# Patient Record
Sex: FEMALE | Race: WHITE | NOT HISPANIC OR LATINO | Employment: OTHER | URBAN - METROPOLITAN AREA
[De-identification: names, ages, dates, MRNs, and addresses within clinical notes are randomized per-mention and may not be internally consistent; named-entity substitution may affect disease eponyms.]

---

## 2017-03-20 ENCOUNTER — TRANSCRIBE ORDERS (OUTPATIENT)
Dept: ADMINISTRATIVE | Facility: HOSPITAL | Age: 62
End: 2017-03-20

## 2017-03-20 DIAGNOSIS — R79.1 ABNORMAL BLEEDING TIME: Primary | ICD-10-CM

## 2017-03-21 ENCOUNTER — HOSPITAL ENCOUNTER (OUTPATIENT)
Dept: RADIOLOGY | Facility: HOSPITAL | Age: 62
Discharge: HOME | End: 2017-03-21
Attending: OBSTETRICS & GYNECOLOGY
Payer: COMMERCIAL

## 2017-03-21 DIAGNOSIS — R79.1 ABNORMAL BLEEDING TIME: ICD-10-CM

## 2017-03-21 PROCEDURE — 76830 TRANSVAGINAL US NON-OB: CPT

## 2017-03-21 PROCEDURE — 76856 US EXAM PELVIC COMPLETE: CPT

## 2017-03-27 ENCOUNTER — GENERIC CONVERSION - ENCOUNTER (OUTPATIENT)
Dept: OTHER | Facility: OTHER | Age: 62
End: 2017-03-27

## 2017-03-30 ENCOUNTER — APPOINTMENT (OUTPATIENT)
Dept: LAB | Facility: HOSPITAL | Age: 62
End: 2017-03-30
Attending: INTERNAL MEDICINE
Payer: COMMERCIAL

## 2017-03-30 ENCOUNTER — TRANSCRIBE ORDERS (OUTPATIENT)
Dept: ADMINISTRATIVE | Facility: HOSPITAL | Age: 62
End: 2017-03-30

## 2017-03-30 ENCOUNTER — HOSPITAL ENCOUNTER (OUTPATIENT)
Dept: RADIOLOGY | Facility: HOSPITAL | Age: 62
Discharge: HOME | End: 2017-03-30
Payer: COMMERCIAL

## 2017-03-30 DIAGNOSIS — I10 ESSENTIAL HYPERTENSION, MALIGNANT: Primary | ICD-10-CM

## 2017-03-30 DIAGNOSIS — I10 ESSENTIAL HYPERTENSION, MALIGNANT: ICD-10-CM

## 2017-03-30 LAB
ATRIAL RATE: 72 BPM
P AXIS: 58 DEGREES
PR INTERVAL: 132 MS
QRS AXIS: 45 DEGREES
QRSD INTERVAL: 120 MS
QT INTERVAL: 432 MS
QTC INTERVAL: 473 MS
T WAVE AXIS: 42 DEGREES
VENIPUNCTURE: NORMAL
VENTRICULAR RATE: 72 BPM

## 2017-03-30 PROCEDURE — 71020 HB CHEST X-RAY 2VW FRONTAL&LATL: CPT

## 2017-03-30 PROCEDURE — 36415 COLL VENOUS BLD VENIPUNCTURE: CPT

## 2017-03-30 PROCEDURE — 93005 ELECTROCARDIOGRAM TRACING: CPT

## 2017-04-04 ENCOUNTER — ANESTHESIA EVENT (OUTPATIENT)
Dept: PERIOP | Facility: AMBULARY SURGERY CENTER | Age: 62
End: 2017-04-04
Payer: COMMERCIAL

## 2017-04-05 ENCOUNTER — ANESTHESIA (OUTPATIENT)
Dept: PERIOP | Facility: AMBULARY SURGERY CENTER | Age: 62
End: 2017-04-05
Payer: COMMERCIAL

## 2017-04-05 ENCOUNTER — HOSPITAL ENCOUNTER (OUTPATIENT)
Facility: AMBULARY SURGERY CENTER | Age: 62
Setting detail: OUTPATIENT SURGERY
Discharge: HOME | End: 2017-04-05
Attending: OBSTETRICS & GYNECOLOGY | Admitting: OBSTETRICS & GYNECOLOGY
Payer: COMMERCIAL

## 2017-04-05 ENCOUNTER — GENERIC CONVERSION - ENCOUNTER (OUTPATIENT)
Dept: OTHER | Facility: OTHER | Age: 62
End: 2017-04-05

## 2017-04-05 VITALS
DIASTOLIC BLOOD PRESSURE: 64 MMHG | TEMPERATURE: 98 F | OXYGEN SATURATION: 95 % | SYSTOLIC BLOOD PRESSURE: 123 MMHG | HEART RATE: 88 BPM | RESPIRATION RATE: 20 BRPM

## 2017-04-05 DIAGNOSIS — N95.0 POSTMENOPAUSAL BLEEDING: ICD-10-CM

## 2017-04-05 PROCEDURE — 88305 TISSUE EXAM BY PATHOLOGIST: CPT | Performed by: OBSTETRICS & GYNECOLOGY

## 2017-04-05 RX ORDER — METOCLOPRAMIDE HYDROCHLORIDE 5 MG/ML
INJECTION INTRAMUSCULAR; INTRAVENOUS AS NEEDED
Status: DISCONTINUED | OUTPATIENT
Start: 2017-04-05 | End: 2017-04-05 | Stop reason: SURG

## 2017-04-05 RX ORDER — SODIUM CHLORIDE, SODIUM LACTATE, POTASSIUM CHLORIDE, CALCIUM CHLORIDE 600; 310; 30; 20 MG/100ML; MG/100ML; MG/100ML; MG/100ML
125 INJECTION, SOLUTION INTRAVENOUS CONTINUOUS
Status: DISCONTINUED | OUTPATIENT
Start: 2017-04-05 | End: 2017-04-05 | Stop reason: HOSPADM

## 2017-04-05 RX ORDER — OXYCODONE HYDROCHLORIDE AND ACETAMINOPHEN 5; 325 MG/1; MG/1
1 TABLET ORAL EVERY 4 HOURS PRN
Status: DISCONTINUED | OUTPATIENT
Start: 2017-04-05 | End: 2017-04-05 | Stop reason: HOSPADM

## 2017-04-05 RX ORDER — ONDANSETRON 2 MG/ML
INJECTION INTRAMUSCULAR; INTRAVENOUS AS NEEDED
Status: DISCONTINUED | OUTPATIENT
Start: 2017-04-05 | End: 2017-04-05 | Stop reason: SURG

## 2017-04-05 RX ORDER — MIDAZOLAM HYDROCHLORIDE 1 MG/ML
INJECTION INTRAMUSCULAR; INTRAVENOUS AS NEEDED
Status: DISCONTINUED | OUTPATIENT
Start: 2017-04-05 | End: 2017-04-05 | Stop reason: SURG

## 2017-04-05 RX ORDER — FENTANYL CITRATE/PF 50 MCG/ML
50 SYRINGE (ML) INJECTION
Status: DISCONTINUED | OUTPATIENT
Start: 2017-04-05 | End: 2017-04-05 | Stop reason: HOSPADM

## 2017-04-05 RX ORDER — PROPOFOL 10 MG/ML
INJECTION, EMULSION INTRAVENOUS AS NEEDED
Status: DISCONTINUED | OUTPATIENT
Start: 2017-04-05 | End: 2017-04-05 | Stop reason: SURG

## 2017-04-05 RX ORDER — KETOROLAC TROMETHAMINE 30 MG/ML
INJECTION, SOLUTION INTRAMUSCULAR; INTRAVENOUS AS NEEDED
Status: DISCONTINUED | OUTPATIENT
Start: 2017-04-05 | End: 2017-04-05 | Stop reason: SURG

## 2017-04-05 RX ORDER — SODIUM CHLORIDE, SODIUM LACTATE, POTASSIUM CHLORIDE, CALCIUM CHLORIDE 600; 310; 30; 20 MG/100ML; MG/100ML; MG/100ML; MG/100ML
125 INJECTION, SOLUTION INTRAVENOUS CONTINUOUS
Status: DISCONTINUED | OUTPATIENT
Start: 2017-04-05 | End: 2017-04-05 | Stop reason: SDUPTHER

## 2017-04-05 RX ORDER — SODIUM CHLORIDE, SODIUM LACTATE, POTASSIUM CHLORIDE, CALCIUM CHLORIDE 600; 310; 30; 20 MG/100ML; MG/100ML; MG/100ML; MG/100ML
50 INJECTION, SOLUTION INTRAVENOUS CONTINUOUS
Status: DISCONTINUED | OUTPATIENT
Start: 2017-04-05 | End: 2017-04-05 | Stop reason: SDUPTHER

## 2017-04-05 RX ORDER — FENTANYL CITRATE 50 UG/ML
INJECTION, SOLUTION INTRAMUSCULAR; INTRAVENOUS AS NEEDED
Status: DISCONTINUED | OUTPATIENT
Start: 2017-04-05 | End: 2017-04-05 | Stop reason: SURG

## 2017-04-05 RX ADMIN — SODIUM CHLORIDE, SODIUM LACTATE, POTASSIUM CHLORIDE, AND CALCIUM CHLORIDE 125 ML/HR: .6; .31; .03; .02 INJECTION, SOLUTION INTRAVENOUS at 08:22

## 2017-04-05 RX ADMIN — FAMOTIDINE 20 MG: 10 INJECTION, SOLUTION INTRAVENOUS at 09:28

## 2017-04-05 RX ADMIN — DEXAMETHASONE SODIUM PHOSPHATE 10 MG: 10 INJECTION INTRAMUSCULAR; INTRAVENOUS at 09:29

## 2017-04-05 RX ADMIN — ONDANSETRON 4 MG: 2 INJECTION INTRAMUSCULAR; INTRAVENOUS at 09:39

## 2017-04-05 RX ADMIN — KETOROLAC TROMETHAMINE 30 MG: 30 INJECTION, SOLUTION INTRAMUSCULAR at 09:39

## 2017-04-05 RX ADMIN — MIDAZOLAM HYDROCHLORIDE 2 MG: 1 INJECTION, SOLUTION INTRAMUSCULAR; INTRAVENOUS at 09:17

## 2017-04-05 RX ADMIN — FENTANYL CITRATE 50 MCG: 50 INJECTION, SOLUTION INTRAMUSCULAR; INTRAVENOUS at 09:49

## 2017-04-05 RX ADMIN — FENTANYL CITRATE 50 MCG: 50 INJECTION, SOLUTION INTRAMUSCULAR; INTRAVENOUS at 09:29

## 2017-04-05 RX ADMIN — PROPOFOL 200 MG: 10 INJECTION, EMULSION INTRAVENOUS at 09:22

## 2017-04-05 RX ADMIN — METOCLOPRAMIDE HYDROCHLORIDE 10 MG: 5 INJECTION INTRAMUSCULAR; INTRAVENOUS at 09:28

## 2017-04-13 ENCOUNTER — ALLSCRIPTS OFFICE VISIT (OUTPATIENT)
Dept: OTHER | Facility: OTHER | Age: 62
End: 2017-04-13

## 2017-04-13 DIAGNOSIS — C54.1 MALIGNANT NEOPLASM OF ENDOMETRIUM (HCC): ICD-10-CM

## 2017-04-14 ENCOUNTER — GENERIC CONVERSION - ENCOUNTER (OUTPATIENT)
Dept: OTHER | Facility: OTHER | Age: 62
End: 2017-04-14

## 2017-04-21 ENCOUNTER — APPOINTMENT (OUTPATIENT)
Dept: LAB | Facility: HOSPITAL | Age: 62
End: 2017-04-21
Attending: OBSTETRICS & GYNECOLOGY
Payer: COMMERCIAL

## 2017-04-21 ENCOUNTER — TRANSCRIBE ORDERS (OUTPATIENT)
Dept: ADMINISTRATIVE | Facility: HOSPITAL | Age: 62
End: 2017-04-21

## 2017-04-21 DIAGNOSIS — C54.1 MALIGNANT NEOPLASM OF ENDOMETRIUM (HCC): ICD-10-CM

## 2017-04-21 DIAGNOSIS — C54.1 ENDOMETRIAL SARCOMA (HCC): ICD-10-CM

## 2017-04-21 DIAGNOSIS — Z01.812 PRE-OPERATIVE LABORATORY EXAMINATION: ICD-10-CM

## 2017-04-21 DIAGNOSIS — Z01.812 PRE-OPERATIVE LABORATORY EXAMINATION: Primary | ICD-10-CM

## 2017-04-21 LAB — VENIPUNCTURE: NORMAL

## 2017-04-21 PROCEDURE — 36415 COLL VENOUS BLD VENIPUNCTURE: CPT

## 2017-04-21 PROCEDURE — 86901 BLOOD TYPING SEROLOGIC RH(D): CPT

## 2017-04-21 PROCEDURE — 86900 BLOOD TYPING SEROLOGIC ABO: CPT

## 2017-04-21 PROCEDURE — 36415 COLL VENOUS BLD VENIPUNCTURE: CPT | Performed by: OBSTETRICS & GYNECOLOGY

## 2017-04-21 PROCEDURE — 86850 RBC ANTIBODY SCREEN: CPT

## 2017-04-24 ENCOUNTER — LAB CONVERSION - ENCOUNTER (OUTPATIENT)
Dept: OTHER | Facility: OTHER | Age: 62
End: 2017-04-24

## 2017-04-24 LAB
A/G RATIO (HISTORICAL): 1.5 (CALC) (ref 1–2.5)
ABO GROUP BLD: NORMAL
ALBUMIN SERPL BCP-MCNC: 4.1 G/DL (ref 3.6–5.1)
ALP SERPL-CCNC: 67 U/L (ref 33–130)
ALT SERPL W P-5'-P-CCNC: 25 U/L (ref 6–29)
AST SERPL W P-5'-P-CCNC: 26 U/L (ref 10–35)
BASOPHILS # BLD AUTO: 0.3 %
BASOPHILS # BLD AUTO: 20 CELLS/UL (ref 0–200)
BILIRUB SERPL-MCNC: 0.5 MG/DL (ref 0.2–1.2)
BUN SERPL-MCNC: 33 MG/DL (ref 7–25)
BUN/CREA RATIO (HISTORICAL): 20 (CALC) (ref 6–22)
CALCIUM SERPL-MCNC: 9 MG/DL (ref 8.6–10.4)
CHLORIDE SERPL-SCNC: 103 MMOL/L (ref 98–110)
CO2 SERPL-SCNC: 19 MMOL/L (ref 20–31)
CREAT SERPL-MCNC: 1.67 MG/DL (ref 0.5–0.99)
DEPRECATED RDW RBC AUTO: 14.1 % (ref 11–15)
EGFR AFRICAN AMERICAN (HISTORICAL): 37 ML/MIN/1.73M2
EGFR-AMERICAN CALC (HISTORICAL): 32 ML/MIN/1.73M2
EOSINOPHIL # BLD AUTO: 1.9 %
EOSINOPHIL # BLD AUTO: 127 CELLS/UL (ref 15–500)
GAMMA GLOBULIN (HISTORICAL): 2.8 G/DL (CALC) (ref 1.9–3.7)
GLUCOSE (HISTORICAL): 130 MG/DL (ref 65–99)
HBA1C MFR BLD HPLC: 6.7 % OF TOTAL HGB
HCT VFR BLD AUTO: 35.8 % (ref 35–45)
HGB BLD-MCNC: 12.3 G/DL (ref 11.7–15.5)
LYMPHOCYTES # BLD AUTO: 1695 CELLS/UL (ref 850–3900)
LYMPHOCYTES # BLD AUTO: 25.3 %
MCH RBC QN AUTO: 30.9 PG (ref 27–33)
MCHC RBC AUTO-ENTMCNC: 34.5 G/DL (ref 32–36)
MCV RBC AUTO: 89.8 FL (ref 80–100)
MONOCYTES # BLD AUTO: 362 CELLS/UL (ref 200–950)
MONOCYTES (HISTORICAL): 5.4 %
NEUTROPHILS # BLD AUTO: 4496 CELLS/UL (ref 1500–7800)
NEUTROPHILS # BLD AUTO: 67.1 %
PLATELET # BLD AUTO: 179 THOUSAND/UL (ref 140–400)
PMV BLD AUTO: 7.5 FL (ref 7.5–12.5)
POTASSIUM SERPL-SCNC: 4.9 MMOL/L (ref 3.5–5.3)
RBC # BLD AUTO: 3.98 MILLION/UL (ref 3.8–5.1)
RH BLD: NORMAL
SODIUM SERPL-SCNC: 134 MMOL/L (ref 135–146)
TOTAL PROTEIN (HISTORICAL): 6.9 G/DL (ref 6.1–8.1)
WBC # BLD AUTO: 6.7 THOUSAND/UL (ref 3.8–10.8)

## 2017-04-26 LAB
ABO GROUP BLD: NORMAL
BLD GP AB SCN SERPL QL: NEGATIVE
RH BLD: NEGATIVE
SPECIMEN EXPIRATION DATE: NORMAL

## 2017-05-01 RX ORDER — TRIAMTERENE AND HYDROCHLOROTHIAZIDE 37.5; 25 MG/1; MG/1
1 TABLET ORAL DAILY
COMMUNITY
End: 2020-01-09

## 2017-05-01 RX ORDER — ROSUVASTATIN CALCIUM 20 MG/1
20 TABLET, COATED ORAL
COMMUNITY
End: 2020-03-27 | Stop reason: SDUPTHER

## 2017-05-03 ENCOUNTER — ANESTHESIA EVENT (OUTPATIENT)
Dept: PERIOP | Facility: HOSPITAL | Age: 62
End: 2017-05-03
Payer: COMMERCIAL

## 2017-05-04 ENCOUNTER — ANESTHESIA (OUTPATIENT)
Dept: PERIOP | Facility: HOSPITAL | Age: 62
End: 2017-05-04
Payer: COMMERCIAL

## 2017-05-04 ENCOUNTER — HOSPITAL ENCOUNTER (OUTPATIENT)
Facility: HOSPITAL | Age: 62
Setting detail: OUTPATIENT SURGERY
Discharge: HOME | End: 2017-05-06
Attending: OBSTETRICS & GYNECOLOGY | Admitting: OBSTETRICS & GYNECOLOGY
Payer: COMMERCIAL

## 2017-05-04 DIAGNOSIS — C54.1 MALIGNANT NEOPLASM OF ENDOMETRIUM (HCC): ICD-10-CM

## 2017-05-04 PROBLEM — IMO0002 UNCONTROLLED TYPE 2 DIABETES MELLITUS WITH NEPHROPATHY: Status: ACTIVE | Noted: 2017-05-04

## 2017-05-04 PROBLEM — I10 HYPERTENSION: Chronic | Status: ACTIVE | Noted: 2017-05-04

## 2017-05-04 PROBLEM — E66.9 OBESITY: Chronic | Status: ACTIVE | Noted: 2017-05-04

## 2017-05-04 PROBLEM — E78.5 HYPERLIPIDEMIA: Chronic | Status: ACTIVE | Noted: 2017-05-04

## 2017-05-04 PROBLEM — I82.409 DEEP VEIN THROMBOSIS (DVT) DURING CURRENT HOSPITALIZATION (HCC): Status: ACTIVE | Noted: 2017-05-04

## 2017-05-04 LAB
GLUCOSE SERPL-MCNC: 102 MG/DL (ref 65–140)
GLUCOSE SERPL-MCNC: 162 MG/DL (ref 65–140)
GLUCOSE SERPL-MCNC: 297 MG/DL (ref 65–140)

## 2017-05-04 PROCEDURE — 88112 CYTOPATH CELL ENHANCE TECH: CPT | Performed by: OBSTETRICS & GYNECOLOGY

## 2017-05-04 PROCEDURE — 82948 REAGENT STRIP/BLOOD GLUCOSE: CPT

## 2017-05-04 PROCEDURE — 88309 TISSUE EXAM BY PATHOLOGIST: CPT | Performed by: OBSTETRICS & GYNECOLOGY

## 2017-05-04 PROCEDURE — 88307 TISSUE EXAM BY PATHOLOGIST: CPT | Performed by: OBSTETRICS & GYNECOLOGY

## 2017-05-04 PROCEDURE — 88363 XM ARCHIVE TISSUE MOLEC ANAL: CPT | Performed by: OBSTETRICS & GYNECOLOGY

## 2017-05-04 RX ORDER — LIDOCAINE HYDROCHLORIDE 10 MG/ML
INJECTION, SOLUTION INFILTRATION; PERINEURAL AS NEEDED
Status: DISCONTINUED | OUTPATIENT
Start: 2017-05-04 | End: 2017-05-04 | Stop reason: SURG

## 2017-05-04 RX ORDER — GLYCOPYRROLATE 0.2 MG/ML
INJECTION INTRAMUSCULAR; INTRAVENOUS AS NEEDED
Status: DISCONTINUED | OUTPATIENT
Start: 2017-05-04 | End: 2017-05-04 | Stop reason: SURG

## 2017-05-04 RX ORDER — OXYCODONE HYDROCHLORIDE 5 MG/1
5 TABLET ORAL EVERY 4 HOURS PRN
Status: DISCONTINUED | OUTPATIENT
Start: 2017-05-04 | End: 2017-05-06 | Stop reason: HOSPADM

## 2017-05-04 RX ORDER — CLONAZEPAM 0.5 MG/1
0.5 TABLET ORAL
Status: DISCONTINUED | OUTPATIENT
Start: 2017-05-05 | End: 2017-05-06 | Stop reason: HOSPADM

## 2017-05-04 RX ORDER — ONDANSETRON 2 MG/ML
4 INJECTION INTRAMUSCULAR; INTRAVENOUS EVERY 6 HOURS PRN
Status: DISCONTINUED | OUTPATIENT
Start: 2017-05-04 | End: 2017-05-06 | Stop reason: HOSPADM

## 2017-05-04 RX ORDER — SODIUM CHLORIDE, SODIUM LACTATE, POTASSIUM CHLORIDE, CALCIUM CHLORIDE 600; 310; 30; 20 MG/100ML; MG/100ML; MG/100ML; MG/100ML
20 INJECTION, SOLUTION INTRAVENOUS CONTINUOUS
Status: DISCONTINUED | OUTPATIENT
Start: 2017-05-04 | End: 2017-05-04

## 2017-05-04 RX ORDER — BUPROPION HYDROCHLORIDE 100 MG/1
200 TABLET, EXTENDED RELEASE ORAL EVERY MORNING
Status: DISCONTINUED | OUTPATIENT
Start: 2017-05-05 | End: 2017-05-06 | Stop reason: HOSPADM

## 2017-05-04 RX ORDER — OXYCODONE HYDROCHLORIDE 10 MG/1
10 TABLET ORAL EVERY 6 HOURS PRN
Status: DISCONTINUED | OUTPATIENT
Start: 2017-05-04 | End: 2017-05-06 | Stop reason: HOSPADM

## 2017-05-04 RX ORDER — SODIUM CHLORIDE 9 MG/ML
INJECTION, SOLUTION INTRAVENOUS CONTINUOUS PRN
Status: DISCONTINUED | OUTPATIENT
Start: 2017-05-04 | End: 2017-05-04 | Stop reason: SURG

## 2017-05-04 RX ORDER — FENTANYL CITRATE/PF 50 MCG/ML
25 SYRINGE (ML) INJECTION
Status: COMPLETED | OUTPATIENT
Start: 2017-05-04 | End: 2017-05-04

## 2017-05-04 RX ORDER — HEPARIN SODIUM 5000 [USP'U]/ML
INJECTION, SOLUTION INTRAVENOUS; SUBCUTANEOUS AS NEEDED
Status: DISCONTINUED | OUTPATIENT
Start: 2017-05-04 | End: 2017-05-04 | Stop reason: SURG

## 2017-05-04 RX ORDER — MAGNESIUM HYDROXIDE 1200 MG/15ML
LIQUID ORAL AS NEEDED
Status: DISCONTINUED | OUTPATIENT
Start: 2017-05-04 | End: 2017-05-04 | Stop reason: HOSPADM

## 2017-05-04 RX ORDER — ONDANSETRON 2 MG/ML
4 INJECTION INTRAMUSCULAR; INTRAVENOUS ONCE AS NEEDED
Status: COMPLETED | OUTPATIENT
Start: 2017-05-04 | End: 2017-05-04

## 2017-05-04 RX ORDER — ZIPRASIDONE HYDROCHLORIDE 40 MG/1
80 CAPSULE ORAL 2 TIMES DAILY WITH MEALS
Status: DISCONTINUED | OUTPATIENT
Start: 2017-05-05 | End: 2017-05-06 | Stop reason: HOSPADM

## 2017-05-04 RX ORDER — BUPIVACAINE HYDROCHLORIDE 2.5 MG/ML
INJECTION, SOLUTION INFILTRATION; PERINEURAL AS NEEDED
Status: DISCONTINUED | OUTPATIENT
Start: 2017-05-04 | End: 2017-05-04 | Stop reason: HOSPADM

## 2017-05-04 RX ORDER — FENTANYL CITRATE 50 UG/ML
INJECTION, SOLUTION INTRAMUSCULAR; INTRAVENOUS AS NEEDED
Status: DISCONTINUED | OUTPATIENT
Start: 2017-05-04 | End: 2017-05-04 | Stop reason: SURG

## 2017-05-04 RX ORDER — ROCURONIUM BROMIDE 10 MG/ML
INJECTION, SOLUTION INTRAVENOUS AS NEEDED
Status: DISCONTINUED | OUTPATIENT
Start: 2017-05-04 | End: 2017-05-04 | Stop reason: SURG

## 2017-05-04 RX ORDER — PROMETHAZINE HYDROCHLORIDE 25 MG/ML
12.5 INJECTION, SOLUTION INTRAMUSCULAR; INTRAVENOUS ONCE
Status: COMPLETED | OUTPATIENT
Start: 2017-05-04 | End: 2017-05-04

## 2017-05-04 RX ORDER — SIMETHICONE 80 MG
80 TABLET,CHEWABLE ORAL EVERY 6 HOURS PRN
Status: DISCONTINUED | OUTPATIENT
Start: 2017-05-04 | End: 2017-05-06 | Stop reason: HOSPADM

## 2017-05-04 RX ORDER — ONDANSETRON 2 MG/ML
INJECTION INTRAMUSCULAR; INTRAVENOUS AS NEEDED
Status: DISCONTINUED | OUTPATIENT
Start: 2017-05-04 | End: 2017-05-04 | Stop reason: SURG

## 2017-05-04 RX ORDER — SODIUM CHLORIDE, SODIUM LACTATE, POTASSIUM CHLORIDE, CALCIUM CHLORIDE 600; 310; 30; 20 MG/100ML; MG/100ML; MG/100ML; MG/100ML
50 INJECTION, SOLUTION INTRAVENOUS CONTINUOUS
Status: DISCONTINUED | OUTPATIENT
Start: 2017-05-04 | End: 2017-05-05

## 2017-05-04 RX ORDER — MIDAZOLAM HYDROCHLORIDE 1 MG/ML
INJECTION INTRAMUSCULAR; INTRAVENOUS AS NEEDED
Status: DISCONTINUED | OUTPATIENT
Start: 2017-05-04 | End: 2017-05-04 | Stop reason: SURG

## 2017-05-04 RX ORDER — DEXTROSE, SODIUM CHLORIDE, AND POTASSIUM CHLORIDE 5; .9; .15 G/100ML; G/100ML; G/100ML
125 INJECTION INTRAVENOUS CONTINUOUS
Status: DISCONTINUED | OUTPATIENT
Start: 2017-05-04 | End: 2017-05-05

## 2017-05-04 RX ORDER — CLONAZEPAM 0.5 MG/1
0.25 TABLET ORAL DAILY
Status: DISCONTINUED | OUTPATIENT
Start: 2017-05-05 | End: 2017-05-06 | Stop reason: HOSPADM

## 2017-05-04 RX ORDER — PROPOFOL 10 MG/ML
INJECTION, EMULSION INTRAVENOUS AS NEEDED
Status: DISCONTINUED | OUTPATIENT
Start: 2017-05-04 | End: 2017-05-04 | Stop reason: SURG

## 2017-05-04 RX ORDER — ACETAMINOPHEN 325 MG/1
650 TABLET ORAL EVERY 4 HOURS PRN
Status: DISCONTINUED | OUTPATIENT
Start: 2017-05-04 | End: 2017-05-06 | Stop reason: HOSPADM

## 2017-05-04 RX ORDER — OXYBUTYNIN CHLORIDE 5 MG/1
15 TABLET, EXTENDED RELEASE ORAL EVERY MORNING
Status: DISCONTINUED | OUTPATIENT
Start: 2017-05-05 | End: 2017-05-06

## 2017-05-04 RX ORDER — DOCUSATE SODIUM 100 MG/1
100 CAPSULE, LIQUID FILLED ORAL 2 TIMES DAILY
Status: DISCONTINUED | OUTPATIENT
Start: 2017-05-04 | End: 2017-05-06 | Stop reason: HOSPADM

## 2017-05-04 RX ADMIN — DOCUSATE SODIUM 100 MG: 100 CAPSULE, LIQUID FILLED ORAL at 20:00

## 2017-05-04 RX ADMIN — FENTANYL CITRATE 50 MCG: 50 INJECTION, SOLUTION INTRAMUSCULAR; INTRAVENOUS at 14:35

## 2017-05-04 RX ADMIN — Medication 2000 MG: at 12:37

## 2017-05-04 RX ADMIN — LIDOCAINE HYDROCHLORIDE 40 MG: 10 INJECTION, SOLUTION INFILTRATION; PERINEURAL at 12:18

## 2017-05-04 RX ADMIN — PROMETHAZINE HYDROCHLORIDE 12.5 MG: 25 INJECTION INTRAMUSCULAR; INTRAVENOUS at 16:59

## 2017-05-04 RX ADMIN — SODIUM CHLORIDE, SODIUM LACTATE, POTASSIUM CHLORIDE, AND CALCIUM CHLORIDE 20 ML/HR: .6; .31; .03; .02 INJECTION, SOLUTION INTRAVENOUS at 10:56

## 2017-05-04 RX ADMIN — FENTANYL CITRATE 50 MCG: 50 INJECTION, SOLUTION INTRAMUSCULAR; INTRAVENOUS at 15:00

## 2017-05-04 RX ADMIN — FENTANYL CITRATE 25 MCG: 50 INJECTION INTRAMUSCULAR; INTRAVENOUS at 16:16

## 2017-05-04 RX ADMIN — HYDROMORPHONE HYDROCHLORIDE 0.4 MG: 1 INJECTION, SOLUTION INTRAMUSCULAR; INTRAVENOUS; SUBCUTANEOUS at 16:59

## 2017-05-04 RX ADMIN — MIDAZOLAM HYDROCHLORIDE 2 MG: 1 INJECTION, SOLUTION INTRAMUSCULAR; INTRAVENOUS at 12:10

## 2017-05-04 RX ADMIN — ROCURONIUM BROMIDE 50 MG: 10 INJECTION, SOLUTION INTRAVENOUS at 12:18

## 2017-05-04 RX ADMIN — FENTANYL CITRATE 100 MCG: 50 INJECTION, SOLUTION INTRAMUSCULAR; INTRAVENOUS at 12:18

## 2017-05-04 RX ADMIN — NEOSTIGMINE METHYLSULFATE 4 MG: 1 INJECTION INTRAMUSCULAR; INTRAVENOUS; SUBCUTANEOUS at 14:49

## 2017-05-04 RX ADMIN — FENTANYL CITRATE 25 MCG: 50 INJECTION INTRAMUSCULAR; INTRAVENOUS at 16:28

## 2017-05-04 RX ADMIN — INSULIN LISPRO 15 UNITS: 100 INJECTION, SOLUTION INTRAVENOUS; SUBCUTANEOUS at 22:01

## 2017-05-04 RX ADMIN — OXYCODONE HYDROCHLORIDE 10 MG: 10 TABLET ORAL at 23:40

## 2017-05-04 RX ADMIN — HEPARIN SODIUM 5000 UNITS: 5000 INJECTION, SOLUTION INTRAVENOUS; SUBCUTANEOUS at 12:29

## 2017-05-04 RX ADMIN — FENTANYL CITRATE 25 MCG: 50 INJECTION INTRAMUSCULAR; INTRAVENOUS at 16:06

## 2017-05-04 RX ADMIN — DEXTROSE, SODIUM CHLORIDE, AND POTASSIUM CHLORIDE 125 ML/HR: 5; .9; .15 INJECTION INTRAVENOUS at 19:15

## 2017-05-04 RX ADMIN — ONDANSETRON 4 MG: 2 INJECTION INTRAMUSCULAR; INTRAVENOUS at 16:27

## 2017-05-04 RX ADMIN — PROPOFOL 150 MG: 10 INJECTION, EMULSION INTRAVENOUS at 12:18

## 2017-05-04 RX ADMIN — DEXAMETHASONE SODIUM PHOSPHATE 4 MG: 10 INJECTION INTRAMUSCULAR; INTRAVENOUS at 12:54

## 2017-05-04 RX ADMIN — FENTANYL CITRATE 50 MCG: 50 INJECTION, SOLUTION INTRAMUSCULAR; INTRAVENOUS at 14:06

## 2017-05-04 RX ADMIN — HYDROMORPHONE HYDROCHLORIDE 0.4 MG: 1 INJECTION, SOLUTION INTRAMUSCULAR; INTRAVENOUS; SUBCUTANEOUS at 18:00

## 2017-05-04 RX ADMIN — SODIUM CHLORIDE, SODIUM LACTATE, POTASSIUM CHLORIDE, AND CALCIUM CHLORIDE: .6; .31; .03; .02 INJECTION, SOLUTION INTRAVENOUS at 12:10

## 2017-05-04 RX ADMIN — ONDANSETRON 4 MG: 2 INJECTION INTRAMUSCULAR; INTRAVENOUS at 12:54

## 2017-05-04 RX ADMIN — SODIUM CHLORIDE: 0.9 INJECTION, SOLUTION INTRAVENOUS at 12:21

## 2017-05-04 RX ADMIN — GLYCOPYRROLATE 0.6 MG: 0.2 INJECTION INTRAMUSCULAR; INTRAVENOUS at 14:49

## 2017-05-04 RX ADMIN — SERTRALINE HYDROCHLORIDE 100 MG: 50 TABLET ORAL at 20:01

## 2017-05-04 RX ADMIN — FENTANYL CITRATE 25 MCG: 50 INJECTION INTRAMUSCULAR; INTRAVENOUS at 15:43

## 2017-05-05 LAB
ANION GAP SERPL CALCULATED.3IONS-SCNC: 8 MMOL/L (ref 4–13)
BUN SERPL-MCNC: 25 MG/DL (ref 5–25)
CALCIUM SERPL-MCNC: 8.1 MG/DL (ref 8.3–10.1)
CHLORIDE SERPL-SCNC: 108 MMOL/L (ref 100–108)
CO2 SERPL-SCNC: 26 MMOL/L (ref 21–32)
CREAT SERPL-MCNC: 1.48 MG/DL (ref 0.6–1.3)
ERYTHROCYTE [DISTWIDTH] IN BLOOD BY AUTOMATED COUNT: 13.9 % (ref 11.6–15.1)
GFR SERPL CREATININE-BSD FRML MDRD: 35.6 ML/MIN/1.73SQ M
GLUCOSE SERPL-MCNC: 117 MG/DL (ref 65–140)
GLUCOSE SERPL-MCNC: 158 MG/DL (ref 65–140)
GLUCOSE SERPL-MCNC: 164 MG/DL (ref 65–140)
GLUCOSE SERPL-MCNC: 215 MG/DL (ref 65–140)
GLUCOSE SERPL-MCNC: 245 MG/DL (ref 65–140)
HCT VFR BLD AUTO: 32.1 % (ref 34.8–46.1)
HGB BLD-MCNC: 10.2 G/DL (ref 11.5–15.4)
MCH RBC QN AUTO: 30 PG (ref 26.8–34.3)
MCHC RBC AUTO-ENTMCNC: 31.8 G/DL (ref 31.4–37.4)
MCV RBC AUTO: 94 FL (ref 82–98)
PLATELET # BLD AUTO: 134 THOUSANDS/UL (ref 149–390)
PMV BLD AUTO: 8.7 FL (ref 8.9–12.7)
POTASSIUM SERPL-SCNC: 4.4 MMOL/L (ref 3.5–5.3)
RBC # BLD AUTO: 3.4 MILLION/UL (ref 3.81–5.12)
SODIUM SERPL-SCNC: 142 MMOL/L (ref 136–145)
WBC # BLD AUTO: 7.98 THOUSAND/UL (ref 4.31–10.16)

## 2017-05-05 PROCEDURE — 80048 BASIC METABOLIC PNL TOTAL CA: CPT | Performed by: OBSTETRICS & GYNECOLOGY

## 2017-05-05 PROCEDURE — 82948 REAGENT STRIP/BLOOD GLUCOSE: CPT

## 2017-05-05 PROCEDURE — 85027 COMPLETE CBC AUTOMATED: CPT | Performed by: OBSTETRICS & GYNECOLOGY

## 2017-05-05 RX ORDER — TRIAMTERENE AND HYDROCHLOROTHIAZIDE 37.5; 25 MG/1; MG/1
1 TABLET ORAL DAILY
Status: DISCONTINUED | OUTPATIENT
Start: 2017-05-06 | End: 2017-05-06 | Stop reason: HOSPADM

## 2017-05-05 RX ORDER — DOCUSATE SODIUM 100 MG/1
100 CAPSULE, LIQUID FILLED ORAL 2 TIMES DAILY PRN
Qty: 60 CAPSULE | Refills: 0
Start: 2017-05-05 | End: 2019-11-12

## 2017-05-05 RX ORDER — SIMETHICONE 80 MG
80 TABLET,CHEWABLE ORAL EVERY 6 HOURS PRN
Status: DISCONTINUED | OUTPATIENT
Start: 2017-05-05 | End: 2017-05-05 | Stop reason: SDUPTHER

## 2017-05-05 RX ORDER — GLIMEPIRIDE 2 MG/1
4 TABLET ORAL
Status: DISCONTINUED | OUTPATIENT
Start: 2017-05-06 | End: 2017-05-06 | Stop reason: HOSPADM

## 2017-05-05 RX ORDER — INSULIN GLARGINE 100 [IU]/ML
65 INJECTION, SOLUTION SUBCUTANEOUS EVERY MORNING
Status: DISCONTINUED | OUTPATIENT
Start: 2017-05-05 | End: 2017-05-06 | Stop reason: HOSPADM

## 2017-05-05 RX ORDER — ACETAMINOPHEN 325 MG/1
TABLET ORAL
Qty: 30 TABLET | Refills: 0
Start: 2017-05-05 | End: 2018-06-28 | Stop reason: HOSPADM

## 2017-05-05 RX ORDER — HEPARIN SODIUM 5000 [USP'U]/ML
5000 INJECTION, SOLUTION INTRAVENOUS; SUBCUTANEOUS EVERY 8 HOURS SCHEDULED
Status: DISCONTINUED | OUTPATIENT
Start: 2017-05-05 | End: 2017-05-06 | Stop reason: HOSPADM

## 2017-05-05 RX ORDER — LISINOPRIL 5 MG/1
5 TABLET ORAL EVERY MORNING
Status: DISCONTINUED | OUTPATIENT
Start: 2017-05-06 | End: 2017-05-06 | Stop reason: HOSPADM

## 2017-05-05 RX ORDER — ATORVASTATIN CALCIUM 40 MG/1
40 TABLET, FILM COATED ORAL
Status: DISCONTINUED | OUTPATIENT
Start: 2017-05-05 | End: 2017-05-06 | Stop reason: HOSPADM

## 2017-05-05 RX ADMIN — ZIPRASIDONE HYDROCHLORIDE 80 MG: 40 CAPSULE ORAL at 10:05

## 2017-05-05 RX ADMIN — INSULIN LISPRO 3 UNITS: 100 INJECTION, SOLUTION INTRAVENOUS; SUBCUTANEOUS at 20:22

## 2017-05-05 RX ADMIN — OXYCODONE HYDROCHLORIDE 5 MG: 5 TABLET ORAL at 11:01

## 2017-05-05 RX ADMIN — OXYBUTYNIN CHLORIDE 15 MG: 5 TABLET, EXTENDED RELEASE ORAL at 10:04

## 2017-05-05 RX ADMIN — INSULIN GLARGINE 65 UNITS: 100 INJECTION, SOLUTION SUBCUTANEOUS at 09:54

## 2017-05-05 RX ADMIN — DOCUSATE SODIUM 100 MG: 100 CAPSULE, LIQUID FILLED ORAL at 17:19

## 2017-05-05 RX ADMIN — DOCUSATE SODIUM 100 MG: 100 CAPSULE, LIQUID FILLED ORAL at 10:04

## 2017-05-05 RX ADMIN — BUPROPION HYDROCHLORIDE 200 MG: 100 TABLET, EXTENDED RELEASE ORAL at 10:03

## 2017-05-05 RX ADMIN — OXYCODONE HYDROCHLORIDE 10 MG: 10 TABLET ORAL at 06:26

## 2017-05-05 RX ADMIN — DEXTROSE, SODIUM CHLORIDE, AND POTASSIUM CHLORIDE 125 ML/HR: 5; .9; .15 INJECTION INTRAVENOUS at 02:38

## 2017-05-05 RX ADMIN — ATORVASTATIN CALCIUM 40 MG: 40 TABLET, FILM COATED ORAL at 17:19

## 2017-05-05 RX ADMIN — SERTRALINE HYDROCHLORIDE 100 MG: 50 TABLET ORAL at 17:19

## 2017-05-05 RX ADMIN — CLONAZEPAM 0.25 MG: 0.5 TABLET ORAL at 10:04

## 2017-05-05 RX ADMIN — ZIPRASIDONE HYDROCHLORIDE 80 MG: 40 CAPSULE ORAL at 17:19

## 2017-05-05 RX ADMIN — INSULIN LISPRO 4 UNITS: 100 INJECTION, SOLUTION INTRAVENOUS; SUBCUTANEOUS at 09:52

## 2017-05-05 RX ADMIN — SERTRALINE HYDROCHLORIDE 100 MG: 50 TABLET ORAL at 10:04

## 2017-05-05 RX ADMIN — CLONAZEPAM 0.5 MG: 0.5 TABLET ORAL at 14:33

## 2017-05-05 RX ADMIN — HEPARIN SODIUM 5000 UNITS: 5000 INJECTION, SOLUTION INTRAVENOUS; SUBCUTANEOUS at 09:59

## 2017-05-05 RX ADMIN — HEPARIN SODIUM 5000 UNITS: 5000 INJECTION, SOLUTION INTRAVENOUS; SUBCUTANEOUS at 14:34

## 2017-05-06 VITALS
WEIGHT: 208.11 LBS | TEMPERATURE: 98.6 F | OXYGEN SATURATION: 91 % | DIASTOLIC BLOOD PRESSURE: 59 MMHG | RESPIRATION RATE: 20 BRPM | SYSTOLIC BLOOD PRESSURE: 105 MMHG | HEART RATE: 84 BPM | BODY MASS INDEX: 38.3 KG/M2 | HEIGHT: 62 IN

## 2017-05-06 PROBLEM — I82.409 DEEP VEIN THROMBOSIS (DVT) DURING CURRENT HOSPITALIZATION (HCC): Status: RESOLVED | Noted: 2017-05-06 | Resolved: 2017-05-06

## 2017-05-06 PROBLEM — I82.409 DEEP VEIN THROMBOSIS (DVT) DURING CURRENT HOSPITALIZATION (HCC): Status: ACTIVE | Noted: 2017-05-06

## 2017-05-06 PROBLEM — I82.409 DEEP VEIN THROMBOSIS (DVT) DURING CURRENT HOSPITALIZATION (HCC): Status: RESOLVED | Noted: 2017-05-04 | Resolved: 2017-05-06

## 2017-05-06 LAB
ANION GAP SERPL CALCULATED.3IONS-SCNC: 5 MMOL/L (ref 4–13)
BUN SERPL-MCNC: 22 MG/DL (ref 5–25)
CALCIUM SERPL-MCNC: 8.7 MG/DL (ref 8.3–10.1)
CHLORIDE SERPL-SCNC: 104 MMOL/L (ref 100–108)
CO2 SERPL-SCNC: 27 MMOL/L (ref 21–32)
CREAT SERPL-MCNC: 1.44 MG/DL (ref 0.6–1.3)
ERYTHROCYTE [DISTWIDTH] IN BLOOD BY AUTOMATED COUNT: 13.9 % (ref 11.6–15.1)
GFR SERPL CREATININE-BSD FRML MDRD: 36.8 ML/MIN/1.73SQ M
GLUCOSE SERPL-MCNC: 102 MG/DL (ref 65–140)
GLUCOSE SERPL-MCNC: 138 MG/DL (ref 65–140)
GLUCOSE SERPL-MCNC: 152 MG/DL (ref 65–140)
HCT VFR BLD AUTO: 35.9 % (ref 34.8–46.1)
HGB BLD-MCNC: 11.5 G/DL (ref 11.5–15.4)
MCH RBC QN AUTO: 30.3 PG (ref 26.8–34.3)
MCHC RBC AUTO-ENTMCNC: 32 G/DL (ref 31.4–37.4)
MCV RBC AUTO: 95 FL (ref 82–98)
PLATELET # BLD AUTO: 150 THOUSANDS/UL (ref 149–390)
PMV BLD AUTO: 8.6 FL (ref 8.9–12.7)
POTASSIUM SERPL-SCNC: 4.3 MMOL/L (ref 3.5–5.3)
RBC # BLD AUTO: 3.79 MILLION/UL (ref 3.81–5.12)
SODIUM SERPL-SCNC: 136 MMOL/L (ref 136–145)
WBC # BLD AUTO: 9.39 THOUSAND/UL (ref 4.31–10.16)

## 2017-05-06 PROCEDURE — 85027 COMPLETE CBC AUTOMATED: CPT | Performed by: OBSTETRICS & GYNECOLOGY

## 2017-05-06 PROCEDURE — 82948 REAGENT STRIP/BLOOD GLUCOSE: CPT

## 2017-05-06 PROCEDURE — 80048 BASIC METABOLIC PNL TOTAL CA: CPT | Performed by: OBSTETRICS & GYNECOLOGY

## 2017-05-06 RX ADMIN — DOCUSATE SODIUM 100 MG: 100 CAPSULE, LIQUID FILLED ORAL at 09:49

## 2017-05-06 RX ADMIN — ZIPRASIDONE HYDROCHLORIDE 80 MG: 40 CAPSULE ORAL at 09:49

## 2017-05-06 RX ADMIN — LISINOPRIL 5 MG: 5 TABLET ORAL at 09:49

## 2017-05-06 RX ADMIN — HEPARIN SODIUM 5000 UNITS: 5000 INJECTION, SOLUTION INTRAVENOUS; SUBCUTANEOUS at 00:15

## 2017-05-06 RX ADMIN — TRIAMTERENE AND HYDROCHLOROTHIAZIDE 1 TABLET: 37.5; 25 TABLET ORAL at 09:49

## 2017-05-06 RX ADMIN — INSULIN GLARGINE 65 UNITS: 100 INJECTION, SOLUTION SUBCUTANEOUS at 09:48

## 2017-05-06 RX ADMIN — SERTRALINE HYDROCHLORIDE 100 MG: 50 TABLET ORAL at 09:49

## 2017-05-06 RX ADMIN — HEPARIN SODIUM 5000 UNITS: 5000 INJECTION, SOLUTION INTRAVENOUS; SUBCUTANEOUS at 05:05

## 2017-05-06 RX ADMIN — BUPROPION HYDROCHLORIDE 200 MG: 100 TABLET, EXTENDED RELEASE ORAL at 09:48

## 2017-05-06 RX ADMIN — CLONAZEPAM 0.25 MG: 0.5 TABLET ORAL at 09:48

## 2017-05-06 RX ADMIN — GLIMEPIRIDE 4 MG: 2 TABLET ORAL at 09:48

## 2017-05-16 ENCOUNTER — TRANSCRIBE ORDERS (OUTPATIENT)
Dept: ADMINISTRATIVE | Facility: HOSPITAL | Age: 62
End: 2017-05-16

## 2017-05-16 ENCOUNTER — ALLSCRIPTS OFFICE VISIT (OUTPATIENT)
Dept: OTHER | Facility: OTHER | Age: 62
End: 2017-05-16

## 2017-05-16 DIAGNOSIS — C54.1 ENDOMETRIAL SARCOMA (HCC): Primary | ICD-10-CM

## 2017-06-09 ENCOUNTER — ALLSCRIPTS OFFICE VISIT (OUTPATIENT)
Dept: OTHER | Facility: OTHER | Age: 62
End: 2017-06-09

## 2017-06-13 ENCOUNTER — HOSPITAL ENCOUNTER (OUTPATIENT)
Dept: CT IMAGING | Facility: HOSPITAL | Age: 62
Discharge: HOME | End: 2017-06-13
Payer: COMMERCIAL

## 2017-06-13 DIAGNOSIS — C54.1 ENDOMETRIAL SARCOMA (HCC): ICD-10-CM

## 2017-06-13 PROCEDURE — 71250 CT THORAX DX C-: CPT

## 2017-06-13 PROCEDURE — 74176 CT ABD & PELVIS W/O CONTRAST: CPT

## 2017-06-16 ENCOUNTER — ALLSCRIPTS OFFICE VISIT (OUTPATIENT)
Dept: OTHER | Facility: OTHER | Age: 62
End: 2017-06-16

## 2017-06-26 ENCOUNTER — GENERIC CONVERSION - ENCOUNTER (OUTPATIENT)
Dept: OTHER | Facility: OTHER | Age: 62
End: 2017-06-26

## 2017-06-27 ENCOUNTER — APPOINTMENT (OUTPATIENT)
Dept: RADIATION ONCOLOGY | Facility: HOSPITAL | Age: 62
End: 2017-06-27
Attending: RADIOLOGY
Payer: COMMERCIAL

## 2017-06-27 PROCEDURE — 99215 OFFICE O/P EST HI 40 MIN: CPT | Performed by: RADIOLOGY

## 2017-07-03 ENCOUNTER — APPOINTMENT (OUTPATIENT)
Dept: RADIATION ONCOLOGY | Facility: CLINIC | Age: 62
End: 2017-07-03
Attending: RADIOLOGY
Payer: COMMERCIAL

## 2017-07-03 PROCEDURE — 77334 RADIATION TREATMENT AID(S): CPT | Performed by: RADIOLOGY

## 2017-07-03 PROCEDURE — 77290 THER RAD SIMULAJ FIELD CPLX: CPT | Performed by: RADIOLOGY

## 2017-07-11 ENCOUNTER — APPOINTMENT (OUTPATIENT)
Dept: RADIATION ONCOLOGY | Facility: HOSPITAL | Age: 62
End: 2017-07-11
Payer: COMMERCIAL

## 2017-07-11 PROCEDURE — 77300 RADIATION THERAPY DOSE PLAN: CPT | Performed by: RADIOLOGY

## 2017-07-11 PROCEDURE — 77338 DESIGN MLC DEVICE FOR IMRT: CPT | Performed by: RADIOLOGY

## 2017-07-11 PROCEDURE — 77301 RADIOTHERAPY DOSE PLAN IMRT: CPT | Performed by: RADIOLOGY

## 2017-07-18 PROCEDURE — 77280 THER RAD SIMULAJ FIELD SMPL: CPT | Performed by: RADIOLOGY

## 2017-07-19 PROCEDURE — 77386 HB NTSTY MODUL RAD TX DLVR CPLX: CPT | Performed by: RADIOLOGY

## 2017-07-20 PROCEDURE — 77386 HB NTSTY MODUL RAD TX DLVR CPLX: CPT | Performed by: RADIOLOGY

## 2017-07-21 PROCEDURE — 77386 HB NTSTY MODUL RAD TX DLVR CPLX: CPT | Performed by: RADIOLOGY

## 2017-07-24 PROCEDURE — 77386 HB NTSTY MODUL RAD TX DLVR CPLX: CPT | Performed by: RADIOLOGY

## 2017-07-25 PROCEDURE — 77386 HB NTSTY MODUL RAD TX DLVR CPLX: CPT | Performed by: RADIOLOGY

## 2017-07-25 PROCEDURE — 77417 THER RADIOLOGY PORT IMAGE(S): CPT | Performed by: RADIOLOGY

## 2017-07-25 PROCEDURE — 77336 RADIATION PHYSICS CONSULT: CPT | Performed by: RADIOLOGY

## 2017-07-26 ENCOUNTER — TRANSCRIBE ORDERS (OUTPATIENT)
Dept: ADMINISTRATIVE | Facility: HOSPITAL | Age: 62
End: 2017-07-26

## 2017-07-26 ENCOUNTER — APPOINTMENT (OUTPATIENT)
Dept: LAB | Facility: HOSPITAL | Age: 62
End: 2017-07-26
Payer: COMMERCIAL

## 2017-07-26 DIAGNOSIS — C54.1 ENDOMETRIAL SARCOMA (HCC): Primary | ICD-10-CM

## 2017-07-26 DIAGNOSIS — C54.1 ENDOMETRIAL SARCOMA (HCC): ICD-10-CM

## 2017-07-26 LAB
ERYTHROCYTE [DISTWIDTH] IN BLOOD BY AUTOMATED COUNT: 14.8 % (ref 11.6–15.1)
HCT VFR BLD AUTO: 33.8 % (ref 37–47)
HGB BLD-MCNC: 11.1 G/DL (ref 12–16)
MCH RBC QN AUTO: 29.7 PG (ref 27–31)
MCHC RBC AUTO-ENTMCNC: 32.7 G/DL (ref 31.4–37.4)
MCV RBC AUTO: 91 FL (ref 82–98)
PLATELET # BLD AUTO: 189 THOUSANDS/UL (ref 130–400)
PMV BLD AUTO: 6.8 FL (ref 8.9–12.7)
RBC # BLD AUTO: 3.72 MILLION/UL (ref 4.2–5.4)
WBC # BLD AUTO: 4.8 THOUSAND/UL (ref 4.8–10.8)

## 2017-07-26 PROCEDURE — 36415 COLL VENOUS BLD VENIPUNCTURE: CPT

## 2017-07-26 PROCEDURE — 85027 COMPLETE CBC AUTOMATED: CPT

## 2017-07-26 PROCEDURE — 77386 HB NTSTY MODUL RAD TX DLVR CPLX: CPT | Performed by: RADIOLOGY

## 2017-07-27 PROCEDURE — 77386 HB NTSTY MODUL RAD TX DLVR CPLX: CPT | Performed by: RADIOLOGY

## 2017-07-28 PROCEDURE — 77386 HB NTSTY MODUL RAD TX DLVR CPLX: CPT | Performed by: RADIOLOGY

## 2017-07-31 PROCEDURE — 77386 HB NTSTY MODUL RAD TX DLVR CPLX: CPT | Performed by: RADIOLOGY

## 2017-08-01 ENCOUNTER — APPOINTMENT (OUTPATIENT)
Dept: RADIATION ONCOLOGY | Facility: HOSPITAL | Age: 62
End: 2017-08-01
Payer: COMMERCIAL

## 2017-08-01 ENCOUNTER — GENERIC CONVERSION - ENCOUNTER (OUTPATIENT)
Dept: OTHER | Facility: OTHER | Age: 62
End: 2017-08-01

## 2017-08-01 PROCEDURE — 77417 THER RADIOLOGY PORT IMAGE(S): CPT | Performed by: RADIOLOGY

## 2017-08-01 PROCEDURE — 77386 HB NTSTY MODUL RAD TX DLVR CPLX: CPT | Performed by: RADIOLOGY

## 2017-08-01 PROCEDURE — 77336 RADIATION PHYSICS CONSULT: CPT | Performed by: RADIOLOGY

## 2017-08-02 PROCEDURE — 77386 HB NTSTY MODUL RAD TX DLVR CPLX: CPT | Performed by: RADIOLOGY

## 2017-08-03 PROCEDURE — 77412 RADIATION TX DELIVERY LVL 3: CPT | Performed by: RADIOLOGY

## 2017-08-03 PROCEDURE — 77387 GUIDANCE FOR RADJ TX DLVR: CPT | Performed by: RADIOLOGY

## 2017-08-07 ENCOUNTER — APPOINTMENT (OUTPATIENT)
Dept: LAB | Facility: HOSPITAL | Age: 62
End: 2017-08-07
Payer: COMMERCIAL

## 2017-08-07 DIAGNOSIS — C54.1 ENDOMETRIAL SARCOMA (HCC): ICD-10-CM

## 2017-08-07 LAB
ERYTHROCYTE [DISTWIDTH] IN BLOOD BY AUTOMATED COUNT: 14.9 % (ref 11.6–15.1)
HCT VFR BLD AUTO: 32 % (ref 37–47)
HGB BLD-MCNC: 10.7 G/DL (ref 12–16)
MCH RBC QN AUTO: 30 PG (ref 27–31)
MCHC RBC AUTO-ENTMCNC: 33.4 G/DL (ref 31.4–37.4)
MCV RBC AUTO: 90 FL (ref 82–98)
PLATELET # BLD AUTO: 154 THOUSANDS/UL (ref 130–400)
PMV BLD AUTO: 5.9 FL (ref 8.9–12.7)
RBC # BLD AUTO: 3.57 MILLION/UL (ref 4.2–5.4)
WBC # BLD AUTO: 3.6 THOUSAND/UL (ref 4.8–10.8)

## 2017-08-07 PROCEDURE — 85027 COMPLETE CBC AUTOMATED: CPT

## 2017-08-07 PROCEDURE — 77386 HB NTSTY MODUL RAD TX DLVR CPLX: CPT | Performed by: RADIOLOGY

## 2017-08-07 PROCEDURE — 36415 COLL VENOUS BLD VENIPUNCTURE: CPT

## 2017-08-08 PROCEDURE — 77386 HB NTSTY MODUL RAD TX DLVR CPLX: CPT | Performed by: RADIOLOGY

## 2017-08-09 PROCEDURE — 77417 THER RADIOLOGY PORT IMAGE(S): CPT | Performed by: RADIOLOGY

## 2017-08-09 PROCEDURE — 77336 RADIATION PHYSICS CONSULT: CPT | Performed by: RADIOLOGY

## 2017-08-09 PROCEDURE — 77386 HB NTSTY MODUL RAD TX DLVR CPLX: CPT | Performed by: RADIOLOGY

## 2017-08-10 PROCEDURE — 77386 HB NTSTY MODUL RAD TX DLVR CPLX: CPT | Performed by: RADIOLOGY

## 2017-08-11 PROCEDURE — 77386 HB NTSTY MODUL RAD TX DLVR CPLX: CPT | Performed by: RADIOLOGY

## 2017-08-14 PROCEDURE — 77386 HB NTSTY MODUL RAD TX DLVR CPLX: CPT | Performed by: RADIOLOGY

## 2017-08-15 PROCEDURE — 77386 HB NTSTY MODUL RAD TX DLVR CPLX: CPT | Performed by: RADIOLOGY

## 2017-08-17 PROCEDURE — 77336 RADIATION PHYSICS CONSULT: CPT | Performed by: RADIOLOGY

## 2017-08-17 PROCEDURE — 77386 HB NTSTY MODUL RAD TX DLVR CPLX: CPT | Performed by: RADIOLOGY

## 2017-08-18 PROCEDURE — 77386 HB NTSTY MODUL RAD TX DLVR CPLX: CPT | Performed by: RADIOLOGY

## 2017-08-21 ENCOUNTER — APPOINTMENT (OUTPATIENT)
Dept: LAB | Facility: HOSPITAL | Age: 62
End: 2017-08-21
Payer: COMMERCIAL

## 2017-08-21 LAB
BASOPHILS # BLD AUTO: 0 THOUSANDS/ΜL (ref 0–0.1)
BASOPHILS NFR BLD AUTO: 0 % (ref 0–1)
EOSINOPHIL # BLD AUTO: 0.1 THOUSAND/ΜL (ref 0–0.61)
EOSINOPHIL NFR BLD AUTO: 3 % (ref 0–6)
ERYTHROCYTE [DISTWIDTH] IN BLOOD BY AUTOMATED COUNT: 15.7 % (ref 11.6–15.1)
HCT VFR BLD AUTO: 32.5 % (ref 37–47)
HGB BLD-MCNC: 10.9 G/DL (ref 12–16)
LYMPHOCYTES # BLD AUTO: 0.6 THOUSANDS/ΜL (ref 0.6–4.47)
LYMPHOCYTES NFR BLD AUTO: 17 % (ref 14–44)
MCH RBC QN AUTO: 30.2 PG (ref 27–31)
MCHC RBC AUTO-ENTMCNC: 33.6 G/DL (ref 31.4–37.4)
MCV RBC AUTO: 90 FL (ref 82–98)
MONOCYTES # BLD AUTO: 0.3 THOUSAND/ΜL (ref 0.17–1.22)
MONOCYTES NFR BLD AUTO: 8 % (ref 4–12)
NEUTROPHILS # BLD AUTO: 2.7 THOUSANDS/ΜL (ref 1.85–7.62)
NEUTS SEG NFR BLD AUTO: 72 % (ref 43–75)
NRBC BLD AUTO-RTO: 0 /100 WBCS
PLATELET # BLD AUTO: 156 THOUSANDS/UL (ref 130–400)
PMV BLD AUTO: 6 FL (ref 8.9–12.7)
RBC # BLD AUTO: 3.61 MILLION/UL (ref 4.2–5.4)
WBC # BLD AUTO: 3.8 THOUSAND/UL (ref 4.8–10.8)

## 2017-08-21 PROCEDURE — 85025 COMPLETE CBC W/AUTO DIFF WBC: CPT | Performed by: RADIOLOGY

## 2017-08-21 PROCEDURE — 36415 COLL VENOUS BLD VENIPUNCTURE: CPT | Performed by: RADIOLOGY

## 2017-08-21 PROCEDURE — 77386 HB NTSTY MODUL RAD TX DLVR CPLX: CPT | Performed by: RADIOLOGY

## 2017-08-23 PROCEDURE — 77386 HB NTSTY MODUL RAD TX DLVR CPLX: CPT | Performed by: RADIOLOGY

## 2017-08-24 PROCEDURE — 77387 GUIDANCE FOR RADJ TX DLVR: CPT | Performed by: RADIOLOGY

## 2017-08-24 PROCEDURE — 77412 RADIATION TX DELIVERY LVL 3: CPT | Performed by: RADIOLOGY

## 2017-08-28 PROCEDURE — 77336 RADIATION PHYSICS CONSULT: CPT | Performed by: RADIOLOGY

## 2017-09-19 ENCOUNTER — APPOINTMENT (OUTPATIENT)
Dept: RADIATION ONCOLOGY | Facility: HOSPITAL | Age: 62
End: 2017-09-19
Attending: RADIOLOGY
Payer: COMMERCIAL

## 2017-09-19 ENCOUNTER — GENERIC CONVERSION - ENCOUNTER (OUTPATIENT)
Dept: OTHER | Facility: OTHER | Age: 62
End: 2017-09-19

## 2017-09-19 PROCEDURE — 99214 OFFICE O/P EST MOD 30 MIN: CPT | Performed by: RADIOLOGY

## 2017-09-28 ENCOUNTER — GENERIC CONVERSION - ENCOUNTER (OUTPATIENT)
Dept: OTHER | Facility: OTHER | Age: 62
End: 2017-09-28

## 2017-09-28 ENCOUNTER — TRANSCRIBE ORDERS (OUTPATIENT)
Dept: ADMINISTRATIVE | Facility: HOSPITAL | Age: 62
End: 2017-09-28

## 2017-09-28 DIAGNOSIS — C54.1 ENDOMETRIAL SARCOMA (HCC): Primary | ICD-10-CM

## 2017-09-28 DIAGNOSIS — C54.1 MALIGNANT NEOPLASM OF ENDOMETRIUM (HCC): ICD-10-CM

## 2017-10-05 ENCOUNTER — TRANSCRIBE ORDERS (OUTPATIENT)
Dept: ADMINISTRATIVE | Facility: HOSPITAL | Age: 62
End: 2017-10-05

## 2017-10-05 ENCOUNTER — APPOINTMENT (OUTPATIENT)
Dept: LAB | Facility: HOSPITAL | Age: 62
End: 2017-10-05
Attending: OBSTETRICS & GYNECOLOGY
Payer: COMMERCIAL

## 2017-10-05 DIAGNOSIS — C54.1 MALIGNANT NEOPLASM OF ENDOMETRIUM (HCC): ICD-10-CM

## 2017-10-05 LAB
BUN SERPL-MCNC: 27 MG/DL (ref 5–25)
CREAT SERPL-MCNC: 1.5 MG/DL (ref 0.6–1.3)
GFR SERPL CREATININE-BSD FRML MDRD: 37 ML/MIN/1.73SQ M

## 2017-10-05 PROCEDURE — 82565 ASSAY OF CREATININE: CPT

## 2017-10-05 PROCEDURE — 36415 COLL VENOUS BLD VENIPUNCTURE: CPT

## 2017-10-05 PROCEDURE — 84520 ASSAY OF UREA NITROGEN: CPT

## 2017-10-10 ENCOUNTER — HOSPITAL ENCOUNTER (OUTPATIENT)
Dept: CT IMAGING | Facility: HOSPITAL | Age: 62
Discharge: HOME | End: 2017-10-10
Attending: OBSTETRICS & GYNECOLOGY
Payer: COMMERCIAL

## 2017-10-10 DIAGNOSIS — C54.1 MALIGNANT NEOPLASM OF ENDOMETRIUM (HCC): ICD-10-CM

## 2017-10-10 PROCEDURE — 74176 CT ABD & PELVIS W/O CONTRAST: CPT

## 2017-10-23 ENCOUNTER — TRANSCRIBE ORDERS (OUTPATIENT)
Dept: ADMINISTRATIVE | Facility: HOSPITAL | Age: 62
End: 2017-10-23

## 2017-10-23 DIAGNOSIS — Z12.31 VISIT FOR SCREENING MAMMOGRAM: Primary | ICD-10-CM

## 2017-10-30 ENCOUNTER — HOSPITAL ENCOUNTER (OUTPATIENT)
Dept: RADIOLOGY | Facility: HOSPITAL | Age: 62
Discharge: HOME | End: 2017-10-30
Attending: INTERNAL MEDICINE
Payer: COMMERCIAL

## 2017-10-30 DIAGNOSIS — Z12.31 VISIT FOR SCREENING MAMMOGRAM: ICD-10-CM

## 2017-10-30 PROCEDURE — G0202 SCR MAMMO BI INCL CAD: HCPCS

## 2017-12-04 ENCOUNTER — TRANSCRIBE ORDERS (OUTPATIENT)
Dept: ADMINISTRATIVE | Facility: HOSPITAL | Age: 62
End: 2017-12-04

## 2017-12-04 DIAGNOSIS — C18.9 MALIGNANT NEOPLASM OF COLON, UNSPECIFIED PART OF COLON (HCC): Primary | ICD-10-CM

## 2017-12-28 ENCOUNTER — ALLSCRIPTS OFFICE VISIT (OUTPATIENT)
Dept: OTHER | Facility: OTHER | Age: 62
End: 2017-12-28

## 2017-12-29 NOTE — PROGRESS NOTES
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 new diagnosis of colon cancer     2  Return in 3 months for endometrial cancer surveillance  Chief Complaint   Chief Complaint Free Text Note Form: Endometrial cancer surveillance      History of Present Illness   Problem [facility]:    Unstaged stage IB, grade 1 endometrial cancer with positive LVSI  Previous Therapy:    1  Dilation and curettage on April 5, 2017 Grade 1 endometrial cancer Robotic-assisted total laparoscopic hysterectomy, bilateral salpingo-oophorectomy and cystoscopy on May 4, 2017  FIGO grade 1, 4 4 cm tumor 1 0 of 1 5 cm myometrial invasion  Positive LVSI  Negative pelvic washings  Whole pelvic radiotherapy and vaginal brachytherapy completed August 24, 2017        Free Text HPI: 78-year-old returns for endometrial cancer surveillance  In the interim, she underwent colonoscopy  She states that she has a new diagnosis of colon cancer and went back for 2nd colonoscopy to remove a 2nd polyp  She has a follow-up with her gastroenterologist on 1/4/2018  She has no pelvic pain, abdominal pain, or vaginal bleeding  No other interval change in her medications or medical history since her last visit  CT scan of the abdomen and pelvis without contrast on 10/10/2017 did not reveal any evidence of recurrent disease        Review of Systems   Complete-Female Gyn Onc:      Constitutional: No fever, no recent weight gain, no chills, not feeling tired and no recent weight loss  ENT: no nose bleeds  Cardiovascular: No chest pain, no lower extremity edema  Respiratory: No shortness of breath  Gastrointestinal: No abdominal pain, no constipation, no nausea or vomiting, no bloody stools  Genitourinary: No complaints of dysuria, no incontinence, no pelvic pain, no dysmenorrhea, no vaginal discharge or bleeding  Musculoskeletal: No limb swelling  Integumentary: No skin lesions  Neurological: No headache, no neuropathy  Endocrine: No complaints of proptosis, no hot flashes, no muscle weakness, no deepening of the voice, no feelings of weakness  Hematologic/Lymphatic: No complaints of swollen glands, no swollen glands in the neck, does not bleed easily, does not bruise easily  ROS Reviewed:    ROS reviewed  Active Problems   1  Bladder leak (788 30) (R32)   2  Deep vein thrombosis (DVT) (453 40) (I82 409)   3  Depression (311) (F32 9)   4  Diabetes mellitus type 2, uncomplicated (382 59) (P12 0)   5  Endometrial cancer, grade I (182 0) (C54 1)   6  HTN (hypertension), benign (401 1) (I10)   7  Hyperlipidemia (272 4) (E78 5)   8  Pain, pelvic, female (625 9) (R10 2)   9  Postoperative examination (V67 00) (Z09)   10  Superficial postoperative wound infection (998 59) (T81 4XXA)   11  Thickened endometrium (793 5) (R93 8)    Past Medical History   1  History of mammogram (V15 89) (Z92 89)   2  History of radiation therapy (V15 3) (Z92 3)  Active Problems And Past Medical History Reviewed: The active problems and past medical history were reviewed and updated today  Surgical History   1  History of Dilation And Curettage   2  History of Gallbladder Surgery   3  History of Hysterectomy   4  History of Salpingo-oophorectomy Bilateral   5  History of Tonsillectomy   6   History of Tubal Ligation    Family History   Mother 1  Family history of Carcinoma of kidney  Paternal Grandmother    2  Family history of malignant neoplasm of bone (V16 8) (Z80 8)  Maternal Aunt    3  Family history of malignant neoplasm (V16 9) (Z80 9)    Social History    ·    · Never smoked   · No alcohol use   · Retired   · Two children    Current Meds    1  BuPROPion HCl ER (SR) 200 MG Oral Tablet Extended Release 12 Hour; TAKE 1     TABLET DAILY; Therapy: (Recorded:13Apr2017) to Recorded   2  ClonazePAM 0 5 MG Oral Tablet; take 1/2 tablet in morning and 1 tablet in evening; Therapy: (Recorded:27Jun2017) to Recorded   3  Crestor 20 MG Oral Tablet; TAKE 1 TABLET DAILY; Therapy: (Recorded:27Jun2017) to Recorded   4  Geodon 80 MG Oral Capsule; TAKE 1 CAPSULE TWICE DAILY; Therapy: (Recorded:13Apr2017) to Recorded   5  Glimepiride 4 MG Oral Tablet; TAKE 1 TABLET DAILY WITH BREAKFAST; Therapy: (Recorded:27Jun2017) to Recorded   6  Invokana 300 MG Oral Tablet; TAKE 1 TABLET BY MOUTH ONCE DAILY 30 MINS     BEFORE BREAKFAST; Therapy: (Recorded:13Apr2017) to Recorded   7  Lantus SOLN; Inject 65 units every morning; Therapy: (Recorded:27Jun2017) to Recorded   8  Lisinopril 5 MG Oral Tablet; TAKE 1 TABLET DAILY; Therapy: (Recorded:13Apr2017) to Recorded   9  Oxybutynin Chloride ER 10 MG Oral Tablet Extended Release 24 Hour; Take 1 tablet     daily; Therapy: (Recorded:27Jun2017) to Recorded   10  Sertraline HCl - 100 MG Oral Tablet; take 2 tablet daily; Therapy: (Recorded:13Apr2017) to Recorded   11  Stool Softener 100 MG Oral Capsule; take 1 capsule daily; Therapy: (Recorded:27Jun2017) to Recorded   12  Triamterene-HCTZ TABS; TAKE 1 TABLET EVERY OTHER DAY; Therapy: (Recorded:13Apr2017) to Recorded   13  Vitamin D3 1000 UNIT Oral Tablet; TAKE 1 TABLET DAILY; Therapy: (Recorded:27Jun2017) to Recorded  Medication List Reviewed: The medication list was reviewed and updated today  Allergies   1   No Known Drug Allergies  2  No Known Environmental Allergies   3  No Known Food Allergies    Vitals   Vital Signs    Recorded: [id number] 10:06AM   Temperature 98 4 F, Oral   Heart Rate 68, R Radial   Pulse Quality Normal, R Radial   Respiration Quality Normal   Respiration 14   Systolic 480, LUE, Sitting   Diastolic 60, LUE, Sitting   Height 5 ft 2 in   Weight 193 lb    BMI Calculated 35 3   BSA Calculated 1 88     Physical Exam        Constitutional      General appearance: No acute distress, well appearing and well nourished  Neck      Neck: Normal, supple, trachea midline, no masses  Thyroid: Normal, no thyromegaly  Pulmonary      Respiratory effort: No increased work of breathing or signs of respiratory distress  Cardiovascular      Peripheral vascular exam: Normal pulses throughout  No edema noted in lower extremities  Genitourinary      External genitalia: Normal and no lesions appreciated  Vagina: Normal, no lesions or dryness appreciated  Urethra: Normal        Urethral meatus: Normal        Bladder: Normal, soft, non-tender and no prolapse or masses appreciated  Cervix: Surgically absent  Uterus: Surgically absent  Adnexa/parametria: Surgically absent  Abdomen      Abdomen: Normal, soft, non-tender, and no organomegaly noted  Liver and spleen: No hepatomegaly or splenomegaly  Examination for hernias: No hernias appreciated  Lymphatic      Palpation of lymph nodes in neck, axillae, groin and/or other locations: No lymphadenopathy or masses noted  Skin      Skin and subcutaneous tissue: Normal skin turgor and no rashes         Palpation of skin and subcutaneous tissue: Normal        Psychiatric      Orientation to person, place, and time: Normal        Mood and affect: Normal        GOG performance status is: 0      Signatures    Electronically signed by : Cinda Delarosa MD; Dec 28 2017 10:32AM EST (Author)

## 2018-01-12 VITALS
SYSTOLIC BLOOD PRESSURE: 100 MMHG | HEART RATE: 84 BPM | BODY MASS INDEX: 36.44 KG/M2 | DIASTOLIC BLOOD PRESSURE: 52 MMHG | RESPIRATION RATE: 16 BRPM | HEIGHT: 62 IN | WEIGHT: 198 LBS | OXYGEN SATURATION: 96 % | TEMPERATURE: 98.9 F

## 2018-01-12 LAB — SCAN RESULT: NORMAL

## 2018-01-13 VITALS
WEIGHT: 192 LBS | HEIGHT: 62 IN | HEART RATE: 89 BPM | SYSTOLIC BLOOD PRESSURE: 110 MMHG | TEMPERATURE: 98.4 F | BODY MASS INDEX: 35.33 KG/M2 | OXYGEN SATURATION: 96 % | RESPIRATION RATE: 16 BRPM | DIASTOLIC BLOOD PRESSURE: 68 MMHG

## 2018-01-13 VITALS
WEIGHT: 199.56 LBS | SYSTOLIC BLOOD PRESSURE: 118 MMHG | RESPIRATION RATE: 16 BRPM | HEIGHT: 62 IN | TEMPERATURE: 98.4 F | BODY MASS INDEX: 36.72 KG/M2 | DIASTOLIC BLOOD PRESSURE: 72 MMHG | HEART RATE: 80 BPM

## 2018-01-14 VITALS
RESPIRATION RATE: 14 BRPM | DIASTOLIC BLOOD PRESSURE: 64 MMHG | WEIGHT: 197.25 LBS | HEART RATE: 76 BPM | SYSTOLIC BLOOD PRESSURE: 110 MMHG | TEMPERATURE: 98.3 F | BODY MASS INDEX: 36.08 KG/M2

## 2018-01-14 VITALS
WEIGHT: 199 LBS | RESPIRATION RATE: 16 BRPM | DIASTOLIC BLOOD PRESSURE: 66 MMHG | BODY MASS INDEX: 36.62 KG/M2 | HEART RATE: 84 BPM | SYSTOLIC BLOOD PRESSURE: 120 MMHG | OXYGEN SATURATION: 97 % | HEIGHT: 62 IN | TEMPERATURE: 99 F

## 2018-01-14 VITALS
OXYGEN SATURATION: 97 % | BODY MASS INDEX: 37.36 KG/M2 | TEMPERATURE: 97 F | HEART RATE: 92 BPM | SYSTOLIC BLOOD PRESSURE: 128 MMHG | HEIGHT: 62 IN | DIASTOLIC BLOOD PRESSURE: 78 MMHG | WEIGHT: 203 LBS | RESPIRATION RATE: 16 BRPM

## 2018-01-18 NOTE — PROGRESS NOTES
Discussion/Summary  Social Work-Discussion Summary [facility]: Patient is being seen for a distress screenning assessment  LSW reviewed pt's distress thermometer completed by pt on 8/1/2017 in rad onc  Pt rated their distress as a 5/10 and named transportation, depression, nervousness, worry and loss of interest in usual activities as psychosocial problems  LSW attempted to meet with pt on 8/1/2017  According to pt's rad onc RN pt is refusing social work outreach  LSW will remain available if pt becomes interested in psychosocial assistance in the future        Signatures   Electronically signed by : JOHN Linda; Aug  1 2017  3:47PM EST                       (Author)

## 2018-01-22 VITALS
TEMPERATURE: 98.1 F | SYSTOLIC BLOOD PRESSURE: 118 MMHG | DIASTOLIC BLOOD PRESSURE: 62 MMHG | BODY MASS INDEX: 35.39 KG/M2 | HEART RATE: 72 BPM | HEIGHT: 62 IN | RESPIRATION RATE: 16 BRPM | WEIGHT: 192.31 LBS

## 2018-01-22 VITALS
DIASTOLIC BLOOD PRESSURE: 60 MMHG | HEIGHT: 62 IN | RESPIRATION RATE: 14 BRPM | HEART RATE: 68 BPM | TEMPERATURE: 98.4 F | WEIGHT: 193 LBS | BODY MASS INDEX: 35.51 KG/M2 | SYSTOLIC BLOOD PRESSURE: 110 MMHG

## 2018-03-29 ENCOUNTER — OFFICE VISIT (OUTPATIENT)
Dept: GYNECOLOGIC ONCOLOGY | Facility: CLINIC | Age: 63
End: 2018-03-29
Payer: COMMERCIAL

## 2018-03-29 VITALS
SYSTOLIC BLOOD PRESSURE: 132 MMHG | WEIGHT: 197 LBS | RESPIRATION RATE: 24 BRPM | BODY MASS INDEX: 36.25 KG/M2 | HEIGHT: 62 IN | TEMPERATURE: 98.2 F | DIASTOLIC BLOOD PRESSURE: 74 MMHG | HEART RATE: 84 BPM

## 2018-03-29 DIAGNOSIS — C54.1 MALIGNANT NEOPLASM OF ENDOMETRIUM (HCC): Primary | ICD-10-CM

## 2018-03-29 PROCEDURE — 99213 OFFICE O/P EST LOW 20 MIN: CPT | Performed by: OBSTETRICS & GYNECOLOGY

## 2018-03-29 NOTE — PROGRESS NOTES
Assessment/Plan:    Problem List Items Addressed This Visit        Genitourinary    Malignant neoplasm of endometrium ([facility] [address] ) - Primary      Clinically without evidence of disease recurrence  Her performance status is 0  Mismatch repair genes were all expressed  1  Return in 3 months for endometrial cancer surveillance  CHIEF COMPLAINT:   Endometrial cancer surveillance      Problem:  Malignant neoplasm of endometrium ([facility] [address] )    Staging form: Corpus Uteri - Carcinoma, AJCC 7th Edition    - Clinical: FIGO Stage IB (T1b, N0, M0) - Signed by Lexii Eisenberg PA-C on 3/28/2018      Previous therapy:     Malignant neoplasm of endometrium ([facility] [address] )    4/5/2017 Initial Diagnosis     [facility]         4/5/2017 Surgery     Dilation and curettage  A  Grade 1 endometrial cancer            5/4/2017 Surgery     Robotic-assisted total laparoscopic hysterectomy, bilateral salpingo-oophorectomy and cystoscopy  A  FIGO grade 1, 4 4 cm tumor   B  1 0 of 1 5 cm myometrial invasion  C  Positive LVSI  D  Negative pelvic washings         7/19/2017 - 8/24/2017 Radiation     :  Course: C1    Plan ID Energy Fractions Dose per Fraction (cGy) Total Dose Delivered (cGy) Elapsed Days   Whole Pelvis 10X 25 / 25 180 4,500 36      Treatment dates:  C1: 7/19/2017 - 8/24/2017                Patient ID: Lucita Herrera is a 58 y o  female  who has no new complaints today  No vaginal bleeding, abdominal/pelvic pain  Normal bowel and bladder function  No interval change in medical history since last visit  Quality of life is good  The following portions of the patient's history were reviewed and updated as appropriate: allergies, current medications, past family history, past medical history, past social history, past surgical history and problem list     Review of Systems   All other systems reviewed and are negative        Current Outpatient Prescriptions   Medication Sig Dispense Refill    buPROPion (WELLBUTRIN SR) 200 MG 12 hr tablet Take 200 mg by mouth every morning      Canagliflozin (INVOKANA) 300 MG TABS Take 300 mg by mouth every morning        clonazePAM (KlonoPIN) 0 25 MG disintegrating tablet Take 0 25 mg by mouth every morning      clonazePAM (KlonoPIN) 0 5 mg tablet Take 0 5 mg by mouth daily after lunch 3 pm      glimepiride (AMARYL) 4 mg tablet Take 4 mg by mouth daily with breakfast        Insulin Glargine (TOUJEO SOLOSTAR SC) Inject 65 Units under the skin every morning LANTUS       lisinopril (ZESTRIL) 5 mg tablet Take 5 mg by mouth every morning      metFORMIN (GLUCOPHAGE) 500 mg tablet Take 500 mg by mouth 2 (two) times a day with meals      oxybutynin (DITROPAN XL) 15 MG 24 hr tablet Take 15 mg by mouth every morning      rosuvastatin (CRESTOR) 20 MG tablet Take 20 mg by mouth daily after dinner      sertraline (ZOLOFT) 100 mg tablet Take 100 mg by mouth 2 (two) times a day      triamterene-hydrochlorothiazide (MAXZIDE-25) 37 5-25 mg per tablet Take 1 tablet by mouth daily      Vitamin D, Cholecalciferol, 1000 units CAPS Take 1,000 Units by mouth every morning        ziprasidone (GEODON) 80 mg capsule Take 80 mg by mouth 2 (two) times a day with meals      acetaminophen (TYLENOL) 325 mg tablet Every 4-6 hours as needed for mild pain 30 tablet 0    docusate sodium (COLACE) 100 mg capsule Take 1 capsule by mouth 2 (two) times a day as needed for constipation for up to 30 days 60 capsule 0     No current facility-administered medications for this visit  Objective:    Blood pressure 132/74, pulse 84, temperature 98 2 °F (36 8 °C), temperature source Oral, resp  rate (!) 24, height 5' 2" (1 575 m), weight 89 4 kg (197 lb)  Body mass index is 36 03 kg/m²  Body surface area is 1 9 meters squared  Physical Exam   Constitutional: She is oriented to person, place, and time  She appears well-developed and well-nourished  No distress     HENT:   Head: Normocephalic and atraumatic  Neck: Normal range of motion  Neck supple  No thyromegaly present  Abdominal: Soft  She exhibits no distension and no mass  There is no tenderness  There is no rebound and no guarding  Genitourinary:   Genitourinary Comments: The uterus, cervix, and adnexa are surgically absent  Vagina without masses, lesions, bleeding, apex is mobile   Musculoskeletal: She exhibits no edema  Lymphadenopathy:     She has no cervical adenopathy  Neurological: She is alert and oriented to person, place, and time  Skin: Skin is warm and dry  She is not diaphoretic  Psychiatric:    Flat affect

## 2018-03-29 NOTE — ASSESSMENT & PLAN NOTE
Clinically without evidence of disease recurrence  Her performance status is 0  Mismatch repair genes were all expressed  1  Return in 3 months for endometrial cancer surveillance

## 2018-06-28 ENCOUNTER — OFFICE VISIT (OUTPATIENT)
Dept: GYNECOLOGIC ONCOLOGY | Facility: CLINIC | Age: 63
End: 2018-06-28
Payer: COMMERCIAL

## 2018-06-28 VITALS
RESPIRATION RATE: 14 BRPM | SYSTOLIC BLOOD PRESSURE: 100 MMHG | HEIGHT: 62 IN | DIASTOLIC BLOOD PRESSURE: 60 MMHG | WEIGHT: 191 LBS | HEART RATE: 76 BPM | TEMPERATURE: 98.1 F | BODY MASS INDEX: 35.15 KG/M2

## 2018-06-28 DIAGNOSIS — C54.1 MALIGNANT NEOPLASM OF ENDOMETRIUM (HCC): Primary | ICD-10-CM

## 2018-06-28 DIAGNOSIS — D64.9 ANEMIA, UNSPECIFIED TYPE: ICD-10-CM

## 2018-06-28 PROCEDURE — 99214 OFFICE O/P EST MOD 30 MIN: CPT | Performed by: PHYSICIAN ASSISTANT

## 2018-06-28 NOTE — ASSESSMENT & PLAN NOTE
Clinically without evidence of disease recurrence  Return to the office in 3 months for continued cancer surveillance

## 2018-06-28 NOTE — PROGRESS NOTES
Assessment/Plan:    Problem List Items Addressed This Visit        Genitourinary    Malignant neoplasm of endometrium ([facility] [address] ) - Primary     Clinically without evidence of disease recurrence  Return to the office in 3 months for continued cancer surveillance  Other    Anemia     Recent colonoscopy  I discussed referral to hematology for further work-up of anemia  At this time, the patient does not desire further work-up, but was agreeable to taking Heme-Onc's contact information  Relevant Orders    Ambulatory referral to Hematology / Oncology            CHIEF COMPLAINT:   Endometrial cancer surveillance    Problem:  Cancer Staging  Malignant neoplasm of endometrium ([facility] [address] )  Staging form: Corpus Uteri - Carcinoma, AJCC 7th Edition  - Clinical: FIGO Stage IB (T1b, N0, M0) - Signed by Emelyn Gomez PA-C on 3/28/2018        Previous therapy:     Malignant neoplasm of endometrium ([facility] [address] )    4/5/2017 Initial Diagnosis     Malignant neoplasm of endometrium ([facility] [address] )         4/5/2017 Surgery     Dilation and curettage  A  Grade 1 endometrial cancer            5/4/2017 Surgery     Robotic-assisted total laparoscopic hysterectomy, bilateral salpingo-oophorectomy and cystoscopy  A  FIGO grade 1, 4 4 cm tumor   B  1 0 of 1 5 cm myometrial invasion  C  Positive LVSI  D  Negative pelvic washings  E  IHC for peterson, all proteins expressed  7/19/2017 - 8/24/2017 Radiation     :  Course: C1    Plan ID Energy Fractions Dose per Fraction (cGy) Total Dose Delivered (cGy) Elapsed Days   Whole Pelvis 10X 25 / 25 180 4,500 36      Treatment dates:  C1: 7/19/2017 - 8/24/2017                Patient ID: Sergio Will is a 58 y o  female  who has no new complaints today  No vaginal bleeding, abdominal/pelvic pain  Normal bladder function  Patient notes chronic, intermittent diarrhea  She recently underwent work-up for anemia, including a colonoscopy  Per the patient, pre-cancerous polyps were removed   Quality of life is good  The following portions of the patient's history were reviewed and updated as appropriate: allergies, current medications, past medical history, past surgical history and problem list     Review of Systems   Constitutional: Negative  HENT: Negative  Eyes: Negative  Respiratory: Negative  Cardiovascular: Negative  Gastrointestinal: Positive for diarrhea (occasional, chronic)  Genitourinary: Negative  Musculoskeletal: Negative  Skin: Negative  Neurological: Negative  Psychiatric/Behavioral: Negative          Current Outpatient Prescriptions   Medication Sig Dispense Refill    buPROPion (WELLBUTRIN SR) 200 MG 12 hr tablet Take 200 mg by mouth every morning      Canagliflozin (INVOKANA) 300 MG TABS Take 300 mg by mouth every morning        clonazePAM (KlonoPIN) 0 25 MG disintegrating tablet Take 0 25 mg by mouth every morning      clonazePAM (KlonoPIN) 0 5 mg tablet Take 0 5 mg by mouth daily after lunch 3 pm      docusate sodium (COLACE) 100 mg capsule Take 1 capsule by mouth 2 (two) times a day as needed for constipation for up to 30 days 60 capsule 0    Insulin Glargine (TOUJEO SOLOSTAR SC) Inject 65 Units under the skin every morning LANTUS       lisinopril (ZESTRIL) 5 mg tablet Take 5 mg by mouth every morning      metFORMIN (GLUCOPHAGE) 500 mg tablet Take 500 mg by mouth 2 (two) times a day with meals      oxybutynin (DITROPAN XL) 15 MG 24 hr tablet Take 15 mg by mouth every morning      rosuvastatin (CRESTOR) 20 MG tablet Take 20 mg by mouth daily after dinner      sertraline (ZOLOFT) 100 mg tablet Take 100 mg by mouth 2 (two) times a day      triamterene-hydrochlorothiazide (MAXZIDE-25) 37 5-25 mg per tablet Take 1 tablet by mouth daily      Vitamin D, Cholecalciferol, 1000 units CAPS Take 1,000 Units by mouth every morning        ziprasidone (GEODON) 80 mg capsule Take 80 mg by mouth 2 (two) times a day with meals       No current facility-administered medications for this visit  Objective:    Blood pressure 100/60, pulse 76, temperature 98 1 °F (36 7 °C), temperature source Oral, resp  rate 14, height 5' 2" (1 575 m), weight 86 6 kg (191 lb)  Body mass index is 34 93 kg/m²  Body surface area is 1 87 meters squared  Physical Exam   Constitutional: She is oriented to person, place, and time  She appears well-developed and well-nourished  HENT:   Head: Normocephalic and atraumatic  Neck: Normal range of motion  Neck supple  No thyromegaly present  Pulmonary/Chest: Effort normal    Abdominal: Soft  She exhibits no distension and no mass  There is no rebound  Genitourinary:   Genitourinary Comments: The external female genitalia is normal  The bartholin's, uretheral and skenes glands are normal  The urethral meatus is normal  Speculum exam reveals a grossly normal vagina, with radiation changes present  No masses, lesions or bleeding  Manual exam notes a surgical absent cervix, uterus and adnexal structures  No masses or fullness  Musculoskeletal: Normal range of motion  She exhibits no edema  Lymphadenopathy:     She has no cervical adenopathy  Neurological: She is alert and oriented to person, place, and time  Skin: Skin is warm and dry  No rash noted  Psychiatric: She has a normal mood and affect  Her behavior is normal    Vitals reviewed

## 2018-06-28 NOTE — ASSESSMENT & PLAN NOTE
Recent colonoscopy  I discussed referral to hematology for further work-up of anemia  At this time, the patient does not desire further work-up, but was agreeable to taking Heme-Onc's contact information

## 2018-09-27 ENCOUNTER — OFFICE VISIT (OUTPATIENT)
Dept: GYNECOLOGIC ONCOLOGY | Facility: CLINIC | Age: 63
End: 2018-09-27
Payer: COMMERCIAL

## 2018-09-27 VITALS
WEIGHT: 194 LBS | BODY MASS INDEX: 35.7 KG/M2 | TEMPERATURE: 98.8 F | RESPIRATION RATE: 14 BRPM | DIASTOLIC BLOOD PRESSURE: 64 MMHG | SYSTOLIC BLOOD PRESSURE: 112 MMHG | HEIGHT: 62 IN | HEART RATE: 82 BPM

## 2018-09-27 DIAGNOSIS — C54.1 MALIGNANT NEOPLASM OF ENDOMETRIUM (HCC): Primary | ICD-10-CM

## 2018-09-27 DIAGNOSIS — C18.9 MALIGNANT NEOPLASM OF COLON, UNSPECIFIED PART OF COLON (HCC): ICD-10-CM

## 2018-09-27 PROCEDURE — 99214 OFFICE O/P EST MOD 30 MIN: CPT | Performed by: PHYSICIAN ASSISTANT

## 2018-09-27 NOTE — ASSESSMENT & PLAN NOTE
Unstaged stage IB, grade 1 with positive LVSI  Completed adjuvant radiation in August 2017  Patient is clinically without evidence of disease recurrence  Return to the office in 3 months for continued cancer surveillance

## 2018-09-27 NOTE — PROGRESS NOTES
Assessment/Plan:    Problem List Items Addressed This Visit        Digestive    Colon cancer [facility])     Per patient, colonoscopy due 2020  Genitourinary    Malignant neoplasm of endometrium ([facility] [address] ) - Primary     Unstaged stage IB, grade 1 with positive LVSI  Completed adjuvant radiation in August 2017  Patient is clinically without evidence of disease recurrence  Return to the office in 3 months for continued cancer surveillance  CHIEF COMPLAINT:   Endometrial cancer surveillance    Problem:  Unstaged, stage IB, grade 1 with positive LVSI  Personal history of colon cancer        Previous therapy:     Malignant neoplasm of endometrium ([address] )    4/5/2017 Initial Diagnosis     Malignant neoplasm of endometrium ([facility] [address] )         4/5/2017 Surgery     Dilation and curettage  A  Grade 1 endometrial cancer            5/4/2017 Surgery     Robotic-assisted total laparoscopic hysterectomy, bilateral salpingo-oophorectomy and cystoscopy  A  FIGO grade 1, 4 4 cm tumor   B  1 0 of 1 5 cm myometrial invasion  C  Positive LVSI  D  Negative pelvic washings  E  IHC for peterson, all proteins expressed  7/19/2017 - 8/24/2017 Radiation     :  Course: C1    Plan ID Energy Fractions Dose per Fraction (cGy) Total Dose Delivered (cGy) Elapsed Days   Whole Pelvis 10X 25 / 25 180 4,500 36      Treatment dates:  C1: 7/19/2017 - 8/24/2017             Colon cancer ([facility] [address] )    12/2017 Surgery     Colonoscopy x 2              Patient ID: Karla Lorenzo is a 58 y o  female  who has no new complaints today  No vaginal bleeding, abdominal/pelvic pain  Normal bowel and bladder function  No interval change in medical history since last visit  Quality of life is good  The following portions of the patient's history were reviewed and updated as appropriate: allergies, current medications, past medical history and problem list     Review of Systems   Constitutional: Negative  HENT: Negative  Eyes: Negative  Respiratory: Negative  Cardiovascular: Negative  Gastrointestinal: Negative  Genitourinary: Negative  Musculoskeletal: Negative  Skin: Negative  Neurological: Negative  Psychiatric/Behavioral: Negative  Current Outpatient Prescriptions   Medication Sig Dispense Refill    buPROPion (WELLBUTRIN SR) 200 MG 12 hr tablet Take 200 mg by mouth every morning      Canagliflozin (INVOKANA) 300 MG TABS Take 300 mg by mouth every morning        clonazePAM (KlonoPIN) 0 25 MG disintegrating tablet Take 0 25 mg by mouth every morning      clonazePAM (KlonoPIN) 0 5 mg tablet Take 0 5 mg by mouth daily after lunch 3 pm      Insulin Glargine (TOUJEO SOLOSTAR SC) Inject 65 Units under the skin every morning LANTUS       lisinopril (ZESTRIL) 5 mg tablet Take 5 mg by mouth every morning      metFORMIN (GLUCOPHAGE) 500 mg tablet Take 500 mg by mouth 2 (two) times a day with meals      oxybutynin (DITROPAN XL) 15 MG 24 hr tablet Take 15 mg by mouth every morning      rosuvastatin (CRESTOR) 20 MG tablet Take 20 mg by mouth daily after dinner      sertraline (ZOLOFT) 100 mg tablet Take 100 mg by mouth 2 (two) times a day      triamterene-hydrochlorothiazide (MAXZIDE-25) 37 5-25 mg per tablet Take 1 tablet by mouth daily      Vitamin D, Cholecalciferol, 1000 units CAPS Take 1,000 Units by mouth every morning        ziprasidone (GEODON) 80 mg capsule Take 80 mg by mouth 2 (two) times a day with meals      docusate sodium (COLACE) 100 mg capsule Take 1 capsule by mouth 2 (two) times a day as needed for constipation for up to 30 days 60 capsule 0     No current facility-administered medications for this visit  Objective:    Blood pressure 112/64, pulse 82, temperature 98 8 °F (37 1 °C), temperature source Oral, resp  rate 14, height 5' 2" (1 575 m), weight 88 kg (194 lb)  Body mass index is 35 48 kg/m²  Body surface area is 1 89 meters squared      Physical Exam   Constitutional: She is oriented to person, place, and time  She appears well-developed and well-nourished  HENT:   Head: Normocephalic and atraumatic  Neck: Normal range of motion  Neck supple  No thyromegaly present  Pulmonary/Chest: Effort normal    Abdominal: Soft  She exhibits no distension and no mass  There is no rebound  Genitourinary:   Genitourinary Comments: The external female genitalia is normal  The bartholin's, uretheral and skenes glands are normal  The urethral meatus is normal (midline with no lesions)  Anus without fissure or lesion  Speculum exam reveals a grossly normal vagina, radiation changes present  No masses, lesions, discharge or bleeding  No significant cystocele or rectocele noted  Manual exam notes a surgical absent cervix, uterus and adnexal structures  No masses or fullness  Bladder is without fullness, mass or tenderness  Musculoskeletal: Normal range of motion  She exhibits no edema  Lymphadenopathy:     She has no cervical adenopathy  Neurological: She is alert and oriented to person, place, and time  Skin: Skin is warm and dry  No rash noted  Psychiatric: She has a normal mood and affect  Her behavior is normal    Vitals reviewed

## 2018-10-09 ENCOUNTER — TELEPHONE (OUTPATIENT)
Dept: NEPHROLOGY | Facility: CLINIC | Age: 63
End: 2018-10-09

## 2018-10-09 NOTE — TELEPHONE ENCOUNTER
I received an ins referral from patient's pcp for an appt for us with the diagnosis of overactive bladder, I notified the patient and let her know we do not treat her for that and to please follow up with Urology  I gave her phone number to [facility]  I also notified her referring Pcp

## 2019-01-03 ENCOUNTER — OFFICE VISIT (OUTPATIENT)
Dept: GYNECOLOGIC ONCOLOGY | Facility: CLINIC | Age: 64
End: 2019-01-03
Payer: COMMERCIAL

## 2019-01-03 VITALS
WEIGHT: 194 LBS | BODY MASS INDEX: 35.7 KG/M2 | RESPIRATION RATE: 14 BRPM | TEMPERATURE: 98.3 F | HEART RATE: 98 BPM | HEIGHT: 62 IN | SYSTOLIC BLOOD PRESSURE: 118 MMHG | DIASTOLIC BLOOD PRESSURE: 70 MMHG

## 2019-01-03 DIAGNOSIS — Z08 ENCOUNTER FOR FOLLOW-UP SURVEILLANCE OF ENDOMETRIAL CANCER: Primary | ICD-10-CM

## 2019-01-03 DIAGNOSIS — Z85.42 ENCOUNTER FOR FOLLOW-UP SURVEILLANCE OF ENDOMETRIAL CANCER: Primary | ICD-10-CM

## 2019-01-03 DIAGNOSIS — C18.9 MALIGNANT NEOPLASM OF COLON, UNSPECIFIED PART OF COLON (HCC): ICD-10-CM

## 2019-01-03 DIAGNOSIS — Z85.42 HISTORY OF ENDOMETRIAL CANCER: ICD-10-CM

## 2019-01-03 PROCEDURE — 99212 OFFICE O/P EST SF 10 MIN: CPT | Performed by: PHYSICIAN ASSISTANT

## 2019-01-03 NOTE — PROGRESS NOTES
Assessment/Plan:    Problem List Items Addressed This Visit        Digestive    Colon cancer [facility])     Per patient, colonoscopy due 2020  Genitourinary    Encounter for follow-up surveillance of endometrial cancer - Primary       Other    History of endometrial cancer     Unstaged stage IB, grade 1 with positive LVSI  Completed adjuvant radiation in August 2017  Patient is clinically without evidence of disease recurrence  Return to the office in 3 months for continued cancer surveillance  CHIEF COMPLAINT:   Endometrial cancer surveillance    Problem:  Cancer Staging  Malignant neoplasm of endometrium ([facility] [address] )  Staging form: Corpus Uteri - Carcinoma, AJCC 7th Edition  - Clinical: FIGO Stage IB (T1b, N0, M0) - Signed by Shahrzad Hester PA-C on 3/28/2018    Personal history of colon cancer      Previous therapy:     History of endometrial cancer    4/5/2017 Initial Diagnosis     Malignant neoplasm of endometrium ([facility] [address] )         4/5/2017 Surgery     Dilation and curettage  A  Grade 1 endometrial cancer            5/4/2017 Surgery     Robotic-assisted total laparoscopic hysterectomy, bilateral salpingo-oophorectomy and cystoscopy  A  FIGO grade 1, 4 4 cm tumor   B  1 0 of 1 5 cm myometrial invasion  C  Positive LVSI  D  Negative pelvic washings  E  IHC for peterson, all proteins expressed  7/19/2017 - 8/24/2017 Radiation     :  Course: C1    Plan ID Energy Fractions Dose per Fraction (cGy) Total Dose Delivered (cGy) Elapsed Days   Whole Pelvis 10X 25 / 25 180 4,500 36      Treatment dates:  C1: 7/19/2017 - 8/24/2017             Colon cancer ([facility] [address] )    12/2017 Surgery     Colonoscopy x 2              Patient ID: Chilo Fraser is a 58 y o  female  who has no new complaints today  No vaginal bleeding, abdominal/pelvic pain  Normal bowel and bladder function  No interval change in medical history since last visit  Quality of life is good          The following portions of the patient's history were reviewed and updated as appropriate: allergies, current medications, past medical history and problem list     Review of Systems   Constitutional: Negative  HENT: Negative  Eyes: Negative  Respiratory: Negative  Cardiovascular: Negative  Gastrointestinal: Negative  Genitourinary: Negative  Musculoskeletal: Negative  Skin: Negative  Neurological: Negative  Psychiatric/Behavioral: Negative  Current Outpatient Prescriptions   Medication Sig Dispense Refill    buPROPion (WELLBUTRIN SR) 200 MG 12 hr tablet Take 200 mg by mouth every morning      Canagliflozin (INVOKANA) 300 MG TABS Take 300 mg by mouth every morning        clonazePAM (KlonoPIN) 0 25 MG disintegrating tablet Take 0 25 mg by mouth every morning      clonazePAM (KlonoPIN) 0 5 mg tablet Take 0 5 mg by mouth daily after lunch 3 pm      Insulin Glargine (TOUJEO SOLOSTAR SC) Inject 65 Units under the skin every morning LANTUS       lisinopril (ZESTRIL) 5 mg tablet Take 5 mg by mouth every morning      metFORMIN (GLUCOPHAGE) 500 mg tablet Take 500 mg by mouth 2 (two) times a day with meals      oxybutynin (DITROPAN XL) 15 MG 24 hr tablet Take 15 mg by mouth every morning      rosuvastatin (CRESTOR) 20 MG tablet Take 20 mg by mouth daily after dinner      sertraline (ZOLOFT) 100 mg tablet Take 100 mg by mouth 2 (two) times a day      triamterene-hydrochlorothiazide (MAXZIDE-25) 37 5-25 mg per tablet Take 1 tablet by mouth daily      Vitamin D, Cholecalciferol, 1000 units CAPS Take 1,000 Units by mouth every morning        ziprasidone (GEODON) 80 mg capsule Take 80 mg by mouth 2 (two) times a day with meals      docusate sodium (COLACE) 100 mg capsule Take 1 capsule by mouth 2 (two) times a day as needed for constipation for up to 30 days 60 capsule 0     No current facility-administered medications for this visit              Objective:    Blood pressure 118/70, pulse 98, temperature 98 3 °F (36 8 °C), temperature source Oral, resp  rate 14, height 5' 2" (1 575 m), weight 88 kg (194 lb)  Body mass index is 35 48 kg/m²  Body surface area is 1 89 meters squared  Physical Exam   Constitutional: She is oriented to person, place, and time  She appears well-developed and well-nourished  HENT:   Head: Normocephalic and atraumatic  Neck: Normal range of motion  Neck supple  No thyromegaly present  Pulmonary/Chest: Effort normal    Abdominal: Soft  She exhibits no distension and no mass  There is no rebound  Genitourinary:   Genitourinary Comments: The external female genitalia is normal  The bartholin's, uretheral and skenes glands are normal  The urethral meatus is normal (midline with no lesions)  Anus without fissure or lesion  Speculum exam reveals a grossly normal vagina  No masses, lesions, discharge or bleeding  No significant cystocele or rectocele noted  Bimanual exam notes a surgical absent cervix, uterus and adnexal structures  No masses or fullness  Bladder is without fullness, mass or tenderness  Musculoskeletal: Normal range of motion  She exhibits no edema  Lymphadenopathy:     She has no cervical adenopathy  Neurological: She is alert and oriented to person, place, and time  Skin: Skin is warm and dry  No rash noted  Psychiatric: She has a normal mood and affect  Her behavior is normal    Vitals reviewed

## 2019-04-04 ENCOUNTER — OFFICE VISIT (OUTPATIENT)
Dept: GYNECOLOGIC ONCOLOGY | Facility: CLINIC | Age: 64
End: 2019-04-04
Payer: COMMERCIAL

## 2019-04-04 VITALS
RESPIRATION RATE: 16 BRPM | TEMPERATURE: 97.9 F | HEIGHT: 62 IN | SYSTOLIC BLOOD PRESSURE: 134 MMHG | WEIGHT: 195 LBS | HEART RATE: 88 BPM | DIASTOLIC BLOOD PRESSURE: 76 MMHG | BODY MASS INDEX: 35.88 KG/M2

## 2019-04-04 DIAGNOSIS — Z85.038 HISTORY OF COLON CANCER: ICD-10-CM

## 2019-04-04 DIAGNOSIS — Z85.42 HISTORY OF ENDOMETRIAL CANCER: ICD-10-CM

## 2019-04-04 DIAGNOSIS — Z85.42 ENCOUNTER FOR FOLLOW-UP SURVEILLANCE OF ENDOMETRIAL CANCER: Primary | ICD-10-CM

## 2019-04-04 DIAGNOSIS — Z08 ENCOUNTER FOR FOLLOW-UP SURVEILLANCE OF ENDOMETRIAL CANCER: Primary | ICD-10-CM

## 2019-04-04 PROCEDURE — 99213 OFFICE O/P EST LOW 20 MIN: CPT | Performed by: PHYSICIAN ASSISTANT

## 2019-08-15 ENCOUNTER — OFFICE VISIT (OUTPATIENT)
Dept: GYNECOLOGIC ONCOLOGY | Facility: CLINIC | Age: 64
End: 2019-08-15
Payer: COMMERCIAL

## 2019-08-15 VITALS
SYSTOLIC BLOOD PRESSURE: 114 MMHG | BODY MASS INDEX: 35.51 KG/M2 | DIASTOLIC BLOOD PRESSURE: 68 MMHG | WEIGHT: 193 LBS | HEART RATE: 70 BPM | HEIGHT: 62 IN

## 2019-08-15 DIAGNOSIS — Z85.42 ENCOUNTER FOR FOLLOW-UP SURVEILLANCE OF ENDOMETRIAL CANCER: ICD-10-CM

## 2019-08-15 DIAGNOSIS — Z85.038 HISTORY OF COLON CANCER: ICD-10-CM

## 2019-08-15 DIAGNOSIS — Z85.42 HISTORY OF ENDOMETRIAL CANCER: Primary | ICD-10-CM

## 2019-08-15 DIAGNOSIS — Z08 ENCOUNTER FOR FOLLOW-UP SURVEILLANCE OF ENDOMETRIAL CANCER: ICD-10-CM

## 2019-08-15 PROCEDURE — 99213 OFFICE O/P EST LOW 20 MIN: CPT | Performed by: PHYSICIAN ASSISTANT

## 2019-08-15 RX ORDER — METFORMIN HYDROCHLORIDE 500 MG/1
500 TABLET, EXTENDED RELEASE ORAL 2 TIMES DAILY
Refills: 5 | COMMUNITY
Start: 2019-07-13

## 2019-08-15 RX ORDER — PEN NEEDLE, DIABETIC 32GX 5/32"
NEEDLE, DISPOSABLE MISCELLANEOUS
Refills: 10 | COMMUNITY
Start: 2019-07-03

## 2019-08-15 NOTE — ASSESSMENT & PLAN NOTE
History of unstaged, stage IB, grade 1 with positive LVSI  She is s/p completion of adjuvant radiation in August 2017  She is clinically without evidence of disease recurrence       The patient will return to the office in 6 months for continued surveillance

## 2019-08-15 NOTE — PROGRESS NOTES
Assessment/Plan:    Problem List Items Addressed This Visit        Other    History of endometrial cancer - Primary     History of unstaged, stage IB, grade 1 with positive LVSI  She is s/p completion of adjuvant radiation in August 2017  She is clinically without evidence of disease recurrence       The patient will return to the office in 6 months for continued surveillance  History of colon cancer     Per patient, colonoscopy due 2020  Encounter for follow-up surveillance of endometrial cancer            CHIEF COMPLAINT:   Endometrial cancer surveillance    Problem:  History of endometrial and colon cancer        Previous therapy:     History of endometrial cancer    4/5/2017 Initial Diagnosis     Malignant neoplasm of endometrium ([facility] [address] )      4/5/2017 Surgery     Dilation and curettage  A  Grade 1 endometrial cancer         5/4/2017 Surgery     Robotic-assisted total laparoscopic hysterectomy, bilateral salpingo-oophorectomy and cystoscopy  A  FIGO grade 1, 4 4 cm tumor   B  1 0 of 1 5 cm myometrial invasion  C  Positive LVSI  D  Negative pelvic washings  E  IHC for peterson, all proteins expressed  7/19/2017 - 8/24/2017 Radiation     :  Course: C1    Plan ID Energy Fractions Dose per Fraction (cGy) Total Dose Delivered (cGy) Elapsed Days   Whole Pelvis 10X 25 / 25 180 4,500 36      Treatment dates:  C1: 7/19/2017 - 8/24/2017          History of colon cancer    12/2017 Surgery     Colonoscopy x 2           Patient ID: Elmira Dickens is a 71 y o  female  who has no new complaints today  No vaginal bleeding, abdominal/pelvic pain  Normal bowel and bladder function  No interval change in medical history since last visit  Quality of life is good  The following portions of the patient's history were reviewed and updated as appropriate: allergies, current medications, past medical history and problem list     Review of Systems   Constitutional: Negative  HENT: Negative  Eyes: Negative  Respiratory: Negative  Cardiovascular: Negative  Gastrointestinal: Negative  Genitourinary: Negative  Musculoskeletal: Negative  Skin: Negative  Neurological: Negative  Psychiatric/Behavioral: Negative  Current Outpatient Medications   Medication Sig Dispense Refill    BD PEN NEEDLE JOSEPHINE U/F 32G X 4 MM MISC USE 4 TIMES DAILY WITH INSULIN  10    buPROPion (WELLBUTRIN SR) 200 MG 12 hr tablet Take 200 mg by mouth every morning      Canagliflozin (INVOKANA) 300 MG TABS Take 300 mg by mouth every morning        clonazePAM (KlonoPIN) 0 25 MG disintegrating tablet Take 0 25 mg by mouth every morning      clonazePAM (KlonoPIN) 0 5 mg tablet Take 0 5 mg by mouth daily after lunch 3 pm      docusate sodium (COLACE) 100 mg capsule Take 1 capsule by mouth 2 (two) times a day as needed for constipation for up to 30 days 60 capsule 0    Insulin Glargine (TOUJEO SOLOSTAR SC) Inject 65 Units under the skin every morning LANTUS       lisinopril (ZESTRIL) 5 mg tablet Take 5 mg by mouth every morning      metFORMIN (GLUCOPHAGE) 500 mg tablet Take 500 mg by mouth 2 (two) times a day with meals      metFORMIN (GLUCOPHAGE-XR) 500 mg 24 hr tablet Take 500 mg by mouth 2 (two) times a day  5    oxybutynin (DITROPAN XL) 15 MG 24 hr tablet Take 15 mg by mouth every morning      rosuvastatin (CRESTOR) 20 MG tablet Take 20 mg by mouth daily after dinner      sertraline (ZOLOFT) 100 mg tablet Take 100 mg by mouth 2 (two) times a day      triamterene-hydrochlorothiazide (MAXZIDE-25) 37 5-25 mg per tablet Take 1 tablet by mouth daily      Vitamin D, Cholecalciferol, 1000 units CAPS Take 1,000 Units by mouth every morning        ziprasidone (GEODON) 80 mg capsule Take 80 mg by mouth 2 (two) times a day with meals       No current facility-administered medications for this visit  Objective:    Blood pressure 114/68, pulse 70, height 5' 2" (1 575 m), weight 87 5 kg (193 lb)    Body mass index is 35 3 kg/m²  Body surface area is 1 88 meters squared  Physical Exam   Constitutional: She is oriented to person, place, and time  She appears well-developed and well-nourished  HENT:   Head: Normocephalic and atraumatic  Neck: Normal range of motion  Neck supple  No thyromegaly present  Pulmonary/Chest: Effort normal    Abdominal: Soft  She exhibits no distension and no mass  There is no rebound  Genitourinary:   Genitourinary Comments: The external female genitalia is normal  The bartholin's, uretheral and skenes glands are normal  The urethral meatus is normal (midline with no lesions)  Anus without fissure or lesion  Speculum exam reveals a grossly normal vagina  Radiation changes present  No masses, lesions, discharge or bleeding  No significant cystocele or rectocele noted  Bimanual exam notes a surgical absent cervix, uterus and adnexal structures  No masses or fullness  Bladder is without fullness, mass or tenderness  Musculoskeletal: Normal range of motion  She exhibits no edema  Lymphadenopathy:     She has no cervical adenopathy  Neurological: She is alert and oriented to person, place, and time  Skin: Skin is warm and dry  No rash noted  Psychiatric: She has a normal mood and affect  Her behavior is normal    Vitals reviewed

## 2019-09-13 ENCOUNTER — TRANSCRIBE ORDERS (OUTPATIENT)
Dept: ADMINISTRATIVE | Facility: HOSPITAL | Age: 64
End: 2019-09-13

## 2019-09-13 DIAGNOSIS — Z12.39 BREAST SCREENING, UNSPECIFIED: Primary | ICD-10-CM

## 2019-10-03 ENCOUNTER — HOSPITAL ENCOUNTER (OUTPATIENT)
Dept: RADIOLOGY | Facility: HOSPITAL | Age: 64
Discharge: HOME | End: 2019-10-03
Attending: INTERNAL MEDICINE
Payer: COMMERCIAL

## 2019-10-03 VITALS — BODY MASS INDEX: 31.82 KG/M2 | WEIGHT: 191 LBS | HEIGHT: 65 IN

## 2019-10-03 DIAGNOSIS — Z12.39 BREAST SCREENING: ICD-10-CM

## 2019-10-03 PROCEDURE — 77067 SCR MAMMO BI INCL CAD: CPT

## 2019-10-12 ENCOUNTER — TRANSCRIBE ORDERS (OUTPATIENT)
Dept: ADMINISTRATIVE | Facility: HOSPITAL | Age: 64
End: 2019-10-12

## 2019-10-12 DIAGNOSIS — I10 HYPERTENSION, UNSPECIFIED TYPE: Primary | ICD-10-CM

## 2019-10-12 DIAGNOSIS — R06.02 SOB (SHORTNESS OF BREATH): Primary | ICD-10-CM

## 2019-10-17 ENCOUNTER — HOSPITAL ENCOUNTER (OUTPATIENT)
Dept: RADIOLOGY | Facility: HOSPITAL | Age: 64
Discharge: HOME | End: 2019-10-17
Attending: INTERNAL MEDICINE
Payer: COMMERCIAL

## 2019-10-17 ENCOUNTER — TRANSCRIBE ORDERS (OUTPATIENT)
Dept: ADMINISTRATIVE | Facility: HOSPITAL | Age: 64
End: 2019-10-17
Payer: COMMERCIAL

## 2019-10-17 ENCOUNTER — APPOINTMENT (OUTPATIENT)
Dept: LAB | Facility: HOSPITAL | Age: 64
End: 2019-10-17
Attending: INTERNAL MEDICINE
Payer: COMMERCIAL

## 2019-10-17 ENCOUNTER — HOSPITAL ENCOUNTER (OUTPATIENT)
Dept: NON INVASIVE DIAGNOSTICS | Facility: HOSPITAL | Age: 64
Discharge: HOME | End: 2019-10-17
Attending: INTERNAL MEDICINE
Payer: COMMERCIAL

## 2019-10-17 DIAGNOSIS — Z00.00 ROUTINE GENERAL MEDICAL EXAMINATION AT A HEALTH CARE FACILITY: Primary | ICD-10-CM

## 2019-10-17 DIAGNOSIS — I10 HYPERTENSION, UNSPECIFIED TYPE: ICD-10-CM

## 2019-10-17 LAB
CHEST PAIN STATEMENT: NORMAL
MAX DIASTOLIC BP: 72 MMHG
MAX HEART RATE: 101 BPM
MAX PREDICTED HEART RATE: 155 BPM
MAX. SYSTOLIC BP: 140 MMHG
PROTOCOL NAME: NORMAL
REASON FOR TERMINATION: NORMAL
TARGET HR FORMULA: NORMAL
TEST INDICATION: NORMAL
TIME IN EXERCISE PHASE: NORMAL

## 2019-10-17 PROCEDURE — 93017 CV STRESS TEST TRACING ONLY: CPT

## 2019-10-17 PROCEDURE — 93016 CV STRESS TEST SUPVJ ONLY: CPT | Performed by: INTERNAL MEDICINE

## 2019-10-17 PROCEDURE — 93005 ELECTROCARDIOGRAM TRACING: CPT | Performed by: INTERNAL MEDICINE

## 2019-10-17 PROCEDURE — 78452 HT MUSCLE IMAGE SPECT MULT: CPT

## 2019-10-17 PROCEDURE — 78452 HT MUSCLE IMAGE SPECT MULT: CPT | Performed by: INTERNAL MEDICINE

## 2019-10-17 PROCEDURE — 93018 CV STRESS TEST I&R ONLY: CPT | Performed by: INTERNAL MEDICINE

## 2019-10-17 PROCEDURE — A9502 TC99M TETROFOSMIN: HCPCS

## 2019-10-17 RX ADMIN — REGADENOSON 0.4 MG: 0.08 INJECTION, SOLUTION INTRAVENOUS at 09:18

## 2019-10-18 LAB
ATRIAL RATE: 79 BPM
P AXIS: 59 DEGREES
PR INTERVAL: 142 MS
QRS AXIS: 41 DEGREES
QRSD INTERVAL: 120 MS
QT INTERVAL: 416 MS
QTC INTERVAL: 477 MS
T WAVE AXIS: 46 DEGREES
VENTRICULAR RATE: 79 BPM

## 2019-10-18 PROCEDURE — 93010 ELECTROCARDIOGRAM REPORT: CPT | Performed by: INTERNAL MEDICINE

## 2019-10-25 ENCOUNTER — HOSPITAL ENCOUNTER (OUTPATIENT)
Dept: NON INVASIVE DIAGNOSTICS | Facility: HOSPITAL | Age: 64
Discharge: HOME | End: 2019-10-25
Attending: INTERNAL MEDICINE
Payer: COMMERCIAL

## 2019-10-25 DIAGNOSIS — R06.02 SOB (SHORTNESS OF BREATH): ICD-10-CM

## 2019-10-25 PROCEDURE — 93306 TTE W/DOPPLER COMPLETE: CPT

## 2019-10-25 PROCEDURE — 93306 TTE W/DOPPLER COMPLETE: CPT | Performed by: INTERNAL MEDICINE

## 2019-11-12 NOTE — PATIENT INSTRUCTIONS
Overactive Bladder   WHAT YOU NEED TO KNOW:   What is overactive bladder? Overactive bladder is a sudden urge to urinate that is difficult for you to control  It occurs when the muscles of the bladder contract (tighten) more than normal  This causes a frequent or sudden need to urinate  You usually have to urinate more than 8 times in 24 hours  You may need to get up more than once in the middle of the night to urinate  You may also leak urine before you are able to make it to the bathroom  What increases my risk for overactive bladder? Your risk for overactive bladder increases as you get older  Previous vaginal birth, chronic constipation, and diabetes increase your risk  Obesity, nerve injury, stroke, and spinal cord problems also increase your risk  How is overactive bladder diagnosed? Your healthcare provider will ask questions about your symptoms  He will also ask you about any medical conditions you have and the medicines you take  He may examine your pelvic area and abdomen to look for problems that may be causing your symptoms  You may need any of the following:  · Blood and urine tests  may be done to look for signs of infection or blood or glucose (sugar) in your urine  · You may be asked to keep a record  about your urination patterns for a few days  Write down the number of times you urinate over 24 hours, the amount, and if you have urine leakage  Your healthcare provider may also want you to record the type and amount of liquids you drink  · An ultrasound  of your bladder may be done  The amount of urine left in your bladder after you urinate will be measured  · A cystoscopy  may show problems inside your bladder  A cystoscope is put into your bladder through your urethra  The urethra is the tube that urine flows through when you urinate  The cystoscope is a long tube with a lens and a light on the end  · Urodynamic testing  may show how well your bladder works   A narrow tube is placed in your urethra and another is placed in your rectum  Ask for more information about this test   How is overactive bladder managed? · Limit liquids as directed  Limit liquids to decrease the amount you urinate  Ask how much liquid to drink each day and which liquids are best for you  You may need to avoid drinking liquids several hours before you go to sleep  Your healthcare provider may also recommend that you limit caffeine and alcohol  · Exercise regularly and maintain a healthy weight  Ask your healthcare provider how much you should weigh and about the best exercise plan for you  Extra weight puts pressure on your bladder and may make your symptoms worse  Ask him to help you create a weight loss plan if you are overweight  · Do pelvic muscle exercises often  Your pelvic muscles help you stop urinating  Squeeze these muscles tightly for 5 seconds, then relax for 5 seconds  Gradually work up to squeezing for 10 seconds  Do 3 sets of 15 repetitions a day, or as directed  This will help strengthen your pelvic muscles and improve bladder control  · Train your bladder  Go to the bathroom at set times, such as every 2 hours, even if you do not feel the urge to go  You can also try to hold your urine when you feel the urge to go  For example, hold your urine for 5 minutes when you feel the urge to go  As that becomes easier, hold your urine for 10 minutes  Work up to every 3 or 4 hours to help control your bladder  How is overactive bladder treated? The following treatments may be done if other methods are not working:  · Medicines  may be given to relax your bladder and decrease urination  · Sacral nerve stimulation sends electrical signals to your sacral nerve through a small device implanted under your skin  Your sacral nerve controls your bladder, sphincter, and pelvic floor muscles  · Surgery  may be done if all other treatments cannot help you control your bladder    When should I contact my healthcare provider? · Your urine is pink, or you notice blood in your urine  · You have pain with urination  · You continue to have symptoms even after you take your medicine  · You have questions or concerns about your condition or care  CARE AGREEMENT:   You have the right to help plan your care  Learn about your health condition and how it may be treated  Discuss treatment options with your caregivers to decide what care you want to receive  You always have the right to refuse treatment  The above information is an  only  It is not intended as medical advice for individual conditions or treatments  Talk to your doctor, nurse or pharmacist before following any medical regimen to see if it is safe and effective for you  © 2017 [address]  Information is for End User's use only and may not be sold, redistributed or otherwise used for commercial purposes  All illustrations and images included in CareNotes® are the copyrighted property of A D A M , Inc  or [employer]  OnabotulinumtoxinA (By injection)   OnabotulinumtoxinA (zr-l-ebu-cv-NHD-uhk-tox-in-ay)  Treats muscle stiffness, muscle spasms, excessive sweating, overactive bladder, or loss of bladder control  Prevents chronic migraine headaches  Improves the appearance of wrinkles on the face  Brand Name(s): Botox, Botox Cosmetic   There may be other brand names for this medicine  When This Medicine Should Not Be Used: This medicine is not right for everyone  You should not receive this medicine if you had an allergic reaction to onabotulinumtoxinA or any other botulinum toxin product  How to Use This Medicine:   Injectable  · Your doctor will prescribe your exact dose and tell you how often it should be given  This medicine is given by a healthcare provider as a shot under your skin or into a muscle  · You may be given medicine to numb the area where the shot will be injected   If you receive the medicine around your eyes, you may be given eye drops or ointment to numb the area  After your injection, you may need to wear a protective contact lens or eye patch  · If you are being treated for excessive sweating, shave your underarms but do not use deodorant for 24 hours before your injection  Avoid exercise, hot foods or liquids, or anything else that could make you sweat for 30 minutes before your injection  · The recommended treatment schedule for chronic migraine is every 12 weeks  · This medicine works slowly  Once your condition has improved, the medicine will last about 3 months, then the effects will slowly go away  You might need more injections to treat your condition  ¨ Muscle spasms in the eyelids should improve within 3 to 10 days  ¨ Eye muscle problems should improve 1 or 2 days after the injection, and the improvement should last for 2 to 6 weeks  ¨ Neck pain should improve within 2 to 6 weeks  ¨ Arm stiffness should improve within 4 to 6 weeks  ¨ Facial lines or wrinkles should improve 1 or 2 days  · This medicine should come with a Medication Guide  Ask your pharmacist for a copy if you do not have one  · Missed dose:Call your doctor or pharmacist for instructions  Drugs and Foods to Avoid:   Ask your doctor or pharmacist before using any other medicine, including over-the-counter medicines, vitamins, and herbal products  · Some foods and medicine can affect how onabotulinumtoxinA works  Tell your doctor if you are using any of the following:  ¨ Aspirin or a blood thinner (such as ticlopidine, warfarin)  ¨ Muscle relaxer  ¨ Medicine for an infection (such as amikacin, gentamicin, streptomycin, tobramycin)  · Tell your doctor if you have received an injection of any botulinum toxin product within the past 4 months    Warnings While Using This Medicine:   · Tell your doctor if you are pregnant or breastfeeding, or if you have breathing or lung problems, bleeding problems, heart or blood vessel disease, or nerve or muscle problems (such as myasthenia gravis)  Tell your doctor if you have ever had face surgery or if you have a urinary tract infection or trouble urinating, diabetes, or multiple sclerosis  · This medicine may cause the following problems:  ¨ Muscle weakness, loss of bladder control, trouble swallowing, speaking, or breathing (caused by the toxin spreading to other parts of your body)  · This medicine may make your muscles weak or cause vision problems  Do not drive or do anything else that could be dangerous until you know how this medicine affects you  · There are some warnings that only apply if you are receiving this medicine to treat the following:   ¨ Injections near the eye: This medicine may reduce blinking, which can raise the risk of eye problems such as corneal exposure and ulcers  Tell your doctor right away if you notice that you are blinking less than usual or your eyes feel dry  ¨ Urinary incontinence: This medicine may cause autonomic dysreflexia, which can be a life-threatening condition  ¨ Overactive bladder: Check with your doctor right away if you have trouble urinating or a burning sensation while urinating  · This medicine contains products from donated human blood, so it may contain viruses, although the risk is low  Human donors and blood are always tested for viruses to keep the risk low  Talk with your doctor about this risk if you are concerned  · Your doctor will check your progress and the effects of this medicine at regular visits  Keep all appointments    Possible Side Effects While Using This Medicine:   Call your doctor right away if you notice any of these side effects:  · Allergic reaction: Itching or hives, swelling in your face or hands, swelling or tingling in your mouth or throat, chest tightness, trouble breathing  · Blurred or double vision, droopy eyelids  · Change in how much or how often you urinate, trouble urinating, or painful urination  · Chest pain, slow or uneven heartbeat  · Headache, increased sweating, warmth or redness in your face, neck, or arm  · Muscle weakness  · Trouble swallowing, talking, or breathing  If you notice these less serious side effects, talk with your doctor:   · Fever, chills, cough, stuffy or runny nose, sore throat, and body aches  · Pain in your neck, back, arms, or legs  · Redness, pain, tenderness, bruising, swelling, or weakness where the shot was given  If you notice other side effects that you think are caused by this medicine, tell your doctor  Call your doctor for medical advice about side effects  You may report side effects to FDA at 0-073-FDA-8596  © 2017 [address] Information is for End User's use only and may not be sold, redistributed or otherwise used for commercial purposes  The above information is an  only  It is not intended as medical advice for individual conditions or treatments  Talk to your doctor, nurse or pharmacist before following any medical regimen to see if it is safe and effective for you  Sacral Nerve Stimulation, Ambulatory Care   GENERAL INFORMATION:   What do I need to know about sacral nerve stimulation? Sacral nerve stimulation (SNS) is treatment for urinary retention without blockage, overactive bladder symptoms, and fecal incontinence  Overactive bladder symptoms include urinary urge incontinence and urinary frequency  Electrical impulses are sent directly to the sacral nerves to improve or restore bladder or bowel function  Sacral nerves are in your lower back  They control your anus, rectum, and bladder functioning  SNS is done when medicines and behavior therapy are not effective  What will happen during SNS? SNS is a two-part procedure:  · The first part is a trial phase to see if it will improve your symptoms   A temporary or permanent lead is placed into your lower back, through to your sacral nerves, for 3 days to 2 weeks  The lead is connected to an external stimulator  Electrical impulses will stimulate your sacral nerves  You will be asked to write down your symptoms and bladder or bowel function  · The second part involves implanting the stimulator  Your healthcare provider will complete this part if you had a decrease in symptoms during the trial phase  He will make an incision on the side of your upper buttock  A space is made just under your skin and the stimulator is connected to a permanent lead  Then, the stimulator is placed in the space and the space is closed with stitches  You will be given a programming device to adjust and turn the stimulator off and on as needed  What are the risks of SNS? You can have pain, discomfort and infection in the area where the stimulator was implanted  You may have an allergic reaction to the materials that the lead or stimulator is made from  You may have pain or feel a shock during stimulation  The lead may move and you may need to have the procedure done again  You cannot have an MRI of your abdomen or pelvis if you have a stimulator implanted  An MRI can cause the leads to heat up and cause problems with your stimulator  You may set off metal detectors at airports or theft detectors in stores  CARE AGREEMENT:   You have the right to help plan your care  Learn about your health condition and how it may be treated  Discuss treatment options with your caregivers to decide what care you want to receive  You always have the right to refuse treatment  The above information is an  only  It is not intended as medical advice for individual conditions or treatments  Talk to your doctor, nurse or pharmacist before following any medical regimen to see if it is safe and effective for you  © 2014 [address] is for End User's use only and may not be sold, redistributed or otherwise used for commercial purposes   All illustrations and images included in [place] are the copyrighted property of A D A M , Inc  or [employer]

## 2019-11-12 NOTE — PROGRESS NOTES
Problem List Items Addressed This Visit        Genitourinary    OAB (overactive bladder) - Primary     Patient has previously been treated for urinary frequency and urgency, and some urge incontinence, currently using between 2-3 pads per day, these are not particularly wet when she changes them  She has previously been on oxybutynin, she has not been taking this for some time, I have removed this from her medication list     She states that she does not want to get back on this medication, she does not want a new medication and she does not want surgery at this time  As such, I spoke with her about the addition of a fiber supplement, as well as timed voiding to help to manage her symptoms  We did discuss percutaneous tibial nerve stimulation, sacral neuromodulation by way of InterStim placement, as well as cystoscopy with botulinum toxin  She states that she does not want these therapies at this time  She will see us back on a p r n  Basis going forward, all of her questions and concerns were answered and addressed today         Relevant Orders    POCT urine dip    Urinalysis with microscopic    POCT Measure PVR (Completed)          Discussion:  I had a nice visit with Julio C Willoughby today, it sounds like she probably has overactive bladder, wet variant, as well as some degree of diabetic bladder dysfunction given her history of severe diabetes  I did tell her that in our diabetic patients that take medicines such as Invokana, they do state that there incontinence symptoms do improve when they get off this medication, I did tell her to not stop or make any changes in her diabetes medications without talking to her primary care provider/the prescribing provider, however      She is interested in behavioral changes at this time, does not want new surgeries or medicines, all of her questions and concerns were answered and addressed today      Assessment and plan:     Please see problem oriented charting for the assessment plan of today's urological complaints    Megan Abreu MD      Chief Complaint     Chief Complaint   Patient presents with    overactive bladder    Urinary incontinence      History of Present Illness     Nayeli Rodriguez is a 71 y o  woman referred in consultation by Dr Tina García for overactive bladder  A copy of today's consultation has been sent to the referring provider in the name of continuity of care  With regard to this complaint it is localized to the urinary bladder  The quality is described as slight to moderate leakage and the severity of this complaint is described as mild  These symptoms have been present for many years and the timing is ongoing  Previous treatments include oxybutynin and previous work-up includes evaluation by her previous doctor, it sounds like she is referring to a urologist but I do not see records from an outside urologist   The patient mentions nothing particular as aggravating and alleviating factors, respectively  The following associated signs and symptoms are mentioned: does endorse some constipation, does not take a fiber supplement  PVR 62 milliliters    I did request urinalysis today, the patient was unable to provide me with a specimen, however    Uses 2-3 pads per day  The following portions of the patient's history were reviewed and updated as appropriate: allergies, current medications, past family history, past medical history, past social history, past surgical history and problem list     Detailed Urologic History     - please refer to HPI    Review of Systems     Review of Systems   Constitutional: Negative  HENT: Negative  Eyes: Negative  Respiratory: Negative  Cardiovascular: Negative  Gastrointestinal: Negative  Endocrine: Negative  Genitourinary:        As per HPI   Musculoskeletal: Positive for gait problem (Some slow gait, likely due to neuropathy)  Skin: Negative  Allergic/Immunologic: Negative  Neurological:        Patient appears to have slight dysarthria, has a kirit Southern accent, peripheral neuropathy is present along with a shuffling gait   Hematological: Negative  Psychiatric/Behavioral: Negative  Patient mentions history of a nervous breakdown, and occasional depression, taking antidepressants and antipsychotic medication at this time, no active thoughts of self harm or harm to others             Allergies     No Known Allergies    Physical Exam     Physical Exam   Constitutional: She is oriented to person, place, and time  She appears well-developed and well-nourished  No distress  Appears nontoxic, pleasant   HENT:   Head: Normocephalic and atraumatic  Right Ear: External ear normal    Left Ear: External ear normal    Nose: Nose normal    Eyes: Right eye exhibits no discharge  Left eye exhibits no discharge  Neck: No tracheal deviation present  Cardiovascular: Normal rate, regular rhythm and intact distal pulses  Pulmonary/Chest: Effort normal  No stridor  No respiratory distress  Abdominal: Soft  She exhibits no distension and no mass  There is no tenderness  There is no rebound and no guarding  No hernia  Genitourinary:   Genitourinary Comments: No CVA or suprapubic tenderness   Musculoskeletal: She exhibits no edema, tenderness or deformity  Neurological: She is alert and oriented to person, place, and time  No cranial nerve deficit  Coordination normal    Skin: Skin is warm and dry  No rash noted  She is not diaphoretic  No erythema  No pallor  Psychiatric: She has a normal mood and affect  Her behavior is normal  Judgment and thought content normal    Nursing note and vitals reviewed            Vital Signs  Vitals:    11/15/19 0917   BP: 130/70   BP Location: Left arm   Patient Position: Sitting   Cuff Size: Adult   Pulse: 93   Weight: 87 5 kg (193 lb)   Height: 5' 4" (1 626 m)         Current Medications       Current Outpatient Medications:     BD PEN NEEDLE JOSEPHINE U/F 32G X 4 MM MISC, USE 4 TIMES DAILY WITH INSULIN, Disp: , Rfl: 10    buPROPion (WELLBUTRIN SR) 200 MG 12 hr tablet, Take 200 mg by mouth every morning, Disp: , Rfl:     clonazePAM (KlonoPIN) 0 25 MG disintegrating tablet, Take 0 25 mg by mouth every morning, Disp: , Rfl:     clonazePAM (KlonoPIN) 0 5 mg tablet, Take 0 5 mg by mouth daily after lunch 3 pm, Disp: , Rfl:     lisinopril (ZESTRIL) 2 5 mg tablet, Take 2 5 mg by mouth daily, Disp: , Rfl: 3    metFORMIN (GLUCOPHAGE) 500 mg tablet, Take 500 mg by mouth 2 (two) times a day with meals, Disp: , Rfl:     rosuvastatin (CRESTOR) 20 MG tablet, Take 20 mg by mouth daily after dinner, Disp: , Rfl:     sertraline (ZOLOFT) 100 mg tablet, Take 100 mg by mouth 2 (two) times a day, Disp: , Rfl:     Vitamin D, Cholecalciferol, 1000 units CAPS, Take 1,000 Units by mouth every morning  , Disp: , Rfl:     ziprasidone (GEODON) 80 mg capsule, Take 80 mg by mouth 2 (two) times a day with meals, Disp: , Rfl:     Canagliflozin (INVOKANA) 300 MG TABS, Take 300 mg by mouth every morning  , Disp: , Rfl:     Insulin Glargine (TOUJEO SOLOSTAR SC), Inject 65 Units under the skin every morning LANTUS , Disp: , Rfl:     lisinopril (ZESTRIL) 5 mg tablet, Take 5 mg by mouth every morning, Disp: , Rfl:     metFORMIN (GLUCOPHAGE-XR) 500 mg 24 hr tablet, Take 500 mg by mouth 2 (two) times a day, Disp: , Rfl: 5    naproxen (NAPROSYN) 500 mg tablet, Take 500 mg by mouth every 12 (twelve) hours as needed Take with food, Disp: , Rfl: 0    triamterene-hydrochlorothiazide (MAXZIDE-25) 37 5-25 mg per tablet, Take 1 tablet by mouth daily, Disp: , Rfl:       Active Problems     Patient Active Problem List   Diagnosis    History of endometrial cancer    Uncontrolled type 2 diabetes mellitus with nephropathy ([facility] [address] )    Hypertension    Hyperlipidemia    Obesity    Anemia    History of colon cancer    Encounter for follow-up surveillance of endometrial cancer    OAB (overactive bladder)         Past Medical History     Past Medical History:   Diagnosis Date    Anesthesia complication     difficulty waking up    Arthritis     left knee    Bone spur     left knee behing the knee cap    Depression     [facility] [address] [facility]     Endometrial cancer ([facility] [address] [facility]     grade I    Hx of bleeding disorder     vaginal bleeding started on 3/13/17     Hyperlipidemia     Hypertension     Shortness of breath     with exertion    Urinary incontinence     Vitamin D deficiency          Surgical History     Past Surgical History:   Procedure Laterality Date    BREAST BIOPSY Left     benign    CHOLECYSTECTOMY      open    DILATION AND CURETTAGE OF UTERUS N/A 4/5/2017    Procedure: DILATATION AND CURETTAGE (D&C);   Surgeon: Oleg Mahoney MD;  Location: [facility] MAIN OR;  Service:     HYSTERECTOMY      OOPHORECTOMY      PELVIC LAPAROSCOPY      OH LAPAROSCOPY W TOT HYSTERECTUTERUS <=250 GRAM  W TUBE/OVARY N/A 5/4/2017    Procedure: ROBOTIC ASSISTED TOTAL LAPAROSCOPIC HYSTERECTOMY, BILATERAL SALPINGOOPHERECTOMY; CYSTOSCOPY;  Surgeon: Ernst Man MD;  Location:  MAIN OR;  Service: Gynecology Oncology    TONSILLECTOMY      TUBAL LIGATION           Family History     Family History   Problem Relation Age of Onset    Cancer Mother         Renal    Heart disease Father         CHF    Heart disease Sister         atrial septal defect         Social History     Social History     Social History     Tobacco Use   Smoking Status Never Smoker   Smokeless Tobacco Never Used         Pertinent Lab Values     Lab Results   Component Value Date    CREATININE 1 50 (H) 10/05/2017                 Pertinent Imaging      No urologic imaging for my review

## 2019-11-13 ENCOUNTER — TELEPHONE (OUTPATIENT)
Dept: UROLOGY | Facility: MEDICAL CENTER | Age: 64
End: 2019-11-13

## 2019-11-13 NOTE — TELEPHONE ENCOUNTER
Patient of Dr Kristina Dominguez seen in [place] office  Patient stated she is unable to get medical records from Dr Jayden Ruiz office  Dr office requesting we fax over request   Phone number [phone] for doctors office

## 2019-11-15 ENCOUNTER — OFFICE VISIT (OUTPATIENT)
Dept: UROLOGY | Facility: CLINIC | Age: 64
End: 2019-11-15
Payer: COMMERCIAL

## 2019-11-15 VITALS
WEIGHT: 193 LBS | SYSTOLIC BLOOD PRESSURE: 130 MMHG | HEART RATE: 93 BPM | HEIGHT: 64 IN | BODY MASS INDEX: 32.95 KG/M2 | DIASTOLIC BLOOD PRESSURE: 70 MMHG

## 2019-11-15 DIAGNOSIS — N32.81 OAB (OVERACTIVE BLADDER): Primary | ICD-10-CM

## 2019-11-15 LAB — POST-VOID RESIDUAL VOLUME, ML POC: 62 ML

## 2019-11-15 PROCEDURE — 99204 OFFICE O/P NEW MOD 45 MIN: CPT | Performed by: UROLOGY

## 2019-11-15 PROCEDURE — 51798 US URINE CAPACITY MEASURE: CPT | Performed by: UROLOGY

## 2019-11-15 RX ORDER — LISINOPRIL 2.5 MG/1
2.5 TABLET ORAL DAILY
Refills: 3 | COMMUNITY
Start: 2019-10-11

## 2019-11-15 RX ORDER — NAPROXEN 500 MG/1
500 TABLET ORAL EVERY 12 HOURS PRN
Refills: 0 | COMMUNITY
Start: 2019-10-08 | End: 2021-01-13

## 2019-11-15 NOTE — ASSESSMENT & PLAN NOTE
Patient has previously been treated for urinary frequency and urgency, and some urge incontinence, currently using between 2-3 pads per day, these are not particularly wet when she changes them  She has previously been on oxybutynin, she has not been taking this for some time, I have removed this from her medication list     She states that she does not want to get back on this medication, she does not want a new medication and she does not want surgery at this time  As such, I spoke with her about the addition of a fiber supplement, as well as timed voiding to help to manage her symptoms  We did discuss percutaneous tibial nerve stimulation, sacral neuromodulation by way of InterStim placement, as well as cystoscopy with botulinum toxin  She states that she does not want these therapies at this time  She will see us back on a p r n   Basis going forward, all of her questions and concerns were answered and addressed today

## 2020-01-09 ENCOUNTER — CONSULT (OUTPATIENT)
Dept: CARDIOLOGY CLINIC | Facility: CLINIC | Age: 65
End: 2020-01-09
Payer: COMMERCIAL

## 2020-01-09 ENCOUNTER — OFFICE VISIT (OUTPATIENT)
Dept: GYNECOLOGIC ONCOLOGY | Facility: CLINIC | Age: 65
End: 2020-01-09
Payer: COMMERCIAL

## 2020-01-09 VITALS
HEIGHT: 64 IN | BODY MASS INDEX: 32.78 KG/M2 | OXYGEN SATURATION: 98 % | WEIGHT: 192 LBS | SYSTOLIC BLOOD PRESSURE: 138 MMHG | DIASTOLIC BLOOD PRESSURE: 70 MMHG

## 2020-01-09 VITALS
DIASTOLIC BLOOD PRESSURE: 76 MMHG | WEIGHT: 196 LBS | BODY MASS INDEX: 33.46 KG/M2 | HEART RATE: 81 BPM | TEMPERATURE: 98.1 F | SYSTOLIC BLOOD PRESSURE: 138 MMHG | HEIGHT: 64 IN

## 2020-01-09 DIAGNOSIS — Z85.42 ENCOUNTER FOR FOLLOW-UP SURVEILLANCE OF ENDOMETRIAL CANCER: Primary | ICD-10-CM

## 2020-01-09 DIAGNOSIS — Z08 ENCOUNTER FOR FOLLOW-UP SURVEILLANCE OF ENDOMETRIAL CANCER: Primary | ICD-10-CM

## 2020-01-09 DIAGNOSIS — Z85.42 HISTORY OF ENDOMETRIAL CANCER: ICD-10-CM

## 2020-01-09 DIAGNOSIS — I10 HYPERTENSION, UNSPECIFIED TYPE: Chronic | ICD-10-CM

## 2020-01-09 DIAGNOSIS — R06.00 DYSPNEA ON EXERTION: Primary | ICD-10-CM

## 2020-01-09 DIAGNOSIS — R00.2 PALPITATIONS: ICD-10-CM

## 2020-01-09 DIAGNOSIS — I51.89 DIASTOLIC DYSFUNCTION: ICD-10-CM

## 2020-01-09 DIAGNOSIS — R60.9 EDEMA, UNSPECIFIED TYPE: ICD-10-CM

## 2020-01-09 DIAGNOSIS — R07.9 CHEST PAIN IN ADULT: ICD-10-CM

## 2020-01-09 DIAGNOSIS — IMO0002 UNCONTROLLED TYPE 2 DIABETES MELLITUS WITH NEPHROPATHY: ICD-10-CM

## 2020-01-09 PROCEDURE — 93000 ELECTROCARDIOGRAM COMPLETE: CPT | Performed by: INTERNAL MEDICINE

## 2020-01-09 PROCEDURE — 99212 OFFICE O/P EST SF 10 MIN: CPT | Performed by: NURSE PRACTITIONER

## 2020-01-09 PROCEDURE — 99205 OFFICE O/P NEW HI 60 MIN: CPT | Performed by: INTERNAL MEDICINE

## 2020-01-09 RX ORDER — INSULIN GLARGINE 100 [IU]/ML
INJECTION, SOLUTION SUBCUTANEOUS
COMMUNITY
Start: 2020-01-06 | End: 2020-02-25 | Stop reason: SDUPTHER

## 2020-01-09 RX ORDER — ASPIRIN 81 MG/1
81 TABLET ORAL
Qty: 90 TABLET | Refills: 3 | Status: SHIPPED | OUTPATIENT
Start: 2020-01-09 | End: 2021-01-12

## 2020-01-09 RX ORDER — METOPROLOL SUCCINATE 25 MG/1
25 TABLET, EXTENDED RELEASE ORAL DAILY
Qty: 30 TABLET | Refills: 3 | Status: SHIPPED | OUTPATIENT
Start: 2020-01-09 | End: 2020-05-04 | Stop reason: SDUPTHER

## 2020-01-09 RX ORDER — NITROGLYCERIN 0.4 MG/1
0.4 TABLET SUBLINGUAL
Qty: 30 TABLET | Refills: 1 | Status: SHIPPED | OUTPATIENT
Start: 2020-01-09

## 2020-01-09 NOTE — PROGRESS NOTES
[facility]  [address] [facility] [address]  [address]   [place], [address]  Cardiology Consultation  Everton Alcazar  1955  [phone]  [facility] [address]  [address]  [address] [facility], [address]  [place] [address] [facility] [address]  [phone]    1  Dyspnea on exertion  POCT ECG   2  Uncontrolled type 2 diabetes mellitus with nephropathy (HCC)  POCT ECG   3  Hypertension, unspecified type  POCT ECG   4  Edema, unspecified type  POCT ECG   5  Palpitations  POCT ECG      Discussion/Summary:   Chest pain- Abnormal baseline ekg  Stress test equivocal- no focal ischemia  Cannot rule out balance ischemia  Baseline EKG with nonspecific ST T-wave changes in anterior leads  Aspirin 81mg daily  She is currently on optimal medical therapy including rosuvastatin + aspirin + lisinopril 2 5mg + metoprolol 25mg in morning  Trial of nitroglycerin prn  If with refractory chest pain she will come to the ED    Exertional dyspnea- secondary to deconditioning? No hx of smoking  Trial of metoprolol for heart rate control  Diastolic dysfunction? Consideration for RHC if with continue dyspnea    Htn- lisinopril 2 5 mg daily  Diabetes mellitus- currently on metformin + lantus    Hld- rosuvastatin + aspirin    -- obtain last blood work from PCP      History:   59-year-old woman with long history of diabetes mellitus, family history for CAD presents with worsening exertional shortness of breath  She reports with light activities feeling significant dyspnea and chest tightness  She had a recent nuclear stress test which did not show focal ischemia but did have some transient ischemic dilatation  She she has not had no prior history of myocardial infarction or CVA  No prior history of atrial fibrillation  She has periodic lower extremity edema   Her blood pressures have been controlled  She is compliant with her medications      PMH  Diabetes Mellitus- 15 years      Past Medical History:   Diagnosis Date    Anesthesia complication     difficulty waking up    Arthritis     left knee    Bone spur     left knee behing the knee cap    Depression     Diabetes mellitus (HCC)     Endometrial cancer (HCC)     grade I    Hx of bleeding disorder     vaginal bleeding started on 3/13/17     Hyperlipidemia     Hypertension     Shortness of breath     with exertion    Urinary incontinence     Vitamin D deficiency      Social History     Socioeconomic History    Marital status: /Civil Union     Spouse name: Not on file    Number of children: Not on file    Years of education: Not on file    Highest education level: Not on file   Occupational History    Not on file   Social Needs    Financial resource strain: Not on file    Food insecurity:     Worry: Not on file     Inability: Not on file    Transportation needs:     Medical: Not on file     Non-medical: Not on file   Tobacco Use    Smoking status: Never Smoker    Smokeless tobacco: Never Used   Substance and Sexual Activity    Alcohol use: No    Drug use: No    Sexual activity: Not on file   Lifestyle    Physical activity:     Days per week: Not on file     Minutes per session: Not on file    Stress: Not on file   Relationships    Social connections:     Talks on phone: Not on file     Gets together: Not on file     Attends Latter day service: Not on file     Active member of club or organization: Not on file     Attends meetings of clubs or organizations: Not on file     Relationship status: Not on file    Intimate partner violence:     Fear of current or ex partner: Not on file     Emotionally abused: Not on file     Physically abused: Not on file     Forced sexual activity: Not on file   Other Topics Concern    Not on file   Social History Narrative    Not on file      Family History   Problem Relation Age of Onset    Cancer Mother         Renal    Heart disease Father         CHF    Heart disease Sister atrial septal defect     Past Surgical History:   Procedure Laterality Date    BREAST BIOPSY Left     benign    CHOLECYSTECTOMY      open    DILATION AND CURETTAGE OF UTERUS N/A 4/5/2017    Procedure: DILATATION AND CURETTAGE (D&C);   Surgeon: Betty Norris MD;  Location: [facility] MAIN OR;  Service:     HYSTERECTOMY      OOPHORECTOMY      PELVIC LAPAROSCOPY      MS LAPAROSCOPY W TOT HYSTERECTUTERUS <=250 GRAM  W TUBE/OVARY N/A 5/4/2017    Procedure: ROBOTIC ASSISTED TOTAL LAPAROSCOPIC HYSTERECTOMY, BILATERAL SALPINGOOPHERECTOMY; CYSTOSCOPY;  Surgeon: Ari Mancia MD;  Location:  MAIN OR;  Service: Gynecology Oncology    TONSILLECTOMY      TUBAL LIGATION         Current Outpatient Medications:     BD PEN NEEDLE JOSEPHINE U/F 32G X 4 MM MISC, USE 4 TIMES DAILY WITH INSULIN, Disp: , Rfl: 10    buPROPion (WELLBUTRIN SR) 200 MG 12 hr tablet, Take 200 mg by mouth every morning, Disp: , Rfl:     clonazePAM (KlonoPIN) 0 25 MG disintegrating tablet, Take 0 25 mg by mouth every morning, Disp: , Rfl:     clonazePAM (KlonoPIN) 0 5 mg tablet, Take 0 5 mg by mouth daily after lunch 3 pm, Disp: , Rfl:     LANTUS SOLOSTAR 100 units/mL injection pen, INJECT 70 UNITS SUBCUTANEOUSLY ONCE DAILY, Disp: , Rfl:     lisinopril (ZESTRIL) 2 5 mg tablet, Take 2 5 mg by mouth daily, Disp: , Rfl: 3    metFORMIN (GLUCOPHAGE-XR) 500 mg 24 hr tablet, Take 500 mg by mouth 2 (two) times a day, Disp: , Rfl: 5    rosuvastatin (CRESTOR) 20 MG tablet, Take 20 mg by mouth daily after dinner, Disp: , Rfl:     sertraline (ZOLOFT) 100 mg tablet, Take 100 mg by mouth 2 (two) times a day, Disp: , Rfl:     Vitamin D, Cholecalciferol, 1000 units CAPS, Take 1,000 Units by mouth every morning  , Disp: , Rfl:     ziprasidone (GEODON) 80 mg capsule, Take 80 mg by mouth 2 (two) times a day with meals, Disp: , Rfl:     Canagliflozin (INVOKANA) 300 MG TABS, Take 300 mg by mouth every morning  , Disp: , Rfl:     Insulin Glargine (TOUJEO SOLOSTAR SC), Inject 65 Units under the skin every morning LANTUS , Disp: , Rfl:     LANTUS SOLOSTAR 100 units/mL injection pen, , Disp: , Rfl:     lisinopril (ZESTRIL) 5 mg tablet, Take 5 mg by mouth every morning, Disp: , Rfl:     naproxen (NAPROSYN) 500 mg tablet, Take 500 mg by mouth every 12 (twelve) hours as needed Take with food, Disp: , Rfl: 0    triamterene-hydrochlorothiazide (MAXZIDE-25) 37 5-25 mg per tablet, Take 1 tablet by mouth daily, Disp: , Rfl:   No Known Allergies  Vitals:    01/09/20 1524   BP: 138/70   BP Location: Right arm   Patient Position: Sitting   Cuff Size: Large   SpO2: 98%   Weight: 87 1 kg (192 lb)   Height: 5' 4" (1 626 m)       Review of Systems:   Review of Systems   Constitutional: Negative  Negative for activity change, appetite change, chills, diaphoresis, fatigue, fever and unexpected weight change  HENT: Negative  Negative for congestion, dental problem, drooling, ear discharge, ear pain, facial swelling, hearing loss, mouth sores, nosebleeds, postnasal drip, rhinorrhea, sinus pressure, sinus pain, sneezing, sore throat, tinnitus, trouble swallowing and voice change  Eyes: Negative  Negative for photophobia, pain, redness, itching and visual disturbance  Respiratory: Positive for shortness of breath  Negative for apnea, cough, choking, chest tightness, wheezing and stridor  Cardiovascular: Positive for leg swelling  Negative for chest pain and palpitations  Gastrointestinal: Negative  Negative for abdominal distention, abdominal pain, anal bleeding, blood in stool, constipation, diarrhea, nausea, rectal pain and vomiting  Endocrine: Negative  Negative for cold intolerance, heat intolerance, polydipsia, polyphagia and polyuria  Genitourinary: Negative    Negative for decreased urine volume, difficulty urinating, dyspareunia, dysuria, enuresis, flank pain, frequency, genital sores, hematuria, menstrual problem, pelvic pain, urgency, vaginal bleeding, vaginal discharge and vaginal pain  Musculoskeletal: Negative  Negative for arthralgias, back pain, gait problem, joint swelling, myalgias, neck pain and neck stiffness  Skin: Negative  Negative for color change, pallor, rash and wound  Allergic/Immunologic: Negative  Negative for environmental allergies, food allergies and immunocompromised state  Neurological: Negative  Negative for dizziness, tremors, seizures, syncope, facial asymmetry, speech difficulty, weakness, light-headedness, numbness and headaches  Hematological: Negative  Negative for adenopathy  Does not bruise/bleed easily  Psychiatric/Behavioral: Negative  Negative for agitation, behavioral problems, confusion, decreased concentration, dysphoric mood, hallucinations, self-injury, sleep disturbance and suicidal ideas  The patient is not nervous/anxious and is not hyperactive  All other systems reviewed and are negative  Vitals:    01/09/20 1524   BP: 138/70   BP Location: Right arm   Patient Position: Sitting   Cuff Size: Large   SpO2: 98%   Weight: 87 1 kg (192 lb)   Height: 5' 4" (1 626 m)     Physical Examination:   Physical Exam   Constitutional: She is oriented to person, place, and time  She appears well-developed and well-nourished  No distress  HENT:   Head: Normocephalic and atraumatic  Right Ear: External ear normal    Left Ear: External ear normal    Eyes: Pupils are equal, round, and reactive to light  Conjunctivae are normal  Right eye exhibits no discharge  Left eye exhibits no discharge  No scleral icterus  Neck: Normal range of motion  Neck supple  No JVD present  No tracheal deviation present  No thyromegaly present  Cardiovascular: Normal rate and regular rhythm  Exam reveals gallop  Exam reveals no friction rub  Murmur heard  Pulmonary/Chest: Effort normal and breath sounds normal  No stridor  No respiratory distress  She has no wheezes  She has no rales  She exhibits no tenderness  Abdominal: Soft  Bowel sounds are normal  She exhibits no distension and no mass  There is no tenderness  There is no rebound and no guarding  Musculoskeletal: Normal range of motion  She exhibits no edema, tenderness or deformity  Neurological: She is alert and oriented to person, place, and time  She has normal reflexes  She displays normal reflexes  No cranial nerve deficit  She exhibits normal muscle tone  Coordination normal    Skin: Skin is warm and dry  No rash noted  She is not diaphoretic  No erythema  No pallor  Psychiatric: She has a normal mood and affect  Her behavior is normal  Judgment and thought content normal        Labs:     Lab Results   Component Value Date    WBC 3 80 (L) 2017    HGB 10 9 (L) 2017    HCT 32 5 (L) 2017    MCV 90 2017    RDW 15 7 (H) 2017     2017     BMP:  Lab Results   Component Value Date    SODIUM 136 2017    K 4 3 2017     2017    CO2 27 2017    BUN 27 (H) 10/05/2017    CREATININE 1 50 (H) 10/05/2017    GLUC 138 2017    CALCIUM 8 7 2017    EGFR 37 10/05/2017     LFT:  Lab Results   Component Value Date    AST 26 2017    ALT 25 2017    ALKPHOS 67 2017    ALB 4 1 2017      No results found for: [id number]  Lab Results   Component Value Date    HGBA1C 6 7 (H) 2017       Imaging & Testing   I have personally reviewed pertinent reports        Cardiac Testing     Results for orders placed during the hospital encounter of 10/25/19   Echo complete with contrast if indicated    Narrative [address] [place] [address]  [phone]    Transthoracic Echocardiogram  2D, M-mode, Doppler, and Color Doppler    Study date:  25-Oct-2019    Patient: Richelle Harry  MR number: [id number]  Account number: [de-identified]  : 1955  Age: 71 years  Gender: Female  Status: Outpatient  Location: Echo lab  Height: 65 in  Weight: 190 5 lb  BP: 134/ 75 mmHg    Indications: SOB    Diagnoses: R06 02 - Shortness of breath    Sonographer:  VALERIE Schmidt  Primary Physician:  Mara Lagos MD  Referring Physician:  Mara Lagos MD  Group:  [facility]  Interpreting Physician:  Edgard Powell DO    SUMMARY    LEFT VENTRICLE:  Systolic function was normal by visual assessment  Ejection fraction was estimated to be 55 %  There were no regional wall motion abnormalities  There was mild concentric hypertrophy  Doppler parameters were consistent with abnormal left ventricular relaxation (grade 1 diastolic dysfunction)  MITRAL VALVE:  There was mild regurgitation  AORTIC VALVE:  There was no evidence for stenosis  There was no regurgitation  TRICUSPID VALVE:  There was mild regurgitation  Pulmonary artery systolic pressure was within the normal range  HISTORY: PRIOR HISTORY: Diabetes, Endometrial Cancer and Colon Cancer,HTN,hyperlipidemia  PROCEDURE: The procedure was performed in the echo lab  This was a routine study  The transthoracic approach was used  The study included complete 2D imaging, M-mode, complete spectral Doppler, and color Doppler  The heart rate was 81 bpm,  at the start of the study  Image quality was adequate  LEFT VENTRICLE: Size was normal  Systolic function was normal by visual assessment  Ejection fraction was estimated to be 55 %  There were no regional wall motion abnormalities  There was mild concentric hypertrophy  DOPPLER: Doppler  parameters were consistent with abnormal left ventricular relaxation (grade 1 diastolic dysfunction)  RIGHT VENTRICLE: The size was normal  Systolic function was normal  DOPPLER: Systolic pressure was within the normal range  LEFT ATRIUM: The atrium was mildly dilated  RIGHT ATRIUM: Size was normal     MITRAL VALVE: Valve structure was normal  There was normal leaflet separation  No echocardiographic evidence for prolapse   DOPPLER: The transmitral velocity was within the normal range  There was no evidence for stenosis  There was mild  regurgitation  AORTIC VALVE: The valve was trileaflet  Leaflets exhibited normal thickness and normal cuspal separation  DOPPLER: Transaortic velocity was within the normal range  There was no evidence for stenosis  There was no regurgitation  TRICUSPID VALVE: The valve structure was normal  There was normal leaflet separation  DOPPLER: The transtricuspid velocity was within the normal range  There was mild regurgitation  Pulmonary artery systolic pressure was within the normal  range  Estimated peak PA pressure was 30 mmHg  PULMONIC VALVE: Leaflets exhibited normal thickness, no calcification, and normal cuspal separation  DOPPLER: The transpulmonic velocity was within the normal range  There was no regurgitation  PERICARDIUM: There was no thickening  There was no pericardial effusion  AORTA: The root exhibited normal size  PULMONARY ARTERY: The size was normal  The morphology appeared normal     SYSTEM MEASUREMENT TABLES    2D mode  AoR Diam 2D: 3 2 cm  LA Diam (2D): 4 cm  LA/Ao (2D): 1 25  FS (2D Teich): 27 7 %  IVSd (2D): 1 09 cm  LVDEV: 129 cmï¾³  LVESV: 60 cmï¾³  LVIDd(2D): 5 19 cm  LVISd (2D): 3 75 cm  LVOT Area 2D: 3 14 cmï¾²  LVPWd (2D): 1 13 cm  SV (Teich): 69 cmï¾³    Apical four chamber  LVEF A4C: 59 %    Unspecified Scan Mode  ARLENE Cont Eq (Peak Andres): 2 4 cmï¾²  LVOT Diam : 2 cm  LVOT Vmax: 1160 mm/s  LVOT Vmax; Mean: 1160 mm/s  Peak Grad ; Mean: 5 mm[Hg]  MV Peak A Andres: 755 mm/s  MV Peak E Andres   Mean: 607 mm/s  MVA (PHT): 4 07 cmï¾²  PHT: 54 ms  Max P mm[Hg]  V Max: 2100 mm/s  Vmax: 2210 mm/s  RA Area: 16 5 cmï¾²  RA Volume: 40 4 cmï¾³  TAPSE: 1 6 cm    [employer] Accredited Echocardiography Laboratory    Prepared and electronically signed by    Pedro Luis Cortez DO  Signed 25-Oct-2019 17:50:25         Results for orders placed during the hospital encounter of 10/17/19   NM myocardial perfusion spect (rx stress and/or rest)    Narrative [address]  [address] [facility]  [phone]    Rest/Stress Gated SPECT Myocardial Perfusion Imaging After Regadenoson    Patient: Larry Mina  MR number: [id number]  Account number: [de-identified]  : 1955  Age: 71 years  Gender: Female  Status: Outpatient  Location: Stress lab  Height: 64 in  Weight: 190 lb  BP: 129/ 64 mmHg    Allergies: NO KNOWN ALLERGIES    Diagnosis: I10  - Essential (primary) hypertension    Primary Physician:  Yissel Louie MD  Technician:  Julia Birch  RN:  Jamar Leon RN  Referring Physician:  Yissel Louie MD  Group:  [facility] Reis  Report Prepared By[de-identified]  KENTRELL Peguero  RN:  KENTRELL Peguero  Interpreting Physician:  Lisa Clemons MD    INDICATIONS: Evaluation for coronary artery disease  HISTORY: The patient is a 71year old  female  Chest pain status: no chest pain  Other symptoms: dyspnea  Coronary artery disease risk factors: dyslipidemia, hypertension, family history of premature coronary artery disease, and  diabetes mellitus  Cardiovascular history: none significant  Co-morbidity: obesity  Medications: a lipid lowering agent, an antihypertensive agent, and diabetic medications  PHYSICAL EXAM: Baseline physical exam screening: no wheezes audible  REST ECG: RBBB Normal sinus rhythm  PROCEDURE: The study was performed in the the Stress lab  A regadenoson infusion pharmacologic stress test was performed  Gated SPECT myocardial perfusion imaging was performed after stress and at rest  Systolic blood pressure was 129  mmHg, at the start of the study  Diastolic blood pressure was 64 mmHg, at the start of the study  The heart rate was 72 bpm, at the start of the study  IV double checked    Regadenoson protocol:  Time HR bpm SBP mmHg DBP mmHg Symptoms Rhythm/conduct  Baseline 09:14 72 129 64 none NSR  Immediate 09:18 90 140 72 mild dyspnea same as above  1 min 09:19 94 138 64 same as above --  2 min 09:20 90 126 58 subsiding --  3 min 09:21 89 123 58 none same as above  No medications or fluids given  STRESS SUMMARY: Duration of pharmacologic stress was 3 min  Maximal heart rate during stress was 101 bpm  The heart rate response to stress was normal  There was normal resting blood pressure with an appropriate response to stress  The  rate-pressure product for the peak heart rate and blood pressure was 58055  There was no chest pain during stress  The stress test was terminated due to protocol completion  Pre oxygen saturation: 96 %  Peak oxygen saturation: 98 %  The  stress ECG was equivocal for ischemia  There were no stress arrhythmias or conduction abnormalities  ISOTOPE ADMINISTRATION:  Resting isotope administration Stress isotope administration  Agent Tetrofosmin Tetrofosmin  Dose 11 mCi 33 mCi  Date 10/17/2019 10/17/2019    The radiopharmaceutical was injected at the peak effect of pharmacologic stress  MYOCARDIAL PERFUSION IMAGING:  The image quality was good  Rotating projection images reveal mild breast attenuation and prone imaging completed for attenuation correction  Left ventricular size was normal  The TID ratio was 1 2  PERFUSION DEFECTS:  -  There was a small, paradoxical (reverse redistribution) myocardial perfusion defect of the apical wall likely due to low counts  -  There was a reversible myocardial perfusion defect of the anteroseptal wall which improved with prone imaging likely due to attenuation from breast tissue  GATED SPECT:  The calculated left ventricular ejection fraction was 60 %  Left ventricular ejection fraction was within normal limits by visual estimate  SUMMARY:  -  Stress results: There was no chest pain during stress  -  ECG conclusions: The stress ECG was equivocal for ischemia  -  Perfusion imaging:  There was a small, paradoxical (reverse redistribution) myocardial perfusion defect of the apical wall likely due to low counts  There was a reversible myocardial perfusion defect of the anteroseptal wall which  improved with prone imaging likely due to attenuation from breast tissue   -  Gated SPECT: The calculated left ventricular ejection fraction was 60 %  Left ventricular ejection fraction was within normal limits by visual estimate  -  Impressions and recommendations: Likely normal study after pharmacologic vasodilation  No evidence of prior infarction and no focal reversible perfusion defect with preserved ejection fraction  Cannot rule out balanced ischemia from  pharmacological vasodilation study but TID ratio of 1 2 and preserved EF  IMPRESSIONS: Likely normal study after pharmacologic vasodilation  No evidence of prior infarction and no focal reversible perfusion defect with preserved ejection fraction  Cannot rule out balanced ischemia from pharmacological  vasodilation study but TID ratio of 1 2 and preserved EF  Prepared and signed by    Carolina Romano MD  Signed 10/18/2019 10:59:38         EKG: Personally reviewed  Normal sinus rhythm incomplete rbbb t-wave inversions      Carolina Romano MD [facility]  [phone]  Please call with any questions or suggestions    Counseling :  A description of the counseling:   Goals and Barriers:  Patient's ability to self care:  Medication side effect reviewed with patient in detail and all their questions answered  "Portions of the record may have been created with voice recognition software  Occasional wrong word or "sound a like" substitutions may have occurred due to the inherent limitations of voice recognition software  Read the chart carefully and recognize, using context, where substitutions have occurred   Please call if you have any questions  "

## 2020-01-09 NOTE — ASSESSMENT & PLAN NOTE
70 y/o with a history of stage IB1 endometrial cancer with positive LVSI  She completed adjuvant radiation in August 2017  She is clinically without evidence of disease recurrence  Her performance status is zero      The patient will return to the office in 6 months for continued surveillance  She is aware of warning signs of cancer recurrence and when to call the office

## 2020-01-09 NOTE — PROGRESS NOTES
Assessment/Plan:    Problem List Items Addressed This Visit        Other    History of endometrial cancer     70 y/o with a history of stage IB1 endometrial cancer with positive LVSI  She completed adjuvant radiation in August 2017  She is clinically without evidence of disease recurrence  Her performance status is zero      The patient will return to the office in 6 months for continued surveillance  She is aware of warning signs of cancer recurrence and when to call the office  Encounter for follow-up surveillance of endometrial cancer - Primary              CHIEF COMPLAINT: Endometrial cancer surveillance      Subjective:     Problem:  Cancer Staging  History of endometrial cancer  Staging form: Corpus Uteri - Carcinoma, AJCC 7th Edition  - Clinical: FIGO Stage IB (T1b, N0, M0) - Signed by Max Sow PA-C on 3/28/2018      Previous therapy:     History of endometrial cancer    4/5/2017 Initial Diagnosis     Malignant neoplasm of endometrium ([facility] [address] )      4/5/2017 Surgery     Dilation and curettage  A  Grade 1 endometrial cancer         5/4/2017 Surgery     Robotic-assisted total laparoscopic hysterectomy, bilateral salpingo-oophorectomy and cystoscopy  A  FIGO grade 1, 4 4 cm tumor   B  1 0 of 1 5 cm myometrial invasion  C  Positive LVSI  D  Negative pelvic washings  E  IHC for peterson, all proteins expressed  7/19/2017 - 8/24/2017 Radiation     :  Course: C1    Plan ID Energy Fractions Dose per Fraction (cGy) Total Dose Delivered (cGy) Elapsed Days   Whole Pelvis 10X 25 / 25 180 4,500 36      Treatment dates:  C1: 7/19/2017 - 8/24/2017          History of colon cancer    12/2017 Surgery     Colonoscopy x 2           Patient ID: Nupur John is a 71 y o  female     70 y/o female presents for endometrial cancer surveillance  She has been well in the interim and is without acute complaints  She denies nausea or vomiting  Her appetite is appropriate   She is voiding and moving her bowels without difficulty  She denies abdominal or pelvic pain  The patient is without vaginal bleeding or discharge  She is ambulatory  UTD with mammogram screening  Colonoscopy due later this year  Review of Systems   Constitutional: Negative for chills, fatigue, fever and unexpected weight change  HENT: Negative for nosebleeds  Eyes: Negative  Respiratory: Negative for cough, chest tightness, shortness of breath and wheezing  Cardiovascular: Negative for chest pain, palpitations and leg swelling  Gastrointestinal: Negative for abdominal distention, abdominal pain, anal bleeding, blood in stool, constipation, diarrhea, nausea, rectal pain and vomiting  Endocrine: Negative  Genitourinary: Negative for difficulty urinating, dysuria, frequency, hematuria, pelvic pain, urgency, vaginal bleeding, vaginal discharge and vaginal pain  Musculoskeletal: Negative for arthralgias and joint swelling  Skin: Negative for color change, pallor and rash  Neurological: Negative for dizziness, weakness, light-headedness, numbness and headaches  Hematological: Negative  Psychiatric/Behavioral: Negative          Current Outpatient Medications   Medication Sig Dispense Refill    BD PEN NEEDLE JOSEPHINE U/F 32G X 4 MM MISC USE 4 TIMES DAILY WITH INSULIN  10    buPROPion (WELLBUTRIN SR) 200 MG 12 hr tablet Take 200 mg by mouth every morning      Canagliflozin (INVOKANA) 300 MG TABS Take 300 mg by mouth every morning        clonazePAM (KlonoPIN) 0 25 MG disintegrating tablet Take 0 25 mg by mouth every morning      clonazePAM (KlonoPIN) 0 5 mg tablet Take 0 5 mg by mouth daily after lunch 3 pm      Insulin Glargine (TOUJEO SOLOSTAR SC) Inject 65 Units under the skin every morning LANTUS       lisinopril (ZESTRIL) 2 5 mg tablet Take 2 5 mg by mouth daily  3    metFORMIN (GLUCOPHAGE-XR) 500 mg 24 hr tablet Take 500 mg by mouth 2 (two) times a day  5    rosuvastatin (CRESTOR) 20 MG tablet Take 20 mg by mouth daily after dinner      sertraline (ZOLOFT) 100 mg tablet Take 100 mg by mouth 2 (two) times a day      Vitamin D, Cholecalciferol, 1000 units CAPS Take 1,000 Units by mouth every morning        ziprasidone (GEODON) 80 mg capsule Take 80 mg by mouth 2 (two) times a day with meals      lisinopril (ZESTRIL) 5 mg tablet Take 5 mg by mouth every morning      naproxen (NAPROSYN) 500 mg tablet Take 500 mg by mouth every 12 (twelve) hours as needed Take with food  0    triamterene-hydrochlorothiazide (MAXZIDE-25) 37 5-25 mg per tablet Take 1 tablet by mouth daily       No current facility-administered medications for this visit  No Known Allergies    Past Medical History:   Diagnosis Date    Anesthesia complication     difficulty waking up    Arthritis     left knee    Bone spur     left knee behing the knee cap    Depression     Diabetes mellitus ([facility] [address] )     Endometrial cancer ([facility] [address] )     grade I    Hx of bleeding disorder     vaginal bleeding started on 3/13/17     Hyperlipidemia     Hypertension     Shortness of breath     with exertion    Urinary incontinence     Vitamin D deficiency        Past Surgical History:   Procedure Laterality Date    BREAST BIOPSY Left     benign    CHOLECYSTECTOMY      open    DILATION AND CURETTAGE OF UTERUS N/A 2017    Procedure: DILATATION AND CURETTAGE (D&C);   Surgeon: Vee Song MD;  Location: [facility] MAIN OR;  Service:     HYSTERECTOMY      OOPHORECTOMY      PELVIC LAPAROSCOPY      IA LAPAROSCOPY W TOT HYSTERECTUTERUS <=250 GRAM  W TUBE/OVARY N/A 2017    Procedure: ROBOTIC ASSISTED TOTAL LAPAROSCOPIC HYSTERECTOMY, BILATERAL SALPINGOOPHERECTOMY; CYSTOSCOPY;  Surgeon: Teagan Verduzco MD;  Location:  MAIN OR;  Service: Gynecology Oncology    TONSILLECTOMY      TUBAL LIGATION         OB History        2    Para   2    Term   2            AB        Living           SAB        TAB        Ectopic        Multiple        Live Births Family History   Problem Relation Age of Onset   Donald Epps Cancer Mother         Renal    Heart disease Father         CHF    Heart disease Sister         atrial septal defect       The following portions of the patient's history were reviewed and updated as appropriate: allergies, current medications, past family history, past medical history, past social history, past surgical history and problem list       Objective:    Blood pressure 138/76, pulse 81, temperature 98 1 °F (36 7 °C), temperature source Oral, height 5' 4" (1 626 m), weight 88 9 kg (196 lb)  Body mass index is 33 64 kg/m²  Physical Exam   Constitutional: She is oriented to person, place, and time  She appears well-developed and well-nourished  No distress  HENT:   Head: Normocephalic and atraumatic  Eyes: Right eye exhibits no discharge  Left eye exhibits no discharge  Neck: Normal range of motion  Neck supple  Cardiovascular: Normal rate, regular rhythm and normal heart sounds  Pulmonary/Chest: Effort normal and breath sounds normal  No respiratory distress  Abdominal: Soft  She exhibits no distension and no mass  There is no tenderness  There is no guarding  Obese   Genitourinary: Vagina normal  No vaginal discharge found  Genitourinary Comments: External genitalia without abnormality  The Bartholin's and Bathgate's glands are normal  Normal midline urethral meatus  There is no blood, discharge, lesion, or mass visualized in the vagina  Uterus, adnexa, and cervix are surgically absent  No masses or fullness on bimanual exam  Bladder without tenderness  Anus without fissure or lesion  Musculoskeletal: Normal range of motion  She exhibits no edema or tenderness  Lymphadenopathy:     She has no cervical adenopathy  Neurological: She is alert and oriented to person, place, and time  No cranial nerve deficit  Skin: Skin is warm and dry  No rash noted  She is not diaphoretic  No erythema  No pallor     Psychiatric: She has a normal mood and affect   Her behavior is normal  Judgment and thought content normal          No results found for:   Lab Results   Component Value Date    WBC 3 80 (L) 08/21/2017    HGB 10 9 (L) 08/21/2017    HCT 32 5 (L) 08/21/2017    MCV 90 08/21/2017     08/21/2017     Lab Results   Component Value Date     (L) 04/21/2017    K 4 3 05/06/2017     05/06/2017    CO2 27 05/06/2017    BUN 27 (H) 10/05/2017    CREATININE 1 50 (H) 10/05/2017    CALCIUM 8 7 05/06/2017    AST 26 04/21/2017    ALT 25 04/21/2017    ALKPHOS 67 04/21/2017    PROT 6 9 04/21/2017    BILITOT 0 5 04/21/2017    EGFR 37 10/05/2017

## 2020-02-11 ENCOUNTER — OFFICE VISIT (OUTPATIENT)
Dept: CARDIOLOGY CLINIC | Facility: CLINIC | Age: 65
End: 2020-02-11
Payer: COMMERCIAL

## 2020-02-11 VITALS
WEIGHT: 193 LBS | HEART RATE: 66 BPM | SYSTOLIC BLOOD PRESSURE: 120 MMHG | BODY MASS INDEX: 32.95 KG/M2 | DIASTOLIC BLOOD PRESSURE: 52 MMHG | HEIGHT: 64 IN | OXYGEN SATURATION: 97 %

## 2020-02-11 DIAGNOSIS — I10 HYPERTENSION, UNSPECIFIED TYPE: ICD-10-CM

## 2020-02-11 DIAGNOSIS — R00.2 PALPITATIONS: ICD-10-CM

## 2020-02-11 DIAGNOSIS — R06.00 DYSPNEA ON EXERTION: ICD-10-CM

## 2020-02-11 DIAGNOSIS — IMO0002 UNCONTROLLED TYPE 2 DIABETES MELLITUS WITH NEPHROPATHY: Primary | ICD-10-CM

## 2020-02-11 PROCEDURE — 99214 OFFICE O/P EST MOD 30 MIN: CPT | Performed by: INTERNAL MEDICINE

## 2020-02-11 NOTE — PROGRESS NOTES
[facility]  [address] [facility] [address]  [address]   [place] [place]  Cardiology Follow-up  Ronald Singhosito  1955  [id number]  [facility] [place]  [address]  [address] [facility], [address]  [place] [id number] [phone]    1  Uncontrolled type 2 diabetes mellitus with nephropathy ([facility] [address] )     2  Hypertension, unspecified type     3  Dyspnea on exertion     4  Palpitations        Discussion/Summary:   Chest pain- No chest pain  Still with dyspnea  Abnormal baseline ekg  Stress test equivocal- no focal ischemia  Cannot rule out balance ischemia  Baseline EKG with nonspecific ST T-wave changes in anterior leads  Aspirin 81mg daily  She is currently on optimal medical therapy including rosuvastatin + aspirin + lisinopril 2 5mg + metoprolol 25mg in morning  Trial of nitroglycerin prn  If with refractory chest pain she will come to the ED  Plan for follow-up consideration for R + LHC  Exertional dyspnea- secondary to deconditioning? No hx of smoking  Trial of metoprolol for heart rate control  Diastolic dysfunction? Consideration for RHC if with continue dyspnea    Htn- lisinopril 2 5 mg daily  Diabetes mellitus- currently on metformin + lantus    Hld- rosuvastatin + aspirin    -- obtain last blood work from PCP- 2nd request given  Possible addition of Omega 3 supplementation      History:   16-year-old woman with long history of diabetes mellitus, family history for CAD presents with worsening exertional shortness of breath  She reports with light activities feeling significant dyspnea and chest tightness  She had a recent nuclear stress test which did not show focal ischemia but did have some transient ischemic dilatation  She she has not had no prior history of myocardial infarction or CVA  No prior history of atrial fibrillation  She has periodic lower extremity edema   Her blood pressures have been controlled    She is compliant with her medications  02/11/2019:  She continues to have exertional shortness of breath  She is not challenge herself with exercise  Her daughter is present and states she has been non compliant  She is compliant with her medications  We have not received her repeat blood work      PMH  Diabetes Mellitus- 15 years      Past Medical History:   Diagnosis Date    Anesthesia complication     difficulty waking up    Arthritis     left knee    Bone spur     left knee behing the knee cap    Depression     Diabetes mellitus (HCC)     Endometrial cancer (HCC)     grade I    Hx of bleeding disorder     vaginal bleeding started on 3/13/17     Hyperlipidemia     Hypertension     Shortness of breath     with exertion    Urinary incontinence     Vitamin D deficiency      Social History     Socioeconomic History    Marital status: /Civil Union     Spouse name: Not on file    Number of children: Not on file    Years of education: Not on file    Highest education level: Not on file   Occupational History    Not on file   Social Needs    Financial resource strain: Not on file    Food insecurity:     Worry: Not on file     Inability: Not on file    Transportation needs:     Medical: Not on file     Non-medical: Not on file   Tobacco Use    Smoking status: Never Smoker    Smokeless tobacco: Never Used   Substance and Sexual Activity    Alcohol use: No    Drug use: No    Sexual activity: Not on file   Lifestyle    Physical activity:     Days per week: Not on file     Minutes per session: Not on file    Stress: Not on file   Relationships    Social connections:     Talks on phone: Not on file     Gets together: Not on file     Attends Synagogue service: Not on file     Active member of club or organization: Not on file     Attends meetings of clubs or organizations: Not on file     Relationship status: Not on file    Intimate partner violence:     Fear of current or ex partner: Not on file Emotionally abused: Not on file     Physically abused: Not on file     Forced sexual activity: Not on file   Other Topics Concern    Not on file   Social History Narrative    Not on file      Family History   Problem Relation Age of Onset    Cancer Mother         Renal    Heart disease Father         CHF    Heart disease Sister         atrial septal defect     Past Surgical History:   Procedure Laterality Date    BREAST BIOPSY Left     benign    CHOLECYSTECTOMY      open    DILATION AND CURETTAGE OF UTERUS N/A 4/5/2017    Procedure: DILATATION AND CURETTAGE (D&C);   Surgeon: Zbigniew Ramires MD;  Location: [facility] MAIN OR;  Service:     HYSTERECTOMY      OOPHORECTOMY      PELVIC LAPAROSCOPY      IN LAPAROSCOPY W TOT HYSTERECTUTERUS <=250 GRAM  W TUBE/OVARY N/A 5/4/2017    Procedure: ROBOTIC ASSISTED TOTAL LAPAROSCOPIC HYSTERECTOMY, BILATERAL SALPINGOOPHERECTOMY; CYSTOSCOPY;  Surgeon: Haresh Barakat MD;  Location:  MAIN OR;  Service: Gynecology Oncology    TONSILLECTOMY      TUBAL LIGATION         Current Outpatient Medications:     aspirin (ECOTRIN LOW STRENGTH) 81 mg EC tablet, Take 1 tablet (81 mg total) by mouth daily with breakfast, Disp: 90 tablet, Rfl: 3    BD PEN NEEDLE JOSEPHINE U/F 32G X 4 MM MISC, USE 4 TIMES DAILY WITH INSULIN, Disp: , Rfl: 10    buPROPion (WELLBUTRIN SR) 200 MG 12 hr tablet, Take 200 mg by mouth every morning, Disp: , Rfl:     clonazePAM (KlonoPIN) 0 25 MG disintegrating tablet, Take 0 25 mg by mouth every morning, Disp: , Rfl:     clonazePAM (KlonoPIN) 0 5 mg tablet, Take 0 5 mg by mouth daily after lunch 3 pm, Disp: , Rfl:     Insulin Glargine (TOUJEO SOLOSTAR SC), Inject 65 Units under the skin every morning LANTUS , Disp: , Rfl:     LANTUS SOLOSTAR 100 units/mL injection pen, , Disp: , Rfl:     LANTUS SOLOSTAR 100 units/mL injection pen, INJECT 70 UNITS SUBCUTANEOUSLY ONCE DAILY, Disp: , Rfl:     lisinopril (ZESTRIL) 2 5 mg tablet, Take 2 5 mg by mouth daily, Disp: , Rfl: 3    metFORMIN (GLUCOPHAGE-XR) 500 mg 24 hr tablet, Take 500 mg by mouth 2 (two) times a day, Disp: , Rfl: 5    metoprolol succinate (TOPROL-XL) 25 mg 24 hr tablet, Take 1 tablet (25 mg total) by mouth daily, Disp: 30 tablet, Rfl: 3    rosuvastatin (CRESTOR) 20 MG tablet, Take 20 mg by mouth daily after dinner, Disp: , Rfl:     sertraline (ZOLOFT) 100 mg tablet, Take 100 mg by mouth 2 (two) times a day, Disp: , Rfl:     Vitamin D, Cholecalciferol, 1000 units CAPS, Take 1,000 Units by mouth every morning  , Disp: , Rfl:     ziprasidone (GEODON) 80 mg capsule, Take 80 mg by mouth 2 (two) times a day with meals, Disp: , Rfl:     Canagliflozin (INVOKANA) 300 MG TABS, Take 300 mg by mouth every morning  , Disp: , Rfl:     naproxen (NAPROSYN) 500 mg tablet, Take 500 mg by mouth every 12 (twelve) hours as needed Take with food, Disp: , Rfl: 0    nitroglycerin (NITROSTAT) 0 4 mg SL tablet, Place 1 tablet (0 4 mg total) under the tongue every 5 (five) minutes as needed for chest pain (Patient not taking: Reported on 2/11/2020), Disp: 30 tablet, Rfl: 1  No Known Allergies  Vitals:    02/11/20 1536   BP: 120/52   BP Location: Right arm   Patient Position: Sitting   Cuff Size: Standard   Pulse: 66   SpO2: 97%   Weight: 87 5 kg (193 lb)   Height: 5' 4" (1 626 m)       Review of Systems:   Review of Systems   Constitutional: Negative  Negative for activity change, appetite change, chills, diaphoresis, fatigue, fever and unexpected weight change  HENT: Negative  Negative for congestion, dental problem, drooling, ear discharge, ear pain, facial swelling, hearing loss, mouth sores, nosebleeds, postnasal drip, rhinorrhea, sinus pressure, sinus pain, sneezing, sore throat, tinnitus, trouble swallowing and voice change  Eyes: Negative  Negative for photophobia, pain, redness, itching and visual disturbance  Respiratory: Positive for shortness of breath   Negative for apnea, cough, choking, chest tightness, wheezing and stridor  Cardiovascular: Positive for leg swelling  Negative for chest pain and palpitations  Gastrointestinal: Negative  Negative for abdominal distention, abdominal pain, anal bleeding, blood in stool, constipation, diarrhea, nausea, rectal pain and vomiting  Endocrine: Negative  Negative for cold intolerance, heat intolerance, polydipsia, polyphagia and polyuria  Genitourinary: Negative  Negative for decreased urine volume, difficulty urinating, dyspareunia, dysuria, enuresis, flank pain, frequency, genital sores, hematuria, menstrual problem, pelvic pain, urgency, vaginal bleeding, vaginal discharge and vaginal pain  Musculoskeletal: Negative  Negative for arthralgias, back pain, gait problem, joint swelling, myalgias, neck pain and neck stiffness  Skin: Negative  Negative for color change, pallor, rash and wound  Allergic/Immunologic: Negative  Negative for environmental allergies, food allergies and immunocompromised state  Neurological: Negative  Negative for dizziness, tremors, seizures, syncope, facial asymmetry, speech difficulty, weakness, light-headedness, numbness and headaches  Hematological: Negative  Negative for adenopathy  Does not bruise/bleed easily  Psychiatric/Behavioral: Negative  Negative for agitation, behavioral problems, confusion, decreased concentration, dysphoric mood, hallucinations, self-injury, sleep disturbance and suicidal ideas  The patient is not nervous/anxious and is not hyperactive  All other systems reviewed and are negative  Vitals:    02/11/20 1536   BP: 120/52   BP Location: Right arm   Patient Position: Sitting   Cuff Size: Standard   Pulse: 66   SpO2: 97%   Weight: 87 5 kg (193 lb)   Height: 5' 4" (1 626 m)     Physical Examination:   Physical Exam   Constitutional: She is oriented to person, place, and time  She appears well-developed and well-nourished  No distress     HENT:   Head: Normocephalic and atraumatic  Right Ear: External ear normal    Left Ear: External ear normal    Eyes: Pupils are equal, round, and reactive to light  Conjunctivae are normal  Right eye exhibits no discharge  Left eye exhibits no discharge  No scleral icterus  Neck: Normal range of motion  Neck supple  No JVD present  No tracheal deviation present  No thyromegaly present  Cardiovascular: Normal rate and regular rhythm  Exam reveals gallop  Exam reveals no friction rub  Murmur heard  Pulmonary/Chest: Effort normal and breath sounds normal  No stridor  No respiratory distress  She has no wheezes  She has no rales  She exhibits no tenderness  Abdominal: Soft  Bowel sounds are normal  She exhibits no distension and no mass  There is no tenderness  There is no rebound and no guarding  Musculoskeletal: Normal range of motion  She exhibits no edema, tenderness or deformity  Neurological: She is alert and oriented to person, place, and time  She has normal reflexes  She displays normal reflexes  No cranial nerve deficit  She exhibits normal muscle tone  Coordination normal    Skin: Skin is warm and dry  No rash noted  She is not diaphoretic  No erythema  No pallor  Psychiatric: She has a normal mood and affect   Her behavior is normal  Judgment and thought content normal        Labs:     Lab Results   Component Value Date    WBC 3 80 (L) 08/21/2017    HGB 10 9 (L) 08/21/2017    HCT 32 5 (L) 08/21/2017    MCV 90 08/21/2017    RDW 15 7 (H) 08/21/2017     08/21/2017     BMP:  Lab Results   Component Value Date    SODIUM 136 05/06/2017    K 4 3 05/06/2017     05/06/2017    CO2 27 05/06/2017    BUN 27 (H) 10/05/2017    CREATININE 1 50 (H) 10/05/2017    GLUC 138 05/06/2017    CALCIUM 8 7 05/06/2017    EGFR 37 10/05/2017     LFT:  Lab Results   Component Value Date    AST 26 04/21/2017    ALT 25 04/21/2017    ALKPHOS 67 04/21/2017    ALB 4 1 04/21/2017      [facility] found [facility]: [facility]  Lab Results   Component Value Date    HGBA1C 6 7 (H) 2017       Imaging & Testing   I have personally reviewed pertinent reports  Cardiac Testing     Results for orders placed during the hospital encounter of 10/25/19   Echo complete with contrast if indicated    [address]  [address] [facility]  [place]  [phone]    Transthoracic Echocardiogram  2D, M-mode, Doppler, and Color Doppler    Study date:  25-Oct-2019    Patient: Felecia Olivia  MR number: [id number]  Account number: [de-identified]  : 1955  Age: 71 years  Gender: Female  Status: Outpatient  Location: Echo lab  Height: 65 in  Weight: 190 5 lb  BP: 134/ 75 mmHg    Indications: SOB    Diagnoses: R06 02 - Shortness of breath    Sonographer:  VALERIE Jeffries  Primary Physician:  Kim Gomez MD  Referring Physician:  Kim Gomez MD  Group:  [facility]  Interpreting Physician:  Madi Welch DO    SUMMARY    LEFT VENTRICLE:  Systolic function was normal by visual assessment  Ejection fraction was estimated to be 55 %  There were no regional wall motion abnormalities  There was mild concentric hypertrophy  Doppler parameters were consistent with abnormal left ventricular relaxation (grade 1 diastolic dysfunction)  MITRAL VALVE:  There was mild regurgitation  AORTIC VALVE:  There was no evidence for stenosis  There was no regurgitation  TRICUSPID VALVE:  There was mild regurgitation  Pulmonary artery systolic pressure was within the normal range  HISTORY: PRIOR HISTORY: Diabetes, Endometrial Cancer and Colon Cancer,HTN,hyperlipidemia  PROCEDURE: The procedure was performed in the echo lab  This was a routine study  The transthoracic approach was used  The study included complete 2D imaging, M-mode, complete spectral Doppler, and color Doppler  The heart rate was 81 bpm,  at the start of the study  Image quality was adequate      LEFT VENTRICLE: Size was normal  Systolic function was normal by visual assessment  Ejection fraction was estimated to be 55 %  There were no regional wall motion abnormalities  There was mild concentric hypertrophy  DOPPLER: Doppler  parameters were consistent with abnormal left ventricular relaxation (grade 1 diastolic dysfunction)  RIGHT VENTRICLE: The size was normal  Systolic function was normal  DOPPLER: Systolic pressure was within the normal range  LEFT ATRIUM: The atrium was mildly dilated  RIGHT ATRIUM: Size was normal     MITRAL VALVE: Valve structure was normal  There was normal leaflet separation  No echocardiographic evidence for prolapse  DOPPLER: The transmitral velocity was within the normal range  There was no evidence for stenosis  There was mild  regurgitation  AORTIC VALVE: The valve was trileaflet  Leaflets exhibited normal thickness and normal cuspal separation  DOPPLER: Transaortic velocity was within the normal range  There was no evidence for stenosis  There was no regurgitation  TRICUSPID VALVE: The valve structure was normal  There was normal leaflet separation  DOPPLER: The transtricuspid velocity was within the normal range  There was mild regurgitation  Pulmonary artery systolic pressure was within the normal  range  Estimated peak PA pressure was 30 mmHg  PULMONIC VALVE: Leaflets exhibited normal thickness, no calcification, and normal cuspal separation  DOPPLER: The transpulmonic velocity was within the normal range  There was no regurgitation  PERICARDIUM: There was no thickening  There was no pericardial effusion  AORTA: The root exhibited normal size      PULMONARY ARTERY: The size was normal  The morphology appeared normal     SYSTEM MEASUREMENT TABLES    2D mode  AoR Diam 2D: 3 2 cm  LA Diam (2D): 4 cm  LA/Ao (2D): 1 25  FS (2D Teich): 27 7 %  IVSd (2D): 1 09 cm  LVDEV: 129 cmï¾³  LVESV: 60 cmï¾³  LVIDd(2D): 5 19 cm  LVISd (2D): 3 75 cm  LVOT Area 2D: 3 14 cmï¾²  LVPWd (2D): 1 13 cm  SV (Teich): 69 cmï¾³    Apical four chamber  LVEF A4C: 59 %    Unspecified Scan Mode  ARLENE Cont Eq (Peak Andres): 2 4 cmï¾²  LVOT Diam : 2 cm  LVOT Vmax: 1160 mm/s  LVOT Vmax; Mean: 1160 mm/s  Peak Grad ; Mean: 5 mm[Hg]  MV Peak A Andres: 755 mm/s  MV Peak E Andres  Mean: 607 mm/s  MVA (PHT): 4 07 cmï¾²  PHT: 54 ms  Max P mm[Hg]  V Max: 2100 mm/s  Vmax: 2210 mm/s  RA Area: 16 5 cmï¾²  RA Volume: 40 4 cmï¾³  TAPSE: 1 6 cm    [employer] Accredited Echocardiography Laboratory    Prepared and electronically signed by    Pedro Luis Cortez DO  Signed 25-Oct-2019 17:50:25         Results for orders placed during the hospital encounter of 10/17/19   NM myocardial perfusion spect (rx stress and/or rest)    [facility] [address]  [address] [facility] [address]  [phone]    Rest/Stress Gated SPECT Myocardial Perfusion Imaging After Regadenoson    Patient: Benjamín Lizama  MR number: [id number]  Account number: [de-identified]  : 1955  Age: 71 years  Gender: Female  Status: Outpatient  Location: Stress lab  Height: 64 in  Weight: 190 lb  BP: 129/ 64 mmHg    Allergies: NO KNOWN ALLERGIES    Diagnosis: I10  - Essential (primary) hypertension    Primary Physician:  Ang Durant MD  Technician:  Renata Betancourt  RN:  Nicole Hendrix RN  Referring Physician:  Ang Durant MD  Group:  [facility]her[facility]  Report Prepared By[de-identified]  KENTRELL Pierre  RN:  KENTRELL Pierre  Interpreting Physician:  Jennifer Ledesma MD    INDICATIONS: Evaluation for coronary artery disease  HISTORY: The patient is a 71year old  female  Chest pain status: no chest pain  Other symptoms: dyspnea  Coronary artery disease risk factors: dyslipidemia, hypertension, family history of premature coronary artery disease, and  diabetes mellitus  Cardiovascular history: none significant  Co-morbidity: obesity  Medications: a lipid lowering agent, an antihypertensive agent, and diabetic medications      PHYSICAL EXAM: Baseline physical exam screening: no wheezes audible  REST ECG: RBBB Normal sinus rhythm  PROCEDURE: The study was performed in the the Stress lab  A regadenoson infusion pharmacologic stress test was performed  Gated SPECT myocardial perfusion imaging was performed after stress and at rest  Systolic blood pressure was 129  mmHg, at the start of the study  Diastolic blood pressure was 64 mmHg, at the start of the study  The heart rate was 72 bpm, at the start of the study  IV double checked  Regadenoson protocol:  Time HR bpm SBP mmHg DBP mmHg Symptoms Rhythm/conduct  Baseline 09:14 72 129 64 none NSR  Immediate 09:18 90 140 72 mild dyspnea same as above  1 min 09:19 94 138 64 same as above --  2 min 09:20 90 126 58 subsiding --  3 min 09:21 89 123 58 none same as above  No medications or fluids given  STRESS SUMMARY: Duration of pharmacologic stress was 3 min  Maximal heart rate during stress was 101 bpm  The heart rate response to stress was normal  There was normal resting blood pressure with an appropriate response to stress  The  rate-pressure product for the peak heart rate and blood pressure was 55252  There was no chest pain during stress  The stress test was terminated due to protocol completion  Pre oxygen saturation: 96 %  Peak oxygen saturation: 98 %  The  stress ECG was equivocal for ischemia  There were no stress arrhythmias or conduction abnormalities  ISOTOPE ADMINISTRATION:  Resting isotope administration Stress isotope administration  Agent Tetrofosmin Tetrofosmin  Dose 11 mCi 33 mCi  Date 10/17/2019 10/17/2019    The radiopharmaceutical was injected at the peak effect of pharmacologic stress  MYOCARDIAL PERFUSION IMAGING:  The image quality was good  Rotating projection images reveal mild breast attenuation and prone imaging completed for attenuation correction  Left ventricular size was normal  The TID ratio was 1 2      PERFUSION DEFECTS:  -  There was a small, paradoxical (reverse redistribution) myocardial perfusion defect of the apical wall likely due to low counts  -  There was a reversible myocardial perfusion defect of the anteroseptal wall which improved with prone imaging likely due to attenuation from breast tissue  GATED SPECT:  The calculated left ventricular ejection fraction was 60 %  Left ventricular ejection fraction was within normal limits by visual estimate  SUMMARY:  -  Stress results: There was no chest pain during stress  -  ECG conclusions: The stress ECG was equivocal for ischemia  -  Perfusion imaging: There was a small, paradoxical (reverse redistribution) myocardial perfusion defect of the apical wall likely due to low counts  There was a reversible myocardial perfusion defect of the anteroseptal wall which  improved with prone imaging likely due to attenuation from breast tissue   -  Gated SPECT: The calculated left ventricular ejection fraction was 60 %  Left ventricular ejection fraction was within normal limits by visual estimate  -  Impressions and recommendations: Likely normal study after pharmacologic vasodilation  No evidence of prior infarction and no focal reversible perfusion defect with preserved ejection fraction  Cannot rule out balanced ischemia from  pharmacological vasodilation study but TID ratio of 1 2 and preserved EF  IMPRESSIONS: Likely normal study after pharmacologic vasodilation  No evidence of prior infarction and no focal reversible perfusion defect with preserved ejection fraction  Cannot rule out balanced ischemia from pharmacological  vasodilation study but TID ratio of 1 2 and preserved EF  Prepared and signed by    Piero Fernandez MD  Signed 10/18/2019 10:59:38         EKG: Personally reviewed      Normal sinus rhythm incomplete rbbb t-wave inversions      Piero Fernandez MD [facility]  [phone]  Please call with any questions or suggestions    Counseling :  A description of the counseling:   Goals and Barriers:  Patient's ability to self care:  Medication side effect reviewed with patient in detail and all their questions answered  "Portions of the record may have been created with voice recognition software  Occasional wrong word or "sound a like" substitutions may have occurred due to the inherent limitations of voice recognition software  Read the chart carefully and recognize, using context, where substitutions have occurred   Please call if you have any questions  "

## 2020-02-11 NOTE — H&P (VIEW-ONLY)
[facility]  [address] [facility] [address]  [address]   [place], [address]  Cardiology Follow-up  Lyndsey May  1955  [id number]  [address] [facility] [place]  [address]  [address] [facility], [address]  [place] [id number] [phone]    1  Uncontrolled type 2 diabetes mellitus with nephropathy ([facility] [address] )     2  Hypertension, unspecified type     3  Dyspnea on exertion     4  Palpitations        Discussion/Summary:   Chest pain- No chest pain  Still with dyspnea  Abnormal baseline ekg  Stress test equivocal- no focal ischemia  Cannot rule out balance ischemia  Baseline EKG with nonspecific ST T-wave changes in anterior leads  Aspirin 81mg daily  She is currently on optimal medical therapy including rosuvastatin + aspirin + lisinopril 2 5mg + metoprolol 25mg in morning  Trial of nitroglycerin prn  If with refractory chest pain she will come to the ED  Plan for follow-up consideration for R + LHC  Exertional dyspnea- secondary to deconditioning? No hx of smoking  Trial of metoprolol for heart rate control  Diastolic dysfunction? Consideration for RHC if with continue dyspnea    Htn- lisinopril 2 5 mg daily  Diabetes mellitus- currently on metformin + lantus    Hld- rosuvastatin + aspirin    -- obtain last blood work from PCP- 2nd request given  Possible addition of Omega 3 supplementation      History:   26-year-old woman with long history of diabetes mellitus, family history for CAD presents with worsening exertional shortness of breath  She reports with light activities feeling significant dyspnea and chest tightness  She had a recent nuclear stress test which did not show focal ischemia but did have some transient ischemic dilatation  She she has not had no prior history of myocardial infarction or CVA  No prior history of atrial fibrillation  She has periodic lower extremity edema   Her blood pressures have been controlled    She is compliant with her medications  02/11/2019:  She continues to have exertional shortness of breath  She is not challenge herself with exercise  Her daughter is present and states she has been non compliant  She is compliant with her medications  We have not received her repeat blood work      PMH  Diabetes Mellitus- 15 years      Past Medical History:   Diagnosis Date    Anesthesia complication     difficulty waking up    Arthritis     left knee    Bone spur     left knee behing the knee cap    Depression     Diabetes mellitus (HCC)     Endometrial cancer (HCC)     grade I    Hx of bleeding disorder     vaginal bleeding started on 3/13/17     Hyperlipidemia     Hypertension     Shortness of breath     with exertion    Urinary incontinence     Vitamin D deficiency      Social History     Socioeconomic History    Marital status: /Civil Union     Spouse name: Not on file    Number of children: Not on file    Years of education: Not on file    Highest education level: Not on file   Occupational History    Not on file   Social Needs    Financial resource strain: Not on file    Food insecurity:     Worry: Not on file     Inability: Not on file    Transportation needs:     Medical: Not on file     Non-medical: Not on file   Tobacco Use    Smoking status: Never Smoker    Smokeless tobacco: Never Used   Substance and Sexual Activity    Alcohol use: No    Drug use: No    Sexual activity: Not on file   Lifestyle    Physical activity:     Days per week: Not on file     Minutes per session: Not on file    Stress: Not on file   Relationships    Social connections:     Talks on phone: Not on file     Gets together: Not on file     Attends Buddhist service: Not on file     Active member of club or organization: Not on file     Attends meetings of clubs or organizations: Not on file     Relationship status: Not on file    Intimate partner violence:     Fear of current or ex partner: Not on file Emotionally abused: Not on file     Physically abused: Not on file     Forced sexual activity: Not on file   Other Topics Concern    Not on file   Social History Narrative    Not on file      Family History   Problem Relation Age of Onset    Cancer Mother         Renal    Heart disease Father         CHF    Heart disease Sister         atrial septal defect     Past Surgical History:   Procedure Laterality Date    BREAST BIOPSY Left     benign    CHOLECYSTECTOMY      open    DILATION AND CURETTAGE OF UTERUS N/A 4/5/2017    Procedure: DILATATION AND CURETTAGE (D&C);   Surgeon: Jacki Adbi MD;  Location: [facility] MAIN OR;  Service:     HYSTERECTOMY      OOPHORECTOMY      PELVIC LAPAROSCOPY      LA LAPAROSCOPY W TOT HYSTERECTUTERUS <=250 GRAM  W TUBE/OVARY N/A 5/4/2017    Procedure: ROBOTIC ASSISTED TOTAL LAPAROSCOPIC HYSTERECTOMY, BILATERAL SALPINGOOPHERECTOMY; CYSTOSCOPY;  Surgeon: Tommy Steward MD;  Location:  MAIN OR;  Service: Gynecology Oncology    TONSILLECTOMY      TUBAL LIGATION         Current Outpatient Medications:     aspirin (ECOTRIN LOW STRENGTH) 81 mg EC tablet, Take 1 tablet (81 mg total) by mouth daily with breakfast, Disp: 90 tablet, Rfl: 3    BD PEN NEEDLE JOSEPHINE U/F 32G X 4 MM MISC, USE 4 TIMES DAILY WITH INSULIN, Disp: , Rfl: 10    buPROPion (WELLBUTRIN SR) 200 MG 12 hr tablet, Take 200 mg by mouth every morning, Disp: , Rfl:     clonazePAM (KlonoPIN) 0 25 MG disintegrating tablet, Take 0 25 mg by mouth every morning, Disp: , Rfl:     clonazePAM (KlonoPIN) 0 5 mg tablet, Take 0 5 mg by mouth daily after lunch 3 pm, Disp: , Rfl:     Insulin Glargine (TOUJEO SOLOSTAR SC), Inject 65 Units under the skin every morning LANTUS , Disp: , Rfl:     LANTUS SOLOSTAR 100 units/mL injection pen, , Disp: , Rfl:     LANTUS SOLOSTAR 100 units/mL injection pen, INJECT 70 UNITS SUBCUTANEOUSLY ONCE DAILY, Disp: , Rfl:     lisinopril (ZESTRIL) 2 5 mg tablet, Take 2 5 mg by mouth daily, Disp: , Rfl: 3    metFORMIN (GLUCOPHAGE-XR) 500 mg 24 hr tablet, Take 500 mg by mouth 2 (two) times a day, Disp: , Rfl: 5    metoprolol succinate (TOPROL-XL) 25 mg 24 hr tablet, Take 1 tablet (25 mg total) by mouth daily, Disp: 30 tablet, Rfl: 3    rosuvastatin (CRESTOR) 20 MG tablet, Take 20 mg by mouth daily after dinner, Disp: , Rfl:     sertraline (ZOLOFT) 100 mg tablet, Take 100 mg by mouth 2 (two) times a day, Disp: , Rfl:     Vitamin D, Cholecalciferol, 1000 units CAPS, Take 1,000 Units by mouth every morning  , Disp: , Rfl:     ziprasidone (GEODON) 80 mg capsule, Take 80 mg by mouth 2 (two) times a day with meals, Disp: , Rfl:     Canagliflozin (INVOKANA) 300 MG TABS, Take 300 mg by mouth every morning  , Disp: , Rfl:     naproxen (NAPROSYN) 500 mg tablet, Take 500 mg by mouth every 12 (twelve) hours as needed Take with food, Disp: , Rfl: 0    nitroglycerin (NITROSTAT) 0 4 mg SL tablet, Place 1 tablet (0 4 mg total) under the tongue every 5 (five) minutes as needed for chest pain (Patient not taking: Reported on 2/11/2020), Disp: 30 tablet, Rfl: 1  No Known Allergies  Vitals:    02/11/20 1536   BP: 120/52   BP Location: Right arm   Patient Position: Sitting   Cuff Size: Standard   Pulse: 66   SpO2: 97%   Weight: 87 5 kg (193 lb)   Height: 5' 4" (1 626 m)       Review of Systems:   Review of Systems   Constitutional: Negative  Negative for activity change, appetite change, chills, diaphoresis, fatigue, fever and unexpected weight change  HENT: Negative  Negative for congestion, dental problem, drooling, ear discharge, ear pain, facial swelling, hearing loss, mouth sores, nosebleeds, postnasal drip, rhinorrhea, sinus pressure, sinus pain, sneezing, sore throat, tinnitus, trouble swallowing and voice change  Eyes: Negative  Negative for photophobia, pain, redness, itching and visual disturbance  Respiratory: Positive for shortness of breath   Negative for apnea, cough, choking, chest tightness, wheezing and stridor  Cardiovascular: Positive for leg swelling  Negative for chest pain and palpitations  Gastrointestinal: Negative  Negative for abdominal distention, abdominal pain, anal bleeding, blood in stool, constipation, diarrhea, nausea, rectal pain and vomiting  Endocrine: Negative  Negative for cold intolerance, heat intolerance, polydipsia, polyphagia and polyuria  Genitourinary: Negative  Negative for decreased urine volume, difficulty urinating, dyspareunia, dysuria, enuresis, flank pain, frequency, genital sores, hematuria, menstrual problem, pelvic pain, urgency, vaginal bleeding, vaginal discharge and vaginal pain  Musculoskeletal: Negative  Negative for arthralgias, back pain, gait problem, joint swelling, myalgias, neck pain and neck stiffness  Skin: Negative  Negative for color change, pallor, rash and wound  Allergic/Immunologic: Negative  Negative for environmental allergies, food allergies and immunocompromised state  Neurological: Negative  Negative for dizziness, tremors, seizures, syncope, facial asymmetry, speech difficulty, weakness, light-headedness, numbness and headaches  Hematological: Negative  Negative for adenopathy  Does not bruise/bleed easily  Psychiatric/Behavioral: Negative  Negative for agitation, behavioral problems, confusion, decreased concentration, dysphoric mood, hallucinations, self-injury, sleep disturbance and suicidal ideas  The patient is not nervous/anxious and is not hyperactive  All other systems reviewed and are negative  Vitals:    02/11/20 1536   BP: 120/52   BP Location: Right arm   Patient Position: Sitting   Cuff Size: Standard   Pulse: 66   SpO2: 97%   Weight: 87 5 kg (193 lb)   Height: 5' 4" (1 626 m)     Physical Examination:   Physical Exam   Constitutional: She is oriented to person, place, and time  She appears well-developed and well-nourished  No distress     HENT:   Head: Normocephalic and atraumatic  Right Ear: External ear normal    Left Ear: External ear normal    Eyes: Pupils are equal, round, and reactive to light  Conjunctivae are normal  Right eye exhibits no discharge  Left eye exhibits no discharge  No scleral icterus  Neck: Normal range of motion  Neck supple  No JVD present  No tracheal deviation present  No thyromegaly present  Cardiovascular: Normal rate and regular rhythm  Exam reveals gallop  Exam reveals no friction rub  Murmur heard  Pulmonary/Chest: Effort normal and breath sounds normal  No stridor  No respiratory distress  She has no wheezes  She has no rales  She exhibits no tenderness  Abdominal: Soft  Bowel sounds are normal  She exhibits no distension and no mass  There is no tenderness  There is no rebound and no guarding  Musculoskeletal: Normal range of motion  She exhibits no edema, tenderness or deformity  Neurological: She is alert and oriented to person, place, and time  She has normal reflexes  She displays normal reflexes  No cranial nerve deficit  She exhibits normal muscle tone  Coordination normal    Skin: Skin is warm and dry  No rash noted  She is not diaphoretic  No erythema  No pallor  Psychiatric: She has a normal mood and affect   Her behavior is normal  Judgment and thought content normal        Labs:     Lab Results   Component Value Date    WBC 3 80 (L) 08/21/2017    HGB 10 9 (L) 08/21/2017    HCT 32 5 (L) 08/21/2017    MCV 90 08/21/2017    RDW 15 7 (H) 08/21/2017     08/21/2017     BMP:  Lab Results   Component Value Date    SODIUM 136 05/06/2017    K 4 3 05/06/2017     05/06/2017    CO2 27 05/06/2017    BUN 27 (H) 10/05/2017    CREATININE 1 50 (H) 10/05/2017    GLUC 138 05/06/2017    CALCIUM 8 7 05/06/2017    EGFR 37 10/05/2017     LFT:  Lab Results   Component Value Date    AST 26 04/21/2017    ALT 25 04/21/2017    ALKPHOS 67 04/21/2017    ALB 4 1 04/21/2017      [facility]: [facility]  Lab Results   Component Value Date    HGBA1C 6 7 (H) 2017       Imaging & Testing   I have personally reviewed pertinent reports  Cardiac Testing     Results for orders placed during the hospital encounter of 10/25/19   Echo complete with contrast if indicated    [address]  [address] [facility]  [place]  [phone]    Transthoracic Echocardiogram  2D, M-mode, Doppler, and Color Doppler    Study date:  25-Oct-2019    Patient: Hao Donis  MR number: [id number]  Account number: [de-identified]  : 1955  Age: 71 years  Gender: Female  Status: Outpatient  Location: Echo lab  Height: 65 in  Weight: 190 5 lb  BP: 134/ 75 mmHg    Indications: SOB    Diagnoses: R06 02 - Shortness of breath    Sonographer:  Jolynn Duane, RCS  Primary Physician:  Montserrat Gatica MD  Referring Physician:  Montserrat Gatica MD  Group:  [facility]  Interpreting Physician:  Mandy Hartley DO    SUMMARY    LEFT VENTRICLE:  Systolic function was normal by visual assessment  Ejection fraction was estimated to be 55 %  There were no regional wall motion abnormalities  There was mild concentric hypertrophy  Doppler parameters were consistent with abnormal left ventricular relaxation (grade 1 diastolic dysfunction)  MITRAL VALVE:  There was mild regurgitation  AORTIC VALVE:  There was no evidence for stenosis  There was no regurgitation  TRICUSPID VALVE:  There was mild regurgitation  Pulmonary artery systolic pressure was within the normal range  HISTORY: PRIOR HISTORY: Diabetes, Endometrial Cancer and Colon Cancer,HTN,hyperlipidemia  PROCEDURE: The procedure was performed in the echo lab  This was a routine study  The transthoracic approach was used  The study included complete 2D imaging, M-mode, complete spectral Doppler, and color Doppler  The heart rate was 81 bpm,  at the start of the study  Image quality was adequate      LEFT VENTRICLE: Size was normal  Systolic function was normal by visual assessment  Ejection fraction was estimated to be 55 %  There were no regional wall motion abnormalities  There was mild concentric hypertrophy  DOPPLER: Doppler  parameters were consistent with abnormal left ventricular relaxation (grade 1 diastolic dysfunction)  RIGHT VENTRICLE: The size was normal  Systolic function was normal  DOPPLER: Systolic pressure was within the normal range  LEFT ATRIUM: The atrium was mildly dilated  RIGHT ATRIUM: Size was normal     MITRAL VALVE: Valve structure was normal  There was normal leaflet separation  No echocardiographic evidence for prolapse  DOPPLER: The transmitral velocity was within the normal range  There was no evidence for stenosis  There was mild  regurgitation  AORTIC VALVE: The valve was trileaflet  Leaflets exhibited normal thickness and normal cuspal separation  DOPPLER: Transaortic velocity was within the normal range  There was no evidence for stenosis  There was no regurgitation  TRICUSPID VALVE: The valve structure was normal  There was normal leaflet separation  DOPPLER: The transtricuspid velocity was within the normal range  There was mild regurgitation  Pulmonary artery systolic pressure was within the normal  range  Estimated peak PA pressure was 30 mmHg  PULMONIC VALVE: Leaflets exhibited normal thickness, no calcification, and normal cuspal separation  DOPPLER: The transpulmonic velocity was within the normal range  There was no regurgitation  PERICARDIUM: There was no thickening  There was no pericardial effusion  AORTA: The root exhibited normal size      PULMONARY ARTERY: The size was normal  The morphology appeared normal     SYSTEM MEASUREMENT TABLES    2D mode  AoR Diam 2D: 3 2 cm  LA Diam (2D): 4 cm  LA/Ao (2D): 1 25  FS (2D Teich): 27 7 %  IVSd (2D): 1 09 cm  LVDEV: 129 cmï¾³  LVESV: 60 cmï¾³  LVIDd(2D): 5 19 cm  LVISd (2D): 3 75 cm  LVOT Area 2D: 3 14 cmï¾²  LVPWd (2D): 1 13 cm  SV (Teich): 69 cmï¾³    Apical four chamber  LVEF A4C: 59 %    Unspecified Scan Mode  ARLENE Cont Eq (Peak Andres): 2 4 cmï¾²  LVOT Diam : 2 cm  LVOT Vmax: 1160 mm/s  LVOT Vmax; Mean: 1160 mm/s  Peak Grad ; Mean: 5 mm[Hg]  MV Peak A Andres: 755 mm/s  MV Peak E Andres  Mean: 607 mm/s  MVA (PHT): 4 07 cmï¾²  PHT: 54 ms  Max P mm[Hg]  V Max: 2100 mm/s  Vmax: 2210 mm/s  RA Area: 16 5 cmï¾²  RA Volume: 40 4 cmï¾³  TAPSE: 1 6 cm    [employer] Accredited Echocardiography Laboratory    Prepared and electronically signed by    Katlyn Simpson DO  Signed 25-Oct-2019 17:50:25         Results for orders placed during the hospital encounter of 10/17/19   NM myocardial perfusion spect (rx stress and/or rest)    [facility] [address]  [address] [facility]   [phone]    Rest/Stress Gated SPECT Myocardial Perfusion Imaging After Regadenoson    Patient: Lm Pardo  MR number: [id number]  Account number: [de-identified]  : 1955  Age: 71 years  Gender: Female  Status: Outpatient  Location: Stress lab  Height: 64 in  Weight: 190 lb  BP: 129/ 64 mmHg    Allergies: NO KNOWN ALLERGIES    Diagnosis: I10  - Essential (primary) hypertension    Primary Physician:  Haritha Mcelroy MD  Technician:  Oleg Rojas  RN:  Jaclyn Coto RN  Referring Physician:  Haritha Mcelroy MD  Group:  [facility]  Report Prepared By[de-identified]  KENTRELL Dykes  RN:  KENTRELL Dykes  Interpreting Physician:  Prince Diop MD    INDICATIONS: Evaluation for coronary artery disease  HISTORY: The patient is a 71year old  female  Chest pain status: no chest pain  Other symptoms: dyspnea  Coronary artery disease risk factors: dyslipidemia, hypertension, family history of premature coronary artery disease, and  diabetes mellitus  Cardiovascular history: none significant  Co-morbidity: obesity  Medications: a lipid lowering agent, an antihypertensive agent, and diabetic medications      PHYSICAL EXAM: Baseline physical exam screening: no wheezes audible  REST ECG: RBBB Normal sinus rhythm  PROCEDURE: The study was performed in the the Stress lab  A regadenoson infusion pharmacologic stress test was performed  Gated SPECT myocardial perfusion imaging was performed after stress and at rest  Systolic blood pressure was 129  mmHg, at the start of the study  Diastolic blood pressure was 64 mmHg, at the start of the study  The heart rate was 72 bpm, at the start of the study  IV double checked  Regadenoson protocol:  Time HR bpm SBP mmHg DBP mmHg Symptoms Rhythm/conduct  Baseline 09:14 72 129 64 none NSR  Immediate 09:18 90 140 72 mild dyspnea same as above  1 min 09:19 94 138 64 same as above --  2 min 09:20 90 126 58 subsiding --  3 min 09:21 89 123 58 none same as above  No medications or fluids given  STRESS SUMMARY: Duration of pharmacologic stress was 3 min  Maximal heart rate during stress was 101 bpm  The heart rate response to stress was normal  There was normal resting blood pressure with an appropriate response to stress  The  rate-pressure product for the peak heart rate and blood pressure was 29015  There was no chest pain during stress  The stress test was terminated due to protocol completion  Pre oxygen saturation: 96 %  Peak oxygen saturation: 98 %  The  stress ECG was equivocal for ischemia  There were no stress arrhythmias or conduction abnormalities  ISOTOPE ADMINISTRATION:  Resting isotope administration Stress isotope administration  Agent Tetrofosmin Tetrofosmin  Dose 11 mCi 33 mCi  Date 10/17/2019 10/17/2019    The radiopharmaceutical was injected at the peak effect of pharmacologic stress  MYOCARDIAL PERFUSION IMAGING:  The image quality was good  Rotating projection images reveal mild breast attenuation and prone imaging completed for attenuation correction  Left ventricular size was normal  The TID ratio was 1 2      PERFUSION DEFECTS:  -  There was a small, paradoxical (reverse redistribution) myocardial perfusion defect of the apical wall likely due to low counts  -  There was a reversible myocardial perfusion defect of the anteroseptal wall which improved with prone imaging likely due to attenuation from breast tissue  GATED SPECT:  The calculated left ventricular ejection fraction was 60 %  Left ventricular ejection fraction was within normal limits by visual estimate  SUMMARY:  -  Stress results: There was no chest pain during stress  -  ECG conclusions: The stress ECG was equivocal for ischemia  -  Perfusion imaging: There was a small, paradoxical (reverse redistribution) myocardial perfusion defect of the apical wall likely due to low counts  There was a reversible myocardial perfusion defect of the anteroseptal wall which  improved with prone imaging likely due to attenuation from breast tissue   -  Gated SPECT: The calculated left ventricular ejection fraction was 60 %  Left ventricular ejection fraction was within normal limits by visual estimate  -  Impressions and recommendations: Likely normal study after pharmacologic vasodilation  No evidence of prior infarction and no focal reversible perfusion defect with preserved ejection fraction  Cannot rule out balanced ischemia from  pharmacological vasodilation study but TID ratio of 1 2 and preserved EF  IMPRESSIONS: Likely normal study after pharmacologic vasodilation  No evidence of prior infarction and no focal reversible perfusion defect with preserved ejection fraction  Cannot rule out balanced ischemia from pharmacological  vasodilation study but TID ratio of 1 2 and preserved EF  Prepared and signed by    Carolina Romano MD  Signed 10/18/2019 10:59:38         EKG: Personally reviewed      Normal sinus rhythm incomplete rbbb t-wave inversions      Carolina Romano MD [facility]  [phone]  Please call with any questions or suggestions    Counseling :  A description of the counseling:   Goals and Barriers:  Patient's ability to self care:  Medication side effect reviewed with patient in detail and all their questions answered  "Portions of the record may have been created with voice recognition software  Occasional wrong word or "sound a like" substitutions may have occurred due to the inherent limitations of voice recognition software  Read the chart carefully and recognize, using context, where substitutions have occurred   Please call if you have any questions  "

## 2020-02-14 ENCOUNTER — TELEPHONE (OUTPATIENT)
Dept: CARDIOLOGY CLINIC | Facility: CLINIC | Age: 65
End: 2020-02-14

## 2020-02-14 DIAGNOSIS — R06.00 DYSPNEA ON EXERTION: ICD-10-CM

## 2020-02-14 DIAGNOSIS — IMO0002 UNCONTROLLED TYPE 2 DIABETES MELLITUS WITH NEPHROPATHY: Primary | ICD-10-CM

## 2020-02-14 DIAGNOSIS — R94.39 CARDIOVASCULAR STRESS TEST ABNORMAL: ICD-10-CM

## 2020-02-14 NOTE — TELEPHONE ENCOUNTER
Dr Alvaro Farrell,  Patient called to make you aware that she took nitro both yesterday and today when feeling short of breath  She states that after she took two dosages she felt less SOB  Can you advise further? She would like a call back

## 2020-02-17 DIAGNOSIS — R06.02 SOB (SHORTNESS OF BREATH): Primary | ICD-10-CM

## 2020-02-18 ENCOUNTER — TELEPHONE (OUTPATIENT)
Dept: CARDIOLOGY CLINIC | Facility: CLINIC | Age: 65
End: 2020-02-18

## 2020-02-18 NOTE — TELEPHONE ENCOUNTER
Patient called asking if she needed to stop taking Aspirin 81 prior to cardiac cath on 2/25/2020  Please advise

## 2020-02-21 ENCOUNTER — TELEPHONE (OUTPATIENT)
Dept: CARDIOLOGY CLINIC | Facility: CLINIC | Age: 65
End: 2020-02-21

## 2020-02-24 ENCOUNTER — TELEPHONE (OUTPATIENT)
Dept: NON INVASIVE DIAGNOSTICS | Facility: HOSPITAL | Age: 65
End: 2020-02-24

## 2020-02-24 RX ORDER — SODIUM CHLORIDE 9 MG/ML
150 INJECTION, SOLUTION INTRAVENOUS CONTINUOUS
Status: CANCELLED | OUTPATIENT
Start: 2020-02-24

## 2020-02-24 RX ORDER — ASPIRIN 81 MG/1
324 TABLET, CHEWABLE ORAL ONCE
Status: CANCELLED | OUTPATIENT
Start: 2020-02-24 | End: 2020-02-24

## 2020-02-25 ENCOUNTER — HOSPITAL ENCOUNTER (OUTPATIENT)
Dept: NON INVASIVE DIAGNOSTICS | Facility: HOSPITAL | Age: 65
Discharge: HOME | End: 2020-02-25
Attending: INTERNAL MEDICINE | Admitting: INTERNAL MEDICINE
Payer: COMMERCIAL

## 2020-02-25 VITALS
DIASTOLIC BLOOD PRESSURE: 73 MMHG | HEART RATE: 68 BPM | HEIGHT: 64 IN | BODY MASS INDEX: 33.03 KG/M2 | TEMPERATURE: 97 F | RESPIRATION RATE: 18 BRPM | OXYGEN SATURATION: 95 % | WEIGHT: 193.5 LBS | SYSTOLIC BLOOD PRESSURE: 128 MMHG

## 2020-02-25 DIAGNOSIS — R94.39 ABNORMAL STRESS TEST: Primary | ICD-10-CM

## 2020-02-25 DIAGNOSIS — IMO0002 UNCONTROLLED TYPE 2 DIABETES MELLITUS WITH NEPHROPATHY: ICD-10-CM

## 2020-02-25 DIAGNOSIS — R06.00 DYSPNEA ON EXERTION: ICD-10-CM

## 2020-02-25 DIAGNOSIS — R94.39 CARDIOVASCULAR STRESS TEST ABNORMAL: ICD-10-CM

## 2020-02-25 LAB
ANION GAP SERPL CALCULATED.3IONS-SCNC: 12 MMOL/L (ref 4–13)
BUN SERPL-MCNC: 21 MG/DL (ref 5–25)
CALCIUM SERPL-MCNC: 8.9 MG/DL (ref 8.3–10.1)
CHLORIDE SERPL-SCNC: 97 MMOL/L (ref 100–108)
CHOLEST SERPL-MCNC: 161 MG/DL (ref 50–200)
CO2 SERPL-SCNC: 25 MMOL/L (ref 21–32)
CREAT SERPL-MCNC: 1.23 MG/DL (ref 0.6–1.3)
GFR SERPL CREATININE-BSD FRML MDRD: 46 ML/MIN/1.73SQ M
GLUCOSE P FAST SERPL-MCNC: 127 MG/DL (ref 65–99)
GLUCOSE SERPL-MCNC: 127 MG/DL (ref 65–140)
HDLC SERPL-MCNC: 61 MG/DL
LDLC SERPL CALC-MCNC: 65 MG/DL (ref 0–100)
NONHDLC SERPL-MCNC: 100 MG/DL
POTASSIUM SERPL-SCNC: 4.5 MMOL/L (ref 3.5–5.3)
SODIUM SERPL-SCNC: 134 MMOL/L (ref 136–145)
TRIGL SERPL-MCNC: 176 MG/DL

## 2020-02-25 PROCEDURE — C1894 INTRO/SHEATH, NON-LASER: HCPCS | Performed by: INTERNAL MEDICINE

## 2020-02-25 PROCEDURE — 93458 L HRT ARTERY/VENTRICLE ANGIO: CPT | Performed by: INTERNAL MEDICINE

## 2020-02-25 PROCEDURE — 93460 R&L HRT ART/VENTRICLE ANGIO: CPT | Performed by: INTERNAL MEDICINE

## 2020-02-25 PROCEDURE — 82810 BLOOD GASES O2 SAT ONLY: CPT | Performed by: INTERNAL MEDICINE

## 2020-02-25 PROCEDURE — C1769 GUIDE WIRE: HCPCS | Performed by: INTERNAL MEDICINE

## 2020-02-25 PROCEDURE — 93005 ELECTROCARDIOGRAM TRACING: CPT

## 2020-02-25 PROCEDURE — 99152 MOD SED SAME PHYS/QHP 5/>YRS: CPT | Performed by: INTERNAL MEDICINE

## 2020-02-25 PROCEDURE — 99153 MOD SED SAME PHYS/QHP EA: CPT | Performed by: INTERNAL MEDICINE

## 2020-02-25 PROCEDURE — 80048 BASIC METABOLIC PNL TOTAL CA: CPT | Performed by: INTERNAL MEDICINE

## 2020-02-25 PROCEDURE — 80061 LIPID PANEL: CPT | Performed by: INTERNAL MEDICINE

## 2020-02-25 RX ORDER — SODIUM CHLORIDE 9 MG/ML
150 INJECTION, SOLUTION INTRAVENOUS CONTINUOUS
Status: DISCONTINUED | OUTPATIENT
Start: 2020-02-25 | End: 2020-02-26 | Stop reason: HOSPADM

## 2020-02-25 RX ORDER — NITROGLYCERIN 20 MG/100ML
INJECTION INTRAVENOUS CODE/TRAUMA/SEDATION MEDICATION
Status: COMPLETED | OUTPATIENT
Start: 2020-02-25 | End: 2020-02-25

## 2020-02-25 RX ORDER — LIDOCAINE HYDROCHLORIDE 10 MG/ML
INJECTION, SOLUTION EPIDURAL; INFILTRATION; INTRACAUDAL; PERINEURAL CODE/TRAUMA/SEDATION MEDICATION
Status: COMPLETED | OUTPATIENT
Start: 2020-02-25 | End: 2020-02-25

## 2020-02-25 RX ORDER — HEPARIN SODIUM 1000 [USP'U]/ML
INJECTION, SOLUTION INTRAVENOUS; SUBCUTANEOUS CODE/TRAUMA/SEDATION MEDICATION
Status: COMPLETED | OUTPATIENT
Start: 2020-02-25 | End: 2020-02-25

## 2020-02-25 RX ORDER — VERAPAMIL HYDROCHLORIDE 2.5 MG/ML
INJECTION, SOLUTION INTRAVENOUS CODE/TRAUMA/SEDATION MEDICATION
Status: COMPLETED | OUTPATIENT
Start: 2020-02-25 | End: 2020-02-25

## 2020-02-25 RX ORDER — MIDAZOLAM HYDROCHLORIDE 2 MG/2ML
INJECTION, SOLUTION INTRAMUSCULAR; INTRAVENOUS CODE/TRAUMA/SEDATION MEDICATION
Status: COMPLETED | OUTPATIENT
Start: 2020-02-25 | End: 2020-02-25

## 2020-02-25 RX ORDER — ASPIRIN 81 MG/1
324 TABLET, CHEWABLE ORAL ONCE
Status: COMPLETED | OUTPATIENT
Start: 2020-02-25 | End: 2020-02-25

## 2020-02-25 RX ORDER — FENTANYL CITRATE 50 UG/ML
INJECTION, SOLUTION INTRAMUSCULAR; INTRAVENOUS CODE/TRAUMA/SEDATION MEDICATION
Status: COMPLETED | OUTPATIENT
Start: 2020-02-25 | End: 2020-02-25

## 2020-02-25 RX ORDER — SODIUM CHLORIDE 9 MG/ML
75 INJECTION, SOLUTION INTRAVENOUS CONTINUOUS
Status: DISPENSED | OUTPATIENT
Start: 2020-02-25 | End: 2020-02-25

## 2020-02-25 RX ADMIN — IOHEXOL 60 ML: 350 INJECTION, SOLUTION INTRAVENOUS at 09:34

## 2020-02-25 RX ADMIN — SODIUM CHLORIDE 150 ML/HR: 0.9 INJECTION, SOLUTION INTRAVENOUS at 07:17

## 2020-02-25 RX ADMIN — NITROGLYCERIN 200 MCG: 20 INJECTION INTRAVENOUS at 09:14

## 2020-02-25 RX ADMIN — LIDOCAINE HYDROCHLORIDE 1 ML: 10 INJECTION, SOLUTION EPIDURAL; INFILTRATION; INTRACAUDAL; PERINEURAL at 09:12

## 2020-02-25 RX ADMIN — MIDAZOLAM HYDROCHLORIDE 1 MG: 1 INJECTION, SOLUTION INTRAMUSCULAR; INTRAVENOUS at 09:06

## 2020-02-25 RX ADMIN — FENTANYL CITRATE 25 MCG: 50 INJECTION INTRAMUSCULAR; INTRAVENOUS at 09:11

## 2020-02-25 RX ADMIN — FENTANYL CITRATE 25 MCG: 50 INJECTION INTRAMUSCULAR; INTRAVENOUS at 09:06

## 2020-02-25 RX ADMIN — VERAPAMIL HYDROCHLORIDE 2.5 MG: 2.5 INJECTION, SOLUTION INTRAVENOUS at 09:14

## 2020-02-25 RX ADMIN — HEPARIN SODIUM 4000 UNITS: 1000 INJECTION INTRAVENOUS; SUBCUTANEOUS at 09:13

## 2020-02-25 RX ADMIN — MIDAZOLAM HYDROCHLORIDE 1 MG: 1 INJECTION, SOLUTION INTRAMUSCULAR; INTRAVENOUS at 09:11

## 2020-02-25 RX ADMIN — ASPIRIN 81 MG 324 MG: 81 TABLET ORAL at 07:17

## 2020-02-25 NOTE — INTERVAL H&P NOTE
H&P reviewed  After examining the patient I find no changes in the patients condition since the H&P had been written      Vitals:    02/25/20 0705   BP: 136/68   Pulse: 72   Resp: 18   Temp: 98 1 °F (36 7 °C)   SpO2: 96%

## 2020-02-26 LAB
ATRIAL RATE: 73 BPM
P AXIS: 54 DEGREES
PR INTERVAL: 144 MS
QRS AXIS: 37 DEGREES
QRSD INTERVAL: 122 MS
QT INTERVAL: 446 MS
QTC INTERVAL: 482 MS
T WAVE AXIS: 42 DEGREES
VENTRICULAR RATE: 70 BPM

## 2020-02-26 PROCEDURE — 93010 ELECTROCARDIOGRAM REPORT: CPT | Performed by: INTERNAL MEDICINE

## 2020-03-20 LAB
BUN SERPL-MCNC: 25 MG/DL (ref 7–25)
BUN/CREAT SERPL: 20 (CALC) (ref 6–22)
CALCIUM SERPL-MCNC: 9.6 MG/DL (ref 8.6–10.4)
CHLORIDE SERPL-SCNC: 102 MMOL/L (ref 98–110)
CO2 SERPL-SCNC: 28 MMOL/L (ref 20–32)
CREAT SERPL-MCNC: 1.22 MG/DL (ref 0.5–0.99)
GLUCOSE SERPL-MCNC: 96 MG/DL (ref 65–99)
POTASSIUM SERPL-SCNC: 4.6 MMOL/L (ref 3.5–5.3)
SL AMB EGFR AFRICAN AMERICAN: 53 ML/MIN/1.73M2
SL AMB EGFR NON AFRICAN AMERICAN: 46 ML/MIN/1.73M2
SODIUM SERPL-SCNC: 139 MMOL/L (ref 135–146)

## 2020-03-27 ENCOUNTER — TELEMEDICINE (OUTPATIENT)
Dept: CARDIOLOGY CLINIC | Facility: CLINIC | Age: 65
End: 2020-03-27
Payer: COMMERCIAL

## 2020-03-27 DIAGNOSIS — E78.2 MIXED HYPERLIPIDEMIA: ICD-10-CM

## 2020-03-27 DIAGNOSIS — R07.9 CHEST PAIN IN ADULT: ICD-10-CM

## 2020-03-27 DIAGNOSIS — IMO0002 UNCONTROLLED TYPE 2 DIABETES MELLITUS WITH NEPHROPATHY: Primary | ICD-10-CM

## 2020-03-27 DIAGNOSIS — R06.00 DYSPNEA ON EXERTION: ICD-10-CM

## 2020-03-27 PROCEDURE — 99443 PR PHYS/QHP TELEPHONE EVALUATION 21-30 MIN: CPT | Performed by: INTERNAL MEDICINE

## 2020-03-27 RX ORDER — ROSUVASTATIN CALCIUM 20 MG/1
20 TABLET, COATED ORAL
Qty: 90 TABLET | Refills: 3 | Status: SHIPPED | OUTPATIENT
Start: 2020-03-27 | End: 2020-09-28 | Stop reason: SDUPTHER

## 2020-03-27 NOTE — PROGRESS NOTES
Virtual Regular Visit    Problem List Items Addressed This Visit        Endocrine    Uncontrolled type 2 diabetes mellitus with nephropathy ([facility] [address] ) - Primary       Other    Hyperlipidemia (Chronic)      Other Visit Diagnoses     Dyspnea on exertion        Chest pain in adult            Exertional dyspnea- no structural abnormality  Hld with elevated triglycerides- rosuvastatin 20mg + omega 3 2000mg     CAD- aspiirn 81mg    Htn- metoprolol + lisinopril    Reason for visit is cardiac cath follow-up    Encounter provider Mikey Hendrix MD    Provider located at [address] [address]      Recent Visits  No visits were found meeting these conditions  Showing recent visits within past 7 days and meeting all other requirements     Today's Visits  Date Type Provider Dept   03/27/20 Telemedicine Mikey Hendrix MD [facility]   Showing today's visits and meeting all other requirements     Future Appointments  No visits were found meeting these conditions  Showing future appointments within next 150 days and meeting all other requirements        After connecting through [employer], the patient was identified by name and date of birth  Karen Rodriguez was informed that this is a telemedicine visit and that the visit is being conducted through telephone which may not be secure and therefore, might not be HIPAA-compliant  My office door was closed  No one else was in the room  She acknowledged consent and understanding of privacy and security of the video platform  The patient has agreed to participate and understands they can discontinue the visit at any time  Lizette Hopkins is a 76 y o  female with recent right left heart cardiac catheterization for abnormal stress test   She was found to have nonobstructive all coronary artery disease  She has a has been placed on optimal medical therapy    Her blood pressures been controlled  She has not been exerting herself  She she will try and exercise more  She denies having any syncope deny is having any major bleeding  Past Medical History:   Diagnosis Date    Anesthesia complication     difficulty waking up    Arthritis     left knee    Bone spur     left knee behing the knee cap    Chronic kidney disease     Colon cancer (HCC)     Colon polyp     Depression     Diabetes mellitus ([facility] [address] )     Endometrial cancer ([facility] [address] )     grade I    Hx of bleeding disorder     vaginal bleeding started on 3/13/17     Hyperlipidemia     Hypertension     PONV (postoperative nausea and vomiting)     Shortness of breath     with exertion    Urinary incontinence     Vitamin D deficiency        Past Surgical History:   Procedure Laterality Date    BREAST BIOPSY Left     benign    CHOLECYSTECTOMY      open    COLONOSCOPY      DILATION AND CURETTAGE OF UTERUS N/A 4/5/2017    Procedure: DILATATION AND CURETTAGE (D&C);   Surgeon: Aniya Huddleston MD;  [facility] MAIN OR;  Service:     HYSTERECTOMY      OOPHORECTOMY      PELVIC LAPAROSCOPY      TN LAPAROSCOPY W TOT HYSTERECTUTERUS <=250 GRAM  W TUBE/OVARY N/A 5/4/2017    Procedure: ROBOTIC ASSISTED TOTAL LAPAROSCOPIC HYSTERECTOMY, BILATERAL SALPINGOOPHERECTOMY; CYSTOSCOPY;  Surgeon: Petr Trevino MD;  Location:  MAIN OR;  Service: Gynecology Oncology    TONSILLECTOMY      TUBAL LIGATION         Current Outpatient Medications   Medication Sig Dispense Refill    aspirin (ECOTRIN LOW STRENGTH) 81 mg EC tablet Take 1 tablet (81 mg total) by mouth daily with breakfast 90 tablet 3    BD PEN NEEDLE JOSEPHINE U/F 32G X 4 MM MISC USE 4 TIMES DAILY WITH INSULIN  10    buPROPion (WELLBUTRIN SR) 200 MG 12 hr tablet Take 200 mg by mouth every morning      clonazePAM (KlonoPIN) 0 25 MG disintegrating tablet Take 0 25 mg by mouth every morning      clonazePAM (KlonoPIN) 0 5 mg tablet Take 0 5 mg by mouth daily after lunch 3 pm      Insulin Glargine (TOUJEO SOLOSTAR SC) Inject 70 Units under the skin every morning LANTUS      lisinopril (ZESTRIL) 2 5 mg tablet Take 2 5 mg by mouth daily  3    metFORMIN (GLUCOPHAGE-XR) 500 mg 24 hr tablet Take 500 mg by mouth 2 (two) times a day  5    metoprolol succinate (TOPROL-XL) 25 mg 24 hr tablet Take 1 tablet (25 mg total) by mouth daily 30 tablet 3    naproxen (NAPROSYN) 500 mg tablet Take 500 mg by mouth every 12 (twelve) hours as needed Take with food  0    nitroglycerin (NITROSTAT) 0 4 mg SL tablet Place 1 tablet (0 4 mg total) under the tongue every 5 (five) minutes as needed for chest pain 30 tablet 1    rosuvastatin (CRESTOR) 20 MG tablet Take 20 mg by mouth daily after dinner      sertraline (ZOLOFT) 100 mg tablet Take 100 mg by mouth 2 (two) times a day      Vitamin D, Cholecalciferol, 1000 units CAPS Take 1,000 Units by mouth every morning        ziprasidone (GEODON) 80 mg capsule Take 80 mg by mouth 2 (two) times a day with meals       No current facility-administered medications for this visit  No Known Allergies    Review of Systems   Constitutional: Negative  Negative for activity change, appetite change, chills, diaphoresis, fatigue, fever and unexpected weight change  HENT: Negative  Negative for congestion, dental problem, drooling, ear discharge, ear pain, facial swelling, hearing loss, mouth sores, nosebleeds, postnasal drip, rhinorrhea, sinus pressure, sinus pain, sneezing, sore throat, tinnitus, trouble swallowing and voice change  Eyes: Negative  Negative for photophobia, pain, redness, itching and visual disturbance  Respiratory: Positive for shortness of breath  Negative for apnea, cough, choking, chest tightness, wheezing and stridor  Cardiovascular: Negative  Negative for chest pain, palpitations and leg swelling  Gastrointestinal: Negative    Negative for abdominal distention, abdominal pain, anal bleeding, blood in stool, constipation, diarrhea, nausea, rectal pain and vomiting  Endocrine: Negative  Negative for cold intolerance, heat intolerance, polydipsia, polyphagia and polyuria  Genitourinary: Negative  Negative for decreased urine volume, difficulty urinating, dyspareunia, dysuria, enuresis, flank pain, frequency, genital sores, hematuria, menstrual problem, pelvic pain, urgency, vaginal bleeding, vaginal discharge and vaginal pain  Musculoskeletal: Negative  Negative for arthralgias, back pain, gait problem, joint swelling, myalgias, neck pain and neck stiffness  Skin: Negative  Negative for color change, pallor, rash and wound  Allergic/Immunologic: Negative  Negative for environmental allergies, food allergies and immunocompromised state  Neurological: Negative  Negative for dizziness, tremors, seizures, syncope, facial asymmetry, speech difficulty, weakness, light-headedness, numbness and headaches  Hematological: Negative  Negative for adenopathy  Does not bruise/bleed easily  Psychiatric/Behavioral: Negative  Negative for agitation, behavioral problems, confusion, decreased concentration, dysphoric mood, hallucinations, self-injury, sleep disturbance and suicidal ideas  The patient is not nervous/anxious and is not hyperactive  All other systems reviewed and are negative  Left main: The vessel was normal sized  Angiography showed minor luminal irregularities  LAD: The vessel was normal sized  Angiography showed mild atherosclerosis  No focal stenosis  Circumflex: The vessel was normal sized  Angiography showed mild atherosclerosis  No focal stenosis    Hemodynamic assessment demonstrated mildly elevated LVEDP, normal cardiac output, and normal pulmonary capillary wedge pressure  5 L/min and index of 2 5    I spent 40 minutes with the patient during this visit

## 2020-05-04 DIAGNOSIS — IMO0002 UNCONTROLLED TYPE 2 DIABETES MELLITUS WITH NEPHROPATHY: ICD-10-CM

## 2020-05-04 DIAGNOSIS — R07.9 CHEST PAIN IN ADULT: ICD-10-CM

## 2020-05-04 DIAGNOSIS — I10 HYPERTENSION, UNSPECIFIED TYPE: Chronic | ICD-10-CM

## 2020-05-04 DIAGNOSIS — R00.2 PALPITATIONS: ICD-10-CM

## 2020-05-04 RX ORDER — METOPROLOL SUCCINATE 25 MG/1
25 TABLET, EXTENDED RELEASE ORAL DAILY
Qty: 90 TABLET | Refills: 3 | Status: SHIPPED | OUTPATIENT
Start: 2020-05-04 | End: 2020-09-28 | Stop reason: SDUPTHER

## 2020-07-09 ENCOUNTER — DOCUMENTATION (OUTPATIENT)
Dept: RADIATION ONCOLOGY | Facility: HOSPITAL | Age: 65
End: 2020-07-09

## 2020-07-09 ENCOUNTER — OFFICE VISIT (OUTPATIENT)
Dept: GYNECOLOGIC ONCOLOGY | Facility: CLINIC | Age: 65
End: 2020-07-09
Payer: COMMERCIAL

## 2020-07-09 VITALS
RESPIRATION RATE: 18 BRPM | HEIGHT: 64 IN | WEIGHT: 196 LBS | TEMPERATURE: 98.4 F | DIASTOLIC BLOOD PRESSURE: 64 MMHG | HEART RATE: 72 BPM | BODY MASS INDEX: 33.46 KG/M2 | SYSTOLIC BLOOD PRESSURE: 138 MMHG

## 2020-07-09 DIAGNOSIS — Z85.42 HISTORY OF ENDOMETRIAL CANCER: ICD-10-CM

## 2020-07-09 DIAGNOSIS — Z85.42 ENCOUNTER FOR FOLLOW-UP SURVEILLANCE OF ENDOMETRIAL CANCER: Primary | ICD-10-CM

## 2020-07-09 DIAGNOSIS — Z08 ENCOUNTER FOR FOLLOW-UP SURVEILLANCE OF ENDOMETRIAL CANCER: Primary | ICD-10-CM

## 2020-07-09 PROCEDURE — 99212 OFFICE O/P EST SF 10 MIN: CPT | Performed by: PHYSICIAN ASSISTANT

## 2020-07-09 NOTE — PROGRESS NOTES
Assessment/Plan:    Problem List Items Addressed This Visit        Other    History of endometrial cancer     History of unstaged, stage IB, grade 1 with positive LVSI  She is s/p completion of adjuvant radiation in August 2017  She is clinically without evidence of disease recurrence       The patient will return to the office in 6 months for continued surveillance  Encounter for follow-up surveillance of endometrial cancer - Primary            CHIEF COMPLAINT:   Endometrial cancer surveillance    Problem:  Cancer Staging  History of endometrial cancer  Staging form: Corpus Uteri - Carcinoma, AJCC 7th Edition  - Clinical: FIGO Stage IB (T1b, N0, M0) - Signed by Laurence Medina PA-C on 3/28/2018        Previous therapy:     History of endometrial cancer    4/5/2017 Initial Diagnosis     Malignant neoplasm of endometrium ([facility] [address] )      4/5/2017 Surgery     Dilation and curettage  A  Grade 1 endometrial cancer         5/4/2017 Surgery     Robotic-assisted total laparoscopic hysterectomy, bilateral salpingo-oophorectomy and cystoscopy  A  FIGO grade 1, 4 4 cm tumor   B  1 0 of 1 5 cm myometrial invasion  C  Positive LVSI  D  Negative pelvic washings  E  IHC for peterson, all proteins expressed  7/19/2017 - 8/24/2017 Radiation     :  Course: C1    Plan ID Energy Fractions Dose per Fraction (cGy) Total Dose Delivered (cGy) Elapsed Days   Whole Pelvis 10X 25 / 25 180 4,500 36      Treatment dates:  C1: 7/19/2017 - 8/24/2017          History of colon cancer    12/2017 Surgery     Colonoscopy x 2           Patient ID: Evette Angeles is a 76 y o  female  who has no new complaints today  No vaginal bleeding, abdominal/pelvic pain  Normal bowel and bladder function  No interval change in medical history since last visit  Quality of life is good        The following portions of the patient's history were reviewed and updated as appropriate: allergies, current medications, past medical history, past surgical history and problem list     Review of Systems   Constitutional: Negative  HENT: Negative  Eyes: Negative  Respiratory: Negative  Cardiovascular: Negative  Gastrointestinal: Negative  Genitourinary: Negative  Musculoskeletal: Negative  Skin: Negative  Neurological: Negative  Psychiatric/Behavioral: Negative  Current Outpatient Medications   Medication Sig Dispense Refill    aspirin (ECOTRIN LOW STRENGTH) 81 mg EC tablet Take 1 tablet (81 mg total) by mouth daily with breakfast 90 tablet 3    BD PEN NEEDLE JOSEPHINE U/F 32G X 4 MM MISC USE 4 TIMES DAILY WITH INSULIN  10    buPROPion (WELLBUTRIN SR) 200 MG 12 hr tablet Take 200 mg by mouth every morning      clonazePAM (KlonoPIN) 0 25 MG disintegrating tablet Take 0 25 mg by mouth every morning      clonazePAM (KlonoPIN) 0 5 mg tablet Take 0 5 mg by mouth daily after lunch 3 pm      Insulin Glargine (TOUJEO SOLOSTAR SC) Inject 70 Units under the skin every morning LANTUS      lisinopril (ZESTRIL) 2 5 mg tablet Take 2 5 mg by mouth daily  3    metFORMIN (GLUCOPHAGE-XR) 500 mg 24 hr tablet Take 500 mg by mouth 2 (two) times a day  5    metoprolol succinate (TOPROL-XL) 25 mg 24 hr tablet Take 1 tablet (25 mg total) by mouth daily 90 tablet 3    nitroglycerin (NITROSTAT) 0 4 mg SL tablet Place 1 tablet (0 4 mg total) under the tongue every 5 (five) minutes as needed for chest pain 30 tablet 1    rosuvastatin (CRESTOR) 20 MG tablet Take 1 tablet (20 mg total) by mouth daily after dinner 90 tablet 3    sertraline (ZOLOFT) 100 mg tablet Take 100 mg by mouth 2 (two) times a day      Vitamin D, Cholecalciferol, 1000 units CAPS Take 1,000 Units by mouth every morning        ziprasidone (GEODON) 80 mg capsule Take 80 mg by mouth 2 (two) times a day with meals      naproxen (NAPROSYN) 500 mg tablet Take 500 mg by mouth every 12 (twelve) hours as needed Take with food  0     No current facility-administered medications for this visit  Objective:    Blood pressure 138/64, pulse 72, temperature 98 4 °F (36 9 °C), temperature source Temporal, resp  rate 18, height 5' 4" (1 626 m), weight 88 9 kg (196 lb), not currently breastfeeding  Body mass index is 33 64 kg/m²  Body surface area is 1 94 meters squared  Physical Exam   Constitutional: She is oriented to person, place, and time  She appears well-developed and well-nourished  HENT:   Head: Normocephalic and atraumatic  Neck: Normal range of motion  Neck supple  No thyromegaly present  Pulmonary/Chest: Effort normal    Abdominal: Soft  She exhibits no distension and no mass  There is no rebound  Genitourinary:   Genitourinary Comments: The external female genitalia is normal  The bartholin's, uretheral and skenes glands are normal  The urethral meatus is normal (midline with no lesions)  Anus without fissure or lesion  Speculum exam reveals a shortened vagina with prior radiation changes  No masses, lesions, discharge or bleeding  No significant cystocele or rectocele noted  Bimanual exam notes a surgical absent cervix, uterus and adnexal structures  No masses or fullness  Bladder is without fullness, mass or tenderness  Musculoskeletal: Normal range of motion  She exhibits no edema  Lymphadenopathy:     She has no cervical adenopathy  Neurological: She is alert and oriented to person, place, and time  Skin: Skin is warm and dry  No rash noted  Psychiatric: She has a normal mood and affect  Her behavior is normal    Vitals reviewed

## 2020-07-09 NOTE — PROGRESS NOTES
Received and reviewed pt's DT-received from gyn onc at [facility]  Pt rated her distress level as a 5 with depression, fears, nervousness, sadness, worry, constipation, legs feeling swollen, hands dry/itchy and tingling in feet  Called and spoke with the pt re above  She reported that she felt much less stress after appt today-her coping methods include cleaning and rearranging her home  She feels her spouse and family/friends are very supportive  Reviewed my role with her and offered ongoing support  She was receptive to taking my contact information  Invited pt to call if any concerns arise

## 2020-09-28 ENCOUNTER — OFFICE VISIT (OUTPATIENT)
Dept: CARDIOLOGY CLINIC | Facility: CLINIC | Age: 65
End: 2020-09-28
Payer: COMMERCIAL

## 2020-09-28 VITALS
SYSTOLIC BLOOD PRESSURE: 124 MMHG | OXYGEN SATURATION: 98 % | TEMPERATURE: 98.4 F | BODY MASS INDEX: 30.73 KG/M2 | WEIGHT: 180 LBS | DIASTOLIC BLOOD PRESSURE: 62 MMHG | HEIGHT: 64 IN | HEART RATE: 73 BPM

## 2020-09-28 DIAGNOSIS — R06.00 DYSPNEA ON EXERTION: Primary | ICD-10-CM

## 2020-09-28 DIAGNOSIS — I51.89 DIASTOLIC DYSFUNCTION: ICD-10-CM

## 2020-09-28 DIAGNOSIS — E78.2 MIXED HYPERLIPIDEMIA: ICD-10-CM

## 2020-09-28 DIAGNOSIS — IMO0002 UNCONTROLLED TYPE 2 DIABETES MELLITUS WITH NEPHROPATHY: ICD-10-CM

## 2020-09-28 DIAGNOSIS — I10 HYPERTENSION, UNSPECIFIED TYPE: ICD-10-CM

## 2020-09-28 DIAGNOSIS — R00.2 PALPITATIONS: ICD-10-CM

## 2020-09-28 DIAGNOSIS — R07.9 CHEST PAIN IN ADULT: ICD-10-CM

## 2020-09-28 PROCEDURE — 99214 OFFICE O/P EST MOD 30 MIN: CPT | Performed by: INTERNAL MEDICINE

## 2020-09-28 PROCEDURE — 93000 ELECTROCARDIOGRAM COMPLETE: CPT | Performed by: INTERNAL MEDICINE

## 2020-09-28 RX ORDER — CHLORAL HYDRATE 500 MG
1000 CAPSULE ORAL 2 TIMES DAILY
Qty: 60 CAPSULE | Refills: 0
Start: 2020-09-28

## 2020-09-28 RX ORDER — ROSUVASTATIN CALCIUM 20 MG/1
20 TABLET, COATED ORAL
Qty: 90 TABLET | Refills: 3 | Status: SHIPPED | OUTPATIENT
Start: 2020-09-28 | End: 2021-03-22

## 2020-09-28 RX ORDER — ZIPRASIDONE HYDROCHLORIDE 80 MG/1
1 CAPSULE ORAL 2 TIMES DAILY
COMMUNITY
Start: 2020-08-21

## 2020-09-28 RX ORDER — METOPROLOL SUCCINATE 25 MG/1
25 TABLET, EXTENDED RELEASE ORAL DAILY
Qty: 90 TABLET | Refills: 3 | Status: SHIPPED | OUTPATIENT
Start: 2020-09-28 | End: 2021-07-20 | Stop reason: SDUPTHER

## 2020-12-09 ENCOUNTER — TRANSCRIBE ORDERS (OUTPATIENT)
Dept: ADMINISTRATIVE | Facility: HOSPITAL | Age: 65
End: 2020-12-09

## 2020-12-09 DIAGNOSIS — Z12.31 ENCOUNTER FOR SCREENING MAMMOGRAM FOR MALIGNANT NEOPLASM OF BREAST: Primary | ICD-10-CM

## 2020-12-10 ENCOUNTER — HOSPITAL ENCOUNTER (OUTPATIENT)
Dept: RADIOLOGY | Facility: HOSPITAL | Age: 65
Discharge: HOME | End: 2020-12-10
Attending: INTERNAL MEDICINE
Payer: COMMERCIAL

## 2020-12-10 VITALS — HEIGHT: 65 IN | BODY MASS INDEX: 31.82 KG/M2 | WEIGHT: 191 LBS

## 2020-12-10 DIAGNOSIS — Z12.31 ENCOUNTER FOR SCREENING MAMMOGRAM FOR MALIGNANT NEOPLASM OF BREAST: ICD-10-CM

## 2020-12-10 PROCEDURE — 77067 SCR MAMMO BI INCL CAD: CPT

## 2020-12-10 PROCEDURE — 77063 BREAST TOMOSYNTHESIS BI: CPT

## 2021-01-04 ENCOUNTER — PREP FOR PROCEDURE (OUTPATIENT)
Dept: GASTROENTEROLOGY | Facility: CLINIC | Age: 66
End: 2021-01-04

## 2021-01-04 DIAGNOSIS — Z86.010 HISTORY OF COLON POLYPS: Primary | ICD-10-CM

## 2021-01-05 DIAGNOSIS — R07.9 CHEST PAIN IN ADULT: ICD-10-CM

## 2021-01-05 DIAGNOSIS — R06.00 DYSPNEA ON EXERTION: ICD-10-CM

## 2021-01-09 ENCOUNTER — TELEPHONE (OUTPATIENT)
Dept: OTHER | Facility: OTHER | Age: 66
End: 2021-01-09

## 2021-01-12 RX ORDER — HYDROGEN PEROXIDE 2.65 ML/100ML
LIQUID ORAL; TOPICAL
Qty: 90 TABLET | Refills: 0 | Status: SHIPPED | OUTPATIENT
Start: 2021-01-12

## 2021-01-13 ENCOUNTER — HOSPITAL ENCOUNTER (OUTPATIENT)
Dept: GASTROENTEROLOGY | Facility: AMBULATORY SURGERY CENTER | Age: 66
Discharge: HOME | End: 2021-01-13
Payer: COMMERCIAL

## 2021-01-13 ENCOUNTER — ANESTHESIA (OUTPATIENT)
Dept: GASTROENTEROLOGY | Facility: AMBULATORY SURGERY CENTER | Age: 66
End: 2021-01-13

## 2021-01-13 ENCOUNTER — ANESTHESIA EVENT (OUTPATIENT)
Dept: GASTROENTEROLOGY | Facility: AMBULATORY SURGERY CENTER | Age: 66
End: 2021-01-13

## 2021-01-13 VITALS
SYSTOLIC BLOOD PRESSURE: 120 MMHG | DIASTOLIC BLOOD PRESSURE: 66 MMHG | RESPIRATION RATE: 18 BRPM | TEMPERATURE: 97 F | WEIGHT: 195 LBS | HEART RATE: 78 BPM | BODY MASS INDEX: 32.49 KG/M2 | HEIGHT: 65 IN | OXYGEN SATURATION: 97 %

## 2021-01-13 DIAGNOSIS — Z86.010 HISTORY OF COLON POLYPS: ICD-10-CM

## 2021-01-13 PROCEDURE — 88305 TISSUE EXAM BY PATHOLOGIST: CPT | Performed by: PATHOLOGY

## 2021-01-13 PROCEDURE — 45385 COLONOSCOPY W/LESION REMOVAL: CPT | Performed by: INTERNAL MEDICINE

## 2021-01-13 PROCEDURE — 00811 ANES LWR INTST NDSC NOS: CPT | Performed by: NURSE ANESTHETIST, CERTIFIED REGISTERED

## 2021-01-13 RX ORDER — SODIUM CHLORIDE 9 MG/ML
30 INJECTION, SOLUTION INTRAVENOUS CONTINUOUS
Status: CANCELLED | OUTPATIENT
Start: 2021-01-13

## 2021-01-13 RX ORDER — SODIUM CHLORIDE 9 MG/ML
INJECTION, SOLUTION INTRAVENOUS CONTINUOUS PRN
Status: DISCONTINUED | OUTPATIENT
Start: 2021-01-13 | End: 2021-01-13

## 2021-01-13 RX ORDER — PROPOFOL 10 MG/ML
INJECTION, EMULSION INTRAVENOUS AS NEEDED
Status: DISCONTINUED | OUTPATIENT
Start: 2021-01-13 | End: 2021-01-13

## 2021-01-13 RX ORDER — SODIUM CHLORIDE 9 MG/ML
20 INJECTION, SOLUTION INTRAVENOUS CONTINUOUS
Status: DISCONTINUED | OUTPATIENT
Start: 2021-01-13 | End: 2021-01-17 | Stop reason: HOSPADM

## 2021-01-13 RX ADMIN — SODIUM CHLORIDE: 9 INJECTION, SOLUTION INTRAVENOUS at 12:52

## 2021-01-13 RX ADMIN — PROPOFOL 50 MG: 10 INJECTION, EMULSION INTRAVENOUS at 13:04

## 2021-01-13 RX ADMIN — PROPOFOL 150 MG: 10 INJECTION, EMULSION INTRAVENOUS at 12:54

## 2021-01-13 RX ADMIN — PROPOFOL 50 MG: 10 INJECTION, EMULSION INTRAVENOUS at 12:58

## 2021-01-13 NOTE — INTERVAL H&P NOTE
H&P reviewed  After examining the patient I find no changes in the patients condition since the H&P had been written      Vitals:    01/13/21 1237   BP: 142/71   Pulse: 87   Resp: 18   Temp: (!) 97 °F (36 1 °C)   SpO2: 99%

## 2021-01-13 NOTE — DISCHARGE SUMMARY
Discharge Summary - Feliberto Guadalupe 76 y o  female MRN: [id number]    Unit/Bed#:  Encounter: [id number]    Admission Date:  01/13/2021    Admitting Diagnosis: History of colon polyps [Z86 010]    HPI:  Patient had presented for surveillance colonoscopy  History of colon cancer in mother and personal history of large sessile polyp with high-grade dysplasia  Procedures Performed: No orders of the defined types were placed in this encounter  Summary of Hospital Course:  See procedure note  Single polyp was removed  Significant Findings, Care, Treatment and Services Provided:  See procedure note    Complications:  None    Discharge Diagnosis:  Colon polyp removed  Internal hemorrhoid  Tattoo juan j identified  Resolved Problems  Date Reviewed: 1/13/2021    None          Condition at Discharge: good         Discharge instructions/Information to patient and family:   See after visit summary for information provided to patient and family  Provisions for Follow-Up Care:  See after visit summary for information related to follow-up care and any pertinent home health orders        PCP: Terence Carbajal MD    Disposition: Home

## 2021-01-13 NOTE — ANESTHESIA POSTPROCEDURE EVALUATION
Post-Op Assessment Note    CV Status:  Stable  Pain Score: 0    Pain management: adequate     Mental Status:  Sleepy   Hydration Status:  Stable   PONV Controlled:  None   Airway Patency:  Patent      Post Op Vitals Reviewed: Yes      Staff: CRNA         No complications documented      BP   107/64   Temp     Pulse  70   Resp   18   SpO2   98

## 2021-01-13 NOTE — DISCHARGE INSTRUCTIONS
Hemorrhoids   WHAT YOU NEED TO KNOW:   What are hemorrhoids? Hemorrhoids are swollen blood vessels inside your rectum (internal hemorrhoids) or on your anus (external hemorrhoids)  Sometimes a hemorrhoid may prolapse  This means it extends out of your anus  What increases my risk for hemorrhoids? · Pregnancy or obesity    · Straining or sitting for a long time during bowel movements    · Liver disease    · Weak muscles around the anus caused by older age, rectal surgery, or anal intercourse    · A lack of physical activity    · Chronic diarrhea or constipation    · A low-fiber diet    What are the signs and symptoms of hemorrhoids? · Pain or itching around your anus or inside your rectum    · Swelling or bumps around your anus    · Bright red blood in your bowel movement, on the toilet paper, or in the toilet bowl    · Tissue bulging out of your anus (prolapsed hemorrhoids)    · Incontinence (poor control over urine or bowel movements)    How are hemorrhoids diagnosed? Your healthcare provider will ask about your symptoms, the foods you eat, and your bowel movements  He or she will examine your anus for external hemorrhoids  You may need the following:  · A digital rectal exam  is a test to check for hemorrhoids  Your healthcare provider will put a gloved finger inside your anus to feel for the hemorrhoids  · An anoscopy  is a test that uses a scope (small tube with a light and camera on the end) to look at your hemorrhoids  How are hemorrhoids treated? Treatment will depend on your symptoms  You may need any of the following:  · Medicines  can help decrease pain and swelling, and soften your bowel movement  The medicine may be a pill, pad, cream, or ointment  · Procedures  may be used to shrink or remove your hemorrhoid  Examples include rubber-band ligation, sclerotherapy, and photocoagulation  These procedures may be done in your healthcare provider's office   Ask your healthcare provider for more information about these procedures  · Surgery  may be needed to shrink or remove your hemorrhoids  How can I manage my symptoms? · Apply ice on your anus for 15 to 20 minutes every hour or as directed  Use an ice pack, or put crushed ice in a plastic bag  Cover it with a towel before you apply it to your anus  Ice helps prevent tissue damage and decreases swelling and pain  · Take a sitz bath  Fill a bathtub with 4 to 6 inches of warm water  You may also use a sitz bath pan that fits inside a toilet bowl  Sit in the sitz bath for 15 minutes  Do this 3 times a day, and after each bowel movement  The warm water can help decrease pain and swelling  · Keep your anal area clean  Gently wash the area with warm water daily  Soap may irritate the area  After a bowel movement, wipe with moist towelettes or wet toilet paper  Dry toilet paper can irritate the area  How can I help prevent hemorrhoids? · Do not strain to have a bowel movement  Do not sit on the toilet too long  These actions can increase pressure on the tissues in your rectum and anus  · Drink plenty of liquids  Liquids can help prevent constipation  Ask how much liquid to drink each day and which liquids are best for you  · Eat a variety of high-fiber foods  Examples include fruits, vegetables, and whole grains  Ask your healthcare provider how much fiber you need each day  You may need to take a fiber supplement  · Exercise as directed  Exercise, such as walking, may make it easier to have a bowel movement  Ask your healthcare provider to help you create an exercise plan  · Do not have anal sex  Anal sex can weaken the skin around your rectum and anus  · Avoid heavy lifting  This can cause straining and increase your risk for another hemorrhoid  When should I seek immediate care? · You have severe pain in your rectum or around your anus      · You have severe pain in your abdomen and you are vomiting  · You have bleeding from your anus that soaks through your underwear  When should I contact my healthcare provider? · You have frequent and painful bowel movements  · Your hemorrhoid looks or feels more swollen than usual      · You do not have a bowel movement for 2 days or more  · You see or feel tissue coming through your anus  · You have questions or concerns about your condition or care  CARE AGREEMENT:   You have the right to help plan your care  Learn about your health condition and how it may be treated  Discuss treatment options with your healthcare providers to decide what care you want to receive  You always have the right to refuse treatment  The above information is an  only  It is not intended as medical advice for individual conditions or treatments  Talk to your doctor, nurse or pharmacist before following any medical regimen to see if it is safe and effective for you  © Copyright [address] Information is for End User's use only and may not be sold, redistributed or otherwise used for commercial purposes  All illustrations and images included in CareNotes® are the copyrighted property of [employer]  or [facility] Fiber Diet   WHAT YOU NEED TO KNOW:   What is a high-fiber diet? A high-fiber diet includes foods that have a high amount of fiber  Fiber is the part of fruits, vegetables, and grains that is not broken down by your body  Fiber keeps your bowel movements regular  Fiber can also help lower your cholesterol level, control blood sugar in people with diabetes, and relieve constipation  Fiber can also help you control your weight because it helps you feel full faster  Most adults should eat 25 to 35 grams of fiber each day  Talk to your dietitian or healthcare provider about the amount of fiber you need  What foods are good sources of fiber?        · Foods with at least 4 grams of fiber per serving:      ? ? to ½ cup of high-fiber cereal (check the nutrition label on the box)    ? ½ cup of blackberries or raspberries    ? 4 dried prunes    ? 1 cooked artichoke    ? ½ cup of cooked legumes, such as lentils, or red, kidney, and randall beans    · Foods with 1 to 3 grams of fiber per serving:      ? 1 slice of whole-wheat, pumpernickel, or rye bread    ? ½ cup of cooked brown rice    ? 4 whole-wheat crackers    ? 1 cup of oatmeal    ? ½ cup of cereal with 1 to 3 grams of fiber per serving (check the nutrition label on the box)    ? 1 small piece of fruit, such as an apple, banana, pear, kiwi, or orange    ? 3 dates    ? ½ cup of canned apricots, fruit cocktail, peaches, or pears    ? ½ cup of raw or cooked vegetables, such as carrots, cauliflower, cabbage, spinach, squash, or corn  What are some ways that I can increase fiber in my diet? · Choose brown or wild rice instead of white rice  · Use whole wheat flour in recipes instead of white or all-purpose flour  · Add beans and peas to casseroles or soups  · Choose fresh fruit and vegetables with peels or skins on instead of juices  What other guidelines should I follow? · Add fiber to your diet slowly  You may have abdominal discomfort, bloating, and gas if you add fiber to your diet too quickly  · Drink plenty of liquids as you add fiber to your diet  You may have nausea or develop constipation if you do not drink enough water  Ask how much liquid to drink each day and which liquids are best for you  CARE AGREEMENT:   You have the right to help plan your care  Discuss treatment options with your healthcare provider to decide what care you want to receive  You always have the right to refuse treatment  The above information is an  only  It is not intended as medical advice for individual conditions or treatments  Talk to your doctor, nurse or pharmacist before following any medical regimen to see if it is safe and effective for you    © Copyright [address] Information is for [employer] use only and may not be sold, redistributed or otherwise used for commercial purposes  All illustrations and images included in CareNotes® are the copyrighted property of [employer]  or [employer]   Colorectal Polyps   WHAT YOU NEED TO KNOW:   What are colorectal polyps? Colorectal polyps are small growths of tissue in the lining of the colon and rectum  Most polyps are hyperplastic polyps and are usually benign (noncancerous)  Certain types of polyps, called adenomatous polyps, may turn into cancer  What increases my risk of colorectal polyps? The exact cause of colorectal polyps is unknown  The following may increase your risk:  · Older age    · A diet of foods high in fat and low in fiber     · Family history of polyps    · Intestinal diseases, such as Crohn's disease or ulcerative colitis    · An unhealthy lifestyle, such as physical inactivity, smoking, or drinking alcohol    · Obesity    What are the signs and symptoms of colorectal polyps? · Blood in your bowel movement or bleeding from the rectum    · Change in bowel movement habits, such as diarrhea and constipation    · Abdominal pain    How are colorectal polyps diagnosed? You should have fecal blood screening once a year for colorectal disease if you are over 48years old  You should be screened earlier if you have an intestinal disease or a family history of polyps or colorectal cancer  During this screening, a sample of your bowel movement is checked for blood, which may be an early sign of colorectal polyps or cancer  You may also need any of the following tests:  · Digital rectal exam:  Your healthcare provider will examine your anus and use a finger to check your rectum for polyps  · Barium enema: A barium enema is an x-ray of the colon  A tube is put into your anus, and a liquid called barium is put through the tube   Barium is used so that healthcare providers can see your colon better on the x-ray film  · Virtual colonoscopy: This is a CT scan that takes pictures of the inside of your colon and rectum  A small, flexible tube is put into your rectum and air or carbon dioxide (gas) is used to expand your colon  This lets healthcare providers clearly see your colon and any polyps on a monitor  · Colonoscopy or sigmoidoscopy: These procedures help your healthcare provider see the inside of your colon using a flexible tube with a small light and camera on the end  During a sigmoidoscopy, your healthcare provider will only look at rectum and lower colon  During a colonoscopy, healthcare providers will look at the full length of your colon  Healthcare providers may remove a small amount of tissue from the colon for a biopsy  How are colorectal polyps treated? A polypectomy is a minimally invasive procedure to remove your polyps  They may be removed during a colonoscopy or sigmoidoscopy  Your healthcare provider may need to remove the polyps with a laparoscope  Laparoscopy is done by inserting a small, flexible scope into incisions made on your abdomen  What are the risks of colorectal polyps? You may bleed during a colonoscopy procedure  Your bowel may be perforated (torn) when polyps are removed  This may lead to an open abdominal surgery  During surgery, you may bleed too much or get an infection  Adenomatous polyps that are not removed may turn into cancer and become more difficult to treat  Where can I find support and more information? · [address] [facility]) [address], [place] [address]  Phone: [phone]  Web Address: Naz Herring  [facility] nih gov    When should I contact my healthcare provider? · You have a fever  · You have chills, a cough, or feel weak and achy  · You have abdominal pain that does not go away or gets worse after you take medicine  · Your abdomen is swollen       · You are losing weight without trying  · You have questions or concerns about your condition or care  When should I seek immediate care or call 911? · You have sudden shortness of breath  · You have a fast heart rate, fast breathing, or are too dizzy to stand up  · You have severe abdominal pain  · You see blood in your bowel movement  CARE AGREEMENT:   You have the right to help plan your care  Learn about your health condition and how it may be treated  Discuss treatment options with your healthcare providers to decide what care you want to receive  You always have the right to refuse treatment  The above information is an  only  It is not intended as medical advice for individual conditions or treatments  Talk to your doctor, nurse or pharmacist before following any medical regimen to see if it is safe and effective for you  © Copyright [address] Information is for End User's use only and may not be sold, redistributed or otherwise used for commercial purposes   All illustrations and images included in CareNotes® are the copyrighted property of [employer]  or [address] [place] [address]

## 2021-01-13 NOTE — H&P
History and Physical - [facility]  Jia Hernandez 76 y o  female MRN: [id number]                  HPI: Jia Hernandez is a 76y o  year old female who presents for surveillance colonoscopy  Patient has history of adenomatous colon polyp with high-grade dysplasia  She has family history of colon cancer in her mother  There is no recent change in bowel habits or rectal bleeding  Her last colonoscopy was three years ago  REVIEW OF SYSTEMS: Per the HPI, and otherwise unremarkable  Historical Information   Past Medical History:   Diagnosis Date    Anesthesia complication     difficulty waking up    Arthritis     left knee    Bone spur     left knee behing the knee cap    Chronic kidney disease     Colon polyp     Tubulovillous Adenoma High Grade Dysplasia - 2017    Depression     Diabetes mellitus ([facility] [address] )     Endometrial cancer ([facility] [address] )     grade I    Hx of bleeding disorder     vaginal bleeding started on 3/13/17     Hyperlipidemia     Hypertension     PONV (postoperative nausea and vomiting)     Shortness of breath     with exertion    Urinary incontinence     Vitamin D deficiency      Past Surgical History:   Procedure Laterality Date    BREAST BIOPSY Left     benign    CHOLECYSTECTOMY      open    COLONOSCOPY      DILATION AND CURETTAGE OF UTERUS N/A 4/5/2017    Procedure: DILATATION AND CURETTAGE (D&C);   Surgeon: Nitin Trevino MD;  Location: [facility] MAIN OR;  Service:     HYSTERECTOMY      OOPHORECTOMY      PELVIC LAPAROSCOPY      NH LAPAROSCOPY W TOT HYSTERECTUTERUS <=250 GRAM  W TUBE/OVARY N/A 5/4/2017    Procedure: ROBOTIC ASSISTED TOTAL LAPAROSCOPIC HYSTERECTOMY, BILATERAL SALPINGOOPHERECTOMY; CYSTOSCOPY;  Surgeon: Dianna Jain MD;  Location:  MAIN OR;  Service: Gynecology Oncology    TONSILLECTOMY      TUBAL LIGATION       Social History   Social History     Substance and Sexual Activity   Alcohol Use No     Social History     Substance and Sexual Activity   Drug Use No     Social History     Tobacco Use   Smoking Status Never Smoker   Smokeless Tobacco Never Used     Family History   Problem Relation Age of Onset    Cancer Mother         Renal    Heart disease Father         CHF    Heart disease Sister         atrial septal defect       Meds/Allergies       Current Outpatient Medications:     buPROPion (WELLBUTRIN SR) 200 MG 12 hr tablet    clonazePAM (KlonoPIN) 0 25 MG disintegrating tablet    clonazePAM (KlonoPIN) 0 5 mg tablet    DOCUSATE SODIUM PO    EQ Aspirin Adult Low Dose 81 MG EC tablet    Insulin Glargine (TOUJEO SOLOSTAR SC)    lisinopril (ZESTRIL) 2 5 mg tablet    metFORMIN (GLUCOPHAGE-XR) 500 mg 24 hr tablet    metoprolol succinate (TOPROL-XL) 25 mg 24 hr tablet    Multiple Vitamins-Minerals (MULTIVITAMIN ADULT PO)    Omega-3 Fatty Acids (fish oil) 1,000 mg    rosuvastatin (CRESTOR) 20 MG tablet    sertraline (ZOLOFT) 100 mg tablet    Vitamin D, Cholecalciferol, 1000 units CAPS    ziprasidone (GEODON) 80 mg capsule    ziprasidone (GEODON) 80 mg capsule    BD PEN NEEDLE JOSEPHINE U/F 32G X 4 MM MISC    nitroglycerin (NITROSTAT) 0 4 mg SL tablet    No Known Allergies    Objective     /71   Pulse 87   Temp (!) 97 °F (36 1 °C) (Temporal)   Resp 18   Ht 5' 5" (1 651 m)   Wt 88 5 kg (195 lb)   SpO2 99%   BMI 32 45 kg/m²       PHYSICAL EXAM    Gen: NAD  CV: RRR  CHEST: Clear  ABD: soft, NT/ND  EXT: no edema      ASSESSMENT/PLAN:  This is a 76y o  year old female here for surveillance colonoscopy with history of adenoma with high-grade dysplasia, and she is stable and optimized for her procedure

## 2021-01-13 NOTE — ANESTHESIA PREPROCEDURE EVALUATION
Procedure:  COLONOSCOPY    Relevant Problems   CARDIO   (+) Hyperlipidemia   (+) Hypertension      HEMATOLOGY   (+) Anemia      NEURO/PSYCH   (+) Encounter for follow-up surveillance of endometrial cancer   (+) History of colon cancer   (+) History of endometrial cancer        Physical Exam    Airway    Mallampati score: II  TM Distance: >3 FB  Neck ROM: full     Dental       Cardiovascular  Cardiovascular exam normal    Pulmonary  Pulmonary exam normal     Other Findings        Anesthesia Plan  ASA Score- 2     Anesthesia Type- IV sedation with anesthesia with ASA Monitors  Additional Monitors:   Airway Plan:           Plan Factors-Exercise tolerance (METS): <4 METS  Chart reviewed  Patient is not a current smoker  Induction- intravenous  Postoperative Plan-     Informed Consent- Anesthetic plan and risks discussed with patient

## 2021-01-14 ENCOUNTER — OFFICE VISIT (OUTPATIENT)
Dept: GYNECOLOGIC ONCOLOGY | Facility: CLINIC | Age: 66
End: 2021-01-14
Payer: COMMERCIAL

## 2021-01-14 VITALS
TEMPERATURE: 97.6 F | BODY MASS INDEX: 32.15 KG/M2 | HEART RATE: 76 BPM | WEIGHT: 193 LBS | DIASTOLIC BLOOD PRESSURE: 76 MMHG | HEIGHT: 65 IN | SYSTOLIC BLOOD PRESSURE: 130 MMHG

## 2021-01-14 DIAGNOSIS — Z85.42 ENCOUNTER FOR FOLLOW-UP SURVEILLANCE OF ENDOMETRIAL CANCER: ICD-10-CM

## 2021-01-14 DIAGNOSIS — Z08 ENCOUNTER FOR FOLLOW-UP SURVEILLANCE OF ENDOMETRIAL CANCER: ICD-10-CM

## 2021-01-14 DIAGNOSIS — Z85.42 HISTORY OF ENDOMETRIAL CANCER: Primary | ICD-10-CM

## 2021-01-14 DIAGNOSIS — Z85.038 HISTORY OF COLON CANCER: ICD-10-CM

## 2021-01-14 PROCEDURE — 99212 OFFICE O/P EST SF 10 MIN: CPT | Performed by: PHYSICIAN ASSISTANT

## 2021-01-14 NOTE — PROGRESS NOTES
Assessment/Plan:    Problem List Items Addressed This Visit        Other    History of endometrial cancer - Primary     History of unstaged, stage IB, grade 1 with positive LVSI  She is s/p completion of adjuvant radiation in August 2017  She is clinically without evidence of disease recurrence       The patient will return to the office in 6 months for continued surveillance           History of colon cancer     Colonoscopy performed yesterday  Encounter for follow-up surveillance of endometrial cancer            CHIEF COMPLAINT:   Endometrial cancer surveillance    Problem:  Cancer Staging  History of endometrial cancer  Staging form: Corpus Uteri - Carcinoma, AJCC 7th Edition  - Clinical: FIGO Stage IB (T1b, N0, M0) - Signed by Larry Rivero PA-C on 3/28/2018        Previous therapy:  Oncology History   History of endometrial cancer   4/5/2017 Initial Diagnosis    Malignant neoplasm of endometrium ([facility] [address] )     4/5/2017 Surgery    Dilation and curettage  A  Grade 1 endometrial cancer        5/4/2017 Surgery    Robotic-assisted total laparoscopic hysterectomy, bilateral salpingo-oophorectomy and cystoscopy  A  FIGO grade 1, 4 4 cm tumor   B  1 0 of 1 5 cm myometrial invasion  C  Positive LVSI  D  Negative pelvic washings  E  IHC for peterson, all proteins expressed  7/19/2017 - 8/24/2017 Radiation    :  Course: C1    Plan ID Energy Fractions Dose per Fraction (cGy) Total Dose Delivered (cGy) Elapsed Days   Whole Pelvis 10X 25 / 25 180 4,500 36      Treatment dates:  C1: 7/19/2017 - 8/24/2017       History of colon cancer   12/2017 Surgery    Colonoscopy x 2           Patient ID: Osvaldo Barone is a 76 y o  female  who has no new complaints today  No vaginal bleeding, abdominal/pelvic pain  Normal bowel and bladder function  The patient underwent colonoscopy yesterday  Polyp removed, pathology pending  Overall, quality of life is good         The following portions of the patient's history were reviewed and updated as appropriate: allergies, current medications, past medical history, past surgical history and problem list     Review of Systems   Constitutional: Negative  HENT: Negative  Eyes: Negative  Respiratory: Negative  Cardiovascular: Negative  Gastrointestinal: Negative  Genitourinary: Negative  Musculoskeletal: Negative  Skin: Negative  Neurological: Negative  Psychiatric/Behavioral: Negative          Current Outpatient Medications   Medication Sig Dispense Refill    BD PEN NEEDLE JOSEPHINE U/F 32G X 4 MM MISC USE 4 TIMES DAILY WITH INSULIN  10    buPROPion (WELLBUTRIN SR) 200 MG 12 hr tablet Take 200 mg by mouth every morning      clonazePAM (KlonoPIN) 0 25 MG disintegrating tablet Take 0 25 mg by mouth every morning      clonazePAM (KlonoPIN) 0 5 mg tablet Take 0 5 mg by mouth daily after lunch 3 pm      DOCUSATE SODIUM PO       EQ Aspirin Adult Low Dose 81 MG EC tablet Take 1 tablet by mouth once daily with breakfast 90 tablet 0    Insulin Glargine (TOUJEO SOLOSTAR SC) Inject 70 Units under the skin every morning LANTUS      lisinopril (ZESTRIL) 2 5 mg tablet Take 2 5 mg by mouth daily  3    metFORMIN (GLUCOPHAGE-XR) 500 mg 24 hr tablet Take 500 mg by mouth 2 (two) times a day  5    metoprolol succinate (TOPROL-XL) 25 mg 24 hr tablet Take 1 tablet (25 mg total) by mouth daily 90 tablet 3    Multiple Vitamins-Minerals (MULTIVITAMIN ADULT PO)       nitroglycerin (NITROSTAT) 0 4 mg SL tablet Place 1 tablet (0 4 mg total) under the tongue every 5 (five) minutes as needed for chest pain 30 tablet 1    Omega-3 Fatty Acids (fish oil) 1,000 mg Take 1 capsule (1,000 mg total) by mouth 2 (two) times a day 60 capsule 0    rosuvastatin (CRESTOR) 20 MG tablet Take 1 tablet (20 mg total) by mouth daily after dinner 90 tablet 3    sertraline (ZOLOFT) 100 mg tablet Take 100 mg by mouth 2 (two) times a day      Vitamin D, Cholecalciferol, 1000 units CAPS Take 1,000 Units by mouth every morning        ziprasidone (GEODON) 80 mg capsule Take 80 mg by mouth 2 (two) times a day with meals      ziprasidone (GEODON) 80 mg capsule Take 1 capsule by mouth 2 (two) times a day       No current facility-administered medications for this visit  Facility-Administered Medications Ordered in Other Visits   Medication Dose Route Frequency Provider Last Rate Last Admin    sodium chloride 0 9 % infusion  20 mL/hr Intravenous Continuous Spence Boast, CRNA               Objective:    Blood pressure 130/76, pulse 76, temperature 97 6 °F (36 4 °C), temperature source Tympanic, height 5' 5" (1 651 m), weight 87 5 kg (193 lb), not currently breastfeeding  Body mass index is 32 12 kg/m²  Body surface area is 1 95 meters squared  Physical Exam  Vitals signs reviewed  Exam conducted with a chaperone present  Constitutional:       General: She is not in acute distress  Appearance: Normal appearance  She is not ill-appearing  HENT:      Head: Normocephalic and atraumatic  Mouth/Throat:      Mouth: Mucous membranes are moist    Eyes:      General:         Right eye: No discharge  Left eye: No discharge  Conjunctiva/sclera: Conjunctivae normal    Pulmonary:      Effort: Pulmonary effort is normal    Abdominal:      Palpations: Abdomen is soft  There is no mass  Tenderness: There is no abdominal tenderness  Hernia: No hernia is present  Genitourinary:     Comments: The external female genitalia is normal  The bartholin's, uretheral and skenes glands are normal  The urethral meatus is normal (midline with no lesions)  Anus without fissure or lesion  Speculum exam reveals a grossly normal vagina  Radiation changes are present  No masses, lesions,discharge or bleeding  No significant cystocele or rectocele noted  Bimanual exam notes a surgical absent cervix, uterus and adnexal structures  No masses or fullness   Bladder is without fullness, mass or tenderness  Musculoskeletal:      Right lower leg: No edema  Left lower leg: No edema  Skin:     General: Skin is warm and dry  Coloration: Skin is not jaundiced  Findings: No rash  Neurological:      General: No focal deficit present  Mental Status: She is alert and oriented to person, place, and time  Cranial Nerves: No cranial nerve deficit  Sensory: No sensory deficit  Motor: No weakness  Gait: Gait normal    Psychiatric:         Mood and Affect: Mood normal          Behavior: Behavior normal          Thought Content:  Thought content normal          Judgment: Judgment normal

## 2021-03-17 ENCOUNTER — TELEPHONE (OUTPATIENT)
Dept: GASTROENTEROLOGY | Facility: CLINIC | Age: 66
End: 2021-03-17

## 2021-03-20 DIAGNOSIS — IMO0002 UNCONTROLLED TYPE 2 DIABETES MELLITUS WITH NEPHROPATHY: ICD-10-CM

## 2021-03-20 DIAGNOSIS — R07.9 CHEST PAIN IN ADULT: ICD-10-CM

## 2021-03-20 DIAGNOSIS — R06.00 DYSPNEA ON EXERTION: ICD-10-CM

## 2021-03-22 RX ORDER — ROSUVASTATIN CALCIUM 20 MG/1
TABLET, COATED ORAL
Qty: 90 TABLET | Refills: 0 | Status: SHIPPED | OUTPATIENT
Start: 2021-03-22 | End: 2021-07-20 | Stop reason: SDUPTHER

## 2021-06-07 LAB
CREAT ?TM UR-SCNC: 177 UMOL/L
EXT MICROALBUMIN URINE RANDOM: 3.3
HBA1C MFR BLD HPLC: 6 %
HCV AB SER-ACNC: NON REACTIVE
MICROALBUMIN/CREAT UR: 19 MG/G{CREAT}

## 2021-06-23 ENCOUNTER — TELEPHONE (OUTPATIENT)
Dept: GASTROENTEROLOGY | Facility: CLINIC | Age: 66
End: 2021-06-23

## 2021-06-23 NOTE — TELEPHONE ENCOUNTER
Can you call this pt  She called to say she had some BW done and has Hep A  She said she may need a shot  She is a pt of Dr Osbaldo Caicedo  Please and thank you

## 2021-06-23 NOTE — TELEPHONE ENCOUNTER
Attempted to reach patient x 2 this afternoon to inform her I received a copy of her labs  She had prior exposure to Hepatitis A  I will try to call her later today or in the morning if no answer  When I call or I keep get a message that she did not set up her voicemail on her phone  Thank you

## 2021-06-23 NOTE — TELEPHONE ENCOUNTER
Spoke to patient on phone and reviewed labs patient is hepatitis a IgG a m  reactive which shows previous exposure  Patient's liver enzymes all within normal limits except AST 37 and slightly elevated  Patient denies any GI symptoms or fatigue  Explained to patient she does not need to have a hepatitis a vaccine that with prior exposure her body will produce antibodies  Patient also reported that she has previously received hepatitis-B vaccine  Thank you

## 2021-06-24 NOTE — TELEPHONE ENCOUNTER
I do not think she necessarily needs a follow-up appointment hepatitis A  you treat the symptoms which she is experiencing none  Her exposure may have been months ago  Her liver enzymes almost within normal limits except AST is 37  normal range 10-35, AST is only slightly elevated  You can call the patient and if she prefers you can always set up a follow-up with Dr Emy Montes  Thank you

## 2021-06-24 NOTE — TELEPHONE ENCOUNTER
Spoke with pt, she feels she does not need to see Dr Olivia Bey at this time  She will call us if she needs to see him in the future

## 2021-07-01 ENCOUNTER — OFFICE VISIT (OUTPATIENT)
Dept: GASTROENTEROLOGY | Facility: CLINIC | Age: 66
End: 2021-07-01
Payer: COMMERCIAL

## 2021-07-01 VITALS
WEIGHT: 186 LBS | SYSTOLIC BLOOD PRESSURE: 100 MMHG | HEIGHT: 65 IN | DIASTOLIC BLOOD PRESSURE: 56 MMHG | BODY MASS INDEX: 30.99 KG/M2 | HEART RATE: 72 BPM

## 2021-07-01 DIAGNOSIS — B15.9 ACUTE HEPATITIS A: ICD-10-CM

## 2021-07-01 DIAGNOSIS — Z85.038 HISTORY OF COLON CANCER: Primary | ICD-10-CM

## 2021-07-01 DIAGNOSIS — D12.6 ADENOMATOUS POLYP OF COLON, UNSPECIFIED PART OF COLON: ICD-10-CM

## 2021-07-01 PROCEDURE — 99214 OFFICE O/P EST MOD 30 MIN: CPT | Performed by: INTERNAL MEDICINE

## 2021-07-01 PROCEDURE — 1124F ACP DISCUSS-NO DSCNMKR DOCD: CPT | Performed by: INTERNAL MEDICINE

## 2021-07-01 NOTE — PROGRESS NOTES
[facility] - Outpatient Follow-up Note  Conrado Hein 78 y o  female MRN: [id number]  Encounter: [id number]          ASSESSMENT AND PLAN:      1  History of colon cancer   patient has history of colon cancer which was treated with polypectomy and submucosal resection  Patient had a tattoo juan j placed at the site of polypectomy  Recent colonoscopy did not reveal any residual polyp in that area  A smaller rectal polyp was removed  This appears to be more like hyperplastic or prolapsing tissue  I have explained the patient that there is nothing to worry about this  She should return in three years again for repeat colonoscopy for surveillance  Patient denies any abdominal pain or discomfort  She denies any nausea or vomiting  She denies any heartburn indigestion  There is no dysphagia odynophagia  She denies any chest pain, cough or  Palpitation  There is no orthopnea  2  Adenomatous polyp of colon, unspecified part of colon   patient has history of adenomatous colon polyp and adenocarcinoma  Repeat colonoscopy in three years have been recommended  3  Acute hepatitis A    Patient has history of icterus earlier this month  Patient was seen by her primary care at [facility]   Patient had blood work performed which revealed hepatitis a antibody IgM positive  I have reviewed the liver function test done earlier this month which was completely normal   Patient probably had the infection somewhere last month and had continued hepatitis E IgM positivity  Currently patient is anicteric  Patient has discussed with me regarding possibility of hepatitis a vaccination  I have explained the patient that she is she already has hepatitis a antibody IgM positive there is no need for vaccination    She will convert into hepatitis a IgG which will help give her lifelong immune TP against hepatitis A     ______________________________________________________________________    SUBJECTIVE:   There is no abdominal pain or discomfort  There is no nausea or vomiting  She denies any change in bowel habits or rectal bleeding  Denies any chest pain, cough or wheezing  There is no palpitation, orthopnea her urine is not dark  She does not have any history of jaundice recently  She did have   Yellow eyes last month  REVIEW OF SYSTEMS IS OTHERWISE NEGATIVE  Historical Information   Past Medical History:   Diagnosis Date    Anesthesia complication     difficulty waking up    Arthritis     left knee    Bone spur     left knee behing the knee cap    Chronic kidney disease     Colon polyp     Tubulovillous Adenoma High Grade Dysplasia - 2017    Depression     Diabetes mellitus [facility] [address] [facility]     Endometrial cancer ([facility] [address] )     grade I    Hx of bleeding disorder     vaginal bleeding started on 3/13/17     Hyperlipidemia     Hypertension     PONV (postoperative nausea and vomiting)     Shortness of breath     with exertion    Urinary incontinence     Vitamin D deficiency      Past Surgical History:   Procedure Laterality Date    BREAST BIOPSY Left     benign    CHOLECYSTECTOMY      open    COLONOSCOPY      DILATION AND CURETTAGE OF UTERUS N/A 4/5/2017    Procedure: DILATATION AND CURETTAGE (D&C);   Surgeon: Hazel Sanchez MD;  Location[facility] MAIN OR;  Service:     HYSTERECTOMY      OOPHORECTOMY      PELVIC LAPAROSCOPY      VA LAPAROSCOPY W TOT HYSTERECTUTERUS <=250 GRAM  W TUBE/OVARY N/A 5/4/2017    Procedure: ROBOTIC ASSISTED TOTAL LAPAROSCOPIC HYSTERECTOMY, BILATERAL SALPINGOOPHERECTOMY; CYSTOSCOPY;  Surgeon: Sagar Parker MD;  Location:  MAIN OR;  Service: Gynecology Oncology    TONSILLECTOMY      TUBAL LIGATION       Social History   Social History     Substance and Sexual Activity   Alcohol Use No     Social History     Substance and Sexual Activity   Drug Use No     Social History Tobacco Use   Smoking Status Never Smoker   Smokeless Tobacco Never Used     Family History   Problem Relation Age of Onset    Cancer Mother         Renal    Heart disease Father         CHF    Heart disease Sister         atrial septal defect       Meds/Allergies       Current Outpatient Medications:     BD PEN NEEDLE JOSEPHINE U/F 32G X 4 MM MISC    buPROPion (WELLBUTRIN SR) 200 MG 12 hr tablet    clonazePAM (KlonoPIN) 0 25 MG disintegrating tablet    clonazePAM (KlonoPIN) 0 5 mg tablet    DOCUSATE SODIUM PO    EQ Aspirin Adult Low Dose 81 MG EC tablet    Insulin Glargine (TOUJEO SOLOSTAR SC)    lisinopril (ZESTRIL) 2 5 mg tablet    metFORMIN (GLUCOPHAGE-XR) 500 mg 24 hr tablet    metoprolol succinate (TOPROL-XL) 25 mg 24 hr tablet    Multiple Vitamins-Minerals (MULTIVITAMIN ADULT PO)    nitroglycerin (NITROSTAT) 0 4 mg SL tablet    Omega-3 Fatty Acids (fish oil) 1,000 mg    rosuvastatin (CRESTOR) 20 MG tablet    sertraline (ZOLOFT) 100 mg tablet    Vitamin D, Cholecalciferol, 1000 units CAPS    ziprasidone (GEODON) 80 mg capsule    ziprasidone (GEODON) 80 mg capsule    No Known Allergies        Objective     Blood pressure 100/56, pulse 72, height 5' 5" (1 651 m), weight 84 4 kg (186 lb), not currently breastfeeding  Body mass index is 30 95 kg/m²  PHYSICAL EXAM:      General Appearance:   Alert, cooperative, no distress   HEENT:   Normocephalic, atraumatic, anicteric      Neck:  Supple, symmetrical, trachea midline   Lungs:   Clear to auscultation bilaterally; no rales, rhonchi or wheezing; respirations unlabored    Heart[de-identified]   Regular rate and rhythm; no murmur, rub, or gallop     Abdomen:   Soft, non-tender, non-distended; normal bowel sounds; no masses, no organomegaly    Genitalia:   Deferred    Rectal:   Deferred    Extremities:  No cyanosis, clubbing or edema    Pulses:  2+ and symmetric    Skin:  No jaundice, rashes, or lesions    Lymph nodes:  No palpable cervical lymphadenopathy  Lab Results:   No visits with results within 1 Day(s) from this visit  Latest known visit with results is:   Orders Only on 06/07/2021   Component Date Value    EXT Creatinine Urine 06/07/2021 177     Microalbum  ,U,Random 06/07/2021 3 3     EXTERNAL Microalb/Creat * 06/07/2021 19     HEP C AB 06/07/2021 Non Reactive     Hemoglobin A1C 06/07/2021 6 0          Radiology Results:   No results found

## 2021-07-20 ENCOUNTER — OFFICE VISIT (OUTPATIENT)
Dept: CARDIOLOGY CLINIC | Facility: CLINIC | Age: 66
End: 2021-07-20
Payer: COMMERCIAL

## 2021-07-20 VITALS
HEIGHT: 65 IN | BODY MASS INDEX: 30.66 KG/M2 | DIASTOLIC BLOOD PRESSURE: 58 MMHG | WEIGHT: 184 LBS | HEART RATE: 70 BPM | SYSTOLIC BLOOD PRESSURE: 104 MMHG | TEMPERATURE: 98.6 F | OXYGEN SATURATION: 98 %

## 2021-07-20 DIAGNOSIS — R07.9 CHEST PAIN IN ADULT: ICD-10-CM

## 2021-07-20 DIAGNOSIS — R06.00 DYSPNEA ON EXERTION: ICD-10-CM

## 2021-07-20 DIAGNOSIS — R00.2 PALPITATIONS: ICD-10-CM

## 2021-07-20 DIAGNOSIS — I10 HYPERTENSION, UNSPECIFIED TYPE: ICD-10-CM

## 2021-07-20 DIAGNOSIS — IMO0002 UNCONTROLLED TYPE 2 DIABETES MELLITUS WITH NEPHROPATHY: ICD-10-CM

## 2021-07-20 PROCEDURE — 99214 OFFICE O/P EST MOD 30 MIN: CPT | Performed by: INTERNAL MEDICINE

## 2021-07-20 RX ORDER — ZIPRASIDONE HYDROCHLORIDE 40 MG/1
40 CAPSULE ORAL EVERY EVENING
COMMUNITY
Start: 2021-07-05

## 2021-07-20 RX ORDER — METOPROLOL SUCCINATE 25 MG/1
25 TABLET, EXTENDED RELEASE ORAL DAILY
Qty: 90 TABLET | Refills: 3 | Status: SHIPPED | OUTPATIENT
Start: 2021-07-20

## 2021-07-20 RX ORDER — ROSUVASTATIN CALCIUM 20 MG/1
20 TABLET, COATED ORAL DAILY
Qty: 90 TABLET | Refills: 3 | Status: SHIPPED | OUTPATIENT
Start: 2021-07-20

## 2021-07-20 NOTE — PROGRESS NOTES
[facility]  [address] [facility] [address]  [address]   [place], [address]  Cardiology Follow-up  Marline Campos  1955  [id number]  [facility] [place]  [address] [facility]  [address] [facility] [facility] [address]  [place] [address] [phone]    1  Uncontrolled type 2 diabetes mellitus with nephropathy (HCC)  rosuvastatin (CRESTOR) 20 MG tablet    metoprolol succinate (TOPROL-XL) 25 mg 24 hr tablet   2  Dyspnea on exertion  rosuvastatin (CRESTOR) 20 MG tablet   3  Chest pain in adult  rosuvastatin (CRESTOR) 20 MG tablet    metoprolol succinate (TOPROL-XL) 25 mg 24 hr tablet   4  Hypertension, unspecified type  metoprolol succinate (TOPROL-XL) 25 mg 24 hr tablet   5  Palpitations  metoprolol succinate (TOPROL-XL) 25 mg 24 hr tablet      Discussion/Summary:   Chest pain- resolved  cath negative  Exertional dyspnea- secondary to deconditioning? Heart rate improved  No hx of smoking  Continue metoprolol therapy  Improve exercise conditioning  Incomplete RBBB/abnormal ekg- false negative test    Htn- lisinopril 2 5 mg daily  Continue metoprolol 25mg daily    Diabetes mellitus- currently on metformin + lantus    Hld- rosuvastatin + aspirin  Contolled  Continue omega 3 supplementation  History:   77-year-old woman with long history of diabetes mellitus, family history for CAD presents with worsening exertional shortness of breath  She reports with light activities feeling significant dyspnea and chest tightness  She had a recent nuclear stress test which did not show focal ischemia but did have some transient ischemic dilatation  She she has not had no prior history of myocardial infarction or CVA  No prior history of atrial fibrillation  She has periodic lower extremity edema   Her blood pressures have been controlled  She is compliant with her medications  02/11/2019:  She continues to have exertional shortness of breath    She is not challenge herself with exercise  Her daughter is present and states she has been non compliant  She is compliant with her medications  We have not received her repeat blood work     09/28/2020:  She denies having recurrence of chest discomfort  She is improving her shortness of breath  She is using an exercise bike  She will try improve on her conditioning  She is compliant with her medications  She has had recent blood work  We need to obtain her last PCP blood work     07/20/2021:  She has been compliant with her medications  She denies having any chest discomfort  She denies having exertional shortness of breath  Her last blood work showed a cholesterol was controlled      PMH  Diabetes Mellitus- 15 years      Past Medical History:   Diagnosis Date    Anesthesia complication     difficulty waking up    Arthritis     left knee    Bone spur     left knee behing the knee cap    Chronic kidney disease     Colon polyp     Tubulovillous Adenoma High Grade Dysplasia - 2017    Depression     Diabetes mellitus ([facility] [address] )     Endometrial cancer ([facility] )     grade I    Hx of bleeding disorder     vaginal bleeding started on 3/13/17     Hyperlipidemia     Hypertension     PONV (postoperative nausea and vomiting)     Shortness of breath     with exertion    Urinary incontinence     Vitamin D deficiency      Social History     Socioeconomic History    Marital status: /Civil Union     Spouse name: Not on file    Number of children: Not on file    Years of education: Not on file    Highest education level: Not on file   Occupational History    Not on file   Tobacco Use    Smoking status: Never Smoker    Smokeless tobacco: Never Used   Vaping Use    Vaping Use: Never used   Substance and Sexual Activity    Alcohol use: No    Drug use: No    Sexual activity: Not on file   Other Topics Concern    Not on file   Social History Narrative    Not on file     Social Determinants of Health     Financial Resource Strain:     Difficulty of Paying Living Expenses:    Food Insecurity:     Worried About Running Out of Food in the Last Year:     [address] in the Last Year:    Transportation Needs:     Lack of Transportation (Medical):  Lack of Transportation (Non-Medical):    Physical Activity:     Days of Exercise per Week:     Minutes of Exercise per Session:    Stress:     Feeling of Stress :    Social Connections:     Frequency of Communication with Friends and Family:     Frequency of Social Gatherings with Friends and Family:     Attends [employer] Services:     Active Member of Clubs or Organizations:     Attends Club or Organization Meetings:     Marital Status:    Intimate Partner Violence:     Fear of Current or Ex-Partner:     Emotionally Abused:     Physically Abused:     Sexually Abused:       Family History   Problem Relation Age of Onset    Cancer Mother         Renal    Heart disease Father         CHF    Heart disease Sister         atrial septal defect     Past Surgical History:   Procedure Laterality Date    BREAST BIOPSY Left     benign    CHOLECYSTECTOMY      open    COLONOSCOPY      DILATION AND CURETTAGE OF UTERUS N/A 4/5/2017    Procedure: DILATATION AND CURETTAGE (D&C);   Surgeon: Ludwig Saxena MD;  [facility] MAIN OR;  Service:     HYSTERECTOMY      OOPHORECTOMY      PELVIC LAPAROSCOPY      MS LAPAROSCOPY W TOT HYSTERECTUTERUS <=250 GRAM  W TUBE/OVARY N/A 5/4/2017    Procedure: ROBOTIC ASSISTED TOTAL LAPAROSCOPIC HYSTERECTOMY, BILATERAL SALPINGOOPHERECTOMY; CYSTOSCOPY;  Surgeon: Delon Mack MD;  Location:  MAIN OR;  Service: Gynecology Oncology    TONSILLECTOMY      TUBAL LIGATION         Current Outpatient Medications:     BD PEN NEEDLE JOSEPHINE U/F 32G X 4 MM MISC, USE 4 TIMES DAILY WITH INSULIN, Disp: , Rfl: 10    buPROPion (WELLBUTRIN SR) 200 MG 12 hr tablet, Take 200 mg by mouth every morning, Disp: , Rfl:     clonazePAM (KlonoPIN) 0 25 MG disintegrating tablet, Take 0 25 mg by mouth every morning, Disp: , Rfl:     clonazePAM (KlonoPIN) 0 5 mg tablet, Take 0 5 mg by mouth daily after lunch 3 pm, Disp: , Rfl:     DOCUSATE SODIUM PO, , Disp: , Rfl:     EQ Aspirin Adult Low Dose 81 MG EC tablet, Take 1 tablet by mouth once daily with breakfast, Disp: 90 tablet, Rfl: 0    Insulin Glargine (TOUJEO SOLOSTAR SC), Inject 70 Units under the skin every morning LANTUS, Disp: , Rfl:     lisinopril (ZESTRIL) 2 5 mg tablet, Take 2 5 mg by mouth daily, Disp: , Rfl: 3    metFORMIN (GLUCOPHAGE-XR) 500 mg 24 hr tablet, Take 500 mg by mouth 2 (two) times a day, Disp: , Rfl: 5    metoprolol succinate (TOPROL-XL) 25 mg 24 hr tablet, Take 1 tablet (25 mg total) by mouth daily, Disp: 90 tablet, Rfl: 3    Multiple Vitamins-Minerals (MULTIVITAMIN ADULT PO), , Disp: , Rfl:     Omega-3 Fatty Acids (fish oil) 1,000 mg, Take 1 capsule (1,000 mg total) by mouth 2 (two) times a day, Disp: 60 capsule, Rfl: 0    rosuvastatin (CRESTOR) 20 MG tablet, Take 1 tablet (20 mg total) by mouth daily, Disp: 90 tablet, Rfl: 3    sertraline (ZOLOFT) 100 mg tablet, Take 100 mg by mouth 2 (two) times a day, Disp: , Rfl:     Vitamin D, Cholecalciferol, 1000 units CAPS, Take 1,000 Units by mouth every morning  , Disp: , Rfl:     ziprasidone (GEODON) 80 mg capsule, Take 80 mg by mouth 2 (two) times a day with meals, Disp: , Rfl:     ziprasidone (GEODON) 80 mg capsule, Take 1 capsule by mouth 2 (two) times a day, Disp: , Rfl:     nitroglycerin (NITROSTAT) 0 4 mg SL tablet, Place 1 tablet (0 4 mg total) under the tongue every 5 (five) minutes as needed for chest pain (Patient not taking: Reported on 7/20/2021), Disp: 30 tablet, Rfl: 1    ziprasidone (GEODON) 40 mg capsule, Take 40 mg by mouth every evening, Disp: , Rfl:   No Known Allergies  Vitals:    07/20/21 1116   BP: 104/58   BP Location: Right arm   Patient Position: Sitting   Cuff Size: Large   Pulse: 70   Temp: 98 6 °F (37 °C) SpO2: 98%   Weight: 83 5 kg (184 lb)   Height: 5' 5" (1 651 m)       Review of Systems:   Review of Systems   Constitutional: Negative  Negative for activity change, appetite change, chills, diaphoresis, fatigue, fever and unexpected weight change  HENT: Negative  Negative for congestion, dental problem, drooling, ear discharge, ear pain, facial swelling, hearing loss, mouth sores, nosebleeds, postnasal drip, rhinorrhea, sinus pressure, sinus pain, sneezing, sore throat, tinnitus, trouble swallowing and voice change  Eyes: Negative  Negative for photophobia, pain, redness, itching and visual disturbance  Respiratory: Positive for shortness of breath  Negative for apnea, cough, choking, chest tightness, wheezing and stridor  Cardiovascular: Positive for leg swelling  Negative for chest pain and palpitations  Gastrointestinal: Negative  Negative for abdominal distention, abdominal pain, anal bleeding, blood in stool, constipation, diarrhea, nausea, rectal pain and vomiting  Endocrine: Negative  Negative for cold intolerance, heat intolerance, polydipsia, polyphagia and polyuria  Genitourinary: Negative  Negative for decreased urine volume, difficulty urinating, dyspareunia, dysuria, enuresis, flank pain, frequency, genital sores, hematuria, menstrual problem, pelvic pain, urgency, vaginal bleeding, vaginal discharge and vaginal pain  Musculoskeletal: Negative  Negative for arthralgias, back pain, gait problem, joint swelling, myalgias, neck pain and neck stiffness  Skin: Negative  Negative for color change, pallor, rash and wound  Allergic/Immunologic: Negative  Negative for environmental allergies, food allergies and immunocompromised state  Neurological: Negative  Negative for dizziness, tremors, seizures, syncope, facial asymmetry, speech difficulty, weakness, light-headedness, numbness and headaches  Hematological: Negative  Negative for adenopathy   Does not bruise/bleed easily  Psychiatric/Behavioral: Negative  Negative for agitation, behavioral problems, confusion, decreased concentration, dysphoric mood, hallucinations, self-injury, sleep disturbance and suicidal ideas  The patient is not nervous/anxious and is not hyperactive  All other systems reviewed and are negative  Vitals:    07/20/21 1116   BP: 104/58   BP Location: Right arm   Patient Position: Sitting   Cuff Size: Large   Pulse: 70   Temp: 98 6 °F (37 °C)   SpO2: 98%   Weight: 83 5 kg (184 lb)   Height: 5' 5" (1 651 m)     Physical Examination:   Physical Exam  Constitutional:       General: She is not in acute distress  Appearance: She is well-developed  She is not diaphoretic  HENT:      Head: Normocephalic and atraumatic  Right Ear: External ear normal       Left Ear: External ear normal    Eyes:      General: No scleral icterus  Right eye: No discharge  Left eye: No discharge  Conjunctiva/sclera: Conjunctivae normal       Pupils: Pupils are equal, round, and reactive to light  Neck:      Thyroid: No thyromegaly  Vascular: No JVD  Trachea: No tracheal deviation  Cardiovascular:      Rate and Rhythm: Normal rate and regular rhythm  Heart sounds: Murmur heard  No friction rub  Gallop present  Pulmonary:      Effort: Pulmonary effort is normal  No respiratory distress  Breath sounds: Normal breath sounds  No stridor  No wheezing or rales  Chest:      Chest wall: No tenderness  Abdominal:      General: Bowel sounds are normal  There is no distension  Palpations: Abdomen is soft  There is no mass  Tenderness: There is no abdominal tenderness  There is no guarding or rebound  Musculoskeletal:         General: No tenderness or deformity  Normal range of motion  Cervical back: Normal range of motion and neck supple  Skin:     General: Skin is warm and dry  Coloration: Skin is not pale  Findings: No erythema or rash  Neurological:      Mental Status: She is alert and oriented to person, place, and time  Cranial Nerves: No cranial nerve deficit  Motor: No abnormal muscle tone  Coordination: Coordination normal       Deep Tendon Reflexes: Reflexes are normal and symmetric  Reflexes normal    Psychiatric:         Behavior: Behavior normal          Thought Content: Thought content normal          Judgment: Judgment normal          Labs:     Lab Results   Component Value Date    WBC 3 80 (L) 2017    HGB 10 9 (L) 2017    HCT 32 5 (L) 2017    MCV 90 2017    RDW 15 7 (H) 2017     2017     BMP:  Lab Results   Component Value Date    SODIUM 139 2020    K 4 6 2020     2020    CO2 28 2020    BUN 25 2020    CREATININE 1 22 (H) 2020    GLUC 96 2020    GLUF 127 (H) 2020    CALCIUM 9 6 2020    EGFR 46 2020     LFT:  Lab Results   Component Value Date    AST 26 2017    ALT 25 2017    ALKPHOS 67 2017    ALB 4 1 2017      No results found for: [id number]  Lab Results   Component Value Date    HGBA1C 6 0 2021       Imaging & Testing   I have personally reviewed pertinent reports        Cardiac Testing     Results for orders placed during the hospital encounter of 10/25/19   Echo complete with contrast if indicated    [facility] [address]  [address] [address]  [phone]    Transthoracic Echocardiogram  2D, M-mode, Doppler, and Color Doppler    Study date:  25-Oct-2019    Patient: Ashley Villalta  MR number: [id number]  Account number: [de-identified]  : 1955  Age: 71 years  Gender: Female  Status: Outpatient  Location: Echo lab  Height: 65 in  Weight: 190 5 lb  BP: 134/ 75 mmHg    Indications: SOB    Diagnoses: R06 02 - Shortness of breath    Sonographer:  VALERIE Ann  Primary Physician:  Jolene Hedrick MD  Referring Physician: Hernesto Swan MD  Group:  [facility]  Interpreting Physician:  DO AMBREEN Phillip    LEFT VENTRICLE:  Systolic function was normal by visual assessment  Ejection fraction was estimated to be 55 %  There were no regional wall motion abnormalities  There was mild concentric hypertrophy  Doppler parameters were consistent with abnormal left ventricular relaxation (grade 1 diastolic dysfunction)  MITRAL VALVE:  There was mild regurgitation  AORTIC VALVE:  There was no evidence for stenosis  There was no regurgitation  TRICUSPID VALVE:  There was mild regurgitation  Pulmonary artery systolic pressure was within the normal range  HISTORY: PRIOR HISTORY: Diabetes, Endometrial Cancer and Colon Cancer,HTN,hyperlipidemia  PROCEDURE: The procedure was performed in the echo lab  This was a routine study  The transthoracic approach was used  The study included complete 2D imaging, M-mode, complete spectral Doppler, and color Doppler  The heart rate was 81 bpm,  at the start of the study  Image quality was adequate  LEFT VENTRICLE: Size was normal  Systolic function was normal by visual assessment  Ejection fraction was estimated to be 55 %  There were no regional wall motion abnormalities  There was mild concentric hypertrophy  DOPPLER: Doppler  parameters were consistent with abnormal left ventricular relaxation (grade 1 diastolic dysfunction)  RIGHT VENTRICLE: The size was normal  Systolic function was normal  DOPPLER: Systolic pressure was within the normal range  LEFT ATRIUM: The atrium was mildly dilated  RIGHT ATRIUM: Size was normal     MITRAL VALVE: Valve structure was normal  There was normal leaflet separation  No echocardiographic evidence for prolapse  DOPPLER: The transmitral velocity was within the normal range  There was no evidence for stenosis  There was mild  regurgitation  AORTIC VALVE: The valve was trileaflet   Leaflets exhibited normal thickness and normal cuspal separation  DOPPLER: Transaortic velocity was within the normal range  There was no evidence for stenosis  There was no regurgitation  TRICUSPID VALVE: The valve structure was normal  There was normal leaflet separation  DOPPLER: The transtricuspid velocity was within the normal range  There was mild regurgitation  Pulmonary artery systolic pressure was within the normal  range  Estimated peak PA pressure was 30 mmHg  PULMONIC VALVE: Leaflets exhibited normal thickness, no calcification, and normal cuspal separation  DOPPLER: The transpulmonic velocity was within the normal range  There was no regurgitation  PERICARDIUM: There was no thickening  There was no pericardial effusion  AORTA: The root exhibited normal size  PULMONARY ARTERY: The size was normal  The morphology appeared normal     SYSTEM MEASUREMENT TABLES    2D mode  AoR Diam 2D: 3 2 cm  LA Diam (2D): 4 cm  LA/Ao (2D): 1 25  FS (2D Teich): 27 7 %  IVSd (2D): 1 09 cm  LVDEV: 129 cmï¾³  LVESV: 60 cmï¾³  LVIDd(2D): 5 19 cm  LVISd (2D): 3 75 cm  LVOT Area 2D: 3 14 cmï¾²  LVPWd (2D): 1 13 cm  SV (Teich): 69 cmï¾³    Apical four chamber  LVEF A4C: 59 %    Unspecified Scan Mode  ARLENE Cont Eq (Peak Andres): 2 4 cmï¾²  LVOT Diam : 2 cm  LVOT Vmax: 1160 mm/s  LVOT Vmax; Mean: 1160 mm/s  Peak Grad ; Mean: 5 mm[Hg]  MV Peak A Andres: 755 mm/s  MV Peak E Andres   Mean: 607 mm/s  MVA (PHT): 4 07 cmï¾²  PHT: 54 ms  Max P mm[Hg]  V Max: 2100 mm/s  Vmax: 2210 mm/s  RA Area: 16 5 cmï¾²  RA Volume: 40 4 cmï¾³  TAPSE: 1 6 cm    [employer] Accredited Echocardiography Laboratory    Prepared and electronically signed by    Miranda Swanson DO  Signed 25-Oct-2019 17:50:25         Results for orders placed during the hospital encounter of 10/17/19   NM myocardial perfusion spect (rx stress and/or rest)    [facility] [address]  [address] [facility] [address] [phone]    Rest/Stress Gated SPECT Myocardial Perfusion Imaging After Regadenoson    Patient: Mohan Hawkins  MR number: [id number]  Account number: [de-identified]  : 1955  Age: 71 years  Gender: Female  Status: Outpatient  Location: Stress lab  Height: 64 in  Weight: 190 lb  BP: 129/ 64 mmHg    Allergies: NO KNOWN ALLERGIES    Diagnosis: I10  - Essential (primary) hypertension    Primary Physician:  Dagoberto Mills MD  Technician:  Angélica Florentino  RN:  Adele Kayser, RN  Referring Physician:  Dagoberto Mills MD  Group:  [facility]  Report Prepared By[de-identified]  KENTRELL Mccann  RN:  KENTRELL Mccann  Interpreting Physician:  Sasha Langley MD    INDICATIONS: Evaluation for coronary artery disease  HISTORY: The patient is a 71year old  female  Chest pain status: no chest pain  Other symptoms: dyspnea  Coronary artery disease risk factors: dyslipidemia, hypertension, family history of premature coronary artery disease, and  diabetes mellitus  Cardiovascular history: none significant  Co-morbidity: obesity  Medications: a lipid lowering agent, an antihypertensive agent, and diabetic medications  PHYSICAL EXAM: Baseline physical exam screening: no wheezes audible  REST ECG: RBBB Normal sinus rhythm  PROCEDURE: The study was performed in the the Stress lab  A regadenoson infusion pharmacologic stress test was performed  Gated SPECT myocardial perfusion imaging was performed after stress and at rest  Systolic blood pressure was 129  mmHg, at the start of the study  Diastolic blood pressure was 64 mmHg, at the start of the study  The heart rate was 72 bpm, at the start of the study  IV double checked    Regadenoson protocol:  Time HR bpm SBP mmHg DBP mmHg Symptoms Rhythm/conduct  Baseline 09:14 72 129 64 none NSR  Immediate 09:18 90 140 72 mild dyspnea same as above  1 min 09:19 94 138 64 same as above --  2 min 09:20 90 126 58 subsiding --  3 min 09:21 89 123 58 none same as above  No medications or fluids given     STRESS SUMMARY: Duration of pharmacologic stress was 3 min  Maximal heart rate during stress was 101 bpm  The heart rate response to stress was normal  There was normal resting blood pressure with an appropriate response to stress  The  rate-pressure product for the peak heart rate and blood pressure was [address]  There was no chest pain during stress  The stress test was terminated due to protocol completion  Pre oxygen saturation: 96 %  Peak oxygen saturation: 98 %  The  stress ECG was equivocal for ischemia  There were no stress arrhythmias or conduction abnormalities  ISOTOPE ADMINISTRATION:  Resting isotope administration Stress isotope administration  Agent Tetrofosmin Tetrofosmin  Dose 11 mCi 33 mCi  Date 10/17/2019 10/17/2019    The radiopharmaceutical was injected at the peak effect of pharmacologic stress  MYOCARDIAL PERFUSION IMAGING:  The image quality was good  Rotating projection images reveal mild breast attenuation and prone imaging completed for attenuation correction  Left ventricular size was normal  The TID ratio was 1 2  PERFUSION DEFECTS:  -  There was a small, paradoxical (reverse redistribution) myocardial perfusion defect of the apical wall likely due to low counts  -  There was a reversible myocardial perfusion defect of the anteroseptal wall which improved with prone imaging likely due to attenuation from breast tissue  GATED SPECT:  The calculated left ventricular ejection fraction was 60 %  Left ventricular ejection fraction was within normal limits by visual estimate  SUMMARY:  -  Stress results: There was no chest pain during stress  -  ECG conclusions: The stress ECG was equivocal for ischemia  -  Perfusion imaging: There was a small, paradoxical (reverse redistribution) myocardial perfusion defect of the apical wall likely due to low counts   There was a reversible myocardial perfusion defect of the anteroseptal wall which  improved with prone imaging likely due to attenuation from breast tissue   -  Gated SPECT: The calculated left ventricular ejection fraction was 60 %  Left ventricular ejection fraction was within normal limits by visual estimate  -  Impressions and recommendations: Likely normal study after pharmacologic vasodilation  No evidence of prior infarction and no focal reversible perfusion defect with preserved ejection fraction  Cannot rule out balanced ischemia from  pharmacological vasodilation study but TID ratio of 1 2 and preserved EF  IMPRESSIONS: Likely normal study after pharmacologic vasodilation  No evidence of prior infarction and no focal reversible perfusion defect with preserved ejection fraction  Cannot rule out balanced ischemia from pharmacological  vasodilation study but TID ratio of 1 2 and preserved EF  Prepared and signed by    Yuki Sagastume MD  Signed 10/18/2019 10:59:38         EKG: Personally reviewed  Normal sinus rhythm incomplete rbbb t-wave inversions      Yuki Sagastume MD [facility]  [phone]  Please call with any questions or suggestions    Counseling :  A description of the counseling:   Goals and Barriers:  Patient's ability to self care:  Medication side effect reviewed with patient in detail and all their questions answered  "Portions of the record may have been created with voice recognition software  Occasional wrong word or "sound a like" substitutions may have occurred due to the inherent limitations of voice recognition software  Read the chart carefully and recognize, using context, where substitutions have occurred   Please call if you have any questions  "

## 2021-08-20 ENCOUNTER — OFFICE VISIT (OUTPATIENT)
Dept: OBGYN CLINIC | Facility: CLINIC | Age: 66
End: 2021-08-20
Payer: COMMERCIAL

## 2021-08-20 ENCOUNTER — APPOINTMENT (OUTPATIENT)
Dept: RADIOLOGY | Facility: CLINIC | Age: 66
End: 2021-08-20
Payer: COMMERCIAL

## 2021-08-20 VITALS
SYSTOLIC BLOOD PRESSURE: 126 MMHG | HEART RATE: 70 BPM | HEIGHT: 65 IN | WEIGHT: 184 LBS | BODY MASS INDEX: 30.66 KG/M2 | DIASTOLIC BLOOD PRESSURE: 74 MMHG

## 2021-08-20 DIAGNOSIS — M25.561 CHRONIC PAIN OF RIGHT KNEE: ICD-10-CM

## 2021-08-20 DIAGNOSIS — M25.561 RIGHT KNEE PAIN, UNSPECIFIED CHRONICITY: ICD-10-CM

## 2021-08-20 DIAGNOSIS — G89.29 CHRONIC PAIN OF RIGHT KNEE: ICD-10-CM

## 2021-08-20 DIAGNOSIS — Z01.89 ENCOUNTER FOR LOWER EXTREMITY COMPARISON IMAGING STUDY: ICD-10-CM

## 2021-08-20 DIAGNOSIS — M17.11 PRIMARY OSTEOARTHRITIS OF RIGHT KNEE: Primary | ICD-10-CM

## 2021-08-20 DIAGNOSIS — Z86.39 HISTORY OF DIABETES MELLITUS: ICD-10-CM

## 2021-08-20 PROCEDURE — 73562 X-RAY EXAM OF KNEE 3: CPT

## 2021-08-20 PROCEDURE — 73560 X-RAY EXAM OF KNEE 1 OR 2: CPT

## 2021-08-20 PROCEDURE — 99203 OFFICE O/P NEW LOW 30 MIN: CPT | Performed by: ORTHOPAEDIC SURGERY

## 2021-08-20 NOTE — PROGRESS NOTES
Assessment/Plan:  1  Primary osteoarthritis of right knee     2  Chronic pain of right knee  XR knee 3 vw right non injury   3  History of diabetes mellitus       Scribe Attestation    I,:  Luciana San am acting as a scribe while in the presence of the attending physician :       I,:  Danielle Ortiz, DO personally performed the services described in this documentation    as scribed in my presence :           Pattie Feliciano is a pleasant 18-year-old female who presents today for initial evaluation of her right knee pain  After reviewing her imaging and performing a thorough history and physical exam I explained that she is suffering with severe degenerative change in her knee  I offered to perform a corticosteroid injection for symptomatic relief  She declined this intervention today and prefers to continue using Tylenol  We discussed appropriate dosing  She does understand that the corticosteroid injection remains an option for her she wishes and she will return to the office at her discretion for this  We will see her back as needed  Subjective: Initial evaluation for right knee pain    Patient ID: Carol Varela is a 78 y o  female who presents today for initial evaluation of her right knee pain  At today's visit, she complains increasing pain about her right knee over the last month  She states that this pain has been present for quite some time  She describes aching pain about the anterior and posterior aspect of her knee that can be severe  She finds her pain is worse with activity and somewhat better at rest   She does report that her level of activity has been decreasing recently due to her knee pain  She also complains of activity related swelling in the knee  She does use Tylenol which provides minimal benefit  She denies any recent injury or trauma  Review of Systems   Constitutional: Positive for activity change  Negative for chills, fever and unexpected weight change     HENT: Negative for hearing loss, nosebleeds and sore throat  Eyes: Negative for pain, redness and visual disturbance  Respiratory: Negative for cough, shortness of breath and wheezing  Cardiovascular: Negative for chest pain, palpitations and leg swelling  Gastrointestinal: Negative for abdominal pain, nausea and vomiting  Endocrine: Negative for polydipsia and polyuria  Genitourinary: Negative for dysuria and hematuria  Musculoskeletal: Positive for arthralgias and myalgias  Negative for joint swelling  See HPI   Skin: Negative for rash and wound  Neurological: Negative for dizziness, numbness and headaches  Psychiatric/Behavioral: Negative for decreased concentration and suicidal ideas  The patient is not nervous/anxious  Past Medical History:   Diagnosis Date    Anesthesia complication     difficulty waking up    Arthritis     left knee    Bone spur     left knee behing the knee cap    Chronic kidney disease     Colon polyp     Tubulovillous Adenoma High Grade Dysplasia - 2017    Depression     Diabetes mellitus [facility] [address] [facility]     Endometrial cancer ([facility] [address] )     grade I    Hx of bleeding disorder     vaginal bleeding started on 3/13/17     Hyperlipidemia     Hypertension     PONV (postoperative nausea and vomiting)     Shortness of breath     with exertion    Urinary incontinence     Vitamin D deficiency        Past Surgical History:   Procedure Laterality Date    BREAST BIOPSY Left     benign    CHOLECYSTECTOMY      open    COLONOSCOPY      DILATION AND CURETTAGE OF UTERUS N/A 4/5/2017    Procedure: DILATATION AND CURETTAGE (D&C);   Surgeon: Hazel Sanchez MD;  Location: [facility] MAIN OR;  Service:     HYSTERECTOMY      OOPHORECTOMY      PELVIC LAPAROSCOPY      NC LAPAROSCOPY W TOT HYSTERECTUTERUS <=250 GRAM  W TUBE/OVARY N/A 5/4/2017    Procedure: ROBOTIC ASSISTED TOTAL LAPAROSCOPIC HYSTERECTOMY, BILATERAL SALPINGOOPHERECTOMY; CYSTOSCOPY;  Surgeon: Sagar Parker MD;  Location:  MAIN OR;  Service: Gynecology Oncology    TONSILLECTOMY      TUBAL LIGATION         Family History   Problem Relation Age of Onset    Cancer Mother         Renal    Heart disease Father         CHF    Heart disease Sister         atrial septal defect       Social History     Occupational History    Not on file   Tobacco Use    Smoking status: Never Smoker    Smokeless tobacco: Never Used   Vaping Use    Vaping Use: Never used   Substance and Sexual Activity    Alcohol use: No    Drug use: No    Sexual activity: Not on file         Current Outpatient Medications:     BD PEN NEEDLE JOSEPHINE U/F 32G X 4 MM MISC, USE 4 TIMES DAILY WITH INSULIN, Disp: , Rfl: 10    buPROPion (WELLBUTRIN SR) 200 MG 12 hr tablet, Take 200 mg by mouth every morning, Disp: , Rfl:     clonazePAM (KlonoPIN) 0 25 MG disintegrating tablet, Take 0 25 mg by mouth every morning, Disp: , Rfl:     clonazePAM (KlonoPIN) 0 5 mg tablet, Take 0 5 mg by mouth daily after lunch 3 pm, Disp: , Rfl:     DOCUSATE SODIUM PO, , Disp: , Rfl:     EQ Aspirin Adult Low Dose 81 MG EC tablet, Take 1 tablet by mouth once daily with breakfast, Disp: 90 tablet, Rfl: 0    Insulin Glargine (TOUJEO SOLOSTAR SC), Inject 70 Units under the skin every morning LANTUS, Disp: , Rfl:     lisinopril (ZESTRIL) 2 5 mg tablet, Take 2 5 mg by mouth daily, Disp: , Rfl: 3    metFORMIN (GLUCOPHAGE-XR) 500 mg 24 hr tablet, Take 500 mg by mouth 2 (two) times a day, Disp: , Rfl: 5    metoprolol succinate (TOPROL-XL) 25 mg 24 hr tablet, Take 1 tablet (25 mg total) by mouth daily, Disp: 90 tablet, Rfl: 3    Multiple Vitamins-Minerals (MULTIVITAMIN ADULT PO), , Disp: , Rfl:     nitroglycerin (NITROSTAT) 0 4 mg SL tablet, Place 1 tablet (0 4 mg total) under the tongue every 5 (five) minutes as needed for chest pain (Patient not taking: Reported on 7/20/2021), Disp: 30 tablet, Rfl: 1    Omega-3 Fatty Acids (fish oil) 1,000 mg, Take 1 capsule (1,000 mg total) by mouth 2 (two) times a day, Disp: 60 capsule, Rfl: 0    rosuvastatin (CRESTOR) 20 MG tablet, Take 1 tablet (20 mg total) by mouth daily, Disp: 90 tablet, Rfl: 3    sertraline (ZOLOFT) 100 mg tablet, Take 100 mg by mouth 2 (two) times a day, Disp: , Rfl:     Vitamin D, Cholecalciferol, 1000 units CAPS, Take 1,000 Units by mouth every morning  , Disp: , Rfl:     ziprasidone (GEODON) 40 mg capsule, Take 40 mg by mouth every evening, Disp: , Rfl:     ziprasidone (GEODON) 80 mg capsule, Take 80 mg by mouth 2 (two) times a day with meals, Disp: , Rfl:     ziprasidone (GEODON) 80 mg capsule, Take 1 capsule by mouth 2 (two) times a day, Disp: , Rfl:     No Known Allergies    Objective:  Vitals:    08/20/21 1313   BP: 126/74   Pulse: 70       Body mass index is 30 62 kg/m²  Right Knee Exam     Tenderness   The patient is experiencing tenderness in the medial joint line  Range of Motion   Right knee extension: 0  Right knee flexion: 120  Tests   Varus: negative Valgus: negative  Drawer:  Anterior - negative    Posterior - negative    Other   Erythema: absent  Scars: absent  Sensation: normal  Pulse: present  Swelling: none  Effusion: no effusion present    Comments:  Stable at 0, 30 and 90 degrees  Neurovascularly in tact distally  No warmth, erythema or signs of infection  Parapatellar crepitance noted  Patellofemoral grind: positive          Observations     Right Knee   Negative for effusion  Physical Exam  Vitals and nursing note reviewed  Constitutional:       Appearance: She is well-developed  HENT:      Head: Normocephalic and atraumatic  Eyes:      General: No scleral icterus  Conjunctiva/sclera: Conjunctivae normal    Cardiovascular:      Rate and Rhythm: Normal rate  Pulmonary:      Effort: Pulmonary effort is normal  No respiratory distress  Musculoskeletal:      Cervical back: Normal range of motion and neck supple  Right knee: No effusion        Comments: As noted in HPI   Skin: General: Skin is warm and dry  Neurological:      Mental Status: She is alert and oriented to person, place, and time  Psychiatric:         Behavior: Behavior normal          I have personally reviewed pertinent films in PACS  X-ray of the right knee obtained on 08/20/2021 reviewed demonstrating severe degenerative change with near bone-on-bone contact in the medial compartment  There is sclerosis, marginal osteophytosis, and tibial subluxation  There is no acute fracture, dislocation, lytic or blastic lesion

## 2021-08-25 ENCOUNTER — TELEPHONE (OUTPATIENT)
Dept: OBGYN CLINIC | Facility: OTHER | Age: 66
End: 2021-08-25

## 2021-08-25 NOTE — TELEPHONE ENCOUNTER
We would perform a steroid injection first to treat her osteoarthritis   This can be done at her convenience

## 2021-08-25 NOTE — TELEPHONE ENCOUNTER
Dr Darrion Florentino    Patient called in to ask about an injection for her Right knee  The last note states Corticosteroid, and I asked her if she would like me to schedule her an appointment to receive that injection  She then said "He said he has to do something with my insurance"    That being said, would you prefer her get a  VISCO or just Steroid? C/b # [phone]    Thank you !

## 2021-08-27 ENCOUNTER — OFFICE VISIT (OUTPATIENT)
Dept: OBGYN CLINIC | Facility: CLINIC | Age: 66
End: 2021-08-27
Payer: COMMERCIAL

## 2021-08-27 VITALS
HEART RATE: 70 BPM | SYSTOLIC BLOOD PRESSURE: 116 MMHG | DIASTOLIC BLOOD PRESSURE: 70 MMHG | HEIGHT: 65 IN | BODY MASS INDEX: 30.22 KG/M2 | WEIGHT: 181.4 LBS

## 2021-08-27 DIAGNOSIS — Z86.39 HISTORY OF DIABETES MELLITUS: ICD-10-CM

## 2021-08-27 DIAGNOSIS — G89.29 CHRONIC PAIN OF RIGHT KNEE: ICD-10-CM

## 2021-08-27 DIAGNOSIS — M17.11 PRIMARY OSTEOARTHRITIS OF RIGHT KNEE: Primary | ICD-10-CM

## 2021-08-27 DIAGNOSIS — M25.561 CHRONIC PAIN OF RIGHT KNEE: ICD-10-CM

## 2021-08-27 PROCEDURE — 20610 DRAIN/INJ JOINT/BURSA W/O US: CPT | Performed by: ORTHOPAEDIC SURGERY

## 2021-08-27 PROCEDURE — 99213 OFFICE O/P EST LOW 20 MIN: CPT | Performed by: ORTHOPAEDIC SURGERY

## 2021-08-27 RX ORDER — BUPIVACAINE HYDROCHLORIDE 5 MG/ML
6 INJECTION, SOLUTION EPIDURAL; INTRACAUDAL
Status: COMPLETED | OUTPATIENT
Start: 2021-08-27 | End: 2021-08-27

## 2021-08-27 RX ORDER — TRIAMCINOLONE ACETONIDE 40 MG/ML
80 INJECTION, SUSPENSION INTRA-ARTICULAR; INTRAMUSCULAR
Status: COMPLETED | OUTPATIENT
Start: 2021-08-27 | End: 2021-08-27

## 2021-08-27 RX ADMIN — TRIAMCINOLONE ACETONIDE 80 MG: 40 INJECTION, SUSPENSION INTRA-ARTICULAR; INTRAMUSCULAR at 14:22

## 2021-08-27 RX ADMIN — BUPIVACAINE HYDROCHLORIDE 6 ML: 5 INJECTION, SOLUTION EPIDURAL; INTRACAUDAL at 14:22

## 2021-08-27 NOTE — PROGRESS NOTES
Assessment/Plan:  1  Primary osteoarthritis of right knee  Large joint arthrocentesis   2  Chronic pain of right knee  Large joint arthrocentesis   3  History of diabetes mellitus  Large joint arthrocentesis      Jesusita Rinne is a pleasant 68-year-old female presenting today for follow-up of her activity related right knee pain due to her severe end-stage underlying osteoarthritis  Since she has previously failed conservative treatment, she would like to escalate treatment in the form of a cortisone injection here today  She consented to and underwent a right knee cortisone injection as detailed below, which she tolerated well without difficulty or complication  Post injection instructions were provided  She is aware that it may cause a transient increase in her blood sugars  We will plan to see her back in 3-4 months pending the efficacy of today's procedure  She expressed understanding all of her questions were addressed today    Large joint arthrocentesis: R knee  Universal Protocol:  Consent: Verbal consent obtained  Risks and benefits: risks, benefits and alternatives were discussed  Consent given by: patient  Time out: Immediately prior to procedure a "time out" was called to verify the correct patient, procedure, equipment, support staff and site/side marked as required    Timeout called at: 8/27/2021 2:00 PM   Site marked: the operative site was marked  Patient identity confirmed: verbally with patient    Supporting Documentation  Indications: pain   Procedure Details  Location: knee - R knee  Preparation: Patient was prepped and draped in the usual sterile fashion  Needle size: 20 G  Ultrasound guidance: no  Approach: anterolateral  Medications administered: 6 mL bupivacaine (PF) 0 5 %; 80 mg triamcinolone acetonide 40 mg/mL    Patient tolerance: patient tolerated the procedure well with no immediate complications  Dressing:  Sterile dressing applied            Subjective: Right knee follow up    Patient ID: Karishma Staples is a 78 y o  female  Sarah Leslie is a pleasant 18-year-old female presenting today for follow-up 1 week from her previous appointment for her activity related right knee pain due to underlying osteoarthritis  Her activity related pain has persisted despite her conservative efforts  After considering risks benefits, she would like to proceed with a cortisone injection today  She denies new injuries from last week's visit    Review of Systems   Constitutional: Positive for activity change  HENT: Negative  Eyes: Negative  Respiratory: Negative  Cardiovascular: Negative  Gastrointestinal: Negative  Endocrine: Negative  Genitourinary: Negative  Musculoskeletal: Positive for arthralgias, gait problem, joint swelling and myalgias  Skin: Negative  Allergic/Immunologic: Negative  Hematological: Negative  Psychiatric/Behavioral: Negative  Past Medical History:   Diagnosis Date    Anesthesia complication     difficulty waking up    Arthritis     left knee    Bone spur     left knee behing the knee cap    Chronic kidney disease     Colon polyp     Tubulovillous Adenoma High Grade Dysplasia - 2017    Depression     Diabetes mellitus ([address]     Endometrial cancer ([facility] [address] [facility]     grade I    Hx of bleeding disorder     vaginal bleeding started on 3/13/17     Hyperlipidemia     Hypertension     PONV (postoperative nausea and vomiting)     Shortness of breath     with exertion    Urinary incontinence     Vitamin D deficiency        Past Surgical History:   Procedure Laterality Date    BREAST BIOPSY Left     benign    CHOLECYSTECTOMY      open    COLONOSCOPY      DILATION AND CURETTAGE OF UTERUS N/A 4/5/2017    Procedure: DILATATION AND CURETTAGE (D&C);   Surgeon: Yara Marcano MD;  [facility] MAIN OR;  Service:     HYSTERECTOMY      OOPHORECTOMY      PELVIC LAPAROSCOPY      ID LAPAROSCOPY W TOT HYSTERECTUTERUS <=250 GRAM  W TUBE/OVARY N/A 5/4/2017 Procedure: ROBOTIC ASSISTED TOTAL LAPAROSCOPIC HYSTERECTOMY, BILATERAL SALPINGOOPHERECTOMY; CYSTOSCOPY;  Surgeon: Kaye Diaz MD;  Location: [facility] MAIN [facility];  Service: Gynecology Oncology    TONSILLECTOMY      TUBAL LIGATION         Family History   Problem Relation Age of Onset    Cancer Mother         Renal    Heart disease Father         CHF    Heart disease Sister         atrial septal defect       Social History     Occupational History    Not on file   Tobacco Use    Smoking status: Never Smoker    Smokeless tobacco: Never Used   Vaping Use    Vaping Use: Never used   Substance and Sexual Activity    Alcohol use: No    Drug use: No    Sexual activity: Not on file         Current Outpatient Medications:     BD PEN NEEDLE JOSEPHINE U/F 32G X 4 MM MISC, USE 4 TIMES DAILY WITH INSULIN, Disp: , Rfl: 10    buPROPion (WELLBUTRIN SR) 200 MG 12 hr tablet, Take 200 mg by mouth every morning, Disp: , Rfl:     clonazePAM (KlonoPIN) 0 25 MG disintegrating tablet, Take 0 25 mg by mouth every morning, Disp: , Rfl:     clonazePAM (KlonoPIN) 0 5 mg tablet, Take 0 5 mg by mouth daily after lunch 3 pm, Disp: , Rfl:     DOCUSATE SODIUM PO, , Disp: , Rfl:     EQ Aspirin Adult Low Dose 81 MG EC tablet, Take 1 tablet by mouth once daily with breakfast, Disp: 90 tablet, Rfl: 0    Insulin Glargine (TOUJEO SOLOSTAR SC), Inject 70 Units under the skin every morning LANTUS, Disp: , Rfl:     lisinopril (ZESTRIL) 2 5 mg tablet, Take 2 5 mg by mouth daily, Disp: , Rfl: 3    metFORMIN (GLUCOPHAGE-XR) 500 mg 24 hr tablet, Take 500 mg by mouth 2 (two) times a day, Disp: , Rfl: 5    metoprolol succinate (TOPROL-XL) 25 mg 24 hr tablet, Take 1 tablet (25 mg total) by mouth daily, Disp: 90 tablet, Rfl: 3    Multiple Vitamins-Minerals (MULTIVITAMIN ADULT PO), , Disp: , Rfl:     nitroglycerin (NITROSTAT) 0 4 mg SL tablet, Place 1 tablet (0 4 mg total) under the tongue every 5 (five) minutes as needed for chest pain (Patient not taking: Reported on 7/20/2021), Disp: 30 tablet, Rfl: 1    Omega-3 Fatty Acids (fish oil) 1,000 mg, Take 1 capsule (1,000 mg total) by mouth 2 (two) times a day, Disp: 60 capsule, Rfl: 0    rosuvastatin (CRESTOR) 20 MG tablet, Take 1 tablet (20 mg total) by mouth daily, Disp: 90 tablet, Rfl: 3    sertraline (ZOLOFT) 100 mg tablet, Take 100 mg by mouth 2 (two) times a day, Disp: , Rfl:     Vitamin D, Cholecalciferol, 1000 units CAPS, Take 1,000 Units by mouth every morning  , Disp: , Rfl:     ziprasidone (GEODON) 40 mg capsule, Take 40 mg by mouth every evening, Disp: , Rfl:     ziprasidone (GEODON) 80 mg capsule, Take 80 mg by mouth 2 (two) times a day with meals, Disp: , Rfl:     ziprasidone (GEODON) 80 mg capsule, Take 1 capsule by mouth 2 (two) times a day, Disp: , Rfl:     No Known Allergies    Objective:  Vitals:    08/27/21 1346   BP: 116/70   Pulse: 70       Body mass index is 30 19 kg/m²  Right Knee Exam     Tenderness   The patient is experiencing tenderness in the medial joint line and MCL  Range of Motion   Extension: normal Right knee extension: 0  Flexion: normal Right knee flexion: 120  Tests   Varus: negative Valgus: negative  Drawer:  Anterior - negative    Posterior - negative    Other   Erythema: absent  Scars: absent  Sensation: normal  Pulse: present  Swelling: none  Effusion: no effusion present    Comments:  Stable at 0, 30 and 90 degrees  Neurovascularly in tact distally  No warmth, erythema or signs of infection  Parapatellar crepitance noted  Patellofemoral grind: positive          Observations     Right Knee   Negative for effusion  Physical Exam  Vitals and nursing note reviewed  Constitutional:       Appearance: She is well-developed  Comments: Body mass index is 30 19 kg/m²  HENT:      Head: Normocephalic and atraumatic  Right Ear: External ear normal       Left Ear: External ear normal    Cardiovascular:      Rate and Rhythm: Normal rate  Pulmonary:      Effort: Pulmonary effort is normal    Abdominal:      Palpations: Abdomen is soft  Musculoskeletal:      Cervical back: Normal range of motion  Right knee: No effusion  Comments: See ortho exam   Skin:     General: Skin is warm and dry  Neurological:      General: No focal deficit present  Mental Status: She is alert and oriented to person, place, and time  Psychiatric:         Behavior: Behavior normal          Thought Content:  Thought content normal          Judgment: Judgment normal

## 2021-09-02 ENCOUNTER — OFFICE VISIT (OUTPATIENT)
Dept: GYNECOLOGIC ONCOLOGY | Facility: CLINIC | Age: 66
End: 2021-09-02
Payer: COMMERCIAL

## 2021-09-02 VITALS
OXYGEN SATURATION: 97 % | WEIGHT: 182 LBS | BODY MASS INDEX: 30.32 KG/M2 | HEART RATE: 68 BPM | SYSTOLIC BLOOD PRESSURE: 128 MMHG | RESPIRATION RATE: 16 BRPM | HEIGHT: 65 IN | DIASTOLIC BLOOD PRESSURE: 64 MMHG | TEMPERATURE: 97.1 F

## 2021-09-02 DIAGNOSIS — Z08 ENCOUNTER FOR FOLLOW-UP SURVEILLANCE OF ENDOMETRIAL CANCER: ICD-10-CM

## 2021-09-02 DIAGNOSIS — Z85.42 ENCOUNTER FOR FOLLOW-UP SURVEILLANCE OF ENDOMETRIAL CANCER: ICD-10-CM

## 2021-09-02 DIAGNOSIS — Z85.42 HISTORY OF ENDOMETRIAL CANCER: Primary | ICD-10-CM

## 2021-09-02 DIAGNOSIS — Z12.31 ENCOUNTER FOR SCREENING MAMMOGRAM FOR BREAST CANCER: ICD-10-CM

## 2021-09-02 DIAGNOSIS — Z85.038 HISTORY OF COLON CANCER: ICD-10-CM

## 2021-09-02 PROCEDURE — 99213 OFFICE O/P EST LOW 20 MIN: CPT | Performed by: PHYSICIAN ASSISTANT

## 2021-09-02 NOTE — PROGRESS NOTES
Assessment/Plan:    Problem List Items Addressed This Visit        Other    History of endometrial cancer - Primary     History of unstaged, stage IB, grade 1 with positive LVSI  She is s/p completion of adjuvant radiation in August 2017  She is clinically without evidence of disease recurrence       The patient will return to the office in 6 months for continued surveillance           History of colon cancer     Last colonoscopy, January 2021  Encounter for follow-up surveillance of endometrial cancer      Other Visit Diagnoses     Encounter for screening mammogram for breast cancer        Relevant Orders    Mammo screening bilateral w 3d & cad            CHIEF COMPLAINT:   Endometrial cancer surveillance    Problem:  Cancer Staging  History of endometrial cancer  Staging form: Corpus Uteri - Carcinoma, AJCC 7th Edition  - Clinical: FIGO Stage IB (T1b, N0, M0) - Signed by Juan Rich PA-C on 3/28/2018        Previous therapy:  Oncology History   History of endometrial cancer   4/5/2017 Initial Diagnosis    Malignant neoplasm of endometrium ([facility] [address] )     4/5/2017 Surgery    Dilation and curettage  A  Grade 1 endometrial cancer        5/4/2017 Surgery    Robotic-assisted total laparoscopic hysterectomy, bilateral salpingo-oophorectomy and cystoscopy  A  FIGO grade 1, 4 4 cm tumor   B  1 0 of 1 5 cm myometrial invasion  C  Positive LVSI  D  Negative pelvic washings  E  IHC for peterson, all proteins expressed  7/19/2017 - 8/24/2017 Radiation    :  Course: C1    Plan ID Energy Fractions Dose per Fraction (cGy) Total Dose Delivered (cGy) Elapsed Days   Whole Pelvis 10X 25 / 25 180 4,500 36      Treatment dates:  C1: 7/19/2017 - 8/24/2017       History of colon cancer   12/2017 Surgery    Colonoscopy x 2           Patient ID: Andrea Nolan is a 78 y o  female  who has no new complaints today  No vaginal bleeding, abdominal/pelvic pain  Normal bowel and bladder function   No interval change in medical history since last visit  Quality of life is good  The following portions of the patient's history were reviewed and updated as appropriate: allergies, current medications, past medical history, past surgical history and problem list     Review of Systems   Constitutional: Negative  HENT: Negative  Eyes: Negative  Respiratory: Negative  Cardiovascular: Negative  Gastrointestinal: Negative  Genitourinary: Negative  Musculoskeletal: Positive for arthralgias  Skin: Negative  Neurological: Negative  Psychiatric/Behavioral: Negative          Current Outpatient Medications   Medication Sig Dispense Refill    BD PEN NEEDLE JOSEPHINE U/F 32G X 4 MM MISC USE 4 TIMES DAILY WITH INSULIN  10    buPROPion (WELLBUTRIN SR) 200 MG 12 hr tablet Take 200 mg by mouth every morning      clonazePAM (KlonoPIN) 0 25 MG disintegrating tablet Take 0 25 mg by mouth every morning      clonazePAM (KlonoPIN) 0 5 mg tablet Take 0 5 mg by mouth daily after lunch 3 pm      DOCUSATE SODIUM PO       EQ Aspirin Adult Low Dose 81 MG EC tablet Take 1 tablet by mouth once daily with breakfast 90 tablet 0    Insulin Glargine (TOUJEO SOLOSTAR SC) Inject 70 Units under the skin every morning LANTUS      lisinopril (ZESTRIL) 2 5 mg tablet Take 2 5 mg by mouth daily  3    metFORMIN (GLUCOPHAGE-XR) 500 mg 24 hr tablet Take 500 mg by mouth 2 (two) times a day  5    metoprolol succinate (TOPROL-XL) 25 mg 24 hr tablet Take 1 tablet (25 mg total) by mouth daily 90 tablet 3    Multiple Vitamins-Minerals (MULTIVITAMIN ADULT PO)       Omega-3 Fatty Acids (fish oil) 1,000 mg Take 1 capsule (1,000 mg total) by mouth 2 (two) times a day 60 capsule 0    rosuvastatin (CRESTOR) 20 MG tablet Take 1 tablet (20 mg total) by mouth daily 90 tablet 3    sertraline (ZOLOFT) 100 mg tablet Take 100 mg by mouth 2 (two) times a day      ziprasidone (GEODON) 40 mg capsule Take 40 mg by mouth every evening      ziprasidone (GEODON) 80 mg capsule Take 80 mg by mouth 2 (two) times a day with meals      nitroglycerin (NITROSTAT) 0 4 mg SL tablet Place 1 tablet (0 4 mg total) under the tongue every 5 (five) minutes as needed for chest pain (Patient not taking: Reported on 9/2/2021) 30 tablet 1    Vitamin D, Cholecalciferol, 1000 units CAPS Take 1,000 Units by mouth every morning   (Patient not taking: Reported on 9/2/2021)      ziprasidone (GEODON) 80 mg capsule Take 1 capsule by mouth 2 (two) times a day (Patient not taking: Reported on 9/2/2021)       No current facility-administered medications for this visit  Objective:    Blood pressure 128/64, pulse 68, temperature (!) 97 1 °F (36 2 °C), temperature source Temporal, resp  rate 16, height 5' 5" (1 651 m), weight 82 6 kg (182 lb), SpO2 97 %, not currently breastfeeding  Body mass index is 30 29 kg/m²  Body surface area is 1 9 meters squared  Physical Exam  Vitals reviewed  Exam conducted with a chaperone present  Constitutional:       General: She is not in acute distress  Appearance: Normal appearance  She is not ill-appearing  HENT:      Head: Normocephalic and atraumatic  Mouth/Throat:      Mouth: Mucous membranes are moist    Eyes:      General:         Right eye: No discharge  Left eye: No discharge  Conjunctiva/sclera: Conjunctivae normal    Pulmonary:      Effort: Pulmonary effort is normal    Abdominal:      Palpations: Abdomen is soft  There is no mass  Tenderness: There is no abdominal tenderness  Hernia: No hernia is present  Genitourinary:     Comments: The external female genitalia is normal  The bartholin's, uretheral and skenes glands are normal  The urethral meatus is normal (midline with no lesions)  Anus without fissure or lesion  Speculum exam reveals a grossly normal vagina  Radiation changes present  No masses, lesions,discharge or bleeding  No significant cystocele or rectocele noted   Bimanual exam notes a surgical absent cervix, uterus and adnexal structures  No masses or fullness  Bladder is without fullness, mass or tenderness  Musculoskeletal:      Right lower leg: No edema  Left lower leg: No edema  Skin:     General: Skin is warm and dry  Coloration: Skin is not jaundiced  Findings: No rash  Neurological:      General: No focal deficit present  Mental Status: She is alert and oriented to person, place, and time  Cranial Nerves: No cranial nerve deficit  Sensory: No sensory deficit  Motor: No weakness  Gait: Gait normal    Psychiatric:         Mood and Affect: Mood normal          Behavior: Behavior normal          Thought Content:  Thought content normal          Judgment: Judgment normal

## 2021-09-29 ENCOUNTER — APPOINTMENT (OUTPATIENT)
Dept: RADIOLOGY | Facility: CLINIC | Age: 66
End: 2021-09-29
Payer: COMMERCIAL

## 2021-09-29 ENCOUNTER — OFFICE VISIT (OUTPATIENT)
Dept: OBGYN CLINIC | Facility: CLINIC | Age: 66
End: 2021-09-29
Payer: COMMERCIAL

## 2021-09-29 VITALS
HEIGHT: 65 IN | DIASTOLIC BLOOD PRESSURE: 70 MMHG | SYSTOLIC BLOOD PRESSURE: 122 MMHG | HEART RATE: 73 BPM | WEIGHT: 184 LBS | BODY MASS INDEX: 30.66 KG/M2

## 2021-09-29 DIAGNOSIS — M25.562 LEFT KNEE PAIN, UNSPECIFIED CHRONICITY: ICD-10-CM

## 2021-09-29 DIAGNOSIS — G89.29 CHRONIC PAIN OF LEFT KNEE: ICD-10-CM

## 2021-09-29 DIAGNOSIS — Z01.89 ENCOUNTER FOR LOWER EXTREMITY COMPARISON IMAGING STUDY: ICD-10-CM

## 2021-09-29 DIAGNOSIS — M25.562 CHRONIC PAIN OF LEFT KNEE: ICD-10-CM

## 2021-09-29 DIAGNOSIS — M17.0 PRIMARY OSTEOARTHRITIS OF BOTH KNEES: Primary | ICD-10-CM

## 2021-09-29 PROCEDURE — 73562 X-RAY EXAM OF KNEE 3: CPT

## 2021-09-29 PROCEDURE — 73560 X-RAY EXAM OF KNEE 1 OR 2: CPT

## 2021-09-29 PROCEDURE — 20610 DRAIN/INJ JOINT/BURSA W/O US: CPT | Performed by: ORTHOPAEDIC SURGERY

## 2021-09-29 PROCEDURE — 99214 OFFICE O/P EST MOD 30 MIN: CPT | Performed by: ORTHOPAEDIC SURGERY

## 2021-09-29 RX ORDER — BUPIVACAINE HYDROCHLORIDE 5 MG/ML
6 INJECTION, SOLUTION EPIDURAL; INTRACAUDAL
Status: COMPLETED | OUTPATIENT
Start: 2021-09-29 | End: 2021-09-29

## 2021-09-29 RX ORDER — TRIAMCINOLONE ACETONIDE 40 MG/ML
80 INJECTION, SUSPENSION INTRA-ARTICULAR; INTRAMUSCULAR
Status: COMPLETED | OUTPATIENT
Start: 2021-09-29 | End: 2021-09-29

## 2021-09-29 RX ADMIN — TRIAMCINOLONE ACETONIDE 80 MG: 40 INJECTION, SUSPENSION INTRA-ARTICULAR; INTRAMUSCULAR at 09:11

## 2021-09-29 RX ADMIN — BUPIVACAINE HYDROCHLORIDE 6 ML: 5 INJECTION, SOLUTION EPIDURAL; INTRACAUDAL at 09:11

## 2021-09-29 NOTE — PROGRESS NOTES
Assessment/Plan:  1  Primary osteoarthritis of both knees     2  Chronic pain of left knee  XR knee 3 vw left non injury     Scribe Attestation    I,:  Luis Villanueva am acting as a scribe while in the presence of the attending physician :       I,:  Madhu Dunn, DO personally performed the services described in this documentation    as scribed in my presence :           Daljit Galarza is a pleasant 43-year-old female who returns today for initial evaluation of her left knee pain  After reviewing her imaging and performing a thorough history and physical exam I explained that she is suffering with severe degenerative change in her left knee, similar to her right knee  As she did well with corticosteroid injection in her right knee, I offered to perform this for her left knee  After discussing the risks and benefits, she elected to proceed  The injection was administered as documented below and was well tolerated by the patient  Post injection instructions were provided  We will see her back in 3 months for repeat clinical evaluation  Large joint arthrocentesis: L knee  Universal Protocol:  Consent: Verbal consent obtained    Risks and benefits: risks, benefits and alternatives were discussed  Consent given by: patient  Patient understanding: patient states understanding of the procedure being performed  Site marked: the operative site was marked  Patient identity confirmed: verbally with patient    Supporting Documentation  Indications: pain   Procedure Details  Location: knee - L knee  Preparation: Patient was prepped and draped in the usual sterile fashion  Needle size: 20 G  Ultrasound guidance: no  Approach: anterolateral  Medications administered: 6 mL bupivacaine (PF) 0 5 %; 80 mg triamcinolone acetonide 40 mg/mL    Patient tolerance: patient tolerated the procedure well with no immediate complications  Dressing:  Sterile dressing applied          Subjective:   Initial evaluation for left knee pain    Patient ID: Dora Hammond is a 78 y o  female who returns today for initial evaluation of her left knee pain  She has been receiving treatment for her right knee and has been responding favorably to corticosteroid injection therapy  At today's visit, she states that she suffered a fall a few weeks ago and after the fall began having diffuse left knee pain  She describes aching pain that is most symptomatic in the anterior and anteromedial aspect of the knee  Her pain has been worse with activity and somewhat better at rest   She denies any distal paresthesias of the lower extremity  Review of Systems   Constitutional: Positive for activity change  Negative for chills, fever and unexpected weight change  HENT: Negative for hearing loss, nosebleeds and sore throat  Eyes: Negative for pain, redness and visual disturbance  Respiratory: Negative for cough, shortness of breath and wheezing  Cardiovascular: Negative for chest pain, palpitations and leg swelling  Gastrointestinal: Negative for abdominal pain, nausea and vomiting  Endocrine: Negative for polydipsia and polyuria  Genitourinary: Negative for dysuria and hematuria  Musculoskeletal: Positive for arthralgias  Negative for joint swelling and myalgias  See HPI   Skin: Negative for rash and wound  Neurological: Negative for dizziness, numbness and headaches  Psychiatric/Behavioral: Negative for decreased concentration and suicidal ideas  The patient is not nervous/anxious            Past Medical History:   Diagnosis Date    Anesthesia complication     difficulty waking up    Arthritis     left knee    Bone spur     left knee behing the knee cap    Chronic kidney disease     Colon polyp     Tubulovillous Adenoma High Grade Dysplasia - 2017    Depression     Diabetes mellitus [facility] [address]     Endometrial cancer ([facility] [address]     grade I    Hx of bleeding disorder     vaginal bleeding started on 3/13/17     Hyperlipidemia     Hypertension     PONV (postoperative nausea and vomiting)     Shortness of breath     with exertion    Urinary incontinence     Vitamin D deficiency        Past Surgical History:   Procedure Laterality Date    BREAST BIOPSY Left     benign    CHOLECYSTECTOMY      open    COLONOSCOPY      DILATION AND CURETTAGE OF UTERUS N/A 4/5/2017    Procedure: DILATATION AND CURETTAGE (D&C);   Surgeon: Norris Ahumada, MD;  Location: [facility] MAIN OR;  Service:     HYSTERECTOMY      OOPHORECTOMY      PELVIC LAPAROSCOPY      HI LAPAROSCOPY W TOT HYSTERECTUTERUS <=250 GRAM  W TUBE/OVARY N/A 5/4/2017    Procedure: ROBOTIC ASSISTED TOTAL LAPAROSCOPIC HYSTERECTOMY, BILATERAL SALPINGOOPHERECTOMY; CYSTOSCOPY;  Surgeon: Sylvester Austin MD;  Location:  MAIN OR;  Service: Gynecology Oncology    TONSILLECTOMY      TUBAL LIGATION         Family History   Problem Relation Age of Onset    Cancer Mother         Renal    Heart disease Father         CHF    Heart disease Sister         atrial septal defect       Social History     Occupational History    Not on file   Tobacco Use    Smoking status: Never Smoker    Smokeless tobacco: Never Used   Vaping Use    Vaping Use: Never used   Substance and Sexual Activity    Alcohol use: No    Drug use: No    Sexual activity: Not on file         Current Outpatient Medications:     BD PEN NEEDLE JOSEPHINE U/F 32G X 4 MM MISC, USE 4 TIMES DAILY WITH INSULIN, Disp: , Rfl: 10    buPROPion (WELLBUTRIN SR) 200 MG 12 hr tablet, Take 200 mg by mouth every morning, Disp: , Rfl:     clonazePAM (KlonoPIN) 0 25 MG disintegrating tablet, Take 0 25 mg by mouth every morning, Disp: , Rfl:     clonazePAM (KlonoPIN) 0 5 mg tablet, Take 0 5 mg by mouth daily after lunch 3 pm, Disp: , Rfl:     DOCUSATE SODIUM PO, , Disp: , Rfl:     EQ Aspirin Adult Low Dose 81 MG EC tablet, Take 1 tablet by mouth once daily with breakfast, Disp: 90 tablet, Rfl: 0    Insulin Glargine (TOUJEO SOLOSTAR SC), Inject 70 Units under the skin every morning LANTUS, Disp: , Rfl:     lisinopril (ZESTRIL) 2 5 mg tablet, Take 2 5 mg by mouth daily, Disp: , Rfl: 3    metFORMIN (GLUCOPHAGE-XR) 500 mg 24 hr tablet, Take 500 mg by mouth 2 (two) times a day, Disp: , Rfl: 5    metoprolol succinate (TOPROL-XL) 25 mg 24 hr tablet, Take 1 tablet (25 mg total) by mouth daily, Disp: 90 tablet, Rfl: 3    Multiple Vitamins-Minerals (MULTIVITAMIN ADULT PO), , Disp: , Rfl:     nitroglycerin (NITROSTAT) 0 4 mg SL tablet, Place 1 tablet (0 4 mg total) under the tongue every 5 (five) minutes as needed for chest pain (Patient not taking: Reported on 9/2/2021), Disp: 30 tablet, Rfl: 1    Omega-3 Fatty Acids (fish oil) 1,000 mg, Take 1 capsule (1,000 mg total) by mouth 2 (two) times a day, Disp: 60 capsule, Rfl: 0    rosuvastatin (CRESTOR) 20 MG tablet, Take 1 tablet (20 mg total) by mouth daily, Disp: 90 tablet, Rfl: 3    sertraline (ZOLOFT) 100 mg tablet, Take 100 mg by mouth 2 (two) times a day, Disp: , Rfl:     Vitamin D, Cholecalciferol, 1000 units CAPS, Take 1,000 Units by mouth every morning   (Patient not taking: Reported on 9/2/2021), Disp: , Rfl:     ziprasidone (GEODON) 40 mg capsule, Take 40 mg by mouth every evening, Disp: , Rfl:     ziprasidone (GEODON) 80 mg capsule, Take 80 mg by mouth 2 (two) times a day with meals, Disp: , Rfl:     ziprasidone (GEODON) 80 mg capsule, Take 1 capsule by mouth 2 (two) times a day (Patient not taking: Reported on 9/2/2021), Disp: , Rfl:     No Known Allergies    Objective:  Vitals:    09/29/21 0827   BP: 122/70   Pulse: 73       Body mass index is 30 62 kg/m²  Left Knee Exam     Tenderness   The patient is experiencing tenderness in the medial joint line  Range of Motion   Left knee extension: 0  Left knee flexion: 120 with pain       Tests   Varus: negative Valgus: negative  Drawer:  Anterior - negative     Posterior - negative    Other   Erythema: absent  Scars: absent  Sensation: normal  Pulse: present  Swelling: none  Effusion: no effusion present    Comments:  Stable at 0, 30 an 90 degrees  Neurovascularly in tact distally  Parapatellar crepitance noted  Patellofemoral grind: positive          Observations   Left Knee   Negative for effusion  Physical Exam  Vitals and nursing note reviewed  Constitutional:       Appearance: She is well-developed  HENT:      Head: Normocephalic and atraumatic  Eyes:      General: No scleral icterus  Conjunctiva/sclera: Conjunctivae normal    Cardiovascular:      Rate and Rhythm: Normal rate  Pulmonary:      Effort: Pulmonary effort is normal  No respiratory distress  Musculoskeletal:      Cervical back: Normal range of motion and neck supple  Left knee: No effusion  Comments: As noted in HPI   Skin:     General: Skin is warm and dry  Neurological:      Mental Status: She is alert and oriented to person, place, and time  Psychiatric:         Behavior: Behavior normal          I have personally reviewed pertinent films in PACS  X-ray of the left knee obtained on 09/29/2021 reviewed demonstrating severe degenerative change with near bone-on-bone contact in the medial and patellofemoral compartments  There is sclerosis, marginal osteophytosis, and tibial subluxation  There is no acute fracture, dislocation, lytic or blastic lesion

## 2022-02-08 ENCOUNTER — TELEPHONE (OUTPATIENT)
Dept: HEMATOLOGY ONCOLOGY | Facility: CLINIC | Age: 67
End: 2022-02-08

## 2022-02-08 NOTE — TELEPHONE ENCOUNTER
Patient calling to report a large lump in her right axilla towards the right breast  Pain score of 8  She states that she noticed it about 1 month ago  Patient states the lump started out small then continued to "swell" and increase in size  She describes it as " it covers my entire armpit"    Recent patient reported vaccines:     1st Covid vaccine dose : 4/16/2021    2nd Covid vaccine dose : 5/4/2021    Flu vaccine : 10/2021    Patient states that she has not had any imaging or seen a specialist for this problem  Patient states that she has a history of endometrial cancer and is Dr Rafael Garcia patient       She realizes that he does not treat the breast, but she would like to inform him and request assistance/referral     [phone] (h)

## 2022-02-08 NOTE — TELEPHONE ENCOUNTER
Returned call to patient her reports her right axilla is swollen and painful  She reports it has been this way she thinks for about one month-denies redness to that area "it just hurts a lot"  Instructed to go to her PCP ASAP (or Urgent care) to have it examined  She stated "okay"  I asked she call us back with an update

## 2022-02-24 ENCOUNTER — OFFICE VISIT (OUTPATIENT)
Dept: SURGERY | Facility: CLINIC | Age: 67
End: 2022-02-24
Payer: COMMERCIAL

## 2022-02-24 VITALS
WEIGHT: 195.6 LBS | TEMPERATURE: 96.2 F | SYSTOLIC BLOOD PRESSURE: 112 MMHG | DIASTOLIC BLOOD PRESSURE: 60 MMHG | OXYGEN SATURATION: 97 % | HEIGHT: 65 IN | HEART RATE: 89 BPM | BODY MASS INDEX: 32.59 KG/M2

## 2022-02-24 DIAGNOSIS — R94.39 ABNORMAL STRESS TEST: ICD-10-CM

## 2022-02-24 DIAGNOSIS — R22.31 AXILLARY MASS, RIGHT: Primary | ICD-10-CM

## 2022-02-24 DIAGNOSIS — Z85.038 HISTORY OF COLON CANCER: ICD-10-CM

## 2022-02-24 DIAGNOSIS — Z85.42 HISTORY OF ENDOMETRIAL CANCER: ICD-10-CM

## 2022-02-24 DIAGNOSIS — IMO0002 UNCONTROLLED TYPE 2 DIABETES MELLITUS WITH NEPHROPATHY: ICD-10-CM

## 2022-02-24 DIAGNOSIS — I10 HYPERTENSION: Chronic | ICD-10-CM

## 2022-02-24 PROCEDURE — 99204 OFFICE O/P NEW MOD 45 MIN: CPT | Performed by: SPECIALIST

## 2022-02-24 NOTE — PROGRESS NOTES
History and Physical Examination - General Surgery   [facility]  Dayna Tariq 75 y o  female MRN: [id number]  Unit/Bed#:  Encounter: [id number] PCP: Karsten Waters MD    History of Present Illness   Chief Complaint:  I have the right axillary mass    HPI:  Dayna Tariq is a 75 y o  female who presents to office with history of right axillary mass which was there for many years and now it has become more painful and having some pain over the right anterior chest    Patient has similar mass which was excised many years ago from the left breast  Last mammo was the 1 year ago was on unremarkable  Patient has history of endometrial cancer and the colon cancer  Patient has a history of a gallbladder surgery in past    Patient had active COVID infection in December 2021  Still has some shortness of breath unable to walk 2 blocks patient has an appointment to see cardiologist    Not on anticoagulation except aspirin    Historical Information   The following portions of the patient's history were reviewed and updated as appropriate  Past Medical History:   Diagnosis Date    Anesthesia complication     difficulty waking up    Arthritis     left knee    Bone spur     left knee behing the knee cap    Chronic kidney disease     Colon polyp     Tubulovillous Adenoma High Grade Dysplasia - 2017    Depression     Diabetes mellitus ([address]     Endometrial cancer ([facility] [address]     grade I    Hx of bleeding disorder     vaginal bleeding started on 3/13/17     Hyperlipidemia     Hypertension     PONV (postoperative nausea and vomiting)     Shortness of breath     with exertion    Urinary incontinence     Vitamin D deficiency      Past Surgical History:   Procedure Laterality Date    BREAST BIOPSY Left     benign    CHOLECYSTECTOMY      open    COLONOSCOPY      DILATION AND CURETTAGE OF UTERUS N/A 4/5/2017    Procedure: DILATATION AND CURETTAGE (D&C);   Surgeon: Vikki Sanchez MD; Location: [facility] MAIN OR;  Service:     HYSTERECTOMY      OOPHORECTOMY      PELVIC LAPAROSCOPY      ID LAPAROSCOPY W TOT HYSTERECTUTERUS <=250 GRAM  W TUBE/OVARY N/A 5/4/2017    Procedure: ROBOTIC ASSISTED TOTAL LAPAROSCOPIC HYSTERECTOMY, BILATERAL SALPINGOOPHERECTOMY; CYSTOSCOPY;  Surgeon: Mary West MD;  Location:  MAIN OR;  Service: Gynecology Oncology    TONSILLECTOMY      TUBAL LIGATION       Social History   Social History     Substance and Sexual Activity   Alcohol Use No     Social History     Substance and Sexual Activity   Drug Use No     Social History     Tobacco Use   Smoking Status Never Smoker   Smokeless Tobacco Never Used     Family History:   Family History   Problem Relation Age of Onset    Cancer Mother         Renal    Heart disease Father         CHF    Heart disease Sister         atrial septal defect       Meds/Allergies   No Known Allergies    Current Outpatient Medications:     BD PEN NEEDLE JOSEPHINE U/F 32G X 4 MM MISC, USE 4 TIMES DAILY WITH INSULIN, Disp: , Rfl: 10    buPROPion (WELLBUTRIN SR) 200 MG 12 hr tablet, Take 200 mg by mouth every morning, Disp: , Rfl:     clonazePAM (KlonoPIN) 0 25 MG disintegrating tablet, Take 0 25 mg by mouth every morning, Disp: , Rfl:     clonazePAM (KlonoPIN) 0 5 mg tablet, Take 0 5 mg by mouth daily after lunch 3 pm, Disp: , Rfl:     DOCUSATE SODIUM PO, , Disp: , Rfl:     EQ Aspirin Adult Low Dose 81 MG EC tablet, Take 1 tablet by mouth once daily with breakfast, Disp: 90 tablet, Rfl: 0    Insulin Glargine (TOUJEO SOLOSTAR SC), Inject 70 Units under the skin every morning LANTUS, Disp: , Rfl:     lisinopril (ZESTRIL) 2 5 mg tablet, Take 2 5 mg by mouth daily, Disp: , Rfl: 3    metFORMIN (GLUCOPHAGE-XR) 500 mg 24 hr tablet, Take 500 mg by mouth 2 (two) times a day, Disp: , Rfl: 5    metoprolol succinate (TOPROL-XL) 25 mg 24 hr tablet, Take 1 tablet (25 mg total) by mouth daily, Disp: 90 tablet, Rfl: 3    Multiple Vitamins-Minerals (MULTIVITAMIN ADULT PO), , Disp: , Rfl:     nitroglycerin (NITROSTAT) 0 4 mg SL tablet, Place 1 tablet (0 4 mg total) under the tongue every 5 (five) minutes as needed for chest pain, Disp: 30 tablet, Rfl: 1    Omega-3 Fatty Acids (fish oil) 1,000 mg, Take 1 capsule (1,000 mg total) by mouth 2 (two) times a day, Disp: 60 capsule, Rfl: 0    rosuvastatin (CRESTOR) 20 MG tablet, Take 1 tablet (20 mg total) by mouth daily, Disp: 90 tablet, Rfl: 3    sertraline (ZOLOFT) 100 mg tablet, Take 100 mg by mouth 2 (two) times a day, Disp: , Rfl:     ziprasidone (GEODON) 40 mg capsule, Take 40 mg by mouth every evening, Disp: , Rfl:     ziprasidone (GEODON) 80 mg capsule, Take 80 mg by mouth 2 (two) times a day with meals, Disp: , Rfl:     Vitamin D, Cholecalciferol, 1000 units CAPS, Take 1,000 Units by mouth every morning   (Patient not taking: Reported on 9/2/2021), Disp: , Rfl:     ziprasidone (GEODON) 80 mg capsule, Take 1 capsule by mouth 2 (two) times a day (Patient not taking: Reported on 9/2/2021), Disp: , Rfl:      REVIEW OF SYSTEMS  Constitutional:  Denies fever or chills   Eyes:  Denies change in visual acuity   HENT:  Denies nasal congestion or sore throat   Respiratory:  Complaining of cough and shortness of breath   Since COVID infection in December 2021  Cardiovascular:  Denies chest pain or edema   GI:  Denies abdominal pain, nausea, vomiting, bloody stools or diarrhea   :  Denies dysuria, frequency, difficulty in micturition and nocturia  Musculoskeletal:  History of back pain or joint pain   Neurologic:  Denies headache, focal weakness or sensory changes   Endocrine:  Denies polyuria or polydipsia   Lymphatic:  Denies swollen glands   Psychiatric:  Denies depression or anxiety     Objective   Current Vitals:   /60 (BP Location: Left arm, Patient Position: Sitting, Cuff Size: Large)   Pulse 89   Temp (!) 96 2 °F (35 7 °C) (Core)   Ht 5' 5" (1 651 m)   Wt 88 7 kg (195 lb 9 6 oz)   SpO2 97% BMI 32 55 kg/m²   Body mass index is 32 55 kg/m²  PHYSICAL EXAMS  General:  Patient is not in acute distress, laying in the bed comfortably, awake, alert responding to commands,   HEENT:  Both pupils normal-size atraumatic, normocephalic, nonicteric  Neck:  JVP not raised  Trachea central  Respiratory:  normal Breath sounds clear to auscultation,  Cardiovascular:  S1-S2 normal without any murmur   GI:  Abdomen soft nontender  Obese scar from previous  Musculoskeletal:  History of back pain joint  Integument:  No skin rashes or ulceration  Lymphatic:  No cervical or axillary lymphadenopathy  Neurologic:  Patient is awake alert, responding to command, well-oriented to time and place and person moving all extremities ambulating well    Visit Diagnosis:   Diagnoses and all orders for this visit:    Axillary mass, right    Abnormal stress test    Hypertension    Uncontrolled type 2 diabetes mellitus with nephropathy ([facility] [address] )    History of colon cancer    History of endometrial cancer       Plan of care was discussed with patient in detail    Pertinent labs reviewed  Pertinent images and available reads personally reviewed  No results found  Pertinent notes reviewed    Assessment/Plan   Assessment:  Prominent axillary tail of breast  Possible lipoma    Plan:  The risks, benefits, alternatives,and probabilities of success were discussed in detail with no guarantee made as to outcome  All questions were answered to the patient's satisfaction  Ultrasound right axilla  Follow-up in 2 weeks after ultrasound  Was explained that no surgery could be done in view of a recent COVID infection    Counseling / Coordination of Care  Expained patient in detailed about coordination of care  A description of the counseling / coordination of care:  I performed an interim history, pertinent images and labs, performed a physical examination to arrive at the plan delineated above with associated thought processes         Dr Xu Ram Chrissy Rucker MD [facility]    Office   Tel  [phone]  Fax   [phone]

## 2022-02-24 NOTE — ASSESSMENT & PLAN NOTE
Patient had the of active COVID infection in December   Ultrasound right axilla  We have to wait for surgery for 3 months after COVID infection  Follow-up in 2 weeks

## 2022-03-31 ENCOUNTER — HOSPITAL ENCOUNTER (OUTPATIENT)
Dept: RADIOLOGY | Facility: HOSPITAL | Age: 67
Discharge: HOME | End: 2022-03-31
Attending: SPECIALIST
Payer: COMMERCIAL

## 2022-03-31 VITALS — WEIGHT: 195 LBS | HEIGHT: 65 IN | BODY MASS INDEX: 32.49 KG/M2

## 2022-03-31 DIAGNOSIS — R22.33 AXILLARY MASS, BILATERAL: ICD-10-CM

## 2022-03-31 DIAGNOSIS — Z85.42 HISTORY OF ENDOMETRIAL CANCER: ICD-10-CM

## 2022-03-31 DIAGNOSIS — R22.31 AXILLARY MASS, RIGHT: ICD-10-CM

## 2022-03-31 DIAGNOSIS — R94.39 ABNORMAL STRESS TEST: ICD-10-CM

## 2022-03-31 DIAGNOSIS — I10 HYPERTENSION: Chronic | ICD-10-CM

## 2022-03-31 DIAGNOSIS — IMO0002 UNCONTROLLED TYPE 2 DIABETES MELLITUS WITH NEPHROPATHY: ICD-10-CM

## 2022-03-31 DIAGNOSIS — Z85.038 HISTORY OF COLON CANCER: ICD-10-CM

## 2022-03-31 DIAGNOSIS — Z12.31 ENCOUNTER FOR SCREENING MAMMOGRAM FOR MALIGNANT NEOPLASM OF BREAST: ICD-10-CM

## 2022-03-31 PROCEDURE — G0279 TOMOSYNTHESIS, MAMMO: HCPCS

## 2022-03-31 PROCEDURE — 77066 DX MAMMO INCL CAD BI: CPT

## 2022-03-31 PROCEDURE — 76642 ULTRASOUND BREAST LIMITED: CPT

## 2022-04-07 PROBLEM — N63.31 MASS OF AXILLARY TAIL OF RIGHT BREAST: Status: ACTIVE | Noted: 2022-04-07

## 2022-04-07 PROBLEM — N63.32 MASS OF AXILLARY TAIL OF LEFT BREAST: Status: ACTIVE | Noted: 2022-04-07

## 2022-04-08 ENCOUNTER — OFFICE VISIT (OUTPATIENT)
Dept: SURGERY | Facility: CLINIC | Age: 67
End: 2022-04-08
Payer: COMMERCIAL

## 2022-04-08 ENCOUNTER — OFFICE VISIT (OUTPATIENT)
Dept: OBGYN CLINIC | Facility: CLINIC | Age: 67
End: 2022-04-08
Payer: COMMERCIAL

## 2022-04-08 VITALS
TEMPERATURE: 96.3 F | DIASTOLIC BLOOD PRESSURE: 72 MMHG | BODY MASS INDEX: 31.32 KG/M2 | OXYGEN SATURATION: 97 % | WEIGHT: 188 LBS | HEIGHT: 65 IN | SYSTOLIC BLOOD PRESSURE: 122 MMHG | HEART RATE: 94 BPM

## 2022-04-08 VITALS
BODY MASS INDEX: 32.44 KG/M2 | WEIGHT: 190 LBS | HEART RATE: 82 BPM | DIASTOLIC BLOOD PRESSURE: 74 MMHG | SYSTOLIC BLOOD PRESSURE: 113 MMHG | HEIGHT: 64 IN | RESPIRATION RATE: 17 BRPM

## 2022-04-08 DIAGNOSIS — N63.31 MASS OF AXILLARY TAIL OF RIGHT BREAST: ICD-10-CM

## 2022-04-08 DIAGNOSIS — Z09 SURGICAL FOLLOW-UP CARE: Primary | ICD-10-CM

## 2022-04-08 DIAGNOSIS — M17.0 PRIMARY OSTEOARTHRITIS OF BOTH KNEES: Primary | ICD-10-CM

## 2022-04-08 DIAGNOSIS — M25.562 CHRONIC PAIN OF BOTH KNEES: ICD-10-CM

## 2022-04-08 DIAGNOSIS — Z86.39 HISTORY OF DIABETES MELLITUS: ICD-10-CM

## 2022-04-08 DIAGNOSIS — G89.29 CHRONIC PAIN OF BOTH KNEES: ICD-10-CM

## 2022-04-08 DIAGNOSIS — Z85.42 HISTORY OF ENDOMETRIAL CANCER: ICD-10-CM

## 2022-04-08 DIAGNOSIS — M25.561 CHRONIC PAIN OF BOTH KNEES: ICD-10-CM

## 2022-04-08 DIAGNOSIS — I10 HYPERTENSION: Chronic | ICD-10-CM

## 2022-04-08 DIAGNOSIS — N63.32 MASS OF AXILLARY TAIL OF LEFT BREAST: ICD-10-CM

## 2022-04-08 PROCEDURE — 20610 DRAIN/INJ JOINT/BURSA W/O US: CPT | Performed by: ORTHOPAEDIC SURGERY

## 2022-04-08 PROCEDURE — 99213 OFFICE O/P EST LOW 20 MIN: CPT | Performed by: SPECIALIST

## 2022-04-08 PROCEDURE — 99213 OFFICE O/P EST LOW 20 MIN: CPT | Performed by: ORTHOPAEDIC SURGERY

## 2022-04-08 RX ORDER — TRIAMCINOLONE ACETONIDE 40 MG/ML
80 INJECTION, SUSPENSION INTRA-ARTICULAR; INTRAMUSCULAR
Status: COMPLETED | OUTPATIENT
Start: 2022-04-08 | End: 2022-04-08

## 2022-04-08 RX ORDER — BUPIVACAINE HYDROCHLORIDE 5 MG/ML
6 INJECTION, SOLUTION EPIDURAL; INTRACAUDAL
Status: COMPLETED | OUTPATIENT
Start: 2022-04-08 | End: 2022-04-08

## 2022-04-08 RX ADMIN — BUPIVACAINE HYDROCHLORIDE 6 ML: 5 INJECTION, SOLUTION EPIDURAL; INTRACAUDAL at 15:27

## 2022-04-08 RX ADMIN — TRIAMCINOLONE ACETONIDE 80 MG: 40 INJECTION, SUSPENSION INTRA-ARTICULAR; INTRAMUSCULAR at 15:27

## 2022-04-08 NOTE — PROGRESS NOTES
General Surgery Office Visit Follow up   [facility]  Patient: Jackie Waters   : 1955 Sex: female MRN: [id number]   CSN: [id number] PCP: Thomas Mancilla MD    Assessment/ Plan:  Jackie Waters is a 75 y o  female  day(s) follow S/p ultrasound bilateral axilla and mammogram breast  Surgical follow-up care [Z09]    Plan  Prominent axillary tail of breast  Normal ultrasound  Normal mammogram    Discharge from for follow  Follow-up mammogram every year by primary care physician    If patient needs excision of those prominent  Axillary tail of breast may need a cosmetic  Surgery along with the reduction mammaplasty    Care discussed with patient in detail ultrasound and mammogram discussed  Cosmetic surgery discussed    Patient agreed with the present plan of care no surgery    SUBJECTIVE:   I am doing well  My knees are hurting I am going to see Dr Clifford Bridges I need bilateral knee replaced  I think my lumps or decreasing in size    OBJECTIVE:  No fever no chills no rigors  Tolerating p o   Diet well  Normal bowel movement no constipation or diarrhea  Ambulating well with some knee pain    Vitals:   /72 (BP Location: Left arm, Patient Position: Sitting, Cuff Size: Standard)   Pulse 94   Temp (!) 96 3 °F (35 7 °C) (Core)   Ht 5' 5" (1 651 m)   Wt 85 3 kg (188 lb)   SpO2 97%   BMI 31 28 kg/m²     Active medications:    Current Outpatient Medications:     BD PEN NEEDLE JOSEPHINE U/F 32G X 4 MM MISC, USE 4 TIMES DAILY WITH INSULIN, Disp: , Rfl: 10    buPROPion (WELLBUTRIN SR) 200 MG 12 hr tablet, Take 200 mg by mouth every morning, Disp: , Rfl:     clonazePAM (KlonoPIN) 0 25 MG disintegrating tablet, Take 0 25 mg by mouth every morning, Disp: , Rfl:     clonazePAM (KlonoPIN) 0 5 mg tablet, Take 0 5 mg by mouth daily after lunch 3 pm, Disp: , Rfl:     EQ Aspirin Adult Low Dose 81 MG EC tablet, Take 1 tablet by mouth once daily with breakfast, Disp: 90 tablet, Rfl: 0    Insulin Glargine (TOUJEO SOLOSTAR SC), Inject 70 Units under the skin every morning LANTUS, Disp: , Rfl:     lisinopril (ZESTRIL) 2 5 mg tablet, Take 2 5 mg by mouth daily, Disp: , Rfl: 3    metFORMIN (GLUCOPHAGE-XR) 500 mg 24 hr tablet, Take 500 mg by mouth 2 (two) times a day, Disp: , Rfl: 5    metoprolol succinate (TOPROL-XL) 25 mg 24 hr tablet, Take 1 tablet (25 mg total) by mouth daily, Disp: 90 tablet, Rfl: 3    Multiple Vitamins-Minerals (MULTIVITAMIN ADULT PO), , Disp: , Rfl:     nitroglycerin (NITROSTAT) 0 4 mg SL tablet, Place 1 tablet (0 4 mg total) under the tongue every 5 (five) minutes as needed for chest pain, Disp: 30 tablet, Rfl: 1    Omega-3 Fatty Acids (fish oil) 1,000 mg, Take 1 capsule (1,000 mg total) by mouth 2 (two) times a day, Disp: 60 capsule, Rfl: 0    rosuvastatin (CRESTOR) 20 MG tablet, Take 1 tablet (20 mg total) by mouth daily, Disp: 90 tablet, Rfl: 3    sertraline (ZOLOFT) 100 mg tablet, Take 100 mg by mouth 2 (two) times a day, Disp: , Rfl:     ziprasidone (GEODON) 40 mg capsule, Take 40 mg by mouth every evening, Disp: , Rfl:     ziprasidone (GEODON) 80 mg capsule, Take 80 mg by mouth 2 (two) times a day with meals, Disp: , Rfl:     DOCUSATE SODIUM PO, , Disp: , Rfl:     Vitamin D, Cholecalciferol, 1000 units CAPS, Take 1,000 Units by mouth every morning   (Patient not taking: Reported on 9/2/2021), Disp: , Rfl:     ziprasidone (GEODON) 80 mg capsule, Take 1 capsule by mouth 2 (two) times a day (Patient not taking: Reported on 9/2/2021), Disp: , Rfl:     Physical Exam:   General Alert awake   Normocephalic atraumatic PERRLA   Lungs clear bilaterally  Cardiac normal S1 normal S2  Abdomen soft,non tender Bowel sounds present protuberant obese  Skin:  Moist  Ext: No clubbing, cyanosis, edema  Bilateral axillary prominent tail soft no palpable mass    Visit Diagnosis:   Diagnoses and all orders for this visit:    Surgical follow-up care    Mass of axillary tail of left breast    Mass of axillary tail of right breast    History of endometrial cancer    Hypertension       Plan of care was discussed with patient in detail    Pertinent labs reviewed  Most Recent Labs:   No visits with results within 2 Week(s) from this visit  Latest known visit with results is:   Orders Only on 06/07/2021   Component Date Value Ref Range Status    EXT Creatinine Urine 06/07/2021 177   Final    Microalbum  ,U,Random 06/07/2021 3 3   Final    EXTERNAL Microalb/Creat Ratio 06/07/2021 19   Final    HEP C AB 06/07/2021 Non Reactive   Final    Hemoglobin A1C 06/07/2021 6 0   Final       Pertinent images and available reads personally reviewed  Procedure: Mammo diagnostic bilateral w 3d & cad    Result Date: 3/31/2022  Narrative: DIAGNOSIS: Axillary mass, right; Encounter for screening mammogram for malignant neoplasm of breast; Axillary mass, bilateral TECHNIQUE: Digital diagnostic mammography was performed  Computer Aided Detection (CAD) analyzed all applicable images  Ultrasound of the bilateral breast(s) was performed  COMPARISONS: Prior breast imaging dated: 12/10/2020, 10/03/2019, 10/30/2017, 05/10/2016, 10/21/2015, 05/13/2015, and 05/07/2015 RELEVANT HISTORY: Family Breast Cancer History: History of breast cancer in Paternal Aunt  Family Medical History: Family medical history includes breast cancer in paternal aunt  Personal History: No known relevant hormone history  Surgical history includes breast biopsy, hysterectomy, and oophorectomy  Medical history includes colon cancer and endometrial cancer  RISK ASSESSMENT: 5 Year Tyrer-Cuzick: 1 11 % 10 Year Tyrer-Cuzick: 2 27 % Lifetime Tyrer-Cuzick: 4 12 % TISSUE DENSITY: The breasts are almost entirely fatty  INDICATION: Jodi Hinkle is a 75 y o  female presenting for bilateral axillary mass/pt  FINDINGS: Bilateral There are no suspicious masses, grouped microcalcifications or areas of unexplained architectural distortion   The skin and nipple areolar complex are unremarkable  Benign appearing calcifications are stable in both breast  At the areas of palpable concern the bilateral axillae, ultrasound confirms no suspicious solid or cystic mass lesions, areas of architectural distortion, or abnormal acoustic shadowing  Normal isoechoic fat lobules noted as are bilateral benign morphology axillary lymph nodes  Impression: No evidence for malignancy  ASSESSMENT/BI-RADS CATEGORY: Left: 2 - Benign Right: 2 - Benign Overall: 2 - Benign RECOMMENDATION:      - Routine screening mammogram in 1 year for both breasts  - Clinical management for both breasts  Workstation ID: [id number]     Procedure: US Breast Axilla Bilateral    Result Date: 3/31/2022  Narrative: DIAGNOSIS: Axillary mass, right; Encounter for screening mammogram for malignant neoplasm of breast; Axillary mass, bilateral TECHNIQUE: Digital diagnostic mammography was performed  Computer Aided Detection (CAD) analyzed all applicable images  Ultrasound of the bilateral breast(s) was performed  COMPARISONS: Prior breast imaging dated: 12/10/2020, 10/03/2019, 10/30/2017, 05/10/2016, 10/21/2015, 05/13/2015, and 05/07/2015 RELEVANT HISTORY: Family Breast Cancer History: History of breast cancer in Paternal Aunt  Family Medical History: Family medical history includes breast cancer in paternal aunt  Personal History: No known relevant hormone history  Surgical history includes breast biopsy, hysterectomy, and oophorectomy  Medical history includes colon cancer and endometrial cancer  RISK ASSESSMENT: 5 Year Tyrer-Cuzick: 1 11 % 10 Year Tyrer-Cuzick: 2 27 % Lifetime Tyrer-Cuzick: 4 12 % TISSUE DENSITY: The breasts are almost entirely fatty  INDICATION: Feliberto Guadalupe is a 75 y o  female presenting for bilateral axillary mass/pt  FINDINGS: Bilateral There are no suspicious masses, grouped microcalcifications or areas of unexplained architectural distortion   The skin and nipple areolar complex are unremarkable  Benign appearing calcifications are stable in both breast  At the areas of palpable concern the bilateral axillae, ultrasound confirms no suspicious solid or cystic mass lesions, areas of architectural distortion, or abnormal acoustic shadowing  Normal isoechoic fat lobules noted as are bilateral benign morphology axillary lymph nodes  Impression: No evidence for malignancy  ASSESSMENT/BI-RADS CATEGORY: Left: 2 - Benign Right: 2 - Benign Overall: 2 - Benign RECOMMENDATION:      - Routine screening mammogram in 1 year for both breasts  - Clinical management for both breasts  Workstation ID: [id number]     Pertinent notes reviewed    Counseling / Coordination of Care  Total Office time /unit time spent today 15minutes  Greater than 50% of total time was spent with the patient and / or family counseling and / or coordination of care  A description of the counseling / coordination of care:  I performed an interim history, pertinent images and labs, performed a physical examination to arrive at the plan delineated above with associated thought processes  Robin Frankel MD MS FRCS FACS  [facility]  04/08/22 9:37 AM        Portions of the record may have been created with voice recognition software  Occasional wrong word or "sound a like" substitutions may have occurred due to the inherent limitations of voice recognition software  Read the chart carefully and recognize, using context, where substitutions have occurred

## 2022-04-08 NOTE — PROGRESS NOTES
Assessment/Plan:  1  Primary osteoarthritis of both knees  Large joint arthrocentesis   2  Chronic pain of both knees  Large joint arthrocentesis   3  History of diabetes mellitus  Large joint arthrocentesis     Tom Ross is a pleasant 68-year-old female presenting today for follow-up of her activity related bilateral knee pain due to her severe underlying osteoarthritis  Her left knee is more symptomatic than her right  She did do well with staggered cortisone injections last year and is interested in repeating those today  Is she consented to and underwent a left knee cortisone injection as detailed below, which she tolerated well without difficulty or complication  Post injection instructions were provided  She does understand that it may cause a transient increase in her blood sugars for the next 4-5 days  She can return in 2-3 weeks for a right knee cortisone injection if her knee remains symptomatic  She expressed understanding all of her questions were addressed    Large joint arthrocentesis: L knee  Universal Protocol:  Consent: Verbal consent obtained  Risks and benefits: risks, benefits and alternatives were discussed  Consent given by: patient  Time out: Immediately prior to procedure a "time out" was called to verify the correct patient, procedure, equipment, support staff and site/side marked as required    Timeout called at: 4/8/2022 3:00 PM   Site marked: the operative site was marked  Patient identity confirmed: verbally with patient    Supporting Documentation  Indications: pain   Procedure Details  Location: knee - L knee  Preparation: Patient was prepped and draped in the usual sterile fashion  Needle size: 20 G  Ultrasound guidance: no  Approach: anterolateral  Medications administered: 6 mL bupivacaine (PF) 0 5 %; 80 mg triamcinolone acetonide 40 mg/mL    Patient tolerance: patient tolerated the procedure well with no immediate complications  Dressing:  Sterile dressing applied          Subjective:  Bilateral knee follow-up    Patient ID: Kashif Sanchez is a 75 y o  female  Josue Torres is a pleasant 54-year-old female presenting today for follow-up of her activity related bilateral knee pain due to her underlying osteoarthritis  She did see good relief from the staggered cortisone injections administered in August and September of last year  Her left knee is bothering her more than her right and she is interested in repeat injections at this time  She did have COVID in December, but has not had any residual issues  She denies any new injuries        Review of Systems   Constitutional: Negative  HENT: Negative  Eyes: Negative  Respiratory: Negative  Cardiovascular: Negative  Gastrointestinal: Negative  Endocrine: Negative  Genitourinary: Negative  Musculoskeletal: Positive for arthralgias, joint swelling and myalgias  Skin: Negative  Allergic/Immunologic: Negative  Neurological: Negative  Hematological: Negative  Psychiatric/Behavioral: Negative        Past Medical History:   Diagnosis Date    Anesthesia complication     difficulty waking up    Arthritis     left knee    Bone spur     left knee behing the knee cap    Chronic kidney disease     Colon cancer [facility])     patient states about 2017    Colon polyp     Tubulovillous Adenoma High Grade Dysplasia - 2017    Depression     Diabetes mellitus ([facility] [address] [facility]     Endometrial cancer ([facility] [address] )     grade I    Hx of bleeding disorder     vaginal bleeding started on 3/13/17     Hyperlipidemia     Hypertension     PONV (postoperative nausea and vomiting)     Shortness of breath     with exertion    Urinary incontinence     Vitamin D deficiency        Past Surgical History:   Procedure Laterality Date    BREAST BIOPSY Left     benign-maybe at ar age 58    CHOLECYSTECTOMY      open    COLONOSCOPY      DILATION AND CURETTAGE OF UTERUS N/A 4/5/2017    Procedure: DILATATION AND CURETTAGE (D&C); Surgeon: Jacki Abdi MD;  Location: [facility] MAIN OR;  Service:     HYSTERECTOMY      OOPHORECTOMY      PELVIC LAPAROSCOPY      SD LAPAROSCOPY W TOT HYSTERECTUTERUS <=250 GRAM  W TUBE/OVARY N/A 5/4/2017    Procedure: ROBOTIC ASSISTED TOTAL LAPAROSCOPIC HYSTERECTOMY, BILATERAL SALPINGOOPHERECTOMY; CYSTOSCOPY;  Surgeon: Tommy Steward MD;  Location:  MAIN OR;  Service: Gynecology Oncology    TONSILLECTOMY      TUBAL LIGATION         Family History   Problem Relation Age of Onset    Cancer Mother         Renal    Heart disease Father         CHF    Heart disease Sister         atrial septal defect    Breast cancer Paternal Aunt 39       Social History     Occupational History    Not on file   Tobacco Use    Smoking status: Never Smoker    Smokeless tobacco: Never Used   Vaping Use    Vaping Use: Never used   Substance and Sexual Activity    Alcohol use: No    Drug use: No    Sexual activity: Not on file         Current Outpatient Medications:     BD PEN NEEDLE JOSEPHINE U/F 32G X 4 MM MISC, USE 4 TIMES DAILY WITH INSULIN, Disp: , Rfl: 10    buPROPion (WELLBUTRIN SR) 200 MG 12 hr tablet, Take 200 mg by mouth every morning, Disp: , Rfl:     clonazePAM (KlonoPIN) 0 5 mg tablet, Take 0 5 mg by mouth daily after lunch 0 5 tablet in morning, one whole tablet around 3 pm  , Disp: , Rfl:     EQ Aspirin Adult Low Dose 81 MG EC tablet, Take 1 tablet by mouth once daily with breakfast, Disp: 90 tablet, Rfl: 0    Insulin Glargine (TOUJEO SOLOSTAR SC), Inject 70 Units under the skin every morning LANTUS, Disp: , Rfl:     lisinopril (ZESTRIL) 2 5 mg tablet, Take 2 5 mg by mouth daily, Disp: , Rfl: 3    metFORMIN (GLUCOPHAGE-XR) 500 mg 24 hr tablet, Take 500 mg by mouth 2 (two) times a day, Disp: , Rfl: 5    metoprolol succinate (TOPROL-XL) 25 mg 24 hr tablet, Take 1 tablet (25 mg total) by mouth daily, Disp: 90 tablet, Rfl: 3    Multiple Vitamins-Minerals (MULTIVITAMIN ADULT PO), , Disp: , Rfl:    nitroglycerin (NITROSTAT) 0 4 mg SL tablet, Place 1 tablet (0 4 mg total) under the tongue every 5 (five) minutes as needed for chest pain, Disp: 30 tablet, Rfl: 1    Omega-3 Fatty Acids (fish oil) 1,000 mg, Take 1 capsule (1,000 mg total) by mouth 2 (two) times a day, Disp: 60 capsule, Rfl: 0    rosuvastatin (CRESTOR) 20 MG tablet, Take 1 tablet (20 mg total) by mouth daily, Disp: 90 tablet, Rfl: 3    sertraline (ZOLOFT) 100 mg tablet, Take 100 mg by mouth 2 (two) times a day, Disp: , Rfl:     ziprasidone (GEODON) 40 mg capsule, Take 40 mg by mouth every evening, Disp: , Rfl:     ziprasidone (GEODON) 80 mg capsule, Take 80 mg by mouth 2 (two) times a day with meals, Disp: , Rfl:     clonazePAM (KlonoPIN) 0 25 MG disintegrating tablet, Take 0 25 mg by mouth every morning (Patient not taking: Reported on 4/8/2022 ), Disp: , Rfl:     DOCUSATE SODIUM PO, , Disp: , Rfl:     Vitamin D, Cholecalciferol, 1000 units CAPS, Take 1,000 Units by mouth every morning   (Patient not taking: Reported on 9/2/2021), Disp: , Rfl:     ziprasidone (GEODON) 80 mg capsule, Take 1 capsule by mouth 2 (two) times a day (Patient not taking: Reported on 9/2/2021), Disp: , Rfl:     No Known Allergies    Objective:  Vitals:    04/08/22 1447   BP: 113/74   Pulse: 82   Resp: 17       Body mass index is 32 61 kg/m²  Left Knee Exam     Tenderness   The patient is experiencing tenderness in the medial joint line  Range of Motion   Left knee extension: 0  Left knee flexion: 120 with pain       Tests   Varus: negative Valgus: negative  Drawer:  Anterior - negative     Posterior - negative    Other   Erythema: absent  Scars: absent  Sensation: normal  Pulse: present  Swelling: none  Effusion: no effusion present    Comments:  Stable at 0, 30 an 90 degrees  Neurovascularly in tact distally  Parapatellar crepitance noted  Patellofemoral grind: positive  Ambulates slowly with shuffling gait          Observations   Left Knee   Negative for effusion  Physical Exam  Vitals and nursing note reviewed  Constitutional:       Appearance: She is well-developed  Comments: Body mass index is 32 61 kg/m²  HENT:      Head: Normocephalic and atraumatic  Right Ear: External ear normal       Left Ear: External ear normal    Cardiovascular:      Rate and Rhythm: Normal rate  Pulses: Normal pulses  Pulmonary:      Effort: Pulmonary effort is normal    Abdominal:      Palpations: Abdomen is soft  Musculoskeletal:      Cervical back: Normal range of motion  Left knee: No effusion  Comments: See ortho exam   Skin:     General: Skin is warm and dry  Neurological:      General: No focal deficit present  Mental Status: She is alert and oriented to person, place, and time  Psychiatric:         Mood and Affect: Mood normal          Behavior: Behavior normal          Thought Content:  Thought content normal          Judgment: Judgment normal

## 2022-08-23 ENCOUNTER — TELEPHONE (OUTPATIENT)
Dept: OBGYN CLINIC | Facility: CLINIC | Age: 67
End: 2022-08-23

## 2022-08-23 NOTE — TELEPHONE ENCOUNTER
Patient contacted office and lvm for apt with Dr Corrina Childress for her knee  Attempted to call patient to schedule   lvm

## 2022-09-28 ENCOUNTER — OFFICE VISIT (OUTPATIENT)
Dept: OBGYN CLINIC | Facility: CLINIC | Age: 67
End: 2022-09-28
Payer: COMMERCIAL

## 2022-09-28 VITALS
DIASTOLIC BLOOD PRESSURE: 76 MMHG | SYSTOLIC BLOOD PRESSURE: 130 MMHG | HEIGHT: 64 IN | HEART RATE: 80 BPM | WEIGHT: 193 LBS | BODY MASS INDEX: 32.95 KG/M2

## 2022-09-28 DIAGNOSIS — G89.29 CHRONIC PAIN OF LEFT KNEE: ICD-10-CM

## 2022-09-28 DIAGNOSIS — M17.12 PRIMARY OSTEOARTHRITIS OF LEFT KNEE: Primary | ICD-10-CM

## 2022-09-28 DIAGNOSIS — M25.562 CHRONIC PAIN OF LEFT KNEE: ICD-10-CM

## 2022-09-28 PROCEDURE — 99214 OFFICE O/P EST MOD 30 MIN: CPT | Performed by: ORTHOPAEDIC SURGERY

## 2022-10-15 ENCOUNTER — APPOINTMENT (EMERGENCY)
Dept: RADIOLOGY | Facility: HOSPITAL | Age: 67
DRG: 643 | End: 2022-10-15
Payer: COMMERCIAL

## 2022-10-15 ENCOUNTER — HOSPITAL ENCOUNTER (INPATIENT)
Facility: HOSPITAL | Age: 67
LOS: 4 days | Discharge: NURSING, REHAB OR LONG-TERM CARE | DRG: 643 | End: 2022-10-19
Attending: EMERGENCY MEDICINE
Payer: COMMERCIAL

## 2022-10-15 DIAGNOSIS — G92.8 TOXIC METABOLIC ENCEPHALOPATHY: ICD-10-CM

## 2022-10-15 DIAGNOSIS — G89.29 CHRONIC PAIN: ICD-10-CM

## 2022-10-15 DIAGNOSIS — R41.82 ALTERED MENTAL STATUS: Primary | ICD-10-CM

## 2022-10-15 DIAGNOSIS — M19.90 OSTEOARTHRITIS: ICD-10-CM

## 2022-10-15 DIAGNOSIS — F99 PSYCHIATRIC DISORDER: ICD-10-CM

## 2022-10-15 DIAGNOSIS — E87.1 HYPONATREMIA: ICD-10-CM

## 2022-10-15 PROBLEM — R79.89 ELEVATED TROPONIN: Status: ACTIVE | Noted: 2022-10-15

## 2022-10-15 PROBLEM — E11.9 DIABETES MELLITUS, TYPE 2 (HCC): Status: ACTIVE | Noted: 2017-05-04

## 2022-10-15 PROBLEM — R77.8 ELEVATED TROPONIN: Status: ACTIVE | Noted: 2022-10-15

## 2022-10-15 LAB
4HR DELTA HS TROPONIN: 6 NG/L
ALBUMIN SERPL BCP-MCNC: 3.7 G/DL (ref 3.5–5)
ALP SERPL-CCNC: 77 U/L (ref 46–116)
ALT SERPL W P-5'-P-CCNC: 55 U/L (ref 12–78)
AMMONIA PLAS-SCNC: 14 UMOL/L (ref 11–35)
AMORPH URATE CRY URNS QL MICRO: NORMAL /HPF
AMPHETAMINES SERPL QL SCN: NEGATIVE
ANION GAP SERPL CALCULATED.3IONS-SCNC: 10 MMOL/L (ref 4–13)
ANION GAP SERPL CALCULATED.3IONS-SCNC: 7 MMOL/L (ref 4–13)
ANION GAP SERPL CALCULATED.3IONS-SCNC: 9 MMOL/L (ref 4–13)
APAP SERPL-MCNC: 8.9 UG/ML (ref 10–20)
APTT PPP: 26 SECONDS (ref 23–37)
AST SERPL W P-5'-P-CCNC: 92 U/L (ref 5–45)
ATRIAL RATE: 104 BPM
BACTERIA UR QL AUTO: NORMAL /HPF
BARBITURATES UR QL: NEGATIVE
BASOPHILS # BLD AUTO: 0.03 THOUSANDS/ÂΜL (ref 0–0.1)
BASOPHILS NFR BLD AUTO: 0 % (ref 0–1)
BENZODIAZ UR QL: NEGATIVE
BILIRUB SERPL-MCNC: 1.01 MG/DL (ref 0.2–1)
BILIRUB UR QL STRIP: NEGATIVE
BUN SERPL-MCNC: 10 MG/DL (ref 5–25)
BUN SERPL-MCNC: 10 MG/DL (ref 5–25)
BUN SERPL-MCNC: 11 MG/DL (ref 5–25)
CALCIUM SERPL-MCNC: 8.5 MG/DL (ref 8.3–10.1)
CALCIUM SERPL-MCNC: 8.6 MG/DL (ref 8.3–10.1)
CALCIUM SERPL-MCNC: 8.7 MG/DL (ref 8.3–10.1)
CARDIAC TROPONIN I PNL SERPL HS: 39 NG/L
CARDIAC TROPONIN I PNL SERPL HS: 45 NG/L
CHLORIDE SERPL-SCNC: 86 MMOL/L (ref 96–108)
CHLORIDE SERPL-SCNC: 87 MMOL/L (ref 96–108)
CHLORIDE SERPL-SCNC: 87 MMOL/L (ref 96–108)
CLARITY UR: CLEAR
CO2 SERPL-SCNC: 24 MMOL/L (ref 21–32)
CO2 SERPL-SCNC: 25 MMOL/L (ref 21–32)
CO2 SERPL-SCNC: 27 MMOL/L (ref 21–32)
COCAINE UR QL: NEGATIVE
COLOR UR: YELLOW
CREAT SERPL-MCNC: 0.85 MG/DL (ref 0.6–1.3)
CREAT SERPL-MCNC: 0.88 MG/DL (ref 0.6–1.3)
CREAT SERPL-MCNC: 0.98 MG/DL (ref 0.6–1.3)
EOSINOPHIL # BLD AUTO: 0.03 THOUSAND/ÂΜL (ref 0–0.61)
EOSINOPHIL NFR BLD AUTO: 0 % (ref 0–6)
ERYTHROCYTE [DISTWIDTH] IN BLOOD BY AUTOMATED COUNT: 13 % (ref 11.6–15.1)
ETHANOL SERPL-MCNC: <3 MG/DL (ref 0–3)
FLUAV RNA RESP QL NAA+PROBE: NEGATIVE
FLUBV RNA RESP QL NAA+PROBE: NEGATIVE
GFR SERPL CREATININE-BSD FRML MDRD: 59 ML/MIN/1.73SQ M
GFR SERPL CREATININE-BSD FRML MDRD: 67 ML/MIN/1.73SQ M
GFR SERPL CREATININE-BSD FRML MDRD: 70 ML/MIN/1.73SQ M
GLUCOSE SERPL-MCNC: 202 MG/DL (ref 65–140)
GLUCOSE SERPL-MCNC: 204 MG/DL (ref 65–140)
GLUCOSE SERPL-MCNC: 210 MG/DL (ref 65–140)
GLUCOSE SERPL-MCNC: 210 MG/DL (ref 65–140)
GLUCOSE SERPL-MCNC: 224 MG/DL (ref 65–140)
GLUCOSE SERPL-MCNC: 227 MG/DL (ref 65–140)
GLUCOSE UR STRIP-MCNC: ABNORMAL MG/DL
HCT VFR BLD AUTO: 36.5 % (ref 34.8–46.1)
HGB BLD-MCNC: 12.8 G/DL (ref 11.5–15.4)
HGB UR QL STRIP.AUTO: ABNORMAL
IMM GRANULOCYTES # BLD AUTO: 0.05 THOUSAND/UL (ref 0–0.2)
IMM GRANULOCYTES NFR BLD AUTO: 1 % (ref 0–2)
INR PPP: 1.03 (ref 0.84–1.19)
KETONES UR STRIP-MCNC: NEGATIVE MG/DL
LACTATE SERPL-SCNC: 1 MMOL/L (ref 0.5–2)
LEUKOCYTE ESTERASE UR QL STRIP: NEGATIVE
LYMPHOCYTES # BLD AUTO: 1.67 THOUSANDS/ÂΜL (ref 0.6–4.47)
LYMPHOCYTES NFR BLD AUTO: 17 % (ref 14–44)
MCH RBC QN AUTO: 30.9 PG (ref 26.8–34.3)
MCHC RBC AUTO-ENTMCNC: 35.1 G/DL (ref 31.4–37.4)
MCV RBC AUTO: 88 FL (ref 82–98)
METHADONE UR QL: NEGATIVE
MONOCYTES # BLD AUTO: 0.98 THOUSAND/ÂΜL (ref 0.17–1.22)
MONOCYTES NFR BLD AUTO: 10 % (ref 4–12)
NEUTROPHILS # BLD AUTO: 7.28 THOUSANDS/ÂΜL (ref 1.85–7.62)
NEUTS SEG NFR BLD AUTO: 72 % (ref 43–75)
NITRITE UR QL STRIP: NEGATIVE
NON-SQ EPI CELLS URNS QL MICRO: NORMAL /HPF
NRBC BLD AUTO-RTO: 0 /100 WBCS
OPIATES UR QL SCN: NEGATIVE
OXYCODONE+OXYMORPHONE UR QL SCN: NEGATIVE
P AXIS: 61 DEGREES
PCP UR QL: NEGATIVE
PH UR STRIP.AUTO: 6 [PH]
PLATELET # BLD AUTO: 188 THOUSANDS/UL (ref 149–390)
PLATELET # BLD AUTO: 252 THOUSANDS/UL (ref 149–390)
PMV BLD AUTO: 7.9 FL (ref 8.9–12.7)
PMV BLD AUTO: 8.2 FL (ref 8.9–12.7)
POTASSIUM SERPL-SCNC: 3.6 MMOL/L (ref 3.5–5.3)
POTASSIUM SERPL-SCNC: 3.6 MMOL/L (ref 3.5–5.3)
POTASSIUM SERPL-SCNC: 3.8 MMOL/L (ref 3.5–5.3)
PR INTERVAL: 130 MS
PROT SERPL-MCNC: 7.2 G/DL (ref 6.4–8.4)
PROT UR STRIP-MCNC: ABNORMAL MG/DL
PROTHROMBIN TIME: 13.7 SECONDS (ref 11.6–14.5)
QRS AXIS: 23 DEGREES
QRSD INTERVAL: 120 MS
QT INTERVAL: 374 MS
QTC INTERVAL: 491 MS
RBC # BLD AUTO: 4.14 MILLION/UL (ref 3.81–5.12)
RBC #/AREA URNS AUTO: NORMAL /HPF
RSV RNA RESP QL NAA+PROBE: NEGATIVE
SALICYLATES SERPL-MCNC: <3 MG/DL (ref 3–20)
SARS-COV-2 RNA RESP QL NAA+PROBE: NEGATIVE
SODIUM SERPL-SCNC: 120 MMOL/L (ref 135–147)
SODIUM SERPL-SCNC: 121 MMOL/L (ref 135–147)
SODIUM SERPL-SCNC: 121 MMOL/L (ref 135–147)
SP GR UR STRIP.AUTO: >=1.03 (ref 1–1.03)
T WAVE AXIS: 47 DEGREES
THC UR QL: NEGATIVE
TSH SERPL DL<=0.05 MIU/L-ACNC: 1.07 UIU/ML (ref 0.45–4.5)
TSH SERPL DL<=0.05 MIU/L-ACNC: 1.33 UIU/ML (ref 0.45–4.5)
URATE SERPL-MCNC: 3.7 MG/DL (ref 2–7.5)
UROBILINOGEN UR QL STRIP.AUTO: 0.2 E.U./DL
VENTRICULAR RATE: 104 BPM
WBC # BLD AUTO: 10.04 THOUSAND/UL (ref 4.31–10.16)
WBC #/AREA URNS AUTO: NORMAL /HPF

## 2022-10-15 PROCEDURE — 84484 ASSAY OF TROPONIN QUANT: CPT | Performed by: EMERGENCY MEDICINE

## 2022-10-15 PROCEDURE — 99285 EMERGENCY DEPT VISIT HI MDM: CPT

## 2022-10-15 PROCEDURE — 82948 REAGENT STRIP/BLOOD GLUCOSE: CPT

## 2022-10-15 PROCEDURE — 93005 ELECTROCARDIOGRAM TRACING: CPT

## 2022-10-15 PROCEDURE — 87081 CULTURE SCREEN ONLY: CPT | Performed by: INTERNAL MEDICINE

## 2022-10-15 PROCEDURE — 84484 ASSAY OF TROPONIN QUANT: CPT | Performed by: NURSE PRACTITIONER

## 2022-10-15 PROCEDURE — 84443 ASSAY THYROID STIM HORMONE: CPT | Performed by: NURSE PRACTITIONER

## 2022-10-15 PROCEDURE — 99223 1ST HOSP IP/OBS HIGH 75: CPT | Performed by: INTERNAL MEDICINE

## 2022-10-15 PROCEDURE — 81001 URINALYSIS AUTO W/SCOPE: CPT | Performed by: NURSE PRACTITIONER

## 2022-10-15 PROCEDURE — G1004 CDSM NDSC: HCPCS

## 2022-10-15 PROCEDURE — 80179 DRUG ASSAY SALICYLATE: CPT | Performed by: EMERGENCY MEDICINE

## 2022-10-15 PROCEDURE — 83930 ASSAY OF BLOOD OSMOLALITY: CPT | Performed by: NURSE PRACTITIONER

## 2022-10-15 PROCEDURE — 82140 ASSAY OF AMMONIA: CPT | Performed by: EMERGENCY MEDICINE

## 2022-10-15 PROCEDURE — 71045 X-RAY EXAM CHEST 1 VIEW: CPT

## 2022-10-15 PROCEDURE — 84550 ASSAY OF BLOOD/URIC ACID: CPT | Performed by: NURSE PRACTITIONER

## 2022-10-15 PROCEDURE — 84300 ASSAY OF URINE SODIUM: CPT | Performed by: NURSE PRACTITIONER

## 2022-10-15 PROCEDURE — 85049 AUTOMATED PLATELET COUNT: CPT | Performed by: NURSE PRACTITIONER

## 2022-10-15 PROCEDURE — 84443 ASSAY THYROID STIM HORMONE: CPT | Performed by: EMERGENCY MEDICINE

## 2022-10-15 PROCEDURE — 80307 DRUG TEST PRSMV CHEM ANLYZR: CPT | Performed by: NURSE PRACTITIONER

## 2022-10-15 PROCEDURE — 82533 TOTAL CORTISOL: CPT | Performed by: NURSE PRACTITIONER

## 2022-10-15 PROCEDURE — 70450 CT HEAD/BRAIN W/O DYE: CPT

## 2022-10-15 PROCEDURE — 0241U HB NFCT DS VIR RESP RNA 4 TRGT: CPT | Performed by: EMERGENCY MEDICINE

## 2022-10-15 PROCEDURE — 85610 PROTHROMBIN TIME: CPT | Performed by: EMERGENCY MEDICINE

## 2022-10-15 PROCEDURE — 87086 URINE CULTURE/COLONY COUNT: CPT | Performed by: NURSE PRACTITIONER

## 2022-10-15 PROCEDURE — 87040 BLOOD CULTURE FOR BACTERIA: CPT | Performed by: EMERGENCY MEDICINE

## 2022-10-15 PROCEDURE — 80048 BASIC METABOLIC PNL TOTAL CA: CPT | Performed by: NURSE PRACTITIONER

## 2022-10-15 PROCEDURE — 85025 COMPLETE CBC W/AUTO DIFF WBC: CPT | Performed by: EMERGENCY MEDICINE

## 2022-10-15 PROCEDURE — 80143 DRUG ASSAY ACETAMINOPHEN: CPT | Performed by: EMERGENCY MEDICINE

## 2022-10-15 PROCEDURE — 82077 ASSAY SPEC XCP UR&BREATH IA: CPT | Performed by: EMERGENCY MEDICINE

## 2022-10-15 PROCEDURE — 36415 COLL VENOUS BLD VENIPUNCTURE: CPT | Performed by: EMERGENCY MEDICINE

## 2022-10-15 PROCEDURE — 83935 ASSAY OF URINE OSMOLALITY: CPT | Performed by: NURSE PRACTITIONER

## 2022-10-15 PROCEDURE — 83605 ASSAY OF LACTIC ACID: CPT | Performed by: EMERGENCY MEDICINE

## 2022-10-15 PROCEDURE — 99285 EMERGENCY DEPT VISIT HI MDM: CPT | Performed by: EMERGENCY MEDICINE

## 2022-10-15 PROCEDURE — 85730 THROMBOPLASTIN TIME PARTIAL: CPT | Performed by: EMERGENCY MEDICINE

## 2022-10-15 PROCEDURE — 80053 COMPREHEN METABOLIC PANEL: CPT | Performed by: EMERGENCY MEDICINE

## 2022-10-15 RX ORDER — ATORVASTATIN CALCIUM 40 MG/1
40 TABLET, FILM COATED ORAL
Status: DISCONTINUED | OUTPATIENT
Start: 2022-10-15 | End: 2022-10-19 | Stop reason: HOSPADM

## 2022-10-15 RX ORDER — INSULIN LISPRO 100 [IU]/ML
1-6 INJECTION, SOLUTION INTRAVENOUS; SUBCUTANEOUS
Status: DISCONTINUED | OUTPATIENT
Start: 2022-10-15 | End: 2022-10-19 | Stop reason: HOSPADM

## 2022-10-15 RX ORDER — CLONAZEPAM 0.5 MG/1
0.5 TABLET ORAL ONCE
Status: DISCONTINUED | OUTPATIENT
Start: 2022-10-15 | End: 2022-10-15

## 2022-10-15 RX ORDER — ASPIRIN 81 MG/1
81 TABLET ORAL
Status: DISCONTINUED | OUTPATIENT
Start: 2022-10-16 | End: 2022-10-19 | Stop reason: HOSPADM

## 2022-10-15 RX ORDER — INSULIN GLARGINE 100 [IU]/ML
40 INJECTION, SOLUTION SUBCUTANEOUS EVERY MORNING
Status: DISCONTINUED | OUTPATIENT
Start: 2022-10-16 | End: 2022-10-16

## 2022-10-15 RX ORDER — METOPROLOL SUCCINATE 25 MG/1
25 TABLET, EXTENDED RELEASE ORAL DAILY
Status: DISCONTINUED | OUTPATIENT
Start: 2022-10-16 | End: 2022-10-19 | Stop reason: HOSPADM

## 2022-10-15 RX ORDER — BUPROPION HYDROCHLORIDE 150 MG/1
150 TABLET ORAL DAILY
Status: DISCONTINUED | OUTPATIENT
Start: 2022-10-16 | End: 2022-10-16

## 2022-10-15 RX ORDER — SODIUM CHLORIDE, SODIUM GLUCONATE, SODIUM ACETATE, POTASSIUM CHLORIDE, MAGNESIUM CHLORIDE, SODIUM PHOSPHATE, DIBASIC, AND POTASSIUM PHOSPHATE .53; .5; .37; .037; .03; .012; .00082 G/100ML; G/100ML; G/100ML; G/100ML; G/100ML; G/100ML; G/100ML
75 INJECTION, SOLUTION INTRAVENOUS CONTINUOUS
Status: DISCONTINUED | OUTPATIENT
Start: 2022-10-15 | End: 2022-10-16

## 2022-10-15 RX ORDER — CHLORAL HYDRATE 500 MG
1000 CAPSULE ORAL 2 TIMES DAILY
Status: DISCONTINUED | OUTPATIENT
Start: 2022-10-15 | End: 2022-10-19 | Stop reason: HOSPADM

## 2022-10-15 RX ORDER — BUPROPION HYDROCHLORIDE 100 MG/1
200 TABLET, EXTENDED RELEASE ORAL EVERY MORNING
Status: DISCONTINUED | OUTPATIENT
Start: 2022-10-16 | End: 2022-10-15 | Stop reason: RX

## 2022-10-15 RX ORDER — ENOXAPARIN SODIUM 100 MG/ML
40 INJECTION SUBCUTANEOUS DAILY
Status: DISCONTINUED | OUTPATIENT
Start: 2022-10-16 | End: 2022-10-19 | Stop reason: HOSPADM

## 2022-10-15 RX ORDER — NYSTATIN 100000 [USP'U]/G
POWDER TOPICAL 2 TIMES DAILY
Status: DISCONTINUED | OUTPATIENT
Start: 2022-10-15 | End: 2022-10-19 | Stop reason: HOSPADM

## 2022-10-15 RX ORDER — POTASSIUM CHLORIDE 20 MEQ/1
40 TABLET, EXTENDED RELEASE ORAL ONCE
Status: COMPLETED | OUTPATIENT
Start: 2022-10-15 | End: 2022-10-15

## 2022-10-15 RX ORDER — METFORMIN HYDROCHLORIDE 500 MG/1
500 TABLET, EXTENDED RELEASE ORAL 2 TIMES DAILY WITH MEALS
Status: DISCONTINUED | OUTPATIENT
Start: 2022-10-16 | End: 2022-10-16

## 2022-10-15 RX ADMIN — INSULIN LISPRO 2 UNITS: 100 INJECTION, SOLUTION INTRAVENOUS; SUBCUTANEOUS at 21:47

## 2022-10-15 RX ADMIN — POTASSIUM CHLORIDE 40 MEQ: 1500 TABLET, EXTENDED RELEASE ORAL at 23:20

## 2022-10-15 RX ADMIN — INSULIN LISPRO 2 UNITS: 100 INJECTION, SOLUTION INTRAVENOUS; SUBCUTANEOUS at 18:37

## 2022-10-15 RX ADMIN — SERTRALINE 100 MG: 50 TABLET, FILM COATED ORAL at 17:27

## 2022-10-15 RX ADMIN — OMEGA-3 FATTY ACIDS CAP 1000 MG 1000 MG: 1000 CAP at 17:27

## 2022-10-15 RX ADMIN — ATORVASTATIN CALCIUM 40 MG: 40 TABLET, FILM COATED ORAL at 17:27

## 2022-10-15 RX ADMIN — SODIUM CHLORIDE, SODIUM GLUCONATE, SODIUM ACETATE, POTASSIUM CHLORIDE, MAGNESIUM CHLORIDE, SODIUM PHOSPHATE, DIBASIC, AND POTASSIUM PHOSPHATE 50 ML/HR: .53; .5; .37; .037; .03; .012; .00082 INJECTION, SOLUTION INTRAVENOUS at 23:14

## 2022-10-15 RX ADMIN — SODIUM CHLORIDE 500 ML: 0.9 INJECTION, SOLUTION INTRAVENOUS at 17:36

## 2022-10-15 NOTE — ASSESSMENT & PLAN NOTE
· Patient presents confused, she is alert to self, place and time but is slow to respond, appears restless   Not at baseline per family  · Likely in the setting of hyponatremia  · CT head unremarkable  · UDS pending  · UA pending  · Hold sedating medications   · Continue neuro checks per routine  · CAM ICU  · Sleep hygiene

## 2022-10-15 NOTE — ASSESSMENT & PLAN NOTE
· History of psychiatric disorder  · Continue wellbutrin and zoloft  · Hold home geodon, klonopin given confusion, altered mental status on admisson  · Unclear compliance with home meds at baseline

## 2022-10-15 NOTE — ASSESSMENT & PLAN NOTE
Hyponatremia with sodium 120 on admission  Presents confused, likely progressive over several days but patient is a poor historian  Family at the bedside is unsure of history of presentation  No recent labs to review  Not on diuretics at home   Appears euvolemic on exam    · Plan to check UA, urine sodium, urine osmo, serum osmo  · Will give 500mL of NSS and recheck BMP  · Continue Q6hr BMP  · Goal to raise sodium 6-8mEq within 24 hours  · Patient also has a history of cancer and may benefit from CT scan pending sodium trend  · Patient is on SSRI which may possibly contributing to hyponatremia  · Will consult nephrology, appreciate recommendations   · Close I&Os  · Close neurologic monitoring

## 2022-10-15 NOTE — ED PROVIDER NOTES
History  Chief Complaint   Patient presents with   • Altered Mental Status     Pt altered for unknown time     67yoF hx DM,HTN,bipolar, CKD BIBA after daughter spoke to her on the phone and she sounded confused  Daughter thinks she may be off her meds  Pt herself is slow to respond but answers questions, states she doesn't feel well but cannot elaborate  Last known well a few days ago  Prior to Admission Medications   Prescriptions Last Dose Informant Patient Reported? Taking?    BD PEN NEEDLE JOSEPHINE U/F 32G X 4 MM MISC  Self Yes No   Sig: USE 4 TIMES DAILY WITH INSULIN   DOCUSATE SODIUM PO  Self Yes No   Patient not taking: Reported on 4/8/2022    EQ Aspirin Adult Low Dose 81 MG EC tablet  Self No No   Sig: Take 1 tablet by mouth once daily with breakfast   Insulin Glargine (TOUJEO SOLOSTAR SC)  Self Yes No   Sig: Inject 70 Units under the skin every morning LANTUS   Multiple Vitamins-Minerals (MULTIVITAMIN ADULT PO)  Self Yes No   Omega-3 Fatty Acids (fish oil) 1,000 mg  Self No No   Sig: Take 1 capsule (1,000 mg total) by mouth 2 (two) times a day   Vitamin D, Cholecalciferol, 1000 units CAPS  Self Yes No   Sig: Take 1,000 Units by mouth every morning     Patient not taking: Reported on 9/2/2021   buPROPion (WELLBUTRIN SR) 200 MG 12 hr tablet  Self Yes No   Sig: Take 200 mg by mouth every morning   clonazePAM (KlonoPIN) 0 25 MG disintegrating tablet  Self Yes No   Sig: Take 0 25 mg by mouth every morning   Patient not taking: Reported on 4/8/2022    clonazePAM (KlonoPIN) 0 5 mg tablet  Self Yes No   Sig: Take 0 5 mg by mouth daily after lunch 0 5 tablet in morning, one whole tablet around 3 pm     lisinopril (ZESTRIL) 2 5 mg tablet  Self Yes No   Sig: Take 2 5 mg by mouth daily   metFORMIN (GLUCOPHAGE-XR) 500 mg 24 hr tablet  Self Yes No   Sig: Take 500 mg by mouth 2 (two) times a day   metoprolol succinate (TOPROL-XL) 25 mg 24 hr tablet  Self No No   Sig: Take 1 tablet (25 mg total) by mouth daily nitroglycerin (NITROSTAT) 0 4 mg SL tablet  Self No No   Sig: Place 1 tablet (0 4 mg total) under the tongue every 5 (five) minutes as needed for chest pain   rosuvastatin (CRESTOR) 20 MG tablet  Self No No   Sig: Take 1 tablet (20 mg total) by mouth daily   sertraline (ZOLOFT) 100 mg tablet  Self Yes No   Sig: Take 100 mg by mouth 2 (two) times a day   ziprasidone (GEODON) 40 mg capsule  Self Yes No   Sig: Take 40 mg by mouth every evening   ziprasidone (GEODON) 80 mg capsule  Self Yes No   Sig: Take 80 mg by mouth 2 (two) times a day with meals   ziprasidone (GEODON) 80 mg capsule  Self Yes No   Sig: Take 1 capsule by mouth 2 (two) times a day   Patient not taking: Reported on 9/2/2021      Facility-Administered Medications: None       Past Medical History:   Diagnosis Date   • Anesthesia complication     difficulty waking up   • Arthritis     left knee   • Bone spur     left knee behing the knee cap   • Chronic kidney disease    • Colon cancer [facility])     patient states about 2017   • Colon polyp     Tubulovillous Adenoma High Grade Dysplasia - 2017   • Depression    • Diabetes mellitus (HCC)    • Endometrial cancer (HCC)     grade I   • Hx of bleeding disorder     vaginal bleeding started on 3/13/17    • Hyperlipidemia    • Hypertension    • PONV (postoperative nausea and vomiting)    • Shortness of breath     with exertion   • Urinary incontinence    • Vitamin D deficiency        Past Surgical History:   Procedure Laterality Date   • BREAST BIOPSY Left     benign-maybe at ar age 58   • CHOLECYSTECTOMY      open   • COLONOSCOPY     • DILATION AND CURETTAGE OF UTERUS N/A 4/5/2017    Procedure: DILATATION AND CURETTAGE (D&C);   Surgeon: Alejandro Kraft MD;  Location: [facility] MAIN OR;  Service:    • HYSTERECTOMY     • OOPHORECTOMY     • PELVIC LAPAROSCOPY     • AZ LAPAROSCOPY W TOT HYSTERECTUTERUS <=250 GRAM  W TUBE/OVARY N/A 5/4/2017    Procedure: ROBOTIC ASSISTED TOTAL LAPAROSCOPIC HYSTERECTOMY, BILATERAL SALPINGOOPHERECTOMY; CYSTOSCOPY;  Surgeon: Dior Mejia MD;  Location: [facility] MAIN [facility];  Service: Gynecology Oncology   • TONSILLECTOMY     • TUBAL LIGATION         Family History   Problem Relation Age of Onset   • Cancer Mother         Renal   • Heart disease Father         CHF   • Heart disease Sister         atrial septal defect   • Breast cancer Paternal Aunt 40     I have reviewed and agree with the history as documented  E-Cigarette/Vaping   • E-Cigarette Use Never User      E-Cigarette/Vaping Substances   • Nicotine No    • THC No    • CBD No    • Flavoring No    • Other No    • Unknown No      Social History     Tobacco Use   • Smoking status: Never Smoker   • Smokeless tobacco: Never Used   Vaping Use   • Vaping Use: Never used   Substance Use Topics   • Alcohol use: No   • Drug use: No       Review of Systems   Unable to perform ROS: Mental status change       Physical Exam  Physical Exam  Vitals reviewed  Constitutional:       General: She is not in acute distress  Appearance: She is well-developed  She is ill-appearing  HENT:      Head: Normocephalic and atraumatic  Right Ear: External ear normal       Left Ear: External ear normal       Nose: Nose normal  No rhinorrhea  Mouth/Throat:      Mouth: Mucous membranes are moist    Eyes:      Conjunctiva/sclera: Conjunctivae normal    Cardiovascular:      Rate and Rhythm: Regular rhythm  Tachycardia present  Pulmonary:      Effort: Pulmonary effort is normal       Breath sounds: Normal breath sounds  No wheezing or rales  Abdominal:      Palpations: Abdomen is soft  Tenderness: There is no abdominal tenderness  Musculoskeletal:      Cervical back: Neck supple  Right lower leg: No edema  Left lower leg: No edema  Skin:     General: Skin is warm and dry  Neurological:      Mental Status: She is alert        Comments: Oriented x 2   Psychiatric:      Comments: Flat affect         Vital Signs  ED Triage Vitals [10/15/22 1313]   Temperature Pulse Respirations Blood Pressure SpO2   99 °F (37 2 °C) (!) 109 20 (!) 189/98 98 %      Temp Source Heart Rate Source Patient Position - Orthostatic VS BP Location FiO2 (%)   Oral Monitor Lying Left arm --      Pain Score       --           Vitals:    10/15/22 1313 10/15/22 1345 10/15/22 1415 10/15/22 1430   BP: (!) 189/98 (!) 178/86 (!) 211/102 (!) 173/82   Pulse: (!) 109 (!) 108 (!) 110 (!) 106   Patient Position - Orthostatic VS: Lying            Visual Acuity  Visual Acuity    Flowsheet Row Most Recent Value   L Pupil Size (mm) 3   R Pupil Size (mm) 3          ED Medications  Medications - No data to display    Diagnostic Studies  Results Reviewed     Procedure Component Value Units Date/Time    Basic metabolic panel [[id number]]     Lab Status: No result Specimen: Blood     Uric acid [[id number]]     Lab Status: No result Specimen: Blood     TSH, 3rd generation [[id number]]     Lab Status: No result Specimen: Blood     Sodium, urine, random [[id number]]     Lab Status: No result Specimen: Urine     Osmolality, urine [[id number]]     Lab Status: No result Specimen: Urine     Osmolality-"If this is regarding a toxic alcohol, STOP  Test is not routinely indicated  Please consult medical  on call for further guidance " [[id number]]     Lab Status: No result Specimen: Blood     HS Troponin I 4hr [[id number]]     Lab Status: No result Specimen: Blood     FLU/RSV/COVID - if FLU/RSV clinically relevant [[id number]]  (Normal) Collected: 10/15/22 1330    Lab Status: Final result Specimen: Nares from Nose Updated: 10/15/22 1427     SARS-CoV-2 Negative     INFLUENZA A PCR Negative     INFLUENZA B PCR Negative     RSV PCR Negative    Narrative:      FOR PEDIATRIC PATIENTS - copy/paste COVID Guidelines URL to browser: [email or web address]    SARS-CoV-2 assay is a Nucleic Acid Amplification assay intended for the  qualitative detection of nucleic acid from SARS-CoV-2 in nasopharyngeal  swabs  Results are for the presumptive identification of SARS-CoV-2 RNA  Positive results are indicative of infection with SARS-CoV-2, the virus  causing COVID-19, but do not rule out bacterial infection or co-infection  with other viruses  Laboratories within the [facility] and its  territories are required to report all positive results to the appropriate  public health authorities  Negative results do not preclude SARS-CoV-2  infection and should not be used as the sole basis for treatment or other  patient management decisions  Negative results must be combined with  clinical observations, patient history, and epidemiological information  This test has not been FDA cleared or approved  This test has been authorized by FDA under an Emergency Use Authorization  (EUA)  This test is only authorized for the duration of time the  declaration that circumstances exist justifying the authorization of the  emergency use of an in vitro diagnostic tests for detection of SARS-CoV-2  virus and/or diagnosis of COVID-19 infection under section 564(b)(1) of  the Act, 21 U  S C  053XBS-7(R)(3), unless the authorization is terminated  or revoked sooner  The test has been validated but independent review by FDA  and CLIA is pending  Test performed using [employer] Gene[employer]pert: This RT-PCR assay targets N2,  a region unique to SARS-CoV-2  A conserved region in the E-gene was chosen  for pan-Sarbecovirus detection which includes SARS-CoV-2  According to CMS-2020-01-R, this platform meets the definition of high-throughput technology      Comprehensive metabolic panel [[id number]]  (Abnormal) Collected: 10/15/22 1330    Lab Status: Final result Specimen: Blood from Arm, Left Updated: 10/15/22 1422     Sodium 120 mmol/L      Potassium 3 8 mmol/L      Chloride 86 mmol/L      CO2 24 mmol/L      ANION GAP 10 mmol/L      BUN 11 mg/dL      Creatinine 0 88 mg/dL      Glucose 224 mg/dL Calcium 8 6 mg/dL      AST 92 U/L      ALT 55 U/L      Alkaline Phosphatase 77 U/L      Total Protein 7 2 g/dL      Albumin 3 7 g/dL      Total Bilirubin 1 01 mg/dL      eGFR 67 ml/min/1 73sq m     Narrative:      [place] guidelines for Chronic Kidney Disease (CKD):   •  Stage 1 with normal or high GFR (GFR > 90 mL/min/1 73 square meters)  •  Stage 2 Mild CKD (GFR = 60-89 mL/min/1 73 square meters)  •  Stage 3A Moderate CKD (GFR = 45-59 mL/min/1 73 square meters)  •  Stage 3B Moderate CKD (GFR = 30-44 mL/min/1 73 square meters)  •  Stage 4 Severe CKD (GFR = 15-29 mL/min/1 73 square meters)  •  Stage 5 End Stage CKD (GFR <15 mL/min/1 73 square meters)  Note: GFR calculation is accurate only with a steady state creatinine    TSH [[id number]]  (Normal) Collected: 10/15/22 1330    Lab Status: Final result Specimen: Blood from Arm, Left Updated: 10/15/22 1422     TSH 3RD GENERATON 1 072 uIU/mL     Narrative:      Patients undergoing fluorescein dye angiography may retain small amounts of fluorescein in the body for 48-72 hours post procedure  Samples containing fluorescein can produce falsely depressed TSH values  If the patient had this procedure,a specimen should be resubmitted post fluorescein clearance        Salicylate level [[id number]]  (Abnormal) Collected: 10/15/22 1330    Lab Status: Final result Specimen: Blood from Arm, Left Updated: 84/81/26 8704     Salicylate Lvl <3 mg/dL     Acetaminophen level-"If concentration is detectable, please discuss with medical  on call " [[id number]]  (Abnormal) Collected: 10/15/22 1330    Lab Status: Final result Specimen: Blood from Arm, Left Updated: 10/15/22 1422     Acetaminophen Level 8 9 ug/mL     Lactic acid [[id number]]  (Normal) Collected: 10/15/22 1330    Lab Status: Final result Specimen: Blood from Arm, Left Updated: 10/15/22 1416     LACTIC ACID 1 0 mmol/L     Narrative:      Result may be elevated if tourniquet was used during collection  Ethanol [[id number]]  (Normal) Collected: 10/15/22 1330    Lab Status: Final result Specimen: Blood from Arm, Left Updated: 10/15/22 1416     Ethanol Lvl <3 mg/dL     HS Troponin 0hr (reflex protocol) [[id number]]  (Normal) Collected: 10/15/22 1330    Lab Status: Final result Specimen: Blood from Arm, Left Updated: 10/15/22 1415     hs TnI 0hr 39 ng/L     HS Troponin I 2hr [[id number]]     Lab Status: No result Specimen: Blood     Ammonia [[id number]]  (Normal) Collected: 10/15/22 1330    Lab Status: Final result Specimen: Blood from Arm, Left Updated: 10/15/22 1404     Ammonia 14 umol/L     Blood culture #2 [[id number]] Collected: 10/15/22 1354    Lab Status: In process Specimen: Blood from Arm, Left Updated: 10/15/22 1403    Blood culture #1 [[id number]] Collected: 10/15/22 1401    Lab Status:  In process Specimen: Blood from Arm, Right Updated: 10/15/22 1403    Protime-INR [[id number]]  (Normal) Collected: 10/15/22 1330    Lab Status: Final result Specimen: Blood from Arm, Left Updated: 10/15/22 1402     Protime 13 7 seconds      INR 1 03    APTT [[id number]]  (Normal) Collected: 10/15/22 1330    Lab Status: Final result Specimen: Blood from Arm, Left Updated: 10/15/22 1402     PTT 26 seconds     CBC and differential [[id number]]  (Abnormal) Collected: 10/15/22 1330    Lab Status: Final result Specimen: Blood from Arm, Left Updated: 10/15/22 1347     WBC 10 04 Thousand/uL      RBC 4 14 Million/uL      Hemoglobin 12 8 g/dL      Hematocrit 36 5 %      MCV 88 fL      MCH 30 9 pg      MCHC 35 1 g/dL      RDW 13 0 %      MPV 8 2 fL      Platelets 673 Thousands/uL      nRBC 0 /100 WBCs      Neutrophils Relative 72 %      Immat GRANS % 1 %      Lymphocytes Relative 17 %      Monocytes Relative 10 %      Eosinophils Relative 0 %      Basophils Relative 0 %      Neutrophils Absolute 7 28 Thousands/µL      Immature Grans Absolute 0 05 Thousand/uL      Lymphocytes Absolute 1 67 Thousands/µL      Monocytes Absolute 0 98 Thousand/µL      Eosinophils Absolute 0 03 Thousand/µL      Basophils Absolute 0 03 Thousands/µL     UA w Reflex to Microscopic w Reflex to Culture [[id number]]     Lab Status: No result Specimen: Urine, Straight Cath     Urine culture [[id number]]     Lab Status: No result Specimen: Urine, Straight Cath     Rapid drug screen, urine [[id number]]     Lab Status: No result Specimen: Urine     Fingerstick Glucose (POCT) [[id number]]  (Abnormal) Collected: 10/15/22 1320    Lab Status: Final result Updated: 10/15/22 1321     POC Glucose 227 mg/dl                  CT head without contrast   Final Result by Willy Teixeira DO (10/15 7053)      No acute intracranial abnormality  Chronic microangiopathic changes  Workstation performed: [id number]         XR chest 1 view portable    (Results Pending)              Procedures  Procedures         ED Course                                             MDM  Number of Diagnoses or Management Options  Altered mental status  Hyponatremia  Diagnosis management comments: Pt arrives altered, Na 120  Stable and awake but confused, no seizures  Borderline febrile, UA pending  CT neg  Pt accepted to ICU  Disposition  Final diagnoses: Altered mental status   Hyponatremia     Time reflects when diagnosis was documented in both MDM as applicable and the Disposition within this note     Time User Action Codes Description Comment    10/15/2022  3:31 PM Luz Ferro Add [R41 82] Altered mental status     10/15/2022  3:32 PM Luz Ferro Add [E87 1] Hyponatremia       ED Disposition     ED Disposition   Admit    Condition   Stable    Date/Time   Sat Oct 15, 2022  3:31 PM    Comment   Case was discussed with dr Brad Cheng and the patient's admission status was agreed to be Admission Status: inpatient status to the service of Dr Brad Cheng             Follow-up Information    None         Patient's Medications   Discharge Prescriptions    No medications on file No discharge procedures on file      PDMP Review     None          ED Provider  Electronically Signed by           Piter Vinson DO  10/15/22 3413

## 2022-10-15 NOTE — H&P
[address] [facility] 1955, 75 y o  female MRN: [id number]  Unit/Bed#: ICU 10 Encounter: [id number]  Primary Care Provider: Davian Denney MD   Date and time admitted to hospital: 10/15/2022  1:12 PM    * Hyponatremia  Assessment & Plan  Hyponatremia with sodium 120 on admission  Presents confused, likely progressive over several days but patient is a poor historian  Family at the bedside is unsure of history of presentation  No recent labs to review  Not on diuretics at home  Appears euvolemic on exam    · Plan to check UA, urine sodium, urine osmo, serum osmo  · Will give 500mL of NSS and recheck BMP  · Continue Q6hr BMP  · Goal to raise sodium 6-8mEq within 24 hours  · Patient also has a history of cancer and may benefit from CT scan pending sodium trend  · Patient is on SSRI which may possibly contributing to hyponatremia  · Will consult nephrology, appreciate recommendations   · Close I&Os  · Close neurologic monitoring        Toxic metabolic encephalopathy  Assessment & Plan  · Patient presents confused, she is alert to self, place and time but is slow to respond, appears restless   Not at baseline per family  · Likely in the setting of hyponatremia  · CT head unremarkable  · UDS pending  · UA pending  · Hold sedating medications   · Continue neuro checks per routine  · CAM ICU  · Sleep hygiene     Elevated troponin  Assessment & Plan  · Initial HS Troponin very mildly elevated on admission of 39  · Patient states she intermittently has chest pain, denies chest pain currently  · EKG with Twave inversions in V1-4, no acute ST changes  · Continue to trend troponins   · Pending troponin trend, ASA and echo       Psychiatric disorder  Assessment & Plan  · History of psychiatric disorder  · Continue wellbutrin and zoloft  · Hold home geodon, klonopin given confusion, altered mental status on admisson  · Unclear compliance with home meds at baseline    History of colon cancer  Assessment & Plan  · Hx of colon and endometrial cancer  Endometrial cancer s/p surgery and radiation completed in 2017  · Not currently receiving treatment  · In remission per family     Hyperlipidemia  Assessment & Plan  · Continue lipitor as a substitution for crestor    Hypertension  Assessment & Plan  · Continue metoprolol  · Hold home lisinopril for now  · Consider resuming tomorrow    Diabetes mellitus, type 2 (HCC)  Assessment & Plan  Lab Results   Component Value Date    HGBA1C 6 0 (H) 06/07/2021       Recent Labs     10/15/22  1320   POCGLU 227*       Blood Sugar Average: Last 72 hrs:  (P) 227     · Hold home metformin   · Start SSI  · Continue lantus, will dose reduce to 40u in AM pending oral intake  Consider increasing if able to tolerate PO  · Goal BG < 180    -------------------------------------------------------------------------------------------------------------  Chief Complaint: altered mental status    History of Present Illness   HX and PE limited by: patient is a poor historian  Opal Hernandez is a 75 y o  female with past medical history of colon and endometrial cancer in remission, HTN, HLD, DM2, psychiatric disorder who presents with progressive confusion  Patient is a poor historian and daughter at the bedside is unsure of chronicity  She thinks that confusion has likely been ongoing over several days  Patient denies shortness of breath, dysuria, fever, chills, nausea, vomiting or diarrhea  In the ER she was found to have a sodium of 120  No recent labs to review  Will admit to SD 1 for further workup and hemodynamic monitoring  History obtained from chart review and the patient   -------------------------------------------------------------------------------------------------------------  Dispo:  Admit to Stepdown Level 1    Code Status: Level 1 - Full Code  --------------------------------------------------------------------------------------------------------------  Review of Systems   Reason unable to perform ROS: poor historian, confused  Constitutional: Negative for activity change and appetite change  Respiratory: Negative for shortness of breath  Cardiovascular: Positive for chest pain  Int chest pain, not currently    Gastrointestinal: Negative for diarrhea, nausea and vomiting  Review of systems was unable to be performed secondary to poor historian     Physical Exam  Constitutional:       General: She is not in acute distress  Appearance: Normal appearance  She is not ill-appearing  HENT:      Head: Normocephalic and atraumatic  Mouth/Throat:      Mouth: Mucous membranes are dry  Pharynx: Oropharynx is clear  Eyes:      Extraocular Movements: Extraocular movements intact  Pupils: Pupils are equal, round, and reactive to light  Cardiovascular:      Rate and Rhythm: Normal rate and regular rhythm  Pulses: Normal pulses  Heart sounds: Normal heart sounds  Pulmonary:      Effort: Pulmonary effort is normal  No respiratory distress  Breath sounds: Normal breath sounds  Abdominal:      General: Abdomen is flat  Bowel sounds are normal  There is distension  Palpations: Abdomen is soft  Skin:     General: Skin is warm and dry  Capillary Refill: Capillary refill takes less than 2 seconds  Neurological:      Mental Status: She is alert  GCS: GCS eye subscore is 4  GCS verbal subscore is 5  GCS motor subscore is 6  Motor: Tremor present  Comments: Mild upper extremity tremors   Psychiatric:         Speech: Speech is delayed        Comments: restless       --------------------------------------------------------------------------------------------------------------  Vitals:   Vitals:    10/15/22 1430 10/15/22 1632 10/15/22 1700 10/15/22 1730   BP: (!) 173/82 158/79 169/79 155/76   BP Location:  Right arm     Pulse: (!) 106 101 100 97   Resp:  (!) 28 (!) 29 (!) 31   Temp:  98 5 °F (36 9 °C) TempSrc:  Temporal     SpO2: 97% 98% 98% 98%   Weight:       Height:         Temp  Min: 98 5 °F (36 9 °C)  Max: 99 °F (37 2 °C)     Height: 5' 4" (162 6 cm)  Body mass index is 33 57 kg/m²  Laboratory and Diagnostics:  Results from last 7 days   Lab Units 10/15/22  1330   WBC Thousand/uL 10 04   HEMOGLOBIN g/dL 12 8   HEMATOCRIT % 36 5   PLATELETS Thousands/uL 252   NEUTROS PCT % 72   MONOS PCT % 10     Results from last 7 days   Lab Units 10/15/22  1330   SODIUM mmol/L 120*   POTASSIUM mmol/L 3 8   CHLORIDE mmol/L 86*   CO2 mmol/L 24   ANION GAP mmol/L 10   BUN mg/dL 11   CREATININE mg/dL 0 88   CALCIUM mg/dL 8 6   GLUCOSE RANDOM mg/dL 224*   ALT U/L 55   AST U/L 92*   ALK PHOS U/L 77   ALBUMIN g/dL 3 7   TOTAL BILIRUBIN mg/dL 1 01*          Results from last 7 days   Lab Units 10/15/22  1330   INR  1 03   PTT seconds 26          Results from last 7 days   Lab Units 10/15/22  1330   LACTIC ACID mmol/L 1 0     ABG:    VBG:          Micro:        EKG: NSR  Imaging: I have personally reviewed pertinent reports     and I have personally reviewed pertinent films in PACS      Historical Information   Past Medical History:   Diagnosis Date   • Anesthesia complication     difficulty waking up   • Arthritis     left knee   • Bone spur     left knee behing the knee cap   • Chronic kidney disease    • Colon cancer [facility])     patient states about 2017   • Colon polyp     Tubulovillous Adenoma High Grade Dysplasia - 2017   • Depression    • Diabetes mellitus ([facility] [address] )    • Endometrial cancer ([facility] [address] )     grade I   • Hx of bleeding disorder     vaginal bleeding started on 3/13/17    • Hyperlipidemia    • Hypertension    • PONV (postoperative nausea and vomiting)    • Psychiatric disorder    • Shortness of breath     with exertion   • Urinary incontinence    • Vitamin D deficiency      Past Surgical History:   Procedure Laterality Date   • BREAST BIOPSY Left     benign-maybe at ar age 58   • CHOLECYSTECTOMY      open   • COLONOSCOPY • DILATION AND CURETTAGE OF UTERUS N/A 4/5/2017    Procedure: DILATATION AND CURETTAGE (D&C);   Surgeon: Corrina Cortez MD;  Location: [facility] MAIN OR;  Service:    • HYSTERECTOMY     • OOPHORECTOMY     • PELVIC LAPAROSCOPY     • DC LAPAROSCOPY W TOT HYSTERECTUTERUS <=250 GRAM  W TUBE/OVARY N/A 5/4/2017    Procedure: ROBOTIC ASSISTED TOTAL LAPAROSCOPIC HYSTERECTOMY, BILATERAL SALPINGOOPHERECTOMY; CYSTOSCOPY;  Surgeon: Jun Portillo MD;  Location:  MAIN OR;  Service: Gynecology Oncology   • TONSILLECTOMY     • TUBAL LIGATION       Social History   Social History     Substance and Sexual Activity   Alcohol Use No     Social History     Substance and Sexual Activity   Drug Use No     Social History     Tobacco Use   Smoking Status Never Smoker   Smokeless Tobacco Never Used     Exercise History: NA  Family History:   Family History   Problem Relation Age of Onset   • Cancer Mother         Renal   • Heart disease Father         CHF   • Heart disease Sister         atrial septal defect   • Breast cancer Paternal Aunt 39     I have reviewed this patient's family history and commented on sigificant items within the HPI      Medications:  Current Facility-Administered Medications   Medication Dose Route Frequency   • [START ON 10/16/2022] aspirin (ECOTRIN LOW STRENGTH) EC tablet 81 mg  81 mg Oral Daily With Breakfast   • atorvastatin (LIPITOR) tablet 40 mg  40 mg Oral Daily With Dinner   • [START ON 10/16/2022] buPROPion (WELLBUTRIN SR) 12 hr tablet 200 mg  200 mg Oral QAM   • [START ON 10/16/2022] enoxaparin (LOVENOX) subcutaneous injection 40 mg  40 mg Subcutaneous Daily   • fish oil capsule 1,000 mg  1,000 mg Oral BID   • influenza vaccine, high-dose (FLUZONE HIGH-DOSE) IM injection SIM 0 7 mL  0 7 mL Intramuscular Once   • [START ON 10/16/2022] insulin glargine (LANTUS) subcutaneous injection 40 Units 0 4 mL  40 Units Subcutaneous QAM   • insulin lispro (HumaLOG) 100 units/mL subcutaneous injection 1-6 Units 1-6 Units Subcutaneous TID AC   • insulin lispro (HumaLOG) 100 units/mL subcutaneous injection 1-6 Units  1-6 Units Subcutaneous HS   • [START ON 10/16/2022] metoprolol succinate (TOPROL-XL) 24 hr tablet 25 mg  25 mg Oral Daily   • [START ON 10/16/2022] multivitamin-minerals (CENTRUM) tablet 1 tablet  1 tablet Oral Daily   • nystatin (MYCOSTATIN) powder   Topical BID   • sertraline (ZOLOFT) tablet 100 mg  100 mg Oral BID   • sodium chloride 0 9 % bolus 500 mL  500 mL Intravenous Once     Home medications:  Prior to Admission Medications   Prescriptions Last Dose Informant Patient Reported? Taking?    BD PEN NEEDLE JOSEPHINE U/F 32G X 4 MM MISC  Self Yes No   Sig: USE 4 TIMES DAILY WITH INSULIN   DOCUSATE SODIUM PO  Self Yes No   Patient not taking: Reported on 4/8/2022    EQ Aspirin Adult Low Dose 81 MG EC tablet  Self No No   Sig: Take 1 tablet by mouth once daily with breakfast   Insulin Glargine (TOUJEO SOLOSTAR SC)  Self Yes No   Sig: Inject 70 Units under the skin every morning LANTUS   Multiple Vitamins-Minerals (MULTIVITAMIN ADULT PO)  Self Yes No   Omega-3 Fatty Acids (fish oil) 1,000 mg  Self No No   Sig: Take 1 capsule (1,000 mg total) by mouth 2 (two) times a day   Vitamin D, Cholecalciferol, 1000 units CAPS  Self Yes No   Sig: Take 1,000 Units by mouth every morning     Patient not taking: Reported on 9/2/2021   buPROPion (WELLBUTRIN SR) 200 MG 12 hr tablet  Self Yes No   Sig: Take 200 mg by mouth every morning   clonazePAM (KlonoPIN) 0 25 MG disintegrating tablet  Self Yes No   Sig: Take 0 25 mg by mouth every morning   Patient not taking: Reported on 4/8/2022    clonazePAM (KlonoPIN) 0 5 mg tablet  Self Yes No   Sig: Take 0 5 mg by mouth daily after lunch 0 5 tablet in morning, one whole tablet around 3 pm     lisinopril (ZESTRIL) 2 5 mg tablet  Self Yes No   Sig: Take 2 5 mg by mouth daily   metFORMIN (GLUCOPHAGE-XR) 500 mg 24 hr tablet  Self Yes No   Sig: Take 500 mg by mouth 2 (two) times a day metoprolol succinate (TOPROL-XL) 25 mg 24 hr tablet  Self No No   Sig: Take 1 tablet (25 mg total) by mouth daily   nitroglycerin (NITROSTAT) 0 4 mg SL tablet  Self No No   Sig: Place 1 tablet (0 4 mg total) under the tongue every 5 (five) minutes as needed for chest pain   rosuvastatin (CRESTOR) 20 MG tablet  Self No No   Sig: Take 1 tablet (20 mg total) by mouth daily   sertraline (ZOLOFT) 100 mg tablet  Self Yes No   Sig: Take 100 mg by mouth 2 (two) times a day   ziprasidone (GEODON) 80 mg capsule  Self Yes No   Sig: Take 80 mg by mouth 2 (two) times a day with meals   ziprasidone (GEODON) 80 mg capsule  Self Yes No   Sig: Take 1 capsule by mouth 2 (two) times a day   Patient not taking: Reported on 9/2/2021      Facility-Administered Medications: None     Allergies:  No Known Allergies    ------------------------------------------------------------------------------------------------------------  Advance Directive and Living Will:      Power of :    POLST:    ------------------------------------------------------------------------------------------------------------  Anticipated Length of Stay is > 2 midnights    Care Time Delivered:   No Critical Care time spent       [facility]        Portions of the record may have been created with voice recognition software  Occasional wrong word or "sound a like" substitutions may have occurred due to the inherent limitations of voice recognition software    Read the chart carefully and recognize, using context, where substitutions have occurred

## 2022-10-15 NOTE — ASSESSMENT & PLAN NOTE
· Initial HS Troponin very mildly elevated on admission of 39  · Patient states she intermittently has chest pain, denies chest pain currently  · EKG with Twave inversions in V1-4, no acute ST changes  · Continue to trend troponins   · Pending troponin trend, ASA and echo

## 2022-10-15 NOTE — ASSESSMENT & PLAN NOTE
· Hx of colon and endometrial cancer   Endometrial cancer s/p surgery and radiation completed in 2017  · Not currently receiving treatment  · In remission per family

## 2022-10-15 NOTE — ASSESSMENT & PLAN NOTE
Lab Results   Component Value Date    HGBA1C 6 0 (H) 06/07/2021       Recent Labs     10/15/22  1320   POCGLU 227*       Blood Sugar Average: Last 72 hrs:  (P) 227     · Hold home metformin   · Start SSI  · Continue lantus, will dose reduce to 40u in AM pending oral intake   Consider increasing if able to tolerate PO  · Goal BG < 180

## 2022-10-16 PROBLEM — R94.39 ABNORMAL STRESS TEST: Status: RESOLVED | Noted: 2020-02-25 | Resolved: 2022-10-16

## 2022-10-16 PROBLEM — F32.A DEPRESSION: Status: ACTIVE | Noted: 2017-04-13

## 2022-10-16 PROBLEM — N63.31 MASS OF AXILLARY TAIL OF RIGHT BREAST: Chronic | Status: ACTIVE | Noted: 2022-04-07

## 2022-10-16 PROBLEM — N32.81 OAB (OVERACTIVE BLADDER): Chronic | Status: ACTIVE | Noted: 2019-11-15

## 2022-10-16 PROBLEM — F99 PSYCHIATRIC DISORDER: Chronic | Status: ACTIVE | Noted: 2022-10-15

## 2022-10-16 PROBLEM — E11.9 DIABETES MELLITUS, TYPE 2 (HCC): Chronic | Status: ACTIVE | Noted: 2017-05-04

## 2022-10-16 PROBLEM — Z09 SURGICAL FOLLOW-UP CARE: Status: RESOLVED | Noted: 2022-04-08 | Resolved: 2022-10-16

## 2022-10-16 PROBLEM — Z85.42 HISTORY OF ENDOMETRIAL CANCER: Chronic | Status: ACTIVE | Noted: 2017-05-04

## 2022-10-16 PROBLEM — Z85.038 HISTORY OF COLON CANCER: Chronic | Status: ACTIVE | Noted: 2018-09-27

## 2022-10-16 PROBLEM — Z08 ENCOUNTER FOR FOLLOW-UP SURVEILLANCE OF ENDOMETRIAL CANCER: Status: RESOLVED | Noted: 2019-01-03 | Resolved: 2022-10-16

## 2022-10-16 PROBLEM — Z85.42 ENCOUNTER FOR FOLLOW-UP SURVEILLANCE OF ENDOMETRIAL CANCER: Status: RESOLVED | Noted: 2019-01-03 | Resolved: 2022-10-16

## 2022-10-16 PROBLEM — F32.A DEPRESSION: Chronic | Status: ACTIVE | Noted: 2017-04-13

## 2022-10-16 PROBLEM — N63.32 MASS OF AXILLARY TAIL OF LEFT BREAST: Chronic | Status: ACTIVE | Noted: 2022-04-07

## 2022-10-16 LAB
ALBUMIN SERPL BCP-MCNC: 3.3 G/DL (ref 3.5–5)
ALP SERPL-CCNC: 64 U/L (ref 46–116)
ALT SERPL W P-5'-P-CCNC: 50 U/L (ref 12–78)
ANION GAP SERPL CALCULATED.3IONS-SCNC: 3 MMOL/L (ref 4–13)
ANION GAP SERPL CALCULATED.3IONS-SCNC: 5 MMOL/L (ref 4–13)
ANION GAP SERPL CALCULATED.3IONS-SCNC: 6 MMOL/L (ref 4–13)
ANION GAP SERPL CALCULATED.3IONS-SCNC: 6 MMOL/L (ref 4–13)
AST SERPL W P-5'-P-CCNC: 73 U/L (ref 5–45)
BASOPHILS # BLD AUTO: 0.02 THOUSANDS/ÂΜL (ref 0–0.1)
BASOPHILS NFR BLD AUTO: 0 % (ref 0–1)
BILIRUB DIRECT SERPL-MCNC: 0.25 MG/DL (ref 0–0.2)
BILIRUB SERPL-MCNC: 0.86 MG/DL (ref 0.2–1)
BUN SERPL-MCNC: 10 MG/DL (ref 5–25)
BUN SERPL-MCNC: 11 MG/DL (ref 5–25)
BUN SERPL-MCNC: 13 MG/DL (ref 5–25)
BUN SERPL-MCNC: 8 MG/DL (ref 5–25)
BUN SERPL-MCNC: 8 MG/DL (ref 5–25)
BUN SERPL-MCNC: 9 MG/DL (ref 5–25)
CA-I BLD-SCNC: 0.98 MMOL/L (ref 1.12–1.32)
CALCIUM SERPL-MCNC: 8.1 MG/DL (ref 8.3–10.1)
CALCIUM SERPL-MCNC: 8.3 MG/DL (ref 8.3–10.1)
CALCIUM SERPL-MCNC: 8.4 MG/DL (ref 8.3–10.1)
CALCIUM SERPL-MCNC: 8.5 MG/DL (ref 8.3–10.1)
CHLORIDE SERPL-SCNC: 88 MMOL/L (ref 96–108)
CHLORIDE SERPL-SCNC: 89 MMOL/L (ref 96–108)
CHLORIDE SERPL-SCNC: 90 MMOL/L (ref 96–108)
CHLORIDE SERPL-SCNC: 91 MMOL/L (ref 96–108)
CHLORIDE SERPL-SCNC: 93 MMOL/L (ref 96–108)
CHLORIDE SERPL-SCNC: 96 MMOL/L (ref 96–108)
CO2 SERPL-SCNC: 26 MMOL/L (ref 21–32)
CO2 SERPL-SCNC: 27 MMOL/L (ref 21–32)
CO2 SERPL-SCNC: 28 MMOL/L (ref 21–32)
CO2 SERPL-SCNC: 28 MMOL/L (ref 21–32)
CREAT SERPL-MCNC: 0.82 MG/DL (ref 0.6–1.3)
CREAT SERPL-MCNC: 0.82 MG/DL (ref 0.6–1.3)
CREAT SERPL-MCNC: 0.86 MG/DL (ref 0.6–1.3)
CREAT SERPL-MCNC: 0.87 MG/DL (ref 0.6–1.3)
CREAT SERPL-MCNC: 0.89 MG/DL (ref 0.6–1.3)
CREAT SERPL-MCNC: 0.95 MG/DL (ref 0.6–1.3)
EOSINOPHIL # BLD AUTO: 0.03 THOUSAND/ÂΜL (ref 0–0.61)
EOSINOPHIL NFR BLD AUTO: 1 % (ref 0–6)
ERYTHROCYTE [DISTWIDTH] IN BLOOD BY AUTOMATED COUNT: 13 % (ref 11.6–15.1)
GFR SERPL CREATININE-BSD FRML MDRD: 61 ML/MIN/1.73SQ M
GFR SERPL CREATININE-BSD FRML MDRD: 66 ML/MIN/1.73SQ M
GFR SERPL CREATININE-BSD FRML MDRD: 68 ML/MIN/1.73SQ M
GFR SERPL CREATININE-BSD FRML MDRD: 69 ML/MIN/1.73SQ M
GFR SERPL CREATININE-BSD FRML MDRD: 73 ML/MIN/1.73SQ M
GFR SERPL CREATININE-BSD FRML MDRD: 73 ML/MIN/1.73SQ M
GLUCOSE SERPL-MCNC: 144 MG/DL (ref 65–140)
GLUCOSE SERPL-MCNC: 147 MG/DL (ref 65–140)
GLUCOSE SERPL-MCNC: 149 MG/DL (ref 65–140)
GLUCOSE SERPL-MCNC: 161 MG/DL (ref 65–140)
GLUCOSE SERPL-MCNC: 179 MG/DL (ref 65–140)
GLUCOSE SERPL-MCNC: 186 MG/DL (ref 65–140)
GLUCOSE SERPL-MCNC: 197 MG/DL (ref 65–140)
GLUCOSE SERPL-MCNC: 199 MG/DL (ref 65–140)
GLUCOSE SERPL-MCNC: 211 MG/DL (ref 65–140)
HCT VFR BLD AUTO: 32.8 % (ref 34.8–46.1)
HGB BLD-MCNC: 11.3 G/DL (ref 11.5–15.4)
IMM GRANULOCYTES # BLD AUTO: 0.03 THOUSAND/UL (ref 0–0.2)
IMM GRANULOCYTES NFR BLD AUTO: 1 % (ref 0–2)
LYMPHOCYTES # BLD AUTO: 1.17 THOUSANDS/ÂΜL (ref 0.6–4.47)
LYMPHOCYTES NFR BLD AUTO: 18 % (ref 14–44)
MAGNESIUM SERPL-MCNC: 1.7 MG/DL (ref 1.6–2.6)
MCH RBC QN AUTO: 30.9 PG (ref 26.8–34.3)
MCHC RBC AUTO-ENTMCNC: 34.5 G/DL (ref 31.4–37.4)
MCV RBC AUTO: 90 FL (ref 82–98)
MONOCYTES # BLD AUTO: 0.8 THOUSAND/ÂΜL (ref 0.17–1.22)
MONOCYTES NFR BLD AUTO: 12 % (ref 4–12)
NEUTROPHILS # BLD AUTO: 4.57 THOUSANDS/ÂΜL (ref 1.85–7.62)
NEUTS SEG NFR BLD AUTO: 68 % (ref 43–75)
NRBC BLD AUTO-RTO: 0 /100 WBCS
OSMOLALITY UR/SERPL-RTO: 255 MMOL/KG (ref 282–298)
OSMOLALITY UR: 518 MMOL/KG
PHOSPHATE SERPL-MCNC: 2.9 MG/DL (ref 2.3–4.1)
PLATELET # BLD AUTO: 167 THOUSANDS/UL (ref 149–390)
PMV BLD AUTO: 8.2 FL (ref 8.9–12.7)
POTASSIUM SERPL-SCNC: 3.6 MMOL/L (ref 3.5–5.3)
POTASSIUM SERPL-SCNC: 3.6 MMOL/L (ref 3.5–5.3)
POTASSIUM SERPL-SCNC: 3.9 MMOL/L (ref 3.5–5.3)
POTASSIUM SERPL-SCNC: 4 MMOL/L (ref 3.5–5.3)
POTASSIUM SERPL-SCNC: 4.3 MMOL/L (ref 3.5–5.3)
POTASSIUM SERPL-SCNC: 4.4 MMOL/L (ref 3.5–5.3)
PROT SERPL-MCNC: 6.8 G/DL (ref 6.4–8.4)
RBC # BLD AUTO: 3.66 MILLION/UL (ref 3.81–5.12)
SODIUM 24H UR-SCNC: 56 MOL/L
SODIUM SERPL-SCNC: 120 MMOL/L (ref 135–147)
SODIUM SERPL-SCNC: 122 MMOL/L (ref 135–147)
SODIUM SERPL-SCNC: 123 MMOL/L (ref 135–147)
SODIUM SERPL-SCNC: 125 MMOL/L (ref 135–147)
WBC # BLD AUTO: 6.62 THOUSAND/UL (ref 4.31–10.16)

## 2022-10-16 PROCEDURE — 80076 HEPATIC FUNCTION PANEL: CPT | Performed by: NURSE PRACTITIONER

## 2022-10-16 PROCEDURE — G0008 ADMIN INFLUENZA VIRUS VAC: HCPCS | Performed by: INTERNAL MEDICINE

## 2022-10-16 PROCEDURE — 99255 IP/OBS CONSLTJ NEW/EST HI 80: CPT | Performed by: INTERNAL MEDICINE

## 2022-10-16 PROCEDURE — 82948 REAGENT STRIP/BLOOD GLUCOSE: CPT

## 2022-10-16 PROCEDURE — 84100 ASSAY OF PHOSPHORUS: CPT | Performed by: NURSE PRACTITIONER

## 2022-10-16 PROCEDURE — 99232 SBSQ HOSP IP/OBS MODERATE 35: CPT | Performed by: INTERNAL MEDICINE

## 2022-10-16 PROCEDURE — 90662 IIV NO PRSV INCREASED AG IM: CPT | Performed by: INTERNAL MEDICINE

## 2022-10-16 PROCEDURE — 80048 BASIC METABOLIC PNL TOTAL CA: CPT | Performed by: NURSE PRACTITIONER

## 2022-10-16 PROCEDURE — 83735 ASSAY OF MAGNESIUM: CPT | Performed by: NURSE PRACTITIONER

## 2022-10-16 PROCEDURE — 82330 ASSAY OF CALCIUM: CPT | Performed by: NURSE PRACTITIONER

## 2022-10-16 PROCEDURE — 85025 COMPLETE CBC W/AUTO DIFF WBC: CPT | Performed by: NURSE PRACTITIONER

## 2022-10-16 RX ORDER — DOCUSATE SODIUM 100 MG/1
100 CAPSULE, LIQUID FILLED ORAL 2 TIMES DAILY
Status: DISCONTINUED | OUTPATIENT
Start: 2022-10-16 | End: 2022-10-19 | Stop reason: HOSPADM

## 2022-10-16 RX ORDER — CLONAZEPAM 0.5 MG/1
0.5 TABLET ORAL ONCE
Status: COMPLETED | OUTPATIENT
Start: 2022-10-16 | End: 2022-10-16

## 2022-10-16 RX ORDER — LISINOPRIL 2.5 MG/1
2.5 TABLET ORAL DAILY
Status: DISCONTINUED | OUTPATIENT
Start: 2022-10-16 | End: 2022-10-17

## 2022-10-16 RX ORDER — POTASSIUM CHLORIDE 20 MEQ/1
40 TABLET, EXTENDED RELEASE ORAL ONCE
Status: COMPLETED | OUTPATIENT
Start: 2022-10-16 | End: 2022-10-16

## 2022-10-16 RX ORDER — MAGNESIUM SULFATE HEPTAHYDRATE 40 MG/ML
2 INJECTION, SOLUTION INTRAVENOUS ONCE
Status: COMPLETED | OUTPATIENT
Start: 2022-10-16 | End: 2022-10-17

## 2022-10-16 RX ORDER — CLONAZEPAM 1 MG/1
1 TABLET ORAL
Status: DISCONTINUED | OUTPATIENT
Start: 2022-10-16 | End: 2022-10-17

## 2022-10-16 RX ORDER — ZIPRASIDONE HYDROCHLORIDE 40 MG/1
80 CAPSULE ORAL 2 TIMES DAILY WITH MEALS
Status: DISCONTINUED | OUTPATIENT
Start: 2022-10-16 | End: 2022-10-17

## 2022-10-16 RX ORDER — CLONAZEPAM 0.5 MG/1
0.5 TABLET ORAL DAILY
Status: DISCONTINUED | OUTPATIENT
Start: 2022-10-16 | End: 2022-10-17

## 2022-10-16 RX ORDER — INSULIN GLARGINE 100 [IU]/ML
70 INJECTION, SOLUTION SUBCUTANEOUS EVERY MORNING
Status: DISCONTINUED | OUTPATIENT
Start: 2022-10-16 | End: 2022-10-19 | Stop reason: HOSPADM

## 2022-10-16 RX ADMIN — NYSTATIN: 100000 POWDER TOPICAL at 08:57

## 2022-10-16 RX ADMIN — Medication 1 TABLET: at 08:05

## 2022-10-16 RX ADMIN — OMEGA-3 FATTY ACIDS CAP 1000 MG 1000 MG: 1000 CAP at 08:05

## 2022-10-16 RX ADMIN — SODIUM CHLORIDE: 4 INJECTION, SOLUTION, CONCENTRATE INTRAVENOUS at 08:57

## 2022-10-16 RX ADMIN — INSULIN GLARGINE 70 UNITS: 100 INJECTION, SOLUTION SUBCUTANEOUS at 08:06

## 2022-10-16 RX ADMIN — BUPROPION HYDROCHLORIDE 150 MG: 150 TABLET, EXTENDED RELEASE ORAL at 08:06

## 2022-10-16 RX ADMIN — DOCUSATE SODIUM 100 MG: 100 CAPSULE, LIQUID FILLED ORAL at 18:25

## 2022-10-16 RX ADMIN — INSULIN LISPRO 1 UNITS: 100 INJECTION, SOLUTION INTRAVENOUS; SUBCUTANEOUS at 06:20

## 2022-10-16 RX ADMIN — OMEGA-3 FATTY ACIDS CAP 1000 MG 1000 MG: 1000 CAP at 18:13

## 2022-10-16 RX ADMIN — ASPIRIN 81 MG: 81 TABLET, COATED ORAL at 08:05

## 2022-10-16 RX ADMIN — LISINOPRIL 2.5 MG: 2.5 TABLET ORAL at 11:20

## 2022-10-16 RX ADMIN — POTASSIUM CHLORIDE 40 MEQ: 1500 TABLET, EXTENDED RELEASE ORAL at 16:32

## 2022-10-16 RX ADMIN — SERTRALINE 100 MG: 50 TABLET, FILM COATED ORAL at 08:05

## 2022-10-16 RX ADMIN — ZIPRASIDONE HYDROCHLORIDE 80 MG: 40 CAPSULE ORAL at 16:33

## 2022-10-16 RX ADMIN — ENOXAPARIN SODIUM 40 MG: 40 INJECTION SUBCUTANEOUS at 08:05

## 2022-10-16 RX ADMIN — NYSTATIN: 100000 POWDER TOPICAL at 18:10

## 2022-10-16 RX ADMIN — INSULIN LISPRO 1 UNITS: 100 INJECTION, SOLUTION INTRAVENOUS; SUBCUTANEOUS at 16:44

## 2022-10-16 RX ADMIN — INFLUENZA A VIRUS A/VICTORIA/2570/2019 IVR-215 (H1N1) ANTIGEN (FORMALDEHYDE INACTIVATED), INFLUENZA A VIRUS A/DARWIN/9/2021 SAN-010 (H3N2) ANTIGEN (FORMALDEHYDE INACTIVATED), INFLUENZA B VIRUS B/PHUKET/3073/2013 ANTIGEN (FORMALDEHYDE INACTIVATED), AND INFLUENZA B VIRUS B/MICHIGAN/01/2021 ANTIGEN (FORMALDEHYDE INACTIVATED) 0.7 ML: 60; 60; 60; 60 INJECTION, SUSPENSION INTRAMUSCULAR at 16:41

## 2022-10-16 RX ADMIN — CLONAZEPAM 0.5 MG: 0.5 TABLET ORAL at 12:15

## 2022-10-16 RX ADMIN — MAGNESIUM SULFATE HEPTAHYDRATE 2 G: 40 INJECTION, SOLUTION INTRAVENOUS at 16:34

## 2022-10-16 RX ADMIN — CLONAZEPAM 0.5 MG: 0.5 TABLET ORAL at 00:19

## 2022-10-16 RX ADMIN — METOPROLOL SUCCINATE 25 MG: 25 TABLET, EXTENDED RELEASE ORAL at 08:05

## 2022-10-16 RX ADMIN — ATORVASTATIN CALCIUM 40 MG: 40 TABLET, FILM COATED ORAL at 16:32

## 2022-10-16 NOTE — CONSULTS
[address] [facility] 75 y o  female MRN: [id number]  Unit/Bed#: ICU 10 Encounter: [id number]    ASSESSMENT and PLAN:  1  Hyponatremia:  · Admission sodium of 120 on October 15, 2022  · Baseline sodium levels from labs from June 2021 and June 2022 are 135-137 (Care everywhere)  · Workup:  Serum osmolality 255, uric acid 3 7, TSH 1 326  · Urine osmolality, urine sodium are pending  · At this time, working diagnosis is SIADH - possibly due to psychiatric medications (bupropion and sertraline)  · Na is up to 122 this AM with isotonic IVF  · Will change to 1 8% saline at 60 cc/hr  · Goal Na is not > 128 by 2 pm today  2  Encephalopathy:  · Per critical care  3  Hypertension:  · BP elevated  · On metoprolol and lisinopril at home  · May resume home medications  4  Diabetes mellitus:    SUMMARY OF RECOMMENDATIONS:  · Change to 1 8% saline at 60 cc/hr  · Check BMP q 4 hours  · Goal Na is not > 128 by 2 pm today  · Follow up [facility] and [facility]  The above plan was discussed with crit care  Previous records were personally reviewed by me to obtain a baseline creatinine  The images (CXR) were personally reviewed by me in PACS    HISTORY OF PRESENT ILLNESS:  Requesting Physician: Tyree Montiel DO  Reason for Consult: Hyponatremia    Estee Jackson is a 75 y o  female who was admitted to [facility] on 10/15/22 after presenting with a change in mental status  A renal consultation is requested today for assistance in the management of hyponatremia  Justin Christianson has a history of HTN, DM, HLP, depression  She now presents to [facility] due to a change in mental status  The patient is a poor historian and the information contained in this consult was obtained upon discussion with the critical care team   From what I gather, the patient's daughter reported that the patient was confused but they are unsure of how long this has been    On presentation, the patient's sodium level was noted to be 120 and she was admitted to the ICU  She was started on isotonic fluids - she received a bolus of  cc in the ER and was placed on Isolyte at 50 cc/hour thereafter  Her sodium this morning is 122  On further questioning, the patient reports headache, some diarrhea and leg swelling  She denied any fever, chills, chest pain, shortness of breath, nausea, vomiting  She is on multiple psychiatric medications but does not recall when the doses were recently adjusted  PAST MEDICAL HISTORY:  Past Medical History:   Diagnosis Date   • Anesthesia complication     difficulty waking up   • Arthritis     left knee   • Bone spur     left knee behing the knee cap   • Chronic kidney disease    • Colon cancer [facility])     patient states about 2017   • Colon polyp     Tubulovillous Adenoma High Grade Dysplasia - 2017   • Depression    • Diabetes mellitus ([facility] [address] )    • Endometrial cancer (HCC)     grade I   • Hx of bleeding disorder     vaginal bleeding started on 3/13/17    • Hyperlipidemia    • Hypertension    • PONV (postoperative nausea and vomiting)    • Psychiatric disorder    • Shortness of breath     with exertion   • Urinary incontinence    • Vitamin D deficiency      PAST SURGICAL HISTORY:  Past Surgical History:   Procedure Laterality Date   • BREAST BIOPSY Left     benign-maybe at ar age 58   • CHOLECYSTECTOMY      open   • COLONOSCOPY     • DILATION AND CURETTAGE OF UTERUS N/A 4/5/2017    Procedure: DILATATION AND CURETTAGE (D&C);   Surgeon: Rhianna Pruitt MD;  Location: [facility] MAIN OR;  Service:    • HYSTERECTOMY     • OOPHORECTOMY     • PELVIC LAPAROSCOPY     • CO LAPAROSCOPY W TOT HYSTERECTUTERUS <=250 GRAM  W TUBE/OVARY N/A 5/4/2017    Procedure: ROBOTIC ASSISTED TOTAL LAPAROSCOPIC HYSTERECTOMY, BILATERAL SALPINGOOPHERECTOMY; CYSTOSCOPY;  Surgeon: Mary Molina MD;  Location:  MAIN OR;  Service: Gynecology Oncology   • TONSILLECTOMY     • TUBAL LIGATION       ALLERGIES:  No Known Allergies    SOCIAL HISTORY:  Social History     Substance and Sexual Activity   Alcohol Use Never     Social History     Substance and Sexual Activity   Drug Use No     Social History     Tobacco Use   Smoking Status Never Smoker   Smokeless Tobacco Never Used     FAMILY HISTORY:  Family History   Problem Relation Age of Onset   • Cancer Mother         Renal   • Heart disease Father         CHF   • Heart disease Sister         atrial septal defect   • Breast cancer Paternal Aunt 39     MEDICATIONS:    Current Facility-Administered Medications:   •  aspirin (ECOTRIN LOW STRENGTH) EC tablet 81 mg, 81 mg, Oral, Daily With Breakfast, JOHN Camarena, 81 mg at 10/16/22 5351  •  atorvastatin (LIPITOR) tablet 40 mg, 40 mg, Oral, Daily With Dinner, JOHN Camarena, 40 mg at 10/15/22 1727  •  buPROPion (WELLBUTRIN XL) 24 hr tablet 150 mg, 150 mg, Oral, Daily, Olayinka Harkins DO, 150 mg at 10/16/22 5183  •  enoxaparin (LOVENOX) subcutaneous injection 40 mg, 40 mg, Subcutaneous, Daily, JOHN Suarez, 40 mg at 10/16/22 7901  •  fish oil capsule 1,000 mg, 1,000 mg, Oral, BID, JOHN Suarez, 1,000 mg at 10/16/22 6226  •  influenza vaccine, high-dose (FLUZONE HIGH-DOSE) IM injection SIM 0 7 mL, 0 7 mL, Intramuscular, Once, Olayinka Harkins DO  •  insulin glargine (LANTUS) subcutaneous injection 70 Units 0 7 mL, 70 Units, Subcutaneous, QAM, JOHN Camarena, 70 Units at 10/16/22 7110  •  insulin lispro (HumaLOG) 100 units/mL subcutaneous injection 1-6 Units, 1-6 Units, Subcutaneous, TID AC, 1 Units at 10/16/22 0620 **AND** Fingerstick Glucose (POCT), , , TID AC, JOHN Mora  •  insulin lispro (HumaLOG) 100 units/mL subcutaneous injection 1-6 Units, 1-6 Units, Subcutaneous, HS, JOHN Suarez, 2 Units at 10/15/22 2147  •  metoprolol succinate (TOPROL-XL) 24 hr tablet 25 mg, 25 mg, Oral, Daily, Sheree Preethi JOHN Moreira, 25 mg at 10/16/22 3840  •  multivitamin-minerals (CENTRUM) tablet 1 tablet, 1 tablet, Oral, Daily, JOHN Vu, 1 tablet at 10/16/22 0629  •  nystatin (MYCOSTATIN) powder, , Topical, BID, JOHN Vu  •  sertraline (ZOLOFT) tablet 100 mg, 100 mg, Oral, BID, JOHN Vu, 100 mg at 10/16/22 8357  •  sodium chloride 23 4% (HYPERTONIC) 308 mEq in sterile water 1,000 mL infusion, , Intravenous, Continuous, Rayo Pascual MD    REVIEW OF SYSTEMS:  All the systems were reviewed and were negative except as documented on the HPI  PHYSICAL EXAM:  Current Weight: Weight - Scale: 86 7 kg (191 lb 2 2 oz)  First Weight: Weight - Scale: 88 7 kg (195 lb 8 8 oz)  Vitals:    10/16/22 0544 10/16/22 0600 10/16/22 0700 10/16/22 0800   BP:  162/76 (!) 171/78 148/81   BP Location:    Right arm   Pulse:  90 97 92   Resp:  (!) 29 (!) 26 (!) 31   Temp:    97 7 °F (36 5 °C)   TempSrc:    Temporal   SpO2:  100% 99% 99%   Weight: 86 7 kg (191 lb 2 2 oz)      Height:           Intake/Output Summary (Last 24 hours) at 10/16/2022 0823  Last data filed at 10/16/2022 0601  Gross per 24 hour   Intake 1143 33 ml   Output 1410 ml   Net -266 67 ml     Physical Exam  General: conscious, cooperative, not in distress  Skin: warm, dry, good turgor  Eyes: pink conjunctivae, no scleral icterus  ENT: moist lips and mucous membranes  Respiratory: equal chest expansion, clear breath sounds  Cardiovascular: distinct heart sounds, normal rate, regular rhythm, no rub  Abdomen: soft, non-tender, non-distended, normoactive bowel sounds  Extremities: no edema  Genitourinary: no gonzales catheter  Neuro: awake, alert  Psych: appropriate affect        Lab Results:   Results from last 7 days   Lab Units 10/16/22  0542 10/16/22  0206 10/15/22  2205 10/15/22  1816 10/15/22  1330   WBC Thousand/uL 6 62  --   --   --  10 04   HEMOGLOBIN g/dL 11 3*  --   --   --  12 8 HEMATOCRIT % 32 8*  --   --   --  36 5   PLATELETS Thousands/uL 167  --   --  188 252   POTASSIUM mmol/L 4 0 3 9 3 6 3 6 3 8   CHLORIDE mmol/L 89* 88* 87* 87* 86*   CO2 mmol/L 27 27 25 27 24   BUN mg/dL 8 9 10 10 11   CREATININE mg/dL 0 82 0 82 0 85 0 98 0 88   CALCIUM mg/dL 8 5 8 5 8 7 8 5 8 6   MAGNESIUM mg/dL 1 7  --   --   --   --    PHOSPHORUS mg/dL 2 9  --   --   --   --    ALK PHOS U/L 64  --   --   --  77   ALT U/L 50  --   --   --  55   AST U/L 73*  --   --   --  92*     Other Studies:   Chest x-ray personally reviewed by me in PACS showed no vascular congestion

## 2022-10-16 NOTE — ASSESSMENT & PLAN NOTE
· History of psychiatric disorder  · Continue wellbutrin and zoloft  · Hold home geodon, scheduled klonopin given confusion, altered mental status on admisson  · Unclear compliance with home meds at baseline

## 2022-10-16 NOTE — PROGRESS NOTES
[facility] [address]  Progress Note - Yvette Bolden 1955, 75 y o  female MRN: [id number]  Unit/Bed#: ICU 10 Encounter: [id number]  Primary Care Provider: Janeane Mcburney, MD   Date and time admitted to hospital: 10/15/2022  1:12 PM    * Hyponatremia  Assessment & Plan  · Na 120 on admission  Presents confused, likely progressive over several days but patient is a poor historian  · Urine sodium, urine osmo, serum osmo pending  · 500mL of NSS with no real improvement in level, Isolyte started @ 50 cc/hr increased to 75 cc/hr if am BMP not improved will transition to NSS  · Continue Q4hr BMP  · Goal to raise sodium 6-8mEq within 24 hours about 1800 today  · Patient also has a history of cancer and may benefit from CT scan pending sodium trend  · Patient is on SSRI which may possibly contributing to hyponatremia  · Consult nephrology will see today  · Close I&Os  · Close neurologic monitoring- pt remains confused at times    Toxic metabolic encephalopathy  Assessment & Plan  · Patient presents confused, she is alert to self, place and time but is slow to respond, appears restless   Not at baseline per family  · Likely in the setting of hyponatremia  · CT head unremarkable  · UDS and UA negative  · Hold sedating medications   · Continue neuro checks  · CAM ICU  · Sleep hygiene- gave 1 dose of Klonopin for sleep per pt request    Elevated troponin  Assessment & Plan  · Initial HS Troponin very mildly elevated on admission of 39  · Patient states she intermittently has chest pain, denies chest pain currently  · EKG with Twave inversions in V1-4, no acute ST changes      Diabetes mellitus, type 2 [facility])  Assessment & Plan  Lab Results   Component Value Date    HGBA1C 6 0 (H) 06/07/2021       Recent Labs     10/15/22  1320 10/15/22  1825 10/15/22  2147   POCGLU 227* 204* 210*       Blood Sugar Average: Last 72 hrs:  (P) 172 [id number]     · Resume home metformin when taking PO  · Cont SSI  · Continue lantus, will dose reduce to 40u in AM pending oral intake (takes 70 units baseline)  Consider increasing if able to tolerate PO  · Goal BG < 180    Psychiatric disorder  Assessment & Plan  · History of psychiatric disorder  · Continue wellbutrin and zoloft  · Hold home geodon, scheduled klonopin given confusion, altered mental status on admisson  · Unclear compliance with home meds at baseline    History of colon cancer  Assessment & Plan  · Hx of colon and endometrial cancer   Endometrial cancer s/p surgery and radiation completed in 2017  · Not currently receiving treatment  · In remission per family     Hyperlipidemia  Assessment & Plan  · Continue home statin    Hypertension  Assessment & Plan  · Continue metoprolol  · Hold home lisinopril for now, consider resuming today      ----------------------------------------------------------------------------------------  HPI/24hr events: no acute issues overnight; remained slightly altered throughout the night; requested Klonopin for sleep, has been sleeping since    Patient appropriate for transfer out of the ICU today?: No  Disposition: Continue Stepdown Level 1 level of care   Code Status: Level 1 - Full Code  ---------------------------------------------------------------------------------------  SUBJECTIVE    Review of Systems   Unable to perform ROS: Mental status change       ---------------------------------------------------------------------------------------  OBJECTIVE    Vitals   Vitals:    10/16/22 0100 10/16/22 0200 10/16/22 0300 10/16/22 0400   BP: 168/72 126/61 137/64 160/69   BP Location:    Right arm   Pulse: 92 86 85 88   Resp: 20 (!) 26 (!) 24 15   Temp:    98 °F (36 7 °C)   TempSrc:    Temporal   SpO2: 97% 100% 100% 100%   Weight:    86 7 kg (191 lb 2 2 oz)   Height:         Temp (24hrs), Av 9 °F (36 6 °C), Min:96 7 °F (35 9 °C), Max:99 °F (37 2 °C)  Current: Temperature: 98 °F (36 7 °C)          Respiratory:  SpO2: SpO2: 100 %, SpO2 Device: O2 Device: Nasal cannula  Nasal Cannula O2 Flow Rate (L/min): 2 L/min    Invasive/non-invasive ventilation settings   Respiratory  Report   Lab Data (Last 4 hours)    None         O2/Vent Data (Last 4 hours)    None                Physical Exam  Vitals and nursing note reviewed  Constitutional:       General: She is awake  She is not in acute distress  Appearance: Normal appearance  She is well-developed  She is obese  HENT:      Head: Normocephalic and atraumatic  Eyes:      Extraocular Movements: Extraocular movements intact  Pupils: Pupils are equal, round, and reactive to light  Cardiovascular:      Rate and Rhythm: Normal rate and regular rhythm  Pulmonary:      Effort: Pulmonary effort is normal       Breath sounds: Normal breath sounds  Abdominal:      General: Bowel sounds are normal  There is no distension  Palpations: Abdomen is soft  Tenderness: There is no abdominal tenderness  Musculoskeletal:         General: Normal range of motion  Right lower leg: Edema present  Left lower leg: Edema present  Skin:     General: Skin is warm and dry  Capillary Refill: Capillary refill takes less than 2 seconds  Neurological:      Mental Status: She is alert  She is confused  Psychiatric:         Mood and Affect: Mood normal          Speech: Speech is delayed  Behavior: Behavior is cooperative  Cognition and Memory: Cognition is impaired  Judgment: Judgment is impulsive               Laboratory and Diagnostics:  Results from last 7 days   Lab Units 10/15/22  1816 10/15/22  1330   WBC Thousand/uL  --  10 04   HEMOGLOBIN g/dL  --  12 8   HEMATOCRIT %  --  36 5   PLATELETS Thousands/uL 188 252   NEUTROS PCT %  --  72   MONOS PCT %  --  10     Results from last 7 days   Lab Units 10/16/22  0206 10/15/22  2205 10/15/22  1816 10/15/22  1330   SODIUM mmol/L 120* 121* 121* 120*   POTASSIUM mmol/L 3 9 3 6 3 6 3 8   CHLORIDE mmol/L 88* 87* 87* 86*   CO2 mmol/L 27 25 27 24   ANION GAP mmol/L 5 9 7 10   BUN mg/dL 9 10 10 11   CREATININE mg/dL 0 82 0 85 0 98 0 88   CALCIUM mg/dL 8 5 8 7 8 5 8 6   GLUCOSE RANDOM mg/dL 199* 202* 210* 224*   ALT U/L  --   --   --  55   AST U/L  --   --   --  92*   ALK PHOS U/L  --   --   --  77   ALBUMIN g/dL  --   --   --  3 7   TOTAL BILIRUBIN mg/dL  --   --   --  1 01*          Results from last 7 days   Lab Units 10/15/22  1330   INR  1 03   PTT seconds 26          Results from last 7 days   Lab Units 10/15/22  1330   LACTIC ACID mmol/L 1 0     ABG:    VBG:          Micro  Results from last 7 days   Lab Units 10/15/22  1401 10/15/22  1354   BLOOD CULTURE  Received in Microbiology Lab  Culture in Progress  Received in Microbiology Lab  Culture in Progress  EKG: NSR  Imaging: I have personally reviewed pertinent reports  Intake and Output  I/O       10/14 0701  10/15 0700 10/15 0701  10/16 0700    P  O   120    IV Piggyback  500    Total Intake(mL/kg)  620 (7 2)    Urine (mL/kg/hr)  1410    Total Output  1410    Net  -790                Height and Weights   Height: 5' 4" (162 6 cm)     Body mass index is 32 81 kg/m²  Weight (last 2 days)     Date/Time Weight    10/16/22 0400 86 7 (191 14)    10/15/22 1702 87 3 (192 46)    10/15/22 1313 88 7 (195 55)            Nutrition       Diet Orders   (From admission, onward)             Start     Ordered    10/15/22 2138  Diet Flo/CHO Controlled; Consistent Carbohydrate Diet Level 2 (5 carb servings/75 grams CHO/meal); Fluid Restriction 1800 ML  Diet effective now        References:    Nutrtion Support Algorithm Enteral vs  Parenteral   Question Answer Comment   Diet Type Flo/CHO Controlled    Flo/CHO Controlled Consistent Carbohydrate Diet Level 2 (5 carb servings/75 grams CHO/meal)    Other Restriction(s): Fluid Restriction 1800 ML    RD to adjust diet per protocol?  Yes        10/15/22 2138                  Active Medications  Scheduled Meds:  Current Facility-Administered Medications   Medication Dose Route Frequency Provider Last Rate   • aspirin  81 mg Oral Daily With Breakfast JOHN Gregory     • atorvastatin  40 mg Oral Daily With [place], [place]     • buPROPion  150 mg Oral Daily Isaiah Barber DO     • enoxaparin  40 mg Subcutaneous Daily [place], [place]     • fish oil  1,000 mg Oral BID JOHN Gregory     • influenza vaccine  0 7 mL Intramuscular Once Isaiah Barber DO     • insulin glargine  40 Units Subcutaneous QAM JOHN Zamora     • insulin lispro  1-6 Units Subcutaneous TID AC JOHN Zamora     • insulin lispro  1-6 Units Subcutaneous HS JOHN Gregory     • metoprolol succinate  25 mg Oral Daily [facility] [place]     • multi-electrolyte  75 mL/hr Intravenous Continuous Ulysess JOHN Bourne 75 mL/hr (10/16/22 0327)   • multivitamin-minerals  1 tablet Oral Daily JOHN Gregory     • nystatin   Topical BID [facility] [place]     • sertraline  100 mg Oral BID JOHN Gregory       Continuous Infusions:  multi-electrolyte, 75 mL/hr, Last Rate: 75 mL/hr (10/16/22 0327)      PRN Meds:        Invasive Devices Review  Invasive Devices  Report    Peripheral Intravenous Line  Duration           Peripheral IV 10/17/19 Lower; Left Arm 1094 days    Peripheral IV 10/15/22 Left;Ventral (anterior) Forearm <1 day                Rationale for remaining devices: N/A  ---------------------------------------------------------------------------------------  Advance Directive and Living Will:      Power of :    POLST:    ---------------------------------------------------------------------------------------  Care Time Delivered:   No Critical Care time spent       [facility]

## 2022-10-16 NOTE — ASSESSMENT & PLAN NOTE
· Na 120 on admission  Presents confused, likely progressive over several days but patient is a poor historian      · Urine sodium, urine osmo, serum osmo pending  · 500mL of NSS with no real improvement in level, Isolyte started @ 50 cc/hr increased to 75 cc/hr if am BMP not improved will transition to NSS  · Continue Q4hr BMP  · Goal to raise sodium 6-8mEq within 24 hours about 1800 today  · Patient also has a history of cancer and may benefit from CT scan pending sodium trend  · Patient is on SSRI which may possibly contributing to hyponatremia  · Consult nephrology will see today  · Close I&Os  · Close neurologic monitoring- pt remains confused at times

## 2022-10-16 NOTE — ASSESSMENT & PLAN NOTE
· Initial HS Troponin very mildly elevated on admission of 39  · Patient states she intermittently has chest pain, denies chest pain currently  · EKG with Twave inversions in V1-4, no acute ST changes

## 2022-10-16 NOTE — ASSESSMENT & PLAN NOTE
Lab Results   Component Value Date    HGBA1C 6 0 (H) 06/07/2021       Recent Labs     10/15/22  1320 10/15/22  1825 10/15/22  2147   POCGLU 227* 204* 210*       Blood Sugar Average: Last 72 hrs:  (P) 066 [id number]     · Resume home metformin when taking PO  · Cont SSI  · Continue lantus, will dose reduce to 40u in AM pending oral intake (takes 70 units baseline)   Consider increasing if able to tolerate PO  · Goal BG < 180

## 2022-10-16 NOTE — ASSESSMENT & PLAN NOTE
· Patient presents confused, she is alert to self, place and time but is slow to respond, appears restless   Not at baseline per family  · Likely in the setting of hyponatremia  · CT head unremarkable  · UDS and UA negative  · Hold sedating medications   · Continue neuro checks  · CAM ICU  · Sleep hygiene- gave 1 dose of Klonopin for sleep per pt request

## 2022-10-17 ENCOUNTER — APPOINTMENT (INPATIENT)
Dept: RADIOLOGY | Facility: HOSPITAL | Age: 67
DRG: 643 | End: 2022-10-17
Payer: COMMERCIAL

## 2022-10-17 PROBLEM — R79.89 ELEVATED TROPONIN: Status: RESOLVED | Noted: 2022-10-15 | Resolved: 2022-10-17

## 2022-10-17 PROBLEM — R77.8 ELEVATED TROPONIN: Status: RESOLVED | Noted: 2022-10-15 | Resolved: 2022-10-17

## 2022-10-17 LAB
ANION GAP SERPL CALCULATED.3IONS-SCNC: 3 MMOL/L (ref 4–13)
ANION GAP SERPL CALCULATED.3IONS-SCNC: 3 MMOL/L (ref 4–13)
ANION GAP SERPL CALCULATED.3IONS-SCNC: 6 MMOL/L (ref 4–13)
ANION GAP SERPL CALCULATED.3IONS-SCNC: 8 MMOL/L (ref 4–13)
ATRIAL RATE: 104 BPM
BACTERIA UR CULT: NORMAL
BASOPHILS # BLD AUTO: 0.04 THOUSANDS/ÂΜL (ref 0–0.1)
BASOPHILS NFR BLD AUTO: 1 % (ref 0–1)
BUN SERPL-MCNC: 13 MG/DL (ref 5–25)
BUN SERPL-MCNC: 14 MG/DL (ref 5–25)
BUN SERPL-MCNC: 15 MG/DL (ref 5–25)
BUN SERPL-MCNC: 16 MG/DL (ref 5–25)
CALCIUM SERPL-MCNC: 8.5 MG/DL (ref 8.3–10.1)
CALCIUM SERPL-MCNC: 8.5 MG/DL (ref 8.3–10.1)
CALCIUM SERPL-MCNC: 8.7 MG/DL (ref 8.3–10.1)
CALCIUM SERPL-MCNC: 8.7 MG/DL (ref 8.3–10.1)
CHLORIDE SERPL-SCNC: 100 MMOL/L (ref 96–108)
CHLORIDE SERPL-SCNC: 102 MMOL/L (ref 96–108)
CHLORIDE SERPL-SCNC: 97 MMOL/L (ref 96–108)
CHLORIDE SERPL-SCNC: 98 MMOL/L (ref 96–108)
CO2 SERPL-SCNC: 26 MMOL/L (ref 21–32)
CO2 SERPL-SCNC: 26 MMOL/L (ref 21–32)
CO2 SERPL-SCNC: 29 MMOL/L (ref 21–32)
CO2 SERPL-SCNC: 30 MMOL/L (ref 21–32)
CORTIS SERPL-MCNC: 25 UG/DL
CREAT SERPL-MCNC: 0.89 MG/DL (ref 0.6–1.3)
CREAT SERPL-MCNC: 0.93 MG/DL (ref 0.6–1.3)
CREAT SERPL-MCNC: 1.05 MG/DL (ref 0.6–1.3)
CREAT SERPL-MCNC: 1.17 MG/DL (ref 0.6–1.3)
EOSINOPHIL # BLD AUTO: 0.08 THOUSAND/ÂΜL (ref 0–0.61)
EOSINOPHIL NFR BLD AUTO: 1 % (ref 0–6)
ERYTHROCYTE [DISTWIDTH] IN BLOOD BY AUTOMATED COUNT: 13.6 % (ref 11.6–15.1)
GFR SERPL CREATININE-BSD FRML MDRD: 47 ML/MIN/1.73SQ M
GFR SERPL CREATININE-BSD FRML MDRD: 54 ML/MIN/1.73SQ M
GFR SERPL CREATININE-BSD FRML MDRD: 63 ML/MIN/1.73SQ M
GFR SERPL CREATININE-BSD FRML MDRD: 66 ML/MIN/1.73SQ M
GLUCOSE SERPL-MCNC: 102 MG/DL (ref 65–140)
GLUCOSE SERPL-MCNC: 121 MG/DL (ref 65–140)
GLUCOSE SERPL-MCNC: 123 MG/DL (ref 65–140)
GLUCOSE SERPL-MCNC: 126 MG/DL (ref 65–140)
GLUCOSE SERPL-MCNC: 135 MG/DL (ref 65–140)
GLUCOSE SERPL-MCNC: 165 MG/DL (ref 65–140)
HCT VFR BLD AUTO: 35.3 % (ref 34.8–46.1)
HGB BLD-MCNC: 11.3 G/DL (ref 11.5–15.4)
IMM GRANULOCYTES # BLD AUTO: 0.03 THOUSAND/UL (ref 0–0.2)
IMM GRANULOCYTES NFR BLD AUTO: 1 % (ref 0–2)
LYMPHOCYTES # BLD AUTO: 0.93 THOUSANDS/ÂΜL (ref 0.6–4.47)
LYMPHOCYTES NFR BLD AUTO: 16 % (ref 14–44)
MCH RBC QN AUTO: 31.2 PG (ref 26.8–34.3)
MCHC RBC AUTO-ENTMCNC: 32 G/DL (ref 31.4–37.4)
MCV RBC AUTO: 97 FL (ref 82–98)
MONOCYTES # BLD AUTO: 0.65 THOUSAND/ÂΜL (ref 0.17–1.22)
MONOCYTES NFR BLD AUTO: 11 % (ref 4–12)
MRSA NOSE QL CULT: NORMAL
NEUTROPHILS # BLD AUTO: 4.22 THOUSANDS/ÂΜL (ref 1.85–7.62)
NEUTS SEG NFR BLD AUTO: 70 % (ref 43–75)
NRBC BLD AUTO-RTO: 0 /100 WBCS
P AXIS: 61 DEGREES
PLATELET # BLD AUTO: 144 THOUSANDS/UL (ref 149–390)
PMV BLD AUTO: 8.2 FL (ref 8.9–12.7)
POTASSIUM SERPL-SCNC: 4.4 MMOL/L (ref 3.5–5.3)
POTASSIUM SERPL-SCNC: 4.5 MMOL/L (ref 3.5–5.3)
POTASSIUM SERPL-SCNC: 4.7 MMOL/L (ref 3.5–5.3)
POTASSIUM SERPL-SCNC: 4.8 MMOL/L (ref 3.5–5.3)
PR INTERVAL: 130 MS
QRS AXIS: 23 DEGREES
QRSD INTERVAL: 120 MS
QT INTERVAL: 374 MS
QTC INTERVAL: 491 MS
RBC # BLD AUTO: 3.62 MILLION/UL (ref 3.81–5.12)
SODIUM SERPL-SCNC: 129 MMOL/L (ref 135–147)
SODIUM SERPL-SCNC: 132 MMOL/L (ref 135–147)
SODIUM SERPL-SCNC: 132 MMOL/L (ref 135–147)
SODIUM SERPL-SCNC: 135 MMOL/L (ref 135–147)
T WAVE AXIS: 47 DEGREES
VENTRICULAR RATE: 104 BPM
WBC # BLD AUTO: 5.95 THOUSAND/UL (ref 4.31–10.16)

## 2022-10-17 PROCEDURE — 93010 ELECTROCARDIOGRAM REPORT: CPT | Performed by: INTERNAL MEDICINE

## 2022-10-17 PROCEDURE — 80048 BASIC METABOLIC PNL TOTAL CA: CPT | Performed by: NURSE PRACTITIONER

## 2022-10-17 PROCEDURE — G1004 CDSM NDSC: HCPCS

## 2022-10-17 PROCEDURE — 99232 SBSQ HOSP IP/OBS MODERATE 35: CPT | Performed by: INTERNAL MEDICINE

## 2022-10-17 PROCEDURE — 71275 CT ANGIOGRAPHY CHEST: CPT

## 2022-10-17 PROCEDURE — 82948 REAGENT STRIP/BLOOD GLUCOSE: CPT

## 2022-10-17 PROCEDURE — 74174 CTA ABD&PLVS W/CONTRAST: CPT

## 2022-10-17 PROCEDURE — 80048 BASIC METABOLIC PNL TOTAL CA: CPT | Performed by: INTERNAL MEDICINE

## 2022-10-17 PROCEDURE — 85025 COMPLETE CBC W/AUTO DIFF WBC: CPT | Performed by: PHYSICIAN ASSISTANT

## 2022-10-17 PROCEDURE — 99232 SBSQ HOSP IP/OBS MODERATE 35: CPT | Performed by: ANESTHESIOLOGY

## 2022-10-17 RX ORDER — ZIPRASIDONE HYDROCHLORIDE 20 MG/1
80 CAPSULE ORAL 2 TIMES DAILY
Status: DISCONTINUED | OUTPATIENT
Start: 2022-10-17 | End: 2022-10-19 | Stop reason: HOSPADM

## 2022-10-17 RX ORDER — POLYETHYLENE GLYCOL 3350 17 G/17G
17 POWDER, FOR SOLUTION ORAL DAILY
Status: DISCONTINUED | OUTPATIENT
Start: 2022-10-17 | End: 2022-10-19 | Stop reason: HOSPADM

## 2022-10-17 RX ADMIN — INSULIN GLARGINE 70 UNITS: 100 INJECTION, SOLUTION SUBCUTANEOUS at 08:07

## 2022-10-17 RX ADMIN — ASPIRIN 81 MG: 81 TABLET, COATED ORAL at 08:11

## 2022-10-17 RX ADMIN — ZIPRASIDONE HYDROCHLORIDE 80 MG: 40 CAPSULE ORAL at 17:17

## 2022-10-17 RX ADMIN — SODIUM CHLORIDE: 4 INJECTION, SOLUTION, CONCENTRATE INTRAVENOUS at 01:37

## 2022-10-17 RX ADMIN — Medication 1 TABLET: at 08:10

## 2022-10-17 RX ADMIN — NYSTATIN 1 APPLICATION: 100000 POWDER TOPICAL at 08:11

## 2022-10-17 RX ADMIN — ENOXAPARIN SODIUM 40 MG: 40 INJECTION SUBCUTANEOUS at 08:07

## 2022-10-17 RX ADMIN — POLYETHYLENE GLYCOL 3350 17 G: 17 POWDER, FOR SOLUTION ORAL at 17:15

## 2022-10-17 RX ADMIN — DICLOFENAC SODIUM TOPICAL GEL, 1%, 2 G: 10 GEL TOPICAL at 21:25

## 2022-10-17 RX ADMIN — NYSTATIN 1 APPLICATION: 100000 POWDER TOPICAL at 17:18

## 2022-10-17 RX ADMIN — OMEGA-3 FATTY ACIDS CAP 1000 MG 1000 MG: 1000 CAP at 17:16

## 2022-10-17 RX ADMIN — DOCUSATE SODIUM 100 MG: 100 CAPSULE, LIQUID FILLED ORAL at 08:11

## 2022-10-17 RX ADMIN — DOCUSATE SODIUM 100 MG: 100 CAPSULE, LIQUID FILLED ORAL at 17:16

## 2022-10-17 RX ADMIN — DICLOFENAC SODIUM TOPICAL GEL, 1%, 2 G: 10 GEL TOPICAL at 17:18

## 2022-10-17 RX ADMIN — LISINOPRIL 2.5 MG: 2.5 TABLET ORAL at 08:11

## 2022-10-17 RX ADMIN — ATORVASTATIN CALCIUM 40 MG: 40 TABLET, FILM COATED ORAL at 17:16

## 2022-10-17 RX ADMIN — OMEGA-3 FATTY ACIDS CAP 1000 MG 1000 MG: 1000 CAP at 08:11

## 2022-10-17 RX ADMIN — IOHEXOL 100 ML: 350 INJECTION, SOLUTION INTRAVENOUS at 15:05

## 2022-10-17 RX ADMIN — ZIPRASIDONE HYDROCHLORIDE 80 MG: 40 CAPSULE ORAL at 11:40

## 2022-10-17 RX ADMIN — METOPROLOL SUCCINATE 25 MG: 25 TABLET, EXTENDED RELEASE ORAL at 08:11

## 2022-10-17 NOTE — PROGRESS NOTES
[place] [facility]  Progress Note - Ale Mcallister 1955, 75 y o  female MRN: [id number]  Unit/Bed#: ICU 10 Encounter: [id number]  Primary Care Provider: Davian Denney MD   Date and time admitted to hospital: 10/15/2022  1:12 PM    * Hyponatremia  Assessment & Plan  · Na 120 on admission  Presents confused, likely progressive over several days but patient is a poor historian  · ?SIADH  · Urine sodium, urine osmo, serum osmo pending  · On 1 8% saline - sodium at 125 overnight so increased to 70ml/hr  · Continue Q4hr BMP  · Patient also has a history of cancer and may benefit from CT scan pending sodium trend  · Patient is on SSRI which may possibly contributing to hyponatremia  · Close I&Os  · Close neurologic monitoring- pt remains confused at times    Toxic metabolic encephalopathy  Assessment & Plan  · Patient presents confused, she is alert to self, place and time but is slow to respond, appears restless   Not at baseline per family  · Likely in the setting of hyponatremia  · CT head unremarkable  · UDS and UA negative  · Hold sedating medications   · Continue neuro checks  · CAM ICU  · Sleep hygiene- held Klonopin as patient more lethargic overnight    Elevated troponin-resolved as of 10/17/2022  Assessment & Plan  · Initial HS Troponin very mildly elevated on admission of 39  · Patient states she intermittently has chest pain, denies chest pain currently  · EKG with Twave inversions in V1-4, no acute ST changes      Diabetes mellitus, type 2 [facility])  Assessment & Plan  Lab Results   Component Value Date    HGBA1C 6 0 (H) 06/07/2021       Recent Labs     10/15/22  2147 10/16/22  1121 10/16/22  1638 10/16/22  2113   POCGLU 210* 149* 161* 144*       Blood Sugar Average: Last 72 hrs:  (P) 182 5     · Resume home metformin when taking PO  · Cont SSI  · Continue lantus, monitor  · Goal BG < 180    Psychiatric disorder  Assessment & Plan  · History of psychiatric disorder  · Continue wellbutrin and zoloft  · Hold scheduled klonopin given confusion, altered mental status on admisson  Continue Geodone  · Unclear compliance with home meds at baseline    History of colon cancer  Assessment & Plan  · Hx of colon and endometrial cancer  Endometrial cancer s/p surgery and radiation completed in 2017  · Not currently receiving treatment  · In remission per family     Hyperlipidemia  Assessment & Plan  · Continue home statin    Hypertension  Assessment & Plan  · Continue metoprolol      ----------------------------------------------------------------------------------------  HPI/24hr events: Patient more lethargic so Klonopin held  Sodium 129 at 0200 so 1 8% stopped    Patient appropriate for transfer out of the ICU today?: Patient does not meet criteria for ICU Follow-up Clinic; referral has not been made     Disposition: Transfer to Med Surg with Telemetry   Code Status: Level 1 - Full Code  ---------------------------------------------------------------------------------------  SUBJECTIVE  confused    Review of Systems  Review of systems was unable to be performed secondary to confusion  ---------------------------------------------------------------------------------------  OBJECTIVE    Vitals   Vitals:    10/16/22 4758 10/17/22 0000 10/17/22 0400 10/17/22 0547   BP: 94/55 114/59 124/61    BP Location:       Pulse: 62 62 63    Resp: 15 16 15    Temp: (!) 96 6 °F (35 9 °C)      TempSrc: Temporal      SpO2: 99% 99% 100%    Weight:    87 6 kg (193 lb 2 oz)   Height:         Temp (24hrs), Av 2 °F (36 2 °C), Min:96 6 °F (35 9 °C), Max:97 7 °F (36 5 °C)  Current: Temperature: (!) 96 6 °F (35 9 °C)          Respiratory:  SpO2: SpO2: 100 %, SpO2 Activity: SpO2 Activity: At Rest, SpO2 Device: O2 Device: None (Room air), Capnography: Capnography: No  Nasal Cannula O2 Flow Rate (L/min): 2 L/min    Invasive/non-invasive ventilation settings   Respiratory  Report   Lab Data (Last 4 hours)    None O2/Vent Data (Last 4 hours)    None                Physical Exam  Vitals and nursing note reviewed  Constitutional:       General: She is not in acute distress  HENT:      Head: Normocephalic and atraumatic  Mouth/Throat:      Mouth: Mucous membranes are moist    Eyes:      Pupils: Pupils are equal, round, and reactive to light  Cardiovascular:      Rate and Rhythm: Normal rate  Pulmonary:      Effort: Pulmonary effort is normal  No respiratory distress  Abdominal:      General: Abdomen is flat  Musculoskeletal:         General: No swelling  Skin:     General: Skin is warm and dry  Neurological:      General: No focal deficit present  Mental Status: She is alert  She is disoriented               Laboratory and Diagnostics:  Results from last 7 days   Lab Units 10/16/22  0542 10/15/22  1816 10/15/22  1330   WBC Thousand/uL 6 62  --  10 04   HEMOGLOBIN g/dL 11 3*  --  12 8   HEMATOCRIT % 32 8*  --  36 5   PLATELETS Thousands/uL 167 188 252   NEUTROS PCT % 68  --  72   MONOS PCT % 12  --  10     Results from last 7 days   Lab Units 10/17/22  0150 10/16/22  2138 10/16/22  1809 10/16/22  1354 10/16/22  0910 10/16/22  0542 10/16/22  0206 10/15/22  1816 10/15/22  1330   SODIUM mmol/L 129* 125* 125* 125* 123* 122* 120*   < > 120*   POTASSIUM mmol/L 4 5 4 4 4 3 3 6 3 6 4 0 3 9   < > 3 8   CHLORIDE mmol/L 97 96 93* 91* 90* 89* 88*   < > 86*   CO2 mmol/L 29 26 27 28 28 27 27   < > 24   ANION GAP mmol/L 3* 3* 5 6 5 6 5   < > 10   BUN mg/dL 13 13 11 10 8 8 9   < > 11   CREATININE mg/dL 0 93 0 86 0 89 0 95 0 87 0 82 0 82   < > 0 88   CALCIUM mg/dL 8 5 8 1* 8 3 8 4 8 5 8 5 8 5   < > 8 6   GLUCOSE RANDOM mg/dL 121 147* 197* 186* 211* 179* 199*   < > 224*   ALT U/L  --   --   --   --   --  50  --   --  55   AST U/L  --   --   --   --   --  73*  --   --  92*   ALK PHOS U/L  --   --   --   --   --  64  --   --  77   ALBUMIN g/dL  --   --   --   --   --  3 3*  --   --  3 7   TOTAL BILIRUBIN mg/dL  --   --   -- --   --  0 86  --   --  1 01*    < > = values in this interval not displayed  Results from last 7 days   Lab Units 10/16/22  0542   MAGNESIUM mg/dL 1 7   PHOSPHORUS mg/dL 2 9      Results from last 7 days   Lab Units 10/15/22  1330   INR  1 03   PTT seconds 26          Results from last 7 days   Lab Units 10/15/22  1330   LACTIC ACID mmol/L 1 0     ABG:    VBG:          Micro  Results from last 7 days   Lab Units 10/15/22  1401 10/15/22  1354   BLOOD CULTURE  No Growth at 24 hrs  No Growth at 24 hrs  EKG:   Imaging: I have personally reviewed pertinent reports  Intake and Output  I/O       10/15 0701  10/16 0700 10/16 0701  10/17 0700    P  O  240     I V  (mL/kg) 403 3 (4 7) 1359 3 (15 5)    IV Piggyback 500     Total Intake(mL/kg) 1143 3 (13 2) 1359 3 (15 5)    Urine (mL/kg/hr) 1410 1410 (0 7)    Total Output 1410 1410    Net -266 7 -50 7                Height and Weights   Height: 5' 4" (162 6 cm)     Body mass index is 33 15 kg/m²  Weight (last 2 days)     Date/Time Weight    10/17/22 0547 87 6 (193 12)    10/16/22 0544 86 7 (191 14)    10/16/22 0400 86 7 (191 14)    10/15/22 1702 87 3 (192 46)    10/15/22 1313 88 7 (195 55)            Nutrition       Diet Orders   (From admission, onward)             Start     Ordered    10/15/22 2138  Diet Flo/CHO Controlled; Consistent Carbohydrate Diet Level 2 (5 carb servings/75 grams CHO/meal); Fluid Restriction 1800 ML  Diet effective now        References:    Nutrtion Support Algorithm Enteral vs  Parenteral   Question Answer Comment   Diet Type Flo/CHO Controlled    Flo/CHO Controlled Consistent Carbohydrate Diet Level 2 (5 carb servings/75 grams CHO/meal)    Other Restriction(s): Fluid Restriction 1800 ML    RD to adjust diet per protocol?  Yes        10/15/22 2138                  Active Medications  Scheduled Meds:  Current Facility-Administered Medications   Medication Dose Route Frequency Provider Last Rate   • aspirin  81 mg Oral Daily With Breakfast JOHN Soares     • atorvastatin  40 mg Oral Daily With ONEJOHN SKELTON     • docusate sodium  100 mg Oral BID Tanesha Ciacedoe, [address]     • enoxaparin  40 mg Subcutaneous Daily Tanesha Paul, [address]     • fish oil  1,000 mg Oral BID Starjanelle Caicedoe, [address]     • insulin glargine  70 Units Subcutaneous QAM Starjanelle Paul, [address]     • insulin lispro  1-6 Units Subcutaneous TID AC Eliane Isabella Schustergee, [address]     • insulin lispro  1-6 Units Subcutaneous HS Hinarigo Flaherty JOHN Paul     • lisinopril  2 5 mg Oral Daily Tanesha Caicedoe, [address]     • metoprolol succinate  25 mg Oral Daily Tanesha Caicedoe, [address]     • multivitamin-minerals  1 tablet Oral Daily Eliane Schustergee, [address]     • nystatin   Topical BID Tanesha Paul, [address]     • ziprasidone  80 mg Oral BID With Meals JOHN Soares       Continuous Infusions:     PRN Meds:        Invasive Devices Review  Invasive Devices  Report    Peripheral Intravenous Line  Duration           Peripheral IV 10/17/19 Lower; Left Arm 1095 days    Peripheral IV 10/15/22 Left;Ventral (anterior) Forearm 1 day          Drain  Duration           Urethral Catheter Latex 16 Fr  <1 day                Rationale for remaining devices:   ---------------------------------------------------------------------------------------  Advance Directive and Living Will:      Power of :    POLST:    ---------------------------------------------------------------------------------------  Care Time Delivered:   No Critical Care time spent       Sidney Briceño PA-C      Portions of the record may have been created with voice recognition software  Occasional wrong word or "sound a like" substitutions may have occurred due to the inherent limitations of voice recognition software    Read the chart carefully and recognize, using context, where substitutions have occurred

## 2022-10-17 NOTE — ASSESSMENT & PLAN NOTE
Lab Results   Component Value Date    HGBA1C 6 0 (H) 06/07/2021       Recent Labs     10/15/22  2147 10/16/22  1121 10/16/22  1638 10/16/22  2113   POCGLU 210* 149* 161* 144*       Blood Sugar Average: Last 72 hrs:  (P) 182 5     · Resume home metformin when taking PO  · Cont SSI  · Continue lantus, monitor  · Goal BG < 180

## 2022-10-17 NOTE — ASSESSMENT & PLAN NOTE
· Patient presents confused, she is alert to self, place and time but is slow to respond, appears restless   Not at baseline per family  · Likely in the setting of hyponatremia  · CT head unremarkable  · UDS and UA negative  · Hold sedating medications   · Continue neuro checks  · CAM ICU  · Sleep hygiene- held Klonopin as patient more lethargic overnight

## 2022-10-17 NOTE — ASSESSMENT & PLAN NOTE
· History of psychiatric disorder  · Continue wellbutrin and zoloft  · Hold scheduled klonopin given confusion, altered mental status on admisson   Continue Geodone  · Unclear compliance with home meds at baseline

## 2022-10-17 NOTE — ASSESSMENT & PLAN NOTE
· Na 120 on admission  Presents confused, likely progressive over several days but patient is a poor historian      · ?SIADH  · Urine sodium, urine osmo, serum osmo pending  · On 1 8% saline - sodium at 125 overnight so increased to 70ml/hr  · Continue Q4hr BMP  · Patient also has a history of cancer and may benefit from CT scan pending sodium trend  · Patient is on SSRI which may possibly contributing to hyponatremia  · Close I&Os  · Close neurologic monitoring- pt remains confused at times

## 2022-10-17 NOTE — CASE MANAGEMENT
Case Management Assessment & Discharge Planning Note    Patient name Sarah Jung  Location ICU 10/ICU 10 MRN [id number]  : 1955 Date 10/17/2022       Current Admission Date: 10/15/2022  Current Admission Diagnosis:Hyponatremia   Patient Active Problem List    Diagnosis Date Noted   • Hyponatremia 10/15/2022   • Psychiatric disorder 10/15/2022   • Toxic metabolic encephalopathy    • Mass of axillary tail of right breast 2022   • Mass of axillary tail of left breast 2022   • Axillary mass, right 2022   • OAB (overactive bladder) 11/15/2019   • History of colon cancer 2018   • Anemia 2018   • History of endometrial cancer 2017   • Diabetes mellitus, type 2 ([facility] [address] ) 2017   • Hypertension 2017   • Hyperlipidemia 2017   • Obesity 2017   • Depression 2017      LOS (days): 2  Geometric Mean LOS (GMLOS) (days): 5 10  Days to GMLOS:3     OBJECTIVE:    Risk of Unplanned Readmission Score: 16 61     Current admission status: Inpatient  Referral Reason: Other (Disposition planning)    Preferred Pharmacy:   [facility] [id number]  Phone: [phone] Fax: [phone]    Primary Care Provider: Ruddy Fair MD    Primary Insurance: [employer]  Secondary Insurance: MEDICARE    ASSESSMENT:  [phone] [facility] Representative - Daughter   Primary Phone: [phone] (Mobile)               Advance Directives  Does patient have a [address] [place] [address] Avenue?: No  Was patient offered paperwork?: Yes (not interested at this time)  Does patient currently have a Health Care decision maker?: Yes, please see Health Care Proxy section  Does patient have Advance Directives?: No  Was patient offered paperwork?: Yes (not interested at this time)  Primary Contact: Alannah Hastings    Readmission Root Cause  30 Day Readmission: No    Patient Information  Admitted from[de-identified] Home  Mental Status: Alert  During Assessment patient was accompanied by: Spouse, Daughter  Assessment information provided by[de-identified] Spouse, Daughter, Patient  Primary Caregiver: Self  Support Systems: Spouse/significant other, Daughter  [place] of Residence: [address] [place] [address] [place] do you live in?: [address] [place] [address] Road entry access options  Select all that apply : Stairs  Number of steps to enter home : 6  Do the steps have railings?: Yes  Type of Current Residence: Other (Comment) (one story)  In the last 12 months, was there a time when you were not able to pay the mortgage or rent on time?: No  In the last 12 months, how many places have you lived?: 1  In the last 12 months, was there a time when you did not have a steady place to sleep or slept in a shelter (including now)?: No  Homeless/housing insecurity resource given?: N/A  Living Arrangements: Lives w/ Spouse/significant other    Activities of Daily Living Prior to Admission  Functional Status: Independent  Completes ADLs independently?: Yes  Ambulates independently?: Yes  Does patient use assisted devices?: No  Does patient currently own DME?: No  Does patient have a history of Outpatient Therapy (PT/OT)?: No  Does the patient have a history of Short-Term Rehab?: No  Does patient have a history of HHC?: No  Does patient currently have [facility]?: No    Patient Information Continued  Income Source: Pension/snf  Does patient have prescription coverage?: Yes ([employer])  Within the past 12 months, you worried that your food would run out before you got the money to buy more : Never true  Within the past 12 months, the food you bought just didn't last and you didn't have money to get more : Never true  Food insecurity resource given?: N/A  Does patient receive dialysis treatments?: No  Does patient have a history of substance abuse?: No  Does patient have a history of Mental Health Diagnosis? :  (Depression per chart)    Means of Transportation  Means of Transport to [facility][de-identified] Family transport  In the past 12 months, has lack of transportation kept you from medical appointments or from getting medications?: No  In the past 12 months, has lack of transportation kept you from meetings, work, or from getting things needed for daily living?: No  Was application for public transport provided?: N/A      DISCHARGE DETAILS:    Discharge planning discussed with[de-identified] Patient,  and daughter  Freedom of Choice: Yes  Comments - Freedom of Choice: SW met with pt and family to assess needs and discuss plans  Per daughter they are awaiting PT/OT recommendations to decide on DCP  At this time they are anticipating pt returning home  Per daughter pt has been seeing Dr Demetrius Rodrigues for her knee and knee surgery has been discussed  SW offered home care services if pt is able to return home  If after PT/OT evaluations the recommendations are different plan can be changed  Pt and family are open to home care  Discussed need to make blanket home care referrals to determine agency availability and pt/family agreeable with referrals being made  Daughter also anticipating need for rolling walker  No DME company preference specified  SW offered ongoing support and assistance to pt and family  Will follow  CM contacted family/caregiver?: Yes  Were Treatment Team discharge recommendations reviewed with patient/caregiver?: Yes  Did patient/caregiver verbalize understanding of patient care needs?: Yes  Were patient/caregiver advised of the risks associated with not following Treatment Team discharge recommendations?: Yes    Contacts  Patient Contacts: Princess Brito  Relationship to Patient[de-identified] Family (daughter)  Contact Method:  In Person  Reason/Outcome: Continuity of Care, Discharge [address]         Is the patient interested in [facility] at discharge?: Yes  [address] requested[de-identified] Nursing, Occupational Therapy, Physical Therapy  Home Health Agency Name[de-identified] Other  [address] Provider[de-identified] PCP  Home Health Services Needed[de-identified] Evaluate Functional Status and Safety, Gait/ADL Training, Strengthening/Theraputic Exercises to Improve Function  Homebound Criteria Met[de-identified] Requires the Assistance of Another Person for Safe Ambulation or to Leave the Home, Uses an Assist Device (i e  cane, walker, etc)  Supporting Clincal Findings[de-identified] Limited Endurance, Fatigues Easliy in [employer]    DME Referral Provided  Referral made for DME?: Yes  DME referral completed for the following items[de-identified] Citlaly Parker  DME Supplier Name[de-identified] [employer]    Other Referral/Resources/Interventions Provided:  Interventions: HHC, DME  Referral Comments: [place] home care referrals made  Rolling walker will be ordered from [facility]      Treatment Team Recommendation:  (pending)  Discharge Destination Plan[de-identified]  (pending PT/OT evaluations)  Transport at Discharge :  (pending progress)    IMM Given (Date):: 10/17/22 (Initial)

## 2022-10-17 NOTE — QUICK NOTE
RENAL NOTE   Sepideh Schofield 75 y o  female MRN: [id number]  Unit/Bed#: ICU 10 Encounter: [id number]    · Na at 6 pm is 125  · Currently on 1 8% saline at 60 cc/hr  · Goal Na is not more than 130 by 6 am on 10/17/22  · Continue BMP q4H  · If Na is approaching 130 well before 6 am tomorrow, would stop 1 8% saline  · If Na worsens, then would give Furosemide 20 mg IV x 1    · Discussed with crit care

## 2022-10-17 NOTE — PROGRESS NOTES
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-bupropion and sertraline and Ziprasidone  -Hospital Course:  Not much response to isotonic IV fluid initially and was started on 1 8% saline on 11/16  -CT abdomen pelvis without contrast from 2017 was negative for any malignancy  -Plan:   • Sodium level improved to 132 mEq/liter on blood work from 10/17  Overall 10 mEq increase since yesterday  Will stop further 1 8% saline and monitor  • Continue to monitor BMP q 6 hours, if sodium level drops below 130 would consider starting on salt tablets, continue fluid restriction 1 5 L per day  Encephalopathy, possibly due to hyponatremia, mental status improving  -CT head was negative    Primary hypertension  -BLOOD PRESSURE ACCEPTABLE, CONTINUE CURRENT TREATMENT WITH METOPROLOL AND LISINOPRIL  -avoid hypotension    Others diabetes mellitus continue management per ICU team  History of colon and endometrial cancer, status post endometrial surgery as well as radiation in 2017    Discussed with ICU team    SUBJECTIVE:  No new complaints    OBJECTIVE:  Current Weight: Weight - Scale: 87 6 kg (193 lb 2 oz)  Vitals:    10/17/22 0600   BP: 121/75   Pulse: 65   Resp: 22   Temp:    SpO2: 100%       Intake/Output Summary (Last 24 hours) at 10/17/2022 0808  Last data filed at 10/17/2022 0630  Gross per 24 hour   Intake 1534 33 ml   Output 1935 ml   Net -400 67 ml       Physical Exam  General:  Ill looking, awake    Eyes: Conjunctivae pink,  Sclera anicteric  ENT: lips and mucous membranes moist  Neck: supple   Chest: Clear to Auscultation both lungs,  no crackles, ronchus or wheezing  CVS: S1 & S2 present, normal rate, regular rhythm, no murmur    Abdomen: soft, non-tender, non-distended, Bowel sounds normoactive  Extremities: no edema of  legs  Skin: no rash  Neuro: awake, alert, oriented X3  Psych: Mood and affect appropriate       Medications:    Current Facility-Administered Medications:   •  aspirin (ECOTRIN LOW STRENGTH) EC tablet 81 mg, 81 mg, Oral, Daily With Breakfast, JOHN Gomez, 81 mg at 10/16/22 7991  •  atorvastatin (LIPITOR) tablet 40 mg, 40 mg, Oral, Daily With ElianaJOHN Parada, 40 mg at 10/16/22 6815  •  docusate sodium (COLACE) capsule 100 mg, 100 mg, Oral, BID, JOHN Gomez, 100 mg at 10/16/22 1825  •  enoxaparin (LOVENOX) subcutaneous injection 40 mg, 40 mg, Subcutaneous, Daily, JOHN Gomez, 40 mg at 10/17/22 8741  •  fish oil capsule 1,000 mg, 1,000 mg, Oral, BID, JOHN Gomez, 1,000 mg at 10/16/22 1813  •  insulin glargine (LANTUS) subcutaneous injection 70 Units 0 7 mL, 70 Units, Subcutaneous, CAYETANO, [employer], CRNP, 70 Units at 10/17/22 8765  •  insulin lispro (HumaLOG) 100 units/mL subcutaneous injection 1-6 Units, 1-6 Units, Subcutaneous, TID AC, 1 Units at 10/16/22 1644 **AND** Fingerstick Glucose (POCT), , , TID AC, JOHN Sharpe  •  insulin lispro (HumaLOG) 100 units/mL subcutaneous injection 1-6 Units, 1-6 Units, Subcutaneous, HS, JOHN Gomez, 2 Units at 10/15/22 2147  •  lisinopril (ZESTRIL) tablet 2 5 mg, 2 5 mg, Oral, Daily, JOHN Gomez, 2 5 mg at 10/16/22 1120  •  metoprolol succinate (TOPROL-XL) 24 hr tablet 25 mg, 25 mg, Oral, Daily, JOHN Gomez, 25 mg at 10/16/22 0805  •  multivitamin-minerals (CENTRUM) tablet 1 tablet, 1 tablet, Oral, Daily, Shraddha Suarez, 1 tablet at 10/16/22 3495  •  nystatin (MYCOSTATIN) powder, , Topical, BID, Shraddha Suarez, Given at 10/16/22 1810  •  ziprasidone (GEODON) capsule 80 mg, 80 mg, Oral, BID With Meals, Sampson Paul, CRNP, 80 mg at 10/16/22 1633    Invasive Devices:   Urethral Catheter Latex 16 Fr  (Active)   Reasons to continue Urinary Catheter  Acute urinary retention/obstruction failing urinary retention protocol 10/17/22 0400   Goal for Removal Voiding trial when ambulation improves 10/17/22 0400   Site Assessment Clean;Skin intact 10/17/22 0400   Shanks Care Done 10/16/22 2000   Collection Container Standard drainage bag 10/17/22 0400   Securement Method Securing device (Describe) 10/17/22 0400   Output (mL) 525 mL 10/17/22 0400       Lab Results:   Results from last 7 days   Lab Units 10/17/22  0547 10/17/22  0150 10/16/22  2138 10/16/22  0910 10/16/22  0542 10/15/22  2205 10/15/22  1816 10/15/22  1330   WBC Thousand/uL 5 95  --   --   --  6 62  --   --  10 04   HEMOGLOBIN g/dL 11 3*  --   --   --  11 3*  --   --  12 8   HEMATOCRIT % 35 3  --   --   --  32 8*  --   --  36 5   PLATELETS Thousands/uL 144*  --   --   --  167  --  188 252   POTASSIUM mmol/L 4 7 4 5 4 4   < > 4 0   < > 3 6 3 8   CHLORIDE mmol/L 100 97 96   < > 89*   < > 87* 86*   CO2 mmol/L 26 29 26   < > 27   < > 27 24   BUN mg/dL 14 13 13   < > 8   < > 10 11   CREATININE mg/dL 0 89 0 93 0 86   < > 0 82   < > 0 98 0 88   CALCIUM mg/dL 8 5 8 5 8 1*   < > 8 5   < > 8 5 8 6   MAGNESIUM mg/dL  --   --   --   --  1 7  --   --   --    PHOSPHORUS mg/dL  --   --   --   --  2 9  --   --   --    ALK PHOS U/L  --   --   --   --  64  --   --  77   ALT U/L  --   --   --   --  50  --   --  55   AST U/L  --   --   --   --  73*  --   --  92*    < > = values in this interval not displayed  Previous work up:         Portions of the record may have been created with voice recognition software   Occasional wrong word or "sound a like" substitutions may have occurred due to the inherent limitations of voice recognition software  Read the chart carefully and recognize, using context, where substitutions have occurred  If you have any questions, please contact the dictating provider

## 2022-10-18 ENCOUNTER — APPOINTMENT (INPATIENT)
Dept: NON INVASIVE DIAGNOSTICS | Facility: HOSPITAL | Age: 67
DRG: 643 | End: 2022-10-18
Payer: COMMERCIAL

## 2022-10-18 PROBLEM — M17.0 OSTEOARTHRITIS OF BOTH KNEES: Status: ACTIVE | Noted: 2022-10-18

## 2022-10-18 PROBLEM — R79.89 ELEVATED BRAIN NATRIURETIC PEPTIDE (BNP) LEVEL: Status: ACTIVE | Noted: 2022-10-18

## 2022-10-18 LAB
ANION GAP SERPL CALCULATED.3IONS-SCNC: 4 MMOL/L (ref 4–13)
ANION GAP SERPL CALCULATED.3IONS-SCNC: 5 MMOL/L (ref 4–13)
BUN SERPL-MCNC: 19 MG/DL (ref 5–25)
BUN SERPL-MCNC: 20 MG/DL (ref 5–25)
CALCIUM SERPL-MCNC: 8.7 MG/DL (ref 8.3–10.1)
CALCIUM SERPL-MCNC: 9.1 MG/DL (ref 8.3–10.1)
CHLORIDE SERPL-SCNC: 100 MMOL/L (ref 96–108)
CHLORIDE SERPL-SCNC: 102 MMOL/L (ref 96–108)
CO2 SERPL-SCNC: 27 MMOL/L (ref 21–32)
CO2 SERPL-SCNC: 28 MMOL/L (ref 21–32)
CREAT SERPL-MCNC: 1.09 MG/DL (ref 0.6–1.3)
CREAT SERPL-MCNC: 1.14 MG/DL (ref 0.6–1.3)
ERYTHROCYTE [DISTWIDTH] IN BLOOD BY AUTOMATED COUNT: 14.2 % (ref 11.6–15.1)
GFR SERPL CREATININE-BSD FRML MDRD: 49 ML/MIN/1.73SQ M
GFR SERPL CREATININE-BSD FRML MDRD: 52 ML/MIN/1.73SQ M
GLUCOSE SERPL-MCNC: 105 MG/DL (ref 65–140)
GLUCOSE SERPL-MCNC: 109 MG/DL (ref 65–140)
GLUCOSE SERPL-MCNC: 111 MG/DL (ref 65–140)
GLUCOSE SERPL-MCNC: 173 MG/DL (ref 65–140)
GLUCOSE SERPL-MCNC: 79 MG/DL (ref 65–140)
GLUCOSE SERPL-MCNC: 86 MG/DL (ref 65–140)
GLUCOSE SERPL-MCNC: 94 MG/DL (ref 65–140)
HCT VFR BLD AUTO: 32.3 % (ref 34.8–46.1)
HGB BLD-MCNC: 10.2 G/DL (ref 11.5–15.4)
MAGNESIUM SERPL-MCNC: 2.2 MG/DL (ref 1.6–2.6)
MCH RBC QN AUTO: 30.4 PG (ref 26.8–34.3)
MCHC RBC AUTO-ENTMCNC: 31.6 G/DL (ref 31.4–37.4)
MCV RBC AUTO: 96 FL (ref 82–98)
NT-PROBNP SERPL-MCNC: 169 PG/ML
PLATELET # BLD AUTO: 162 THOUSANDS/UL (ref 149–390)
PMV BLD AUTO: 8.5 FL (ref 8.9–12.7)
POTASSIUM SERPL-SCNC: 4.9 MMOL/L (ref 3.5–5.3)
POTASSIUM SERPL-SCNC: 5 MMOL/L (ref 3.5–5.3)
RBC # BLD AUTO: 3.35 MILLION/UL (ref 3.81–5.12)
SODIUM SERPL-SCNC: 133 MMOL/L (ref 135–147)
SODIUM SERPL-SCNC: 133 MMOL/L (ref 135–147)
WBC # BLD AUTO: 6.57 THOUSAND/UL (ref 4.31–10.16)

## 2022-10-18 PROCEDURE — 83735 ASSAY OF MAGNESIUM: CPT | Performed by: NURSE PRACTITIONER

## 2022-10-18 PROCEDURE — 83880 ASSAY OF NATRIURETIC PEPTIDE: CPT | Performed by: PHYSICIAN ASSISTANT

## 2022-10-18 PROCEDURE — 82948 REAGENT STRIP/BLOOD GLUCOSE: CPT

## 2022-10-18 PROCEDURE — 97110 THERAPEUTIC EXERCISES: CPT

## 2022-10-18 PROCEDURE — 97167 OT EVAL HIGH COMPLEX 60 MIN: CPT

## 2022-10-18 PROCEDURE — 99232 SBSQ HOSP IP/OBS MODERATE 35: CPT | Performed by: PHYSICIAN ASSISTANT

## 2022-10-18 PROCEDURE — 99232 SBSQ HOSP IP/OBS MODERATE 35: CPT | Performed by: INTERNAL MEDICINE

## 2022-10-18 PROCEDURE — 93306 TTE W/DOPPLER COMPLETE: CPT

## 2022-10-18 PROCEDURE — 80048 BASIC METABOLIC PNL TOTAL CA: CPT | Performed by: NURSE PRACTITIONER

## 2022-10-18 PROCEDURE — 97163 PT EVAL HIGH COMPLEX 45 MIN: CPT

## 2022-10-18 PROCEDURE — 85027 COMPLETE CBC AUTOMATED: CPT | Performed by: NURSE PRACTITIONER

## 2022-10-18 RX ORDER — FUROSEMIDE 10 MG/ML
20 INJECTION INTRAMUSCULAR; INTRAVENOUS ONCE
Status: COMPLETED | OUTPATIENT
Start: 2022-10-18 | End: 2022-10-18

## 2022-10-18 RX ORDER — SODIUM CHLORIDE 1000 MG
1 TABLET, SOLUBLE MISCELLANEOUS 2 TIMES DAILY WITH MEALS
Status: DISCONTINUED | OUTPATIENT
Start: 2022-10-18 | End: 2022-10-18

## 2022-10-18 RX ORDER — SODIUM CHLORIDE 1000 MG
1 TABLET, SOLUBLE MISCELLANEOUS 2 TIMES DAILY WITH MEALS
Status: DISCONTINUED | OUTPATIENT
Start: 2022-10-18 | End: 2022-10-19

## 2022-10-18 RX ADMIN — METOPROLOL SUCCINATE 25 MG: 25 TABLET, EXTENDED RELEASE ORAL at 09:34

## 2022-10-18 RX ADMIN — DOCUSATE SODIUM 100 MG: 100 CAPSULE, LIQUID FILLED ORAL at 09:32

## 2022-10-18 RX ADMIN — ENOXAPARIN SODIUM 40 MG: 40 INJECTION SUBCUTANEOUS at 09:35

## 2022-10-18 RX ADMIN — Medication 1 TABLET: at 09:33

## 2022-10-18 RX ADMIN — SODIUM CHLORIDE 1 G: 1 TABLET ORAL at 12:34

## 2022-10-18 RX ADMIN — DOCUSATE SODIUM 100 MG: 100 CAPSULE, LIQUID FILLED ORAL at 17:50

## 2022-10-18 RX ADMIN — ATORVASTATIN CALCIUM 40 MG: 40 TABLET, FILM COATED ORAL at 16:21

## 2022-10-18 RX ADMIN — NYSTATIN: 100000 POWDER TOPICAL at 09:37

## 2022-10-18 RX ADMIN — NYSTATIN: 100000 POWDER TOPICAL at 17:54

## 2022-10-18 RX ADMIN — OMEGA-3 FATTY ACIDS CAP 1000 MG 1000 MG: 1000 CAP at 17:49

## 2022-10-18 RX ADMIN — FUROSEMIDE 20 MG: 10 INJECTION, SOLUTION INTRAMUSCULAR; INTRAVENOUS at 12:36

## 2022-10-18 RX ADMIN — ZIPRASIDONE HYDROCHLORIDE 80 MG: 40 CAPSULE ORAL at 17:49

## 2022-10-18 RX ADMIN — ZIPRASIDONE HYDROCHLORIDE 80 MG: 40 CAPSULE ORAL at 09:32

## 2022-10-18 RX ADMIN — INSULIN LISPRO 1 UNITS: 100 INJECTION, SOLUTION INTRAVENOUS; SUBCUTANEOUS at 21:30

## 2022-10-18 RX ADMIN — OMEGA-3 FATTY ACIDS CAP 1000 MG 1000 MG: 1000 CAP at 09:32

## 2022-10-18 RX ADMIN — INSULIN GLARGINE 70 UNITS: 100 INJECTION, SOLUTION SUBCUTANEOUS at 09:31

## 2022-10-18 RX ADMIN — SODIUM CHLORIDE 1 G: 1 TABLET ORAL at 16:21

## 2022-10-18 RX ADMIN — ASPIRIN 81 MG: 81 TABLET, COATED ORAL at 09:32

## 2022-10-18 NOTE — ASSESSMENT & PLAN NOTE
· Patient with chronic history of severe bilateral OA, chronic pain  Was following with Orthopedic surgery outpatient in considering elective bilateral knee replacement  · Patient has been deconditioned and not ambulating as frequently due to pain per daughter  · PT/OT evaluated, patient was weak and unsteady, recommending STR which patient at this time is refusing  · Discussed with Orthopedic surgery, no inpatient interventions or new x-rays indicated at this time, already recommended to have total knee replacement  Needs close outpatient follow-up to schedule elective total knee replacement

## 2022-10-18 NOTE — PROGRESS NOTES
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-bupropion and sertraline and Ziprasidone  -Hospital Course:  Not much response to isotonic IV fluid initially and was started on 1 8% saline on 11/16 and had good response after which it was stopped  -CT abdomen pelvis without contrast from 2017 was negative for any malignancy  -Plan:   • Sodium level improved to 133 mEq/liter  and stable on last 2 blood work  Discussed with patient about possibility of drop in sodium level as she likely has underlying SIADH due to psychiatric medications he is currently taking  Will start on salt tablets 1 g twice daily and monitor sodium level  • Continue fluid restriction 1 8 L per day  • Renal function is stable at creatinine 1 0-1 1  • If plan for discharge, would recommend repeat BMP coming Friday  Would discharge on salt tablets 1 g twice daily  Overall stable from Nephrology side for outpatient care    Encephalopathy, possibly due to hyponatremia, mental status improving  -CT head was negative    Primary hypertension  -BP stable, continue treatment with metoprolol and lisinopril  -avoid hypotension    Anemia:  Hemoglobin 10 2, continue to monitor per primary team    Others   diabetes mellitus continue management per ICU team  History of colon and endometrial cancer, status post endometrial surgery as well as radiation in 2017    Discussed with primary team    SUBJECTIVE:  No new complaints    No chest pain or shortness of breath, no nausea vomiting    OBJECTIVE:  Current Weight: Weight - Scale: 87 5 kg (193 lb)  Vitals:    10/18/22 0622   BP:    Pulse: 80   Resp:    Temp:    SpO2: 99%   /64 (BP Location: Right arm)   Pulse 80   Temp 98 1 °F (36 7 °C) (Oral)   Resp 18   Ht 5' 4" (1 626 m)   Wt 87 5 kg (193 lb)   SpO2 99%   BMI 33 13 kg/m²       Intake/Output Summary (Last 24 hours) at 10/18/2022 1056  Last data filed at 10/18/2022 0601  Gross per 24 hour   Intake 725 ml   Output 1625 ml   Net -900 ml       Physical Exam  General:  Ill looking, awake  Eyes: Conjunctivae pink,  Sclera anicteric  ENT: lips and mucous membranes moist  Neck: supple   Chest: Clear to Auscultation both lungs,  no crackles, ronchus or wheezing  CVS: S1 & S2 present, normal rate, regular rhythm, no murmur    Abdomen: soft, non-tender, non-distended, Bowel sounds normoactive  Extremities: no edema of  legs  Skin: no rash  Neuro: awake, alert, oriented x 3   Psych: Mood and affect appropriate       Medications:    Current Facility-Administered Medications:   •  aspirin (ECOTRIN LOW STRENGTH) EC tablet 81 mg, 81 mg, Oral, Daily With Breakfast, [employer], CRNP, 81 mg at 10/18/22 0932  •  atorvastatin (LIPITOR) tablet 40 mg, 40 mg, Oral, Daily With Maria Elena Young, CRNP, 40 mg at 10/17/22 1716  •  [MAR Hold] Diclofenac Sodium (VOLTAREN) 1 % topical gel 2 g, 2 g, Topical, 4x Daily, Gearl Plank Stilwell, CRNP, 2 g at 10/17/22 2125  •  docusate sodium (COLACE) capsule 100 mg, 100 mg, Oral, BID, Gearl Plank Stilwell, CRNP, 100 mg at 10/18/22 0932  •  enoxaparin (LOVENOX) subcutaneous injection 40 mg, 40 mg, Subcutaneous, Daily, Gearl Plank Stilwell, CRNP, 40 mg at 10/18/22 1787  •  fish oil capsule 1,000 mg, 1,000 mg, Oral, BID, Gearl Plank Stilwell, CRNP, 1,000 mg at 10/18/22 0932  •  insulin glargine (LANTUS) subcutaneous injection 70 Units 0 7 mL, 70 Units, Subcutaneous, Sarika MONTEIRO, [facility], 70 Units at 10/18/22 0931  •  insulin lispro (HumaLOG) 100 units/mL subcutaneous injection 1-6 Units, 1-6 Units, Subcutaneous, TID AC, 1 Units at 10/16/22 1644 **AND** Fingerstick Glucose (POCT), , , TID AC, JOHN Erickson  •  insulin lispro (HumaLOG) 100 units/mL subcutaneous injection 1-6 Units, 1-6 Units, Subcutaneous, HS, JOHN Anand, 2 Units at 10/15/22 2147  •  metoprolol succinate (TOPROL-XL) 24 hr tablet 25 mg, 25 mg, Oral, Daily, JOHN Anand, 25 mg at 10/18/22 0298  •  multivitamin-minerals (CENTRUM) tablet 1 tablet, 1 tablet, Oral, Daily, KARLEE AnandNP, 1 tablet at 10/18/22 5157  •  nystatin (MYCOSTATIN) powder, , Topical, BID, Marcella Caicedoe, [address], Given at 10/18/22 7904  •  [MAR Hold] polyethylene glycol (MIRALAX) packet 17 g, 17 g, Oral, Daily, Marcella Schustergee, KARLEENP, 17 g at 10/17/22 1715  •  ziprasidone (GEODON) capsule 80 mg, 80 mg, Oral, BID, Marcella [place], CRNP, 80 mg at 10/18/22 0932    Invasive Devices:   Urethral Catheter Latex 16 Fr   (Active)   Reasons to continue Urinary Catheter  Acute urinary retention/obstruction failing urinary retention protocol 10/17/22 0400   Goal for Removal Voiding trial when ambulation improves 10/17/22 0400   Site Assessment Clean;Skin intact 10/17/22 0400   Shanks Care Done 10/16/22 2000   Collection Container Standard drainage bag 10/17/22 0400   Securement Method Securing device (Describe) 10/17/22 0400   Output (mL) 525 mL 10/17/22 0400       Lab Results:   Results from last 7 days   Lab Units 10/18/22  0616 10/18/22  0049 10/17/22  1807 10/17/22  1152 10/17/22  0547 10/16/22  0910 10/16/22  0542 10/15/22  1816 10/15/22  1330   WBC Thousand/uL 6 57  --   --   --  5 95  --  6 62  --  10 04   HEMOGLOBIN g/dL 10 2*  --   --   --  11 3*  --  11 3*  --  12 8   HEMATOCRIT % 32 3*  --   --   --  35 3  --  32 8*  --  36 5   PLATELETS Thousands/uL 162  --   --   -- 144*  --  167   < > 252   POTASSIUM mmol/L 4 9 5 0 4 8   < > 4 7   < > 4 0   < > 3 8   CHLORIDE mmol/L 102 100 98   < > 100   < > 89*   < > 86*   CO2 mmol/L 27 28 26   < > 26   < > 27   < > 24   BUN mg/dL 20 19 16   < > 14   < > 8   < > 11   CREATININE mg/dL 1 09 1 14 1 17   < > 0 89   < > 0 82   < > 0 88   CALCIUM mg/dL 9 1 8 7 8 7   < > 8 5   < > 8 5   < > 8 6   MAGNESIUM mg/dL 2 2  --   --   --   --   --  1 7  --   --    PHOSPHORUS mg/dL  --   --   --   --   --   --  2 9  --   --    ALK PHOS U/L  --   --   --   --   --   --  64  --  77   ALT U/L  --   --   --   --   --   --  50  --  55   AST U/L  --   --   --   --   --   --  73*  --  92*    < > = values in this interval not displayed  Previous work up:         Portions of the record may have been created with voice recognition software  Occasional wrong word or "sound a like" substitutions may have occurred due to the inherent limitations of voice recognition software  Read the chart carefully and recognize, using context, where substitutions have occurred  If you have any questions, please contact the dictating provider

## 2022-10-18 NOTE — ASSESSMENT & PLAN NOTE
· Initial HS Troponin very mildly elevated on admission of 39  · Patient states she intermittently has chest pain, denies chest pain currently  · EKG with T-wave inversions in V1-4, no acute ST changes  · Suspect non MI troponin elevation in setting of hyponatremia/acute illness

## 2022-10-18 NOTE — ASSESSMENT & PLAN NOTE
· History of psychiatric disorder  · Home regimen: Wellbutrin, Zoloft, Klonopin, Geodon  Unclear compliance  · Wellbutrin, Zoloft and Klonopin held in setting of encephalopathy  · Per patient's daughter, further management of psychiatric disorder per patient's primary psychiatrist, declines further changes to medications or inpatient psychiatric consult at this time

## 2022-10-18 NOTE — CASE MANAGEMENT
Support Center received request for authorization from Care Manager    Authorization request for: [facility]  Facility Name: [facility]  NPI: [id number]  Facility MD:  Dr Gely Liu: [id number]  Authorization initiated by contacting: Larissa Flores: [employer]  Pending Reference #: [id number]   Clinicals submitted via: Bill Dubin

## 2022-10-18 NOTE — PHYSICAL THERAPY NOTE
PHYSICAL THERAPY EVALUATION/TREATMENT     10/18/22 1037   PT Last Visit   PT Visit Date 10/18/22   Note Type   Note type Evaluation   Pain Assessment   Pain Assessment Tool 0-10   Pain Score No Pain   Restrictions/Precautions   Weight Bearing Precautions Per Order No   Other Precautions Chair Alarm; Bed Alarm; Fall Risk   Home Living   Type of [address] [facility] One level  (6 HERNAN)   Bathroom Shower/Tub Tub/shower unit   [address] [facility]  (standard)   Prior Function   Level of [place] Independent with ADLs; Independent with functional mobility   Lives With Spouse; Son   [facility] From [facility]   General   Additional Pertinent History Admitted with AMS, hyponatremia   Family/Caregiver Present No   Cognition   Overall Cognitive Status WFL   Arousal/Participation Cooperative   Attention Within functional limits   Following Commands Follows multistep commands with increased time or repetition   RLE Assessment   RLE Assessment WFL  (grossly at least 3+/5 strength  Limited by arthritis pain)   LLE Assessment   LLE Assessment WFL  (grossly at least 3+/5 strength    Limited by arthritis pain)   Bed Mobility   Supine to Sit 4  Minimal assistance   Additional Comments to chair   Transfers   Sit to Stand 3  Moderate assistance   Additional items Assist x 1;Verbal cues   Stand to Sit 3  Moderate assistance   Additional items Assist x 1   Ambulation/Elevation   Gait pattern   (unsteady)   Gait Assistance 3  Moderate assist   Additional items Assist x 1;Verbal cues   Assistive Device Rolling walker   Distance 3 feet   Ambulation/Elevation Additional Comments limited by O2 tubing length   Balance   Static Sitting Fair +   Dynamic Sitting Fair   Static Standing Poor +   Dynamic Standing Poor   Ambulatory Poor  (with RW)   Activity Tolerance   Activity Tolerance Patient limited by fatigue;Patient limited by pain  (knee pain)   Nurse Made Aware yes: June   Assessment   Prognosis Good   Problem List Decreased strength;Decreased endurance; Impaired balance;Decreased mobility;Pain   Assessment Patient seen for Physical Therapy evaluation  Patient admitted with Hyponatremia  Comorbidities affecting patient's physical performance include:Arthritis in knees : left > right, CKD, Colon Ca~2017, endometrial Ca, DM, HTN  Personal factors affecting patient at time of initial evaluation include: lives in one story house, stairs to enter home, inability to ambulate household distances, inability to navigate community distances, inability to navigate level surfaces without external assistance, inability to perform ADLS and inability to perform IADLS   Prior to admission, patient was independent with functional mobility without assistive device, independent with ADLS, requiring assist for IADLS, living with  and son in a single level home with 6 steps to enter, ambulating household distance and home with family assist   Please find objective findings from Physical Therapy assessment regarding body systems outlined above with impairments and limitations including weakness, impaired balance, decreased endurance, gait deviations, pain, decreased activity tolerance, decreased functional mobility tolerance and fall risk  The Barthel Index was used as a functional outcome tool presenting with a score of Barthel Index Score: 35 today indicating marked limitations of functional mobility and ADLS  Patient's clinical presentation is currently unstable/unpredictable as seen in patient's presentation of vital sign response, changing level of pain, increased fall risk, new onset of impairment of functional mobility, decreased endurance and new onset of weakness  Pt would benefit from continued Physical Therapy treatment to address deficits as defined above and maximize level of functional mobility  As demonstrated by objective findings, the assigned level of complexity for this evaluation is high  The patient's AM-PAC Basic Mobility Inpatient Short Form Raw Score is 13  A Raw score of less than or equal to 16 suggests the patient may benefit from discharge to post-acute rehabilitation services  Please also refer to the recommendation of the Physical Therapist for safe discharge planning  Goals   Patient Goals to go home   STG Expiration Date 10/25/22   Short Term Goal #1 Indep with transfers supine <> short sit and sit <> stand with use of RW and with fair + balance   Short Term Goal #2 supervision for amb  with RW for functional household distances with fair + balance   LTG Expiration Date 11/01/22   Long Term Goal #1 INdep amb  with RW for functional household distances with good balance  Long Term Goal #2 Indep navigating 6 stairs to allow pt to enter/exit her home  Plan   Treatment/Interventions Functional transfer training;LE strengthening/ROM; Elevations; Therapeutic exercise; Endurance training;Patient/family training;Equipment eval/education; Bed mobility;Gait training;Spoke to nursing;Spoke to case management;OT;Family   PT Frequency Other (Comment)  (5/wk)   Recommendation   PT Discharge Recommendation Post acute rehabilitation services   Equipment Recommended [facility] Recommended Wheeled walker   AM-PAC Basic Mobility Inpatient   Turning in Bed Without Bedrails 3   Lying on Back to Sitting on Edge of Flat Bed 3   Moving Bed to Chair 2   Standing Up From Chair 2   Walk in Room 2   Climb 3-5 Stairs 1   Basic Mobility Inpatient Raw Score 13   Basic Mobility Standardized Score 33 99   Highest Level Of Mobility   JH-HLM Goal 4: Move to chair/commode   JH-HLM Achieved 4: Move to chair/commode   Barthel Index   Feeding 10   Bathing 0   Grooming Score 0   Dressing Score 5   Bladder Score 0   Bowels Score 10   Toilet Use Score 5   Transfers (Bed/Chair) Score 5   Mobility (Level Surface) Score 0   Stairs Score 0   Barthel Index Score 35   Additional Treatment Session   Start Time 1027   End Time 1037   Treatment Assessment Pt able to take steps fwd/bwd and sidestep with mod A to get to bedside chair using RW  Once in chair, pt completed: seated hip flexion, LAQs and APs all 10 reps bilaterally  All needs in reach  A:Tolerated fairly well  P: cont  as per plan   Equipment Use rolling walker   End of Consult   Patient Position at End of Consult Bedside chair;Bed/Chair alarm activated; All needs within reach   End of Consult Comments Pt is very unsteady with gait   Some c/o pain in bilateral knees due to arthritis  Pt is supposed to get knee replacements in future  Would benefit from rehab before going home, then progress to OP PT to increase strength for tentative knee replacements  Licensure   [place] License Number  frank Flores PT  [id number]

## 2022-10-18 NOTE — OCCUPATIONAL THERAPY NOTE
OT EVALUATION       10/18/22 1047   Note Type   Note type Evaluation   Pain Assessment   Pain Assessment Tool 0-10   Pain Score No Pain   Restrictions/Precautions   Other Precautions Chair Alarm; Bed Alarm; Fall Risk   Home Living   Type of [address] One level  (6 HERNAN)   Bathroom Shower/Tub Tub/shower unit   [address] [facility]  (standard walker)   Prior Function   Level of [place] Independent with ADLs; Independent with functional mobility   Lives With Spouse; Jose A Montalvo Help From Family   ADL   Eating Assistance 7  Independent   Grooming Assistance 5  Supervision/Setup   UB Bathing Assistance 4  Minimal Assistance   LB Bathing Assistance 3  Moderate Assistance   UB Dressing Assistance 4  Minimal Assistance   LB Dressing Assistance 3  Moderate Assistance   Toileting Assistance  3  Moderate Assistance   Bed Mobility   Supine to Sit 4  Minimal assistance   Transfers   Sit to Stand 3  Moderate assistance   Stand to Sit 3  Moderate assistance   Functional Mobility   Functional Mobility 3  Moderate assistance   Additional Comments few steps bed to chair with RW   Balance   Static Sitting Fair +   Dynamic Sitting Fair   Static Standing Poor +   Dynamic Standing Poor   Activity Tolerance   Activity Tolerance Patient limited by fatigue   RUE Assessment   RUE Assessment WFL   LUE Assessment   LUE Assessment WFL   Cognition   Overall Cognitive Status WFL   Arousal/Participation Cooperative   Attention Within functional limits   Orientation Level Oriented X4   Following Commands Follows multistep commands with increased time or repetition   Assessment   Limitation Decreased ADL status; Decreased UE strength;Decreased Safe judgement during ADL;Decreased endurance;Decreased self-care trans;Decreased high-level ADLs  (decreased balance and mobility)   Prognosis Good   Assessment Patient evaluated by Occupational Therapy  Patient admitted with Hyponatremia    The patients occupational profile, medical and therapy history includes a extensive additional review of physical, cognitive, or psychosocial history related to current functional performance  Comorbidities affecting functional mobility and ADLS include: arthritis, cancer, CKD, diabetes and hypertension  Prior to admission, patient was independent with functional mobility without assistive device, independent with ADLS and requiring assist for IADLS  The evaluation identifies the following performance deficits: weakness, impaired balance, decreased endurance, increased fall risk, new onset of impairment of functional mobility, decreased ADLS, decreased IADLS, decreased activity tolerance, decreased safety awareness, impaired judgement and decreased strength, that result in activity limitations and/or participation restrictions  This evaluation requires clinical decision making of high complexity, because the patient presents with comorbidites that affect occupational performance and required significant modification of tasks or assistance with consideration of multiple treatment options  The Barthel Index was used as a functional outcome tool presenting with a score of Barthel Index Score: 35, indicating marked limitations of functional mobility and ADLS  The patient's raw score on the -PAC Daily Activity inpatient short form is 16, standardized score is 35 96, less than 39 4  Patients at this level are likely to benefit from DC to post-acute rehabilitation services  Please refer to the recommendation of the Occupational Therapist for safe DC planning  Patient will benefit from skilled Occupational Therapy services to address above deficits and facilitate a safe return to prior level of function     Goals   Patient Goals to go home   STG Time Frame   (1-7 days)   Short Term Goal  Goals established to promote Patient Goals: to go home:  Grooming: independent seated, Bathing: min assist, Upper Body Dressing supervision, Lower Body Dressing: min assist, Toileting: min assist, Patient will increase standing tolerance to 3 minutes during ADL task to decrease assistance level and decrease fall risk; Patient will increase bed mobility to supervision in preparation for ADLS and transfers; Patient will increase functional mobility to and from bathroom with rolling walker with min assist to increase performance with ADLS and to use a toilet; Patient will tolerate 10 minutes of UE ROM/strengthening to increase general activity tolerance and performance in ADLS/IADLS; Patient will improve functional activity tolerance to 10 minutes of sustained functional tasks to increase participation in basic self-care and decrease assistance level;  Patient will increase dynamic sitting balance to fair+ to improve the ability to sit at edge of bed or on a chair for ADLS;  Patient will increase dynamic standing balance to poor+ to improve postural stability and decrease fall risk during standing ADLS and transfers  LTG Time Frame   (8-14 days)   Long Term Goal Bathing: supervision, Upper Body Dressing independent, Lower Body Dressing: supervision, Toileting: supervision, Patient will increase standing tolerance to 6 minutes during ADL task to decrease assistance level and decrease fall risk; Patient will increase bed mobility to independent in preparation for ADLS and transfers;  Patient will increase functional mobility to and from bathroom with rolling walker with supervision to increase performance with ADLS and to use a toilet; Patient will tolerate 20 minutes of UE ROM/strengthening to increase general activity tolerance and performance in ADLS/IADLS; Patient will improve functional activity tolerance to 20 minutes of sustained functional tasks to increase participation in basic self-care and decrease assistance level;  Patient will increase dynamic sitting balance to good to improve the ability to sit at edge of bed or on a chair for ADLS;  Patient will increase dynamic standing balance to fair- to improve postural stability and decrease fall risk during standing ADLS and transfers  Pt will score >/= 20/24 on AM-PAC Daily Activity Inpatient scale to promote safe independence with ADLs and functional mobility; Pt will score >/= 65/100 on Barthel Index in order to decrease caregiver assistance needed and increase ability to perform ADLs and functional mobility  Functional Transfer Goals   Pt Will Perform All Functional Transfers   (STG min assist LTG supervision)   Plan   Treatment Interventions ADL retraining;Functional transfer training;UE strengthening/ROM; Endurance training;Patient/family training;Equipment evaluation/education; Activityengagement; Compensatory technique education   Goal Expiration Date 11/08/22   OT Frequency 3-5x/wk   Recommendation   OT Discharge Recommendation Post acute rehabilitation services   AM-PAC Daily Activity Inpatient   Lower Body Dressing 2   Bathing 2   Toileting 2   Upper Body Dressing 3   Grooming 3   Eating 4   Daily Activity Raw Score 16   Daily Activity Standardized Score (Calc for Raw Score >=11) 35 96   AM-PAC Applied Cognition Inpatient   Following a Speech/Presentation 4   Understanding Ordinary Conversation 4   Taking Medications 4   Remembering Where Things Are Placed or Put Away 4   Remembering List of 4-5 Errands 4   Taking Care of Complicated Tasks 4   Applied Cognition Raw Score 24   Applied Cognition Standardized Score 62 21   Barthel Index   Feeding 10   Bathing 0   Grooming Score 0   Dressing Score 5   Bladder Score 0   Bowels Score 10   Toilet Use Score 5   Transfers (Bed/Chair) Score 5   Mobility (Level Surface) Score 0   Stairs Score 0   Barthel Index Score 35   Licensure   [place] License Number  [facility] [address] OTR/L [id number]

## 2022-10-18 NOTE — CASE MANAGEMENT
Case Management Discharge Planning Note    Patient name Leandro Mancera  Location [address] [place] [address] MRN [id number]  : 1955 Date 10/18/2022       Current Admission Date: 10/15/2022  Current Admission Diagnosis:Hyponatremia   Patient Active Problem List    Diagnosis Date Noted   • Hyponatremia 10/15/2022   • Psychiatric disorder 10/15/2022   • Toxic metabolic encephalopathy    • Mass of axillary tail of right breast 2022   • Mass of axillary tail of left breast 2022   • Axillary mass, right 2022   • OAB (overactive bladder) 11/15/2019   • History of colon cancer 2018   • Anemia 2018   • History of endometrial cancer 2017   • Diabetes mellitus, type 2 ([address] ) 2017   • Hypertension 2017   • Hyperlipidemia 2017   • Obesity 2017   • Depression 2017      LOS (days): 3  Geometric Mean LOS (GMLOS) (days): 5 10  Days to GMLOS:2 3     OBJECTIVE:  Risk of Unplanned Readmission Score: 16 77         Current admission status: Inpatient   Preferred Pharmacy:   [facility] [address] [facility]  [place] [id number]  Phone: [phone] Fax: [phone]    Primary Care Provider: Chelsy Morris MD    Primary Insurance: [employer]  Secondary Insurance: MEDICARE    DISCHARGE DETAILS:    Discharge planning discussed with[de-identified] Dtfilemon Sultana  Freedom of Choice: Yes  Comments - Freedom of Choice: SW received phone call from dtfilemon Sultana requesting to continue discussion on discharge plans  Jenifer notes choice [place] [address] agency is #1 [facility] and #2 [facility] if accepted  Choice for STR facilty if recommended is [facility]  SW advised PT/OT evaluations remain pending; however, referrals will be made proactively      Contacts  Patient Contacts: Maeola Damascus (dtr)  Relationship to Patient[de-identified] Family  Contact Method: Phone  Phone Number: [phone]  Reason/Outcome: Emergency Contact, Discharge Planning, Continuity of Care, Referral    Other Referral/Resources/Interventions Provided:  Interventions: HHC, Short Term Rehab    SW checked on status of [facility] referrals  [facility] with [facility]  [address] [employer] and [facility] are still reviewing  STR referral sent in [place] as well  Response pending  SLIM AP updated on above  SW will f/u with PT/OT on recommendations

## 2022-10-18 NOTE — ASSESSMENT & PLAN NOTE
Patient mildly volume overloaded with lower extremity edema, SOB, intermittently requiring 2 L NC O2  Not previously on home O2  · Last ECHO in 2019: EF 88%, grade 1 diastolic dysfunction  · CXR, CTA did not reveal findings consistent with heart failure or volume overload  · BNP: 169     · Trial low-dose IV Lasix 20 mg x1 today, discussed with Nephrology  · Obtain updated ECHO  · Consider cardiology consult

## 2022-10-18 NOTE — ASSESSMENT & PLAN NOTE
Na 120 on admission  Presented confused, likely progressive over several days but patient is a poor historian  Initially under ICU service for need for hypertonic fluids and frequent BMP monitoring  · ?SIADH, also consider SSRI contributing  · CTA c/a/p did not reveal any acute findings or signs concerning for underlying malignancy  · S/p isotonic saline, then 1 8% saline, since D/C  · Na improving, 133 today    · Nephrology following:  · Start salt tablets 1 g BID, monitor response  · Continue 1 8 L fluid restriction  · Recommend repeating BMP on 10/21 if discharged  · Close I&Os  · Close neurologic monitoring - pt remains confused at times

## 2022-10-18 NOTE — CASE MANAGEMENT
Case Management Discharge Planning Note    Patient name Charlene Gonzales  Location [address] [place] [address] MRN [id number]  : 1955 Date 10/18/2022       Current Admission Date: 10/15/2022  Current Admission Diagnosis:Hyponatremia   Patient Active Problem List    Diagnosis Date Noted   • Elevated brain natriuretic peptide (BNP) level 10/18/2022   • Hyponatremia 10/15/2022   • Psychiatric disorder 10/15/2022   • Toxic metabolic encephalopathy    • Mass of axillary tail of right breast 2022   • Mass of axillary tail of left breast 2022   • Axillary mass, right 2022   • OAB (overactive bladder) 11/15/2019   • History of colon cancer 2018   • Anemia 2018   • History of endometrial cancer 2017   • Diabetes mellitus, type 2 ([address] ) 2017   • Hypertension 2017   • Hyperlipidemia 2017   • Obesity 2017   • Depression 2017      LOS (days): 3  Geometric Mean LOS (GMLOS) (days): 5 10  Days to GMLOS:2 2     OBJECTIVE:  Risk of Unplanned Readmission Score: 16 99      Current admission status: Inpatient   Preferred Pharmacy:   [facility] [address] [facility]  [place] [address]  Phone: [phone] Fax: [phone]    Primary Care Provider: Castro Francois MD    Primary Insurance: [facility]  Secondary Insurance: MEDICARE    DISCHARGE DETAILS:    Discharge planning discussed with[de-identified] Patient, Dtr Nima Mcdonnell  [place] of Choice: Yes     CM contacted family/caregiver?: Yes  Were Treatment Team discharge recommendations reviewed with patient/caregiver?: Yes  Did patient/caregiver verbalize understanding of patient care needs?: Yes  Were patient/caregiver advised of the risks associated with not following Treatment Team discharge recommendations?: Yes    Contacts  Patient Contacts: Capri Morfin (dtr)  Relationship to Patient[de-identified] Family  Contact Method: Phone  Phone Number: [phone]  Reason/Outcome: Emergency Contact, Discharge Planning, Continuity of Care, Referral    Treatment Team Recommendation: Short Term Rehab  Discharge Destination Plan[de-identified] Home with [address]    SW updated patient's dtr Nate Elizalde with recommendation by PT/OT for STR  Patient initially stated that she would not go to rehab but is now stating that she will consider  Nate Elizalde noted she wasn't surprised but will speak to her about it today  Sw advised that pt was accepted at [address] [place] [address] and also by [facility]  Nate Elizalde inquired if patient could be admitted under the [facility] service at [facility]  NINO will reach out to [facility]/[place] regarding  Nate Elizalde also inquired if orthopedics could be consulted to evaluate if patient's knees are main contributing factor to patient's ambulatory dysfunction  If so, Nate Elizalde is not sure that rehab will be effective for patient and noted it may be better for her to go home w/ [facility] and see if her knee replacement surgery could be moved up  SLIM AP notified  SW reached out to [facility] Attending Dr Bony Mayfield re ability to accept patient at [facility]  SW will f/u with CM DC support to start STR auth once PT/OT notes are available

## 2022-10-18 NOTE — CASE MANAGEMENT
Case Management Progress Note    Patient name Elif Hester  [address] MRN [id number]  : 1955 Date 10/18/2022       LOS (days): 3  Geometric Mean LOS (GMLOS) (days): 5 10  Days to GMLOS:2 1        OBJECTIVE:     Current admission status: Inpatient  Preferred Pharmacy:   [address] [place] [address] [phone] [address]  Phone: [phone] Fax: [phone]    Primary Care Provider: Diaz Banerjee MD    Primary Insurance: [facility]  Secondary Insurance: MEDICARE    PROGRESS NOTE:    SW notified by dtr Kellie Romano that she spoke with patient and patient is now agreeable to going to STR at [address]  SW advised that Dr Yoselyn Ulrich agreed to admit pt under [facility] service at [facility]  Dtr pleased

## 2022-10-18 NOTE — ASSESSMENT & PLAN NOTE
Lab Results   Component Value Date    HGBA1C 6 0 (H) 06/07/2021       Recent Labs     10/17/22  1708 10/17/22  2123 10/18/22  0721 10/18/22  1133   POCGLU 102 105 86 109       Blood Sugar Average: Last 72 hrs:  (P) 687 [id number]   · Hold home metformin  · Continue home Lantus 70 units qHS, SSI coverage while inpatient  · Diabetic diet

## 2022-10-18 NOTE — PROGRESS NOTES
[facility] [address]  Progress Note - Jesus Thompson 1955, 75 y o  female MRN: [id number]  Unit/Bed#: [address] [place] [address] Encounter: [id number]  Primary Care Provider: Paul Epps MD   Date and time admitted to hospital: 10/15/2022  1:12 PM    * Hyponatremia  Assessment & Plan  Na 120 on admission  Presented confused, likely progressive over several days but patient is a poor historian  Initially under ICU service for need for hypertonic fluids and frequent BMP monitoring  · ?SIADH, also consider SSRI contributing  · CTA c/a/p did not reveal any acute findings or signs concerning for underlying malignancy  · S/p isotonic saline, then 1 8% saline, since D/C  · Na improving, 133 today  · Nephrology following:  · Start salt tablets 1 g BID, monitor response  · Continue 1 8 L fluid restriction  · Recommend repeating BMP on 10/21 if discharged  · Close I&Os  · Close neurologic monitoring - pt remains confused at times    Elevated brain natriuretic peptide (BNP) level  Assessment & Plan  Patient mildly volume overloaded with lower extremity edema, SOB, intermittently requiring 2 L NC O2  Not previously on home O2  · Last ECHO in 2019: EF 51%, grade 1 diastolic dysfunction  · CXR, CTA did not reveal findings consistent with heart failure or volume overload  · BNP: 169  · Trial low-dose IV Lasix 20 mg x1 today, discussed with Nephrology  · Obtain updated ECHO  · Consider cardiology consult    Toxic metabolic encephalopathy  Assessment & Plan  Patient presented confused, she is alert to self, place and time but is slow to respond, appears restless  Not at baseline per family  · Likely in the setting of hyponatremia, also consider underlying psychiatric disorders/questionable compliance with medication contributing  · CT head unremarkable  · UDS and UA negative  · Patient today A&O x3, still slow to respond to questioning    Per patient's daughter, closer to baseline mental status  · Continue holding sedating medications   · Continue neuro checks  · Sleep hygiene - held Klonopin as patient more lethargic overnight    Osteoarthritis of both knees  Assessment & Plan  · Patient with chronic history of severe bilateral OA, chronic pain  Was following with Orthopedic surgery outpatient in considering elective bilateral knee replacement  · Patient has been deconditioned and not ambulating as frequently due to pain per daughter  · PT/OT evaluated, patient was weak and unsteady, recommending STR which patient at this time is refusing  · Discussed with Orthopedic surgery, no inpatient interventions or new x-rays indicated at this time, already recommended to have total knee replacement  Needs close outpatient follow-up to schedule elective total knee replacement  Psychiatric disorder  Assessment & Plan  · History of psychiatric disorder  · Home regimen: Wellbutrin, Zoloft, Klonopin, Geodon  Unclear compliance  · Wellbutrin, Zoloft and Klonopin held in setting of encephalopathy  · Per patient's daughter, further management of psychiatric disorder per patient's primary psychiatrist, declines further changes to medications or inpatient psychiatric consult at this time  History of colon cancer  Assessment & Plan  · Hx of colon and endometrial cancer   Endometrial cancer s/p surgery and radiation completed in 2017  · Not currently receiving treatment  · In remission per family     Hyperlipidemia  Assessment & Plan  · Continue statin    Hypertension  Assessment & Plan  · BP reviewed, stable  · Continue Toprol    Diabetes mellitus, type 2 [facility])  Assessment & Plan  Lab Results   Component Value Date    HGBA1C 6 0 (H) 06/07/2021       Recent Labs     10/17/22  1708 10/17/22  2123 10/18/22  0721 10/18/22  1133   POCGLU 102 105 86 109       Blood Sugar Average: Last 72 hrs:  (P) 867 [id number]   · Hold home metformin  · Continue home Lantus 70 units qHS, SSI coverage while inpatient  · Diabetic diet    Elevated troponin-resolved as of 10/17/2022  Assessment & Plan  · Initial HS Troponin very mildly elevated on admission of 39  · Patient states she intermittently has chest pain, denies chest pain currently  · EKG with T-wave inversions in V1-4, no acute ST changes  · Suspect non MI troponin elevation in setting of hyponatremia/acute illness    VTE Pharmacologic Prophylaxis: VTE Score: 3 Moderate Risk (Score 3-4) - Pharmacological DVT Prophylaxis Ordered: enoxaparin (Lovenox)  Patient Centered Rounds: I performed bedside rounds with nursing staff today  Discussions with Specialists or Other Care Team Provider:  Orthopedic surgery, Nephrology, case management    Education and Discussions with Family / Patient: Updated  (daughter) via phone  Time Spent for Care: 45 minutes  More than 50% of total time spent on counseling and coordination of care as described above  Current Length of Stay: 3 day(s)  Current Patient Status: Inpatient   Certification Statement: The patient will continue to require additional inpatient hospital stay due to Monitor volume status, hyponatremia, discharge planning  Discharge Plan: Anticipate discharge in 24-48 hrs to [facility] versus rehab    Code Status: Level 1 - Full Code    Subjective:   Patient is seen at bedside this a m , reports that she intermittently feels short of breath with swelling in her legs  Patient reports that she did not do well with physical therapy, reports feeling weak with chronic knee pain, although stated that she is not interested in rehab at this time  Objective:     Vitals:   Temp (24hrs), Av °F (36 7 °C), Min:97 1 °F (36 2 °C), Max:98 5 °F (36 9 °C)    Temp:  [97 1 °F (36 2 °C)-98 5 °F (36 9 °C)] 98 5 °F (36 9 °C)  HR:  [67-81] 67  Resp:  [18-33] 20  BP: (113-128)/(59-80) 122/61  SpO2:  [95 %-99 %] 96 %  Body mass index is 33 13 kg/m²  Input and Output Summary (last 24 hours):      Intake/Output Summary (Last 24 hours) at 10/18/2022 1550  Last data filed at 10/18/2022 0601  Gross per 24 hour   Intake 450 ml   Output 1325 ml   Net -875 ml       Physical Exam:   Physical Exam  Constitutional:       General: She is not in acute distress  Appearance: She is not ill-appearing, toxic-appearing or diaphoretic  Cardiovascular:      Rate and Rhythm: Normal rate and regular rhythm  Pulses: Normal pulses  Heart sounds: Normal heart sounds  Pulmonary:      Effort: No respiratory distress  Breath sounds: Normal breath sounds  Comments: Slightly increased WOB  Abdominal:      General: Bowel sounds are normal  There is no distension  Palpations: Abdomen is soft  Tenderness: There is no abdominal tenderness  Musculoskeletal:         General: Swelling present  No tenderness  Comments: Very mild, nonpitting bilateral lower extremity edema   Skin:     General: Skin is warm  Neurological:      General: No focal deficit present  Mental Status: She is alert and oriented to person, place, and time        Comments: Slow to respond to questioning   Psychiatric:         Mood and Affect: Mood normal          Behavior: Behavior normal          Additional Data:     Labs:  Results from last 7 days   Lab Units 10/18/22  0616 10/17/22  0547   WBC Thousand/uL 6 57 5 95   HEMOGLOBIN g/dL 10 2* 11 3*   HEMATOCRIT % 32 3* 35 3   PLATELETS Thousands/uL 162 144*   NEUTROS PCT %  --  70   LYMPHS PCT %  --  16   MONOS PCT %  --  11   EOS PCT %  --  1     Results from last 7 days   Lab Units 10/18/22  0616 10/16/22  0910 10/16/22  0542   SODIUM mmol/L 133*   < > 122*   POTASSIUM mmol/L 4 9   < > 4 0   CHLORIDE mmol/L 102   < > 89*   CO2 mmol/L 27   < > 27   BUN mg/dL 20   < > 8   CREATININE mg/dL 1 09   < > 0 82   ANION GAP mmol/L 4   < > 6   CALCIUM mg/dL 9 1   < > 8 5   ALBUMIN g/dL  --   --  3 3*   TOTAL BILIRUBIN mg/dL  --   --  0 86   ALK PHOS U/L  --   --  64   ALT U/L  --   --  50   AST U/L  -- --  73*   GLUCOSE RANDOM mg/dL 79   < > 179*    < > = values in this interval not displayed  Results from last 7 days   Lab Units 10/15/22  1330   INR  1 03     Results from last 7 days   Lab Units 10/18/22  1133 10/18/22  0721 10/17/22  2123 10/17/22  1708 10/17/22  0629 10/16/22  2113 10/16/22  1638 10/16/22  1121 10/15/22  2147 10/15/22  1825 10/15/22  1320   POC GLUCOSE mg/dl 109 86 105 102 126 144* 161* 149* 210* 204* 227*         Results from last 7 days   Lab Units 10/15/22  1330   LACTIC ACID mmol/L 1 0       Lines/Drains:  Invasive Devices  Report    Peripheral Intravenous Line  Duration           Peripheral IV 10/17/19 Lower; Left Arm 1097 days    Peripheral IV 10/15/22 Left;Ventral (anterior) Forearm 2 days          Drain  Duration           Urethral Catheter Latex 16 Fr  2 days              Urinary Catheter:  Goal for removal: Voiding trial when ambulation improves               Imaging: Reviewed radiology reports from this admission including: chest CT scan, abdominal/pelvic CT and xray(s)    Recent Cultures (last 7 days):   Results from last 7 days   Lab Units 10/15/22  1804 10/15/22  1401 10/15/22  1354   BLOOD CULTURE   --  No Growth at 48 hrs  No Growth at 48 hrs     URINE CULTURE  No Growth <1000 cfu/mL  --   --        Last 24 Hours Medication List:   Current Facility-Administered Medications   Medication Dose Route Frequency Provider Last Rate   • aspirin  81 mg Oral Daily With Breakfast JOHN Hebert     • atorvastatin  40 mg Oral Daily With [address] Narayan Cecilia     • [facility] Hold] Diclofenac Sodium  2 g Topical 4x Daily JOHN Hebert     • docusate sodium  100 mg Oral BID JOHN Hebert     • enoxaparin  40 mg Subcutaneous Daily Eliane Mason [place], [address]     • fish oil  1,000 mg Oral BID Gwenetta Motto [place], [address]     • insulin glargine  70 Units Subcutaneous QAM JOHN Zamora     • insulin lispro 1-6 Units Subcutaneous TID AC Eliane Mason [place], [address]     • insulin lispro  1-6 Units Subcutaneous HS Ellis Klamath [place], [address]     • metoprolol succinate  25 mg Oral Daily Ellis Klamath [place], [address]     • multivitamin-minerals  1 tablet Oral Daily Ellis Klamath [place], [address]     • nystatin   Topical BID Ellis Klamath [place], [address]     • [facility] Hold] polyethylene glycol  17 g Oral Daily Ellis Klamath [place], [address]     • sodium chloride  1 g Oral BID With Meals Randy Higgins MD     • ziprasidone  80 mg Oral BID [address]          Today, Patient Was Seen By: Miguel Angel Olsen    **Please Note: This note may have been constructed using a voice recognition system  **

## 2022-10-18 NOTE — ASSESSMENT & PLAN NOTE
Patient presented confused, she is alert to self, place and time but is slow to respond, appears restless  Not at baseline per family  · Likely in the setting of hyponatremia, also consider underlying psychiatric disorders/questionable compliance with medication contributing  · CT head unremarkable  · UDS and UA negative  · Patient today A&O x3, still slow to respond to questioning  Per patient's daughter, closer to baseline mental status    · Continue holding sedating medications   · Continue neuro checks  · Sleep hygiene - held Klonopin as patient more lethargic overnight

## 2022-10-19 ENCOUNTER — TELEPHONE (OUTPATIENT)
Dept: NEPHROLOGY | Facility: CLINIC | Age: 67
End: 2022-10-19

## 2022-10-19 VITALS
HEIGHT: 64 IN | OXYGEN SATURATION: 93 % | WEIGHT: 190.6 LBS | HEART RATE: 77 BPM | TEMPERATURE: 98.7 F | DIASTOLIC BLOOD PRESSURE: 64 MMHG | BODY MASS INDEX: 32.54 KG/M2 | RESPIRATION RATE: 18 BRPM | SYSTOLIC BLOOD PRESSURE: 141 MMHG

## 2022-10-19 DIAGNOSIS — E87.1 HYPONATREMIA: Primary | ICD-10-CM

## 2022-10-19 DIAGNOSIS — I10 HYPERTENSION, UNSPECIFIED TYPE: ICD-10-CM

## 2022-10-19 LAB
ANION GAP SERPL CALCULATED.3IONS-SCNC: 2 MMOL/L (ref 4–13)
AORTIC ROOT: 3 CM
APICAL FOUR CHAMBER EJECTION FRACTION: 69 %
AV LVOT PEAK GRADIENT: 5 MMHG
AV PEAK GRADIENT: 9 MMHG
BUN SERPL-MCNC: 21 MG/DL (ref 5–25)
CALCIUM SERPL-MCNC: 9 MG/DL (ref 8.3–10.1)
CHLORIDE SERPL-SCNC: 100 MMOL/L (ref 96–108)
CO2 SERPL-SCNC: 30 MMOL/L (ref 21–32)
CREAT SERPL-MCNC: 1.11 MG/DL (ref 0.6–1.3)
DME PARACHUTE DELIVERY DATE REQUESTED: NORMAL
DME PARACHUTE ITEM DESCRIPTION: NORMAL
DME PARACHUTE ORDER STATUS: NORMAL
DME PARACHUTE SUPPLIER NAME: NORMAL
DME PARACHUTE SUPPLIER PHONE: NORMAL
DOP CALC LVOT AREA: 3.14 CM2
DOP CALC LVOT DIAMETER: 2 CM
E WAVE DECELERATION TIME: 198 MS
E/A RATIO: 0.73
ERYTHROCYTE [DISTWIDTH] IN BLOOD BY AUTOMATED COUNT: 13.9 % (ref 11.6–15.1)
FRACTIONAL SHORTENING: 27 (ref 28–44)
GFR SERPL CREATININE-BSD FRML MDRD: 51 ML/MIN/1.73SQ M
GLUCOSE SERPL-MCNC: 101 MG/DL (ref 65–140)
GLUCOSE SERPL-MCNC: 106 MG/DL (ref 65–140)
GLUCOSE SERPL-MCNC: 127 MG/DL (ref 65–140)
GLUCOSE SERPL-MCNC: 165 MG/DL (ref 65–140)
HCT VFR BLD AUTO: 34.2 % (ref 34.8–46.1)
HGB BLD-MCNC: 10.8 G/DL (ref 11.5–15.4)
INTERVENTRICULAR SEPTUM IN DIASTOLE (PARASTERNAL SHORT AXIS VIEW): 1 CM
INTERVENTRICULAR SEPTUM: 1 CM (ref 0.6–1.1)
LAAS-AP2: 15.3 CM2
LAAS-AP4: 20 CM2
LEFT ATRIUM AREA SYSTOLE SINGLE PLANE A4C: 15.4 CM2
LEFT ATRIUM SIZE: 4 CM
LEFT ATRIUM VOLUME INDEX (MOD BIPLANE): 20.7
LEFT INTERNAL DIMENSION IN SYSTOLE: 4.1 CM (ref 2.1–4)
LEFT VENTRICULAR INTERNAL DIMENSION IN DIASTOLE: 5.6 CM (ref 3.5–6)
LEFT VENTRICULAR POSTERIOR WALL IN END DIASTOLE: 1 CM
LEFT VENTRICULAR STROKE VOLUME: 82 ML
LVSV (TEICH): 82 ML
MCH RBC QN AUTO: 30.5 PG (ref 26.8–34.3)
MCHC RBC AUTO-ENTMCNC: 31.6 G/DL (ref 31.4–37.4)
MCV RBC AUTO: 97 FL (ref 82–98)
MV E'TISSUE VEL-SEP: 5 CM/S
MV PEAK A VEL: 0.89 M/S
MV PEAK E VEL: 65 CM/S
MV STENOSIS PRESSURE HALF TIME: 57 MS
MV VALVE AREA BY PLANIMETRY: 18.6 CM2
MV VALVE AREA P 1/2 METHOD: 3.9
PLATELET # BLD AUTO: 163 THOUSANDS/UL (ref 149–390)
PMV BLD AUTO: 8.6 FL (ref 8.9–12.7)
POTASSIUM SERPL-SCNC: 4.2 MMOL/L (ref 3.5–5.3)
RBC # BLD AUTO: 3.54 MILLION/UL (ref 3.81–5.12)
RIGHT VENTRICLE ID DIMENSION: 3 CM
SL CV LEFT ATRIUM LENGTH A2C: 4.9 CM
SL CV LV EF: 55
SL CV PED ECHO LEFT VENTRICLE DIASTOLIC VOLUME (MOD BIPLANE) 2D: 157 ML
SL CV PED ECHO LEFT VENTRICLE SYSTOLIC VOLUME (MOD BIPLANE) 2D: 74 ML
SODIUM SERPL-SCNC: 132 MMOL/L (ref 135–147)
TR MAX PG: 24 MMHG
TR PEAK VELOCITY: 2.4 M/S
TRICUSPID VALVE PEAK REGURGITATION VELOCITY: 2.44 M/S
WBC # BLD AUTO: 5.38 THOUSAND/UL (ref 4.31–10.16)

## 2022-10-19 PROCEDURE — 85027 COMPLETE CBC AUTOMATED: CPT | Performed by: PHYSICIAN ASSISTANT

## 2022-10-19 PROCEDURE — 99232 SBSQ HOSP IP/OBS MODERATE 35: CPT | Performed by: INTERNAL MEDICINE

## 2022-10-19 PROCEDURE — 99239 HOSP IP/OBS DSCHRG MGMT >30: CPT | Performed by: PHYSICIAN ASSISTANT

## 2022-10-19 PROCEDURE — 91913: CPT | Performed by: PHYSICIAN ASSISTANT

## 2022-10-19 PROCEDURE — 94760 N-INVAS EAR/PLS OXIMETRY 1: CPT

## 2022-10-19 PROCEDURE — 93306 TTE W/DOPPLER COMPLETE: CPT | Performed by: INTERNAL MEDICINE

## 2022-10-19 PROCEDURE — 82948 REAGENT STRIP/BLOOD GLUCOSE: CPT

## 2022-10-19 PROCEDURE — 80048 BASIC METABOLIC PNL TOTAL CA: CPT | Performed by: INTERNAL MEDICINE

## 2022-10-19 RX ORDER — NYSTATIN 100000 [USP'U]/G
POWDER TOPICAL 2 TIMES DAILY
Qty: 15 G | Refills: 0
Start: 2022-10-19 | End: 2022-10-28 | Stop reason: SDUPTHER

## 2022-10-19 RX ORDER — DOCUSATE SODIUM 100 MG/1
100 CAPSULE, LIQUID FILLED ORAL 2 TIMES DAILY
Refills: 0
Start: 2022-10-19 | End: 2022-10-28 | Stop reason: SDUPTHER

## 2022-10-19 RX ORDER — TORSEMIDE 10 MG/1
10 TABLET ORAL DAILY
Refills: 0
Start: 2022-10-20 | End: 2022-10-28

## 2022-10-19 RX ORDER — SODIUM CHLORIDE 1000 MG
1 TABLET, SOLUBLE MISCELLANEOUS 3 TIMES DAILY
Refills: 0
Start: 2022-10-19 | End: 2022-10-28

## 2022-10-19 RX ORDER — POLYETHYLENE GLYCOL 3350 17 G/17G
17 POWDER, FOR SOLUTION ORAL DAILY
Refills: 0
Start: 2022-10-20 | End: 2022-10-28 | Stop reason: SDUPTHER

## 2022-10-19 RX ORDER — TORSEMIDE 10 MG/1
10 TABLET ORAL DAILY
Status: DISCONTINUED | OUTPATIENT
Start: 2022-10-19 | End: 2022-10-19 | Stop reason: HOSPADM

## 2022-10-19 RX ORDER — SODIUM PHOSPHATE, DIBASIC AND SODIUM PHOSPHATE, MONOBASIC 7; 19 G/133ML; G/133ML
1 ENEMA RECTAL ONCE AS NEEDED
Status: COMPLETED | OUTPATIENT
Start: 2022-10-19 | End: 2022-10-19

## 2022-10-19 RX ORDER — SODIUM CHLORIDE 1000 MG
1 TABLET, SOLUBLE MISCELLANEOUS
Status: DISCONTINUED | OUTPATIENT
Start: 2022-10-19 | End: 2022-10-19 | Stop reason: HOSPADM

## 2022-10-19 RX ADMIN — INSULIN LISPRO 1 UNITS: 100 INJECTION, SOLUTION INTRAVENOUS; SUBCUTANEOUS at 12:06

## 2022-10-19 RX ADMIN — INSULIN GLARGINE 70 UNITS: 100 INJECTION, SOLUTION SUBCUTANEOUS at 09:30

## 2022-10-19 RX ADMIN — POLYETHYLENE GLYCOL 3350 17 G: 17 POWDER, FOR SOLUTION ORAL at 08:36

## 2022-10-19 RX ADMIN — SODIUM CHLORIDE 1 G: 1 TABLET ORAL at 08:33

## 2022-10-19 RX ADMIN — SODIUM CHLORIDE 1 G: 1 TABLET ORAL at 16:04

## 2022-10-19 RX ADMIN — MODERNA COVID-19 VACCINE, BIVALENT 0.5 ML: 25; 25 INJECTION, SUSPENSION INTRAMUSCULAR at 16:16

## 2022-10-19 RX ADMIN — ATORVASTATIN CALCIUM 40 MG: 40 TABLET, FILM COATED ORAL at 16:04

## 2022-10-19 RX ADMIN — DICLOFENAC SODIUM TOPICAL GEL, 1%, 2 G: 10 GEL TOPICAL at 12:16

## 2022-10-19 RX ADMIN — OMEGA-3 FATTY ACIDS CAP 1000 MG 1000 MG: 1000 CAP at 08:33

## 2022-10-19 RX ADMIN — DOCUSATE SODIUM 100 MG: 100 CAPSULE, LIQUID FILLED ORAL at 18:11

## 2022-10-19 RX ADMIN — DOCUSATE SODIUM 100 MG: 100 CAPSULE, LIQUID FILLED ORAL at 08:33

## 2022-10-19 RX ADMIN — SODIUM CHLORIDE 1 G: 1 TABLET ORAL at 12:09

## 2022-10-19 RX ADMIN — TORSEMIDE 10 MG: 10 TABLET ORAL at 12:09

## 2022-10-19 RX ADMIN — ZIPRASIDONE HYDROCHLORIDE 80 MG: 40 CAPSULE ORAL at 08:33

## 2022-10-19 RX ADMIN — SODIUM PHOSPHATE 1 ENEMA: 7; 19 ENEMA RECTAL at 16:02

## 2022-10-19 RX ADMIN — METOPROLOL SUCCINATE 25 MG: 25 TABLET, EXTENDED RELEASE ORAL at 08:33

## 2022-10-19 RX ADMIN — NYSTATIN: 100000 POWDER TOPICAL at 18:12

## 2022-10-19 RX ADMIN — ASPIRIN 81 MG: 81 TABLET, COATED ORAL at 08:33

## 2022-10-19 RX ADMIN — Medication 1 TABLET: at 08:33

## 2022-10-19 RX ADMIN — ZIPRASIDONE HYDROCHLORIDE 80 MG: 40 CAPSULE ORAL at 18:10

## 2022-10-19 RX ADMIN — NYSTATIN: 100000 POWDER TOPICAL at 08:37

## 2022-10-19 RX ADMIN — ENOXAPARIN SODIUM 40 MG: 40 INJECTION SUBCUTANEOUS at 08:34

## 2022-10-19 NOTE — ASSESSMENT & PLAN NOTE
Patient mildly volume overloaded with lower extremity edema, SOB, intermittently requiring 2 L NC O2  Not previously on home O2  · Last ECHO in 2019: EF 27%, grade 1 diastolic dysfunction  · CXR, CTA did not reveal findings consistent with heart failure or volume overload  · BNP: 169  · S/p IV Lasix 20 mg x1 on 10/18  · ECHO: EF 25%, normal systolic and diastolic function    · Starting maintenance torsemide 10 mg daily per Nephrology to avoid fluid overload on salt tablets

## 2022-10-19 NOTE — ASSESSMENT & PLAN NOTE
· Hx of colon and endometrial cancer   Endometrial cancer s/p surgery and radiation completed in 2017  · Not currently receiving treatment  · In remission per family   · Patient reporting constipation, requesting Fleet enema, will order as needed

## 2022-10-19 NOTE — ASSESSMENT & PLAN NOTE
Lab Results   Component Value Date    HGBA1C 6 0 (H) 06/07/2021       Recent Labs     10/18/22  2013 10/19/22  0721 10/19/22  1123 10/19/22  1618   POCGLU 173* 106 165* 127       Blood Sugar Average: Last 72 hrs:  (P) 128   · Hold home metformin  · Continue home Lantus 70 units qHS, SSI coverage while inpatient  · Diabetic diet

## 2022-10-19 NOTE — CASE MANAGEMENT
Rachel Guajadro 49 has received approved authorization from:   [employer] in by Rep: Robert Clark received for: [facility]  Facility: [address] [place] [address] #: [id number]  Start of Care: 10/19  Next Review Date: 10/25  Care Coordinator: Roberto POTTS#: [phone]   Care Manager notified:  Yes

## 2022-10-19 NOTE — ASSESSMENT & PLAN NOTE
Patient presented confused, she is alert to self, place and time but is slow to respond, appears restless  Not at baseline per family  · Likely in the setting of hyponatremia, also consider underlying psychiatric disorders/questionable compliance with medication contributing  · CT head unremarkable  · UDS and UA negative  · Patient today A&O x3, appears to be at baseline  Per patient's daughter, closer to baseline mental status    · Continue holding sedating medications   · Continue neuro checks  · Sleep hygiene

## 2022-10-19 NOTE — DISCHARGE SUMMARY
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  · CTA c/a/p did not reveal any acute findings or signs concerning for underlying malignancy  · S/p isotonic saline, then 1 8% saline, since D/C  · Na improving, although slightly decreased at 132 today  Slight elevation in Cr but overall within baseline  · Shanks catheter placed initially on admission for accurate I&Os, D/C Shanks catheter with voiding trial today  · Nephrology following:  · Increase salt tablets 1 g BID->TID  · Increase fluid restriction 1 8->1 5 L  · Start torsemide 10 mg daily to prevent fluid overload  · Recommend repeating BMP on 10/24 on discharge  · Follow-up with Nephrology outpatient  · Close neurologic monitoring - pt remains confused at times    Elevated brain natriuretic peptide (BNP) level  Assessment & Plan  Patient mildly volume overloaded with lower extremity edema, SOB, intermittently requiring 2 L NC O2  Not previously on home O2  · Last ECHO in 2019: EF 96%, grade 1 diastolic dysfunction  · CXR, CTA did not reveal findings consistent with heart failure or volume overload  · BNP: 169  · S/p IV Lasix 20 mg x1 on 10/18  · ECHO: EF 03%, normal systolic and diastolic function    · Starting maintenance torsemide 10 mg daily per Nephrology to avoid fluid overload on salt tablets    Toxic metabolic encephalopathy  Assessment & Plan  Patient presented confused, she is alert to self, place and time but is slow to respond, appears restless  Not at baseline per family  · Likely in the setting of hyponatremia, also consider underlying psychiatric disorders/questionable compliance with medication contributing  · CT head unremarkable  · UDS and UA negative  · Patient today A&O x3, appears to be at baseline  Per patient's daughter, closer to baseline mental status  · Continue holding sedating medications   · Continue neuro checks  · Sleep hygiene    Osteoarthritis of both knees  Assessment & Plan  · Patient with chronic history of severe bilateral OA, chronic pain  Was following with Orthopedic surgery outpatient in considering elective bilateral knee replacement  · Patient has been deconditioned and not ambulating as frequently due to pain per daughter  · Discussed with Orthopedic surgery:  · No inpatient interventions or new x-rays indicated at this time, already recommended to have total knee replacement  · Needs close outpatient follow-up with Orthopedics to schedule elective total knee replacement  · PT/OT evaluated, patient was weak and unsteady, recommending STR which patient initially had refused, now agreeable per discussion with patient's daughter  Case management on board for placement  Psychiatric disorder  Assessment & Plan  · History of psychiatric disorder  · Home regimen: Wellbutrin, Zoloft, Klonopin, Geodon  Unclear compliance  · Wellbutrin, Zoloft and Klonopin held in setting of encephalopathy  · ICU provider spoke with patient's primary psychiatrist, continue holding Wellbutrin, Zoloft until seen outpatient, could consider restarting Klonopin as needed  Continue Geodon  History of colon cancer  Assessment & Plan  · Hx of colon and endometrial cancer   Endometrial cancer s/p surgery and radiation completed in 2017  · Not currently receiving treatment  · In remission per family   · Patient reporting constipation, requesting Fleet enema, will order as needed    Hyperlipidemia  Assessment & Plan  · Continue statin    Hypertension  Assessment & Plan  · BP reviewed, stable  · Continue Toprol    Diabetes mellitus, type 2 [facility])  Assessment & Plan  Lab Results   Component Value Date    HGBA1C 6 0 (H) 06/07/2021       Recent Labs     10/18/22  2013 10/19/22  0721 10/19/22  1123 10/19/22  1618   POCGLU 173* 106 165* 127       Blood Sugar Average: Last 72 hrs:  (P) 128   · Hold home metformin  · Continue home Lantus 70 units qHS, SSI coverage while inpatient  · Diabetic diet    Medical Problems             Resolved Problems  Date Reviewed: 10/19/2022          Resolved    Elevated troponin 10/17/2022     Resolved by  Pato Vasquez PA-C              Discharging Physician / Practitioner: Nereida Morataya  PCP: Joselin Wayne MD  Admission Date:   Admission Orders (From admission, onward)     Ordered        10/15/22 1531  Inpatient Admission  Once                      Discharge Date: 10/19/22    Consultations During Hospital Stay:  · Nephrology  · Critical care  · PT/OT    Procedures Performed:   · None    Significant Findings / Test Results:   CTA chest abdomen pelvis w wo contrast   Final Result by Aly Keenan MD (10/17 2101)      No evidence of acute process in the chest, abdomen or pelvis  Workstation performed: [id number]         CT head without contrast   Final Result by Lesli Mcclure DO (10/15 1553)      No acute intracranial abnormality  Chronic microangiopathic changes  Workstation performed: [id number]         XR chest 1 view portable   Final Result by Jamie Redmond MD (10/15 1708)      No acute cardiopulmonary disease  Workstation performed: [id number]             Incidental Findings:   · None     Test Results Pending at Discharge (will require follow up):    · None     Outpatient Tests Requested:  · Follow-up with Nephrology outpatient  · Follow-up with psychiatry outpatient  · Follow-up with Orthopedic surgery outpatient  · Obtain BMP while at rehab and/or in 1 week to monitor electrolytes and renal function    Complications:  None    Reason for Admission:  Hyponatremia    Hospital Course:   Shaunna Braxton is a 75 y o  female patient, PMH multiple psychiatric disorders, OA bilateral knees, HTN, HLD, who originally presented to the hospital on 10/15/2022 due to hyponatremia  Patient initially presented with AMS and severe hyponatremia, Na 120 on arrival   CT head, UA and UDS were negative for acute findings, CTA c/a/p did not reveal any signs concerning for underlying malignancy  Patient initially was trialed on isotonic saline, however did not respond and was placed on hypertonic saline  Patient was initially admitted under Critical Care Service due to frequent BMP monitoring, Shanks catheter placed for accurate I&O measurement  During admission, patient's hyponatremia and mental status had significantly improved, thought to be due to SIADH from psychiatric disorders/medications  Patient's home Wellbutrin, Zoloft and Klonopin were initially held due to encephalopathy, ICU provider had discussed with patient's known psychiatrist and recommended continuing to hold until can be seen outpatient, although could consider restarting Klonopin as needed  Nephrology was following, tolerated transition off hypertonic fluids well, to continue sodium chloride supplementation and fluid restriction on discharge  There was concern for mild volume overload, to start maintenance torsemide on discharge to event fluid overload with salt tablets  Shanks catheter removed prior to discharge  Recommend close outpatient follow-up with Nephrology, obtain repeat BMP to monitor response  PT/OT had evaluated patient, recommending short-term rehab, initially had refused but was ultimately agreeable upon discussion with patient's daughter  Please see above list of diagnoses and related plan for additional information       Condition at Discharge: stable    Discharge Day Visit / Exam:   Subjective:  Patient is a bedside this a m , denies SOB or other complaints, appears to be at baseline mental status  Discussed with patient and patient's daughter via phone, agreeable with plan for discharge to rehab  Vitals: Blood Pressure: 141/64 (10/19/22 1603)  Pulse: 77 (10/19/22 1603)  Temperature: 98 7 °F (37 1 °C) (10/19/22 1603)  Temp Source: Oral (10/18/22 2255)  Respirations: 18 (10/19/22 1603)  Height: 5' 4" (162 6 cm) (10/18/22 1330)  Weight - Scale: 86 5 kg (190 lb 9 6 oz) (10/19/22 0600)  SpO2: 93 % (10/19/22 1603)  Exam:   Physical Exam  Constitutional:       General: She is not in acute distress  Appearance: She is not ill-appearing, toxic-appearing or diaphoretic  Cardiovascular:      Rate and Rhythm: Normal rate and regular rhythm  Pulses: Normal pulses  Heart sounds: Normal heart sounds  Pulmonary:      Effort: Pulmonary effort is normal  No respiratory distress  Abdominal:      General: Bowel sounds are normal  There is no distension  Palpations: Abdomen is soft  Tenderness: There is no abdominal tenderness  Musculoskeletal:         General: No swelling or tenderness  Skin:     General: Skin is warm  Neurological:      General: No focal deficit present  Mental Status: She is alert and oriented to person, place, and time  Psychiatric:         Mood and Affect: Mood normal          Behavior: Behavior normal          Discussion with Family: Updated  (daughter) via phone  Discharge instructions/Information to patient and family:   See after visit summary for information provided to patient and family  Provisions for Follow-Up Care:  See after visit summary for information related to follow-up care and any pertinent home health orders  Disposition:   Other [facility] at [address] [place] [address]    Planned Readmission:  None     Discharge Statement:  I spent 45 minutes discharging the patient   This time was spent on the day of discharge  I had direct contact with the patient on the day of discharge  Greater than 50% of the total time was spent examining patient, answering all patient questions, arranging and discussing plan of care with patient as well as directly providing post-discharge instructions  Additional time then spent on discharge activities  Discharge Medications:  See after visit summary for reconciled discharge medications provided to patient and/or family        **Please Note: This note may have been constructed using a voice recognition system**

## 2022-10-19 NOTE — ASSESSMENT & PLAN NOTE
· History of psychiatric disorder  · Home regimen: Wellbutrin, Zoloft, Klonopin, Geodon  Unclear compliance  · Wellbutrin, Zoloft and Klonopin held in setting of encephalopathy  · ICU provider spoke with patient's primary psychiatrist, continue holding Wellbutrin, Zoloft until seen outpatient, could consider restarting Klonopin as needed  Continue Geodon

## 2022-10-19 NOTE — PROGRESS NOTES
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-bupropion and sertraline and Ziprasidone  -Hospital Course:  Not much response to isotonic IV fluid initially and was started on 1 8% saline on 11/16 and had good response after which it was stopped  -CT abdomen pelvis without contrast from 2017 was negative for any malignancy  -she was started on salt tablets 1 g b i d  On 10/18 when the sodium was 133  -Plan:   • Sodium level dropped to 132 mEq/liter today, increasing salt tablets to 1 g t i d   Continue fluid restriction at 1 5 L per day  Also started on torsemide 10 mg daily to prevent fluid overload from use of salt tablets  • Will discharge on salt tablets 1 g t i d   • Okay for discharge from Nephrology side, office informed to arrange for outpatient follow-up  Repeat BMP next week on Monday  • Renal function stable at creatinine 1 0-1 1    Encephalopathy, possibly due to hyponatremia, mental status improving  -CT head was negative    Primary hypertension  -BP stable, continue treatment with metoprolol and lisinopril  -avoid hypotension    Anemia:  Hemoglobin 10 2, continue to monitor per primary team    Others   diabetes mellitus continue management per ICU team  History of colon and endometrial cancer, status post endometrial surgery as well as radiation in 2017    Discussed with primary team advanced practitioner    SUBJECTIVE:  No new complaints  No chest pain or SOB       OBJECTIVE:  Current Weight: Weight - Scale: 86 5 kg (190 lb 9 6 oz)  Vitals:    10/19/22 1005   BP:    Pulse:    Resp:    Temp:    SpO2: 95%   /61   Pulse 74   Temp 98 7 °F (37 1 °C)   Resp 20   Ht 5' 4" (1 626 m)   Wt 86 5 kg (190 lb 9 6 oz)   SpO2 95%   BMI 32 72 kg/m²       Intake/Output Summary (Last 24 hours) at 10/19/2022 1153  Last data filed at 10/19/2022 0457  Gross per 24 hour   Intake 720 ml   Output 1150 ml   Net -430 ml       Physical Exam  General:  Ill looking, awake  Eyes: Conjunctivae pink,  Sclera anicteric  ENT: lips and mucous membranes moist  Neck: supple   Chest: Clear to Auscultation both lungs,  no crackles, ronchus or wheezing  CVS: S1 & S2 present, normal rate, regular rhythm, no murmur    Abdomen: soft, non-tender, non-distended, Bowel sounds normoactive  Extremities: no edema of  legs  Skin: no rash  Neuro: awake, alert, oriented x 3   Psych: Mood and affect appropriate     Medications:    Current Facility-Administered Medications:   •  aspirin (ECOTRIN LOW STRENGTH) EC tablet 81 mg, 81 mg, Oral, Daily With Breakfast, William Albrecht MD, 81 mg at 10/19/22 3954  •  atorvastatin (LIPITOR) tablet 40 mg, 40 mg, Oral, Daily With Eder Zarate MD, 40 mg at 10/18/22 1621  •  Diclofenac Sodium (VOLTAREN) 1 % topical gel 2 g, 2 g, Topical, 4x Daily, William Albrecht MD, 2 g at 10/17/22 2125  •  docusate sodium (COLACE) capsule 100 mg, 100 mg, Oral, BID, William Albrecht MD, 100 mg at 10/19/22 4253  •  enoxaparin (LOVENOX) subcutaneous injection 40 mg, 40 mg, Subcutaneous, Daily, William Albrecht MD, 40 mg at 10/19/22 0834  •  fish oil capsule 1,000 mg, 1,000 mg, Oral, BID, William Albrecht MD, 1,000 mg at 10/19/22 7257  •  insulin glargine (LANTUS) subcutaneous injection 70 Units 0 7 mL, 70 Units, Subcutaneous, QAM, William Albrecht MD, 70 Units at 10/18/22 0931  •  insulin lispro (HumaLOG) 100 units/mL subcutaneous injection 1-6 Units, 1-6 Units, Subcutaneous, TID AC, 1 Units at 10/16/22 1644 **AND** Fingerstick Glucose (POCT), , , TID AC, Marta Mccloud MD  •  insulin lispro (HumaLOG) 100 units/mL subcutaneous injection 1-6 Units, 1-6 Units, Subcutaneous, HS, Marta Mccloud MD, 1 Units at 10/18/22 2130  •  metoprolol succinate (TOPROL-XL) 24 hr tablet 25 mg, 25 mg, Oral, Daily, Marta Mccloud MD, 25 mg at 10/19/22 8451  •  multivitamin-minerals (CENTRUM) tablet 1 tablet, 1 tablet, Oral, Daily, Marta Mccloud MD, 1 tablet at 10/19/22 1208  •  nystatin (MYCOSTATIN) powder, , Topical, BID, Marta Mccloud MD, Given at 10/19/22 0837  •  polyethylene glycol (MIRALAX) packet 17 g, 17 g, Oral, Daily, Marta Mccloud MD, 17 g at 10/19/22 0836  •  sodium chloride tablet 1 g, 1 g, Oral, BID With Meals, aMrta Mccloud MD, 1 g at 10/19/22 0833  •  ziprasidone (GEODON) capsule 80 mg, 80 mg, Oral, BID, Marta Mccloud MD, 80 mg at 10/19/22 0833    Invasive Devices:   Urethral Catheter Latex 16 Fr   (Active)   Reasons to continue Urinary Catheter  Acute urinary retention/obstruction failing urinary retention protocol 10/17/22 0400   Goal for Removal Voiding trial when ambulation improves 10/17/22 0400   Site Assessment Clean;Skin intact 10/17/22 0400   Shanks Care Done 10/16/22 2000   Collection Container Standard drainage bag 10/17/22 0400   Securement Method Securing device (Describe) 10/17/22 0400   Output (mL) 525 mL 10/17/22 0400       Lab Results:   Results from last 7 days   Lab Units 10/19/22  0514 10/18/22  0616 10/18/22  0049 10/17/22  1152 10/17/22  0547 10/16/22  0910 10/16/22  0542 10/15/22  1816 10/15/22  1330   WBC Thousand/uL 5 38 6 57  --   --  5 95  --  6 62  --  10 04   HEMOGLOBIN g/dL 10 8* 10 2*  --   --  11 3*  --  11 3*  --  12 8   HEMATOCRIT % 34 2* 32 3*  --   --  35 3  --  32 8*  --  36 5   PLATELETS Thousands/uL 163 162  --   --  144*  --  167   < > 252   POTASSIUM mmol/L 4 2 4 9 5 0   < > 4 7   < > 4 0   < > 3 8   CHLORIDE mmol/L 100 102 100   < > 100   < > 89*   < > 86*   CO2 mmol/L 30 27 28   < > 26   < > 27   < > 24   BUN mg/dL 21 20 19   < > 14   < > 8   < > 11   CREATININE mg/dL 1 11 1 09 1 14   < > 0 89   < > 0 82   < > 0 88   CALCIUM mg/dL 9 0 9 1 8 7   < > 8 5   < > 8 5   < > 8 6   MAGNESIUM mg/dL  --  2 2  --   --   --   --  1 7  --   --    PHOSPHORUS mg/dL  --   --   --   --   --   --  2 9  --   --    ALK PHOS U/L  --   --   --   --   --   --  64  --  77   ALT U/L  --   --   --   --   --   --  50  --  55   AST U/L  --   --   --   --   --   --  73*  --  92*    < > = values in this interval not displayed  Previous work up:         Portions of the record may have been created with voice recognition software  Occasional wrong word or "sound a like" substitutions may have occurred due to the inherent limitations of voice recognition software  Read the chart carefully and recognize, using context, where substitutions have occurred  If you have any questions, please contact the dictating provider

## 2022-10-19 NOTE — UTILIZATION REVIEW
Initial Clinical Review    Admission: Date/Time/Statement:   Admission Orders (From admission, onward)     Ordered        10/15/22 1531  Inpatient Admission  Once                      Orders Placed This Encounter   Procedures   • Inpatient Admission     Standing Status:   Standing     Number of Occurrences:   1     Order Specific Question:   Level of Care     Answer:   Critical Care [15]     Order Specific Question:   Estimated length of stay     Answer:   More than 2 Midnights     Order Specific Question:   Certification     Answer:   I certify that inpatient services are medically necessary for this patient for a duration of greater than two midnights  See H&P and MD Progress Notes for additional information about the patient's course of treatment  ED Arrival Information     Expected   -    Arrival   10/15/2022 13:11    Acuity   Emergent            Means of arrival   Ambulance    [facility]    Admission type   Emergency            Arrival complaint   AMS           Chief Complaint   Patient presents with   • Altered Mental Status     Pt altered for unknown time       Initial Presentation:   75 yof to ER from home via EMS for progressive confusion per daughter who thinks she may be off her meds  Hx DM, HTN, bipolar, CKD BIBA  Presents tachycardic, hypertensive, restless, slow to respond, upper extremity tremors  Admission work-up showing hyponatremia @ 120, elevated troponin  Admitted to inpatient status to level 1 stepdown for hyponatremia  Plan to check UA, urine sodium, urine osmo, serum osmo  Will give 500mL of NSS and recheck BMP  Continue Q6hr BMP  Goal to raise sodium 6-8mEq within 24 hours, consult renal    Date: 10/16/22   Day 2:   Persistently confused  Na still only 122; no real improvement in level, Isolyte started @ 50 cc/hr increased to 75 cc/hr if am BMP not improved will transition to NSS   Fluid restriction maintained    Per renal: hyponatremia  Plan: Change to 1 8% saline at 60 cc/hr  Check BMP q 4 hours  Goal Na is not > 128 by 2 pm today  Follow up [facility]Osm and Cinthia       10/18/22: med surg LOC 10/17  10/18/22 22:55:47 98 5 °F (36 9 °C) 72 18 129/63 85 95 % 28 2 L/min Nasal cannula --   10/18/22 18:59:06 98 4 °F (36 9 °C) 74 18 125/62 83 99 % 28 2 L/min Nasal cannula --   10/18/22 15:21:30 98 5 °F (36 9 °C) 67 20 122/61 81 96 % -- -- -- --   10/18/22 1330 -- 67 -- 114/64 -- -- -- -- -- --   10/18/22 0940 98 °F (36 7 °C) 77 21 118/63 -- 99 % 28 2 L/min Nasal cannula Lying   10/18/22 06:22:04 -- 80 -- -- -- 99 % -- -- -- --   10/17/22 23:08:35 98 1 °F (36 7 °C) 81 18 114/64 81 95 % 28 2 L/min Nasal cannula Lying   10/17/22 1941 97 1 °F (36 2 °C) Abnormal  80 33 Abnormal  113/59 83 97 % -- -- None (Room air) --   10/17/22 1800 -- 79 33 Abnormal  -- -- 98 % -- -- -- --   10/17/22 1700 -- 81 25 Abnormal  -- -- 97 % -- -- -- --     Pt A&O x3, however, still slow to respond to questions  Na improved to 133, start salt tabs per renal  C/o intermittent SOB & swelling in legs  Lungs clear with slight increased WOB, mild non-pitting BLE edema noted  Elevated BNP, IV Lasix x1 dose, IVF d/c'd yesterday, echo ordered      ED Triage Vitals   Temperature Pulse Respirations Blood Pressure SpO2   10/15/22 1313 10/15/22 1313 10/15/22 1313 10/15/22 1313 10/15/22 1313   99 °F (37 2 °C) (!) 109 20 (!) 189/98 98 %      Temp Source Heart Rate Source Patient Position - Orthostatic VS BP Location FiO2 (%)   10/15/22 1313 10/15/22 1313 10/15/22 1313 10/15/22 1313 --   Oral Monitor Lying Left arm       Pain Score       10/15/22 1632       6          Wt Readings from Last 1 Encounters:   10/19/22 86 5 kg (190 lb 9 6 oz)     Additional Vital Signs:   Vitals:     10/16/22 0100 10/16/22 0200 10/16/22 0300 10/16/22 0400   BP: 168/72 126/61 137/64 160/69   BP Location:       Right arm   Pulse: 92 86 85 88   Resp: 20 (!) 26 (!) 24 15   Temp:       98 °F (36 7 °C)   TempSrc:       Temporal   SpO2: 97% 100% 100% 100%   Weight:       86 7 kg (191 lb 2 2 oz)   Height:                 Temp (24hrs), Av 9 °F (36 6 °C), Min:96 7 °F (35 9 °C), Max:99 °F (37 2 °C)  Current: Temperature: 98 °F (36 7 °C)     Respiratory:  SpO2: SpO2: 100 %, SpO2 Device: O2 Device: Nasal cannula  Nasal Cannula O2 Flow Rate (L/min): 2 L/min    Pertinent Labs/Diagnostic Test Results:   10/18  Echo=  •  Left Ventricle: Left ventricular cavity size is normal  Wall thickness is mildly increased  The left ventricular ejection fraction is 55%  Systolic function is normal  Wall motion is normal  Diastolic function is normal for age  •  Left Atrium: The atrium is mildly dilated  •  Prior TTE study available for comparison  Prior study date: 10/25/2019  No significant changes noted compared to the prior study    CTA chest abdomen pelvis w wo contrast   Final Result  (10/17 2101)      No evidence of acute process in the chest, abdomen or pelvis  CT head without contrast   Final Result  (10/15 5943)      No acute intracranial abnormality  Chronic microangiopathic changes  XR chest 1 view portable   Final Result  (10/15 170)      No acute cardiopulmonary disease  10/15  Ekg=  Sinus tachycardia  Possible Left atrial enlargement  Right bundle branch block    Results from last 7 days   Lab Units 10/15/22  1330   SARS-COV-2  Negative     Results from last 7 days   Lab Units 10/19/22  0514 10/18/22  0616 10/17/22  0547 10/16/22  0542 10/15/22  1816 10/15/22  1330   WBC Thousand/uL 5 38 6 57 5 95 6 62  --  10 04   HEMOGLOBIN g/dL 10 8* 10 2* 11 3* 11 3*  --  12 8   HEMATOCRIT % 34 2* 32 3* 35 3 32 8*  --  36 5   PLATELETS Thousands/uL 163 162 144* 167   < > 252   NEUTROS ABS Thousands/µL  --   --  4 22 4 57  --  7 28    < > = values in this interval not displayed       Results from last 7 days   Lab Units 10/19/22  0514 10/18/22  7601 10/18/22  0049 10/17/22  1807 10/17/22  1152 10/16/22  0910 10/16/22  0542 SODIUM mmol/L 132* 133* 133* 132* 135   < > 122*   POTASSIUM mmol/L 4 2 4 9 5 0 4 8 4 4   < > 4 0   CHLORIDE mmol/L 100 102 100 98 102   < > 89*   CO2 mmol/L 30 27 28 26 30   < > 27   ANION GAP mmol/L 2* 4 5 8 3*   < > 6   BUN mg/dL 21 20 19 16 15   < > 8   CREATININE mg/dL 1 11 1 09 1 14 1 17 1 05   < > 0 82   EGFR ml/min/1 73sq m 51 52 49 47 54   < > 73   CALCIUM mg/dL 9 0 9 1 8 7 8 7 8 7   < > 8 5   CALCIUM, IONIZED mmol/L  --   --   --   --   --   --  0 98*   MAGNESIUM mg/dL  --  2 2  --   --   --   --  1 7   PHOSPHORUS mg/dL  --   --   --   --   --   --  2 9    < > = values in this interval not displayed       Results from last 7 days   Lab Units 10/16/22  0542 10/15/22  1330   AST U/L 73* 92*   ALT U/L 50 55   ALK PHOS U/L 64 77   TOTAL PROTEIN g/dL 6 8 7 2   ALBUMIN g/dL 3 3* 3 7   TOTAL BILIRUBIN mg/dL 0 86 1 01*   BILIRUBIN DIRECT mg/dL 0 25*  --    AMMONIA umol/L  --  14     Results from last 7 days   Lab Units 10/19/22  1123 10/19/22  0721 10/18/22  2013 10/18/22  1642 10/18/22  1133 10/18/22  0721 10/17/22  2123 10/17/22  1708 10/17/22  0629 10/16/22  2113 10/16/22  1638 10/16/22  1121   POC GLUCOSE mg/dl 165* 106 173* 111 109 86 105 102 126 144* 161* 149*     Results from last 7 days   Lab Units 10/19/22  0514 10/18/22  0616 10/18/22  0049 10/17/22  1807 10/17/22  1152 10/17/22  0547 10/17/22  0150 10/16/22  2138 10/16/22  1809 10/16/22  1354 10/16/22  0910 10/16/22  0542   GLUCOSE RANDOM mg/dL 101 79 94 165* 135 123 121 147* 197* 186* 211* 179*     Results from last 7 days   Lab Units 10/15/22  1330   OSMOLALITY, SERUM mmol/*     Results from last 7 days   Lab Units 10/15/22  1818 10/15/22  1330   HS TNI 0HR ng/L  --  39   HS TNI 4HR ng/L 45  --    HSTNI D4 ng/L 6  --      Results from last 7 days   Lab Units 10/15/22  1330   PROTIME seconds 13 7   INR  1 03   PTT seconds 26     Results from last 7 days   Lab Units 10/15/22  1330   TSH 3RD GENERATON uIU/mL 1 326  1 072     Results from last 7 days   Lab Units 10/15/22  1330   LACTIC ACID mmol/L 1 0     Results from last 7 days   Lab Units 10/18/22  0616   NT-PRO BNP pg/mL 169*     Results from last 7 days   Lab Units 10/15/22  2214 10/15/22  1330   OSMOLALITY, SERUM mmol/KG  --  255*   OSMO UR mmol/  --      Results from last 7 days   Lab Units 10/15/22  2214 10/15/22  1809   CLARITY UA   --  Clear   COLOR UA   --  Yellow   SPEC GRAV UA   --  >=1 030   PH UA   --  6 0   GLUCOSE UA mg/dl  --  500 (1/2%)*   KETONES UA mg/dl  --  Negative   BLOOD UA   --  Trace-lysed*   PROTEIN UA mg/dl  --  30 (1+)*   NITRITE UA   --  Negative   BILIRUBIN UA   --  Negative   UROBILINOGEN UA E U /dl  --  0 2   LEUKOCYTES UA   --  Negative   WBC UA /hpf  --  None Seen   RBC UA /hpf  --  0-1   BACTERIA UA /hpf  --  Occasional   EPITHELIAL CELLS WET PREP /hpf  --  None Seen   SODIUM UR  56  --      Results from last 7 days   Lab Units 10/15/22  1330   INFLUENZA A PCR  Negative   INFLUENZA B PCR  Negative   RSV PCR  Negative     Results from last 7 days   Lab Units 10/15/22  1757   AMPH/METH  Negative   BARBITURATE UR  Negative   BENZODIAZEPINE UR  Negative   COCAINE UR  Negative   METHADONE URINE  Negative   OPIATE UR  Negative   PCP UR  Negative   THC UR  Negative     Results from last 7 days   Lab Units 10/15/22  1330   ETHANOL LVL mg/dL <3   ACETAMINOPHEN LVL ug/mL 8 9*   SALICYLATE LVL mg/dL <3*     Results from last 7 days   Lab Units 10/15/22  1804 10/15/22  1401 10/15/22  1354   BLOOD CULTURE   --  No Growth at 72 hrs  No Growth at 72 hrs     URINE CULTURE  No Growth <1000 cfu/mL  --   --      Past Medical History:   Diagnosis Date   • Anesthesia complication     difficulty waking up   • Arthritis     left knee   • Bone spur     left knee behing the knee cap   • Chronic kidney disease    • Colon cancer [facility])     patient states about 2017   • Colon polyp     Tubulovillous Adenoma High Grade Dysplasia - 2017   • Depression    • Diabetes mellitus ([facility] [address] )    • Endometrial cancer ([facility] [address] )     grade I   • Hx of bleeding disorder     vaginal bleeding started on 3/13/17    • Hyperlipidemia    • Hypertension    • PONV (postoperative nausea and vomiting)    • Psychiatric disorder    • Shortness of breath     with exertion   • Urinary incontinence    • Vitamin D deficiency      Present on Admission:  • Hyponatremia  • Hypertension  • Hyperlipidemia  • Diabetes mellitus, type 2 (HCC)  • History of colon cancer  • Psychiatric disorder  • Toxic metabolic encephalopathy  • (Resolved) Elevated troponin    Admitting Diagnosis: Hyponatremia [E87 1]  Altered mental status [R41 82]  Age/Sex: 75 y o  female  Admission Orders:  Consult renal  Nursing dysphagia assessment  Scd/foot pumps  Neuro checks q4h  accuchecks  Pt/ot eval & tx  Consult psyche  Consult ortho  1800cc lfuid restriction    Scheduled Medications:  Medications 10/10 10/11 10/12 10/13 10/14 10/15 10/16 10/17 10/18   aspirin (ECOTRIN LOW STRENGTH) EC tablet 81 mg  Dose: 81 mg  Freq: Daily with breakfast Route: PO  Start: 10/16/22 0730   Admin Instructions:   Do Not Crush - Enteric coated  0805      7042      0932        atorvastatin (LIPITOR) tablet 40 mg  Dose: 40 mg  Freq: Daily with dinner Route: PO  Start: 10/15/22 1715         1727      1632      1716      1621        buPROPion (WELLBUTRIN SR) 12 hr tablet 200 mg  Dose: 200 mg  Freq: Every morning Route: PO  Start: 10/16/22 0900 End: 10/15/22 2056   Admin Instructions:   Do not crush or chew  Look alike sound alike  2056-D/C'd         buPROPion (WELLBUTRIN XL) 24 hr tablet 150 mg  Dose: 150 mg  Freq: Daily Route: PO  Start: 10/16/22 0900 End: 10/16/22 1150   Admin Instructions:   Do not crush or chew  Look alike sound alike           1744     1150-D/C'd        clonazePAM (KlonoPIN) tablet 0 5 mg  Dose: 0 5 mg  Freq: Daily Route: PO  Start: 10/16/22 1200 End: 10/17/22 0552   Admin Instructions:   High alert medication   LOOK ALIKE SOUND ALIKE MED 1215      0552-D/C'd       clonazePAM (KlonoPIN) tablet 0 5 mg  Dose: 0 5 mg  Freq: Once Route: PO  Start: 10/16/22 0015 End: 10/16/22 0019   Admin Instructions:   High alert medication  LOOK ALIKE SOUND ALIKE MED          0019          clonazePAM (KlonoPIN) tablet 0 5 mg  Dose: 0 5 mg  Freq: Once Route: PO  Start: 10/15/22 1615 End: 10/15/22 1610   Admin Instructions:   High alert medication  LOOK ALIKE SOUND ALIKE MED         1610-D/C'd         clonazePAM (KlonoPIN) tablet 1 mg  Dose: 1 mg  Freq: Daily at bedtime Route: PO  Start: 10/16/22 2200 End: 10/17/22 0552   Admin Instructions:   High alert medication  LOOK ALIKE SOUND ALISeniorlink MED          (2114) [C]      0552-D/C'd       Diclofenac Sodium (VOLTAREN) 1 % topical gel 2 g  Dose: 2 g  Freq: 4 times daily Route: TP  Start: 10/17/22 1800   Order specific questions:   What is the location for this topical application? [facility]           1718     2125     2302      0900     1200     1800     2200        docusate sodium (COLACE) capsule 100 mg  Dose: 100 mg  Freq: 2 times daily Route: PO  Start: 10/16/22 1815          1825      0811     1716      0932     1750        enoxaparin (LOVENOX) subcutaneous injection 40 mg  Dose: 40 mg  Freq: Daily Route: SC  Start: 10/16/22 0900   Admin Instructions:   High Alert Medication, Check for allergies to pork or pork derivatives/dietary restrictions before administration  LOOK ALIKE SOUND ALIKE MED          3505      [id number]      0935        fish oil capsule 1,000 mg  Dose: 1,000 mg  Freq: 2 times daily Route: PO  Start: 10/15/22 1800   Admin Instructions:   Swallow whole; do not break, crush, dissolve, or chew  [facility]        furosemide (LASIX) injection 20 mg  Dose: 20 mg  Freq: Once Route: IV  Start: 10/18/22 1230 End: 10/18/22 1236   Admin Instructions:   Administer slowly 20 mg per minute   LOOK ALIKE SOUND ALIKE MED   Order specific questions:   Hold for systolic blood pressure less than (mmHg) 110            1236        influenza vaccine, high-dose (FLUZONE HIGH-DOSE) IM injection SIM 0 7 mL  Dose: 0 7 mL  Freq: Once Route: IM  Indications of Use: VACCINATION  Start: 10/15/22 1645 End: 10/16/22 1641   Admin Instructions:   Safe to administer as soon as possible after screening  Shake well before use  Refrigerate  Order specific questions:   [facility] recommends spacing COVID vaccines from influenza vaccines by 14 days  Does the patient consent to receiving the flu vaccine at this time? Yes          1641          insulin glargine (LANTUS) subcutaneous injection 40 Units 0 4 mL  Dose: 40 Units  Freq: Every morning Route: SC  Start: 10/16/22 0900 End: 10/16/22 0604   Admin Instructions:   HIGH ALERT MEDICATION  Do not dilute/mix insulin glargine with any other insulin formulation/solution  **DISPOSE IN 8 GALLON BLACK CONTAINER** Do not hold medication without a physician order  0604-D/C'd        insulin glargine (LANTUS) subcutaneous injection 70 Units 0 7 mL  Dose: 70 Units  Freq: Every morning Route: SC  Start: 10/16/22 0900   Admin Instructions:   HIGH ALERT MEDICATION  Do not dilute/mix insulin glargine with any other insulin formulation/solution  **DISPOSE IN 8 GALLON BLACK CONTAINER** Do not hold medication without a physician order  1958      0807      0931        insulin lispro (HumaLOG) 100 units/mL subcutaneous injection 1-6 Units  Dose: 1-6 Units  Freq: Daily at bedtime Route: SC  Start: 10/15/22 2200   Admin Instructions:   Algorithm 3    Blood Glucose 150 - 189: 1 Unit of Insulin  Blood Glucose 190 - 229: 2 Units of Insulin  Blood Glucose 230 - 269: 3 Units of Insulin  Blood Glucose 270 - 309: 4 Units of Insulin  Blood Glucose 310 - 349: 5 Units of Insulin  Blood Glucose greater than or equal to 350: 6 Units of Insulin  HIGH ALERT MEDICATION  **DISPOSE IN 8 GALLON BLACK CONTAINER**   LOOK ALIKE SOUND ALIKE MED         2147 [C]      (2114) [C]      (2125) [C]      2130 [C]         insulin lispro (HumaLOG) 100 units/mL subcutaneous injection 1-6 Units  Dose: 1-6 Units  Freq: 3 times daily before meals Route: SC  Start: 10/15/22 1730   Admin Instructions:   Algorithm 3    Blood Glucose 150 - 189: 1 Unit of Insulin  Blood Glucose 190 - 229: 2 Units of Insulin  Blood Glucose 230 - 269: 3 Units of Insulin  Blood Glucose 270 - 309: 4 Units of Insulin  Blood Glucose 310 - 349: 5 Units of Insulin  Blood Glucose greater than or equal to 350: 6 Units of Insulin  HIGH ALERT MEDICATION  **DISPOSE IN 8 GALLON BLACK CONTAINER**  LOOK ALIKE SOUND ALIKE MED         3978 [C]      [id number] [C]     (9523) 6726      (6657) [C]     (1130)     (1709)      (0800)     (1237)     (1550)        lisinopril (ZESTRIL) tablet 2 5 mg  Dose: 2 5 mg  Freq: Daily Route: PO  Start: 10/16/22 1100 End: 10/17/22 1034   Admin Instructions:   LOOK ALIKE SOUND ALIKE MED   Order specific questions:   Hold for systolic blood pressure less than (mmHg) 110          1120      0811     1034-D/C'd       magnesium sulfate 2 g/50 mL IVPB (premix) 2 g  Dose: 2 g  Freq: Once Route: IV  Last Dose: Stopped (10/17/22 0811)  Start: 10/16/22 1615 End: 10/17/22 0811   Admin Instructions:   High alert medication  1634      1115         metFORMIN (GLUCOPHAGE-XR) 24 hr tablet 500 mg  Dose: 500 mg  Freq: 2 times daily with meals Route: PO  Start: 10/16/22 0730 End: 10/16/22 0458   Admin Instructions:   Swallow whole; do not crush, chew or split  LOOK ALIKE SOUND ALIKE MED          0458-D/C'd        metoprolol succinate (TOPROL-XL) 24 hr tablet 25 mg  Dose: 25 mg  Freq: Daily Route: PO  Start: 10/16/22 0900   Admin Instructions:   Hold for heart rate less than 50 beats per minute  Do not crush or chew   LOOK ALIKE SOUND ALIKE MED   Order specific questions:   Hold for systolic blood pressure less than (mmHg) 110          0805      0811      0934        multivitamin-minerals (CENTRUM) tablet 1 tablet  Dose: 1 tablet  Freq: Daily Route: PO  Start: 10/16/22 0900   Admin Instructions:   **DISPOSE IN 8 GALLON BLACK CONTAINER**          0894      0810      0984        nystatin (MYCOSTATIN) powder  Freq: 2 times daily Route: TP  Start: 10/15/22 1800   Admin Instructions:   LOOK ALIKE SOUND ALIKE MED         (4626) 3748 2475 2489     1715 5179     1751        polyethylene glycol (MIRALAX) packet 17 g  Dose: 17 g  Freq: Daily Route: PO  Start: 10/17/22 1600   Admin Instructions:   Stir powder in 4-8 ounces of water, juice, soda, coffee or tea until dissolved and drink  LOOK ALIKE SOUND ALIKE MED           5370     1229      0900        potassium chloride (K-DUR,KLOR-CON) CR tablet 40 mEq  Dose: 40 mEq  Freq: Once Route: PO  Start: 10/16/22 1600 End: 10/16/22 1632   Admin Instructions:   Swallow whole; do not crush or chew  Tablet may be split in half to facilitate swallowing  1632          potassium chloride (K-DUR,KLOR-CON) CR tablet 40 mEq  Dose: 40 mEq  Freq: Once Route: PO  Start: 10/15/22 2315 End: 10/15/22 2320   Admin Instructions:   Swallow whole; do not crush or chew  Tablet may be split in half to facilitate swallowing          2320           sertraline (ZOLOFT) tablet 100 mg  Dose: 100 mg  Freq: 2 times daily Route: PO  Start: 10/15/22 1800 End: 10/16/22 1150   Admin Instructions:   LOOK ALIKE SOUND ALIKE MED         5414      0887     1150-D/C'd        sodium chloride 0 9 % bolus 500 mL  Dose: 500 mL  Freq:  Once Route: IV  Last Dose: Stopped (10/15/22 1806)  Start: 10/15/22 1715 End: 10/15/22 1806         1736     1806           sodium chloride tablet 1 g  Dose: 1 g  Freq: 3 times daily with meals Route: PO  Start: 10/19/22 1200               sodium chloride tablet 1 g  Dose: 1 g  Freq: 2 times daily with meals Route: PO  Start: 10/18/22 1115 End: 10/19/22 1156            1234     1621        sodium chloride tablet 1 g  Dose: 1 g  Freq: 2 times daily with meals Route: PO  Start: 10/18/22 1630 End: 10/18/22 1101            1101-D/C'd      torsemide (DEMADEX) tablet 10 mg  Dose: 10 mg  Freq: Daily Route: PO  Start: 10/19/22 1200   Order specific questions:   Hold for systolic blood pressure less than (mmHg) 110               ziprasidone (GEODON) capsule 80 mg  Dose: 80 mg  Freq: 2 times daily Route: PO  Start: 10/17/22 1045           1140     1717      0932     1749        ziprasidone (GEODON) capsule 80 mg  Dose: 80 mg  Freq: 2 times daily with meals Route: PO  Start: 10/16/22 1630 End: 10/17/22 0814          1633      0814-D/C'd  (0818)         Medications 10/10 10/11 10/12 10/13 10/14 10/15 10/16 10/17 10/18       Continuous Meds Sorted by Name  for Conchetta Poles as of 10/19/22 1310                   Inactive     Active     Other Encounter     Linked            Medications 10/10 10/11 10/12 10/13 10/14 10/15 10/16 10/17 10/18   multi-electrolyte (ISOLYTE-S PH 7 4 equivalent) IV solution  Rate: 75 mL/hr Dose: 75 mL/hr  Freq: Continuous Route: IV  Indications of Use: IV Hydration  Last Dose: Stopped (10/16/22 0811)  Start: 10/15/22 2315 End: 10/16/22 0753         2314      0327     0753-D/C'd  0811          sodium chloride 23 4% (HYPERTONIC) 308 mEq in sterile water 1,000 mL infusion  Rate: 70 mL/hr Freq: Continuous Route: IV  Last Dose: Stopped (10/17/22 0630)  Start: 10/16/22 0800 End: 10/17/22 6971          7362     4275      0137     0552-D/C'd  0630 [C]             PRN Meds Sorted by Name  for Conchetta Poles as of 10/19/22 1310                   Inactive     Active     Other Encounter     Linked            Medications 10/10 10/11 10/12 10/13 10/14 10/15 10/16 10/17 10/18   iohexol (OMNIPAQUE) 350 MG/ML injection (SINGLE-DOSE) 100 mL  Dose: 100 mL  Freq: Once in imaging Route: IV  PRN Reason: contrast  Start: 10/17/22 1505 End: 10/17/22 1505           1505         sodium phosphate-biphosphate (FLEET) enema 1 enema  Dose: 1 enema  Freq:  Once as needed Route: RE  PRN Reason: constipation  Start: 10/19/22 1210                   Network Utilization Review Department  ATTENTION: Please call with any questions or concerns to [phone] and carefully listen to the prompts so that you are directed to the right person  All voicemails are confidential   Renee Lobo all requests for admission clinical reviews, approved or denied determinations and any other requests to dedicated fax number below belonging to the campus where the patient is receiving treatment   List of dedicated fax numbers for the Facilities:  [address] DENIALS (Administrative/Medical Necessity) [phone]   [address] (Maternity/NICU/Pediatrics) [phone]   919 Leanna Fournier [phone]   [place] [address] [phone]   [address] [place] [address] [facility] [phone]   1558 [facility] [place] [facility] 134 815 [facility] [phone]

## 2022-10-19 NOTE — CASE MANAGEMENT
Case Management Progress Note    Patient name Tj Amezcua  Location [address] [address]-* MRN [id number]  : 1955 Date 10/19/2022       LOS (days): 4  Geometric Mean LOS (GMLOS) (days): 5 10  Days to GMLOS:1 1        OBJECTIVE:     Current admission status: Inpatient  Preferred Pharmacy:   [address] [facility]  [place] [id number]  Phone: [phone] Fax: [phone]    Primary Care Provider: Binh Martínez MD    Primary Insurance: Guido Patricia  Secondary Insurance: MEDICARE    PROGRESS NOTE:    SW notified by dtr that they no longer wish to transport her and would like a wheelchair van instead  Transport requested in [employer]  Pickup time pending  Unit nurse notified

## 2022-10-19 NOTE — ASSESSMENT & PLAN NOTE
· Patient with chronic history of severe bilateral OA, chronic pain  Was following with Orthopedic surgery outpatient in considering elective bilateral knee replacement  · Patient has been deconditioned and not ambulating as frequently due to pain per daughter  · Discussed with Orthopedic surgery:  · No inpatient interventions or new x-rays indicated at this time, already recommended to have total knee replacement  · Needs close outpatient follow-up with Orthopedics to schedule elective total knee replacement  · PT/OT evaluated, patient was weak and unsteady, recommending STR which patient initially had refused, now agreeable per discussion with patient's daughter  Case management on board for placement

## 2022-10-19 NOTE — ASSESSMENT & PLAN NOTE
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  · CTA c/a/p did not reveal any acute findings or signs concerning for underlying malignancy  · S/p isotonic saline, then 1 8% saline, since D/C  · Na improving, although slightly decreased at 132 today  Slight elevation in Cr but overall within baseline    · Shanks catheter placed initially on admission for accurate I&Os, D/C Shanks catheter with voiding trial today  · Nephrology following:  · Increase salt tablets 1 g BID->TID  · Increase fluid restriction 1 8->1 5 L  · Start torsemide 10 mg daily to prevent fluid overload  · Recommend repeating BMP on 10/24 on discharge  · Follow-up with Nephrology outpatient  · Close neurologic monitoring - pt remains confused at times

## 2022-10-19 NOTE — [place] UNIVERSAL TRANSFER FORM
[place] UNIVERSAL TRANSFER FORM  (ALL ITEMS MUST BE COMPLETED)    1  TRANSFER FROM: [id number]    2  DATE OF TRANSFER: 10/19/2022                        TIME OF TRANSFER: 1745    3  PATIENT NAME: Lisa Vincent,        YOB: 1955                             GENDER: female    4  LANGUAGE:   English    5  PHYSICIAN NAME:  Andreas Lloyd MD                   PHONE: [phone]    6  CODE STATUS: Level 1 - Full Code        Out of Hospital DNR Attached: No    7  :                                      :  Extended Emergency Contact Information  Primary Emergency Contact: Arun Bowles  Address: [facility], [address] [place] [address]  [place] Phone: [phone]  Relation: Spouse  Secondary Emergency Contact: [facility]  Mobile Phone: [phone]  Relation: Daughter           Health Care Representative/Proxy:  Yes           Legal Guardian:  Yes             NAME OF:           HEALTH CARE REPRESENTATIVE/PROXY:                                         OR           LEGAL GUARDIAN, IF NOT :                                               PHONE:  (Day)           (Night)                        (Cell)    8  REASON FOR TRANSFER: STR r/t deconditioning            V/S: /64   Pulse 77   Temp 98 7 °F (37 1 °C)   Resp 18   Ht 5' 4" (1 626 m)   Wt 86 5 kg (190 lb 9 6 oz)   SpO2 93%   BMI 32 72 kg/m²           PAIN: No    9  PRIMARY DIAGNOSIS: Hyponatremia      Secondary Diagnosis:         Pacemaker: No     Internal Defib: No          Mental Health Diagnosis (if Applicable): Chronic Psychiatric disorder, depression  10  RESTRAINTS: No     11  RESPIRATORY NEEDS: Room air     12  ISOLATION/PRECAUTION: No    13  ALLERGY: Patient has no known allergies  14  SENSORY: Decreased vision           15  SKIN CONDITION: intact    16   DIET:  Diabetic diet    17  IV ACCESS: No    18  PERSONAL ITEMS SENT WITH PATIENT:  Glasses, Bible, 2 shirts , 2 pair of pants, 12 pairs of underwear, and  2 bras  19  ATTACHED DOCUMENTS: AVS form  20  AT RISK ALERTS: fall        HARM TO: Very pleasant  21  WEIGHT BEARING STATUS:         Left Leg: Limited        Right Leg: Limited    22  MENTAL STATUS: Alert and oriented x 3    23  FUNCTION:        Walk: With help        Transfer: With help        Toilet: With help        Feed: With help    24  IMMUNIZATIONS/SCREENING:     Immunization History   Administered Date(s) Administered   • COVID-19 Moderna Vac BIVALENT 18 Yr+ Im (BOOSTER ONLY) 10/19/2022   • INFLUENZA 10/19/2020   • Influenza, high dose seasonal 0 7 mL 10/16/2022   • influenza, trivalent, adjuvanted 10/19/2020       25  BOWEL:  Continent    26  BLADDER:  Continent    27   SENDING FACILITY CONTACT: Latanya Ramirez                  Title: RN        Unit: [id number] [facility]        Phone: [phone] [address] (if known):        Title:        Unit:         Phone:         FORM PREFILLED BY (if applicable)       Title:       Unit:        Phone:         FORM COMPLETED BY Latanya Ruby      Title: [facility]      Phone: [phone]

## 2022-10-19 NOTE — ASSESSMENT & PLAN NOTE
Lab Results   Component Value Date    HGBA1C 6 0 (H) 06/07/2021       Recent Labs     10/18/22  1642 10/18/22  2013 10/19/22  0721 10/19/22  1123   POCGLU 111 173* 106 165*       Blood Sugar Average: Last 72 hrs:  (P) 160 [id number]   · Hold home metformin  · Continue home Lantus 70 units qHS, SSI coverage while inpatient  · Diabetic diet

## 2022-10-19 NOTE — PLAN OF CARE
Problem: MOBILITY - ADULT  Goal: Maintains/Returns to pre admission functional level  Description: INTERVENTIONS:  - Perform BMAT or MOVE assessment daily    - Set and communicate daily mobility goal to care team and patient/family/caregiver  - Collaborate with rehabilitation services on mobility goals if consulted  - Perform Range of Motion 3 times a day  - Reposition patient every 2 hours  - Dangle patient 2 times a day  - Stand patient 2 times a day  - Ambulate patient 2 times a day  - Out of bed to chair 2 times a day   - Out of bed for meals 2  Problem: Potential for Falls  Goal: Patient will remain free of falls  Description: INTERVENTIONS:  - Educate patient/family on patient safety including physical limitations  - Instruct patient to call for assistance with activity   - Consult OT/PT to assist with strengthening/mobility   - Keep Call bell within reach  - Keep bed low and locked with side rails adjusted as appropriate  - Keep care items and personal belongings within reach  - Initiate and maintain comfort rounds  - Make Fall Risk Sign visible to staff  - Offer Toileting every 2 Hours, in advance of need  - Initiate/Maintain bed alarm  - Obtain necessary fall risk management equipment:   Problem: Nutrition/Hydration-ADULT  Goal: Nutrient/Hydration intake appropriate for improving, restoring or maintaining nutritional needs  Description: Monitor and assess patient's nutrition/hydration status for malnutrition  Collaborate with interdisciplinary team and initiate plan and interventions as ordered  Monitor patient's weight and dietary intake as ordered or per policy  Utilize nutrition screening tool and intervene as necessary  Determine patient's food preferences and provide high-protein, high-caloric foods as appropriate       INTERVENTIONS:  - Monitor oral intake, urinary output, labs, and treatment plans  - Assess nutrition and hydration status and recommend course of action  - Evaluate amount of meals eaten  - Assist patient with eating if necessary   - Allow adequate time for meals  - Recommend/ encourage appropriate diets, oral nutritional supplements, and vitamin/mineral supplements  - Order, calculate, and assess calorie counts as needed  - Recommend, monitor, and adjust tube feedings and TPN/PPN based on assessed needs  - Assess need for intravenous fluids  - Provide specific nutrition/hydration education as appropriate  - Include patient/family/caregiver in decisions related to nutrition  Outcome: Progressing     Problem: PAIN - ADULT  Goal: Verbalizes/displays adequate comfort level or baseline comfort level  Description: Interventions:  - Encourage patient to monitor pain and request assistance  - Assess pain using appropriate pain scale  - Administer analgesics based on type and severity of pain and evaluate response  - Implement non-pharmacological measures as appropriate and evaluate response  - Consider cultural and social influences on pain and pain management  - Notify physician/advanced practitioner if interventions unsuccessful or patient reports new pain  Outcome: Progressing     Problem: DISCHARGE PLANNING  Goal: Discharge to home or other facility with appropriate resources  Description: INTERVENTIONS:  - Identify barriers to discharge w/patient and caregiver  - Arrange for needed discharge resources and transportation as appropriate  - Identify discharge learning needs (meds, wound care, etc )  - Arrange for interpretive services to assist at discharge as needed  - Refer to Case Management Department for coordinating discharge planning if the patient needs post-hospital services based on physician/advanced practitioner order or complex needs related to functional status, cognitive ability, or social support system  Outcome: Progressing     Problem: Knowledge Deficit  Goal: Patient/family/caregiver demonstrates understanding of disease process, treatment plan, medications, and discharge instructions  Description: Complete learning assessment and assess knowledge base    Interventions:  - Provide teaching at level of understanding  - Provide teaching via preferred learning methods  Outcome: Progressing     Problem: Prexisting or High Potential for Compromised Skin Integrity  Goal: Skin integrity is maintained or improved  Description: INTERVENTIONS:  - Identify patients at risk for skin breakdown  - Assess and monitor skin integrity  - Assess and monitor nutrition and hydration status  - Monitor labs   - Assess for incontinence   - Turn and reposition patient  - Assist with mobility/ambulation  - Relieve pressure over bony prominences  - Avoid friction and shearing  - Provide appropriate hygiene as needed including keeping skin clean and dry  - Evaluate need for skin moisturizer/barrier cream  - Collaborate with interdisciplinary team   - Patient/family teaching  - Consider wound care consult   Outcome: Progressing     Problem: METABOLIC, FLUID AND ELECTROLYTES - ADULT  Goal: Electrolytes maintained within normal limits  Description: INTERVENTIONS:  - Monitor labs and assess patient for signs and symptoms of electrolyte imbalances  - Administer electrolyte replacement as ordered  - Monitor response to electrolyte replacements, including repeat lab results as appropriate  - Instruct patient on fluid and nutrition as appropriate  Outcome: Progressing     Problem: METABOLIC, FLUID AND ELECTROLYTES - ADULT  Goal: Fluid balance maintained  Description: INTERVENTIONS:  - Monitor labs   - Monitor I/O and WT  - Instruct patient on fluid and nutrition as appropriate  - Assess for signs & symptoms of volume excess or deficit  Outcome: Progressing     Problem: METABOLIC, FLUID AND ELECTROLYTES - ADULT  Goal: Glucose maintained within target range  Description: INTERVENTIONS:  - Monitor Blood Glucose as ordered  - Assess for signs and symptoms of hyperglycemia and hypoglycemia  - Administer ordered medications to maintain glucose within target range  - Assess nutritional intake and initiate nutrition service referral as needed  Outcome: Progressing     - Apply yellow socks and bracelet for high fall risk patients  - Consider moving patient to room near nurses station  Outcome: Progressing    times a day  - Out of bed for toileting  - Record patient progress and toleration of activity level   Outcome: Progressing     Problem: MOBILITY - ADULT  Goal: Maintain or return to baseline ADL function  Description: INTERVENTIONS:  -  Assess patient's ability to carry out ADLs; assess patient's baseline for ADL function and identify physical deficits which impact ability to perform ADLs (bathing, care of mouth/teeth, toileting, grooming, dressing, etc )  - Assess/evaluate cause of self-care deficits   - Assess range of motion  - Assess patient's mobility; develop plan if impaired  - Assess patient's need for assistive devices and provide as appropriate  - Encourage maximum independence but intervene and supervise when necessary  - Involve family in performance of ADLs  - Assess for home care needs following discharge   - Consider OT consult to assist with ADL evaluation and planning for discharge  - Provide patient education as appropriate  Outcome: Progressing

## 2022-10-19 NOTE — CASE MANAGEMENT
Case Management Progress Note    Patient name Luis Alfredo Oleary  [address] MRN [id number]  : 1955 Date 10/19/2022       LOS (days): 4  Geometric Mean LOS (GMLOS) (days): 5 10  Days to GMLOS:1 1        OBJECTIVE:     Current admission status: Inpatient  Preferred Pharmacy:   [facility] [address] [facility], [address] [id number]  Phone: [phone] Fax: [phone]    Primary Care Provider: Angelina Lainez MD    Primary Insurance: [employer]  Secondary Insurance: MEDICARE    PROGRESS NOTE:    SNF authorization was received for admission to [facility] today  SW later advised by liaison that pt would need to be boosted for COVID in order to go to the bed available  SW met bedside with unit nurse CC and explained to patient and   Both were understanding  Patient agreed to get booster today  SLIM AP notified  She will order booster and place discharge orders  SW called dtr Chano Quevedo to advise of approval and plan for booster  SW also inquired on outstanding order for RW  Chano Quevedo would still like to move forward with obtaining RW  Liaison Jony notified  He will be delivering bedside today  SNF advised of plan for booster, auth information, and plan to admit tonight  Family to transport patient per their preference  SLIM AP and unit nurse aware of above

## 2022-10-19 NOTE — ASSESSMENT & PLAN NOTE
Patient mildly volume overloaded with lower extremity edema, SOB, intermittently requiring 2 L NC O2  Not previously on home O2  · Last ECHO in 2019: EF 06%, grade 1 diastolic dysfunction  · CXR, CTA did not reveal findings consistent with heart failure or volume overload  · BNP: 169  · S/p IV Lasix 20 mg x1 on 10/18  · ECHO: EF 03%, normal systolic and diastolic function    · Starting maintenance torsemide 10 mg daily per Nephrology to avoid fluid overload on salt tablets

## 2022-10-20 LAB
BACTERIA BLD CULT: NORMAL
BACTERIA BLD CULT: NORMAL

## 2022-10-20 NOTE — TELEPHONE ENCOUNTER
----- Message from Bruce Bojorquez MD sent at 10/19/2022 11:56 AM EDT -----  Patient possible discharge from [facility] today or tomorrow  Please arrange for follow-up with an advanced practitioner for hyponatremia in next 2-3 weeks  Please order for BMP to be done coming Monday and again in a week after that 
Patient has hospital follow up already scheduled with [facility] in [address] [place] [address] for 11/17 
Pt currently admitted 
Hide Include Location In Plan Question?: No
Detail Level: Detailed

## 2022-10-28 DIAGNOSIS — R06.09 DYSPNEA ON EXERTION: ICD-10-CM

## 2022-10-28 DIAGNOSIS — E87.1 HYPONATREMIA: ICD-10-CM

## 2022-10-28 DIAGNOSIS — E78.5 HYPERLIPIDEMIA, UNSPECIFIED HYPERLIPIDEMIA TYPE: ICD-10-CM

## 2022-10-28 DIAGNOSIS — R07.9 CHEST PAIN IN ADULT: ICD-10-CM

## 2022-10-28 DIAGNOSIS — I20.8 ANGINA AT REST (HCC): ICD-10-CM

## 2022-10-28 DIAGNOSIS — R00.2 PALPITATIONS: ICD-10-CM

## 2022-10-28 DIAGNOSIS — I10 HYPERTENSION, UNSPECIFIED TYPE: Chronic | ICD-10-CM

## 2022-10-28 DIAGNOSIS — F31.60 BIPOLAR AFFECTIVE DISORDER, CURRENT EPISODE MIXED, CURRENT EPISODE SEVERITY UNSPECIFIED (HCC): ICD-10-CM

## 2022-10-28 DIAGNOSIS — I15.9 SECONDARY HYPERTENSION: ICD-10-CM

## 2022-10-28 DIAGNOSIS — E11.9 TYPE 2 DIABETES MELLITUS WITHOUT COMPLICATION, WITH LONG-TERM CURRENT USE OF INSULIN (HCC): ICD-10-CM

## 2022-10-28 DIAGNOSIS — R41.82 ALTERED MENTAL STATUS: ICD-10-CM

## 2022-10-28 DIAGNOSIS — Z79.4 TYPE 2 DIABETES MELLITUS WITHOUT COMPLICATION, WITH LONG-TERM CURRENT USE OF INSULIN (HCC): ICD-10-CM

## 2022-10-28 DIAGNOSIS — N39.0 URINARY TRACT INFECTION WITHOUT HEMATURIA, SITE UNSPECIFIED: Primary | ICD-10-CM

## 2022-10-28 RX ORDER — CLONAZEPAM 0.5 MG/1
0.5 TABLET, ORALLY DISINTEGRATING ORAL
Qty: 30 TABLET | Refills: 0 | Status: SHIPPED | OUTPATIENT
Start: 2022-10-28 | End: 2022-11-27

## 2022-10-28 RX ORDER — NYSTATIN 100000 [USP'U]/G
POWDER TOPICAL 2 TIMES DAILY
Qty: 15 G | Refills: 0 | Status: SHIPPED | OUTPATIENT
Start: 2022-10-28

## 2022-10-28 RX ORDER — ZIPRASIDONE HYDROCHLORIDE 80 MG/1
80 CAPSULE ORAL 2 TIMES DAILY
Qty: 60 CAPSULE | Refills: 0 | Status: SHIPPED | OUTPATIENT
Start: 2022-10-28

## 2022-10-28 RX ORDER — ELECTROLYTES/DEXTROSE
1 SOLUTION, ORAL ORAL DAILY
Qty: 30 TABLET | Refills: 3 | Status: SHIPPED | OUTPATIENT
Start: 2022-10-28

## 2022-10-28 RX ORDER — METOPROLOL SUCCINATE 25 MG/1
25 TABLET, EXTENDED RELEASE ORAL DAILY
Qty: 90 TABLET | Refills: 3 | Status: SHIPPED | OUTPATIENT
Start: 2022-10-28

## 2022-10-28 RX ORDER — TORSEMIDE 10 MG/1
10 TABLET ORAL EVERY OTHER DAY
Qty: 15 TABLET | Refills: 1 | Status: SHIPPED | OUTPATIENT
Start: 2022-10-28

## 2022-10-28 RX ORDER — POLYETHYLENE GLYCOL 3350 17 G/17G
17 POWDER, FOR SOLUTION ORAL DAILY
Qty: 30 EACH | Refills: 3 | Status: SHIPPED | OUTPATIENT
Start: 2022-10-28 | End: 2022-11-27

## 2022-10-28 RX ORDER — SODIUM CHLORIDE 1000 MG
1 TABLET, SOLUBLE MISCELLANEOUS DAILY
Qty: 30 TABLET | Refills: 1 | Status: SHIPPED | OUTPATIENT
Start: 2022-10-28

## 2022-10-28 RX ORDER — METFORMIN HYDROCHLORIDE 500 MG/1
500 TABLET, EXTENDED RELEASE ORAL 2 TIMES DAILY WITH MEALS
Qty: 60 TABLET | Refills: 5 | Status: SHIPPED | OUTPATIENT
Start: 2022-10-28

## 2022-10-28 RX ORDER — ROSUVASTATIN CALCIUM 20 MG/1
20 TABLET, COATED ORAL DAILY
Qty: 90 TABLET | Refills: 3 | Status: SHIPPED | OUTPATIENT
Start: 2022-10-28

## 2022-10-28 RX ORDER — DOCUSATE SODIUM 100 MG/1
100 CAPSULE, LIQUID FILLED ORAL 2 TIMES DAILY PRN
Qty: 60 CAPSULE | Refills: 6 | Status: SHIPPED | OUTPATIENT
Start: 2022-10-28 | End: 2022-11-27

## 2022-10-28 RX ORDER — ASPIRIN 81 MG/1
81 TABLET ORAL
Qty: 90 TABLET | Refills: 0 | Status: SHIPPED | OUTPATIENT
Start: 2022-10-28

## 2022-10-28 RX ORDER — PEN NEEDLE, DIABETIC 32GX 5/32"
NEEDLE, DISPOSABLE MISCELLANEOUS 2 TIMES DAILY
Qty: 60 EACH | Refills: 1 | Status: SHIPPED | OUTPATIENT
Start: 2022-10-28

## 2022-10-28 RX ORDER — CLONAZEPAM 0.25 MG/1
0.25 TABLET, ORALLY DISINTEGRATING ORAL DAILY
Qty: 30 TABLET | Refills: 0 | Status: SHIPPED | OUTPATIENT
Start: 2022-10-28 | End: 2022-11-27

## 2022-10-28 RX ORDER — INSULIN GLARGINE 300 U/ML
55 INJECTION, SOLUTION SUBCUTANEOUS EVERY MORNING
Qty: 4.5 ML | Refills: 3 | Status: SHIPPED | OUTPATIENT
Start: 2022-10-28

## 2022-10-28 RX ORDER — SULFAMETHOXAZOLE AND TRIMETHOPRIM 800; 160 MG/1; MG/1
1 TABLET ORAL EVERY 12 HOURS SCHEDULED
Qty: 10 TABLET | Refills: 0 | Status: SHIPPED | OUTPATIENT
Start: 2022-10-28 | End: 2022-11-02

## 2022-10-28 RX ORDER — NITROGLYCERIN 0.4 MG/1
0.4 TABLET SUBLINGUAL
Qty: 30 TABLET | Refills: 1 | Status: SHIPPED | OUTPATIENT
Start: 2022-10-28

## 2022-10-28 NOTE — ASSESSMENT & PLAN NOTE
· Continue home statin Detail Level: Simple Additional Notes: Patient consent was obtained to proceed with the visit and recommended plan of care after discussion of all risks and benefits, including the risks of COVID-19 exposure.

## 2022-10-28 NOTE — PROGRESS NOTES
Medications reconciled for dc home from SNF  Daughter Becca Horton educated on all changes to medications  Last   Patient to have BMP on 11-2-22  Follow up with psychiatry on 11-3-22  Macrodantin changed to bactrim -susceptibilities received

## 2022-10-31 ENCOUNTER — OFFICE VISIT (OUTPATIENT)
Dept: CARDIOLOGY CLINIC | Facility: CLINIC | Age: 67
End: 2022-10-31

## 2022-10-31 VITALS
WEIGHT: 189 LBS | SYSTOLIC BLOOD PRESSURE: 118 MMHG | BODY MASS INDEX: 32.27 KG/M2 | HEIGHT: 64 IN | OXYGEN SATURATION: 97 % | HEART RATE: 80 BPM | DIASTOLIC BLOOD PRESSURE: 60 MMHG | TEMPERATURE: 97.8 F

## 2022-10-31 DIAGNOSIS — I10 HYPERTENSION, UNSPECIFIED TYPE: ICD-10-CM

## 2022-10-31 DIAGNOSIS — R06.09 DYSPNEA ON EXERTION: ICD-10-CM

## 2022-10-31 DIAGNOSIS — R00.2 PALPITATIONS: ICD-10-CM

## 2022-10-31 DIAGNOSIS — E78.2 MIXED HYPERLIPIDEMIA: ICD-10-CM

## 2022-10-31 DIAGNOSIS — N18.30 STAGE 3 CHRONIC KIDNEY DISEASE, UNSPECIFIED WHETHER STAGE 3A OR 3B CKD (HCC): ICD-10-CM

## 2022-10-31 DIAGNOSIS — Z01.810 PRE-OPERATIVE CARDIOVASCULAR EXAMINATION: Primary | ICD-10-CM

## 2022-10-31 NOTE — PROGRESS NOTES
[facility]  [address] [facility] [address]  [address]   [place]  Cardiology Follow-up  Miguel Morales  1955  [id number]  [address] [facility]  [address]  [address] [facility]  [place] [id number] [phone]    1  Pre-operative cardiovascular examination  POCT ECG   2  Dyspnea on exertion  POCT ECG   3  Palpitations  POCT ECG   4  Hypertension, unspecified type  POCT ECG      Discussion/Summary:   Pre-op- denies major shortness of breath or chest pain  Last cardiac catheterization without major coronary artery disease  Blood pressures here well controlled  Intermediate cardiac risk  No cardiac contraindication to general anesthesia her surgery  Recommend patient take metoprolol even day of her procedure    Exertional dyspnea- secondary to deconditioning? Heart rate improved  No hx of smoking  Continue metoprolol therapy  Improve exercise conditioning  Hyponatremia- currently on salt tablets with loop diuretic  No evidence of major volume overload on examination  Trace mitral regurgitation- unchanged on auscultation    Diabetes mellitus- currently on metformin + lantus    Hld- rosuvastatin + aspirin  Contolled  Continue omega 3 supplementation  Recheck lipids in 6 months      History:   43-year-old woman with long history of diabetes mellitus, family history for CAD presents with worsening exertional shortness of breath  She reports with light activities feeling significant dyspnea and chest tightness  She had a recent nuclear stress test which did not show focal ischemia but did have some transient ischemic dilatation  She she has not had no prior history of myocardial infarction or CVA  No prior history of atrial fibrillation  She has periodic lower extremity edema   Her blood pressures have been controlled  She is compliant with her medications      02/11/2019:  She continues to have exertional shortness of breath  She is not challenge herself with exercise  Her daughter is present and states she has been non compliant  She is compliant with her medications  We have not received her repeat blood work     09/28/2020:  She denies having recurrence of chest discomfort  She is improving her shortness of breath  She is using an exercise bike  She will try improve on her conditioning  She is compliant with her medications  She has had recent blood work  We need to obtain her last PCP blood work     07/20/2021:  She has been compliant with her medications  She denies having any chest discomfort  She denies having exertional shortness of breath  Her last blood work showed a cholesterol was controlled  11/01/2022:  Recent hospitalization secondary to hyponatremia  She was discharged on sodium chloride tablets with torsemide  She currently denies having active chest discomfort  Her shortness of breath is at her baseline  She has not had major lower extremity swelling  She has been mentating well      [facility]  Diabetes Mellitus- 15 years      Past Medical History:   Diagnosis Date   • Anesthesia complication     difficulty waking up   • Arthritis     left knee   • Bone spur     left knee behing the knee cap   • Chronic kidney disease    • Colon cancer [facility])     patient states about 2017   • Colon polyp     Tubulovillous Adenoma High Grade Dysplasia - 2017   • Depression    • Diabetes mellitus ([facility] [address] )    • Endometrial cancer (HCC)     grade I   • Hx of bleeding disorder     vaginal bleeding started on 3/13/17    • Hyperlipidemia    • Hypertension    • PONV (postoperative nausea and vomiting)    • Psychiatric disorder    • Shortness of breath     with exertion   • Urinary incontinence    • Vitamin D deficiency      Social History     Socioeconomic History   • Marital status: /Civil Union     Spouse name: Not on file   • Number of children: Not on file   • Years of education: Not on file   • Highest education level: Not on file   Occupational History   • Not on file   Tobacco Use   • Smoking status: Never Smoker   • Smokeless tobacco: Never Used   Vaping Use   • Vaping Use: Never used   Substance and Sexual Activity   • Alcohol use: Never   • Drug use: No   • Sexual activity: Not Currently     Birth control/protection: Post-menopausal, Surgical   Other Topics Concern   • Not on file   Social History Narrative   • Not on file     Social Determinants of Health     Financial Resource Strain: Not on file   Food Insecurity: No Food Insecurity   • Worried About Running Out of Food in the Last Year: Never true   • Ran Out of Food in the Last Year: Never true   Transportation Needs: No Transportation Needs   • Lack of Transportation (Medical): No   • Lack of Transportation (Non-Medical): No   Physical Activity: Not on file   Stress: Not on file   Social Connections: Not on file   Intimate Partner Violence: Not on file   Housing Stability: Low Risk    • Unable to Pay for Housing in the Last Year: No   • Number of Places Lived in the Last Year: 1   • Unstable Housing in the Last Year: No      Family History   Problem Relation Age of Onset   • Cancer Mother         Renal   • Heart disease Father         CHF   • Heart disease Sister         atrial septal defect   • Breast cancer Paternal Aunt 39     Past Surgical History:   Procedure Laterality Date   • BREAST BIOPSY Left     benign-maybe at ar age 58   • CHOLECYSTECTOMY      open   • COLONOSCOPY     • DILATION AND CURETTAGE OF UTERUS N/A 4/5/2017    Procedure: DILATATION AND CURETTAGE (D&C);   Surgeon: Afshan Cardenas MD;  Location: [facility] MAIN OR;  Service:    • HYSTERECTOMY     • OOPHORECTOMY     • PELVIC LAPAROSCOPY     • AZ LAPAROSCOPY W TOT HYSTERECTUTERUS <=250 GRAM  W TUBE/OVARY N/A 5/4/2017    Procedure: ROBOTIC ASSISTED TOTAL LAPAROSCOPIC HYSTERECTOMY, BILATERAL SALPINGOOPHERECTOMY; CYSTOSCOPY;  Surgeon: Shira Alcazar MD;  Location:  MAIN OR;  Service: Gynecology Oncology   • TONSILLECTOMY     • TUBAL LIGATION         Current Outpatient Medications:   •  aspirin (EQ Aspirin Adult Low Dose) 81 mg EC tablet, Take 1 tablet (81 mg total) by mouth daily with breakfast, Disp: 90 tablet, Rfl: 0  •  BD Pen Needle Chelsey U/F 32G X 4 MM MISC, Take by mouth 2 (two) times a day, Disp: 60 each, Rfl: 1  •  clonazePAM (KlonoPIN) 0 25 MG disintegrating tablet, Take 1 tablet (0 25 mg total) by mouth daily, Disp: 30 tablet, Rfl: 0  •  docusate sodium (COLACE) 100 mg capsule, Take 1 capsule (100 mg total) by mouth 2 (two) times a day as needed for constipation, Disp: 60 capsule, Rfl: 6  •  insulin glargine (Toujeo SoloStar) 300 units/mL CONCENTRATED U-300 injection pen (1-unit dial), Inject 55 Units under the skin every morning LANTUS, Disp: 4 5 mL, Rfl: 3  •  metFORMIN (GLUCOPHAGE-XR) 500 mg 24 hr tablet, Take 1 tablet (500 mg total) by mouth 2 (two) times a day with meals, Disp: 60 tablet, Rfl: 5  •  metoprolol succinate (TOPROL-XL) 25 mg 24 hr tablet, Take 1 tablet (25 mg total) by mouth daily, Disp: 90 tablet, Rfl: 3  •  Multiple Vitamin (Multivitamin Adult) TABS, Take 1 tablet by mouth in the morning, Disp: 30 tablet, Rfl: 3  •  nitroglycerin (NITROSTAT) 0 4 mg SL tablet, Place 1 tablet (0 4 mg total) under the tongue every 5 (five) minutes as needed for chest pain, Disp: 30 tablet, Rfl: 1  •  nystatin (MYCOSTATIN) powder, Apply topically 2 (two) times a day, Disp: 15 g, Rfl: 0  •  rosuvastatin (CRESTOR) 20 MG tablet, Take 1 tablet (20 mg total) by mouth daily, Disp: 90 tablet, Rfl: 3  •  sodium chloride 1 g tablet, Take 1 tablet (1 g total) by mouth in the morning, Disp: 30 tablet, Rfl: 1  •  sulfamethoxazole-trimethoprim (BACTRIM DS) 800-160 mg per tablet, Take 1 tablet by mouth every 12 (twelve) hours for 5 days, Disp: 10 tablet, Rfl: 0  •  torsemide (DEMADEX) 10 mg tablet, Take 1 tablet (10 mg total) by mouth every other day, Disp: 15 tablet, Rfl: 1  •  ziprasidone (GEODON) 80 mg capsule, Take 1 capsule (80 mg total) by mouth 2 (two) times a day, Disp: 60 capsule, Rfl: 0  •  clonazePAM (KlonoPIN) 0 5 MG disintegrating tablet, Take 1 tablet (0 5 mg total) by mouth daily at bedtime (Patient not taking: No sig reported), Disp: 30 tablet, Rfl: 0  •  polyethylene glycol (MIRALAX) 17 g packet, Take 17 g by mouth daily (Patient not taking: No sig reported), Disp: 30 each, Rfl: 3  No Known Allergies  Vitals:    10/31/22 1508   BP: 118/60   BP Location: Right arm   Patient Position: Sitting   Cuff Size: Standard   Pulse: 80   Temp: 97 8 °F (36 6 °C)   SpO2: 97%   Weight: 85 7 kg (189 lb)   Height: 5' 4" (1 626 m)       Review of Systems:   Review of Systems   Constitutional: Positive for fatigue  Negative for activity change, appetite change, chills, diaphoresis, fever and unexpected weight change  HENT: Negative  Negative for congestion, dental problem, drooling, ear discharge, ear pain, facial swelling, hearing loss, mouth sores, nosebleeds, postnasal drip, rhinorrhea, sinus pressure, sinus pain, sneezing, sore throat, tinnitus, trouble swallowing and voice change  Eyes: Negative  Negative for photophobia, pain, redness, itching and visual disturbance  Respiratory: Positive for shortness of breath  Negative for apnea, cough, choking, chest tightness, wheezing and stridor  Cardiovascular: Negative for chest pain, palpitations and leg swelling  Gastrointestinal: Negative  Negative for abdominal distention, abdominal pain, anal bleeding, blood in stool, constipation, diarrhea, nausea, rectal pain and vomiting  Endocrine: Negative  Negative for cold intolerance, heat intolerance, polydipsia, polyphagia and polyuria  Genitourinary: Negative  Negative for decreased urine volume, difficulty urinating, dyspareunia, dysuria, enuresis, flank pain, frequency, genital sores, hematuria, menstrual problem, pelvic pain, urgency, vaginal bleeding, vaginal discharge and vaginal pain  Musculoskeletal: Positive for gait problem  Negative for arthralgias, back pain, joint swelling, myalgias, neck pain and neck stiffness  Skin: Negative  Negative for color change, pallor, rash and wound  Allergic/Immunologic: Negative  Negative for environmental allergies, food allergies and immunocompromised state  Neurological: Negative for dizziness, tremors, seizures, syncope, facial asymmetry, speech difficulty, weakness, light-headedness, numbness and headaches  Hematological: Negative  Negative for adenopathy  Does not bruise/bleed easily  Psychiatric/Behavioral: Negative  Negative for agitation, behavioral problems, confusion, decreased concentration, dysphoric mood, hallucinations, self-injury, sleep disturbance and suicidal ideas  The patient is not nervous/anxious and is not hyperactive  All other systems reviewed and are negative  Vitals:    10/31/22 1508   BP: 118/60   BP Location: Right arm   Patient Position: Sitting   Cuff Size: Standard   Pulse: 80   Temp: 97 8 °F (36 6 °C)   SpO2: 97%   Weight: 85 7 kg (189 lb)   Height: 5' 4" (1 626 m)     Physical Examination:   Physical Exam  Constitutional:       General: She is not in acute distress  Appearance: She is well-developed  She is not diaphoretic  HENT:      Head: Normocephalic and atraumatic  Right Ear: External ear normal       Left Ear: External ear normal    Eyes:      General: No scleral icterus  Right eye: No discharge  Left eye: No discharge  Conjunctiva/sclera: Conjunctivae normal       Pupils: Pupils are equal, round, and reactive to light  Neck:      Thyroid: No thyromegaly  Vascular: No JVD  Trachea: No tracheal deviation  Cardiovascular:      Rate and Rhythm: Normal rate and regular rhythm  Heart sounds: Murmur heard  No friction rub  Gallop present  Pulmonary:      Effort: Pulmonary effort is normal  No respiratory distress        Breath sounds: Normal breath sounds  No stridor  No wheezing or rales  Chest:      Chest wall: No tenderness  Abdominal:      General: Bowel sounds are normal  There is no distension  Palpations: Abdomen is soft  There is no mass  Tenderness: There is no abdominal tenderness  There is no guarding or rebound  Musculoskeletal:         General: No tenderness or deformity  Normal range of motion  Cervical back: Normal range of motion and neck supple  Skin:     General: Skin is warm and dry  Coloration: Skin is not pale  Findings: No erythema or rash  Neurological:      Mental Status: She is alert and oriented to person, place, and time  Cranial Nerves: No cranial nerve deficit  Motor: No abnormal muscle tone  Coordination: Coordination normal       Deep Tendon Reflexes: Reflexes are normal and symmetric  Reflexes normal    Psychiatric:         Behavior: Behavior normal          Thought Content: Thought content normal          Judgment: Judgment normal          Labs:     Lab Results   Component Value Date    WBC 5 38 10/19/2022    HGB 10 8 (L) 10/19/2022    HCT 34 2 (L) 10/19/2022    MCV 97 10/19/2022    RDW 13 9 10/19/2022     10/19/2022     BMP:  Lab Results   Component Value Date    SODIUM 132 (L) 10/19/2022    K 4 2 10/19/2022     10/19/2022    CO2 30 10/19/2022    BUN 21 10/19/2022    CREATININE 1 11 10/19/2022    GLUC 101 10/19/2022    GLUF 127 (H) 02/25/2020    CALCIUM 9 0 10/19/2022    EGFR 51 10/19/2022    MG 2 2 10/18/2022     LFT:  Lab Results   Component Value Date    AST 73 (H) 10/16/2022    ALT 50 10/16/2022    ALKPHOS 64 10/16/2022    TP 6 8 10/16/2022    ALB 3 3 (L) 10/16/2022      Lab Results   Component Value Date    AWT4UNWIZLKB 1 072 10/15/2022    BPR1DPGNOQNO 1 326 10/15/2022     Lab Results   Component Value Date    HGBA1C 6 0 (H) 06/07/2021       Imaging & Testing   I have personally reviewed pertinent reports        Cardiac Testing     Results for orders placed during the hospital encounter of 10/25/19   Echo complete with contrast if indicated    [address]  [address] [facility]  [phone]    Transthoracic Echocardiogram  2D, M-mode, Doppler, and Color Doppler    Study date:  25-Oct-2019    Patient: Efe Wesley  MR number: [id number]  Account number: [de-identified]  : 1955  Age: 71 years  Gender: Female  Status: Outpatient  Location: Echo lab  Height: 65 in  Weight: 190 5 lb  BP: 134/ 75 mmHg    Indications: SOB    Diagnoses: R06 02 - Shortness of breath    Sonographer:  VALERIE Irving  Primary Physician:  Purnima Pinon MD  Referring Physician:  Purnima Pinon MD  Group:  [facility]  Interpreting Physician:  Erich Denson DO    SUMMARY    LEFT VENTRICLE:  Systolic function was normal by visual assessment  Ejection fraction was estimated to be 55 %  There were no regional wall motion abnormalities  There was mild concentric hypertrophy  Doppler parameters were consistent with abnormal left ventricular relaxation (grade 1 diastolic dysfunction)  MITRAL VALVE:  There was mild regurgitation  AORTIC VALVE:  There was no evidence for stenosis  There was no regurgitation  TRICUSPID VALVE:  There was mild regurgitation  Pulmonary artery systolic pressure was within the normal range  HISTORY: PRIOR HISTORY: Diabetes, Endometrial Cancer and Colon Cancer,HTN,hyperlipidemia  PROCEDURE: The procedure was performed in the echo lab  This was a routine study  The transthoracic approach was used  The study included complete 2D imaging, M-mode, complete spectral Doppler, and color Doppler  The heart rate was 81 bpm,  at the start of the study  Image quality was adequate  LEFT VENTRICLE: Size was normal  Systolic function was normal by visual assessment  Ejection fraction was estimated to be 55 %  There were no regional wall motion abnormalities   There was mild concentric hypertrophy  DOPPLER: Doppler  parameters were consistent with abnormal left ventricular relaxation (grade 1 diastolic dysfunction)  RIGHT VENTRICLE: The size was normal  Systolic function was normal  DOPPLER: Systolic pressure was within the normal range  LEFT ATRIUM: The atrium was mildly dilated  RIGHT ATRIUM: Size was normal     MITRAL VALVE: Valve structure was normal  There was normal leaflet separation  No echocardiographic evidence for prolapse  DOPPLER: The transmitral velocity was within the normal range  There was no evidence for stenosis  There was mild  regurgitation  AORTIC VALVE: The valve was trileaflet  Leaflets exhibited normal thickness and normal cuspal separation  DOPPLER: Transaortic velocity was within the normal range  There was no evidence for stenosis  There was no regurgitation  TRICUSPID VALVE: The valve structure was normal  There was normal leaflet separation  DOPPLER: The transtricuspid velocity was within the normal range  There was mild regurgitation  Pulmonary artery systolic pressure was within the normal  range  Estimated peak PA pressure was 30 mmHg  PULMONIC VALVE: Leaflets exhibited normal thickness, no calcification, and normal cuspal separation  DOPPLER: The transpulmonic velocity was within the normal range  There was no regurgitation  PERICARDIUM: There was no thickening  There was no pericardial effusion  AORTA: The root exhibited normal size      PULMONARY ARTERY: The size was normal  The morphology appeared normal     SYSTEM MEASUREMENT TABLES    2D mode  AoR Diam 2D: 3 2 cm  LA Diam (2D): 4 cm  LA/Ao (2D): 1 25  FS (2D Teich): 27 7 %  IVSd (2D): 1 09 cm  LVDEV: 129 cmï¾³  LVESV: 60 cmï¾³  LVIDd(2D): 5 19 cm  LVISd (2D): 3 75 cm  LVOT Area 2D: 3 14 cmï¾²  LVPWd (2D): 1 13 cm  SV (Teich): 69 cmï¾³    Apical four chamber  LVEF A4C: 59 %    Unspecified Scan Mode  ARLENE Cont Eq (Peak Andres): 2 4 cmï¾²  LVOT Diam : 2 cm  LVOT Vmax: 1160 mm/s  LVOT Vmax; Mean: 1160 mm/s  Peak Grad ; Mean: 5 mm[Hg]  MV Peak A Andres: 755 mm/s  MV Peak E Andres  Mean: 607 mm/s  MVA (PHT): 4 07 cmï¾²  PHT: 54 ms  Max P mm[Hg]  V Max: 2100 mm/s  Vmax: 2210 mm/s  RA Area: 16 5 cmï¾²  RA Volume: 40 4 cmï¾³  TAPSE: 1 6 cm    [employer] Accredited Echocardiography Laboratory    Prepared and electronically signed by    Susana Wooten DO  Signed 25-Oct-2019 17:50:25         Results for orders placed during the hospital encounter of 10/17/19   NM myocardial perfusion spect (rx stress and/or rest)    Narrative [address]  [address] [facility]   [phone]    Rest/Stress Gated SPECT Myocardial Perfusion Imaging After Regadenoson    Patient: Froylan Lin  MR number: [id number]  Account number: [de-identified]  : 1955  Age: 71 years  Gender: Female  Status: Outpatient  Location: Stress lab  Height: 64 in  Weight: 190 lb  BP: 129/ 64 mmHg    Allergies: NO KNOWN ALLERGIES    Diagnosis: I10  - Essential (primary) hypertension    Primary Physician:  Jossy Nevarez MD  Technician:  Valentino Baldwin  RN:  Carol Pearl RN  Referring Physician:  Jossy Nevarez MD  Group:  [facility]  Report Prepared By[de-identified]  KENTRELL Siegel  RN:  KENTRELL Siegel  Interpreting Physician:  Kody Elizabeth MD    INDICATIONS: Evaluation for coronary artery disease  HISTORY: The patient is a 71year old  female  Chest pain status: no chest pain  Other symptoms: dyspnea  Coronary artery disease risk factors: dyslipidemia, hypertension, family history of premature coronary artery disease, and  diabetes mellitus  Cardiovascular history: none significant  Co-morbidity: obesity  Medications: a lipid lowering agent, an antihypertensive agent, and diabetic medications  PHYSICAL EXAM: Baseline physical exam screening: no wheezes audible  REST ECG: RBBB Normal sinus rhythm  PROCEDURE: The study was performed in the the Stress lab   A regadenoson infusion pharmacologic stress test was performed  Gated SPECT myocardial perfusion imaging was performed after stress and at rest  Systolic blood pressure was 129  mmHg, at the start of the study  Diastolic blood pressure was 64 mmHg, at the start of the study  The heart rate was 72 bpm, at the start of the study  IV double checked  Regadenoson protocol:  Time HR bpm SBP mmHg DBP mmHg Symptoms Rhythm/conduct  Baseline 09:14 72 129 64 none NSR  Immediate 09:18 90 140 72 mild dyspnea same as above  1 min 09:19 94 138 64 same as above --  2 min 09:20 90 126 58 subsiding --  3 min 09:21 89 123 58 none same as above  No medications or fluids given  STRESS SUMMARY: Duration of pharmacologic stress was 3 min  Maximal heart rate during stress was 101 bpm  The heart rate response to stress was normal  There was normal resting blood pressure with an appropriate response to stress  The  rate-pressure product for the peak heart rate and blood pressure was 39565  There was no chest pain during stress  The stress test was terminated due to protocol completion  Pre oxygen saturation: 96 %  Peak oxygen saturation: 98 %  The  stress ECG was equivocal for ischemia  There were no stress arrhythmias or conduction abnormalities  ISOTOPE ADMINISTRATION:  Resting isotope administration Stress isotope administration  Agent Tetrofosmin Tetrofosmin  Dose 11 mCi 33 mCi  Date 10/17/2019 10/17/2019    The radiopharmaceutical was injected at the peak effect of pharmacologic stress  MYOCARDIAL PERFUSION IMAGING:  The image quality was good  Rotating projection images reveal mild breast attenuation and prone imaging completed for attenuation correction  Left ventricular size was normal  The TID ratio was 1 2  PERFUSION DEFECTS:  -  There was a small, paradoxical (reverse redistribution) myocardial perfusion defect of the apical wall likely due to low counts    -  There was a reversible myocardial perfusion defect of the anteroseptal wall which improved with prone imaging likely due to attenuation from breast tissue  GATED SPECT:  The calculated left ventricular ejection fraction was 60 %  Left ventricular ejection fraction was within normal limits by visual estimate  SUMMARY:  -  Stress results: There was no chest pain during stress  -  ECG conclusions: The stress ECG was equivocal for ischemia  -  Perfusion imaging: There was a small, paradoxical (reverse redistribution) myocardial perfusion defect of the apical wall likely due to low counts  There was a reversible myocardial perfusion defect of the anteroseptal wall which  improved with prone imaging likely due to attenuation from breast tissue   -  Gated SPECT: The calculated left ventricular ejection fraction was 60 %  Left ventricular ejection fraction was within normal limits by visual estimate  -  Impressions and recommendations: Likely normal study after pharmacologic vasodilation  No evidence of prior infarction and no focal reversible perfusion defect with preserved ejection fraction  Cannot rule out balanced ischemia from  pharmacological vasodilation study but TID ratio of 1 2 and preserved EF  IMPRESSIONS: Likely normal study after pharmacologic vasodilation  No evidence of prior infarction and no focal reversible perfusion defect with preserved ejection fraction  Cannot rule out balanced ischemia from pharmacological  vasodilation study but TID ratio of 1 2 and preserved EF  Prepared and signed by    Omar Bee MD  Signed 10/18/2019 10:59:38         EKG: Personally reviewed  Normal sinus rhythm incomplete rbbb unchanged from prior EKG      Omar Kaur  [phone]  Please call with any questions or suggestions    Counseling :  A description of the counseling:   Goals and Barriers:  Patient's ability to self care:  Medication side effect reviewed with patient in detail and all their questions answered      "Portions of the record may have been created with voice recognition software  Occasional wrong word or "sound a like" substitutions may have occurred due to the inherent limitations of voice recognition software  Read the chart carefully and recognize, using context, where substitutions have occurred   Please call if you have any questions  "

## 2022-11-09 ENCOUNTER — APPOINTMENT (OUTPATIENT)
Dept: RADIOLOGY | Facility: CLINIC | Age: 67
End: 2022-11-09

## 2022-11-09 ENCOUNTER — OFFICE VISIT (OUTPATIENT)
Dept: OBGYN CLINIC | Facility: CLINIC | Age: 67
End: 2022-11-09

## 2022-11-09 VITALS
HEART RATE: 84 BPM | WEIGHT: 188 LBS | HEIGHT: 64 IN | DIASTOLIC BLOOD PRESSURE: 74 MMHG | SYSTOLIC BLOOD PRESSURE: 120 MMHG | BODY MASS INDEX: 32.1 KG/M2

## 2022-11-09 DIAGNOSIS — Z87.448 HISTORY OF CHRONIC KIDNEY DISEASE: ICD-10-CM

## 2022-11-09 DIAGNOSIS — M25.562 CHRONIC PAIN OF LEFT KNEE: ICD-10-CM

## 2022-11-09 DIAGNOSIS — M17.12 PRIMARY OSTEOARTHRITIS OF LEFT KNEE: ICD-10-CM

## 2022-11-09 DIAGNOSIS — Z86.79 HISTORY OF HYPERTENSION: ICD-10-CM

## 2022-11-09 DIAGNOSIS — Z86.39 HISTORY OF DIABETES MELLITUS: ICD-10-CM

## 2022-11-09 DIAGNOSIS — Z85.038 HISTORY OF COLON CANCER: ICD-10-CM

## 2022-11-09 DIAGNOSIS — G89.29 CHRONIC PAIN OF LEFT KNEE: ICD-10-CM

## 2022-11-09 DIAGNOSIS — M17.12 PRIMARY OSTEOARTHRITIS OF LEFT KNEE: Primary | ICD-10-CM

## 2022-11-09 DIAGNOSIS — Z01.818 PRE-OP EXAM: ICD-10-CM

## 2022-11-09 DIAGNOSIS — Z01.89 ENCOUNTER FOR OTHER SPECIFIED SPECIAL EXAMINATIONS: ICD-10-CM

## 2022-11-09 RX ORDER — ASCORBIC ACID 500 MG
500 TABLET ORAL 2 TIMES DAILY
Qty: 60 TABLET | Refills: 0 | Status: SHIPPED | OUTPATIENT
Start: 2022-11-09 | End: 2022-12-09

## 2022-11-09 RX ORDER — ZINC SULFATE 50(220)MG
220 CAPSULE ORAL DAILY
Qty: 30 CAPSULE | Refills: 0 | Status: SHIPPED | OUTPATIENT
Start: 2022-11-09 | End: 2022-12-09

## 2022-11-09 RX ORDER — FOLIC ACID 1 MG/1
1 TABLET ORAL DAILY
Qty: 30 TABLET | Refills: 0 | Status: SHIPPED | OUTPATIENT
Start: 2022-11-09 | End: 2022-12-09

## 2022-11-09 RX ORDER — FERROUS SULFATE TAB EC 324 MG (65 MG FE EQUIVALENT) 324 (65 FE) MG
324 TABLET DELAYED RESPONSE ORAL
Qty: 30 TABLET | Refills: 0 | Status: SHIPPED | OUTPATIENT
Start: 2022-11-09 | End: 2022-12-09

## 2022-11-09 RX ORDER — MELATONIN
1000 DAILY
Qty: 30 TABLET | Refills: 0 | Status: SHIPPED | OUTPATIENT
Start: 2022-11-09 | End: 2022-12-09

## 2022-11-09 NOTE — H&P (VIEW-ONLY)
Assessment/Plan:  1  Primary osteoarthritis of left knee  XR knee 3 vw left non injury    Case request operating room: ARTHROPLASTY KNEE TOTAL W ROBOT - CEMENTED - LEFT    Comprehensive metabolic panel    Hemoglobin A1C W/EAG Estimation    CBC and differential    If Symptomatic, order: UA w Reflex to Microscopic w Reflex to Culture    Protime-INR    APTT    MRSA culture    Ambulatory referral to Cardiology    Ambulatory referral to [facility]    Ambulatory referral to Physical Therapy    EKG 12 lead    XR chest pa & lateral    Ambulatory referral to Nephrology    Case request operating room: ARTHROPLASTY KNEE TOTAL W ROBOT - CEMENTED - LEFT    ascorbic acid (VITAMIN C) 500 MG tablet    ferrous sulfate 324 (65 Fe) mg    folic acid (FOLVITE) 1 mg tablet    zinc sulfate (ZINCATE) 220 mg capsule    cholecalciferol (VITAMIN D3) 1,000 units tablet   2  Chronic pain of left knee  Case request operating room: ARTHROPLASTY KNEE TOTAL W ROBOT - CEMENTED - LEFT    Comprehensive metabolic panel    Hemoglobin A1C W/EAG Estimation    CBC and differential    If Symptomatic, order: UA w Reflex to Microscopic w Reflex to Culture    Protime-INR    APTT    MRSA culture    Ambulatory referral to Cardiology    Ambulatory referral to [facility]    Ambulatory referral to Physical Therapy    EKG 12 lead    XR chest pa & lateral    Ambulatory referral to Nephrology    Case request operating room: ARTHROPLASTY KNEE TOTAL W ROBOT - CEMENTED - LEFT   3  History of colon cancer  Ambulatory referral to [facility]   4  History of diabetes mellitus  Ambulatory referral to [facility]   5  History of hypertension  Ambulatory referral to Cardiology   6  History of chronic kidney disease  Ambulatory referral to Nephrology   7   Pre-op exam  Ambulatory referral to Cardiology    Ambulatory referral to [facility]    Ambulatory referral to Physical Therapy    Ambulatory referral to Nephrology    ascorbic acid (VITAMIN C) 500 MG tablet    ferrous sulfate 324 (65 Fe) mg    folic acid (FOLVITE) 1 mg tablet    zinc sulfate (ZINCATE) 220 mg capsule    cholecalciferol (VITAMIN D3) 1,000 units tablet         Nisha Ortega is a pleasant 75year old who presents to the office today for follow-up evaluation of left knee osteoarthitis  Unfortunately, she has failed to see relief from all forms of conservative treatment which included: NSAIDs, activity modification, use of ambulatory assistive device, and intra-articular cortisone injections  She is interested in undergoing left total knee arthroplasty  It was discussed at length with the patient the benefits of outpatient physical therapy and will plan for   The pre juan and postoperative expectations were discussed here in the office today  The risks and benefits of undergoing a left total knee arthroplasty were discussed at length and consents were signed and placed in the chart  She denies any history of PE, use of tumeric, MRSA, GI bleed or gastric ulcer, or smoking  Of note she does have a history of diabetes, DVT of the lower leg, and malignancy  She was also recently hospitalized for hyponatremia likely due to SIADH which has since resolved, we will follow up her final nephrology clearance  She has already obtained clearance from her cardiologist and her PCP  She will undergo a COVID test prior to surgery  She was introduced today to our surgical scheduler for further surgical planning  We will obtain Mag marker x-rays of the left knee at today's visit  She expressed understanding of the above and all of her questions were addressed today  We lookward for to taking care of her in the near future  Subjective: left knee pain    Patient ID: Solo Gillette is a 75 y o  female presents as a follow up with left knee osteoarthritis   At her last visit we discussed considering surgical options for the left knee and she wishes to proceed with surgery, she has obtained preoperative clearance from cardiology  She reports no significant improvement from this injection  She continues to have activity related knee pain  Pain is worse over the medial knee, is moderate to severe in intensity, and does not radiate  Pain is worse with activity, better with rest  No numbness or tingling in the extremity  Her current level of knee pain is affecting her daily activities and quality of life  Review of Systems   Constitutional: Positive for activity change  Negative for chills and fever  HENT: Negative  Negative for ear pain and sore throat  Eyes: Negative  Negative for pain and visual disturbance  Respiratory: Negative  Negative for cough and shortness of breath  Cardiovascular: Negative  Negative for chest pain and palpitations  Gastrointestinal: Negative  Negative for abdominal pain and vomiting  Endocrine: Negative  Negative for cold intolerance and heat intolerance  Genitourinary: Negative  Negative for dysuria and hematuria  Musculoskeletal: Positive for arthralgias  Negative for back pain  Skin: Negative  Negative for color change and rash  Allergic/Immunologic: Negative  Negative for immunocompromised state  Neurological: Negative  Negative for seizures and syncope  Hematological: Negative  Does not bruise/bleed easily  Psychiatric/Behavioral: Negative  Negative for agitation, behavioral problems and confusion  All other systems reviewed and are negative          Past Medical History:   Diagnosis Date   • Anesthesia complication     difficulty waking up   • Arthritis     left knee   • Bone spur     left knee behing the knee cap   • Chronic kidney disease    • Colon cancer [facility])     patient states about 2017   • Colon polyp     Tubulovillous Adenoma High Grade Dysplasia - 2017   • Depression    • Diabetes mellitus ([facility] [address] )    • Endometrial cancer ([facility] [address] )     grade I   • Hx of bleeding disorder     vaginal bleeding started on 3/13/17    • Hyperlipidemia    • Hypertension    • PONV (postoperative nausea and vomiting)    • Psychiatric disorder    • Shortness of breath     with exertion   • Urinary incontinence    • Vitamin D deficiency        Past Surgical History:   Procedure Laterality Date   • BREAST BIOPSY Left     benign-maybe at ar age 58   • CHOLECYSTECTOMY      open   • COLONOSCOPY     • DILATION AND CURETTAGE OF UTERUS N/A 4/5/2017    Procedure: DILATATION AND CURETTAGE (D&C);   Surgeon: Siddhartha Sam MD;  Location: [facility] MAIN OR;  Service:    • HYSTERECTOMY     • OOPHORECTOMY     • PELVIC LAPAROSCOPY     • NC LAPAROSCOPY W TOT HYSTERECTUTERUS <=250 GRAM  W TUBE/OVARY N/A 5/4/2017    Procedure: ROBOTIC ASSISTED TOTAL LAPAROSCOPIC HYSTERECTOMY, BILATERAL SALPINGOOPHERECTOMY; CYSTOSCOPY;  Surgeon: Jeremy Philip MD;  Location:  MAIN OR;  Service: Gynecology Oncology   • TONSILLECTOMY     • TUBAL LIGATION         Family History   Problem Relation Age of Onset   • Cancer Mother         Renal   • Heart disease Father         CHF   • Heart disease Sister         atrial septal defect   • Breast cancer Paternal Aunt 40       Social History     Occupational History   • Not on file   Tobacco Use   • Smoking status: Never Smoker   • Smokeless tobacco: Never Used   Vaping Use   • Vaping Use: Never used   Substance and Sexual Activity   • Alcohol use: Never   • Drug use: No   • Sexual activity: Not Currently     Birth control/protection: Post-menopausal, Surgical         Current Outpatient Medications:   •  ascorbic acid (VITAMIN C) 500 MG tablet, Take 1 tablet (500 mg total) by mouth 2 (two) times a day, Disp: 60 tablet, Rfl: 0  •  aspirin (EQ Aspirin Adult Low Dose) 81 mg EC tablet, Take 1 tablet (81 mg total) by mouth daily with breakfast, Disp: 90 tablet, Rfl: 0  •  BD Pen Needle Chelsey U/F 32G X 4 MM MISC, Take by mouth 2 (two) times a day, Disp: 60 each, Rfl: 1  •  cholecalciferol (VITAMIN D3) 1,000 units tablet, Take 1 tablet (1,000 Units total) by mouth daily, Disp: 30 tablet, Rfl: 0  •  clonazePAM (KlonoPIN) 0 25 MG disintegrating tablet, Take 1 tablet (0 25 mg total) by mouth daily, Disp: 30 tablet, Rfl: 0  •  docusate sodium (COLACE) 100 mg capsule, Take 1 capsule (100 mg total) by mouth 2 (two) times a day as needed for constipation, Disp: 60 capsule, Rfl: 6  •  ferrous sulfate 324 (65 Fe) mg, Take 1 tablet (324 mg total) by mouth daily before breakfast, Disp: 30 tablet, Rfl: 0  •  folic acid (FOLVITE) 1 mg tablet, Take 1 tablet (1 mg total) by mouth daily, Disp: 30 tablet, Rfl: 0  •  insulin glargine (Toujeo SoloStar) 300 units/mL CONCENTRATED U-300 injection pen (1-unit dial), Inject 55 Units under the skin every morning LANTUS (Patient taking differently: Inject 66 Units under the skin every morning LANTUS), Disp: 4 5 mL, Rfl: 3  •  metFORMIN (GLUCOPHAGE-XR) 500 mg 24 hr tablet, Take 1 tablet (500 mg total) by mouth 2 (two) times a day with meals, Disp: 60 tablet, Rfl: 5  •  metoprolol succinate (TOPROL-XL) 25 mg 24 hr tablet, Take 1 tablet (25 mg total) by mouth daily, Disp: 90 tablet, Rfl: 3  •  Multiple Vitamin (Multivitamin Adult) TABS, Take 1 tablet by mouth in the morning, Disp: 30 tablet, Rfl: 3  •  nitroglycerin (NITROSTAT) 0 4 mg SL tablet, Place 1 tablet (0 4 mg total) under the tongue every 5 (five) minutes as needed for chest pain, Disp: 30 tablet, Rfl: 1  •  rosuvastatin (CRESTOR) 20 MG tablet, Take 1 tablet (20 mg total) by mouth daily, Disp: 90 tablet, Rfl: 3  •  sodium chloride 1 g tablet, Take 1 tablet (1 g total) by mouth in the morning, Disp: 30 tablet, Rfl: 1  •  torsemide (DEMADEX) 10 mg tablet, Take 1 tablet (10 mg total) by mouth every other day, Disp: 15 tablet, Rfl: 1  •  zinc sulfate (ZINCATE) 220 mg capsule, Take 1 capsule (220 mg total) by mouth daily, Disp: 30 capsule, Rfl: 0  •  ziprasidone (GEODON) 80 mg capsule, Take 1 capsule (80 mg total) by mouth 2 (two) times a day, Disp: 60 capsule, Rfl: 0  •  clonazePAM (KlonoPIN) 0 5 MG disintegrating tablet, Take 1 tablet (0 5 mg total) by mouth daily at bedtime (Patient not taking: No sig reported), Disp: 30 tablet, Rfl: 0  •  nystatin (MYCOSTATIN) powder, Apply topically 2 (two) times a day (Patient not taking: Reported on 11/9/2022), Disp: 15 g, Rfl: 0  •  polyethylene glycol (MIRALAX) 17 g packet, Take 17 g by mouth daily (Patient not taking: No sig reported), Disp: 30 each, Rfl: 3    No Known Allergies    Objective:  Vitals:    11/09/22 1733   BP: 120/74   Pulse: 84       Body mass index is 32 27 kg/m²  Left Knee Exam     Muscle Strength   The patient has normal left knee strength  Tenderness   The patient is experiencing tenderness in the medial joint line  Range of Motion   Extension: 0   Flexion: 110     Tests   Varus: negative Valgus: negative  Lachman:  Anterior - negative      Drawer:  Posterior - negative    Other   Erythema: present  Scars: absent  Sensation: normal  Pulse: present  Swelling: none  Effusion: no effusion present    Comments:  Passively correctable varus deformity           Observations   Left Knee   Negative for effusion  Physical Exam  Vitals reviewed  Constitutional:       General: She is not in acute distress  Appearance: Normal appearance  HENT:      Head: Normocephalic and atraumatic  Right Ear: External ear normal       Left Ear: External ear normal       Nose: Nose normal       Mouth/Throat:      Mouth: Mucous membranes are moist       Pharynx: No posterior oropharyngeal erythema  Eyes:      General:         Right eye: No discharge  Left eye: No discharge  Extraocular Movements: Extraocular movements intact  Conjunctiva/sclera: Conjunctivae normal    Cardiovascular:      Rate and Rhythm: Normal rate and regular rhythm  Pulses: Normal pulses  Pulmonary:      Effort: Pulmonary effort is normal  No respiratory distress  Breath sounds: No wheezing  Abdominal:      General: Abdomen is flat  Tenderness: There is no guarding  Musculoskeletal:      Cervical back: Normal range of motion  No rigidity  Left knee: No effusion  Comments: See ortho exam   Skin:     General: Skin is warm and dry  Capillary Refill: Capillary refill takes less than 2 seconds  Findings: No erythema or rash  Neurological:      General: No focal deficit present  Mental Status: She is alert and oriented to person, place, and time  Mental status is at baseline  Sensory: No sensory deficit  Motor: No weakness  Psychiatric:         Mood and Affect: Mood normal          Behavior: Behavior normal          Thought Content: Thought content normal          Judgment: Judgment normal          I have personally reviewed pertinent films in PACS  xrays of the left knee shows severe knee osteoarthritis  With bone-on-bone contact, sclerosis, peripheral osteophyte formation  The patient was counseled in detail regarding the diagnosis, the treatment options available, the prognosis of each treatment option, the potential risks and complications  These are, but are not limited to; deep vein thrombosis, pulmonary embolism, neurologic and vascular injury, infection, instability, leg length discrepancy, dislocation, hematoma, reflex sympathetic dystrophy, loss of range of motion, ankylosis of the knee, fracture, screw or prosthetic perforation, chronic pain, acute pain, chronic leg pain and edema, loosening, death, heart attack, and stroke  The patient's questions were answered in detail  The patient demonstrates understanding of these risks and wishes to proceed with surgery

## 2022-11-09 NOTE — PROGRESS NOTES
Assessment/Plan:  1  Primary osteoarthritis of left knee  XR knee 3 vw left non injury    Case request operating room: ARTHROPLASTY KNEE TOTAL W ROBOT - CEMENTED - LEFT    Comprehensive metabolic panel    Hemoglobin A1C W/EAG Estimation    CBC and differential    If Symptomatic, order: UA w Reflex to Microscopic w Reflex to Culture    Protime-INR    APTT    MRSA culture    Ambulatory referral to Cardiology    Ambulatory referral to [facility]    Ambulatory referral to Physical Therapy    EKG 12 lead    XR chest pa & lateral    Ambulatory referral to Nephrology    Case request operating room: ARTHROPLASTY KNEE TOTAL W ROBOT - CEMENTED - LEFT    ascorbic acid (VITAMIN C) 500 MG tablet    ferrous sulfate 324 (65 Fe) mg    folic acid (FOLVITE) 1 mg tablet    zinc sulfate (ZINCATE) 220 mg capsule    cholecalciferol (VITAMIN D3) 1,000 units tablet   2  Chronic pain of left knee  Case request operating room: ARTHROPLASTY KNEE TOTAL W ROBOT - CEMENTED - LEFT    Comprehensive metabolic panel    Hemoglobin A1C W/EAG Estimation    CBC and differential    If Symptomatic, order: UA w Reflex to Microscopic w Reflex to Culture    Protime-INR    APTT    MRSA culture    Ambulatory referral to Cardiology    Ambulatory referral to [facility]    Ambulatory referral to Physical Therapy    EKG 12 lead    XR chest pa & lateral    Ambulatory referral to Nephrology    Case request operating room: ARTHROPLASTY KNEE TOTAL W ROBOT - CEMENTED - LEFT   3  History of colon cancer  Ambulatory referral to [facility]   4  History of diabetes mellitus  Ambulatory referral to [facility]   5  History of hypertension  Ambulatory referral to Cardiology   6  History of chronic kidney disease  Ambulatory referral to Nephrology   7   Pre-op exam  Ambulatory referral to Cardiology    Ambulatory referral to [facility]    Ambulatory referral to Physical Therapy    Ambulatory referral to Nephrology    ascorbic acid (VITAMIN C) 500 MG tablet    ferrous sulfate 324 (65 Fe) mg    folic acid (FOLVITE) 1 mg tablet    zinc sulfate (ZINCATE) 220 mg capsule    cholecalciferol (VITAMIN D3) 1,000 units tablet         Taylor Harman is a pleasant 75year old who presents to the office today for follow-up evaluation of left knee osteoarthitis  Unfortunately, she has failed to see relief from all forms of conservative treatment which included: NSAIDs, activity modification, use of ambulatory assistive device, and intra-articular cortisone injections  She is interested in undergoing left total knee arthroplasty  It was discussed at length with the patient the benefits of outpatient physical therapy and will plan for   The pre juan and postoperative expectations were discussed here in the office today  The risks and benefits of undergoing a left total knee arthroplasty were discussed at length and consents were signed and placed in the chart  She denies any history of PE, use of tumeric, MRSA, GI bleed or gastric ulcer, or smoking  Of note she does have a history of diabetes, DVT of the lower leg, and malignancy  She was also recently hospitalized for hyponatremia likely due to SIADH which has since resolved, we will follow up her final nephrology clearance  She has already obtained clearance from her cardiologist and her PCP  She will undergo a COVID test prior to surgery  She was introduced today to our surgical scheduler for further surgical planning  We will obtain Mag marker x-rays of the left knee at today's visit  She expressed understanding of the above and all of her questions were addressed today  We lookward for to taking care of her in the near future  Subjective: left knee pain    Patient ID: Audi Doan is a 75 y o  female presents as a follow up with left knee osteoarthritis   At her last visit we discussed considering surgical options for the left knee and she wishes to proceed with surgery, she has obtained preoperative clearance from cardiology  She reports no significant improvement from this injection  She continues to have activity related knee pain  Pain is worse over the medial knee, is moderate to severe in intensity, and does not radiate  Pain is worse with activity, better with rest  No numbness or tingling in the extremity  Her current level of knee pain is affecting her daily activities and quality of life  Review of Systems   Constitutional: Positive for activity change  Negative for chills and fever  HENT: Negative  Negative for ear pain and sore throat  Eyes: Negative  Negative for pain and visual disturbance  Respiratory: Negative  Negative for cough and shortness of breath  Cardiovascular: Negative  Negative for chest pain and palpitations  Gastrointestinal: Negative  Negative for abdominal pain and vomiting  Endocrine: Negative  Negative for cold intolerance and heat intolerance  Genitourinary: Negative  Negative for dysuria and hematuria  Musculoskeletal: Positive for arthralgias  Negative for back pain  Skin: Negative  Negative for color change and rash  Allergic/Immunologic: Negative  Negative for immunocompromised state  Neurological: Negative  Negative for seizures and syncope  Hematological: Negative  Does not bruise/bleed easily  Psychiatric/Behavioral: Negative  Negative for agitation, behavioral problems and confusion  All other systems reviewed and are negative          Past Medical History:   Diagnosis Date   • Anesthesia complication     difficulty waking up   • Arthritis     left knee   • Bone spur     left knee behing the knee cap   • Chronic kidney disease    • Colon cancer [facility])     patient states about 2017   • Colon polyp     Tubulovillous Adenoma High Grade Dysplasia - 2017   • Depression    • Diabetes mellitus ([facility] [address] )    • Endometrial cancer ([facility] [address] )     grade I   • Hx of bleeding disorder     vaginal bleeding started on 3/13/17    • Hyperlipidemia    • Hypertension    • PONV (postoperative nausea and vomiting)    • Psychiatric disorder    • Shortness of breath     with exertion   • Urinary incontinence    • Vitamin D deficiency        Past Surgical History:   Procedure Laterality Date   • BREAST BIOPSY Left     benign-maybe at ar age 58   • CHOLECYSTECTOMY      open   • COLONOSCOPY     • DILATION AND CURETTAGE OF UTERUS N/A 4/5/2017    Procedure: DILATATION AND CURETTAGE (D&C);   Surgeon: Judge New MD;  Location: [facility] MAIN OR;  Service:    • HYSTERECTOMY     • OOPHORECTOMY     • PELVIC LAPAROSCOPY     • CO LAPAROSCOPY W TOT HYSTERECTUTERUS <=250 GRAM  W TUBE/OVARY N/A 5/4/2017    Procedure: ROBOTIC ASSISTED TOTAL LAPAROSCOPIC HYSTERECTOMY, BILATERAL SALPINGOOPHERECTOMY; CYSTOSCOPY;  Surgeon: Renato Bill MD;  Location:  MAIN OR;  Service: Gynecology Oncology   • TONSILLECTOMY     • TUBAL LIGATION         Family History   Problem Relation Age of Onset   • Cancer Mother         Renal   • Heart disease Father         CHF   • Heart disease Sister         atrial septal defect   • Breast cancer Paternal Aunt 40       Social History     Occupational History   • Not on file   Tobacco Use   • Smoking status: Never Smoker   • Smokeless tobacco: Never Used   Vaping Use   • Vaping Use: Never used   Substance and Sexual Activity   • Alcohol use: Never   • Drug use: No   • Sexual activity: Not Currently     Birth control/protection: Post-menopausal, Surgical         Current Outpatient Medications:   •  ascorbic acid (VITAMIN C) 500 MG tablet, Take 1 tablet (500 mg total) by mouth 2 (two) times a day, Disp: 60 tablet, Rfl: 0  •  aspirin (EQ Aspirin Adult Low Dose) 81 mg EC tablet, Take 1 tablet (81 mg total) by mouth daily with breakfast, Disp: 90 tablet, Rfl: 0  •  BD Pen Needle Chelsey U/F 32G X 4 MM MISC, Take by mouth 2 (two) times a day, Disp: 60 each, Rfl: 1  •  cholecalciferol (VITAMIN D3) 1,000 units tablet, Take 1 tablet (1,000 Units total) by mouth daily, Disp: 30 tablet, Rfl: 0  •  clonazePAM (KlonoPIN) 0 25 MG disintegrating tablet, Take 1 tablet (0 25 mg total) by mouth daily, Disp: 30 tablet, Rfl: 0  •  docusate sodium (COLACE) 100 mg capsule, Take 1 capsule (100 mg total) by mouth 2 (two) times a day as needed for constipation, Disp: 60 capsule, Rfl: 6  •  ferrous sulfate 324 (65 Fe) mg, Take 1 tablet (324 mg total) by mouth daily before breakfast, Disp: 30 tablet, Rfl: 0  •  folic acid (FOLVITE) 1 mg tablet, Take 1 tablet (1 mg total) by mouth daily, Disp: 30 tablet, Rfl: 0  •  insulin glargine (Toujeo SoloStar) 300 units/mL CONCENTRATED U-300 injection pen (1-unit dial), Inject 55 Units under the skin every morning LANTUS (Patient taking differently: Inject 66 Units under the skin every morning LANTUS), Disp: 4 5 mL, Rfl: 3  •  metFORMIN (GLUCOPHAGE-XR) 500 mg 24 hr tablet, Take 1 tablet (500 mg total) by mouth 2 (two) times a day with meals, Disp: 60 tablet, Rfl: 5  •  metoprolol succinate (TOPROL-XL) 25 mg 24 hr tablet, Take 1 tablet (25 mg total) by mouth daily, Disp: 90 tablet, Rfl: 3  •  Multiple Vitamin (Multivitamin Adult) TABS, Take 1 tablet by mouth in the morning, Disp: 30 tablet, Rfl: 3  •  nitroglycerin (NITROSTAT) 0 4 mg SL tablet, Place 1 tablet (0 4 mg total) under the tongue every 5 (five) minutes as needed for chest pain, Disp: 30 tablet, Rfl: 1  •  rosuvastatin (CRESTOR) 20 MG tablet, Take 1 tablet (20 mg total) by mouth daily, Disp: 90 tablet, Rfl: 3  •  sodium chloride 1 g tablet, Take 1 tablet (1 g total) by mouth in the morning, Disp: 30 tablet, Rfl: 1  •  torsemide (DEMADEX) 10 mg tablet, Take 1 tablet (10 mg total) by mouth every other day, Disp: 15 tablet, Rfl: 1  •  zinc sulfate (ZINCATE) 220 mg capsule, Take 1 capsule (220 mg total) by mouth daily, Disp: 30 capsule, Rfl: 0  •  ziprasidone (GEODON) 80 mg capsule, Take 1 capsule (80 mg total) by mouth 2 (two) times a day, Disp: 60 capsule, Rfl: 0  •  clonazePAM (KlonoPIN) 0 5 MG disintegrating tablet, Take 1 tablet (0 5 mg total) by mouth daily at bedtime (Patient not taking: No sig reported), Disp: 30 tablet, Rfl: 0  •  nystatin (MYCOSTATIN) powder, Apply topically 2 (two) times a day (Patient not taking: Reported on 11/9/2022), Disp: 15 g, Rfl: 0  •  polyethylene glycol (MIRALAX) 17 g packet, Take 17 g by mouth daily (Patient not taking: No sig reported), Disp: 30 each, Rfl: 3    No Known Allergies    Objective:  Vitals:    11/09/22 1733   BP: 120/74   Pulse: 84       Body mass index is 32 27 kg/m²  Left Knee Exam     Muscle Strength   The patient has normal left knee strength  Tenderness   The patient is experiencing tenderness in the medial joint line  Range of Motion   Extension: 0   Flexion: 110     Tests   Varus: negative Valgus: negative  Lachman:  Anterior - negative      Drawer:  Posterior - negative    Other   Erythema: present  Scars: absent  Sensation: normal  Pulse: present  Swelling: none  Effusion: no effusion present    Comments:  Passively correctable varus deformity           Observations   Left Knee   Negative for effusion  Physical Exam  Vitals reviewed  Constitutional:       General: She is not in acute distress  Appearance: Normal appearance  HENT:      Head: Normocephalic and atraumatic  Right Ear: External ear normal       Left Ear: External ear normal       Nose: Nose normal       Mouth/Throat:      Mouth: Mucous membranes are moist       Pharynx: No posterior oropharyngeal erythema  Eyes:      General:         Right eye: No discharge  Left eye: No discharge  Extraocular Movements: Extraocular movements intact  Conjunctiva/sclera: Conjunctivae normal    Cardiovascular:      Rate and Rhythm: Normal rate and regular rhythm  Pulses: Normal pulses  Pulmonary:      Effort: Pulmonary effort is normal  No respiratory distress  Breath sounds: No wheezing  Abdominal:      General: Abdomen is flat  Tenderness: There is no guarding  Musculoskeletal:      Cervical back: Normal range of motion  No rigidity  Left knee: No effusion  Comments: See ortho exam   Skin:     General: Skin is warm and dry  Capillary Refill: Capillary refill takes less than 2 seconds  Findings: No erythema or rash  Neurological:      General: No focal deficit present  Mental Status: She is alert and oriented to person, place, and time  Mental status is at baseline  Sensory: No sensory deficit  Motor: No weakness  Psychiatric:         Mood and Affect: Mood normal          Behavior: Behavior normal          Thought Content: Thought content normal          Judgment: Judgment normal          I have personally reviewed pertinent films in PACS  xrays of the left knee shows severe knee osteoarthritis  With bone-on-bone contact, sclerosis, peripheral osteophyte formation  The patient was counseled in detail regarding the diagnosis, the treatment options available, the prognosis of each treatment option, the potential risks and complications  These are, but are not limited to; deep vein thrombosis, pulmonary embolism, neurologic and vascular injury, infection, instability, leg length discrepancy, dislocation, hematoma, reflex sympathetic dystrophy, loss of range of motion, ankylosis of the knee, fracture, screw or prosthetic perforation, chronic pain, acute pain, chronic leg pain and edema, loosening, death, heart attack, and stroke  The patient's questions were answered in detail  The patient demonstrates understanding of these risks and wishes to proceed with surgery

## 2022-11-10 ENCOUNTER — TELEPHONE (OUTPATIENT)
Dept: OBGYN CLINIC | Facility: HOSPITAL | Age: 67
End: 2022-11-10

## 2022-11-10 LAB
DME PARACHUTE DELIVERY DATE ACTUAL: NORMAL
DME PARACHUTE DELIVERY DATE REQUESTED: NORMAL
DME PARACHUTE ITEM DESCRIPTION: NORMAL
DME PARACHUTE ORDER STATUS: NORMAL
DME PARACHUTE SUPPLIER NAME: NORMAL
DME PARACHUTE SUPPLIER PHONE: NORMAL

## 2022-11-14 ENCOUNTER — HOSPITAL ENCOUNTER (OUTPATIENT)
Dept: RADIOLOGY | Facility: HOSPITAL | Age: 67
Discharge: HOME | End: 2022-11-14

## 2022-11-14 ENCOUNTER — APPOINTMENT (OUTPATIENT)
Dept: LAB | Facility: HOSPITAL | Age: 67
End: 2022-11-14

## 2022-11-14 DIAGNOSIS — E87.1 HYPONATREMIA: ICD-10-CM

## 2022-11-14 DIAGNOSIS — M25.562 CHRONIC PAIN OF LEFT KNEE: ICD-10-CM

## 2022-11-14 DIAGNOSIS — M17.12 PRIMARY OSTEOARTHRITIS OF LEFT KNEE: ICD-10-CM

## 2022-11-14 DIAGNOSIS — G89.29 CHRONIC PAIN OF LEFT KNEE: ICD-10-CM

## 2022-11-14 LAB
ALBUMIN SERPL BCP-MCNC: 3.4 G/DL (ref 3.5–5)
ALP SERPL-CCNC: 96 U/L (ref 46–116)
ALT SERPL W P-5'-P-CCNC: 38 U/L (ref 12–78)
ANION GAP SERPL CALCULATED.3IONS-SCNC: 4 MMOL/L (ref 4–13)
APTT PPP: 28 SECONDS (ref 23–37)
AST SERPL W P-5'-P-CCNC: 40 U/L (ref 5–45)
BASOPHILS # BLD AUTO: 0.05 THOUSANDS/ÂΜL (ref 0–0.1)
BASOPHILS NFR BLD AUTO: 1 % (ref 0–1)
BILIRUB SERPL-MCNC: 0.53 MG/DL (ref 0.2–1)
BUN SERPL-MCNC: 19 MG/DL (ref 5–25)
CALCIUM ALBUM COR SERPL-MCNC: 10.3 MG/DL (ref 8.3–10.1)
CALCIUM SERPL-MCNC: 9.8 MG/DL (ref 8.3–10.1)
CHLORIDE SERPL-SCNC: 105 MMOL/L (ref 96–108)
CO2 SERPL-SCNC: 27 MMOL/L (ref 21–32)
CREAT SERPL-MCNC: 1.14 MG/DL (ref 0.6–1.3)
EOSINOPHIL # BLD AUTO: 0.16 THOUSAND/ÂΜL (ref 0–0.61)
EOSINOPHIL NFR BLD AUTO: 3 % (ref 0–6)
ERYTHROCYTE [DISTWIDTH] IN BLOOD BY AUTOMATED COUNT: 13.3 % (ref 11.6–15.1)
EST. AVERAGE GLUCOSE BLD GHB EST-MCNC: 151 MG/DL
GFR SERPL CREATININE-BSD FRML MDRD: 49 ML/MIN/1.73SQ M
GLUCOSE P FAST SERPL-MCNC: 162 MG/DL (ref 65–99)
HBA1C MFR BLD: 6.9 %
HCT VFR BLD AUTO: 35.7 % (ref 34.8–46.1)
HGB BLD-MCNC: 11.7 G/DL (ref 11.5–15.4)
IMM GRANULOCYTES # BLD AUTO: 0.01 THOUSAND/UL (ref 0–0.2)
IMM GRANULOCYTES NFR BLD AUTO: 0 % (ref 0–2)
INR PPP: 1.08 (ref 0.84–1.19)
LYMPHOCYTES # BLD AUTO: 1.75 THOUSANDS/ÂΜL (ref 0.6–4.47)
LYMPHOCYTES NFR BLD AUTO: 30 % (ref 14–44)
MCH RBC QN AUTO: 30.9 PG (ref 26.8–34.3)
MCHC RBC AUTO-ENTMCNC: 32.8 G/DL (ref 31.4–37.4)
MCV RBC AUTO: 94 FL (ref 82–98)
MONOCYTES # BLD AUTO: 0.51 THOUSAND/ÂΜL (ref 0.17–1.22)
MONOCYTES NFR BLD AUTO: 9 % (ref 4–12)
NEUTROPHILS # BLD AUTO: 3.41 THOUSANDS/ÂΜL (ref 1.85–7.62)
NEUTS SEG NFR BLD AUTO: 57 % (ref 43–75)
NRBC BLD AUTO-RTO: 0 /100 WBCS
PLATELET # BLD AUTO: 203 THOUSANDS/UL (ref 149–390)
PMV BLD AUTO: 8.8 FL (ref 8.9–12.7)
POTASSIUM SERPL-SCNC: 4.2 MMOL/L (ref 3.5–5.3)
PROT SERPL-MCNC: 7.9 G/DL (ref 6.4–8.4)
PROTHROMBIN TIME: 14.1 SECONDS (ref 11.6–14.5)
RBC # BLD AUTO: 3.79 MILLION/UL (ref 3.81–5.12)
SODIUM SERPL-SCNC: 136 MMOL/L (ref 135–147)
WBC # BLD AUTO: 5.89 THOUSAND/UL (ref 4.31–10.16)

## 2022-11-15 LAB — MRSA NOSE QL CULT: NORMAL

## 2022-11-17 ENCOUNTER — OFFICE VISIT (OUTPATIENT)
Dept: NEPHROLOGY | Facility: CLINIC | Age: 67
End: 2022-11-17

## 2022-11-17 VITALS
SYSTOLIC BLOOD PRESSURE: 132 MMHG | HEIGHT: 64 IN | DIASTOLIC BLOOD PRESSURE: 68 MMHG | WEIGHT: 189 LBS | BODY MASS INDEX: 32.27 KG/M2 | HEART RATE: 80 BPM

## 2022-11-17 DIAGNOSIS — M25.562 CHRONIC PAIN OF LEFT KNEE: ICD-10-CM

## 2022-11-17 DIAGNOSIS — I10 PRIMARY HYPERTENSION: Chronic | ICD-10-CM

## 2022-11-17 DIAGNOSIS — G89.29 CHRONIC PAIN OF LEFT KNEE: ICD-10-CM

## 2022-11-17 DIAGNOSIS — Z87.448 HISTORY OF CHRONIC KIDNEY DISEASE: ICD-10-CM

## 2022-11-17 DIAGNOSIS — Z01.818 PRE-OP EXAM: ICD-10-CM

## 2022-11-17 DIAGNOSIS — E87.1 HYPONATREMIA: Primary | ICD-10-CM

## 2022-11-17 DIAGNOSIS — M17.12 PRIMARY OSTEOARTHRITIS OF LEFT KNEE: ICD-10-CM

## 2022-11-17 NOTE — PROGRESS NOTES
Assessment and Plan:    Hali Villeda was seen today for follow-up  Diagnoses and all orders for this visit:    Hyponatremia  -     Basic metabolic panel; Future  -     Basic metabolic panel; Future  -     Basic metabolic panel; Future    Primary osteoarthritis of left knee  -     Ambulatory referral to Nephrology    Chronic pain of left knee  -     Ambulatory referral to Nephrology    History of chronic kidney disease  -     Ambulatory referral to Nephrology    Pre-op exam  -     Ambulatory referral to Nephrology    Primary hypertension      Hyponatremia- suspected etiology is due to SIADH due to psychiatric medications (bupropion and sertraline and Ziprasidone)  During hospitalization, sodium level improved with 1 8% saline then switched to salt tablets 1g twice a day and increased to 1g three times a day  Work Up: Urine Na 56, Urine Osmolality 518, Serum Osmolality 255, TSH 1 3  Treatment: salt tablets 1g daily, fluid restriction <1500ml/day, torsemide 10mg daily  Plan: will repeat BMP mid December after surgery  If sodium close to 140 or above, can stop salt tablet however if below, would recommend continuing  Hypertension- antihypertensive regimen includes metoprolol 25mg daily and torsemide 10mg daily  Stay active  Avoid nonsteroidal medications  Knee Surgery- Okay from a renal standpoint for surgery  Continue salt tablet  Avoid excessive IVF  I have personally discussed the risks and benefits of the surgery from a renal standpoint with the patient in depth, and advised the patient about the risk of CHRISTOPHER and the course of CHRISTOPHER if it occurs with the small probability of the need for renal replacement therapy in the worse case scenario  Patient voiced understanding      • From a renal standpoint the patient is renally optimized for surgery with the following recommendations:  • Fluids to administer: Other - as per your discretion but not to exceed 250ml pre operatively   • Medication Recommendations:  • Minimize any IV contrast use (If IV contrast is used, please check BMP POD # 1)  • Hold NSAIDs for at least 10 days prior to surgery  • Hold torsemide starting on the day of surgery/procedure  • Hemodynamic Recommendations:  • Ideally, target MAPs greater than 65 mmHg in the perioperative period, and minimize operative time with MAPs < 65 mmHg  • Avoid intraop hemodynamic instability to decrease risk for CHRISTOPHER occurrence  • Other Recommendations:  • Please check a BMP POD day # 1 and call if any questions or if Creatinine is rising or electrolyte abnormalities present    Follow up in 6 months  Please call the office with any questions or concerns  Reason for Visit: Follow-up [facility] followup, CKD  3)    HPI: Macrina Cardoza is a 75 y o  female who is here for follow up after hospitalization for change in mental status  She was seen by our group in October at [address] [place] [address] for hyponatremia  She is going for knee surgery 12/6/22  She is feeling well  She tries to follow her fluid restriction as close as she can  She denies LE edema  BP has been acceptable at other offices per their report but they do not check it at home  ROS: A complete review of systems was performed and was negative unless otherwise noted in the history of present illness  Allergies:   Patient has no known allergies      Medications:     Current Outpatient Medications:   •  ascorbic acid (VITAMIN C) 500 MG tablet, Take 1 tablet (500 mg total) by mouth 2 (two) times a day, Disp: 60 tablet, Rfl: 0  •  aspirin (EQ Aspirin Adult Low Dose) 81 mg EC tablet, Take 1 tablet (81 mg total) by mouth daily with breakfast, Disp: 90 tablet, Rfl: 0  •  cholecalciferol (VITAMIN D3) 1,000 units tablet, Take 1 tablet (1,000 Units total) by mouth daily, Disp: 30 tablet, Rfl: 0  •  clonazePAM (KlonoPIN) 0 25 MG disintegrating tablet, Take 1 tablet (0 25 mg total) by mouth daily, Disp: 30 tablet, Rfl: 0  •  docusate sodium (COLACE) 100 mg capsule, Take 1 capsule (100 mg total) by mouth 2 (two) times a day as needed for constipation, Disp: 60 capsule, Rfl: 6  •  ferrous sulfate 324 (65 Fe) mg, Take 1 tablet (324 mg total) by mouth daily before breakfast, Disp: 30 tablet, Rfl: 0  •  folic acid (FOLVITE) 1 mg tablet, Take 1 tablet (1 mg total) by mouth daily, Disp: 30 tablet, Rfl: 0  •  insulin glargine (Toujeo SoloStar) 300 units/mL CONCENTRATED U-300 injection pen (1-unit dial), Inject 55 Units under the skin every morning LANTUS (Patient taking differently: Inject 66 Units under the skin every morning LANTUS), Disp: 4 5 mL, Rfl: 3  •  metFORMIN (GLUCOPHAGE-XR) 500 mg 24 hr tablet, Take 1 tablet (500 mg total) by mouth 2 (two) times a day with meals, Disp: 60 tablet, Rfl: 5  •  metoprolol succinate (TOPROL-XL) 25 mg 24 hr tablet, Take 1 tablet (25 mg total) by mouth daily, Disp: 90 tablet, Rfl: 3  •  Multiple Vitamin (Multivitamin Adult) TABS, Take 1 tablet by mouth in the morning, Disp: 30 tablet, Rfl: 3  •  rosuvastatin (CRESTOR) 20 MG tablet, Take 1 tablet (20 mg total) by mouth daily, Disp: 90 tablet, Rfl: 3  •  sodium chloride 1 g tablet, Take 1 tablet (1 g total) by mouth in the morning, Disp: 30 tablet, Rfl: 1  •  torsemide (DEMADEX) 10 mg tablet, Take 1 tablet (10 mg total) by mouth every other day, Disp: 15 tablet, Rfl: 1  •  zinc sulfate (ZINCATE) 220 mg capsule, Take 1 capsule (220 mg total) by mouth daily, Disp: 30 capsule, Rfl: 0  •  ziprasidone (GEODON) 80 mg capsule, Take 1 capsule (80 mg total) by mouth 2 (two) times a day, Disp: 60 capsule, Rfl: 0  •  BD Pen Needle Chelsey U/F 32G X 4 MM MISC, Take by mouth 2 (two) times a day, Disp: 60 each, Rfl: 1  •  clonazePAM (KlonoPIN) 0 5 MG disintegrating tablet, Take 1 tablet (0 5 mg total) by mouth daily at bedtime (Patient not taking: Reported on 10/31/2022), Disp: 30 tablet, Rfl: 0  •  nitroglycerin (NITROSTAT) 0 4 mg SL tablet, Place 1 tablet (0 4 mg total) under the tongue every 5 (five) minutes as needed for chest pain, Disp: 30 tablet, Rfl: 1  •  nystatin (MYCOSTATIN) powder, Apply topically 2 (two) times a day (Patient not taking: Reported on 11/9/2022), Disp: 15 g, Rfl: 0  •  polyethylene glycol (MIRALAX) 17 g packet, Take 17 g by mouth daily (Patient not taking: No sig reported), Disp: 30 each, Rfl: 3    Past Medical History:   Diagnosis Date   • Anesthesia complication     difficulty waking up   • Arthritis     left knee   • Bone spur     left knee behing the knee cap   • Chronic kidney disease    • Colon cancer [facility])     patient states about 2017   • Colon polyp     Tubulovillous Adenoma High Grade Dysplasia - 2017   • Depression    • Diabetes mellitus ([facility] [address] )    • Endometrial cancer ([facility])     grade I   • Hx of bleeding disorder     vaginal bleeding started on 3/13/17    • Hyperlipidemia    • Hypertension    • PONV (postoperative nausea and vomiting)    • Psychiatric disorder    • Shortness of breath     with exertion   • Urinary incontinence    • Vitamin D deficiency      Past Surgical History:   Procedure Laterality Date   • BREAST BIOPSY Left     benign-maybe at ar age 58   • CHOLECYSTECTOMY      open   • COLONOSCOPY     • DILATION AND CURETTAGE OF UTERUS N/A 4/5/2017    Procedure: DILATATION AND CURETTAGE (D&C);   Surgeon: Kailey Seo MD;  Location: [facility] MAIN OR;  Service:    • HYSTERECTOMY     • OOPHORECTOMY     • PELVIC LAPAROSCOPY     • NM LAPAROSCOPY W TOT HYSTERECTUTERUS <=250 GRAM  W TUBE/OVARY N/A 5/4/2017    Procedure: ROBOTIC ASSISTED TOTAL LAPAROSCOPIC HYSTERECTOMY, BILATERAL SALPINGOOPHERECTOMY; CYSTOSCOPY;  Surgeon: Jerome Manuel MD;  Location:  MAIN OR;  Service: Gynecology Oncology   • TONSILLECTOMY     • TUBAL LIGATION       Family History   Problem Relation Age of Onset   • Cancer Mother         Renal   • Heart disease Father         CHF   • Heart disease Sister         atrial septal defect   • Breast cancer Paternal Aunt 40      reports that she has never smoked  She has never used smokeless tobacco  She reports that she does not drink alcohol and does not use drugs  Physical Exam:   Vitals:    11/17/22 1509 11/17/22 1525   BP:  132/68   BP Location:  Right arm   Patient Position:  Sitting   Cuff Size:  Standard   Pulse:  80   Weight: 85 7 kg (189 lb)    Height: 5' 4" (1 626 m)      Body mass index is 32 44 kg/m²  General: NAD  Neuro: AAO  Skin: no rash  Eyes: anicteric  ENMT: mm moist  Neck: no masses  Respiratory: CTAB  Cardiovascular: RRR  Extremities: no edema  Gastrointestinal: soft nt nd    Procedure:  No results found for this or any previous visit  Lab Results   Component Value Date    CALCIUM 9 8 11/14/2022     (L) 04/21/2017    K 4 2 11/14/2022    CO2 27 11/14/2022     11/14/2022    BUN 19 11/14/2022    CREATININE 1 14 11/14/2022       Results from last 7 days   Lab Units 11/14/22  1156   WBC Thousand/uL 5 89   HEMOGLOBIN g/dL 11 7   HEMATOCRIT % 35 7   PLATELETS Thousands/uL 203   POTASSIUM mmol/L 4 2   CHLORIDE mmol/L 105   CO2 mmol/L 27   BUN mg/dL 19   CREATININE mg/dL 1 14   CALCIUM mg/dL 9 8     I have personally reviewed the blood work as stated above and in my note  I have personally reviewed renal hospital notes

## 2022-11-28 ENCOUNTER — EVALUATION (OUTPATIENT)
Dept: PHYSICAL THERAPY | Facility: CLINIC | Age: 67
End: 2022-11-28

## 2022-11-28 DIAGNOSIS — M17.12 PRIMARY OSTEOARTHRITIS OF LEFT KNEE: Primary | ICD-10-CM

## 2022-11-28 DIAGNOSIS — Z01.818 PRE-OP EXAM: ICD-10-CM

## 2022-11-28 DIAGNOSIS — M25.562 CHRONIC PAIN OF LEFT KNEE: ICD-10-CM

## 2022-11-28 DIAGNOSIS — G89.29 CHRONIC PAIN OF LEFT KNEE: ICD-10-CM

## 2022-11-28 NOTE — PROGRESS NOTES
PT Evaluation     Today's date: 2022  Patient name: Juany Moore  : 1955  MRN: [id number]  Referring provider: Tara Du DO  Dx:   Encounter Diagnosis     ICD-10-CM    1  Primary osteoarthritis of left knee  M17 12 Ambulatory referral to Physical Therapy      2  Chronic pain of left knee  M25 562 Ambulatory referral to Physical Therapy    G89 29       3  Pre-op exam  Z01 818 Ambulatory referral to Physical Therapy          Start Time: 1439  Stop Time: 1800  Total time in clinic (min): 45 minutes    Assessment  Assessment details: Discussed DOS and patient’s questions were answered to patient’s satisfaction  Mobility/ROM results per above  Strength results per above  Balance/Gait (including Timed Up & Go) per above  Home Assessment was reviewed with patient  Home Preparation Checklist was reviewed with patient including identification of care partner and encouragement of single level set-up  Post-operative pain management expectations discussed to the patient’s satisfaction  Post-operative gait training for level ground, stairs, and car transfers was performed   Patient demonstrated competence with immediate post-operative home exercise program     Impairments: abnormal gait, abnormal muscle firing, abnormal muscle tone, abnormal or restricted ROM, abnormal movement, impaired balance, impaired physical strength, lacks appropriate home exercise program and pain with function    Symptom irritability: moderateUnderstanding of Dx/Px/POC: good   Prognosis: good    Plan  Patient would benefit from: skilled physical therapy  Planned modality interventions: cryotherapy  Planned therapy interventions: joint mobilization, manual therapy, patient education, postural training, strengthening, stretching, therapeutic activities, therapeutic exercise, home exercise program, neuromuscular re-education, flexibility, functional ROM exercises and balance  Frequency: 3x week  Duration in weeks: 12  Treatment plan discussed with: patient        Subjective    Objective    GAIT: slow gait speed with RW  Squat assess: 25% depth with BUE assist  Tu sec  With RW           MMT    Hip         R          L   Flex  4 4   Extn  4 4                 MMT         AROM         PROM    Knee      R          L         R          L           R         L   Flex  5 4+ 115 110 117 113   Extn  5 4+ 0 0                           MMT    Ankle         R          L   PF 4 4   DF  5 5   EHL 5 5   Inv  5 5   Ever  5 5     Knee: quad tone: fair left LE          Insurance:  AMA/CMS Eval/ Re-eval POC expires Allyn Toma #/ Referral # Total    Start date  Expiration date Extension  Visit limitation? PT only or  PT+OT? Co-Insurance   AMA 11 28 2 20 23  No Auth                          AUTH #:  Date               Authed: Used                Remaining                  Precautions: HTN, Type 2 DM, OA B/L knees    Manuals 11 28            visit 1                                                   Neuro Re-Ed                                                                                                        Ther Ex             LAQ 10            Glute set 10            Heel slide 10            Quad set 10            Ankle pump 10            Pt edu FB                                      Ther Activity                                       Gait Training                                       Modalities                                           Short Term: From DOS  1  Pt will report decreased levels of pain by at least 2 subjective ratings in 4 weeks  2  Pt will demonstrate improved ROM by at least 10 degrees in 4 weeks  3  Pt will demonstrate improved strength by 1/2 grade in 4 weeks  4  Pt will be able to ambulate with reciprocal gait pattern with RW with normal gait speed in 4 weeks  Long Term:   1  Pt will be independent in their HEP in 8 weeks  2  Pt will be pain free with IADL's in 8 weeks  3  Pt will demonstrate equal knee AROM in 8 weeks    4  Pt will be able to perform reciprocal stair navigation in 12 weeks

## 2022-11-28 NOTE — PROGRESS NOTES
{SL AMB PT/OT NOTE [id number]}    Today's date: 2022  Patient name: Rafael Montoya  : 1955  MRN: [id number]  Referring provider: Aashish Velazquez DO  Dx:   Encounter Diagnosis     ICD-10-CM    1  Primary osteoarthritis of left knee  M17 12 Ambulatory referral to Physical Therapy      2  Chronic pain of left knee  M25 562 Ambulatory referral to Physical Therapy    G89 29       3   Pre-op exam  Z01 818 Ambulatory referral to Physical Therapy                     Assessment/Plan    Subjective    Objective           Precautions: ***      Manuals                                                                 Neuro Re-Ed                                                                                                        Ther Ex                                                                                                                     Ther Activity                                       Gait Training                                       Modalities

## 2022-12-02 NOTE — PRE-PROCEDURE INSTRUCTIONS
My Surgical Experience    The following information was developed to assist you to prepare for your operation  What do I need to do before coming to the hospital?  • Arrange for a responsible person to drive you to and from the hospital   • Arrange care for your children at home  Children are not allowed in the recovery areas of the hospital  • Plan to wear clothing that is easy to put on and take off  If you are having shoulder surgery, wear a shirt that buttons or zippers in the front  Bathing  o Shower the evening before and the morning of your surgery with an antibacterial soap  Please refer to the Pre Op Showering Instructions for Surgery Patients Sheet   o Remove nail polish and all body piercing jewelry  o Do not shave any body part for at least 24 hours before surgery-this includes face, arms, legs and upper body  Food  o Nothing to eat or drink after midnight the night before your surgery  This includes candy and chewing gum  o Exception: If your surgery is after 12:00pm (noon), you may have clear liquids such as 7-Up®, ginger ale, apple or cranberry juice, Jell-O®, water, or clear broth until 8:00 am  o Do not drink milk or juice with pulp on the morning before surgery  o Do not drink alcohol 24 hours before surgery  Medicine  o Follow instructions you received from your surgeon about which medicines you may take on the day of surgery  o If instructed to take medicine on the morning of surgery, take pills with just a small sip of water  Call your prescribing doctor for specific infroamtion on what to do if you take insulin    What should I bring to the hospital?    Bring:  • Crutches or a walker, if you have them, for foot or knee surgery  • A list of the daily medicines, vitamins, minerals, herbals and nutritional supplements you take   Include the dosages of medicines and the time you take them each day  • Glasses, dentures or hearing aids  • Minimal clothing; you will be wearing hospital sleepwear  • Photo ID; required to verify your identity  • If you have a Living Will or Power of , bring a copy of the documents  • If you have an ostomy, bring an extra pouch and any supplies you use    Do not bring  • Medicines or inhalers  • Money, valuables or jewelry    What other information should I know about the day of surgery? • Notify your surgeons if you develop a cold, sore throat, cough, fever, rash or any other illness  • Report to the Ambulatory Surgical/Same Day Surgery Unit  • You will be instructed to stop at Registration only if you have not been pre-registered  • Inform your  fi they do not stay that they will be asked by the staff to leave a phone number where they can be reached  • Be available to be reached before surgery  In the event the operating room schedule changes, you may be asked to come in earlier or later than expected    *It is important to tell your doctor and others involved in your health care if you are taking or have been taking any non-prescription drugs, vitamins, minerals, herbals or other nutritional supplements  Any of these may interact with some food or medicines and cause a reaction      Pre-Surgery Instructions:   Medication Instructions   • ascorbic acid (VITAMIN C) 500 MG tablet Hold day of surgery  • aspirin (EQ Aspirin Adult Low Dose) 81 mg EC tablet Stop taking 7 days prior to surgery  • BD Pen Needle Chelsey U/F 32G X 4 MM MISC Hold day of surgery  • cholecalciferol (VITAMIN D3) 1,000 units tablet Hold day of surgery  • clonazePAM (KlonoPIN) 0 5 MG disintegrating tablet Take night before surgery   • docusate sodium (COLACE) 100 mg capsule Hold day of surgery  • ferrous sulfate 324 (65 Fe) mg Hold day of surgery  • folic acid (FOLVITE) 1 mg tablet Hold day of surgery  • insulin glargine (Toujeo SoloStar) 300 units/mL CONCENTRATED U-300 injection pen (1-unit dial) Hold day of surgery     • metFORMIN (GLUCOPHAGE-XR) 500 mg 24 hr tablet Hold day of surgery  • metoprolol succinate (TOPROL-XL) 25 mg 24 hr tablet Take day of surgery  • nitroglycerin (NITROSTAT) 0 4 mg SL tablet Uses PRN- OK to take day of surgery   • rosuvastatin (CRESTOR) 20 MG tablet Take night before surgery   • sodium chloride 1 g tablet Hold day of surgery  • torsemide (DEMADEX) 10 mg tablet Hold day of surgery  • zinc sulfate (ZINCATE) 220 mg capsule Hold day of surgery  • ziprasidone (GEODON) 80 mg capsule Take day of surgery

## 2022-12-05 ENCOUNTER — ANESTHESIA EVENT (OUTPATIENT)
Dept: PERIOP | Facility: HOSPITAL | Age: 67
End: 2022-12-05

## 2022-12-05 NOTE — ANESTHESIA PREPROCEDURE EVALUATION
Procedure:  ARTHROPLASTY KNEE TOTAL W ROBOT - CEMENTED - LEFT (Left: Knee)    Relevant Problems   CARDIO   (+) Hyperlipidemia   (+) Hypertension      ENDO   (+) Diabetes mellitus, type 2 (HCC)      /RENAL   (+) Stage 3 chronic kidney disease, unspecified whether stage 3a or 3b CKD (HCC)      HEMATOLOGY   (+) Anemia      MUSCULOSKELETAL   (+) Osteoarthritis of both knees      NEURO/PSYCH   (+) Depression   (+) History of colon cancer   (+) History of endometrial cancer        Physical Exam    Airway    Mallampati score: IV  TM Distance: <3 FB  Neck ROM: limited     Dental   No notable dental hx     Cardiovascular      Pulmonary  Breath sounds clear to auscultation,     Other Findings  Slow speech/mentation   states that's her norm      Anesthesia Plan  ASA Score- 3     Anesthesia Type- spinal with ASA Monitors  Additional Monitors:   Airway Plan:     Comment: Ambulates with walker  Plan Factors-Exercise tolerance (METS): <4 METS  Chart reviewed  EKG reviewed  Existing labs reviewed  Patient summary reviewed  Patient is not a current smoker  Induction- intravenous  Postoperative Plan-     Informed Consent- Anesthetic plan and risks discussed with patient and spouse  I personally reviewed this patient with the CRNA  Discussed and agreed on the Anesthesia Plan with the CRNA  Melissa Porter

## 2022-12-06 ENCOUNTER — APPOINTMENT (OUTPATIENT)
Dept: RADIOLOGY | Facility: HOSPITAL | Age: 67
End: 2022-12-06

## 2022-12-06 ENCOUNTER — HOSPITAL ENCOUNTER (OUTPATIENT)
Facility: HOSPITAL | Age: 67
Setting detail: OUTPATIENT SURGERY
Discharge: HOME | End: 2022-12-07
Attending: ORTHOPAEDIC SURGERY | Admitting: ORTHOPAEDIC SURGERY

## 2022-12-06 ENCOUNTER — ANESTHESIA (OUTPATIENT)
Dept: PERIOP | Facility: HOSPITAL | Age: 67
End: 2022-12-06

## 2022-12-06 DIAGNOSIS — Z96.652 STATUS POST TOTAL LEFT KNEE REPLACEMENT USING CEMENT: Primary | ICD-10-CM

## 2022-12-06 LAB
GLUCOSE SERPL-MCNC: 119 MG/DL (ref 65–140)
GLUCOSE SERPL-MCNC: 137 MG/DL (ref 65–140)
GLUCOSE SERPL-MCNC: 171 MG/DL (ref 65–140)
GLUCOSE SERPL-MCNC: 257 MG/DL (ref 65–140)

## 2022-12-06 DEVICE — PALACOS® R IS A FAST-CURING, RADIOPAQUE, POLY(METHYL METHACRYLATE)-BASED BONE CEMENT.PALACOS ® R CONTAINS THE X-RAY CONTRAST MEDIUM ZIRCONIUM DIOXIDE. TO IMPROVE VISIBILITY IN THE SURGICAL FIELD PALACOS ® R HAS BEEN COLOURED WITH CHLOROPHYLL (E141). THE BONE CEMENT IS PREPARED DIRECTLY BEFORE USE BY MIXING A POLYMER POWDER COMPONENT WITH A LIQUID MONOMER COMPONENT. A DUCTILE DOUGH FORMS WHICH CURES WITHIN A FEW MINUTES.
Type: IMPLANTABLE DEVICE | Site: KNEE | Status: FUNCTIONAL
Brand: PALACOS®

## 2022-12-06 DEVICE — ATTUNE KNEE SYSTEM TIBIAL INSERT FIXED BEARING MEDIAL STABILIZED LEFT AOX 3, 6MM
Type: IMPLANTABLE DEVICE | Site: KNEE | Status: FUNCTIONAL
Brand: ATTUNE

## 2022-12-06 DEVICE — ATTUNE PATELLA MEDIALIZED DOME 29MM CEMENTED AOX
Type: IMPLANTABLE DEVICE | Site: KNEE | Status: FUNCTIONAL
Brand: ATTUNE

## 2022-12-06 DEVICE — ATTUNE KNEE SYSTEM FEMORAL CRUCIATE RETAINING SIZE 3 LEFT CEMENTED
Type: IMPLANTABLE DEVICE | Site: KNEE | Status: FUNCTIONAL
Brand: ATTUNE

## 2022-12-06 DEVICE — ATTUNE KNEE SYSTEM TIBIAL BASE FIXED BEARING SIZE 3 CEMENTED
Type: IMPLANTABLE DEVICE | Site: KNEE | Status: FUNCTIONAL
Brand: ATTUNE

## 2022-12-06 RX ORDER — ACETAMINOPHEN 325 MG/1
975 TABLET ORAL EVERY 8 HOURS
Status: DISCONTINUED | OUTPATIENT
Start: 2022-12-06 | End: 2022-12-07 | Stop reason: HOSPADM

## 2022-12-06 RX ORDER — ENOXAPARIN SODIUM 100 MG/ML
40 INJECTION SUBCUTANEOUS DAILY
Status: DISCONTINUED | OUTPATIENT
Start: 2022-12-07 | End: 2022-12-07 | Stop reason: HOSPADM

## 2022-12-06 RX ORDER — GABAPENTIN 300 MG/1
300 CAPSULE ORAL ONCE
Status: COMPLETED | OUTPATIENT
Start: 2022-12-06 | End: 2022-12-06

## 2022-12-06 RX ORDER — ACETAMINOPHEN 325 MG/1
975 TABLET ORAL ONCE
Status: COMPLETED | OUTPATIENT
Start: 2022-12-06 | End: 2022-12-06

## 2022-12-06 RX ORDER — MAGNESIUM HYDROXIDE/ALUMINUM HYDROXICE/SIMETHICONE 120; 1200; 1200 MG/30ML; MG/30ML; MG/30ML
30 SUSPENSION ORAL EVERY 6 HOURS PRN
Status: DISCONTINUED | OUTPATIENT
Start: 2022-12-06 | End: 2022-12-07 | Stop reason: HOSPADM

## 2022-12-06 RX ORDER — SODIUM CHLORIDE 1000 MG
1 TABLET, SOLUBLE MISCELLANEOUS DAILY
Status: DISCONTINUED | OUTPATIENT
Start: 2022-12-06 | End: 2022-12-07 | Stop reason: HOSPADM

## 2022-12-06 RX ORDER — PROPOFOL 10 MG/ML
INJECTION, EMULSION INTRAVENOUS CONTINUOUS PRN
Status: DISCONTINUED | OUTPATIENT
Start: 2022-12-06 | End: 2022-12-06

## 2022-12-06 RX ORDER — ONDANSETRON 2 MG/ML
4 INJECTION INTRAMUSCULAR; INTRAVENOUS ONCE AS NEEDED
Status: DISCONTINUED | OUTPATIENT
Start: 2022-12-06 | End: 2022-12-06 | Stop reason: HOSPADM

## 2022-12-06 RX ORDER — CEFAZOLIN SODIUM 2 G/50ML
2000 SOLUTION INTRAVENOUS ONCE
Status: DISCONTINUED | OUTPATIENT
Start: 2022-12-06 | End: 2022-12-06 | Stop reason: HOSPADM

## 2022-12-06 RX ORDER — METOPROLOL SUCCINATE 25 MG/1
25 TABLET, EXTENDED RELEASE ORAL DAILY
Status: DISCONTINUED | OUTPATIENT
Start: 2022-12-07 | End: 2022-12-07 | Stop reason: HOSPADM

## 2022-12-06 RX ORDER — MIDAZOLAM HYDROCHLORIDE 2 MG/2ML
INJECTION, SOLUTION INTRAMUSCULAR; INTRAVENOUS AS NEEDED
Status: DISCONTINUED | OUTPATIENT
Start: 2022-12-06 | End: 2022-12-06

## 2022-12-06 RX ORDER — ZIPRASIDONE HYDROCHLORIDE 20 MG/1
80 CAPSULE ORAL 2 TIMES DAILY
Status: DISCONTINUED | OUTPATIENT
Start: 2022-12-06 | End: 2022-12-07 | Stop reason: HOSPADM

## 2022-12-06 RX ORDER — OXYCODONE HYDROCHLORIDE 5 MG/1
2.5 TABLET ORAL EVERY 4 HOURS PRN
Status: DISCONTINUED | OUTPATIENT
Start: 2022-12-06 | End: 2022-12-07

## 2022-12-06 RX ORDER — CEFAZOLIN SODIUM 2 G/50ML
2000 SOLUTION INTRAVENOUS EVERY 8 HOURS
Status: COMPLETED | OUTPATIENT
Start: 2022-12-06 | End: 2022-12-07

## 2022-12-06 RX ORDER — ALBUMIN, HUMAN INJ 5% 5 %
SOLUTION INTRAVENOUS CONTINUOUS PRN
Status: DISCONTINUED | OUTPATIENT
Start: 2022-12-06 | End: 2022-12-06

## 2022-12-06 RX ORDER — CHLORHEXIDINE GLUCONATE 0.12 MG/ML
15 RINSE ORAL ONCE
Status: COMPLETED | OUTPATIENT
Start: 2022-12-06 | End: 2022-12-06

## 2022-12-06 RX ORDER — INSULIN LISPRO 100 [IU]/ML
2-12 INJECTION, SOLUTION INTRAVENOUS; SUBCUTANEOUS
Status: DISCONTINUED | OUTPATIENT
Start: 2022-12-06 | End: 2022-12-07 | Stop reason: HOSPADM

## 2022-12-06 RX ORDER — BUPIVACAINE HYDROCHLORIDE 2.5 MG/ML
INJECTION, SOLUTION EPIDURAL; INFILTRATION; INTRACAUDAL AS NEEDED
Status: DISCONTINUED | OUTPATIENT
Start: 2022-12-06 | End: 2022-12-06 | Stop reason: HOSPADM

## 2022-12-06 RX ORDER — SODIUM CHLORIDE, SODIUM LACTATE, POTASSIUM CHLORIDE, CALCIUM CHLORIDE 600; 310; 30; 20 MG/100ML; MG/100ML; MG/100ML; MG/100ML
125 INJECTION, SOLUTION INTRAVENOUS CONTINUOUS
Status: DISCONTINUED | OUTPATIENT
Start: 2022-12-06 | End: 2022-12-06

## 2022-12-06 RX ORDER — GABAPENTIN 100 MG/1
200 CAPSULE ORAL 2 TIMES DAILY
Status: DISCONTINUED | OUTPATIENT
Start: 2022-12-06 | End: 2022-12-07 | Stop reason: HOSPADM

## 2022-12-06 RX ORDER — ONDANSETRON 2 MG/ML
INJECTION INTRAMUSCULAR; INTRAVENOUS AS NEEDED
Status: DISCONTINUED | OUTPATIENT
Start: 2022-12-06 | End: 2022-12-06

## 2022-12-06 RX ORDER — SODIUM CHLORIDE 9 MG/ML
75 INJECTION, SOLUTION INTRAVENOUS CONTINUOUS
Status: DISCONTINUED | OUTPATIENT
Start: 2022-12-06 | End: 2022-12-07 | Stop reason: ALTCHOICE

## 2022-12-06 RX ORDER — MAGNESIUM HYDROXIDE 1200 MG/15ML
LIQUID ORAL AS NEEDED
Status: DISCONTINUED | OUTPATIENT
Start: 2022-12-06 | End: 2022-12-06 | Stop reason: HOSPADM

## 2022-12-06 RX ORDER — FENTANYL CITRATE/PF 50 MCG/ML
25 SYRINGE (ML) INJECTION
Status: DISCONTINUED | OUTPATIENT
Start: 2022-12-06 | End: 2022-12-06 | Stop reason: HOSPADM

## 2022-12-06 RX ORDER — EPHEDRINE SULFATE 50 MG/ML
INJECTION INTRAVENOUS AS NEEDED
Status: DISCONTINUED | OUTPATIENT
Start: 2022-12-06 | End: 2022-12-06

## 2022-12-06 RX ORDER — HYDROMORPHONE HCL/PF 1 MG/ML
0.2 SYRINGE (ML) INJECTION
Status: DISCONTINUED | OUTPATIENT
Start: 2022-12-06 | End: 2022-12-06 | Stop reason: HOSPADM

## 2022-12-06 RX ORDER — CLONAZEPAM 0.5 MG/1
0.5 TABLET ORAL
Status: DISCONTINUED | OUTPATIENT
Start: 2022-12-06 | End: 2022-12-07 | Stop reason: HOSPADM

## 2022-12-06 RX ORDER — DOCUSATE SODIUM 100 MG/1
100 CAPSULE, LIQUID FILLED ORAL 2 TIMES DAILY
Status: DISCONTINUED | OUTPATIENT
Start: 2022-12-06 | End: 2022-12-07 | Stop reason: HOSPADM

## 2022-12-06 RX ORDER — PANTOPRAZOLE SODIUM 40 MG/1
40 TABLET, DELAYED RELEASE ORAL DAILY
Status: DISCONTINUED | OUTPATIENT
Start: 2022-12-07 | End: 2022-12-07 | Stop reason: HOSPADM

## 2022-12-06 RX ORDER — PRAVASTATIN SODIUM 40 MG
40 TABLET ORAL
Status: DISCONTINUED | OUTPATIENT
Start: 2022-12-06 | End: 2022-12-07 | Stop reason: HOSPADM

## 2022-12-06 RX ORDER — CEFAZOLIN SODIUM 2 G/50ML
SOLUTION INTRAVENOUS AS NEEDED
Status: DISCONTINUED | OUTPATIENT
Start: 2022-12-06 | End: 2022-12-06

## 2022-12-06 RX ORDER — ONDANSETRON 2 MG/ML
4 INJECTION INTRAMUSCULAR; INTRAVENOUS EVERY 6 HOURS PRN
Status: DISCONTINUED | OUTPATIENT
Start: 2022-12-06 | End: 2022-12-07 | Stop reason: HOSPADM

## 2022-12-06 RX ORDER — DEXAMETHASONE SODIUM PHOSPHATE 4 MG/ML
10 INJECTION, SOLUTION INTRA-ARTICULAR; INTRALESIONAL; INTRAMUSCULAR; INTRAVENOUS; SOFT TISSUE ONCE
Status: COMPLETED | OUTPATIENT
Start: 2022-12-07 | End: 2022-12-07

## 2022-12-06 RX ORDER — SODIUM CHLORIDE, SODIUM LACTATE, POTASSIUM CHLORIDE, CALCIUM CHLORIDE 600; 310; 30; 20 MG/100ML; MG/100ML; MG/100ML; MG/100ML
INJECTION, SOLUTION INTRAVENOUS CONTINUOUS PRN
Status: DISCONTINUED | OUTPATIENT
Start: 2022-12-06 | End: 2022-12-06

## 2022-12-06 RX ORDER — OXYCODONE HYDROCHLORIDE 5 MG/1
5 TABLET ORAL EVERY 4 HOURS PRN
Status: DISCONTINUED | OUTPATIENT
Start: 2022-12-06 | End: 2022-12-07

## 2022-12-06 RX ORDER — PROPOFOL 10 MG/ML
INJECTION, EMULSION INTRAVENOUS AS NEEDED
Status: DISCONTINUED | OUTPATIENT
Start: 2022-12-06 | End: 2022-12-06

## 2022-12-06 RX ORDER — OXYCODONE HCL 10 MG/1
10 TABLET, FILM COATED, EXTENDED RELEASE ORAL ONCE
Status: COMPLETED | OUTPATIENT
Start: 2022-12-06 | End: 2022-12-06

## 2022-12-06 RX ADMIN — ACETAMINOPHEN 975 MG: 325 TABLET, FILM COATED ORAL at 11:29

## 2022-12-06 RX ADMIN — PROPOFOL 40 MCG/KG/MIN: 10 INJECTION, EMULSION INTRAVENOUS at 13:04

## 2022-12-06 RX ADMIN — OXYCODONE HYDROCHLORIDE 5 MG: 5 TABLET ORAL at 20:28

## 2022-12-06 RX ADMIN — ONDANSETRON 4 MG: 2 INJECTION INTRAMUSCULAR; INTRAVENOUS at 12:50

## 2022-12-06 RX ADMIN — SODIUM CHLORIDE 75 ML/HR: 0.9 INJECTION, SOLUTION INTRAVENOUS at 17:51

## 2022-12-06 RX ADMIN — CLONAZEPAM 0.5 MG: 0.5 TABLET ORAL at 23:19

## 2022-12-06 RX ADMIN — MIDAZOLAM 2 MG: 1 INJECTION INTRAMUSCULAR; INTRAVENOUS at 12:50

## 2022-12-06 RX ADMIN — CEFAZOLIN SODIUM 2000 MG: 2 SOLUTION INTRAVENOUS at 23:19

## 2022-12-06 RX ADMIN — OXYCODONE HYDROCHLORIDE 10 MG: 10 TABLET, FILM COATED, EXTENDED RELEASE ORAL at 11:29

## 2022-12-06 RX ADMIN — PRAVASTATIN SODIUM 40 MG: 40 TABLET ORAL at 23:19

## 2022-12-06 RX ADMIN — CHLORHEXIDINE GLUCONATE 15 ML: 1.2 SOLUTION ORAL at 11:30

## 2022-12-06 RX ADMIN — ZIPRASIDONE HYDROCHLORIDE 80 MG: 20 CAPSULE ORAL at 17:51

## 2022-12-06 RX ADMIN — SODIUM CHLORIDE 1 G: 1 TABLET ORAL at 17:51

## 2022-12-06 RX ADMIN — ALBUMIN HUMAN: 50 SOLUTION INTRAVENOUS at 14:50

## 2022-12-06 RX ADMIN — SODIUM CHLORIDE, SODIUM LACTATE, POTASSIUM CHLORIDE, AND CALCIUM CHLORIDE 125 ML/HR: .6; .31; .03; .02 INJECTION, SOLUTION INTRAVENOUS at 10:26

## 2022-12-06 RX ADMIN — ACETAMINOPHEN 975 MG: 325 TABLET, FILM COATED ORAL at 17:51

## 2022-12-06 RX ADMIN — PHENYLEPHRINE HYDROCHLORIDE 100 MCG/MIN: 10 INJECTION INTRAVENOUS at 13:22

## 2022-12-06 RX ADMIN — EPHEDRINE SULFATE 10 MG: 50 INJECTION, SOLUTION INTRAVENOUS at 13:09

## 2022-12-06 RX ADMIN — PROPOFOL 20 MG: 10 INJECTION, EMULSION INTRAVENOUS at 13:04

## 2022-12-06 RX ADMIN — EPHEDRINE SULFATE 10 MG: 50 INJECTION, SOLUTION INTRAVENOUS at 14:23

## 2022-12-06 RX ADMIN — GABAPENTIN 200 MG: 100 CAPSULE ORAL at 17:51

## 2022-12-06 RX ADMIN — Medication 20 MG: at 13:04

## 2022-12-06 RX ADMIN — GABAPENTIN 300 MG: 300 CAPSULE ORAL at 11:29

## 2022-12-06 RX ADMIN — CEFAZOLIN SODIUM 2000 MG: 2 SOLUTION INTRAVENOUS at 12:55

## 2022-12-06 RX ADMIN — DOCUSATE SODIUM 100 MG: 100 CAPSULE, LIQUID FILLED ORAL at 17:51

## 2022-12-06 RX ADMIN — SODIUM CHLORIDE, SODIUM LACTATE, POTASSIUM CHLORIDE, AND CALCIUM CHLORIDE: .6; .31; .03; .02 INJECTION, SOLUTION INTRAVENOUS at 12:50

## 2022-12-06 RX ADMIN — SODIUM CHLORIDE, SODIUM LACTATE, POTASSIUM CHLORIDE, AND CALCIUM CHLORIDE: .6; .31; .03; .02 INJECTION, SOLUTION INTRAVENOUS at 14:41

## 2022-12-06 RX ADMIN — EPHEDRINE SULFATE 10 MG: 50 INJECTION, SOLUTION INTRAVENOUS at 13:07

## 2022-12-06 NOTE — PROGRESS NOTES
Progress Note - Orthopedics   Ryley Malady 75 y o  female MRN: [id number]  Unit/Bed#: OR POOL Encounter: [id number]    Assessment:  1) POD#0 s/p RA L TKA    Plan:  Ancef 2g IV x 2 for 24 hours postop  DVT prophylaxis: Lovenox 40mg SQ Qdaily/SCD's/Ambulation  WBAT LLE  PT/OT- WBAT LLE  Analgesia PRN  Follow up AM labs  D/c gonzales in AM POD #1  Dressing- monitor for drainage  Discharge planning -disposition pending progress with PT postoperatively  Plan for discharge home tomorrow to start outpatient Visicol therapy later this week  Weight bearing: WBAT LLE    VTE Pharmacologic Prophylaxis: Enoxaparin (Lovenox)  VTE Mechanical Prophylaxis: sequential compression device    Subjective: Patient seen and examined postoperatively  Pain control presents with surgery discussed with the patient and patient's family in the waiting room  Vitals: Blood pressure 121/65, pulse 88, temperature (!) 95 9 °F (35 5 °C), temperature source Temporal, resp  rate 18, weight 82 8 kg (182 lb 9 6 oz), SpO2 99 %, not currently breastfeeding  ,Body mass index is 31 34 kg/m²  Intake/Output Summary (Last 24 hours) at 12/6/2022 1518  Last data filed at 12/6/2022 1507  Gross per 24 hour   Intake 1250 ml   Output 240 ml   Net 1010 ml       Invasive Devices     Peripheral Intravenous Line  Duration           Peripheral IV 12/06/22 Right Forearm <1 day          Drain  Duration           Urethral Catheter Latex 16 Fr  <1 day                Physical Exam: NAD  Ortho Exam: LLE: Dsg c/d/i, compartments soft, calf non-tender, +PF/DF/EHL, +DP/SP/Saph/Sural SILT, DP 2+, foot warm    Lab, Imaging and other studies: PO XR L knee: Reviewed and acceptable    Con WHITE    Division of Adult Reconstruction  Department of Orthopaedic Surgery  [facility]

## 2022-12-06 NOTE — OP NOTE
OPERATIVE REPORT  PATIENT NAME: Steven Rock    :  1955  MRN: [id number]  Pt Location: [facility] OR ROOM 01    SURGERY DATE: 2022    Surgeon(s) and Role:     * Hannah Daugherty, DO - Primary     * Eleazar Barkley PA-C - Assisting, no qualified resident was available an assistant was needed for patient positioning, prepping and draping, soft tissue retraction, protection of vital structures throughout the case, exposure of the femur and tibia from robotic assisted resection and implantation of final prosthesis, superficial closure, and to complete the case safely  Tiarra Hwang,  ATC/OTC - 2nd Assist    Preop Diagnosis:  Primary osteoarthritis of left knee [M17 12]  Chronic pain of left knee [M25 562, G89 29]    Post-Op Diagnosis Codes:     * Primary osteoarthritis of left knee [M17 12]     * Chronic pain of left knee [M25 562, G89 29]    Procedure(s) (LRB):  ARTHROPLASTY KNEE TOTAL W ROBOT - CEMENTED - LEFT (Left)    Specimen(s):  * No specimens in log *    Estimated Blood Loss:   40 mL    Drains: None  Urethral Catheter Latex 16 Fr  (Active)   Number of days: 0     Anesthesia Type:   Spinal with sedation, postoperative abductor canal block with Exparel    Intravenous fluids: 900 cc    Antibiotics: Ancef 2 g    Urinary output: Shanks: 200 cc    Tourniquet time: 56 Duy@[employer] mmHg    Implant Name Type Inv   Item Serial No   Lot No  LRB No  Used Action   CEMENT BN 40 GM PALACOS RADIOPAQUE W/O ANTIBIOTIC - [id number]  CEMENT BN 40 GM PALACOS RADIOPAQUE W/O ANTIBIOTIC  [employer] [id number] Left 1 Implanted   CEMENT BN 40 GM PALACOS RADIOPAQUE W/O ANTIBIOTIC - [id number]  CEMENT BN 40 GM PALACOS RADIOPAQUE W/O ANTIBIOTIC  [employer] [id number] Left 1 Implanted   COMPONENT PATELLA 29MM MEDIAL DOMED CMNT ATTUNE - [id number]  COMPONENT PATELLA 29MM MEDIAL DOMED CMNT ATTUNE  DEPUY [id number] Left 1 Implanted   BASEPLATE TIBIAL SZ 3 CMNT FX BRNG ATTUNE S PLUS - [id number]  BASEPLATE TIBIAL SZ 3 CMNT FX BRNG ATTUNE S PLUS  DEPUY [id number] Left 1 Implanted   INSERT TIB SZ 3 6MM LT FX BRNG MED STAB ATTUNE - [id number]  INSERT TIB SZ 3 6MM LT FX BRNG MED STAB ATTUNE  DEPUY [id number] Left 1 Implanted   COMPONENT FEM SZ 3 LT CMNT CR ATTUNE - [id number]  COMPONENT FEM SZ 3 LT CMNT CR ATTUNE  DEPUY [id number] Left 1 Implanted     Operative Indications:  Primary osteoarthritis of left knee [M17 12]  Chronic pain of left knee [T76 589, G89 29]  Patient is a 50-year-old female known to me for treatment of her activity related left knee pain  The patient has exhausted conservative measures of treatment and her persistent activity related left knee pain persisted  X-rays demonstrate severe osteoarthritis of her left knee  With failed conservative treatment, persistent activity related pain, and end-stage osteoarthritis the patient was recommended undergo robotic assisted left total knee arthroplasty  She was agreeable to this  She was optimized for the intended procedure by her medical subspecialist   She underwent robotic assisted left knee arthroplasty on 12/6/2022  Operative Findings:  Intraoperatively the patient was found to have significant osteopenia  Her preoperative range of motion was 10 degrees of flexion to 120 degrees of flexion with a 9 degree varus deformity of her left limb  Robotic assisted left total knee arthroplasty was successfully performed  The final construct demonstrated excellent range of motion from 0 to 135 degrees with excellent stability at 0 degrees and 30 degrees at 90 degrees  The patella tracked midline and flat  The overall alignment of the limb was corrected to 3 degrees of varus  After closure of the arthrotomy range of motion, stability, patellar tracking were unchanged  The patient tolerated procedure well  Complications:   None    Procedure and Technique:  The patient was identified and marked in the preoperative holding area  Final questions were addressed   Consents were visualized for correct laterality and the intended procedure  Patient was taken back to the operating room where they were transferred to the operating room table and administered spinal anesthesia by the anesthesia staff  Tourniquet was then applied to the left thigh  The right lower extremity was draped over the foot break in the bed after the split leg attachment was removed, and the popliteal fossa was well padded and the leg was secured in the flexed position off of the operating table  The left lower extremity was prepped and draped in the standard sterile fashion with the addition of the knee positioner  The patient was administered 2 g of IV Ancef  Tranexamic acid  was not administered by the anesthesia staff due to a history of DVT  A final timeout was performed and all members of the operating room staff were in agreement of the intended procedure and the correct laterality was confirmed  An anterior knee incision was marked over the anterior left knee and carried over the medial portion of the patella down medial to the tibial tubercle  The leg was exsanguinated and the tourniquet was inflated to 250 mmHg  An incision was made over the anterior knee using a scalpel, and sharp dissection was carried deep through Elpidio's fascia creating full thickness flaps  The extensor mechanism was identified  A standard medial parapatellar arthrotomy was performed using a scalpel, and upon doing so benign-appearing yellow intra-articular fluid was expressed  The anterior horn of the medial and lateral meniscus was removed  50% of the patellar fat pad was removed for proper exposure  The distal arthrotomy was used to initiate a medial release around the proximal tibia at the joint line to the mid coronal plane  On inspection there was diffuse grade 4 degenerative change in the medial compartment, lateral compartment, and patellofemoral compartment   Preparations were made for robotic assisted total knee arthroplasty  The periarticular osteophytes were removed from around the distal femur, proximal tibia, and intercondylar notch  As the periarticular space was being prepared and osteophytes were being removed it was noted that the patient extremity soft osteopenic bone  The remnants of the ACL and the PCL were removed electrocautery  The visualized portions of the medial and lateral meniscus were removed  The distal femur and proximal tibial arrays were placed without complication  The checkpoints were created the distal femur proximal tibia  The hip center was captured  Anatomic registration was carried out in sequence  Range of motion was evaluated and the knee was in 9 degrees of varus, and the range of motion was from 10 degrees-120 degrees  The Proadjust graph was created  Through manipulating the position of the femoral and tibial implants a well-balanced Proadjust graph was created and this was excepted as the intraoperative plan  The robot was brought into the surgical field  The retractors were placed around the distal femur proximal tibia  Robotic assisted resection was performed of the distal femur in sequence without damage to the periarticular tissues  The knee tibia was subluxed anteriorly and the proximal tibia was resected without complication  All bony fragments were removed and resection appeared to be complete  The knee was brought out into the full extension in the posterior compartment was evaluated  The remnants of the medial and lateral meniscus were removed with electrocautery  Based on the intraoperative plan a size 3 femoral notch guide was placed and the trochlea was prepared  The size 3 left CR trial was placed upon the distal femur and a size 5 polyethylene insert was placed into the articulation  The knee was cycled through a range of motion and is range of motion was evaluated    The polyethylene was then upsized to a size 6 medial stabilized CR trial and the knee was cycled through range of motion and his motion was again evaluated  The overall alignment of the limb was restored to 3 degrees of varus, and range of motion was from 0 degrees-135 degrees  The knee had excellent stability at 0 degrees and 30 degrees and 90 degrees  This was deemed to be the final construct  The arrays were removed from the distal femur proximal tibia without complication  The lug holes in the femur were drilled  The trials were removed and the tibia was subluxed anteriorly  The tibia was sized and was found that a size 3 base plate would be appropriate  This was aligned with the medial 1/3 of the proximal tibial tubercle and pinned into place  The tibia was then reamed, broached, and finished in sequence  The base plate was removed  Attention was then turned to the patella  The osteo synovial reflection was revealed using a Bovie  The patella height measured 21 millimeters prior to resection  The patella was sized and found to be a 29 mm patellar button  The cutting guide was set for the appropriate depth and the patella was evenly resected using oscillating saw  The 29 mm patellar button was then medialized over the patella and the lug holes were drilled  A trial patella button was placed upon the patella and the pre osteotomy patellar height was restored  A lateral facetectomy of the patella was performed  The soft tissues of the posterior capsule were cauterized using Bovie to ensure hemostasis  The posterior capsule and medial and lateral periarticular soft tissues were injected with a periarticular analgesic cocktail  The knee was again irrigated in preparation for cementation  The tibia was subluxed and all retractors were in place for cementation when final implants were confirmed and placed upon the back table    2 bags of Palacos cement without gentamicin were mixed and the final size 3 S+ tibial implant, 6 mm AOX CR medial stabilized polyethylene insert, size 3 left CR femur, and 29 mm patella button were cemented in sequence  Excess cement was removed after each implant was in place  As the cement cured the remainder of the periarticular pain cocktail was injected into the soft tissues around the knee  Irrigation then followed with IrriSept followed by normal saline solution  After the cement had cured the joint was inspected for any residual loose bodies of cement and these were removed  The tourniquet was released after 56 minutes at 250 mmHg  The tracking and stability of the final components were assessed  The patella tracked midline without tilt, and the knee was stable in both flexion and extension, coronal stability was unchanged, and the knee demonstrated range of motion from 0-135°  The knee was again irrigated and a very conservative partial synovectomy was performed  Hemostasis was achieved using electrocautery  Closure began using a #2 barbed bidirectional suture for the arthrotomy  After closure of the arthrotomy range of motion to gravity was tested and was found to be 0-135° of flexion  Quarter percent Marcaine plain was injected in the subcutaneous soft tissues  The deep subcutaneous tissues were reapproximated with undyed 0 Vicryl, the superficial subcutaneous tissues were reapproximated with undyed 2-0 Vicryl, the skin edges reapproximated with a running 3-0 bidirectional barbed Monocryl suture, and the skin edges were sealed with Exofin  After the  had dried a silver impregnated Mepilex dressing was placed over the incision  The drapes were removed and OLAMIDE stockings were placed on the bilateral lower extremities  Patient was then awakened from anesthesia without difficulty, and transferred to PACU in stable condition        I was present for all critical portions of the procedure    Patient Disposition:  PACU         SIGNATURE: Bessie Bynum DO  DATE: December 6, 2022  TIME: 3:03 PM

## 2022-12-06 NOTE — ANESTHESIA POSTPROCEDURE EVALUATION
Post-Op Assessment Note    CV Status:  Stable  Pain Score: 0    Pain management: adequate     Mental Status:  Sleepy   Hydration Status:  Euvolemic   PONV Controlled:  Controlled   Airway Patency:  Patent      Post Op Vitals Reviewed: Yes      Staff: Anesthesiologist, CRNA         No notable events documented      BP   105/53   Temp     Pulse  63   Resp   14   SpO2   100

## 2022-12-06 NOTE — INTERVAL H&P NOTE
H&P reviewed  After examining the patient I find no changes in the patients condition since the H&P had been written  Patient seen and examined this morning and she states that there have been no changes in her overall medical health or orthopedic exam since last evaluation  Patient denies any recent fever, chills, nausea, vomiting, headache, chest pain, trouble breathing  The patient has been seen and cleared by medical subspecialist with preparation for the procedure today  She will be a good candidate for Lovenox postoperatively for DVT prophylaxis  She would be a good candidate for outpatient physical therapy following discharge from hospital   All questions addressed this morning  Proceed OR for robotic assisted left total knee arthroplasty      Vitals:    12/06/22 0950   BP: 121/65   Pulse: 88   Resp: 18   Temp: (!) 95 9 °F (35 5 °C)   SpO2: 99%

## 2022-12-06 NOTE — PHYSICAL THERAPY NOTE
PHYSICAL THERAPY EVALUATION/TREATMENT       12/06/22 1645   PT Last Visit   PT Visit Date 12/06/22   Note Type   Note type Evaluation   Pain Assessment   Pain Assessment Tool 0-10   Pain Score No Pain  (at rest)   Pain Location/Orientation Orientation: Left; Location: Knee   Pain Onset/Description Onset: Ongoing; Descriptor: Sore   Effect of Pain on Daily Activities limits mobility/rest   Patient's Stated Pain Goal No pain   Hospital Pain Intervention(s) Rest;Repositioned   Multiple Pain Sites No   Restrictions/Precautions   Weight Bearing Precautions Per Order Yes   LLE Weight Bearing Per Order WBAT   Home Living   Type of [address] [place], [place] [address] One level;Stairs to enter with rails  (6 HERNA[address])   [address] [place] chair   Home Equipment Walker;Cane   Prior Function   Level of [place] Independent with ADLs; Independent with functional mobility; Needs assistance with IADLS  (Does not drive, family does cooking/cleaning/laundry)   Lives With Spouse; Son   S[place]yer An Help From Family   IADLs Family/Friend/Other provides transportation; Family/Friend/Other provides meals   Falls in the last 6 months 0   General   Additional Pertinent History Pt is a 75year-old female who was admitted to the hospital today (12/6/22) now seen POD #0 L TKA  Family/Caregiver Present Yes  ( at bedside)   Cognition   Arousal/Participation Cooperative   Orientation Level Oriented X4   Following Commands Follows multistep commands with increased time or repetition   Subjective   Subjective "i'm sleepy"   RLE Assessment   RLE Assessment WFL   LLE Assessment   LLE Assessment WFL  (Knee at least 3/5 ; Knee ROM 0-80 degrees)   Light Touch   RLE Light Touch   (in)   LLE Light Touch   (Intact but reports decreased senation compared to RLE from knee + below)   Bed Mobility   Supine to Sit 5  Supervision   Additional items Bedrails;Verbal cues; Increased time required   Sit to Supine 5  Supervision   Additional items Bedrails;Verbal cues   Transfers   Sit to Stand 4  Minimal assistance   Additional items Assist x 1;Verbal cues   Stand to Sit 4  Minimal assistance   Additional items Assist x 1;Verbal cues   Stand pivot 4  Minimal assistance   Additional items Assist x 1;Verbal cues   Ambulation/Elevation   Gait pattern Antalgic;Decreased L stance; Step to;Short stride   Gait Assistance 4  Minimal assist   Additional items Assist x 1;Verbal cues   Assistive Device Rolling walker   Distance 4 feet forward from edge of bed   Stair Management Assistance Not tested   Balance   Static Sitting Fair +   Dynamic Sitting Fair   Static Standing Fair -   Dynamic Standing Fair -   Ambulatory Poor +   Activity Tolerance   Activity Tolerance Patient tolerated treatment well;Patient limited by fatigue;Patient limited by pain   Nurse Made Aware yes   Assessment   Prognosis Good   Problem List Decreased strength;Decreased range of motion;Decreased endurance; Impaired balance;Decreased mobility;Pain;Orthopedic restrictions;Decreased skin integrity   Assessment Patient seen for Physical Therapy evaluation  Patient admitted with Status post total left knee replacement using cement  Comorbidities affecting patient's physical performance include: cancer, CKD, diabetes, hypertension and osteoarthritis  Personal factors affecting patient at time of initial evaluation include: lives in 1 story house, ambulating with assistive device, stairs to enter home, inability to navigate community distances, inability to perform IADLS  and inability to live alone  Prior to admission, patient was independent with functional mobility with walker, independent with ADLS, requiring assist for IADLS, living with spouse + son in a 1 level home with 6 steps to enter and ambulating household distance    Please find objective findings from Physical Therapy assessment regarding body systems outlined above with impairments and limitations including weakness, decreased ROM, impaired balance, decreased endurance, gait deviations, pain, decreased activity tolerance, decreased functional mobility tolerance, fall risk and decreased skin integrity  The Barthel Index was used as a functional outcome tool presenting with a score of Barthel Index Score: 50 today indicating marked limitations of functional mobility and ADLS  Patient's clinical presentation is currently unstable/unpredictable as seen in patient's presentation of vital sign response, changing level of pain, increased fall risk and decreased endurance  Pt would benefit from continued Physical Therapy treatment to address deficits as defined above and maximize level of functional mobility  As demonstrated by objective findings, the assigned level of complexity for this evaluation is high  The patient's AM-PAC Basic Mobility Inpatient Short Form Raw Score is 17  A Raw score of greater than 16 suggests the patient may benefit from discharge to home  Please also refer to the recommendation of the Physical Therapist for safe discharge planning  Goals   Patient Goals to get better   STG Expiration Date 12/13/22   Short Term Goal #1 Pt will be I with HEP ; Pt will perform bed mobility/transfer to bedside chair with Supervision using RW   Short Term Goal #2 Pt will ambulate x 150 feet with RW + Supervision ; Pt joann negotiate x 6 steps with rail/cane + Superviison   LTG Expiration Date 12/20/22   Long Term Goal #1 Pt will perform bed mobility/transfers IND with walker   Long Term Goal #2 Pt will ambulate 250 feet with RW IND ; Pt will negotiate x 6 steps with rail/cane IND   Plan   Treatment/Interventions ADL retraining;Functional transfer training;LE strengthening/ROM; Endurance training; Therapeutic exercise; Bed mobility;Gait training;Spoke to nursing   PT Frequency Twice a day   Recommendation   PT Discharge Recommendation Home with outpatient rehabilitation   Additional Comments Pt has walker/cane at home   Fawn Avila in Bed Without Bedrails 4   Lying on Back to Sitting on Edge of Flat Bed 3   Moving Bed to Chair 3   Standing Up From Chair 3   Walk in Room 3   Climb 3-5 Stairs 1   Basic Mobility Inpatient Raw Score 17   Basic Mobility Standardized Score 39 67   Highest Level Of Mobility   -[facility] Goal 5: Stand one or more mins   -HL Achieved 5: Stand (1 or more minutes)   Barthel Index   Feeding 10   Bathing 0   Grooming Score 0   Dressing Score 5   Bladder Score 10   Bowels Score 10   Toilet Use Score 5   Transfers (Bed/Chair) Score 10   Mobility (Level Surface) Score 0   Stairs Score 0   Barthel Index Score 50   Additional Treatment Session   Start Time 1725   End Time 1735   Treatment Assessment S: "I am feeling nauseous" O/A: Pt agreeable to PT session this afternoon  Pt able to ambulate approx 4 feet forward from edge of bed - reports feeling nauseous and requesting to return to bed  Able to return to sitting with Min A, intermittent verbal cues for proper hand placement + supervision to return to supine position  Repositioned for comfort and given kelli cracker/water + setup with food at end of session P: Pt will benefit from skilled PT to address deficits to maximize IND for safe d/c   Equipment Use walker   Exercises   Neuro re-ed Pt able to perform ankle pumps, LAQ x 5 reps L   End of Consult   Patient Position at End of Consult Supine;Bed/Chair alarm activated; All needs within reach   [facility] Insurance Number  Adriana Ahmadi [id number]

## 2022-12-06 NOTE — PLAN OF CARE
Problem: PAIN - ADULT  Goal: Verbalizes/displays adequate comfort level or baseline comfort level  Description: Interventions:  - Encourage patient to monitor pain and request assistance  - Assess pain using appropriate pain scale  - Administer analgesics based on type and severity of pain and evaluate response  - Implement non-pharmacological measures as appropriate and evaluate response  - Consider cultural and social influences on pain and pain management  - Notify physician/advanced practitioner if interventions unsuccessful or patient reports new pain  Outcome: Progressing     Problem: INFECTION - ADULT  Goal: Absence or prevention of progression during hospitalization  Description: INTERVENTIONS:  - Assess and monitor for signs and symptoms of infection  - Monitor lab/diagnostic results  - Monitor all insertion sites, i e  indwelling lines, tubes, and drains  -- [place] appropriate cooling/warming therapies per order  - Administer medications as ordered  - Instruct and encourage patient and family to use good hand hygiene technique  - Identify and instruct in appropriate isolation precautions for identified infection/condition  Outcome: Progressing  Goal: Absence of fever/infection during neutropenic period  Description: INTERVENTIONS:  - Monitor WBC    Outcome: Progressing     Problem: SAFETY ADULT  Goal: Patient will remain free of falls  Description: INTERVENTIONS:  - Educate patient/family on patient safety including physical limitations  - Instruct patient to call for assistance with activity   - Consult OT/PT to assist with strengthening/mobility   - Keep Call bell within reach  - Keep bed low and locked with side rails adjusted as appropriate  - Keep care items and personal belongings within reach  - Initiate and maintain comfort rounds  - Make Fall Risk Sign visible to staff  - Offer Toileting every 2 Hours, in advance of need  - Initiate/Maintain 1alarm  - Obtain necessary fall risk management equipment: call bell use  - Apply yellow socks and bracelet for high fall risk patients  - Consider moving patient to room near nurses station  Outcome: Progressing  Goal: Maintain or return to baseline ADL function  Description: INTERVENTIONS:  -  Assess patient's ability to carry out ADLs; assess patient's baseline for ADL function and identify physical deficits which impact ability to perform ADLs (bathing, care of mouth/teeth, toileting, grooming, dressing, etc )  - Assess/evaluate cause of self-care deficits   - Assess range of motion  - Assess patient's mobility; develop plan if impaired  - Assess patient's need for assistive devices and provide as appropriate  - Encourage maximum independence but intervene and supervise when necessary  - Involve family in performance of ADLs  - Assess for home care needs following discharge   - Consider OT consult to assist with ADL evaluation and planning for discharge  - Provide patient education as appropriate  Outcome: Progressing  Goal: Maintains/Returns to pre admission functional level  Description: INTERVENTIONS:  - Perform BMAT or MOVE assessment daily    - Set and communicate daily mobility goal to care team and patient/family/caregiver  - Collaborate with rehabilitation services on mobility goals if consulted  - Perform Range of Motion 3 times a day  - Reposition patient every 3 hours    - Dangle patient 3 times a day  - Stand patient 3 times a day  - Ambulate patient 3 times a day  - Out of bed to chair 3 times a day   - Out of bed for meals 3 times a day  - Out of bed for toileting  - Record patient progress and toleration of activity level   Outcome: Progressing     Problem: DISCHARGE PLANNING  Goal: Discharge to home or other facility with appropriate resources  Description: INTERVENTIONS:  - Identify barriers to discharge w/patient and caregiver  - Arrange for needed discharge resources and transportation as appropriate  - Identify discharge learning needs (meds, wound care, etc )  - Arrange for interpretive services to assist at discharge as needed  - Refer to Case Management Department for coordinating discharge planning if the patient needs post-hospital services based on physician/advanced practitioner order or complex needs related to functional status, cognitive ability, or social support system  Outcome: Progressing     Problem: Knowledge Deficit  Goal: Patient/family/caregiver demonstrates understanding of disease process, treatment plan, medications, and discharge instructions  Description: Complete learning assessment and assess knowledge base    Interventions:  - Provide teaching at level of understanding  - Provide teaching via preferred learning methods  Outcome: Progressing

## 2022-12-07 VITALS
HEIGHT: 64 IN | BODY MASS INDEX: 31.07 KG/M2 | WEIGHT: 182 LBS | DIASTOLIC BLOOD PRESSURE: 71 MMHG | SYSTOLIC BLOOD PRESSURE: 134 MMHG | RESPIRATION RATE: 17 BRPM | OXYGEN SATURATION: 100 % | HEART RATE: 82 BPM | TEMPERATURE: 99.1 F

## 2022-12-07 LAB
ANION GAP SERPL CALCULATED.3IONS-SCNC: 6 MMOL/L (ref 4–13)
BASOPHILS # BLD AUTO: 0 THOUSANDS/ÂΜL (ref 0–0.1)
BASOPHILS NFR BLD AUTO: 0 % (ref 0–1)
BUN SERPL-MCNC: 13 MG/DL (ref 5–25)
CALCIUM SERPL-MCNC: 8.5 MG/DL (ref 8.3–10.1)
CHLORIDE SERPL-SCNC: 104 MMOL/L (ref 96–108)
CO2 SERPL-SCNC: 28 MMOL/L (ref 21–32)
CREAT SERPL-MCNC: 1 MG/DL (ref 0.6–1.3)
EOSINOPHIL # BLD AUTO: 0.04 THOUSAND/ÂΜL (ref 0–0.61)
EOSINOPHIL NFR BLD AUTO: 1 % (ref 0–6)
ERYTHROCYTE [DISTWIDTH] IN BLOOD BY AUTOMATED COUNT: 13.2 % (ref 11.6–15.1)
GFR SERPL CREATININE-BSD FRML MDRD: 58 ML/MIN/1.73SQ M
GLUCOSE P FAST SERPL-MCNC: 220 MG/DL (ref 65–99)
GLUCOSE SERPL-MCNC: 203 MG/DL (ref 65–140)
GLUCOSE SERPL-MCNC: 220 MG/DL (ref 65–140)
GLUCOSE SERPL-MCNC: 260 MG/DL (ref 65–140)
GLUCOSE SERPL-MCNC: 264 MG/DL (ref 65–140)
GLUCOSE SERPL-MCNC: 271 MG/DL (ref 65–140)
GLUCOSE SERPL-MCNC: 275 MG/DL (ref 65–140)
GLUCOSE SERPL-MCNC: 279 MG/DL (ref 65–140)
HCT VFR BLD AUTO: 29.1 % (ref 34.8–46.1)
HGB BLD-MCNC: 9.6 G/DL (ref 11.5–15.4)
IMM GRANULOCYTES # BLD AUTO: 0.02 THOUSAND/UL (ref 0–0.2)
IMM GRANULOCYTES NFR BLD AUTO: 0 % (ref 0–2)
LYMPHOCYTES # BLD AUTO: 0.58 THOUSANDS/ÂΜL (ref 0.6–4.47)
LYMPHOCYTES NFR BLD AUTO: 7 % (ref 14–44)
MCH RBC QN AUTO: 31 PG (ref 26.8–34.3)
MCHC RBC AUTO-ENTMCNC: 33 G/DL (ref 31.4–37.4)
MCV RBC AUTO: 94 FL (ref 82–98)
MONOCYTES # BLD AUTO: 0.53 THOUSAND/ÂΜL (ref 0.17–1.22)
MONOCYTES NFR BLD AUTO: 6 % (ref 4–12)
NEUTROPHILS # BLD AUTO: 7.06 THOUSANDS/ÂΜL (ref 1.85–7.62)
NEUTS SEG NFR BLD AUTO: 86 % (ref 43–75)
NRBC BLD AUTO-RTO: 0 /100 WBCS
PLATELET # BLD AUTO: 140 THOUSANDS/UL (ref 149–390)
PMV BLD AUTO: 9.2 FL (ref 8.9–12.7)
POTASSIUM SERPL-SCNC: 4.5 MMOL/L (ref 3.5–5.3)
RBC # BLD AUTO: 3.1 MILLION/UL (ref 3.81–5.12)
SODIUM SERPL-SCNC: 138 MMOL/L (ref 135–147)
WBC # BLD AUTO: 8.23 THOUSAND/UL (ref 4.31–10.16)

## 2022-12-07 RX ORDER — OXYCODONE HYDROCHLORIDE 10 MG/1
10 TABLET ORAL EVERY 4 HOURS PRN
Status: DISCONTINUED | OUTPATIENT
Start: 2022-12-07 | End: 2022-12-07 | Stop reason: HOSPADM

## 2022-12-07 RX ORDER — OXYCODONE HYDROCHLORIDE 5 MG/1
5 TABLET ORAL EVERY 4 HOURS PRN
Status: DISCONTINUED | OUTPATIENT
Start: 2022-12-07 | End: 2022-12-07 | Stop reason: HOSPADM

## 2022-12-07 RX ORDER — NALOXONE HYDROCHLORIDE 4 MG/.1ML
SPRAY NASAL
Qty: 1 EACH | Refills: 0 | Status: SHIPPED | OUTPATIENT
Start: 2022-12-07

## 2022-12-07 RX ORDER — OXYCODONE HYDROCHLORIDE 5 MG/1
5 TABLET ORAL EVERY 4 HOURS PRN
Qty: 30 TABLET | Refills: 0 | Status: SHIPPED | OUTPATIENT
Start: 2022-12-07 | End: 2022-12-12

## 2022-12-07 RX ORDER — ACETAMINOPHEN 325 MG/1
975 TABLET ORAL EVERY 8 HOURS
Refills: 0
Start: 2022-12-07

## 2022-12-07 RX ORDER — ENOXAPARIN SODIUM 100 MG/ML
40 INJECTION SUBCUTANEOUS DAILY
Qty: 16.4 ML | Refills: 0 | Status: SHIPPED | OUTPATIENT
Start: 2022-12-08 | End: 2023-01-18

## 2022-12-07 RX ADMIN — ACETAMINOPHEN 975 MG: 325 TABLET, FILM COATED ORAL at 09:27

## 2022-12-07 RX ADMIN — METOPROLOL SUCCINATE 25 MG: 25 TABLET, EXTENDED RELEASE ORAL at 08:25

## 2022-12-07 RX ADMIN — INSULIN LISPRO 6 UNITS: 100 INJECTION, SOLUTION INTRAVENOUS; SUBCUTANEOUS at 09:27

## 2022-12-07 RX ADMIN — SODIUM CHLORIDE 1 G: 1 TABLET ORAL at 08:25

## 2022-12-07 RX ADMIN — CEFAZOLIN SODIUM 2000 MG: 2 SOLUTION INTRAVENOUS at 06:26

## 2022-12-07 RX ADMIN — OXYCODONE HYDROCHLORIDE 5 MG: 5 TABLET ORAL at 08:25

## 2022-12-07 RX ADMIN — ZIPRASIDONE HYDROCHLORIDE 80 MG: 20 CAPSULE ORAL at 08:25

## 2022-12-07 RX ADMIN — INSULIN LISPRO 4 UNITS: 100 INJECTION, SOLUTION INTRAVENOUS; SUBCUTANEOUS at 08:24

## 2022-12-07 RX ADMIN — ENOXAPARIN SODIUM 40 MG: 40 INJECTION SUBCUTANEOUS at 08:25

## 2022-12-07 RX ADMIN — PANTOPRAZOLE SODIUM 40 MG: 40 TABLET, DELAYED RELEASE ORAL at 08:25

## 2022-12-07 RX ADMIN — INSULIN LISPRO 6 UNITS: 100 INJECTION, SOLUTION INTRAVENOUS; SUBCUTANEOUS at 14:22

## 2022-12-07 RX ADMIN — GABAPENTIN 200 MG: 100 CAPSULE ORAL at 08:25

## 2022-12-07 RX ADMIN — DOCUSATE SODIUM 100 MG: 100 CAPSULE, LIQUID FILLED ORAL at 08:25

## 2022-12-07 RX ADMIN — ACETAMINOPHEN 975 MG: 325 TABLET, FILM COATED ORAL at 01:53

## 2022-12-07 RX ADMIN — OXYCODONE HYDROCHLORIDE 5 MG: 5 TABLET ORAL at 01:53

## 2022-12-07 RX ADMIN — DEXAMETHASONE SODIUM PHOSPHATE 10 MG: 4 INJECTION, SOLUTION INTRAMUSCULAR; INTRAVENOUS at 14:22

## 2022-12-07 NOTE — OCCUPATIONAL THERAPY NOTE
Occupational Therapy Evaluation/Treatment        12/07/22 1110   Note Type   Note type Evaluation   Pain Assessment   Pain Assessment Tool 0-10   Pain Score 5   Pain Location/Orientation Orientation: Left; Location: Knee   Restrictions/Precautions   Weight Bearing Precautions Per Order Yes   LLE Weight Bearing Per Order WBAT   Other Precautions Chair Alarm; Bed Alarm; Fall Risk;Impulsive;Pain   Home Living   Type of [facility] One level;Stairs to enter with rails  (6 HERNAN)   Bathroom Shower/Tub Tub/shower unit   Bathroom Toilet Standard   Bathroom Equipment Shower chair   Home Equipment Walker;Cane   Additional Comments Patient reports PTA independent in ADLs and functional mobility   Prior Function   Level of [place] Independent with ADLs; Independent with functional mobility   Lives With Spouse; Son   [place] Help From Family   IADLs Family/Friend/Other provides transportation; Family/Friend/Other provides meals   Comments Patient lives with spouse and son; daughter will also provide assist for ADLs/iADLs as needed   ADL   Eating Assistance 5  Supervision/Setup   Grooming Assistance 5  Supervision/Setup   UB Bathing Assistance 5  Supervision/Setup   LB Bathing Assistance 4  Minimal Assistance    [address] 5  Supervision/Setup   LB Dressing Assistance 4  Minimal [address]  4  Minimal Assistance   Bed Mobility   Additional Comments Not assessed; patient received seated in recliner chair at start of session   Transfers   Sit to Stand 5  Supervision   Additional items Verbal cues   Stand to Sit 5  Supervision   Additional items Verbal cues   Additional Comments Increased time to complete functional transfers and ADLs due to fatigue; pt reports feeling 'woozy' from pain medicine   Functional Mobility   Functional Mobility 5  Supervision   Additional Comments Patient ambulated short household distance in room with RW and supervision; increased time   Balance   Static Sitting Fair +   Dynamic Sitting Fair   Static Standing Fair   Dynamic Standing Fair -   Activity Tolerance   Activity Tolerance Patient limited by pain; Patient limited by fatigue   RUE Assessment   RUE Assessment WFL   LUE Assessment   LUE Assessment WFL   Cognition   Overall Cognitive Status WFL   Arousal/Participation Alert; Cooperative   Attention Within functional limits   Orientation Level Oriented X4   Following Commands Follows multistep commands with increased time or repetition   Comments Patient reports feeling 'woozy' from pain meds; increased fatigue and increased time to process commands   Assessment   Limitation Decreased ADL status; Decreased UE strength;Decreased Safe judgement during ADL;Decreased endurance;Decreased self-care trans;Decreased high-level ADLs   Prognosis Good   Assessment Patient evaluated by Occupational Therapy  Patient admitted with Status post total left knee replacement using cement  The patients occupational profile, medical and therapy history includes a extensive additional review of physical, cognitive, or psychosocial history related to current functional performance  Comorbidities affecting functional mobility and ADLS include: DM, arthritis, HTN, depression, vitamin D deficiency, HLD, CKD, colon cancer  Prior to admission, patient was independent with functional mobility without assistive device, independent with ADLS and independent with IADLS  The evaluation identifies the following performance deficits: weakness, impaired balance, decreased endurance, increased fall risk, new onset of impairment of functional mobility, decreased ADLS, decreased IADLS, pain, decreased activity tolerance, decreased safety awareness, impaired judgement and decreased strength, that result in activity limitations and/or participation restrictions   This evaluation requires clinical decision making of high complexity, because the patient presents with comorbidites that affect occupational performance and required significant modification of tasks or assistance with consideration of multiple treatment options  The Barthel Index was used as a functional outcome tool presenting with a score of Barthel Index Score: 55, indicating marked limitations of functional mobility and ADLS  The patient's raw score on the -PAC Daily Activity inpatient short form is 20, standardized score is 42 03, greater than 39 4  Patients at this level are likely to benefit from DC to home  Please refer to the recommendation of the Occupational Therapist for safe DC planning  Patient will benefit from skilled Occupational Therapy services to address above deficits and facilitate a safe return to prior level of function  Goals   Patient Goals 'I want to go home'   STG Time Frame 1-3   Short Term Goal  Goals established to promote Patient Goals: 'I want to go home':  Eating: independent; Grooming: independent standing at sink; Bathing: supervision; Upper Body Dressing independent; Lower Body Dressing: supervision; Toileting: supervision; Patient will increase ambulatory standard toilet transfer to independent to increase performance and safety with ADLS and functional mobility; Patient will increase standing tolerance to 10 minutes during ADL task to decrease assistance level and decrease fall risk; Patient will increase bed mobility to independent in preparation for ADLS and transfers;  Patient will increase functional mobility to and from bathroom with rolling walker independently to increase performance with ADLS and to use a toilet; Patient will tolerate 10 minutes of UE ROM/strengthening to increase general activity tolerance and performance in ADLS/IADLS; Patient will improve functional activity tolerance to 10 minutes of sustained functional tasks to increase participation in basic self-care and decrease assistance level;  Patient will be able to to verbalize understanding and perform energy conservation/proper body mechanics during ADLS and functional mobility at least 75% of the time with minimal cueing to decrease signs of fatigue and increase stamina to return to prior level of function; Patient will increase dynamic sitting balance to fair+ to improve the ability to sit at edge of bed or on a chair for ADLS;  Patient will increase dynamic standing balance to fair to improve postural stability and decrease fall risk during standing ADLS and transfers  LTG Time Frame 3-7   Long Term Goal Bathing: independent; Lower Body Dressing: independent; Toileting: independent; Patient will increase ambulatory standard toilet transfer to independent with rolling walker to increase performance and safety with ADLS and functional mobility; Patient will increase standing tolerance to 15 minutes during ADL task to decrease assistance level and decrease fall risk;Patient will increase functional mobility to and from bathroom with single point cane independently to increase performance with ADLS and to use a toilet; Patient will tolerate 15 minutes of UE ROM/strengthening to increase general activity tolerance and performance in ADLS/IADLS; Patient will improve functional activity tolerance to 15 minutes of sustained functional tasks to increase participation in basic self-care and decrease assistance level;  Patient will be able to to verbalize understanding and perform energy conservation/proper body mechanics during ADLS and functional mobility at least 90% of the time with no cueing to decrease signs of fatigue and increase stamina to return to prior level of function; Patient will increase static/dynamic sitting balance to good to improve the ability to sit at edge of bed or on a chair for ADLS;  Patient will increase static/dynamic standing balance to good to improve postural stability and decrease fall risk during standing ADLS and transfers    Pt will score >/= 24/24 on AM-PAC Daily Activity Inpatient scale to promote safe independence with ADLs and functional mobility; Pt will score >/= 100/100 on Barthel Index in order to decrease caregiver assistance needed and increase ability to perform ADLs and functional mobility  Plan   Treatment Interventions ADL retraining;Functional transfer training;UE strengthening/ROM; Endurance training;Patient/family training;Equipment evaluation/education; Compensatory technique education;Continued evaluation; Energy conservation; Activityengagement   Goal Expiration Date 12/14/22   OT Frequency 3-5x/wk   Recommendation   OT Discharge Recommendation No rehabilitation needs   AM-PAC Daily Activity Inpatient   Lower Body Dressing 3   Bathing 3   Toileting 3   Upper Body Dressing 3   Grooming 4   Eating 4   Daily Activity Raw Score 20   Daily Activity Standardized Score (Calc for Raw Score >=11) 42 03   AM-PAC Applied Cognition Inpatient   Following a Speech/Presentation 3   Understanding Ordinary Conversation 4   Taking Medications 3   Remembering Where Things Are Placed or Put Away 3   Remembering List of 4-5 Errands 3   Taking Care of Complicated Tasks 3   Applied Cognition Raw Score 19   Applied Cognition Standardized Score 39 77   Barthel Index   Feeding 10   Bathing 0   Grooming Score 0   Dressing Score 5   Bladder Score 10   Bowels Score 10   Toilet Use Score 5   Transfers (Bed/Chair) Score 10   Mobility (Level Surface) Score 0   Stairs Score 5   Barthel Index Score 55   Additional Treatment Session   Start Time 1040   End Time 1110   Treatment Assessment Patient seen for OT treatment session following evaluation; Patient ambulated short household distance to/from bathroom with RW and supervision; Attempted ambulatory transfer to/from standard height toilet however patient with difficulty due to increased pain L knee; BSC over toilet attempted instead, and patient able to complete ambulatory transfer to/from [facility] over toilet with RW and supervision;  Patient reports  recently purchased a raised toilet seat for use at home; Hand hygiene standing to sink unsupported with supervision; Once back at chair, stand to sit with supervision; Lower body Dressing: Don underwear, don pants with min assist and significantly increased time to complete; Upper body dressing: don bra and overhead shirt with set up assist, increased time to complete  Patient's daughter present for OT treatment session and reports able to assist patient with ADLs/iADLs as needed upon discharge home; Patient and daughter educated on safe car transfer technique and safe tub transfer technique, verbalized good understanding  Patient limited at times during evaluation by pain and delayed processing due to lethargy likely from pain meds/surgery; however functionally patient able to complete ADLs and functional mobility at a supervision-min assist level and will have assist from family at home as needed  At end of session, patient seated in recliner chair with all needs met, daughter at bedside  Continue OT per POC     Licensure   [place] License Number  [place], [place] [id number]

## 2022-12-07 NOTE — PHYSICAL THERAPY NOTE
PT TREATMENT       12/07/22 1535   PT Last Visit   PT Visit Date 12/07/22   Note Type   Note Type BID visit/treatment   Pain Assessment   Pain Assessment Tool 0-10   Pain Score 7   Pain Location/Orientation Orientation: Left; Location: Knee   Pain Onset/Description Descriptor: Aching;Descriptor: Sore; Onset: Ongoing   Effect of Pain on Daily Activities limits mobility   Patient's Stated Pain Goal No pain   Hospital Pain Intervention(s) Rest;Repositioned   Multiple Pain Sites No   Restrictions/Precautions   Weight Bearing Precautions Per Order Yes   LLE Weight Bearing Per Order WBAT   Other Precautions Bed Alarm; Chair Alarm; Fall Risk;Pain   General   Chart Reviewed Yes   Family/Caregiver Present No   Cognition   Arousal/Participation Cooperative   Attention Within functional limits   Orientation Level Oriented X4   Following Commands Follows multistep commands with increased time or repetition   Subjective   Subjective "I didn't sleep last night"   Bed Mobility   Additional Comments Received sitting in bedside chair   Transfers   Sit to Stand 5  Supervision   Additional items Verbal cues; Increased time required   Stand to Sit 5  Supervision   Additional items Verbal cues; Increased time required   Stand pivot 5  Supervision   Additional items Increased time required   Ambulation/Elevation   Gait pattern Step through pattern; Short stride; Inconsistent mayank;Decreased L stance   Gait Assistance 5  Supervision   Additional items Verbal cues   Assistive Device Rolling walker   Distance 60 feet   Stair Management Assistance 5  Supervision   Additional items Verbal cues   Stair Management Technique One rail R;With cane   Number of Stairs 2   Balance   Static Sitting Fair +   Dynamic Sitting Fair   Static Standing Fair   Dynamic Standing Fair -   Ambulatory Fair -   Activity Tolerance   Activity Tolerance Patient limited by fatigue;Patient tolerated treatment well   Nurse Made Aware yes   Exercises   Neuro re-ed Pt able to perform LAQ, ankle pumps in sitting x 5-10 reps with intermittent assist L   Assessment   Prognosis Good   Problem List Decreased strength;Decreased range of motion;Decreased endurance; Impaired balance;Decreased mobility;Pain   Assessment Pt agreeable to PT session this afternoon - attempted to see patient x 3 attempts - during first two attempts patient very fatrigued/tired and difficult to arouse/maintain eye contact  On third attempt able to verbalize her name/birthday and oriented to place/time - Once lowering legs to floor patient more awake and able to participate  Pt able to ambulate x 60 feet with decreased gait speed, step through pattern with short step length with close supervision using RW  Able to ascend x 2 steps with rail/cane with verbal cues for proper sequencing  Able to descend backwards with close supervision/contact guard  Once sitting in bedside chair patient able to keep eyes open but within 4-5 minutes falling asleep  RN present to bladder scan patient and patient able to wake up to transfer to commode  The patient's AM-PAC Basic Mobility Inpatient Short Form Raw Score is 20  A Raw score of greater than 16 suggests the patient may benefit from discharge to home  Please also refer to the recommendation of the Physical Therapist for safe discharge planning  Goals   Patient Goals to go home   Plan   Treatment/Interventions ADL retraining;Functional transfer training;LE strengthening/ROM; Therapeutic exercise; Endurance training;Bed mobility;Gait training;Spoke to nursing   Progress Progressing toward goals   PT Frequency Twice a day   Recommendation   PT Discharge Recommendation Home with outpatient rehabilitation   AM-PAC Basic Mobility Inpatient   Turning in Bed Without Bedrails 4   Lying on Back to Sitting on Edge of Flat Bed 4   Moving Bed to Chair 3   Standing Up From Chair 3   Walk in Room 3   Climb 3-5 Stairs 3   Basic Mobility Inpatient Raw Score 20   Basic Mobility Standardized Score 43 99   Highest Level Of Mobility   -HLM Goal 6: Walk 10 steps or more   JH-HLM Achieved 7: Walk 25 feet or more   Education   Education Provided Mobility training;Home exercise program;Assistive device   Patient Explanation/teachback used; Reinforcement needed   End of Consult   Patient Position at End of Consult Bedside chair;Bed/Chair alarm activated; All needs within reach   [facility] Number  Adriana Ahmadi [id number]

## 2022-12-07 NOTE — PLAN OF CARE
Problem: PHYSICAL THERAPY ADULT  Goal: Performs mobility at highest level of function for planned discharge setting  See evaluation for individualized goals  Description: Treatment/Interventions: ADL retraining, Functional transfer training, LE strengthening/ROM, Therapeutic exercise, Endurance training, Gait training, Spoke to nursing          See flowsheet documentation for full assessment, interventions and recommendations  Outcome: Progressing  Note: Prognosis: Good  Problem List: Decreased strength, Decreased range of motion, Impaired balance, Decreased endurance, Decreased mobility, Pain, Orthopedic restrictions  Assessment: Pt agreeable to PT session this AM  Able to perform supine/seated exercise as mentioned above with intermittent tactile cues to improve form/mobility  Pt able to progress ambulation x 60 feet for 2 trials with mild gait deviations as mentioned above, decreased gait speed 2* to pain at L knee  Able to negotiate 4 steps with rail + cane with good response, intermittent verbal cues for proper sequencing  Pt repositioned for comfort in bedside chair with all needs within reach  PT Discharge Recommendation: Home with outpatient rehabilitation    See flowsheet documentation for full assessment

## 2022-12-07 NOTE — PROGRESS NOTES
Progress Note - Orthopedics   Dayna Tariq 75 y o  female MRN: [id number]  Unit/Bed#: 2 South 202 Encounter: [id number]    Assessment:  1) POD#1 s/p Robotic Assisted Left TKA    Plan:  Ancef 2g IV x 2 for 24 hours postop - completed  DVT prophylaxis: Lovenox 40mg SQ Qdaily/SCD's/Ambulation  PT/OT- WBAT LLE  Analgesia PRN - will increase from fabi protocol and monitor  Follow up AM labs - stable, DC IVF, no hematoma  Shanks Dc - monitor for void  Dressing- monitor for drainage, Mepilex may remain in place 7 days  Discharge planning -disposition pending progress with PT postoperatively  Plan for discharge home today to start outpatient physical therapy later this week  All discharge instructions and patient questions were discussed in detail  She will follow up with Dr Lyndsey Alvarado in 2 weeks    Weight bearing: WBAT LLE    VTE Pharmacologic Prophylaxis: Enoxaparin (Lovenox)  VTE Mechanical Prophylaxis: sequential compression device    Subjective: Patient seen and examined this morning  Pain poorly controlled controlled  No overnight events  Minimally Able to work with physical therapy yesterday  Denies chest pain, SOB, dizziness, or paresthesias    Vitals: Blood pressure 136/75, pulse 100, temperature 98 5 °F (36 9 °C), resp  rate 17, height 5' 4" (1 626 m), weight 82 6 kg (182 lb), SpO2 98 %, not currently breastfeeding  ,Body mass index is 31 24 kg/m²        Intake/Output Summary (Last 24 hours) at 12/7/2022 0829  Last data filed at 12/7/2022 3633  Gross per 24 hour   Intake 2293 75 ml   Output 1240 ml   Net 1053 75 ml       Invasive Devices     Peripheral Intravenous Line  Duration           Peripheral IV 12/06/22 Right Forearm <1 day                Physical Exam: NAD, A&O3, resting comfortably in bed  Ortho Exam: LLE: Left anterior knee Dsg c/d/i, compartments soft, calf non-tender, +PF/DF/EHL, +DP/SP/Saph/Sural SILT, DP 2+, foot warm, TEDs/foot pumps in place    Lab, Imaging and other studies: PO XR L knee: Reviewed and acceptable    Lab Results   Component Value Date    WBC 8 23 12/07/2022    HGB 9 6 (L) 12/07/2022    HCT 29 1 (L) 12/07/2022    MCV 94 12/07/2022     (L) 12/07/2022     Lab Results   Component Value Date     (L) 04/21/2017    SODIUM 138 12/07/2022    K 4 5 12/07/2022     12/07/2022    CO2 28 12/07/2022    AGAP 6 12/07/2022    BUN 13 12/07/2022    CREATININE 1 00 12/07/2022    GLUC 220 (H) 12/07/2022    GLUF 220 (H) 12/07/2022    CALCIUM 8 5 12/07/2022    AST 40 11/14/2022    ALT 38 11/14/2022    ALKPHOS 96 11/14/2022    PROT 6 9 04/21/2017    TP 7 9 11/14/2022    BILITOT 0 5 04/21/2017    TBILI 0 53 11/14/2022    EGFR 58 12/07/2022     Cami Garcia PA-C

## 2022-12-07 NOTE — DISCHARGE INSTRUCTIONS
Total Knee Replacement     WHAT YOU SHOULD KNOW:   Total knee replacement is surgery to replace a knee joint damaged by wear, injury, or disease  It is also called a total knee arthroplasty  The knee joint is where your femur (thigh bone) and tibia (large lower leg bone or shin bone) meet  A small bone called the patella (kneecap) protects your knee joint  AFTER YOU LEAVE:     Medicines:   Pain medicine: You may be given a prescription medicine to decrease pain  Do not wait until the pain is severe before you take this medicine  We recommend that you take Tylenol 975mg every 8 hours for baseline pain control  Oxycodone can be used as needed, but no more than 1-2 tablets every 4 to 6 hours  Stool softeners  make it easier for you to have a bowel movement  You may need this medicine to treat or prevent constipation  You will be given Colace 100mg to take twice a day    Anticoagulants  are a type of blood thinner medicine that helps prevent clots  Clots can cause strokes, heart attacks, and death  These medicines may cause you to bleed or bruise more easily  You will be given lovenox 40mg to inject under the skin daily for 6 weeks after surgery  Watch for bleeding from your gums or nose  Watch for blood in your urine and bowel movements  Use a soft washcloth and a soft toothbrush  If you shave, use an electric razor  Avoid activities that can cause bruising or bleeding  Take your medicine as directed  Call your healthcare provider if you think your medicine is not helping or if you have side effects  Tell him if you are allergic to any medicine  Keep a list of the medicines, vitamins, and herbs you take  Include the amounts, and when and why you take them  Bring the list or the pill bottles to follow-up visits  Carry your medicine list with you in case of an emergency  Follow up with your orthopedist as directed:   You may need to return to have your wound checked and stitches, staples, or drain removed  Write down your questions so you remember to ask them during your visits  Please make an appointment to see Dr Abebe German 2 weeks postoperatively  Physical therapy:  A physical therapist teaches you exercises to help improve movement and strength, and to decrease pain  You will begin outpatient physical therapy later this week as planned  Self-care:   Wound Care:  Please leave the Mepilex dressing in place for 7 days after surgery  After 7 days, you may remove the dressing and leave the incision open to air  If the incision is uncomfortable under clothing after the Mepilex is removed, you may cover it with a a dry sterile dressing  Once the Mepilex dressing has been removed, please keep the incision dry for another week until your follow up appointment  Use ice:  Ice helps decrease swelling and pain  Ice may also help prevent tissue damage  Use an ice pack or put crushed ice in a plastic bag  Cover it with a towel, and place it on your knee for 15 to 20 minutes every hour as directed  Use a cane, walker, or crutches as directed: These devices will help decrease your risk of falling  Your therapist will guide you about transitioning from a walker to cane to no assistive device  Wear pressure stockings: These are long, tight stockings that put pressure on your legs to promote blood flow and prevent clots  You may remove the stocking on your non-operative leg when you get home  The stocking on your operative leg should remain on for 2-3 days  Afterwards, you may put the stocking on or off as needed for swelling  Contact your orthopedist if:  You have a fever over 101 0°F     You have trouble moving or bending your joint  You have increased pain and swelling in your joint, even after you take pain medicine  You have back pain or lower leg pain when you bend your foot upwards  You have questions or concerns about your condition or care      Blood soaks through/saturates your bandage  Your incision comes apart  Your incision is red, swollen, or draining pus  You cannot walk or move your joint, or the limb is numb  Your leg feels warm, tender, and painful  It may look swollen and red  Seek immediate care or call 911 if: You feel like you are going to faint  You have a seizure or feel confused  You feel lightheaded, short of breath, and have chest pain  You have chest pain when you take a deep breath or cough  You may cough up blood  © 2014 [address] is for End User's use only and may not be sold, redistributed or otherwise used for commercial purposes  All illustrations and images included in CareNotes® are the copyrighted property of [employer]  or Lopez Wong  The above information is an  only  It is not intended as medical advice for individual conditions or treatments  Talk to your doctor, nurse or pharmacist before following any medical regimen to see if it is safe and effective for you

## 2022-12-07 NOTE — PLAN OF CARE
Problem: PAIN - ADULT  Goal: Verbalizes/displays adequate comfort level or baseline comfort level  Description: Interventions:  - Encourage patient to monitor pain and request assistance  - Assess pain using appropriate pain scale  - Administer analgesics based on type and severity of pain and evaluate response  - Implement non-pharmacological measures as appropriate and evaluate response  - Consider cultural and social influences on pain and pain management  - Notify physician/advanced practitioner if interventions unsuccessful or patient reports new pain  Outcome: Progressing     Problem: INFECTION - ADULT  Goal: Absence or prevention of progression during hospitalization  Description: INTERVENTIONS:  - Assess and monitor for signs and symptoms of infection  - Monitor lab/diagnostic results  - Monitor all insertion sites, i e  indwelling lines, tubes, and drains  -- Scotts appropriate cooling/warming therapies per order  - Administer medications as ordered  - Instruct and encourage patient and family to use good hand hygiene technique  - Identify and instruct in appropriate isolation precautions for identified infection/condition  Outcome: Progressing  Goal: Absence of fever/infection during neutropenic period  Description: INTERVENTIONS:  - Monitor WBC    Outcome: Progressing     Problem: SAFETY ADULT  Goal: Patient will remain free of falls  Description: INTERVENTIONS:  - Educate patient/family on patient safety including physical limitations  - Instruct patient to call for assistance with activity   - Consult OT/PT to assist with strengthening/mobility   - Keep Call bell within reach  - Keep bed low and locked with side rails adjusted as appropriate  - Keep care items and personal belongings within reach  - Initiate and maintain comfort rounds  - Make Fall Risk Sign visible to staff  - Offer Toileting every 2 Hours, in advance of need  - Initiate/Maintain bed alarm  - Obtain necessary fall risk management equipment: fall risk   - Apply yellow socks and bracelet for high fall risk patients  - Consider moving patient to room near nurses station  Outcome: Progressing  Goal: Maintain or return to baseline ADL function  Description: INTERVENTIONS:  -  Assess patient's ability to carry out ADLs; assess patient's baseline for ADL function and identify physical deficits which impact ability to perform ADLs (bathing, care of mouth/teeth, toileting, grooming, dressing, etc )  - Assess/evaluate cause of self-care deficits   - Assess range of motion  - Assess patient's mobility; develop plan if impaired  - Assess patient's need for assistive devices and provide as appropriate  - Encourage maximum independence but intervene and supervise when necessary  - Involve family in performance of ADLs  - Assess for home care needs following discharge   - Consider OT consult to assist with ADL evaluation and planning for discharge  - Provide patient education as appropriate  Outcome: Progressing  Goal: Maintains/Returns to pre admission functional level  Description: INTERVENTIONS:  - Perform BMAT or MOVE assessment daily    - Set and communicate daily mobility goal to care team and patient/family/caregiver  - Collaborate with rehabilitation services on mobility goals if consulted  - Perform Range of Motion 2 times a day  - Reposition patient every 2 hours    - Dangle patient 2 times a day  - Stand patient 2 times a day  - Ambulate patient 2 times a day  - Out of bed to chair 2 times a day   - Out of bed for meals 2 times a day  - Out of bed for toileting  - Record patient progress and toleration of activity level   Outcome: Progressing

## 2022-12-07 NOTE — PHYSICAL THERAPY NOTE
PT TREATMENT       12/07/22 1012   PT Last Visit   PT Visit Date 12/07/22   Note Type   Note Type BID visit/treatment   Pain Assessment   Pain Assessment Tool 0-10   Pain Score 10 - Worst Possible Pain   Pain Location/Orientation Orientation: Left; Location: Knee   Pain Onset/Description Descriptor: Aching;Descriptor: Sore; Onset: Ongoing   Effect of Pain on Daily Activities limits rest/mobility   Patient's Stated Pain Goal No pain   Hospital Pain Intervention(s) Rest;Repositioned;Cold applied   Multiple Pain Sites No   Restrictions/Precautions   Weight Bearing Precautions Per Order Yes   LLE Weight Bearing Per Order WBAT   Other Precautions Fall Risk;Pain   General   Chart Reviewed Yes   Family/Caregiver Present No   Cognition   Overall Cognitive Status WFL   Orientation Level Oriented X4   Following Commands Follows multistep commands with increased time or repetition   Subjective   Subjective "it's really hurting this morning"   Bed Mobility   Supine to Sit 5  Supervision   Additional items Bedrails; Increased time required;Verbal cues   Sit to Supine 5  Supervision   Transfers   Sit to Stand 5  Supervision   Additional items Verbal cues   Stand to Sit 5  Supervision   Additional items Verbal cues   Stand pivot 5  Supervision   Additional items Verbal cues   Ambulation/Elevation   Gait pattern Antalgic; Short stride; Step through pattern  (decreased gait speed)   Gait Assistance 5  Supervision   Additional items Verbal cues   Assistive Device Rolling walker   Distance 60 feet x 2 trials   Stair Management Assistance 4  Minimal assist   Additional items Verbal cues   Stair Management Technique Two rails; One rail L;With cane   Number of Stairs 8   Ambulation/Elevation Additional Comments Able to perform 4 steps x 2 rails + 4 steps 1 rail + cane   Balance   Static Sitting Fair +   Dynamic Sitting Fair   Static Standing Fair   Dynamic Standing Fair -   Ambulatory Fair -   Activity Tolerance   Activity Tolerance Patient tolerated treatment well;Patient limited by pain   Nurse Made Aware yes   Exercises   Neuro re-ed Pt able to perform exercise in supine/sitting including ankle pumps, quad/glute sets heel slides, LAQ   Assessment   Prognosis Good   Problem List Decreased strength;Decreased range of motion; Impaired balance;Decreased endurance;Decreased mobility;Pain;Orthopedic restrictions   Assessment Pt agreeable to PT session this AM  Able to perform supine/seated exercise as mentioned above with intermittent tactile cues to improve form/mobility  Pt able to progress ambulation x 60 feet for 2 trials with mild gait deviations as mentioned above, decreased gait speed 2* to pain at L knee  Able to negotiate 4 steps with rail + cane with good response, intermittent verbal cues for proper sequencing  Pt repositioned for comfort in bedside chair with all needs within reach  The patient's AM-PAC Basic Mobility Inpatient Short Form Raw Score is 20  A Raw score of greater than 16 suggests the patient may benefit from discharge to home  Please also refer to the recommendation of the Physical Therapist for safe discharge planning  Goals   Patient Goals to get better   Plan   Treatment/Interventions ADL retraining;Functional transfer training;LE strengthening/ROM; Therapeutic exercise; Endurance training;Gait training;Spoke to nursing   Progress Progressing toward goals   PT Frequency Twice a day   Recommendation   PT Discharge Recommendation Home with outpatient rehabilitation   AM-PAC Basic Mobility Inpatient   Turning in Bed Without Bedrails 4   Lying on Back to Sitting on Edge of Flat Bed 4   Moving Bed to Chair 3   Standing Up From Chair 3   Walk in Room 3   Climb 3-5 Stairs 3   Basic Mobility Inpatient Raw Score 20   Basic Mobility Standardized Score 43 99   Highest Level Of Mobility   JH-HLM Goal 6: Walk 10 steps or more   JH-HLM Achieved 7: Walk 25 feet or more   End of Consult   Patient Position at End of Consult Bedside chair; All needs within reach   [facility] Insurance Number  Adriana Ahmadi [id number]

## 2022-12-08 ENCOUNTER — TELEPHONE (OUTPATIENT)
Dept: OBGYN CLINIC | Facility: HOSPITAL | Age: 67
End: 2022-12-08

## 2022-12-09 ENCOUNTER — OFFICE VISIT (OUTPATIENT)
Dept: PHYSICAL THERAPY | Facility: CLINIC | Age: 67
End: 2022-12-09

## 2022-12-09 DIAGNOSIS — Z96.652 STATUS POST TOTAL KNEE REPLACEMENT, LEFT: Primary | ICD-10-CM

## 2022-12-09 DIAGNOSIS — M25.562 CHRONIC PAIN OF LEFT KNEE: ICD-10-CM

## 2022-12-09 DIAGNOSIS — M17.12 PRIMARY OSTEOARTHRITIS OF LEFT KNEE: ICD-10-CM

## 2022-12-09 DIAGNOSIS — G89.29 CHRONIC PAIN OF LEFT KNEE: ICD-10-CM

## 2022-12-09 NOTE — PROGRESS NOTES
PT Re-evaluation     Today's date: 2022  Patient name: Ryley Corea  : 1955  MRN: [id number]  Referring provider: Jerrell Evans DO  Dx:   Encounter Diagnosis     ICD-10-CM    1  Primary osteoarthritis of left knee  M17 12 Ambulatory referral to Physical Therapy      2  Chronic pain of left knee  M25 562 Ambulatory referral to Physical Therapy    G89 29       3  Pre-op exam  Z01 818 Ambulatory referral to Physical Therapy          Start Time: 7442  Stop Time: 1800  Total time in clinic (min): 45 minutes    Assessment  Patient is presenting with decreased knee ROM, strength, and slow gait speed leading to limitations with ADLs, walking, squatting, transitions, and steps  Prognosis is good given HEP compliance and PT 2-3x/wk  Positive prognostic indicators include positive attitude toward recovery  Please contact me if you have any questions or recommendations  Thank you for the opportunity to share in Yris's care  Impairments: abnormal gait, abnormal muscle firing, abnormal muscle tone, abnormal or restricted ROM, abnormal movement, impaired balance, impaired physical strength, lacks appropriate home exercise program and pain with function    Symptom irritability: moderate Understanding of Dx/Px/POC: good   Prognosis: good    Plan  Patient would benefit from: skilled physical therapy  Planned modality interventions: cryotherapy  Planned therapy interventions: joint mobilization, manual therapy, patient education, postural training, strengthening, stretching, therapeutic activities, therapeutic exercise, home exercise program, neuromuscular re-education, flexibility, functional ROM exercises and balance  Frequency: 3x week initially  Duration in weeks: 12  Treatment plan discussed with: patient and daughter    Subjective  Patient is presenting 3 days post op left total knee replacement  She reports high pain level, limitations with walking, completing ADLs, and squatting   Patient denies numbness/tingling at this time  Objective    GAIT: slow gait speed with RW, decreased knee flexion  Squat assess: 25% depth with BUE assist  Tu min  With RW           MMT    Hip         R          L   Flex  4 4   Extn  4 4                 MMT         AROM         PROM    Knee      R          L         R          L           R         L   Flex  5 4+ 115 90 117 97   Extn  5 3+ 0 0                           MMT    Ankle         R          L   PF 4 4   DF  5 5   EHL 5 5   Inv  5 5   Ever  5 5     Knee: quad tone: fair left LE  Swelling present: WNL      Insurance:  AMA/CMS Eval/ Re-eval POC expires Alee Callahan #/ Referral # Total    Start date  Expiration date Extension  Visit limitation? PT only or  PT+OT? Co-Insurance   AMA  2  23  No Auth                          AUTH #:  Date 12 9             Authed: 99 units Used 2 3              Remaining  97 94               Precautions: HTN, Type 2 DM, OA B/L knees    Manuals 11 28 12 9       visit 1 2                                  Neuro Re-Ed                                                                        Ther Ex         LAQ 10 10       Glute set 10 10       Heel slide 10 10       Quad set 10 10       Ankle pump 10        Pt edu FB        PROM  FB       Nu step  6' lvl 1       Re-assessment  FB       Stand hip flexion  10x       Calf raise  10                Ther Activity                           Gait Training                           Modalities                               Short Term: From DOS  1  Pt will report decreased levels of pain by at least 2 subjective ratings in 4 weeks  2  Pt will demonstrate improved ROM by at least 10 degrees in 4 weeks  3  Pt will demonstrate improved strength by 1/2 grade in 4 weeks  4  Pt will be able to ambulate with reciprocal gait pattern with RW with normal gait speed in 4 weeks  Long Term:   1  Pt will be independent in their HEP in 8 weeks  2  Pt will be pain free with IADL's in 8 weeks    3  Pt will demonstrate equal knee AROM in 8 weeks    4  Pt will be able to perform reciprocal stair navigation in 12 weeks

## 2022-12-10 ENCOUNTER — NURSE TRIAGE (OUTPATIENT)
Dept: OTHER | Facility: OTHER | Age: 67
End: 2022-12-10

## 2022-12-10 NOTE — TELEPHONE ENCOUNTER
Reason for Disposition  • [1] Caller has URGENT question AND [2] triager unable to answer question    Answer Assessment - Initial Assessment Questions  1  SYMPTOM: "What's the main symptom you're concerned about?" (e g , pain, fever, vomiting)      bruising  2  ONSET: "When did bruising  start?"      yesterday  3  SURGERY: "What surgery was performed?"      Total knee replacement  4  DATE of SURGERY: "When was surgery performed?"       10/6  5  ANESTHESIA: " What type of anesthesia did you have?" (e g , general, spinal, epidural, local)      general  6  PAIN: "Is there any pain?" If Yes, ask: "How bad is it?"  (Scale 1-10; or mild, moderate, severe)      moderate  7  FEVER: "Do you have a fever?" If Yes, ask: "What is your temperature, how was it measured, and when did it start?"      no  8  VOMITING: "Is there any vomiting?" If yes, ask: "How many times?"      no  9  BLEEDING: "Is there any bleeding?" If Yes, ask: "How much?" and "Where?"      Possible   10   OTHER SYMPTOMS: "Do you have any other symptoms?" (e g , drainage from wound, painful urination, constipation)        no    Protocols used: POST-OP SYMPTOMS AND QUESTIONS-[facility]

## 2022-12-10 NOTE — TELEPHONE ENCOUNTER
Regarding: pain/bruising after surgery  ----- Message from [facility] sent at 12/10/2022 11:13 AM EST -----  "My mom has bruising on her left calf along with pain   She had a knee replacement surgery on 12/06/2022 "

## 2022-12-12 ENCOUNTER — OFFICE VISIT (OUTPATIENT)
Dept: PHYSICAL THERAPY | Facility: CLINIC | Age: 67
End: 2022-12-12

## 2022-12-12 DIAGNOSIS — G89.29 CHRONIC PAIN OF LEFT KNEE: ICD-10-CM

## 2022-12-12 DIAGNOSIS — M25.562 CHRONIC PAIN OF LEFT KNEE: ICD-10-CM

## 2022-12-12 DIAGNOSIS — Z96.652 STATUS POST TOTAL KNEE REPLACEMENT, LEFT: ICD-10-CM

## 2022-12-12 DIAGNOSIS — M17.12 PRIMARY OSTEOARTHRITIS OF LEFT KNEE: Primary | ICD-10-CM

## 2022-12-12 NOTE — PROGRESS NOTES
Daily Note     Today's date: 2022  Patient name: Ryley Corea  : 1955  MRN: [id number]  Referring provider: Jerrell Evans DO  Dx:   Encounter Diagnosis     ICD-10-CM    1  Primary osteoarthritis of left knee  M17 12       2  Chronic pain of left knee  M25 562     G89 29       3  Status post total knee replacement, left  Z96 652         Start Time: 1015  Stop Time: 1100  Total time in clinic (min): 45 minutes  Subjective: Patient reports knee pain entering treatment today  Objective: See treatment diary below  Swelling present medial knee and gastroc region    Assessment: Tolerated treatment well  Patient did well with treatment rendered today  Cueing needed for proper exercise performance  Full knee extension demonstrated actively  Fatigue reported with step ups added today  Plan: Continue per plan of care  Insurance:  AMA/CMS Eval/ Re-eval POC expires Iman Antonio #/ Referral # Total    Start date  Expiration date Extension  Visit limitation? PT only or  PT+OT?  Co-Insurance   AMA  2 20 23  No Auth                          AUTH #:  Date 12 9  12 12               Authed: 99 units Used 2 3  3                 Remaining  97 94  91                  Precautions: HTN, Type 2 DM, OA B/L knees     Manuals 11 28 12 9  12 12         visit 1 2 3                                                         Neuro Re-Ed                                                               Ther Ex               LAQ 10 10  3*10         Glute set 10 10           Heel slide 10 10  2*10         Quad set 10 10  2*10         Ankle pump 10    2*10         Pt edu FB    FB         PROM   FB  FB         Nu step   6' lvl 1  7' lvl 1         Re-assessment   FB           Stand hip flexion   10x  10x         Calf raise   10  2*10         Step up   2*10, 4"                                                 Ther Activity                                               Gait Training                                             Modalities

## 2022-12-12 NOTE — TELEPHONE ENCOUNTER
Spoke to daughter, who confirmed patients new number is [phone], changed and updated in the chart  Pt contacted and full postoperative follow up call assessment completed  She reports doing "ok" and "some pain " SHe is ambulating with the RW, and has outpatient PT again today, she also went Friday  She reports swelling is "pretty bad at the knee" and we discussed icing, and swelling being persistent for the first 2 weeks  She states her dressing is dry with no drainage  We did review her AVS for OLAMIDE stocking instructions  We also reviewed her AVS for medications  She reports taking Tylenol 975mg TID and oxycodone 5mg every 4 hours for pain  She is taking colace 100mg BID (had a BM) and Lovenox daily  She denies questions or concerns, and denies any chest pain, SOB, dizziness, fever or calf pain  pt encouraged to call me with questions, concerns or issues

## 2022-12-14 ENCOUNTER — OFFICE VISIT (OUTPATIENT)
Dept: PHYSICAL THERAPY | Facility: CLINIC | Age: 67
End: 2022-12-14

## 2022-12-14 DIAGNOSIS — M25.562 CHRONIC PAIN OF LEFT KNEE: ICD-10-CM

## 2022-12-14 DIAGNOSIS — Z96.652 STATUS POST TOTAL KNEE REPLACEMENT, LEFT: ICD-10-CM

## 2022-12-14 DIAGNOSIS — G89.29 CHRONIC PAIN OF LEFT KNEE: ICD-10-CM

## 2022-12-14 DIAGNOSIS — M17.12 PRIMARY OSTEOARTHRITIS OF LEFT KNEE: Primary | ICD-10-CM

## 2022-12-14 NOTE — PROGRESS NOTES
Daily Note     Today's date: 2022  Patient name: Sean Armijo  : 1955  MRN: [id number]  Referring provider: Jason Levine DO  Dx:   Encounter Diagnosis     ICD-10-CM    1  Primary osteoarthritis of left knee  M17 12       2  Chronic pain of left knee  M25 562     G89 29       3  Status post total knee replacement, left  L19 301                  Subjective: Patient reports knee is doing better with less soreness entering treatment  Reports that she has a current UTI and may be limited with therapy today  Reports that she felt good after last session  Objective: See treatment diary below  Swelling present medial knee and gastroc region  Slow gait speed    Assessment: Tolerated treatment well  Patient demonstrates improved knee flexion with treatment today  Good tolerance to addition of standing exercises  Plan: Continue per plan of care  Insurance:  AMA/CMS Eval/ Re-eval POC expires Leeanne Pacheco #/ Referral # Total    Start date  Expiration date Extension  Visit limitation? PT only or  PT+OT?  Co-Insurance   AMA  2 20 23  No Auth                          AUTH #:  Date  12 9  12 12 12 14      Authed: 99 units Used 2 3  3         Remaining  97 94  91          Precautions: HTN, Type 2 DM, OA B/L knees     HEP: glute set, heel slide, quad set, LAQ, ankle pumps    Manuals  28 12 9  12 12  12 14       visit 1 2 3  4                                                       Neuro Re-Ed                                                               Ther Ex               LAQ 10 10  3*10  2*15 with  DF       Heel slide 10 10  2*10  2*10, 5" PT assist       Quad set 10 10  2*10  3*10       Ankle pump 10    2*10  2*10       Pt edu FB    FB         PROM   FB  FB  FB       Nu step seat 6   6' lvl 1  7' lvl 1 7', lvl 1       Re-assessment   FB           Stand hip flexion   10x  10x  2*10       Calf raise   10  2*10  3*10       squats     3*10     Step up   2*10, 4" 2*10, 4"                                       Ther Activity                                               Gait Training                                               Modalities

## 2022-12-15 ENCOUNTER — TELEPHONE (OUTPATIENT)
Dept: NEPHROLOGY | Facility: CLINIC | Age: 67
End: 2022-12-15

## 2022-12-15 DIAGNOSIS — E87.1 HYPONATREMIA: ICD-10-CM

## 2022-12-15 RX ORDER — TORSEMIDE 10 MG/1
10 TABLET ORAL EVERY OTHER DAY
Qty: 15 TABLET | Refills: 3 | Status: SHIPPED | OUTPATIENT
Start: 2022-12-15

## 2022-12-15 NOTE — TELEPHONE ENCOUNTER
rMedication Refills   Person Calling In  Name if not patient Patient    Medication name  torsemide   Medication Dosage  Medication Frequency 2 mg 1 tablet by mouth every other day   Luigi Paniagua   Additional Information

## 2022-12-16 ENCOUNTER — OFFICE VISIT (OUTPATIENT)
Dept: PHYSICAL THERAPY | Facility: CLINIC | Age: 67
End: 2022-12-16

## 2022-12-16 DIAGNOSIS — Z96.652 STATUS POST TOTAL KNEE REPLACEMENT, LEFT: ICD-10-CM

## 2022-12-16 DIAGNOSIS — M25.562 CHRONIC PAIN OF LEFT KNEE: ICD-10-CM

## 2022-12-16 DIAGNOSIS — M17.12 PRIMARY OSTEOARTHRITIS OF LEFT KNEE: Primary | ICD-10-CM

## 2022-12-16 DIAGNOSIS — G89.29 CHRONIC PAIN OF LEFT KNEE: ICD-10-CM

## 2022-12-16 NOTE — PROGRESS NOTES
Daily Note     Today's date: 2022  Patient name: Saige Farfan  : 1955  MRN: [id number]  Referring provider: Moris Moreira DO  Dx:   Encounter Diagnosis     ICD-10-CM    1  Primary osteoarthritis of left knee  M17 12       2  Chronic pain of left knee  M25 562     G89 29       3  Status post total knee replacement, left  [id number]                  Subjective: Patient reports some soreness in her knee entering treatment today  Objective: See treatment diary below  Swelling present medial knee and gastroc region  Slow gait speed with RW    Assessment: Tolerated treatment well  PT assist required with SLR flexion added today to avoid quad lag  Bandage removed and good incisional healing is present  Plan: Continue per plan of care  Insurance:  AMA/CMS Eval/ Re-eval POC expires Joselyn Gonzalez #/ Referral # Total    Start date  Expiration date Extension  Visit limitation? PT only or  PT+OT? Co-Insurance   AMA  28 2 20 23  No Auth                          AUTH #:  Date  12 9  12 12 12 14 12 16     Authed: 99 units Used 2 3  3 3 3       Remaining  97 94  91 88 85        Precautions: HTN, Type 2 DM, OA B/L knees     HEP: glute set, heel slide, quad set, LAQ, ankle pumps    Manuals 11 28 12 9  12 12  12 14  12 16     visit 1 2 3  4  5                                                     Neuro Re-Ed                feet together EC         4*20'' CCG                                     Ther Ex               LAQ 10 10  3*10  2*15 with  DF  2*15, 3" with DF     Heel slide 10 10  2*10  2*10, 5" PT assist  2*10, 5" PT assist     Quad set 10 10  2*10  3*10  2*10     Ankle pump 10    2*10  2*10 2*10     Pt edu FB    FB         PROM   FB  FB  FB       Nu step seat 6   6' lvl 1  7' lvl 1 7', lvl 1  7', lvl 1     Re-assessment   FB           Stand hip flexion   10x  10x  2*10  2*12     Calf raise   10  2*10  3*10  3*10     squats     3*10 3*10    Step up   2*10, 4" 2*10, 4"     Stand hip abd      3*10 ea  SLR flexion     2*10 PT assist                    Ther Activity                                               Gait Training                                               Modalities

## 2022-12-19 ENCOUNTER — OFFICE VISIT (OUTPATIENT)
Dept: PHYSICAL THERAPY | Facility: CLINIC | Age: 67
End: 2022-12-19

## 2022-12-19 DIAGNOSIS — M25.562 CHRONIC PAIN OF LEFT KNEE: ICD-10-CM

## 2022-12-19 DIAGNOSIS — M17.12 PRIMARY OSTEOARTHRITIS OF LEFT KNEE: Primary | ICD-10-CM

## 2022-12-19 DIAGNOSIS — Z96.652 STATUS POST TOTAL KNEE REPLACEMENT, LEFT: ICD-10-CM

## 2022-12-19 DIAGNOSIS — G89.29 CHRONIC PAIN OF LEFT KNEE: ICD-10-CM

## 2022-12-19 NOTE — PROGRESS NOTES
Daily Note     Today's date: 2022  Patient name: Harry Valera  : 1955  MRN: [id number]  Referring provider: Janyce Bence, DO  Dx:   Encounter Diagnosis     ICD-10-CM    1  Primary osteoarthritis of left knee  M17 12       2  Chronic pain of left knee  M25 562     G89 29       3  Status post total knee replacement, left  [id number]                  Subjective: Patient reports continued trouble sleeping at this time and that her anterior knee region  Objective: See treatment diary below  Swelling present medial knee and gastroc region  Slow gait speed with RW, improved since last visit    Knee extn: 0 deg  Knee flexion: 110 deg  Assessment: Tolerated treatment well  Tactile and verbal cueing required to complete SLR flexion today  Achiness in knee reported with standing  feet together EC to semi tandem with CCG  Plan: Continue per plan of care  Insurance:  AMA/CMS Eval/ Re-eval POC expires Manisha Hilding #/ Referral # Total    Start date  Expiration date Extension  Visit limitation? PT only or  PT+OT?  Co-Insurance   AMA  2 20 23  No Auth                          AUTH #:  Date 11 28 12 9  12 12 12 14 12 16 12 19    Authed: 99 units Used 2 3  3 3 3 3      Remaining  97 94  91 88 85 82       Precautions: HTN, Type 2 DM, OA B/L knees     HEP: glute set, heel slide, quad set, LAQ, ankle pumps    Manuals 12 9  12 12  12 14  12 16  12 19   visit 2 3  4  5  6                                             Neuro Re-Ed              feet together EC       4*20'' CCG  4*20'' semi-tandem CCG                               Ther Ex             LAQ 10  3*10  2*15 with  DF  2*15, 3" with DF  2*15, 3" with DF   Heel slide 10  2*10  2*10, 5" PT assist  2*10, 5" PT assist     Quad set 10  2*10  3*10  2*10  2*10   Ankle pump    2*10  2*10 2*10  2*10   PROM FB  FB  FB    FB   Nu step seat 6 6' lvl 1  7' lvl 1 7', lvl 1  7', lvl 1  7' lvl 1   Re-assessment FB           Stand hip flexion 10x  10x 2*10  2*12  3*10   Calf raise 10  2*10  3*10  3*10 3*10   squats    3*10 3*10 3*10   Step up  2*10, 4" 2*10, 4" 2*10, 4" 2*10, 6"   Stand hip abd     3*10 ea  3*10 ea     SLR flexion    2*10 PT assist 3*8                  Ther Activity                                         Gait Training                                         Modalities

## 2022-12-20 ENCOUNTER — TELEPHONE (OUTPATIENT)
Dept: NEPHROLOGY | Facility: CLINIC | Age: 67
End: 2022-12-20

## 2022-12-20 DIAGNOSIS — E87.1 HYPONATREMIA: ICD-10-CM

## 2022-12-20 LAB
BUN SERPL-MCNC: 14 MG/DL (ref 7–25)
BUN/CREAT SERPL: NORMAL (CALC) (ref 6–22)
CALCIUM SERPL-MCNC: 9.6 MG/DL (ref 8.6–10.4)
CHLORIDE SERPL-SCNC: 105 MMOL/L (ref 98–110)
CO2 SERPL-SCNC: 27 MMOL/L (ref 20–32)
CREAT SERPL-MCNC: 0.88 MG/DL (ref 0.5–1.05)
GFR/BSA.PRED SERPLBLD CYS-BASED-ARV: 72 ML/MIN/1.73M2
GLUCOSE SERPL-MCNC: 93 MG/DL (ref 65–139)
POTASSIUM SERPL-SCNC: 3.8 MMOL/L (ref 3.5–5.3)
SODIUM SERPL-SCNC: 142 MMOL/L (ref 135–146)

## 2022-12-20 NOTE — TELEPHONE ENCOUNTER
----- Message from [place], [place] sent at 12/20/2022  8:34 AM EST -----  Sodium level 142 which is at goal   Salmeron Night to stop salt tablets and repeat BMP (already ordered) in mid January  Thanks

## 2022-12-21 ENCOUNTER — APPOINTMENT (OUTPATIENT)
Dept: RADIOLOGY | Facility: CLINIC | Age: 67
End: 2022-12-21

## 2022-12-21 ENCOUNTER — OFFICE VISIT (OUTPATIENT)
Dept: OBGYN CLINIC | Facility: CLINIC | Age: 67
End: 2022-12-21

## 2022-12-21 ENCOUNTER — OFFICE VISIT (OUTPATIENT)
Dept: PHYSICAL THERAPY | Facility: CLINIC | Age: 67
End: 2022-12-21

## 2022-12-21 VITALS — DIASTOLIC BLOOD PRESSURE: 68 MMHG | HEART RATE: 80 BPM | SYSTOLIC BLOOD PRESSURE: 108 MMHG

## 2022-12-21 DIAGNOSIS — Z96.652 STATUS POST TOTAL KNEE REPLACEMENT, LEFT: ICD-10-CM

## 2022-12-21 DIAGNOSIS — Z96.652 STATUS POST TOTAL LEFT KNEE REPLACEMENT USING CEMENT: ICD-10-CM

## 2022-12-21 DIAGNOSIS — Z96.652 STATUS POST TOTAL LEFT KNEE REPLACEMENT USING CEMENT: Primary | ICD-10-CM

## 2022-12-21 DIAGNOSIS — E87.1 HYPONATREMIA: ICD-10-CM

## 2022-12-21 DIAGNOSIS — M25.562 CHRONIC PAIN OF LEFT KNEE: ICD-10-CM

## 2022-12-21 DIAGNOSIS — G89.29 CHRONIC PAIN OF LEFT KNEE: ICD-10-CM

## 2022-12-21 DIAGNOSIS — M17.12 PRIMARY OSTEOARTHRITIS OF LEFT KNEE: Primary | ICD-10-CM

## 2022-12-21 RX ORDER — SODIUM CHLORIDE 1000 MG
1 TABLET, SOLUBLE MISCELLANEOUS EVERY MORNING
Qty: 30 TABLET | Refills: 3 | Status: SHIPPED | OUTPATIENT
Start: 2022-12-21 | End: 2022-12-27 | Stop reason: ALTCHOICE

## 2022-12-21 RX ORDER — OXYCODONE HYDROCHLORIDE 5 MG/1
5 TABLET ORAL EVERY 4 HOURS PRN
Qty: 45 TABLET | Refills: 0 | Status: SHIPPED | OUTPATIENT
Start: 2022-12-21

## 2022-12-21 RX ORDER — SODIUM CHLORIDE 1000 MG
TABLET, SOLUBLE MISCELLANEOUS
Qty: 30 TABLET | Refills: 0 | Status: SHIPPED | OUTPATIENT
Start: 2022-12-21 | End: 2022-12-21 | Stop reason: SDUPTHER

## 2022-12-21 NOTE — PROGRESS NOTES
Daily Note     Today's date: 2022  Patient name: Vianey Roman  : 1955  MRN: [id number]  Referring provider: Pastor Jose DO  Dx:   Encounter Diagnosis     ICD-10-CM    1  Primary osteoarthritis of left knee  M17 12       2  Chronic pain of left knee  M25 562     G89 29       3  Status post total knee replacement, left  [id number]                  Subjective: Patient reports continued trouble sleeping at this time and that her anterior knee region  Objective: See treatment diary below  Swelling present medial knee and gastroc region  Slow gait speed with RW, improved since last visit    Knee extn: 0 deg  Knee flexion: 113 deg  Assessment: Tolerated treatment well  Patient demonstrates improved squat depth and knee flexion compared to prior visit  Able to perform reciprocal stair ascension for assessment today with BUE assist  Good incisional healing  Plan: Continue per plan of care  Insurance:  AMA/CMS Eval/ Re-eval POC expires James Candelaria #/ Referral # Total    Start date  Expiration date Extension  Visit limitation? PT only or  PT+OT?  Co-Insurance   AMA  2 20 23  No Auth                          AUTH #:  Date  12 9  12 12 12 14 12 16 12 19 12 21   Authed: 99 units Used 2 3  3 3 3 3      Remaining  97 94  91 88 85 82       Precautions: HTN, Type 2 DM, OA B/L knees     HEP: glute set, heel slide, quad set, LAQ, ankle pumps    Manuals  12 12  12 14  12 16  12 19 12 21   visit 3  4  5  6 7    patellar mobs         FB                             Neuro Re-Ed             feet together EC     4*20'' CCG  4*20'' semi-tandem CCG 4*20'' semi-Tdm CCG                             Ther Ex            LAQ  3*10  2*15 with  DF  2*15, 3" with DF  2*15, 3" with DF 2*15, 3" with DF   Heel slide  2*10  2*10, 5" PT assist  2*10, 5" PT assist      Quad set  2*10  3*10  2*10  2*10 HEP   Ankle pump  2*10  2*10 2*10  2*10 HEP   PROM  FB  FB    FB FB   Nu step seat 6  7' lvl 1 7', lvl 1  7', lvl 1  7' lvl 1 8', lvl 2   Re-assessment            Stand hip flexion  10x  2*10  2*12  3*10 3*10   Calf raise  2*10  3*10  3*10 3*10 3*10   squats   3*10 3*10 3*10 3*10   Step up 2*10, 4" 2*10, 4" 2*10, 4" 2*10, 6" 3*10, 6"   Stand hip abd    3*10 ea  3*10 ea  3*10 ea     SLR flexion   2*10 PT assist 3*8  3*10                                        Ther Activity                                      Gait Training                                      Modalities

## 2022-12-21 NOTE — PROGRESS NOTES
Assessment/Plan:  1  Status post total left knee replacement using cement  XR knee 3 vw left non injury    oxyCODONE (Roxicodone) 5 immediate release tablet          Patient presents today for her 2 week post op following her left total knee arthroplasty on  12/6/22  Overall she is doing well, she will continue with her efforts with PT, she should continue her lovenox for DVT ppx, she may begin to wash her incision but should continue to avoid submerging in the incision site in any body of water including the bath at this time  Will see patient back in 4 weeks for her next follow up appointment  Patient ID: Shahid Kruger is a 75 y o  female    Subjective: patient presents for her 2 week post operative visit following her right TKA on 12/6/22  Overall she feels she is doing well, her pain has been well controlled  She has been attending PT as directed  She denies any wound issues since her surgery  She is ambulating with a walker currently  Review of Systems   Constitutional: Positive for activity change  HENT: Negative for hearing loss  Eyes: Negative for discharge and redness  Respiratory: Negative for cough  Cardiovascular: Negative for chest pain  Gastrointestinal: Negative for diarrhea and vomiting  Musculoskeletal: Positive for arthralgias and joint swelling  Skin: Negative for rash and wound  Neurological: Negative for speech difficulty and numbness  Psychiatric/Behavioral: Negative for agitation           Past Medical History:   Diagnosis Date   • Anesthesia complication     difficulty waking up   • Arthritis     left knee   • Bone spur     left knee behing the knee cap   • Chronic kidney disease    • Colon cancer [facility])     patient states about 2017   • Colon polyp     Tubulovillous Adenoma High Grade Dysplasia - 2017   • Depression    • Diabetes mellitus ([address] )    • Endometrial cancer ([address] )     grade I   • Hx of bleeding disorder     vaginal bleeding started on 3/13/17 • Hyperlipidemia    • Hypertension    • PONV (postoperative nausea and vomiting)    • Psychiatric disorder    • Shortness of breath     with exertion   • Urinary incontinence    • Vitamin D deficiency        Past Surgical History:   Procedure Laterality Date   • BREAST BIOPSY Left     benign-maybe at ar age 58   • CHOLECYSTECTOMY      open   • COLONOSCOPY     • DILATION AND CURETTAGE OF UTERUS N/A 4/5/2017    Procedure: DILATATION AND CURETTAGE (D&C);   Surgeon: Lawson Oconnell MD;  Location: [facility] MAIN OR;  Service:    • HYSTERECTOMY     • OOPHORECTOMY     • PELVIC LAPAROSCOPY     • DC LAPAROSCOPY W TOT HYSTERECTUTERUS <=250 GRAM  W TUBE/OVARY N/A 5/4/2017    Procedure: ROBOTIC ASSISTED TOTAL LAPAROSCOPIC HYSTERECTOMY, BILATERAL SALPINGOOPHERECTOMY; CYSTOSCOPY;  Surgeon: Naz Richey MD;  Location:  MAIN OR;  Service: Gynecology Oncology   • DC TOTAL KNEE ARTHROPLASTY Left 12/6/2022    Procedure: ARTHROPLASTY KNEE TOTAL W ROBOT - CEMENTED - LEFT;  Surgeon: Sonja Justice DO;  Location: [address] [place] [address];  Service: Orthopedics   • TONSILLECTOMY     • TUBAL LIGATION         Family History   Problem Relation Age of Onset   • Cancer Mother         Renal   • Heart disease Father         CHF   • Heart disease Sister         atrial septal defect   • Breast cancer Paternal Aunt 40       Social History     Occupational History   • Not on file   Tobacco Use   • Smoking status: Never   • Smokeless tobacco: Never   Vaping Use   • Vaping Use: Never used   Substance and Sexual Activity   • Alcohol use: Never   • Drug use: No   • Sexual activity: Not Currently     Birth control/protection: Post-menopausal, Surgical         Current Outpatient Medications:   •  oxyCODONE (Roxicodone) 5 immediate release tablet, Take 1 tablet (5 mg total) by mouth every 4 (four) hours as needed for moderate pain Max Daily Amount: 30 mg, Disp: 45 tablet, Rfl: 0  •  acetaminophen (TYLENOL) 325 mg tablet, Take 3 tablets (975 mg total) by mouth every 8 (eight) hours, Disp: , Rfl: 0  •  BD Pen Needle Chelsey U/F 32G X 4 MM MISC, Take by mouth 2 (two) times a day, Disp: 60 each, Rfl: 1  •  clonazePAM (KlonoPIN) 0 25 MG disintegrating tablet, Take 1 tablet (0 25 mg total) by mouth daily, Disp: 30 tablet, Rfl: 0  •  clonazePAM (KlonoPIN) 0 5 MG disintegrating tablet, Take 1 tablet (0 5 mg total) by mouth daily at bedtime, Disp: 30 tablet, Rfl: 0  •  docusate sodium (COLACE) 100 mg capsule, Take 1 capsule (100 mg total) by mouth 2 (two) times a day as needed for constipation, Disp: 60 capsule, Rfl: 6  •  enoxaparin (LOVENOX) 40 mg/0 4 mL, Inject 0 4 mL (40 mg total) under the skin daily Do not start before December 8, 2022 , Disp: 16 4 mL, Rfl: 0  •  insulin glargine (Toujeo SoloStar) 300 units/mL CONCENTRATED U-300 injection pen (1-unit dial), Inject 55 Units under the skin every morning LANTUS (Patient taking differently: Inject 66 Units under the skin every morning LANTUS), Disp: 4 5 mL, Rfl: 3  •  metFORMIN (GLUCOPHAGE-XR) 500 mg 24 hr tablet, Take 1 tablet (500 mg total) by mouth 2 (two) times a day with meals, Disp: 60 tablet, Rfl: 5  •  metoprolol succinate (TOPROL-XL) 25 mg 24 hr tablet, Take 1 tablet (25 mg total) by mouth daily, Disp: 90 tablet, Rfl: 3  •  Multiple Vitamin (Multivitamin Adult) TABS, Take 1 tablet by mouth in the morning, Disp: 30 tablet, Rfl: 3  •  naloxone (NARCAN) 4 mg/0 1 mL nasal spray, Administer 1 spray into a nostril   If no response after 2-3 minutes, give another dose in the other nostril using a new spray , Disp: 1 each, Rfl: 0  •  nitroglycerin (NITROSTAT) 0 4 mg SL tablet, Place 1 tablet (0 4 mg total) under the tongue every 5 (five) minutes as needed for chest pain, Disp: 30 tablet, Rfl: 1  •  rosuvastatin (CRESTOR) 20 MG tablet, Take 1 tablet (20 mg total) by mouth daily (Patient taking differently: Take 20 mg by mouth daily at bedtime), Disp: 90 tablet, Rfl: 3  •  sodium chloride 1 g tablet, Take 1 tablet (1 g total) by mouth every morning, Disp: 30 tablet, Rfl: 3  •  torsemide (DEMADEX) 10 mg tablet, Take 1 tablet (10 mg total) by mouth every other day, Disp: 15 tablet, Rfl: 3  •  ziprasidone (GEODON) 80 mg capsule, Take 1 capsule (80 mg total) by mouth 2 (two) times a day, Disp: 60 capsule, Rfl: 0    No Known Allergies    Objective:  Vitals:    12/21/22 1432   BP: 108/68   Pulse: 80       There is no height or weight on file to calculate BMI  Left Knee Exam     Tenderness   The patient is experiencing tenderness in the medial joint line  Range of Motion   Extension: -5   Flexion: 110     Tests   Varus: negative Valgus: negative  Drawer:  Anterior - negative       Patellar apprehension: negative    Other   Erythema: absent  Scars: present  Sensation: normal  Pulse: present  Swelling: mild  Effusion: no effusion present    Comments:  Incision site is well appearing without drainage or dehisence  Mild ecchymosis to the posterior aspect of the knee and calf area              Observations   Left Knee   Negative for effusion  Physical Exam  Vitals and nursing note reviewed  Constitutional:       General: She is not in acute distress  Appearance: Normal appearance  HENT:      Head: Normocephalic and atraumatic  Nose: Nose normal    Eyes:      General: No scleral icterus  Right eye: No discharge  Left eye: No discharge  Extraocular Movements: Extraocular movements intact  Cardiovascular:      Rate and Rhythm: Normal rate  Pulmonary:      Effort: Pulmonary effort is normal  No respiratory distress  Musculoskeletal:      Cervical back: Normal range of motion and neck supple  Left knee: No effusion  Comments: See ortho eam   Skin:     General: Skin is warm and dry  Neurological:      Mental Status: She is alert and oriented to person, place, and time     Psychiatric:         Mood and Affect: Mood normal          Behavior: Behavior normal          I have personally reviewed pertinent films in PACS  AP, lateral, and sunrise views of the left knee shows stable position of her implants without any evidence of loosening or malpositioning  This document was created using speech voice recognition software  Grammatical errors, random word insertions, pronoun errors, and incomplete sentences are an occasional consequence of this system due to software limitations, ambient noise, and hardware issues  Any formal questions or concerns about content, text, or information contained within the body of this dictation should be directly addressed to the provider for clarification

## 2022-12-21 NOTE — TELEPHONE ENCOUNTER
Patient called asking if she should stop taking torsemide as she was taking the torsemide because she was taking sodium chloride

## 2022-12-21 NOTE — TELEPHONE ENCOUNTER
Patient called back and expressed understanding that she is to stop the salt tablets and repeat a BMP in mid-january  Patient cited no questions or concerns at this time

## 2022-12-22 NOTE — TELEPHONE ENCOUNTER
Spoke with patient about the following, expressed understanding and thanked us for the call:    Would recommend continuing torsemide as this will help maintain normal sodium levels and allow for free water excretion while she is not taking salt tablets

## 2022-12-23 ENCOUNTER — OFFICE VISIT (OUTPATIENT)
Dept: PHYSICAL THERAPY | Facility: CLINIC | Age: 67
End: 2022-12-23

## 2022-12-23 DIAGNOSIS — M17.12 PRIMARY OSTEOARTHRITIS OF LEFT KNEE: Primary | ICD-10-CM

## 2022-12-23 DIAGNOSIS — Z96.652 STATUS POST TOTAL KNEE REPLACEMENT, LEFT: ICD-10-CM

## 2022-12-23 DIAGNOSIS — G89.29 CHRONIC PAIN OF LEFT KNEE: ICD-10-CM

## 2022-12-23 DIAGNOSIS — M25.562 CHRONIC PAIN OF LEFT KNEE: ICD-10-CM

## 2022-12-23 NOTE — PROGRESS NOTES
Daily Note     Today's date: 2022  Patient name: Quin Schultz  : 1955  MRN: [id number]  Referring provider: Lynnette Ball DO  Dx:   Encounter Diagnosis     ICD-10-CM    1  Primary osteoarthritis of left knee  M17 12       2  Chronic pain of left knee  M25 562     G89 29       3  Status post total knee replacement, left  [id number]                  Subjective: Patient reports seeing physician giving her a good report  Patient reports continuing to performing step to stair navigation entering her home  Objective: See treatment diary below  Swelling present medial knee and gastroc region  Slow gait speed with RW, improved since last visit    Knee extn: 0 deg  Knee flexion: 113 deg  Assessment: Tolerated treatment well  Patient continues to require cueing for recovery periods during treatment today particularly with straight leg raise  Able to perform reciprocal stair navigation on 4'' step with BUE assist     Plan: Continue per plan of care  Insurance:  AMA/CMS Eval/ Re-eval POC expires Ketty Reyes #/ Referral # Total    Start date  Expiration date Extension  Visit limitation? PT only or  PT+OT?  Co-Insurance   AMA  2 20 23  No Auth                          AUTH #:  Date  12 9  12 12 12 14 12 16 12 19 12 21   Authed: 99 units Used 2 3  3 3 3 3      Remaining  97 94  91 88 85 82       Precautions: HTN, Type 2 DM, OA B/L knees     HEP: glute set, heel slide, quad set, LAQ, ankle pumps    Manuals  12 14  12 16  12 19 12 21 12 23   visit  4  5  6 7 8    patellar mobs       FB                            Neuro Re-Ed            feet together EC   4*20'' CCG  4*20'' semi-tandem CCG 4*20'' semi-Tdm CCG 4*20'' semi-Tdm CCG                           Ther Ex           LAQ  2*15 with  DF  2*15, 3" with DF  2*15, 3" with DF 2*15, 3" with DF 2*15, 3" with DF   Heel slide  2*10, 5" PT assist  2*10, 5" PT assist       Quad set  3*10  2*10  2*10 HEP    Ankle pump  2*10 2*10  2*10 HEP    PROM  FB    FB FB    Nu step seat 6 7', lvl 1  7', lvl 1  7' lvl 1 8', lvl 2 4' 1/2 rev  bike   Re-assessment           Stand hip flexion  2*10  2*12  3*10 3*10 3*10   Calf raise  3*10  3*10 3*10 3*10 3*10   squats  3*10 3*10 3*10 3*10 3*10   Step up 2*10, 4" 2*10, 4" 2*10, 6" 3*10, 6" 3*10, 6"   Stand hip abd   3*10 ea  3*10 ea  3*10 ea  3*10 ea     SLR flexion  2*10 PT assist 3*8  3*10 3*10                                       Ther Activity                                   Gait Training                                   Modalities

## 2022-12-27 ENCOUNTER — OFFICE VISIT (OUTPATIENT)
Dept: FAMILY MEDICINE CLINIC | Facility: CLINIC | Age: 67
End: 2022-12-27

## 2022-12-27 VITALS
HEIGHT: 64 IN | TEMPERATURE: 97.3 F | BODY MASS INDEX: 30.59 KG/M2 | OXYGEN SATURATION: 99 % | SYSTOLIC BLOOD PRESSURE: 108 MMHG | RESPIRATION RATE: 16 BRPM | WEIGHT: 179.2 LBS | HEART RATE: 73 BPM | DIASTOLIC BLOOD PRESSURE: 70 MMHG

## 2022-12-27 DIAGNOSIS — Z79.4 TYPE 2 DIABETES MELLITUS WITHOUT COMPLICATION, WITH LONG-TERM CURRENT USE OF INSULIN (HCC): Primary | ICD-10-CM

## 2022-12-27 DIAGNOSIS — E11.9 TYPE 2 DIABETES MELLITUS WITHOUT COMPLICATION, WITH LONG-TERM CURRENT USE OF INSULIN (HCC): Primary | ICD-10-CM

## 2022-12-27 DIAGNOSIS — D69.6 PLATELETS DECREASED (HCC): ICD-10-CM

## 2022-12-27 DIAGNOSIS — E87.1 HYPONATREMIA: ICD-10-CM

## 2022-12-27 DIAGNOSIS — E78.2 MIXED HYPERLIPIDEMIA: Chronic | ICD-10-CM

## 2022-12-27 DIAGNOSIS — Z96.652 STATUS POST TOTAL LEFT KNEE REPLACEMENT USING CEMENT: ICD-10-CM

## 2022-12-27 DIAGNOSIS — D64.9 ANEMIA, UNSPECIFIED TYPE: ICD-10-CM

## 2022-12-27 DIAGNOSIS — F31.9 BIPOLAR AFFECTIVE DISORDER, REMISSION STATUS UNSPECIFIED (HCC): ICD-10-CM

## 2022-12-27 DIAGNOSIS — I10 ESSENTIAL HYPERTENSION: ICD-10-CM

## 2022-12-27 PROBLEM — G92.8 TOXIC METABOLIC ENCEPHALOPATHY: Status: RESOLVED | Noted: 2022-10-15 | Resolved: 2022-12-27

## 2022-12-27 PROBLEM — F99 PSYCHIATRIC DISORDER: Chronic | Status: RESOLVED | Noted: 2022-10-15 | Resolved: 2022-12-27

## 2022-12-27 RX ORDER — AMOXICILLIN 500 MG/1
CAPSULE ORAL
COMMUNITY
Start: 2022-12-23 | End: 2022-12-27

## 2022-12-27 RX ORDER — CLONAZEPAM 0.5 MG/1
0.5 TABLET ORAL 2 TIMES DAILY PRN
COMMUNITY
Start: 2022-12-14

## 2022-12-27 RX ORDER — CHLORAL HYDRATE 500 MG
1000 CAPSULE ORAL 2 TIMES DAILY
COMMUNITY

## 2022-12-27 NOTE — ASSESSMENT & PLAN NOTE
Platelet count was slightly low  Will check labs again  Lab Results   Component Value Date     (L) 12/07/2022

## 2022-12-27 NOTE — PROGRESS NOTES
Name: Daljit Kaufman      : 1955      MRN: [id number]  Encounter Provider: Yonny Henriquez DO  Encounter Date: 2022   Encounter department: [address] [place] [address]     1  Type 2 diabetes mellitus without complication, with long-term current use of insulin ([facility])  Assessment & Plan:    Lab Results   Component Value Date    HGBA1C 6 9 (H) 2022       Orders:  -     Hemoglobin A1c (w/out EAG) ([facility] ONLY); Future; Expected date: 2023  -     Microalbumin, Random Urine (W/Creatinine) ([facility] ONLY); Future; Expected date: 2023    2  Platelets decreased ([facility] )  Assessment & Plan:  Platelet count was slightly low  Will check labs again  Lab Results   Component Value Date     (L) 2022         Orders:  -     CBC and differential; Future  -     CBC and differential  -     CBC and Platelet; Future; Expected date: 2023  -     CBC and Platelet    3  Essential hypertension  -     Comprehensive metabolic panel; Future; Expected date: 2023  -     CBC and Platelet; Future; Expected date: 2023  -     Lipid Panel with Direct LDL reflex; Future; Expected date: 2023  -     Comprehensive metabolic panel  -     CBC and Platelet  -     Lipid Panel with Direct LDL reflex    4  Mixed hyperlipidemia  Assessment & Plan:  Stable  Continue rosuvastatin 20 mg daily    Orders:  -     Lipid Panel with Direct LDL reflex; Future; Expected date: 2023  -     Lipid Panel with Direct LDL reflex    5  Bipolar affective disorder, remission status unspecified ([facility] )  Assessment & Plan:  Follows with Dr Luz Marina Goins   Currently only on Geodon      6  Status post total left knee replacement using cement    7   Anemia, unspecified type  Assessment & Plan:  Recently anemic post her knee replacement  Patient still feeling cold and tired  Will check labs  Lab Results   Component Value Date    HGB 9 6 (L) 2022         Orders:  -     CBC and differential; Future  -     Iron; Future  -     TIBC; Future  -     CBC and differential  -     Iron  -     TIBC    8  Hyponatremia  Assessment & Plan:  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  Following with nephrology  Sodium chloride tablets recently discontinued    Orders:  -     Basic metabolic panel; Future  -     Basic metabolic panel  -     Comprehensive metabolic panel; Future; Expected date: 03/27/2023  -     Comprehensive metabolic panel        Return in about 3 months (around 3/27/2023) for Annual Wellness Visit  Subjective      Patient is here to establish her long-term care  She recently had her left knee replaced  She has been feeling cold and tired since then  Her daughter is concerned about anemia  Diabetes-patient has been careful with her diet  She is taking her medications regularly  She stopped eating as many desserts  She continues to follow regularly with her psychiatrist   She had to have her medications changed due to hyponatremia from SIADH  Review of Systems    Current Outpatient Medications on File Prior to Visit   Medication Sig   • acetaminophen (TYLENOL) 325 mg tablet Take 3 tablets (975 mg total) by mouth every 8 (eight) hours   • BD Pen Needle Chelsey U/F 32G X 4 MM MISC Take by mouth 2 (two) times a day   • clonazePAM (KlonoPIN) 0 5 mg tablet Take 0 5 mg by mouth 2 (two) times a day as needed   • docusate sodium (COLACE) 100 mg capsule Take 1 capsule (100 mg total) by mouth 2 (two) times a day as needed for constipation   • enoxaparin (LOVENOX) 40 mg/0 4 mL Inject 0 4 mL (40 mg total) under the skin daily Do not start before December 8, 2022     • insulin glargine (Toujeo SoloStar) 300 units/mL CONCENTRATED U-300 injection pen (1-unit dial) Inject 55 Units under the skin every morning LANTUS (Patient taking differently: Inject 66 Units under the skin every morning LANTUS)   • metFORMIN (GLUCOPHAGE-XR) 500 mg 24 hr tablet Take 1 tablet (500 mg total) by mouth 2 (two) times a day with meals   • metoprolol succinate (TOPROL-XL) 25 mg 24 hr tablet Take 1 tablet (25 mg total) by mouth daily   • nitroglycerin (NITROSTAT) 0 4 mg SL tablet Place 1 tablet (0 4 mg total) under the tongue every 5 (five) minutes as needed for chest pain   • Omega-3 Fatty Acids (fish oil) 1,000 mg Take 1,000 mg by mouth 2 (two) times a day   • oxyCODONE (Roxicodone) 5 immediate release tablet Take 1 tablet (5 mg total) by mouth every 4 (four) hours as needed for moderate pain Max Daily Amount: 30 mg   • rosuvastatin (CRESTOR) 20 MG tablet Take 1 tablet (20 mg total) by mouth daily (Patient taking differently: Take 20 mg by mouth daily at bedtime)   • torsemide (DEMADEX) 10 mg tablet Take 1 tablet (10 mg total) by mouth every other day   • ziprasidone (GEODON) 80 mg capsule Take 1 capsule (80 mg total) by mouth 2 (two) times a day   • [DISCONTINUED] amoxicillin (AMOXIL) 500 mg capsule TAKE 1 CAPSULE BY MOUTH THREE TIMES DAILY FOR 7 DAYS   • Multiple Vitamin (Multivitamin Adult) TABS Take 1 tablet by mouth in the morning (Patient not taking: Reported on 12/27/2022)   • naloxone (NARCAN) 4 mg/0 1 mL nasal spray Administer 1 spray into a nostril  If no response after 2-3 minutes, give another dose in the other nostril using a new spray  (Patient not taking: Reported on 12/27/2022)   • [DISCONTINUED] clonazePAM (KlonoPIN) 0 25 MG disintegrating tablet Take 1 tablet (0 25 mg total) by mouth daily   • [DISCONTINUED] clonazePAM (KlonoPIN) 0 5 MG disintegrating tablet Take 1 tablet (0 5 mg total) by mouth daily at bedtime   • [DISCONTINUED] sodium chloride 1 g tablet Take 1 tablet (1 g total) by mouth every morning (Patient not taking: Reported on 12/27/2022)       Objective     /70   Pulse 73   Temp (!) 97 3 °F (36 3 °C) (Temporal)   Resp 16   Ht 5' 4" (1 626 m)   Wt 81 3 kg (179 lb 3 2 oz)   SpO2 99%   BMI 30 76 kg/m²     Physical Exam  Vitals and nursing note reviewed  Constitutional:       Appearance: She is well-developed  HENT:      Head: Normocephalic and atraumatic  Right Ear: Tympanic membrane and external ear normal       Left Ear: Tympanic membrane and external ear normal    Cardiovascular:      Rate and Rhythm: Normal rate and regular rhythm  Pulses: no weak pulses          Dorsalis pedis pulses are 2+ on the right side and 2+ on the left side  Posterior tibial pulses are 2+ on the right side and 2+ on the left side  Heart sounds: Normal heart sounds  No murmur heard  No friction rub  Pulmonary:      Effort: Pulmonary effort is normal  No respiratory distress  Breath sounds: Normal breath sounds  No wheezing or rales  Musculoskeletal:      Right lower leg: Edema present  Left lower leg: Edema present  Feet:      Right foot:      Skin integrity: No ulcer, skin breakdown, erythema, warmth, callus or dry skin  Left foot:      Skin integrity: No ulcer, skin breakdown, erythema, warmth, callus or dry skin  Neurological:      Gait: Gait abnormal         Patient's shoes and socks removed  Right Foot/Ankle   Right Foot Inspection  Skin Exam: skin normal  Skin not intact, no dry skin, no warmth, no callus, no erythema, no maceration, no abnormal color, no pre-ulcer, no ulcer and no callus  Toe Exam: ROM and strength within normal limits  Sensory   Monofilament testing: intact    Vascular  Capillary refills: < 3 seconds  The right DP pulse is 2+  The right PT pulse is 2+  Left Foot/Ankle  Left Foot Inspection  Skin Exam: skin normal  Skin not intact, no dry skin, no warmth, no erythema, no maceration, normal color, no pre-ulcer, no ulcer and no callus  Toe Exam: ROM and strength within normal limits  Sensory   Monofilament testing: intact    Vascular  Capillary refills: < 3 seconds  The left DP pulse is 2+  The left PT pulse is 2+       Assign Risk Category  No deformity present  No loss of protective sensation  No weak pulses  Risk: 0    Doss Trenton, DO

## 2022-12-27 NOTE — ASSESSMENT & PLAN NOTE
Recently anemic post her knee replacement  Patient still feeling cold and tired  Will check labs  Lab Results   Component Value Date    HGB 9 6 (L) 12/07/2022

## 2022-12-27 NOTE — ASSESSMENT & PLAN NOTE
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

## 2022-12-28 ENCOUNTER — OFFICE VISIT (OUTPATIENT)
Dept: PHYSICAL THERAPY | Facility: CLINIC | Age: 67
End: 2022-12-28

## 2022-12-28 DIAGNOSIS — Z96.652 STATUS POST TOTAL LEFT KNEE REPLACEMENT USING CEMENT: ICD-10-CM

## 2022-12-28 DIAGNOSIS — G89.29 CHRONIC PAIN OF LEFT KNEE: ICD-10-CM

## 2022-12-28 DIAGNOSIS — Z96.652 STATUS POST TOTAL KNEE REPLACEMENT, LEFT: ICD-10-CM

## 2022-12-28 DIAGNOSIS — M17.12 PRIMARY OSTEOARTHRITIS OF LEFT KNEE: Primary | ICD-10-CM

## 2022-12-28 DIAGNOSIS — M25.562 CHRONIC PAIN OF LEFT KNEE: ICD-10-CM

## 2022-12-28 RX ORDER — ENOXAPARIN SODIUM 100 MG/ML
40 INJECTION SUBCUTANEOUS DAILY
Qty: 16.4 ML | Refills: 0 | OUTPATIENT
Start: 2022-12-28 | End: 2023-02-07

## 2022-12-28 NOTE — PROGRESS NOTES
Daily Note     Today's date: 2022  Patient name: Magda Saleem  : 1955  MRN: [id number]  Referring provider: Dorita Morse DO  Dx:   Encounter Diagnosis     ICD-10-CM    1  Primary osteoarthritis of left knee  M17 12       2  Chronic pain of left knee  M25 562     G89 29       3  Status post total knee replacement, left  [id number]                  Subjective: Patient reports some knee soreness entering treatment today  Continues to use RW and reports feeling more secure with this  1 on 1 with PT for 23 minutes  Remaining time independent fitness program   Objective: See treatment diary below  Knee extn: 0 deg  Knee flexion: 113 deg  Assessment: Tolerated treatment well  Patient demonstrated good tolerance to lateral movements added today  Cueing needed to avoid straight leg position with side stepping exercise  BUE assist required with step ups  Plan: Continue per plan of care  Insurance:  AMA/CMS Eval/ Re[facility]eval P[facility] #/ Referral # Total    Start date  Expiration date Extension  Visit limitation? PT only or  PT+OT?  Co-Insurance   AMA  2 20 23  No Auth                          AUTH #:  Date 11 28 12 9  12 12 12 14 12 16 12 19 12 21 12 23      Authed: 99 units Used 2 3  3 3 3 3 3 3 2       Remaining  97 94  91 88 85 82 79 76 74        Precautions: HTN, Type 2 DM, OA B/L knees     HEP: glute set, heel slide, quad set, LAQ, ankle pumps,     Manuals  12 16  12 19 12 21 12 23    visit  5  6 7 8 9    patellar mobs     FB                           Neuro Re-Ed           feet together EC 4*20'' CCG  4*20'' semi-tandem CCG 4*20'' semi-Tdm CCG 4*20'' semi-Tdm CCG                          Ther Ex          LAQ  2*15, 3" with DF  2*15, 3" with DF 2*15, 3" with DF 2*15, 3" with DF 2*15, 3" with DF   Heel slide  2*10, 5" PT assist        PROM    FB FB     Nu step seat 6  7', lvl 1  7' lvl 1 8', lvl 2 4' 1/2 rev  bike 7' lvl 1 nu step   Stand hip flexion  2*12  3*10 3*10 3*10 3*10   Calf raise  3*10 3*10 3*10 3*10 3*10   squats 3*10 3*10 3*10 3*10 3*10   Step up 2*10, 4" 2*10, 6" 3*10, 6" 3*10, 6" 2*15, 6"   Stand hip abd  3*10 ea  3*10 ea  3*10 ea  3*10 ea  3*10 ea     SLR flexion 2*10 PT assist 3*8  3*10 3*10                                       Ther Activity                                Gait Training                                Modalities

## 2022-12-28 NOTE — TELEPHONE ENCOUNTER
Left message that Enoxaparin is only needed 6 weeks post op  She no longer needs to take this, so the refill was denied  I advised for patient to call in with any questions

## 2022-12-29 ENCOUNTER — TELEPHONE (OUTPATIENT)
Dept: FAMILY MEDICINE CLINIC | Facility: CLINIC | Age: 67
End: 2022-12-29

## 2022-12-29 LAB
BASOPHILS # BLD AUTO: 30 CELLS/UL (ref 0–200)
BASOPHILS NFR BLD AUTO: 0.6 %
BUN SERPL-MCNC: 10 MG/DL (ref 7–25)
BUN/CREAT SERPL: NORMAL (CALC) (ref 6–22)
CALCIUM SERPL-MCNC: 9.4 MG/DL (ref 8.6–10.4)
CHLORIDE SERPL-SCNC: 103 MMOL/L (ref 98–110)
CO2 SERPL-SCNC: 28 MMOL/L (ref 20–32)
CREAT SERPL-MCNC: 0.91 MG/DL (ref 0.5–1.05)
EOSINOPHIL # BLD AUTO: 130 CELLS/UL (ref 15–500)
EOSINOPHIL NFR BLD AUTO: 2.6 %
ERYTHROCYTE [DISTWIDTH] IN BLOOD BY AUTOMATED COUNT: 13.3 % (ref 11–15)
GFR/BSA.PRED SERPLBLD CYS-BASED-ARV: 69 ML/MIN/1.73M2
GLUCOSE SERPL-MCNC: 117 MG/DL (ref 65–139)
HCT VFR BLD AUTO: 30.4 % (ref 35–45)
HGB BLD-MCNC: 9.6 G/DL (ref 11.7–15.5)
IRON SATN MFR SERPL: 18 % (CALC) (ref 16–45)
IRON SERPL-MCNC: 61 MCG/DL (ref 45–160)
LYMPHOCYTES # BLD AUTO: 1045 CELLS/UL (ref 850–3900)
LYMPHOCYTES NFR BLD AUTO: 20.9 %
MCH RBC QN AUTO: 29.5 PG (ref 27–33)
MCHC RBC AUTO-ENTMCNC: 31.6 G/DL (ref 32–36)
MCV RBC AUTO: 93.5 FL (ref 80–100)
MONOCYTES # BLD AUTO: 320 CELLS/UL (ref 200–950)
MONOCYTES NFR BLD AUTO: 6.4 %
NEUTROPHILS # BLD AUTO: 3475 CELLS/UL (ref 1500–7800)
NEUTROPHILS NFR BLD AUTO: 69.5 %
PLATELET # BLD AUTO: 224 THOUSAND/UL (ref 140–400)
PMV BLD REES-ECKER: 9 FL (ref 7.5–12.5)
POTASSIUM SERPL-SCNC: 4.1 MMOL/L (ref 3.5–5.3)
RBC # BLD AUTO: 3.25 MILLION/UL (ref 3.8–5.1)
SODIUM SERPL-SCNC: 141 MMOL/L (ref 135–146)
TIBC SERPL-MCNC: 346 MCG/DL (CALC) (ref 250–450)
WBC # BLD AUTO: 5 THOUSAND/UL (ref 3.8–10.8)

## 2022-12-29 NOTE — TELEPHONE ENCOUNTER
----- Message from Jazz Cabezas sent at 12/29/2022 10:08 AM EST -----    ----- Message -----  From: Crow Toure Lab Results In  Sent: 12/28/2022  11:15 PM EST  To: Andrew Londono DO

## 2022-12-29 NOTE — TELEPHONE ENCOUNTER
Please let patient know her blood count has not changed since surgery, Hb is still 9 6  She should take otc iron once a day and continue to follow up with Dr Jose Manzo

## 2022-12-30 ENCOUNTER — OFFICE VISIT (OUTPATIENT)
Dept: PHYSICAL THERAPY | Facility: CLINIC | Age: 67
End: 2022-12-30

## 2022-12-30 DIAGNOSIS — M17.12 PRIMARY OSTEOARTHRITIS OF LEFT KNEE: Primary | ICD-10-CM

## 2022-12-30 DIAGNOSIS — Z96.652 STATUS POST TOTAL KNEE REPLACEMENT, LEFT: ICD-10-CM

## 2022-12-30 DIAGNOSIS — M25.562 CHRONIC PAIN OF LEFT KNEE: ICD-10-CM

## 2022-12-30 DIAGNOSIS — G89.29 CHRONIC PAIN OF LEFT KNEE: ICD-10-CM

## 2022-12-30 NOTE — PROGRESS NOTES
Daily Note     Today's date: 2022  Patient name: Claudine Padron  : 1955  MRN: [id number]  Referring provider: Lai Ferro DO  Dx:   Encounter Diagnosis     ICD-10-CM    1  Primary osteoarthritis of left knee  M17 12       2  Chronic pain of left knee  M25 562     G89 29       3  Status post total knee replacement, left  [id number]         Start Time: 1010  Stop Time: 1100  Total time in clinic (min): 50 minutes  Subjective: Patient reports that she is interested in trying to use a cane today  Objective: See treatment diary below  Knee extn: 0 deg  Knee flexion: 113 deg  Assessment: Tolerated treatment well  Patient require constant cueing for proper use of SPC in RUE with ambulation and with stair navigation with use of railing  Continues to present with slight quad lag when performing straight leg raise  Plan: Continue per plan of care  Insurance:  AMA/CMS Eval/ Re-eval POC expires Maureen Maravilla #/ Referral # Total    Start date  Expiration date Extension  Visit limitation? PT only or  PT+OT?  Co-Insurance   AMA  2 20 23  No Auth                          AUTH #:  Date  12 9  12 12 12 14 12 16 12 19 12 21 12 23 12  30    Authed: 99 units Used 2 3  3 3 3 3 3 3 2 4      Remaining  97 94  91 88 85 82 79 76 74 70       Precautions: HTN, Type 2 DM, OA B/L knees     HEP: glute set, heel slide, quad set, LAQ, ankle pumps,     Manuals  12 19 12 21 12 23    visit  6 7 8 9 10    patellar mobs   FB   FB                       Neuro Re-Ed          feet together EC  4*20'' semi-tandem CCG 4*20'' semi-Tdm CCG 4*20'' semi-Tdm CCG                         Ther Ex         LAQ  2*15, 3" with DF 2*15, 3" with DF 2*15, 3" with DF 2*15, 3" with DF 3*12, 3" with DF   Heel slide         PROM  FB FB      Nu step seat 6  7' lvl 1 8', lvl 2 4' 1/2 rev  bike 7' lvl 1 nu step 7' lvl 3 nu step   Stand hip flexion  3*10 3*10 3*10 3*10    Calf raise 3*10 3*10 3*10 3*10 3*10   squats 3*10 3*10 3*10 3*10 3*10   Step up 2*10, 6" 3*10, 6" 3*10, 6" 2*15, 6" 2*15, 6"   Stand hip abd  3*10 ea  3*10 ea  3*10 ea  3*10 ea      SLR flexion 3*8  3*10 3*10  3*10                                     Ther Activity          stair navigation      8' with [facility]             Gait Training          SPC       8'             Modalities

## 2023-01-03 ENCOUNTER — OFFICE VISIT (OUTPATIENT)
Dept: PHYSICAL THERAPY | Facility: CLINIC | Age: 68
End: 2023-01-03

## 2023-01-03 DIAGNOSIS — M25.562 CHRONIC PAIN OF LEFT KNEE: ICD-10-CM

## 2023-01-03 DIAGNOSIS — Z96.652 STATUS POST TOTAL KNEE REPLACEMENT, LEFT: ICD-10-CM

## 2023-01-03 DIAGNOSIS — M17.12 PRIMARY OSTEOARTHRITIS OF LEFT KNEE: Primary | ICD-10-CM

## 2023-01-03 DIAGNOSIS — G89.29 CHRONIC PAIN OF LEFT KNEE: ICD-10-CM

## 2023-01-03 NOTE — PROGRESS NOTES
Daily Note     Today's date: 1/3/2023  Patient name: Larry Mccann  : 1955  MRN: [id number]  Referring provider: Yazmin Hernandez DO  Dx:   Encounter Diagnosis     ICD-10-CM    1  Primary osteoarthritis of left knee  M17 12       2  Chronic pain of left knee  M25 562     G89 29       3  Status post total knee replacement, left  [id number]                  Subjective: Patient reports using the cane some at home  Reports that she is compliant with HEP and is still having knee discomfort that will interfere with sleeping  Objective: See treatment diary below  Knee extn: 0 deg  Knee flexion: not measured today    Assessment: Tolerated treatment well  Patient demonstrated improved gait pattern with SPC during treatment today  Full passive knee extension at this time and scar is healing nicely  Patient educated it is okay to start using lotion around incision as scabbing is off  Plan: Continue per plan of care  Insurance:  AMA/CMS Eval/ Re-eval POC expires Richie Mar #/ Referral # Total    Start date  Expiration date Extension  Visit limitation? PT only or  PT+OT?  Co-Insurance   AMA  2 20 23  No Auth                          AUTH #:  Date  12 9  12 12 12 14 12 16 12 19 12 21 12 23 12 28 12 30 1 3 23   Authed: 99 units Used 2 3  3 3 3 3 3 3 2 4 3     Remaining  97 94  91 88 85 82 79 76 74 70 67      Precautions: HTN, Type 2 DM, OA B/L knees     HEP: glute set, heel slide, quad set, LAQ, ankle pumps,     Manuals  12 19 12 21 12 23 12/28 12/30 1/3/23   visit  6 7 8 9 10 11    patellar mobs   FB   FB FB                         Neuro Re-Ed           feet together EC  4*20'' semi-tandem CCG 4*20'' semi-Tdm CCG 4*20'' semi-Tdm CCG                            Ther Ex          LAQ  2*15, 3" with DF 2*15, 3" with DF 2*15, 3" with DF 2*15, 3" with DF 3*12, 3" with DF    Heel slide          PROM  FB FB    FB   Nu step seat 6  7' lvl 1 8', lvl 2 4' 1/2 rev  bike 7' lvl 1 nu step 7' lvl 3 nu step 8' lvl 3 nu step   Stand hip flexion  3*10 3*10 3*10 3*10     Calf raise 3*10 3*10 3*10 3*10 3*10 3*10   squats 3*10 3*10 3*10 3*10 3*10 3*10   Step up 2*10, 6" 3*10, 6" 3*10, 6" 2*15, 6" 2*15, 6" 3*10, 8"   Stand hip abd  3*10 ea  3*10 ea  3*10 ea  3*10 ea   3*10 RLE moving   SLR flexion 3*8  3*10 3*10  3*10 3*10, 2"                                         Ther Activity           stair navigation      8' with SPC 5'               Gait Training           SPC       8' 8'               Modalities

## 2023-01-05 ENCOUNTER — OFFICE VISIT (OUTPATIENT)
Dept: PHYSICAL THERAPY | Facility: CLINIC | Age: 68
End: 2023-01-05

## 2023-01-05 DIAGNOSIS — Z96.652 STATUS POST TOTAL KNEE REPLACEMENT, LEFT: ICD-10-CM

## 2023-01-05 DIAGNOSIS — G89.29 CHRONIC PAIN OF LEFT KNEE: ICD-10-CM

## 2023-01-05 DIAGNOSIS — M17.12 PRIMARY OSTEOARTHRITIS OF LEFT KNEE: Primary | ICD-10-CM

## 2023-01-05 DIAGNOSIS — M25.562 CHRONIC PAIN OF LEFT KNEE: ICD-10-CM

## 2023-01-05 NOTE — PROGRESS NOTES
Daily Note     Today's date: 2023  Patient name: Steven Rock  : 1955  MRN: [id number]  Referring provider: John Deal DO  Dx:   Encounter Diagnosis     ICD-10-CM    1  Primary osteoarthritis of left knee  M17 12       2  Chronic pain of left knee  M25 562     G89 29       3  Status post total knee replacement, left  [id number]                  Subjective: Patient reports walking at times short distances at home without cane but is afraid of falling  Objective: See treatment diary below  Knee extn: 0 deg  Knee flexion: not measured today    Assessment: Tolerated treatment well  Patient able to ambulate small distances with slow gait pattern without use of SPC  Advised to continue to use this at home for safety reasons and patient verbalized understanding  Patient wished to defer further exercise today and end session due to other personal matters  Plan: Continue per plan of care  Insurance:  AMA/CMS Eval/ Re-eval POC expires Emile Burgos #/ Referral # Total    Start date  Expiration date Extension  Visit limitation? PT only or  PT+OT? Co-Insurance   AMA  2 20 23  No Auth                          AUTH #:  Date  12 16 12  12  12 30 1 3 23 1 5 23   Authed: 99 units Used 3 3 3 3 3 2 4 3      Remaining  88 85 82 79 76 74 70 67       Precautions: HTN, Type 2 DM, OA B/L knees     HEP: glute set, heel slide, quad set, LAQ, ankle pumps,     Manuals 12 23 12/28 12/30 1/3/23 1/5/23   visit 8 9 10 11 12    patellar mobs   FB FB                      Neuro Re-Ed         feet together EC 4*20'' semi-Tdm CCG                         Ther Ex        LAQ 2*15, 3" with DF 2*15, 3" with DF 3*12, 3" with DF     Heel slide        PROM    FB    Nu step seat 6 4' 12 rev  bike 7' lvl 1 nu step 7' lvl 3 nu step 8' lvl 3 nu step 7' lvl 3   Stand hip flexion 3*10 3*10   3*10 ea     Calf raise 3*10 3*10 3*10 3*10 3*10   squats 3*10 3*10 3*10 3*10 3*10   Step up 3*10, 6" 2*15, 6" 2*15, 6" 3*10, 8" Stand hip abd  3*10 ea  3*10 ea   3*10 RLE moving 3*10 RLE moving   SLR flexion 3*10  3*10 3*10, 2"                                     Ther Activity         stair navigation   8' with SPC 5'  3'            Gait Training         SPC    8' 8'  8'            Modalities

## 2023-01-09 ENCOUNTER — TELEPHONE (OUTPATIENT)
Dept: NEPHROLOGY | Facility: CLINIC | Age: 68
End: 2023-01-09

## 2023-01-09 ENCOUNTER — OFFICE VISIT (OUTPATIENT)
Dept: PHYSICAL THERAPY | Facility: CLINIC | Age: 68
End: 2023-01-09

## 2023-01-09 DIAGNOSIS — G89.29 CHRONIC PAIN OF LEFT KNEE: ICD-10-CM

## 2023-01-09 DIAGNOSIS — M25.562 CHRONIC PAIN OF LEFT KNEE: ICD-10-CM

## 2023-01-09 DIAGNOSIS — M17.12 PRIMARY OSTEOARTHRITIS OF LEFT KNEE: Primary | ICD-10-CM

## 2023-01-09 DIAGNOSIS — Z96.652 STATUS POST TOTAL KNEE REPLACEMENT, LEFT: ICD-10-CM

## 2023-01-09 NOTE — TELEPHONE ENCOUNTER
Patient called to have her BMP lab order for 1/16 sent to [facility] on [address]  Lab order faxed

## 2023-01-09 NOTE — PROGRESS NOTES
Daily Note     Today's date: 2023  Patient name: Ankush Nunn  : 1955  MRN: [id number]  Referring provider: Felicia Garduno DO  Dx:   Encounter Diagnosis     ICD-10-CM    1  Primary osteoarthritis of left knee  M17 12       2  Chronic pain of left knee  M25 562     G89 29       3  Status post total knee replacement, left  [id number]                  Subjective: Patient reports her pain is 5/10 today left knee      Objective: See treatment diary below      Assessment: Tolerated treatment well  Taught patient quad set  She needed verbal and tactile cues to perform a quad set  Contraction of left quad was minimal     Plan: Continue per plan of care  As per primary PT  Insurance:  AMA/CMS Eval/ Re-eval POC expires Linda Napier #/ Referral # Total    Start date  Expiration date Extension  Visit limitation? PT only or  PT+OT? Co-Insurance   AMA 11 28 2 20 23  No Auth                          AUTH #:  Date 12 14 12 16 12 19 12 21 12 23 12 28 12 30 1 3 23 1 5 23   Authed: 99 units Used 3 3 3 3 3 2 4 3 2     Remaining  88 85 82 79 76 74 70 67 65      AUTH #:  Date            Authed: 99 units Used 2             Remaining  63             Precautions: HTN, Type 2 DM, OA B/L knees     HEP: glute set, heel slide, quad set, LAQ, ankle pumps,     Manuals 12/28 12/30 1/3/23 1/5/23 1/9/23   visit 9 10 11 12 13    patellar mobs  FB FB  AF                     Neuro Re-Ed         feet together EC                          Ther Ex        LAQ 2*15, 3" with DF 3*12, 3" with DF   3x10   Heel slide     1x10 on slide board w strap   PROM   FB  AF   Nu step seat 6 7' lvl 1 nu step 7' lvl 3 nu step 8' lvl 3 nu step 7' lvl 3 7' Lev 3   Stand hip flexion 3*10   3*10 ea   3x10   Calf raise 3*10 3*10 3*10 3*10 3x10   squats 3*10 3*10 3*10 3*10 2x10   Step up 2*15, 6" 2*15, 6" 3*10, 8"     Stand hip abd  3*10 ea   3*10 RLE moving 3*10 RLE moving 3x10   SLR flexion  3*10 3*10, 2"                                      Ther Activity  stair navigation  8' with SPC 5'  3'             Gait Training         SPC   8' 8'  8'             Modalities

## 2023-01-11 ENCOUNTER — TELEPHONE (OUTPATIENT)
Dept: CARDIOLOGY CLINIC | Facility: CLINIC | Age: 68
End: 2023-01-11

## 2023-01-11 LAB
BUN SERPL-MCNC: 10 MG/DL (ref 7–25)
BUN/CREAT SERPL: NORMAL (CALC) (ref 6–22)
CALCIUM SERPL-MCNC: 9.2 MG/DL (ref 8.6–10.4)
CHLORIDE SERPL-SCNC: 101 MMOL/L (ref 98–110)
CO2 SERPL-SCNC: 31 MMOL/L (ref 20–32)
CREAT SERPL-MCNC: 0.97 MG/DL (ref 0.5–1.05)
GFR/BSA.PRED SERPLBLD CYS-BASED-ARV: 64 ML/MIN/1.73M2
GLUCOSE SERPL-MCNC: 110 MG/DL (ref 65–139)
POTASSIUM SERPL-SCNC: 3.9 MMOL/L (ref 3.5–5.3)
SODIUM SERPL-SCNC: 139 MMOL/L (ref 135–146)

## 2023-01-11 NOTE — TELEPHONE ENCOUNTER
Spoke with daughter  Patient has been taking torsemide 10mg EOD  Legs +2 pitting edema  No sob  No vitals to report

## 2023-01-11 NOTE — TELEPHONE ENCOUNTER
Daughter called mom legs swollen x 2 months would like to speak to someone regarding medication she was taking   Please call see dr Dee Dee Romano

## 2023-01-11 NOTE — TELEPHONE ENCOUNTER
There are going to try torsemide 10 mg daily plus compression plus leg elevation    They will touch bases with us in 1 week and let us know if the lower extremity swelling is better

## 2023-01-12 ENCOUNTER — OFFICE VISIT (OUTPATIENT)
Dept: PHYSICAL THERAPY | Facility: CLINIC | Age: 68
End: 2023-01-12

## 2023-01-12 DIAGNOSIS — M17.12 PRIMARY OSTEOARTHRITIS OF LEFT KNEE: Primary | ICD-10-CM

## 2023-01-12 DIAGNOSIS — M25.562 CHRONIC PAIN OF LEFT KNEE: ICD-10-CM

## 2023-01-12 DIAGNOSIS — Z96.652 STATUS POST TOTAL KNEE REPLACEMENT, LEFT: ICD-10-CM

## 2023-01-12 DIAGNOSIS — E87.1 HYPONATREMIA: ICD-10-CM

## 2023-01-12 DIAGNOSIS — G89.29 CHRONIC PAIN OF LEFT KNEE: ICD-10-CM

## 2023-01-12 RX ORDER — TORSEMIDE 10 MG/1
10 TABLET ORAL DAILY
Qty: 30 TABLET | Refills: 3 | Status: SHIPPED | OUTPATIENT
Start: 2023-01-12

## 2023-01-12 NOTE — PROGRESS NOTES
Daily Note     Today's date: 2023  Patient name: Angeline Goodman  : 1955  MRN: [id number]  Referring provider: Huber Moreau DO  Dx:   Encounter Diagnosis     ICD-10-CM    1  Primary osteoarthritis of left knee  M17 12       2  Chronic pain of left knee  M25 562     G89 29       3  Status post total knee replacement, left  [id number]         Start Time: 1715  Stop Time: 1745  Total time in clinic (min): 30 minutes  Subjective: Patient reports lateral knee pain 7/10  Reports increased swelling in leg  Cardiologist and nephrologist aware  Objective: See treatment diary below    Assessment: Tolerated treatment fair today  Difficulty with quad activation during session  Referring provider made aware of patient lateral calf pain  Patient to call referring provider's office tomorrow for appointment per his recommendations  Plan: Continue per plan of care  Insurance:  AMA/CMS Eval/ Re-eval POC expires Kassandra Jallohs #/ Referral # Total    Start date  Expiration date Extension  Visit limitation? PT only or  PT+OT? Co-Insurance   AMA 11 28 2 20 23  No Auth                          AUTH #:  Date  14 12 16 12 19 12 21 12 23 12 28 12 30 1 3 23 1 5 23   Authed: 99 units Used 3 3 3 3 3 2 4 3 2     Remaining  88 85 82 79 76 74 70 67 65      AUTH #:  Date 1 9           Authed: 99 units Used 2             Remaining  63             Precautions: HTN, Type 2 DM, OA B/L knees     HEP: glute set, heel slide, quad set, LAQ, ankle pumps,     Manuals 12/28 12/30 1/3/23 1/5/23 1/9/23 1/12/23   visit 9 10 11 12 13 14    patellar mobs  FB FB  AF Not performed                       Neuro Re-Ed          feet together EC                             Ther Ex         LAQ 2*15, 3" with DF 3*12, 3" with DF   3x10 2*10   Heel slide     1x10 on slide board w strap    PROM   FB  AF    Nu step seat 6 7' lvl 1 nu step 7' lvl 3 nu step 8' lvl 3 nu step 7' lvl 3 7' Lev 3 7' lvl 3   Stand hip flexion 3*10   3*10 ea   3x10 3*10   Calf raise 3*10 3*10 3*10 3*10 3x10 held   squats 3*10 3*10 3*10 3*10 2x10 3*10   Step up 2*15, 6" 2*15, 6" 3*10, 8"      Stand hip abd  3*10 ea   3*10 RLE moving 3*10 RLE moving 3x10    SLR flexion  3*10 3*10, 2"      Quad set      2*10, 3"                               Ther Activity          stair navigation  8' with SPC 5'  3'               Gait Training          SPC   8' 8'  8'               Modalities

## 2023-01-12 NOTE — TELEPHONE ENCOUNTER
Pt's daughter called - pt is having worsening edema, cardiology had recommended to take torsemide 10 mg daily instead of qod that she is currently taking for few weeks or until edema improves  Daughter wanted to know if nephrology agreeable with plan  Per Thania burleson to increase Torsemide to 10 mg daily until edema improves, prescription sent to [facility]  Lab orders in the system, pt can have in few weeks    Above detailed message was left for Madeline Padron (pt's daughter)

## 2023-01-13 ENCOUNTER — OFFICE VISIT (OUTPATIENT)
Dept: OBGYN CLINIC | Facility: CLINIC | Age: 68
End: 2023-01-13

## 2023-01-13 ENCOUNTER — HOSPITAL ENCOUNTER (OUTPATIENT)
Dept: VASCULAR ULTRASOUND | Facility: HOSPITAL | Age: 68
Discharge: HOME | End: 2023-01-13

## 2023-01-13 ENCOUNTER — TELEPHONE (OUTPATIENT)
Dept: OBGYN CLINIC | Facility: CLINIC | Age: 68
End: 2023-01-13

## 2023-01-13 VITALS
TEMPERATURE: 98.7 F | BODY MASS INDEX: 30.35 KG/M2 | HEIGHT: 64 IN | SYSTOLIC BLOOD PRESSURE: 110 MMHG | HEART RATE: 80 BPM | WEIGHT: 177.8 LBS | DIASTOLIC BLOOD PRESSURE: 68 MMHG

## 2023-01-13 DIAGNOSIS — Z96.652 AFTERCARE FOLLOWING LEFT KNEE JOINT REPLACEMENT SURGERY: ICD-10-CM

## 2023-01-13 DIAGNOSIS — Z47.1 AFTERCARE FOLLOWING LEFT KNEE JOINT REPLACEMENT SURGERY: ICD-10-CM

## 2023-01-13 DIAGNOSIS — Z96.652 STATUS POST TOTAL LEFT KNEE REPLACEMENT USING CEMENT: ICD-10-CM

## 2023-01-13 DIAGNOSIS — Z86.718 HISTORY OF DVT (DEEP VEIN THROMBOSIS): ICD-10-CM

## 2023-01-13 DIAGNOSIS — M79.662 PAIN OF LEFT CALF: ICD-10-CM

## 2023-01-13 DIAGNOSIS — M79.662 PAIN OF LEFT CALF: Primary | ICD-10-CM

## 2023-01-13 DIAGNOSIS — R60.0 LEG EDEMA, LEFT: ICD-10-CM

## 2023-01-13 NOTE — TELEPHONE ENCOUNTER
PT reached put to provided yesterday regarding some concerning leg swelling  lvm for patient to make appointment today to be evaluated for leg swelling  Will keep attempting to contact today

## 2023-01-14 NOTE — PROGRESS NOTES
Assessment/Plan:  1  Pain of left calf  VAS lower limb venous duplex study, unilateral/limited      2  Leg edema, left  VAS lower limb venous duplex study, unilateral/limited      3  History of DVT (deep vein thrombosis)  VAS lower limb venous duplex study, unilateral/limited      4  Status post total left knee replacement using cement  VAS lower limb venous duplex study, unilateral/limited      5  Aftercare following left knee joint replacement surgery  VAS lower limb venous duplex study, unilateral/limited        Alfred Russell is a pleasant 34-year-old female presenting today for an interval evaluation 5 weeks after a robotic assisted left total knee arthroplasty  She has developed left leg edema and pain in the calf over the past week  She has also been without DVT prophylaxis since completing her partial fill prescription of Lovenox last week  She does seem to be doing better with the diuretic  However, with a tender calf, swollen leg, and subtherapeutic anticoagulation due to early stoppage, I have referred her for a stat duplex ultrasound to rule out DVT  If positive, she will present to the emergency department  If negative, it is recommended that she restart her regular aspirin regimen that she was doing prior to surgery  I would like to see her back in 2 weeks for reevaluation  She expressed understanding all of her questions were addressed    Addendum: I did receive notification that her duplex ultrasound was negative for DVT  Therefore, she should reinitiate her normal aspirin regimen    Subjective: Left leg swelling    Patient ID: Dayna Tariq is a 75 y o  female  Alfred Russell is a pleasant 34-year-old female presenting today 5 weeks after undergoing a robotic assisted left total knee arthroplasty  She has been working with physical therapy and was noted to have increased swelling and pain over the last week in her left leg  She was evaluated and treated by cardiology who increased her diuretic    She locates the pain primarily in the calf and anterior knee  Of note, she only received a partial fill of her Lovenox prescription, so she has not been on Lovenox or any other anticoagulation for the past week or so  She denies any falls or paresthesias    Review of Systems   Constitutional: Positive for activity change  HENT: Negative  Eyes: Negative  Respiratory: Negative  Cardiovascular: Positive for leg swelling  Gastrointestinal: Negative  Endocrine: Negative  Genitourinary: Negative  Musculoskeletal: Positive for arthralgias, gait problem, joint swelling and myalgias  Skin: Positive for color change  Allergic/Immunologic: Negative  Hematological: Negative  Psychiatric/Behavioral: Negative  Past Medical History:   Diagnosis Date   • Anesthesia complication     difficulty waking up   • Arthritis     left knee   • Bone spur     left knee behing the knee cap   • Chronic kidney disease    • Colon cancer [facility])     patient states about 2017   • Colon polyp     Tubulovillous Adenoma High Grade Dysplasia - 2017   • Depression    • Diabetes mellitus ([facility] [address] )    • Endometrial cancer (HCC)     grade I   • Hx of bleeding disorder     vaginal bleeding started on 3/13/17    • Hyperlipidemia    • Hypertension    • PONV (postoperative nausea and vomiting)    • Psychiatric disorder    • Shortness of breath     with exertion   • Urinary incontinence    • Vitamin D deficiency        Past Surgical History:   Procedure Laterality Date   • BREAST BIOPSY Left     benign-maybe at ar age 58   • CHOLECYSTECTOMY      open   • COLONOSCOPY     • DILATION AND CURETTAGE OF UTERUS N/A 04/05/2017    Procedure: DILATATION AND CURETTAGE (D&C);   Surgeon: Kailey Seo MD;  Location: [facility] MAIN OR;  Service:    • HYSTERECTOMY     • OOPHORECTOMY     • PELVIC LAPAROSCOPY     • MN ARTHRP KNE CONDYLE&PLATU MEDIAL&LAT COMPARTMENTS Left 12/06/2022    Procedure: ARTHROPLASTY KNEE TOTAL W ROBOT - CEMENTED - LEFT; Surgeon: Haresh Landin DO;  Location: [address] [place] [address];  Service: Orthopedics   • GA LAPS TOTAL HYSTERECT 250 GM/< W/RMVL TUBE/OVARY N/A 05/04/2017    Procedure: ROBOTIC ASSISTED TOTAL LAPAROSCOPIC HYSTERECTOMY, BILATERAL SALPINGOOPHERECTOMY; CYSTOSCOPY;  Surgeon: Maikel Pyle MD;  Location: [facility] OR;  Service: Gynecology Oncology   • REPLACEMENT TOTAL KNEE     • TONSILLECTOMY     • TUBAL LIGATION         Family History   Problem Relation Age of Onset   • Cancer Mother         Renal   • Heart disease Father         CHF   • Heart disease Sister         atrial septal defect   • Breast cancer Paternal Aunt 40       Social History     Occupational History   • Not on file   Tobacco Use   • Smoking status: Never   • Smokeless tobacco: Never   Vaping Use   • Vaping Use: Never used   Substance and Sexual Activity   • Alcohol use: Never   • Drug use: No   • Sexual activity: Not Currently     Birth control/protection: Post-menopausal, Surgical         Current Outpatient Medications:   •  acetaminophen (TYLENOL) 325 mg tablet, Take 3 tablets (975 mg total) by mouth every 8 (eight) hours, Disp: , Rfl: 0  •  BD Pen Needle Chelsey U/F 32G X 4 MM MISC, Take by mouth 2 (two) times a day, Disp: 60 each, Rfl: 1  •  clonazePAM (KlonoPIN) 0 5 mg tablet, Take 0 5 mg by mouth 2 (two) times a day as needed, Disp: , Rfl:   •  docusate sodium (COLACE) 100 mg capsule, Take 1 capsule (100 mg total) by mouth 2 (two) times a day as needed for constipation, Disp: 60 capsule, Rfl: 6  •  enoxaparin (LOVENOX) 40 mg/0 4 mL, Inject 0 4 mL (40 mg total) under the skin daily Do not start before December 8, 2022 , Disp: 16 4 mL, Rfl: 0  •  insulin glargine (Toujeo SoloStar) 300 units/mL CONCENTRATED U-300 injection pen (1-unit dial), Inject 55 Units under the skin every morning LANTUS (Patient taking differently: Inject 66 Units under the skin every morning LANTUS), Disp: 4 5 mL, Rfl: 3  •  metFORMIN (GLUCOPHAGE-XR) 500 mg 24 hr tablet, Take 1 tablet (500 mg total) by mouth 2 (two) times a day with meals, Disp: 60 tablet, Rfl: 5  •  metoprolol succinate (TOPROL-XL) 25 mg 24 hr tablet, Take 1 tablet (25 mg total) by mouth daily, Disp: 90 tablet, Rfl: 3  •  Multiple Vitamin (Multivitamin Adult) TABS, Take 1 tablet by mouth in the morning (Patient not taking: Reported on 12/27/2022), Disp: 30 tablet, Rfl: 3  •  naloxone (NARCAN) 4 mg/0 1 mL nasal spray, Administer 1 spray into a nostril  If no response after 2-3 minutes, give another dose in the other nostril using a new spray  (Patient not taking: Reported on 12/27/2022), Disp: 1 each, Rfl: 0  •  nitroglycerin (NITROSTAT) 0 4 mg SL tablet, Place 1 tablet (0 4 mg total) under the tongue every 5 (five) minutes as needed for chest pain, Disp: 30 tablet, Rfl: 1  •  Omega-3 Fatty Acids (fish oil) 1,000 mg, Take 1,000 mg by mouth 2 (two) times a day, Disp: , Rfl:   •  oxyCODONE (Roxicodone) 5 immediate release tablet, Take 1 tablet (5 mg total) by mouth every 4 (four) hours as needed for moderate pain Max Daily Amount: 30 mg, Disp: 45 tablet, Rfl: 0  •  rosuvastatin (CRESTOR) 20 MG tablet, Take 1 tablet (20 mg total) by mouth daily (Patient taking differently: Take 20 mg by mouth daily at bedtime), Disp: 90 tablet, Rfl: 3  •  torsemide (DEMADEX) 10 mg tablet, Take 1 tablet (10 mg total) by mouth daily Pt can take Torsemide 10 mg qd due to worsening edema, can go back to every other day when improved  , Disp: 30 tablet, Rfl: 3  •  ziprasidone (GEODON) 80 mg capsule, Take 1 capsule (80 mg total) by mouth 2 (two) times a day, Disp: 60 capsule, Rfl: 0    No Known Allergies    Objective:  Vitals:    01/13/23 1306   BP: 110/68   Pulse: 80   Temp: 98 7 °F (37 1 °C)       Body mass index is 30 52 kg/m²  Left Knee Exam     Muscle Strength   The patient has normal left knee strength  Tenderness   The patient is experiencing tenderness in the medial joint line      Range of Motion   Extension:  0 normal   Flexion:  110 normal Tests   Varus: negative Valgus: negative  Drawer:  Anterior - negative       Patellar apprehension: negative    Other   Erythema: absent  Scars: present  Sensation: normal  Pulse: present  Swelling: mild  Effusion: no effusion present    Comments:  Incision site is well appearing without drainage or dehisence  Mild ecchymosis to the posterior aspect of the knee and calf area  Calf tender to palpation  Mild nonpitting edema left lower extremity compared to contralateral side  Positive Homans  Prosthesis stable to stressing varus and valgus  Ambulates slowly with a walker              Observations   Left Knee   Negative for effusion  Physical Exam  Vitals and nursing note reviewed  Constitutional:       Appearance: Normal appearance  She is well-developed  Comments: Body mass index is 30 52 kg/m²  HENT:      Head: Normocephalic and atraumatic  Right Ear: External ear normal       Left Ear: External ear normal    Eyes:      Extraocular Movements: Extraocular movements intact  Conjunctiva/sclera: Conjunctivae normal    Cardiovascular:      Rate and Rhythm: Normal rate  Pulses: Normal pulses  Pulmonary:      Effort: Pulmonary effort is normal    Musculoskeletal:      Cervical back: Normal range of motion  Left knee: No effusion  Comments: See ortho exam   Skin:     General: Skin is warm and dry  Neurological:      General: No focal deficit present  Mental Status: She is alert and oriented to person, place, and time  Mental status is at baseline  Psychiatric:         Mood and Affect: Mood normal          Behavior: Behavior normal          Thought Content:  Thought content normal          Judgment: Judgment normal

## 2023-01-16 ENCOUNTER — OFFICE VISIT (OUTPATIENT)
Dept: PHYSICAL THERAPY | Facility: CLINIC | Age: 68
End: 2023-01-16

## 2023-01-16 ENCOUNTER — TELEPHONE (OUTPATIENT)
Dept: NEPHROLOGY | Facility: CLINIC | Age: 68
End: 2023-01-16

## 2023-01-16 DIAGNOSIS — Z96.652 STATUS POST TOTAL KNEE REPLACEMENT, LEFT: ICD-10-CM

## 2023-01-16 DIAGNOSIS — M25.562 CHRONIC PAIN OF LEFT KNEE: ICD-10-CM

## 2023-01-16 DIAGNOSIS — M17.12 PRIMARY OSTEOARTHRITIS OF LEFT KNEE: Primary | ICD-10-CM

## 2023-01-16 DIAGNOSIS — G89.29 CHRONIC PAIN OF LEFT KNEE: ICD-10-CM

## 2023-01-16 NOTE — PROGRESS NOTES
Daily Note     Today's date: 2023  Patient name: Junior Quinn  : 1955  MRN: [id number]  Referring provider: May Gregory DO  Dx:   Encounter Diagnosis     ICD-10-CM    1  Primary osteoarthritis of left knee  M17 12       2  Status post total knee replacement, left  Z96 652       3  Chronic pain of left knee  M25 562     G89 29                  Subjective: Patient reports seeing referring provider's PA  VAS ultrasound performed and was negative  Patient reporting less pain entering session than prior  Objective: See treatment diary below    Assessment: Tolerated treatment well today  Patient reported feeling okay during session with education provided to patient to perform straight leg raise and heel slides at home to work on quad strengthening and improving knee flexion  Knee flexion improved after fibular head mobs  Plan: Continue per plan of care  Insurance:  AMA/CMS Eval/ Re-eval POC expires Waqas [place] #/ Referral # Total    Start date  Expiration date Extension  Visit limitation? PT only or  PT+OT? Co-Insurance   AMA 11 28 2 20 23  No Auth                          AUTH #:  Date 12 14 12 16 12 19 12 21 12 23 12 28 12 30 1 3 23 1 5 23   Authed: 99 units Used 3 3 3 3 3 2 4 3 2     Remaining  88 85 82 79 76 74 70 67 65      AUTH #:  Date 1 9           Authed: 99 units Used 2             Remaining  63             Precautions: HTN, Type 2 DM, OA B/L knees     HEP: glute set, heel slide, quad set, LAQ, ankle pumps,     Manuals 1/3/23 1/5/23 1/9/23 1/12/23 1/16/23   visit 11 12 13 14 15    patellar mobs FB  AF Not performed FB    fib head mobs     FB            Neuro Re-Ed         feet together EC                          Ther Ex        LAQ   3x10 2*10 2*10   Heel slide   1x10 on slide board w strap     PROM FB  AF  FB   Nu step seat 6 8' lvl 3 nu step 7' lvl 3 7' Lev 3 7' lvl 3 Bike, 1/2 rev  5 '   Stand hip flexion  3*10 ea   3x10 3*10    Calf raise 3*10 3*10 3x10 held    squats 3*10 3*10 2x10 3*10 3*10   Step up 3*10, 8"    3*10, 6"    Stand hip abd  3*10 RLE moving 3*10 RLE moving 3x10     SLR flexion 3*10, 2"    3*10   Quad set    2*10, 3" 2*10, 3"   Side step     2*2 laps (10')                    Ther Activity         stair navigation 5'  3'               Gait Training        [facility]  8'  8'               Modalities

## 2023-01-16 NOTE — TELEPHONE ENCOUNTER
Patient called for the results from her 1/10 lab work done at [facility]  She requests a call back to go over her results

## 2023-01-18 ENCOUNTER — TELEPHONE (OUTPATIENT)
Dept: OTHER | Facility: HOSPITAL | Age: 68
End: 2023-01-18

## 2023-01-18 NOTE — TELEPHONE ENCOUNTER
Called patient to discuss blood work  She states swelling is improving  She is taking torsemide 10mg daily and wearing compression stockings  She continues to be off the salt tablets  BMP looks great without concerns from my standpoint  All questions answered

## 2023-01-19 ENCOUNTER — APPOINTMENT (OUTPATIENT)
Dept: PHYSICAL THERAPY | Facility: CLINIC | Age: 68
End: 2023-01-19

## 2023-01-20 ENCOUNTER — TELEPHONE (OUTPATIENT)
Dept: CARDIOLOGY CLINIC | Facility: CLINIC | Age: 68
End: 2023-01-20

## 2023-01-20 DIAGNOSIS — N18.30 STAGE 3 CHRONIC KIDNEY DISEASE, UNSPECIFIED WHETHER STAGE 3A OR 3B CKD (HCC): Primary | ICD-10-CM

## 2023-01-20 NOTE — TELEPHONE ENCOUNTER
Patient's daughter called  Just touching base with you after increasing my Torsemide from every other day to daily for 1 week due to leg swelling  I am also wearing compression stocking in addition to keeping my legs elevated  The swelling is improved, I still have plus 1 edema       So my question is do I continue with Torsemide one more week or go back to every other day? Just to remind you I have low sodium and on a fluid restriction

## 2023-01-25 ENCOUNTER — TELEPHONE (OUTPATIENT)
Dept: CARDIOLOGY CLINIC | Facility: CLINIC | Age: 68
End: 2023-01-25

## 2023-01-25 LAB
BUN SERPL-MCNC: 14 MG/DL (ref 7–25)
BUN/CREAT SERPL: NORMAL (CALC) (ref 6–22)
CALCIUM SERPL-MCNC: 9.7 MG/DL (ref 8.6–10.4)
CHLORIDE SERPL-SCNC: 100 MMOL/L (ref 98–110)
CO2 SERPL-SCNC: 29 MMOL/L (ref 20–32)
CREAT SERPL-MCNC: 0.93 MG/DL (ref 0.5–1.05)
GFR/BSA.PRED SERPLBLD CYS-BASED-ARV: 67 ML/MIN/1.73M2
GLUCOSE SERPL-MCNC: 122 MG/DL (ref 65–139)
POTASSIUM SERPL-SCNC: 4 MMOL/L (ref 3.5–5.3)
SODIUM SERPL-SCNC: 138 MMOL/L (ref 135–146)

## 2023-01-25 NOTE — TELEPHONE ENCOUNTER
----- Message from Kari Maravilla MD sent at 1/25/2023 11:08 AM EST -----  Her sodium is stable at 138- can you let her daughter SOHAM Estrada Lopez no   We shold continue the torsemide if she is doing well

## 2023-01-26 ENCOUNTER — TELEPHONE (OUTPATIENT)
Dept: OBGYN CLINIC | Facility: CLINIC | Age: 68
End: 2023-01-26

## 2023-01-26 NOTE — TELEPHONE ENCOUNTER
Contacted patient and lvm to contact office to schedule missed appointment 01/25/23 as soon as possible with Dr Tati Martinez  Ideally 01/27/23

## 2023-01-27 ENCOUNTER — OFFICE VISIT (OUTPATIENT)
Dept: OBGYN CLINIC | Facility: CLINIC | Age: 68
End: 2023-01-27

## 2023-01-27 VITALS
WEIGHT: 177 LBS | SYSTOLIC BLOOD PRESSURE: 125 MMHG | HEART RATE: 73 BPM | DIASTOLIC BLOOD PRESSURE: 64 MMHG | BODY MASS INDEX: 30.22 KG/M2 | HEIGHT: 64 IN | TEMPERATURE: 98.3 F

## 2023-01-27 DIAGNOSIS — Z96.652 AFTERCARE FOLLOWING LEFT KNEE JOINT REPLACEMENT SURGERY: ICD-10-CM

## 2023-01-27 DIAGNOSIS — Z96.652 STATUS POST TOTAL LEFT KNEE REPLACEMENT USING CEMENT: Primary | ICD-10-CM

## 2023-01-27 DIAGNOSIS — Z47.1 AFTERCARE FOLLOWING LEFT KNEE JOINT REPLACEMENT SURGERY: ICD-10-CM

## 2023-01-27 NOTE — PROGRESS NOTES
Assessment/Plan:  1  Status post total left knee replacement using cement        2  Aftercare following left knee joint replacement surgery          Scribe Attestation    I,:  Simone Santillan am acting as a scribe while in the presence of the attending physician :       I,:  Marleni Garrett, DO personally performed the services described in this documentation    as scribed in my presence :         Linden Russell is a pleasant 79-year-old female who returns today for follow-up evaluation 7-week status post left total knee arthroplasty  I am very pleased with her clinical presentation today in the office  She may continue gradually increasing her activity to her tolerance  She no longer requires DVT prophylaxis  She should remain diligent with her home exercise program   I would like to see her back in 5 weeks for her 3-month postoperative appointment with x-ray on arrival     Subjective: Follow-up evaluation 7-week status post left total knee arthroplasty    Patient ID: Carlos Bagley is a 75 y o  female who returns today for follow-up evaluation 7-week status post left total knee arthroplasty  At her last visit, an ultrasound was ordered to evaluate for lower extremity DVT  This test was negative  At today's visit, she reports that she has been doing well  She denies any pain about the knee at rest or with activity  She is no longer participating in physical therapy  She is using a walker for ambulatory assistance  She denies any new injury or trauma  Review of Systems   Constitutional: Positive for activity change  Negative for chills, fever and unexpected weight change  HENT: Negative for hearing loss, nosebleeds and sore throat  Eyes: Negative for pain, redness and visual disturbance  Respiratory: Negative for cough, shortness of breath and wheezing  Cardiovascular: Negative for chest pain, palpitations and leg swelling  Gastrointestinal: Negative for abdominal pain, nausea and vomiting  Endocrine: Negative for polydipsia and polyuria  Genitourinary: Negative for dysuria and hematuria  Musculoskeletal: Negative for arthralgias, joint swelling and myalgias  See HPI   Skin: Negative for rash and wound  Neurological: Negative for dizziness, numbness and headaches  Psychiatric/Behavioral: Negative for decreased concentration and suicidal ideas  The patient is not nervous/anxious  Past Medical History:   Diagnosis Date   • Anesthesia complication     difficulty waking up   • Arthritis     left knee   • Bone spur     left knee behing the knee cap   • Chronic kidney disease    • Colon cancer [facility])     patient states about 2017   • Colon polyp     Tubulovillous Adenoma High Grade Dysplasia - 2017   • Depression    • Diabetes mellitus ([facility] [address] )    • Endometrial cancer (HCC)     grade I   • Hx of bleeding disorder     vaginal bleeding started on 3/13/17    • Hyperlipidemia    • Hypertension    • PONV (postoperative nausea and vomiting)    • Psychiatric disorder    • Shortness of breath     with exertion   • Urinary incontinence    • Vitamin D deficiency        Past Surgical History:   Procedure Laterality Date   • BREAST BIOPSY Left     benign-maybe at ar age 58   • CHOLECYSTECTOMY      open   • COLONOSCOPY     • DILATION AND CURETTAGE OF UTERUS N/A 04/05/2017    Procedure: DILATATION AND CURETTAGE (D&C);   Surgeon: Marquise Bob MD;  Location: [facility] MAIN OR;  Service:    • HYSTERECTOMY     • OOPHORECTOMY     • PELVIC LAPAROSCOPY     • AR ARTHRP KNE CONDYLE&PLATU MEDIAL&LAT COMPARTMENTS Left 12/06/2022    Procedure: ARTHROPLASTY KNEE TOTAL W ROBOT - CEMENTED - LEFT;  Surgeon: Daniela Garcia DO;  Location: [address] [place] [address];  Service: Orthopedics   • AR LAPS TOTAL HYSTERECT 250 GM/< W/RMVL TUBE/OVARY N/A 05/04/2017    Procedure: ROBOTIC ASSISTED TOTAL LAPAROSCOPIC HYSTERECTOMY, BILATERAL SALPINGOOPHERECTOMY; CYSTOSCOPY;  Surgeon: Bailee Krishna MD;  Location:  MAIN OR;  Service: Gynecology Oncology   • REPLACEMENT TOTAL KNEE     • TONSILLECTOMY     • TUBAL LIGATION         Family History   Problem Relation Age of Onset   • Cancer Mother         Renal   • Heart disease Father         CHF   • Heart disease Sister         atrial septal defect   • Breast cancer Paternal Aunt 40       Social History     Occupational History   • Not on file   Tobacco Use   • Smoking status: Never   • Smokeless tobacco: Never   Vaping Use   • Vaping Use: Never used   Substance and Sexual Activity   • Alcohol use: Never   • Drug use: No   • Sexual activity: Not Currently     Birth control/protection: Post-menopausal, Surgical         Current Outpatient Medications:   •  acetaminophen (TYLENOL) 325 mg tablet, Take 3 tablets (975 mg total) by mouth every 8 (eight) hours, Disp: , Rfl: 0  •  BD Pen Needle Chelsey U/F 32G X 4 MM MISC, Take by mouth 2 (two) times a day, Disp: 60 each, Rfl: 1  •  clonazePAM (KlonoPIN) 0 5 mg tablet, Take 0 5 mg by mouth 2 (two) times a day as needed, Disp: , Rfl:   •  docusate sodium (COLACE) 100 mg capsule, Take 1 capsule (100 mg total) by mouth 2 (two) times a day as needed for constipation, Disp: 60 capsule, Rfl: 6  •  insulin glargine (Toujeo SoloStar) 300 units/mL CONCENTRATED U-300 injection pen (1-unit dial), Inject 55 Units under the skin every morning LANTUS (Patient taking differently: Inject 66 Units under the skin every morning LANTUS), Disp: 4 5 mL, Rfl: 3  •  metFORMIN (GLUCOPHAGE-XR) 500 mg 24 hr tablet, Take 1 tablet (500 mg total) by mouth 2 (two) times a day with meals, Disp: 60 tablet, Rfl: 5  •  metoprolol succinate (TOPROL-XL) 25 mg 24 hr tablet, Take 1 tablet (25 mg total) by mouth daily, Disp: 90 tablet, Rfl: 3  •  Multiple Vitamin (Multivitamin Adult) TABS, Take 1 tablet by mouth in the morning, Disp: 30 tablet, Rfl: 3  •  nitroglycerin (NITROSTAT) 0 4 mg SL tablet, Place 1 tablet (0 4 mg total) under the tongue every 5 (five) minutes as needed for chest pain, Disp: 30 tablet, Rfl: 1  •  Omega-3 Fatty Acids (fish oil) 1,000 mg, Take 1,000 mg by mouth 2 (two) times a day, Disp: , Rfl:   •  oxyCODONE (Roxicodone) 5 immediate release tablet, Take 1 tablet (5 mg total) by mouth every 4 (four) hours as needed for moderate pain Max Daily Amount: 30 mg, Disp: 45 tablet, Rfl: 0  •  rosuvastatin (CRESTOR) 20 MG tablet, Take 1 tablet (20 mg total) by mouth daily (Patient taking differently: Take 20 mg by mouth daily at bedtime), Disp: 90 tablet, Rfl: 3  •  torsemide (DEMADEX) 10 mg tablet, Take 1 tablet (10 mg total) by mouth daily Pt can take Torsemide 10 mg qd due to worsening edema, can go back to every other day when improved  , Disp: 30 tablet, Rfl: 3  •  ziprasidone (GEODON) 80 mg capsule, Take 1 capsule (80 mg total) by mouth 2 (two) times a day, Disp: 60 capsule, Rfl: 0  •  enoxaparin (LOVENOX) 40 mg/0 4 mL, Inject 0 4 mL (40 mg total) under the skin daily Do not start before December 8, 2022  (Patient not taking: Reported on 1/27/2023), Disp: 16 4 mL, Rfl: 0  •  naloxone (NARCAN) 4 mg/0 1 mL nasal spray, Administer 1 spray into a nostril  If no response after 2-3 minutes, give another dose in the other nostril using a new spray  (Patient not taking: Reported on 12/27/2022), Disp: 1 each, Rfl: 0    No Known Allergies    Objective:  Vitals:    01/27/23 1052   BP: 125/64   Pulse: 73   Temp: 98 3 °F (36 8 °C)       Body mass index is 30 38 kg/m²  Left Knee Exam     Tenderness   The patient is experiencing no tenderness  Range of Motion   Left knee extension: 3     Flexion: 120     Tests   Varus: negative Valgus: negative  Drawer:  Anterior - negative     Posterior - negative    Other   Erythema: absent  Scars: present (well healed anterior operative scar)  Sensation: normal  Pulse: present  Swelling: none  Effusion: no effusion present    Comments:  Stable at 0, 30 and 90 degrees  No warmth or erythema  Neurovascularly in tact distally  Patella tracks flat and midline          Observations   Left Knee   Negative for effusion  Physical Exam  Vitals and nursing note reviewed  Constitutional:       Appearance: Normal appearance  She is well-developed  HENT:      Head: Normocephalic and atraumatic  Right Ear: External ear normal       Left Ear: External ear normal    Eyes:      General: No scleral icterus  Extraocular Movements: Extraocular movements intact  Conjunctiva/sclera: Conjunctivae normal    Cardiovascular:      Rate and Rhythm: Normal rate  Pulmonary:      Effort: Pulmonary effort is normal  No respiratory distress  Musculoskeletal:      Cervical back: Normal range of motion and neck supple  Left knee: No effusion  Comments: See Ortho exam   Skin:     General: Skin is warm and dry  Neurological:      Mental Status: She is alert and oriented to person, place, and time  Psychiatric:         Behavior: Behavior normal            This document was created using speech voice recognition software  Grammatical errors, random word insertions, pronoun errors, and incomplete sentences are an occasional consequence of this system due to software limitations, ambient noise, and hardware issues  Any formal questions or concerns about content, text, or information contained within the body of this dictation should be directly addressed to the provider for clarification

## 2023-01-30 NOTE — TELEPHONE ENCOUNTER
Pts daughter called, pt still had +1 pittign edema  She is taking torsemide daily  She had her BMP last Wednesday    Please advise

## 2023-01-30 NOTE — TELEPHONE ENCOUNTER
Spoke to daughter, pt will continue as instructed and repeat BMP in 1 month   Lab ordered and at  per pt request

## 2023-02-08 DIAGNOSIS — Z79.4 TYPE 2 DIABETES MELLITUS WITHOUT COMPLICATION, WITH LONG-TERM CURRENT USE OF INSULIN (HCC): ICD-10-CM

## 2023-02-08 DIAGNOSIS — E11.9 TYPE 2 DIABETES MELLITUS WITHOUT COMPLICATION, WITH LONG-TERM CURRENT USE OF INSULIN (HCC): ICD-10-CM

## 2023-02-09 RX ORDER — METFORMIN HYDROCHLORIDE 500 MG/1
500 TABLET, EXTENDED RELEASE ORAL 2 TIMES DAILY WITH MEALS
Qty: 60 TABLET | Refills: 3 | Status: SHIPPED | OUTPATIENT
Start: 2023-02-09

## 2023-02-12 DIAGNOSIS — Z79.4 TYPE 2 DIABETES MELLITUS WITHOUT COMPLICATION, WITH LONG-TERM CURRENT USE OF INSULIN (HCC): ICD-10-CM

## 2023-02-12 DIAGNOSIS — E11.9 TYPE 2 DIABETES MELLITUS WITHOUT COMPLICATION, WITH LONG-TERM CURRENT USE OF INSULIN (HCC): ICD-10-CM

## 2023-02-13 DIAGNOSIS — E11.9 TYPE 2 DIABETES MELLITUS WITHOUT COMPLICATION, WITH LONG-TERM CURRENT USE OF INSULIN (HCC): ICD-10-CM

## 2023-02-13 DIAGNOSIS — Z79.4 TYPE 2 DIABETES MELLITUS WITHOUT COMPLICATION, WITH LONG-TERM CURRENT USE OF INSULIN (HCC): ICD-10-CM

## 2023-02-13 RX ORDER — INSULIN GLARGINE 300 U/ML
55 INJECTION, SOLUTION SUBCUTANEOUS EVERY MORNING
Qty: 4.5 ML | Refills: 0 | Status: SHIPPED | OUTPATIENT
Start: 2023-02-13 | End: 2023-02-14

## 2023-02-14 RX ORDER — INSULIN GLARGINE 300 U/ML
INJECTION, SOLUTION SUBCUTANEOUS
Qty: 6 ML | Refills: 3 | Status: SHIPPED | OUTPATIENT
Start: 2023-02-14

## 2023-02-15 ENCOUNTER — TELEPHONE (OUTPATIENT)
Dept: CARDIOLOGY CLINIC | Facility: CLINIC | Age: 68
End: 2023-02-15

## 2023-02-15 LAB
BUN SERPL-MCNC: 13 MG/DL (ref 7–25)
BUN/CREAT SERPL: ABNORMAL (CALC) (ref 6–22)
CALCIUM SERPL-MCNC: 9.6 MG/DL (ref 8.6–10.4)
CHLORIDE SERPL-SCNC: 98 MMOL/L (ref 98–110)
CO2 SERPL-SCNC: 29 MMOL/L (ref 20–32)
CREAT SERPL-MCNC: 0.88 MG/DL (ref 0.5–1.05)
GFR/BSA.PRED SERPLBLD CYS-BASED-ARV: 72 ML/MIN/1.73M2
GLUCOSE SERPL-MCNC: 165 MG/DL (ref 65–99)
POTASSIUM SERPL-SCNC: 3.8 MMOL/L (ref 3.5–5.3)
SODIUM SERPL-SCNC: 135 MMOL/L (ref 135–146)

## 2023-02-15 NOTE — TELEPHONE ENCOUNTER
----- Message from Mulu Campa MD sent at 2/15/2023  1:04 PM EST -----  Sodium is stable at 135 continue diuretic

## 2023-02-20 DIAGNOSIS — E87.1 HYPONATREMIA: Primary | ICD-10-CM

## 2023-02-22 ENCOUNTER — TELEPHONE (OUTPATIENT)
Dept: FAMILY MEDICINE CLINIC | Facility: CLINIC | Age: 68
End: 2023-02-22

## 2023-02-22 DIAGNOSIS — E87.1 HYPONATREMIA: Primary | ICD-10-CM

## 2023-02-22 LAB
BUN SERPL-MCNC: 15 MG/DL (ref 7–25)
BUN/CREAT SERPL: ABNORMAL (CALC) (ref 6–22)
CALCIUM SERPL-MCNC: 9.4 MG/DL (ref 8.6–10.4)
CHLORIDE SERPL-SCNC: 97 MMOL/L (ref 98–110)
CO2 SERPL-SCNC: 29 MMOL/L (ref 20–32)
CREAT SERPL-MCNC: 0.82 MG/DL (ref 0.5–1.05)
GFR/BSA.PRED SERPLBLD CYS-BASED-ARV: 77 ML/MIN/1.73M2
GLUCOSE SERPL-MCNC: 156 MG/DL (ref 65–99)
POTASSIUM SERPL-SCNC: 4.3 MMOL/L (ref 3.5–5.3)
SODIUM SERPL-SCNC: 134 MMOL/L (ref 135–146)

## 2023-02-22 NOTE — TELEPHONE ENCOUNTER
2/22/2023 8:26 AM Called Emili regarding her BMP results  Her sodium did go down to 134  Left message on her voicemail to call the office     Kim Zarate,

## 2023-02-28 NOTE — TELEPHONE ENCOUNTER
2/28/2023 8:40 AM Called Emili regarding her lab results  Discussed her sodium is down to 134    She will increase her salt intake    Message complete  Shwetha Vogel, DO

## 2023-03-02 ENCOUNTER — TELEPHONE (OUTPATIENT)
Dept: OTHER | Facility: OTHER | Age: 68
End: 2023-03-02

## 2023-03-02 DIAGNOSIS — I10 ESSENTIAL HYPERTENSION: Primary | ICD-10-CM

## 2023-03-02 RX ORDER — ASPIRIN 81 MG/1
81 TABLET ORAL DAILY
Qty: 90 TABLET | Refills: 1 | Status: SHIPPED | OUTPATIENT
Start: 2023-03-02

## 2023-03-02 NOTE — TELEPHONE ENCOUNTER
----- Message from Vianey Roman sent at 3/1/2023  8:01 PM EST -----  Regarding: Refill  Contact: [phone]  Good evening,  I noticed that Aspirin 81 mg  daily is not on my medication list  Could you please call in a RX to [place]    Thank you,  Emili

## 2023-03-02 NOTE — TELEPHONE ENCOUNTER
Patient is currently at [address] [facility] in [facility]  dtr will call back with fax number

## 2023-03-03 ENCOUNTER — TELEPHONE (OUTPATIENT)
Dept: NEPHROLOGY | Facility: CLINIC | Age: 68
End: 2023-03-03

## 2023-03-03 LAB
BUN SERPL-MCNC: 16 MG/DL (ref 7–25)
BUN/CREAT SERPL: ABNORMAL (CALC) (ref 6–22)
CALCIUM SERPL-MCNC: 9.7 MG/DL (ref 8.6–10.4)
CHLORIDE SERPL-SCNC: 98 MMOL/L (ref 98–110)
CO2 SERPL-SCNC: 29 MMOL/L (ref 20–32)
CREAT SERPL-MCNC: 0.9 MG/DL (ref 0.5–1.05)
GFR/BSA.PRED SERPLBLD CYS-BASED-ARV: 69 ML/MIN/1.73M2
GLUCOSE SERPL-MCNC: 153 MG/DL (ref 65–139)
POTASSIUM SERPL-SCNC: 3.9 MMOL/L (ref 3.5–5.3)
SODIUM SERPL-SCNC: 137 MMOL/L (ref 135–146)

## 2023-03-03 NOTE — TELEPHONE ENCOUNTER
Called patient to clarify about her urinating, she said she is not going as much as she typically does  She has no other complaints at this time

## 2023-03-07 ENCOUNTER — OFFICE VISIT (OUTPATIENT)
Dept: FAMILY MEDICINE CLINIC | Facility: CLINIC | Age: 68
End: 2023-03-07

## 2023-03-07 VITALS
SYSTOLIC BLOOD PRESSURE: 122 MMHG | BODY MASS INDEX: 27.69 KG/M2 | HEIGHT: 64 IN | DIASTOLIC BLOOD PRESSURE: 68 MMHG | HEART RATE: 82 BPM | WEIGHT: 162.2 LBS | TEMPERATURE: 97 F | RESPIRATION RATE: 16 BRPM

## 2023-03-07 DIAGNOSIS — D69.6 PLATELETS DECREASED (HCC): ICD-10-CM

## 2023-03-07 DIAGNOSIS — E87.1 HYPONATREMIA: ICD-10-CM

## 2023-03-07 DIAGNOSIS — F31.9 BIPOLAR AFFECTIVE DISORDER, REMISSION STATUS UNSPECIFIED (HCC): ICD-10-CM

## 2023-03-07 DIAGNOSIS — E11.9 TYPE 2 DIABETES MELLITUS WITHOUT COMPLICATION, WITH LONG-TERM CURRENT USE OF INSULIN (HCC): Primary | ICD-10-CM

## 2023-03-07 DIAGNOSIS — Z79.4 TYPE 2 DIABETES MELLITUS WITHOUT COMPLICATION, WITH LONG-TERM CURRENT USE OF INSULIN (HCC): Primary | ICD-10-CM

## 2023-03-07 PROBLEM — N18.30 STAGE 3 CHRONIC KIDNEY DISEASE, UNSPECIFIED WHETHER STAGE 3A OR 3B CKD (HCC): Status: RESOLVED | Noted: 2022-10-31 | Resolved: 2023-03-07

## 2023-03-07 RX ORDER — ESCITALOPRAM OXALATE 5 MG/1
TABLET ORAL
COMMUNITY
Start: 2023-02-10

## 2023-03-07 RX ORDER — BUPROPION HYDROCHLORIDE 100 MG/1
TABLET, EXTENDED RELEASE ORAL
COMMUNITY
Start: 2023-03-06 | End: 2023-03-07 | Stop reason: ALTCHOICE

## 2023-03-07 RX ORDER — PNV NO.95/FERROUS FUM/FOLIC AC 28MG-0.8MG
TABLET ORAL DAILY
COMMUNITY

## 2023-03-07 NOTE — ASSESSMENT & PLAN NOTE
Following with Dr Alysha Daniels   We discussed medication options in light of her hyponatremia  She did try bupropion the past and it gave her hallucinations  I agreed with starting low-dose of Lexapro with careful monitoring of her sodium

## 2023-03-07 NOTE — PROGRESS NOTES
Name: Marguerite Rock      : 1955      MRN: [id number]  Encounter Provider: Teri Jerez DO  Encounter Date: 3/7/2023   Encounter department: [address]     1  Type 2 diabetes mellitus without complication, with long-term current use of insulin ([facility])  Assessment & Plan:    Lab Results   Component Value Date    HGBA1C 6 9 (H) 2022     Insulin is down to 44       2  Bipolar affective disorder, remission status unspecified ([facility] [address] )  Assessment & Plan:  Following with Dr Rere Perdaza   We discussed medication options in light of her hyponatremia  She did try bupropion the past and it gave her hallucinations  I agreed with starting low-dose of Lexapro with careful monitoring of her sodium  3  Platelets decreased ([facility] [address] )  Assessment & Plan:  Stable   Lab Results   Component Value Date     2022           4  Hyponatremia  Assessment & Plan:  Starting lexapro and concerned about sodium levels  Will check BMP    Orders:  -     Basic metabolic panel; Standing  -     Basic metabolic panel    Return for Scheduled follow up in       She is seeing a psychiatrist for her depression  Patient tried Wellbutrin in the past and had paranoia and delusions of people breaking into her house  She is feeling very depressed  Her daughter is worried about her memory  Unsure if it is her depression or an underlying memory issue      Review of Systems    Current Outpatient Medications on File Prior to Visit   Medication Sig   • acetaminophen (TYLENOL) 325 mg tablet Take 3 tablets (975 mg total) by mouth every 8 (eight) hours   • aspirin (Aspirin 81) 81 mg EC tablet Take 1 tablet (81 mg total) by mouth daily   • BD Pen Needle Chelsey U/F 32G X 4 MM MISC Take by mouth 2 (two) times a day   • clonazePAM (KlonoPIN) 0 5 mg tablet Take 0 5 mg by mouth 2 (two) times a day as needed 0 5mg at night   • docusate sodium (COLACE) 100 mg capsule Take 1 capsule (100 mg total) by mouth 2 (two) times a day as needed for constipation (Patient taking differently: Take 100 mg by mouth 2 (two) times a day)   • escitalopram (LEXAPRO) 5 mg tablet Not started yet   • Ferrous Sulfate (Iron) 325 (65 Fe) MG TABS Take by mouth daily   • metFORMIN (GLUCOPHAGE-XR) 500 mg 24 hr tablet Take 1 tablet (500 mg total) by mouth 2 (two) times a day with meals   • metoprolol succinate (TOPROL-XL) 25 mg 24 hr tablet Take 1 tablet (25 mg total) by mouth daily   • Multiple Vitamin (Multivitamin Adult) TABS Take 1 tablet by mouth in the morning   • nitroglycerin (NITROSTAT) 0 4 mg SL tablet Place 1 tablet (0 4 mg total) under the tongue every 5 (five) minutes as needed for chest pain   • Omega-3 Fatty Acids (fish oil) 1,000 mg Take 1,000 mg by mouth 2 (two) times a day   • rosuvastatin (CRESTOR) 20 MG tablet Take 1 tablet (20 mg total) by mouth daily (Patient taking differently: Take 20 mg by mouth daily at bedtime)   • torsemide (DEMADEX) 10 mg tablet Take 1 tablet (10 mg total) by mouth daily Pt can take Torsemide 10 mg qd due to worsening edema, can go back to every other day when improved  • Toujeo SoloStar 300 units/mL CONCENTRATED U-300 injection pen (1-unit dial) INJECT 55 UNITS SUBCUTANEOUSLY IN THE MORNING (Patient taking differently: 44 units)   • ziprasidone (GEODON) 80 mg capsule Take 1 capsule (80 mg total) by mouth 2 (two) times a day   • [DISCONTINUED] buPROPion (WELLBUTRIN SR) 100 mg 12 hr tablet Not started yet   • [DISCONTINUED] enoxaparin (LOVENOX) 40 mg/0 4 mL Inject 0 4 mL (40 mg total) under the skin daily Do not start before December 8, 2022  (Patient not taking: Reported on 1/27/2023)   • [DISCONTINUED] naloxone (NARCAN) 4 mg/0 1 mL nasal spray Administer 1 spray into a nostril  If no response after 2-3 minutes, give another dose in the other nostril using a new spray   (Patient not taking: Reported on 12/27/2022)   • [DISCONTINUED] oxyCODONE (Roxicodone) 5 immediate release tablet Take 1 tablet (5 mg total) by mouth every 4 (four) hours as needed for moderate pain Max Daily Amount: 30 mg (Patient not taking: Reported on 3/7/2023)       Objective     /68   Pulse 82   Temp (!) 97 °F (36 1 °C)   Resp 16   Ht 5' 4" (1 626 m)   Wt 73 6 kg (162 lb 3 2 oz)   BMI 27 84 kg/m²     Physical Exam  Vitals and nursing note reviewed  Constitutional:       Appearance: She is well-developed  HENT:      Head: Normocephalic and atraumatic  Right Ear: Tympanic membrane and external ear normal       Left Ear: Tympanic membrane and external ear normal    Cardiovascular:      Rate and Rhythm: Normal rate and regular rhythm  Heart sounds: Normal heart sounds  No murmur heard  No friction rub  Pulmonary:      Effort: Pulmonary effort is normal  No respiratory distress  Breath sounds: Normal breath sounds  No wheezing or rales  Musculoskeletal:      Right lower leg: No edema  Left lower leg: No edema  Psychiatric:         Mood and Affect: Affect is flat         Yonny Henriquez, DO

## 2023-03-07 NOTE — TELEPHONE ENCOUNTER
Spoke with patient about the following, she expressed understanding and thanked us for the call:    Bmp looked good from last week   No concerns from a lab standpoint

## 2023-03-08 DIAGNOSIS — E87.1 HYPONATREMIA: Primary | ICD-10-CM

## 2023-03-09 ENCOUNTER — APPOINTMENT (OUTPATIENT)
Dept: RADIOLOGY | Facility: CLINIC | Age: 68
End: 2023-03-09

## 2023-03-09 ENCOUNTER — OFFICE VISIT (OUTPATIENT)
Dept: OBGYN CLINIC | Facility: CLINIC | Age: 68
End: 2023-03-09

## 2023-03-09 VITALS
TEMPERATURE: 98.2 F | BODY MASS INDEX: 27.55 KG/M2 | SYSTOLIC BLOOD PRESSURE: 132 MMHG | HEIGHT: 64 IN | DIASTOLIC BLOOD PRESSURE: 72 MMHG | HEART RATE: 76 BPM | WEIGHT: 161.4 LBS

## 2023-03-09 DIAGNOSIS — Z96.652 STATUS POST TOTAL LEFT KNEE REPLACEMENT USING CEMENT: Primary | ICD-10-CM

## 2023-03-09 DIAGNOSIS — Z47.1 AFTERCARE FOLLOWING LEFT KNEE JOINT REPLACEMENT SURGERY: ICD-10-CM

## 2023-03-09 DIAGNOSIS — Z96.652 STATUS POST TOTAL LEFT KNEE REPLACEMENT USING CEMENT: ICD-10-CM

## 2023-03-09 DIAGNOSIS — Z96.652 AFTERCARE FOLLOWING LEFT KNEE JOINT REPLACEMENT SURGERY: ICD-10-CM

## 2023-03-09 NOTE — PROGRESS NOTES
Assessment/Plan:  1  Status post total left knee replacement using cement  XR knee 3 vw left non injury      2  Aftercare following left knee joint replacement surgery          Scribe Attestation    I,:  Neisha Tay am acting as a scribe while in the presence of the attending physician :       I,:  Josie Ruiz, DO personally performed the services described in this documentation    as scribed in my presence :         Apollo Cortez is a pleasant 59-year-old female who returns today for follow-up evaluation 3 months status post left total knee arthroplasty  I am very pleased with her imaging and her clinical presentation today in the office  She may continue with all activity to her tolerance  She understands that she requires prophylactic antibiotics prior to any dental procedure moving forward  I do believe that her low back discomfort will improve after transitioning back to a normal mattress  All of her questions and concerns were addressed today  We will see her back in 9 months for her anniversary visit with x-ray on arrival      Subjective: Follow-up evaluation 3-month status post left total knee arthroplasty    Patient ID: Dakota Quinn is a 70 y o  female who returns today for follow-up evaluation 3-month status post left total knee arthroplasty  She reports that her knee has been doing well  She denies any significant pain about her knee at rest or with activity  She also does not feel limited by her knee  She does report soreness in her left low back which has been increasing recently  She has been sleeping on an air mattress and believes this may be contributing to her symptoms  She is using a walker for ambulatory assistance because of this  She denies any new injury or trauma  Review of Systems   Constitutional: Negative for activity change, chills, fever and unexpected weight change  HENT: Negative for hearing loss, nosebleeds and sore throat      Eyes: Negative for pain, redness and visual disturbance  Respiratory: Negative for cough, shortness of breath and wheezing  Cardiovascular: Negative for chest pain, palpitations and leg swelling  Gastrointestinal: Negative for abdominal pain, nausea and vomiting  Endocrine: Negative for polydipsia and polyuria  Genitourinary: Negative for dysuria and hematuria  Musculoskeletal: Negative for arthralgias, joint swelling and myalgias  See HPI   Skin: Negative for rash and wound  Neurological: Negative for dizziness, numbness and headaches  Psychiatric/Behavioral: Negative for decreased concentration and suicidal ideas  The patient is not nervous/anxious  Past Medical History:   Diagnosis Date   • Anesthesia complication     difficulty waking up   • Arthritis     left knee   • Bone spur     left knee behing the knee cap   • Chronic kidney disease    • Colon cancer [facility])     patient states about 2017   • Colon polyp     Tubulovillous Adenoma High Grade Dysplasia - 2017   • Depression    • Diabetes mellitus ([facility] [address] )    • Endometrial cancer (HCC)     grade I   • Hx of bleeding disorder     vaginal bleeding started on 3/13/17    • Hyperlipidemia    • Hypertension    • PONV (postoperative nausea and vomiting)    • Psychiatric disorder    • Shortness of breath     with exertion   • Urinary incontinence    • Vitamin D deficiency        Past Surgical History:   Procedure Laterality Date   • BREAST BIOPSY Left     benign-maybe at ar age 58   • CHOLECYSTECTOMY      open   • COLONOSCOPY     • DILATION AND CURETTAGE OF UTERUS N/A 04/05/2017    Procedure: DILATATION AND CURETTAGE (D&C);   Surgeon: Shanae Henriquez MD;  Location: [facility] MAIN OR;  Service:    • HYSTERECTOMY     • OOPHORECTOMY     • PELVIC LAPAROSCOPY     • CT ARTHRP KNE CONDYLE&PLATU MEDIAL&LAT COMPARTMENTS Left 12/06/2022    Procedure: ARTHROPLASTY KNEE TOTAL W ROBOT - CEMENTED - LEFT;  Surgeon: Devon Benjamin DO;  Location: [address] [place] [address];  Service: Orthopedics   • CT LAPS TOTAL HYSTERECT 250 GM/< W/RMVL TUBE/OVARY N/A 05/04/2017    Procedure: ROBOTIC ASSISTED TOTAL LAPAROSCOPIC HYSTERECTOMY, BILATERAL SALPINGOOPHERECTOMY; CYSTOSCOPY;  Surgeon: Zuleika Antoine MD;  Location: [facility] MAIN OR;  Service: Gynecology Oncology   • REPLACEMENT TOTAL KNEE     • TONSILLECTOMY     • TUBAL LIGATION         Family History   Problem Relation Age of Onset   • Cancer Mother         Renal   • Heart disease Father         CHF   • Heart disease Sister         atrial septal defect   • Breast cancer Paternal Aunt 40       Social History     Occupational History   • Not on file   Tobacco Use   • Smoking status: Never   • Smokeless tobacco: Never   Vaping Use   • Vaping Use: Never used   Substance and Sexual Activity   • Alcohol use: Never   • Drug use: No   • Sexual activity: Not Currently     Birth control/protection: Post-menopausal, Surgical         Current Outpatient Medications:   •  acetaminophen (TYLENOL) 325 mg tablet, Take 3 tablets (975 mg total) by mouth every 8 (eight) hours, Disp: , Rfl: 0  •  aspirin (Aspirin 81) 81 mg EC tablet, Take 1 tablet (81 mg total) by mouth daily, Disp: 90 tablet, Rfl: 1  •  BD Pen Needle Chelsey U/F 32G X 4 MM MISC, Take by mouth 2 (two) times a day, Disp: 60 each, Rfl: 1  •  clonazePAM (KlonoPIN) 0 5 mg tablet, Take 0 5 mg by mouth 2 (two) times a day as needed 0 5mg at night, Disp: , Rfl:   •  escitalopram (LEXAPRO) 5 mg tablet, Not started yet, Disp: , Rfl:   •  Ferrous Sulfate (Iron) 325 (65 Fe) MG TABS, Take by mouth daily, Disp: , Rfl:   •  metFORMIN (GLUCOPHAGE-XR) 500 mg 24 hr tablet, Take 1 tablet (500 mg total) by mouth 2 (two) times a day with meals, Disp: 60 tablet, Rfl: 3  •  metoprolol succinate (TOPROL-XL) 25 mg 24 hr tablet, Take 1 tablet (25 mg total) by mouth daily, Disp: 90 tablet, Rfl: 3  •  Multiple Vitamin (Multivitamin Adult) TABS, Take 1 tablet by mouth in the morning, Disp: 30 tablet, Rfl: 3  •  nitroglycerin (NITROSTAT) 0 4 mg SL tablet, Place 1 tablet (0 4 mg total) under the tongue every 5 (five) minutes as needed for chest pain, Disp: 30 tablet, Rfl: 1  •  Omega-3 Fatty Acids (fish oil) 1,000 mg, Take 1,000 mg by mouth 2 (two) times a day, Disp: , Rfl:   •  rosuvastatin (CRESTOR) 20 MG tablet, Take 1 tablet (20 mg total) by mouth daily (Patient taking differently: Take 20 mg by mouth daily at bedtime), Disp: 90 tablet, Rfl: 3  •  torsemide (DEMADEX) 10 mg tablet, Take 1 tablet (10 mg total) by mouth daily Pt can take Torsemide 10 mg qd due to worsening edema, can go back to every other day when improved  , Disp: 30 tablet, Rfl: 3  •  Toujeo SoloStar 300 units/mL CONCENTRATED U-300 injection pen (1-unit dial), INJECT 55 UNITS SUBCUTANEOUSLY IN THE MORNING (Patient taking differently: 44 units), Disp: 6 mL, Rfl: 3  •  ziprasidone (GEODON) 80 mg capsule, Take 1 capsule (80 mg total) by mouth 2 (two) times a day, Disp: 60 capsule, Rfl: 0  •  docusate sodium (COLACE) 100 mg capsule, Take 1 capsule (100 mg total) by mouth 2 (two) times a day as needed for constipation (Patient taking differently: Take 100 mg by mouth 2 (two) times a day), Disp: 60 capsule, Rfl: 6    No Known Allergies    Objective:  Vitals:    03/09/23 1509   BP: 132/72   Pulse: 76   Temp: 98 2 °F (36 8 °C)       Body mass index is 27 7 kg/m²  Left Knee Exam     Tenderness   The patient is experiencing no tenderness  Range of Motion   Left knee extension: 3  Flexion: 130     Tests   Varus: negative Valgus: negative  Drawer:  Anterior - negative     Posterior - negative    Other   Erythema: absent  Scars: present (well healed anterior operative scar)  Sensation: normal  Pulse: present  Swelling: none  Effusion: no effusion present    Comments:  Stable at 3, 30 and 90 degrees  No warmth or erythema  Neurovascularly in tact distally  Patella tracks flat and midline          Observations   Left Knee   Negative for effusion  Physical Exam  Vitals and nursing note reviewed  Constitutional:       Appearance: Normal appearance  She is well-developed  HENT:      Head: Normocephalic and atraumatic  Right Ear: External ear normal       Left Ear: External ear normal    Eyes:      General: No scleral icterus  Extraocular Movements: Extraocular movements intact  Conjunctiva/sclera: Conjunctivae normal    Cardiovascular:      Rate and Rhythm: Normal rate  Pulmonary:      Effort: Pulmonary effort is normal  No respiratory distress  Musculoskeletal:      Cervical back: Normal range of motion and neck supple  Left knee: No effusion  Comments: See Ortho exam   Skin:     General: Skin is warm and dry  Neurological:      Mental Status: She is alert and oriented to person, place, and time  Psychiatric:         Behavior: Behavior normal          I have personally reviewed pertinent films in PACS  X-ray of the left knee obtained on 3/9/2023 reviewed demonstrating a well-positioned and aligned cemented total knee arthroplasty without evidence of failure  There is no new fracture, dislocation, lytic or blastic lesion  This document was created using speech voice recognition software  Grammatical errors, random word insertions, pronoun errors, and incomplete sentences are an occasional consequence of this system due to software limitations, ambient noise, and hardware issues  Any formal questions or concerns about content, text, or information contained within the body of this dictation should be directly addressed to the provider for clarification

## 2023-03-15 LAB
BUN SERPL-MCNC: 16 MG/DL (ref 7–25)
BUN/CREAT SERPL: ABNORMAL (CALC) (ref 6–22)
CALCIUM SERPL-MCNC: 9.6 MG/DL (ref 8.6–10.4)
CHLORIDE SERPL-SCNC: 96 MMOL/L (ref 98–110)
CO2 SERPL-SCNC: 29 MMOL/L (ref 20–32)
CREAT SERPL-MCNC: 0.85 MG/DL (ref 0.5–1.05)
GFR/BSA.PRED SERPLBLD CYS-BASED-ARV: 74 ML/MIN/1.73M2
GLUCOSE SERPL-MCNC: 158 MG/DL (ref 65–99)
POTASSIUM SERPL-SCNC: 4.1 MMOL/L (ref 3.5–5.3)
SODIUM SERPL-SCNC: 135 MMOL/L (ref 135–146)

## 2023-03-16 ENCOUNTER — TELEPHONE (OUTPATIENT)
Dept: NEUROLOGY | Facility: CLINIC | Age: 68
End: 2023-03-16

## 2023-03-17 NOTE — TELEPHONE ENCOUNTER
1st attempt to schedule patient from referral   Patient couldn't talk at this time and will call back

## 2023-03-18 DIAGNOSIS — Z79.4 TYPE 2 DIABETES MELLITUS WITHOUT COMPLICATION, WITH LONG-TERM CURRENT USE OF INSULIN (HCC): ICD-10-CM

## 2023-03-18 DIAGNOSIS — E11.9 TYPE 2 DIABETES MELLITUS WITHOUT COMPLICATION, WITH LONG-TERM CURRENT USE OF INSULIN (HCC): ICD-10-CM

## 2023-03-20 ENCOUNTER — APPOINTMENT (INPATIENT)
Dept: RADIOLOGY | Facility: HOSPITAL | Age: 68
End: 2023-03-20

## 2023-03-20 ENCOUNTER — ANESTHESIA EVENT (INPATIENT)
Dept: PERIOP | Facility: HOSPITAL | Age: 68
End: 2023-03-20

## 2023-03-20 ENCOUNTER — ANESTHESIA (INPATIENT)
Dept: PERIOP | Facility: HOSPITAL | Age: 68
End: 2023-03-20

## 2023-03-20 ENCOUNTER — APPOINTMENT (EMERGENCY)
Dept: RADIOLOGY | Facility: HOSPITAL | Age: 68
End: 2023-03-20

## 2023-03-20 ENCOUNTER — HOSPITAL ENCOUNTER (INPATIENT)
Facility: HOSPITAL | Age: 68
LOS: 8 days | End: 2023-03-28
Attending: EMERGENCY MEDICINE | Admitting: STUDENT IN AN ORGANIZED HEALTH CARE EDUCATION/TRAINING PROGRAM

## 2023-03-20 DIAGNOSIS — I10 ESSENTIAL HYPERTENSION: ICD-10-CM

## 2023-03-20 DIAGNOSIS — R41.3 MEMORY CHANGES: ICD-10-CM

## 2023-03-20 DIAGNOSIS — Z86.718 HISTORY OF DVT OF LOWER EXTREMITY: ICD-10-CM

## 2023-03-20 DIAGNOSIS — S72.90XA FEMUR FRACTURE (HCC): Primary | ICD-10-CM

## 2023-03-20 DIAGNOSIS — F41.9 ANXIETY: ICD-10-CM

## 2023-03-20 DIAGNOSIS — F31.9 BIPOLAR DEPRESSION (HCC): ICD-10-CM

## 2023-03-20 DIAGNOSIS — R41.82 ALTERED MENTAL STATUS: ICD-10-CM

## 2023-03-20 DIAGNOSIS — S72.452A CLOSED DISPLACED SUPRACONDYLAR FRACTURE OF DISTAL END OF LEFT FEMUR WITHOUT INTRACONDYLAR EXTENSION, INITIAL ENCOUNTER (HCC): Primary | ICD-10-CM

## 2023-03-20 DIAGNOSIS — N39.0 UTI (URINARY TRACT INFECTION): ICD-10-CM

## 2023-03-20 DIAGNOSIS — R07.9 CHEST PAIN: ICD-10-CM

## 2023-03-20 DIAGNOSIS — F31.9 BIPOLAR AFFECTIVE DISORDER, REMISSION STATUS UNSPECIFIED (HCC): ICD-10-CM

## 2023-03-20 DIAGNOSIS — D62 ACUTE BLOOD LOSS ANEMIA: ICD-10-CM

## 2023-03-20 PROBLEM — T88.59XA DELAYED EMERGENCE FROM ANESTHESIA: Status: ACTIVE | Noted: 2023-03-20

## 2023-03-20 PROBLEM — E83.42 HYPOMAGNESEMIA: Status: ACTIVE | Noted: 2023-03-20

## 2023-03-20 PROBLEM — M97.12XA PERIPROSTHETIC FRACTURE AROUND INTERNAL PROSTHETIC LEFT KNEE JOINT: Status: ACTIVE | Noted: 2023-03-20

## 2023-03-20 PROBLEM — F99 PSYCHIATRIC DISORDER: Status: ACTIVE | Noted: 2022-10-15

## 2023-03-20 PROBLEM — R93.89 ABNORMAL CAT SCAN: Status: ACTIVE | Noted: 2023-03-20

## 2023-03-20 PROBLEM — Z98.890 PONV (POSTOPERATIVE NAUSEA AND VOMITING): Status: ACTIVE | Noted: 2023-03-20

## 2023-03-20 PROBLEM — R11.2 PONV (POSTOPERATIVE NAUSEA AND VOMITING): Status: ACTIVE | Noted: 2023-03-20

## 2023-03-20 LAB
2HR DELTA HS TROPONIN: 0 NG/L
4HR DELTA HS TROPONIN: -1 NG/L
ABO GROUP BLD: NORMAL
ALBUMIN SERPL BCP-MCNC: 3.7 G/DL (ref 3.5–5)
ALP SERPL-CCNC: 85 U/L (ref 34–104)
ALT SERPL W P-5'-P-CCNC: 26 U/L (ref 7–52)
ANION GAP SERPL CALCULATED.3IONS-SCNC: 10 MMOL/L (ref 4–13)
APTT PPP: 26 SECONDS (ref 23–37)
AST SERPL W P-5'-P-CCNC: 26 U/L (ref 13–39)
BASOPHILS # BLD AUTO: 0.02 THOUSANDS/ÂΜL (ref 0–0.1)
BASOPHILS NFR BLD AUTO: 0 % (ref 0–1)
BILIRUB SERPL-MCNC: 0.46 MG/DL (ref 0.2–1)
BILIRUB UR QL STRIP: NEGATIVE
BLD GP AB SCN SERPL QL: NEGATIVE
BUN SERPL-MCNC: 13 MG/DL (ref 5–25)
CALCIUM SERPL-MCNC: 8.6 MG/DL (ref 8.4–10.2)
CARDIAC TROPONIN I PNL SERPL HS: 12 NG/L
CARDIAC TROPONIN I PNL SERPL HS: 13 NG/L
CARDIAC TROPONIN I PNL SERPL HS: 13 NG/L
CHLORIDE SERPL-SCNC: 92 MMOL/L (ref 96–108)
CLARITY UR: CLEAR
CO2 SERPL-SCNC: 29 MMOL/L (ref 21–32)
COLOR UR: NORMAL
CREAT SERPL-MCNC: 0.82 MG/DL (ref 0.6–1.3)
EOSINOPHIL # BLD AUTO: 0.02 THOUSAND/ÂΜL (ref 0–0.61)
EOSINOPHIL NFR BLD AUTO: 0 % (ref 0–6)
ERYTHROCYTE [DISTWIDTH] IN BLOOD BY AUTOMATED COUNT: 13.6 % (ref 11.6–15.1)
FLUAV RNA RESP QL NAA+PROBE: NEGATIVE
FLUBV RNA RESP QL NAA+PROBE: NEGATIVE
GFR SERPL CREATININE-BSD FRML MDRD: 73 ML/MIN/1.73SQ M
GLUCOSE SERPL-MCNC: 148 MG/DL (ref 65–140)
GLUCOSE SERPL-MCNC: 166 MG/DL (ref 65–140)
GLUCOSE SERPL-MCNC: 179 MG/DL (ref 65–140)
GLUCOSE SERPL-MCNC: 181 MG/DL (ref 65–140)
GLUCOSE SERPL-MCNC: 183 MG/DL (ref 65–140)
GLUCOSE UR STRIP-MCNC: NEGATIVE MG/DL
HCT VFR BLD AUTO: 32.2 % (ref 34.8–46.1)
HGB BLD-MCNC: 11 G/DL (ref 11.5–15.4)
HGB UR QL STRIP.AUTO: NEGATIVE
IMM GRANULOCYTES # BLD AUTO: 0.03 THOUSAND/UL (ref 0–0.2)
IMM GRANULOCYTES NFR BLD AUTO: 0 % (ref 0–2)
INR PPP: 1.1 (ref 0.84–1.19)
KETONES UR STRIP-MCNC: NEGATIVE MG/DL
LEUKOCYTE ESTERASE UR QL STRIP: NEGATIVE
LYMPHOCYTES # BLD AUTO: 0.71 THOUSANDS/ÂΜL (ref 0.6–4.47)
LYMPHOCYTES NFR BLD AUTO: 8 % (ref 14–44)
MAGNESIUM SERPL-MCNC: 1.4 MG/DL (ref 1.9–2.7)
MCH RBC QN AUTO: 29.4 PG (ref 26.8–34.3)
MCHC RBC AUTO-ENTMCNC: 34.2 G/DL (ref 31.4–37.4)
MCV RBC AUTO: 86 FL (ref 82–98)
MONOCYTES # BLD AUTO: 0.54 THOUSAND/ÂΜL (ref 0.17–1.22)
MONOCYTES NFR BLD AUTO: 6 % (ref 4–12)
NEUTROPHILS # BLD AUTO: 7.56 THOUSANDS/ÂΜL (ref 1.85–7.62)
NEUTS SEG NFR BLD AUTO: 86 % (ref 43–75)
NITRITE UR QL STRIP: NEGATIVE
NRBC BLD AUTO-RTO: 0 /100 WBCS
PH UR STRIP.AUTO: 6 [PH]
PLATELET # BLD AUTO: 238 THOUSANDS/UL (ref 149–390)
PMV BLD AUTO: 8.2 FL (ref 8.9–12.7)
POTASSIUM SERPL-SCNC: 3.8 MMOL/L (ref 3.5–5.3)
PROT SERPL-MCNC: 6.9 G/DL (ref 6.4–8.4)
PROT UR STRIP-MCNC: NEGATIVE MG/DL
PROTHROMBIN TIME: 14.3 SECONDS (ref 11.6–14.5)
RBC # BLD AUTO: 3.74 MILLION/UL (ref 3.81–5.12)
RH BLD: NEGATIVE
RSV RNA RESP QL NAA+PROBE: NEGATIVE
SARS-COV-2 RNA RESP QL NAA+PROBE: NEGATIVE
SODIUM SERPL-SCNC: 131 MMOL/L (ref 135–147)
SP GR UR STRIP.AUTO: 1.01 (ref 1–1.03)
SPECIMEN EXPIRATION DATE: NORMAL
TSH SERPL DL<=0.05 MIU/L-ACNC: 0.88 UIU/ML (ref 0.45–4.5)
UROBILINOGEN UR QL STRIP.AUTO: 0.2 E.U./DL
WBC # BLD AUTO: 8.88 THOUSAND/UL (ref 4.31–10.16)

## 2023-03-20 PROCEDURE — 0QSC06Z REPOSITION LEFT LOWER FEMUR WITH INTRAMEDULLARY INTERNAL FIXATION DEVICE, OPEN APPROACH: ICD-10-PCS | Performed by: ORTHOPAEDIC SURGERY

## 2023-03-20 DEVICE — LOCKING SCREW FOR IM NAIL Ø 5MM/ 62MM/ XL25/ STERILE: Type: IMPLANTABLE DEVICE | Site: FEMUR | Status: FUNCTIONAL

## 2023-03-20 DEVICE — LOCKING SCREW FOR IM NAIL Ø 5MM/ 56MM/ XL25/ STERILE: Type: IMPLANTABLE DEVICE | Site: FEMUR | Status: FUNCTIONAL

## 2023-03-20 DEVICE — LOCKING SCREW FOR IM NAIL Ø 5MM/ 70MM/ XL25/ STERILE: Type: IMPLANTABLE DEVICE | Site: FEMUR | Status: FUNCTIONAL

## 2023-03-20 DEVICE — LOCKING SCREW FOR IM NAIL Ø 5MM/ 44MM/ XL25/ STERILE: Type: IMPLANTABLE DEVICE | Site: FEMUR | Status: FUNCTIONAL

## 2023-03-20 DEVICE — LOCKING SCREW FOR IM NAIL Ø 5MM/ 32MM/ XL25/ STERILE: Type: IMPLANTABLE DEVICE | Site: FEMUR | Status: FUNCTIONAL

## 2023-03-20 DEVICE — RFNA / 11MM / 360MM STANDARD BEND / STERILE: Type: IMPLANTABLE DEVICE | Site: FEMUR | Status: FUNCTIONAL

## 2023-03-20 DEVICE — END CAP FOR RFNA / 0MM STERILE: Type: IMPLANTABLE DEVICE | Site: FEMUR | Status: FUNCTIONAL

## 2023-03-20 RX ORDER — ZIPRASIDONE HYDROCHLORIDE 20 MG/1
80 CAPSULE ORAL 2 TIMES DAILY
Status: DISCONTINUED | OUTPATIENT
Start: 2023-03-20 | End: 2023-03-21

## 2023-03-20 RX ORDER — CLONAZEPAM 0.5 MG/1
0.5 TABLET ORAL 2 TIMES DAILY PRN
Status: DISCONTINUED | OUTPATIENT
Start: 2023-03-21 | End: 2023-03-20

## 2023-03-20 RX ORDER — ONDANSETRON 2 MG/ML
INJECTION INTRAMUSCULAR; INTRAVENOUS AS NEEDED
Status: DISCONTINUED | OUTPATIENT
Start: 2023-03-20 | End: 2023-03-20

## 2023-03-20 RX ORDER — CHLORAL HYDRATE 500 MG
1000 CAPSULE ORAL 2 TIMES DAILY
Status: DISCONTINUED | OUTPATIENT
Start: 2023-03-21 | End: 2023-03-21

## 2023-03-20 RX ORDER — FENTANYL CITRATE/PF 50 MCG/ML
50 SYRINGE (ML) INJECTION
Status: DISCONTINUED | OUTPATIENT
Start: 2023-03-20 | End: 2023-03-20 | Stop reason: HOSPADM

## 2023-03-20 RX ORDER — FENTANYL CITRATE 50 UG/ML
INJECTION, SOLUTION INTRAMUSCULAR; INTRAVENOUS AS NEEDED
Status: DISCONTINUED | OUTPATIENT
Start: 2023-03-20 | End: 2023-03-20

## 2023-03-20 RX ORDER — SUCCINYLCHOLINE/SOD CL,ISO/PF 100 MG/5ML
SYRINGE (ML) INTRAVENOUS AS NEEDED
Status: DISCONTINUED | OUTPATIENT
Start: 2023-03-20 | End: 2023-03-20

## 2023-03-20 RX ORDER — LIDOCAINE HYDROCHLORIDE 10 MG/ML
INJECTION, SOLUTION EPIDURAL; INFILTRATION; INTRACAUDAL; PERINEURAL AS NEEDED
Status: DISCONTINUED | OUTPATIENT
Start: 2023-03-20 | End: 2023-03-20

## 2023-03-20 RX ORDER — OXYCODONE HYDROCHLORIDE 5 MG/1
5 TABLET ORAL EVERY 6 HOURS PRN
Status: DISCONTINUED | OUTPATIENT
Start: 2023-03-20 | End: 2023-03-21

## 2023-03-20 RX ORDER — HYDROMORPHONE HCL/PF 1 MG/ML
0.5 SYRINGE (ML) INJECTION ONCE
Status: COMPLETED | OUTPATIENT
Start: 2023-03-20 | End: 2023-03-20

## 2023-03-20 RX ORDER — CEFAZOLIN SODIUM 1 G/50ML
1000 SOLUTION INTRAVENOUS EVERY 8 HOURS
Status: CANCELLED | OUTPATIENT
Start: 2023-03-20 | End: 2023-03-21

## 2023-03-20 RX ORDER — ACETAMINOPHEN 325 MG/1
975 TABLET ORAL ONCE
Status: COMPLETED | OUTPATIENT
Start: 2023-03-20 | End: 2023-03-20

## 2023-03-20 RX ORDER — FERROUS SULFATE 325(65) MG
325 TABLET ORAL
Status: DISCONTINUED | OUTPATIENT
Start: 2023-03-21 | End: 2023-03-23

## 2023-03-20 RX ORDER — ALBUMIN, HUMAN INJ 5% 5 %
SOLUTION INTRAVENOUS CONTINUOUS PRN
Status: DISCONTINUED | OUTPATIENT
Start: 2023-03-20 | End: 2023-03-20

## 2023-03-20 RX ORDER — SODIUM CHLORIDE 9 MG/ML
INJECTION, SOLUTION INTRAVENOUS CONTINUOUS PRN
Status: DISCONTINUED | OUTPATIENT
Start: 2023-03-20 | End: 2023-03-20

## 2023-03-20 RX ORDER — MAGNESIUM SULFATE HEPTAHYDRATE 40 MG/ML
2 INJECTION, SOLUTION INTRAVENOUS ONCE
Status: COMPLETED | OUTPATIENT
Start: 2023-03-20 | End: 2023-03-20

## 2023-03-20 RX ORDER — CEFAZOLIN SODIUM 1 G/3ML
INJECTION, POWDER, FOR SOLUTION INTRAMUSCULAR; INTRAVENOUS AS NEEDED
Status: DISCONTINUED | OUTPATIENT
Start: 2023-03-20 | End: 2023-03-20

## 2023-03-20 RX ORDER — HYDROMORPHONE HCL/PF 1 MG/ML
0.5 SYRINGE (ML) INJECTION EVERY 2 HOUR PRN
Status: CANCELLED | OUTPATIENT
Start: 2023-03-20 | End: 2023-03-22

## 2023-03-20 RX ORDER — ESCITALOPRAM OXALATE 5 MG/1
5 TABLET ORAL DAILY
Status: DISCONTINUED | OUTPATIENT
Start: 2023-03-21 | End: 2023-03-23

## 2023-03-20 RX ORDER — DOCUSATE SODIUM 100 MG/1
100 CAPSULE, LIQUID FILLED ORAL 2 TIMES DAILY
Status: DISCONTINUED | OUTPATIENT
Start: 2023-03-20 | End: 2023-03-22

## 2023-03-20 RX ORDER — ACETAMINOPHEN 325 MG/1
650 TABLET ORAL EVERY 6 HOURS PRN
Status: DISCONTINUED | OUTPATIENT
Start: 2023-03-20 | End: 2023-03-22

## 2023-03-20 RX ORDER — EPHEDRINE SULFATE 50 MG/ML
INJECTION INTRAVENOUS AS NEEDED
Status: DISCONTINUED | OUTPATIENT
Start: 2023-03-20 | End: 2023-03-20

## 2023-03-20 RX ORDER — CLONAZEPAM 0.5 MG/1
0.25 TABLET ORAL
COMMUNITY
End: 2023-03-28

## 2023-03-20 RX ORDER — POLYETHYLENE GLYCOL 3350 17 G/17G
17 POWDER, FOR SOLUTION ORAL DAILY
Status: DISCONTINUED | OUTPATIENT
Start: 2023-03-21 | End: 2023-03-22

## 2023-03-20 RX ORDER — PROPOFOL 10 MG/ML
INJECTION, EMULSION INTRAVENOUS CONTINUOUS PRN
Status: DISCONTINUED | OUTPATIENT
Start: 2023-03-20 | End: 2023-03-20

## 2023-03-20 RX ORDER — KETAMINE HCL IN NACL, ISO-OSM 100MG/10ML
SYRINGE (ML) INJECTION AS NEEDED
Status: DISCONTINUED | OUTPATIENT
Start: 2023-03-20 | End: 2023-03-20

## 2023-03-20 RX ORDER — ROCURONIUM BROMIDE 10 MG/ML
INJECTION, SOLUTION INTRAVENOUS AS NEEDED
Status: DISCONTINUED | OUTPATIENT
Start: 2023-03-20 | End: 2023-03-20

## 2023-03-20 RX ORDER — CEFAZOLIN SODIUM 2 G/50ML
2000 SOLUTION INTRAVENOUS ONCE
Status: DISCONTINUED | OUTPATIENT
Start: 2023-03-20 | End: 2023-03-20 | Stop reason: HOSPADM

## 2023-03-20 RX ORDER — ONDANSETRON 2 MG/ML
4 INJECTION INTRAMUSCULAR; INTRAVENOUS ONCE AS NEEDED
Status: DISCONTINUED | OUTPATIENT
Start: 2023-03-20 | End: 2023-03-20 | Stop reason: HOSPADM

## 2023-03-20 RX ORDER — ZIPRASIDONE HYDROCHLORIDE 20 MG/1
80 CAPSULE ORAL 2 TIMES DAILY
Status: DISCONTINUED | OUTPATIENT
Start: 2023-03-21 | End: 2023-03-20

## 2023-03-20 RX ORDER — CEFAZOLIN SODIUM 1 G/50ML
1000 SOLUTION INTRAVENOUS EVERY 8 HOURS
Status: COMPLETED | OUTPATIENT
Start: 2023-03-20 | End: 2023-03-21

## 2023-03-20 RX ORDER — ZIPRASIDONE HYDROCHLORIDE 80 MG/1
160 CAPSULE ORAL
COMMUNITY
End: 2023-03-28

## 2023-03-20 RX ORDER — PROPOFOL 10 MG/ML
INJECTION, EMULSION INTRAVENOUS AS NEEDED
Status: DISCONTINUED | OUTPATIENT
Start: 2023-03-20 | End: 2023-03-20

## 2023-03-20 RX ORDER — METOPROLOL SUCCINATE 25 MG/1
25 TABLET, EXTENDED RELEASE ORAL DAILY
Status: DISCONTINUED | OUTPATIENT
Start: 2023-03-21 | End: 2023-03-28 | Stop reason: HOSPADM

## 2023-03-20 RX ORDER — PEN NEEDLE, DIABETIC 32GX 5/32"
NEEDLE, DISPOSABLE MISCELLANEOUS
Qty: 60 EACH | Refills: 0 | Status: ON HOLD | OUTPATIENT
Start: 2023-03-20

## 2023-03-20 RX ORDER — ACETAMINOPHEN 325 MG/1
650 TABLET ORAL EVERY 6 HOURS PRN
Status: CANCELLED | OUTPATIENT
Start: 2023-03-20

## 2023-03-20 RX ORDER — OXYCODONE HYDROCHLORIDE 10 MG/1
10 TABLET ORAL EVERY 4 HOURS PRN
Status: CANCELLED | OUTPATIENT
Start: 2023-03-20

## 2023-03-20 RX ORDER — OXYCODONE HYDROCHLORIDE 5 MG/1
5 TABLET ORAL EVERY 4 HOURS PRN
Status: CANCELLED | OUTPATIENT
Start: 2023-03-20

## 2023-03-20 RX ORDER — DEXAMETHASONE SODIUM PHOSPHATE 10 MG/ML
INJECTION, SOLUTION INTRAMUSCULAR; INTRAVENOUS AS NEEDED
Status: DISCONTINUED | OUTPATIENT
Start: 2023-03-20 | End: 2023-03-20

## 2023-03-20 RX ORDER — CLONAZEPAM 0.5 MG/1
0.5 TABLET ORAL 2 TIMES DAILY PRN
Status: DISCONTINUED | OUTPATIENT
Start: 2023-03-20 | End: 2023-03-21

## 2023-03-20 RX ORDER — ASPIRIN 81 MG/1
81 TABLET, CHEWABLE ORAL 2 TIMES DAILY
Status: DISCONTINUED | OUTPATIENT
Start: 2023-03-20 | End: 2023-03-21

## 2023-03-20 RX ORDER — HYDROMORPHONE HCL IN WATER/PF 6 MG/30 ML
0.2 PATIENT CONTROLLED ANALGESIA SYRINGE INTRAVENOUS EVERY 4 HOURS PRN
Status: DISCONTINUED | OUTPATIENT
Start: 2023-03-20 | End: 2023-03-28 | Stop reason: HOSPADM

## 2023-03-20 RX ORDER — ATORVASTATIN CALCIUM 40 MG/1
40 TABLET, FILM COATED ORAL
Status: DISCONTINUED | OUTPATIENT
Start: 2023-03-21 | End: 2023-03-28 | Stop reason: HOSPADM

## 2023-03-20 RX ORDER — MAGNESIUM HYDROXIDE 1200 MG/15ML
LIQUID ORAL AS NEEDED
Status: DISCONTINUED | OUTPATIENT
Start: 2023-03-20 | End: 2023-03-20 | Stop reason: HOSPADM

## 2023-03-20 RX ADMIN — PROPOFOL 110 MCG/KG/MIN: 10 INJECTION, EMULSION INTRAVENOUS at 18:06

## 2023-03-20 RX ADMIN — FENTANYL CITRATE 25 MCG: 50 INJECTION, SOLUTION INTRAMUSCULAR; INTRAVENOUS at 18:10

## 2023-03-20 RX ADMIN — CEFAZOLIN 2000 MG: 1 INJECTION, POWDER, FOR SOLUTION INTRAVENOUS at 18:03

## 2023-03-20 RX ADMIN — FENTANYL CITRATE 25 MCG: 50 INJECTION, SOLUTION INTRAMUSCULAR; INTRAVENOUS at 18:34

## 2023-03-20 RX ADMIN — ZIPRASIDONE HYDROCHLORIDE 80 MG: 20 CAPSULE ORAL at 22:33

## 2023-03-20 RX ADMIN — ASPIRIN 81 MG CHEWABLE TABLET 81 MG: 81 TABLET CHEWABLE at 22:34

## 2023-03-20 RX ADMIN — HYDROMORPHONE HYDROCHLORIDE 0.5 MG: 1 INJECTION, SOLUTION INTRAMUSCULAR; INTRAVENOUS; SUBCUTANEOUS at 16:50

## 2023-03-20 RX ADMIN — Medication 10 MG: at 18:06

## 2023-03-20 RX ADMIN — CEFAZOLIN SODIUM 1000 MG: 1 SOLUTION INTRAVENOUS at 22:34

## 2023-03-20 RX ADMIN — LIDOCAINE HYDROCHLORIDE 50 MG: 10 INJECTION, SOLUTION EPIDURAL; INFILTRATION; INTRACAUDAL; PERINEURAL at 18:10

## 2023-03-20 RX ADMIN — SODIUM CHLORIDE: 0.9 INJECTION, SOLUTION INTRAVENOUS at 17:50

## 2023-03-20 RX ADMIN — ACETAMINOPHEN 975 MG: 325 TABLET, FILM COATED ORAL at 13:01

## 2023-03-20 RX ADMIN — SUGAMMADEX 50 MG: 100 INJECTION, SOLUTION INTRAVENOUS at 19:12

## 2023-03-20 RX ADMIN — EPHEDRINE SULFATE 5 MG: 50 INJECTION, SOLUTION INTRAVENOUS at 18:07

## 2023-03-20 RX ADMIN — FENTANYL CITRATE 25 MCG: 50 INJECTION, SOLUTION INTRAMUSCULAR; INTRAVENOUS at 18:57

## 2023-03-20 RX ADMIN — ONDANSETRON 4 MG: 2 INJECTION INTRAMUSCULAR; INTRAVENOUS at 18:57

## 2023-03-20 RX ADMIN — SODIUM CHLORIDE 500 ML: 0.9 INJECTION, SOLUTION INTRAVENOUS at 13:41

## 2023-03-20 RX ADMIN — Medication 80 MG: at 17:58

## 2023-03-20 RX ADMIN — DEXAMETHASONE SODIUM PHOSPHATE 4 MG: 10 INJECTION, SOLUTION INTRAMUSCULAR; INTRAVENOUS at 18:05

## 2023-03-20 RX ADMIN — PROPOFOL 100 MG: 10 INJECTION, EMULSION INTRAVENOUS at 17:58

## 2023-03-20 RX ADMIN — EPHEDRINE SULFATE 10 MG: 50 INJECTION, SOLUTION INTRAVENOUS at 18:40

## 2023-03-20 RX ADMIN — EPHEDRINE SULFATE 10 MG: 50 INJECTION, SOLUTION INTRAVENOUS at 18:05

## 2023-03-20 RX ADMIN — HYDROMORPHONE HYDROCHLORIDE 0.5 MG: 1 INJECTION, SOLUTION INTRAMUSCULAR; INTRAVENOUS; SUBCUTANEOUS at 13:40

## 2023-03-20 RX ADMIN — MAGNESIUM SULFATE HEPTAHYDRATE 2 G: 40 INJECTION, SOLUTION INTRAVENOUS at 13:41

## 2023-03-20 RX ADMIN — ALBUMIN (HUMAN): 12.5 INJECTION, SOLUTION INTRAVENOUS at 18:05

## 2023-03-20 RX ADMIN — SUGAMMADEX 150 MG: 100 INJECTION, SOLUTION INTRAVENOUS at 19:06

## 2023-03-20 RX ADMIN — ROCURONIUM BROMIDE 30 MG: 10 INJECTION, SOLUTION INTRAVENOUS at 18:07

## 2023-03-20 RX ADMIN — EPHEDRINE SULFATE 10 MG: 50 INJECTION, SOLUTION INTRAVENOUS at 18:02

## 2023-03-20 RX ADMIN — CLONAZEPAM 0.5 MG: 0.5 TABLET ORAL at 22:33

## 2023-03-20 RX ADMIN — DOCUSATE SODIUM 100 MG: 100 CAPSULE, LIQUID FILLED ORAL at 22:34

## 2023-03-20 RX ADMIN — FENTANYL CITRATE 25 MCG: 50 INJECTION, SOLUTION INTRAMUSCULAR; INTRAVENOUS at 17:58

## 2023-03-20 NOTE — ED NOTES
RN called daughter per pt req  Made aware that she is here  Per daughter, she has been having ambulatory disfunction  And recent L knee surgery   MD made aware     Di Rivero RN  03/20/23 6923

## 2023-03-20 NOTE — ED PROVIDER NOTES
History  Chief Complaint   Patient presents with   • Fall     Arrives BLS reported that she slipped and fall in the bathtub  No LOC, hit head  Co pain to L knee, BL hips  Pt on ASA,  Collar applied on arrival       65-year-old female presents via BLS after she fell slipping in her bathtub today  Complaining of left knee pain  Unable to ambulate  Daughter also was concerned that she has been declining mentally over the last couple weeks has been slow to respond confused they think it could be depression however neurologic evaluation has not been done as she could not follow-up outpatient  No reports of nausea vomiting abdominal pain diarrhea fevers chills cough congestion dysuria frequency or any other symptoms  Has a left knee replacement  Also reports hitting her head only on aspirin has neck pain and bilateral hip pain  No other injuries or complaints      History provided by:  Patient   used: No        Prior to Admission Medications   Prescriptions Last Dose Informant Patient Reported? Taking?    BD Pen Needle Chelsey 2nd Gen 32G X 4 MM MISC   No No   Sig: USE 1  TWICE DAILY   Ferrous Sulfate (Iron) 325 (65 Fe) MG TABS   Yes No   Sig: Take by mouth daily   Multiple Vitamin (Multivitamin Adult) TABS   No No   Sig: Take 1 tablet by mouth in the morning   Omega-3 Fatty Acids (fish oil) 1,000 mg   Yes No   Sig: Take 1,000 mg by mouth 2 (two) times a day   Touafshin SoloStar 300 units/mL CONCENTRATED U-300 injection pen (1-unit dial)   No No   Sig: INJECT 55 UNITS SUBCUTANEOUSLY IN THE MORNING   Patient taking differently: 44 units   acetaminophen (TYLENOL) 325 mg tablet   No No   Sig: Take 3 tablets (975 mg total) by mouth every 8 (eight) hours   aspirin (Aspirin 81) 81 mg EC tablet   No No   Sig: Take 1 tablet (81 mg total) by mouth daily   clonazePAM (KlonoPIN) 0 5 mg tablet   Yes No   Sig: Take 0 5 mg by mouth 2 (two) times a day as needed 0 5mg at night   docusate sodium (COLACE) 100 mg capsule   No No   Sig: Take 1 capsule (100 mg total) by mouth 2 (two) times a day as needed for constipation   Patient taking differently: Take 100 mg by mouth 2 (two) times a day   escitalopram (LEXAPRO) 5 mg tablet   Yes No   Sig: Not started yet   metFORMIN (GLUCOPHAGE-XR) 500 mg 24 hr tablet   No No   Sig: Take 1 tablet (500 mg total) by mouth 2 (two) times a day with meals   metoprolol succinate (TOPROL-XL) 25 mg 24 hr tablet   No No   Sig: Take 1 tablet (25 mg total) by mouth daily   nitroglycerin (NITROSTAT) 0 4 mg SL tablet   No No   Sig: Place 1 tablet (0 4 mg total) under the tongue every 5 (five) minutes as needed for chest pain   rosuvastatin (CRESTOR) 20 MG tablet   No No   Sig: Take 1 tablet (20 mg total) by mouth daily   Patient taking differently: Take 20 mg by mouth daily at bedtime   torsemide (DEMADEX) 10 mg tablet   No No   Sig: Take 1 tablet (10 mg total) by mouth daily Pt can take Torsemide 10 mg qd due to worsening edema, can go back to every other day when improved     ziprasidone (GEODON) 80 mg capsule   No No   Sig: Take 1 capsule (80 mg total) by mouth 2 (two) times a day      Facility-Administered Medications: None       Past Medical History:   Diagnosis Date   • Anesthesia complication     difficulty waking up   • Arthritis     left knee   • Bone spur     left knee behing the knee cap   • Chronic kidney disease    • Colon cancer [facility])     patient states about 2017   • Colon polyp     Tubulovillous Adenoma High Grade Dysplasia - 2017   • Depression    • Diabetes mellitus ([facility] [address] )    • Endometrial cancer (HCC)     grade I   • Hx of bleeding disorder     vaginal bleeding started on 3/13/17    • Hyperlipidemia    • Hypertension    • PONV (postoperative nausea and vomiting)    • Psychiatric disorder    • Shortness of breath     with exertion   • Urinary incontinence    • Vitamin D deficiency        Past Surgical History:   Procedure Laterality Date   • BREAST BIOPSY Left     benign-maybe at ar age 58   • CHOLECYSTECTOMY      open   • COLONOSCOPY     • DILATION AND CURETTAGE OF UTERUS N/A 04/05/2017    Procedure: DILATATION AND CURETTAGE (D&C); Surgeon: Leanna Lopez MD;  Location: [facility] MAIN OR;  Service:    • HYSTERECTOMY     • OOPHORECTOMY     • PELVIC LAPAROSCOPY     • MS ARTHRP KNE CONDYLE&PLATU MEDIAL&LAT COMPARTMENTS Left 12/06/2022    Procedure: ARTHROPLASTY KNEE TOTAL W ROBOT - CEMENTED - LEFT;  Surgeon: Wilfred Castillo DO;  Location: [address] [place] [address];  Service: Orthopedics   • MS LAPS TOTAL HYSTERECT 250 GM/< W/RMVL TUBE/OVARY N/A 05/04/2017    Procedure: ROBOTIC ASSISTED TOTAL LAPAROSCOPIC HYSTERECTOMY, BILATERAL SALPINGOOPHERECTOMY; CYSTOSCOPY;  Surgeon: Alcira Gamez MD;  Location:  MAIN OR;  Service: Gynecology Oncology   • REPLACEMENT TOTAL KNEE     • TONSILLECTOMY     • TUBAL LIGATION         Family History   Problem Relation Age of Onset   • Cancer Mother         Renal   • Heart disease Father         CHF   • Heart disease Sister         atrial septal defect   • Breast cancer Paternal Aunt 40     I have reviewed and agree with the history as documented  E-Cigarette/Vaping   • E-Cigarette Use Never User      E-Cigarette/Vaping Substances   • Nicotine No    • THC No    • CBD No    • Flavoring No    • Other No    • Unknown No      Social History     Tobacco Use   • Smoking status: Never   • Smokeless tobacco: Never   Vaping Use   • Vaping Use: Never used   Substance Use Topics   • Alcohol use: Never   • Drug use: No       Review of Systems   Constitutional: Negative  HENT: Negative  Eyes: Negative  Respiratory: Negative  Cardiovascular: Negative  Gastrointestinal: Negative  Endocrine: Negative  Genitourinary: Negative  Musculoskeletal: Positive for arthralgias, joint swelling and myalgias  Skin: Negative  Allergic/Immunologic: Negative  Neurological: Negative  Hematological: Negative  Psychiatric/Behavioral: Negative      All other systems reviewed and are negative  Physical Exam  Physical Exam  Constitutional:       Appearance: Normal appearance  HENT:      Head: Normocephalic and atraumatic  Nose: Nose normal       Mouth/Throat:      Mouth: Mucous membranes are moist    Eyes:      Extraocular Movements: Extraocular movements intact  Pupils: Pupils are equal, round, and reactive to light  Cardiovascular:      Rate and Rhythm: Normal rate and regular rhythm  Pulmonary:      Effort: Pulmonary effort is normal       Breath sounds: Normal breath sounds  Abdominal:      General: Abdomen is flat  Bowel sounds are normal       Palpations: Abdomen is soft  Musculoskeletal:         General: Normal range of motion  Cervical back: Normal range of motion and neck supple  Comments: Left knee: Numbness and deformity noted above the left knee  Popliteal pulses intact  Peroneal nerve is intact  Range of motion deferred because of pain  Hip joint is stable  Pelvis is stable  Right knee tenderness noted along the knee joint range of motion intact no effusion noted no deformity noted  Skin:     General: Skin is warm  Capillary Refill: Capillary refill takes less than 2 seconds  Neurological:      General: No focal deficit present  Mental Status: She is alert and oriented to person, place, and time  Mental status is at baseline  Psychiatric:         Mood and Affect: Mood normal          Thought Content:  Thought content normal          Vital Signs  ED Triage Vitals [03/20/23 1112]   Temperature Pulse Respirations Blood Pressure SpO2   98 5 °F (36 9 °C) 86 20 145/69 99 %      Temp Source Heart Rate Source Patient Position - Orthostatic VS BP Location FiO2 (%)   Oral Monitor -- -- --      Pain Score       10 - Worst Possible Pain           Vitals:    03/20/23 1315 03/20/23 1400 03/20/23 1415 03/20/23 1515   BP: 136/74 102/56 104/55 122/59   Pulse: 72 68 68 64         Visual Acuity      ED Medications  Medications acetaminophen (TYLENOL) tablet 975 mg (975 mg Oral Given 3/20/23 1301)   magnesium sulfate 2 g/50 mL IVPB (premix) 2 g (2 g Intravenous New Bag 3/20/23 1341)   sodium chloride 0 9 % bolus 500 mL (500 mL Intravenous New Bag 3/20/23 1341)   HYDROmorphone (DILAUDID) injection 0 5 mg (0 5 mg Intravenous Given 3/20/23 1340)       Diagnostic Studies  Results Reviewed     Procedure Component Value Units Date/Time    HS Troponin I 2hr [[id number]]  (Normal) Collected: 03/20/23 1441    Lab Status: Final result Specimen: Blood from Arm, Left Updated: 03/20/23 1512     hs TnI 2hr 13 ng/L      Delta 2hr hsTnI 0 ng/L     FLU/RSV/COVID - if FLU/RSV clinically relevant [[id number]]     Lab Status: No result Specimen: Nares from Nose     HS Troponin I 4hr [[id number]]     Lab Status: No result Specimen: Blood     HS Troponin 0hr (reflex protocol) [[id number]]  (Normal) Collected: 03/20/23 1224    Lab Status: Final result Specimen: Blood from Arm, Left Updated: 03/20/23 1359     hs TnI 0hr 13 ng/L     TSH [[id number]]  (Normal) Collected: 03/20/23 1224    Lab Status: Final result Specimen: Blood from Arm, Left Updated: 03/20/23 1343     TSH 3RD GENERATON 0 877 uIU/mL     Narrative:      Patients undergoing fluorescein dye angiography may retain small amounts of fluorescein in the body for 48-72 hours post procedure  Samples containing fluorescein can produce falsely depressed TSH values  If the patient had this procedure,a specimen should be resubmitted post fluorescein clearance        Protime-INR [[id number]]  (Normal) Collected: 03/20/23 1224    Lab Status: Final result Specimen: Blood from Arm, Left Updated: 03/20/23 1256     Protime 14 3 seconds      INR 1 10    APTT [[id number]]  (Normal) Collected: 03/20/23 1224    Lab Status: Final result Specimen: Blood from Arm, Left Updated: 03/20/23 1256     PTT 26 seconds     Comprehensive metabolic panel [[id number]]  (Abnormal) Collected: 03/20/23 1224    Lab Status: Final result Specimen: Blood from Arm, Left Updated: 03/20/23 1254     Sodium 131 mmol/L      Potassium 3 8 mmol/L      Chloride 92 mmol/L      CO2 29 mmol/L      ANION GAP 10 mmol/L      BUN 13 mg/dL      Creatinine 0 82 mg/dL      Glucose 181 mg/dL      Calcium 8 6 mg/dL      AST 26 U/L      ALT 26 U/L      Alkaline Phosphatase 85 U/L      Total Protein 6 9 g/dL      Albumin 3 7 g/dL      Total Bilirubin 0 46 mg/dL      eGFR 73 ml/min/1 73sq m     Narrative:      [place] guidelines for Chronic Kidney Disease (CKD):   •  Stage 1 with normal or high GFR (GFR > 90 mL/min/1 73 square meters)  •  Stage 2 Mild CKD (GFR = 60-89 mL/min/1 73 square meters)  •  Stage 3A Moderate CKD (GFR = 45-59 mL/min/1 73 square meters)  •  Stage 3B Moderate CKD (GFR = 30-44 mL/min/1 73 square meters)  •  Stage 4 Severe CKD (GFR = 15-29 mL/min/1 73 square meters)  •  Stage 5 End Stage CKD (GFR <15 mL/min/1 73 square meters)  Note: GFR calculation is accurate only with a steady state creatinine    Magnesium [[id number]]  (Abnormal) Collected: 03/20/23 1224    Lab Status: Final result Specimen: Blood from Arm, Left Updated: 03/20/23 1254     Magnesium 1 4 mg/dL     CBC and differential [[id number]]  (Abnormal) Collected: 03/20/23 1224    Lab Status: Final result Specimen: Blood from Arm, Left Updated: 03/20/23 1230     WBC 8 88 Thousand/uL      RBC 3 74 Million/uL      Hemoglobin 11 0 g/dL      Hematocrit 32 2 %      MCV 86 fL      MCH 29 4 pg      MCHC 34 2 g/dL      RDW 13 6 %      MPV 8 2 fL      Platelets 053 Thousands/uL      nRBC 0 /100 WBCs      Neutrophils Relative 86 %      Immat GRANS % 0 %      Lymphocytes Relative 8 %      Monocytes Relative 6 %      Eosinophils Relative 0 %      Basophils Relative 0 %      Neutrophils Absolute 7 56 Thousands/µL      Immature Grans Absolute 0 03 Thousand/uL      Lymphocytes Absolute 0 71 Thousands/µL      Monocytes Absolute 0 54 Thousand/µL      Eosinophils Absolute 0 02 Thousand/µL Basophils Absolute 0 02 Thousands/µL     Fingerstick Glucose (POCT) [[id number]]  (Abnormal) Collected: 03/20/23 1139    Lab Status: Final result Updated: 03/20/23 1146     POC Glucose 183 mg/dl     UA (URINE) with reflex to Scope [[id number]]     Lab Status: No result Specimen: Urine                  XR knee 4+ vw left injury   Final Result by Luz Issa MD (03/20 1434)      Acute, displaced periprosthetic left distal femoral fracture  Workstation performed: [id number]         XR pelvis complete 3+ views   Final Result by Luz Issa MD (03/20 1439)      Diffuse osteopenia  No acute pelvic fracture  Workstation performed: [id number]         CT spine cervical without contrast   Final Result by Jacquie Diaz MD (03/20 1313)      No cervical spine fracture or traumatic malalignment  Workstation performed: [id number]         CT head without contrast   Final Result by Jacquie Diaz MD (03/20 1313)      No acute intracranial abnormality  Workstation performed: [id number]         XR knee 4+ vw right injury    (Results Pending)              Procedures  ECG 12 Lead Documentation Only  Performed by: Butch Abbott DO  Authorized by: Butch Abbott DO     ECG reviewed by me, the ED Provider: yes    Patient location:  ED  Previous ECG:     Previous ECG:  Unavailable    Comparison to cardiac monitor: Yes    Interpretation:     Interpretation: non-specific    Rate:     ECG rate assessment: normal    Rhythm:     Rhythm: sinus rhythm    Ectopy:     Ectopy: none    QRS:     QRS axis:  Normal  Conduction:     Conduction: normal    ST segments:     ST segments:  Non-specific  T waves:     T waves: non-specific               ED Course                               SBIRT 20yo+    Flowsheet Row Most Recent Value   SBIRT (23 yo +)    In order to provide better care to our patients, we are screening all of our patients for alcohol and drug use   Would it be okay to ask you these screening questions? No Filed at: 03/20/2023 1122                    Medical Decision Making  Patient evaluated with labs, imaging  Orthopaedics consulted will plan for surgery tonight  Reviewed results discussed with patient and daughter  Patient given IV fluids, pain  Patient improved with symptoms, admitted to hospitalist service for further evaluation and management  Patient verbalized understanding of results and agreed with the plan  Femur fracture [facility]): acute illness or injury  Amount and/or Complexity of Data Reviewed  Independent Historian: spouse and EMS  External Data Reviewed: labs, radiology, ECG and notes  Labs: ordered  Decision-making details documented in ED Course  Radiology: ordered  Decision-making details documented in ED Course  ECG/medicine tests: ordered  Decision-making details documented in ED Course  Risk  OTC drugs  Prescription drug management  Decision regarding hospitalization  Disposition  Final diagnoses:   Femur fracture ([address] )   Altered mental status     Time reflects when diagnosis was documented in both MDM as applicable and the Disposition within this note     Time User Action Codes Description Comment    3/20/2023  1:48 PM Alma Day Add [S72 90XA] Femur fracture ([facility] [address] )     3/20/2023  3:54 PM Candice Day Add [R41 82] Altered mental status       ED Disposition     ED Disposition   Admit    Condition   Stable    Date/Time   Mon Mar 20, 2023  1:48 PM    Comment               Follow-up Information    None         Patient's Medications   Discharge Prescriptions    No medications on file       No discharge procedures on file      PDMP Review       Value Time User    PDMP Reviewed  Yes 12/7/2022 12:36 PM Dario Youngblood PA-C          ED Provider  Electronically Signed by           Darby Hollingsworth,   03/20/23 1553       Alma Day,   03/20/23 1554

## 2023-03-20 NOTE — CONSULTS
Consultation - Orthopedics   Marianna Overton 70 y o  female MRN: [id number]  Unit/Bed#: ED 14 Encounter: [id number]      Assessment/Plan     Assessment:  Left knee pain -patient is known to our practice after undergoing a robotic assisted left total knee arthroplasty in December 2022  Unfortunately, she suffered a mechanical fall in the bathtub this morning resulting in a displaced left distal femur periprosthetic fracture  This will require surgical intervention in the form of a retrograde nail  Since that she has not had anything to eat since this morning and is not on any blood thinners other than aspirin 81 mg, we will plan for surgery tonight with Dr Jaylyn Solitario  He will obtain consent prior to procedure  In the interim, we have ordered preoperative labs including a type and screen  She will need to be nonweightbearing on the left lower extremity  Of note, we will abstain from our typical tranexamic acid protocol due to her history of DVT  The procedure was discussed with the patient at bedside in the emergency department and she was amenable  Please keep her n p o     Plan:  -Plan for OR with Dr Lily Flores for a retrograde intramedullary nail left femur  Consent will be obtained prior to surgery  -NPO  -NWB LLE  -Hold all chemical anticoagulation, SCDs okay  -No TXA due to history of lower extremity DVT  -Antibiotics on call to OR  -Appreciate SLIM input for risk stratification    History of Present Illness   Physician Requesting Consult: Madi De Jesus MD  Reason for Consult / Principal Problem: Fall  HPI: Marianna Overton is a 70y o  year old female well-known to me after undergoing a robotic assisted left total knee arthroplasty in December 2022  She was just seen for her 3-month postop appointment and was doing very well  She was in her normal state of health this morning when her  was helping her out of the bathtub    She slipped and suffered a mechanical fall onto her left side, bending her knee   She was unable to stand and ambulate afterwards  She was brought to the emergency department where x-ray revealed a displaced distal femur periprosthetic fracture  She complains of significant pain in this area  She also has pain in her right knee, which has known significant underlying osteoarthritis, and both hips  No fractures were appreciated of the hips or right knee  She has not had anything to eat since this morning  She takes aspirin 81 mg, but no other blood thinners  She denies hitting her head or any loss of consciousness  She denies recent illness, chest pain, shortness of breath, dizziness, or paresthesias in the left lower extremity  Her pain is localized around the left knee    Consults    Review of Systems   Constitutional: Positive for activity change  HENT: Negative  Eyes: Negative  Respiratory: Negative  Cardiovascular: Positive for leg swelling  Gastrointestinal: Negative  Endocrine: Negative  Genitourinary: Negative  Musculoskeletal: Positive for arthralgias, gait problem, joint swelling and myalgias  Skin: Negative  Allergic/Immunologic: Negative  Hematological: Negative  Psychiatric/Behavioral: Negative          Historical Information   Past Medical History:   Diagnosis Date   • Anesthesia complication     difficulty waking up   • Arthritis     left knee   • Bone spur     left knee behing the knee cap   • Chronic kidney disease    • Colon cancer [facility])     patient states about 2017   • Colon polyp     Tubulovillous Adenoma High Grade Dysplasia - 2017   • Depression    • Diabetes mellitus ([facility] [address] )    • Endometrial cancer ([facility] [address] )     grade I   • Hx of bleeding disorder     vaginal bleeding started on 3/13/17    • Hyperlipidemia    • Hypertension    • PONV (postoperative nausea and vomiting)    • Psychiatric disorder    • Shortness of breath     with exertion   • Urinary incontinence    • Vitamin D deficiency      Past Surgical History:   Procedure Laterality Date   • BREAST BIOPSY Left     benign-maybe at ar age 58   • CHOLECYSTECTOMY      open   • COLONOSCOPY     • DILATION AND CURETTAGE OF UTERUS N/A 04/05/2017    Procedure: DILATATION AND CURETTAGE (D&C); Surgeon: Lakshmi Diaz MD;  Location: [facility] MAIN OR;  Service:    • HYSTERECTOMY     • OOPHORECTOMY     • PELVIC LAPAROSCOPY     • WI ARTHRP KNE CONDYLE&PLATU MEDIAL&LAT COMPARTMENTS Left 12/06/2022    Procedure: ARTHROPLASTY KNEE TOTAL W ROBOT - CEMENTED - LEFT;  Surgeon: Juni Briceno DO;  Location: [address] [place] [address];  Service: Orthopedics   • WI LAPS TOTAL HYSTERECT 250 GM/< W/RMVL TUBE/OVARY N/A 05/04/2017    Procedure: ROBOTIC ASSISTED TOTAL LAPAROSCOPIC HYSTERECTOMY, BILATERAL SALPINGOOPHERECTOMY; CYSTOSCOPY;  Surgeon: Ana Cristina Lim MD;  Location:  MAIN OR;  Service: Gynecology Oncology   • REPLACEMENT TOTAL KNEE     • TONSILLECTOMY     • TUBAL LIGATION       Social History   Social History     Substance and Sexual Activity   Alcohol Use Never     Social History     Substance and Sexual Activity   Drug Use No     E-Cigarette/Vaping   • E-Cigarette Use Never User      E-Cigarette/Vaping Substances   • Nicotine No    • THC No    • CBD No    • Flavoring No    • Other No    • Unknown No      Social History     Tobacco Use   Smoking Status Never   Smokeless Tobacco Never     Family History:   Family History   Problem Relation Age of Onset   • Cancer Mother         Renal   • Heart disease Father         CHF   • Heart disease Sister         atrial septal defect   • Breast cancer Paternal Aunt 39       Meds/Allergies   all current active meds have been reviewed, current meds:   No current facility-administered medications for this encounter  and PTA meds:   Prior to Admission Medications   Prescriptions Last Dose Informant Patient Reported? Taking?    BD Pen Needle Chelsey 2nd Gen 32G X 4 MM MISC   No No   Sig: USE 1  TWICE DAILY   Ferrous Sulfate (Iron) 325 (65 Fe) MG TABS   Yes No   Sig: Take by mouth daily   Multiple Vitamin (Multivitamin Adult) TABS   No No   Sig: Take 1 tablet by mouth in the morning   Omega-3 Fatty Acids (fish oil) 1,000 mg   Yes No   Sig: Take 1,000 mg by mouth 2 (two) times a day   Touozieltwan SoloStar 300 units/mL CONCENTRATED U-300 injection pen (1-unit dial)   No No   Sig: INJECT 55 UNITS SUBCUTANEOUSLY IN THE MORNING   Patient taking differently: 44 units   acetaminophen (TYLENOL) 325 mg tablet   No No   Sig: Take 3 tablets (975 mg total) by mouth every 8 (eight) hours   aspirin (Aspirin 81) 81 mg EC tablet   No No   Sig: Take 1 tablet (81 mg total) by mouth daily   clonazePAM (KlonoPIN) 0 5 mg tablet   Yes No   Sig: Take 0 5 mg by mouth 2 (two) times a day as needed 0 5mg at night   docusate sodium (COLACE) 100 mg capsule   No No   Sig: Take 1 capsule (100 mg total) by mouth 2 (two) times a day as needed for constipation   Patient taking differently: Take 100 mg by mouth 2 (two) times a day   escitalopram (LEXAPRO) 5 mg tablet   Yes No   Sig: Not started yet   metFORMIN (GLUCOPHAGE-XR) 500 mg 24 hr tablet   No No   Sig: Take 1 tablet (500 mg total) by mouth 2 (two) times a day with meals   metoprolol succinate (TOPROL-XL) 25 mg 24 hr tablet   No No   Sig: Take 1 tablet (25 mg total) by mouth daily   nitroglycerin (NITROSTAT) 0 4 mg SL tablet   No No   Sig: Place 1 tablet (0 4 mg total) under the tongue every 5 (five) minutes as needed for chest pain   rosuvastatin (CRESTOR) 20 MG tablet   No No   Sig: Take 1 tablet (20 mg total) by mouth daily   Patient taking differently: Take 20 mg by mouth daily at bedtime   torsemide (DEMADEX) 10 mg tablet   No No   Sig: Take 1 tablet (10 mg total) by mouth daily Pt can take Torsemide 10 mg qd due to worsening edema, can go back to every other day when improved     ziprasidone (GEODON) 80 mg capsule   No No   Sig: Take 1 capsule (80 mg total) by mouth 2 (two) times a day      Facility-Administered Medications: None     No Known Allergies    Objective Vitals: Blood pressure 122/59, pulse 64, temperature 98 5 °F (36 9 °C), temperature source Oral, resp  rate 16, weight 71 3 kg (157 lb 3 oz), SpO2 96 %, not currently breastfeeding  ,Body mass index is 26 98 kg/m²  No intake or output data in the 24 hours ending 03/20/23 1541  No intake/output data recorded  Invasive Devices     Peripheral Intravenous Line  Duration           Peripheral IV 12/06/22 Right Forearm 104 days                Physical Exam  Vitals and nursing note reviewed  Constitutional:       Appearance: Normal appearance  She is normal weight  HENT:      Head: Normocephalic and atraumatic  Right Ear: External ear normal       Left Ear: External ear normal       Nose: Nose normal       Mouth/Throat:      Mouth: Mucous membranes are moist    Eyes:      Extraocular Movements: Extraocular movements intact  Cardiovascular:      Rate and Rhythm: Normal rate  Pulses: Normal pulses  Pulmonary:      Effort: Pulmonary effort is normal    Abdominal:      Palpations: Abdomen is soft  Musculoskeletal:      Cervical back: Neck supple  Left knee: No effusion  Comments: See ortho exam   Skin:     General: Skin is warm and dry  Capillary Refill: Capillary refill takes less than 2 seconds  Neurological:      General: No focal deficit present  Mental Status: She is alert and oriented to person, place, and time  Mental status is at baseline  Psychiatric:         Mood and Affect: Mood normal          Behavior: Behavior normal          Thought Content: Thought content normal          Judgment: Judgment normal        Left Knee Exam     Tenderness   The patient is experiencing tenderness in the lateral retinaculum and medial retinaculum      Other   Erythema: absent  Scars: present  Sensation: normal  Pulse: present  Swelling: none  Effusion: no effusion present    Comments:  Well-healed anterior knee incision  Range of motion and strength deferred due to acute fracture  No erythema, warmth, abrasion, laceration, ulceration, or other break in skin  Thigh and calf soft and nontender  Normal sensation to light touch L2-S1 nerve distributions  2+ pedal pulse  Positive EHL/DF/PF            Lab Results:   I have personally reviewed pertinent lab results  CBC:   Lab Results   Component Value Date    WBC 8 88 03/20/2023    HGB 11 0 (L) 03/20/2023    HCT 32 2 (L) 03/20/2023    MCV 86 03/20/2023     03/20/2023    MCH 29 4 03/20/2023    MCHC 34 2 03/20/2023    RDW 13 6 03/20/2023    MPV 8 2 (L) 03/20/2023    NRBC 0 03/20/2023     CMP:   Lab Results   Component Value Date    SODIUM 131 (L) 03/20/2023    CL 92 (L) 03/20/2023    CO2 29 03/20/2023    BUN 13 03/20/2023    CREATININE 0 82 03/20/2023    CALCIUM 8 6 03/20/2023    AST 26 03/20/2023    ALT 26 03/20/2023    ALKPHOS 85 03/20/2023    EGFR 73 03/20/2023     PT/INR:   Lab Results   Component Value Date    INR 1 10 03/20/2023     Imaging Studies: I have personally reviewed pertinent films in PACS   XR pelvis complete 3+ views    Result Date: 3/20/2023  Narrative: PELVIS INDICATION:   pain  COMPARISON:  None VIEWS:  XR PELVIS COMPLETE 3+ VW FINDINGS: Diffuse osteopenia  No acute pelvic fracture  Mild degenerative changes of both hips  Impression: Diffuse osteopenia  No acute pelvic fracture  Workstation performed: [id number]     XR knee 3 vw left non injury    Result Date: 3/15/2023  Narrative: LEFT KNEE INDICATION:   [id number]: Presence of left artificial knee joint  COMPARISON:  12/21/2022 VIEWS:  XR KNEE 3 VW LEFT NON INJURY FINDINGS: There is no acute fracture or dislocation  There is no joint effusion  Unremarkable appearance of total knee arthroplasty  No evidence of hardware complication  No lytic or blastic osseous lesion  Soft tissues are unremarkable  Impression: Unremarkable appearance of total knee arthroplasty   Workstation performed: [id number]     XR knee 4+ vw left injury    Result Date: 3/20/2023  Narrative: LEFT KNEE INDICATION:   pain  COMPARISON:  3/9/2023  VIEWS:  XR KNEE 4+ VW LEFT INJURY FINDINGS: There is an acute, displaced periprosthetic fracture of the distal femur with acute apex angulation and overriding of fracture components  Stably aligned knee arthroplasty  No dislocation  Soft tissues are unremarkable  Impression: Acute, displaced periprosthetic left distal femoral fracture  Workstation performed: [id number]     CT head without contrast    Result Date: 3/20/2023  Narrative: CT BRAIN - WITHOUT CONTRAST INDICATION:   Ocular pain pain  COMPARISON:  CT 10/15/2022  TECHNIQUE:  CT examination of the brain was performed  In addition to axial images, sagittal and coronal 2D reformatted images were created and submitted for interpretation  Radiation dose length product (DLP) for this visit:  448 mGy-cm   This examination, like all CT scans performed in the [facility], was performed utilizing techniques to minimize radiation dose exposure, including the use of iterative reconstruction and automated exposure control  IMAGE QUALITY:  Diagnostic  FINDINGS: PARENCHYMA: Decreased attenuation is noted in periventricular and subcortical white matter demonstrating an appearance that is statistically most likely to represent mild microangiopathic change  No CT signs of acute infarction  No intracranial mass, mass effect or midline shift  No acute parenchymal hemorrhage  VENTRICLES AND EXTRA-AXIAL SPACES:  Normal for the patient's age  VISUALIZED ORBITS: Normal visualized orbits  PARANASAL SINUSES: Stable tiny polyp or mucous retention cyst in the right maxillary sinus  CALVARIUM AND EXTRACRANIAL SOFT TISSUES:  Normal      Impression: No acute intracranial abnormality  Workstation performed: [id number]     CT spine cervical without contrast    Result Date: 3/20/2023  Narrative: CT CERVICAL SPINE - WITHOUT CONTRAST INDICATION:   pain  COMPARISON:  CT 8/17/2007   TECHNIQUE:  CT examination of the cervical spine was performed without intravenous contrast   Contiguous axial images were obtained  Sagittal and coronal reconstructions were performed  Radiation dose length product (DLP) for this visit:  974 mGy-cm   This examination, like all CT scans performed in the [facility], was performed utilizing techniques to minimize radiation dose exposure, including the use of iterative reconstruction and automated exposure control  IMAGE QUALITY:  Diagnostic  FINDINGS: ALIGNMENT:  Normal alignment of the cervical spine  No subluxation  VERTEBRAE:  No fracture  DEGENERATIVE CHANGES:  Mild multilevel cervical degenerative changes are noted without critical central canal stenosis  PREVERTEBRAL AND PARASPINAL SOFT TISSUES:  10 mm hypodense right thyroid nodule  Incidental discovery of one or more thyroid nodule(s) measuring less than 1 5 cm and without suspicious features is noted in this patient who is above 28years old; according to guidelines published in the February 2015 white paper on incidental thyroid nodules in the Journal of the [employer] of Radiology [facility]), no further evaluation is recommended  THORACIC INLET:  Normal      Impression: No cervical spine fracture or traumatic malalignment  Workstation performed: [id number]     Dr Jalen Gonzalez and I reviewed all available pertinent imaging  She has severe end stage right knee degenerative changes  She has mild bilateral hip osteoarthritis without fracture   Her left knee demonstrates an acute displaced distal femur periprosthetic fracture    EKG, Pathology, and Other Studies: I have personally reviewed pertinent films in PACS  VTE Prophylaxis: Sequential compression device Sarah Severance)     Code Status: Prior  Advance Directive and Living Will:      Power of :    POLST:      Ioana Garcia PA-C

## 2023-03-20 NOTE — H&P
[address] [facility] 1955, 70 y o  female MRN: [id number]  Unit/Bed#: OR POOL Encounter: [id number]  Primary Care Provider: Onelia Nash DO   Date and time admitted to hospital: 3/20/2023 11:11 AM    * Periprosthetic fracture around internal prosthetic left knee joint  Assessment & Plan  · Noted in history   · Left total knee arthroplasty in December 2022  Suffered a mechanical fall in the bathtub this morning  · X-ray knee revealed acute displaced periprosthetic left distal femoral fracture  · CT head showed no acute intracranial abnormality  · Ortho surgery was consulted in ED  Patient will require surgical intervention in the form of retrograde nail  · Plan is for surgery tonight with Dr Sara Noriega  · NPO  · NWB LLE  · Hold all blood thinners  · SCDs  · Pain management  · Supportive care    Hyponatremia  Assessment & Plan  Noted on admission with sodium of 131, likely dehydration from poor oral intake  ED gave 500 ml bolus IVF  Repeat BMP in a m  Type 2 diabetes mellitus without complication, with long-term current use of insulin [facility])  Assessment & Plan  Lab Results   Component Value Date    HGBA1C 6 9 (H) 11/14/2022       Recent Labs     03/20/23  1139 03/20/23  1700   POCGLU 183* 148*       Blood Sugar Average: Last 72 hrs:  (P) 165 5     Hold metformin  Accu-Cheks ACHS  SSI  Hypoglycemic protocol    Abnormal CAT scan  Assessment & Plan  Incidental finding on CT of spine: 10 mm hypodense right thyroid nodule  Recommendations on incidental thyroid nodules in the Journal of the [employer] of Radiology [facility]), no further evaluation is recommended  Hypomagnesemia  Assessment & Plan  Magnesium noted on admission to be 1 4  Repleted in ED  Check in a m      History of DVT of lower extremity  Assessment & Plan  · Noted in history  · Patient only takes aspirin 81 mg  · Holding for surgery    Psychiatric disorder  Assessment & Plan  Geodon and Lexapro    Anemia  Assessment & Plan  · Continue iron supplementation    Hyperlipidemia  Assessment & Plan  Statin    Essential hypertension  Assessment & Plan  Metoprolol    VTE Pharmacologic Prophylaxis:   High Risk (Score >/= 5) - Pharmacological DVT Prophylaxis Contraindicated  Sequential Compression Devices Ordered  Code Status: Full  Discussion with family: Updated  (daughter) at bedside  Anticipated Length of Stay: Patient will be admitted on an inpatient basis with an anticipated length of stay of greater than 2 midnights secondary to scheduled surgery  Total Time Spent on Date of Encounter in care of patient: 55 minutes This time was spent on one or more of the following: performing physical exam; counseling and coordination of care; obtaining or reviewing history; documenting in the medical record; reviewing/ordering tests, medications or procedures; communicating with other healthcare professionals and discussing with patient's family/caregivers  Chief Complaint: Fall    History of Present Illness:  Jethro Larkin is a 70 y o  female with a PMH of hypertension, diabetes type 2, hyperlipidemia, psychiatric disorder colon and endometrial cancer in remission who presents post fall in the bathtub this morning, and was unable to stand and ambulate afterwards  Patient reported excruciating pain to the left knee  X-ray of the left knee revealed acute displaced periprosthetic left distal femoral fracture  Ortho surgery was consulted in ED and planned surgical intervention for tonight  Sodium and magnesium were low and repleted in ED  Review of Systems:  Review of Systems   Constitutional: Positive for activity change  Respiratory: Negative for cough and chest tightness  Cardiovascular: Positive for leg swelling  Gastrointestinal: Negative for abdominal pain  Musculoskeletal: Positive for arthralgias, joint swelling and myalgias  Neurological: Positive for weakness  Psychiatric/Behavioral: Negative for agitation  Past Medical and Surgical History:   Past Medical History:   Diagnosis Date   • Anesthesia complication     difficulty waking up   • Arthritis     left knee   • Bone spur     left knee behing the knee cap   • Chronic kidney disease    • Colon cancer [facility])     patient states about 2017   • Colon polyp     Tubulovillous Adenoma High Grade Dysplasia - 2017   • Depression    • Diabetes mellitus ([facility] [address] )    • Endometrial cancer (HCC)     grade I   • Hx of bleeding disorder     vaginal bleeding started on 3/13/17    • Hyperlipidemia    • Hypertension    • PONV (postoperative nausea and vomiting)    • Psychiatric disorder    • Shortness of breath     with exertion   • Urinary incontinence    • Vitamin D deficiency        Past Surgical History:   Procedure Laterality Date   • BREAST BIOPSY Left     benign-maybe at ar age 58   • CHOLECYSTECTOMY      open   • COLONOSCOPY     • DILATION AND CURETTAGE OF UTERUS N/A 04/05/2017    Procedure: DILATATION AND CURETTAGE (D&C); Surgeon: Trinh Banks MD;  Location: [facility] MAIN OR;  Service:    • HYSTERECTOMY     • OOPHORECTOMY     • PELVIC LAPAROSCOPY     • ND ARTHRP KNE CONDYLE&PLATU MEDIAL&LAT COMPARTMENTS Left 12/06/2022    Procedure: ARTHROPLASTY KNEE TOTAL W ROBOT - CEMENTED - LEFT;  Surgeon: Roberto Rivero DO;  Location: [address] [place] [address];  Service: Orthopedics   • ND LAPS TOTAL HYSTERECT 250 GM/< W/RMVL TUBE/OVARY N/A 05/04/2017    Procedure: ROBOTIC ASSISTED TOTAL LAPAROSCOPIC HYSTERECTOMY, BILATERAL SALPINGOOPHERECTOMY; CYSTOSCOPY;  Surgeon: Heena Crain MD;  Location:  MAIN OR;  Service: Gynecology Oncology   • REPLACEMENT TOTAL KNEE     • TONSILLECTOMY     • TUBAL LIGATION         Meds/Allergies:  Prior to Admission medications    Medication Sig Start Date End Date Taking?  Authorizing Provider   acetaminophen (TYLENOL) 325 mg tablet Take 3 tablets (975 mg total) by mouth every 8 (eight) hours 12/7/22   Kenia Diaz EZEKIEL   aspirin (Aspirin 81) 81 mg EC tablet Take 1 tablet (81 mg total) by mouth daily 3/2/23   Marguerite Ohm, DO   BD Pen Needle Chelsey 2nd Gen 32G X 4 MM MISC USE 1  TWICE DAILY 3/20/23   Tuesday JOHN Gonzales   clonazePAM (KlonoPIN) 0 5 mg tablet Take 0 5 mg by mouth 2 (two) times a day as needed 0 5mg at night 12/14/22   Historical Provider, MD   docusate sodium (COLACE) 100 mg capsule Take 1 capsule (100 mg total) by mouth 2 (two) times a day as needed for constipation  Patient taking differently: Take 100 mg by mouth 2 (two) times a day 10/28/22 3/7/23  Tuesday JOHN Gonzales   escitalopram (LEXAPRO) 5 mg tablet Not started yet 2/10/23   Historical Provider, MD   Ferrous Sulfate (Iron) 325 (65 Fe) MG TABS Take by mouth daily    Historical Provider, MD   metFORMIN (GLUCOPHAGE-XR) 500 mg 24 hr tablet Take 1 tablet (500 mg total) by mouth 2 (two) times a day with meals 2/9/23   Marguerite Ohm, DO   metoprolol succinate (TOPROL-XL) 25 mg 24 hr tablet Take 1 tablet (25 mg total) by mouth daily 10/28/22   Tuesday JOHN Gonzales   Multiple Vitamin (Multivitamin Adult) TABS Take 1 tablet by mouth in the morning 10/28/22   Tuesday JOHN Gonzales   nitroglycerin (NITROSTAT) 0 4 mg SL tablet Place 1 tablet (0 4 mg total) under the tongue every 5 (five) minutes as needed for chest pain 10/28/22   Tuesday JOHN Gonzales   Omega-3 Fatty Acids (fish oil) 1,000 mg Take 1,000 mg by mouth 2 (two) times a day    Historical Provider, MD   rosuvastatin (CRESTOR) 20 MG tablet Take 1 tablet (20 mg total) by mouth daily  Patient taking differently: Take 20 mg by mouth daily at bedtime 10/28/22   Tuesday JOHN Gonzales   torsemide (DEMADEX) 10 mg tablet Take 1 tablet (10 mg total) by mouth daily Pt can take Torsemide 10 mg qd due to worsening edema, can go back to every other day when improved   1/12/23   Francisca Sean, PA-C   Toujeo SoloStar 300 units/mL CONCENTRATED U-300 injection pen (1-unit dial) INJECT 55 UNITS SUBCUTANEOUSLY IN THE MORNING  Patient taking differently: 44 units 2/14/23   Sarahy Wesley DO   ziprasidone (GEODON) 80 mg capsule Take 1 capsule (80 mg total) by mouth 2 (two) times a day 10/28/22   Tuesday JOHN Gonzales   BD Pen Needle Chelsey U/F 32G X 4 MM MISC Take by mouth 2 (two) times a day 10/28/22 3/20/23  Tuesday JOHN Gonzales     I have reviewed home medications with patient personally  Allergies: No Known Allergies    Social History:  Marital Status: /Civil Union   Occupation:   Patient Pre-hospital Living Situation:   Patient Pre-hospital Level of Mobility:   Patient Pre-hospital Diet Restrictions:  Substance Use History:   Social History     Substance and Sexual Activity   Alcohol Use Never     Social History     Tobacco Use   Smoking Status Never   Smokeless Tobacco Never     Social History     Substance and Sexual Activity   Drug Use No             Physical Exam:     Vitals:   Blood Pressure: 120/64 (03/20/23 1715)  Pulse: 68 (03/20/23 1715)  Temperature: 98 5 °F (36 9 °C) (03/20/23 1112)  Temp Source: Oral (03/20/23 1112)  Respirations: 16 (03/20/23 1715)  Weight - Scale: 71 3 kg (157 lb 3 oz) (03/20/23 1112)  SpO2: 94 % (03/20/23 1715)    Physical Exam  HENT:      Mouth/Throat:      Mouth: Mucous membranes are dry  Cardiovascular:      Rate and Rhythm: Normal rate  Heart sounds: Normal heart sounds  Pulmonary:      Breath sounds: Normal breath sounds  Abdominal:      General: Bowel sounds are normal    Musculoskeletal:         General: Swelling, tenderness, deformity and signs of injury present  Left lower leg: Edema present  Skin:     General: Skin is dry  Capillary Refill: Capillary refill takes less than 2 seconds  Neurological:      Mental Status: She is alert and oriented to person, place, and time     Psychiatric:         Behavior: Behavior normal          Additional Data:     Lab Results:  Results from last 7 days   Lab Units 03/20/23  1224   WBC Thousand/uL 8 88   HEMOGLOBIN g/dL 11 0*   HEMATOCRIT % 32 2*   PLATELETS Thousands/uL 238   NEUTROS PCT % 86*   LYMPHS PCT % 8*   MONOS PCT % 6   EOS PCT % 0     Results from last 7 days   Lab Units 03/20/23  1224   SODIUM mmol/L 131*   POTASSIUM mmol/L 3 8   CHLORIDE mmol/L 92*   CO2 mmol/L 29   BUN mg/dL 13   CREATININE mg/dL 0 82   ANION GAP mmol/L 10   CALCIUM mg/dL 8 6   ALBUMIN g/dL 3 7   TOTAL BILIRUBIN mg/dL 0 46   ALK PHOS U/L 85   ALT U/L 26   AST U/L 26   GLUCOSE RANDOM mg/dL 181*     Results from last 7 days   Lab Units 03/20/23  1224   INR  1 10     Results from last 7 days   Lab Units 03/20/23  1700 03/20/23  1139   POC GLUCOSE mg/dl 148* 183*               Lines/Drains:  Invasive Devices     Peripheral Intravenous Line  Duration           Peripheral IV 12/06/22 Right Forearm 104 days          Drain  Duration           Urethral Catheter Double-lumen 16 Fr  <1 day              Urinary Catheter:  Goal for removal: TBD             Imaging: Reviewed radiology reports from this admission including: xray(s)  XR knee 4+ vw left injury   Final Result by Cordelia Soriano MD (03/20 1434)      Acute, displaced periprosthetic left distal femoral fracture  Workstation performed: [id number]         XR knee 4+ vw right injury   Final Result by Micha Clemente MD (03/20 1637)      No acute osseous abnormality  Degenerative changes as described  Workstation performed: [id number]         XR pelvis complete 3+ views   Final Result by Cordelia Soriano MD (03/20 1439)      Diffuse osteopenia  No acute pelvic fracture  Workstation performed: [id number]         CT spine cervical without contrast   Final Result by Achille Cooks, MD (03/20 1313)      No cervical spine fracture or traumatic malalignment  Workstation performed: [id number]         CT head without contrast   Final Result by Achille Cooks, MD (03/20 1313)      No acute intracranial abnormality                    Workstation performed: [id number] XR femur 2 vw left    (Results Pending)       EKG and Other Studies Reviewed on Admission:   · EKG: NSR  HR 60's  ** Please Note: This note has been constructed using a voice recognition system   **

## 2023-03-20 NOTE — OP NOTE
OPERATIVE REPORT  PATIENT NAME: Akshat Melchor    :  1955  MRN: [id number]  Pt Location: [facility] ROOM 03    SURGERY DATE: 3/20/2023    Surgeon(s) and Role:     * Sheron Little MD - Primary    Preop Diagnosis:  Closed displaced supracondylar fracture of distal end of left femur without intracondylar extension, initial encounter ([place] [address] ) Mohan Brian    Post-Op Diagnosis Codes:     * Closed displaced supracondylar fracture of distal end of left femur without intracondylar extension, initial encounter ([place] [address] ) [S72 [id number]]    Procedure(s):  Left - INSERTION NAIL IM FEMUR RETROGRADE    Specimen(s):  * No specimens in log *    Estimated Blood Loss:   Minimal    Drains:  Urethral Catheter Double-lumen 16 Fr  (Active)   Amt returned on insertion(mL) 400 mL 23 1632   Reasons to continue Urinary Catheter  Acute urinary retention/obstruction failing urinary retention protocol 23 1632   Goal for Removal No longer needed- Will place order to discontinue 23 1632   Site Assessment Clean;Skin intact 23 1632   Securement Method Securing device (Describe) 23 1632   Number of days: 0       Anesthesia Type:   General    Operative Indications:  Closed displaced supracondylar fracture of distal end of left femur without intracondylar extension, initial encounter ([place] [address] ) [[id number]]    Operative Findings:  Closed displaced periprosthetic supracondylar left distal femoral fracture    Complications:   None    Procedure and Technique:  After informed surgical consent of been obtained patient was brought to the operating room and transferred to the operating table in the supine position  General anesthesia was obtained  Left lower extremity was prepped and draped in normal sterile fashion  Using the standard technique a small incision was made distal to the patella and dissection was taken down through the skin and subcutaneous tissues to the level of the notch    The starting hole was made in the distal femur was "taken up across the femur proximally  It was at this point I determined that she really had a fairly significant extension deformity that I had to correct through the fracture site  The other issue is that the entry site and the portion in the notch was rather tight and so that was difficult to work around  Weighing all these options I placed a coronal blocking screw from lateral to medial in the distal aspect of the proximal fragment  After this had been completed the guidewire was directed up and anterior to this blocking screw and into the proximal fragment  The femur was sequentially reamed and then an 11 x 360 mm retrograde antegrade femoral nail was placed up the canal and into the proximal fragment  This was locked distally using the \"perfect [place]\" technique with 3 separate locking screws  This was then also locked proximally with a single anterior to posterior screw  Final fluoroscopic views were obtained showing good position of her fracture and her fixation  Wounds were closed with 2-0 Vicryl and staples  Sterile dressings were applied  Patient was then awakened from general anesthesia and transferred to recovery stable condition having tolerated procedure well     I was present for the entire procedure, I was present for all critical portions of the procedure, A qualified resident physician was not available and A physician assistant was required during the procedure for retraction tissue handling,dissection and suturing    Patient Disposition:  PACU         SIGNATURE: Suzanne Elliott MD  DATE: March 20, 2023  TIME: 7:02 PM              "

## 2023-03-20 NOTE — ANESTHESIA PREPROCEDURE EVALUATION
Procedure:  NSERTION NAIL IM FEMUR RETROGRADE (Left: Leg Upper)    Relevant Problems   ANESTHESIA   (+) Delayed emergence from anesthesia   (+) PONV (postoperative nausea and vomiting)      CARDIO   (+) Essential hypertension   (+) Hyperlipidemia      ENDO   (+) Type 2 diabetes mellitus without complication, with long-term current use of insulin (HCC)      HEMATOLOGY   (+) Anemia      MUSCULOSKELETAL   (+) Osteoarthritis of both knees      NEURO/PSYCH   (+) History of DVT of lower extremity   (+) History of colon cancer   (+) History of endometrial cancer      Musculoskeletal and Integument   (+) Periprosthetic fracture around internal prosthetic left knee joint      Other   (+) Bipolar disorder (HCC)   (+) Hyponatremia   (+) Status post total left knee replacement using cement        Physical Exam    Airway    Mallampati score: III  TM Distance: >3 FB  Neck ROM: full     Dental       Cardiovascular  Rhythm: regular, Rate: normal,     Pulmonary  Breath sounds clear to auscultation,     Other Findings        Anesthesia Plan  ASA Score- 3     Anesthesia Type- general with ASA Monitors  Additional Monitors:   Airway Plan: ETT  Comment: TIVA with ETT  Plan Factors-    Chart reviewed  Patient is not a current smoker  Induction- intravenous  Postoperative Plan- Plan for postoperative opioid use  Informed Consent- Anesthetic plan and risks discussed with patient and daughter  I personally reviewed this patient with the CRNA  Discussed and agreed on the Anesthesia Plan with the CRNA  Leslee Pastor

## 2023-03-20 NOTE — ANESTHESIA PREPROCEDURE EVALUATION
Procedure:  INSERTION NAIL IM FEMUR ANTEGRADE (TROCHANTERIC) (Left: Leg Upper)    Relevant Problems   CARDIO   (+) Essential hypertension   (+) Hyperlipidemia      ENDO   (+) Type 2 diabetes mellitus without complication, with long-term current use of insulin (HCC)      HEMATOLOGY   (+) Anemia      MUSCULOSKELETAL   (+) Osteoarthritis of both knees      NEURO/PSYCH   (+) History of colon cancer   (+) History of endometrial cancer      Other   (+) Bipolar disorder (HCC)   (+) Status post total left knee replacement using cement

## 2023-03-20 NOTE — ASSESSMENT & PLAN NOTE
Noted on admission with sodium of 131, likely dehydration from poor oral intake  ED gave 500 ml bolus IVF  Repeat BMP in a m

## 2023-03-20 NOTE — PERIOPERATIVE NURSING NOTE
Left leg ace dry, intact  Left pedal pulse palpable  Ice pack applied  Slight bloody drainage on meplix dsg, marked  16French gonzales patent for clear agustin urine

## 2023-03-20 NOTE — ASSESSMENT & PLAN NOTE
· Noted in history   · Left total knee arthroplasty in December 2022  Suffered a mechanical fall in the bathtub this morning  · X-ray knee revealed acute displaced periprosthetic left distal femoral fracture  · CT head showed no acute intracranial abnormality  · Ortho surgery was consulted in ED    Patient will require surgical intervention in the form of retrograde nail  · Plan is for surgery tonight with Dr Knutson Pi  · NPO  · NWB LLE  · Hold all blood thinners  · SCDs  · Pain management  · Supportive care

## 2023-03-20 NOTE — ASSESSMENT & PLAN NOTE
Lab Results   Component Value Date    HGBA1C 6 9 (H) 11/14/2022       Recent Labs     03/20/23  1139 03/20/23  1700   POCGLU 183* 148*       Blood Sugar Average: Last 72 hrs:  (P) 165 5     Hold metformin  Accu-Cheks ACHS  SSI  Hypoglycemic protocol

## 2023-03-20 NOTE — ASSESSMENT & PLAN NOTE
Incidental finding on CT of spine: 10 mm hypodense right thyroid nodule  Recommendations on incidental thyroid nodules in the Journal of the [employer] of Radiology [facility]), no further evaluation is recommended

## 2023-03-20 NOTE — ANESTHESIA POSTPROCEDURE EVALUATION
Post-Op Assessment Note    CV Status:  Stable  Pain Score: 0    Pain management: adequate     Mental Status:  Sleepy   Hydration Status:  Stable   PONV Controlled:  None   Airway Patency:  Patent      Post Op Vitals Reviewed: Yes      Staff: Anesthesiologist         No notable events documented      BP   116/63   Temp   97 3   Pulse  69   Resp   12   SpO2   100%

## 2023-03-21 PROBLEM — E83.42 HYPOMAGNESEMIA: Status: RESOLVED | Noted: 2023-03-20 | Resolved: 2023-03-21

## 2023-03-21 PROBLEM — F41.9 ANXIETY: Status: ACTIVE | Noted: 2023-03-21

## 2023-03-21 LAB
ANION GAP SERPL CALCULATED.3IONS-SCNC: 6 MMOL/L (ref 4–13)
ATRIAL RATE: 80 BPM
BUN SERPL-MCNC: 12 MG/DL (ref 5–25)
CALCIUM SERPL-MCNC: 8 MG/DL (ref 8.4–10.2)
CHLORIDE SERPL-SCNC: 96 MMOL/L (ref 96–108)
CO2 SERPL-SCNC: 29 MMOL/L (ref 21–32)
CREAT SERPL-MCNC: 0.66 MG/DL (ref 0.6–1.3)
ERYTHROCYTE [DISTWIDTH] IN BLOOD BY AUTOMATED COUNT: 13.6 % (ref 11.6–15.1)
GFR SERPL CREATININE-BSD FRML MDRD: 90 ML/MIN/1.73SQ M
GLUCOSE SERPL-MCNC: 148 MG/DL (ref 65–140)
GLUCOSE SERPL-MCNC: 150 MG/DL (ref 65–140)
HCT VFR BLD AUTO: 25 % (ref 34.8–46.1)
HGB BLD-MCNC: 8.3 G/DL (ref 11.5–15.4)
MAGNESIUM SERPL-MCNC: 1.9 MG/DL (ref 1.9–2.7)
MCH RBC QN AUTO: 28.8 PG (ref 26.8–34.3)
MCHC RBC AUTO-ENTMCNC: 33.2 G/DL (ref 31.4–37.4)
MCV RBC AUTO: 87 FL (ref 82–98)
P AXIS: 65 DEGREES
PLATELET # BLD AUTO: 178 THOUSANDS/UL (ref 149–390)
PMV BLD AUTO: 8.5 FL (ref 8.9–12.7)
POTASSIUM SERPL-SCNC: 3.8 MMOL/L (ref 3.5–5.3)
PR INTERVAL: 134 MS
QRS AXIS: 25 DEGREES
QRSD INTERVAL: 110 MS
QT INTERVAL: 456 MS
QTC INTERVAL: 525 MS
RBC # BLD AUTO: 2.88 MILLION/UL (ref 3.81–5.12)
SODIUM SERPL-SCNC: 131 MMOL/L (ref 135–147)
T WAVE AXIS: 43 DEGREES
VENTRICULAR RATE: 80 BPM
WBC # BLD AUTO: 7.59 THOUSAND/UL (ref 4.31–10.16)

## 2023-03-21 RX ORDER — CLONAZEPAM 0.5 MG/1
0.25 TABLET ORAL 2 TIMES DAILY
Status: DISCONTINUED | OUTPATIENT
Start: 2023-03-21 | End: 2023-03-22

## 2023-03-21 RX ORDER — OXYCODONE HYDROCHLORIDE 5 MG/1
2.5 TABLET ORAL EVERY 6 HOURS PRN
Status: DISCONTINUED | OUTPATIENT
Start: 2023-03-21 | End: 2023-03-28 | Stop reason: HOSPADM

## 2023-03-21 RX ORDER — INSULIN LISPRO 100 [IU]/ML
1-5 INJECTION, SOLUTION INTRAVENOUS; SUBCUTANEOUS
Status: DISCONTINUED | OUTPATIENT
Start: 2023-03-21 | End: 2023-03-28 | Stop reason: HOSPADM

## 2023-03-21 RX ORDER — ZIPRASIDONE HYDROCHLORIDE 40 MG/1
80 CAPSULE ORAL DAILY
Status: DISCONTINUED | OUTPATIENT
Start: 2023-03-22 | End: 2023-03-28 | Stop reason: HOSPADM

## 2023-03-21 RX ORDER — ASPIRIN 81 MG/1
81 TABLET, CHEWABLE ORAL DAILY
Status: DISCONTINUED | OUTPATIENT
Start: 2023-03-22 | End: 2023-03-21

## 2023-03-21 RX ORDER — ENOXAPARIN SODIUM 100 MG/ML
40 INJECTION SUBCUTANEOUS
Status: DISCONTINUED | OUTPATIENT
Start: 2023-03-21 | End: 2023-03-28 | Stop reason: HOSPADM

## 2023-03-21 RX ORDER — ZIPRASIDONE HYDROCHLORIDE 40 MG/1
160 CAPSULE ORAL
Status: DISCONTINUED | OUTPATIENT
Start: 2023-03-21 | End: 2023-03-23

## 2023-03-21 RX ORDER — SODIUM CHLORIDE 9 MG/ML
75 INJECTION, SOLUTION INTRAVENOUS CONTINUOUS
Status: DISCONTINUED | OUTPATIENT
Start: 2023-03-21 | End: 2023-03-21

## 2023-03-21 RX ADMIN — OMEGA-3 FATTY ACIDS CAP 1000 MG 1000 MG: 1000 CAP at 08:35

## 2023-03-21 RX ADMIN — OMEGA-3 FATTY ACIDS CAP 1000 MG 1000 MG: 1000 CAP at 17:51

## 2023-03-21 RX ADMIN — CLONAZEPAM 0.5 MG: 0.5 TABLET ORAL at 10:37

## 2023-03-21 RX ADMIN — DOCUSATE SODIUM 100 MG: 100 CAPSULE, LIQUID FILLED ORAL at 17:51

## 2023-03-21 RX ADMIN — ZIPRASIDONE HYDROCHLORIDE 80 MG: 20 CAPSULE ORAL at 08:39

## 2023-03-21 RX ADMIN — INSULIN LISPRO 1 UNITS: 100 INJECTION, SOLUTION INTRAVENOUS; SUBCUTANEOUS at 21:51

## 2023-03-21 RX ADMIN — ASPIRIN 81 MG CHEWABLE TABLET 81 MG: 81 TABLET CHEWABLE at 08:36

## 2023-03-21 RX ADMIN — B-COMPLEX W/ C & FOLIC ACID TAB 1 TABLET: TAB at 08:35

## 2023-03-21 RX ADMIN — CEFAZOLIN SODIUM 1000 MG: 1 SOLUTION INTRAVENOUS at 04:48

## 2023-03-21 RX ADMIN — ZIPRASIDONE HYDROCHLORIDE 160 MG: 40 CAPSULE ORAL at 21:49

## 2023-03-21 RX ADMIN — CEFAZOLIN SODIUM 1000 MG: 1 SOLUTION INTRAVENOUS at 11:49

## 2023-03-21 RX ADMIN — HYDROMORPHONE HYDROCHLORIDE 0.2 MG: 0.2 INJECTION, SOLUTION INTRAMUSCULAR; INTRAVENOUS; SUBCUTANEOUS at 06:16

## 2023-03-21 RX ADMIN — METOPROLOL SUCCINATE 25 MG: 25 TABLET, EXTENDED RELEASE ORAL at 08:35

## 2023-03-21 RX ADMIN — CLONAZEPAM 0.25 MG: 0.5 TABLET ORAL at 21:50

## 2023-03-21 RX ADMIN — POLYETHYLENE GLYCOL 3350 17 G: 17 POWDER, FOR SOLUTION ORAL at 08:36

## 2023-03-21 RX ADMIN — OXYCODONE 5 MG: 5 TABLET ORAL at 11:25

## 2023-03-21 RX ADMIN — ENOXAPARIN SODIUM 40 MG: 40 INJECTION SUBCUTANEOUS at 16:44

## 2023-03-21 RX ADMIN — OXYCODONE HYDROCHLORIDE 2.5 MG: 5 TABLET ORAL at 21:49

## 2023-03-21 RX ADMIN — DOCUSATE SODIUM 100 MG: 100 CAPSULE, LIQUID FILLED ORAL at 08:36

## 2023-03-21 RX ADMIN — ESCITALOPRAM OXALATE 5 MG: 5 TABLET, FILM COATED ORAL at 08:35

## 2023-03-21 RX ADMIN — ACETAMINOPHEN 650 MG: 325 TABLET, FILM COATED ORAL at 16:57

## 2023-03-21 RX ADMIN — ATORVASTATIN CALCIUM 40 MG: 40 TABLET, FILM COATED ORAL at 16:46

## 2023-03-21 RX ADMIN — OXYCODONE 5 MG: 5 TABLET ORAL at 03:54

## 2023-03-21 RX ADMIN — FERROUS SULFATE TAB 325 MG (65 MG ELEMENTAL FE) 325 MG: 325 (65 FE) TAB at 08:35

## 2023-03-21 NOTE — PLAN OF CARE
Problem: Potential for Falls  Goal: Patient will remain free of falls  Description: INTERVENTIONS:  - Educate patient/family on patient safety including physical limitations  - Instruct patient to call for assistance with activity   - Consult OT/PT to assist with strengthening/mobility   - Keep Call bell within reach  - Keep bed low and locked with side rails adjusted as appropriate  - Keep care items and personal belongings within reach  - Initiate and maintain comfort rounds  - Make Fall Risk Sign visible to staff  - Offer Toileting every 2Hours, in advance of need  - Initiate/Maintain bed alarm  - Obtain necessary fall risk management equipment: yellow socks  - Apply yellow socks and bracelet for high fall risk patients  - Consider moving patient to room near nurses station  3/21/2023 1358 by Jaimee Grey  Outcome: Progressing  3/21/2023 1031 by Jaimee Grey  Outcome: Progressing     Problem: Nutrition/Hydration-ADULT  Goal: Nutrient/Hydration intake appropriate for improving, restoring or maintaining nutritional needs  Description: Monitor and assess patient's nutrition/hydration status for malnutrition  Collaborate with interdisciplinary team and initiate plan and interventions as ordered  Monitor patient's weight and dietary intake as ordered or per policy  Utilize nutrition screening tool and intervene as necessary  Determine patient's food preferences and provide high-protein, high-caloric foods as appropriate       INTERVENTIONS:  - Monitor oral intake, urinary output, labs, and treatment plans  - Assess nutrition and hydration status and recommend course of action  - Evaluate amount of meals eaten  - Assist patient with eating if necessary   - Allow adequate time for meals  - Recommend/ encourage appropriate diets, oral nutritional supplements, and vitamin/mineral supplements  - Order, calculate, and assess calorie counts as needed  - Recommend, monitor, and adjust tube feedings and TPN/PPN based on assessed needs  - Assess need for intravenous fluids  - Provide specific nutrition/hydration education as appropriate  - Include patient/family/caregiver in decisions related to nutrition  3/21/2023 1358 by Alan Zamudio  Outcome: Progressing  3/21/2023 1031 by Alan Zamudio  Outcome: Progressing     Problem: MOBILITY - ADULT  Goal: Maintain or return to baseline ADL function  Description: INTERVENTIONS:  -  Assess patient's ability to carry out ADLs; assess patient's baseline for ADL function and identify physical deficits which impact ability to perform ADLs (bathing, care of mouth/teeth, toileting, grooming, dressing, etc )  - Assess/evaluate cause of self-care deficits   - Assess range of motion  - Assess patient's mobility; develop plan if impaired  - Assess patient's need for assistive devices and provide as appropriate  - Encourage maximum independence but intervene and supervise when necessary  - Involve family in performance of ADLs  - Assess for home care needs following discharge   - Consider OT consult to assist with ADL evaluation and planning for discharge  - Provide patient education as appropriate  3/21/2023 1358 by Alan Zamudio  Outcome: Progressing  3/21/2023 1031 by Alan Zamudio  Outcome: Progressing  Goal: Maintains/Returns to pre admission functional level  Description: INTERVENTIONS:  - Perform BMAT or MOVE assessment daily    - Set and communicate daily mobility goal to care team and patient/family/caregiver  - Collaborate with rehabilitation services on mobility goals if consulted  - Perform Range of Motion 3 times a day  - Reposition patient every 2 hours    - Dangle patient 3 times a day  - Stand patient 3 times a day  - Ambulate patient 3 times a day  - Out of bed to chair 3 times a day   - Out of bed for meals 3 times a day  - Out of bed for toileting  - Record patient progress and toleration of activity level   3/21/2023 1358 by Alan Zamudio  Outcome: Progressing  3/21/2023 1031 by Katherine Robison  Outcome: Progressing

## 2023-03-21 NOTE — UTILIZATION REVIEW
Initial Clinical Review    Admission: Date/Time/Statement:   Admission Orders (From admission, onward)     Ordered        03/20/23 1348  INPATIENT ADMISSION  Once                      Orders Placed This Encounter   Procedures   • INPATIENT ADMISSION     Standing Status:   Standing     Number of Occurrences:   1     Order Specific Question:   Level of Care     Answer:   Med Surg [16]     Order Specific Question:   Estimated length of stay     Answer:   More than 2 Midnights     Order Specific Question:   Certification     Answer:   I certify that inpatient services are medically necessary for this patient for a duration of greater than two midnights  See H&P and MD Progress Notes for additional information about the patient's course of treatment  ED Arrival Information     Expected   -    Arrival   3/20/2023 11:07    Acuity   Urgent            Means of arrival   Ambulance    Escorted by   [facility]    Service   Hospitalist    Admission type   Emergency            Arrival complaint   l knee,hip pain due to fall           Chief Complaint   Patient presents with   • Fall     Arrives BLS reported that she slipped and fall in the bathtub  No LOC, hit head  Co pain to L knee, BL hips  Pt on ASA,  Collar applied on arrival         Initial Presentation:   70 yof to ER from home via EMS s/p slip & fall in bathtub, c/o L knee, bilateral hip & neck pain, unable to ambulate, +head strike on ASA  Hx L TKA, hypertension, diabetes type 2, hyperlipidemia, psychiatric disorder colon and endometrial cancer in remission  Presents with numbness and deformity noted above the left knee  Popliteal pulses intact  Peroneal nerve is intact  Range of motion deferred because of pain  Hip joint is stable  Pelvis is stable  Right knee tenderness noted along the knee joint range of motion intact no effusion noted no deformity noted   Admission work-up showing L femoral fx on imaging, hypomagnesemia  Admitted to inpatient status for displaced left distal femur periprosthetic fracture  Per ortho: displaced left distal femur periprosthetic fracture  OR planned for a retrograde intramedullary nail left femur  To OR for: Left - INSERTION NAIL IM FEMUR RETROGRADE  Anesthesia Type: General  Operative Findings: Closed displaced periprosthetic supracondylar left distal femoral fracture    Date: 3/21/23   Day 2:   POD#1: Left - INSERTION NAIL IM FEMUR RETROGRADE  LLE dressing c,d,&i  Using IV Dilaudid & Oxycodone for pain control  Post-op course IVABT completed  Hyponatremia, possibly 2nd antipsychotic meds  Hx low Na per daughter; home fluid restriction resumed, monitor      ED Triage Vitals   Temperature Pulse Respirations Blood Pressure SpO2   03/20/23 1112 03/20/23 1112 03/20/23 1112 03/20/23 1112 03/20/23 1112   98 5 °F (36 9 °C) 86 20 145/69 99 %      Temp Source Heart Rate Source Patient Position - Orthostatic VS BP Location FiO2 (%)   03/20/23 1112 03/20/23 1112 03/20/23 2039 03/20/23 2039 --   Oral Monitor Lying Right arm       Pain Score       03/20/23 1112       10 - Worst Possible Pain          Wt Readings from Last 1 Encounters:   03/20/23 71 3 kg (157 lb 3 oz)     Additional Vital Signs:   03/21/23 07:49:43 99 6 °F (37 6 °C) 82 -- 116/58 77 99 % -- -- -- --   03/21/23 03:06:32 97 7 °F (36 5 °C) 82 19 116/60 79 98 % -- -- -- --   03/20/23 20:39:54 97 2 °F (36 2 °C) Abnormal  79 17 138/74 -- 93 % 2 L/min Nasal cannula -- Lying   03/20/23 2000 -- 78 16 130/62 -- 99 % 2 L/min Nasal cannula -- --   03/20/23 1951 -- -- -- -- -- 97 % 2 L/min Blow-by  -- --   O2 Device: changed to nasal [email or web address] litres at 03/20/23 1951 03/20/23 1944 -- 77 17 141/66 -- 100 % 4 L/min Simple mask  -- --   O2 Device: changed to blow by at 03/20/23 1944 03/20/23 1926 97 3 °F (36 3 °C) Abnormal  71 13 116/63 -- 100 % 4 L/min Simple mask WDL --   03/20/23 1715 -- 68 16 120/64 87 94 % -- -- -- --   03/20/23 1645 -- 66 18 123/60 86 97 % -- -- -- --     Pertinent Labs/Diagnostic Test Results:   XR femur 2 vw left   Final Result  (03/20 2006)      Fluoroscopic guidance provided for procedure guidance  Please refer to the separate procedure notes for additional details  XR knee 4+ vw left injury   Final Result (03/20 1434)      Acute, displaced periprosthetic left distal femoral fracture  XR knee 4+ vw right injury   Final Result  (03/20 1637)      No acute osseous abnormality  Degenerative changes as described  XR pelvis complete 3+ views   Final Result (03/20 1439)      Diffuse osteopenia  No acute pelvic fracture  CT spine cervical without contrast   Final Result  (03/20 1313)      No cervical spine fracture or traumatic malalignment  CT head without contrast   Final Result  (03/20 1313)      No acute intracranial abnormality       Results from last 7 days   Lab Units 03/20/23  1615   SARS-COV-2  Negative     Results from last 7 days   Lab Units 03/21/23  0459 03/20/23  1224   WBC Thousand/uL 7 59 8 88   HEMOGLOBIN g/dL 8 3* 11 0*   HEMATOCRIT % 25 0* 32 2*   PLATELETS Thousands/uL 178 238   NEUTROS ABS Thousands/µL  --  7 56     Results from last 7 days   Lab Units 03/21/23  0459 03/20/23  1224 03/15/23  1046   SODIUM mmol/L 131* 131* 135   POTASSIUM mmol/L 3 8 3 8 4 1   CHLORIDE mmol/L 96 92* 96*   CO2 mmol/L 29 29 29   ANION GAP mmol/L 6 10  --    BUN mg/dL 12 13 16   CREATININE mg/dL 0 66 0 82 0 85   EGFR ml/min/1 73sq m 90 73 74   CALCIUM mg/dL 8 0* 8 6 9 6   MAGNESIUM mg/dL 1 9 1 4*  --      Results from last 7 days   Lab Units 03/20/23  1224   AST U/L 26   ALT U/L 26   ALK PHOS U/L 85   TOTAL PROTEIN g/dL 6 9   ALBUMIN g/dL 3 7   TOTAL BILIRUBIN mg/dL 0 46     Results from last 7 days   Lab Units 03/20/23  2125 03/20/23  1943 03/20/23  1700 03/20/23  1139   POC GLUCOSE mg/dl 179* 166* 148* 183*     Results from last 7 days   Lab Units 03/21/23  0459 03/20/23  1224 03/15/23  1046   GLUCOSE RANDOM mg/dL 148* 181* 158*     Results from last 7 days   Lab Units 03/20/23  1615 03/20/23  1441 03/20/23  1224   HS TNI 0HR ng/L  --   --  13   HS TNI 2HR ng/L  --  13  --    HSTNI D2 ng/L  --  0  --    HS TNI 4HR ng/L 12  --   --    HSTNI D4 ng/L -1  --   --      Results from last 7 days   Lab Units 03/20/23  1224   PROTIME seconds 14 3   INR  1 10   PTT seconds 26     Results from last 7 days   Lab Units 03/20/23  1224   TSH 3RD GENERATON uIU/mL 0 877     Results from last 7 days   Lab Units 03/20/23  1626   CLARITY UA  Clear   COLOR UA  Light Yellow   SPEC GRAV UA  1 010   PH UA  6 0   GLUCOSE UA mg/dl Negative   KETONES UA mg/dl Negative   BLOOD UA  Negative   PROTEIN UA mg/dl Negative   NITRITE UA  Negative   BILIRUBIN UA  Negative   UROBILINOGEN UA E U /dl 0 2   LEUKOCYTES UA  Negative     Results from last 7 days   Lab Units 03/20/23  1615   INFLUENZA A PCR  Negative   INFLUENZA B PCR  Negative   RSV PCR  Negative     ED Treatment:   Medication Administration from 03/20/2023 1107 to 03/20/2023 1733       Date/Time Order Dose Route Action     03/20/2023 1301 EDT acetaminophen (TYLENOL) tablet 975 mg 975 mg Oral Given     03/20/2023 1341 EDT magnesium sulfate 2 g/50 mL IVPB (premix) 2 g 2 g Intravenous New Bag     03/20/2023 1341 EDT sodium chloride 0 9 % bolus 500 mL 500 mL Intravenous New Bag     03/20/2023 1340 EDT HYDROmorphone (DILAUDID) injection 0 5 mg 0 5 mg Intravenous Given     03/20/2023 1650 EDT HYDROmorphone (DILAUDID) injection 0 5 mg 0 5 mg Intravenous Given        Past Medical History:   Diagnosis Date   • Anesthesia complication     difficulty waking up   • Arthritis     left knee   • Bone spur     left knee behing the knee cap   • Chronic kidney disease    • Colon cancer [facility])     patient states about 2017   • Colon polyp     Tubulovillous Adenoma High Grade Dysplasia - 2017   • Depression    • Diabetes mellitus ([facility] [address] )    • [facility] )     grade I   • Hx of bleeding disorder     vaginal bleeding started on 3/13/17 • Hyperlipidemia    • Hypertension    • Hypomagnesemia 3/20/2023   • PONV (postoperative nausea and vomiting)    • Psychiatric disorder    • Shortness of breath     with exertion   • Urinary incontinence    • Vitamin D deficiency      Present on Admission:  • Anemia  • Essential hypertension  • Hyperlipidemia  • Hyponatremia  • Psychiatric disorder  • (Resolved) Hypomagnesemia  • Abnormal CAT scan  • Anxiety      Admitting Diagnosis: Femur fracture (HCC) [S72 90XA]  Altered mental status [R41 82]  Hip pain [M25 559]  Knee pain [M25 569]  Age/Sex: 70 y o  female  Admission Orders: Shanks cath  NWB LLE  Scd/foot pumps  Consult ortho  1500cc fluid restriction  Pt/ot eval & tx    Scheduled Medications:  aspirin, 81 mg, Oral, BID  atorvastatin, 40 mg, Oral, Daily With Dinner  ceFAZolin (ANCEF) IVPB 1,000 mg 50 mL, Q8H, x 3 doses  clonazePAM, 0 25 mg, Oral, BID  docusate sodium, 100 mg, Oral, BID  enoxaparin, 40 mg, Subcutaneous, Q24H MARIA INES  escitalopram, 5 mg, Oral, Daily  ferrous sulfate, 325 mg, Oral, Daily With Breakfast  fish oil, 1,000 mg, Oral, BID  metoprolol succinate, 25 mg, Oral, Daily  multivitamin stress formula, 1 tablet, Oral, Daily  polyethylene glycol, 17 g, Oral, Daily  ziprasidone, 160 mg, Oral, HS  [START ON 3/22/2023] ziprasidone, 80 mg, Oral, Daily    PRN Meds:  acetaminophen, 650 mg, Oral, Q6H PRN  HYDROmorphone, 0 2 mg, Intravenous, Q4H PRN, 3/21 x1  oxyCODONE, 5 mg, Oral, Q6H PRN, 3/21 x2    Network Utilization Review Department  ATTENTION: Please call with any questions or concerns to [phone] and carefully listen to the prompts so that you are directed to the right person  All voicemails are confidential   Zenaida Triana all requests for admission clinical reviews, approved or denied determinations and any other requests to dedicated fax number below belonging to the campus where the patient is receiving treatment   List of dedicated fax numbers for the Facilities:  [facility] [place] ADMISSION DENIALS (Administrative/Medical Necessity) [phone]   [address] (Maternity/NICU/Pediatrics) Leila Li [address] Irlanda Gonzalez  [phone]   [address] [address] [facility] [place] [place] [address] [facility] [address] [facility] [place] [address] [facility] [phone]

## 2023-03-21 NOTE — ASSESSMENT & PLAN NOTE
· Noted in history   · Left total knee arthroplasty in December 2022  Suffered a mechanical fall in the bathtub this morning  · X-ray knee revealed acute displaced periprosthetic left distal femoral fracture  · CT head showed no acute intracranial abnormality  · Ortho surgery was consulted in ED    Patient will require surgical intervention in the form of retrograde nail  · Surgery 3/20  with Dr Alysha Jackson  · POD # 1 s p insertion nail IM femur retrograde   · Lovenox 40 mg daily for 6 weeks  · NWB LLE  · SCDs  · Pain management  · Supportive care

## 2023-03-21 NOTE — PROGRESS NOTES
Cristina U  65   Progress Note - Alana Godoy 1955, 70 y o  female MRN: [id number]  Unit/Bed#: [address] [place] [address] Encounter: [id number]  Primary Care Provider: Caitlin Chatman DO   Date and time admitted to hospital: 3/20/2023 11:11 AM    * Periprosthetic fracture around internal prosthetic left knee joint  Assessment & Plan  · Noted in history   · Left total knee arthroplasty in December 2022  Suffered a mechanical fall in the bathtub this morning  · X-ray knee revealed acute displaced periprosthetic left distal femoral fracture  · CT head showed no acute intracranial abnormality  · Ortho surgery was consulted in ED  Patient will require surgical intervention in the form of retrograde nail  · Surgery 3/20  with Dr Kandis Marie  · POD # 1 s p insertion nail IM femur retrograde   · Lovenox 40 mg daily for 6 weeks  · NWB LLE  · SCDs  · Pain management  · Supportive care    Hyponatremia  Assessment & Plan  · Noted on admission with sodium of 131, possibly secondary to antipsychotics medications  Patient's daughter stated sodium is chronically low and and she is on 1500 mL/day fluid restriction consistently  · Regular diet with fluid restriction   · Monitor and trend sodium        Type 2 diabetes mellitus without complication, with long-term current use of insulin [facility])  Assessment & Plan  Lab Results   Component Value Date    HGBA1C 6 9 (H) 11/14/2022       Recent Labs     03/20/23  1139 03/20/23  1700 03/20/23  1943 03/20/23  2125   POCGLU 183* 148* 166* 179*       Blood Sugar Average: Last 72 hrs:  (P) 169     Hold metformin  Accu-Cheks ACHS  SSI  Hypoglycemic protocol    Anxiety  Assessment & Plan  Continue Clonazepam    Abnormal CAT scan  Assessment & Plan  Incidental finding on CT of spine: 10 mm hypodense right thyroid nodule  Recommendations on incidental thyroid nodules in the Journal of the [employer] of Radiology [facility]), no further evaluation is recommended      History of DVT of lower extremity  Assessment & Plan  · Noted in history  · Patient only takes aspirin 81 mg  · Continue aspirin    Psychiatric disorder  Assessment & Plan  Geodon and Lexapro    Anemia  Assessment & Plan  · Continue iron supplementation    Hyperlipidemia  Assessment & Plan  Statin    Essential hypertension  Assessment & Plan  Continue Metoprolol and torsemide    Hypomagnesemia-resolved as of 3/21/2023  Assessment & Plan  Magnesium noted on admission to be 1 4  Repleted in ED  Mag today 1 9  Resolved         VTE Pharmacologic Prophylaxis:   High Risk (Score >/= 5) - Pharmacological DVT Prophylaxis Ordered: enoxaparin (Lovenox)  Sequential Compression Devices Ordered  Patient Centered Rounds: I performed bedside rounds with nursing staff today  Discussions with Specialists or Other Care Team Provider: Yes    Education and Discussions with Family / Patient: Updated  (daughter) via phone  Total Time Spent on Date of Encounter in care of patient: 35 minutes This time was spent on one or more of the following: performing physical exam; counseling and coordination of care; obtaining or reviewing history; documenting in the medical record; reviewing/ordering tests, medications or procedures; communicating with other healthcare professionals and discussing with patient's family/caregivers  Current Length of Stay: 1 day(s)  Current Patient Status: Inpatient   Certification Statement: The patient will continue to require additional inpatient hospital stay due to Clinical course  Discharge Plan: Anticipate discharge in 48-72 hrs to discharge location to be determined pending rehab evaluations  Code Status: Level 1 - Full Code    Subjective:   Patient seen and examined at bedside  POD #1  Reports mild discomfort to the left lower extremity  Denies chest pain, shortness of breath, headaches, lightheadedness or abdominal pain      Objective:     Vitals:   Temp (24hrs), Av 9 °F (36 6 °C), Min:97 2 °F (36 2 °C), Max:99 6 °F (37 6 °C)    Temp:  [97 2 °F (36 2 °C)-99 6 °F (37 6 °C)] 99 6 °F (37 6 °C)  HR:  [64-91] 82  Resp:  [13-19] 18  BP: (102-142)/(55-80) 116/58  SpO2:  [93 %-100 %] 99 %  Body mass index is 26 98 kg/m²  Input and Output Summary (last 24 hours): Intake/Output Summary (Last 24 hours) at 3/21/2023 1320  Last data filed at 3/21/2023 0518  Gross per 24 hour   Intake 900 ml   Output 1025 ml   Net -125 ml       Physical Exam:   Physical Exam  Constitutional:       Appearance: She is not ill-appearing  HENT:      Head: Normocephalic  Mouth/Throat:      Mouth: Mucous membranes are moist    Eyes:      General: No scleral icterus  Cardiovascular:      Rate and Rhythm: Normal rate  Heart sounds: Normal heart sounds  Pulmonary:      Effort: Pulmonary effort is normal       Breath sounds: Normal breath sounds  Abdominal:      General: Bowel sounds are normal  There is no distension  Palpations: Abdomen is soft  Musculoskeletal:         General: Signs of injury present  Comments: LLE with  ACE wrap  CDI   Skin:     General: Skin is dry  Capillary Refill: Capillary refill takes less than 2 seconds  Coloration: Skin is pale  Neurological:      Mental Status: She is alert  Mental status is at baseline  Motor: Weakness present     Psychiatric:         Behavior: Behavior normal          Additional Data:     Labs:  Results from last 7 days   Lab Units 03/21/23  0459 03/20/23  1224   WBC Thousand/uL 7 59 8 88   HEMOGLOBIN g/dL 8 3* 11 0*   HEMATOCRIT % 25 0* 32 2*   PLATELETS Thousands/uL 178 238   NEUTROS PCT %  --  86*   LYMPHS PCT %  --  8*   MONOS PCT %  --  6   EOS PCT %  --  0     Results from last 7 days   Lab Units 03/21/23  0459 03/20/23  1224   SODIUM mmol/L 131* 131*   POTASSIUM mmol/L 3 8 3 8   CHLORIDE mmol/L 96 92*   CO2 mmol/L 29 29   BUN mg/dL 12 13   CREATININE mg/dL 0 66 0 82   ANION GAP mmol/L 6 10   CALCIUM mg/dL 8 0* 8 6   ALBUMIN g/dL  --  3 7 TOTAL BILIRUBIN mg/dL  --  0 46   ALK PHOS U/L  --  85   ALT U/L  --  26   AST U/L  --  26   GLUCOSE RANDOM mg/dL 148* 181*     Results from last 7 days   Lab Units 03/20/23  1224   INR  1 10     Results from last 7 days   Lab Units 03/20/23  2125 03/20/23  1943 03/20/23  1700 03/20/23  1139   POC GLUCOSE mg/dl 179* 166* 148* 183*               Lines/Drains:  Invasive Devices     Peripheral Intravenous Line  Duration           Peripheral IV 03/20/23 Left Antecubital 1 day          Drain  Duration           Urethral Catheter Double-lumen 16 Fr  <1 day              Urinary Catheter:  Goal for removal: Voiding trial when ambulation improves               Imaging: No pertinent imaging reviewed      Recent Cultures (last 7 days):         Last 24 Hours Medication List:   Current Facility-Administered Medications   Medication Dose Route Frequency Provider Last Rate   • acetaminophen  650 mg Oral Q6H PRN JOHN Lopez     • aspirin  81 mg Oral BID Yash Bro MD     • atorvastatin  40 mg Oral Daily With Dinner JOHN Lopez     • clonazePAM  0 25 mg Oral BID JOHN Lopez     • docusate sodium  100 mg Oral BID JOHN Lopez     • enoxaparin  40 mg Subcutaneous Q24H [address] [place] [address] JOHN Dale     • escitalopram  5 mg Oral Daily [place] JOHN Dale     • ferrous sulfate  325 mg Oral Daily With Breakfast JOHN Lopez     • fish oil  1,000 mg Oral BID JOHN Lopez     • HYDROmorphone  0 2 mg Intravenous Q4H PRN Chris Bui PA-C     • metoprolol succinate  25 mg Oral Daily JOHN Lopez     • multivitamin stress formula  1 tablet Oral Daily JOHN Lopez     • oxyCODONE  5 mg Oral Q6H PRN Chris Bui PA-C     • polyethylene glycol  17 g Oral Daily JOHN Lopez     • ziprasidone  160 mg Oral HS JOHN Lopez     • [START ON 3/22/2023] ziprasidone  80 mg Oral Daily JOHN Lopez Today, Patient Was Seen By: JOHN Cortes    **Please Note: This note may have been constructed using a voice recognition system  **

## 2023-03-21 NOTE — PROGRESS NOTES
Progress Note - Orthopedics   Dimas Rojas 70 y o  female MRN: [id number]  Unit/Bed#: [address] [place] [address] Encounter: [id number]        Subjective: Postop day #1 status post surgical fixation left distal femur periprosthetic fracture with retrograde IM nail  Patient notes pain about the left thigh, which is worse with movement and better with pain medication  She notes good sensation of the left lower extremity  She does feel quite groggy today, but denies any chest pain, shortness of breath, or dizziness  Hemoglobin 8 3 today  Vitals have been stable  No acute overnight events  Vitals: Blood pressure 111/53, pulse 84, temperature 99 1 °F (37 3 °C), resp  rate 18, weight 71 3 kg (157 lb 3 oz), SpO2 98 %, not currently breastfeeding  ,Body mass index is 26 98 kg/m²  Intake/Output Summary (Last 24 hours) at 3/21/2023 1455  Last data filed at 3/21/2023 0518  Gross per 24 hour   Intake 400 ml   Output 1025 ml   Net -625 ml       Invasive Devices     Peripheral Intravenous Line  Duration           Peripheral IV 03/20/23 Left Antecubital 1 day          Drain  Duration           Urethral Catheter Double-lumen 16 Fr  <1 day                  Ortho Exam: Alert and oriented x3 in no acute distress lying comfortably in bed  Left lower extremity: Ace bandage CDI  Proximal dressing with small area of strike through  Compartments soft  No calf tenderness  Moves toes/ankle freely  Good capillary refill  Sensation intact lateral femoral cutaneous, saphenous, sural, deep/superficial peroneal nerve distributions  Lab, Imaging and other studies: I have personally reviewed pertinent lab results    CBC:   Lab Results   Component Value Date    WBC 7 59 03/21/2023    HGB 8 3 (L) 03/21/2023    HCT 25 0 (L) 03/21/2023    MCV 87 03/21/2023     03/21/2023    MCH 28 8 03/21/2023    MCHC 33 2 03/21/2023    RDW 13 6 03/21/2023    MPV 8 5 (L) 03/21/2023    NRBC 0 03/20/2023     CMP:   Lab Results   Component Value Date     (L) 04/21/2017    CL 96 03/21/2023    CL 96 (L) 03/15/2023    CO2 29 03/21/2023    CO2 29 03/15/2023    BUN 12 03/21/2023    BUN 16 03/15/2023    CREATININE 0 66 03/21/2023    CREATININE 1 67 (H) 04/21/2017    CALCIUM 8 0 (L) 03/21/2023    CALCIUM 9 6 03/15/2023    AST 26 03/20/2023    AST 26 04/21/2017    ALT 26 03/20/2023    ALT 25 04/21/2017    ALKPHOS 85 03/20/2023    ALKPHOS 67 04/21/2017    PROT 6 9 04/21/2017    BILITOT 0 5 04/21/2017    EGFR 90 03/21/2023     PT/INR:   Lab Results   Component Value Date    INR 1 10 03/20/2023       Assessment:  Status post surgical fixation left distal femur periprosthetic fracture with retrograde IM nail    Plan:  PT/OT  Weight-bear as tolerated left lower extremity  Dressings will remain in place  Lovenox for DVT prophylaxis  Postop antibiotics completed  We will continue to monitor hemoglobin  Analgesia as needed  Medical management per primary team   Patient will likely require short-term rehab upon discharge  Weight bearing: WBAT with assistance      VTE Pharmacologic Prophylaxis: Enoxaparin (Lovenox)  VTE Mechanical Prophylaxis: sequential compression device

## 2023-03-21 NOTE — PLAN OF CARE
Problem: Potential for Falls  Goal: Patient will remain free of falls  Description: INTERVENTIONS:  - Educate patient/family on patient safety including physical limitations  - Instruct patient to call for assistance with activity   - Consult OT/PT to assist with strengthening/mobility   - Keep Call bell within reach  - Keep bed low and locked with side rails adjusted as appropriate  - Keep care items and personal belongings within reach  - Initiate and maintain comfort rounds  - Make Fall Risk Sign visible to staff  - Offer Toileting every 2 Hours, in advance of need  - Initiate/Maintain bed alarm  - Obtain necessary fall risk management equipment: yellow socks  - Apply yellow socks and bracelet for high fall risk patients  - Consider moving patient to room near nurses station  Outcome: Progressing     Problem: Nutrition/Hydration-ADULT  Goal: Nutrient/Hydration intake appropriate for improving, restoring or maintaining nutritional needs  Description: Monitor and assess patient's nutrition/hydration status for malnutrition  Collaborate with interdisciplinary team and initiate plan and interventions as ordered  Monitor patient's weight and dietary intake as ordered or per policy  Utilize nutrition screening tool and intervene as necessary  Determine patient's food preferences and provide high-protein, high-caloric foods as appropriate       INTERVENTIONS:  - Monitor oral intake, urinary output, labs, and treatment plans  - Assess nutrition and hydration status and recommend course of action  - Evaluate amount of meals eaten  - Assist patient with eating if necessary   - Allow adequate time for meals  - Recommend/ encourage appropriate diets, oral nutritional supplements, and vitamin/mineral supplements  - Order, calculate, and assess calorie counts as needed  - Recommend, monitor, and adjust tube feedings and TPN/PPN based on assessed needs  - Assess need for intravenous fluids  - Provide specific nutrition/hydration education as appropriate  - Include patient/family/caregiver in decisions related to nutrition  Outcome: Progressing     Problem: MOBILITY - ADULT  Goal: Maintain or return to baseline ADL function  Description: INTERVENTIONS:  -  Assess patient's ability to carry out ADLs; assess patient's baseline for ADL function and identify physical deficits which impact ability to perform ADLs (bathing, care of mouth/teeth, toileting, grooming, dressing, etc )  - Assess/evaluate cause of self-care deficits   - Assess range of motion  - Assess patient's mobility; develop plan if impaired  - Assess patient's need for assistive devices and provide as appropriate  - Encourage maximum independence but intervene and supervise when necessary  - Involve family in performance of ADLs  - Assess for home care needs following discharge   - Consider OT consult to assist with ADL evaluation and planning for discharge  - Provide patient education as appropriate  Outcome: Progressing  Goal: Maintains/Returns to pre admission functional level  Description: INTERVENTIONS:  - Perform BMAT or MOVE assessment daily    - Set and communicate daily mobility goal to care team and patient/family/caregiver  - Collaborate with rehabilitation services on mobility goals if consulted  - Perform Range of Motion 3 times a day  - Reposition patient every 2 hours    - Dangle patient 3 times a day  - Stand patient 3 times a day  - Ambulate patient 3 times a day  - Out of bed to chair 3 times a day   - Out of bed for meals 3 times a day  - Out of bed for toileting  - Record patient progress and toleration of activity level   Outcome: Progressing

## 2023-03-21 NOTE — CASE MANAGEMENT
Case Management Assessment & Discharge Planning Note    Patient name Edmond Edmondson  Location [address] [address] MRN [id number]  : 1955 Date 3/21/2023       Current Admission Date: 3/20/2023  Current Admission Diagnosis:Periprosthetic fracture around internal prosthetic left knee joint   Patient Active Problem List    Diagnosis Date Noted   • Anxiety 2023   • PONV (postoperative nausea and vomiting) 2023   • Delayed emergence from anesthesia 2023   • Periprosthetic fracture around internal prosthetic left knee joint 2023   • History of DVT of lower extremity 2023   • Abnormal CAT scan 2023   • Platelets decreased ([address] ) 2022   • Status post total left knee replacement using cement 2022   • Elevated brain natriuretic peptide (BNP) level 10/18/2022   • Osteoarthritis of both knees 10/18/2022   • Hyponatremia 10/15/2022   • Psychiatric disorder 10/15/2022   • Mass of axillary tail of right breast 2022   • Mass of axillary tail of left breast 2022   • Axillary mass, right 2022   • OAB (overactive bladder) 11/15/2019   • History of colon cancer 2018   • Anemia 2018   • History of endometrial cancer 2017   • Type 2 diabetes mellitus without complication, with long-term current use of insulin ([address] ) 2017   • Essential hypertension 2017   • Hyperlipidemia 2017   • Obesity 2017   • Bipolar disorder ([address] ) 2017      LOS (days): 1  Geometric Mean LOS (GMLOS) (days):   Days to GMLOS:     OBJECTIVE:    Risk of Unplanned Readmission Score: 20 27     Current admission status: Inpatient    Preferred Pharmacy:   [facility] [address] [facility]  [id number] [id number]  Phone: [phone] Fax: [phone]    Primary Care Provider: Sary Montague DO    Primary Insurance: Britt Melissa  Secondary Insurance: MEDICARE    ASSESSMENT:  Chandan Lomax Health Care Representative - Daughter   Primary Phone: [phone] (Mobile)               Readmission Root Cause  30 Day Readmission: No    Patient Information  Admitted from[de-identified] Home  Mental Status: Alert  During Assessment patient was accompanied by: Spouse, Daughter  Assessment information provided by[de-identified] Daughter  Primary Caregiver: Family  Caregiver's Name[de-identified] Jolene Young Relationship to Patient[de-identified] Family Member (daughter)  Caregiver's Telephone Number[de-identified] [phone]  Support Systems: Spouse/significant other, Daughter  [place] of Residence: [address] [place] [address] do you live in?: [address] [place] [address] Road entry access options   Select all that apply : Stairs  Number of steps to enter home : 6  Do the steps have railings?: Yes  Type of Current Residence: Other (Comment) (one story)  In the last 12 months, was there a time when you were not able to pay the mortgage or rent on time?: No  In the last 12 months, how many places have you lived?: 1  In the last 12 months, was there a time when you did not have a steady place to sleep or slept in a shelter (including now)?: No  Homeless/housing insecurity resource given?: N/A  Living Arrangements: Lives w/ Spouse/significant other    Activities of Daily Living Prior to Admission  Functional Status: Assistance  Completes ADLs independently?: No  Level of ADL dependence: Assistance  Ambulates independently?: Yes  Does patient use assisted devices?: Yes  Assisted Devices (DME) used: Devon Varela (raised toilet seat)  Does patient currently own DME?: Yes  What DME does the patient currently own?: Andrzej Butler, Other (Comment) (raised toilet seat)  Does patient have a history of Outpatient Therapy (PT/OT)?: Yes  Does the patient have a history of Short-Term Rehab?: Yes ([facility])  Does patient have a history of [facility]?: No  Does patient currently have [facility]?: No    Patient Information Continued  Income Source: Pension/senior care  Does patient have prescription coverage?: Yes ([employer])  Within the past 12 months, you worried that your food would run out before you got the money to buy more : Never true  Within the past 12 months, the food you bought just didn't last and you didn't have money to get more : Never true  Food insecurity resource given?: N/A  Does patient receive dialysis treatments?: No  Does patient have a history of substance abuse?: No  Does patient have a history of Mental Health Diagnosis?: Yes (Bipolar Disorder)    Means of Transportation  Means of Transport to Appts[de-identified] Family transport  In the past 12 months, has lack of transportation kept you from medical appointments or from getting medications?: No  In the past 12 months, has lack of transportation kept you from meetings, work, or from getting things needed for daily living?: No  Was application for public transport provided?: N/A    DISCHARGE DETAILS:    Discharge planning discussed with[de-identified] Patient,  and daughter, Amrit Gómez of Choice: Yes  Comments - Freedom of Choice: SW met with pt and family to assess needs and discuss plans  Pt was admitted with femur fracture after fall  Pt had surgery yesterday  STR placement recommended  Pt's daughter requested referrals be made to acute rehab facilities including [address] and [facility] [address] offered to make the acute rehab referrals and offered to also make skilled referrals as alternate plan  Daughter was agreeable but reinforced that acute rehab is family's preference    CM contacted family/caregiver?: Yes  Were Treatment Team discharge recommendations reviewed with patient/caregiver?: Yes  Did patient/caregiver verbalize understanding of patient care needs?: Yes  Were patient/caregiver advised of the risks associated with not following Treatment Team discharge recommendations?: Yes    Contacts  Patient Contacts: Cy Hill (dtr)  Relationship to Patient[de-identified] Family  Contact Method: In Person  Reason/Outcome: Discharge [address]         Is the patient interested in [facility] at discharge?: No    DME Referral Provided  Referral made for DME?: No    Other Referral/Resources/Interventions Provided:  Interventions: Short Term Rehab, Acute Rehab  Referral Comments: Referrals for acute and skilled rehab will be made when PT/OT evaluations are available      Treatment Team Recommendation: Short Term Rehab, Acute Rehab  Discharge Destination Plan[de-identified] Short Term Rehab, Acute Rehab  Transport at Discharge : [facility]

## 2023-03-21 NOTE — ASSESSMENT & PLAN NOTE
Lab Results   Component Value Date    HGBA1C 6 9 (H) 11/14/2022       Recent Labs     03/20/23  1139 03/20/23  1700 03/20/23  1943 03/20/23 2125   POCGLU 183* 148* 166* 179*       Blood Sugar Average: Last 72 hrs:  (P) 169     Recent A1c 6 9  Hold metformin and Toujeo   Update A1c  Accu-Cheks ACHS  SSI  Hypoglycemic protocol

## 2023-03-21 NOTE — ASSESSMENT & PLAN NOTE
Lab Results   Component Value Date    HGBA1C 6 9 (H) 11/14/2022       Recent Labs     03/20/23  1139 03/20/23  1700 03/20/23  1943 03/20/23 2125   POCGLU 183* 148* 166* 179*       Blood Sugar Average: Last 72 hrs:  (P) 169     Hold metformin  Accu-Cheks ACHS  SSI  Hypoglycemic protocol

## 2023-03-21 NOTE — ASSESSMENT & PLAN NOTE
· Noted on admission with sodium of 131, possibly secondary to antipsychotics medications  Patient's daughter stated sodium is chronically low and and she is on 1500 mL/day fluid restriction consistently    · Regular diet with fluid restriction   · Monitor and trend sodium

## 2023-03-22 PROBLEM — S72.92XA FRACTURE OF LEFT FEMUR (HCC): Status: ACTIVE | Noted: 2023-03-20

## 2023-03-22 PROBLEM — R41.3 MEMORY CHANGES: Status: ACTIVE | Noted: 2023-03-22

## 2023-03-22 PROBLEM — F31.9 BIPOLAR DEPRESSION (HCC): Status: ACTIVE | Noted: 2022-10-15

## 2023-03-22 LAB
ANION GAP SERPL CALCULATED.3IONS-SCNC: 8 MMOL/L (ref 4–13)
BUN SERPL-MCNC: 10 MG/DL (ref 5–25)
CALCIUM SERPL-MCNC: 8 MG/DL (ref 8.4–10.2)
CHLORIDE SERPL-SCNC: 98 MMOL/L (ref 96–108)
CO2 SERPL-SCNC: 28 MMOL/L (ref 21–32)
CREAT SERPL-MCNC: 0.64 MG/DL (ref 0.6–1.3)
ERYTHROCYTE [DISTWIDTH] IN BLOOD BY AUTOMATED COUNT: 14.1 % (ref 11.6–15.1)
EST. AVERAGE GLUCOSE BLD GHB EST-MCNC: 120 MG/DL
FOLATE SERPL-MCNC: 13.6 NG/ML (ref 3.1–17.5)
GFR SERPL CREATININE-BSD FRML MDRD: 91 ML/MIN/1.73SQ M
GLUCOSE SERPL-MCNC: 134 MG/DL (ref 65–140)
GLUCOSE SERPL-MCNC: 137 MG/DL (ref 65–140)
GLUCOSE SERPL-MCNC: 142 MG/DL (ref 65–140)
GLUCOSE SERPL-MCNC: 163 MG/DL (ref 65–140)
GLUCOSE SERPL-MCNC: 265 MG/DL (ref 65–140)
HBA1C MFR BLD: 5.8 %
HCT VFR BLD AUTO: 24.8 % (ref 34.8–46.1)
HGB BLD-MCNC: 8.1 G/DL (ref 11.5–15.4)
MCH RBC QN AUTO: 29 PG (ref 26.8–34.3)
MCHC RBC AUTO-ENTMCNC: 32.7 G/DL (ref 31.4–37.4)
MCV RBC AUTO: 89 FL (ref 82–98)
PLATELET # BLD AUTO: 132 THOUSANDS/UL (ref 149–390)
PMV BLD AUTO: 8.8 FL (ref 8.9–12.7)
POTASSIUM SERPL-SCNC: 3.9 MMOL/L (ref 3.5–5.3)
RBC # BLD AUTO: 2.79 MILLION/UL (ref 3.81–5.12)
SODIUM SERPL-SCNC: 134 MMOL/L (ref 135–147)
VIT B12 SERPL-MCNC: 322 PG/ML (ref 100–900)
WBC # BLD AUTO: 5.85 THOUSAND/UL (ref 4.31–10.16)

## 2023-03-22 RX ORDER — LORAZEPAM 2 MG/ML
0.5 INJECTION INTRAMUSCULAR ONCE AS NEEDED
Status: COMPLETED | OUTPATIENT
Start: 2023-03-22 | End: 2023-03-23

## 2023-03-22 RX ORDER — POLYETHYLENE GLYCOL 3350 17 G/17G
17 POWDER, FOR SOLUTION ORAL 2 TIMES DAILY
Status: DISCONTINUED | OUTPATIENT
Start: 2023-03-22 | End: 2023-03-28 | Stop reason: HOSPADM

## 2023-03-22 RX ORDER — CLONAZEPAM 0.5 MG/1
0.25 TABLET ORAL
Status: DISCONTINUED | OUTPATIENT
Start: 2023-03-22 | End: 2023-03-24

## 2023-03-22 RX ORDER — ACETAMINOPHEN 325 MG/1
975 TABLET ORAL 2 TIMES DAILY
Status: DISCONTINUED | OUTPATIENT
Start: 2023-03-22 | End: 2023-03-28 | Stop reason: HOSPADM

## 2023-03-22 RX ADMIN — METOPROLOL SUCCINATE 25 MG: 25 TABLET, EXTENDED RELEASE ORAL at 08:04

## 2023-03-22 RX ADMIN — ZIPRASIDONE HYDROCHLORIDE 160 MG: 40 CAPSULE ORAL at 21:58

## 2023-03-22 RX ADMIN — FERROUS SULFATE TAB 325 MG (65 MG ELEMENTAL FE) 325 MG: 325 (65 FE) TAB at 08:03

## 2023-03-22 RX ADMIN — ACETAMINOPHEN 975 MG: 325 TABLET, FILM COATED ORAL at 14:14

## 2023-03-22 RX ADMIN — CLONAZEPAM 0.25 MG: 0.5 TABLET ORAL at 21:58

## 2023-03-22 RX ADMIN — CLONAZEPAM 0.25 MG: 0.5 TABLET ORAL at 08:05

## 2023-03-22 RX ADMIN — ZIPRASIDONE HYDROCHLORIDE 80 MG: 40 CAPSULE ORAL at 08:07

## 2023-03-22 RX ADMIN — CYANOCOBALAMIN TAB 500 MCG 1000 MCG: 500 TAB at 18:15

## 2023-03-22 RX ADMIN — ESCITALOPRAM OXALATE 5 MG: 5 TABLET, FILM COATED ORAL at 08:04

## 2023-03-22 RX ADMIN — POLYETHYLENE GLYCOL 3350 17 G: 17 POWDER, FOR SOLUTION ORAL at 08:01

## 2023-03-22 RX ADMIN — ENOXAPARIN SODIUM 40 MG: 40 INJECTION SUBCUTANEOUS at 08:06

## 2023-03-22 RX ADMIN — POLYETHYLENE GLYCOL 3350 17 G: 17 POWDER, FOR SOLUTION ORAL at 21:59

## 2023-03-22 RX ADMIN — ACETAMINOPHEN 975 MG: 325 TABLET, FILM COATED ORAL at 21:58

## 2023-03-22 RX ADMIN — ATORVASTATIN CALCIUM 40 MG: 40 TABLET, FILM COATED ORAL at 16:14

## 2023-03-22 RX ADMIN — DOCUSATE SODIUM 100 MG: 100 CAPSULE, LIQUID FILLED ORAL at 08:03

## 2023-03-22 RX ADMIN — HYDROMORPHONE HYDROCHLORIDE 0.2 MG: 0.2 INJECTION, SOLUTION INTRAMUSCULAR; INTRAVENOUS; SUBCUTANEOUS at 19:45

## 2023-03-22 RX ADMIN — INSULIN LISPRO 1 UNITS: 100 INJECTION, SOLUTION INTRAVENOUS; SUBCUTANEOUS at 21:58

## 2023-03-22 RX ADMIN — INSULIN LISPRO 2 UNITS: 100 INJECTION, SOLUTION INTRAVENOUS; SUBCUTANEOUS at 11:49

## 2023-03-22 NOTE — ASSESSMENT & PLAN NOTE
Incidental finding on CT of spine: 10 mm hypodense right thyroid nodule  Recommendations on incidental thyroid nodules in the Journal of the [employer] of Radiology Isi Lubin, no further evaluation is recommended

## 2023-03-22 NOTE — PHYSICAL THERAPY NOTE
"       PHYSICAL THERAPY EVALUATION/TREATMENT     03/22/23 1000   PT Last Visit   PT Visit Date 03/22/23   Note Type   Note type Screen   Pain Assessment   Pain Assessment Tool 0-10   Pain Score 10 - Worst Possible Pain   Pain Location/Orientation Orientation: Left; Location: Leg   Hospital Pain Intervention(s) Repositioned; Ambulation/increased activity   Restrictions/Precautions   Weight Bearing Precautions Per Order Yes   LLE Weight Bearing Per Order WBAT   Home Living   Type of [facility] One level; Able to live on main level with bedroom/bathroom;Stairs to enter with rails  ([address])   Home Equipment Walker;Cane;Grab bars   Additional Comments uses RW at home   Prior Function   Level of [place] Independent with functional mobility; Needs assistance with ADLs; Needs assistance with IADLS   Lives With Spouse; Son   [place] Help From Family  (HHA 2x/wk)   IADLs Family/Friend/Other provides transportation; Family/Friend/Other provides meals; Family/Friend/Other provides medication management   Falls in the last 6 months 1 to 4   Comments Pt may be a questionable historian   General   Additional Pertinent History Pt is a 70year old female admitted following a fall at home POD#2 s/p surgical fixation left distal femur periprosthetic fracture with retrograde IM nail   Family/Caregiver Present No   Cognition   Overall Cognitive Status Impaired  (slow responses at times)   Arousal/Participation Alert   Attention Attends with cues to redirect   Orientation Level Oriented to person;Oriented to place;Oriented to situation   Following Commands Follows one step commands with increased time or repetition   Comments Pt is also impulsive; needs cues to wait on directions from PT   Subjective   Subjective \"what am I doing today? \"   RLE Assessment   RLE Assessment WFL   LLE Assessment   LLE Assessment   (knee flexion to 90; hip : 90 ankle: WFLs  strength: hip: 4/5, knee: 3+/5, ankle : 4/5)   Bed Mobility   Supine to Sit " 3  Moderate assistance   Additional items Increased time required;Verbal cues;LE management   Additional Comments pt to bedside chair   Transfers   Sit to Stand 4  Minimal assistance   Additional items Assist x 1;Verbal cues   Stand to Sit 4  Minimal assistance   Additional items Assist x 1;Verbal cues   Ambulation/Elevation   Gait pattern Forward Flexion; Short stride; Step to;Decreased L stance   Gait Assistance 4  Minimal assist   Additional items Assist x 1;Verbal cues   Assistive Device Rolling walker   Distance 5 feet   Stair Management Assistance Not tested   Balance   Static Sitting Fair +   Dynamic Sitting Fair   Static Standing Fair   Dynamic Standing [address] -  (with RW)   Activity Tolerance   Activity Tolerance Patient limited by pain; Patient limited by fatigue   Nurse Made Aware yes: Gabriela  Pt in chair   Assessment   Prognosis Good   Problem List Decreased strength;Decreased endurance; Impaired balance;Decreased mobility; Impaired judgement;Decreased safety awareness;Decreased skin integrity;Pain   Assessment Patient seen for Physical Therapy evaluation  Patient admitted with Periprosthetic fracture around internal prosthetic left knee joint  Comorbidities affecting patient's physical performance include: left TKA, arthritis, colon cancer, DM, depression, HTN  Personal factors affecting patient at time of initial evaluation include: lives in single story house, ambulating with assistive device, stairs to enter home, inability to ambulate household distances, inability to navigate community distances, inability to navigate level surfaces without external assistance, positive fall history, impulsivity, inability to perform ADLS and inability to perform IADLS    Prior to admission, patient was independent with functional mobility with rolling walker, requiring assist for ADLS, requiring assist for IADLS, living with spouse in a single level home with 6 steps to enter, ambulating household distance and home with family assist   Please find objective findings from Physical Therapy assessment regarding body systems outlined above with impairments and limitations including weakness, impaired balance, decreased endurance, impaired coordination, gait deviations, pain, decreased activity tolerance, decreased functional mobility tolerance, decreased safety awareness, impaired judgement, fall risk, decreased skin integrity and decreased cognition  The Barthel Index was used as a functional outcome tool presenting with a score of Barthel Index Score: 50 today indicating marked limitations of functional mobility and ADLS  Patient's clinical presentation is currently unstable/unpredictable as seen in patient's presentation of changing level of pain, varying levels of cognitive performance, increased fall risk, new onset of impairment of functional mobility, decreased endurance and new onset of weakness  Pt would benefit from continued Physical Therapy treatment to address deficits as defined above and maximize level of functional mobility  As demonstrated by objective findings, the assigned level of complexity for this evaluation is high  The patient's AM-PAC Basic Mobility Inpatient Short Form Raw Score is 14  A Raw score of less than or equal to 16 suggests the patient may benefit from discharge to post-acute rehabilitation services  Please also refer to the recommendation of the Physical Therapist for safe discharge planning  Goals   Patient Goals to sit in the chair   STG Expiration Date 03/29/23   Short Term Goal #1 Indep with transfers supine <> short sit and sit <> stand with use of RW WBAT LLE     Short Term Goal #2 Supervision for amb with RW for functional household distances with fair + balance   LTG Expiration Date 04/05/23   Long Term Goal #1 Indep amb  with RW for functional household distances with good balance   Long Term Goal #2 Indep with HEP   Plan   Treatment/Interventions Functional transfer training;LE strengthening/ROM; Therapeutic exercise; Endurance training;Cognitive reorientation;Patient/family training;Equipment eval/education; Bed mobility;Gait training;Spoke to nursing;Spoke to case management;OT   PT Frequency Other (Comment)  (dialy)   Recommendation   PT Discharge Recommendation Post acute rehabilitation services   Additional Comments Pt has RW   AM-PAC Basic Mobility Inpatient   Turning in Flat Bed Without Bedrails 2   Lying on Back to Sitting on Edge of Flat Bed Without Bedrails 2   Moving Bed to Chair 3   Standing Up From Chair Using Arms 3   Walk in Room 3   Climb 3-5 Stairs With Railing 1   Basic Mobility Inpatient Raw Score 14   Basic Mobility Standardized Score 35 55   Highest Level Of Mobility   JH-HLM Goal 4: Move to chair/commode   JH-HLM Achieved 4: Move to chair/commode   Barthel Index   Feeding 10   Bathing 0   Grooming Score 0   Dressing Score 5   Bladder Score 10   Bowels Score 10   Toilet Use Score 5   Transfers (Bed/Chair) Score 10   Mobility (Level Surface) Score 0   Stairs Score 0   Barthel Index Score 50   Additional Treatment Session   Start Time 0940   End Time 1000   Treatment Assessment Pt presents semi supine in bed  Agreeable to PT  Would like to sit in the chair  Pt just finished with CNA : washed up/ AM care  Pt requires most help with bedmobility to get to EOB  Does fairly well with tranferring sit <> stand and with walking with RW and Min A  Pt in chair at end of session  A: Able to walk from bed <> chair with min A and verbal cues  Pt would benefit from Rehab upon d/c  Acute vs  STR  P: Cont  as per plan  Expect pt to progress to allow pt to attend acute rehab  Equipment Use rolling walker   Exercises   Heelslides Supine;5 reps; Left   Ankle Pumps Sitting;10 reps;Bilateral   Balance training  sitting, standing and walking with use of RW   End of Consult   Patient Position at End of Consult Bedside chair;Bed/Chair alarm activated; All needs within reach   [address] Number  Cheryl Ashleigh Henning PT  [phone]

## 2023-03-22 NOTE — ASSESSMENT & PLAN NOTE
Patient cooperative, pleasant but slow the respond  Daughter noted an acute change in her memory about 3 weeks ago  Seems like it is getting closer to baseline     · CT head unremarkable  · UA unremarkable   · Check B12, folate, A1c, and vit D   · Consult neurology for further input   · Supportive care

## 2023-03-22 NOTE — OCCUPATIONAL THERAPY NOTE
Occupational Therapy Evaluation/Treatment     Patient Name: Marianna Overton  DGVJE'X Date: 3/22/2023  Problem List  Principal Problem:    Periprosthetic fracture around internal prosthetic left knee joint  Active Problems:    Type 2 diabetes mellitus without complication, with long-term current use of insulin (HCC)    Essential hypertension    Hyperlipidemia    Anemia    Hyponatremia    Status post total left knee replacement using cement    PONV (postoperative nausea and vomiting)    Delayed emergence from anesthesia    Psychiatric disorder    History of DVT of lower extremity    Abnormal CAT scan    Anxiety    Past Medical History  Past Medical History:   Diagnosis Date    Anesthesia complication     difficulty waking up    Arthritis     left knee    Bone spur     left knee behing the knee cap    Chronic kidney disease     Colon cancer ([facility] )     patient states about 2017    Colon polyp     Tubulovillous Adenoma High Grade Dysplasia - 2017    Depression     Diabetes mellitus ([facility] )     [facility]ometrial cancer ([facility] )     grade I    Hx of bleeding disorder     vaginal bleeding started on 3/13/17     Hyperlipidemia     Hypertension     Hypomagnesemia 3/20/2023    PONV (postoperative nausea and vomiting)     Psychiatric disorder     Shortness of breath     with exertion    Urinary incontinence     Vitamin D deficiency      Past Surgical History  Past Surgical History:   Procedure Laterality Date    BREAST BIOPSY Left     benign-maybe at ar age 58    CHOLECYSTECTOMY      open    COLONOSCOPY      DILATION AND CURETTAGE OF UTERUS N/A 04/05/2017    Procedure: DILATATION AND CURETTAGE (D&C);   Surgeon: Zuleika Huffman MD;  Location: [facility] MAIN OR;  Service:     HYSTERECTOMY      OOPHORECTOMY      PELVIC LAPAROSCOPY      NE ARTHRP KNE CONDYLE&PLATU MEDIAL&LAT COMPARTMENTS Left 12/06/2022    Procedure: ARTHROPLASTY KNEE TOTAL W ROBOT - CEMENTED - LEFT;  Surgeon: Jon Cooper DO;  Location: [address] [place] [address];  Service: Orthopedics AR LAPS TOTAL HYSTERECT 250 GM/< W/RMVL TUBE/OVARY N/A 05/04/2017    Procedure: ROBOTIC ASSISTED TOTAL LAPAROSCOPIC HYSTERECTOMY, BILATERAL SALPINGOOPHERECTOMY; CYSTOSCOPY;  Surgeon: Sarah Gallardo MD;  Location:  MAIN OR;  Service: Gynecology Oncology    AR OPTX FEM SHFT FX W/INSJ IMED IMPLT W/WO SCREW Left 3/20/2023    Procedure: INSERTION NAIL IM FEMUR RETROGRADE;  Surgeon: Sheron Little MD;  Location: [address] [place] [address];  Service: Orthopedics    REPLACEMENT TOTAL KNEE      TONSILLECTOMY      TUBAL LIGATION           03/22/23 1145   OT Last Visit   OT Visit Date 03/22/23   Note Type   Note type Evaluation  (+treatment)   Pain Assessment   Pain Assessment Tool 0-10   Pain Score 10 - Worst Possible Pain   Pain Location/Orientation Orientation: Left; Location: Leg   Pain Radiating Towards [place]   Pain Onset/Description Onset: Ongoing;Frequency: Constant/Continuous; Descriptor: Discomfort   Effect of Pain on Daily Activities Limits comfort and activity tolerance   Patient's Stated Pain Goal No pain   Hospital Pain Intervention(s) Repositioned; Ambulation/increased activity; Emotional support   Multiple Pain Sites No   Restrictions/Precautions   Weight Bearing Precautions Per Order Yes   LLE Weight Bearing Per Order WBAT  (POD#2 s/p surgical fixation left distal femur periprosthetic fracture with retrograde IM nail)   Other Precautions Cognitive; Chair Alarm; Bed Alarm;WBS;Fall Risk;Pain   Home Living   Type of [address] [place] [address] One level;Performs ADLs on one level; Able to live on main level with bedroom/bathroom;Stairs to enter with rails  (6 HER[address] with handrails)   Bathroom Shower/Tub Tub/shower unit   Bathroom Toilet Raised   Bathroom Equipment Shower chair; Toilet raiser;Grab bars around toilet   [address] Walker;Cane;Grab bars   Additional Comments Pt ambulates at RW at baseline   Prior Function   Level of [place] Independent with functional mobility; Needs assistance with "ADLs; Independent with IADLS   Lives With Spouse; Son   [place] Help From Family;Personal care attendant  (Pt reports have a PCA who comes in 2-3x/week to assist with bathing and dressing)   IADLs Family/Friend/Other provides transportation; Family/Friend/Other provides meals; Family/Friend/Other provides medication management   Falls in the last 6 months 1 to 4  (pt reports 3)   Vocational On disability   Comments At baseline pt lives with her spouse and son in a 1STE, able to maintain a FFSU  Pt reports having assistance with ADLs from her family and personal care attendant who comes 2-3x/week  Her family and spouse assist with IADLs and she reports being independent for functional mobility with RW  Pt is a poor historian, unsure how realistic the information provided is  Pt has a hx of falls, (-) driving  Lifestyle   Autonomy At baseline pt is JUAN PABLO with RW and needs assistance with ADLs/IADLs  Pt has a very supportive family   Reciprocal Relationships Spouse//son//family   Service to Others On disability   Intrinsic Gratification Pt reports that she likes to read   General   Additional Pertinent History Pt is POD#2 s/p surgical fixation left distal femur periprosthetic fracture with retrograde IM nail after a fall in the tub/shower, (+) head strike  Pt has a hx of bipoloar disorder  Family/Caregiver Present No   Subjective   Subjective \"I want that other lady in here  I am scared to fall\"   ADL   Eating Assistance 5  Supervision/Setup   Grooming Assistance 5  Supervision/Setup   UB Bathing Assistance 4  Minimal Assistance   LB Bathing Assistance 3  Moderate Assistance   UB Dressing Assistance 4  Minimal [address] 3  Moderate [address]  3  Moderate Assistance   Functional Assistance 4  Minimal Assistance   Additional Comments Pt is limited by pain in LLE, limited insight into current deficits, decresed activity tolerance, fear of falling and decreased initiation   " Bed Mobility   Supine to Sit Unable to assess   Sit to Supine Unable to assess   Additional Comments Pt OOB in recliner chair at the start/end of the session with all needs in reach   Transfers   Sit to Stand 4  Minimal assistance   Additional items Assist x 1; Increased time required;Verbal cues   Stand to Sit 4  Minimal assistance   Additional items Assist x 1; Increased time required;Verbal cues   Stand pivot 4  Minimal assistance   Additional items Assist x 1; Increased time required;Verbal cues  (RW)   Additional Comments Pt demonstrates good use of armrests for controlled desent into bedside chair  Verbal cues provided for optimal body mechanics, surface proximity and RW usage  Functional Mobility   Functional Mobility 4  Minimal assistance   Additional Comments variable CHUCK to CGA for functional household mobility to the bathroom with RW and increased time, d/t fear of falling  Additional items Rolling walker   Balance   Static Sitting Fair +   Dynamic Sitting Fair   Static Standing Fair   Dynamic Standing Fair -   Activity Tolerance   Activity Tolerance Patient limited by fatigue;Patient limited by pain   Medical Staff Made Aware Spoke with care coordination, [facility]   Nurse Made Aware Spoke with RN Ayesha Stratton pre/post session   RUE Assessment   RUE Assessment WFL  (MMT 3+/5 grossly, pt has limited shoulder flexion 0-90 degrees)   LUE Assessment   LUE Assessment WFL  (MMT 3+/5 grossly, pt has limited shoulder flexion 0-90 degrees)   Hand Function   Gross Motor Coordination Functional   Fine Motor Coordination Functional   Sensation   Light Touch No apparent deficits  (Pt denies)   Cognition   Overall Cognitive Status Impaired   Arousal/Participation Alert; Responsive; Cooperative   Attention Attends with cues to redirect   Orientation Level Oriented to person;Oriented to time;Oriented to situation;Disoriented to place   Memory Decreased recall of precautions   Following Commands Follows one step commands with increased time or repetition   Comments Pt presents with flat affect t/o the session, however is cooperative and motivated to participate  Pt with increased fear of falling, requesting a second person for functional mobility for moral support  Pt benefits from reassurance t/o the session  Overall, pt demonstrates good carry over of safety precautions  Assessment   Limitation Decreased ADL status; Decreased UE ROM; Decreased UE strength;Decreased endurance;Decreased self-care trans;Decreased high-level ADLs   Assessment Patient is a 70 y o  female seen for OT evaluation and treatment  at [address] [place] [address] following admission on 3/20/2023  s/p Periprosthetic fracture around internal prosthetic left knee joint  Comorbidities and significant PMHx impacting functional performance include: hypertension, diabetes type 2, hyperlipidemia, psychiatric disorder, colon and endometrial cancer in remission   Patient presents with active orders for OT eval and treat; WBAT LLE   Performed at least 2 patient identifiers during session including name and wristband  Prior to admission, patient was independent with functional mobility with RW , requiring assist for ADLS and requiring assist for IADLS  Upon initial evaluation, patient requires min A for UB ADLs, mod A for LB ADLs, and min A for transfers and variable min A to CGA functional mobility household distance with RW  Based on functional eval, pt presents with intact  attention, intact  safety awareness, impaired  problem solving skills, and intact  long memory   Occupational performance is affected by the following deficits: endurance ,  decreased muscular strength , decreased standing tolerance for self care tasks , decreased dynamic balance impacting functional reach, decreased activity tolerance , impaired judgement and problem solving , decreased initiation , (+) pain  and impaired global mental status The AM-PAC & Barthel Index outcome tools were used to assist in determining pt safety w/self care /mobility and appropriate d/c recommendations, see above for score  Based on these findings, functional performance deficits, and medical complexity pt has been identified as a high complexity evaluation  Personal factors impacting performance include: decreased (+) Hx of falls , steps to enter home, decreased ADL independence , decreased IADL independence and High fall risk   Patient would benefit from OT services within the acute care setting to maximize level of functional independence in the following occupational areas toileting, personal hygiene/grooming , activity engagement , fall prevention , UB ADLs, LB ADLs, self-care transfers, bed mobility  and functional mobility  From OT standpoint, recommendation at time of D/C would be post-acute rehabilitation   Goals   Patient Goals None stated at this time; To have less pain and return home   LTG Time Frame 10-14   Long Term Goal See below   Plan   Treatment Interventions ADL retraining;Functional transfer training;UE strengthening/ROM; Patient/family training;Equipment evaluation/education; Compensatory technique education; Activityengagement; Energy conservation   Goal Expiration Date 04/01/23   OT Treatment Day 1   OT Frequency 3-5x/wk   Recommendation   OT Discharge Recommendation Post acute rehabilitation services   Additional Comments  The patient's raw score on the AM-PAC Daily Activity Inpatient Short Form is 16  A raw score of less than 19 suggests the patient may benefit from discharge to post-acute rehabilitation services  Please refer to the recommendation of the Occupational Therapist for safe discharge planning     AM-PAC Daily Activity Inpatient   Lower Body Dressing 2   Bathing 2   Toileting 2   Upper Body Dressing 3   Grooming 3   Eating 4   Daily Activity Raw Score 16   Daily Activity Standardized Score (Calc for Raw Score >=11) 35 96   AM-[facility] Applied Cognition Inpatient   Following a Speech/Presentation 1 Understanding Ordinary Conversation 3   Taking Medications 1   Remembering Where Things Are Placed or Put Away 2   Remembering List of 4-5 Errands 2   Taking Care of Complicated Tasks 1   Applied Cognition Raw Score 10   Applied Cognition Standardized Score 24 98   Barthel Index   Feeding 10   Bathing 0   Grooming Score 0   Dressing Score 5   Bladder Score 10   Bowels Score 10   Toilet Use Score 5   Transfers (Bed/Chair) Score 10   Mobility (Level Surface) Score 0   Stairs Score 0   Barthel Index Score 50   Additional Treatment Session   Start Time 1135   End Time 1145   Treatment Assessment Pt participated in tx session following IE for ADL training and to further challenge activity tolerance  Pt completed ambulating standard toilet transfer with min A and use of R grab bar  Pt with demonstration of controlled descend onto the toilet  Pt attempted to have a BM, however unsuccessful  Pt ambulated a few steps to the recliner chair with min A and use of RW, verbal cues for usage of RW  Pt declined ambulating short household distance back to the recliner chair d/t fatigue and pain in LLE  Pt tolerated treatment well and demonstrates good carry over of proper hand placement and body mechanics for safe transfers t/o the session  Pt will continue to benefit from skilled OT services to maximize functional mobility, self-care transfers and participation in ADL/self-care tasks  Continue POC, goals remain appropriate  At time of d/c recommendation is post-acute rehabilitation services  End of Consult   Education Provided Yes   Patient Position at End of Consult Bedside chair;Bed/Chair alarm activated; All needs within reach   Nurse Communication Nurse aware of consult   End of Consult Comments ELVER Reid present at the end of the session   Licensure   [place] License Number  [address]  [id number]       Pt will complete UB ADLs Independent  as needed for increased ADL independence within 10 days       Pt will complete LB ADLs Mod independent   with use of LHAE as needed for increased ADL independence within 10 days  Pt will verbalize and demonstrate understanding in use of compensatory techniques and use of long handled AE for increased safety and independence during LB ADL tasks  Pt will complete toileting Mod independent   with use of DME for increased ADL independence within 10 days  Pt will demonstrate proper body mechanics to complete self-care transfers and functional mobility with Mod independent  and use of AD PRN for increased safety and functional independence within 10 days  Pt will demonstrate standing tolerance of 5 min with Mod independent  and use of AD PRN for increased activity tolerance during ADL/IADL tasks within 10 days  Pt will demonstrate proper body mechanics and fall prevention strategies during 100% of tx sessions for increased safety awareness during ADL/IADLs    Pt will demonstrate OOB sitting tolerance of 2-4 hr/day for increased activity tolerance and engagement in self care tasks within 10 days  Pt will receive education on use of compensatory memory strategies for increased safety and independent with IADL tasks  Pt will verbalize and demonstrate understanding of energy conservation/deep breathing techniques for increased activity tolerance and endurance during meaningful activities  Pt will verbalize and demonstrate understanding of B UE HEP program increase strength and endurance during self care transfers within 10 days  Pt will verbalize and demonstrate understanding of post-op movement precautions and WB status in 100% of tx sessions for increased safety and functional mobility  Pt will demonstrate use of pain management techniques PRN for increased activity tolerance and engagement

## 2023-03-22 NOTE — ASSESSMENT & PLAN NOTE
HGB 11-> 8 3 -> 8 1   Continue iron supplementation  Check B12, folate, iron panel, and FOBT   Monitor  Transfuse if less than 7

## 2023-03-22 NOTE — PLAN OF CARE
Problem: OCCUPATIONAL THERAPY ADULT  Goal: Performs self-care activities at highest level of function for planned discharge setting  See evaluation for individualized goals  Description: Treatment Interventions: ADL retraining, Functional transfer training, UE strengthening/ROM, Patient/family training, Equipment evaluation/education, Compensatory technique education, Activityengagement, Energy conservation          See flowsheet documentation for full assessment, interventions and recommendations  Note: Limitation: Decreased ADL status, Decreased UE ROM, Decreased UE strength, Decreased endurance, Decreased self-care trans, Decreased high-level ADLs     Assessment: Patient is a 70 y o  female seen for OT evaluation and treatment  at [address] [place] [address] following admission on 3/20/2023  s/p Periprosthetic fracture around internal prosthetic left knee joint  Comorbidities and significant PMHx impacting functional performance include: hypertension, diabetes type 2, hyperlipidemia, psychiatric disorder, colon and endometrial cancer in remission   Patient presents with active orders for OT eval and treat; WBAT LLE   Performed at least 2 patient identifiers during session including name and wristband  Prior to admission, patient was independent with functional mobility with RW , requiring assist for ADLS and requiring assist for IADLS  Upon initial evaluation, patient requires min A for UB ADLs, mod A for LB ADLs, and min A for transfers and variable min A to CGA functional mobility household distance with RW  Based on functional eval, pt presents with intact  attention, intact  safety awareness, impaired  problem solving skills, and intact  long memory   Occupational performance is affected by the following deficits: endurance ,  decreased muscular strength , decreased standing tolerance for self care tasks , decreased dynamic balance impacting functional reach, decreased activity tolerance , impaired judgement and problem solving , decreased initiation , (+) pain  and impaired global mental status The AM-PAC & Barthel Index outcome tools were used to assist in determining pt safety w/self care /mobility and appropriate d/c recommendations, see above for score  Based on these findings, functional performance deficits, and medical complexity pt has been identified as a high complexity evaluation  Personal factors impacting performance include: decreased (+) Hx of falls , steps to enter home, decreased ADL independence , decreased IADL independence and High fall risk   Patient would benefit from OT services within the acute care setting to maximize level of functional independence in the following occupational areas toileting, personal hygiene/grooming , activity engagement , fall prevention , UB ADLs, LB ADLs, self-care transfers, bed mobility  and functional mobility   From OT standpoint, recommendation at time of D/C would be post-acute rehabilitation     OT Discharge Recommendation: Post acute rehabilitation services       Jamir Oquendo MS OTR/L

## 2023-03-22 NOTE — PROGRESS NOTES
[facility] 44  Progress Note - Akshat Melchor 1955, 70 y o  female MRN: [id number]  Unit/Bed#: 2050 [facility] Encounter: [id number]  Primary Care Provider: Linsey Garrison DO   Date and time admitted to hospital: 3/20/2023 11:11 AM    * Fracture of left femur [facility])  Assessment & Plan  · Left total knee arthroplasty in December 2022  · Suffered a mechanical fall in the bathtub  · Imaging revealed an acute displaced periprosthetic left distal femoral fracture  · Orthopedics following   · POD # 2 s/p left femoral intramedullary nail (retrograde for periprosthetic fracture)  · Lovenox 40 mg daily for 6 weeks  · Holding ASA while on Lovenox  · Weight bearing as tolerated   · SCDs  · Pain management  · Supportive care  · PT/OT recommending acute rehab     Memory changes  Assessment & Plan  Patient cooperative, pleasant but slow the respond  Daughter noted an acute change in her memory about 3 weeks ago  Seems like it is getting closer to baseline  · CT head unremarkable  · UA unremarkable   · Check B12, folate, A1c, and vit D   · Consult neurology for further input   · Supportive care     Hyponatremia  Assessment & Plan  · Acute on Chronic  Noted on admission with sodium of 131, possibly secondary to antipsychotics medications   Previously on Wellbutrin and Zoloft, now on Lexapro   · Regular diet with 1500 mL fluid restriction   · Na today 134  · Monitor am     Anemia  Assessment & Plan  HGB 11-> 8 3 -> 8 1   Continue iron supplementation  Check B12, folate, iron panel, and FOBT   Monitor  Transfuse if less than 7    Type 2 diabetes mellitus without complication, with long-term current use of insulin [facility])  Assessment & Plan  Lab Results   Component Value Date    HGBA1C 6 9 (H) 11/14/2022       Recent Labs     03/20/23  1139 03/20/23  1700 03/20/23  1943 03/20/23  2125   POCGLU 183* 148* 166* 179*       Blood Sugar Average: Last 72 hrs:  (P) 169     Recent A1c 6 9  Hold metformin and Toujeo Update A1c  Accu-Cheks ACHS  SSI  Hypoglycemic protocol    Anxiety  Assessment & Plan  Stable  Continue Clonazepam    Abnormal CAT scan  Assessment & Plan  Incidental finding on CT of spine: 10 mm hypodense right thyroid nodule  Recommendations on incidental thyroid nodules in the Journal of the [employer] of Radiology [facility]), no further evaluation is recommended  History of DVT of lower extremity  Assessment & Plan  Noted in history  Patient only takes aspirin 81 mg  Currently holding aspirin while on lovenox     Bipolar depression ([address] )  Assessment & Plan  Continue home medications, Geodon, Klonopin, and Lexapro  Mood is stable, cooperative     Hyperlipidemia  Assessment & Plan  Continue Lipitor     Essential hypertension  Assessment & Plan  Continue Toprol XL  Hold Torsemide      VTE Pharmacologic Prophylaxis:   Moderate Risk (Score 3-4) - Pharmacological DVT Prophylaxis Ordered: enoxaparin (Lovenox)  Patient Centered Rounds: I performed bedside rounds with nursing staff today  Discussions with Specialists or Other Care Team Provider: nursing, CM    Education and Discussions with Family / Patient: Updated  (daughter) via phone  Total Time Spent on Date of Encounter in care of patient: 35 minutes This time was spent on one or more of the following: performing physical exam; counseling and coordination of care; obtaining or reviewing history; documenting in the medical record; reviewing/ordering tests, medications or procedures; communicating with other healthcare professionals and discussing with patient's family/caregivers  Current Length of Stay: 2 day(s)  Current Patient Status: Inpatient   Certification Statement: The patient will continue to require additional inpatient hospital stay due to post op care, forgetfulness   Discharge Plan: Anticipate discharge in 24-48 hrs to acute care    Code Status: Level 1 - Full Code    Subjective:   Patient seen and examined at bedside  She is resting out of bed in recliner chair with feet elevated  SCDs in place  She reports her left leg pain is controlled  She is tolerating the gonzales catheter without pain or discomfort  Eating okay  No HA, dizziness, CP, or SOB  Objective:     Vitals:   Temp (24hrs), Av °F (37 2 °C), Min:98 4 °F (36 9 °C), Max:99 8 °F (37 7 °C)    Temp:  [98 4 °F (36 9 °C)-99 8 °F (37 7 °C)] 98 4 °F (36 9 °C)  HR:  [75-97] 97  Resp:  [16-17] 16  BP: (109-133)/(52-63) 132/59  SpO2:  [97 %-100 %] 100 %  Body mass index is 26 98 kg/m²  Input and Output Summary (last 24 hours): Intake/Output Summary (Last 24 hours) at 3/22/2023 1441  Last data filed at 3/22/2023 1416  Gross per 24 hour   Intake --   Output 1250 ml   Net -1250 ml       Physical Exam:   Physical Exam  Vitals and nursing note reviewed  Constitutional:       General: She is not in acute distress  Appearance: She is obese  She is ill-appearing  She is not toxic-appearing or diaphoretic  Comments: Pleasant female resting out of bed in chair with feet elevated    HENT:      Head: Normocephalic  Mouth/Throat:      Mouth: Mucous membranes are moist    Eyes:      Conjunctiva/sclera: Conjunctivae normal    Cardiovascular:      Rate and Rhythm: Normal rate  Pulmonary:      Effort: Pulmonary effort is normal       Breath sounds: Normal breath sounds  No wheezing, rhonchi or rales  Abdominal:      General: Bowel sounds are normal       Palpations: Abdomen is soft  Musculoskeletal:         General: Normal range of motion  Cervical back: Normal range of motion  Right lower leg: No edema  Left lower leg: No edema  Skin:     General: Skin is warm and dry  Capillary Refill: Capillary refill takes less than 2 seconds  Neurological:      Mental Status: She is alert and oriented to person, place, and time  Mental status is at baseline  Motor: Weakness present     Psychiatric:         Mood and Affect: Mood normal  Speech: Speech normal          Behavior: Behavior normal  Behavior is not agitated  Behavior is cooperative  Cognition and Memory: Memory is impaired  Additional Data:     Labs:  Results from last 7 days   Lab Units 03/22/23  0556 03/21/23  0459 03/20/23  1224   WBC Thousand/uL 5 85   < > 8 88   HEMOGLOBIN g/dL 8 1*   < > 11 0*   HEMATOCRIT % 24 8*   < > 32 2*   PLATELETS Thousands/uL 132*   < > 238   NEUTROS PCT %  --   --  86*   LYMPHS PCT %  --   --  8*   MONOS PCT %  --   --  6   EOS PCT %  --   --  0    < > = values in this interval not displayed  Results from last 7 days   Lab Units 03/22/23  0556 03/21/23  0459 03/20/23  1224   SODIUM mmol/L 134*   < > 131*   POTASSIUM mmol/L 3 9   < > 3 8   CHLORIDE mmol/L 98   < > 92*   CO2 mmol/L 28   < > 29   BUN mg/dL 10   < > 13   CREATININE mg/dL 0 64   < > 0 82   ANION GAP mmol/L 8   < > 10   CALCIUM mg/dL 8 0*   < > 8 6   ALBUMIN g/dL  --   --  3 7   TOTAL BILIRUBIN mg/dL  --   --  0 46   ALK PHOS U/L  --   --  85   ALT U/L  --   --  26   AST U/L  --   --  26   GLUCOSE RANDOM mg/dL 137   < > 181*    < > = values in this interval not displayed       Results from last 7 days   Lab Units 03/20/23  1224   INR  1 10     Results from last 7 days   Lab Units 03/22/23  1052 03/22/23  0705 03/21/23  2042 03/20/23  2125 03/20/23  1943 03/20/23  1700 03/20/23  1139   POC GLUCOSE mg/dl 265* 134 150* 179* 166* 148* 183*               Lines/Drains:  Invasive Devices     Peripheral Intravenous Line  Duration           Peripheral IV 03/20/23 Left Antecubital 2 days              Urinary Catheter:  Goal for removal: Remove after 48 hrs of I/O monitoring               Imaging: Reviewed radiology reports from this admission including: CT head, xray(s) and CT cervical spine     Recent Cultures (last 7 days):         Last 24 Hours Medication List:   Current Facility-Administered Medications   Medication Dose Route Frequency Provider Last Rate   • acetaminophen  975 mg Oral BID JOHN Gao     • atorvastatin  40 mg Oral Daily With Dinner JOHN Ruiz     • clonazePAM  0 25 mg Oral BID JOHN Ruiz     • enoxaparin  40 mg Subcutaneous Q24H [facility] JOHN Ruiz     • escitalopram  5 mg Oral Daily Olayide D JOHN Dale     • ferrous sulfate  325 mg Oral Daily With Breakfast JOHN Ruiz     • HYDROmorphone  0 2 mg Intravenous Q4H PRN Luis Carlos Jorge PA-C     • insulin lispro  1-5 Units Subcutaneous TID AC Olayide D JOHN Dale     • insulin lispro  1-5 Units Subcutaneous HS Olayide D JOHN Dale     • metoprolol succinate  25 mg Oral Daily Olayide D JOHN Dale     • oxyCODONE  2 5 mg Oral Q6H PRN JOHN Ruiz     • polyethylene glycol  17 g Oral BID JOHN Gao     • ziprasidone  160 mg Oral HS JOHN Ruiz     • ziprasidone  80 mg Oral Daily JOHN Ruiz          Today, Patient Was Seen By: JOHN Gao    **Please Note: This note may have been constructed using a voice recognition system  **

## 2023-03-22 NOTE — PROGRESS NOTES
Orthopedics   Brad Velasco 70 y o  female MRN: [id number]  Unit/Bed#: [address] [place] [address]      Subjective:  70 y  o female post operative day 2 left femoral intramedullary nail (retrograde for periprosthetic fracture)  Pt doing well  Pain controlled  Patient denies any complaints this afternoon  No overnight events       Labs:  0   Lab Value Date/Time    HCT 24 8 (L) 03/22/2023 0556    HCT 25 0 (L) 03/21/2023 0459    HCT 32 2 (L) 03/20/2023 1224    HCT 35 8 04/21/2017 0950    HGB 8 1 (L) 03/22/2023 0556    HGB 8 3 (L) 03/21/2023 0459    HGB 11 0 (L) 03/20/2023 1224    HGB 12 3 04/21/2017 0950    INR 1 10 03/20/2023 1224    WBC 5 85 03/22/2023 0556    WBC 7 59 03/21/2023 0459    WBC 8 88 03/20/2023 1224    WBC 6 7 04/21/2017 0950       Meds:    Current Facility-Administered Medications:   •  acetaminophen (TYLENOL) tablet 650 mg, 650 mg, Oral, Q6H PRN, Vernona Husk, CRNP, 650 mg at 03/21/23 1657  •  atorvastatin (LIPITOR) tablet 40 mg, 40 mg, Oral, Daily With Dinner, Olayide D Olaniyan, CRNP, 40 mg at 03/21/23 1646  •  clonazePAM (KlonoPIN) tablet 0 25 mg, 0 25 mg, Oral, BID, Olayide D Olaniyan, CRNP, 0 25 mg at 03/22/23 0805  •  docusate sodium (COLACE) capsule 100 mg, 100 mg, Oral, BID, Olayide D Olaniyan, CRNP, 100 mg at 03/22/23 5337  •  enoxaparin (LOVENOX) subcutaneous injection 40 mg, 40 mg, Subcutaneous, Q24H [address], Olayide D Olaniyan, CRNP, 40 mg at 03/22/23 5803  •  escitalopram (LEXAPRO) tablet 5 mg, 5 mg, Oral, Daily, Olayide D Olaniyan, CRNP, 5 mg at 03/22/23 3577  •  ferrous sulfate tablet 325 mg, 325 mg, Oral, Daily With Breakfast, JOHN Silverman, 325 mg at 03/22/23 0812  •  HYDROmorphone HCl (DILAUDID) injection 0 2 mg, 0 2 mg, Intravenous, Q4H PRN, Eileen Matta PA-C, 0 2 mg at 03/21/23 0616  •  insulin lispro (HumaLOG) 100 units/mL subcutaneous injection 1-5 Units, 1-5 Units, Subcutaneous, TID AC **AND** Fingerstick Glucose (POCT), , , TID AC, JOHN Blanco  •  insulin lispro (HumaLOG) 100 units/mL subcutaneous injection 1-5 Units, 1-5 Units, Subcutaneous, HS, Olayide D Olaniyan, CRNP, 1 Units at 03/21/23 2151  •  metoprolol succinate (TOPROL-XL) 24 hr tablet 25 mg, 25 mg, Oral, Daily, Olayide D Olaniyan, CRNP, 25 mg at 03/22/23 6623  •  oxyCODONE (ROXICODONE) IR tablet 2 5 mg, 2 5 mg, Oral, Q6H PRN, Rollene Grumbling, CRNP, 2 5 mg at 03/21/23 2149  •  polyethylene glycol (MIRALAX) packet 17 g, 17 g, Oral, Daily, Olayide D Olaniyan, CRNP, 17 g at 03/22/23 0801  •  ziprasidone (GEODON) capsule 160 mg, 160 mg, Oral, HS, Olayide D Olaniyan, CRNP, 160 mg at 03/21/23 2149  •  ziprasidone (GEODON) capsule 80 mg, 80 mg, Oral, Daily, Rollene Grumbling, CRNP, 80 mg at 03/22/23 6047    Blood Culture:   Lab Results   Component Value Date    BLOODCX No Growth After 5 Days  10/15/2022       Wound Culture:   No results found for: WOUNDCULT    Ins and Outs:  I/O last 24 hours: In: -   Out: 900 [Urine:900]          Physical exam:  Vitals:    03/22/23 0800   BP: 132/59   Pulse: 97   Resp: 16   Temp: 98 4 °F (36 9 °C)   SpO2: 100%     left lower extremity  · Dressing clean dry intact  · Sensation intact L2-S1  · Motor intact to knee flexion/extension, EHL/FHL  · 2+ DP pulse    Assessment: 70 y  o female post operative day 2 left femur IMN   Pt doing well    Plan:  · Up and out of bed  · Weightbearing as tolerated left lower extremity  · PT/OT  · DVT prophylaxis  · Analgesics  · Dispo: Alexis Mazariegos for discharge from ortho perspective   · Pending medical stability  · Will require rehab placement  · Patient noted to have acute blood loss anemia due to a drop in Hbg of > 2 0g from preop levels, will monitor vital signs and resuscitate with IV fluids as needed   · hgb 8 1 today    Marge Yarbrough PA-C

## 2023-03-22 NOTE — PLAN OF CARE
Problem: Potential for Falls  Goal: Patient will remain free of falls  Description: INTERVENTIONS:  - Educate patient/family on patient safety including physical limitations  - Instruct patient to call for assistance with activity   - Consult OT/PT to assist with strengthening/mobility   - Keep Call bell within reach  - Keep bed low and locked with side rails adjusted as appropriate  - Keep care items and personal belongings within reach  - Initiate and maintain comfort rounds  - Make Fall Risk Sign visible to staff  - Offer Toileting every 2 Hours, in advance of need  - Initiate/Maintain bed alarm  - Obtain necessary fall risk management equipment: nonskid footwear  - Apply yellow socks and bracelet for high fall risk patients  - Consider moving patient to room near nurses station  Outcome: Progressing     Problem: Nutrition/Hydration-ADULT  Goal: Nutrient/Hydration intake appropriate for improving, restoring or maintaining nutritional needs  Description: Monitor and assess patient's nutrition/hydration status for malnutrition  Collaborate with interdisciplinary team and initiate plan and interventions as ordered  Monitor patient's weight and dietary intake as ordered or per policy  Utilize nutrition screening tool and intervene as necessary  Determine patient's food preferences and provide high-protein, high-caloric foods as appropriate       INTERVENTIONS:  - Monitor oral intake, urinary output, labs, and treatment plans  - Assess nutrition and hydration status and recommend course of action  - Evaluate amount of meals eaten  - Assist patient with eating if necessary   - Allow adequate time for meals  - Recommend/ encourage appropriate diets, oral nutritional supplements, and vitamin/mineral supplements  - Order, calculate, and assess calorie counts as needed  - Recommend, monitor, and adjust tube feedings and TPN/PPN based on assessed needs  - Assess need for intravenous fluids  - Provide specific nutrition/hydration education as appropriate  - Include patient/family/caregiver in decisions related to nutrition  Outcome: Progressing     Problem: MOBILITY - ADULT  Goal: Maintain or return to baseline ADL function  Description: INTERVENTIONS:  -  Assess patient's ability to carry out ADLs; assess patient's baseline for ADL function and identify physical deficits which impact ability to perform ADLs (bathing, care of mouth/teeth, toileting, grooming, dressing, etc )  - Assess/evaluate cause of self-care deficits   - Assess range of motion  - Assess patient's mobility; develop plan if impaired  - Assess patient's need for assistive devices and provide as appropriate  - Encourage maximum independence but intervene and supervise when necessary  - Involve family in performance of ADLs  - Assess for home care needs following discharge   - Consider OT consult to assist with ADL evaluation and planning for discharge  - Provide patient education as appropriate  Outcome: Progressing  Goal: Maintains/Returns to pre admission functional level  Description: INTERVENTIONS:  - Perform BMAT or MOVE assessment daily    - Set and communicate daily mobility goal to care team and patient/family/caregiver  - Collaborate with rehabilitation services on mobility goals if consulted  - Perform Range of Motion 3 times a day  - Reposition patient every 2 hours    - Dangle patient 1 times a day  - Stand patient 1 times a day  - Ambulate patient 2 times a day  - Out of bed to chair 1 times a day   - Out of bed for meals 1 times a day  - Out of bed for toileting  - Record patient progress and toleration of activity level   Outcome: Progressing     Problem: Prexisting or High Potential for Compromised Skin Integrity  Goal: Skin integrity is maintained or improved  Description: INTERVENTIONS:  - Identify patients at risk for skin breakdown  - Assess and monitor skin integrity  - Assess and monitor nutrition and hydration status  - Monitor labs   - Assess for incontinence   - Turn and reposition patient  - Assist with mobility/ambulation  - Relieve pressure over bony prominences  - Avoid friction and shearing  - Provide appropriate hygiene as needed including keeping skin clean and dry  - Evaluate need for skin moisturizer/barrier cream  - Collaborate with interdisciplinary team   - Patient/family teaching  - Consider wound care consult   Outcome: Progressing

## 2023-03-22 NOTE — CASE MANAGEMENT
Case Management Progress Note    Patient name Evgeny Carmona  Location [address] [place] [facility] MRN [id number]  : 1955 Date 3/22/2023       LOS (days): 2  Geometric Mean LOS (GMLOS) (days):   Days to [address]:        OBJECTIVE:    Current admission status: Inpatient  Preferred Pharmacy:   [facility] [address] [facility]  [phone] [id number]  Phone: [phone] Fax: [phone]    Primary Care Provider: Marguerite Geiger DO    Primary Insurance: Jordan Vargas  Secondary Insurance: MEDICARE    PROGRESS NOTE:    SW following to assist with DCP  Pt was evaluated by PT and OT  Per PT pt may be a good candidate for acute rehab  Family's preference is acute rehab placement  SW made referrals to acute rehab facilities requested by daughter  Referrals were also made to skilled facilities if needed  [employer] authorization will be needed prior to transfer  Will follow

## 2023-03-22 NOTE — ASSESSMENT & PLAN NOTE
· Acute on Chronic  Noted on admission with sodium of 131, possibly secondary to antipsychotics medications   Previously on Wellbutrin and Zoloft, now on Lexapro   · Regular diet with 1500 mL fluid restriction   · Na today 134  · Monitor am

## 2023-03-23 ENCOUNTER — APPOINTMENT (INPATIENT)
Dept: RADIOLOGY | Facility: HOSPITAL | Age: 68
End: 2023-03-23

## 2023-03-23 LAB
ANION GAP SERPL CALCULATED.3IONS-SCNC: 6 MMOL/L (ref 4–13)
BASOPHILS # BLD AUTO: 0.01 THOUSANDS/ÂΜL (ref 0–0.1)
BASOPHILS NFR BLD AUTO: 0 % (ref 0–1)
BUN SERPL-MCNC: 14 MG/DL (ref 5–25)
CALCIUM SERPL-MCNC: 8 MG/DL (ref 8.4–10.2)
CHLORIDE SERPL-SCNC: 99 MMOL/L (ref 96–108)
CO2 SERPL-SCNC: 29 MMOL/L (ref 21–32)
CREAT SERPL-MCNC: 0.73 MG/DL (ref 0.6–1.3)
EOSINOPHIL # BLD AUTO: 0.12 THOUSAND/ÂΜL (ref 0–0.61)
EOSINOPHIL NFR BLD AUTO: 2 % (ref 0–6)
ERYTHROCYTE [DISTWIDTH] IN BLOOD BY AUTOMATED COUNT: 14.3 % (ref 11.6–15.1)
GFR SERPL CREATININE-BSD FRML MDRD: 84 ML/MIN/1.73SQ M
GLUCOSE SERPL-MCNC: 116 MG/DL (ref 65–140)
GLUCOSE SERPL-MCNC: 118 MG/DL (ref 65–140)
GLUCOSE SERPL-MCNC: 126 MG/DL (ref 65–140)
GLUCOSE SERPL-MCNC: 217 MG/DL (ref 65–140)
GLUCOSE SERPL-MCNC: 291 MG/DL (ref 65–140)
HCT VFR BLD AUTO: 23.7 % (ref 34.8–46.1)
HGB BLD-MCNC: 7.7 G/DL (ref 11.5–15.4)
IMM GRANULOCYTES # BLD AUTO: 0.03 THOUSAND/UL (ref 0–0.2)
IMM GRANULOCYTES NFR BLD AUTO: 1 % (ref 0–2)
LYMPHOCYTES # BLD AUTO: 1.14 THOUSANDS/ÂΜL (ref 0.6–4.47)
LYMPHOCYTES NFR BLD AUTO: 20 % (ref 14–44)
MAGNESIUM SERPL-MCNC: 1.7 MG/DL (ref 1.9–2.7)
MCH RBC QN AUTO: 28.9 PG (ref 26.8–34.3)
MCHC RBC AUTO-ENTMCNC: 32.5 G/DL (ref 31.4–37.4)
MCV RBC AUTO: 89 FL (ref 82–98)
MONOCYTES # BLD AUTO: 0.43 THOUSAND/ÂΜL (ref 0.17–1.22)
MONOCYTES NFR BLD AUTO: 8 % (ref 4–12)
NEUTROPHILS # BLD AUTO: 3.86 THOUSANDS/ÂΜL (ref 1.85–7.62)
NEUTS SEG NFR BLD AUTO: 69 % (ref 43–75)
NRBC BLD AUTO-RTO: 0 /100 WBCS
PHOSPHATE SERPL-MCNC: 2.7 MG/DL (ref 2.3–4.1)
PLATELET # BLD AUTO: 123 THOUSANDS/UL (ref 149–390)
PMV BLD AUTO: 8.7 FL (ref 8.9–12.7)
POTASSIUM SERPL-SCNC: 4.3 MMOL/L (ref 3.5–5.3)
RBC # BLD AUTO: 2.66 MILLION/UL (ref 3.81–5.12)
SODIUM SERPL-SCNC: 134 MMOL/L (ref 135–147)
WBC # BLD AUTO: 5.59 THOUSAND/UL (ref 4.31–10.16)

## 2023-03-23 RX ORDER — SODIUM CHLORIDE 9 MG/ML
50 INJECTION, SOLUTION INTRAVENOUS CONTINUOUS
Status: DISCONTINUED | OUTPATIENT
Start: 2023-03-23 | End: 2023-03-23

## 2023-03-23 RX ORDER — ZIPRASIDONE HYDROCHLORIDE 40 MG/1
80 CAPSULE ORAL
Status: DISCONTINUED | OUTPATIENT
Start: 2023-03-23 | End: 2023-03-28 | Stop reason: HOSPADM

## 2023-03-23 RX ORDER — SENNOSIDES 8.6 MG
2 TABLET ORAL
Status: DISCONTINUED | OUTPATIENT
Start: 2023-03-23 | End: 2023-03-28 | Stop reason: HOSPADM

## 2023-03-23 RX ORDER — LANOLIN ALCOHOL/MO/W.PET/CERES
400 CREAM (GRAM) TOPICAL DAILY
Status: DISCONTINUED | OUTPATIENT
Start: 2023-03-23 | End: 2023-03-28 | Stop reason: HOSPADM

## 2023-03-23 RX ADMIN — CYANOCOBALAMIN TAB 500 MCG 1000 MCG: 500 TAB at 08:35

## 2023-03-23 RX ADMIN — ZIPRASIDONE HYDROCHLORIDE 80 MG: 40 CAPSULE ORAL at 08:38

## 2023-03-23 RX ADMIN — FERROUS SULFATE TAB 325 MG (65 MG ELEMENTAL FE) 325 MG: 325 (65 FE) TAB at 07:49

## 2023-03-23 RX ADMIN — SENNOSIDES 17.2 MG: 8.6 TABLET, FILM COATED ORAL at 22:16

## 2023-03-23 RX ADMIN — MAGNESIUM OXIDE TAB 400 MG (241.3 MG ELEMENTAL MG) 400 MG: 400 (241.3 MG) TAB at 09:34

## 2023-03-23 RX ADMIN — ACETAMINOPHEN 975 MG: 325 TABLET, FILM COATED ORAL at 08:37

## 2023-03-23 RX ADMIN — ZIPRASIDONE HYDROCHLORIDE 80 MG: 40 CAPSULE ORAL at 22:19

## 2023-03-23 RX ADMIN — ENOXAPARIN SODIUM 40 MG: 40 INJECTION SUBCUTANEOUS at 08:39

## 2023-03-23 RX ADMIN — ACETAMINOPHEN 975 MG: 325 TABLET, FILM COATED ORAL at 22:16

## 2023-03-23 RX ADMIN — CLONAZEPAM 0.25 MG: 0.5 TABLET ORAL at 22:16

## 2023-03-23 RX ADMIN — ESCITALOPRAM OXALATE 5 MG: 5 TABLET, FILM COATED ORAL at 08:36

## 2023-03-23 RX ADMIN — LORAZEPAM 0.5 MG: 2 INJECTION INTRAMUSCULAR; INTRAVENOUS at 09:34

## 2023-03-23 RX ADMIN — INSULIN LISPRO 3 UNITS: 100 INJECTION, SOLUTION INTRAVENOUS; SUBCUTANEOUS at 11:37

## 2023-03-23 RX ADMIN — METOPROLOL SUCCINATE 25 MG: 25 TABLET, EXTENDED RELEASE ORAL at 08:35

## 2023-03-23 RX ADMIN — POLYETHYLENE GLYCOL 3350 17 G: 17 POWDER, FOR SOLUTION ORAL at 08:34

## 2023-03-23 RX ADMIN — INSULIN LISPRO 1 UNITS: 100 INJECTION, SOLUTION INTRAVENOUS; SUBCUTANEOUS at 22:19

## 2023-03-23 RX ADMIN — SODIUM CHLORIDE 100 ML/HR: 0.9 INJECTION, SOLUTION INTRAVENOUS at 06:43

## 2023-03-23 RX ADMIN — ATORVASTATIN CALCIUM 40 MG: 40 TABLET, FILM COATED ORAL at 16:49

## 2023-03-23 RX ADMIN — POLYETHYLENE GLYCOL 3350 17 G: 17 POWDER, FOR SOLUTION ORAL at 22:16

## 2023-03-23 NOTE — ASSESSMENT & PLAN NOTE
· Acute on Chronic  Noted on admission with sodium of 131, possibly secondary to antipsychotics medications   Previously on Wellbutrin and Zoloft  · Discontinued Lexapro as this could be contributing to acute memory/slow response   · Sodium stable at 135  · Decrease torsemide to 5 mg every other day to avoid dehydration  · Liberalize fluid restriction to 1800 mL/day  · Monitor sodium in am

## 2023-03-23 NOTE — ASSESSMENT & PLAN NOTE
· S/p mechanical fall in the bathtub  · Imaging revealed an acute displaced periprosthetic left distal femoral fracture  · Orthopedics following   · POD #6 status post surgical fixation left distal femur periprosthetic fracture with retrograde IM nail  · Pain controlled   · Noted acute blood loss anemia postoperatively  · Attempted to transfuse 1 unit of PRBCs, however, unable to transfuse as patient is type O- and current recommendations are to transfuse if less than 6 given critical shortage of PRBCs  · Venofer 200 mg IV daily for 3 doses  · Hemoglobin stable, 11 3 -> 7 3 -> 7 8 -> 7 3  · No signs of active bleeding   · Plan for Lovenox 40 mg daily for 6 weeks  · Hold ASA while on Lovenox  · Weight bearing as tolerated   · SCDs  · Bowel regimen   · Supportive care  · PT/OT recommending acute rehab   · Continue Shanks catheter and plan for voiding trial in acute rehab  · Stable for discharge from a surgical perspective

## 2023-03-23 NOTE — PROGRESS NOTES
Cristina U  65   Progress Note - Dimas Rojas 1955, 70 y o  female MRN: [id number]  Unit/Bed#: [address] Encounter: [id number]  Primary Care Provider: Ramona Álvarez DO   Date and time admitted to hospital: 3/20/2023 11:11 AM    * Fracture of left femur [facility])  Assessment & Plan  · Left total knee arthroplasty in December 2022  · Suffered a mechanical fall in the bathtub  · Imaging revealed an acute displaced periprosthetic left distal femoral fracture  · Orthopedics following   · POD #3 s/p left femoral intramedullary nail (retrograde for periprosthetic fracture)  · Lovenox 40 mg daily for 6 weeks  · Holding ASA while on Lovenox  · Weight bearing as tolerated   · SCDs  · Pain management with bowel regimen   · Supportive care  · Monitor for urinary retention   · PT/OT recommending acute rehab     Memory changes  Assessment & Plan  Patient cooperative, pleasant but slow the respond  Daughter noted an acute change in her memory about 3 weeks ago  Seems like it is getting closer to baseline  · CT head unremarkable  · MRI brain with acute findings  · Appreciate neurology input   · Will need outpatient testing   · Added B12 supplement  · UA unremarkable   · Supportive care     Hyponatremia  Assessment & Plan  · Acute on Chronic  Noted on admission with sodium of 131, possibly secondary to antipsychotics medications   Previously on Wellbutrin and Zoloft, now on Lexapro   · Will discontinue Lexapro as this could be contributing to acute memory/slow response   · Regular diet with 1500 mL fluid restriction   · Na today 134  · Monitor am     Anemia  Assessment & Plan  HGB 11-> 8 3 -> 8 1 -> 7 7  · B12 322  · Folate 13 6  · Iron panel without confirmation of DANIELLA  · Stop iron supplementation at this time   · Add B12 supplement   · Check FOBT   · Monitor HGB and transfuse if less than 7    Type 2 diabetes mellitus without complication, with long-term current use of insulin (HCC)  Assessment & Plan  Lab Results   Component Value Date    HGBA1C 5 8 (H) 03/22/2023       Recent Labs     03/22/23  2040 03/23/23  0717 03/23/23  1109 03/23/23  1635   POCGLU 163* 126 291* 118       Blood Sugar Average: Last 72 hrs:  (P) 138 [id number]     Recent A1c 6 9 -> 5 8  Hold metformin and Toujeo   Accu-Cheks ACHS  SSI  Hypoglycemic protocol    Abnormal CAT scan  Assessment & Plan  Incidental finding on CT of spine: 10 mm hypodense right thyroid nodule  · Recommendations on incidental thyroid nodules in the Journal of the [employer] of Radiology [facility]), no further evaluation is recommended  History of DVT of lower extremity  Assessment & Plan  · Noted in history  · Patient only takes aspirin 81 mg  · Currently holding aspirin while on lovenox     Bipolar depression ([facility] [address] )  Assessment & Plan  · Appreciate psychiatry input   · Decrease Geodon to 80 mg BID (maximum dose)  · Decrease Klonopin to 0 25 mg HS  · Stop Lexapro due to concern for acute memory changes  · Mood is stable, pleasant and cooperative     Hyperlipidemia  Assessment & Plan  Continue Lipitor     Essential hypertension  Assessment & Plan  BP stable  · Continue Toprol XL  · Hold Torsemide; consider resuming tomorrow       VTE Pharmacologic Prophylaxis:   Moderate Risk (Score 3-4) - Pharmacological DVT Prophylaxis Ordered: enoxaparin (Lovenox)  Patient Centered Rounds: I performed bedside rounds with nursing staff today  Discussions with Specialists or Other Care Team Provider: nursing, CM, neurology, psychiatry     Education and Discussions with Family / Patient: Updated  (daughter) via phone  As well as  at bedside      Total Time Spent on Date of Encounter in care of patient: 35 minutes This time was spent on one or more of the following: performing physical exam; counseling and coordination of care; obtaining or reviewing history; documenting in the medical record; reviewing/ordering tests, medications or procedures; communicating with other healthcare professionals and discussing with patient's family/caregivers  Current Length of Stay: 3 day(s)  Current Patient Status: Inpatient   Certification Statement: The patient will continue to require additional inpatient hospital stay due to post op care, forgetfulness   Discharge Plan: Anticipate discharge in 24-48 hrs to acute care    Code Status: Level 1 - Full Code    Subjective:   Patient seen and examined at bedside   at bedside  Patient eating dinner  Appetite fair - eating about 25% thus far  Left thigh pain controlled  No HA, dizziness, CP, or SOB  Per nursing, patient has not urinated since her straight cath this morning  Will straight cath again now  Objective:     Vitals:   Temp (24hrs), Av 5 °F (36 9 °C), Min:98 2 °F (36 8 °C), Max:99 °F (37 2 °C)    Temp:  [98 2 °F (36 8 °C)-99 °F (37 2 °C)] 98 6 °F (37 °C)  HR:  [67-83] 67  Resp:  [16-22] 22  BP: (112-144)/(50-69) 113/58  SpO2:  [96 %-99 %] 97 %  Body mass index is 26 98 kg/m²  Input and Output Summary (last 24 hours): Intake/Output Summary (Last 24 hours) at 3/23/2023 1658  Last data filed at 3/23/2023 0810  Gross per 24 hour   Intake --   Output 300 ml   Net -300 ml       Physical Exam:   Physical Exam  Vitals and nursing note reviewed  Constitutional:       General: She is not in acute distress  Appearance: She is obese  She is ill-appearing  She is not toxic-appearing or diaphoretic  HENT:      Head: Normocephalic  Mouth/Throat:      Mouth: Mucous membranes are moist    Eyes:      Conjunctiva/sclera: Conjunctivae normal    Cardiovascular:      Rate and Rhythm: Normal rate  Pulmonary:      Effort: Pulmonary effort is normal       Breath sounds: Normal breath sounds  No wheezing, rhonchi or rales  Abdominal:      General: Bowel sounds are normal  There is no distension  Palpations: Abdomen is soft  Tenderness: There is no abdominal tenderness     Musculoskeletal: General: Normal range of motion  Cervical back: Normal range of motion  Right lower leg: No edema  Left lower leg: No edema  Skin:     General: Skin is warm and dry  Capillary Refill: Capillary refill takes less than 2 seconds  Neurological:      Mental Status: She is alert and oriented to person, place, and time  Mental status is at baseline  Motor: Weakness present  Psychiatric:         Mood and Affect: Affect is flat  Speech: Speech normal          Behavior: Behavior normal  Behavior is cooperative  Cognition and Memory: Memory is impaired  Judgment: Judgment normal           Additional Data:     Labs:  Results from last 7 days   Lab Units 03/23/23  0522   WBC Thousand/uL 5 59   HEMOGLOBIN g/dL 7 7*   HEMATOCRIT % 23 7*   PLATELETS Thousands/uL 123*   NEUTROS PCT % 69   LYMPHS PCT % 20   MONOS PCT % 8   EOS PCT % 2     Results from last 7 days   Lab Units 03/23/23  0522 03/21/23  0459 03/20/23  1224   SODIUM mmol/L 134*   < > 131*   POTASSIUM mmol/L 4 3   < > 3 8   CHLORIDE mmol/L 99   < > 92*   CO2 mmol/L 29   < > 29   BUN mg/dL 14   < > 13   CREATININE mg/dL 0 73   < > 0 82   ANION GAP mmol/L 6   < > 10   CALCIUM mg/dL 8 0*   < > 8 6   ALBUMIN g/dL  --   --  3 7   TOTAL BILIRUBIN mg/dL  --   --  0 46   ALK PHOS U/L  --   --  85   ALT U/L  --   --  26   AST U/L  --   --  26   GLUCOSE RANDOM mg/dL 116   < > 181*    < > = values in this interval not displayed       Results from last 7 days   Lab Units 03/20/23  1224   INR  1 10     Results from last 7 days   Lab Units 03/23/23  1635 03/23/23  1109 03/23/23  0717 03/22/23  2040 03/22/23  1553 03/22/23  1052 03/22/23  0705 03/21/23  2042 03/20/23  2125 03/20/23  1943 03/20/23  1700 03/20/23  1139   POC GLUCOSE mg/dl 118 291* 126 163* 142* 265* 134 150* 179* 166* 148* 183*     Results from last 7 days   Lab Units 03/22/23  0556   HEMOGLOBIN A1C % 5 8*           Lines/Drains:  Invasive Devices     Peripheral Intravenous Line  Duration           Peripheral IV 03/20/23 Left Antecubital 3 days              Urinary Catheter:  Goal for removal: Remove after 48 hrs of I/O monitoring               Imaging: Reviewed radiology reports from this admission including: CT head, MRI brain, xray(s) and CT cervical spine     Recent Cultures (last 7 days):         Last 24 Hours Medication List:   Current Facility-Administered Medications   Medication Dose Route Frequency Provider Last Rate   • acetaminophen  975 mg Oral BID JOHN Devine     • atorvastatin  40 mg Oral Daily With Dinner JOHN Wright     • clonazePAM  0 25 mg Oral HS JOHN Devine     • cyanocobalamin  1,000 mcg Oral Daily JOHN Devine     • enoxaparin  40 mg Subcutaneous Q24H [address] JOHN Wright     • HYDROmorphone  0 2 mg Intravenous Q4H PRN Leslie Rome PA-C     • insulin lispro  1-5 Units Subcutaneous TID AC JOHN Blanco     • insulin lispro  1-5 Units Subcutaneous HS JOHN Blanco     • magnesium Oxide  400 mg Oral Daily JOHN Devine     • metoprolol succinate  25 mg Oral Daily Olayide JOHN Wong     • oxyCODONE  2 5 mg Oral Q6H PRN JOHN Wright     • polyethylene glycol  17 g Oral BID JOHN Devine     • ziprasidone  80 mg Oral Daily JOHN Wright     • ziprasidone  80 mg Oral HS Ricarda Brody MD          Today, Patient Was Seen By: JOHN Devine    **Please Note: This note may have been constructed using a voice recognition system  **

## 2023-03-23 NOTE — ASSESSMENT & PLAN NOTE
· Appreciate psychiatry input   · Decreased Geodon to 80 mg BID (maximum dose)  · Continue Klonopin 0 25 mg BID  · Discontinued Lexapro due to concern for acute memory changes  · Mood is stable, pleasant and cooperative

## 2023-03-23 NOTE — ASSESSMENT & PLAN NOTE
CTA chest: Subcentimeter right thyroid nodule with adjacent punctate calcification  · Updated patient's daughter   · Per radiology, no follow-up needed

## 2023-03-23 NOTE — ARC ADMISSION
Referral received for [facility]C admission  Review completed this morning with ARC physician and patient is approved pending medical stability/clearance plus insurance authorization once cleared  CM updated  Please have PT/OT continue to work with patient

## 2023-03-23 NOTE — PHYSICAL THERAPY NOTE
"   PT TREATMENT     03/23/23 5410   Note Type   Note Type Treatment   Pain Assessment   Pain Assessment Tool 0-10   Pain Score 9   Pain Location/Orientation Location: Leg;Orientation: Left  (Pt reports she does not want to take pain meds )   Restrictions/Precautions   LLE Weight Bearing Per Order WBAT   Other Precautions Pain; Fall Risk; Chair Alarm   General   Chart Reviewed Yes   Cognition   Arousal/Participation Arousable  (slow to respond but improved during the session  flat affect)   Attention Within functional limits   Following Commands Follows one step commands with increased time or repetition   Subjective   Subjective \"I feel so sleepy\"   Bed Mobility   Supine to Sit 3  Moderate assistance   Additional items LE management;Verbal cues; Increased time required   Additional items LE management; Increased time required   Transfers   Sit to Stand 3  Moderate assistance   Stand to Sit 4  Minimal assistance   Stand pivot 4  Minimal assistance   Additional items   (with walker)   Toilet transfer 4  Minimal assistance   Additional items Verbal cues;Standard toilet; Increased time required  (grab bar R)   Ambulation/Elevation   Gait pattern   (slow mayank)   Gait Assistance 4  Minimal assist   Assistive Device Rolling walker   Distance 2x20 feet   Balance   Static Sitting Fair +   Static Standing Fair -   Activity Tolerance   Activity Tolerance Patient limited by fatigue   Exercises    x 10 each ankle pumps, AAROM heel slides supine, AAROM LAQ sitting  Assessment   Prognosis Good   Problem List Decreased strength;Decreased range of motion; Impaired balance;Decreased mobility;Pain;Decreased cognition   Assessment Pt demonstrates good activity tolerance considering pt underwent IM nail L femur 2 days ago  Pt is limited by c/o feeling very tired  Pt is motivated to return home and to her prior level of function and should do well at STR  The patient's AM-PAC Basic Mobility Inpatient Short Form Raw Score is 13   A Raw " score of less than or equal to 16 suggests the patient may benefit from discharge to post-acute rehabilitation services  Plan   Treatment/Interventions Elevations;LE strengthening/ROM; Functional transfer training; Therapeutic exercise; Endurance training;Patient/family training;Gait training;Bed mobility; Equipment eval/education   Progress Progressing toward goals   PT Frequency   (daily)   Recommendation   PT Discharge Recommendation Post acute rehabilitation services   AM-PAC Basic Mobility Inpatient   Turning in Flat Bed Without Bedrails 2   Lying on Back to Sitting on Edge of Flat Bed Without Bedrails 2   Moving Bed to Chair 2   Standing Up From Chair Using Arms 3   Walk in Room 3   Climb 3-5 Stairs With Railing 1   Basic Mobility Inpatient Raw Score 13   Basic Mobility Standardized Score 33 99   Highest Level Of Mobility   -HLM Goal 4: Move to chair/commode   -HLM Achieved 7: Walk 25 feet or more   Licensure   [place] License Number  Guy ZUÑIGA  [id number]

## 2023-03-23 NOTE — CONSULTS
Greg[facility] 38   Neurology Initial Consult    Jethro Larkin is a 70 y o  female  [address] obtained from:   Chief Complaint   Patient presents with   • Fall     Arrives BLS reported that she slipped and fall in the bathtub  No LOC, hit head  Co pain to L knee, BL hips  Pt on ASA,  Collar applied on arrival           Assessment/Plan:    1  Femur Fracture S/P repair  2  Bipolar disorder, polypharmacy  3  Memory Changes  4  Hyponatremia  5  Iron Deficiency  6  DM      -Fall risks  -Continue on baby aspirin 81 mg daily  -Continues on atorvastatin 40 mg daily  -Psychiatric medications per medical team  -MRI of the brain completed negative for any acute findings  -Vitamin B12, folate, TSH within normal limits, vitamin D pending  -PT/OT/ST-OT evaluation for cognition/memory  -Recommendations for psychiatric follow-up/consultation in hospital to evaluate for alternate medication options for antidepressant   -Consider discontinuation of Lexapro for alternate medication option   -Continue for outpatient follow-up with neurology regarding memory evaluation and testing  Patient is a 26-year-old female who fell and fractured her left knee, she had surgical intervention and is postop day #3 today  During hospitalization daughter raise some concerns with regards to changes in her memory  She has noted increased slowing of her responses and difficulties with her memory, more so over the past few weeks  Patient had initially been on Wellbutrin however was taken off by her psychiatrist due to hallucinations and confusion  This was replaced by Lexapro 5 mg daily in February, started 2/10/2023  This was reviewed by her PCP since that time due to hyponatremia however they continued the use of Lexapro with observation of her sodium levels  Neurology was consulted for further evaluation, an MRI of the brain was ordered to rule out ischemic etiology    MRI of the brain noncontrast study was completed this a m , was negative for any acute ischemic event, no other acute abnormality seen  Neurology at bedside however patient had received IV Ativan sedation prior to her MRI and was obtunded during his exam   She had trace to decreased reflexes bilaterally, she was able to open her eyes however was not tracking or following commands  Patient resumed sedation  Patient has multiple medical changes at this point in time  Would not be an ideal time nor place to evaluate patient for dementia or memory loss  Would recommend patient continue to follow-up in the outpatient setting for MRI with neuro quant, neuropsych evaluation and referral to occupational therapy for memory and cognition therapies  MoCA testing would not be an accurate test at this point in time given patient being on chemical sedation and pain control  Patient does have polypharmacy on board, most recently added Lexapro  Patient's daughter had developed her memory issues have increased over the past few weeks, Lexapro added on February 10  Possible exacerbation of patient's underlying memory deficits, recommendations for psychiatry evaluation for alternate medication for treatment of patient's depression/bipolar disorder  Bronson Loya will need follow up in 8-10 weeks with general attending or advance practitioner  She will not require outpatient neurological testing  HPI:  Bronson Loya is a 68yo female with PMH of CKD, colon and endometrial cancers, DM, HLD, HTN, bipolar disorder and vitamin D deficiencies who was brought to the hospital on 3/20/2022 with a left knee fracture  She is postop day #2 repair by orthopedics  During her hospitalization patient's daughter had voiced some concerns with regards to her memory  This has been an ongoing concern for her for approximately 3 to 4 weeks    She was referred to neurology however they have not yet been able to secure an appointment in the outpatient setting  Patient was sent for an MRI of the brain to rule out any ischemic events that might cause patient to have changes in her mentation or memory, this was negative for any acute findings  She has mild chronic angiopathic disease  Patient is on polypharmacy at home, she is on Wellbutrin which was recently stopped due to hallucinations, she is on Geodon, Klonopin and recently started on Zoloft by her PCP on February 10, 2022  Met with patient at bedside, she had just returned from her MRI of the brain  She was sedated with Ativan prior to her study and was sedate during this exam   Patient was able to open her eyes with loud call and shaking of shoulders however promptly return to sedated state  Patient has some diminished reflexes with the upper extremity, trace to the lower extremity, left knee was not tested as this is the fracture knee  She has some downgoing plantar reflexes on the right, her left foot with some fanning of her toes during testing  Reviewed patient's medical history, prior complaints and treatment recommendations  Patient is followed in the outpatient area by psychiatry, Dr Bria Castrejon  He has her on Klonopin and Geodon, he had her on bupropion which was changed to low-dose Lexapro 5 mg daily  She was then evaluated by her PCP due to hyponatremia, based on her history PCP did agree with low-dose Lexapro with monitoring of her sodium levels over time  With regards to evaluation for dementia, this would be best served in the outpatient setting when patient has improved from her current medical issues  Would recommend in the outpatient setting evaluation for OT, MRI with neuro quant and neuropsychiatric evaluations  Patient's vitamin levels and TSH are normal     Currently patient is sedated and unable to answer any questions to assess her memories at this time it would be skewed by other medications such as pain control    Patient does have polypharmacy with her psychiatric medications, she is being managed by outpatient psychiatry  She was recently started on Lexapro 5 mg and has subsequently noted an increase in memory issues over the past few weeks  Would recommend reconsideration to an alternate antidepressant from Lexapro as this can cause some brain slowing with somebody with underlying cognitive changes  Patient can follow-up in the outpatient neurology office once things have improved, her antidepressants have changed and further evaluation of her psychological polypharmacy can be done by her psychiatry team           Past Medical History:   Diagnosis Date   • Anesthesia complication     difficulty waking up   • Arthritis     left knee   • Bone spur     left knee behing the knee cap   • Chronic kidney disease    • Colon cancer [facility])     patient states about 2017   • Colon polyp     Tubulovillous Adenoma High Grade Dysplasia - 2017   • Depression    • Diabetes mellitus ([facility])    • Endometrial cancer ([facility])     grade I   • Hx of bleeding disorder     vaginal bleeding started on 3/13/17    • Hyperlipidemia    • Hypertension    • Hypomagnesemia 3/20/2023   • PONV (postoperative nausea and vomiting)    • Psychiatric disorder    • Shortness of breath     with exertion   • Urinary incontinence    • Vitamin D deficiency        Past Surgical History:   Procedure Laterality Date   • BREAST BIOPSY Left     benign-maybe at ar age 58   • CHOLECYSTECTOMY      open   • COLONOSCOPY     • DILATION AND CURETTAGE OF UTERUS N/A 04/05/2017    Procedure: DILATATION AND CURETTAGE (D&C);   Surgeon: Juliana Rush MD;  Location: [facility] MAIN OR;  Service:    • HYSTERECTOMY     • OOPHORECTOMY     • PELVIC LAPAROSCOPY     • ND ARTHRP KNE CONDYLE&PLATU MEDIAL&LAT COMPARTMENTS Left 12/06/2022    Procedure: ARTHROPLASTY KNEE TOTAL W ROBOT - CEMENTED - LEFT;  Surgeon: Alba Price DO;  Location: [address] [place] [address];  Service: Orthopedics   • ND LAPS TOTAL HYSTERECT 250 GM/< W/RMVL TUBE/OVARY N/A 05/04/2017 Procedure: ROBOTIC ASSISTED TOTAL LAPAROSCOPIC HYSTERECTOMY, BILATERAL SALPINGOOPHERECTOMY; CYSTOSCOPY;  Surgeon: Chilango Farris MD;  [facility] MA[facility];  Service: Gynecology Oncology   • PA OPTX FEM SHFT FX W/INSJ IMED IMPLT W/WO SCREW Left 3/20/2023    Procedure: INSERTION NAIL IM FEMUR RETROGRADE;  Surgeon: Ju Castillo MD;  Location: [address] [place] [address];  Service: Orthopedics   • REPLACEMENT TOTAL KNEE     • TONSILLECTOMY     • TUBAL LIGATION         No Known Allergies      Current Facility-Administered Medications:   •  acetaminophen (TYLENOL) tablet 975 mg, 975 mg, Oral, BID, Di Arm, CRNP, 975 mg at 03/23/23 3318  •  atorvastatin (LIPITOR) tablet 40 mg, 40 mg, Oral, Daily With Dinner, Olayide D Olaniyan, CRNP, 40 mg at 03/22/23 1614  •  clonazePAM (KlonoPIN) tablet 0 25 mg, 0 25 mg, Oral, HS, Di Arm, CRNP, 0 25 mg at 03/22/23 2158  •  cyanocobalamin (VITAMIN B-12) tablet 1,000 mcg, 1,000 mcg, Oral, Daily, Di Arm, CRNP, 1,000 mcg at 03/23/23 6758  •  enoxaparin (LOVENOX) subcutaneous injection 40 mg, 40 mg, Subcutaneous, Q24H [address], Olayide D Olaniyan, CRNP, 40 mg at 03/23/23 8539  •  escitalopram (LEXAPRO) tablet 5 mg, 5 mg, Oral, Daily, Olayide D Olaniyan, CRNP, 5 mg at 03/23/23 9401  •  HYDROmorphone HCl (DILAUDID) injection 0 2 mg, 0 2 mg, Intravenous, Q4H PRN, Yeimi Sen PA-C, 0 2 mg at 03/22/23 1945  •  insulin lispro (HumaLOG) 100 units/mL subcutaneous injection 1-5 Units, 1-5 Units, Subcutaneous, TID AC, 3 Units at 03/23/23 1137 **AND** Fingerstick Glucose (POCT), , , TID AC, Olayide D Olaniyan, CRNP  •  insulin lispro (HumaLOG) 100 units/mL subcutaneous injection 1-5 Units, 1-5 Units, Subcutaneous, HS, JOHN Blanco, 1 Units at 03/22/23 2158  •  magnesium Oxide (MAG-OX) tablet 400 mg, 400 mg, Oral, Daily, JOHN Flores Arm, 400 mg at 03/23/23 0759  •  metoprolol succinate (TOPROL-XL) 24 hr tablet 25 mg, 25 mg, Oral, Daily, Sergio Kava D JOHN Dale, 25 mg at 03/23/23 8307  •  oxyCODONE (ROXICODONE) IR tablet 2 5 mg, 2 5 mg, Oral, Q6H PRN, JOHN Bailey, 2 5 mg at 03/21/23 2149  •  polyethylene glycol (MIRALAX) packet 17 g, 17 g, Oral, BID, Lesvia Fan, CRNP, 17 g at 03/23/23 6851  •  sodium chloride 0 9 % infusion, 50 mL/hr, Intravenous, Continuous, Lesvia FanKARLEENP, Last Rate: 50 mL/hr at 03/23/23 1106, 50 mL/hr at 03/23/23 1106  •  ziprasidone (GEODON) capsule 160 mg, 160 mg, Oral, HS, Olayide D Ollissette, KARLEENP, 160 mg at 03/22/23 2158  •  ziprasidone (GEODON) capsule 80 mg, 80 mg, Oral, Daily, JOHN Bailey, 80 mg at 03/23/23 0801    Social History     Socioeconomic History   • Marital status: /Civil Union     Spouse name: Not on file   • Number of children: Not on file   • Years of education: Not on file   • Highest education level: Not on file   Occupational History   • Not on file   Tobacco Use   • Smoking status: Never   • Smokeless tobacco: Never   Vaping Use   • Vaping Use: Never used   Substance and Sexual Activity   • Alcohol use: Never   • Drug use: No   • Sexual activity: Not Currently     Birth control/protection: Post-menopausal, Surgical   Other Topics Concern   • Not on file   Social History Narrative   • Not on file     Social Determinants of Health     Financial Resource Strain: Not on file   Food Insecurity: No Food Insecurity   • Worried About Running Out of Food in the Last Year: Never true   • Ran Out of Food in the Last Year: Never true   Transportation Needs: No Transportation Needs   • Lack of Transportation (Medical): No   • Lack of Transportation (Non-Medical):  No   Physical Activity: Not on file   Stress: Not on file   Social Connections: Not on file   Intimate Partner Violence: Not on file   Housing Stability: Low Risk    • Unable to Pay for Housing in the Last Year: No   • Number of Places Lived in the Last Year: 1   • Unstable Housing in the Last Year: No Family History   Problem Relation Age of Onset   • Cancer Mother         Renal   • Heart disease Father         CHF   • Heart disease Sister         atrial septal defect   • Breast cancer Paternal Aunt 39         Review of systems:  Patient is currently unable to participate in subjective feedback and exam     Physical examination:  /61   Pulse 71   Temp 98 4 °F (36 9 °C)   Resp 18   Wt 71 3 kg (157 lb 3 oz)   SpO2 99%   BMI 26 98 kg/m²     GENERAL APPEARANCE:  The patient is sedated, difficult to arouse  HEENT:  Head is normocephalic  Pupils are equal and reactive  NECK:  Supple without lymphadenopathy  HEART:  Regular rate and rhythm  LUNGS: No audible wheezing or stridor heard, slight snore with sleep   ABDOMEN:  Soft nondistended   EXTREMITIES:  Without cyanosis, clubbing or edema  Mental status: The patient is unable to be assessed for mental status at this time secondary to sedation  Cranial nerves:  CN II: Visual fields are unable to be assessed  Fundoscopic exam is unable to be assessed   pupils are 1 mm and briskly reactive to light  CN III, IV, VI: At primary gaze, there is no eye deviation  CN V: Corneal responses are intact  CN VII: Face is symmetric with normal eye closure  CN VIII: Hearing is unable to be assessed  CN IX, X: Palate and phonation are unable to be assessed  CN XI: Head turning and shoulder shrug are unable to be assessed  CN XII: Tongue is midline    Motor:  Unable to test patient's muscle and motor exams due to being obtunded from prior sedation    Reflexes    RJ BJ TJ KJ AJ Plantars Jewell's   Right 1+ 1+  tr 1+ Downgoing Not present   Left 1+ 1+  Not tested 1+ Fanned up Not present      Sensory:  Patient unable to provide subjective feedback due to sedation  Coordination:  Patient unable to participate in neurological exam due to receipt of sedation for MRI  [place] unable to assess secondary to sedation  Gait/Stance:  Unable to assess secondary to sedation    Lab Results   Component Value Date    WBC 5 59 03/23/2023    HGB 7 7 (L) 03/23/2023    HCT 23 7 (L) 03/23/2023    MCV 89 03/23/2023     (L) 03/23/2023     Lab Results   Component Value Date    HGBA1C 5 8 (H) 03/22/2023     Lab Results   Component Value Date    ALT 26 03/20/2023    AST 26 03/20/2023    ALKPHOS 85 03/20/2023    BILITOT 0 5 04/21/2017     Lab Results   Component Value Date    CALCIUM 8 0 (L) 03/23/2023     (L) 04/21/2017    K 4 3 03/23/2023    CO2 29 03/23/2023    CL 99 03/23/2023    BUN 14 03/23/2023    CREATININE 0 73 03/23/2023         Review of reports and notes reveal:  Independent Interpretation of images or specimens:  XR pelvis complete 3+ views    Result Date: 3/20/2023  Diffuse osteopenia  No acute pelvic fracture  Workstation performed: [id number]     XR femur 2 vw left    Result Date: 3/20/2023  Fluoroscopic guidance provided for procedure guidance  Please refer to the separate procedure notes for additional details  Workstation performed: [id number]     XR knee 4+ vw left injury    Result Date: 3/20/2023  Acute, displaced periprosthetic left distal femoral fracture  Workstation performed: [id number]     XR knee 4+ vw right injury    Result Date: 3/20/2023  No acute osseous abnormality  Degenerative changes as described  Workstation performed: [id number]     CT head without contrast    Result Date: 3/20/2023  No acute intracranial abnormality  Workstation performed: [id number]     CT spine cervical without contrast    Result Date: 3/20/2023  No cervical spine fracture or traumatic malalignment  Workstation performed: [id number]     MRI brain wo contrast    Result Date: 3/23/2023  1  No acute infarction, edema, or mass effect  2  Mild chronic microangiopathic ischemic changes  3  Subcentimeter right frontal scalp sebaceous cyst  Workstation performed: [id number]           Thank you for this consult      Total time of encounter: 70 Minutes  More than 50% of time was spent in counseling and coordination of care of patient  History, her diagnostic studies, current treatment plans and follow-up recommendations    Discussed with medical team and neurology MD

## 2023-03-23 NOTE — TELEPHONE ENCOUNTER
2nd attempt to schedule patient from referral   Patient is in hospital and was asked to call daughter  Called daughter  No answer  No voicemail

## 2023-03-23 NOTE — SPEECH THERAPY NOTE
Speech pathology/swallow eval - missed visit  Swallow eval orders and EMR reviewed  Pt greeted at b/s- sleepy but orientated to self, place and time  Pt reported poor appetite today  As per rn, Patricia, pt received Ativan and going for MRI brain now  Rn reported that pt eats slowly  Pt on regular diet and thin liquids  Will return to see pt as appropriate and schedule permits  D/w rn  Current Medical Status  Pt is a 70 y o  female who presented to [address] [place] [address] on 3/20/23 via BLS after she suffered a mechanical fall in her bathtub  Complaining of left knee pain  3/20/23 status post surgical fixation left distal femur periprosthetic fracture with retrograde IM nail  Michelle Antonio, [address] [facility]  Speech Pathology [place] [id number]

## 2023-03-23 NOTE — ASSESSMENT & PLAN NOTE
Patient cooperative, pleasant but slow the respond  Daughter noted an acute change in her memory about 3 weeks ago     · CT head unremarkable  · MRI brain with acute findings  · Appreciate neurology and psychiatry input   · Stop Lexapro as this can worsen memory issues  · Added B12 supplement  · UA unremarkable   · Supportive care   · Follow-up with neurology for further outpatient memory testing

## 2023-03-23 NOTE — SPEECH THERAPY NOTE
Speech Language/Pathology/2nd missed visit    Second attempt to see pt today for bedside swallow eval  Pt given ativan this am for MRI/brain, d/w rn, Patricia that would make attempt later in day  Rn reported that pt eats slowly and may need an easier to [facility]  Edin Cisse  Pt seen b/s- sleeping without alerting to verbal stimuli  Rn present and agreed with deferring eval until 3/24  Douglas Rodas, [id number] [facility]  Speech Pathology [place] [id number]

## 2023-03-23 NOTE — CONSULTS
Consultation - Akshat Melchor 70 y o  female MRN: [id number]    Unit/Bed#: [address] [place] [address] Encounter: [id number]      Identifying Data: 70years old white female is admitted at [address] [place] [address] on March 20, 2023 after she had a fall  Psychiatric consultation is for bipolar depression  Patient examined spoke to the nurse history physical medications labs reviewed and noted drugs and alcohol level is negative discussed with nurse practitioner Edmund Simons at length she reported that patient has a long psych history and she has a psychiatrist who is giving her psychotropic medications and family do not want to stop her Geodon and Klonopin neurologist recommended not to start her on Lexapro at this time  I reviewed the 's note patient is referred for the hospice care after medical stabilization  Edmund Simons also reported that patient had given Ativan for MRI and she is sluggish but she is able to communicate and respond to simple verbal command  Patient is a poor historian and is sluggish but she was responding to simple verbal command she knows that she is at the hospital she was able to tell me her age and she is aware about what is going on with her she was able to give out the basic background psychiatric history she is seeing her psychiatrist Dr Gordon Hoff for 20+ years and she has been taking Geodon and Klonopin she was able to give me the names of the medications currently patient is on such a high dose of Geodon 80 mg in the morning and 160 mg at bedtime after reviewing her medications she is also on Klonopin 0 5 mg p o  twice daily at home but currently it is 0 25 mg p o  at bedtime only to avoid the sedation since patient had a fall and medical evaluation is in progress    After the evaluation I discussed with nurse practitioner Edmund Simons that I will reduce her Geodon up to 80 mg twice a day currently patient is such a high dose of medication and this dose is usually not recommended for the geriatric patients patient is 70years old  If anything needed I will follow-up and adjust her medication but I do not want to give her more than 160 mg a day at this time and also I will continue her Klonopin and the smaller dose at this time but if needed I will adjust the dosage  I reviewed her home medications patient is getting Geodon 80 mg twice a day and Klonopin 0 5 mg p o  twice daily she was also ordered Lexapro 5 mg daily but it is not started yet  Social history patient lives with her  she has 1 daughter and 1 son she denies smoking denies abusing alcohol or drugs she had 12 grade education and 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 patient denies history of drugs and alcohol abuse in the past     Allergy  No known drug allergy    Diagnosis  Bipolar depression  Anxiety  Left knee arthritis left knee bone spur CKD colon cancer colon polyp diabetes endometrial cancer hypertension hyperlipidemia vitamin D deficiency urinary incontinence  History of breast biopsy was benign cholecystectomy colonoscopy D&C hysterectomy oophorectomy pelvic laparoscopy bilateral salpingo-oophorectomy total knee replacement tonsillectomy tubal ligation    Chief Complaints: Bipolar depression and anxiety    Family History: Patient denies  Family History   Problem Relation Age of Onset   • Cancer Mother         Renal   • Heart disease Father         CHF   • Heart disease Sister         atrial septal defect   • Breast cancer Paternal Aunt 39       Legal History:     Mental Status Exam: 70years old white female is alert awake but sleepy keeping her eyes closed during the session but she is oriented to place and person she is able to answer my questions memory intact affect flat withdrawn guarded brasher labile currently patient denies auditory or visual hallucinations  Patient denies suicidal or homicidal ideation  Patient is sluggish she is not agitated  She gets anxious insomnia appetite poor    Guarded but no paranoia or delusion elucidated poor concentration Insight and judgment are adequate  History of Present Illness     HPI: Jim Oconnell is a 70y o  year old female who presents with fall    Inpatient consult to Psychiatry  Consult performed by: Judy Obregon MD  Consult ordered by: JOHN Casey            Historical Information   Past Psychiatric History: Patient gives a long history of bipolar depression and she has been under psychiatric care Dr Brendan Guan since over 20+ years and she has been on psychotropic medications she was able to tell me name of her both psychiatric medications Klonopin and Geodon but she was not sure about the dosage  Patient reports that years ago she had 1 psychiatric admission in the past but no suicide attempt in the past she has no history of drugs and alcohol abuse in the past currently she is under psychiatric care with psychotropic medications as described above  Past Medical History:   Diagnosis Date   • Anesthesia complication     difficulty waking up   • Arthritis     left knee   • Bone spur     left knee behing the knee cap   • Chronic kidney disease    • Colon cancer [facility])     patient states about 2017   • Colon polyp     Tubulovillous Adenoma High Grade Dysplasia - 2017   • Depression    • Diabetes mellitus ([facility] )    • Endometrial cancer ([facility] )     grade I   • Hx of bleeding disorder     vaginal bleeding started on 3/13/17    • Hyperlipidemia    • Hypertension    • Hypomagnesemia 3/20/2023   • PONV (postoperative nausea and vomiting)    • Psychiatric disorder    • Shortness of breath     with exertion   • Urinary incontinence    • Vitamin D deficiency      Past Surgical History:   Procedure Laterality Date   • BREAST BIOPSY Left     benign-maybe at ar age 58   • CHOLECYSTECTOMY      open   • COLONOSCOPY     • DILATION AND CURETTAGE OF UTERUS N/A 04/05/2017    Procedure: DILATATION AND CURETTAGE (D&C);   Surgeon: Alisha Chavez MD; Location: [facility] SURGICAL [place] MAIN OR;  Service:    • HYSTERECTOMY     • OOPHORECTOMY     • PELVIC LAPAROSCOPY     • RI ARTHRP KNE CONDYLE&PLATU MEDIAL&LAT COMPARTMENTS Left 12/06/2022    Procedure: ARTHROPLASTY KNEE TOTAL W ROBOT - CEMENTED - LEFT;  Surgeon: Naya Silver DO;  Location: [address] [place] [address];  Service: Orthopedics   • RI LAPS TOTAL HYSTERECT 250 GM/< W/RMVL TUBE/OVARY N/A 05/04/2017    Procedure: ROBOTIC ASSISTED TOTAL LAPAROSCOPIC HYSTERECTOMY, BILATERAL SALPINGOOPHERECTOMY; CYSTOSCOPY;  Surgeon: Srinath Arriola MD;  Location:  MAIN OR;  Service: Gynecology Oncology   • RI OPTX FEM SHFT FX W/INSJ IMED IMPLT W/WO SCREW Left 3/20/2023    Procedure: INSERTION NAIL IM FEMUR RETROGRADE;  Surgeon: Abel Bunn MD;  Location: [address] [place] [address];  Service: Orthopedics   • REPLACEMENT TOTAL KNEE     • TONSILLECTOMY     • TUBAL LIGATION       Social History   Social History     Substance and Sexual Activity   Alcohol Use Never     Social History     Substance and Sexual Activity   Drug Use No     Social History     Tobacco Use   Smoking Status Never   Smokeless Tobacco Never       Meds/Allergies   current meds:   Current Facility-Administered Medications   Medication Dose Route Frequency   • acetaminophen (TYLENOL) tablet 975 mg  975 mg Oral BID   • atorvastatin (LIPITOR) tablet 40 mg  40 mg Oral Daily With Dinner   • clonazePAM (KlonoPIN) tablet 0 25 mg  0 25 mg Oral HS   • cyanocobalamin (VITAMIN B-12) tablet 1,000 mcg  1,000 mcg Oral Daily   • enoxaparin (LOVENOX) subcutaneous injection 40 mg  40 mg Subcutaneous Q24H [address]   • HYDROmorphone HCl (DILAUDID) injection 0 2 mg  0 2 mg Intravenous Q4H PRN   • insulin lispro (HumaLOG) 100 units/mL subcutaneous injection 1-5 Units  1-5 Units Subcutaneous TID AC   • insulin lispro (HumaLOG) 100 units/mL subcutaneous injection 1-5 Units  1-5 Units Subcutaneous HS   • magnesium Oxide (MAG-OX) tablet 400 mg  400 mg Oral Daily   • metoprolol succinate (TOPROL-XL) 24 hr tablet 25 mg  25 mg Oral Daily   • oxyCODONE (ROXICODONE) IR tablet 2 5 mg  2 5 mg Oral Q6H PRN   • polyethylene glycol (MIRALAX) packet 17 g  17 g Oral BID   • sodium chloride 0 9 % infusion  50 mL/hr Intravenous Continuous   • ziprasidone (GEODON) capsule 160 mg  160 mg Oral HS   • ziprasidone (GEODON) capsule 80 mg  80 mg Oral Daily    and PTA meds:    Medications Prior to Admission   Medication   • aspirin (Aspirin 81) 81 mg EC tablet   • clonazePAM (KlonoPIN) 0 5 mg tablet   • clonazePAM (KlonoPIN) 0 5 mg tablet   • docusate sodium (COLACE) 100 mg capsule   • escitalopram (LEXAPRO) 5 mg tablet   • Ferrous Sulfate (Iron) 325 (65 Fe) MG TABS   • metFORMIN (GLUCOPHAGE-XR) 500 mg 24 hr tablet   • metoprolol succinate (TOPROL-XL) 25 mg 24 hr tablet   • Multiple Vitamin (Multivitamin Adult) TABS   • nitroglycerin (NITROSTAT) 0 4 mg SL tablet   • Omega-3 Fatty Acids (fish oil) 1,000 mg   • rosuvastatin (CRESTOR) 20 MG tablet   • torsemide (DEMADEX) 10 mg tablet   • ziprasidone (GEODON) 80 mg capsule   • acetaminophen (TYLENOL) 325 mg tablet   • BD Pen Needle Chelsey 2nd Gen 32G X 4 MM MISC   • Toujeo SoloStar 300 units/mL CONCENTRATED U-300 injection pen (1-unit dial)   • ziprasidone (GEODON) 80 mg capsule     No Known Allergies    Objective   Vitals: Blood pressure 115/61, pulse 71, temperature 98 4 °F (36 9 °C), resp  rate 18, weight 71 3 kg (157 lb 3 oz), SpO2 99 %, not currently breastfeeding        Routine Lab Results:   Admission on 03/20/2023   Component Date Value Ref Range Status   • WBC 03/20/2023 8 88  4 31 - 10 16 Thousand/uL Final   • RBC 03/20/2023 3 74 (L)  3 81 - 5 12 Million/uL Final   • Hemoglobin 03/20/2023 11 0 (L)  11 5 - 15 4 g/dL Final   • Hematocrit 03/20/2023 32 2 (L)  34 8 - 46 1 % Final   • MCV 03/20/2023 86  82 - 98 fL Final   • MCH 03/20/2023 29 4  26 8 - 34 3 pg Final   • MCHC 03/20/2023 34 2  31 4 - 37 4 g/dL Final   • RDW 03/20/2023 13 6  11 6 - 15 1 % Final   • MPV 03/20/2023 8 2 (L)  8 9 - 12 7 fL Final   • Platelets 87/56/2899 238  149 - 390 Thousands/uL Final   • nRBC 03/20/2023 0  /100 WBCs Final   • Neutrophils Relative 03/20/2023 86 (H)  43 - 75 % Final   • Immat GRANS % 03/20/2023 0  0 - 2 % Final   • Lymphocytes Relative 03/20/2023 8 (L)  14 - 44 % Final   • Monocytes Relative 03/20/2023 6  4 - 12 % Final   • Eosinophils Relative 03/20/2023 0  0 - 6 % Final   • Basophils Relative 03/20/2023 0  0 - 1 % Final   • Neutrophils Absolute 03/20/2023 7 56  1 85 - 7 62 Thousands/µL Final   • Immature Grans Absolute 03/20/2023 0 03  0 00 - 0 20 Thousand/uL Final   • Lymphocytes Absolute 03/20/2023 0 71  0 60 - 4 47 Thousands/µL Final   • Monocytes Absolute 03/20/2023 0 54  0 17 - 1 22 Thousand/µL Final   • Eosinophils Absolute 03/20/2023 0 02  0 00 - 0 61 Thousand/µL Final   • Basophils Absolute 03/20/2023 0 02  0 00 - 0 10 Thousands/µL Final   • Sodium 03/20/2023 131 (L)  135 - 147 mmol/L Final   • Potassium 03/20/2023 3 8  3 5 - 5 3 mmol/L Final   • Chloride 03/20/2023 92 (L)  96 - 108 mmol/L Final   • CO2 03/20/2023 29  21 - 32 mmol/L Final   • ANION GAP 03/20/2023 10  4 - 13 mmol/L Final   • BUN 03/20/2023 13  5 - 25 mg/dL Final   • Creatinine 03/20/2023 0 82  0 60 - 1 30 mg/dL Final    Standardized to IDMS reference method   • Glucose 03/20/2023 181 (H)  65 - 140 mg/dL Final    If the patient is fasting, the ADA then defines impaired fasting glucose as > 100 mg/dL and diabetes as > or equal to 123 mg/dL  Specimen collection should occur prior to Sulfasalazine administration due to the potential for falsely depressed results  Specimen collection should occur prior to Sulfapyridine administration due to the potential for falsely elevated results  • Calcium 03/20/2023 8 6  8 4 - 10 2 mg/dL Final   • AST 03/20/2023 26  13 - 39 U/L Final    Specimen collection should occur prior to Sulfasalazine administration due to the potential for falsely depressed results      • ALT 03/20/2023 26  7 - 52 U/L Final    Specimen "collection should occur prior to Sulfasalazine administration due to the potential for falsely depressed results  • Alkaline Phosphatase 03/20/2023 85  34 - 104 U/L Final   • Total Protein 03/20/2023 6 9  6 4 - 8 4 g/dL Final   • Albumin 03/20/2023 3 7  3 5 - 5 0 g/dL Final   • Total Bilirubin 03/20/2023 0 46  0 20 - 1 00 mg/dL Final   • eGFR 03/20/2023 73  ml/min/1 73sq m Final   • Magnesium 03/20/2023 1 4 (L)  1 9 - 2 7 mg/dL Final   • Color, UA 03/20/2023 Light Yellow   Final   • Clarity, UA 03/20/2023 Clear   Final   • Specific Gravity, UA 03/20/2023 1 010  1 000 - 1 030 Final   • pH, UA 03/20/2023 6 0  5 0, 5 5, 6 0, 6 5, 7 0, 7 5, 8 0, 8 5, 9 0 Final   • Leukocytes, UA 03/20/2023 Negative  Negative Final   • Nitrite, UA 03/20/2023 Negative  Negative Final   • Protein, UA 03/20/2023 Negative  Negative mg/dl Final   • Glucose, UA 03/20/2023 Negative  Negative mg/dl Final   • Ketones, UA 03/20/2023 Negative  Negative mg/dl Final   • Urobilinogen, UA 03/20/2023 0 2  0 2, 1 0 E U /dl E U /dl Final   • Bilirubin, UA 03/20/2023 Negative  Negative Final   • Occult Blood, UA 03/20/2023 Negative  Negative Final   • hs TnI 0hr 03/20/2023 13  \"Refer to ACS Flowchart\"- see link ng/L Final    Comment:                                              Initial (time 0) result  If >=50 ng/L, Myocardial injury suggested ;  Type of myocardial injury and treatment strategy  to be determined  If 5-49 ng/L, a delta result at 2 hours and or 4 hours will be needed to further evaluate  If <4 ng/L, and chest pain has been >3 hours since onset, patient may qualify for discharge based on the HEART score in the ED  If <5 ng/L and <3hours since onset of chest pain, a delta result at 2 hours will be needed to further evaluate  HS Troponin 99th Percentile URL of a Health Population=12 ng/L with a 95% Confidence Interval of 8-18 ng/L      Second Troponin (time 2 hours)  If calculated delta >= 20 ng/L,  Myocardial injury suggested ; Type of " "myocardial injury and treatment strategy to be determined  If 5-49 ng/L and the calculated delta is 5-19 ng/L, consult medical service for evaluation  Continue evaluation for ischemia on ecg and other possible etiology and repeat hs troponin at 4 hours  If delta                            is <5 ng/L at 2 hours, consider discharge based on risk stratification via the HEART score (if in ED), or CHRISTY risk score in IP/Observation  HS Troponin 99th Percentile URL of a Health Population=12 ng/L with a 95% Confidence Interval of 8-18 ng/L  • Protime 03/20/2023 14 3  11 6 - 14 5 seconds Final   • INR 03/20/2023 1 10  0 84 - 1 19 Final   • PTT 03/20/2023 26  23 - 37 seconds Final    Therapeutic Heparin Range =  60-90 seconds   • TSH 3RD GENERATON 03/20/2023 0 877  0 450 - 4 500 uIU/mL Final    The recommended reference ranges for TSH during pregnancy are as follows:   First trimester 0 1 to 2 5 uIU/mL   Second trimester  0 2 to 3 0 uIU/mL   Third trimester 0 3 to 3 0 uIU/m    Note: Normal ranges may not apply to patients who are transgender, non-binary, or whose legal sex, sex at birth, and gender identity differ  Adult TSH (3rd generation) reference range follows the recommended guidelines of the [employer] Association, January, 2020  • POC Glucose 03/20/2023 183 (H)  65 - 140 mg/dl Final   • hs TnI 2hr 03/20/2023 13  \"Refer to ACS Flowchart\"- see link ng/L Final    Comment:                                              Initial (time 0) result  If >=50 ng/L, Myocardial injury suggested ;  Type of myocardial injury and treatment strategy  to be determined  If 5-49 ng/L, a delta result at 2 hours and or 4 hours will be needed to further evaluate  If <4 ng/L, and chest pain has been >3 hours since onset, patient may qualify for discharge based on the HEART score in the ED  If <5 ng/L and <3hours since onset of chest pain, a delta result at 2 hours will be needed to further evaluate      HS Troponin 99th " "Percentile URL of a Health Population=12 ng/L with a 95% Confidence Interval of 8-18 ng/L  Second Troponin (time 2 hours)  If calculated delta >= 20 ng/L,  Myocardial injury suggested ; Type of myocardial injury and treatment strategy to be determined  If 5-49 ng/L and the calculated delta is 5-19 ng/L, consult medical service for evaluation  Continue evaluation for ischemia on ecg and other possible etiology and repeat hs troponin at 4 hours  If delta                            is <5 ng/L at 2 hours, consider discharge based on risk stratification via the HEART score (if in ED), or CHRISTY risk score in IP/Observation  HS Troponin 99th Percentile URL of a Health Population=12 ng/L with a 95% Confidence Interval of 8-18 ng/L  • Delta 2hr hsTnI 03/20/2023 0  <20 ng/L Final   • hs TnI 4hr 03/20/2023 12  \"Refer to ACS Flowchart\"- see link ng/L Final    Comment:                                              Initial (time 0) result  If >=50 ng/L, Myocardial injury suggested ;  Type of myocardial injury and treatment strategy  to be determined  If 5-49 ng/L, a delta result at 2 hours and or 4 hours will be needed to further evaluate  If <4 ng/L, and chest pain has been >3 hours since onset, patient may qualify for discharge based on the HEART score in the ED  If <5 ng/L and <3hours since onset of chest pain, a delta result at 2 hours will be needed to further evaluate  HS Troponin 99th Percentile URL of a Health Population=12 ng/L with a 95% Confidence Interval of 8-18 ng/L  Second Troponin (time 2 hours)  If calculated delta >= 20 ng/L,  Myocardial injury suggested ; Type of myocardial injury and treatment strategy to be determined  If 5-49 ng/L and the calculated delta is 5-19 ng/L, consult medical service for evaluation  Continue evaluation for ischemia on ecg and other possible etiology and repeat hs troponin at 4 hours    If delta                            is <5 ng/L at 2 hours, consider discharge " based on risk stratification via the HEART score (if in ED), or CHRISTY risk score in IP/Observation  HS Troponin 99th Percentile URL of a Health Population=12 ng/L with a 95% Confidence Interval of 8-18 ng/L  • Delta 4hr hsTnI 03/20/2023 -1  <20 ng/L Final   • SARS-CoV-2 03/20/2023 Negative  Negative Final   • INFLUENZA A PCR 03/20/2023 Negative  Negative Final   • INFLUENZA B PCR 03/20/2023 Negative  Negative Final   • RSV PCR 03/20/2023 Negative  Negative Final   • ABO Grouping 03/20/2023 O   Final   • Rh Factor 03/20/2023 Negative   Final   • Antibody Screen 03/20/2023 Negative   Final   • Specimen Expiration Date 03/20/2023 [id number]   Final   • POC Glucose 03/20/2023 148 (H)  65 - 140 mg/dl Final   • POC Glucose 03/20/2023 166 (H)  65 - 140 mg/dl Final   • POC Glucose 03/20/2023 179 (H)  65 - 140 mg/dl Final   • Sodium 03/21/2023 131 (L)  135 - 147 mmol/L Final   • Potassium 03/21/2023 3 8  3 5 - 5 3 mmol/L Final   • Chloride 03/21/2023 96  96 - 108 mmol/L Final   • CO2 03/21/2023 29  21 - 32 mmol/L Final   • ANION GAP 03/21/2023 6  4 - 13 mmol/L Final   • BUN 03/21/2023 12  5 - 25 mg/dL Final   • Creatinine 03/21/2023 0 66  0 60 - 1 30 mg/dL Final    Standardized to IDMS reference method   • Glucose 03/21/2023 148 (H)  65 - 140 mg/dL Final    If the patient is fasting, the ADA then defines impaired fasting glucose as > 100 mg/dL and diabetes as > or equal to 123 mg/dL  Specimen collection should occur prior to Sulfasalazine administration due to the potential for falsely depressed results  Specimen collection should occur prior to Sulfapyridine administration due to the potential for falsely elevated results     • Calcium 03/21/2023 8 0 (L)  8 4 - 10 2 mg/dL Final   • eGFR 03/21/2023 90  ml/min/1 73sq m Final   • WBC 03/21/2023 7 59  4 31 - 10 16 Thousand/uL Final   • RBC 03/21/2023 2 88 (L)  3 81 - 5 12 Million/uL Final   • Hemoglobin 03/21/2023 8 3 (L)  11 5 - 15 4 g/dL Final   • Hematocrit 03/21/2023 25 0 (L)  34 8 - 46 1 % Final   • MCV 03/21/2023 87  82 - 98 fL Final   • MCH 03/21/2023 28 8  26 8 - 34 3 pg Final   • MCHC 03/21/2023 33 2  31 4 - 37 4 g/dL Final   • RDW 03/21/2023 13 6  11 6 - 15 1 % Final   • Platelets 40/51/8204 178  149 - 390 Thousands/uL Final   • MPV 03/21/2023 8 5 (L)  8 9 - 12 7 fL Final   • Magnesium 03/21/2023 1 9  1 9 - 2 7 mg/dL Final   • POC Glucose 03/21/2023 150 (H)  65 - 140 mg/dl Final   • Ventricular Rate 03/20/2023 80  BPM Final   • Atrial Rate 03/20/2023 80  BPM Final   • OK Interval 03/20/2023 134  ms Final   • QRSD Interval 03/20/2023 110  ms Final   • QT Interval 03/20/2023 456  ms Final   • QTC Interval 03/20/2023 525  ms Final   • P Axis 03/20/2023 65  degrees Final   • QRS Axis 03/20/2023 25  degrees Final   • T Wave Axis 03/20/2023 43  degrees Final   • WBC 03/22/2023 5 85  4 31 - 10 16 Thousand/uL Final   • RBC 03/22/2023 2 79 (L)  3 81 - 5 12 Million/uL Final   • Hemoglobin 03/22/2023 8 1 (L)  11 5 - 15 4 g/dL Final   • Hematocrit 03/22/2023 24 8 (L)  34 8 - 46 1 % Final   • MCV 03/22/2023 89  82 - 98 fL Final   • MCH 03/22/2023 29 0  26 8 - 34 3 pg Final   • MCHC 03/22/2023 32 7  31 4 - 37 4 g/dL Final   • RDW 03/22/2023 14 1  11 6 - 15 1 % Final   • Platelets 59/32/2720 132 (L)  149 - 390 Thousands/uL Final   • MPV 03/22/2023 8 8 (L)  8 9 - 12 7 fL Final   • Sodium 03/22/2023 134 (L)  135 - 147 mmol/L Final   • Potassium 03/22/2023 3 9  3 5 - 5 3 mmol/L Final   • Chloride 03/22/2023 98  96 - 108 mmol/L Final   • CO2 03/22/2023 28  21 - 32 mmol/L Final   • ANION GAP 03/22/2023 8  4 - 13 mmol/L Final   • BUN 03/22/2023 10  5 - 25 mg/dL Final   • Creatinine 03/22/2023 0 64  0 60 - 1 30 mg/dL Final    Standardized to IDMS reference method   • Glucose 03/22/2023 137  65 - 140 mg/dL Final    If the patient is fasting, the ADA then defines impaired fasting glucose as > 100 mg/dL and diabetes as > or equal to 123 mg/dL    Specimen collection should occur prior to Sulfasalazine administration due to the potential for falsely depressed results  Specimen collection should occur prior to Sulfapyridine administration due to the potential for falsely elevated results  • Calcium 03/22/2023 8 0 (L)  8 4 - 10 2 mg/dL Final   • eGFR 03/22/2023 91  ml/min/1 73sq m Final   • POC Glucose 03/22/2023 134  65 - 140 mg/dl Final   • POC Glucose 03/22/2023 265 (H)  65 - 140 mg/dl Final   • Vitamin B-12 03/22/2023 322  100 - 900 pg/mL Final   • Folate 03/22/2023 13 6  3 1 - 17 5 ng/mL Final   • Hemoglobin A1C 03/22/2023 5 8 (H)  Normal 3 8-5 6%; PreDiabetic 5 7-6 4%; Diabetic >=6 5%; Glycemic control for adults with diabetes <7 0% % Final   • EAG 03/22/2023 120  mg/dl Final   • POC Glucose 03/22/2023 142 (H)  65 - 140 mg/dl Final   • POC Glucose 03/22/2023 163 (H)  65 - 140 mg/dl Final   • Sodium 03/23/2023 134 (L)  135 - 147 mmol/L Final   • Potassium 03/23/2023 4 3  3 5 - 5 3 mmol/L Final   • Chloride 03/23/2023 99  96 - 108 mmol/L Final   • CO2 03/23/2023 29  21 - 32 mmol/L Final   • ANION GAP 03/23/2023 6  4 - 13 mmol/L Final   • BUN 03/23/2023 14  5 - 25 mg/dL Final   • Creatinine 03/23/2023 0 73  0 60 - 1 30 mg/dL Final    Standardized to IDMS reference method   • Glucose 03/23/2023 116  65 - 140 mg/dL Final    If the patient is fasting, the ADA then defines impaired fasting glucose as > 100 mg/dL and diabetes as > or equal to 123 mg/dL     • Calcium 03/23/2023 8 0 (L)  8 4 - 10 2 mg/dL Final   • eGFR 03/23/2023 84  ml/min/1 73sq m Final   • Magnesium 03/23/2023 1 7 (L)  1 9 - 2 7 mg/dL Final   • Phosphorus 03/23/2023 2 7  2 3 - 4 1 mg/dL Final   • WBC 03/23/2023 5 59  4 31 - 10 16 Thousand/uL Final   • RBC 03/23/2023 2 66 (L)  3 81 - 5 12 Million/uL Final   • Hemoglobin 03/23/2023 7 7 (L)  11 5 - 15 4 g/dL Final   • Hematocrit 03/23/2023 23 7 (L)  34 8 - 46 1 % Final   • MCV 03/23/2023 89  82 - 98 fL Final   • MCH 03/23/2023 28 9  26 8 - 34 3 pg Final   • MCHC 03/23/2023 32 5  31 4 - 37 4 g/dL Final • RDW 03/23/2023 14 3  11 6 - 15 1 % Final   • MPV 03/23/2023 8 7 (L)  8 9 - 12 7 fL Final   • Platelets 81/23/1404 123 (L)  149 - 390 Thousands/uL Final   • nRBC 03/23/2023 0  /100 WBCs Final   • Neutrophils Relative 03/23/2023 69  43 - 75 % Final   • Immat GRANS % 03/23/2023 1  0 - 2 % Final   • Lymphocytes Relative 03/23/2023 20  14 - 44 % Final   • Monocytes Relative 03/23/2023 8  4 - 12 % Final   • Eosinophils Relative 03/23/2023 2  0 - 6 % Final   • Basophils Relative 03/23/2023 0  0 - 1 % Final   • Neutrophils Absolute 03/23/2023 3 86  1 85 - 7 62 Thousands/µL Final   • Immature Grans Absolute 03/23/2023 0 03  0 00 - 0 20 Thousand/uL Final   • Lymphocytes Absolute 03/23/2023 1 14  0 60 - 4 47 Thousands/µL Final   • Monocytes Absolute 03/23/2023 0 43  0 17 - 1 22 Thousand/µL Final   • Eosinophils Absolute 03/23/2023 0 12  0 00 - 0 61 Thousand/µL Final   • Basophils Absolute 03/23/2023 0 01  0 00 - 0 10 Thousands/µL Final   • POC Glucose 03/23/2023 126  65 - 140 mg/dl Final   • POC Glucose 03/23/2023 291 (H)  65 - 140 mg/dl Final   Transcribe Orders on 03/22/2023   Component Date Value Ref Range Status   • Iron Saturation 03/22/2023 8 (L)  15 - 50 % Final   • TIBC 03/22/2023 204 (L)  250 - 450 ug/dL Final   • Iron 03/22/2023 16 (L)  50 - 170 ug/dL Final    Patients treated with metal-binding drugs (ie  Deferoxamine) may have depressed iron values  • Ferritin 03/22/2023 458 (H)  8 - 388 ng/mL Final         Diagnosis: Bipolar depression mixed type  Anxiety disorder  Insomnia  Multiple medical problems including cancer patient is referred to the hospice care  Plan: Reduce her Geodon to 80 mg p o  twice daily  Continue Klonopin 0 25 mg p o  at bedtime at this time  Psychotherapy  Psychiatric follow-up after the discharge with her psychiatrist Dr Heath Fabian    I will follow-up during the hospital course and if needed I will adjust the dosage of her medications  I will not start her on Lexapro at neurologist recommendation at this time  Discussed with the nurse  Discussed with the nurse practitioner Caroline Kaur at length  I will follow-up      Letty Be MD

## 2023-03-23 NOTE — ASSESSMENT & PLAN NOTE
· Noted in history  · Patient only takes aspirin 81 mg  · Currently holding aspirin while on lovenox

## 2023-03-23 NOTE — QUICK NOTE
No voiding since catheter removal yesterday at 219pm  Bladder scan 70ml per RN  Will start timed IVF and straight cath x 1  Monitor for UO

## 2023-03-23 NOTE — ASSESSMENT & PLAN NOTE
Lab Results   Component Value Date    HGBA1C 5 8 (H) 03/22/2023       Recent Labs     03/25/23  1114 03/25/23  1545 03/25/23 2052 03/26/23  0708   POCGLU 195* 235* 219* 134       Blood Sugar Average: Last 72 hrs:  (P) 194   · Recent A1c 6 9 -> 5 8  · Hold metformin and Toujeo   · Will need to decrease or stop Toujeo on discharge  · Accu-Cheks ACHS  · SSI  · Hypoglycemic protocol

## 2023-03-23 NOTE — ASSESSMENT & PLAN NOTE
HGB 11-> 8 3 -> 8 1 -> 7 7 -> 7 3  Likely acute blood loss anemia from surgery  · B12 322  · Folate 13 6  · Iron panel suspicious for DANIELLA   · Stop iron supplementation at this time   · We will give Venofer 200 mg IV x1  · Add B12 supplement   · Check FOBT   · Monitor HGB and transfuse if less than 6 or if patient becomes symptomatic

## 2023-03-23 NOTE — PLAN OF CARE
Problem: Potential for Falls  Goal: Patient will remain free of falls  Description: INTERVENTIONS:  - Educate patient/family on patient safety including physical limitations  - Instruct patient to call for assistance with activity   - Consult OT/PT to assist with strengthening/mobility   - Keep Call bell within reach  - Keep bed low and locked with side rails adjusted as appropriate  - Keep care items and personal belongings within reach  - Initiate and maintain comfort rounds  - Make Fall Risk Sign visible to staff  - Offer Toileting every 2 Hours, in advance of need  - Initiate/Maintain bed alarm  - Obtain necessary fall risk management equipment: nonskim footwear   - Apply yellow socks and bracelet for high fall risk patients  - Consider moving patient to room near nurses station  Outcome: Progressing     Problem: Nutrition/Hydration-ADULT  Goal: Nutrient/Hydration intake appropriate for improving, restoring or maintaining nutritional needs  Description: Monitor and assess patient's nutrition/hydration status for malnutrition  Collaborate with interdisciplinary team and initiate plan and interventions as ordered  Monitor patient's weight and dietary intake as ordered or per policy  Utilize nutrition screening tool and intervene as necessary  Determine patient's food preferences and provide high-protein, high-caloric foods as appropriate       INTERVENTIONS:  - Monitor oral intake, urinary output, labs, and treatment plans  - Assess nutrition and hydration status and recommend course of action  - Evaluate amount of meals eaten  - Assist patient with eating if necessary   - Allow adequate time for meals  - Recommend/ encourage appropriate diets, oral nutritional supplements, and vitamin/mineral supplements  - Order, calculate, and assess calorie counts as needed  - Recommend, monitor, and adjust tube feedings and TPN/PPN based on assessed needs  - Assess need for intravenous fluids  - Provide specific nutrition/hydration education as appropriate  - Include patient/family/caregiver in decisions related to nutrition  Outcome: Progressing     Problem: MOBILITY - ADULT  Goal: Maintain or return to baseline ADL function  Description: INTERVENTIONS:  -  Assess patient's ability to carry out ADLs; assess patient's baseline for ADL function and identify physical deficits which impact ability to perform ADLs (bathing, care of mouth/teeth, toileting, grooming, dressing, etc )  - Assess/evaluate cause of self-care deficits   - Assess range of motion  - Assess patient's mobility; develop plan if impaired  - Assess patient's need for assistive devices and provide as appropriate  - Encourage maximum independence but intervene and supervise when necessary  - Involve family in performance of ADLs  - Assess for home care needs following discharge   - Consider OT consult to assist with ADL evaluation and planning for discharge  - Provide patient education as appropriate  Outcome: Progressing  Goal: Maintains/Returns to pre admission functional level  Description: INTERVENTIONS:  - Perform BMAT or MOVE assessment daily    - Set and communicate daily mobility goal to care team and patient/family/caregiver  - Collaborate with rehabilitation services on mobility goals if consulted  - Perform Range of Motion 3 times a day  - Reposition patient every 2 hours    - Dangle patient 1 times a day  - Stand patient 1 times a day  - Ambulate patient 1 times a day  - Out of bed to chair 1 times a day   - Out of bed for meals 1 times a day  - Out of bed for toileting  - Record patient progress and toleration of activity level   Outcome: Progressing     Problem: Prexisting or High Potential for Compromised Skin Integrity  Goal: Skin integrity is maintained or improved  Description: INTERVENTIONS:  - Identify patients at risk for skin breakdown  - Assess and monitor skin integrity  - Assess and monitor nutrition and hydration status  - Monitor labs   - Assess for incontinence   - Turn and reposition patient  - Assist with mobility/ambulation  - Relieve pressure over bony prominences  - Avoid friction and shearing  - Provide appropriate hygiene as needed including keeping skin clean and dry  - Evaluate need for skin moisturizer/barrier cream  - Collaborate with interdisciplinary team   - Patient/family teaching  - Consider wound care consult   Outcome: Progressing

## 2023-03-23 NOTE — CASE MANAGEMENT
Case Management Discharge Planning Note    Patient name Marianna Overton  Location [address] [address] MRN [id number]  : 1955 Date 3/23/2023       Current Admission Date: 3/20/2023  Current Admission Diagnosis:Fracture of left femur [facility])   Patient Active Problem List    Diagnosis Date Noted   • Memory changes 2023   • Anxiety 2023   • PONV (postoperative nausea and vomiting) 2023   • Delayed emergence from anesthesia 2023   • Fracture of left femur ([facility] [address] ) 2023   • History of DVT of lower extremity 2023   • Abnormal CAT scan 2023   • Platelets decreased ([facility] [address] ) 2022   • Status post total left knee replacement using cement 2022   • Elevated brain natriuretic peptide (BNP) level 10/18/2022   • Osteoarthritis of both knees 10/18/2022   • Hyponatremia 10/15/2022   • Bipolar depression ([facility] [address] ) 10/15/2022   • Mass of axillary tail of right breast 2022   • Mass of axillary tail of left breast 2022   • Axillary mass, right 2022   • OAB (overactive bladder) 11/15/2019   • History of colon cancer 2018   • Anemia 2018   • History of endometrial cancer 2017   • Type 2 diabetes mellitus without complication, with long-term current use of insulin ([facility] [address] ) 2017   • Essential hypertension 2017   • Hyperlipidemia 2017   • Obesity 2017   • Bipolar disorder ([facility] [address] ) 2017      LOS (days): 3  Geometric Mean LOS (GMLOS) (days):   Days to GMLOS:     OBJECTIVE:  Risk of Unplanned Readmission Score: 22 64         Current admission status: Inpatient   Preferred Pharmacy:   [facility] [address] [place] [address] [place]  [place] [id number]  Phone: [phone] Fax: [phone]    Primary Care Provider: Callie Currie DO    Primary Insurance: [employer]  Secondary Insurance: MEDICARE    DISCHARGE DETAILS:    Discharge planning discussed with[de-identified] Daughter Charley Escoto       Other Referral/Resources/Interventions Provided:  Interventions: Acute Rehab  Referral Comments: CM reached out to daughter Sheri Hathaway to review rehab referral responses  CM emailed lists to Sheri Hathaway at [email or web address] per her request  CM reviewed [facility] and [facility]  Daughter has strong preference for [facility] location and verbalizes if [facility] unavailabe she would likely prefer [facility] over [facility] [address] [facility] location  Daughter states she is going to make some phone calls to contacts that she has and will proved CM with choice tomorrow  CM updated [facility] via [address]  Awaiting response from daughter for rehab choice and patient will need insurance auth prior to transfer

## 2023-03-23 NOTE — PROGRESS NOTES
Progress Note - Orthopedics   Brando Smith 70 y o  female MRN: [id number]  Unit/Bed#: [address] [address] Encounter: [id number]        Subjective: Postop day #3 status post surgical fixation left distal femur periprosthetic fracture with retrograde IM nail  Patient can not provide any history today as she is quite sedated  Patient did receive Tammy Reels prior to her brain MRI this morning, and nursing staff notes significant sedation since that time  Hgb 7 7 this morning  No acute overnight events  Brain MRI performed due to some intermittent memory changes  Neurology has also been consulted  Vitals: Blood pressure 115/61, pulse 71, temperature 98 4 °F (36 9 °C), resp  rate 18, weight 71 3 kg (157 lb 3 oz), SpO2 99 %, not currently breastfeeding  ,Body mass index is 26 98 kg/m²  Intake/Output Summary (Last 24 hours) at 3/23/2023 1118  Last data filed at 3/23/2023 0810  Gross per 24 hour   Intake --   Output 650 ml   Net -650 ml       Invasive Devices     Peripheral Intravenous Line  Duration           Peripheral IV 03/20/23 Left Antecubital 3 days                  Ortho Exam: Patient quite sedated and unable to respond to questions  Lying comfortably in bed  Ace bandage CDI  Proximal dressing with small area of strike through  Compartments soft  No calf tenderness  Good capillary refill  Sensation can not be assessed  Lab, Imaging and other studies: I have personally reviewed pertinent lab results    CBC:   Lab Results   Component Value Date    WBC 5 59 03/23/2023    HGB 7 7 (L) 03/23/2023    HCT 23 7 (L) 03/23/2023    MCV 89 03/23/2023     (L) 03/23/2023    MCH 28 9 03/23/2023    MCHC 32 5 03/23/2023    RDW 14 3 03/23/2023    MPV 8 7 (L) 03/23/2023    NRBC 0 03/23/2023     CMP:   Lab Results   Component Value Date     (L) 04/21/2017    CL 99 03/23/2023    CL 96 (L) 03/15/2023    CO2 29 03/23/2023    CO2 29 03/15/2023    BUN 14 03/23/2023    BUN 16 03/15/2023    CREATININE 0 73 03/23/2023    CREATININE 1 67 (H) 04/21/2017    CALCIUM 8 0 (L) 03/23/2023    CALCIUM 9 6 03/15/2023    AST 26 03/20/2023    AST 26 04/21/2017    ALT 26 03/20/2023    ALT 25 04/21/2017    ALKPHOS 85 03/20/2023    ALKPHOS 67 04/21/2017    PROT 6 9 04/21/2017    BILITOT 0 5 04/21/2017    EGFR 84 03/23/2023     PT/INR:   Lab Results   Component Value Date    INR 1 10 03/20/2023       Assessment:  Status post surgical fixation left distal femur periprosthetic fracture with retrograde IM nail    Plan:  PT/OT  Weight-bear as tolerated left lower extremity  Dressings will remain in place  Lovenox for DVT prophylaxis  We will continue to monitor hemoglobin  Analgesia as needed  Medical management per primary team   Patient will  require short-term rehab upon discharge  Fine to discharge from orthopedic perspective once medically stable  Weight bearing: WBAT with assistance      VTE Pharmacologic Prophylaxis: Enoxaparin (Lovenox)  VTE Mechanical Prophylaxis: sequential compression device

## 2023-03-24 PROBLEM — R33.9 URINARY RETENTION: Status: ACTIVE | Noted: 2023-03-24

## 2023-03-24 PROBLEM — D62 ACUTE BLOOD LOSS ANEMIA: Status: ACTIVE | Noted: 2023-03-24

## 2023-03-24 LAB
ANION GAP SERPL CALCULATED.3IONS-SCNC: 7 MMOL/L (ref 4–13)
BUN SERPL-MCNC: 14 MG/DL (ref 5–25)
CALCIUM SERPL-MCNC: 8.4 MG/DL (ref 8.4–10.2)
CHLORIDE SERPL-SCNC: 99 MMOL/L (ref 96–108)
CO2 SERPL-SCNC: 28 MMOL/L (ref 21–32)
CREAT SERPL-MCNC: 0.66 MG/DL (ref 0.6–1.3)
ERYTHROCYTE [DISTWIDTH] IN BLOOD BY AUTOMATED COUNT: 14.4 % (ref 11.6–15.1)
GFR SERPL CREATININE-BSD FRML MDRD: 90 ML/MIN/1.73SQ M
GLUCOSE SERPL-MCNC: 127 MG/DL (ref 65–140)
GLUCOSE SERPL-MCNC: 160 MG/DL (ref 65–140)
GLUCOSE SERPL-MCNC: 199 MG/DL (ref 65–140)
GLUCOSE SERPL-MCNC: 211 MG/DL (ref 65–140)
GLUCOSE SERPL-MCNC: 260 MG/DL (ref 65–140)
HCT VFR BLD AUTO: 22.4 % (ref 34.8–46.1)
HGB BLD-MCNC: 7.3 G/DL (ref 11.5–15.4)
MAGNESIUM SERPL-MCNC: 1.8 MG/DL (ref 1.9–2.7)
MCH RBC QN AUTO: 29 PG (ref 26.8–34.3)
MCHC RBC AUTO-ENTMCNC: 32.6 G/DL (ref 31.4–37.4)
MCV RBC AUTO: 89 FL (ref 82–98)
PHOSPHATE SERPL-MCNC: 3 MG/DL (ref 2.3–4.1)
PLATELET # BLD AUTO: 139 THOUSANDS/UL (ref 149–390)
PMV BLD AUTO: 9.4 FL (ref 8.9–12.7)
POTASSIUM SERPL-SCNC: 4.4 MMOL/L (ref 3.5–5.3)
RBC # BLD AUTO: 2.52 MILLION/UL (ref 3.81–5.12)
SODIUM SERPL-SCNC: 134 MMOL/L (ref 135–147)
WBC # BLD AUTO: 4.89 THOUSAND/UL (ref 4.31–10.16)

## 2023-03-24 RX ORDER — FUROSEMIDE 10 MG/ML
20 INJECTION INTRAMUSCULAR; INTRAVENOUS ONCE
Status: DISCONTINUED | OUTPATIENT
Start: 2023-03-24 | End: 2023-03-24

## 2023-03-24 RX ORDER — TORSEMIDE 10 MG/1
10 TABLET ORAL EVERY OTHER DAY
Status: DISCONTINUED | OUTPATIENT
Start: 2023-03-24 | End: 2023-03-25

## 2023-03-24 RX ORDER — TORSEMIDE 10 MG/1
10 TABLET ORAL EVERY OTHER DAY
Status: DISCONTINUED | OUTPATIENT
Start: 2023-03-25 | End: 2023-03-24

## 2023-03-24 RX ORDER — CLONAZEPAM 0.5 MG/1
0.25 TABLET ORAL 2 TIMES DAILY
Status: DISCONTINUED | OUTPATIENT
Start: 2023-03-25 | End: 2023-03-27

## 2023-03-24 RX ORDER — SODIUM PHOSPHATE, DIBASIC AND SODIUM PHOSPHATE, MONOBASIC 7; 19 G/133ML; G/133ML
1 ENEMA RECTAL ONCE
Status: DISCONTINUED | OUTPATIENT
Start: 2023-03-24 | End: 2023-03-25

## 2023-03-24 RX ORDER — CLONAZEPAM 0.5 MG/1
0.25 TABLET ORAL ONCE AS NEEDED
Status: COMPLETED | OUTPATIENT
Start: 2023-03-24 | End: 2023-03-24

## 2023-03-24 RX ORDER — CLONAZEPAM 0.5 MG/1
0.25 TABLET ORAL 2 TIMES DAILY
Status: DISCONTINUED | OUTPATIENT
Start: 2023-03-24 | End: 2023-03-24

## 2023-03-24 RX ADMIN — CYANOCOBALAMIN TAB 500 MCG 1000 MCG: 500 TAB at 08:41

## 2023-03-24 RX ADMIN — IRON SUCROSE 200 MG: 20 INJECTION, SOLUTION INTRAVENOUS at 11:50

## 2023-03-24 RX ADMIN — MAGNESIUM OXIDE TAB 400 MG (241.3 MG ELEMENTAL MG) 400 MG: 400 (241.3 MG) TAB at 08:39

## 2023-03-24 RX ADMIN — POLYETHYLENE GLYCOL 3350 17 G: 17 POWDER, FOR SOLUTION ORAL at 08:41

## 2023-03-24 RX ADMIN — SENNOSIDES 17.2 MG: 8.6 TABLET, FILM COATED ORAL at 22:11

## 2023-03-24 RX ADMIN — INSULIN LISPRO 1 UNITS: 100 INJECTION, SOLUTION INTRAVENOUS; SUBCUTANEOUS at 22:29

## 2023-03-24 RX ADMIN — INSULIN LISPRO 1 UNITS: 100 INJECTION, SOLUTION INTRAVENOUS; SUBCUTANEOUS at 17:06

## 2023-03-24 RX ADMIN — CLONAZEPAM 0.25 MG: 0.5 TABLET ORAL at 22:14

## 2023-03-24 RX ADMIN — ZIPRASIDONE HYDROCHLORIDE 80 MG: 40 CAPSULE ORAL at 08:42

## 2023-03-24 RX ADMIN — ENOXAPARIN SODIUM 40 MG: 40 INJECTION SUBCUTANEOUS at 08:41

## 2023-03-24 RX ADMIN — HYDROMORPHONE HYDROCHLORIDE 0.2 MG: 0.2 INJECTION, SOLUTION INTRAMUSCULAR; INTRAVENOUS; SUBCUTANEOUS at 19:47

## 2023-03-24 RX ADMIN — METOPROLOL SUCCINATE 25 MG: 25 TABLET, EXTENDED RELEASE ORAL at 08:40

## 2023-03-24 RX ADMIN — ACETAMINOPHEN 975 MG: 325 TABLET, FILM COATED ORAL at 08:38

## 2023-03-24 RX ADMIN — TORSEMIDE 10 MG: 10 TABLET ORAL at 12:07

## 2023-03-24 RX ADMIN — ACETAMINOPHEN 975 MG: 325 TABLET, FILM COATED ORAL at 22:11

## 2023-03-24 RX ADMIN — POLYETHYLENE GLYCOL 3350 17 G: 17 POWDER, FOR SOLUTION ORAL at 22:06

## 2023-03-24 RX ADMIN — INSULIN LISPRO 2 UNITS: 100 INJECTION, SOLUTION INTRAVENOUS; SUBCUTANEOUS at 12:08

## 2023-03-24 RX ADMIN — ATORVASTATIN CALCIUM 40 MG: 40 TABLET, FILM COATED ORAL at 17:04

## 2023-03-24 RX ADMIN — INSULIN LISPRO 2 UNITS: 100 INJECTION, SOLUTION INTRAVENOUS; SUBCUTANEOUS at 08:38

## 2023-03-24 RX ADMIN — ZIPRASIDONE HYDROCHLORIDE 80 MG: 40 CAPSULE ORAL at 22:16

## 2023-03-24 RX ADMIN — CLONAZEPAM 0.25 MG: 0.5 TABLET ORAL at 17:05

## 2023-03-24 RX ADMIN — OXYCODONE HYDROCHLORIDE 2.5 MG: 5 TABLET ORAL at 12:07

## 2023-03-24 NOTE — PHYSICAL THERAPY NOTE
"   PT TREATMENT     03/24/23 6775   Note Type   Note Type Treatment   Pain Assessment   Pain Assessment Tool Perry-Baker FACES   Perry-Baker FACES Pain Rating 6   Pain Location/Orientation Location: Leg;Location: Knee;Orientation: Left   Restrictions/Precautions   LLE Weight Bearing Per Order WBAT   Other Precautions Fall Risk;Pain;Bed Alarm; Chair Alarm   General   Chart Reviewed Yes   Additional Pertinent History Pt's hgb is 7 3 today  Cognition   Overall Cognitive Status WFL  (slow to respond)   Arousal/Participation Arousable   Following Commands Follows one step commands without difficulty   Subjective   Subjective \"I am so tired, my eyes are burning  I did not sleep at all last night  \"   Bed Mobility   Supine to Sit 3  Moderate assistance   Additional items LE management;Verbal cues; Increased time required   Sit to Supine 3  Moderate assistance   Additional items LE management; Increased time required   Transfers   Sit to Stand 4  Minimal assistance   Stand to Sit 4  Minimal assistance   Ambulation/Elevation   Gait pattern   (slow mayank)   Gait Assistance 4  Minimal assist   Assistive Device Rolling walker   Distance 20 feet   Balance   Static Sitting Fair +   Static Standing Fair -   Activity Tolerance   Activity Tolerance Patient limited by fatigue   Exercises   Neuro re-ed x 10 each B LE ankle pumps and quad sets;  x 10 each L LE LAQ AAROM, L heel slide AAROM, L hip abd/add AAROM   Assessment   Prognosis Good   Problem List Decreased strength;Decreased range of motion; Impaired balance;Decreased mobility;Pain   Assessment Pt is more awake today but still moving slowly and with limited activity tolerance POD 4 s/p distal femur periprosthetic fx s/p ORIF with IM nail  Fatigue may be driven at least partly by low hgb and pt's report of not sleeping  Pt is able to ambulate with min assist with a walker  but has not been able to go further than 20 feet at one time    Hopefull that in upcoming sessions pt can " increase her ambulation distance  Plan   Treatment/Interventions Functional transfer training;LE strengthening/ROM; Therapeutic exercise; Endurance training;Gait training;Bed mobility; Equipment eval/education;Patient/family training   Progress Progressing toward goals   PT Frequency Other (Comment)  (daily)   Recommendation   PT Discharge Recommendation Post acute rehabilitation services   AM-PAC Basic Mobility Inpatient   Turning in Flat Bed Without Bedrails 3   Lying on Back to Sitting on Edge of Flat Bed Without Bedrails 2   Moving Bed to Chair 2   Standing Up From Chair Using Arms 3   Walk in Room 3   Climb 3-5 Stairs With Railing 1   Basic Mobility Inpatient Raw Score 14   Basic Mobility Standardized Score 35 55   Highest Level Of Mobility   JH-HLM Goal 4: Move to chair/commode   JH-HLM Achieved 6: Walk 10 steps or more   End of Consult   Patient Position at End of Consult All needs within reach;Bed/Chair alarm activated;Supine  (Pt declined sitting OOB)   Licensure   [place] License Number  Guy ZUÑIGA  [id number]

## 2023-03-24 NOTE — PLAN OF CARE
Problem: Potential for Falls  Goal: Patient will remain free of falls  Description: INTERVENTIONS:  - Educate patient/family on patient safety including physical limitations  - Instruct patient to call for assistance with activity   - Consult OT/PT to assist with strengthening/mobility   - Keep Call bell within reach  - Keep bed low and locked with side rails adjusted as appropriate  - Keep care items and personal belongings within reach  - Initiate and maintain comfort rounds  - Make Fall Risk Sign visible to staff  - Offer Toileting every  Hours, in advance of need  - Initiate/Maintain alarm  - Obtain necessary fall risk management equipment:   - Apply yellow socks and bracelet for high fall risk patients  - Consider moving patient to room near nurses station  Outcome: Progressing     Problem: Nutrition/Hydration-ADULT  Goal: Nutrient/Hydration intake appropriate for improving, restoring or maintaining nutritional needs  Description: Monitor and assess patient's nutrition/hydration status for malnutrition  Collaborate with interdisciplinary team and initiate plan and interventions as ordered  Monitor patient's weight and dietary intake as ordered or per policy  Utilize nutrition screening tool and intervene as necessary  Determine patient's food preferences and provide high-protein, high-caloric foods as appropriate       INTERVENTIONS:  - Monitor oral intake, urinary output, labs, and treatment plans  - Assess nutrition and hydration status and recommend course of action  - Evaluate amount of meals eaten  - Assist patient with eating if necessary   - Allow adequate time for meals  - Recommend/ encourage appropriate diets, oral nutritional supplements, and vitamin/mineral supplements  - Order, calculate, and assess calorie counts as needed  - Recommend, monitor, and adjust tube feedings and TPN/PPN based on assessed needs  - Assess need for intravenous fluids  - Provide specific nutrition/hydration education as appropriate  - Include patient/family/caregiver in decisions related to nutrition  Outcome: Progressing     Problem: MOBILITY - ADULT  Goal: Maintain or return to baseline ADL function  Description: INTERVENTIONS:  -  Assess patient's ability to carry out ADLs; assess patient's baseline for ADL function and identify physical deficits which impact ability to perform ADLs (bathing, care of mouth/teeth, toileting, grooming, dressing, etc )  - Assess/evaluate cause of self-care deficits   - Assess range of motion  - Assess patient's mobility; develop plan if impaired  - Assess patient's need for assistive devices and provide as appropriate  - Encourage maximum independence but intervene and supervise when necessary  - Involve family in performance of ADLs  - Assess for home care needs following discharge   - Consider OT consult to assist with ADL evaluation and planning for discharge  - Provide patient education as appropriate  Outcome: Progressing  Goal: Maintains/Returns to pre admission functional level  Description: INTERVENTIONS:  - Perform BMAT or MOVE assessment daily    - Set and communicate daily mobility goal to care team and patient/family/caregiver  - Collaborate with rehabilitation services on mobility goals if consulted  - Perform Range of Motion  times a day  - Reposition patient every  hours    - Dangle patient  times a day  - Stand patient  times a day  - Ambulate patient  times a day  - Out of bed to chair  times a day   - Out of bed for meals times a day  - Out of bed for toileting  - Record patient progress and toleration of activity level   Outcome: Progressing     Problem: Prexisting or High Potential for Compromised Skin Integrity  Goal: Skin integrity is maintained or improved  Description: INTERVENTIONS:  - Identify patients at risk for skin breakdown  - Assess and monitor skin integrity  - Assess and monitor nutrition and hydration status  - Monitor labs   - Assess for incontinence   - Turn and reposition patient  - Assist with mobility/ambulation  - Relieve pressure over bony prominences  - Avoid friction and shearing  - Provide appropriate hygiene as needed including keeping skin clean and dry  - Evaluate need for skin moisturizer/barrier cream  - Collaborate with interdisciplinary team   - Patient/family teaching  - Consider wound care consult   Outcome: Progressing

## 2023-03-24 NOTE — PROGRESS NOTES
Enrico 44  Progress Note - Leslie Ingram 1955, 70 y o  female MRN: [id number]  Unit/Bed#: [facility] Encounter: [id number]  Primary Care Provider: Edward Branch DO   Date and time admitted to hospital: 3/20/2023 11:11 AM    * Fracture of left femur [facility])  Assessment & Plan  · Left total knee arthroplasty in December 2022  · Suffered a mechanical fall in the bathtub  · Imaging revealed an acute displaced periprosthetic left distal femoral fracture    · Orthopedics following   · POD #4 s/p left femoral intramedullary nail (retrograde for periprosthetic fracture)  · Noted acute blood loss anemia with a hemoglobin of 11 3 -> 7 3  · Attempted to transfuse 1 unit of PRBCs, however, unable to transfuse as patient is type O- and current recommendations are to transfuse if less than 6 given critical shortage of PRBCs  · Spoke with daughter, at this time, we will give Venofer 200 mg IV x1  · Monitor hemoglobin in the morning  · Plan for Lovenox 40 mg daily for 6 weeks  · Hold ASA while on Lovenox  · Weight bearing as tolerated   · SCDs  · Pain management with bowel regimen   · Supportive care  · PT/OT recommending acute rehab   · Stable for discharge from a surgical perspective    Acute blood loss anemia  Assessment & Plan  HGB 11-> 8 3 -> 8 1 -> 7 7 -> 7 3  Likely acute blood loss anemia from surgery  · B12 322  · Folate 13 6  · Iron panel suspicious for DANIELLA   · Stop iron supplementation at this time   · We will give Venofer 200 mg IV x1  · Unable to transfuse 1 unit of PRBCs as her blood type is O- and there is a critical shortage; blood bank recommends transfusion for hemoglobin is less than 6  · Add B12 supplement   · Check FOBT   · Monitor HGB and transfuse if less than 6 or if patient becomes symptomatic    Urinary retention  Assessment & Plan  Developed postoperative urinary retention  · Failed voiding trial  · Shanks catheter reinserted on 3/24/2023  · Plan to continue Shanks catheter until mobility improves  · Monitor for constipation    Memory changes  Assessment & Plan  Patient cooperative, pleasant but slow the respond  Daughter noted an acute change in her memory about 3 weeks ago  · CT head unremarkable  · MRI brain with acute findings  · Appreciate neurology and psychiatry input   · Will need further outpatient memory testing testing   · Stop Lexapro as this can worsen memory issues  · Decrease Klonopin from twice daily to once daily at bedtime  · Decrease Geodon to 60 mg twice daily  · Continue newly added B12 supplement  · UA unremarkable   · Supportive care     Bipolar depression [facility])  Assessment & Plan  · Appreciate psychiatry input   · Decrease Geodon to 80 mg BID (maximum dose)  · Decrease Klonopin to 0 25 mg HS  · Stop Lexapro due to concern for acute memory changes  · Mood is stable, pleasant and cooperative     Hyponatremia  Assessment & Plan  · Acute on Chronic  Noted on admission with sodium of 131, possibly secondary to antipsychotics medications  Previously on Wellbutrin and Zoloft, now on Lexapro   · Will discontinue Lexapro as this could be contributing to acute memory/slow response   · Regular diet with 1500 mL fluid restriction   · Na today 134  · Resume torsemide 10 mg every other day  · Monitor sodium in am     Abnormal CAT scan  Assessment & Plan  Incidental finding on CT of spine: 10 mm hypodense right thyroid nodule  · Recommendations on incidental thyroid nodules in the Journal of the [employer] of Radiology [facility]), no further evaluation is recommended      History of DVT of lower extremity  Assessment & Plan  · Noted in history  · Patient only takes aspirin 81 mg  · Currently holding aspirin while on lovenox     Hyperlipidemia  Assessment & Plan  · Continue Lipitor     Essential hypertension  Assessment & Plan  BP stable  · Continue Toprol XL  · We will resume home torsemide 10 mg every other day    Type 2 diabetes mellitus without complication, with long-term current use of insulin [facility])  Assessment & Plan  Lab Results   Component Value Date    HGBA1C 5 8 (H) 2023       Recent Labs     23  1635 23  2106 23  0722 23  1143   POCGLU 118 217* 199* 260*       Blood Sugar Average: Last 72 hrs:  (P) 187 [id number]     · Recent A1c 6 9 -> 5 8  · Hold metformin and Toujeo   · Accu-Cheks ACHS  · SSI  · Hypoglycemic protocol      VTE Pharmacologic Prophylaxis:   Moderate Risk (Score 3-4) - Pharmacological DVT Prophylaxis Ordered: enoxaparin (Lovenox)  Patient Centered Rounds: I performed bedside rounds with nursing staff today  Discussions with Specialists or Other Care Team Provider: nursing, CM, orthopedics    Education and Discussions with Family / Patient: Updated  (daughter) via phone  Total Time Spent on Date of Encounter in care of patient: 35 minutes This time was spent on one or more of the following: performing physical exam; counseling and coordination of care; obtaining or reviewing history; documenting in the medical record; reviewing/ordering tests, medications or procedures; communicating with other healthcare professionals and discussing with patient's family/caregivers  Current Length of Stay: 4 day(s)  Current Patient Status: Inpatient   Certification Statement: The patient will continue to require additional inpatient hospital stay due to Postoperative monitoring, anemia  Discharge Plan: Anticipate discharge tomorrow to Acute rehab    Code Status: Level 1 - Full Code    Subjective:   Patient seen and examined at bedside this morning  Patient unable to urinate overnight  Was able to work with physical therapy this morning  Is feeling slightly better today  Denies any dizziness or lightheadedness  Denies any chest pain or shortness of breath  No nausea or vomiting  Appetite is improving      Objective:     Vitals:   Temp (24hrs), Av 5 °F (36 9 °C), Min:98 2 °F (36 8 °C), Max:98 8 °F (37 1 °C)    Temp:  [98 2 °F (36 8 °C)-98 8 °F (37 1 °C)] 98 8 °F (37 1 °C)  HR:  [67-84] 84  Resp:  [16-22] 18  BP: (113-132)/(57-60) 132/59  SpO2:  [97 %-98 %] 98 %  Body mass index is 26 98 kg/m²  Input and Output Summary (last 24 hours): Intake/Output Summary (Last 24 hours) at 3/24/2023 1417  Last data filed at 3/24/2023 1207  Gross per 24 hour   Intake 120 ml   Output 759 ml   Net -639 ml       Physical Exam:   Physical Exam  Vitals and nursing note reviewed  Constitutional:       General: She is not in acute distress  Appearance: She is obese  She is ill-appearing  She is not toxic-appearing or diaphoretic  Comments: Pleasant female resting in bed on room air   HENT:      Head: Normocephalic  Mouth/Throat:      Mouth: Mucous membranes are moist    Eyes:      Conjunctiva/sclera: Conjunctivae normal    Cardiovascular:      Rate and Rhythm: Normal rate  Pulmonary:      Effort: Pulmonary effort is normal       Breath sounds: Normal breath sounds  No wheezing, rhonchi or rales  Abdominal:      General: Bowel sounds are normal  There is no distension  Palpations: Abdomen is soft  Tenderness: There is no abdominal tenderness  Musculoskeletal:         General: Tenderness present  Cervical back: Normal range of motion  Right lower leg: No edema  Left lower leg: No edema  Skin:     General: Skin is warm and dry  Capillary Refill: Capillary refill takes less than 2 seconds  Comments: Left hip dressing, 2 dressings noted to left knee -please see picture attached   Neurological:      Mental Status: She is alert and oriented to person, place, and time  Mental status is at baseline  Motor: Weakness present  Psychiatric:         Mood and Affect: Affect is flat  Speech: Speech normal          Behavior: Behavior normal  Behavior is cooperative  Thought Content:  Thought content normal          Judgment: Judgment normal  Additional Data:     Labs:  Results from last 7 days   Lab Units 03/24/23  0506 03/23/23  0522   WBC Thousand/uL 4 89 5 59   HEMOGLOBIN g/dL 7 3* 7 7*   HEMATOCRIT % 22 4* 23 7*   PLATELETS Thousands/uL 139* 123*   NEUTROS PCT %  --  69   LYMPHS PCT %  --  20   MONOS PCT %  --  8   EOS PCT %  --  2     Results from last 7 days   Lab Units 03/24/23  0506 03/21/23  0459 03/20/23  1224   SODIUM mmol/L 134*   < > 131*   POTASSIUM mmol/L 4 4   < > 3 8   CHLORIDE mmol/L 99   < > 92*   CO2 mmol/L 28   < > 29   BUN mg/dL 14   < > 13   CREATININE mg/dL 0 66   < > 0 82   ANION GAP mmol/L 7   < > 10   CALCIUM mg/dL 8 4   < > 8 6   ALBUMIN g/dL  --   --  3 7   TOTAL BILIRUBIN mg/dL  --   --  0 46   ALK PHOS U/L  --   --  85   ALT U/L  --   --  26   AST U/L  --   --  26   GLUCOSE RANDOM mg/dL 127   < > 181*    < > = values in this interval not displayed       Results from last 7 days   Lab Units 03/20/23  1224   INR  1 10     Results from last 7 days   Lab Units 03/24/23  1143 03/24/23  0722 03/23/23  2106 03/23/23  1635 03/23/23  1109 03/23/23  0717 03/22/23  2040 03/22/23  1553 03/22/23  1052 03/22/23  0705 03/21/23  2042 03/20/23  2125   POC GLUCOSE mg/dl 260* 199* 217* 118 291* 126 163* 142* 265* 134 150* 179*     Results from last 7 days   Lab Units 03/22/23  0556   HEMOGLOBIN A1C % 5 8*           Lines/Drains:  Invasive Devices     Peripheral Intravenous Line  Duration           Peripheral IV 03/20/23 Left Antecubital 4 days    Peripheral IV 03/24/23 Left;Ventral (anterior) Forearm <1 day              Urinary Catheter:  Goal for removal: Voiding trial when ambulation improves               Imaging: Reviewed radiology reports from this admission including: CT head, MRI brain, xray(s) and CT cervical spine     Recent Cultures (last 7 days):         Last 24 Hours Medication List:   Current Facility-Administered Medications   Medication Dose Route Frequency Provider Last Rate   • acetaminophen  975 mg Oral BID Ketty Arreaga JOHN Choi     • atorvastatin  40 mg Oral Daily With Dinner JOHN Ricci     • clonazePAM  0 25 mg Oral BID Blanquita Silva MD     • cyanocobalamin  1,000 mcg Oral Daily JOHN Hartman     • enoxaparin  40 mg Subcutaneous Q24H [address] JOHN Ricci     • HYDROmorphone  0 2 mg Intravenous Q4H PRN Janie Benjamin PA-C     • insulin lispro  1-5 Units Subcutaneous TID AC JOHN Blanco     • insulin lispro  1-5 Units Subcutaneous HS Amilcarayide D JOHN Dale     • iron sucrose  200 mg Intravenous Daily JOHN Hartman     • magnesium Oxide  400 mg Oral Daily JOHN Hartman     • metoprolol succinate  25 mg Oral Daily JOHN Ricci     • oxyCODONE  2 5 mg Oral Q6H PRN JOHN Ricci     • polyethylene glycol  17 g Oral BID JOHN Hartman     • senna  2 tablet Oral HS JOHN Hartman     • torsemide  10 mg Oral Every Other Day JOHN Hartman     • ziprasidone  80 mg Oral Daily JHON Ricci     • ziprasidone  80 mg Oral HS Blanquita Silva MD          Today, Patient Was Seen By: JOHN Hartman    **Please Note: This note may have been constructed using a voice recognition system  **

## 2023-03-24 NOTE — PROGRESS NOTES
Progress Note - Orthopedics   Angela Segura 70 y o  female MRN: [id number]  Unit/Bed#: [address] [place] [address] Encounter: [id number]        Subjective: Postop day #4 status post surgical fixation left distal femur periprosthetic fracture with retrograde IM nail    The patient is much more alert this morning  She notes pain about the left knee  She notes good sensation of the left lower extremity  Hemoglobin 7 3 this morning  No acute overnight events  Brain MRI showed no acute pathology  Vitals: Blood pressure 132/59, pulse 84, temperature 98 8 °F (37 1 °C), temperature source Oral, resp  rate 18, weight 71 3 kg (157 lb 3 oz), SpO2 98 %, not currently breastfeeding  ,Body mass index is 26 98 kg/m²  Intake/Output Summary (Last 24 hours) at 3/24/2023 1040  Last data filed at 3/24/2023 0046  Gross per 24 hour   Intake --   Output 759 ml   Net -759 ml       Invasive Devices     Peripheral Intravenous Line  Duration           Peripheral IV 03/20/23 Left Antecubital 4 days                  Ortho Exam: Patient alert and oriented x3 in no acute distress sitting up comfortably in bed  Ace bandage lower leg and knee removed  Small nickel sized region of strikethrough on each of her 3 dressings  Swelling left knee  Compartments soft  No calf tenderness  Moves toes/ankle freely  Good capillary refill  Sensation intact lateral femoral cutaneous, saphenous, sural, deep/superficial peroneal nerve distributions  Lab, Imaging and other studies: I have personally reviewed pertinent lab results    CBC:   Lab Results   Component Value Date    WBC 4 89 03/24/2023    HGB 7 3 (L) 03/24/2023    HCT 22 4 (L) 03/24/2023    MCV 89 03/24/2023     (L) 03/24/2023    MCH 29 0 03/24/2023    MCHC 32 6 03/24/2023    RDW 14 4 03/24/2023    MPV 9 4 03/24/2023    NRBC 0 03/23/2023     CMP:   Lab Results   Component Value Date     (L) 04/21/2017    CL 99 03/24/2023    CL 96 (L) 03/15/2023    CO2 28 03/24/2023    CO2 29 03/15/2023    BUN 14 03/24/2023    BUN 16 03/15/2023    CREATININE 0 66 03/24/2023    CREATININE 1 67 (H) 04/21/2017    CALCIUM 8 4 03/24/2023    CALCIUM 9 6 03/15/2023    AST 26 03/20/2023    AST 26 04/21/2017    ALT 26 03/20/2023    ALT 25 04/21/2017    ALKPHOS 85 03/20/2023    ALKPHOS 67 04/21/2017    PROT 6 9 04/21/2017    BILITOT 0 5 04/21/2017    EGFR 90 03/24/2023     PT/INR:   Lab Results   Component Value Date    INR 1 10 03/20/2023       Assessment:  Status post surgical fixation left distal femur periprosthetic fracture with retrograde IM nail    Plan:  PT/OT  Laurita Perfect as tolerated left lower extremity   Dressings will remain in place   Lovenox for DVT prophylaxis   Medical team did reach out in regards to hemoglobin of 7 3  They are going to transfuse 1 unit of packed red blood cells today  Steve Harvey as needed   Medical management per primary team  Tamiko Vega will  require short-term rehab upon discharge  Fine to discharge from orthopedic perspective once medically stable  Weight bearing: WBAT with assistance      VTE Pharmacologic Prophylaxis: Enoxaparin (Lovenox)  VTE Mechanical Prophylaxis: sequential compression device

## 2023-03-24 NOTE — CASE MANAGEMENT
Case Management Discharge Planning Note    Patient name Jim Oconnell  [address] MRN [id number]  : 1955 Date 3/24/2023       Current Admission Date: 3/20/2023  Current Admission Diagnosis:Fracture of left femur [facility])   Patient Active Problem List    Diagnosis Date Noted   • Memory changes 2023   • Anxiety 2023   • PONV (postoperative nausea and vomiting) 2023   • Delayed emergence from anesthesia 2023   • Fracture of left femur ([facility] ) 2023   • History of DVT of lower extremity 2023   • Abnormal CAT scan 2023   • Platelets decreased ([facility] ) 2022   • Status post total left knee replacement using cement 2022   • Elevated brain natriuretic peptide (BNP) level 10/18/2022   • Osteoarthritis of both knees 10/18/2022   • Hyponatremia 10/15/2022   • Bipolar depression ([facility] ) 10/15/2022   • Mass of axillary tail of right breast 2022   • Mass of axillary tail of left breast 2022   • Axillary mass, right 2022   • OAB (overactive bladder) 11/15/2019   • History of colon cancer 2018   • Anemia 2018   • History of endometrial cancer 2017   • Type 2 diabetes mellitus without complication, with long-term current use of insulin ([facility] ) 2017   • Essential hypertension 2017   • Hyperlipidemia 2017   • Obesity 2017   • Bipolar disorder ([facility] ) 2017      LOS (days): 4  Geometric Mean LOS (GMLOS) (days):   Days to GMLOS:     OBJECTIVE:  Risk of Unplanned Readmission Score: 20 34         Current admission status: Inpatient   Preferred Pharmacy:   [address] [facility] [address] [place]  [place] [address]  Phone: [phone] Fax: [phone]    Primary Care Provider: Sony De Jesus DO    Primary Insurance: Cheri Etienne  Secondary Insurance: MEDICARE    DISCHARGE DETAILS:                                          Other Referral/Resources/Interventions Provided:  Interventions: Acute Rehab  Referral Comments: CM recieved voicemail from patient's daughter Carolina Acharya explaining that they would like to accept bed offer for [facility] with strong preference for bed at [facility] d/t patient's 's inability to drive to [facility]  CM returned Carolina Acharya' call and reviewed that CM reserved bed offer at [address] and next step is insurance authorization which could possibly take a couple days to recieved determination  CM reviewed that d/t today being Friday, auth would be submitted but that it is unlikely determination would be recieved over the weekend so patient will most likely remain hospitalized until Monday  CM reviewed that if determination is recieved over the weekend and patient is approved to go to [facility], [facility] Lyn will arrange transport for patient and notify Carolina Acharya of same  Jenifer verbalizes understanding           Treatment Team Recommendation: Short Term Rehab  Discharge Destination Plan[de-identified] Short Term Rehab, Acute Rehab

## 2023-03-24 NOTE — PROGRESS NOTES
Patient examined spoke to the nurse patient is much more alert and awake she is able to communicate her feelings well she was able to give out background basic psychiatric history she was able to tell me the name of her psychiatrist Dr Kat Youssef she has been working with her for a long time'' she is also my good friend'' patient was able to tell me the names and the dosage of her medications she reports that she is taking Geodon 80 mg twice a day and Klonopin 0 5 mg p o  twice daily currently patient is on Klonopin 0 25 mg at bedtime only I will increase to 0 25 mg p o  twice daily for now since patient improved with lethargy and she is asking to adjust her Klonopin for anxiety  Patient's Geodon was adjusted yesterday to the 80 mg p o  twice daily which is the maximum recommended dose of Geodon I will not increase her medication and patient does not want to reduce Geodon at this time because this is her normal dosage at home and she wants to continue the same dosage  Medical evaluation and treatment is in progress  Medical progress note reviewed and noted  Patient is not in distress and she offers no new complaints she was able to answer all my simple questions she knows that she is at the hospital she was able to tell me her age  She offers no new complaints  Patient denies auditory or visual hallucination  Patient denies suicidal or homicidal ideation  She is stable at her baseline with bipolar depression and anxiety  Therapy is done with good response  I will follow-up

## 2023-03-24 NOTE — PLAN OF CARE
Problem: OCCUPATIONAL THERAPY ADULT  Goal: Performs self-care activities at highest level of function for planned discharge setting  See evaluation for individualized goals  Description: Treatment Interventions: ADL retraining, Functional transfer training, UE strengthening/ROM, Patient/family training, Equipment evaluation/education, Compensatory technique education, Activityengagement, Energy conservation          See flowsheet documentation for full assessment, interventions and recommendations  Outcome: Progressing  Note: Limitation: Decreased ADL status, Decreased UE ROM, Decreased UE strength, Decreased endurance, Decreased self-care trans, Decreased high-level ADLs     Assessment: Patient seen for OT treatment  Patient requires mod assist for transfers and LB ADLS and min assist for UB ADLS  Patient is cooperative and pleasant  Patient tolerated session well  Patient will benefit from continued OT services to maximize functional performance with ADLS       OT Discharge Recommendation: Post acute rehabilitation services

## 2023-03-24 NOTE — OCCUPATIONAL THERAPY NOTE
OT TREATMENT         03/24/23 0937   Note Type   Note Type Treatment   Pain Assessment   Pain Assessment Tool 0-10   Pain Score 8   Pain Location/Orientation Orientation: Left; Location: Leg   Restrictions/Precautions   LLE Weight Bearing Per Order WBAT   Other Precautions Chair Alarm; Bed Alarm; Fall Risk   ADL   Eating Assistance 5  Supervision/Setup   Grooming Assistance 5  Supervision/Setup   Grooming Deficit Wash/dry face   Grooming Comments seated on edge of bed   UB Bathing Assistance 4  Minimal Assistance   UB Bathing Deficit Setup;Right arm;Left arm; Chest   LB Bathing Assistance 3  Moderate Assistance   LB Bathing Deficit Right upper leg;Left upper leg;Perineal area; Buttocks; Left lower leg including foot;Right lower leg including foot   UB Dressing Assistance 4  Minimal Assistance   LB Dressing Assistance 3  Moderate Assistance   LB Dressing Comments min assist to doff/maximiliano L sock with use of reacher and sock aid seated on edge of bed   Toileting Assistance  3  Moderate Assistance   Bed Mobility   Supine to Sit 3  Moderate assistance   Additional items Assist x 1;Verbal cues;LE management   Transfers   Sit to Stand 3  Moderate assistance   Additional items Assist x 1;Verbal cues   Stand to Sit 4  Minimal assistance   Additional items Assist x 1;Verbal cues   Functional Mobility   Functional Mobility 4  Minimal assistance   Additional Comments 15 feet with RW in room   ROM- Right Upper Extremities   R Shoulder AROM; Flexion; Horizontal ABduction   R Elbow AROM;Elbow extension;Elbow flexion   R Hand AROM; Thumb; Index finger; Long finger;Ring finger;Little finger   R Weight/Reps/Sets 10 times each seated on edge of bed   ROM - Left Upper Extremities    L Shoulder AROM; Flexion; Horizontal ABduction   L Elbow AROM;Elbow flexion;Elbow extension   L Hand AROM; Thumb; Index finger; Long finger;Ring finger;Little finger   L Weight/Reps/Sets 10 times each seated on edge of bed   Cognition   Arousal/Participation Cooperative Following Commands Follows one step commands with increased time or repetition   Activity Tolerance   Medical Staff Made Aware yes, Queenie Harper   Assessment   Assessment Patient seen for OT treatment  Patient requires mod assist for transfers and LB ADLS and min assist for UB ADLS  Patient is cooperative and pleasant  Patient tolerated session well  Patient will benefit from continued OT services to maximize functional performance with ADLS  The patient's raw score on the AM-PAC Daily Activity Inpatient Short Form is 16  A raw score of less than 19 suggests the patient may benefit from discharge to post-acute rehabilitation services  Please refer to the recommendation of the Occupational Therapist for safe discharge planning  Plan   Treatment Interventions ADL retraining;Functional transfer training;UE strengthening/ROM; Endurance training;Equipment evaluation/education;Patient/family training; Activityengagement; Compensatory technique education   OT Frequency 3-5x/wk   Recommendation   OT Discharge Recommendation Post acute rehabilitation services   AM-PAC Daily Activity Inpatient   Lower Body Dressing 2   Bathing 2   Toileting 2   Upper Body Dressing 3   Grooming 3   Eating 4   Daily Activity Raw Score 16   Daily Activity Standardized Score (Calc for Raw Score >=11) 35 96   AM-PAC Applied Cognition Inpatient   Following a Speech/Presentation 3   Understanding Ordinary Conversation 3   Taking Medications 1   Remembering Where Things Are Placed or Put Away 2   Remembering List of 4-5 Errands 2   Taking Care of Complicated Tasks 1   Applied Cognition Raw Score 12   Applied Cognition Standardized Score 87 81   Licensure   [place] License Number  Shaneka Martíneznoris [address] OTR/L [id number]

## 2023-03-24 NOTE — ARC ADMISSION
From [facility] perspective patient is appropriate and is cleared for auth submission   CM Made aware

## 2023-03-24 NOTE — PLAN OF CARE
Problem: Potential for Falls  Goal: Patient will remain free of falls  Description: INTERVENTIONS:  - Educate patient/family on patient safety including physical limitations  - Instruct patient to call for assistance with activity   - Consult OT/PT to assist with strengthening/mobility   - Keep Call bell within reach  - Keep bed low and locked with side rails adjusted as appropriate  - Keep care items and personal belongings within reach  - Initiate and maintain comfort rounds  - Make Fall Risk Sign visible to staff  - Offer Toileting every 2 Hours, in advance of need  - Initiate/Maintain bed alarm  - Obtain necessary fall risk management equipment: alarm  - Apply yellow socks and bracelet for high fall risk patients  - Consider moving patient to room near nurses station  Outcome: Progressing     Problem: Nutrition/Hydration-ADULT  Goal: Nutrient/Hydration intake appropriate for improving, restoring or maintaining nutritional needs  Description: Monitor and assess patient's nutrition/hydration status for malnutrition  Collaborate with interdisciplinary team and initiate plan and interventions as ordered  Monitor patient's weight and dietary intake as ordered or per policy  Utilize nutrition screening tool and intervene as necessary  Determine patient's food preferences and provide high-protein, high-caloric foods as appropriate       INTERVENTIONS:  - Monitor oral intake, urinary output, labs, and treatment plans  - Assess nutrition and hydration status and recommend course of action  - Evaluate amount of meals eaten  - Assist patient with eating if necessary   - Allow adequate time for meals  - Recommend/ encourage appropriate diets, oral nutritional supplements, and vitamin/mineral supplements  - Order, calculate, and assess calorie counts as needed  - Recommend, monitor, and adjust tube feedings and TPN/PPN based on assessed needs  - Assess need for intravenous fluids  - Provide specific nutrition/hydration education as appropriate  - Include patient/family/caregiver in decisions related to nutrition  Outcome: Progressing     Problem: MOBILITY - ADULT  Goal: Maintain or return to baseline ADL function  Description: INTERVENTIONS:  -  Assess patient's ability to carry out ADLs; assess patient's baseline for ADL function and identify physical deficits which impact ability to perform ADLs (bathing, care of mouth/teeth, toileting, grooming, dressing, etc )  - Assess/evaluate cause of self-care deficits   - Assess range of motion  - Assess patient's mobility; develop plan if impaired  - Assess patient's need for assistive devices and provide as appropriate  - Encourage maximum independence but intervene and supervise when necessary  - Involve family in performance of ADLs  - Assess for home care needs following discharge   - Consider OT consult to assist with ADL evaluation and planning for discharge  - Provide patient education as appropriate  Outcome: Progressing  Goal: Maintains/Returns to pre admission functional level  Description: INTERVENTIONS:  - Perform BMAT or MOVE assessment daily    - Set and communicate daily mobility goal to care team and patient/family/caregiver     - Collaborate with rehabilitation services on mobility goals if consulted    - Dangle patient 2 times a day  - Stand patient 2 times a day  - Ambulate patient 2 times a day  - Out of bed to chair 2 times a day   - Out of bed for meals 2 times a day  - Out of bed for toileting  - Record patient progress and toleration of activity level   Outcome: Progressing     Problem: Prexisting or High Potential for Compromised Skin Integrity  Goal: Skin integrity is maintained or improved  Description: INTERVENTIONS:  - Identify patients at risk for skin breakdown  - Assess and monitor skin integrity  - Assess and monitor nutrition and hydration status  - Monitor labs   - Assess for incontinence   - Turn and reposition patient  - Assist with mobility/ambulation  - Relieve pressure over bony prominences  - Avoid friction and shearing  - Provide appropriate hygiene as needed including keeping skin clean and dry  - Evaluate need for skin moisturizer/barrier cream  - Collaborate with interdisciplinary team   - Patient/family teaching  - Consider wound care consult   Outcome: Progressing

## 2023-03-24 NOTE — ASSESSMENT & PLAN NOTE
HGB 11-> 8 3 -> 8 1 -> 7 7 -> 7 3 -> 7 8 -> 7 3 > 7 7  Likely acute blood loss anemia from surgery  · B12 322  · Folate 13 6  · Iron panel suspicious for DANIELLA   · Stop iron supplementation at this time   · Venofer 200 mg IV daily for 3 doses  · Unable to transfuse 1 unit of PRBCs as her blood type is O- and there is a critical shortage; blood bank recommends transfusion for hemoglobin is less than 6  · Added B12 supplement   · Monitor HGB and transfuse if less than 6 or if patient becomes symptomatic

## 2023-03-24 NOTE — ASSESSMENT & PLAN NOTE
Developed postoperative urinary retention  · Failed voiding trial  · Shanks catheter reinserted on 3/24/2023  · Plan to continue Shanks catheter until mobility improves in acute rehab  · Monitor for constipation

## 2023-03-24 NOTE — CASE MANAGEMENT
Rachel Guajardo 49 received request for authorization from Care Manager    Authorization request for: Acute Rehab  Facility Name:[facility]   NPI: [id number]  Facility MD: Dr Crain    NPI: [id number]  Authorization initiated by contacting insurance: [employer] Via: [employer]  Pending Reference #:[id number]   Clinicals submitted via: attachement via [employer]

## 2023-03-25 ENCOUNTER — APPOINTMENT (INPATIENT)
Dept: RADIOLOGY | Facility: HOSPITAL | Age: 68
End: 2023-03-25

## 2023-03-25 PROBLEM — K22.4 ESOPHAGEAL DYSMOTILITY: Status: ACTIVE | Noted: 2023-03-25

## 2023-03-25 PROBLEM — R07.9 CHEST PAIN: Status: ACTIVE | Noted: 2023-03-25

## 2023-03-25 PROBLEM — E04.1 THYROID NODULE: Status: ACTIVE | Noted: 2023-03-20

## 2023-03-25 LAB
2HR DELTA HS TROPONIN: 1 NG/L
4HR DELTA HS TROPONIN: 3 NG/L
ABO GROUP BLD: NORMAL
ANION GAP SERPL CALCULATED.3IONS-SCNC: 7 MMOL/L (ref 4–13)
BASOPHILS # BLD AUTO: 0.02 THOUSANDS/ÂΜL (ref 0–0.1)
BASOPHILS NFR BLD AUTO: 0 % (ref 0–1)
BILIRUB DIRECT SERPL-MCNC: 0.18 MG/DL (ref 0–0.2)
BLD GP AB SCN SERPL QL: NEGATIVE
BNP SERPL-MCNC: 34 PG/ML (ref 0–100)
BUN SERPL-MCNC: 12 MG/DL (ref 5–25)
CALCIUM SERPL-MCNC: 8.6 MG/DL (ref 8.4–10.2)
CARDIAC TROPONIN I PNL SERPL HS: 10 NG/L
CARDIAC TROPONIN I PNL SERPL HS: 11 NG/L
CARDIAC TROPONIN I PNL SERPL HS: 13 NG/L
CHLORIDE SERPL-SCNC: 99 MMOL/L (ref 96–108)
CO2 SERPL-SCNC: 29 MMOL/L (ref 21–32)
CREAT SERPL-MCNC: 0.68 MG/DL (ref 0.6–1.3)
D DIMER PPP FEU-MCNC: 8.01 UG/ML FEU
EOSINOPHIL # BLD AUTO: 0.06 THOUSAND/ÂΜL (ref 0–0.61)
EOSINOPHIL NFR BLD AUTO: 1 % (ref 0–6)
ERYTHROCYTE [DISTWIDTH] IN BLOOD BY AUTOMATED COUNT: 14.2 % (ref 11.6–15.1)
GFR SERPL CREATININE-BSD FRML MDRD: 89 ML/MIN/1.73SQ M
GLUCOSE SERPL-MCNC: 157 MG/DL (ref 65–140)
GLUCOSE SERPL-MCNC: 170 MG/DL (ref 65–140)
GLUCOSE SERPL-MCNC: 195 MG/DL (ref 65–140)
GLUCOSE SERPL-MCNC: 219 MG/DL (ref 65–140)
GLUCOSE SERPL-MCNC: 235 MG/DL (ref 65–140)
HCT VFR BLD AUTO: 24.1 % (ref 34.8–46.1)
HGB BLD-MCNC: 7.8 G/DL (ref 11.5–15.4)
IMM GRANULOCYTES # BLD AUTO: 0.06 THOUSAND/UL (ref 0–0.2)
IMM GRANULOCYTES NFR BLD AUTO: 1 % (ref 0–2)
LYMPHOCYTES # BLD AUTO: 0.9 THOUSANDS/ÂΜL (ref 0.6–4.47)
LYMPHOCYTES NFR BLD AUTO: 17 % (ref 14–44)
MAGNESIUM SERPL-MCNC: 1.7 MG/DL (ref 1.9–2.7)
MCH RBC QN AUTO: 28.4 PG (ref 26.8–34.3)
MCHC RBC AUTO-ENTMCNC: 32.4 G/DL (ref 31.4–37.4)
MCV RBC AUTO: 88 FL (ref 82–98)
MONOCYTES # BLD AUTO: 0.49 THOUSAND/ÂΜL (ref 0.17–1.22)
MONOCYTES NFR BLD AUTO: 9 % (ref 4–12)
NEUTROPHILS # BLD AUTO: 3.78 THOUSANDS/ÂΜL (ref 1.85–7.62)
NEUTS SEG NFR BLD AUTO: 72 % (ref 43–75)
NRBC BLD AUTO-RTO: 0 /100 WBCS
PLATELET # BLD AUTO: 143 THOUSANDS/UL (ref 149–390)
PMV BLD AUTO: 8.5 FL (ref 8.9–12.7)
POTASSIUM SERPL-SCNC: 4.3 MMOL/L (ref 3.5–5.3)
RBC # BLD AUTO: 2.75 MILLION/UL (ref 3.81–5.12)
RH BLD: NEGATIVE
SODIUM SERPL-SCNC: 135 MMOL/L (ref 135–147)
SPECIMEN EXPIRATION DATE: NORMAL
WBC # BLD AUTO: 5.31 THOUSAND/UL (ref 4.31–10.16)

## 2023-03-25 RX ORDER — LANOLIN ALCOHOL/MO/W.PET/CERES
3 CREAM (GRAM) TOPICAL
Status: DISCONTINUED | OUTPATIENT
Start: 2023-03-25 | End: 2023-03-28 | Stop reason: HOSPADM

## 2023-03-25 RX ORDER — FAMOTIDINE 20 MG/1
10 TABLET, FILM COATED ORAL 2 TIMES DAILY PRN
Status: DISCONTINUED | OUTPATIENT
Start: 2023-03-25 | End: 2023-03-28 | Stop reason: HOSPADM

## 2023-03-25 RX ORDER — SODIUM PHOSPHATE, DIBASIC AND SODIUM PHOSPHATE, MONOBASIC 7; 19 G/133ML; G/133ML
1 ENEMA RECTAL ONCE
Status: COMPLETED | OUTPATIENT
Start: 2023-03-25 | End: 2023-03-25

## 2023-03-25 RX ORDER — PANTOPRAZOLE SODIUM 40 MG/10ML
40 INJECTION, POWDER, LYOPHILIZED, FOR SOLUTION INTRAVENOUS ONCE
Status: COMPLETED | OUTPATIENT
Start: 2023-03-25 | End: 2023-03-25

## 2023-03-25 RX ORDER — ASPIRIN 325 MG
325 TABLET ORAL ONCE
Status: COMPLETED | OUTPATIENT
Start: 2023-03-25 | End: 2023-03-25

## 2023-03-25 RX ORDER — PANTOPRAZOLE SODIUM 40 MG/1
40 TABLET, DELAYED RELEASE ORAL
Status: DISCONTINUED | OUTPATIENT
Start: 2023-03-26 | End: 2023-03-28 | Stop reason: HOSPADM

## 2023-03-25 RX ORDER — NITROGLYCERIN 0.4 MG/1
0.4 TABLET SUBLINGUAL
Status: DISCONTINUED | OUTPATIENT
Start: 2023-03-25 | End: 2023-03-28 | Stop reason: HOSPADM

## 2023-03-25 RX ORDER — TORSEMIDE 10 MG/1
5 TABLET ORAL EVERY OTHER DAY
Status: DISCONTINUED | OUTPATIENT
Start: 2023-03-26 | End: 2023-03-28 | Stop reason: HOSPADM

## 2023-03-25 RX ADMIN — CYANOCOBALAMIN TAB 500 MCG 1000 MCG: 500 TAB at 08:56

## 2023-03-25 RX ADMIN — ACETAMINOPHEN 975 MG: 325 TABLET, FILM COATED ORAL at 08:55

## 2023-03-25 RX ADMIN — SODIUM PHOSPHATE 1 ENEMA: 7; 19 ENEMA RECTAL at 06:46

## 2023-03-25 RX ADMIN — SENNOSIDES 17.2 MG: 8.6 TABLET, FILM COATED ORAL at 22:59

## 2023-03-25 RX ADMIN — HYDROMORPHONE HYDROCHLORIDE 0.2 MG: 0.2 INJECTION, SOLUTION INTRAMUSCULAR; INTRAVENOUS; SUBCUTANEOUS at 20:15

## 2023-03-25 RX ADMIN — INSULIN LISPRO 1 UNITS: 100 INJECTION, SOLUTION INTRAVENOUS; SUBCUTANEOUS at 22:58

## 2023-03-25 RX ADMIN — POLYETHYLENE GLYCOL 3350 17 G: 17 POWDER, FOR SOLUTION ORAL at 20:15

## 2023-03-25 RX ADMIN — INSULIN LISPRO 1 UNITS: 100 INJECTION, SOLUTION INTRAVENOUS; SUBCUTANEOUS at 11:38

## 2023-03-25 RX ADMIN — CLONAZEPAM 0.25 MG: 0.5 TABLET ORAL at 20:15

## 2023-03-25 RX ADMIN — INSULIN LISPRO 2 UNITS: 100 INJECTION, SOLUTION INTRAVENOUS; SUBCUTANEOUS at 16:18

## 2023-03-25 RX ADMIN — ASPIRIN 325 MG: 325 TABLET ORAL at 06:36

## 2023-03-25 RX ADMIN — ATORVASTATIN CALCIUM 40 MG: 40 TABLET, FILM COATED ORAL at 16:19

## 2023-03-25 RX ADMIN — Medication 3 MG: at 22:59

## 2023-03-25 RX ADMIN — POLYETHYLENE GLYCOL 3350 17 G: 17 POWDER, FOR SOLUTION ORAL at 08:58

## 2023-03-25 RX ADMIN — ZIPRASIDONE HYDROCHLORIDE 80 MG: 40 CAPSULE ORAL at 22:59

## 2023-03-25 RX ADMIN — ZIPRASIDONE HYDROCHLORIDE 80 MG: 40 CAPSULE ORAL at 08:58

## 2023-03-25 RX ADMIN — ACETAMINOPHEN 975 MG: 325 TABLET, FILM COATED ORAL at 20:15

## 2023-03-25 RX ADMIN — MAGNESIUM OXIDE TAB 400 MG (241.3 MG ELEMENTAL MG) 400 MG: 400 (241.3 MG) TAB at 08:57

## 2023-03-25 RX ADMIN — ENOXAPARIN SODIUM 40 MG: 40 INJECTION SUBCUTANEOUS at 08:57

## 2023-03-25 RX ADMIN — IOHEXOL 100 ML: 350 INJECTION, SOLUTION INTRAVENOUS at 09:39

## 2023-03-25 RX ADMIN — METOPROLOL SUCCINATE 25 MG: 25 TABLET, EXTENDED RELEASE ORAL at 08:57

## 2023-03-25 RX ADMIN — IRON SUCROSE 200 MG: 20 INJECTION, SOLUTION INTRAVENOUS at 09:07

## 2023-03-25 RX ADMIN — PANTOPRAZOLE SODIUM 40 MG: 40 INJECTION, POWDER, FOR SOLUTION INTRAVENOUS at 06:36

## 2023-03-25 RX ADMIN — CLONAZEPAM 0.25 MG: 0.5 TABLET ORAL at 08:56

## 2023-03-25 NOTE — PHYSICAL THERAPY NOTE
"   PT TREATMENT       03/25/23 1510   PT Last Visit   PT Visit Date 03/25/23   Note Type   Note Type Treatment   Pain Assessment   Pain Assessment Tool 0-10   Pain Score 8   Pain Location/Orientation Location: Leg;Location: Knee  (refusing pain medicine at this time)   Pain Onset/Description Onset: Ongoing; Descriptor: Sore   Effect of Pain on Daily Activities limits rest/mobility   Patient's Stated Pain Goal No pain   Hospital Pain Intervention(s) Repositioned   Multiple Pain Sites No   Restrictions/Precautions   Weight Bearing Precautions Per Order Yes   LLE Weight Bearing Per Order WBAT   Other Precautions Bed Alarm; Chair Alarm; Fall Risk;Pain   General   Chart Reviewed Yes   Family/Caregiver Present   (dtr present at end of session)   Cognition   Overall Cognitive Status WFL   Arousal/Participation Cooperative;Arousable  (tired/fatigued)   Orientation Level Oriented X4   Following Commands Follows multistep commands with increased time or repetition   Subjective   Subjective \"I'm tired I don't know how much I can do\"   Bed Mobility   Supine to Sit 3  Moderate assistance   Additional items Assist x 1;Verbal cues;LE management   Additional Comments transferred to bedside chair   Transfers   Sit to Stand 4  Minimal assistance   Additional items Assist x 1;Verbal cues   Stand to Sit 4  Minimal assistance   Additional items Assist x 1;Verbal cues   Stand pivot 4  Minimal assistance   Additional items Assist x 1;Verbal cues   Ambulation/Elevation   Gait pattern Short stride; Step to; Antalgic;Decreased L stance   Gait Assistance 4  Minimal assist   Additional items Assist x 1;Verbal cues   Assistive Device Rolling walker   Distance 2 feet to bedside chair   Stair Management Assistance Not tested   Balance   Static Sitting Fair +   Dynamic Sitting Fair   Static Standing Fair   Dynamic Standing Fair -   Ambulatory Poor +   Activity Tolerance   Activity Tolerance Patient tolerated treatment well;Patient limited by " fatigue;Patient limited by pain   Nurse Made Aware yes   Exercises   Neuro re-ed Able to perform seated/supine exercise including ankle pumps, quad sets, assisted heel slides, LAQ x 5-10 reps L   Assessment   Prognosis Good   Problem List Decreased strength;Decreased range of motion; Impaired balance;Decreased endurance;Decreased mobility; Decreased safety awareness;Decreased skin integrity;Orthopedic restrictions;Pain   Assessment Pt agreeable to PT session this afternoon with verbal encouragement  Able to perform exercise as mentioned above with intermittent assist to improve form/ROM  Pt reports fatigue, able to ambulate x 2 feet to bedside chair, declines further ambulation at this time due to fatigue/pain  Repositioned in bedside chair with all needs within reach - RN notified of bleeding from IV site - RN Preeti present at EOS to assess  The patient's AM-PAC Basic Mobility Inpatient Short Form Raw Score is 14  A Raw score of less than or equal to 16 suggests the patient may benefit from discharge to post-acute rehabilitation services  Please also refer to the recommendation of the Physical Therapist for safe discharge planning  Goals   Patient Goals to get better   Plan   Treatment/Interventions ADL retraining;Functional transfer training;LE strengthening/ROM; Therapeutic exercise; Endurance training;Bed mobility;Gait training;Spoke to nursing   Progress Progressing toward goals   PT Frequency Other (Comment)  (daily)   Recommendation   PT Discharge Recommendation Post acute rehabilitation services   AM-PAC Basic Mobility Inpatient   Turning in Flat Bed Without Bedrails 3   Lying on Back to Sitting on Edge of Flat Bed Without Bedrails 2   Moving Bed to Chair 2   Standing Up From Chair Using Arms 3   Walk in Room 3   Climb 3-5 Stairs With Railing 1   Basic Mobility Inpatient Raw Score 14   Basic Mobility Standardized Score 35 55   Highest Level Of Mobility   -[facility] Goal 4: Move to chair/commode   [facility] Achieved 4: Move to [facility] Financial Provided Mobility training;Home exercise program   Patient Explanation/teachback used; Reinforcement needed   End of Consult   Patient Position at End of Consult Bedside chair;Bed/Chair alarm activated; All needs within reach   [facility] Insurance Number  Adriana Galdino [id number]

## 2023-03-25 NOTE — PROGRESS NOTES
Patient examined spoke to the nurse patient is alert awake cooperative communicates well she reports that'' I feel all right, do not mess with my medicine'' patient is reassured that I am giving her the same dosage of her Geodon what her psychiatrist was giving her and see kind of satisfied with that no agitation medical treatment is in progress nurse reported no new behavioral problem  Patient denies auditory or visual hallucinations  Patient denies suicidal or homicidal ideation  Patient is stable at her baseline with bipolar depression and anxiety no side effects of medications Geodon and Klonopin noted she does not want to reduce her Geodon  Medical treatment is in progress  I will continue her current medications and follow-up  No new behavioral problem she is not confused she is not sedated and she is not in distress she offers no new complaints  I will follow-up

## 2023-03-25 NOTE — NURSING NOTE
"There's an order for fleet enema tonight and RN about to administer the fleet enema but patient refuses this time, patient verbalizes \"I want to sleep\"  RN explains the indication of the fleet enema and patient understands it but still refuses and agrees to have it in AM  RN notified Hospitalist Dr Reyna Thomson about it and said okay to give it in AM  Patient has no complaints at this time, no symptoms noted  Will monitor patient    "

## 2023-03-25 NOTE — CONSULTS
Consultation - Cardiology   Lacey Momin 70 y o  female MRN: [id number]  Unit/Bed#: [address] [place] [address] Encounter: [id number]    Assessment/Plan     Assessment:  1  Mechanical fall with fracture left femur  2  Chest discomfort  3  Acute blood loss anemia  4  Dysphagia  5  Bipolar depression  6  Hyponatremia  7  Hypertension  8  Diabetes: Hemoglobin A1c is 5 8    Plan:  Patient has been admitted to the hospitalist service  1  Continue to trend troponins, first 2 have been negative  2   Vital signs have been stable  Continue her home medication and continue monitoring blood pressure  3   Patient had CTA PE protocol, awaiting results    4  Feel that this is low probability of coronary event due to recent normal cardiac catheterization  Question whether this may be due to her dysphagia versus GERD  Thank you for this consult  We will continue to follow the patient with you      History of Present Illness   Physician Requesting Consult: Kendra Almonte MD  Reason for Consult / Principal Problem: Sudden onset of midsternal chest discomfort described as a pressure      HPI: Lacey Momin is a 70y o  year old female who was admitted on 3/20/2023 after she experienced a slip and fall and fell getting out of the bathtub  She did note head strike and falling to her left and was unable to get up  Patient was brought to the hospital and found to have an acute displaced left femoral fracture it was distal and it was a periprostatic fracture  Patient had robotic left total knee done on 12/22  She was admitted and had repair of her fracture with implantation of a IM femoral nail on 3/20  Last evening patient had sudden onset of midsternal chest pain which she describes as a pressure  She states she still has this discomfort and she grades it a 5 out of 10  Initial high-sensitivity troponin was 10 and 2-hour high-sensitivity troponin is 11, BNP is 34    Twelve-lead EKG is unchanged from previous and she is noted to have sinus rhythm with an incomplete right bundle branch block  Her QTc has been prolonged at 505 ms  Patient has a history of bipolar disorder and is on antipsychotic therapy  Her Geodon dose was recently decreased     As I am dictating this the hospitalist just came to me and states patient said she is pain-free  She has past medical history for hypertension, dyslipidemia, palpitations, dyspnea on exertion, hyponatremia for which she is on salt tablets, diabetes, bipolar disorder and a recent left total knee replacement in December 2022  Cardiac testing had 2D echocardiogram done on 10/18/2022 which demonstrated an EF of 55% and mild left atrial dilatation  She had a cardiac catheterization on 2/25/2020 which noted minor luminal irregularities of all coronary vessels but no flow restricting stenosis  Inpatient consult to Cardiology  Consult performed by: JOHN Kraus  Consult ordered by: Sharonda Luo MD          Review of Systems   Constitutional: Positive for activity change, appetite change (Patient avoids eating and drinking because of the sensation of choking) and fatigue  HENT: Negative  Negative for dental problem, facial swelling, rhinorrhea and tinnitus  Respiratory: Negative  Negative for chest tightness and shortness of breath  Cardiovascular: Positive for chest pain  Negative for palpitations and leg swelling  Gastrointestinal: Negative for abdominal distention, blood in stool, diarrhea, nausea and vomiting  Endocrine: Negative  Negative for polydipsia, polyphagia and polyuria  Genitourinary: Negative for difficulty urinating  Musculoskeletal: Negative  Negative for arthralgias  Skin: Negative  Neurological: Negative  Negative for dizziness, syncope, weakness and light-headedness  Hematological: Negative  Psychiatric/Behavioral: Negative          Historical Information   Past Medical History:   Diagnosis Date   • Anesthesia complication difficulty waking up   • Arthritis     left knee   • Bone spur     left knee behing the knee cap   • Chronic kidney disease    • Colon cancer [facility])     patient states about 2017   • Colon polyp     Tubulovillous Adenoma High Grade Dysplasia - 2017   • Depression    • Diabetes mellitus ([facility] [address] )    • Endometrial cancer ([facility] [address] )     grade I   • Hx of bleeding disorder     vaginal bleeding started on 3/13/17    • Hyperlipidemia    • Hypertension    • Hypomagnesemia 3/20/2023   • PONV (postoperative nausea and vomiting)    • Psychiatric disorder    • Shortness of breath     with exertion   • Urinary incontinence    • Vitamin D deficiency      Past Surgical History:   Procedure Laterality Date   • BREAST BIOPSY Left     benign-maybe at ar age 58   • CHOLECYSTECTOMY      open   • COLONOSCOPY     • DILATION AND CURETTAGE OF UTERUS N/A 04/05/2017    Procedure: DILATATION AND CURETTAGE (D&C);   Surgeon: Haritha Michelle MD;  Location: [facility] MAIN OR;  Service:    • HYSTERECTOMY     • OOPHORECTOMY     • PELVIC LAPAROSCOPY     • TX ARTHRP KNE CONDYLE&PLATU MEDIAL&LAT COMPARTMENTS Left 12/06/2022    Procedure: ARTHROPLASTY KNEE TOTAL W ROBOT - CEMENTED - LEFT;  Surgeon: Bronson Abreu DO;  Location: [address] [place] [address];  Service: Orthopedics   • TX LAPS TOTAL HYSTERECT 250 GM/< W/RMVL TUBE/OVARY N/A 05/04/2017    Procedure: ROBOTIC ASSISTED TOTAL LAPAROSCOPIC HYSTERECTOMY, BILATERAL SALPINGOOPHERECTOMY; CYSTOSCOPY;  Surgeon: Chadd Wan MD;  Location:  MAIN OR;  Service: Gynecology Oncology   • TX OPTX FEM SHFT FX W/INSJ IMED IMPLT W/WO SCREW Left 3/20/2023    Procedure: INSERTION NAIL IM FEMUR RETROGRADE;  Surgeon: Yumiko Mariano MD;  Location: [address] [place] [address];  Service: Orthopedics   • REPLACEMENT TOTAL KNEE     • TONSILLECTOMY     • TUBAL LIGATION       Social History     Substance and Sexual Activity   Alcohol Use Never     Social History     Substance and Sexual Activity   Drug Use No     E-Cigarette/Vaping   • E-Cigarette Use Never User E-Cigarette/Vaping Substances   • Nicotine No    • THC No    • CBD No    • Flavoring No    • Other No    • Unknown No      Social History     Tobacco Use   Smoking Status Never   Smokeless Tobacco Never     Family History:   Family History   Problem Relation Age of Onset   • Cancer Mother         Renal   • Heart disease Father         CHF   • Heart disease Sister         atrial septal defect   • Breast cancer Paternal Aunt 39       Meds/Allergies   all current active meds have been reviewed, current meds:   Current Facility-Administered Medications   Medication Dose Route Frequency   • acetaminophen (TYLENOL) tablet 975 mg  975 mg Oral BID   • atorvastatin (LIPITOR) tablet 40 mg  40 mg Oral Daily With Dinner   • clonazePAM (KlonoPIN) tablet 0 25 mg  0 25 mg Oral BID   • cyanocobalamin (VITAMIN B-12) tablet 1,000 mcg  1,000 mcg Oral Daily   • enoxaparin (LOVENOX) subcutaneous injection 40 mg  40 mg Subcutaneous Q24H MARIA INES   • HYDROmorphone HCl (DILAUDID) injection 0 2 mg  0 2 mg Intravenous Q4H PRN   • insulin lispro (HumaLOG) 100 units/mL subcutaneous injection 1-5 Units  1-5 Units Subcutaneous TID AC   • insulin lispro (HumaLOG) 100 units/mL subcutaneous injection 1-5 Units  1-5 Units Subcutaneous HS   • iron sucrose (VENOFER) 200 mg in sodium chloride 0 9 % 100 mL IVPB  200 mg Intravenous Daily   • magnesium Oxide (MAG-OX) tablet 400 mg  400 mg Oral Daily   • metoprolol succinate (TOPROL-XL) 24 hr tablet 25 mg  25 mg Oral Daily   • nitroglycerin (NITROSTAT) SL tablet 0 4 mg  0 4 mg Sublingual Q5 Min PRN   • oxyCODONE (ROXICODONE) IR tablet 2 5 mg  2 5 mg Oral Q6H PRN   • polyethylene glycol (MIRALAX) packet 17 g  17 g Oral BID   • senna (SENOKOT) tablet 17 2 mg  2 tablet Oral HS   • torsemide (DEMADEX) tablet 10 mg  10 mg Oral Every Other Day   • ziprasidone (GEODON) capsule 80 mg  80 mg Oral Daily   • ziprasidone (GEODON) capsule 80 mg  80 mg Oral HS    and PTA meds:   Prior to Admission Medications Prescriptions Last Dose Informant Patient Reported? Taking?    BD Pen Needle Chelsey 2nd Gen 32G X 4 MM MISC   No No   Sig: USE 1  TWICE DAILY   Ferrous Sulfate (Iron) 325 (65 Fe) MG TABS   Yes Yes   Sig: Take by mouth daily   Multiple Vitamin (Multivitamin Adult) TABS   No Yes   Sig: Take 1 tablet by mouth in the morning   Omega-3 Fatty Acids (fish oil) 1,000 mg   Yes Yes   Sig: Take 1,000 mg by mouth 2 (two) times a day   Tohuong SoloStar 300 units/mL CONCENTRATED U-300 injection pen (1-unit dial)   No No   Sig: INJECT 55 UNITS SUBCUTANEOUSLY IN THE MORNING   Patient taking differently: 44 units   acetaminophen (TYLENOL) 325 mg tablet Not Taking  No No   Sig: Take 3 tablets (975 mg total) by mouth every 8 (eight) hours   Patient not taking: Reported on 3/20/2023   aspirin (Aspirin 81) 81 mg EC tablet   No Yes   Sig: Take 1 tablet (81 mg total) by mouth daily   clonazePAM (KlonoPIN) 0 5 mg tablet   Yes Yes   Sig: Take 0 25 mg by mouth daily   clonazePAM (KlonoPIN) 0 5 mg tablet   Yes Yes   Sig: Take 0 25 mg by mouth daily at bedtime   docusate sodium (COLACE) 100 mg capsule   No Yes   Sig: Take 1 capsule (100 mg total) by mouth 2 (two) times a day as needed for constipation   Patient taking differently: Take 100 mg by mouth 2 (two) times a day   escitalopram (LEXAPRO) 5 mg tablet   Yes Yes   Si mg daily   metFORMIN (GLUCOPHAGE-XR) 500 mg 24 hr tablet   No Yes   Sig: Take 1 tablet (500 mg total) by mouth 2 (two) times a day with meals   metoprolol succinate (TOPROL-XL) 25 mg 24 hr tablet   No Yes   Sig: Take 1 tablet (25 mg total) by mouth daily   nitroglycerin (NITROSTAT) 0 4 mg SL tablet   No Yes   Sig: Place 1 tablet (0 4 mg total) under the tongue every 5 (five) minutes as needed for chest pain   rosuvastatin (CRESTOR) 20 MG tablet   No Yes   Sig: Take 1 tablet (20 mg total) by mouth daily   Patient taking differently: Take 20 mg by mouth daily with dinner   torsemide (DEMADEX) 10 mg tablet 3/20/2023  No Yes Sig: Take 1 tablet (10 mg total) by mouth daily Pt can take Torsemide 10 mg qd due to worsening edema, can go back to every other day when improved  Patient taking differently: Take 10 mg by mouth every other day Pt can take Torsemide 10 mg qd due to worsening edema, can go back to every other day when improved  ziprasidone (GEODON) 80 mg capsule   No No   Sig: Take 1 capsule (80 mg total) by mouth 2 (two) times a day   Patient taking differently: Take 80 mg by mouth in the morning   ziprasidone (GEODON) 80 mg capsule   Yes Yes   Sig: Take 160 mg by mouth daily at bedtime      Facility-Administered Medications: None     No Known Allergies    Objective   Vitals: Blood pressure 153/72, pulse 96, temperature 98 1 °F (36 7 °C), resp  rate 18, weight 71 3 kg (157 lb 3 oz), SpO2 99 %, not currently breastfeeding  Orthostatic Blood Pressures    Flowsheet Row Most Recent Value   Blood Pressure 153/72 filed at 03/25/2023 0705   Patient Position - Orthostatic VS Lying filed at 03/24/2023 1922            Intake/Output Summary (Last 24 hours) at 3/25/2023 8084  Last data filed at 3/25/2023 0640  Gross per 24 hour   Intake 320 ml   Output 2350 ml   Net -2030 ml       Invasive Devices     Peripheral Intravenous Line  Duration           Peripheral IV 03/24/23 Left;Ventral (anterior) Forearm <1 day    Peripheral IV 03/25/23 Right Antecubital <1 day          Drain  Duration           Urethral Catheter Non-latex 16 Fr  <1 day                Physical Exam  Vitals and nursing note reviewed  Constitutional:       General: She is not in acute distress  Appearance: Normal appearance  She is normal weight  HENT:      Right Ear: External ear normal       Left Ear: External ear normal    Eyes:      General: No scleral icterus  Right eye: No discharge  Left eye: No discharge  Cardiovascular:      Rate and Rhythm: Normal rate and regular rhythm  Pulses: Normal pulses  Heart sounds: Normal heart sounds  Pulmonary:      Effort: Pulmonary effort is normal  No respiratory distress  Breath sounds: Normal breath sounds  No wheezing  Abdominal:      General: Bowel sounds are normal  There is no distension  Palpations: Abdomen is soft  Musculoskeletal:      Right lower leg: No edema  Left lower leg: No edema  Skin:     General: Skin is warm and dry  Capillary Refill: Capillary refill takes less than 2 seconds  Neurological:      General: No focal deficit present  Mental Status: She is alert and oriented to person, place, and time  Mental status is at baseline  Psychiatric:         Attention and Perception: Attention normal          Mood and Affect: Mood normal  Affect is flat  Lab Results:   I have personally reviewed pertinent lab results  CBC with diff:   Results from last 7 days   Lab Units 03/25/23  0517   WBC Thousand/uL 5 31   RBC Million/uL 2 75*   HEMOGLOBIN g/dL 7 8*   HEMATOCRIT % 24 1*   MCV fL 88   MCH pg 28 4   MCHC g/dL 32 4   RDW % 14 2   MPV fL 8 5*   PLATELETS Thousands/uL 143*     CMP:   Results from last 7 days   Lab Units 03/25/23  0517 03/21/23  0459 03/20/23  1224   SODIUM mmol/L 135   < > 131*   CHLORIDE mmol/L 99   < > 92*   CO2 mmol/L 29   < > 29   BUN mg/dL 12   < > 13   CREATININE mg/dL 0 68   < > 0 82   CALCIUM mg/dL 8 6   < > 8 6   AST U/L  --   --  26   ALT U/L  --   --  26   ALK PHOS U/L  --   --  85   EGFR ml/min/1 73sq m 89   < > 73    < > = values in this interval not displayed       HS Troponin:   0   Lab Value Date/Time    HSTNI0 10 03/25/2023 0625    HSTNI2 11 03/25/2023 0812    HSTNI4 12 03/20/2023 1615    HSTNI4 45 10/15/2022 1818     BNP:   Results from last 7 days   Lab Units 03/25/23  0517   POTASSIUM mmol/L 4 3   CHLORIDE mmol/L 99   CO2 mmol/L 29   BUN mg/dL 12   CREATININE mg/dL 0 68   CALCIUM mg/dL 8 6   EGFR ml/min/1 73sq m 89     Coags:   Results from last 7 days   Lab Units 03/20/23  1224   PTT seconds 26   INR  1 10     TSH: Results from last 7 days   Lab Units 03/20/23  1224   TSH 3RD GENERATON uIU/mL 0 877     Magnesium:   Results from last 7 days   Lab Units 03/25/23  0517   MAGNESIUM mg/dL 1 7*     Lipid Profile:     Imaging: I have personally reviewed pertinent reports  EKG:   EKG is sinus rhythm with an incomplete right bundle branch block, QTc is noted to be prolonged at 505 ms  Patient does have history of antipsychotic use    Her dose of Geodon has just been adjusted back  VTE Prophylaxis: Sequential compression device Osman Rose)     Code Status: Level 1 - Full Code  Advance Directive and Living Will:      Power of :    POLST:      Santiago  Cardiology

## 2023-03-25 NOTE — PROGRESS NOTES
Progress Note - Orthopedics   Riki Badillo 70 y o  female MRN: [id number]  Unit/Bed#: [address] [place] [address] Encounter: [id number]        Subjective: Postop day #5 status post surgical fixation left distal femur periprosthetic fracture with retrograde IM nail      The patient notes moderate pain about the left knee     She notes good sensation of the left lower extremity  Hemoglobin 7 8 this morning  Patient did have chest tightness this morning, but notes this has almost fully resolved at this point  Primary team did order cardiac work-up  Vitals: Blood pressure 153/72, pulse 96, temperature 98 1 °F (36 7 °C), resp  rate 18, weight 71 3 kg (157 lb 3 oz), SpO2 99 %, not currently breastfeeding  ,Body mass index is 26 98 kg/m²  Intake/Output Summary (Last 24 hours) at 3/25/2023 0934  Last data filed at 3/25/2023 3443  Gross per 24 hour   Intake 320 ml   Output 2350 ml   Net -2030 ml       Invasive Devices     Peripheral Intravenous Line  Duration           Peripheral IV 03/24/23 Left;Ventral (anterior) Forearm <1 day    Peripheral IV 03/25/23 Right Antecubital <1 day          Drain  Duration           Urethral Catheter Non-latex 16 Fr  <1 day                  Ortho Exam: Patient alert and oriented x3 in no acute distress sitting up comfortably in bed  Small nickel sized region of strikethrough on each of her 3 dressings  Swelling left knee  Compartments soft  No calf tenderness  Moves toes/ankle freely  Good capillary refill  Sensation intact lateral femoral cutaneous, saphenous, sural, deep/superficial peroneal nerve distributions  Lab, Imaging and other studies: I have personally reviewed pertinent lab results    CBC:   Lab Results   Component Value Date    WBC 5 31 03/25/2023    HGB 7 8 (L) 03/25/2023    HCT 24 1 (L) 03/25/2023    MCV 88 03/25/2023     (L) 03/25/2023    MCH 28 4 03/25/2023    MCHC 32 4 03/25/2023    RDW 14 2 03/25/2023    MPV 8 5 (L) 03/25/2023    NRBC 0 03/25/2023     CMP:   Lab Results Component Value Date     (L) 04/21/2017    CL 99 03/25/2023    CL 96 (L) 03/15/2023    CO2 29 03/25/2023    CO2 29 03/15/2023    BUN 12 03/25/2023    BUN 16 03/15/2023    CREATININE 0 68 03/25/2023    CREATININE 1 67 (H) 04/21/2017    CALCIUM 8 6 03/25/2023    CALCIUM 9 6 03/15/2023    AST 26 03/20/2023    AST 26 04/21/2017    ALT 26 03/20/2023    ALT 25 04/21/2017    ALKPHOS 85 03/20/2023    ALKPHOS 67 04/21/2017    PROT 6 9 04/21/2017    BILITOT 0 5 04/21/2017    EGFR 89 03/25/2023     PT/INR:   Lab Results   Component Value Date    INR 1 10 03/20/2023       Assessment:  Status post surgical fixation left distal femur periprosthetic fracture with retrograde IM nail    Plan:  PT/OT  Vickye Perfect as tolerated left lower extremity   Dressings will remain in place   Lovenox for DVT prophylaxis   We will continue to monitor hemoglobin which is up to 7 8 this morning   [employer] [place] as needed   Medical management per primary team  Eletha Mask will  require short-term rehab upon discharge  Fine to discharge from orthopedic perspective once medically stable  Cardiac work-up for chest tightness this morning is pending  Weight bearing: WBAT with assistance      VTE Pharmacologic Prophylaxis: Enoxaparin (Lovenox)  VTE Mechanical Prophylaxis: sequential compression device

## 2023-03-25 NOTE — SPEECH THERAPY NOTE
Speech-Language Pathology Bedside Swallow Evaluation        Patient Name: Jim Oconnell    QIYDC'V Date: 3/25/2023     Problem List  Patient Active Problem List   Diagnosis   • History of endometrial cancer   • Type 2 diabetes mellitus without complication, with long-term current use of insulin ([facility] [address] )   • Essential hypertension   • Hyperlipidemia   • Obesity   • History of colon cancer   • OAB (overactive bladder)   • Axillary mass, right   • Mass of axillary tail of right breast   • Mass of axillary tail of left breast   • Hyponatremia   • Bipolar disorder (HCC)   • Elevated brain natriuretic peptide (BNP) level   • Osteoarthritis of both knees   • Status post total left knee replacement using cement   • Platelets decreased (HCC)   • PONV (postoperative nausea and vomiting)   • Delayed emergence from anesthesia   • Bipolar depression ([facility] [place] [address] )   • Fracture of left femur (HCC)   • History of DVT of lower extremity   • Abnormal CAT scan   • Anxiety   • Memory changes   • Urinary retention   • Acute blood loss anemia       Past Medical History  Past Medical History:   Diagnosis Date   • Anesthesia complication     difficulty waking up   • Arthritis     left knee   • Bone spur     left knee behing the knee cap   • Chronic kidney disease    • Colon cancer [facility])     patient states about 2017   • Colon polyp     Tubulovillous Adenoma High Grade Dysplasia - 2017   • Depression    • Diabetes mellitus ([facility] [place] [address] )    • Endometrial cancer (HCC)     grade I   • Hx of bleeding disorder     vaginal bleeding started on 3/13/17    • Hyperlipidemia    • Hypertension    • Hypomagnesemia 3/20/2023   • PONV (postoperative nausea and vomiting)    • Psychiatric disorder    • Shortness of breath     with exertion   • Urinary incontinence    • Vitamin D deficiency        Past Surgical History  Past Surgical History:   Procedure Laterality Date   • BREAST BIOPSY Left     benign-maybe at ar age 58   • CHOLECYSTECTOMY      open   • COLONOSCOPY "    • DILATION AND CURETTAGE OF UTERUS N/A 04/05/2017    Procedure: DILATATION AND CURETTAGE (D&C); Surgeon: Ivan Keith MD;  Location: [facility] MAIN OR;  Service:    • HYSTERECTOMY     • OOPHORECTOMY     • PELVIC LAPAROSCOPY     • CT ARTHRP KNE CONDYLE&PLATU MEDIAL&LAT COMPARTMENTS Left 12/06/2022    Procedure: ARTHROPLASTY KNEE TOTAL W ROBOT - CEMENTED - LEFT;  Surgeon: Penny Mariano DO;  Location: [address] [place] [address];  Service: Orthopedics   • CT LAPS TOTAL HYSTERECT 250 GM/< W/RMVL TUBE/OVARY N/A 05/04/2017    Procedure: ROBOTIC ASSISTED TOTAL LAPAROSCOPIC HYSTERECTOMY, BILATERAL SALPINGOOPHERECTOMY; CYSTOSCOPY;  Surgeon: Geraldine Lynch MD;  Location:  MAIN OR;  Service: Gynecology Oncology   • CT OPTX FEM SHFT FX W/INSJ IMED IMPLT W/WO SCREW Left 3/20/2023    Procedure: INSERTION NAIL IM FEMUR RETROGRADE;  Surgeon: Latia Gomez MD;  Location: [address] [place] [address];  Service: Orthopedics   • REPLACEMENT TOTAL KNEE     • TONSILLECTOMY     • TUBAL LIGATION           Current Medical Status  Pt is a 70 y o  female who presented to [address] [place] [address] s/p fall and surgical fixation left distal femur periprosthetic fracture with retrograde IM nail on 3/20  ST consult requested to further evaluate swallowing due to reported fear of choking, limited intake and 40+ lbs weight loss in the last few months  Patient reports sxs of globus sensation, reflux, and retrograde flow as well as x1 episode of choking  Per patient and RN she has been eating primarily pureed solids ( RN reports she even refused oatmeal due to fear of choking)  Patient however refuses any downgrade to puree stating \"If you change my diet to puree I will not eat it  \"    No hiatal hernia or thoracic abnormalities on CTA  Mild multilevel cervical degenerative changes noted on CT  Past medical history:   Please see H&P for details    Special Studies:  3/25 CTA chest pe: No pulmonary embolus  No acute pulmonary disease    3/23 MRI brain: 1   No acute infarction, " edema, or mass effect  2  Mild chronic microangiopathic ischemic changes  3  Subcentimeter right frontal scalp sebaceous cyst      3/20 CT Head: No acute intracranial abnormality  3/20 CT cervical spine: No cervical spine fracture or traumatic malalignment  Swallow Information   Current Risks for Dysphagia & Aspiration: reported new dysphagia     Current Symptoms/Concerns: choking incident and weight loss    Current Diet: regular diet and thin liquids      Baseline Diet: patient reports she is eating very smooth/soft solids at this time     was able to tolerate more solids at home      Baseline Assessment   Behavior/Cognition: alert    Speech/Language Status: able to participate in conversation and able to follow commands + slow to respond/delayed processing    Patient Positioning: upright in bed      Swallow Mechanism Exam   Facial: symmetrical  Labial: WFL  Lingual: WFL  Velum: symmetrical  Mandible: adequate ROM  Dentition: adequate  Vocal quality:clear/adequate   Volitional Cough: strong/productive   Resp: 1L NC    Consistencies Assessed and Performance   Consistencies Administered: thin liquids, puree, soft solids and hard solids  Specific materials administered included: cottage cheese, yogurt, fig ynag, turkey sandwich ( x1 very small bite) and thin liquids via single and consecutive sips     Oral Stage:  Mastication was slow but adequate with the materials administered today  Bolus formation and transfer were slow but functional with no significant oral residue noted  No overt s/s reduced oral control  Pharyngeal Stage:   Swallowing initiation appeared prompt  Laryngeal rise was palpated and judged to be within functional limits  No coughing, throat clearing, change in vocal quality or respiratory status noted today       Esophageal Concerns: globus sensation reported across consistencies- able to clear with liquid wash with puree and soft but globus sensation persisted with small bite of turkey sandwich ( patient was very resistant to trying turkey sandwich due to fear of choking)      Summary   Assessment was somewhat limited as patient was only agreeable to small amounts  Despite this her oropharyngeal swallow function appears generally WFL at this time with the materials administered today  No significant osteophyte or sxs to indicate this or CP prominence at bedside however cannot fully rule this out  s/s suggestive of esophageal  dysphagia  ? dysmotility vs stricture? This can be further evaluated with barium swallow/esophagram  Consider initiation of PPI  May need GI consult  Discussed diet options, concern for ongoing malnutrition and patients reported sxs  She continued to refuse puree but was agreeable to trial of dysphagia 2      Recommendations: mechanically altered/level 2 diet and thin liquids     Recommended Form of Meds: whole with puree (per patient request- RN reports she has been tolerating this without difficulty)    Aspiration precautions and compensatory swallowing strategies: upright posture, slow rate of feeding, small bites/sips, alternating bites and sips and use of added moisteners    Results Reviewed with: patient, RN and CRNP     Dysphagia Goals: pt will tolerate dysphagia 2 with thin liquids without s/s of aspiration x2    Plan  Will f/u to determine if additional assessment is indicated    CR Calles , CCC-SLP  Speech Language Pathologist   Available via [address] [place] [address] #[id number]  [place] #[id number]

## 2023-03-25 NOTE — NURSING NOTE
Patient was complaining of chest tightness earlier, Hospitalist made aware  EKG done, Vital signs stable, blood work done, Aspirin and protonix given as ordered  RN offered the nitro SL but patient refuses to take it now and patient stated that her chest tightness is better  Hospitalist is aware  Will monitor patient

## 2023-03-25 NOTE — PLAN OF CARE
Problem: Potential for Falls  Goal: Patient will remain free of falls  Description: INTERVENTIONS:  - Educate patient/family on patient safety including physical limitations  - Instruct patient to call for assistance with activity   - Consult OT/PT to assist with strengthening/mobility   - Keep Call bell within reach  - Keep bed low and locked with side rails adjusted as appropriate  - Keep care items and personal belongings within reach  - Initiate and maintain comfort rounds  - Make Fall Risk Sign visible to staff  - Offer Toileting every 2 Hours, in advance of need  - Initiate/Maintain bed alarm  - Obtain necessary fall risk management equipment: walker  - Apply yellow socks and bracelet for high fall risk patients  - Consider moving patient to room near nurses station  Outcome: Progressing     Problem: Nutrition/Hydration-ADULT  Goal: Nutrient/Hydration intake appropriate for improving, restoring or maintaining nutritional needs  Description: Monitor and assess patient's nutrition/hydration status for malnutrition  Collaborate with interdisciplinary team and initiate plan and interventions as ordered  Monitor patient's weight and dietary intake as ordered or per policy  Utilize nutrition screening tool and intervene as necessary  Determine patient's food preferences and provide high-protein, high-caloric foods as appropriate       INTERVENTIONS:  - Monitor oral intake, urinary output, labs, and treatment plans  - Assess nutrition and hydration status and recommend course of action  - Evaluate amount of meals eaten  - Assist patient with eating if necessary   - Allow adequate time for meals  - Recommend/ encourage appropriate diets, oral nutritional supplements, and vitamin/mineral supplements  - Order, calculate, and assess calorie counts as needed  - Recommend, monitor, and adjust tube feedings and TPN/PPN based on assessed needs  - Assess need for intravenous fluids  - Provide specific nutrition/hydration education as appropriate  - Include patient/family/caregiver in decisions related to nutrition  Outcome: Progressing     Problem: MOBILITY - ADULT  Goal: Maintain or return to baseline ADL function  Description: INTERVENTIONS:  -  Assess patient's ability to carry out ADLs; assess patient's baseline for ADL function and identify physical deficits which impact ability to perform ADLs (bathing, care of mouth/teeth, toileting, grooming, dressing, etc )  - Assess/evaluate cause of self-care deficits   - Assess range of motion  - Assess patient's mobility; develop plan if impaired  - Assess patient's need for assistive devices and provide as appropriate  - Encourage maximum independence but intervene and supervise when necessary  - Involve family in performance of ADLs  - Assess for home care needs following discharge   - Consider OT consult to assist with ADL evaluation and planning for discharge  - Provide patient education as appropriate  Outcome: Progressing  Goal: Maintains/Returns to pre admission functional level  Description: INTERVENTIONS:  - Perform BMAT or MOVE assessment daily    - Set and communicate daily mobility goal to care team and patient/family/caregiver  - Collaborate with rehabilitation services on mobility goals if consulted  - Perform Range of Motion 2 times a day  - Reposition patient every 2 hours    - Dangle patient 2 times a day  - Stand patient 2 times a day  - Ambulate patient 2 times a day  - Out of bed to chair 2 times a day   - Out of bed for meals 2 times a day  - Out of bed for toileting  - Record patient progress and toleration of activity level   Outcome: Progressing     Problem: Prexisting or High Potential for Compromised Skin Integrity  Goal: Skin integrity is maintained or improved  Description: INTERVENTIONS:  - Identify patients at risk for skin breakdown  - Assess and monitor skin integrity  - Assess and monitor nutrition and hydration status  - Monitor labs   - Assess for incontinence   - Turn and reposition patient  - Assist with mobility/ambulation  - Relieve pressure over bony prominences  - Avoid friction and shearing  - Provide appropriate hygiene as needed including keeping skin clean and dry  - Evaluate need for skin moisturizer/barrier cream  - Collaborate with interdisciplinary team   - Patient/family teaching  - Consider wound care consult   Outcome: Progressing     Problem: PAIN - ADULT  Goal: Verbalizes/displays adequate comfort level or baseline comfort level  Description: Interventions:  - Encourage patient to monitor pain and request assistance  - Assess pain using appropriate pain scale  - Administer analgesics based on type and severity of pain and evaluate response  - Implement non-pharmacological measures as appropriate and evaluate response  - Consider cultural and social influences on pain and pain management  - Notify physician/advanced practitioner if interventions unsuccessful or patient reports new pain  Outcome: Progressing

## 2023-03-25 NOTE — PLAN OF CARE
Problem: Potential for Falls  Goal: Patient will remain free of falls  Description: INTERVENTIONS:  - Educate patient/family on patient safety including physical limitations  - Instruct patient to call for assistance with activity   - Consult OT/PT to assist with strengthening/mobility   - Keep Call bell within reach  - Keep bed low and locked with side rails adjusted as appropriate  - Keep care items and personal belongings within reach  - Initiate and maintain comfort rounds  - Make Fall Risk Sign visible to staff  - Offer Toileting every 2 Hours, in advance of need  - Initiate/Maintain bed alarm  - Obtain necessary fall risk management equipment: walker  - Apply yellow socks and bracelet for high fall risk patients  - Consider moving patient to room near nurses station  Outcome: Progressing     Problem: Nutrition/Hydration-ADULT  Goal: Nutrient/Hydration intake appropriate for improving, restoring or maintaining nutritional needs  Description: Monitor and assess patient's nutrition/hydration status for malnutrition  Collaborate with interdisciplinary team and initiate plan and interventions as ordered  Monitor patient's weight and dietary intake as ordered or per policy  Utilize nutrition screening tool and intervene as necessary  Determine patient's food preferences and provide high-protein, high-caloric foods as appropriate       INTERVENTIONS:  - Monitor oral intake, urinary output, labs, and treatment plans  - Assess nutrition and hydration status and recommend course of action  - Evaluate amount of meals eaten  - Assist patient with eating if necessary   - Allow adequate time for meals  - Recommend/ encourage appropriate diets, oral nutritional supplements, and vitamin/mineral supplements  - Order, calculate, and assess calorie counts as needed  - Recommend, monitor, and adjust tube feedings and TPN/PPN based on assessed needs  - Assess need for intravenous fluids  - Provide specific nutrition/hydration education as appropriate  - Include patient/family/caregiver in decisions related to nutrition  Outcome: Progressing     Problem: MOBILITY - ADULT  Goal: Maintain or return to baseline ADL function  Description: INTERVENTIONS:  -  Assess patient's ability to carry out ADLs; assess patient's baseline for ADL function and identify physical deficits which impact ability to perform ADLs (bathing, care of mouth/teeth, toileting, grooming, dressing, etc )  - Assess/evaluate cause of self-care deficits   - Assess range of motion  - Assess patient's mobility; develop plan if impaired  - Assess patient's need for assistive devices and provide as appropriate  - Encourage maximum independence but intervene and supervise when necessary  - Involve family in performance of ADLs  - Assess for home care needs following discharge   - Consider OT consult to assist with ADL evaluation and planning for discharge  - Provide patient education as appropriate  Outcome: Progressing  Goal: Maintains/Returns to pre admission functional level  Description: INTERVENTIONS:  - Perform BMAT or MOVE assessment daily    - Set and communicate daily mobility goal to care team and patient/family/caregiver  - Collaborate with rehabilitation services on mobility goals if consulted    - Reposition patient every 2 hours    - Dangle patient 2 times a day  - Stand patient 2 times a day  - Ambulate patient 2 times a day  - Out of bed to chair 2 times a day   - Out of bed for meals 2 times a day  - Out of bed for toileting  - Record patient progress and toleration of activity level   Outcome: Progressing     Problem: Prexisting or High Potential for Compromised Skin Integrity  Goal: Skin integrity is maintained or improved  Description: INTERVENTIONS:  - Identify patients at risk for skin breakdown  - Assess and monitor skin integrity  - Assess and monitor nutrition and hydration status  - Monitor labs   - Assess for incontinence   - Turn and reposition patient  - Assist with mobility/ambulation  - Relieve pressure over bony prominences  - Avoid friction and shearing  - Provide appropriate hygiene as needed including keeping skin clean and dry  - Evaluate need for skin moisturizer/barrier cream  - Collaborate with interdisciplinary team   - Patient/family teaching  - Consider wound care consult   Outcome: Progressing     Problem: PAIN - ADULT  Goal: Verbalizes/displays adequate comfort level or baseline comfort level  Description: Interventions:  - Encourage patient to monitor pain and request assistance  - Assess pain using appropriate pain scale  - Administer analgesics based on type and severity of pain and evaluate response  - Implement non-pharmacological measures as appropriate and evaluate response  - Consider cultural and social influences on pain and pain management  - Notify physician/advanced practitioner if interventions unsuccessful or patient reports new pain  Outcome: Progressing

## 2023-03-25 NOTE — PROGRESS NOTES
Notified about patient having chest discomfort     Not in distress   Vitals stable     Labs, troponin BNP, stat CXR and EKG ordered   Patient is on Lovenox so less likely PE   Placed on telemetry   Asa and nitro SL ordered   Cardiology consulted

## 2023-03-25 NOTE — ASSESSMENT & PLAN NOTE
· Appreciate speech therapy  · Swallow eval noted esophageal abnormalities   · Concern for dysmotility vs stricture   · Downgraded diet to level 2, chopped foods with thin liquids  · Added Protonix 40 mg daily for possible dysmotility   · Esophagram recs:  Oropharyngeal skills assessed radio graphically and no deficits noted (and no laryngeal penetration, aspiration or pharyngeal residue occurred this am)

## 2023-03-25 NOTE — UTILIZATION REVIEW
Continued Stay Review     Date: 03-25-23                       Current Patient Class: inpatient  Current Level of Care: M/S    HPI:68 y o  female initially admitted on *03-20-23    Assessment/Plan:  Postop day #5 status post surgical fixation left distal femur periprosthetic fracture with retrograde IM nail   Patient did have chest tightness this morning, but notes this has almost fully resolved at this point  The patient notes moderate pain about the left knee  Anderson Perish notes good sensation of the left lower extremity  Weight-bear as tolerated left lower extremity,  short-term rehab upon discharge  CM awaiting insurance approval from [facility]      Vital Signs:   Date/Time Temp Pulse Resp BP MAP (mmHg) SpO2 O2 Flow Rate (L/min) O2 Device Patient Position - Orthostatic VS   03/25/23 07:05:30 98 1 °F (36 7 °C) 96 -- 153/72 99 99 % -- -- --   03/25/23 05:53:09 -- 90 18 148/70 96 98 % -- -- --   03/24/23 22:35:02 98 1 °F (36 7 °C) 81 16 122/64 83 97 % -- -- --   03/24/23 19:22:26 98 5 °F (36 9 °C) 75 18 118/63 81 98 % -- -- Lying   03/24/23 1524 98 4 °F (36 9 °C) 70 -- 118/64 82 99 % -- -- --   03/24/23 07:31:32 98 8 °F (37 1 °C) 84 18 132/59 83 98 % 1 L/min Nasal cannula --   03/24/23 00:07:37 98 4 °F (36 9 °C) 81 20 119/57 78 97 % -- --        Pertinent Labs/Diagnostic Results:   Results from last 7 days   Lab Units 03/20/23  1615   SARS-COV-2  Negative     Results from last 7 days   Lab Units 03/25/23  0517 03/24/23  0506 03/23/23  0522 03/22/23  0556 03/21/23  0459 03/20/23  1224   WBC Thousand/uL 5 31 4 89 5 59 5 85 7 59 8 88   HEMOGLOBIN g/dL 7 8* 7 3* 7 7* 8 1* 8 3* 11 0*   HEMATOCRIT % 24 1* 22 4* 23 7* 24 8* 25 0* 32 2*   PLATELETS Thousands/uL 143* 139* 123* 132* 178 238   NEUTROS ABS Thousands/µL 3 78  --  3 86  --   --  7 56         Results from last 7 days   Lab Units 03/25/23  0517 03/24/23  0506 03/23/23  0522 03/22/23  0556 03/21/23  0459 03/20/23  1224   SODIUM mmol/L 135 134* 134* 134* 131* 131*   POTASSIUM mmol/L 4 3 4 4 4 3 3 9 3 8 3 8   CHLORIDE mmol/L 99 99 99 98 96 92*   CO2 mmol/L 29 28 29 28 29 29   ANION GAP mmol/L 7 7 6 8 6 10   BUN mg/dL 12 14 14 10 12 13   CREATININE mg/dL 0 68 0 66 0 73 0 64 0 66 0 82   EGFR ml/min/1 73sq m 89 90 84 91 90 73   CALCIUM mg/dL 8 6 8 4 8 0* 8 0* 8 0* 8 6   MAGNESIUM mg/dL 1 7* 1 8* 1 7*  --  1 9 1 4*   PHOSPHORUS mg/dL  --  3 0 2 7  --   --   --      Results from last 7 days   Lab Units 03/25/23  0517 03/20/23  1224   AST U/L  --  26   ALT U/L  --  26   ALK PHOS U/L  --  85   TOTAL PROTEIN g/dL  --  6 9   ALBUMIN g/dL  --  3 7   TOTAL BILIRUBIN mg/dL  --  0 46   BILIRUBIN DIRECT mg/dL 0 18  --      Results from last 7 days   Lab Units 03/25/23  0736 03/24/23  2228 03/24/23  1557 03/24/23  1143 03/24/23  0722 03/23/23  2106 03/23/23  1635 03/23/23  1109 03/23/23  0717 03/22/23  2040 03/22/23  1553 03/22/23  1052   POC GLUCOSE mg/dl 157* 211* 160* 260* 199* 217* 118 291* 126 163* 142* 265*     Results from last 7 days   Lab Units 03/25/23  0517 03/24/23  0506 03/23/23  0522 03/22/23  0556 03/21/23  0459 03/20/23  1224   GLUCOSE RANDOM mg/dL 170* 127 116 137 148* 181*         Results from last 7 days   Lab Units 03/22/23  0556   HEMOGLOBIN A1C % 5 8*   EAG mg/dl 120       Results from last 7 days   Lab Units 03/25/23  0812 03/25/23  0625 03/20/23  1615 03/20/23  1441 03/20/23  1224   HS TNI 0HR ng/L  --  10  --   --  13   HS TNI 2HR ng/L 11  --   --  13  --    HSTNI D2 ng/L 1  --   --  0  --    HS TNI 4HR ng/L  --   --  12  --   --    HSTNI D4 ng/L  --   --  -1  --   --      Results from last 7 days   Lab Units 03/25/23  0625   D-DIMER QUANTITATIVE ug/ml FEU 8 01*     Results from last 7 days   Lab Units 03/20/23  1224   PROTIME seconds 14 3   INR  1 10   PTT seconds 26     Results from last 7 days   Lab Units 03/20/23  1224   TSH 3RD GENERATON uIU/mL 0 877                     Results from last 7 days   Lab Units 03/25/23  0517   BNP pg/mL 34     Results from last 7 days   Lab Units 03/22/23  1741   FERRITIN ng/mL 458*                         Results from last 7 days   Lab Units 03/20/23  1626   CLARITY UA  Clear   COLOR UA  Light Yellow   SPEC GRAV UA  1 010   PH UA  6 0   GLUCOSE UA mg/dl Negative   KETONES UA mg/dl Negative   BLOOD UA  Negative   PROTEIN UA mg/dl Negative   NITRITE UA  Negative   BILIRUBIN UA  Negative   UROBILINOGEN UA E U /dl 0 2   LEUKOCYTES UA  Negative     Results from last 7 days   Lab Units 03/20/23  1615   INFLUENZA A PCR  Negative   INFLUENZA B PCR  Negative   RSV PCR  Negative                                             Medications:   Scheduled Medications:  acetaminophen, 975 mg, Oral, BID  atorvastatin, 40 mg, Oral, Daily With Dinner  clonazePAM, 0 25 mg, Oral, BID  cyanocobalamin, 1,000 mcg, Oral, Daily  enoxaparin, 40 mg, Subcutaneous, Q24H [facility]  insulin lispro, 1-5 Units, Subcutaneous, TID AC  insulin lispro, 1-5 Units, Subcutaneous, HS  iron sucrose, 200 mg, Intravenous, Daily  magnesium Oxide, 400 mg, Oral, Daily  metoprolol succinate, 25 mg, Oral, Daily  polyethylene glycol, 17 g, Oral, BID  senna, 2 tablet, Oral, HS  torsemide, 10 mg, Oral, Every Other Day  ziprasidone, 80 mg, Oral, Daily  ziprasidone, 80 mg, Oral, HS      Continuous IV Infusions:     PRN Meds:  HYDROmorphone, 0 2 mg, Intravenous, Q4H PRN  nitroglycerin, 0 4 mg, Sublingual, Q5 Min PRN  oxyCODONE, 2 5 mg, Oral, Q6H PRN        Discharge Plan: D    Network Utilization Review Department  ATTENTION: Please call with any questions or concerns to [phone] and carefully listen to the prompts so that you are directed to the right person  All voicemails are confidential   Dallin Prater all requests for admission clinical reviews, approved or denied determinations and any other requests to dedicated fax number below belonging to the campus where the patient is receiving treatment   List of dedicated fax numbers for the Facilities:  FACILITY NAME UR FAX NUMBER   ADMISSION DENIALS (Administrative/Medical Necessity) [phone]   [address] (Maternity/NICU/Pediatrics) Leila Li [address] Irlanda Gonzalez  [phone]   [address] [address] [facility] [place] [place] [id number] [place] [address] [address] [facility] [place] [address] [phone]

## 2023-03-25 NOTE — PLAN OF CARE
Recommendations: mechanically altered/level 2 diet and thin liquids     Recommended Form of Meds: whole with puree (per patient request- RN reports she has been tolerating this without difficulty)    Aspiration precautions and compensatory swallowing strategies: upright posture, slow rate of feeding, small bites/sips, alternating bites and sips and use of added moisteners    Dysphagia Goals: pt will tolerate dysphagia 2 with thin liquids without s/s of aspiration x2

## 2023-03-25 NOTE — PROGRESS NOTES
[address]  Progress Note - Edmond Edmondson 1955, 70 y o  female MRN: [id number]  Unit/Bed#: 2050 [facility] Encounter: [id number]  Primary Care Provider: Sary Montague DO   Date and time admitted to hospital: 3/20/2023 11:11 AM    * Fracture of left femur ([address] )  Assessment & Plan  · Suffered a mechanical fall in the bathtub  · Imaging revealed an acute displaced periprosthetic left distal femoral fracture  · Orthopedics following   · POD #5 status post surgical fixation left distal femur periprosthetic fracture with retrograde IM nail    · Noted acute blood loss anemia postoperatively  · Attempted to transfuse 1 unit of PRBCs, however, unable to transfuse as patient is type O- and current recommendations are to transfuse if less than 6 given critical shortage of PRBCs  · Spoke with daughter, at this time, we will give Venofer 200 mg IV daily for 3 doses  · Hemoglobin improving, 11 3 -> 7 3 -> 7 8  · Plan for Lovenox 40 mg daily for 6 weeks  · Hold ASA while on Lovenox  · Weight bearing as tolerated   · SCDs  · Pain management with bowel regimen   · Supportive care  · PT/OT recommending acute rehab   · Continue Shanks catheter and plan for voiding trial in acute rehab  · Stable for discharge from a surgical perspective    Esophageal dysmotility  Assessment & Plan  Appreciate speech therapy  Swallow eval revealed an unremarkable oral and pharyngeal stage, but did note esophageal abnormalities  · Downgrade diet to level 2, chopped foods with thin liquids  · Aspiration precautions  · Concern for esophageal dysmotility, add Protonix 40 mg daily and Pepcid as needed  · Rule out esophageal stricture with esophagram on Monday    Acute blood loss anemia  Assessment & Plan  HGB 11-> 8 3 -> 8 1 -> 7 7 -> 7 3 -> 7 8  Likely acute blood loss anemia from surgery  · B12 322  · Folate 13 6  · Iron panel suspicious for DANIELLA   · Stop iron supplementation at this time   · We will give Venofer 200 mg IV daily for 3 doses  · Unable to transfuse 1 unit of PRBCs as her blood type is O- and there is a critical shortage; blood bank recommends transfusion for hemoglobin is less than 6  · Added B12 supplement   · Monitor HGB and transfuse if less than 6 or if patient becomes symptomatic    Urinary retention  Assessment & Plan  Developed postoperative urinary retention  · Failed voiding trial  · Shanks catheter reinserted on 3/24/2023  · Plan to continue Shanks catheter until mobility improves in acute rehab  · Monitor for constipation    Memory changes  Assessment & Plan  Patient cooperative, pleasant but slow the respond  Daughter noted an acute change in her memory about 3 weeks ago  · CT head unremarkable  · MRI brain with acute findings  · Appreciate neurology and psychiatry input   · Stop Lexapro as this can worsen memory issues  · Added B12 supplement  · UA unremarkable   · Supportive care   · Follow-up with neurology for further outpatient memory testing     Bipolar depression [facility])  Assessment & Plan  · Appreciate psychiatry input   · Decreased Geodon to 80 mg BID (maximum dose)  · Continue Klonopin 0 25 mg BID  · Discontinued Lexapro due to concern for acute memory changes  · Mood is stable, pleasant and cooperative     Hyponatremia  Assessment & Plan  · Acute on Chronic  Noted on admission with sodium of 131, possibly secondary to antipsychotics medications   Previously on Wellbutrin and Zoloft  · Discontinued Lexapro as this could be contributing to acute memory/slow response   · Sodium stable at 135  · Decrease torsemide to 5 mg every other day to avoid dehydration  · Liberalize fluid restriction to 1800 mL/day  · Monitor sodium in am     Chest pain  Assessment & Plan  Reported chest pain in the evening of 3/24  Appreciate cardiology input  History of nonobstructive coronary artery disease  Troponins and EKG unremarkable  · Likely due to esophageal dysmotility versus stricture  · Add Protonix 40 mg daily and Pepcid PRN digestion/heartburn  · Elevate head of bed    Thyroid nodule  Assessment & Plan  CTA chest: Subcentimeter right thyroid nodule with adjacent punctate calcification  · Updated patient's daughter   · Per radiology, no follow-up needed    History of DVT of lower extremity  Assessment & Plan  · Noted in history  · Patient only takes aspirin 81 mg  · Currently holding aspirin while on lovenox     Hyperlipidemia  Assessment & Plan  · Continue Lipitor     Essential hypertension  Assessment & Plan  BP stable  · Continue Toprol XL  · Resume torsemide but decrease to 5 mg every other day    Type 2 diabetes mellitus without complication, with long-term current use of insulin [facility])  Assessment & Plan  Lab Results   Component Value Date    HGBA1C 5 8 (H) 03/22/2023       Recent Labs     03/24/23  1557 03/24/23  2228 03/25/23  0736 03/25/23  1114   POCGLU 160* 211* 157* 195*       Blood Sugar Average: Last 72 hrs:  (P) 188 [id number]     · Recent A1c 6 9 -> 5 8  · Hold metformin and Toujeo   · Will likely need to decrease regimen on discharge  · Accu-Cheks ACHS  · SSI  · Hypoglycemic protocol      VTE Pharmacologic Prophylaxis:   Moderate Risk (Score 3-4) - Pharmacological DVT Prophylaxis Ordered: enoxaparin (Lovenox)  Patient Centered Rounds: I performed bedside rounds with nursing staff today  Discussions with Specialists or Other Care Team Provider: nursing, CM, cardiology, psychiatry, orthopedic note appreciated    Education and Discussions with Family / Patient: Updated  (daughter) at bedside       Total Time Spent on Date of Encounter in care of patient: 45 minutes This time was spent on one or more of the following: performing physical exam; counseling and coordination of care; obtaining or reviewing history; documenting in the medical record; reviewing/ordering tests, medications or procedures; communicating with other healthcare professionals and discussing with patient's family/caregivers  Current Length of Stay: 5 day(s)  Current Patient Status: Inpatient   Certification Statement: The patient will continue to require additional inpatient hospital stay due to Postoperative monitoring, anemia, esophageal dysmotility versus stricture  Discharge Plan: Anticipate discharge in 48 hrs to Acute rehab    Code Status: Level 1 - Full Code    Subjective:   Patient seen and examined at bedside this morning  Tolerating Shanks catheter without pain or discomfort  Denies any headache, dizziness, shortness of breath, abdominal pain  Reports left leg pain is controlled  Notes an episode of chest discomfort during the night when she was laying flat  No longer having any chest pain  Objective:     Vitals:   Temp (24hrs), Av 3 °F (36 8 °C), Min:98 1 °F (36 7 °C), Max:98 5 °F (36 9 °C)    Temp:  [98 1 °F (36 7 °C)-98 5 °F (36 9 °C)] 98 1 °F (36 7 °C)  HR:  [70-96] 96  Resp:  [16-19] 19  BP: (118-153)/(63-72) 153/72  SpO2:  [97 %-99 %] 99 %  Body mass index is 26 98 kg/m²  Input and Output Summary (last 24 hours): Intake/Output Summary (Last 24 hours) at 3/25/2023 1329  Last data filed at 3/25/2023 1056  Gross per 24 hour   Intake 450 ml   Output 1950 ml   Net -1500 ml       Physical Exam:   Physical Exam  Vitals and nursing note reviewed  Constitutional:       General: She is not in acute distress  Appearance: She is ill-appearing  She is not toxic-appearing or diaphoretic  Comments: Pleasant female resting in bed on room air   HENT:      Head: Normocephalic  Mouth/Throat:      Mouth: Mucous membranes are moist    Eyes:      Conjunctiva/sclera: Conjunctivae normal    Cardiovascular:      Rate and Rhythm: Normal rate  Pulmonary:      Effort: Pulmonary effort is normal  No tachypnea  Breath sounds: Examination of the right-lower field reveals decreased breath sounds  Examination of the left-lower field reveals decreased breath sounds   Decreased breath sounds present  No wheezing, rhonchi or rales  Abdominal:      General: Bowel sounds are normal  There is no distension  Palpations: Abdomen is soft  Tenderness: There is no abdominal tenderness  Musculoskeletal:         General: Normal range of motion  Cervical back: Normal range of motion  Right lower leg: No edema  Left lower leg: No edema  Skin:     General: Skin is warm and dry  Capillary Refill: Capillary refill takes less than 2 seconds  Comments: Left lower extremity with 3 OR dressings with scant amount of sanguinous drainage   Neurological:      Mental Status: She is alert and oriented to person, place, and time  Mental status is at baseline  Motor: Weakness (Generalized weakness) present  Psychiatric:         Mood and Affect: Mood normal  Affect is flat  Speech: Speech normal          Behavior: Behavior normal  Behavior is cooperative  Cognition and Memory: Cognition normal          Judgment: Judgment normal               Additional Data:     Labs:  Results from last 7 days   Lab Units 03/25/23  0517   WBC Thousand/uL 5 31   HEMOGLOBIN g/dL 7 8*   HEMATOCRIT % 24 1*   PLATELETS Thousands/uL 143*   NEUTROS PCT % 72   LYMPHS PCT % 17   MONOS PCT % 9   EOS PCT % 1     Results from last 7 days   Lab Units 03/25/23  0517 03/21/23  0459 03/20/23  1224   SODIUM mmol/L 135   < > 131*   POTASSIUM mmol/L 4 3   < > 3 8   CHLORIDE mmol/L 99   < > 92*   CO2 mmol/L 29   < > 29   BUN mg/dL 12   < > 13   CREATININE mg/dL 0 68   < > 0 82   ANION GAP mmol/L 7   < > 10   CALCIUM mg/dL 8 6   < > 8 6   ALBUMIN g/dL  --   --  3 7   TOTAL BILIRUBIN mg/dL  --   --  0 46   ALK PHOS U/L  --   --  85   ALT U/L  --   --  26   AST U/L  --   --  26   GLUCOSE RANDOM mg/dL 170*   < > 181*    < > = values in this interval not displayed       Results from last 7 days   Lab Units 03/20/23  1224   INR  1 10     Results from last 7 days   Lab Units 03/25/23  1114 03/25/23  0736 03/24/23  2228 03/24/23  1557 03/24/23  1143 03/24/23  0722 03/23/23  2106 03/23/23  1635 03/23/23  1109 03/23/23  0717 03/22/23  2040 03/22/23  1553   POC GLUCOSE mg/dl 195* 157* 211* 160* 260* 199* 217* 118 291* 126 163* 142*     Results from last 7 days   Lab Units 03/22/23  0556   HEMOGLOBIN A1C % 5 8*           Lines/Drains:  Invasive Devices     Peripheral Intravenous Line  Duration           Peripheral IV 03/24/23 Left;Ventral (anterior) Forearm 1 day    Peripheral IV 03/25/23 Right Antecubital <1 day          Drain  Duration           Urethral Catheter Non-latex 16 Fr  1 day              Urinary Catheter:  Goal for removal: Voiding trial when ambulation improves               Imaging: Reviewed radiology reports from this admission including: CT head, MRI brain, xray(s) and CT cervical spine, CTA chest    Recent Cultures (last 7 days):         Last 24 Hours Medication List:   Current Facility-Administered Medications   Medication Dose Route Frequency Provider Last Rate   • acetaminophen  975 mg Oral BID JOHN Gold     • atorvastatin  40 mg Oral Daily With Dinner JOHN Kamara     • clonazePAM  0 25 mg Oral BID JOHN Gold     • cyanocobalamin  1,000 mcg Oral Daily JOHN Gold     • enoxaparin  40 mg Subcutaneous Q24H [facility] JOHN Kamara     • famotidine  10 mg Oral BID PRN JOHN Gold     • HYDROmorphone  0 2 mg Intravenous Q4H PRN Andressa Temple PA-C     • insulin lispro  1-5 Units Subcutaneous TID AC JOHN Blanco     • insulin lispro  1-5 Units Subcutaneous HS JOHN Blanco     • iron sucrose  200 mg Intravenous Daily JOHN Gold Stopped (03/25/23 1056)   • magnesium Oxide  400 mg Oral Daily JHON Gold     • melatonin  3 mg Oral HS JOHN Gold     • metoprolol succinate  25 mg Oral Daily JOHN Kamara     • nitroglycerin  0 4 mg Sublingual Q5 Min PRN Yvonne Angeles MD     • oxyCODONE  2 5 mg Oral Q6H PRN JOHN Garcia     • [START ON 3/26/2023] pantoprazole  40 mg Oral Early Morning JOHN Solorio     • polyethylene glycol  17 g Oral BID JOHN Solorio     • senna  2 tablet Oral HS JOHN Solorio     • [START ON 3/26/2023] torsemide  5 mg Oral Every Other Day JOHN Solorio     • ziprasidone  80 mg Oral Daily JOHN Garcia     • ziprasidone  80 mg Oral HS Diego Martinez MD          Today, Patient Was Seen By: JOHN Solorio    **Please Note: This note may have been constructed using a voice recognition system  **

## 2023-03-25 NOTE — ASSESSMENT & PLAN NOTE
Reported chest pain in the evening of 3/24  Appreciate cardiology input  History of nonobstructive coronary artery disease  Troponins and EKG unremarkable  · Likely due to esophageal dysmotility versus stricture  · Add Protonix 40 mg daily and Pepcid PRN digestion/heartburn  · Elevate head of bed

## 2023-03-25 NOTE — PLAN OF CARE
Problem: PHYSICAL THERAPY ADULT  Goal: Performs mobility at highest level of function for planned discharge setting  See evaluation for individualized goals  Description: Treatment/Interventions: ADL retraining, Functional transfer training, LE strengthening/ROM, Therapeutic exercise, Endurance training, Bed mobility, Gait training, Spoke to nursing          See flowsheet documentation for full assessment, interventions and recommendations  Outcome: Progressing  Note: Prognosis: Good  Problem List: Decreased strength, Decreased range of motion, Impaired balance, Decreased endurance, Decreased mobility, Decreased safety awareness, Decreased skin integrity, Orthopedic restrictions, Pain  Assessment: Pt agreeable to PT session this afternoon with verbal encouragement  Able to perform exercise as mentioned above with intermittent assist to improve form/ROM  Pt reports fatigue, able to ambulate x 2 feet to bedside chair, declines further ambulation at this time due to fatigue/pain  Repositioned in bedside chair with all needs within reach - RN notified of bleeding from IV site - RN Ruth Cannon present at EOS to assess  PT Discharge Recommendation: Post acute rehabilitation services    See flowsheet documentation for full assessment

## 2023-03-25 NOTE — CASE MANAGEMENT
"Connie Don from [place] called ([phone] automated message says \"Chanel,\" but they share it) regarding submitted authorization  Stated facility and type of facility was not clearly identified when authorization was submitted  Needed to confirm this information  Stated that till they are able to confirm the facility/type the case will not be touched, that way it can be properly assessed  Called and left a voicemail for Connie Don with information  CM notified     "

## 2023-03-26 PROBLEM — R13.10 DYSPHAGIA: Status: ACTIVE | Noted: 2023-03-25

## 2023-03-26 PROBLEM — R07.9 CHEST PAIN: Status: RESOLVED | Noted: 2023-03-25 | Resolved: 2023-03-26

## 2023-03-26 LAB
ANION GAP SERPL CALCULATED.3IONS-SCNC: 6 MMOL/L (ref 4–13)
BUN SERPL-MCNC: 16 MG/DL (ref 5–25)
CALCIUM SERPL-MCNC: 8.1 MG/DL (ref 8.4–10.2)
CHLORIDE SERPL-SCNC: 100 MMOL/L (ref 96–108)
CO2 SERPL-SCNC: 29 MMOL/L (ref 21–32)
CREAT SERPL-MCNC: 0.73 MG/DL (ref 0.6–1.3)
ERYTHROCYTE [DISTWIDTH] IN BLOOD BY AUTOMATED COUNT: 14.6 % (ref 11.6–15.1)
GFR SERPL CREATININE-BSD FRML MDRD: 84 ML/MIN/1.73SQ M
GLUCOSE SERPL-MCNC: 134 MG/DL (ref 65–140)
GLUCOSE SERPL-MCNC: 142 MG/DL (ref 65–140)
GLUCOSE SERPL-MCNC: 210 MG/DL (ref 65–140)
GLUCOSE SERPL-MCNC: 218 MG/DL (ref 65–140)
GLUCOSE SERPL-MCNC: 253 MG/DL (ref 65–140)
HAPTOGLOB SERPL-MCNC: 317 MG/DL (ref 37–355)
HCT VFR BLD AUTO: 22.9 % (ref 34.8–46.1)
HGB BLD-MCNC: 7.3 G/DL (ref 11.5–15.4)
MAGNESIUM SERPL-MCNC: 1.8 MG/DL (ref 1.9–2.7)
MCH RBC QN AUTO: 28.5 PG (ref 26.8–34.3)
MCHC RBC AUTO-ENTMCNC: 31.9 G/DL (ref 31.4–37.4)
MCV RBC AUTO: 90 FL (ref 82–98)
PHOSPHATE SERPL-MCNC: 3.7 MG/DL (ref 2.3–4.1)
PLATELET # BLD AUTO: 145 THOUSANDS/UL (ref 149–390)
PMV BLD AUTO: 9.2 FL (ref 8.9–12.7)
POTASSIUM SERPL-SCNC: 4.4 MMOL/L (ref 3.5–5.3)
RBC # BLD AUTO: 2.56 MILLION/UL (ref 3.81–5.12)
SODIUM SERPL-SCNC: 135 MMOL/L (ref 135–147)
WBC # BLD AUTO: 5.66 THOUSAND/UL (ref 4.31–10.16)

## 2023-03-26 RX ADMIN — ACETAMINOPHEN 975 MG: 325 TABLET, FILM COATED ORAL at 20:52

## 2023-03-26 RX ADMIN — TORSEMIDE 5 MG: 10 TABLET ORAL at 08:33

## 2023-03-26 RX ADMIN — OXYCODONE HYDROCHLORIDE 2.5 MG: 5 TABLET ORAL at 11:36

## 2023-03-26 RX ADMIN — INSULIN LISPRO 2 UNITS: 100 INJECTION, SOLUTION INTRAVENOUS; SUBCUTANEOUS at 11:36

## 2023-03-26 RX ADMIN — CYANOCOBALAMIN TAB 500 MCG 1000 MCG: 500 TAB at 08:33

## 2023-03-26 RX ADMIN — POLYETHYLENE GLYCOL 3350 17 G: 17 POWDER, FOR SOLUTION ORAL at 08:33

## 2023-03-26 RX ADMIN — ATORVASTATIN CALCIUM 40 MG: 40 TABLET, FILM COATED ORAL at 16:28

## 2023-03-26 RX ADMIN — SENNOSIDES 17.2 MG: 8.6 TABLET, FILM COATED ORAL at 21:40

## 2023-03-26 RX ADMIN — ENOXAPARIN SODIUM 40 MG: 40 INJECTION SUBCUTANEOUS at 08:33

## 2023-03-26 RX ADMIN — ZIPRASIDONE HYDROCHLORIDE 80 MG: 40 CAPSULE ORAL at 21:41

## 2023-03-26 RX ADMIN — ACETAMINOPHEN 975 MG: 325 TABLET, FILM COATED ORAL at 08:33

## 2023-03-26 RX ADMIN — INSULIN LISPRO 1 UNITS: 100 INJECTION, SOLUTION INTRAVENOUS; SUBCUTANEOUS at 21:44

## 2023-03-26 RX ADMIN — CLONAZEPAM 0.25 MG: 0.5 TABLET ORAL at 08:33

## 2023-03-26 RX ADMIN — CLONAZEPAM 0.25 MG: 0.5 TABLET ORAL at 20:52

## 2023-03-26 RX ADMIN — ZIPRASIDONE HYDROCHLORIDE 80 MG: 40 CAPSULE ORAL at 08:34

## 2023-03-26 RX ADMIN — PANTOPRAZOLE SODIUM 40 MG: 40 TABLET, DELAYED RELEASE ORAL at 05:07

## 2023-03-26 RX ADMIN — INSULIN LISPRO 2 UNITS: 100 INJECTION, SOLUTION INTRAVENOUS; SUBCUTANEOUS at 16:28

## 2023-03-26 RX ADMIN — OXYCODONE HYDROCHLORIDE 2.5 MG: 5 TABLET ORAL at 17:52

## 2023-03-26 RX ADMIN — MAGNESIUM OXIDE TAB 400 MG (241.3 MG ELEMENTAL MG) 400 MG: 400 (241.3 MG) TAB at 08:33

## 2023-03-26 RX ADMIN — IRON SUCROSE 200 MG: 20 INJECTION, SOLUTION INTRAVENOUS at 08:33

## 2023-03-26 RX ADMIN — METOPROLOL SUCCINATE 25 MG: 25 TABLET, EXTENDED RELEASE ORAL at 08:33

## 2023-03-26 RX ADMIN — Medication 3 MG: at 21:40

## 2023-03-26 NOTE — PROGRESS NOTES
Progress Note - Cardiology   [facility]     Sweetie Kaminski 70 y o  female MRN: [id number]  : 1955  Unit/Bed#: [address] [place] [address] Encounter: [id number]    Assessment and Plan:   1  Mechanical fall with fracture left femur: Postop day 6 intramedullary nail to left femur fracture    -   Managed per PT and OT and Ortho    2  Chest discomfort: High-sensitivity troponins:  10 (0hr), 11 (2hr), 13 (4hr)  -   Sign EKG abnormal secondary to incomplete right bundle branch block    -   2020 cardiac catheterization: Minor luminal irregularities of all coronary vessels with no flow restricting stenosis    -   Low probability of coronary event due to normal cardiac catheterization    -   Question underlying GERD versus her dysphagia as cause    3  Acute blood loss anemia: Baseline globin appears to be around 11     -   Patient currently running in the sevens  -   Ordered for Venofer IV x3 doses    4  Dysphagia: Patient seen by speech therapy on 3/25/2023, states exam was limited due to patient participation  Question dysphagia, dysmotility versus stricture    -   Managed per primary team    5  Bipolar depression: Psych input appreciated    -   Geodon dosing decreased due to confusion    -   Monitor QTc    6  Hyponatremia: Sodium appears to be at baseline    -   Continue Demadex 5 mg every other day    -   Home sodium tablets currently on hold, would resume on discharge  7  Hypertension: Stable    -   Continue Toprol XL 25 mg once a day    8  Diabetes: Hemoglobin A1c is 5 8, managed per primary team    Patient has been stable from a cardiac standpoint  We will sign off at this time  Please not hesitate to contact if needed again during this admission  Subjective / Objective:   Patient seen and examined  No further complaints of chest discomfort  Patient is resting comfortably in bed      Vitals: Blood pressure 132/70, pulse 86, temperature 98 1 °F (36 7 °C), temperature source "Oral, resp  rate 18, height 5' 4\" (1 626 m), weight 71 3 kg (157 lb 3 oz), SpO2 96 %, not currently breastfeeding  Vitals:    03/20/23 1112 03/26/23 0745   Weight: 71 3 kg (157 lb 3 oz) 71 3 kg (157 lb 3 oz)     Body mass index is 26 98 kg/m²  BP Readings from Last 3 Encounters:   03/26/23 132/70   03/09/23 132/72   03/07/23 122/68     Orthostatic Blood Pressures    Flowsheet Row Most Recent Value   Blood Pressure 132/70 filed at 03/26/2023 0745   Patient Position - Orthostatic VS Lying filed at 03/25/2023 2233        I/O       03/24 0701  03/25 0700 03/25 0701  03/26 0700 03/26 0701  03/27 0700    P  O  320 150     IV Piggyback  100     Total Intake(mL/kg) 320 (4 5) 250 (3 5)     Urine (mL/kg/hr) 2350 (1 4) 950 (0 6)     Total Output 2350 950     Net -2030 -700                Invasive Devices     Peripheral Intravenous Line  Duration           Peripheral IV 03/24/23 Left;Ventral (anterior) Forearm 1 day    Peripheral IV 03/25/23 Right Antecubital <1 day          Drain  Duration           Urethral Catheter Non-latex 16 Fr  1 day                  Intake/Output Summary (Last 24 hours) at 3/26/2023 0839  Last data filed at 3/26/2023 0429  Gross per 24 hour   Intake 250 ml   Output 950 ml   Net -700 ml         Physical Exam:   Physical Exam  Vitals and nursing note reviewed  Constitutional:       General: She is not in acute distress  Appearance: Normal appearance  She is normal weight  HENT:      Right Ear: External ear normal       Left Ear: External ear normal       Nose: Nose normal    Eyes:      General: No scleral icterus  Right eye: No discharge  Left eye: No discharge  Cardiovascular:      Rate and Rhythm: Normal rate and regular rhythm  Pulses: Normal pulses  Heart sounds: No murmur heard  Pulmonary:      Effort: Pulmonary effort is normal  No accessory muscle usage or respiratory distress        Breath sounds: Examination of the right-lower field reveals decreased breath " sounds  Examination of the left-lower field reveals decreased breath sounds  Decreased breath sounds present  Abdominal:      General: Bowel sounds are normal  There is no distension  Palpations: Abdomen is soft  Musculoskeletal:      Right lower leg: No edema  Left lower leg: No edema  Skin:     General: Skin is warm and dry  Capillary Refill: Capillary refill takes less than 2 seconds  Neurological:      General: No focal deficit present  Mental Status: She is alert and oriented to person, place, and time  Mental status is at baseline     Psychiatric:         Mood and Affect: Mood normal                  Medications/ Allergies:     Current Facility-Administered Medications   Medication Dose Route Frequency Provider Last Rate   • acetaminophen  975 mg Oral BID JOHN Chan     • atorvastatin  40 mg Oral Daily With Dinner JOHN Lopez     • clonazePAM  0 25 mg Oral BID JOHN Chan     • cyanocobalamin  1,000 mcg Oral Daily JOHN Chan     • enoxaparin  40 mg Subcutaneous Q24H [address] JOHN Lopez     • famotidine  10 mg Oral BID PRN JOHN Chan     • HYDROmorphone  0 2 mg Intravenous Q4H PRN Chris Bui PA-C     • insulin lispro  1-5 Units Subcutaneous TID AC JOHN Blanco     • insulin lispro  1-5 Units Subcutaneous HS JOHN Blanco     • iron sucrose  200 mg Intravenous Daily JOHN Chan 0 mg (03/25/23 1056)   • magnesium Oxide  400 mg Oral Daily JOHN Chan     • melatonin  3 mg Oral HS JOHN Chan     • metoprolol succinate  25 mg Oral Daily JOHN Blanco     • nitroglycerin  0 4 mg Sublingual Q5 Min PRN Janet Melendez MD     • oxyCODONE  2 5 mg Oral Q6H PRN JOHN Lopez     • pantoprazole  40 mg Oral Early Morning JOHN Chan     • polyethylene glycol  17 g Oral BID Yoanna Frank Brandee Proctor     • senna  2 tablet Oral HS Arielle Tapia JOHN     • torsemide  5 mg Oral Every Other Day Arielle Tapia JOHN     • ziprasidone  80 mg Oral Daily JOHN Bowden     • ziprasidone  80 mg Oral HS Adalberto Feliciano MD       famotidine, 10 mg, BID PRN  HYDROmorphone, 0 2 mg, Q4H PRN  nitroglycerin, 0 4 mg, Q5 Min PRN  oxyCODONE, 2 5 mg, Q6H PRN      No Known Allergies    VTE Pharmacologic Prophylaxis:   Sequential compression device (Venodyne)     Labs:   Troponins:  Results from last 7 days   Lab Units 03/25/23  1106 03/25/23  0812   HSTNI D2 ng/L  --  1   HSTNI D4 ng/L 3  --      CBC with diff:  Results from last 7 days   Lab Units 03/26/23  0441 03/25/23  0517 03/24/23  0506 03/23/23  0522 03/22/23  0556 03/21/23  0459 03/20/23  1224   WBC Thousand/uL 5 66 5 31 4 89 5 59 5 85 7 59 8 88   HEMOGLOBIN g/dL 7 3* 7 8* 7 3* 7 7* 8 1* 8 3* 11 0*   HEMATOCRIT % 22 9* 24 1* 22 4* 23 7* 24 8* 25 0* 32 2*   MCV fL 90 88 89 89 89 87 86   PLATELETS Thousands/uL 145* 143* 139* 123* 132* 178 238   MCH pg 28 5 28 4 29 0 28 9 29 0 28 8 29 4   MCHC g/dL 31 9 32 4 32 6 32 5 32 7 33 2 34 2   RDW % 14 6 14 2 14 4 14 3 14 1 13 6 13 6   MPV fL 9 2 8 5* 9 4 8 7* 8 8* 8 5* 8 2*   NRBC AUTO /100 WBCs  --  0  --  0  --   --  0     CMP:  Results from last 7 days   Lab Units 03/26/23  0441 03/25/23  0517 03/24/23  0506 03/23/23  0522 03/22/23  0556 03/21/23  0459 03/20/23  1224   SODIUM mmol/L 135 135 134* 134* 134* 131* 131*   POTASSIUM mmol/L 4 4 4 3 4 4 4 3 3 9 3 8 3 8   CHLORIDE mmol/L 100 99 99 99 98 96 92*   CO2 mmol/L 29 29 28 29 28 29 29   ANION GAP mmol/L 6 7 7 6 8 6 10   BUN mg/dL 16 12 14 14 10 12 13   CREATININE mg/dL 0 73 0 68 0 66 0 73 0 64 0 66 0 82   CALCIUM mg/dL 8 1* 8 6 8 4 8 0* 8 0* 8 0* 8 6   AST U/L  --   --   --   --   --   --  26   ALT U/L  --   --   --   --   --   --  26   ALK PHOS U/L  --   --   --   --   --   --  85   TOTAL PROTEIN g/dL  --   --   --   --   --   --  6 9 ALBUMIN g/dL  --   --   --   --   --   --  3 7   TOTAL BILIRUBIN mg/dL  --   --   --   --   --   --  0 46   EGFR ml/min/1 73sq m 84 89 90 84 91 90 73     Magnesium:  Results from last 7 days   Lab Units 03/26/23  0441 03/25/23  0517 03/24/23  0506 03/23/23  0522 03/21/23  0459 03/20/23  1224   MAGNESIUM mg/dL 1 8* 1 7* 1 8* 1 7* 1 9 1 4*     Coags:  Results from last 7 days   Lab Units 03/20/23  1224   PTT seconds 26   INR  1 10     TSH:  Results from last 7 days   Lab Units 03/20/23  1224   TSH 3RD GENERATON uIU/mL 0 877     Hgb A1c:  Results from last 7 days   Lab Units 03/22/23  0556   HEMOGLOBIN A1C % 5 8*       Imaging & Testing   I have personally reviewed pertinent reports  XR chest portable    Result Date: 3/25/2023  Narrative: CHEST INDICATION:   sib  COMPARISON:  Compared with 11/14/2022 EXAM PERFORMED/VIEWS:  XR CHEST PORTABLE FINDINGS: Cardiomediastinal silhouette appears unremarkable  The lungs are clear  No pneumothorax or pleural effusion  Osseous structures appear within normal limits for patient age  Impression: No acute cardiopulmonary disease  Workstation performed: [id number]     XR pelvis complete 3+ views    Result Date: 3/20/2023  Narrative: PELVIS INDICATION:   pain  COMPARISON:  None VIEWS:  XR PELVIS COMPLETE 3+ VW FINDINGS: Diffuse osteopenia  No acute pelvic fracture  Mild degenerative changes of both hips  Impression: Diffuse osteopenia  No acute pelvic fracture  Workstation performed: [id number]     XR femur 2 vw left    Result Date: 3/20/2023  Narrative: Flonnie Drilling C-ARM - left femur INDICATION: IM nailing  Procedure guidance  COMPARISON:  None TECHNIQUE: FLUOROSCOPY TIME:   2 minutes 6 FLUOROSCOPIC IMAGES FINDINGS: Fluoroscopic guidance provided for procedure guidance  Osseous and soft tissue detail limited by technique  Impression: Fluoroscopic guidance provided for procedure guidance  Please refer to the separate procedure notes for additional details   Workstation performed: [id number]     XR knee 4+ vw left injury    Result Date: 3/20/2023  Narrative: LEFT KNEE INDICATION:   pain  COMPARISON:  3/9/2023  VIEWS:  XR KNEE 4+ VW LEFT INJURY FINDINGS: There is an acute, displaced periprosthetic fracture of the distal femur with acute apex angulation and overriding of fracture components  Stably aligned knee arthroplasty  No dislocation  Soft tissues are unremarkable  Impression: Acute, displaced periprosthetic left distal femoral fracture  Workstation performed: [id number]     XR knee 4+ vw right injury    Result Date: 3/20/2023  Narrative: RIGHT KNEE INDICATION:   pain  COMPARISON:  11/9/2022 VIEWS:  XR KNEE 4+ VW RIGHT INJURY FINDINGS: There is no acute fracture or dislocation  There is no joint effusion  Moderate tricompartmental osteoarthritis as evidenced by joint space narrowing and osteophyte formation  No lytic or blastic osseous lesion  Soft tissues are unremarkable  Impression: No acute osseous abnormality  Degenerative changes as described  Workstation performed: [id number]     CT head without contrast    Result Date: 3/20/2023  Narrative: CT BRAIN - WITHOUT CONTRAST INDICATION:   Ocular pain pain  COMPARISON:  CT 10/15/2022  TECHNIQUE:  CT examination of the brain was performed  In addition to axial images, sagittal and coronal 2D reformatted images were created and submitted for interpretation  Radiation dose length product (DLP) for this visit:  448 mGy-cm   This examination, like all CT scans performed in the [facility], was performed utilizing techniques to minimize radiation dose exposure, including the use of iterative reconstruction and automated exposure control  IMAGE QUALITY:  Diagnostic  FINDINGS: PARENCHYMA: Decreased attenuation is noted in periventricular and subcortical white matter demonstrating an appearance that is statistically most likely to represent mild microangiopathic change  No CT signs of acute infarction  No intracranial mass, mass effect or midline shift  No acute parenchymal hemorrhage  VENTRICLES AND EXTRA-AXIAL SPACES:  Normal for the patient's age  VISUALIZED ORBITS: Normal visualized orbits  PARANASAL SINUSES: Stable tiny polyp or mucous retention cyst in the right maxillary sinus  CALVARIUM AND EXTRACRANIAL SOFT TISSUES:  Normal      Impression: No acute intracranial abnormality  Workstation performed: [id number]     CT spine cervical without contrast    Result Date: 3/20/2023  Narrative: CT CERVICAL SPINE - WITHOUT CONTRAST INDICATION:   pain  COMPARISON:  CT 8/17/2007  TECHNIQUE:  CT examination of the cervical spine was performed without intravenous contrast   Contiguous axial images were obtained  Sagittal and coronal reconstructions were performed  Radiation dose length product (DLP) for this visit:  974 mGy-cm   This examination, like all CT scans performed in the [facility], was performed utilizing techniques to minimize radiation dose exposure, including the use of iterative reconstruction and automated exposure control  IMAGE QUALITY:  Diagnostic  FINDINGS: ALIGNMENT:  Normal alignment of the cervical spine  No subluxation  VERTEBRAE:  No fracture  DEGENERATIVE CHANGES:  Mild multilevel cervical degenerative changes are noted without critical central canal stenosis  PREVERTEBRAL AND PARASPINAL SOFT TISSUES:  10 mm hypodense right thyroid nodule  Incidental discovery of one or more thyroid nodule(s) measuring less than 1 5 cm and without suspicious features is noted in this patient who is above 28years old; according to guidelines published in the February 2015 white paper on incidental thyroid nodules in the Journal of the [employer] of Radiology [facility]), no further evaluation is recommended  THORACIC INLET:  Normal      Impression: No cervical spine fracture or traumatic malalignment    Workstation performed: [id number]     MRI brain wo contrast    Result Date: 3/23/2023  Narrative: MRI BRAIN WITHOUT CONTRAST INDICATION: acute change in memory  COMPARISON:   CT head 3/20/2023 TECHNIQUE:  Multiplanar, multisequence imaging of the brain was performed  IMAGE QUALITY:  Diagnostic  FINDINGS: BRAIN PARENCHYMA:  There is no discrete mass, mass effect or midline shift  There is no intracranial hemorrhage  There is no evidence of acute infarction and diffusion imaging is unremarkable  Small scattered hyperintensities on T2/FLAIR imaging are noted in the periventricular and subcortical white matter demonstrating an appearance that is statistically most likely to represent mild microangiopathic change  VENTRICLES:  Normal for the patient's age  SELLA AND PITUITARY GLAND:  Normal  ORBITS:  Normal  PARANASAL SINUSES:  Normal  VASCULATURE:  Evaluation of the major intracranial vasculature demonstrates appropriate flow voids  CALVARIUM AND SKULL BASE:  Normal  EXTRACRANIAL SOFT TISSUES:  A 0 8 cm subcutaneous lesion is noted along the right frontal scalp, probable sebaceous cyst      Impression: 1  No acute infarction, edema, or mass effect  2  Mild chronic microangiopathic ischemic changes  3  Subcentimeter right frontal scalp sebaceous cyst  Workstation performed: [id number]     CTA chest pe study    Result Date: 3/25/2023  Narrative: CTA - CHEST WITH IV CONTRAST - PULMONARY ANGIOGRAM INDICATION:   chest pain  Per my review of the medical record, patient is status post insertion of intramedullary femur nail for periprosthetic fracture on 3/20/2023  COMPARISON: CXR 3/25/2023 and 11/14/2022, chest CT 10/17/2022, abdomen CT 1/10/2008 and 10/10/2017  TECHNIQUE: CT angiogram timed for optimal opacification of the pulmonary arteries  Axial, sagittal, and coronal 2D reformats created from source data  Coronal 3D MIP postprocessing on the acquisition scanner  Radiation dose length product (DLP):  447 mGy-cm     Radiation dose exposure minimized using iterative reconstruction and automated "exposure control  IV Contrast:  100 mL of iohexol (OMNIPAQUE)  FINDINGS: PULMONARY ARTERIES:  No pulmonary embolus  LUNGS:  Lungs clear  AIRWAYS: No significant filling defects  PLEURA:  Unremarkable  HEART/GREAT VESSELS:  Mild cardiomegaly  Mild coronary artery calcification indicating atherosclerotic heart disease  MEDIASTINUM AND MATT:  Unremarkable  CHEST WALL AND LOWER NECK: Subcentimeter right thyroid nodule with adjacent punctate calcification  No follow-up needed  Left axillary clips  UPPER ABDOMEN:  Cholecystectomy  2 6 cm right adrenal nodule and 1 6 cm left adrenal nodule, stable since 2017  The left nodule is stable since 2008  The right adrenal nodule has enlarged since 2008  Both are likely benign adenomas  OSSEOUS STRUCTURES:  Mild degenerative disease in the spine  Impression: No pulmonary embolus  No acute pulmonary disease  Workstation performed: [id number]        [facility]  Cardiology      \"This note was completed in part utilizing m-modal fluency direct voice recognition software  Grammatical errors, random word insertion, spelling mistakes, and incomplete sentences may be an occasional consequence of the system secondary to software limitations, ambient noise and hardware issues  Please read the chart carefully and recognize, using context, where substitutions have occurred  If you have any questions or concerns about the context, text or information contained within the body of this dictation, please contact myself, the provider, for further clarification  \"  "

## 2023-03-26 NOTE — PROGRESS NOTES
"Progress Note - Orthopedics   Angela Segura 70 y o  female MRN: [id number]  Unit/Bed#: [address] [place] [address] Encounter: [id number]        Subjective: Postop day #6 status post surgical fixation left distal femur periprosthetic fracture with retrograde IM nail  The patient notes ongoing pain about the left knee  Norval Saas notes good sensation of the left lower extremity   Hemoglobin 7 3 this morning  Patient denies any CP, SOB, or dizziness  No acute overnight events  Vitals: Blood pressure 132/70, pulse 86, temperature 98 1 °F (36 7 °C), temperature source Oral, resp  rate 18, height 5' 4\" (1 626 m), weight 71 3 kg (157 lb 3 oz), SpO2 96 %, not currently breastfeeding  ,Body mass index is 26 98 kg/m²  Intake/Output Summary (Last 24 hours) at 3/26/2023 0834  Last data filed at 3/26/2023 0429  Gross per 24 hour   Intake 250 ml   Output 950 ml   Net -700 ml       Invasive Devices     Peripheral Intravenous Line  Duration           Peripheral IV 03/24/23 Left;Ventral (anterior) Forearm 1 day    Peripheral IV 03/25/23 Right Antecubital <1 day          Drain  Duration           Urethral Catheter Non-latex 16 Fr  1 day                  Ortho Exam:Patient alert and oriented x3 in no acute distress sitting up comfortably in bed     Small nickel sized region of strikethrough on each of her 3 dressings  Swelling left knee   Compartments soft  No calf tenderness  Moves toes/ankle freely  Good capillary refill  Sensation intact lateral femoral cutaneous, saphenous, sural, deep/superficial peroneal nerve distributions  Lab, Imaging and other studies: I have personally reviewed pertinent lab results    CBC:   Lab Results   Component Value Date    WBC 5 66 03/26/2023    HGB 7 3 (L) 03/26/2023    HCT 22 9 (L) 03/26/2023    MCV 90 03/26/2023     (L) 03/26/2023    MCH 28 5 03/26/2023    MCHC 31 9 03/26/2023    RDW 14 6 03/26/2023    MPV 9 2 03/26/2023    NRBC 0 03/25/2023     CMP:   Lab Results   Component Value Date     " (L) 04/21/2017     03/26/2023    CL 96 (L) 03/15/2023    CO2 29 03/26/2023    CO2 29 03/15/2023    BUN 16 03/26/2023    BUN 16 03/15/2023    CREATININE 0 73 03/26/2023    CREATININE 1 67 (H) 04/21/2017    CALCIUM 8 1 (L) 03/26/2023    CALCIUM 9 6 03/15/2023    AST 26 03/20/2023    AST 26 04/21/2017    ALT 26 03/20/2023    ALT 25 04/21/2017    ALKPHOS 85 03/20/2023    ALKPHOS 67 04/21/2017    PROT 6 9 04/21/2017    BILITOT 0 5 04/21/2017    EGFR 84 03/26/2023     PT/INR:   Lab Results   Component Value Date    INR 1 10 03/20/2023       Assessment:  POD #6 Status post surgical fixation left distal femur periprosthetic fracture with retrograde IM nail    Plan:  PT/OT  Jordana Holley as tolerated left lower extremity   Dressings will remain in place   Lovenox for DVT prophylaxis   We will continue to monitor hemoglobin, primary team to determine if repeat transfusion warranted     [place]ene[place] as needed   Medical management per primary team  Cleatis Reason will  require short-term rehab upon discharge  Fine to discharge from orthopedic perspective once medically stable        Weight bearing: WBAT with assistance      VTE Pharmacologic Prophylaxis: Enoxaparin (Lovenox)  VTE Mechanical Prophylaxis: sequential compression device

## 2023-03-26 NOTE — PHYSICAL THERAPY NOTE
"   PT TREATMENT       03/26/23 1345   PT Last Visit   PT Visit Date 03/26/23   Note Type   Note Type Treatment   Pain Assessment   Pain Assessment Tool 0-10   Pain Score 8   Pain Location/Orientation Orientation: Left; Location: Knee; Location: Leg   Pain Onset/Description Onset: Ongoing; Descriptor: Sore   Effect of Pain on Daily Activities limits rest/mobility   Patient's Stated Pain Goal No pain   Hospital Pain Intervention(s) Repositioned; Ambulation/increased activity;Cold applied   Multiple Pain Sites No   Restrictions/Precautions   Weight Bearing Precautions Per Order Yes   LLE Weight Bearing Per Order WBAT   Other Precautions Bed Alarm; Chair Alarm; Fall Risk;Pain;WBS   General   Chart Reviewed Yes   Family/Caregiver Present No   Cognition   Overall Cognitive Status WFL   Arousal/Participation Cooperative   Orientation Level Oriented X4   Following Commands Follows multistep commands with increased time or repetition   Subjective   Subjective \"It still really hurts  \"   Bed Mobility   Sit to Supine 3  Moderate assistance   Additional items Assist x 1;Verbal cues;LE management   Additional Comments received sitting in bedside chair   Transfers   Sit to Stand 4  Minimal assistance   Additional items Assist x 1;Verbal cues   Stand to Sit 4  Minimal assistance   Additional items Assist x 1;Verbal cues   Stand pivot 5  Supervision   Additional items Verbal cues   Ambulation/Elevation   Gait pattern Short stride; Step through pattern;Excessively slow;Decreased L stance   Gait Assistance 4  Minimal assist   Additional items Assist x 1;Verbal cues   Assistive Device Rolling walker   Distance 50 feet with change of direction   Stair Management Assistance Not tested   Balance   Static Sitting Fair +   Dynamic Sitting Fair   Static Standing Fair   Dynamic Standing Fair -   Ambulatory Poor +   Activity Tolerance   Activity Tolerance Patient tolerated treatment well;Patient limited by fatigue;Patient limited by pain   Nurse Made " Aware yes   Exercises   Neuro re-ed Pt able to perform seated exercise including ankle pumps, LAQ, quad sets, heel slides, hip abd x 10 reps L   Assessment   Prognosis Good   Problem List Decreased strength;Decreased range of motion;Decreased endurance; Impaired balance;Decreased mobility; Decreased safety awareness;Pain;Orthopedic restrictions;Decreased skin integrity   Assessment Pt agreeable to PT session this afternoon - received sitting in bedside chair asleep but easily arousable  Able to perform exercise as mentioned above with intermittent assist to improve ROM  Able to progress ambulation x 50 feet with Min A/contact guard with gait deviations as mentioned above  Pt easily fatigues, requesting to return to bed at EOS  Repositioned for comfort with all needs within reach  The patient's AM-PAC Basic Mobility Inpatient Short Form Raw Score is 14  A Raw score of less than or equal to 16 suggests the patient may benefit from discharge to post-acute rehabilitation services  Please also refer to the recommendation of the Physical Therapist for safe discharge planning  Goals   Patient Goals to get better   Plan   Treatment/Interventions Functional transfer training;LE strengthening/ROM; Therapeutic exercise; Endurance training;Bed mobility;Gait training;Spoke to nursing;Elevations; ADL retraining   Progress Progressing toward goals   PT Frequency Other (Comment)  (daily)   Recommendation   PT Discharge Recommendation Post acute rehabilitation services   AM-PAC Basic Mobility Inpatient   Turning in Flat Bed Without Bedrails 3   Lying on Back to Sitting on Edge of Flat Bed Without Bedrails 2   Moving Bed to Chair 2   Standing Up From Chair Using Arms 3   Walk in Room 3   Climb 3-5 Stairs With Railing 1   Basic Mobility Inpatient Raw Score 14   Basic Mobility Standardized Score 35 55   Highest Level Of Mobility   JH-HLM Goal 4: Move to chair/commode   JH-HLM Achieved 7: Walk 25 feet or more   Education   Education Provided Home exercise program;Mobility training;Assistive device   Patient Explanation/teachback used;Demonstrates verbal understanding;Reinforcement needed   End of Consult   Patient Position at End of Consult Supine;Bed/Chair alarm activated; All needs within reach   [facility] Insurance Number  Adriana Ahmadi [id number]

## 2023-03-26 NOTE — PROGRESS NOTES
[place] 44  Progress Note - Akshat Melchor 1955, 70 y o  female MRN: [id number]  Unit/Bed#: [facility] Encounter: [id number]  Primary Care Provider: Linsey Garrison DO   Date and time admitted to hospital: 3/20/2023 11:11 AM    * Fracture of left femur [facility])  Assessment & Plan  · S/p mechanical fall in the bathtub  · Imaging revealed an acute displaced periprosthetic left distal femoral fracture  · Orthopedics following   · POD #6 status post surgical fixation left distal femur periprosthetic fracture with retrograde IM nail  · Pain controlled   · Noted acute blood loss anemia postoperatively  · Attempted to transfuse 1 unit of PRBCs, however, unable to transfuse as patient is type O- and current recommendations are to transfuse if less than 6 given critical shortage of PRBCs  · Venofer 200 mg IV daily for 3 doses  · Hemoglobin stable, 11 3 -> 7 3 -> 7 8 -> 7 3  · No signs of active bleeding   · Plan for Lovenox 40 mg daily for 6 weeks  · Hold ASA while on Lovenox  · Weight bearing as tolerated   · SCDs  · Bowel regimen   · Supportive care  · PT/OT recommending acute rehab   · Continue Shanks catheter and plan for voiding trial in acute rehab  · Stable for discharge from a surgical perspective    Dysphagia  Assessment & Plan  · Appreciate speech therapy  · Swallow eval noted esophageal abnormalities   · Concern for dysmotility vs stricture   · Downgraded diet to level 2, chopped foods with thin liquids  · Added Protonix 40 mg daily for possible dysmotility   · Plan for Esophagram on Monday to rule out stricture     Acute blood loss anemia  Assessment & Plan  HGB 11-> 8 3 -> 8 1 -> 7 7 -> 7 3 -> 7 8 -> 7 3  Likely acute blood loss anemia from surgery  · B12 322  · Folate 13 6  · Iron panel suspicious for DANIELLA   · Stop iron supplementation at this time   · Venofer 200 mg IV daily for 3 doses  · Unable to transfuse 1 unit of PRBCs as her blood type is O- and there is a critical shortage; blood bank recommends transfusion for hemoglobin is less than 6  · Added B12 supplement   · Monitor HGB and transfuse if less than 6 or if patient becomes symptomatic    Urinary retention  Assessment & Plan  Developed postoperative urinary retention  · Failed voiding trial  · Shanks catheter reinserted on 3/24/2023  · Plan to continue Shanks catheter until mobility improves in acute rehab  · Monitor for constipation    Memory changes  Assessment & Plan  Patient cooperative, pleasant but slow the respond  Daughter noted an acute change in her memory about 3 weeks ago  · CT head unremarkable  · MRI brain with acute findings  · Appreciate neurology and psychiatry input   · Stop Lexapro as this can worsen memory issues  · Added B12 supplement  · UA unremarkable   · Supportive care   · Follow-up with neurology for further outpatient memory testing     Bipolar depression [facility])  Assessment & Plan  · Appreciate psychiatry input   · Decreased Geodon to 80 mg BID (maximum dose)  · Continue Klonopin 0 25 mg BID  · Discontinued Lexapro due to concern for acute memory changes  · Mood is stable, pleasant and cooperative     Hyponatremia  Assessment & Plan  · Acute on Chronic  Noted on admission with sodium of 131, possibly secondary to antipsychotics medications   Previously on Wellbutrin and Zoloft  · Discontinued Lexapro as this could be contributing to acute memory/slow response   · Sodium stable at 135  · Decrease torsemide to 5 mg every other day to avoid dehydration  · Liberalize fluid restriction to 1800 mL/day  · Monitor sodium in am     Thyroid nodule  Assessment & Plan  CTA chest: Subcentimeter right thyroid nodule with adjacent punctate calcification  · Updated patient's daughter   · Per radiology, no follow-up needed    History of DVT of lower extremity  Assessment & Plan  · Noted in history  · Patient only takes aspirin 81 mg  · Currently holding aspirin while on lovenox     Hyperlipidemia  Assessment & Plan  · Continue Lipitor     Essential hypertension  Assessment & Plan  BP stable  · Continue Toprol XL  · Resume torsemide but decrease to 5 mg every other day    Type 2 diabetes mellitus without complication, with long-term current use of insulin [facility])  Assessment & Plan  Lab Results   Component Value Date    HGBA1C 5 8 (H) 03/22/2023       Recent Labs     03/25/23  1114 03/25/23  1545 03/25/23  2052 03/26/23  0708   POCGLU 195* 235* 219* 134       Blood Sugar Average: Last 72 hrs:  (P) 194   · Recent A1c 6 9 -> 5 8  · Hold metformin and Toujeo   · Will need to decrease or stop Toujeo on discharge  · Accu-Cheks ACHS  · SSI  · Hypoglycemic protocol    Chest pain-resolved as of 3/26/2023  Assessment & Plan  Reported chest pain in the evening of 3/24  Appreciate cardiology input  History of nonobstructive coronary artery disease  Troponins and EKG unremarkable  · Likely due to esophageal dysmotility versus stricture  · Add Protonix 40 mg daily and Pepcid PRN digestion/heartburn  · Elevate head of bed      VTE Pharmacologic Prophylaxis:   Moderate Risk (Score 3-4) - Pharmacological DVT Prophylaxis Ordered: enoxaparin (Lovenox)  Patient Centered Rounds: I performed bedside rounds with nursing staff today  Discussions with Specialists or Other Care Team Provider: nursing, CM, cardiology and orthopedic notes appreciated    Education and Discussions with Family / Patient: Updated  (daughter) via phone  Total Time Spent on Date of Encounter in care of patient: 35 minutes This time was spent on one or more of the following: performing physical exam; counseling and coordination of care; obtaining or reviewing history; documenting in the medical record; reviewing/ordering tests, medications or procedures; communicating with other healthcare professionals and discussing with patient's family/caregivers      Current Length of Stay: 6 day(s)  Current Patient Status: Inpatient   Certification Statement: The patient will "continue to require additional inpatient hospital stay due to dysphagia, postoperative monitoring, anemia  Discharge Plan: Anticipate discharge in 24-48 hrs to acute rehab     Code Status: Level 1 - Full Code    Subjective:   Patient seen and examined at bedside  No discomfort with gonzales catheter  Slept well with Melatonin last night  Reports moderate left hip pain - waiting for Tylenol to \"kick in \" Denies HA, dizziness, CP, or SOB  Objective:     Vitals:   Temp (24hrs), Av 1 °F (36 7 °C), Min:97 8 °F (36 6 °C), Max:98 4 °F (36 9 °C)    Temp:  [97 8 °F (36 6 °C)-98 4 °F (36 9 °C)] 98 1 °F (36 7 °C)  HR:  [73-86] 86  Resp:  [18-21] 18  BP: (121-132)/(60-70) 132/70  SpO2:  [96 %-98 %] 96 %  Body mass index is 26 98 kg/m²  Input and Output Summary (last 24 hours): Intake/Output Summary (Last 24 hours) at 3/26/2023 0923  Last data filed at 3/26/2023 0429  Gross per 24 hour   Intake 100 ml   Output 950 ml   Net -850 ml       Physical Exam:   Physical Exam  Vitals and nursing note reviewed  Constitutional:       General: She is not in acute distress  Appearance: She is ill-appearing  She is not toxic-appearing or diaphoretic  HENT:      Head: Normocephalic  Mouth/Throat:      Mouth: Mucous membranes are moist    Eyes:      Conjunctiva/sclera: Conjunctivae normal    Cardiovascular:      Rate and Rhythm: Normal rate  Pulmonary:      Effort: Pulmonary effort is normal       Breath sounds: Normal breath sounds  No wheezing, rhonchi or rales  Abdominal:      General: Bowel sounds are normal  There is no distension  Palpations: Abdomen is soft  Tenderness: There is no abdominal tenderness  Genitourinary:     Comments: Indwelling gonzales catheter draining agustin colored urine   Musculoskeletal:      Cervical back: Normal range of motion  Right lower leg: No edema  Left lower leg: No edema  Skin:     General: Skin is warm and dry        Capillary Refill: Capillary refill takes " less than 2 seconds  Comments: Left lower extremity with 3 OR dressings intact with small amount of strike through    Neurological:      Mental Status: She is alert and oriented to person, place, and time  Mental status is at baseline  Motor: Weakness present  Psychiatric:         Mood and Affect: Affect is flat  Speech: Speech normal          Behavior: Behavior normal  Behavior is cooperative  Cognition and Memory: Cognition normal          Judgment: Judgment normal               Additional Data:     Labs:  Results from last 7 days   Lab Units 03/26/23  0441 03/25/23  0517   WBC Thousand/uL 5 66 5 31   HEMOGLOBIN g/dL 7 3* 7 8*   HEMATOCRIT % 22 9* 24 1*   PLATELETS Thousands/uL 145* 143*   NEUTROS PCT %  --  72   LYMPHS PCT %  --  17   MONOS PCT %  --  9   EOS PCT %  --  1     Results from last 7 days   Lab Units 03/26/23  0441 03/21/23  0459 03/20/23  1224   SODIUM mmol/L 135   < > 131*   POTASSIUM mmol/L 4 4   < > 3 8   CHLORIDE mmol/L 100   < > 92*   CO2 mmol/L 29   < > 29   BUN mg/dL 16   < > 13   CREATININE mg/dL 0 73   < > 0 82   ANION GAP mmol/L 6   < > 10   CALCIUM mg/dL 8 1*   < > 8 6   ALBUMIN g/dL  --   --  3 7   TOTAL BILIRUBIN mg/dL  --   --  0 46   ALK PHOS U/L  --   --  85   ALT U/L  --   --  26   AST U/L  --   --  26   GLUCOSE RANDOM mg/dL 142*   < > 181*    < > = values in this interval not displayed       Results from last 7 days   Lab Units 03/20/23  1224   INR  1 10     Results from last 7 days   Lab Units 03/26/23  0708 03/25/23  2052 03/25/23  1545 03/25/23  1114 03/25/23  0736 03/24/23  2228 03/24/23  1557 03/24/23  1143 03/24/23  0722 03/23/23  2106 03/23/23  1635 03/23/23  1109   POC GLUCOSE mg/dl 134 219* 235* 195* 157* 211* 160* 260* 199* 217* 118 291*     Results from last 7 days   Lab Units 03/22/23  0556   HEMOGLOBIN A1C % 5 8*           Lines/Drains:  Invasive Devices     Peripheral Intravenous Line  Duration           Peripheral IV 03/24/23 Left;Ventral (anterior) Forearm 1 day    Peripheral IV 03/25/23 Right Antecubital 1 day          Drain  Duration           Urethral Catheter Non-latex 16 Fr  1 day              Urinary Catheter:  Goal for removal: Voiding trial when ambulation improves               Imaging: Reviewed radiology reports from this admission including: CT head, MRI brain, xray(s) and CT cervical spine, CTA chest    Recent Cultures (last 7 days):         Last 24 Hours Medication List:   Current Facility-Administered Medications   Medication Dose Route Frequency Provider Last Rate   • acetaminophen  975 mg Oral BID JOHN Lawson     • atorvastatin  40 mg Oral Daily With Dinner JOHN Nava     • clonazePAM  0 25 mg Oral BID JOHN Lawson     • cyanocobalamin  1,000 mcg Oral Daily JOHN Lawson     • enoxaparin  40 mg Subcutaneous Q24H [address] JOHN Nava     • famotidine  10 mg Oral BID PRN JOHN Lawson     • HYDROmorphone  0 2 mg Intravenous Q4H PRN Olivier Pemberton PA-C     • insulin lispro  1-5 Units Subcutaneous TID AC JOHN Blanco     • insulin lispro  1-5 Units Subcutaneous HS [address] D JOHN Dale     • iron sucrose  200 mg Intravenous Daily JOHN Lawson 0 mg (03/25/23 1056)   • magnesium Oxide  400 mg Oral Daily JOHN Lawson     • melatonin  3 mg Oral HS JOHN Lawson     • metoprolol succinate  25 mg Oral Daily [place] JOHN Wong     • nitroglycerin  0 4 mg Sublingual Q5 Min PRN Christina Ferreira MD     • oxyCODONE  2 5 mg Oral Q6H PRN JOHN Nava     • pantoprazole  40 mg Oral Early Morning JOHN Lawson     • polyethylene glycol  17 g Oral BID JOHN Lawson     • senna  2 tablet Oral HS JOHN Lawson     • torsemide  5 mg Oral Every Other Day JOHN Lawson     • ziprasidone  80 mg Oral Daily JOHN Nava     • ziprasidone  80 mg Oral HS Diego Martinez MD          Today, Patient Was Seen By: JOHN Solorio    **Please Note: This note may have been constructed using a voice recognition system  **

## 2023-03-26 NOTE — PLAN OF CARE
Problem: Potential for Falls  Goal: Patient will remain free of falls  Description: INTERVENTIONS:  - Educate patient/family on patient safety including physical limitations  - Instruct patient to call for assistance with activity   - Consult OT/PT to assist with strengthening/mobility   - Keep Call bell within reach  - Keep bed low and locked with side rails adjusted as appropriate  - Keep care items and personal belongings within reach  - Initiate and maintain comfort rounds  - Make Fall Risk Sign visible to staff  - Offer Toileting every 2 Hours, in advance of need  - Initiate/Maintain bed alarm  - Obtain necessary fall risk management equipment:   - Apply yellow socks and bracelet for high fall risk patients  - Consider moving patient to room near nurses station  Outcome: Progressing     Problem: Nutrition/Hydration-ADULT  Goal: Nutrient/Hydration intake appropriate for improving, restoring or maintaining nutritional needs  Description: Monitor and assess patient's nutrition/hydration status for malnutrition  Collaborate with interdisciplinary team and initiate plan and interventions as ordered  Monitor patient's weight and dietary intake as ordered or per policy  Utilize nutrition screening tool and intervene as necessary  Determine patient's food preferences and provide high-protein, high-caloric foods as appropriate       INTERVENTIONS:  - Monitor oral intake, urinary output, labs, and treatment plans  - Assess nutrition and hydration status and recommend course of action  - Evaluate amount of meals eaten  - Assist patient with eating if necessary   - Allow adequate time for meals  - Recommend/ encourage appropriate diets, oral nutritional supplements, and vitamin/mineral supplements  - Order, calculate, and assess calorie counts as needed  - Recommend, monitor, and adjust tube feedings and TPN/PPN based on assessed needs  - Assess need for intravenous fluids  - Provide specific nutrition/hydration education as appropriate  - Include patient/family/caregiver in decisions related to nutrition  Outcome: Progressing     Problem: MOBILITY - ADULT  Goal: Maintain or return to baseline ADL function  Description: INTERVENTIONS:  -  Assess patient's ability to carry out ADLs; assess patient's baseline for ADL function and identify physical deficits which impact ability to perform ADLs (bathing, care of mouth/teeth, toileting, grooming, dressing, etc )  - Assess/evaluate cause of self-care deficits   - Assess range of motion  - Assess patient's mobility; develop plan if impaired  - Assess patient's need for assistive devices and provide as appropriate  - Encourage maximum independence but intervene and supervise when necessary  - Involve family in performance of ADLs  - Assess for home care needs following discharge   - Consider OT consult to assist with ADL evaluation and planning for discharge  - Provide patient education as appropriate  Outcome: Progressing  Goal: Maintains/Returns to pre admission functional level  Description: INTERVENTIONS:  - Perform BMAT or MOVE assessment daily    - Set and communicate daily mobility goal to care team and patient/family/caregiver  - Collaborate with rehabilitation services on mobility goals if consulted  - Perform Range of Motion 4 times a day  - Reposition patient every 2 hours    - Dangle patient 3 times a day  - Stand patient 3 times a day  - Ambulate patient 3 times a day  - Out of bed to chair 3 times a day   - Out of bed for meals  3 times a day  - Out of bed for toileting  - Record patient progress and toleration of activity level   Outcome: Progressing     Problem: Prexisting or High Potential for Compromised Skin Integrity  Goal: Skin integrity is maintained or improved  Description: INTERVENTIONS:  - Identify patients at risk for skin breakdown  - Assess and monitor skin integrity  - Assess and monitor nutrition and hydration status  - Monitor labs   - Assess for incontinence   - Turn and reposition patient  - Assist with mobility/ambulation  - Relieve pressure over bony prominences  - Avoid friction and shearing  - Provide appropriate hygiene as needed including keeping skin clean and dry  - Evaluate need for skin moisturizer/barrier cream  - Collaborate with interdisciplinary team   - Patient/family teaching  - Consider wound care consult   Outcome: Progressing     Problem: PAIN - ADULT  Goal: Verbalizes/displays adequate comfort level or baseline comfort level  Description: Interventions:  - Encourage patient to monitor pain and request assistance  - Assess pain using appropriate pain scale  - Administer analgesics based on type and severity of pain and evaluate response  - Implement non-pharmacological measures as appropriate and evaluate response  - Consider cultural and social influences on pain and pain management  - Notify physician/advanced practitioner if interventions unsuccessful or patient reports new pain  Outcome: Progressing

## 2023-03-26 NOTE — PROGRESS NOTES
Patient examined spoke to the nurse patient is alert awake cooperative oriented to place and person she is not confused she is able to communicate her feelings well she reminds me again said do not do there to change my Geodon and Klonopin I need that I reassured her that I am giving her the dosage the same as she was getting from her psychiatrist Dr Anne Bhat  She is satisfied she is stable at her baseline with bipolar depression she is not lethargic she is not confused nurse reports no behavioral problem  Medical treatment is in progress  Patient needs help with ADL care  No side effects of Geodon and Klonopin noted  No new behavioral problems reported or noted  Patient denies hallucination  Patient denies feeling suicidal   Patient is not lethargic  Patient does not look in distress  Therapy is done with good response  I will follow-up

## 2023-03-26 NOTE — OCCUPATIONAL THERAPY NOTE
"  OT TREATMENT     03/26/23 1157   Note Type   Note Type Treatment   Pain Assessment   Pain Assessment Tool 0-10   Pain Score 8   Pain Location/Orientation Orientation: Left; Location: Hip;Location: Leg   Pain Onset/Description Onset: Ongoing   Hospital Pain Intervention(s) Repositioned; Ambulation/increased activity   Restrictions/Precautions   Weight Bearing Precautions Per Order Yes   LLE Weight Bearing Per Order WBAT   Other Precautions Chair Alarm; Fall Risk;Pain   ADL   Grooming Assistance 5  Supervision/Setup   Grooming Deficit Brushing hair   UB Dressing Assistance 4  Minimal Assistance   Toileting Assistance  3  Moderate Assistance   Toileting Deficit Increased time to complete;Perineal hygiene; Bedside commode   Bed Mobility   Rolling L 3  Moderate assistance   Additional items Assist x 1   Supine to Sit 3  Moderate assistance   Additional items Assist x 1;LE management   Transfers   Sit to Stand 4  Minimal assistance   Additional items Assist x 1; Increased time required;Verbal cues   Stand to Sit 4  Minimal assistance   Additional items Assist x 1;Verbal cues   Stand pivot 4  Minimal assistance   Additional items Assist x 1; Increased time required;Verbal cues   Toilet transfer 4  Minimal assistance   Additional items Assist x 1; Increased time required;Verbal cues; Commode   Subjective   Subjective I have pain   Cognition   Overall Cognitive Status WFL   Arousal/Participation Alert; Responsive; Cooperative  (tired)   Orientation Level Oriented X4   Following Commands Follows multistep commands with increased time or repetition   Activity Tolerance   Activity Tolerance Patient limited by pain; Patient limited by fatigue;Patient tolerated treatment well   Medical Staff Made Aware yes, Marge   Assessment   Assessment S: \"I have pain and I'm tired\" O: Pt received while in bed and reported feeling tired  Pt initially needed encouragement to participate in OT session   Pt requested to use bed pan and reported that PCA had " already assisted her with morning care  Max A for placement of bed pan but pt unable to have BM  Pt has catheter  Encouraged pt to get up and out of bed and pt agreed  Bed mob Supine->sit mod A, sts and SPT to commode min A with RW and cues for sequencing and use of RW  Pt needed increased time using commode  Needed mod A for juan care/hygiene post toileting  Pt ambulated few steps to recliner chair using RW  Min A to S for grooming- hair care  A: Patient with some pain to L hip/leg/ knee but able to complete all ADLs and functional mobility with minimal difficulty  Continued skilled OT services recommended to increase pt's overall  Activity tolerance, strength and for education for compensatory strategies and use of  DME/AD to increase ease, safety and reduce pain and discomfort during ADL tasks  At end of session patient seated in bedside chair with all needs met and alarm activated  P: Continue with POC and STR  The patient's raw score on the AM-PAC Daily Activity Inpatient Short Form is 16  A raw score of less than 19 suggests the patient may benefit from discharge to post-acute rehabilitation services  Please refer to the recommendation of the Occupational Therapist for safe discharge planning  Plan   Treatment Interventions ADL retraining;Functional transfer training; Endurance training; Activityengagement;Patient/family training   OT Frequency   (5x/week)   Recommendation   OT Discharge Recommendation Post acute rehabilitation services   AM-PAC Daily Activity Inpatient   Lower Body Dressing 2   Bathing 2   Toileting 2   Upper Body Dressing 3   Grooming 3   Eating 4   Daily Activity Raw Score 16   Daily Activity Standardized Score (Calc for Raw Score >=11) 35 96   AM-PAC Applied Cognition Inpatient   Following a Speech/Presentation 4   Understanding Ordinary Conversation 4   Taking Medications 3   Remembering Where Things Are Placed or Put Away 3   Remembering List of 4-5 Errands 3   Taking Care of Complicated Tasks 3   Applied Cognition Raw Score 20   Applied Cognition Standardized Score 41 76   End of Consult   Education Provided Yes   Patient Position at End of Consult Bedside chair;Bed/Chair alarm activated; All needs within reach   Nurse Communication Nurse aware of consult   Licensure   [place] License Number  Lupis Mckenzie [address] 86, OTR/L [id number]

## 2023-03-27 ENCOUNTER — APPOINTMENT (INPATIENT)
Dept: RADIOLOGY | Facility: HOSPITAL | Age: 68
End: 2023-03-27

## 2023-03-27 LAB
ANION GAP SERPL CALCULATED.3IONS-SCNC: 6 MMOL/L (ref 4–13)
ATRIAL RATE: 84 BPM
BACTERIA UR QL AUTO: ABNORMAL /HPF
BILIRUB UR QL STRIP: NEGATIVE
BUN SERPL-MCNC: 17 MG/DL (ref 5–25)
CALCIUM SERPL-MCNC: 8.4 MG/DL (ref 8.4–10.2)
CAOX CRY URNS QL MICRO: ABNORMAL /HPF
CHLORIDE SERPL-SCNC: 100 MMOL/L (ref 96–108)
CLARITY UR: ABNORMAL
CO2 SERPL-SCNC: 29 MMOL/L (ref 21–32)
COLOR UR: ABNORMAL
CREAT SERPL-MCNC: 0.83 MG/DL (ref 0.6–1.3)
ERYTHROCYTE [DISTWIDTH] IN BLOOD BY AUTOMATED COUNT: 14.7 % (ref 11.6–15.1)
GFR SERPL CREATININE-BSD FRML MDRD: 72 ML/MIN/1.73SQ M
GLUCOSE SERPL-MCNC: 142 MG/DL (ref 65–140)
GLUCOSE SERPL-MCNC: 156 MG/DL (ref 65–140)
GLUCOSE SERPL-MCNC: 164 MG/DL (ref 65–140)
GLUCOSE SERPL-MCNC: 182 MG/DL (ref 65–140)
GLUCOSE SERPL-MCNC: 193 MG/DL (ref 65–140)
GLUCOSE UR STRIP-MCNC: NEGATIVE MG/DL
HCT VFR BLD AUTO: 24.4 % (ref 34.8–46.1)
HGB BLD-MCNC: 7.7 G/DL (ref 11.5–15.4)
HGB UR QL STRIP.AUTO: ABNORMAL
KETONES UR STRIP-MCNC: NEGATIVE MG/DL
LEUKOCYTE ESTERASE UR QL STRIP: ABNORMAL
MAGNESIUM SERPL-MCNC: 2 MG/DL (ref 1.9–2.7)
MCH RBC QN AUTO: 28.8 PG (ref 26.8–34.3)
MCHC RBC AUTO-ENTMCNC: 31.6 G/DL (ref 31.4–37.4)
MCV RBC AUTO: 91 FL (ref 82–98)
NITRITE UR QL STRIP: NEGATIVE
NON-SQ EPI CELLS URNS QL MICRO: ABNORMAL /HPF
P AXIS: 66 DEGREES
PH UR STRIP.AUTO: 7.5 [PH]
PHOSPHATE SERPL-MCNC: 3.5 MG/DL (ref 2.3–4.1)
PLATELET # BLD AUTO: 149 THOUSANDS/UL (ref 149–390)
PMV BLD AUTO: 8.9 FL (ref 8.9–12.7)
POTASSIUM SERPL-SCNC: 4.6 MMOL/L (ref 3.5–5.3)
PR INTERVAL: 116 MS
PROT UR STRIP-MCNC: ABNORMAL MG/DL
QRS AXIS: 25 DEGREES
QRSD INTERVAL: 116 MS
QT INTERVAL: 428 MS
QTC INTERVAL: 505 MS
RBC # BLD AUTO: 2.67 MILLION/UL (ref 3.81–5.12)
RBC #/AREA URNS AUTO: ABNORMAL /HPF
SARS-COV-2 RNA RESP QL NAA+PROBE: NEGATIVE
SODIUM SERPL-SCNC: 135 MMOL/L (ref 135–147)
SP GR UR STRIP.AUTO: 1.01 (ref 1–1.03)
T WAVE AXIS: 34 DEGREES
UROBILINOGEN UR QL STRIP.AUTO: 0.2 E.U./DL
VENTRICULAR RATE: 84 BPM
WBC # BLD AUTO: 6.43 THOUSAND/UL (ref 4.31–10.16)
WBC #/AREA URNS AUTO: ABNORMAL /HPF

## 2023-03-27 RX ORDER — CLONAZEPAM 0.5 MG/1
0.5 TABLET ORAL 2 TIMES DAILY
Status: DISCONTINUED | OUTPATIENT
Start: 2023-03-27 | End: 2023-03-28 | Stop reason: HOSPADM

## 2023-03-27 RX ADMIN — INSULIN LISPRO 1 UNITS: 100 INJECTION, SOLUTION INTRAVENOUS; SUBCUTANEOUS at 22:39

## 2023-03-27 RX ADMIN — ENOXAPARIN SODIUM 40 MG: 40 INJECTION SUBCUTANEOUS at 10:33

## 2023-03-27 RX ADMIN — ACETAMINOPHEN 975 MG: 325 TABLET, FILM COATED ORAL at 10:33

## 2023-03-27 RX ADMIN — INSULIN LISPRO 1 UNITS: 100 INJECTION, SOLUTION INTRAVENOUS; SUBCUTANEOUS at 12:00

## 2023-03-27 RX ADMIN — ZIPRASIDONE HYDROCHLORIDE 80 MG: 40 CAPSULE ORAL at 22:47

## 2023-03-27 RX ADMIN — MAGNESIUM OXIDE TAB 400 MG (241.3 MG ELEMENTAL MG) 400 MG: 400 (241.3 MG) TAB at 10:33

## 2023-03-27 RX ADMIN — POLYETHYLENE GLYCOL 3350 17 G: 17 POWDER, FOR SOLUTION ORAL at 22:36

## 2023-03-27 RX ADMIN — ACETAMINOPHEN 975 MG: 325 TABLET, FILM COATED ORAL at 22:46

## 2023-03-27 RX ADMIN — CYANOCOBALAMIN TAB 500 MCG 1000 MCG: 500 TAB at 10:33

## 2023-03-27 RX ADMIN — PANTOPRAZOLE SODIUM 40 MG: 40 TABLET, DELAYED RELEASE ORAL at 05:29

## 2023-03-27 RX ADMIN — CLONAZEPAM 0.5 MG: 0.5 TABLET ORAL at 22:46

## 2023-03-27 RX ADMIN — METOPROLOL SUCCINATE 25 MG: 25 TABLET, EXTENDED RELEASE ORAL at 10:33

## 2023-03-27 RX ADMIN — POLYETHYLENE GLYCOL 3350 17 G: 17 POWDER, FOR SOLUTION ORAL at 10:35

## 2023-03-27 RX ADMIN — ZIPRASIDONE HYDROCHLORIDE 80 MG: 40 CAPSULE ORAL at 10:35

## 2023-03-27 RX ADMIN — ATORVASTATIN CALCIUM 40 MG: 40 TABLET, FILM COATED ORAL at 16:37

## 2023-03-27 RX ADMIN — INSULIN LISPRO 1 UNITS: 100 INJECTION, SOLUTION INTRAVENOUS; SUBCUTANEOUS at 16:37

## 2023-03-27 RX ADMIN — Medication 3 MG: at 22:46

## 2023-03-27 RX ADMIN — SENNOSIDES 17.2 MG: 8.6 TABLET, FILM COATED ORAL at 22:46

## 2023-03-27 RX ADMIN — CLONAZEPAM 0.25 MG: 0.5 TABLET ORAL at 10:33

## 2023-03-27 NOTE — PROGRESS NOTES
[facility] 44  Progress Note  Name: Mary Hutson  MRN: [id number]  Unit/Bed#: 18 [address] Date of Admission: 3/20/2023   Date of Service: 3/27/2023 I Hospital Day: 7    Assessment/Plan   * Fracture of left femur [facility])  Assessment & Plan  · S/p mechanical fall in the bathtub  · Imaging revealed an acute displaced periprosthetic left distal femoral fracture  · Orthopedics following   · POD #6 status post surgical fixation left distal femur periprosthetic fracture with retrograde IM nail  · Pain controlled   · Noted acute blood loss anemia postoperatively  · Attempted to transfuse 1 unit of PRBCs, however, unable to transfuse as patient is type O- and current recommendations are to transfuse if less than 6 given critical shortage of PRBCs  · Venofer 200 mg IV daily for 3 doses  · Hemoglobin stable, 11 3 -> 7 3 -> 7 8 -> 7 3  · No signs of active bleeding   · Plan for Lovenox 40 mg daily for 6 weeks  · Hold ASA while on Lovenox  · Weight bearing as tolerated   · SCDs  · Bowel regimen   · Supportive care  · PT/OT recommending acute rehab   · Continue Shanks catheter and plan for voiding trial in acute rehab  · Stable for discharge from a surgical perspective    Hyponatremia  Assessment & Plan  · Acute on Chronic  Noted on admission with sodium of 131, possibly secondary to antipsychotics medications   Previously on Wellbutrin and Zoloft  · Discontinued Lexapro as this could be contributing to acute memory/slow response   · Sodium stable at 135  · Decrease torsemide to 5 mg every other day to avoid dehydration  · Liberalize fluid restriction to 1800 mL/day  · Monitor sodium in am     Type 2 diabetes mellitus without complication, with long-term current use of insulin [facility])  Assessment & Plan  Lab Results   Component Value Date    HGBA1C 5 8 (H) 03/22/2023       Recent Labs     03/25/23  1114 03/25/23  1545 03/25/23 2052 03/26/23  0708   POCGLU 195* 235* 219* 134       Blood Sugar Average: Last 72 hrs:  (P) 194   · Recent A1c 6 9 -> 5 8  · Hold metformin and Toujeo   · Will need to decrease or stop Toujeo on discharge  · Accu-Cheks ACHS  · SSI  · Hypoglycemic protocol    Dysphagia  Assessment & Plan  · Appreciate speech therapy  · Swallow eval noted esophageal abnormalities   · Concern for dysmotility vs stricture   · Downgraded diet to level 2, chopped foods with thin liquids  · Added Protonix 40 mg daily for possible dysmotility   · Esophagram recs:  Oropharyngeal skills assessed radio graphically and no deficits noted (and no laryngeal penetration, aspiration or pharyngeal residue occurred this am)  Acute blood loss anemia  Assessment & Plan  HGB 11-> 8 3 -> 8 1 -> 7 7 -> 7 3 -> 7 8 -> 7 3 > 7 7  Likely acute blood loss anemia from surgery  · B12 322  · Folate 13 6  · Iron panel suspicious for DANIELLA   · Stop iron supplementation at this time   · Venofer 200 mg IV daily for 3 doses  · Unable to transfuse 1 unit of PRBCs as her blood type is O- and there is a critical shortage; blood bank recommends transfusion for hemoglobin is less than 6  · Added B12 supplement   · Monitor HGB and transfuse if less than 6 or if patient becomes symptomatic    Urinary retention  Assessment & Plan  Developed postoperative urinary retention  · Failed voiding trial  · Shanks catheter reinserted on 3/24/2023  · Plan to continue Shanks catheter until mobility improves in acute rehab  · Monitor for constipation    Memory changes  Assessment & Plan  Patient cooperative, pleasant but slow the respond  Daughter noted an acute change in her memory about 3 weeks ago     · CT head unremarkable  · MRI brain with acute findings  · Appreciate neurology and psychiatry input   · Stop Lexapro as this can worsen memory issues  · Added B12 supplement  · UA unremarkable   · Supportive care   · Follow-up with neurology for further outpatient memory testing     Anxiety  Assessment & Plan  Stable  Continue Clonazepam    Thyroid nodule  Assessment & Plan  CTA chest: Subcentimeter right thyroid nodule with adjacent punctate calcification  · Updated patient's daughter   · Per radiology, no follow-up needed    History of DVT of lower extremity  Assessment & Plan  · Noted in history  · Patient only takes aspirin 81 mg  · Currently holding aspirin while on lovenox     Bipolar depression ([facility] [address] )  Assessment & Plan  · Appreciate psychiatry input   · Decreased Geodon to 80 mg BID (maximum dose)  · Continue Klonopin 0 25 mg BID  · Discontinued Lexapro due to concern for acute memory changes  · Mood is stable, pleasant and cooperative     Hyperlipidemia  Assessment & Plan  · Continue Lipitor     Essential hypertension  Assessment & Plan  BP stable  · Continue Toprol XL  · Resume torsemide but decrease to 5 mg every other day    Chest pain-resolved as of 3/26/2023  Assessment & Plan  Reported chest pain in the evening of 3/24  Appreciate cardiology input  History of nonobstructive coronary artery disease  Troponins and EKG unremarkable  · Likely due to esophageal dysmotility versus stricture  · Add Protonix 40 mg daily and Pepcid PRN digestion/heartburn  · Elevate head of bed           VTE Pharmacologic Prophylaxis:   High Risk (Score >/= 5) - Pharmacological DVT Prophylaxis Ordered: enoxaparin (Lovenox)  Sequential Compression Devices Ordered  Patient Centered Rounds: I performed bedside rounds with nursing staff today  Discussions with Specialists or Other Care Team Provider: Yes    Education and Discussions with Family / Patient: Updated  (daughter) via phone      Total Time Spent on Date of Encounter in care of patient: 45 minutes This time was spent on one or more of the following: performing physical exam; counseling and coordination of care; obtaining or reviewing history; documenting in the medical record; reviewing/ordering tests, medications or procedures; communicating with other healthcare professionals and discussing with patient's family/caregivers  Current Length of Stay: 7 day(s)  Current Patient Status: Inpatient   Certification Statement: The patient will continue to require additional inpatient hospital stay due to Placement  Discharge Plan: Anticipate discharge tomorrow to rehab facility  Code Status: Level 1 - Full Code    Subjective:   Patient seen and examined with  at bedside s/p esophagram  Reports that she tolerated it well with no difficulty swallowing  Asking for diet tray as she is hungry  Denies SOB, lightheadedness, dizziness, abdominal pain, nausea or vomiting  Objective:     Vitals:   Temp (24hrs), Av 1 °F (36 7 °C), Min:97 8 °F (36 6 °C), Max:98 6 °F (37 °C)    Temp:  [97 8 °F (36 6 °C)-98 6 °F (37 °C)] 98 °F (36 7 °C)  HR:  [69-87] 87  Resp:  [16-18] 18  BP: (103-144)/(53-65) 144/65  SpO2:  [94 %-98 %] 98 %  Body mass index is 26 98 kg/m²  Input and Output Summary (last 24 hours): Intake/Output Summary (Last 24 hours) at 3/27/2023 1321  Last data filed at 3/27/2023 0523  Gross per 24 hour   Intake 180 ml   Output 750 ml   Net -570 ml     Physical Exam:   Physical Exam  Vitals and nursing note reviewed  Constitutional:       General: She is not in acute distress  Appearance: She is ill-appearing  She is not toxic-appearing or diaphoretic  HENT:      Head: Normocephalic  Mouth/Throat:      Mouth: Mucous membranes are moist    Eyes:      Conjunctiva/sclera: Conjunctivae normal    Cardiovascular:      Rate and Rhythm: Normal rate  Pulmonary:      Effort: Pulmonary effort is normal       Breath sounds: Normal breath sounds  No wheezing, rhonchi or rales  Abdominal:      General: Bowel sounds are normal  There is no distension  Palpations: Abdomen is soft  Tenderness: There is no abdominal tenderness  Genitourinary:     Comments: Indwelling gonzales catheter draining agustin colored urine   Musculoskeletal:      Cervical back: Normal range of motion        Right lower leg: No edema  Left lower leg: No edema  Skin:     General: Skin is warm and dry  Capillary Refill: Capillary refill takes less than 2 seconds  Comments: Left lower extremity dressings CDI  Neurological:      Mental Status: She is alert and oriented to person, place, and time  Mental status is at baseline  Motor: Weakness present  Psychiatric:         Mood and Affect: Affect is flat  Speech: Speech normal          Behavior: Behavior normal  Behavior is cooperative  Cognition and Memory: Cognition normal          Judgment: Judgment normal        Additional Data:     Labs:  Results from last 7 days   Lab Units 03/27/23  0449 03/26/23  0441 03/25/23  0517   WBC Thousand/uL 6 43   < > 5 31   HEMOGLOBIN g/dL 7 7*   < > 7 8*   HEMATOCRIT % 24 4*   < > 24 1*   PLATELETS Thousands/uL 149   < > 143*   NEUTROS PCT %  --   --  72   LYMPHS PCT %  --   --  17   MONOS PCT %  --   --  9   EOS PCT %  --   --  1    < > = values in this interval not displayed       Results from last 7 days   Lab Units 03/27/23  0449   SODIUM mmol/L 135   POTASSIUM mmol/L 4 6   CHLORIDE mmol/L 100   CO2 mmol/L 29   BUN mg/dL 17   CREATININE mg/dL 0 83   ANION GAP mmol/L 6   CALCIUM mg/dL 8 4   GLUCOSE RANDOM mg/dL 142*         Results from last 7 days   Lab Units 03/27/23  1050 03/27/23  0934 03/27/23  0801 03/26/23  2144 03/26/23  1559 03/26/23  1119 03/26/23  0708 03/25/23  2052 03/25/23  1545 03/25/23  1114 03/25/23  0736 03/24/23  2228   POC GLUCOSE mg/dl 164* 156* 182* 218* 210* 253* 134 219* 235* 195* 157* 211*     Results from last 7 days   Lab Units 03/22/23  0556   HEMOGLOBIN A1C % 5 8*           Lines/Drains:  Invasive Devices     Peripheral Intravenous Line  Duration           Peripheral IV 03/25/23 Right Antecubital 2 days          Drain  Duration           Urethral Catheter Non-latex 16 Fr  3 days              Urinary Catheter:  Goal for removal: N/A- Discharging with Shanks               Imaging: No pertinent imaging reviewed  Recent Cultures (last 7 days):         Last 24 Hours Medication List:   Current Facility-Administered Medications   Medication Dose Route Frequency Provider Last Rate   • acetaminophen  975 mg Oral BID JONH Devlin     • atorvastatin  40 mg Oral Daily With Dinner JOHN Woods     • clonazePAM  0 5 mg Oral BID Leela Mac MD     • cyanocobalamin  1,000 mcg Oral Daily Levokerone Blas, CRNP     • enoxaparin  40 mg Subcutaneous Q24H [address] JOHN Woods     • famotidine  10 mg Oral BID PRN Gilberto Odonnell, CRNP     • HYDROmorphone  0 2 mg Intravenous Q4H PRN Maycol Agrawal PA-C     • insulin lispro  1-5 Units Subcutaneous TID AC Olsangeetaide JOHN Wong     • insulin lispro  1-5 Units Subcutaneous HS Olayide D KARLEE DaleNP     • magnesium Oxide  400 mg Oral Daily Levomary carmen Odonnell, CRNP     • melatonin  3 mg Oral HS Gilberto Odonnell, KARLEENP     • metoprolol succinate  25 mg Oral Daily Olayide D Suyapa, KARLEENP     • nitroglycerin  0 4 mg Sublingual Q5 Min PRN Rayo Larose MD     • oxyCODONE  2 5 mg Oral Q6H PRN JOHN Woods     • pantoprazole  40 mg Oral Early Morning Levomary carmen Odonnell, KARLEENP     • polyethylene glycol  17 g Oral BID Levomary carmen Odonnell, CRNP     • senna  2 tablet Oral HS Gilberto Odonnell, CRNP     • torsemide  5 mg Oral Every Other Day JOHN Devlin     • ziprasidone  80 mg Oral Daily Olayide D JOHN Dale     • ziprasidone  80 mg Oral HS Leela Mac MD          Today, Patient Was Seen By: JOHN Woods    **Please Note: This note may have been constructed using a voice recognition system  **

## 2023-03-27 NOTE — CASE MANAGEMENT
Called [employer]ras[facility] Mcbrideing to fast track [facility]  Per Cyrilardine Cesar they do not service commercial plans  I called the number on patients card  Per customer service at  this was called in as a denial this morning 03/27/23-9am     No voicemails received for patient  Refaxed   Called and spoke to Calvert [place]   The initial fax was received  Also the ones faxed today  LVM with  nurse Lender Race  [phone] to follow up

## 2023-03-27 NOTE — PROGRESS NOTES
Patient examined spoke to the nurse discussed with Dr Rajni Murdock and nurse practitioner  Daughter was requesting to add more psychotropic medications we had a long discussion and I recommended that patient is already on maximum dose of Geodon and she came in with Klonopin 0 5 mg p o  twice daily but we were holding her Klonopin to a lower dosage when she was sedated but now patient improved with the sedation and patient is also asking to give her psychotropic medications exactly the same way that her psychiatrist Dr Johnny Grimes given her I will adjust her Klonopin 0 25 mg p o  twice daily to 0 5 mg p o  twice daily and continue her Geodon 80 mg twice daily which is the maximum dose of medication and patient is on fairly high dose of Geodon for a geriatric patient but she does not want me to touch her medication and keep the same dosage  According to the Dr Rajni Murdock daughter was requesting to add more medicine we discussed and we talked about antidepressant SSRIs or SNRI but patient does have problem with hyponatremia and these medications make that condition worse so we decided not to order any antidepression and patient is stable at her baseline with bipolar depression and anxiety she offers no new complaints  Currently patient denies auditory or visual hallucinations  Patient denies suicidal or homicidal ideation  Patient is tolerating her medications well she is not lethargic she is not in distress  Medical treatment is in progress  Since patient improved with lethargy I will increase her Klonopin 0 5 mg p o  twice daily at her request to her original dosage at home and continue Geodon 80 mg p o  twice daily  Patient will follow-up with her psychiatrist Dr Johnny Grimes after the discharge  We had a long discussion with Dr Rajni Murdock and the nurse practitioner  I will follow-up

## 2023-03-27 NOTE — SPEECH THERAPY NOTE
"As requested by pt, I was in attendance for Barium Swallow/Esophagram (pt c/o being anxious and requested my presence)  Support provided  Oropharyngeal skills assessed radiographically with liquid and no deficits noted (and no laryngeal penetration, aspiration or pharyngeal residue occurred this am)  Pt c s/s significant reflux  I assisted transport to return to room  PS I observed/assessed intake with limited amts of salmon (minced), mashed potato, well cooked and cut green sage and 1 Lesa Doone cookie  Slow mastication  Pt removed 1 bite of fish and 1 green bean (\"too hard\")  No pharyngeal ss  Continue current diet and SLP f/u re: potential for further diet advancement and to ensure reflux precautions    Geovanny Watt [address] [id number]    "

## 2023-03-27 NOTE — PROGRESS NOTES
"Progress Note - Orthopedics   Angela Segura 70 y o  female MRN: [id number]  Unit/Bed#: [address] [place] [address] Encounter: [id number]        Subjective: Postop day #7 status post surgical fixation left distal femur periprosthetic fracture with retrograde IM nail  The patient notes ongoing pain about the left knee while ambulating, better controlled at rest   She notes good sensation of the left lower extremity   Hemoglobin 7 7 this morning  She is nervous about her upcoming barium swallow  Patient denies any CP, SOB, or dizziness  No acute overnight events  Evaluated by cardiology and psychiatry with no acute issues found  Vitals: Blood pressure 144/65, pulse 87, temperature 98 °F (36 7 °C), temperature source Oral, resp  rate 18, height 5' 4\" (1 626 m), weight 71 3 kg (157 lb 3 oz), SpO2 98 %, not currently breastfeeding  ,Body mass index is 26 98 kg/m²  Intake/Output Summary (Last 24 hours) at 3/27/2023 0818  Last data filed at 3/27/2023 9113  Gross per 24 hour   Intake 180 ml   Output 750 ml   Net -570 ml       Invasive Devices     Peripheral Intravenous Line  Duration           Peripheral IV 03/25/23 Right Antecubital 1 day          Drain  Duration           Urethral Catheter Non-latex 16 Fr  2 days                  Ortho Exam:  General: Patient alert and oriented x3, in no acute distress, sitting up comfortably in bed       LLE: Small nickel sized region of strikethrough on each of her 3 dressings  Swelling left knee   Compartments soft  No calf tenderness  Moves toes/ankle freely  Good capillary refill  Sensation intact lateral femoral cutaneous, saphenous, sural, deep/superficial peroneal nerve distributions  Lab, Imaging and other studies: I have personally reviewed pertinent lab results    CBC:   Lab Results   Component Value Date    WBC 6 43 03/27/2023    HGB 7 7 (L) 03/27/2023    HCT 24 4 (L) 03/27/2023    MCV 91 03/27/2023     03/27/2023    MCH 28 8 03/27/2023    MCHC 31 6 03/27/2023    RDW 14 7 " 03/27/2023    MPV 8 9 03/27/2023    NRBC 0 03/25/2023     CMP:   Lab Results   Component Value Date     (L) 04/21/2017    SODIUM 135 03/27/2023    K 4 6 03/27/2023     03/27/2023    CO2 29 03/27/2023    AGAP 6 03/27/2023    BUN 17 03/27/2023    CREATININE 0 83 03/27/2023    GLUC 142 (H) 03/27/2023    GLUF 220 (H) 12/07/2022    CALCIUM 8 4 03/27/2023    AST 26 03/20/2023    ALT 26 03/20/2023    ALKPHOS 85 03/20/2023    PROT 6 9 04/21/2017    TP 6 9 03/20/2023    BILITOT 0 5 04/21/2017    TBILI 0 46 03/20/2023    EGFR 72 03/27/2023       PT/INR:   Lab Results   Component Value Date    INR 1 10 03/20/2023       Assessment:  POD #7 Status post surgical fixation left distal femur periprosthetic fracture with retrograde IM nail    Plan:  PT/OT  Weight-bear as tolerated left lower extremity     Dressings will remain in place today and may be removed tomorrow     Lovenox for DVT prophylaxis for 6 weeks     We will continue to monitor hemoglobin, primary team to determine if repeat transfusion warranted  Appears stable at this time       Analgesia as needed     Medical management per primary team     Patient will  require short-term rehab upon discharge  Fine to discharge from orthopedic perspective once medically stable  Follow-up as an outpatient 2 weeks from surgical date for reevaluation and continued care    Weight bearing: WBAT with assistance      VTE Pharmacologic Prophylaxis: Enoxaparin (Lovenox)  VTE Mechanical Prophylaxis: sequential compression device

## 2023-03-27 NOTE — CASE MANAGEMENT
Case Management Progress Note    Patient name Angela Segura  Location [address] [facility]/ Taylor kendall MRN [id number]  : 1955 Date 3/27/2023       LOS (days): 7  Geometric Mean LOS (GMLOS) (days): 4 40  Days to GMLOS:-2 6        OBJECTIVE:        Current admission status: Inpatient  Preferred Pharmacy:   [facility] [address] [facility]ARY[facility]OU [facility]  [phone] [place] [id number]  Phone: [phone] Fax: [phone]    Primary Care Provider: Sarahy Wesley DO    Primary Insurance: Maiar Ser  Secondary Insurance: MEDICARE    PROGRESS NOTE:    Per TT with Wilber Horowitz, no authorization received at this time  CM requested follow-up once Haris Embs is received  Per TT from [facility] admissions, needing updated COVID swab to admit patient  Provider made aware of need for COVID swab to admit to [facility] via TT  CM to follow

## 2023-03-27 NOTE — PHYSICAL THERAPY NOTE
"   PT TREATMENT     03/27/23 1515   Note Type   Note Type Treatment   Pain Assessment   Pain Assessment Tool Perry-Baker FACES   Perry-Baker FACES Pain Rating 4   Pain Location/Orientation   (L leg)   Restrictions/Precautions   LLE Weight Bearing Per Order WBAT   Other Precautions Pain; Fall Risk; Chair Alarm; Bed Alarm   General   Chart Reviewed Yes   Cognition   Overall Cognitive Status Impaired  (slow to respond)   Arousal/Participation Cooperative   Subjective   Subjective \"I'm weak\"   Bed Mobility   Supine to Sit 3  Moderate assistance   Additional items LE management;Verbal cues; Increased time required   Sit to Supine 3  Moderate assistance   Additional items LE management;Verbal cues; Increased time required   Transfers   Sit to Stand 4  Minimal assistance   Stand to Sit 4  Minimal assistance   Stand pivot 4  Minimal assistance   Additional items   (with walker)   Toilet transfer 4  Minimal assistance   Additional items Commode  (with walker)   Ambulation/Elevation   Gait Assistance 4  Minimal assist   Assistive Device Rolling walker   Distance 5 feet, 50 feet   Balance   Static Sitting Fair +   Static Standing Fair   Activity Tolerance   Activity Tolerance Patient limited by fatigue   Exercises   Neuro re-ed x 10 each B LE ankle pumps, LAQ, quad set, x 10 L LE AAROM hip abd/add   Assessment   Prognosis Good   Problem List Decreased strength;Decreased range of motion;Decreased mobility; Impaired balance;Pain   Assessment Pt demonstrates slowly improving pain and function  Pt does well with encouragement and needs to be pushed  Plan to take two longer walks next session and have pt sit up OOB in the chair  The patient's AM-PAC Basic Mobility Inpatient Short Form Raw Score is 14  A Raw score of less than or equal to 16 suggests the patient may benefit from discharge to post-acute rehabilitation services  Plan   Treatment/Interventions Therapeutic exercise;Elevations; Functional transfer training;LE " strengthening/ROM; Gait training;Bed mobility; Equipment eval/education;Patient/family training   Progress Progressing toward goals   PT Frequency Other (Comment)  (daily)   Recommendation   PT Discharge Recommendation Post acute rehabilitation services   AM-PAC Basic Mobility Inpatient   Turning in Flat Bed Without Bedrails 2   Lying on Back to Sitting on Edge of Flat Bed Without Bedrails 2   Moving Bed to Chair 3   Standing Up From Chair Using Arms 3   Walk in Room 3   Climb 3-5 Stairs With Railing 1   Basic Mobility Inpatient Raw Score 14   Basic Mobility Standardized Score 41 05   Highest Level Of Mobility   JH-HLM Goal 6: Walk 10 steps or more   JH-HLM Achieved 7: Walk 25 feet or more   Licensure   [place] License Number  Guy ZUÑIGA  [id number]

## 2023-03-27 NOTE — UTILIZATION REVIEW
Continued Stay Review     Date: 03-25-23                       Current Patient Class: inpatient  Current Level of Care: M/S    HPI:68 y o  female initially admitted on *03-20-23    Assessment/Plan:  Postop day #5 status post surgical fixation left distal femur periprosthetic fracture with retrograde IM nail   Patient did have chest tightness this morning, but notes this has almost fully resolved at this point  The patient notes moderate pain about the left knee  [employer] Mo notes good sensation of the left lower extremity  Weight-bear as tolerated left lower extremity,  short-term rehab upon discharge  CM awaiting insurance approval from [facility]      Vital Signs:   Date/Time Temp Pulse Resp BP MAP (mmHg) SpO2 O2 Flow Rate (L/min) O2 Device Patient Position - Orthostatic VS   03/25/23 07:05:30 98 1 °F (36 7 °C) 96 -- 153/72 99 99 % -- -- --   03/25/23 05:53:09 -- 90 18 148/70 96 98 % -- -- --   03/24/23 22:35:02 98 1 °F (36 7 °C) 81 16 122/64 83 97 % -- -- --   03/24/23 19:22:26 98 5 °F (36 9 °C) 75 18 118/63 81 98 % -- -- Lying   03/24/23 1524 98 4 °F (36 9 °C) 70 -- 118/64 82 99 % -- -- --   03/24/23 07:31:32 98 8 °F (37 1 °C) 84 18 132/59 83 98 % 1 L/min Nasal cannula --   03/24/23 00:07:37 98 4 °F (36 9 °C) 81 20 119/57 78 97 % -- --        Pertinent Labs/Diagnostic Results:   Results from last 7 days   Lab Units 03/27/23  1338 03/20/23  1615   SARS-COV-2  Negative Negative     Results from last 7 days   Lab Units 03/27/23  0449 03/26/23  0441 03/25/23  0517 03/24/23  0506 03/23/23  0522   WBC Thousand/uL 6 43 5 66 5 31 4 89 5 59   HEMOGLOBIN g/dL 7 7* 7 3* 7 8* 7 3* 7 7*   HEMATOCRIT % 24 4* 22 9* 24 1* 22 4* 23 7*   PLATELETS Thousands/uL 149 145* 143* 139* 123*   NEUTROS ABS Thousands/µL  --   --  3 78  --  3 86         Results from last 7 days   Lab Units 03/27/23  0449 03/26/23  0441 03/25/23  0517 03/24/23  0506 03/23/23  0522   SODIUM mmol/L 135 135 135 134* 134*   POTASSIUM mmol/L 4 6 4 4 4 3 4 4 4 3   CHLORIDE mmol/L 100 100 99 99 99   CO2 mmol/L 29 29 29 28 29   ANION GAP mmol/L 6 6 7 7 6   BUN mg/dL 17 16 12 14 14   CREATININE mg/dL 0 83 0 73 0 68 0 66 0 73   EGFR ml/min/1 73sq m 72 84 89 90 84   CALCIUM mg/dL 8 4 8 1* 8 6 8 4 8 0*   MAGNESIUM mg/dL 2 0 1 8* 1 7* 1 8* 1 7*   PHOSPHORUS mg/dL 3 5 3 7  --  3 0 2 7     Results from last 7 days   Lab Units 03/25/23  0517   BILIRUBIN DIRECT mg/dL 0 18     Results from last 7 days   Lab Units 03/27/23  1050 03/27/23  0934 03/27/23  0801 03/26/23  2144 03/26/23  1559 03/26/23  1119 03/26/23  0708 03/25/23  2052 03/25/23  1545 03/25/23  1114 03/25/23  0736 03/24/23  2228   POC GLUCOSE mg/dl 164* 156* 182* 218* 210* 253* 134 219* 235* 195* 157* 211*     Results from last 7 days   Lab Units 03/27/23  0449 03/26/23  0441 03/25/23  0517 03/24/23  0506 03/23/23  0522 03/22/23  0556 03/21/23  0459   GLUCOSE RANDOM mg/dL 142* 142* 170* 127 116 137 148*         Results from last 7 days   Lab Units 03/22/23  0556   HEMOGLOBIN A1C % 5 8*   EAG mg/dl 120       Results from last 7 days   Lab Units 03/25/23  1106 03/25/23  0812 03/25/23  0625 03/20/23  1615   HS TNI 0HR ng/L  --   --  10  --    HS TNI 2HR ng/L  --  11  --   --    HSTNI D2 ng/L  --  1  --   --    HS TNI 4HR ng/L 13  --   --  12   HSTNI D4 ng/L 3  --   --  -1     Results from last 7 days   Lab Units 03/25/23  6976   D-DIMER QUANTITATIVE ug/ml FEU 8 01*                             Results from last 7 days   Lab Units 03/25/23  0517   BNP pg/mL 34     Results from last 7 days   Lab Units 03/22/23  1741   FERRITIN ng/mL 458*                         Results from last 7 days   Lab Units 03/20/23  1626   CLARITY UA  Clear   COLOR UA  Light Yellow   SPEC GRAV UA  1 010   PH UA  6 0   GLUCOSE UA mg/dl Negative   KETONES UA mg/dl Negative   BLOOD UA  Negative   PROTEIN UA mg/dl Negative   NITRITE UA  Negative   BILIRUBIN UA  Negative   UROBILINOGEN UA E U /dl 0 2   LEUKOCYTES UA  Negative     Results from last 7 days   Lab Units 03/20/23  1615   INFLUENZA A PCR  Negative   INFLUENZA B PCR  Negative   RSV PCR  Negative                                             Medications:   Scheduled Medications:  acetaminophen, 975 mg, Oral, BID  atorvastatin, 40 mg, Oral, Daily With Dinner  clonazePAM, 0 5 mg, Oral, BID  cyanocobalamin, 1,000 mcg, Oral, Daily  enoxaparin, 40 mg, Subcutaneous, Q24H MARIA INES  insulin lispro, 1-5 Units, Subcutaneous, TID AC  insulin lispro, 1-5 Units, Subcutaneous, HS  magnesium Oxide, 400 mg, Oral, Daily  melatonin, 3 mg, Oral, HS  metoprolol succinate, 25 mg, Oral, Daily  pantoprazole, 40 mg, Oral, Early Morning  polyethylene glycol, 17 g, Oral, BID  senna, 2 tablet, Oral, HS  torsemide, 5 mg, Oral, Every Other Day  ziprasidone, 80 mg, Oral, Daily  ziprasidone, 80 mg, Oral, HS      Continuous IV Infusions:     PRN Meds:  famotidine, 10 mg, Oral, BID PRN  HYDROmorphone, 0 2 mg, Intravenous, Q4H PRN  nitroglycerin, 0 4 mg, Sublingual, Q5 Min PRN  oxyCODONE, 2 5 mg, Oral, Q6H PRN        Discharge Plan: D    Network Utilization Review Department  ATTENTION: Please call with any questions or concerns to [phone] and carefully listen to the prompts so that you are directed to the right person  All voicemails are confidential   [place] all requests for admission clinical reviews, approved or denied determinations and any other requests to dedicated fax number below belonging to the campus where the patient is receiving treatment   List of dedicated fax numbers for the Facilities:  [address] DENIALS (Administrative/Medical Necessity) [phone]   [address] (Maternity/NICU/Pediatrics) [phone]   91 Leanna Fournier [phone]   [facility] [phone] [address] [place] [address] Yuko Hutchinson [facility] [address] [facility] [place] [address] 623 [facility] [phone]

## 2023-03-28 VITALS
TEMPERATURE: 97.7 F | BODY MASS INDEX: 26.84 KG/M2 | HEIGHT: 64 IN | SYSTOLIC BLOOD PRESSURE: 131 MMHG | OXYGEN SATURATION: 96 % | RESPIRATION RATE: 18 BRPM | DIASTOLIC BLOOD PRESSURE: 64 MMHG | WEIGHT: 157.19 LBS | HEART RATE: 84 BPM

## 2023-03-28 PROBLEM — N39.0 UTI (URINARY TRACT INFECTION): Status: ACTIVE | Noted: 2023-03-28

## 2023-03-28 PROBLEM — E66.9 OBESITY: Chronic | Status: RESOLVED | Noted: 2017-05-04 | Resolved: 2023-03-28

## 2023-03-28 LAB
GLUCOSE SERPL-MCNC: 201 MG/DL (ref 65–140)
GLUCOSE SERPL-MCNC: 222 MG/DL (ref 65–140)
GLUCOSE SERPL-MCNC: 235 MG/DL (ref 65–140)

## 2023-03-28 RX ORDER — MIRTAZAPINE 15 MG/1
7.5 TABLET, FILM COATED ORAL
Status: DISCONTINUED | OUTPATIENT
Start: 2023-03-28 | End: 2023-03-28 | Stop reason: HOSPADM

## 2023-03-28 RX ORDER — NITROFURANTOIN 25; 75 MG/1; MG/1
100 CAPSULE ORAL 2 TIMES DAILY
Qty: 14 CAPSULE | Refills: 0 | Status: ON HOLD
Start: 2023-03-28 | End: 2023-04-04

## 2023-03-28 RX ORDER — SENNOSIDES 8.6 MG
17.2 TABLET ORAL
Qty: 30 TABLET | Refills: 0 | Status: ON HOLD
Start: 2023-03-28 | End: 2023-04-12

## 2023-03-28 RX ORDER — POLYETHYLENE GLYCOL 3350 17 G/17G
17 POWDER, FOR SOLUTION ORAL 2 TIMES DAILY
Qty: 238 G | Refills: 0 | Status: ON HOLD
Start: 2023-03-28 | End: 2023-04-04

## 2023-03-28 RX ORDER — MIRTAZAPINE 7.5 MG/1
7.5 TABLET, FILM COATED ORAL
Qty: 15 TABLET | Refills: 0 | Status: ON HOLD
Start: 2023-03-28 | End: 2023-04-12

## 2023-03-28 RX ORDER — ZIPRASIDONE HYDROCHLORIDE 80 MG/1
80 CAPSULE ORAL
Qty: 30 CAPSULE | Refills: 0 | Status: ON HOLD
Start: 2023-03-28 | End: 2023-04-27

## 2023-03-28 RX ORDER — ZIPRASIDONE HYDROCHLORIDE 80 MG/1
80 CAPSULE ORAL DAILY
Qty: 30 CAPSULE | Refills: 0 | Status: ON HOLD
Start: 2023-03-29 | End: 2023-04-28

## 2023-03-28 RX ORDER — TORSEMIDE 5 MG/1
5 TABLET ORAL EVERY OTHER DAY
Qty: 15 TABLET | Refills: 0 | Status: ON HOLD
Start: 2023-03-30 | End: 2023-04-29

## 2023-03-28 RX ORDER — CLONAZEPAM 0.5 MG/1
0.5 TABLET ORAL 2 TIMES DAILY
Qty: 20 TABLET | Refills: 0 | Status: ON HOLD | OUTPATIENT
Start: 2023-03-28 | End: 2023-04-07

## 2023-03-28 RX ADMIN — CLONAZEPAM 0.5 MG: 0.5 TABLET ORAL at 09:08

## 2023-03-28 RX ADMIN — MAGNESIUM OXIDE TAB 400 MG (241.3 MG ELEMENTAL MG) 400 MG: 400 (241.3 MG) TAB at 09:08

## 2023-03-28 RX ADMIN — METOPROLOL SUCCINATE 25 MG: 25 TABLET, EXTENDED RELEASE ORAL at 09:08

## 2023-03-28 RX ADMIN — INSULIN LISPRO 2 UNITS: 100 INJECTION, SOLUTION INTRAVENOUS; SUBCUTANEOUS at 11:48

## 2023-03-28 RX ADMIN — ENOXAPARIN SODIUM 40 MG: 40 INJECTION SUBCUTANEOUS at 09:08

## 2023-03-28 RX ADMIN — PANTOPRAZOLE SODIUM 40 MG: 40 TABLET, DELAYED RELEASE ORAL at 07:45

## 2023-03-28 RX ADMIN — POLYETHYLENE GLYCOL 3350 17 G: 17 POWDER, FOR SOLUTION ORAL at 09:11

## 2023-03-28 RX ADMIN — ACETAMINOPHEN 975 MG: 325 TABLET, FILM COATED ORAL at 09:08

## 2023-03-28 RX ADMIN — TORSEMIDE 5 MG: 10 TABLET ORAL at 09:08

## 2023-03-28 RX ADMIN — CYANOCOBALAMIN TAB 500 MCG 1000 MCG: 500 TAB at 09:08

## 2023-03-28 RX ADMIN — ZIPRASIDONE HYDROCHLORIDE 80 MG: 40 CAPSULE ORAL at 09:11

## 2023-03-28 RX ADMIN — INSULIN LISPRO 1 UNITS: 100 INJECTION, SOLUTION INTRAVENOUS; SUBCUTANEOUS at 08:17

## 2023-03-28 NOTE — NURSING NOTE
Patient left unit via stretcher in stable condition with all belongings accompanied by transport team and spouse  IV removed per protocol and occlusive dressing applied; Shanks catheter remained in place per order  No answer / no voicemail at receiving facility when attempting to call to give report x2

## 2023-03-28 NOTE — PHYSICAL THERAPY NOTE
"   PT TREATMENT       03/28/23 1052   PT Last Visit   PT Visit Date 03/28/23   Note Type   Note Type Treatment   Pain Assessment   Pain Assessment Tool 0-10   Pain Score No Pain   Pain Location/Orientation Orientation: Left; Location: Knee; Location: Leg   Patient's Stated Pain Goal No pain   Hospital Pain Intervention(s) Repositioned; Ambulation/increased activity   Multiple Pain Sites No   Restrictions/Precautions   Weight Bearing Precautions Per Order Yes   LLE Weight Bearing Per Order WBAT   Other Precautions Bed Alarm; Chair Alarm; Fall Risk;Pain   General   Chart Reviewed Yes   Family/Caregiver Present No   Cognition   Overall Cognitive Status Impaired  (increased time to respond to questioning)   Arousal/Participation Cooperative   Orientation Level Oriented X4   Following Commands Follows multistep commands with increased time or repetition   Subjective   Subjective \"I'm scared of falling\"   Bed Mobility   Additional Comments received sitting on toilet, transferred back to bedside chair at EOS   Transfers   Sit to Stand 4  Minimal assistance   Additional items Assist x 1;Verbal cues   Stand to Sit 4  Minimal assistance   Additional items Assist x 1;Verbal cues   Stand pivot 4  Minimal assistance   Additional items Assist x 1;Verbal cues   Toilet transfer 4  Minimal assistance   Additional items Assist x 1;Verbal cues; Commode   Ambulation/Elevation   Gait pattern Decreased L stance; Short stride; Excessively slow; Step through pattern   Gait Assistance 4  Minimal assist   Additional items Assist x 1;Verbal cues   Assistive Device Rolling walker   Distance 40 feet ; 30 feet ; 15 feet   Stair Management Assistance Not tested   Balance   Static Sitting Fair +   Dynamic Sitting Fair   Static Standing Fair   Dynamic Standing Fair -   Ambulatory Poor +   Activity Tolerance   Activity Tolerance Patient tolerated treatment well;Patient limited by fatigue;Patient limited by pain  (+ fear of falls, requires encouragement) " Nurse Made Aware yes   Exercises   Neuro re-ed Pt able to perform seated exercise including LAQ, ankle pumps, quad sets x 5-10 reps L   Assessment   Prognosis Good   Problem List Decreased strength;Decreased range of motion;Decreased endurance; Impaired balance;Decreased mobility; Impaired judgement;Decreased safety awareness;Pain;Decreased skin integrity;Orthopedic restrictions   Assessment Pt agreeable to PT session this AM - received sitting on toilet with CNA  Able to progress ambulation x increased distance with increased trials as mentioned above but continues to require frequent verbal encouragement to improve tolerance to functional mobility + confidence  After sitting in bedside chair pt able to perform exercise and then requesting to return to bathroom - able to toilet with Min A + requires Max A for pericare in standing  Repositioned for comfort in bedside chair with all needs within reach  The patient's AM-PAC Basic Mobility Inpatient Short Form Raw Score is 14  A Raw score of less than or equal to 16 suggests the patient may benefit from discharge to post-acute rehabilitation services  Please also refer to the recommendation of the Physical Therapist for safe discharge planning  Goals   Patient Goals to get better   Plan   Treatment/Interventions ADL retraining;Functional transfer training;LE strengthening/ROM; Therapeutic exercise; Endurance training;Bed mobility;Gait training;Spoke to nursing;OT   Progress Progressing toward goals   PT Frequency Other (Comment)  (daily)   Recommendation   PT Discharge Recommendation Post acute rehabilitation services   AM-PAC Basic Mobility Inpatient   Turning in Flat Bed Without Bedrails 2   Lying on Back to Sitting on Edge of Flat Bed Without Bedrails 2   Moving Bed to Chair 3   Standing Up From Chair Using Arms 3   Walk in Room 3   Climb 3-5 Stairs With Railing 1   Basic Mobility Inpatient Raw Score 14   Basic Mobility Standardized Score 35 55   Highest Level Of Mobility   JH-HLM Goal 4: Move to chair/commode   JH-HLM Achieved 7: Walk 25 feet or more   Education   Education Provided Mobility training;Home exercise program   Patient Explanation/teachback used; Reinforcement needed   End of Consult   Patient Position at End of Consult Bedside chair;Bed/Chair alarm activated; All needs within reach   [facility] Insurance Number  Adriana Ahmadi [id number]

## 2023-03-28 NOTE — PLAN OF CARE
Problem: Potential for Falls  Goal: Patient will remain free of falls  Description: INTERVENTIONS:  - Educate patient/family on patient safety including physical limitations  - Instruct patient to call for assistance with activity   - Consult OT/PT to assist with strengthening/mobility   - Keep Call bell within reach  - Keep bed low and locked with side rails adjusted as appropriate  - Keep care items and personal belongings within reach  - Initiate and maintain comfort rounds  - Make Fall Risk Sign visible to staff  - Offer Toileting every 2 Hours, in advance of need  - Initiate/Maintain bed alarm  - Obtain necessary fall risk management equipment:   - Apply yellow socks and bracelet for high fall risk patients  - Consider moving patient to room near nurses station  3/28/2023 09 by Geovanny Solis RN  Outcome: Progressing  3/28/2023 0953 by Geovanny Solis RN  Outcome: Progressing     Problem: Nutrition/Hydration-ADULT  Goal: Nutrient/Hydration intake appropriate for improving, restoring or maintaining nutritional needs  Description: Monitor and assess patient's nutrition/hydration status for malnutrition  Collaborate with interdisciplinary team and initiate plan and interventions as ordered  Monitor patient's weight and dietary intake as ordered or per policy  Utilize nutrition screening tool and intervene as necessary  Determine patient's food preferences and provide high-protein, high-caloric foods as appropriate       INTERVENTIONS:  - Monitor oral intake, urinary output, labs, and treatment plans  - Assess nutrition and hydration status and recommend course of action  - Evaluate amount of meals eaten  - Assist patient with eating if necessary   - Allow adequate time for meals  - Recommend/ encourage appropriate diets, oral nutritional supplements, and vitamin/mineral supplements  - Order, calculate, and assess calorie counts as needed  - Recommend, monitor, and adjust tube feedings and TPN/PPN based on assessed needs  - Assess need for intravenous fluids  - Provide specific nutrition/hydration education as appropriate  - Include patient/family/caregiver in decisions related to nutrition  3/28/2023 0953 by Caleb Kaufman RN  Outcome: Progressing  3/28/2023 0953 by Caleb Kaufman RN  Outcome: Progressing     Problem: MOBILITY - ADULT  Goal: Maintain or return to baseline ADL function  Description: INTERVENTIONS:  -  Assess patient's ability to carry out ADLs; assess patient's baseline for ADL function and identify physical deficits which impact ability to perform ADLs (bathing, care of mouth/teeth, toileting, grooming, dressing, etc )  - Assess/evaluate cause of self-care deficits   - Assess range of motion  - Assess patient's mobility; develop plan if impaired  - Assess patient's need for assistive devices and provide as appropriate  - Encourage maximum independence but intervene and supervise when necessary  - Involve family in performance of ADLs  - Assess for home care needs following discharge   - Consider OT consult to assist with ADL evaluation and planning for discharge  - Provide patient education as appropriate  3/28/2023 0953 by Caleb Kaufman RN  Outcome: Progressing  3/28/2023 0953 by Caleb Kaufman RN  Outcome: Progressing  Goal: Maintains/Returns to pre admission functional level  Description: INTERVENTIONS:  - Perform BMAT or MOVE assessment daily    - Set and communicate daily mobility goal to care team and patient/family/caregiver  - Collaborate with rehabilitation services on mobility goals if consulted  - Perform Range of Motion 4 times a day  - Reposition patient every 2 hours    - Dangle patient 3 times a day  - Stand patient 3 times a day  - Ambulate patient 3 times a day  - Out of bed to chair 3 times a day   - Out of bed for meals  3 times a day  - Out of bed for toileting  - Record patient progress and toleration of activity level   3/28/2023 0953 by Caleb Kaufman RN  Outcome: Progressing  3/28/2023 0953 by Selina Salamanca RN  Outcome: Progressing     Problem: Prexisting or High Potential for Compromised Skin Integrity  Goal: Skin integrity is maintained or improved  Description: INTERVENTIONS:  - Identify patients at risk for skin breakdown  - Assess and monitor skin integrity  - Assess and monitor nutrition and hydration status  - Monitor labs   - Assess for incontinence   - Turn and reposition patient  - Assist with mobility/ambulation  - Relieve pressure over bony prominences  - Avoid friction and shearing  - Provide appropriate hygiene as needed including keeping skin clean and dry  - Evaluate need for skin moisturizer/barrier cream  - Collaborate with interdisciplinary team   - Patient/family teaching  - Consider wound care consult   3/28/2023 0953 by Selina Salamanca RN  Outcome: Progressing  3/28/2023 0953 by Selina Salamanca RN  Outcome: Progressing     Problem: PAIN - ADULT  Goal: Verbalizes/displays adequate comfort level or baseline comfort level  Description: Interventions:  - Encourage patient to monitor pain and request assistance  - Assess pain using appropriate pain scale  - Administer analgesics based on type and severity of pain and evaluate response  - Implement non-pharmacological measures as appropriate and evaluate response  - Consider cultural and social influences on pain and pain management  - Notify physician/advanced practitioner if interventions unsuccessful or patient reports new pain  3/28/2023 0953 by Selina Salamanca RN  Outcome: Progressing  3/28/2023 0953 by Selina Salamanca RN  Outcome: Progressing

## 2023-03-28 NOTE — CASE MANAGEMENT
Case Management Discharge Planning Note    Patient name Angela Segura  Location 2 [facility] 212/2 [facility] MRN [id number]  : 1955 Date 3/28/2023       Current Admission Date: 3/20/2023  Current Admission Diagnosis:Fracture of left femur [facility])   Patient Active Problem List    Diagnosis Date Noted   • UTI (urinary tract infection) 2023   • Dysphagia 2023   • Urinary retention 2023   • Acute blood loss anemia 2023   • Memory changes 2023   • Anxiety 2023   • PONV (postoperative nausea and vomiting) 2023   • Delayed emergence from anesthesia 2023   • Fracture of left femur ([address] ) 2023   • History of DVT of lower extremity 2023   • Thyroid nodule 2023   • Platelets decreased ([address] ) 2022   • Status post total left knee replacement using cement 2022   • Elevated brain natriuretic peptide (BNP) level 10/18/2022   • Osteoarthritis of both knees 10/18/2022   • Hyponatremia 10/15/2022   • Bipolar depression ([address] ) 10/15/2022   • Mass of axillary tail of right breast 2022   • Mass of axillary tail of left breast 2022   • Axillary mass, right 2022   • OAB (overactive bladder) 11/15/2019   • History of colon cancer 2018   • History of endometrial cancer 2017   • Type 2 diabetes mellitus without complication, with long-term current use of insulin ([address] ) 2017   • Essential hypertension 2017   • Hyperlipidemia 2017   • Obesity 2017   • Bipolar disorder ([address] ) 2017      LOS (days): 8  Geometric Mean LOS (GMLOS) (days): 4 40  Days to GMLOS:-3 6     OBJECTIVE:  Risk of Unplanned Readmission Score: 21 04     Current admission status: Inpatient   Preferred Pharmacy:   [facility] [address] MARYLOU SAGE [place]  [address]  [address]  Phone: [phone] Fax: [phone]    Primary Care Provider: Sarahy Wesley DO    Primary Insurance: Maira Garcia  Secondary Insurance: MEDICARE    DISCHARGE DETAILS:    Other Referral/Resources/Interventions Provided:  Interventions: Acute Rehab  Referral Comments: Discharge ordered  Pt will be transferred to [address]  Pt/daughter, [facility] and RN aware of plan  Treatment Team Recommendation: Acute Rehab  Discharge Destination Plan[de-identified] Acute Rehab  Transport at Discharge : [facility]    Transported by [employer] and Unit #):  CARLEEN  ETA of Transport (Date): 03/28/23  ETA of Transport (Time): Josh Flanagan Name, [phone] [facility], [address]  Receiving Facility/Agency Phone Number: [phone]

## 2023-03-28 NOTE — ASSESSMENT & PLAN NOTE
· S/p mechanical fall in the bathtub  · Imaging revealed an acute displaced periprosthetic left distal femoral fracture  · Orthopedics following   · POD #6 status post surgical fixation left distal femur periprosthetic fracture with retrograde IM nail  · Pain controlled   · Noted acute blood loss anemia postoperatively  · Attempted to transfuse 1 unit of PRBCs, however, unable to transfuse as patient is type O- and current recommendations are to transfuse if less than 6 given critical shortage of PRBCs  · Venofer 200 mg IV daily for 3 doses  · Hemoglobin stable, 11 3 -> 7 3 -> 7 8 -> 7 3  · No signs of active bleeding   · Plan for Lovenox 40 mg daily for 6 weeks  · Hold ASA while on Lovenox  · Weight bearing as tolerated   · SCDs  · Bowel regimen   · Supportive care  · PT/OT recommending acute rehab   · Continue Shanks catheter and plan for voiding trial in acute rehab  · Stable for discharge from a surgical perspective  · Discharging to [facility] acute rehab facility in [facility]

## 2023-03-28 NOTE — CASE MANAGEMENT
Case Management Discharge Planning Note    Patient name Mihai Bloodgood  Location [address] [place] [address] [place] [address] MRN [id number]  : 1955 Date 3/28/2023       Current Admission Date: 3/20/2023  Current Admission Diagnosis:Fracture of left femur [facility])   Patient Active Problem List    Diagnosis Date Noted   • Dysphagia 2023   • Urinary retention 2023   • Acute blood loss anemia 2023   • Memory changes 2023   • Anxiety 2023   • PONV (postoperative nausea and vomiting) 2023   • Delayed emergence from anesthesia 2023   • Fracture of left femur ([address] ) 2023   • History of DVT of lower extremity 2023   • Thyroid nodule 2023   • Platelets decreased ([address] ) 2022   • Status post total left knee replacement using cement 2022   • Elevated brain natriuretic peptide (BNP) level 10/18/2022   • Osteoarthritis of both knees 10/18/2022   • Hyponatremia 10/15/2022   • Bipolar depression ([address] ) 10/15/2022   • Mass of axillary tail of right breast 2022   • Mass of axillary tail of left breast 2022   • Axillary mass, right 2022   • OAB (overactive bladder) 11/15/2019   • History of colon cancer 2018   • History of endometrial cancer 2017   • Type 2 diabetes mellitus without complication, with long-term current use of insulin ([address] ) 2017   • Essential hypertension 2017   • Hyperlipidemia 2017   • Obesity 2017   • Bipolar disorder ([address] ) 2017      LOS (days): 8  Geometric Mean LOS (GMLOS) (days): 4 40  Days to GMLOS:-3 4     OBJECTIVE:  Risk of Unplanned Readmission Score: 20 99         Current admission status: Inpatient   Preferred Pharmacy:   [facility] [address] [place]  [phone] [place] [address]  [address]  Phone: [phone] Fax: [phone]    Primary Care Provider: Aleena Koch DO    Primary Insurance: Devang Leach  Secondary Insurance: MEDICARE    DISCHARGE DETAILS: Facility Insurance Auth Number: [id number] ([facility]andan Shirley)

## 2023-03-28 NOTE — PROGRESS NOTES
PHYSICAL MEDICINE AND REHABILITATION   PREADMISSION ASSESSMENT     Projected [facility] Diagnoses:  Impairment of mobility, safety and Activities of Daily Living (ADLs) due to Orthopedic Disorders:  08 9  Other Orthopedic    Etiologic: Acute closed displaced supracondylar fracture of distal end of left femur without intracondylar extension  Date of Onset: 3/20/23    Date of surgery:   3/20/23 Left - INSERTION NAIL IM FEMUR RETROGRADE     PATIENT INFORMATION  Name: Mihai Lo Phone #: [phone] (E)   Address: [address] [place] [id number]  YOB: 1955 Age: 70 y o  SS#   Marital Status: /Civil Union  Ethnicity: White   Employment Status: on disability  Extended Emergency Contact Information  Primary Emergency Contact: [facility] April Monte  Mobile Phone: [phone]  Relation: Daughter  Secondary Emergency Contact: Arun Bowles  Address: [address]  [facility] [employer]: [phone]  Relation: Spouse  Advance Directive: Level 1 Full code ( no advance directive on file )    INSURANCE/COVERAGE:     Primary Payor: Devang Pair / Plan: Devang Pair PPO / Product Type: PPO /   Secondary Payer:MEDICARE A AND B   Payer Contact: Beba Guaman  Payer Contact:n/a   Contact Phone: (f) [phone] (p) [phone] Contact Phone:n/a     Authorization #: [id number]  Coverage Dates:3/28/23 - 4/2/23  LCD: 4/2/23 ( review due 4/2/23 )  MEDICARE #: [id number]  Medicare Days: n/a  Ded- $3500 (  $3,326 61 remains ), OOPM ( $4500 -   $4,051 33 remainin) , copay $0, coins 25%        Medical Record #: [id number]    REFERRAL SOURCE:   Referring provider: Christine Harding MD  Referring facility: [facility]   [facility]  PCP: Aleena Koch DO PCP phone number: Edita Higuera  is a 70 y o  female with a medical history of but not limited to - hypertension, diabetes type 2, hyperlipidemia, psychiatric disorder colon and endometrial cancer in remission who presented to [address] [place] [address] on 3/20/23 s/p fall in the bathtub that morning, and was unable to stand and ambulate afterwards  She reported excruciating pain to the left knee  X-ray of the left knee revealed acute displaced periprosthetic left distal femoral fracture  Ortho surgery was consulted in ED and planned surgical intervention   Underwent Left - INSERTION NAIL IM FEMUR RETROGRADE on 3/20/23  She is WBAT  She was noted ABLA post op with no needed blood supplements  Pain was managed with IV and PO PRN analgesics  During hospital course noted with Chest discomfort on 3/23/23 - Cardio was consulted with no concerns with GERD expected  SLP therapies consulted for c/o dysphagia - she was placed on mechanical soft diet  Pysch was also consulted per family request d/t h/o bipolar  They continue to follow She has been deemed hemodynamically stable at this time  PT/OT therapies were consulted and continue to follow patient at this time and are recommending inpatient acute rehab when medically stable  All involved medical disciplines feel/agree patient is medically ready for discharge at this time  Inpatient acute rehabilitation physician was consulted  Upon review of patient’s case and correspondence with PT/OT therapy services, [facility] physician feels Christiano Joseph will benefit and is a good candidate / appropriate for inpatient acute rehab at this time  She has demonstrated the willingness / desire and tolerance to participate in the required 3 hours or more of therapies per day         Unknown Covid vaccination status ; Covid (-) 3/27/23   Past Medical History:   Past Surgical History:    Allergies:     Past Medical History:   Diagnosis Date   • Anesthesia complication     difficulty waking up   • Arthritis     left knee   • Bone spur     left knee behing the knee cap   • Chronic kidney disease    • Colon cancer ([facility] [address] ) patient states about 2017   • Colon polyp     Tubulovillous Adenoma High Grade Dysplasia - 2017   • Depression    • Diabetes mellitus ([facility] [address] )    • Endometrial cancer ([facility] [address] )     grade I   • Hx of bleeding disorder     vaginal bleeding started on 3/13/17    • Hyperlipidemia    • Hypertension    • Hypomagnesemia 3/20/2023   • PONV (postoperative nausea and vomiting)    • Psychiatric disorder    • Shortness of breath     with exertion   • Urinary incontinence    • Vitamin D deficiency     Past Surgical History:   Procedure Laterality Date   • BREAST BIOPSY Left     benign-maybe at ar age 58   • CHOLECYSTECTOMY      open   • COLONOSCOPY     • DILATION AND CURETTAGE OF UTERUS N/A 04/05/2017    Procedure: DILATATION AND CURETTAGE (D&C);   Surgeon: Vishnu Mortensen MD;  Location: [facility] MAIN OR;  Service:    • HYSTERECTOMY     • OOPHORECTOMY     • PELVIC LAPAROSCOPY     • WI ARTHRP KNE CONDYLE&PLATU MEDIAL&LAT COMPARTMENTS Left 12/06/2022    Procedure: ARTHROPLASTY KNEE TOTAL W ROBOT - CEMENTED - LEFT;  Surgeon: Antoinette Sandoval DO;  Location: [address] [place] [address];  Service: Orthopedics   • WI LAPS TOTAL HYSTERECT 250 GM/< W/RMVL TUBE/OVARY N/A 05/04/2017    Procedure: ROBOTIC ASSISTED TOTAL LAPAROSCOPIC HYSTERECTOMY, BILATERAL SALPINGOOPHERECTOMY; CYSTOSCOPY;  Surgeon: Roxane Pitt MD;  Location:  MAIN OR;  Service: Gynecology Oncology   • WI OPTX FEM SHFT FX W/INSJ IMED IMPLT W/WO SCREW Left 3/20/2023    Procedure: INSERTION NAIL IM FEMUR RETROGRADE;  Surgeon: Nadine Schmitt MD;  Location: [address] [place] [address];  Service: Orthopedics   • REPLACEMENT TOTAL KNEE     • TONSILLECTOMY     • TUBAL LIGATION       No Known Allergies       Medical/functional conditions requiring inpatient rehabilitation: impaired mobility / self care ; impaired balance / ambulation     Risk for medical/clinical complications: risk for falls ; risk for pain ; risk for skin breakdown ; risk for infection     Comorbidities/Surgeries in the last 100 days: hypertension, diabetes type 2, hyperlipidemia, psychiatric disorder ( Bipolar )    CURRENT VITAL SIGNS:   Temp:  [97 7 °F (36 5 °C)-98 7 °F (37 1 °C)] 97 7 °F (36 5 °C)  HR:  [80-84] 84  Resp:  [16-18] 18  BP: (122-134)/(49-67) 131/64   Intake/Output Summary (Last 24 hours) at 3/28/2023 1146  Last data filed at 3/28/2023 0901  Gross per 24 hour   Intake 820 ml   Output 900 ml   Net -80 ml        LABORATORY RESULTS:      Lab Results   Component Value Date    HGB 7 7 (L) 03/27/2023    HGB 12 3 04/21/2017    HCT 24 4 (L) 03/27/2023    HCT 35 8 04/21/2017    WBC 6 43 03/27/2023    WBC 6 7 04/21/2017     Lab Results   Component Value Date    BUN 17 03/27/2023    BUN 16 03/15/2023     (L) 04/21/2017    K 4 6 03/27/2023    K 4 1 03/15/2023     03/27/2023    CL 96 (L) 03/15/2023    CREATININE 0 83 03/27/2023    CREATININE 1 67 (H) 04/21/2017     Lab Results   Component Value Date    PROTIME 14 3 03/20/2023    INR 1 10 03/20/2023        DIAGNOSTIC STUDIES:  XR chest portable    Result Date: 3/25/2023  Impression: No acute cardiopulmonary disease  Workstation performed: [id number]     XR pelvis complete 3+ views    Result Date: 3/20/2023  Impression: Diffuse osteopenia  No acute pelvic fracture  Workstation performed: [id number]     XR femur 2 vw left    Result Date: 3/20/2023  Impression: Fluoroscopic guidance provided for procedure guidance  Please refer to the separate procedure notes for additional details  Workstation performed: [id number]     XR knee 4+ vw left injury    Result Date: 3/20/2023  Impression: Acute, displaced periprosthetic left distal femoral fracture  Workstation performed: [id number]     XR knee 4+ vw right injury    Result Date: 3/20/2023  Impression: No acute osseous abnormality  Degenerative changes as described  Workstation performed: [id number]     CT head without contrast    Result Date: 3/20/2023  Impression: No acute intracranial abnormality   Workstation performed: [id number]     CT spine cervical without contrast    Result Date: 3/20/2023  Impression: No cervical spine fracture or traumatic malalignment  Workstation performed: [id number]     MRI brain wo contrast    Result Date: 3/23/2023  Impression: 1  No acute infarction, edema, or mass effect  2  Mild chronic microangiopathic ischemic changes  3  Subcentimeter right frontal scalp sebaceous cyst  Workstation performed: [id number]     CTA chest pe study    Result Date: 3/25/2023  Impression: No pulmonary embolus  No acute pulmonary disease   Workstation performed: [id number]       PRECAUTIONS/SPECIAL NEEDS:  Tobacco:   Social History     Tobacco Use   Smoking Status Never   Smokeless Tobacco Never   , Alcohol:    Social History     Substance and Sexual Activity   Alcohol Use Never   , Anticoagulation:  Lovenox SQ as ordered by MD, Edema Management, Safety Concerns, Pain Management, Dietary Restrictions: 1800ml fluid restrict/dysphagia 2 -mechanical soft / consistent carb / thin liquids    , Language Preference: English and Fall precautions     MEDICATIONS:     Current Facility-Administered Medications:   •  acetaminophen (TYLENOL) tablet 975 mg, 975 mg, Oral, BID, JOHN Ramirez, 975 mg at 03/28/23 0255  •  atorvastatin (LIPITOR) tablet 40 mg, 40 mg, Oral, Daily With Dinner, JOHN Blanco, 40 mg at 03/27/23 1637  •  clonazePAM (KlonoPIN) tablet 0 5 mg, 0 5 mg, Oral, BID, Koffi Westbrook MD, 0 5 mg at 03/28/23 0908  •  cyanocobalamin (VITAMIN B-12) tablet 1,000 mcg, 1,000 mcg, Oral, Daily, JOHN Ramirez, 1,000 mcg at 03/28/23 0908  •  enoxaparin (LOVENOX) subcutaneous injection 40 mg, 40 mg, Subcutaneous, Q24H [facility], JOHN Blanco, 40 mg at 03/28/23 0908  •  famotidine (PEPCID) tablet 10 mg, 10 mg, Oral, BID PRN, JOHN Ramirez  •  HYDROmorphone HCl (DILAUDID) injection 0 2 mg, 0 2 mg, Intravenous, Q4H PRN, Maureen Sweet PA-C, 0 2 mg at 03/25/23 2015  •  insulin lispro (HumaLOG) 100 units/mL subcutaneous injection 1-5 Units, 1-5 Units, Subcutaneous, TID AC, 1 Units at 03/28/23 0817 **AND** Fingerstick Glucose (POCT), , , TID AC, Olayide D Suyapa, KARLEENP  •  insulin lispro (HumaLOG) 100 units/mL subcutaneous injection 1-5 Units, 1-5 Units, Subcutaneous, HS, Olayide D Ollissette, CRNP, 1 Units at 03/27/23 2239  •  magnesium Oxide (MAG-OX) tablet 400 mg, 400 mg, Oral, Daily, Ivydale Atkinson, CRNP, 400 mg at 03/28/23 4815  •  melatonin tablet 3 mg, 3 mg, Oral, HS, Ivydale Marlene, CRNP, 3 mg at 03/27/23 2246  •  metoprolol succinate (TOPROL-XL) 24 hr tablet 25 mg, 25 mg, Oral, Daily, Olayide D Olaniyan, CRNP, 25 mg at 03/28/23 0908  •  nitroglycerin (NITROSTAT) SL tablet 0 4 mg, 0 4 mg, Sublingual, Q5 Min PRN, Kim Vogel MD  •  oxyCODONE (ROXICODONE) IR tablet 2 5 mg, 2 5 mg, Oral, Q6H PRN, Olayide D Olaniyan, CRNP, 2 5 mg at 03/26/23 1752  •  pantoprazole (PROTONIX) EC tablet 40 mg, 40 mg, Oral, Early Morning, Ivydale Marlene, CRNP, 40 mg at 03/28/23 0745  •  polyethylene glycol (MIRALAX) packet 17 g, 17 g, Oral, BID, Ivydale Marlene, CRNP, 17 g at 03/28/23 7373  •  senna (SENOKOT) tablet 17 2 mg, 2 tablet, Oral, HS, Ivydale Atkinson, CRNP, 17 2 mg at 03/27/23 2246  •  torsemide (DEMADEX) tablet 5 mg, 5 mg, Oral, Every Other Day, Ivydale Marlene, CRNP, 5 mg at 03/28/23 0908  •  ziprasidone (GEODON) capsule 80 mg, 80 mg, Oral, Daily, Rachael Tucker, CRNP, 80 mg at 03/28/23 3372  •  ziprasidone (GEODON) capsule 80 mg, 80 mg, Oral, HS, Maria M Arambula MD, 80 mg at 03/27/23 3205    SKIN INTEGRITY:   Left LE s/p sx site / incisional line - treatment as ordered     PRIOR LEVEL OF FUNCTION:  She lives in a(n) single family home  Riki Badillo is  and lives with their family  Self Care: Independent and Needed some help, Indoor Mobility: Independent and Needed some help, Stairs (in/outdoor):  Independent and Needed some help and Cognition: "Independent    FALLS IN THE LAST 6 MONTHS: 3    HOME ENVIRONMENT:  The living area: can live on one level  There are 6 - 10 steps to enter the home  The patient will not have 24 hour supervision/physical assistance available upon discharge  PREVIOUS DME:  Equipment in home (previous DME): Shower Chair, Grab Bars, [address] [facility] [address], [address] and toilet riser     FUNCTIONAL STATUS:  Physical Therapy Occupational Therapy Speech Therapy   As per PT:     PT Visit Date 03/28/23   Note Type   Note Type Treatment   Pain Assessment   Pain Assessment Tool 0-10   Pain Score No Pain   Pain Location/Orientation Orientation: Left; Location: Knee; Location: Leg   Patient's Stated Pain Goal No pain   Hospital Pain Intervention(s) Repositioned; Ambulation/increased activity   Multiple Pain Sites No   Restrictions/Precautions   Weight Bearing Precautions Per Order Yes   LLE Weight Bearing Per Order WBAT   Other Precautions Bed Alarm; Chair Alarm; Fall Risk;Pain   General   Chart Reviewed Yes   Family/Caregiver Present No   Cognition   Overall Cognitive Status Impaired  (increased time to respond to questioning)   Arousal/Participation Cooperative   Orientation Level Oriented X4   Following Commands Follows multistep commands with increased time or repetition   Subjective   Subjective \"I'm scared of falling\"   Bed Mobility   Additional Comments received sitting on toilet, transferred back to bedside chair at EOS   Transfers   Sit to Stand 4  Minimal assistance   Additional items Assist x 1;Verbal cues   Stand to Sit 4  Minimal assistance   Additional items Assist x 1;Verbal cues   Stand pivot 4  Minimal assistance   Additional items Assist x 1;Verbal cues   Toilet transfer 4  Minimal assistance   Additional items Assist x 1;Verbal cues; Commode   Ambulation/Elevation   Gait pattern Decreased L stance; Short stride; Excessively slow; Step through pattern   Gait Assistance 4  Minimal assist   Additional items Assist x 1;Verbal cues " Assistive Device Rolling walker   Distance 40 feet ; 30 feet ; 15 feet   Stair Management Assistance Not tested   Balance   Static Sitting Fair +   Dynamic Sitting Fair   Static Standing Fair   Dynamic Standing Fair -   Ambulatory Poor +   Activity Tolerance   Activity Tolerance Patient tolerated treatment well;Patient limited by fatigue;Patient limited by pain  (+ fear of falls, requires encouragement)   Nurse Made Aware yes   Exercises   Neuro re-ed Pt able to perform seated exercise including LAQ, ankle pumps, quad sets x 5-10 reps L   Assessment   Prognosis Good   Problem List Decreased strength;Decreased range of motion;Decreased endurance; Impaired balance;Decreased mobility; Impaired judgement;Decreased safety awareness;Pain;Decreased skin integrity;Orthopedic restrictions   Assessment Pt agreeable to PT session this AM - received sitting on toilet with CNA  Able to progress ambulation x increased distance with increased trials as mentioned above but continues to require frequent verbal encouragement to improve tolerance to functional mobility + confidence  After sitting in bedside chair pt able to perform exercise and then requesting to return to bathroom - able to toilet with Min A + requires Max A for pericare in standing  Repositioned for comfort in bedside chair with all needs within reach  The patient's AM-PAC Basic Mobility Inpatient Short Form Raw Score is 14  A Raw score of less than or equal to 16 suggests the patient may benefit from discharge to post-acute rehabilitation services  Please also refer to the recommendation of the Physical Therapist for safe discharge planning          As per OT:    03/26/23 7367    Note Type   Note Type Treatment   Pain Assessment   Pain Assessment Tool 0-10   Pain Score 8   Pain Location/Orientation Orientation: Left; Location: Hip;Location: Leg   Pain Onset/Description Onset: Ongoing   Hospital Pain Intervention(s) Repositioned; Ambulation/increased activity "  Restrictions/Precautions   Weight Bearing Precautions Per Order Yes   LLE Weight Bearing Per Order WBAT   Other Precautions Chair Alarm; Fall Risk;Pain   ADL   Grooming Assistance 5  Supervision/Setup   Grooming Deficit Brushing hair   UB Dressing Assistance 4  Minimal Assistance   Toileting Assistance  3  Moderate Assistance   Toileting Deficit Increased time to complete;Perineal hygiene; Bedside commode   Bed Mobility   Rolling L 3  Moderate assistance   Additional items Assist x 1   Supine to Sit 3  Moderate assistance   Additional items Assist x 1;LE management   Transfers   Sit to Stand 4  Minimal assistance   Additional items Assist x 1; Increased time required;Verbal cues   Stand to Sit 4  Minimal assistance   Additional items Assist x 1;Verbal cues   Stand pivot 4  Minimal assistance   Additional items Assist x 1; Increased time required;Verbal cues   Toilet transfer 4  Minimal assistance   Additional items Assist x 1; Increased time required;Verbal cues; Commode   Subjective   Subjective I have pain   Cognition   Overall Cognitive Status WFL   Arousal/Participation Alert; Responsive; Cooperative  (tired)   Orientation Level Oriented X4   Following Commands Follows multistep commands with increased time or repetition   Activity Tolerance   Activity Tolerance Patient limited by pain; Patient limited by fatigue;Patient tolerated treatment well   Medical Staff Made Aware yes, Marge   Assessment   Assessment S: \"I have pain and I'm tired\" O: Pt received while in bed and reported feeling tired  Pt initially needed encouragement to participate in OT session  Pt requested to use bed pan and reported that PCA had already assisted her with morning care  Max A for placement of bed pan but pt unable to have BM  Pt has catheter  Encouraged pt to get up and out of bed and pt agreed  Bed mob Supine->sit mod A, sts and SPT to commode min A with RW and cues for sequencing and use of RW  Pt needed increased time using commode   " Needed mod A for juan care/hygiene post toileting  Pt ambulated few steps to recliner chair using RW  Min A to S for grooming- hair care  A: Patient with some pain to L hip/leg/ knee but able to complete all ADLs and functional mobility with minimal difficulty  Continued skilled OT services recommended to increase pt's overall  Activity tolerance, strength and for education for compensatory strategies and use of  DME/AD to increase ease, safety and reduce pain and discomfort during ADL tasks  At end of session patient seated in bedside chair with all needs met and alarm activated  P: Continue with POC and STR  The patient's raw score on the AM-PAC Daily Activity Inpatient Short Form is 16  A raw score of less than 19 suggests the patient may benefit from discharge to post-acute rehabilitation services  Please refer to the recommendation of the Occupational Therapist for safe discharge planning        As per SLP:     Speech-Language Pathology Bedside Swallow Evaluation           Patient Name: Evgeny Carmona     Today's Date: 3/25/2023     Problem List      Patient Active Problem List   Diagnosis   • History of endometrial cancer   • Type 2 diabetes mellitus without complication, with long-term current use of insulin ([address] )   • Essential hypertension   • Hyperlipidemia   • Obesity   • History of colon cancer   • OAB (overactive bladder)   • Axillary mass, right   • Mass of axillary tail of right breast   • Mass of axillary tail of left breast   • Hyponatremia   • Bipolar disorder ([address] )   • Elevated brain natriuretic peptide (BNP) level   • Osteoarthritis of both knees   • Status post total left knee replacement using cement   • Platelets decreased (HCC)   • PONV (postoperative nausea and vomiting)   • Delayed emergence from anesthesia   • Bipolar depression ([address] )   • Fracture of left femur ([address] )   • History of DVT of lower extremity   • Abnormal CAT scan   • Anxiety   • Memory changes   • Urinary retention   • Acute blood loss anemia         Past Medical History  Medical History        Past Medical History:   Diagnosis Date   • Anesthesia complication       difficulty waking up   • Arthritis       left knee   • Bone spur       left knee behing the knee cap   • Chronic kidney disease     • Colon cancer [facility])       patient states about 2017   • Colon polyp       Tubulovillous Adenoma High Grade Dysplasia - 2017   • Depression     • Diabetes mellitus ([facility] [address] )     • Endometrial cancer ([facility] [address] )       grade I   • Hx of bleeding disorder       vaginal bleeding started on 3/13/17    • Hyperlipidemia     • Hypertension     • Hypomagnesemia 3/20/2023   • PONV (postoperative nausea and vomiting)     • Psychiatric disorder     • Shortness of breath       with exertion   • Urinary incontinence     • Vitamin D deficiency              Past Surgical History  Surgical History         Past Surgical History:   Procedure Laterality Date   • BREAST BIOPSY Left       benign-maybe at ar age 58   • CHOLECYSTECTOMY         open   • COLONOSCOPY       • DILATION AND CURETTAGE OF UTERUS N/A 04/05/2017     Procedure: DILATATION AND CURETTAGE (D&C);   Surgeon: Jelly Bhagat MD;  Location: [facility] MAIN OR;  Service:    • HYSTERECTOMY       • OOPHORECTOMY       • PELVIC LAPAROSCOPY       • AZ ARTHRP KNE CONDYLE&PLATU MEDIAL&LAT COMPARTMENTS Left 12/06/2022     Procedure: ARTHROPLASTY KNEE TOTAL W ROBOT - CEMENTED - LEFT;  Surgeon: Miesha Galvan DO;  Location: [address] [place] [address];  Service: Orthopedics   • AZ LAPS TOTAL HYSTERECT 250 GM/< W/RMVL TUBE/OVARY N/A 05/04/2017     Procedure: ROBOTIC ASSISTED TOTAL LAPAROSCOPIC HYSTERECTOMY, BILATERAL SALPINGOOPHERECTOMY; CYSTOSCOPY;  Surgeon: Charlene Sofia MD;  Location:  MAIN OR;  Service: Gynecology Oncology   • AZ OPTX FEM SHFT [address] W/INSJ IMED IMPLT W/WO SCREW Left 3/20/2023     Procedure: INSERTION NAIL IM FEMUR RETROGRADE;  Surgeon: Myron Juarez MD;  Location: [address] [place] [address];  Service: Orthopedics   • REPLACEMENT TOTAL "KNEE       • TONSILLECTOMY       • TUBAL LIGATION                   Current Medical Status  Pt is a 70 y o  female who presented to [address] [place] [address] s/p fall and surgical fixation left distal femur periprosthetic fracture with retrograde IM nail on 3/20  ST consult requested to further evaluate swallowing due to reported fear of choking, limited intake and 40+ lbs weight loss in the last few months  Patient reports sxs of globus sensation, reflux, and retrograde flow as well as x1 episode of choking  Per patient and RN she has been eating primarily pureed solids ( RN reports she even refused oatmeal due to fear of choking)  Patient however refuses any downgrade to puree stating \"If you change my diet to puree I will not eat it  \"     No hiatal hernia or thoracic abnormalities on CTA  Mild multilevel cervical degenerative changes noted on CT      Past medical history:   Please see H&P for details     Special Studies:  3/25 CTA chest pe: No pulmonary embolus  No acute pulmonary disease     3/23 MRI brain: 1  No acute infarction, edema, or mass effect  2  Mild chronic microangiopathic ischemic changes  3  Subcentimeter right frontal scalp sebaceous cyst       3/20 CT Head: No acute intracranial abnormality      3/20 CT cervical spine: No cervical spine fracture or traumatic malalignment      Swallow Information   Current Risks for Dysphagia & Aspiration: reported new dysphagia     Current Symptoms/Concerns: choking incident and weight loss    Current Diet: regular diet and thin liquids      Baseline Diet: patient reports she is eating very smooth/soft solids at this time     was able to tolerate more solids at home       Baseline Assessment   Behavior/Cognition: alert    Speech/Language Status: able to participate in conversation and able to follow commands + slow to respond/delayed processing     Patient Positioning: upright in bed        Swallow Mechanism Exam   Facial: symmetrical  Labial: WFL  Lingual: WFL  Velum: " symmetrical  Mandible: adequate ROM  Dentition: adequate  Vocal quality:clear/adequate   Volitional Cough: strong/productive   Resp: 1L NC     Consistencies Assessed and Performance   Consistencies Administered: thin liquids, puree, soft solids and hard solids  Specific materials administered included: cottage cheese, yogurt, fig yang, turkey sandwich ( x1 very small bite) and thin liquids via single and consecutive sips     Oral Stage:  Mastication was slow but adequate with the materials administered today  Bolus formation and transfer were slow but functional with no significant oral residue noted  No overt s/s reduced oral control  Pharyngeal Stage:   Swallowing initiation appeared prompt  Laryngeal rise was palpated and judged to be within functional limits  No coughing, throat clearing, change in vocal quality or respiratory status noted today  Esophageal Concerns: globus sensation reported across consistencies- able to clear with liquid wash with puree and soft but globus sensation persisted with small bite of turkey sandwich ( patient was very resistant to trying turkey sandwich due to fear of choking)        Summary   Assessment was somewhat limited as patient was only agreeable to small amounts  Despite this her oropharyngeal swallow function appears generally WFL at this time with the materials administered today  No significant osteophyte or sxs to indicate this or CP prominence at bedside however cannot fully rule this out  s/s suggestive of esophageal  dysphagia  ? dysmotility vs stricture? This can be further evaluated with barium swallow/esophagram  Consider initiation of PPI  May need GI consult      Discussed diet options, concern for ongoing malnutrition and patients reported sxs  She continued to refuse puree but was agreeable to trial of dysphagia 2      Recommendations: mechanically altered/level 2 diet and thin liquids     Recommended Form of Meds: whole with puree (per patient request- RN reports she has been tolerating this without difficulty)    Aspiration precautions and compensatory swallowing strategies: upright posture, slow rate of feeding, small bites/sips, alternating bites and sips and use of added moisteners    Results Reviewed with: patient, RN and KARLEENP     Dysphagia Goals: pt will tolerate dysphagia 2 with thin liquids without s/s of aspiration x2     Plan  Will f/u to determine if additional assessment is indicated       CARE SCORES:  Self Care:  Eatin: Setup or clean-up assistance  Oral hygiene: 04: Supervision or touching  assistance  Toilet hygiene: 03: Partial/moderate assistance  Shower/bathing self: 03: Partial/moderate assistance  Upper body dressin: Partial/moderate assistance  Lower body dressin: Partial/moderate assistance  Putting on/taking off footwear: 03: Partial/moderate assistance  Transfers:  Roll left and right: 03: Partial/moderate assistance  Sit to lyin: Partial/moderate assistance  Lying to sitting on side of bed: 03: Partial/moderate assistance  Sit to stand: 03: Partial/moderate assistance  Chair/bed to chair transfer: 03: Partial/moderate assistance  Toilet transfer: 03: Partial/moderate assistance  Mobility:  Walk 10 ft: 03: Partial/moderate assistance  Walk 50 ft with two turns: 09: Not applicable  Walk 186WP: 09: Not applicable    CURRENT GAP IN FUNCTION  Prior to Admission: Functional Status: Patient was independent with mobility/ambulation, transfers, ADL's, IADL's  Expected functional outcomes: It is expected that with skilled acute rehabilitation services the patient will progress to Supervision for self care and Supervision for mobility     Estimated length of stay: 10 to 14 days    Anticipated Post-Discharge Disposition/Treatment  Disposition: Return to previous home/apartment    Outpatient Services: Physical Therapy (PT), Occupational Therapy (OT) and Speech Therapy    BARRIERS TO DISCHARGE  Lovenox, Weakness, Pain, Balance Difficulty, Fatigue, Home Accessibility, Caregiver Accessibility and Equipment Needs    INTERVENTIONS FOR DISCHARGE  Adaptive equipment, Patient/Family/Caregiver Education, Arrange DME needs, Medication Changes per MD, Therapy exercises and Energy conservation education     REQUIRED THERAPY:  Patient will require PT and OT 90 minutes each per day, five days per week to achieve rehab goals  REQUIRED FUNCTIONAL AND MEDICAL MANAGEMENT FOR INPATIENT REHABILITATION:   Pain Management: Overall pain is well controlled, Deep Vein Thrombosis (DVT) Prophylaxis:  low molecular weight heparin, SCD's while in bed and Nursing education and bowel/bladder management, internal medicine to manage/monitor medical conditions, PM&R to maximize function and provide medical oversight, PT/OT intervention, patient/family education/training, and any consults as needed     RECOMMENDED LEVEL OF CARE:   Ramon Cook  is a 70 y o  female with a medical history of but not limited to - hypertension, diabetes type 2, hyperlipidemia, psychiatric disorder colon and endometrial cancer in remission who presented to [address] [place] [address] on 3/20/23 s/p fall in the bathtub that morning, and was unable to stand and ambulate afterwards  She reported excruciating pain to the left knee  X-ray of the left knee revealed acute displaced periprosthetic left distal femoral fracture  Ortho surgery was consulted in ED and planned surgical intervention   Underwent Left - INSERTION NAIL IM FEMUR RETROGRADE on 3/20/23  She is WBAT  She was noted ABLA post op with no needed blood supplements  Pain was managed with IV and PO PRN analgesics  During hospital course noted with Chest discomfort on 3/23/23 - Cardio was consulted with no concerns with GERD expected  SLP therapies consulted for c/o dysphagia - she was placed on mechanical soft diet  Pysch was also consulted per family request d/t h/o bipolar   They continue to follow She has been deemed hemodynamically stable at this time  PT/OT therapies were consulted and continue to follow patient at this time and are recommending inpatient acute rehab when medically stable  All involved medical disciplines feel/agree patient is medically ready for discharge at this time  Inpatient acute rehabilitation physician was consulted  Upon review of patient’s case and correspondence with PT/OT therapy services, [facility] physician feels Kim Williamson will benefit and is a good candidate / appropriate for inpatient acute rehab at this time  She has demonstrated the willingness / desire and tolerance to participate in the required 3 hours or more of therapies per day  Prior to admission / hospital stay Kim Williamson reports she was independent with most all ADLs needing assist at times /functional mobility / and needing assist with iADLs  Currently with PT therapies /  OT therapies : Upper ADLs - supervision min A ; lower ADLs - min-mod A ; bed mobility sup - min A ; transfers- min A ; ambulation with rolling walker min A Nursing is being recommended for medication distribution / management , bowel / bladder management and educational purposes , internal medicine to continue to monitor and manage medical conditions ,PM&R to maximize  function and provide medical oversight, and inpatient rehab to maximize self care ,mobility ,strength , and endurance upon discharge to home

## 2023-03-28 NOTE — CASE MANAGEMENT
Rachel Bardales has received approved authorization from insurance:     Called in to      Authorization received for: Acute Rehab  Facility: [facility]  Authorization #:[id number]  Start of Care:03/27/23  Next Review Date:04/02/23  Care Coordinator:Tatiana POTTS#: [phone]  Submit next review via Kathy portal or fax to [phone]   Care Manager notified:  Concepción Escalera

## 2023-03-28 NOTE — DISCHARGE SUMMARY
[address]  Discharge- Angela Segura 1955, 70 y o  female MRN: [id number]  Unit/Bed#: [facility] Encounter: [id number]  Primary Care Provider: Sarahy Wesley DO   Date and time admitted to hospital: 3/20/2023 11:11 AM    * Fracture of left femur [facility])  Assessment & Plan  · S/p mechanical fall in the bathtub  · Imaging revealed an acute displaced periprosthetic left distal femoral fracture  · Orthopedics following   · POD #6 status post surgical fixation left distal femur periprosthetic fracture with retrograde IM nail  · Pain controlled   · Noted acute blood loss anemia postoperatively  · Attempted to transfuse 1 unit of PRBCs, however, unable to transfuse as patient is type O- and current recommendations are to transfuse if less than 6 given critical shortage of PRBCs  · Venofer 200 mg IV daily for 3 doses  · Hemoglobin stable, 11 3 -> 7 3 -> 7 8 -> 7 3  · No signs of active bleeding   · Plan for Lovenox 40 mg daily for 6 weeks  · Hold ASA while on Lovenox  · Weight bearing as tolerated   · SCDs  · Bowel regimen   · Supportive care  · PT/OT recommending acute rehab   · Continue Shanks catheter and plan for voiding trial in acute rehab  · Stable for discharge from a surgical perspective  · Discharging to [facility] acute rehab facility in [facility]    Hyponatremia  Assessment & Plan  · Acute on Chronic  Noted on admission with sodium of 131, possibly secondary to antipsychotics medications   Previously on Wellbutrin and Zoloft  · Discontinued Lexapro as this could be contributing to acute memory/slow response   · Sodium stable at 135  · Decrease torsemide to 5 mg every other day to avoid dehydration  · Liberalize fluid restriction to 1800 mL/day  · Monitor sodium in am     Type 2 diabetes mellitus without complication, with long-term current use of insulin [facility])  Assessment & Plan  Lab Results   Component Value Date    HGBA1C 5 8 (H) 03/22/2023       Recent Labs     03/27/23  7765 03/28/23  0656 03/28/23  1054 03/28/23  1832   POCGLU 222* 201* 235* 270*       Blood Sugar Average: Last 72 hrs:  (P) 013 [id number]   · Recent A1c 6 9 -> 5 8  · Continue metformin and Toujeo   · Accu-Cheks ACHS  · SSI  · Hypoglycemic protocol    Dysphagia  Assessment & Plan  · Appreciate speech therapy  · Swallow eval noted esophageal abnormalities   · Concern for dysmotility vs stricture   · Downgraded diet to level 2, chopped foods with thin liquids  · Added Protonix 40 mg daily for possible dysmotility   · Esophagram recs:  Oropharyngeal skills assessed radio graphically and no deficits noted (and no laryngeal penetration, aspiration or pharyngeal residue occurred this am)  Acute blood loss anemia  Assessment & Plan  HGB 11-> 8 3 -> 8 1 -> 7 7 -> 7 3 -> 7 8 -> 7 3 > 7 7  Likely acute blood loss anemia from surgery  · B12 322  · Folate 13 6  · Iron panel suspicious for DANIELLA   · Stop iron supplementation at this time   · Venofer 200 mg IV daily for 3 doses  · Unable to transfuse 1 unit of PRBCs as her blood type is O- and there is a critical shortage; blood bank recommends transfusion for hemoglobin is less than 6  · Added B12 supplement   · Monitor HGB and transfuse if less than 6 or if patient becomes symptomatic    Urinary retention  Assessment & Plan  Developed postoperative urinary retention  · Failed voiding trial  · Shanks catheter reinserted on 3/24/2023  · Plan to continue Shanks catheter until mobility improves in acute rehab  · Monitor for constipation    Memory changes  Assessment & Plan  Patient cooperative, pleasant but slow the respond  Daughter noted an acute change in her memory about 3 weeks ago     · CT head unremarkable  · MRI brain with acute findings  · Appreciate neurology and psychiatry input   · Stop Lexapro as this can worsen memory issues  · Added B12 supplement  · UA unremarkable   · Supportive care   · Follow-up with neurology for further outpatient memory testing Anxiety  Assessment & Plan  Stable  Continue Clonazepam    Thyroid nodule  Assessment & Plan  CTA chest: Subcentimeter right thyroid nodule with adjacent punctate calcification  · Updated patient's daughter   · Per radiology, no follow-up needed    History of DVT of lower extremity  Assessment & Plan  · Noted in history  · Patient only takes aspirin 81 mg  · Currently holding aspirin while on lovenox     Bipolar depression ([facility] [address] )  Assessment & Plan  · Appreciate psychiatry input   · Decreased Geodon to 80 mg BID (maximum dose)  · Continue Klonopin 0 25 mg BID  · Discontinued Lexapro due to concern for acute memory changes  · Started on Remeron 7 5 mg at bedtime  · Mood is stable, pleasant and cooperative     Hyperlipidemia  Assessment & Plan  · Continue Lipitor     Essential hypertension  Assessment & Plan  BP stable  · Continue Toprol XL  · Resume torsemide but decrease to 5 mg every other day      Medical Problems     Resolved Problems  Date Reviewed: 3/28/2023          Resolved    Hypomagnesemia 3/21/2023     Resolved by  JOHN Guo    Chest pain 3/26/2023     Resolved by  Javon Keenan [place]        Discharging Physician / Practitioner: Shraddha Guo  PCP: Jag Miller DO  Admission Date:   Admission Orders (From admission, onward)     Ordered        03/20/23 1348  INPATIENT ADMISSION  Once                      Discharge Date: 03/28/23    Consultations During Hospital Stay:  · Ortho surgery  · Neurology  · Psychiatry    Procedures Performed:   · CT cervical spine  · CT head  · X-ray left knee  · X-ray left femur  · X-ray pelvis  · MRI brain  · Chest x-ray  · CTA chest for PE study  · Femoral intramedullary nail (retrograde for periprosthetic fracture)    Significant Findings / Test Results:   · Acute displaced periprosthetic left distal femoral fracture    Incidental Findings:   · 10 mm hypodense right thyroid nodule  · I reviewed the above mentioned incidental findings with the patient and/or family and they expressed understanding  Test Results Pending at Discharge (will require follow up): · None     Outpatient Tests Requested:  · None    Complications: None  Reason for Admission: Mechanical fall        Hospital Course:   Bronson Loya is a 70 y o  female patient who originally presented to the hospital on 3/20/2023 due to fall in the bathtub  X-ray of the left knee revealed acute displaced periprosthetic left distal femoral fracture for which Ortho surgery was consulted  Surgical intervention was performed of retrograde intramedullary nail left femur  Patient was started on Lovenox and will continue for 6 weeks until 5/1/2023  Patient's daughter reported acute change in her memory prior to hospitalization and she requested for neurology consult for mentation different from baseline  MRI brain was negative for any acute ischemic event no other acute abnormality  Patient will follow-up with neurology in 8 to 10 weeks  Neurology recommended discontinuing Lexapro for alternate medication option and recommended psychiatric consultation  Psychiatry was consulted and decrease Geodon to 80 mg twice daily and Klonopin 0 5 mg twice daily  Discontinue Lexapro and on discharge started patient on Remeron 7 5 mg at bedtime  Patient with Shanks catheter which was inserted pre- surgery  Failed voiding trial upon removal and Shanks catheter was reinserted with voiding trial to be performed at the acute rehab  Patient was noted to be chronically hyponatremic  Torsemide was decreased to 5 mg every other day and fluid restriction 1800 mL initiated  Sodium upon discharge was noted to be 135  Patient is being discharged to [address] [place] [address] to continue rehabilitation  Please see above list of diagnoses and related plan for additional information  Condition at Discharge: stable    Discharge Day Visit / Exam:     Subjective: Patient seen and examined with  at bedside  "Mentation at baseline  Alert and able to participate in examination  Denies chest pain, shortness of breath, headache, lightheadedness, abdominal pain, nausea or vomiting  Vitals: Blood Pressure: 131/64 (03/28/23 0732)  Pulse: 84 (03/28/23 0732)  Temperature: 97 7 °F (36 5 °C) (03/28/23 0732)  Temp Source: Oral (03/28/23 0732)  Respirations: 18 (03/28/23 0732)  Height: 5' 4\" (162 6 cm) (03/26/23 0745)  Weight - Scale: 71 3 kg (157 lb 3 oz) (03/26/23 0745)  SpO2: 96 % (03/28/23 0732)    Physical Exam:   Physical Exam  Vitals and nursing note reviewed  Constitutional:       General: She is not in acute distress  Appearance: She is ill-appearing  She is not toxic-appearing or diaphoretic  HENT:      Head: Normocephalic  Mouth/Throat:      Mouth: Mucous membranes are moist    Eyes:      Conjunctiva/sclera: Conjunctivae normal    Cardiovascular:      Rate and Rhythm: Normal rate  Pulmonary:      Effort: Pulmonary effort is normal       Breath sounds: Normal breath sounds  No wheezing, rhonchi or rales  Abdominal:      General: Bowel sounds are normal  There is no distension  Palpations: Abdomen is soft  Tenderness: There is no abdominal tenderness  Genitourinary:     Comments: Indwelling gonzales catheter draining agustin colored urine   Musculoskeletal:      Cervical back: Normal range of motion  Right lower leg: No edema  Left lower leg: No edema  Skin:     General: Skin is warm and dry  Capillary Refill: Capillary refill takes less than 2 seconds  Comments: Left lower extremity dressings CDI  Neurological:      Mental Status: She is alert and oriented to person, place, and time  Mental status is at baseline  Motor: Weakness present  Psychiatric:         Mood and Affect: Affect is flat  Speech: Speech normal          Behavior: Behavior normal  Behavior is cooperative           Cognition and Memory: Cognition normal          Judgment: Judgment normal  " Discussion with Family: Updated  (daughter) via phone  Discharge instructions/Information to patient and family:   See after visit summary for information provided to patient and family  Provisions for Follow-Up Care:  See after visit summary for information related to follow-up care and any pertinent home health orders  Disposition:   Acute Rehab at [facility]    Planned Readmission: No     Discharge Statement:  I spent 30 minutes discharging the patient  This time was spent on the day of discharge  I had direct contact with the patient on the day of discharge  Greater than 50% of the total time was spent examining patient, answering all patient questions, arranging and discussing plan of care with patient as well as directly providing post-discharge instructions  Additional time then spent on discharge activities  Discharge Medications:  See after visit summary for reconciled discharge medications provided to patient and/or family        **Please Note: This note may have been constructed using a voice recognition system**

## 2023-03-28 NOTE — PROGRESS NOTES
"Progress Note - Orthopedics   Shira May 70 y o  female MRN: [id number]  Unit/Bed#: [address] [address] Encounter: [id number]        Subjective: Postop day #8 status post surgical fixation left distal femur periprosthetic fracture with retrograde IM nail  The patient notes ongoing pain about the left knee while ambulating, better controlled at rest   She notes good sensation of the left lower extremity   Hemoglobin 7 7 yesterday  Barium swallow completed yesterday without acute findings  Patient denies any CP, SOB, or dizziness  No acute overnight events  Planning for discharge to rehab at [facility]  Vitals: Blood pressure 131/64, pulse 84, temperature 97 7 °F (36 5 °C), temperature source Oral, resp  rate 18, height 5' 4\" (1 626 m), weight 71 3 kg (157 lb 3 oz), SpO2 96 %, not currently breastfeeding  ,Body mass index is 26 98 kg/m²  Intake/Output Summary (Last 24 hours) at 3/28/2023 1403  Last data filed at 3/28/2023 0901  Gross per 24 hour   Intake 820 ml   Output 900 ml   Net -80 ml       Invasive Devices     Drain  Duration           Urethral Catheter Non-latex 16 Fr  4 days                  Ortho Exam:  General: Patient alert and oriented x3, in no acute distress, sitting up comfortably in chair  LLE: 3 Mepilex dressings removed at bedside  5 incisions well approximated with staples  No surrounding erythema, warmth, drainage, or evidence of dehiscence  The incisions were left open to air  Minimal swelling left knee   Compartments soft  No calf tenderness  Moves toes/ankle freely  Good capillary refill  Sensation intact lateral femoral cutaneous, saphenous, sural, deep/superficial peroneal nerve distributions  Lab, Imaging and other studies: I have personally reviewed pertinent lab results    CBC:   Lab Results   Component Value Date    WBC 6 43 03/27/2023    HGB 7 7 (L) 03/27/2023    HCT 24 4 (L) 03/27/2023    MCV 91 03/27/2023     03/27/2023    MCH 28 8 03/27/2023    MCHC 31 6 03/27/2023 " RDW 14 7 03/27/2023    MPV 8 9 03/27/2023    NRBC 0 03/25/2023     CMP:   Lab Results   Component Value Date     (L) 04/21/2017    SODIUM 135 03/27/2023    K 4 6 03/27/2023     03/27/2023    CO2 29 03/27/2023    AGAP 6 03/27/2023    BUN 17 03/27/2023    CREATININE 0 83 03/27/2023    GLUC 142 (H) 03/27/2023    GLUF 220 (H) 12/07/2022    CALCIUM 8 4 03/27/2023    AST 26 03/20/2023    ALT 26 03/20/2023    ALKPHOS 85 03/20/2023    PROT 6 9 04/21/2017    TP 6 9 03/20/2023    BILITOT 0 5 04/21/2017    TBILI 0 46 03/20/2023    EGFR 72 03/27/2023       PT/INR:   Lab Results   Component Value Date    INR 1 10 03/20/2023       Assessment:  POD#8 status post surgical fixation left distal femur periprosthetic fracture with retrograde IM nail    Plan:  PT/OT  Weight-bear as tolerated left lower extremity     Dressings removed today  Incisions may be left open to air  Shelba Nimo may be removed in 5-7 days from now  Lovenox daily for DVT prophylaxis for 6 weeks post-op   Labs stable     Analgesia as needed  Shanks per primary team   Medical management per primary team     Patient will  require short-term rehab upon discharge  Fine to discharge from orthopedic perspective once medically stable  Patient will be inpatient rehab in [place]  She should be evaluated by orthopedics in 7 days with an x-ray of her left femur and staple removal     Weight bearing: WBAT with assistance      VTE Pharmacologic Prophylaxis: Enoxaparin (Lovenox)  VTE Mechanical Prophylaxis: sequential compression device

## 2023-03-28 NOTE — ASSESSMENT & PLAN NOTE
Lab Results   Component Value Date    HGBA1C 5 8 (H) 03/22/2023       Recent Labs     03/27/23  2238 03/28/23  0656 03/28/23  1054 03/28/23  1832   POCGLU 222* 201* 235* 270*       Blood Sugar Average: Last 72 hrs:  (P) 075 [id number]   · Recent A1c 6 9 -> 5 8  · Continue metformin and Toujeo   · Accu-Cheks ACHS  · SSI  · Hypoglycemic protocol

## 2023-03-28 NOTE — PROGRESS NOTES
Spoke to daughter Goran Steward yesterday on the phone at [phone] she insisted to give mom antidepression Lexapro before I spoke to her I had a long discussion with medical attending Dr Leta Read according to the neurology recommendation they recommended not to start her on SSRI Lexapro because that caused hyponatremia I discussed with the daughter and explained her that we like to give her antidepressant medication but because of the risk of getting hyponatremic we are staying away and also patient has to agree to try something I told her that I will discuss with the patient next day and will decide to start her on antidepressant medication  Daughter feels that mom is depressed I came over to see the patient today she looks better and she was able to communicate well she wants to continue her Geodon and Klonopin the same dosage but she is asking to give her antidepressant medication I explained her the same thing that SSRI can cause hyponatremia we discussed the different option SNRI also have a similar problem and patient was tried on Wellbutrin in the past daughter reported that it made her psychotic and also I will not recommend Wellbutrin for bipolar depression it has a tendency to trigger the manic episode and or course psychosis after the discussion with the patient I explained the side effects of Remeron if she wants to try smaller dose she agreed to try Remeron she does not remember taking that medicine before  Spoke to the  at bedside  Patient will be discharged to the rehab once she is medically stable she will need psychiatric follow-up for her bipolar depression and once she is home she will follow-up with her psychiatrist Dr Tran Rhodes    At present time patient is not agitated no psychosis no hallucination patient denies feeling suicidal but she has feeling of depression she is stable with anxiety no panic attacks she wants to continue her both medication Geodon and Klonopin at the same dosage at this time  Therapy done with good response  I will order Remeron 7 5 mg at bedtime at this time and follow-up

## 2023-03-28 NOTE — CASE MANAGEMENT
Case Management Discharge Planning Note    Patient name Brad Velasco  Location 2 [facility] 212/2 [facility] MRN [id number]  : 1955 Date 3/28/2023       Current Admission Date: 3/20/2023  Current Admission Diagnosis:Fracture of left femur [facility])   Patient Active Problem List    Diagnosis Date Noted   • Dysphagia 2023   • Urinary retention 2023   • Acute blood loss anemia 2023   • Memory changes 2023   • Anxiety 2023   • PONV (postoperative nausea and vomiting) 2023   • Delayed emergence from anesthesia 2023   • Fracture of left femur ([address] ) 2023   • History of DVT of lower extremity 2023   • Thyroid nodule 2023   • Platelets decreased ([address] ) 2022   • Status post total left knee replacement using cement 2022   • Elevated brain natriuretic peptide (BNP) level 10/18/2022   • Osteoarthritis of both knees 10/18/2022   • Hyponatremia 10/15/2022   • Bipolar depression ([address] ) 10/15/2022   • Mass of axillary tail of right breast 2022   • Mass of axillary tail of left breast 2022   • Axillary mass, right 2022   • OAB (overactive bladder) 11/15/2019   • History of colon cancer 2018   • History of endometrial cancer 2017   • Type 2 diabetes mellitus without complication, with long-term current use of insulin ([address] ) 2017   • Essential hypertension 2017   • Hyperlipidemia 2017   • Obesity 2017   • Bipolar disorder ([address] ) 2017      LOS (days): 8  Geometric Mean LOS (GMLOS) (days): 4 40  Days to GMLOS:-3 5     OBJECTIVE:  Risk of Unplanned Readmission Score: 20 99     Current admission status: Inpatient   Preferred Pharmacy:   [facility] [address] MARYLOU [address] [place]  [phone]  [address]  Phone: [phone] Fax: [phone]    Primary Care Provider: Almaz Edmondson DO    Primary Insurance: Carrillo Thomas  Secondary Insurance: MEDICARE    DISCHARGE DETAILS:    Discharge planning discussed with[de-identified] Daughter Bhakti High  Freedom of Choice: Yes  Comments - Freedom of Choice: SW following to assist with DCP  Acute rehab at [address] is planned per family's request   [employer] authorization approval received earlier today per CM Discharge Support  SW spoke with pt's daughter to review plan and IMM  Daughter requesting transfer tp [facility] when discharged  Notified CRNP of approval and plan as well  CM contacted family/caregiver?: Yes    Contacts  Patient Contacts: Bhakti High (dtr)  Relationship to Patient[de-identified] Family  Contact Method: Phone  Phone Number: [phone]  Reason/Outcome: Discharge Planning    Other Referral/Resources/Interventions Provided:  Interventions: Acute Rehab  Referral Comments: NINO spoke with Nevin Guadarrama in [facility] admissions  Discussed plan to transfer today and obtained room number and report number  Unit requested transport after 2:00 pm based on time room would be available  Treatment Team Recommendation: Acute Rehab  Discharge Destination Plan[de-identified] Acute Rehab  Transport at Discharge : [facility]  Dispatcher Contacted: Yes  Number/Name of Dispatcher: Teri Sales and Unit #): SLETS  ETA of Transport (Date): 03/28/23  ETA of Transport (Time): 1400    IMM Given (Date):: 03/28/23  IMM Given to[de-identified] Family (IMM reviewed with daughter, Willi Jiménez, over phone  Daughter verbalized understanding and agrees with discharge determination  Copy of IMM will be placed in pt's room as requested by daughter    Copy also placed in scan bin for chart )        Accepting Facility Name, [address] : [facility], [address]  Receiving Facility/Agency Phone Number: [phone]

## 2023-03-28 NOTE — ASSESSMENT & PLAN NOTE
· Appreciate psychiatry input   · Decreased Geodon to 80 mg BID (maximum dose)  · Continue Klonopin 0 25 mg BID  · Discontinued Lexapro due to concern for acute memory changes  · Started on Remeron 7 5 mg at bedtime  · Mood is stable, pleasant and cooperative

## 2023-03-29 PROBLEM — R41.0 DELIRIUM: Status: ACTIVE | Noted: 2023-03-29

## 2023-03-30 PROBLEM — L89.302 PRESSURE INJURY OF BUTTOCK, STAGE 2 (HCC): Status: ACTIVE | Noted: 2023-03-30

## 2023-03-30 NOTE — UTILIZATION REVIEW
NOTIFICATION OF ADMISSION DISCHARGE   This is a Notification of Discharge from [address]  Please be advised that this patient has been discharge from our facility  Below you will find the admission and discharge date and time including the patient’s disposition  UTILIZATION REVIEW CONTACT:  Bo Barahona  Utilization   Network Utilization Review Department  Phone: [phone] x carefully listen to the prompts  All voicemails are confidential   Email: [email or web address]     ADMISSION INFORMATION  PRESENTATION DATE: 3/20/2023 11:11 AM  OBERVATION ADMISSION DATE:   INPATIENT ADMISSION DATE: 3/20/23  1:48 PM   DISCHARGE DATE: 3/28/2023  2:52 PM   DISPOSITION:[facility]    IMPORTANT INFORMATION:  Send all requests for admission clinical reviews, approved or denied determinations and any other requests to dedicated fax number below belonging to the campus where the patient is receiving treatment   List of dedicated fax numbers:  [address] DENIALS (Administrative/Medical Necessity) [phone]   [address] (Maternity/NICU/Pediatrics) [phone]   [facility] [phone]   [place] [address] [phone]   [address] [phone]   220 [facility] [phone] 90 [facility] [phone]   [address] [place], [place] [address] [address] [phone]   [facility]  [phone]   [address] [facility] [phone] 412 [facility] [address] [facility] [phone]

## 2023-04-01 PROBLEM — E43 SEVERE PROTEIN-CALORIE MALNUTRITION (HCC): Status: ACTIVE | Noted: 2023-04-01

## 2023-04-01 LAB
25(OH)D2 SERPL-MCNC: <1 NG/ML
25(OH)D3 SERPL-MCNC: 30 NG/ML
25(OH)D3+25(OH)D2 SERPL-MCNC: 30 NG/ML

## 2023-04-03 PROBLEM — B37.0 THRUSH: Status: ACTIVE | Noted: 2023-04-03

## 2023-04-06 ENCOUNTER — TELEPHONE (OUTPATIENT)
Dept: FAMILY MEDICINE CLINIC | Facility: CLINIC | Age: 68
End: 2023-04-06

## 2023-04-06 PROBLEM — R33.9 URINARY RETENTION: Status: RESOLVED | Noted: 2023-03-24 | Resolved: 2023-04-06

## 2023-04-06 PROBLEM — L89.302 PRESSURE INJURY OF BUTTOCK, STAGE 2 (HCC): Status: RESOLVED | Noted: 2023-03-30 | Resolved: 2023-04-06

## 2023-04-06 NOTE — TELEPHONE ENCOUNTER
Patients  dropped off handicap placard to be filled out by Dr Jimenez Sellers  Placed in nurse pending at back station      Please call daughter Yanely Chadwick when ready for   [phone]

## 2023-04-09 PROBLEM — R41.0 DELIRIUM: Status: RESOLVED | Noted: 2023-03-29 | Resolved: 2023-04-09

## 2023-04-09 PROBLEM — E87.1 HYPONATREMIA: Status: RESOLVED | Noted: 2022-10-15 | Resolved: 2023-04-09

## 2023-04-13 DIAGNOSIS — S72.452A CLOSED DISPLACED SUPRACONDYLAR FRACTURE OF DISTAL END OF LEFT FEMUR WITHOUT INTRACONDYLAR EXTENSION, INITIAL ENCOUNTER (HCC): Primary | ICD-10-CM

## 2023-04-26 ENCOUNTER — OFFICE VISIT (OUTPATIENT)
Dept: FAMILY MEDICINE CLINIC | Facility: CLINIC | Age: 68
End: 2023-04-26

## 2023-04-26 VITALS
RESPIRATION RATE: 20 BRPM | WEIGHT: 151 LBS | HEART RATE: 104 BPM | BODY MASS INDEX: 25.78 KG/M2 | SYSTOLIC BLOOD PRESSURE: 150 MMHG | OXYGEN SATURATION: 98 % | HEIGHT: 64 IN | TEMPERATURE: 97 F | DIASTOLIC BLOOD PRESSURE: 78 MMHG

## 2023-04-26 DIAGNOSIS — I10 ESSENTIAL HYPERTENSION: ICD-10-CM

## 2023-04-26 DIAGNOSIS — S72.92XS CLOSED FRACTURE OF LEFT FEMUR, UNSPECIFIED FRACTURE MORPHOLOGY, UNSPECIFIED PORTION OF FEMUR, SEQUELA: ICD-10-CM

## 2023-04-26 DIAGNOSIS — R39.9 UTI SYMPTOMS: Primary | ICD-10-CM

## 2023-04-26 LAB
SL AMB  POCT GLUCOSE, UA: NEGATIVE
SL AMB LEUKOCYTE ESTERASE,UA: NEGATIVE
SL AMB POCT BILIRUBIN,UA: NEGATIVE
SL AMB POCT BLOOD,UA: NEGATIVE
SL AMB POCT CLARITY,UA: NORMAL
SL AMB POCT COLOR,UA: NORMAL
SL AMB POCT KETONES,UA: NEGATIVE
SL AMB POCT NITRITE,UA: POSITIVE
SL AMB POCT PH,UA: 6.5
SL AMB POCT SPECIFIC GRAVITY,UA: 1.03
SL AMB POCT URINE PROTEIN: NORMAL
SL AMB POCT UROBILINOGEN: 0.2

## 2023-04-26 RX ORDER — CEPHALEXIN 500 MG/1
500 CAPSULE ORAL EVERY 12 HOURS SCHEDULED
Qty: 14 CAPSULE | Refills: 0 | Status: ON HOLD | OUTPATIENT
Start: 2023-04-26 | End: 2023-05-03

## 2023-04-26 RX ORDER — OLANZAPINE 2.5 MG/1
5 TABLET ORAL
Status: ON HOLD | COMMUNITY
Start: 2023-04-18

## 2023-04-26 RX ORDER — MIRTAZAPINE 15 MG/1
7.5 TABLET, FILM COATED ORAL
Status: ON HOLD | COMMUNITY
Start: 2023-04-18

## 2023-04-26 NOTE — PROGRESS NOTES
Assessment/Plan:    1  UTI symptoms  -     POCT urine dip auto non-scope  -     cephalexin (KEFLEX) 500 mg capsule; Take 1 capsule (500 mg total) by mouth every 12 (twelve) hours for 7 days  -     Urine culture    2  Essential hypertension  Assessment & Plan:  Stable       3  Closed fracture of left femur, unspecified fracture morphology, unspecified portion of femur, sequela  Assessment & Plan:  Recently discharged from the rehab  She has PT coming into the home               There are no Patient Instructions on file for this visit  Return if symptoms worsen or fail to improve  Subjective:      Patient ID: Margarito Jacobson is a 70 y o  female  Chief Complaint   Patient presents with   • Possible UTI     Having low back pain, painful urination   [facility] [place]         She had a UTI approx 1 month ago while she was in the hospital   Reports she still has lingering symptoms  She now has back pain, decreased urine output and it appears darker in color  No dysuria or urinary frequency  No associated fevers or chills  The following portions of the patient's history were reviewed and updated as appropriate: allergies, current medications, past family history, past medical history, past social history, past surgical history and problem list     Review of Systems   Constitutional: Negative for chills and fever  Gastrointestinal: Negative for abdominal pain, nausea and vomiting  Genitourinary: Positive for decreased urine volume  Negative for dysuria, flank pain, frequency, hematuria and urgency  Musculoskeletal: Positive for back pain           Current Outpatient Medications   Medication Sig Dispense Refill   • acetaminophen (TYLENOL) 325 mg tablet Take 3 tablets (975 mg total) by mouth 2 (two) times a day  0   • BD Pen Needle Chelsey 2nd Gen 32G X 4 MM MISC USE 1  TWICE DAILY 60 each 0   • cephalexin (KEFLEX) 500 mg capsule Take 1 capsule (500 mg total) by mouth every 12 (twelve) hours for 7 days 14 capsule 0   • clonazePAM (KlonoPIN) 0 5 mg tablet Take 0 5 tablets (0 25 mg total) by mouth daily at bedtime for 10 days (Patient taking differently: Take 0 25 mg by mouth daily at bedtime 0 5 for 10 days and 0 25 prn) 5 tablet 0   • mirtazapine (REMERON) 15 mg tablet Take 15 mg by mouth daily at bedtime     • clotrimazole (MYCELEX) 10 mg arnaud Take 1 tablet (10 mg total) by mouth 5 (five) times a day for 14 days 70 tablet 0   • cyanocobalamin (VITAMIN B-12) 1000 MCG tablet Take 1 tablet (1,000 mcg total) by mouth daily Do not start before March 29, 2023  30 tablet 0   • docusate sodium (COLACE) 100 mg capsule Take 100 mg by mouth 2 (two) times a day     • enoxaparin (LOVENOX) 40 mg/0 4 mL Inject 0 4 mL (40 mg total) under the skin every 24 hours for 21 days Do not start before April 11, 2023   8 4 mL 0   • Ferrous Sulfate (Iron) 325 (65 Fe) MG TABS Take by mouth daily     • insulin glargine (Toujeo SoloStar) 300 units/mL CONCENTRATED U-300 injection pen (1-unit dial) Inject 20 Units under the skin daily 6 mL 0   • magnesium Oxide (MAG-OX) 400 mg TABS Take 1 tablet (400 mg total) by mouth daily 30 tablet 0   • metFORMIN (GLUCOPHAGE-XR) 500 mg 24 hr tablet Take 1 tablet (500 mg total) by mouth 2 (two) times a day with meals 60 tablet 3   • metoprolol succinate (TOPROL-XL) 25 mg 24 hr tablet Take 1 tablet (25 mg total) by mouth daily 90 tablet 3   • nitroglycerin (NITROSTAT) 0 4 mg SL tablet Place 1 tablet (0 4 mg total) under the tongue every 5 (five) minutes as needed for chest pain 30 tablet 1   • OLANZapine (ZyPREXA) 2 5 mg tablet Take 2 5 mg by mouth daily at bedtime     • pantoprazole (PROTONIX) 40 mg tablet Take 1 tablet (40 mg total) by mouth daily in the early morning 30 tablet 0   • rosuvastatin (CRESTOR) 20 MG tablet Take 1 tablet (20 mg total) by mouth daily 90 tablet 3   • torsemide (DEMADEX) 5 MG tablet Take 1 tablet (5 mg total) by mouth every other day Do not start before March 30, 2023  15 tablet 0 "  • ziprasidone (GEODON) 80 mg capsule Take 1 capsule (80 mg total) by mouth 2 (two) times a day with meals  0     No current facility-administered medications for this visit  Objective:    /78   Pulse 104   Temp (!) 97 °F (36 1 °C)   Resp 20   Ht 5' 4\" (1 626 m) Comment: per patient  Wt 68 5 kg (151 lb)   SpO2 98%   BMI 25 92 kg/m²        Physical Exam  Vitals and nursing note reviewed  Constitutional:       Appearance: Normal appearance  She is well-developed  She is not ill-appearing or diaphoretic  HENT:      Head: Normocephalic and atraumatic  Eyes:      Conjunctiva/sclera: Conjunctivae normal    Pulmonary:      Effort: Pulmonary effort is normal  No tachypnea, accessory muscle usage or respiratory distress  Musculoskeletal:      Cervical back: Normal range of motion  Skin:     Coloration: Skin is not pale  Neurological:      Mental Status: She is alert     Psychiatric:         Mood and Affect: Mood normal          Speech: Speech normal          Behavior: Behavior normal                 JOHN Solorzano  "

## 2023-04-27 ENCOUNTER — HOSPITAL ENCOUNTER (INPATIENT)
Facility: HOSPITAL | Age: 68
LOS: 32 days | Discharge: HOME HEALTH | End: 2023-05-30
Attending: EMERGENCY MEDICINE | Admitting: STUDENT IN AN ORGANIZED HEALTH CARE EDUCATION/TRAINING PROGRAM

## 2023-04-27 DIAGNOSIS — I15.9 SECONDARY HYPERTENSION: ICD-10-CM

## 2023-04-27 DIAGNOSIS — Z00.8 ENCOUNTER FOR PSYCHOLOGICAL EVALUATION: Primary | ICD-10-CM

## 2023-04-27 DIAGNOSIS — R13.10 DYSPHAGIA, UNSPECIFIED TYPE: ICD-10-CM

## 2023-04-27 DIAGNOSIS — R00.2 PALPITATIONS: ICD-10-CM

## 2023-04-27 DIAGNOSIS — F31.5 BIPOLAR AFFECTIVE DISORDER, DEPRESSED, SEVERE, WITH PSYCHOTIC BEHAVIOR (HCC): ICD-10-CM

## 2023-04-27 DIAGNOSIS — S72.92XS CLOSED FRACTURE OF LEFT FEMUR, UNSPECIFIED FRACTURE MORPHOLOGY, UNSPECIFIED PORTION OF FEMUR, SEQUELA: ICD-10-CM

## 2023-04-27 DIAGNOSIS — Z00.8 MEDICAL CLEARANCE FOR PSYCHIATRIC ADMISSION: ICD-10-CM

## 2023-04-27 DIAGNOSIS — E11.9 TYPE 2 DIABETES MELLITUS WITHOUT COMPLICATION, WITH LONG-TERM CURRENT USE OF INSULIN (HCC): ICD-10-CM

## 2023-04-27 DIAGNOSIS — Z79.4 TYPE 2 DIABETES MELLITUS WITHOUT COMPLICATION, WITH LONG-TERM CURRENT USE OF INSULIN (HCC): ICD-10-CM

## 2023-04-27 DIAGNOSIS — M17.0 OSTEOARTHRITIS OF BOTH KNEES, UNSPECIFIED OSTEOARTHRITIS TYPE: ICD-10-CM

## 2023-04-27 DIAGNOSIS — R07.9 CHEST PAIN IN ADULT: ICD-10-CM

## 2023-04-27 DIAGNOSIS — I10 ESSENTIAL HYPERTENSION: ICD-10-CM

## 2023-04-27 DIAGNOSIS — R52 PAIN: ICD-10-CM

## 2023-04-27 DIAGNOSIS — I10 HYPERTENSION, UNSPECIFIED TYPE: Chronic | ICD-10-CM

## 2023-04-27 LAB
ALBUMIN SERPL BCP-MCNC: 3.5 G/DL (ref 3.5–5)
ALP SERPL-CCNC: 107 U/L (ref 34–104)
ALT SERPL W P-5'-P-CCNC: 17 U/L (ref 7–52)
AMPHETAMINES SERPL QL SCN: NEGATIVE
ANION GAP SERPL CALCULATED.3IONS-SCNC: 11 MMOL/L (ref 4–13)
AST SERPL W P-5'-P-CCNC: 22 U/L (ref 13–39)
ATRIAL RATE: 81 BPM
BACTERIA UR QL AUTO: ABNORMAL /HPF
BARBITURATES UR QL: NEGATIVE
BASOPHILS # BLD AUTO: 0.02 THOUSANDS/ÂΜL (ref 0–0.1)
BASOPHILS NFR BLD AUTO: 0 % (ref 0–1)
BENZODIAZ UR QL: NEGATIVE
BILIRUB SERPL-MCNC: 0.41 MG/DL (ref 0.2–1)
BILIRUB UR QL STRIP: NEGATIVE
BUN SERPL-MCNC: 15 MG/DL (ref 5–25)
CALCIUM SERPL-MCNC: 9 MG/DL (ref 8.4–10.2)
CHLORIDE SERPL-SCNC: 94 MMOL/L (ref 96–108)
CLARITY UR: CLEAR
CO2 SERPL-SCNC: 27 MMOL/L (ref 21–32)
COCAINE UR QL: NEGATIVE
COLOR UR: ABNORMAL
CREAT SERPL-MCNC: 0.67 MG/DL (ref 0.6–1.3)
EOSINOPHIL # BLD AUTO: 0.01 THOUSAND/ÂΜL (ref 0–0.61)
EOSINOPHIL NFR BLD AUTO: 0 % (ref 0–6)
ERYTHROCYTE [DISTWIDTH] IN BLOOD BY AUTOMATED COUNT: 15.4 % (ref 11.6–15.1)
ETHANOL SERPL-MCNC: <10 MG/DL
GFR SERPL CREATININE-BSD FRML MDRD: 89 ML/MIN/1.73SQ M
GLUCOSE SERPL-MCNC: 168 MG/DL (ref 65–140)
GLUCOSE UR STRIP-MCNC: NEGATIVE MG/DL
HCT VFR BLD AUTO: 28.3 % (ref 34.8–46.1)
HGB BLD-MCNC: 9.1 G/DL (ref 11.5–15.4)
HGB UR QL STRIP.AUTO: NEGATIVE
HYALINE CASTS #/AREA URNS LPF: ABNORMAL /LPF
IMM GRANULOCYTES # BLD AUTO: 0.03 THOUSAND/UL (ref 0–0.2)
IMM GRANULOCYTES NFR BLD AUTO: 0 % (ref 0–2)
KETONES UR STRIP-MCNC: NEGATIVE MG/DL
LEUKOCYTE ESTERASE UR QL STRIP: NEGATIVE
LYMPHOCYTES # BLD AUTO: 0.82 THOUSANDS/ÂΜL (ref 0.6–4.47)
LYMPHOCYTES NFR BLD AUTO: 12 % (ref 14–44)
MCH RBC QN AUTO: 29 PG (ref 26.8–34.3)
MCHC RBC AUTO-ENTMCNC: 32.2 G/DL (ref 31.4–37.4)
MCV RBC AUTO: 90 FL (ref 82–98)
METHADONE UR QL: NEGATIVE
MONOCYTES # BLD AUTO: 0.46 THOUSAND/ÂΜL (ref 0.17–1.22)
MONOCYTES NFR BLD AUTO: 7 % (ref 4–12)
MUCOUS THREADS UR QL AUTO: ABNORMAL
NEUTROPHILS # BLD AUTO: 5.41 THOUSANDS/ÂΜL (ref 1.85–7.62)
NEUTS SEG NFR BLD AUTO: 81 % (ref 43–75)
NITRITE UR QL STRIP: POSITIVE
NON-SQ EPI CELLS URNS QL MICRO: ABNORMAL /HPF
NRBC BLD AUTO-RTO: 0 /100 WBCS
OPIATES UR QL SCN: NEGATIVE
OXYCODONE+OXYMORPHONE UR QL SCN: NEGATIVE
P AXIS: 61 DEGREES
PCP UR QL: NEGATIVE
PH UR STRIP.AUTO: 6 [PH]
PLATELET # BLD AUTO: 182 THOUSANDS/UL (ref 149–390)
PMV BLD AUTO: 8.5 FL (ref 8.9–12.7)
POTASSIUM SERPL-SCNC: 3.7 MMOL/L (ref 3.5–5.3)
PR INTERVAL: 120 MS
PROT SERPL-MCNC: 6.8 G/DL (ref 6.4–8.4)
PROT UR STRIP-MCNC: ABNORMAL MG/DL
QRS AXIS: 5 DEGREES
QRSD INTERVAL: 112 MS
QT INTERVAL: 420 MS
QTC INTERVAL: 487 MS
RBC # BLD AUTO: 3.14 MILLION/UL (ref 3.81–5.12)
RBC #/AREA URNS AUTO: ABNORMAL /HPF
SODIUM SERPL-SCNC: 132 MMOL/L (ref 135–147)
SP GR UR STRIP.AUTO: 1.02 (ref 1–1.04)
T WAVE AXIS: 4 DEGREES
THC UR QL: NEGATIVE
TSH SERPL DL<=0.05 MIU/L-ACNC: 0.82 UIU/ML (ref 0.45–4.5)
UROBILINOGEN UA: NEGATIVE MG/DL
VENTRICULAR RATE: 81 BPM
WBC # BLD AUTO: 6.75 THOUSAND/UL (ref 4.31–10.16)
WBC #/AREA URNS AUTO: ABNORMAL /HPF

## 2023-04-27 RX ORDER — INSULIN GLARGINE 100 [IU]/ML
20 INJECTION, SOLUTION SUBCUTANEOUS EVERY MORNING
Status: DISCONTINUED | OUTPATIENT
Start: 2023-04-28 | End: 2023-04-28

## 2023-04-27 RX ORDER — DOCUSATE SODIUM 100 MG/1
100 CAPSULE, LIQUID FILLED ORAL 2 TIMES DAILY
Status: DISCONTINUED | OUTPATIENT
Start: 2023-04-27 | End: 2023-04-28

## 2023-04-27 RX ORDER — ENOXAPARIN SODIUM 100 MG/ML
40 INJECTION SUBCUTANEOUS
Status: DISCONTINUED | OUTPATIENT
Start: 2023-04-28 | End: 2023-04-28

## 2023-04-27 RX ORDER — CLOTRIMAZOLE 10 MG/1
10 LOZENGE ORAL; TOPICAL
Status: DISCONTINUED | OUTPATIENT
Start: 2023-04-27 | End: 2023-04-28

## 2023-04-27 RX ORDER — ACETAMINOPHEN 325 MG/1
975 TABLET ORAL EVERY 8 HOURS PRN
Status: DISCONTINUED | OUTPATIENT
Start: 2023-04-27 | End: 2023-04-28

## 2023-04-27 RX ORDER — ROSUVASTATIN CALCIUM 10 MG/1
20 TABLET, COATED ORAL DAILY
Status: DISCONTINUED | OUTPATIENT
Start: 2023-04-28 | End: 2023-04-27 | Stop reason: ALTCHOICE

## 2023-04-27 RX ORDER — PANTOPRAZOLE SODIUM 40 MG/1
40 TABLET, DELAYED RELEASE ORAL
Status: DISCONTINUED | OUTPATIENT
Start: 2023-04-28 | End: 2023-04-28

## 2023-04-27 RX ORDER — FERROUS SULFATE 325(65) MG
325 TABLET ORAL
Status: DISCONTINUED | OUTPATIENT
Start: 2023-04-28 | End: 2023-04-28

## 2023-04-27 RX ORDER — CEPHALEXIN 500 MG/1
500 CAPSULE ORAL EVERY 12 HOURS SCHEDULED
Status: DISCONTINUED | OUTPATIENT
Start: 2023-04-27 | End: 2023-04-28

## 2023-04-27 RX ORDER — OLANZAPINE 5 MG/1
5 TABLET ORAL
Status: DISCONTINUED | OUTPATIENT
Start: 2023-04-27 | End: 2023-05-01

## 2023-04-27 RX ORDER — LANOLIN ALCOHOL/MO/W.PET/CERES
400 CREAM (GRAM) TOPICAL DAILY
Status: DISCONTINUED | OUTPATIENT
Start: 2023-04-28 | End: 2023-04-28

## 2023-04-27 RX ORDER — CLONAZEPAM 0.5 MG/1
0.5 TABLET ORAL
Status: DISCONTINUED | OUTPATIENT
Start: 2023-04-27 | End: 2023-04-28

## 2023-04-27 RX ORDER — ZIPRASIDONE HYDROCHLORIDE 40 MG/1
40 CAPSULE ORAL 2 TIMES DAILY WITH MEALS
Status: DISCONTINUED | OUTPATIENT
Start: 2023-04-27 | End: 2023-04-29

## 2023-04-27 RX ORDER — ATORVASTATIN CALCIUM 40 MG/1
40 TABLET, FILM COATED ORAL
Status: DISCONTINUED | OUTPATIENT
Start: 2023-04-27 | End: 2023-04-28

## 2023-04-27 RX ORDER — NITROGLYCERIN 0.4 MG/1
0.4 TABLET SUBLINGUAL
Status: DISCONTINUED | OUTPATIENT
Start: 2023-04-27 | End: 2023-04-28

## 2023-04-27 RX ORDER — MIRTAZAPINE 7.5 MG/1
7.5 TABLET, FILM COATED ORAL
Status: DISCONTINUED | OUTPATIENT
Start: 2023-04-27 | End: 2023-05-05

## 2023-04-27 RX ORDER — CLONAZEPAM 0.5 MG/1
0.25 TABLET ORAL 2 TIMES DAILY PRN
Status: DISCONTINUED | OUTPATIENT
Start: 2023-04-27 | End: 2023-04-28

## 2023-04-27 RX ADMIN — ATORVASTATIN CALCIUM 40 MG: 20 TABLET, FILM COATED ORAL at 19:34

## 2023-04-27 RX ADMIN — CEPHALEXIN 500 MG: 500 CAPSULE ORAL at 22:30

## 2023-04-27 RX ADMIN — CLOTRIMAZOLE 10 MG: 10 LOZENGE ORAL at 22:56

## 2023-04-27 RX ADMIN — ZIPRASIDONE HYDROCHLORIDE 40 MG: 40 CAPSULE ORAL at 19:33

## 2023-04-27 RX ADMIN — DOCUSATE SODIUM 100 MG: 100 CAPSULE, LIQUID FILLED ORAL at 19:34

## 2023-04-27 RX ADMIN — CLONAZEPAM 0.5 MG: 0.5 TABLET ORAL at 22:29

## 2023-04-27 RX ADMIN — CLOTRIMAZOLE 10 MG: 10 LOZENGE ORAL at 19:34

## 2023-04-27 RX ADMIN — OLANZAPINE 5 MG: 5 TABLET, FILM COATED ORAL at 22:29

## 2023-04-27 RX ADMIN — MIRTAZAPINE 7.5 MG: 7.5 TABLET, FILM COATED ORAL at 22:29

## 2023-04-27 NOTE — ED NOTES
All belongings sent home with patients daughter  Walker and glasses remain at bedside        Kentrell Olga Mccall Bending  04/27/23 200 [facility] Bending  04/27/23 1818

## 2023-04-27 NOTE — ED PROVIDER NOTES
"History  Chief Complaint   Patient presents with   • Psychiatric Evaluation     Patient brought in by her daughter  Patient daughter states she has not been right since she broke her femur in march  Daughter states \" she thinks that police are coming after her\"  Does go to therapist and psychiatry but need her medications readjusted  Patient denies SI/HI  Daughter states patient will not eat  Daughter states she is talking nonsense  Patient does have bipolar  Patient is A/Ox4       Patient states she thinks police are coming after her because of taxes and her       HPI  Patient is a 48-year-old female history is obtained from both patient as well as daughter presenting here today for psychiatric evaluation for paranoid behavior and delusions  Patient has a history of bipolar 1 was well controlled for many years however over the last several months has had significant worsening primarily presenting as flight of ideas paranoia and delusions  Daughter reports that symptoms began around November  Around that time she had a sibling die  Reports that she also had a left knee replacement  She most recently has been dealing with a left femur fracture periprosthetic and was admitted for some time at that time also had some hyponatremia  Reports her medical concerns have been improving  She has been ambulatory on her leg without difficulty does report some pain however is improving  They have been monitoring her sodiums and they think that it could be likely secondary to medication side effect  Over the past several months she has had increasing paranoia and delusions  Recently had urine testing that returned positive for nitrites and was started on a course of Keflex which she has not started taking yet  However reports that her psychiatric symptoms have been ongoing for far longer than the urinary tract infection      Patient herself is somewhat withdrawn with a flat affect does report that she thinks the " police are coming after her because of her taxes and her   Reports auditory hallucinations in which she states she hears voices that are talking nonsense with no command hallucinations  She is alert and oriented x4  No SI or HI  Has recently had a CT head without any acute findings reports no recent falls  Prior to Admission Medications   Prescriptions Last Dose Informant Patient Reported? Taking? BD Pen Needle Chelsey 2nd Gen 32G X 4 MM MISC   No No   Sig: USE 1  TWICE DAILY   Ferrous Sulfate (Iron) 325 (65 Fe) MG TABS 4/27/2023  Yes Yes   Sig: Take by mouth daily   OLANZapine (ZyPREXA) 2 5 mg tablet 4/26/2023  Yes Yes   Sig: Take 5 mg by mouth daily at bedtime   acetaminophen (TYLENOL) 325 mg tablet Past Month  No Yes   Sig: Take 3 tablets (975 mg total) by mouth 2 (two) times a day   cephalexin (KEFLEX) 500 mg capsule   No No   Sig: Take 1 capsule (500 mg total) by mouth every 12 (twelve) hours for 7 days   clonazePAM (KlonoPIN) 0 5 mg tablet   No No   Sig: Take 0 5 tablets (0 25 mg total) by mouth daily at bedtime for 10 days   Patient taking differently: Take 5 mg by mouth daily at bedtime 0 5 for 10 days and 0 25 prn   clotrimazole (MYCELEX) 10 mg arnaud 4/27/2023  No Yes   Sig: Take 1 tablet (10 mg total) by mouth 5 (five) times a day for 14 days   cyanocobalamin (VITAMIN B-12) 1000 MCG tablet 4/27/2023  No Yes   Sig: Take 1 tablet (1,000 mcg total) by mouth daily Do not start before March 29, 2023  docusate sodium (COLACE) 100 mg capsule   Yes No   Sig: Take 100 mg by mouth 2 (two) times a day   enoxaparin (LOVENOX) 40 mg/0 4 mL 4/27/2023  No Yes   Sig: Inject 0 4 mL (40 mg total) under the skin every 24 hours for 21 days Do not start before April 11, 2023     insulin glargine (Toujeo SoloStar) 300 units/mL CONCENTRATED U-300 injection pen (1-unit dial) 4/27/2023  No Yes   Sig: Inject 20 Units under the skin daily   magnesium Oxide (MAG-OX) 400 mg TABS 4/27/2023  No Yes   Sig: Take 1 tablet (400 mg total) by mouth daily   metFORMIN (GLUCOPHAGE-XR) 500 mg 24 hr tablet 4/27/2023  No Yes   Sig: Take 1 tablet (500 mg total) by mouth 2 (two) times a day with meals   metoprolol succinate (TOPROL-XL) 25 mg 24 hr tablet 4/27/2023  No Yes   Sig: Take 1 tablet (25 mg total) by mouth daily   mirtazapine (REMERON) 15 mg tablet 4/26/2023  Yes Yes   Sig: Take 7 5 mg by mouth daily at bedtime   nitroglycerin (NITROSTAT) 0 4 mg SL tablet   No No   Sig: Place 1 tablet (0 4 mg total) under the tongue every 5 (five) minutes as needed for chest pain   pantoprazole (PROTONIX) 40 mg tablet 4/27/2023  No Yes   Sig: Take 1 tablet (40 mg total) by mouth daily in the early morning   rosuvastatin (CRESTOR) 20 MG tablet 4/26/2023  No Yes   Sig: Take 1 tablet (20 mg total) by mouth daily   torsemide (DEMADEX) 5 MG tablet 4/27/2023  No Yes   Sig: Take 1 tablet (5 mg total) by mouth every other day Do not start before March 30, 2023     ziprasidone (GEODON) 80 mg capsule 4/27/2023  No Yes   Sig: Take 1 capsule (80 mg total) by mouth 2 (two) times a day with meals   Patient taking differently: Take 40 mg by mouth 2 (two) times a day with meals      Facility-Administered Medications: None       Past Medical History:   Diagnosis Date   • Anesthesia complication     difficulty waking up   • Arthritis     left knee   • Bipolar 1 disorder (HCC)    • Bone spur     left knee behing the knee cap   • Chronic kidney disease    • Colon cancer [facility])     patient states about 2017   • Colon polyp     Tubulovillous Adenoma High Grade Dysplasia - 2017   • Depression    • Diabetes mellitus ([facility] [address] )    • Endometrial cancer ([facility] [address] )     grade I   • Hx of bleeding disorder     vaginal bleeding started on 3/13/17    • Hyperlipidemia    • Hypertension    • Hypomagnesemia 03/20/2023   • PONV (postoperative nausea and vomiting)    • Psychiatric disorder    • Shortness of breath     with exertion   • Urinary incontinence    • Vitamin D deficiency        Past Surgical History:   Procedure Laterality Date   • BREAST BIOPSY Left     benign-maybe at ar age 58   • CHOLECYSTECTOMY      open   • COLONOSCOPY     • DILATION AND CURETTAGE OF UTERUS N/A 04/05/2017    Procedure: DILATATION AND CURETTAGE (D&C); Surgeon: Bam Hurtado MD;  Location[facility] MAIN OR;  Service:    • HYSTERECTOMY     • OOPHORECTOMY     • PELVIC LAPAROSCOPY     • VT ARTHRP KNE CONDYLE&PLATU MEDIAL&LAT COMPARTMENTS Left 12/06/2022    Procedure: ARTHROPLASTY KNEE TOTAL W ROBOT - CEMENTED - LEFT;  Surgeon: Ulysses Melia, DO;  Location: [address] [place] [address];  Service: Orthopedics   • VT LAPS TOTAL HYSTERECT 250 GM/< W/RMVL TUBE/OVARY N/A 05/04/2017    Procedure: ROBOTIC ASSISTED TOTAL LAPAROSCOPIC HYSTERECTOMY, BILATERAL SALPINGOOPHERECTOMY; CYSTOSCOPY;  Surgeon: Adam Muse MD;  Location: [facility] OR;  Service: Gynecology Oncology   • VT OPTX FEM SHFT FX W/INSJ IMED IMPLT W/WO SCREW Left 3/20/2023    Procedure: INSERTION NAIL IM FEMUR RETROGRADE;  Surgeon: Krysta Suarez MD;  Location: [facility];  Service: Orthopedics   • REPLACEMENT TOTAL KNEE     • TONSILLECTOMY     • TUBAL LIGATION         Family History   Problem Relation Age of Onset   • Cancer Mother         Renal   • Heart disease Father         CHF   • Heart disease Sister         atrial septal defect   • Breast cancer Paternal Aunt 40     I have reviewed and agree with the history as documented  E-Cigarette/Vaping   • E-Cigarette Use Never User      E-Cigarette/Vaping Substances   • Nicotine No    • THC No    • CBD No    • Flavoring No    • Other No    • Unknown No      Social History     Tobacco Use   • Smoking status: Never     Passive exposure: Never   • Smokeless tobacco: Never   Vaping Use   • Vaping Use: Never used   Substance Use Topics   • Alcohol use: Never   • Drug use: No       Review of Systems   Constitutional: Negative for chills and fever  HENT: Negative for congestion and sore throat      Eyes: Negative for redness and visual disturbance  Respiratory: Negative for cough and shortness of breath  Cardiovascular: Negative for chest pain and palpitations  Gastrointestinal: Negative for constipation, diarrhea, nausea and vomiting  Genitourinary: Negative for dysuria, hematuria, vaginal bleeding and vaginal discharge  Musculoskeletal: Positive for arthralgias (L knee, femur)  Negative for myalgias  Skin: Negative for rash and wound  Allergic/Immunologic: Negative for immunocompromised state  Neurological: Negative for seizures and syncope  Psychiatric/Behavioral: Positive for decreased concentration, dysphoric mood and hallucinations  Negative for confusion  Physical Exam  Physical Exam  Vitals and nursing note reviewed  Constitutional:       General: She is not in acute distress  Appearance: Normal appearance  She is well-developed  She is not ill-appearing  HENT:      Head: Normocephalic and atraumatic  No raccoon eyes  Right Ear: External ear normal       Left Ear: External ear normal       Nose: Nose normal  No congestion  Mouth/Throat:      Lips: Pink  Mouth: Mucous membranes are moist    Eyes:      General: Lids are normal  No scleral icterus  Conjunctiva/sclera: Conjunctivae normal    Cardiovascular:      Rate and Rhythm: Normal rate and regular rhythm  Heart sounds: No murmur heard  No friction rub  Pulmonary:      Effort: Pulmonary effort is normal  No respiratory distress  Breath sounds: No wheezing or rales  Abdominal:      General: Abdomen is flat  Tenderness: There is no abdominal tenderness  There is no guarding or rebound  Musculoskeletal:         General: No swelling or signs of injury  Cervical back: Normal range of motion  No rigidity or tenderness  Comments: Ambulatory with aid of walker    Left knee midline surgical scar well healing no overlying skin findings  Skin:     General: Skin is warm and dry        Coloration: Skin is not jaundiced  Findings: No rash  Neurological:      Mental Status: She is alert and oriented to person, place, and time  Mental status is at baseline  Psychiatric:         Attention and Perception: She perceives auditory hallucinations  Mood and Affect: Affect is flat  Behavior: Behavior is withdrawn  Behavior is cooperative  Vital Signs  ED Triage Vitals [04/27/23 1712]   Temperature Pulse Respirations Blood Pressure SpO2   (!) 97 1 °F (36 2 °C) 90 18 163/88 97 %      Temp Source Heart Rate Source Patient Position - Orthostatic VS BP Location FiO2 (%)   Tympanic Monitor Sitting Left arm --      Pain Score       --           Vitals:    04/27/23 1712   BP: 163/88   Pulse: 90   Patient Position - Orthostatic VS: Sitting         Visual Acuity      ED Medications  Medications - No data to display    Diagnostic Studies  Results Reviewed     Procedure Component Value Units Date/Time    UA w Reflex to Microscopic w Reflex to Culture [[id number]] Collected: 04/27/23 1802    Lab Status:  In process Specimen: Urine, Clean Catch Updated: 04/27/23 1827    CBC and differential [[id number]]  (Abnormal) Collected: 04/27/23 1802    Lab Status: Final result Specimen: Blood from Arm, Left Updated: 04/27/23 1825     WBC 6 75 Thousand/uL      RBC 3 14 Million/uL      Hemoglobin 9 1 g/dL      Hematocrit 28 3 %      MCV 90 fL      MCH 29 0 pg      MCHC 32 2 g/dL      RDW 15 4 %      MPV 8 5 fL      Platelets 447 Thousands/uL      nRBC 0 /100 WBCs      Neutrophils Relative 81 %      Immat GRANS % 0 %      Lymphocytes Relative 12 %      Monocytes Relative 7 %      Eosinophils Relative 0 %      Basophils Relative 0 %      Neutrophils Absolute 5 41 Thousands/µL      Immature Grans Absolute 0 03 Thousand/uL      Lymphocytes Absolute 0 82 Thousands/µL      Monocytes Absolute 0 46 Thousand/µL      Eosinophils Absolute 0 01 Thousand/µL      Basophils Absolute 0 02 Thousands/µL     Comprehensive metabolic panel [[id number]] Collected: 04/27/23 1802    Lab Status: In process Specimen: Blood from Arm, Left Updated: 04/27/23 1819    Rapid drug screen, urine [[id number]] Collected: 04/27/23 1802    Lab Status: In process Specimen: Urine, Clean Catch Updated: 04/27/23 1811    Ethanol [[id number]] Collected: 04/27/23 1802    Lab Status: In process Specimen: Blood from Arm, Left Updated: 04/27/23 1811    TSH [[id number]] Collected: 04/27/23 1802    Lab Status: In process Specimen: Blood from Arm, Left Updated: 04/27/23 1811                 No orders to display              Procedures  Procedures         ED Course  ED Course as of 04/28/23 1513   u Apr 27, 2023   1831 WBC: 6 75   1831 Hemoglobin(!): 9 1   1832 Hgb increased from priors   1839 Nitrite, UA(!): Positive   1839 MEDICAL ALCOHOL: <10   1853 Sodium(!): 132   1853 Potassium: 3 7   1853 Chloride(!): 94   1853 TSH 3RD GENERATON: 0 818   1853 Creatinine: 0 67   1853 Medically clear for further psychiatric evaluation  SBIRT 20yo+    Flowsheet Row Most Recent Value   Initial Alcohol Screen: US AUDIT-C     1  How often do you have a drink containing alcohol? 0 Filed at: 04/27/2023 1725   2  How many drinks containing alcohol do you have on a typical day you are drinking? 0 Filed at: 04/27/2023 1725   3a  Male UNDER 65: How often do you have five or more drinks on one occasion? 0 Filed at: 04/27/2023 1725   3b  FEMALE Any Age, or MALE 65+: How often do you have 4 or more drinks on one occassion? 0 Filed at: 04/27/2023 1725   Audit-C Score 0 Filed at: 04/27/2023 1725   AZEB: How many times in the past year have you    Used an illegal drug or used a prescription medication for non-medical reasons? Never Filed at: 04/27/2023 1725                    MDM  Patient on arrival is ambulatory to room is in no acute distress, vital signs stable, afebrile  On exam lungs clear auscultation, heart without murmurs rubs or gallops abdomen soft nontender    Given age and past medical history will obtain labs to further assess she does have a history of hyponatremia  We will continue to monitor  We will treat with Keflex for her UTI  Will discuss with crisis  Disposition  Final diagnoses:   None     ED Disposition     None      Follow-up Information    None         Patient's Medications   Discharge Prescriptions    No medications on file       No discharge procedures on file      PDMP Review       Value Time User    PDMP Reviewed  Yes 12/7/2022 12:36 PM Antione Diaz PA-C          ED Provider  Electronically Signed by           Davian Nguyen MD  04/28/23 5125

## 2023-04-27 NOTE — ED NOTES
Patient was referred by her daughter, Nancy Orosco, [phone], for decompensation that began about 4 months ago and has been noticeably worse since 2023  Patient has a history of bipolar disorder with initial onset at about age 47, at which time, she had a similar episode  She has had anxiety for most of her life  Patient's daughter stated the patient sees a psychiatrist and a therapist; however, her current symptoms have not responded to medication changes and adjustments  In addition, it has been complicated by a history of hyponatremia secondary to SSRIs, as well as prolonged QT interval,  possibly due to possibly secondary to second generation antipsychotics  Per daughter, the patient has been extremely paranoid and thinks that the police are after her and her   She has not been sleeping much at all  Patient has been highly anxious  She has also been afraid to eat due to thinking she will choke  She has lost about 50 pounds over the last 4 months  Patient has had significant stressors beginning with hyponatremia in 2022; She then had a knee replacement in 2022  Her sister, June Abbott,  the same month and the patient was unable to see her or attend services due to recovery period  Shortly afterward,  the pipes in the patient's home broke  She and her  had to move to a hotel  While her  was helping her bathe, the patient slipped in the tub and broke her femur, requiring surgery  She does have ongoing pain related to this  The patient has since been experiencing her current symptoms  An inpatient admission is recommended at this time  The patient is willing to sign a 201

## 2023-04-28 PROBLEM — Z00.8 MEDICAL CLEARANCE FOR PSYCHIATRIC ADMISSION: Status: ACTIVE | Noted: 2023-04-28

## 2023-04-28 PROBLEM — R41.3 MEMORY CHANGES: Status: RESOLVED | Noted: 2023-03-22 | Resolved: 2023-04-28

## 2023-04-28 PROBLEM — Z87.81 HISTORY OF FEMUR FRACTURE: Status: ACTIVE | Noted: 2023-04-28

## 2023-04-28 LAB
ATRIAL RATE: 88 BPM
BACTERIA UR CULT: ABNORMAL
GLUCOSE SERPL-MCNC: 107 MG/DL (ref 65–140)
GLUCOSE SERPL-MCNC: 229 MG/DL (ref 65–140)
GLUCOSE SERPL-MCNC: 299 MG/DL (ref 65–140)
P AXIS: 63 DEGREES
PR INTERVAL: 120 MS
QRS AXIS: 7 DEGREES
QRSD INTERVAL: 114 MS
QT INTERVAL: 414 MS
QTC INTERVAL: 500 MS
T WAVE AXIS: 21 DEGREES
VENTRICULAR RATE: 88 BPM

## 2023-04-28 RX ORDER — CLOTRIMAZOLE 10 MG/1
10 LOZENGE ORAL; TOPICAL 3 TIMES DAILY
Status: COMPLETED | OUTPATIENT
Start: 2023-04-28 | End: 2023-05-01

## 2023-04-28 RX ORDER — RISPERIDONE 0.25 MG/1
0.25 TABLET ORAL
Status: DISCONTINUED | OUTPATIENT
Start: 2023-04-28 | End: 2023-05-30 | Stop reason: HOSPADM

## 2023-04-28 RX ORDER — ACETAMINOPHEN 325 MG/1
650 TABLET ORAL EVERY 4 HOURS PRN
Status: DISCONTINUED | OUTPATIENT
Start: 2023-04-28 | End: 2023-05-30 | Stop reason: HOSPADM

## 2023-04-28 RX ORDER — HALOPERIDOL 5 MG/ML
5 INJECTION INTRAMUSCULAR
Status: DISCONTINUED | OUTPATIENT
Start: 2023-04-28 | End: 2023-05-30 | Stop reason: HOSPADM

## 2023-04-28 RX ORDER — INSULIN LISPRO 100 [IU]/ML
1-5 INJECTION, SOLUTION INTRAVENOUS; SUBCUTANEOUS
Status: DISCONTINUED | OUTPATIENT
Start: 2023-04-28 | End: 2023-05-30 | Stop reason: HOSPADM

## 2023-04-28 RX ORDER — RISPERIDONE 1 MG/1
1 TABLET ORAL
Status: DISCONTINUED | OUTPATIENT
Start: 2023-04-28 | End: 2023-05-30 | Stop reason: HOSPADM

## 2023-04-28 RX ORDER — PANTOPRAZOLE SODIUM 20 MG/1
20 TABLET, DELAYED RELEASE ORAL
Status: DISCONTINUED | OUTPATIENT
Start: 2023-04-29 | End: 2023-05-05

## 2023-04-28 RX ORDER — ENOXAPARIN SODIUM 100 MG/ML
40 INJECTION SUBCUTANEOUS
Status: COMPLETED | OUTPATIENT
Start: 2023-04-29 | End: 2023-05-02

## 2023-04-28 RX ORDER — RISPERIDONE 0.5 MG/1
0.5 TABLET ORAL
Status: DISCONTINUED | OUTPATIENT
Start: 2023-04-28 | End: 2023-05-30 | Stop reason: HOSPADM

## 2023-04-28 RX ORDER — TORSEMIDE 5 MG/1
5 TABLET ORAL EVERY OTHER DAY
Status: DISCONTINUED | OUTPATIENT
Start: 2023-04-29 | End: 2023-05-02

## 2023-04-28 RX ORDER — ATORVASTATIN CALCIUM 40 MG/1
40 TABLET, FILM COATED ORAL
Status: DISCONTINUED | OUTPATIENT
Start: 2023-04-28 | End: 2023-05-30 | Stop reason: HOSPADM

## 2023-04-28 RX ORDER — TRAZODONE HYDROCHLORIDE 50 MG/1
50 TABLET ORAL
Status: DISCONTINUED | OUTPATIENT
Start: 2023-04-28 | End: 2023-05-30 | Stop reason: HOSPADM

## 2023-04-28 RX ORDER — METOPROLOL SUCCINATE 25 MG/1
25 TABLET, EXTENDED RELEASE ORAL DAILY
Status: DISCONTINUED | OUTPATIENT
Start: 2023-04-29 | End: 2023-04-30

## 2023-04-28 RX ORDER — ACETAMINOPHEN 325 MG/1
975 TABLET ORAL EVERY 6 HOURS PRN
Status: DISCONTINUED | OUTPATIENT
Start: 2023-04-28 | End: 2023-05-30 | Stop reason: HOSPADM

## 2023-04-28 RX ORDER — CEPHALEXIN 500 MG/1
500 CAPSULE ORAL EVERY 12 HOURS SCHEDULED
Status: COMPLETED | OUTPATIENT
Start: 2023-04-28 | End: 2023-05-02

## 2023-04-28 RX ORDER — INSULIN GLARGINE 100 [IU]/ML
15 INJECTION, SOLUTION SUBCUTANEOUS
Status: DISCONTINUED | OUTPATIENT
Start: 2023-04-28 | End: 2023-05-30 | Stop reason: HOSPADM

## 2023-04-28 RX ADMIN — ACETAMINOPHEN 975 MG: 325 TABLET ORAL at 14:15

## 2023-04-28 RX ADMIN — CYANOCOBALAMIN TAB 1000 MCG 1000 MCG: 1000 TAB at 17:47

## 2023-04-28 RX ADMIN — ZIPRASIDONE HYDROCHLORIDE 40 MG: 40 CAPSULE ORAL at 17:47

## 2023-04-28 RX ADMIN — MAGNESIUM OXIDE TAB 400 MG (241.3 MG ELEMENTAL MG) 400 MG: 400 (241.3 MG) TAB at 09:25

## 2023-04-28 RX ADMIN — OLANZAPINE 5 MG: 5 TABLET, FILM COATED ORAL at 21:30

## 2023-04-28 RX ADMIN — CYANOCOBALAMIN TAB 1000 MCG 1000 MCG: 1000 TAB at 09:26

## 2023-04-28 RX ADMIN — INSULIN GLARGINE 20 UNITS: 100 INJECTION, SOLUTION SUBCUTANEOUS at 09:27

## 2023-04-28 RX ADMIN — METFORMIN HYDROCHLORIDE 500 MG: 500 TABLET ORAL at 09:28

## 2023-04-28 RX ADMIN — DOCUSATE SODIUM 100 MG: 100 CAPSULE, LIQUID FILLED ORAL at 09:25

## 2023-04-28 RX ADMIN — CEPHALEXIN 500 MG: 500 CAPSULE ORAL at 09:25

## 2023-04-28 RX ADMIN — CLOTRIMAZOLE 10 MG: 10 LOZENGE ORAL at 21:30

## 2023-04-28 RX ADMIN — INSULIN LISPRO 3 UNITS: 100 INJECTION, SOLUTION INTRAVENOUS; SUBCUTANEOUS at 21:28

## 2023-04-28 RX ADMIN — CLOTRIMAZOLE 10 MG: 10 LOZENGE ORAL at 09:24

## 2023-04-28 RX ADMIN — ZIPRASIDONE HYDROCHLORIDE 40 MG: 40 CAPSULE ORAL at 09:26

## 2023-04-28 RX ADMIN — METOPROLOL TARTRATE 25 MG: 50 TABLET, FILM COATED ORAL at 09:26

## 2023-04-28 RX ADMIN — CLOTRIMAZOLE 10 MG: 10 LOZENGE ORAL at 13:05

## 2023-04-28 RX ADMIN — MIRTAZAPINE 7.5 MG: 7.5 TABLET, FILM COATED ORAL at 21:30

## 2023-04-28 RX ADMIN — INSULIN LISPRO 2 UNITS: 100 INJECTION, SOLUTION INTRAVENOUS; SUBCUTANEOUS at 17:48

## 2023-04-28 RX ADMIN — ENOXAPARIN SODIUM 40 MG: 40 INJECTION SUBCUTANEOUS at 09:25

## 2023-04-28 RX ADMIN — FERROUS SULFATE TAB 325 MG (65 MG ELEMENTAL FE) 325 MG: 325 (65 FE) TAB at 09:26

## 2023-04-28 RX ADMIN — CEPHALEXIN 500 MG: 500 CAPSULE ORAL at 21:30

## 2023-04-28 RX ADMIN — ATORVASTATIN CALCIUM 40 MG: 40 TABLET, FILM COATED ORAL at 17:47

## 2023-04-28 RX ADMIN — INSULIN GLARGINE 15 UNITS: 100 INJECTION, SOLUTION SUBCUTANEOUS at 21:31

## 2023-04-28 RX ADMIN — CLOTRIMAZOLE 10 MG: 10 LOZENGE ORAL at 18:01

## 2023-04-28 RX ADMIN — PANTOPRAZOLE SODIUM 40 MG: 40 TABLET, DELAYED RELEASE ORAL at 09:26

## 2023-04-28 NOTE — ASSESSMENT & PLAN NOTE
Blood pressure stable  · Continue Toprol XL with holding parameters  · Continue Torsemide 5 mg every other day for hyponatremia

## 2023-04-28 NOTE — ASSESSMENT & PLAN NOTE
Lab Results   Component Value Date    HGBA1C 5 8 (H) 03/22/2023       Recent Labs     04/28/23  0828   POCGLU 107       Blood Sugar Average: Last 72 hrs:  (P) 107   · A1c well controlled  · On Metformin and Toujeo at home  · Start Lantus in place of Toujeo   · Monitor Accu-Cheks AC + HS with lispro insulin sliding scale coverage

## 2023-04-28 NOTE — ASSESSMENT & PLAN NOTE
Urinalysis positive for nitrates, started on Keflex in the ER  · Continue Keflex for 5 days to complete treatment for simple UTI  · Follow-up urine culture

## 2023-04-28 NOTE — ASSESSMENT & PLAN NOTE
· Previously on Geodon, Remeron, and Klonopin   · Avoiding SSRIs given hyponatremia   · Management per primary service

## 2023-04-28 NOTE — ASSESSMENT & PLAN NOTE
· Patient is medically cleared for admission to [facility] and treatment of underlying psychiatric illness  · No acute medical needs  Medicine will sign off   Please call with additional questions or concerns

## 2023-04-28 NOTE — ASSESSMENT & PLAN NOTE
Blood pressure stable  · Continue Toprol all XL with holding parameters  · Continue torsemide 5 mg every other day for hyponatremia

## 2023-04-28 NOTE — ASSESSMENT & PLAN NOTE
Lab Results   Component Value Date    HGBA1C 5 8 (H) 03/22/2023       Recent Labs     04/28/23  0828   POCGLU 107       Blood Sugar Average: Last 72 hrs:  (P) 107   · A1c well controlled  · On Metformin and Toujeo at home  · Continue Lantus at this time  · Monitor Accu-Cheks AC + HS with lispro insulin sliding scale coverage

## 2023-04-28 NOTE — ASSESSMENT & PLAN NOTE
That is post mechanical fall in bathtub which resulted in an acute displaced periprosthetic left distal femur fracture  Status postsurgical fixation of left distal femur periprosthetic fracture with retrograde IM nail  Status post acute rehab      · Hemoglobin stable  · On Lovenox through May 1  · Continue PT/OT  · Ambulate with walker  · Up with assistance

## 2023-04-28 NOTE — ASSESSMENT & PLAN NOTE
"Recently seen by speech therapy   Esophagram revealed: \"No evidence for esophageal obstruction or stricture  Significant spontaneous gastroesophageal reflux to the level of the upper esophagus  Tertiary contractions throughout the esophagus consistent with some degree of impaired motility  \"  · Continue dysphagia level 2, chopped foods with thin liquids  · Protonix 40 mg daily for possible dysmotility   · Aspiration precautions   "

## 2023-04-28 NOTE — NURSING NOTE
"Patient is a 201 voluntary commitment coming to this unit from [facility] Emergency Department  Patient was brought into the ED by her daughter who was concerned with an increase in paranoia and anxiety interfering with the patients ability to complete daily task  Daughter reports that the patient has been stable on medication to treat bipolar disorder for 20 years and has not been well since her psychiatric medications were adjusted r/t to [facility] and hyponatremia in December 2022  Patient had a recent fall and is afraid of falling  Daughter expresses that patient believes she can do less then her actual capabilities  Patient states, \"I can't do nothing, I can't even get out of bed\"  Patient was having paranoia that police are coming to get her because her  did not file taxes  Patient denies having this belief currently  Patient denies ever having SI at any point in life  She is cooperative and well oriented     "

## 2023-04-28 NOTE — ED NOTES
This nurse called pharmacy and talked with Ta Webster to request Mycelex as ordered  Pharmacy to send medication as ordered        Luis Russell RN  04/28/23 2036

## 2023-04-28 NOTE — NURSING NOTE
Multiple pressure ulcers present on admission; stage 1 on sacrum, stage 1 on left buttock, stage 2 on right buttock  Wound care consult initiated

## 2023-04-28 NOTE — ED NOTES
Per patient request, this tech assisted patient to wash up in recliner chair  Patient put on deoderant and brushed teeth  Patient changed into new clean paper scrub pants and shirt        Orion Burks  04/28/23 1121

## 2023-04-28 NOTE — ED NOTES
Pt awake alert only to time  Pt is repetitive about how her medications should be given with apple sauce  Pt is redirectable and pleasant  Pt has no SI, appears paranoid         Jaimee Robledo RN  04/28/23 1988

## 2023-04-28 NOTE — CONSULTS
"[facility]  Consult  Name: Sherrie Staples 70 y o  female I MRN: [id number]  Unit/Bed#: KLFSX 364-51 I Date of Admission: 4/27/2023   Date of Service: 4/28/2023 I Hospital Day: 0    Inpatient consult for Medical Clearance for [facility] patient  Consult performed by: JOHN Maciel  Consult ordered by: JOHN House          Assessment/Plan   Medical clearance for psychiatric admission  Assessment & Plan  · Patient is medically cleared for admission to [facility] and treatment of underlying psychiatric illness  · No acute medical needs  Medicine will sign off  Please call with additional questions or concerns    Dysphagia  Assessment & Plan  Recently seen by speech therapy   Esophagram revealed: \"No evidence for esophageal obstruction or stricture  Significant spontaneous gastroesophageal reflux to the level of the upper esophagus  Tertiary contractions throughout the esophagus consistent with some degree of impaired motility  \"  · Continue dysphagia level 2, chopped foods with thin liquids  · Protonix 40 mg daily for possible dysmotility   · Aspiration precautions     History of femur fracture  Assessment & Plan  That is post mechanical fall in bathtub which resulted in an acute displaced periprosthetic left distal femur fracture  Status postsurgical fixation of left distal femur periprosthetic fracture with retrograde IM nail  Status post acute rehab      · Hemoglobin stable  · On Lovenox through May 1  · Continue PT/OT  · Ambulate with walker  · Up with assistance    UTI (urinary tract infection)  Assessment & Plan  Urinalysis positive for nitrates, started on Keflex in the ER  · Continue Keflex for 5 days to complete treatment for simple UTI  · Follow-up urine culture    Bipolar depression (HCC)  Assessment & Plan  · Previously on Geodon, Remeron, and Klonopin   · Avoiding SSRIs given hyponatremia   · Management per primary service    Hyponatremia  Assessment & Plan  Sodium " level 132   History of chronic hyponatremia, thought to be secondary to antipsychotic medications  Previously on Wellbutrin and Zoloft which have been discontinued  Psychiatry recommended avoiding all SSRIs  · Continue torsemide 5 mg every other day and 1800 mL fluid restriction    History of DVT of lower extremity  Assessment & Plan  · Not on anticoagulation, takes baby aspirin daily    Essential hypertension  Assessment & Plan  Blood pressure stable  · Continue Toprol XL with holding parameters  · Continue Torsemide 5 mg every other day for hyponatremia    Type 2 diabetes mellitus without complication, with long-term current use of insulin ([facility])  Assessment & Plan  Lab Results   Component Value Date    HGBA1C 5 8 (H) 03/22/2023       Recent Labs     04/28/23  0828   POCGLU 107       Blood Sugar Average: Last 72 hrs:  (P) 107   · A1c well controlled  · On Metformin and Toujeo at home  · Start Lantus in place of Toujeo   · Monitor Accu-Cheks AC + HS with lispro insulin sliding scale coverage       ECT Clearance:  • History of recent seizure or stroke:  no  • History of pheochromocytoma:  no  • History of active bleeding (Intracranial hemorrhage, aneurysm or AVM):  no  • History of metallic implants in the head or neck:  no  • History of increased intracranial pressure with mass effect:  no  • History of cervical spine fracture or instability:  no  • Has the patient had an EKG in the past 3 months?  o Results: NSR with PACs  o What is the QTc interval? 487    Based on above criteria, Patient is not medically cleared for ECT should it be recommended as QTc is slightly prolonged  Recommend repeat EKG if ECT is recommended  Recommendations for Discharge:  · SLIM will sign off - please call with questions or concerns  · Follow up with PCP upon discharge  Counseling / Coordination of Care Time: 30 minutes  Greater than 50% of total time spent on patient counseling and coordination of care      Collaboration of Care: Were Recommendations Directly Discussed with Primary Treatment Team? - Yes     History of Present Illness:    Orlin Ross is a 70 y o  female with a past medical history including bipolar disorder, hyponatremia, hypertension, type 2 diabetes mellitus, hyperlipidemia, endometrial cancer, recent fall resulting in left femur fracture and status post repair who is originally admitted to the psychiatric service due to bipolar disorder, polypharmacy, confusion  We are consulted for medical clearance for psychiatric hospitalization and medical management  Patient resting in chair on room air, rolling walker next to her  Patient presented to [address] [place] [address] ER accompanied by her daughter on 4/27/2023 for psychiatric evaluation for paranoid behavior and delusions  Patient has a history of bipolar 1 disorder and was well controlled for many years, however, over the last several months she has had worsening behavioral issues  Patient had a recent admission to the intensive care unit for hyponatremia  Patient had a recent fall resulting in left femur fracture for which she underwent repair and was later discharged to acute rehab  Patient was seen by crisis worker in the emergency department  An inpatient behavioral health admission was recommended at that time and patient signed a 201  Review of Systems:    Review of Systems   Constitutional: Positive for activity change, appetite change and fatigue  Negative for chills and fever  HENT: Negative for congestion and sore throat  Eyes: Negative for visual disturbance  Respiratory: Negative for cough and shortness of breath  Cardiovascular: Negative for chest pain, palpitations and leg swelling  Gastrointestinal: Negative for abdominal pain, diarrhea, nausea and vomiting  Genitourinary: Negative for difficulty urinating  Musculoskeletal: Positive for gait problem  Skin: Negative for wound  Neurological: Positive for weakness   Negative for dizziness, light-headedness and headaches  Psychiatric/Behavioral: Positive for behavioral problems  The patient is nervous/anxious  All other systems reviewed and are negative  Past Medical and Surgical History:     Past Medical History:   Diagnosis Date   • Anesthesia complication     difficulty waking up   • Anxiety    • Arthritis     left knee   • Bipolar 1 disorder (HCC)    • Bone spur     left knee behing the knee cap   • Cancer [facility])    • Chronic kidney disease    • Chronic pain disorder    • Colon cancer [facility])     patient states about 2017   • Colon polyp     Tubulovillous Adenoma High Grade Dysplasia - 2017   • Depression    • Diabetes mellitus ([facility] )    • Endometrial cancer ([facility] )     grade I   • Hx of bleeding disorder     vaginal bleeding started on 3/13/17    • Hyperlipidemia    • Hypertension    • Hypomagnesemia 03/20/2023   • Memory loss    • PONV (postoperative nausea and vomiting)    • Psychiatric disorder    • Psychiatric illness    • Psychosis ([facility] )    • Shortness of breath     with exertion   • Sleep difficulties    • Urinary incontinence    • Vitamin D deficiency        Past Surgical History:   Procedure Laterality Date   • BREAST BIOPSY Left     benign-maybe at ar age 58   • CHOLECYSTECTOMY      open   • COLONOSCOPY     • DILATION AND CURETTAGE OF UTERUS N/A 04/05/2017    Procedure: DILATATION AND CURETTAGE (D&C);   Surgeon: Casie Marcus MD;  Location: [facility] MAIN OR;  Service:    • HYSTERECTOMY     • OOPHORECTOMY     • PELVIC LAPAROSCOPY     • NJ ARTHRP KNE CONDYLE&PLATU MEDIAL&LAT COMPARTMENTS Left 12/06/2022    Procedure: ARTHROPLASTY KNEE TOTAL W ROBOT - CEMENTED - LEFT;  Surgeon: Carlos Davis DO;  Location: [address] [place] [address];  Service: Orthopedics   • NJ LAPS TOTAL HYSTERECT 250 GM/< W/RMVL TUBE/OVARY N/A 05/04/2017    Procedure: ROBOTIC ASSISTED TOTAL LAPAROSCOPIC HYSTERECTOMY, BILATERAL SALPINGOOPHERECTOMY; CYSTOSCOPY;  Surgeon: Melissa Ang MD;  Location: [facility] MAIN [facility]; Service: Gynecology Oncology   • MN OPTX FEM SHFT FX W/INSJ IMED IMPLT W/WO SCREW Left 3/20/2023    Procedure: INSERTION NAIL IM FEMUR RETROGRADE;  Surgeon: Thuan Chahal MD;  Location: [address] [place] [address];  Service: Orthopedics   • REPLACEMENT TOTAL KNEE     • TONSILLECTOMY     • TUBAL LIGATION         Meds/Allergies:    all medications and allergies reviewed    Allergies: No Known Allergies    Social History:     Marital Status: /Civil Union    Substance Use History:   Social History     Substance and Sexual Activity   Alcohol Use Never     Social History     Tobacco Use   Smoking Status Never   • Passive exposure: Never   Smokeless Tobacco Never     Social History     Substance and Sexual Activity   Drug Use No       Family History:    Family History   Problem Relation Age of Onset   • Cancer Mother         Renal   • Heart disease Father         CHF   • Dementia Sister    • Heart disease Sister         atrial septal defect   • Dementia Sister    • Breast cancer Paternal Aunt 39       Physical Exam:     Vitals:   Blood Pressure: 138/68 (04/28/23 1220)  Pulse: 82 (04/28/23 1220)  Temperature: (!) 97 1 °F (36 2 °C) (04/27/23 1712)  Temp Source: Tympanic (04/27/23 1712)  Respirations: 16 (04/28/23 1220)  Weight - Scale: 68 3 kg (150 lb 9 6 oz) (04/27/23 1712)  SpO2: 100 % (04/28/23 1220)    Physical Exam  Vitals and nursing note reviewed  Constitutional:       General: She is not in acute distress  Appearance: She is ill-appearing (Appears mildly deconditioned)  She is not toxic-appearing or diaphoretic  Comments: Pleasant female resting in chair on room air, rolling walker next to her   HENT:      Head: Normocephalic  Mouth/Throat:      Mouth: Mucous membranes are moist    Eyes:      Conjunctiva/sclera: Conjunctivae normal    Cardiovascular:      Rate and Rhythm: Normal rate  Pulmonary:      Effort: Pulmonary effort is normal       Breath sounds: Normal breath sounds  No wheezing, rhonchi or rales  Abdominal:      General: Bowel sounds are normal  There is no distension  Palpations: Abdomen is soft  Tenderness: There is no abdominal tenderness  Musculoskeletal:         General: Normal range of motion  Cervical back: Normal range of motion  Right lower leg: No edema  Left lower leg: No edema  Skin:     General: Skin is warm and dry  Capillary Refill: Capillary refill takes less than 2 seconds  Neurological:      Mental Status: She is alert and oriented to person, place, and time  Mental status is at baseline  Motor: Weakness (Generalized weakness) present  Psychiatric:         Mood and Affect: Mood is depressed  Affect is flat  Behavior: Behavior is slowed  Behavior is cooperative  Cognition and Memory: Memory is impaired  Additional Data:     Lab Results:     Results from last 7 days   Lab Units 04/27/23  1802   WBC Thousand/uL 6 75   HEMOGLOBIN g/dL 9 1*   HEMATOCRIT % 28 3*   PLATELETS Thousands/uL 182   NEUTROS PCT % 81*   LYMPHS PCT % 12*   MONOS PCT % 7   EOS PCT % 0     Results from last 7 days   Lab Units 04/27/23  1802   SODIUM mmol/L 132*   POTASSIUM mmol/L 3 7   CHLORIDE mmol/L 94*   CO2 mmol/L 27   BUN mg/dL 15   CREATININE mg/dL 0 67   ANION GAP mmol/L 11   CALCIUM mg/dL 9 0   ALBUMIN g/dL 3 5   TOTAL BILIRUBIN mg/dL 0 41   ALK PHOS U/L 107*   ALT U/L 17   AST U/L 22   GLUCOSE RANDOM mg/dL 168*             Lab Results   Component Value Date/Time    HGBA1C 5 8 (H) 03/22/2023 05:56 AM    HGBA1C 6 9 (H) 11/14/2022 11:56 AM    HGBA1C 6 0 (H) 06/07/2021 10:36 AM    HGBA1C 6 0 06/07/2021 12:00 AM    HGBA1C 6 7 (H) 04/21/2017 09:50 AM     Results from last 7 days   Lab Units 04/28/23  0828   POC GLUCOSE mg/dl 107           Imaging: I have personally reviewed pertinent reports        No orders to display       EKG, Pathology, and Other Studies Reviewed on Admission:   · EKG: see above documentation    ** Please Note: This note has been constructed using a voice recognition system   **

## 2023-04-28 NOTE — ASSESSMENT & PLAN NOTE
That is post mechanical fall in bathtub which resulted in an acute displaced periprosthetic left distal femur fracture  Status postsurgical fixation of left distal femur periprosthetic fracture with retrograde IM nail  Status post acute rehab  · Hemoglobin stable  · Continue PT/OT  · Ambulate with walker  · Up with assistance

## 2023-04-28 NOTE — ED NOTES
Pt vitals taken and assisted with bedpan and positioning  Pt has no other needs @ this time and call bell is @ bedside  All safety precautions remain in place       Walt MCCOY [facility]  04/27/23 [id number]

## 2023-04-28 NOTE — ED NOTES
Call received from [place] from Intake   She stated she will call with placement clarification later in the AM

## 2023-04-28 NOTE — ED NOTES
Per pt request, this tech and ED Nancyann Dandy assisted patient onto commode at bedside  Patient voided urine  Assisted pt to recliner chair        Carlita Beltran  04/28/23 5530

## 2023-04-28 NOTE — ASSESSMENT & PLAN NOTE
Sodium level 132   History of chronic hyponatremia, thought to be secondary to antipsychotic medications  Previously on Wellbutrin and Zoloft which have been discontinued  Psychiatry recommended avoiding all SSRIs    · Continue torsemide 5 mg every other day and 1800 mL fluid restriction

## 2023-04-28 NOTE — ED NOTES
Per patient request, this tech and ED Jose [place] assisted patient onto commode at bedside  Patient voided BM  This tech and Frank Wooten RN assisted patient to clean up, change into a clean dry brief, and return patient to recliner chair        Jose Bishop  04/28/23 Yg Jernigan  04/28/23 [id number]

## 2023-04-28 NOTE — ED NOTES
Patient is accepted at [facility], [address]   Patient is accepted by Dr Beka Salas per Anastacio Gaffney  Report may be called now  Patient may go to the floor at **  Nurse report is to be called to [phone] prior to patient transfer

## 2023-04-28 NOTE — ASSESSMENT & PLAN NOTE
· Patient is medically cleared for admission to [facility] and treatment of underlying psychiatric illness  · No acute medical needs  Medicine will sign off   Please call with additional questions or concerns

## 2023-04-29 LAB
25(OH)D3 SERPL-MCNC: 31.6 NG/ML (ref 30–100)
ALBUMIN SERPL BCP-MCNC: 3.3 G/DL (ref 3.5–5)
ALP SERPL-CCNC: 85 U/L (ref 34–104)
ALT SERPL W P-5'-P-CCNC: 22 U/L (ref 7–52)
ANION GAP SERPL CALCULATED.3IONS-SCNC: 10 MMOL/L (ref 4–13)
AST SERPL W P-5'-P-CCNC: 25 U/L (ref 13–39)
ATRIAL RATE: 82 BPM
ATRIAL RATE: 83 BPM
ATRIAL RATE: 83 BPM
ATRIAL RATE: 88 BPM
ATRIAL RATE: 89 BPM
BASOPHILS # BLD AUTO: 0.02 THOUSANDS/ÂΜL (ref 0–0.1)
BASOPHILS NFR BLD AUTO: 0 % (ref 0–1)
BILIRUB SERPL-MCNC: 0.35 MG/DL (ref 0.2–1)
BUN SERPL-MCNC: 12 MG/DL (ref 5–25)
CALCIUM ALBUM COR SERPL-MCNC: 9.6 MG/DL (ref 8.3–10.1)
CALCIUM SERPL-MCNC: 9 MG/DL (ref 8.4–10.2)
CHLORIDE SERPL-SCNC: 96 MMOL/L (ref 96–108)
CO2 SERPL-SCNC: 28 MMOL/L (ref 21–32)
CREAT SERPL-MCNC: 0.61 MG/DL (ref 0.6–1.3)
EOSINOPHIL # BLD AUTO: 0.04 THOUSAND/ÂΜL (ref 0–0.61)
EOSINOPHIL NFR BLD AUTO: 1 % (ref 0–6)
ERYTHROCYTE [DISTWIDTH] IN BLOOD BY AUTOMATED COUNT: 15.6 % (ref 11.6–15.1)
FOLATE SERPL-MCNC: 9 NG/ML (ref 3.1–17.5)
GFR SERPL CREATININE-BSD FRML MDRD: 92 ML/MIN/1.73SQ M
GLUCOSE P FAST SERPL-MCNC: 86 MG/DL (ref 65–99)
GLUCOSE SERPL-MCNC: 119 MG/DL (ref 65–140)
GLUCOSE SERPL-MCNC: 159 MG/DL (ref 65–140)
GLUCOSE SERPL-MCNC: 169 MG/DL (ref 65–140)
GLUCOSE SERPL-MCNC: 216 MG/DL (ref 65–140)
GLUCOSE SERPL-MCNC: 86 MG/DL (ref 65–140)
HCT VFR BLD AUTO: 27 % (ref 34.8–46.1)
HGB BLD-MCNC: 8.4 G/DL (ref 11.5–15.4)
IMM GRANULOCYTES # BLD AUTO: 0.02 THOUSAND/UL (ref 0–0.2)
IMM GRANULOCYTES NFR BLD AUTO: 0 % (ref 0–2)
LYMPHOCYTES # BLD AUTO: 1.5 THOUSANDS/ÂΜL (ref 0.6–4.47)
LYMPHOCYTES NFR BLD AUTO: 25 % (ref 14–44)
MAGNESIUM SERPL-MCNC: 1.7 MG/DL (ref 1.9–2.7)
MCH RBC QN AUTO: 27.9 PG (ref 26.8–34.3)
MCHC RBC AUTO-ENTMCNC: 31.1 G/DL (ref 31.4–37.4)
MCV RBC AUTO: 90 FL (ref 82–98)
MONOCYTES # BLD AUTO: 0.56 THOUSAND/ÂΜL (ref 0.17–1.22)
MONOCYTES NFR BLD AUTO: 9 % (ref 4–12)
NEUTROPHILS # BLD AUTO: 3.91 THOUSANDS/ÂΜL (ref 1.85–7.62)
NEUTS SEG NFR BLD AUTO: 65 % (ref 43–75)
NRBC BLD AUTO-RTO: 0 /100 WBCS
P AXIS: 110 DEGREES
P AXIS: 112 DEGREES
P AXIS: 66 DEGREES
P AXIS: 98 DEGREES
PHOSPHATE SERPL-MCNC: 3.4 MG/DL (ref 2.3–4.1)
PLATELET # BLD AUTO: 204 THOUSANDS/UL (ref 149–390)
PMV BLD AUTO: 8.3 FL (ref 8.9–12.7)
POTASSIUM SERPL-SCNC: 4.1 MMOL/L (ref 3.5–5.3)
PR INTERVAL: 116 MS
PR INTERVAL: 118 MS
PR INTERVAL: 118 MS
PR INTERVAL: 130 MS
PR INTERVAL: 166 MS
PROT SERPL-MCNC: 6.1 G/DL (ref 6.4–8.4)
QRS AXIS: 11 DEGREES
QRS AXIS: 158 DEGREES
QRS AXIS: 162 DEGREES
QRS AXIS: 162 DEGREES
QRS AXIS: 179 DEGREES
QRSD INTERVAL: 106 MS
QRSD INTERVAL: 108 MS
QRSD INTERVAL: 110 MS
QT INTERVAL: 392 MS
QT INTERVAL: 400 MS
QT INTERVAL: 410 MS
QTC INTERVAL: 470 MS
QTC INTERVAL: 474 MS
QTC INTERVAL: 479 MS
QTC INTERVAL: 481 MS
QTC INTERVAL: 498 MS
RBC # BLD AUTO: 3.01 MILLION/UL (ref 3.81–5.12)
SODIUM SERPL-SCNC: 134 MMOL/L (ref 135–147)
T WAVE AXIS: 134 DEGREES
T WAVE AXIS: 142 DEGREES
T WAVE AXIS: 146 DEGREES
T WAVE AXIS: 150 DEGREES
T WAVE AXIS: 28 DEGREES
TSH SERPL DL<=0.05 MIU/L-ACNC: 0.89 UIU/ML (ref 0.45–4.5)
VENTRICULAR RATE: 82 BPM
VENTRICULAR RATE: 83 BPM
VENTRICULAR RATE: 83 BPM
VENTRICULAR RATE: 88 BPM
VENTRICULAR RATE: 89 BPM
VIT B12 SERPL-MCNC: 484 PG/ML (ref 100–900)
WBC # BLD AUTO: 6.05 THOUSAND/UL (ref 4.31–10.16)

## 2023-04-29 RX ORDER — ZIPRASIDONE HYDROCHLORIDE 20 MG/1
20 CAPSULE ORAL 2 TIMES DAILY WITH MEALS
Status: COMPLETED | OUTPATIENT
Start: 2023-04-29 | End: 2023-05-02

## 2023-04-29 RX ORDER — CLONAZEPAM 0.5 MG/1
0.25 TABLET ORAL
Status: DISCONTINUED | OUTPATIENT
Start: 2023-04-29 | End: 2023-05-16

## 2023-04-29 RX ORDER — CLONAZEPAM 0.5 MG/1
0.5 TABLET ORAL
Status: DISCONTINUED | OUTPATIENT
Start: 2023-04-29 | End: 2023-04-29

## 2023-04-29 RX ORDER — POLYETHYLENE GLYCOL 3350 17 G/17G
17 POWDER, FOR SOLUTION ORAL DAILY PRN
Status: DISCONTINUED | OUTPATIENT
Start: 2023-04-29 | End: 2023-05-30 | Stop reason: HOSPADM

## 2023-04-29 RX ORDER — DOCUSATE SODIUM 100 MG/1
100 CAPSULE, LIQUID FILLED ORAL 2 TIMES DAILY
Status: DISCONTINUED | OUTPATIENT
Start: 2023-04-29 | End: 2023-05-30 | Stop reason: HOSPADM

## 2023-04-29 RX ADMIN — PANTOPRAZOLE SODIUM 20 MG: 20 TABLET, DELAYED RELEASE ORAL at 06:04

## 2023-04-29 RX ADMIN — ZIPRASIDONE HYDROCHLORIDE 40 MG: 40 CAPSULE ORAL at 08:21

## 2023-04-29 RX ADMIN — CEPHALEXIN 500 MG: 500 CAPSULE ORAL at 08:22

## 2023-04-29 RX ADMIN — ACETAMINOPHEN 650 MG: 325 TABLET ORAL at 19:33

## 2023-04-29 RX ADMIN — TORSEMIDE 5 MG: 5 TABLET ORAL at 08:24

## 2023-04-29 RX ADMIN — CEPHALEXIN 500 MG: 500 CAPSULE ORAL at 21:32

## 2023-04-29 RX ADMIN — CLOTRIMAZOLE 10 MG: 10 LOZENGE ORAL at 08:24

## 2023-04-29 RX ADMIN — DOCUSATE SODIUM 100 MG: 100 CAPSULE, LIQUID FILLED ORAL at 17:26

## 2023-04-29 RX ADMIN — ZIPRASIDONE HYDROCHLORIDE 20 MG: 20 CAPSULE ORAL at 17:26

## 2023-04-29 RX ADMIN — CYANOCOBALAMIN TAB 1000 MCG 1000 MCG: 1000 TAB at 08:22

## 2023-04-29 RX ADMIN — OLANZAPINE 5 MG: 5 TABLET, FILM COATED ORAL at 21:32

## 2023-04-29 RX ADMIN — INSULIN GLARGINE 15 UNITS: 100 INJECTION, SOLUTION SUBCUTANEOUS at 21:33

## 2023-04-29 RX ADMIN — CLOTRIMAZOLE 10 MG: 10 LOZENGE ORAL at 21:32

## 2023-04-29 RX ADMIN — METOPROLOL SUCCINATE 25 MG: 25 TABLET, EXTENDED RELEASE ORAL at 08:21

## 2023-04-29 RX ADMIN — ENOXAPARIN SODIUM 40 MG: 40 INJECTION SUBCUTANEOUS at 08:25

## 2023-04-29 RX ADMIN — DOCUSATE SODIUM 100 MG: 100 CAPSULE, LIQUID FILLED ORAL at 12:58

## 2023-04-29 RX ADMIN — CLOTRIMAZOLE 10 MG: 10 LOZENGE ORAL at 17:27

## 2023-04-29 RX ADMIN — MIRTAZAPINE 7.5 MG: 7.5 TABLET, FILM COATED ORAL at 21:32

## 2023-04-29 RX ADMIN — CLONAZEPAM 0.25 MG: 0.5 TABLET ORAL at 21:32

## 2023-04-29 RX ADMIN — ACETAMINOPHEN 650 MG: 325 TABLET ORAL at 08:20

## 2023-04-29 RX ADMIN — INSULIN LISPRO 2 UNITS: 100 INJECTION, SOLUTION INTRAVENOUS; SUBCUTANEOUS at 12:25

## 2023-04-29 RX ADMIN — INSULIN LISPRO 1 UNITS: 100 INJECTION, SOLUTION INTRAVENOUS; SUBCUTANEOUS at 21:34

## 2023-04-29 RX ADMIN — ATORVASTATIN CALCIUM 40 MG: 40 TABLET, FILM COATED ORAL at 17:25

## 2023-04-29 RX ADMIN — INSULIN LISPRO 1 UNITS: 100 INJECTION, SOLUTION INTRAVENOUS; SUBCUTANEOUS at 17:29

## 2023-04-29 NOTE — CMS CERTIFICATION NOTE
Certification: Based upon physical, mental and social evaluations, I certify that inpatient psychiatric services are medically necessary for this patient for a duration of 21 midnights for the treatment of Bipolar disorder [facility])    Available alternative community resources do not meet the patient's mental health care needs  I further attest that an established written individualized plan of care has been implemented and is outlined in the patient's medical records

## 2023-04-29 NOTE — H&P
"Psychiatric Evaluation - Behavioral Health     Identification Data:Yris Charles Flavin 70 y o  female MRN: [id number]  Unit/Bed#: Sarah Garcia 941-36 Encounter: [id number]    Chief Complaint:     History of present illness:    Marge Wild is a 70 y o   female,  (has one daughter and one son), domiciled w/  and their son w/ PMH of multiple medical conditions including recent femur fracture and s/p knee replacement, OAB, dysphagia, HLD, h/o endometrial and colon cancer in remission, thyroid nodule and SIADH and PPH of Bipolar Disorder, one prior psychiatric admissions, no prior SA, no h/o self-injurious behavior, f/u in outpatient setting with Dr Breann Perez, on Geodon 80 mg BID, Klonopin 0 5 mg BID, Remeron 7 5 mg nightly, last visited by psych consult on 3/29/23 by Dr Ashlyn Adkins who was BIB her daughter to the [facility] ED on 4/27/23 due to worsening of mood and paranoid ideations since broke her femur in March  The patient signed 61 51 81 and got admitted to the inpatient psychiatry unit 6T for further psychiatric stabilization  As per ED crisis worker's note on 4/27/23: \"Patient was referred by her daughter, Randy Peña, [phone], for decompensation that began about 4 months ago and has been noticeably worse since March 2023  Patient has a history of bipolar disorder with initial onset at about age 47, at which time, she had a similar episode  She has had anxiety for most of her life  Patient's daughter stated the patient sees a psychiatrist and a therapist; however, her current symptoms have not responded to medication changes and adjustments  In addition, it has been complicated by a history of hyponatremia secondary to SSRIs, as well as prolonged QT interval,  possibly due to possibly secondary to second generation antipsychotics  Per daughter, the patient has been extremely paranoid and thinks that the police are after her and her   She has not been sleeping much at all   Patient has been " "highly anxious  She has also been afraid to eat due to thinking she will choke  She has lost about 50 pounds over the last 4 months  Patient has had significant stressors beginning with hyponatremia in 2022; She then had a knee replacement in 2022  Her sister, Mary Chew,  the same month and the patient was unable to see her or attend services due to recovery period  Shortly afterward,  the pipes in the patient's home broke  She and her  had to move to a hotel  While her  was helping her bathe, the patient slipped in the tub and broke her femur, requiring surgery  She does have ongoing pain related to this  The patient has since been experiencing her current symptoms  An inpatient admission is recommended at this time  The patient is willing to sign a 201  \"    The pt was visited on the unit; chart reviewed  Presented calm, fairly cooperative and slightly dismissive, dressed in hospital attire w/ good hygiene, fair eye contact, dysphoric mood, constricted affect, talking in normal tone, volume and decreased amount, w/ tangential thought process and paucity of content, limited insight and judgement  She was not a good historian, and noted that she feels tired this morning  She was A&O to person, partially time (stated: it's May 2023 instead of 23) and partially to the place (knew she was in inpatient psych, but did not know the name of the hospital)  She reported that she felt \"upset with the staff\" referring to the caregivers at home, but did not elaborate on details  She reported poor appetite, decreased energy level as well as interrupted sleep lately  She stated that she lost some of her teeth and is not able to chew and swallow food easily, and requested \"soft food but not puree\"  She was not able to provide a comprehensive story  She reported AH earlier this week when she heard her  voice in the kitchen but reportedly he was not there   Denied A/VH since being in the hospital  No " "fixed delusions were elicited  She reported feeling safe in the hospital and denied any paranoia; however, when she was confronted with the reports that she was paranoid about  coming to get her, she confirmed that she felt this way, \"but not anymore\"  Denied SI/HI, intent or plan upon direct inquiry at this time  The patient agreed to verbalize any suicidal thoughts, frustrations or concerns to the nursing staff, immediately  Denied any history of eating disorder or obsessive/compulsive sxs  Denied smoking cigarettes, drinking alcohol or other illicit substance use  I called and spoke with patient's daughter Diane Lockhart: [phone]) who reportedly works for [employer]  She reported that her mother was stable with her psychiatric regimen until November 2022 when first had the knee surgery and then reportedly her sister passed away and recently had a fall with L femur fracture in March, and as per her daughter, the patient has been deteriorating and regressing  She noted that she called her father last night and reported that the  are coming to get their son as he did not do the taxes  She stated that her mother is A&Ox3 at her baseline, and has been stable for many years  She mentioned her concerns regarding hyponatremia, h/o SIADH and also prolonged QTc, and reportedly patient's psychiatrist was not willing to make any changes  Given the prolonged QTC, and risk of arrhythmias, Geodon is being cross titrated to Zyprexa (initiated in ED); Geodon was decreased from 40 mg BID to 40/20 mg today and then 20 mg BID tomorrow and maintained on Zyprexa 5 mg nightly  The patient was restarted on Klonopin with lower dose (0 25 mg nightly) to be tapered off subsequently  Other meds maintained the same dose  QTc has remained prolonged (500 yesterday) and will continue to monitor QTc  Will f/u SLIM recs regarding medical conditions and replete lytes as indicated   PT/OT eval requested and speech eval for swallowing " ordered  Placed on fall precautions and delirium precautions           Psychiatric Review Of Systems:  Pertinent items are noted in HPI; all others negative    Historical Information   PMDP Prescriptions  :  Filled  Written  Sold  ID  Drug  QTY  Days  Prescriber  RX #  Dispenser  Refill  Daily Dose*  Pymt Type       04/17/2023 04/17/2023   2  Clonazepam 0 5 Mg Tablet 15 00  15  Be Tit  [id number]   Wal ([id number][place]  0  1 00 L[place]     03/06/2023 03/06/2023   1  Clonazepam 0 5 Mg Tablet 15 00  30  Be Tit  [id number]   Wal ([id number])  0  0 50 [place]     01/12/2023 01/09/2023   1  Clonazepam 0 5 Mg Tablet 15 00  30  Be Tit  [id number]   Wal ([id number])  0  0 50 [place]     12/21/2022 12/21/2022   1  Oxycodone Hcl (Ir) 5 Mg Tablet 45 00  8  Ch Gordon  [id number]   Wal ([id number])  0  42 19 MME  Private [facility]     12/14/2022 12/14/2022   1  Clonazepam 0 5 Mg Tablet 15 00  30  Be Tit  [id number]   Wal ([id number])  0  0 50 L[place]     12/07/2022 12/07/2022   1  Oxycodone Hcl (Ir) 5 Mg Tablet 30 00  5  Lauren Bap  [id number]   Wal ([id number])  0  45 00 MME  [place]     10/28/2022  10/03/2022   1  Clonazepam 0 5 Mg Tablet 45 00  30  Be Tit  [id number]   Wal ([id number])  0  1 50 LME  [place]     09/15/2022  08/15/2022   1  Clonazepam 0 5 Mg Tablet 45 00  30  Be Tit  [id number]   Wal ([id number])  1  1 50 LME  [place]     08/15/2022  08/15/2022   1  Clonazepam 0 5 Mg Tablet 45 00  30  Be Tit  [id number]   Wal ([id number])  0  1 50 LME  [place]     07/17/2022 06/13/2022   1  Clonazepam 0 5 Mg Tablet 45 00  30  Be Tit  [id number]   Wal ([id number])  1  1 50 LME  [place]     06/14/2022 06/13/2022   1  Clonazepam 0 5 Mg Tablet 45 00  30  Be Tit  [id number]   Wal ([id number])  0  1 50 L[place]     05/16/2022 03/28/2022   1  Clonazepam 0 5 Mg Tablet 45 00  30  Be Tit  [id number]   Wal ([id number])  1  1 50 L[place]     04/15/2022  03/28/2022   1  Clonazepam 0 5 Mg Tablet 45 00  30  Be Tit  [id number]   [facility] ([id number])  0  1 50 209 [address] 03/14/2022 01/31/2022   1  Clonazepam 0 5 Mg Tablet 45 00  30  Be Tit  [id number]   Wal ([id number])  1  1 50 LME  [place]     02/14/2022 01/31/2022   1  Clonazepam 0 5 Mg Tablet 45 00  30  Be Tit  [id number]   Wal ([id number])  0  1 50 LME  [place]      Past Psychiatric History:   - PPH of Bipolar Disorder, one prior psychiatric admissions, no prior SA, no h/o self-injurious behavior, f/u in outpatient setting with Dr Anup Sanchez  - Prior meds: Lexapro, Remeron, Geodon, Klonopin    Substance Abuse History:  Social History     Substance and Sexual Activity   Alcohol Use Never     Social History     Substance and Sexual Activity   Drug Use No         Family Psychiatric History:   Family History   Problem Relation Age of Onset   • Cancer Mother         Renal   • Heart disease Father         CHF   • Dementia Sister    • Heart disease Sister         atrial septal defect   • Dementia Sister    • Breast cancer Paternal Aunt 40       Social History:  Social History     Socioeconomic History   • Marital status: /Civil Union     Spouse name: Not on file   • Number of children: Not on file   • Years of education: Not on file   • Highest education level: Not on file   Occupational History   • Not on file   Tobacco Use   • Smoking status: Never     Passive exposure: Never   • Smokeless tobacco: Never   Vaping Use   • Vaping Use: Never used   Substance and Sexual Activity   • Alcohol use: Never   • Drug use: No   • Sexual activity: Not Currently     Birth control/protection: Post-menopausal, Surgical   Other Topics Concern   • Not on file   Social History Narrative   • Not on file     Social Determinants of Health     Financial Resource Strain: Not on file   Food Insecurity: No Food Insecurity   • Worried About Running Out of Food in the Last Year: Never true   • Ran Out of Food in the Last Year: Never true   Transportation Needs: No Transportation Needs   • Lack of Transportation (Medical):  No   • Lack of Transportation (Non-Medical):  No   Physical Activity: Not on file   Stress: Not on file   Social Connections: Not on file   Intimate Partner Violence: Not on file   Housing Stability: Low Risk    • Unable to Pay for Housing in the Last Year: No   • Number of Places Lived in the Last Year: 1   • Unstable Housing in the Last Year: No       Developmental:  Education: 11th grade  Marital history:   Children: two adult children  Living arrangement, social support:  and son  Occupational History: retired      Traumatic History:   Abuse:none is reported  Other Traumatic Events: none    Past Medical History:   Diagnosis Date   • Anesthesia complication     difficulty waking up   • Anxiety    • Arthritis     left knee   • Bipolar 1 disorder ([facility] )    • Bone spur     left knee behing the knee cap   • Cancer ([facility] )    • Chronic kidney disease    • Chronic pain disorder    • Colon cancer ([facility] )     patient states about 2017   • Colon polyp     Tubulovillous Adenoma High Grade Dysplasia - 2017   • Depression    • Diabetes mellitus ([facility] )    • Endometrial cancer ([place] [address] )     grade I   • Hx of bleeding disorder     vaginal bleeding started on 3/13/17    • Hyperlipidemia    • Hypertension    • Hypomagnesemia 03/20/2023   • Memory loss    • PONV (postoperative nausea and vomiting)    • Psychiatric disorder    • Psychiatric illness    • Psychosis ([facility] )    • Shortness of breath     with exertion   • Sleep difficulties    • Urinary incontinence    • Vitamin D deficiency        Medical Review Of Systems:  Pertinent items are noted in HPI; all others negative    Meds/Allergies   all current active meds have been reviewed, current meds:   Current Facility-Administered Medications   Medication Dose Route Frequency   • acetaminophen (TYLENOL) tablet 650 mg  650 mg Oral Q4H PRN   • acetaminophen (TYLENOL) tablet 650 mg  650 mg Oral Q4H PRN   • acetaminophen (TYLENOL) tablet 975 mg  975 mg Oral Q6H PRN   • [START ON 5/2/2023] aspirin (ECOTRIN LOW STRENGTH) EC tablet 81 mg  81 mg Oral Daily   • atorvastatin (LIPITOR) tablet 40 mg  40 mg Oral Daily With Dinner   • cephalexin (KEFLEX) capsule 500 mg  500 mg Oral Q12H [facility]   • clonazePAM (KlonoPIN) tablet 0 25 mg  0 25 mg Oral HS   • clotrimazole (MYCELEX) arnaud 10 mg  10 mg Oral TID   • cyanocobalamin (VITAMIN B-12) tablet 1,000 mcg  1,000 mcg Oral Daily   • enoxaparin (LOVENOX) subcutaneous injection 40 mg  40 mg Subcutaneous Q24H MARIA INES   • haloperidol lactate (HALDOL) injection 5 mg  5 mg Intramuscular Q4H PRN Max 4/day   • insulin glargine (LANTUS) subcutaneous injection 15 Units 0 15 mL  15 Units Subcutaneous HS   • insulin lispro (HumaLOG) 100 units/mL subcutaneous injection 1-5 Units  1-5 Units Subcutaneous TID AC   • insulin lispro (HumaLOG) 100 units/mL subcutaneous injection 1-5 Units  1-5 Units Subcutaneous HS   • metoprolol succinate (TOPROL-XL) 24 hr tablet 25 mg  25 mg Oral Daily   • mirtazapine (REMERON) tablet 7 5 mg  7 5 mg Oral HS   • OLANZapine (ZyPREXA) tablet 5 mg  5 mg Oral HS   • pantoprazole (PROTONIX) EC tablet 20 mg  20 mg Oral Early Morning   • risperiDONE (RisperDAL) tablet 0 25 mg  0 25 mg Oral Q4H PRN Max 6/day   • risperiDONE (RisperDAL) tablet 0 5 mg  0 5 mg Oral Q4H PRN Max 3/day   • risperiDONE (RisperDAL) tablet 1 mg  1 mg Oral Q2H PRN Max 3/day   • torsemide (DEMADEX) tablet 5 mg  5 mg Oral Every Other Day   • traZODone (DESYREL) tablet 50 mg  50 mg Oral HS PRN   • ziprasidone (GEODON) capsule 20 mg  20 mg Oral BID With Meals    and PTA meds:   Prior to Admission Medications   Prescriptions Last Dose Informant Patient Reported? Taking?    BD Pen Needle Chelsey 2nd Gen 32G X 4 MM MISC   No No   Sig: USE 1  TWICE DAILY   Ferrous Sulfate (Iron) 325 (65 Fe) MG TABS 4/27/2023  Yes Yes   Sig: Take by mouth daily   OLANZapine (ZyPREXA) 2 5 mg tablet 4/27/2023  Yes Yes   Sig: Take 5 mg by mouth daily at bedtime   acetaminophen (TYLENOL) 325 mg tablet Past Month  No Yes   Sig: Take 3 tablets (975 mg total) by mouth 2 (two) times a day   Patient taking differently: Take 975 mg by mouth every 6 (six) hours as needed   cephalexin (KEFLEX) 500 mg capsule 4/28/2023  No Yes   Sig: Take 1 capsule (500 mg total) by mouth every 12 (twelve) hours for 7 days   clonazePAM (KlonoPIN) 0 5 mg tablet   No No   Sig: Take 0 5 tablets (0 25 mg total) by mouth daily at bedtime for 10 days   Patient taking differently: Take 0 5 mg by mouth daily at bedtime 0 5 HS and 0 25 prn   clotrimazole (MYCELEX) 10 mg arnaud 4/27/2023  No Yes   Sig: Take 1 tablet (10 mg total) by mouth 5 (five) times a day for 14 days   cyanocobalamin (VITAMIN B-12) 1000 MCG tablet 4/27/2023  No Yes   Sig: Take 1 tablet (1,000 mcg total) by mouth daily Do not start before March 29, 2023  docusate sodium (COLACE) 100 mg capsule 4/27/2023  Yes Yes   Sig: Take 100 mg by mouth 2 (two) times a day   enoxaparin (LOVENOX) 40 mg/0 4 mL 4/27/2023  No Yes   Sig: Inject 0 4 mL (40 mg total) under the skin every 24 hours for 21 days Do not start before April 11, 2023     Patient taking differently: Inject 40 mg under the skin every 24 hours Ends May 1st   insulin glargine (Toujeo SoloStar) 300 units/mL CONCENTRATED U-300 injection pen (1-unit dial) 4/27/2023  No Yes   Sig: Inject 20 Units under the skin daily   magnesium Oxide (MAG-OX) 400 mg TABS 4/27/2023  No Yes   Sig: Take 1 tablet (400 mg total) by mouth daily   metFORMIN (GLUCOPHAGE-XR) 500 mg 24 hr tablet 4/27/2023  No Yes   Sig: Take 1 tablet (500 mg total) by mouth 2 (two) times a day with meals   metoprolol succinate (TOPROL-XL) 25 mg 24 hr tablet 4/27/2023  No Yes   Sig: Take 1 tablet (25 mg total) by mouth daily   mirtazapine (REMERON) 15 mg tablet 4/27/2023  Yes Yes   Sig: Take 7 5 mg by mouth daily at bedtime   nitroglycerin (NITROSTAT) 0 4 mg SL tablet More than a month  No No   Sig: Place 1 tablet (0 4 mg total) under the tongue every 5 (five) minutes as needed for chest pain   pantoprazole (PROTONIX) 40 mg tablet 4/27/2023  No Yes   Sig: Take 1 tablet (40 mg total) by mouth daily in the early morning   rosuvastatin (CRESTOR) 20 MG tablet 4/27/2023  No Yes   Sig: Take 1 tablet (20 mg total) by mouth daily   Patient taking differently: Take 20 mg by mouth daily with dinner   torsemide (DEMADEX) 5 MG tablet 4/27/2023  No Yes   Sig: Take 1 tablet (5 mg total) by mouth every other day Do not start before March 30, 2023  Patient taking differently: Take 5 mg by mouth every other day Next dose due 29th   ziprasidone (GEODON) 80 mg capsule 4/27/2023  No Yes   Sig: Take 1 capsule (80 mg total) by mouth 2 (two) times a day with meals      Facility-Administered Medications: None     Allergies   Allergen Reactions   • Wellbutrin [Bupropion] Anxiety     Patient has tried Wellbutrin which resulted in increased paranoia  Patient wishes to not try this medication again  Objective      Mental Status Evaluation:  Appearance and attitude: appeared as stated age, dressed in hospital attire, with good hygiene  Eye contact: fair  Motor Function: within normal limits, No PMA/PMR  Gait/station: Not observed  Speech: talking in soft tone with increased latency and decreased amount  Language: No overt abnormality  Mood/affect: dysphoric / Affect was constricted  Thought Processes: slowing of thoughts  Thought content: denied suicidal ideations or homicidal ideations, some paranoia, paucity of thought  Associations: tangential associations  Perceptual disturbances: auditory hallucinations, no visual hallucinations  Orientation: oriented to person, partialy to time and place  Cognitive Function: intact  Memory: not cooperative with formal MMSE  Intellect: average  Fund of knowledge: diminished  Impulse control: fair  Insight/judgment: limited/limited    Pain: reported having pain in L hip  Pain scale: 5    Lab Results: I have personally reviewed pertinent lab results          WBC   Date Value Ref Range Status   04/29/2023 6  05 4 31 - 10 16 Thousand/uL Final   04/21/2017 6 7 3 8 - 10 8 Thousand/uL Final     WBC, UA   Date Value Ref Range Status   04/27/2023 4-10 (A) None Seen, 0-1, 1-2, 0-5, 2-4 /hpf Final     Hgb A1c   Date Value Ref Range Status   06/07/2021 6 0 (H) <5 7 % of total Hgb Final     Comment:     For someone without known diabetes, a hemoglobin   A1c value between 5 7% and 6 4% is consistent with  prediabetes and should be confirmed with a   follow-up test     For someone with known diabetes, a value <7%  indicates that their diabetes is well controlled  A1c  targets should be individualized based on duration of  diabetes, age, comorbid conditions, and other  considerations  This assay result is consistent with an increased risk  of diabetes  Currently, no consensus exists regarding use of  hemoglobin A1c for diagnosis of diabetes for children       MCV   Date Value Ref Range Status   04/29/2023 90 82 - 98 fL Final   04/17/2023 87 7 80 0 - 100 0 fL Final   04/21/2017 89 8 80 0 - 100 0 fL Final     Lab Results   Component Value Date    BUN 12 04/29/2023    SODIUM 134 (L) 04/29/2023    CO2 28 04/29/2023     Lab Results   Component Value Date    ALKPHOS 85 04/29/2023     No results found for: CKMB  No results found for: TSH  INR   Date Value Ref Range Status   03/20/2023 1 10 0 84 - 1 19 Final   11/14/2022 1 08 0 84 - 1 19 Final   10/15/2022 1 03 0 84 - 1 19 Final     No results found for: APTT  No results found for: PHENO  Sodium   Date Value Ref Range Status   04/29/2023 134 (L) 135 - 147 mmol/L Final   04/27/2023 133 (L) 135 - 146 mmol/L Final     Sodium, Ur   Date Value Ref Range Status   10/15/2022 56  Final     BUN   Date Value Ref Range Status   04/29/2023 12 5 - 25 mg/dL Final   04/27/2023 15 7 - 25 mg/dL Final     SL AMB BUN/CREATININE RATIO   Date Value Ref Range Status   94/37/4705 NOT APPLICABLE 6 - 22 (calc) Final     Creatinine   Date Value Ref Range Status   04/29/2023 0 61 0 60 - 1 30 mg/dL Final Comment:     Standardized to IDMS reference method   04/21/2017 1 67 (H) 0 50 - 0 99 mg/dL Final     Comment:     Result Comment: For patients >52years of age, the reference limit  for Creatinine is approximately 13% higher for people  identified as African-American  EXT Creatinine Urine   Date Value Ref Range Status   06/07/2021 177  Final     TSH 3RD GENERATON   Date Value Ref Range Status   04/29/2023 0 886 0 450 - 4 500 uIU/mL Final     Comment:     The recommended reference ranges for TSH during pregnancy are as follows:   First trimester 0 1 to 2 5 uIU/mL   Second trimester  0 2 to 3 0 uIU/mL   Third trimester 0 3 to 3 0 uIU/m    Note: Normal ranges may not apply to patients who are transgender, non-binary, or whose legal sex, sex at birth, and gender identity differ  Adult TSH (3rd generation) reference range follows the recommended guidelines of the American Thyroid Association, January, 2020       WBC   Date Value Ref Range Status   04/29/2023 6 05 4 31 - 10 16 Thousand/uL Final   04/21/2017 6 7 3 8 - 10 8 Thousand/uL Final     WBC, UA   Date Value Ref Range Status   04/27/2023 4-10 (A) None Seen, 0-1, 1-2, 0-5, 2-4 /hpf Final     No components found for: B12  Lab Results   Component Value Date    FOLATE 13 6 03/22/2023     No results found for: RPR      Imaging Studies: reivewed    EKG, Pathology, and Other Studies: reviewed    Code Status:Full code    Patient Strengths/Assets: cooperative, family ties, resourceful    Patient Barriers/Limitations: difficulty adapting, poor physical health    Suicide/Homicide Risk Assessment:    Risk of Harm to Self:   Nursing Suicide Risk Assessment Last 24 hours: C-SSRS Risk (Since Last Contact)  Calculated C-SSRS Risk Score (Since Last Contact): No Risk Indicated  Current Specific Risk Factors include: diagnosis of mood disorder, presence of paranoid ideation  Protective Factors: no current suicidal ideation  Based on today's assessment, Jenny Liriano presents the following risk of harm to self: low    Risk of Harm to Others:  Nursing Homicide Risk Assessment: Violence Risk to Others: Denies within past 6 months  Current Specific Risk Factors include: none  Protective Factors: no current homicidal ideation  Based on today's assessment, Mary Hart presents the following risk of harm to others: low    The following interventions are recommended: behavioral checks every 7 minutes, continued hospitalization on locked unit    Assessment/Plan     Principal Problem:    Bipolar disorder ([facility] )  Active Problems:    Type 2 diabetes mellitus without complication, with long-term current use of insulin ([facility] )    Essential hypertension    Hyponatremia    History of DVT of lower extremity    Dysphagia    UTI (urinary tract infection)    History of femur fracture    Medical clearance for psychiatric admission    Plan:   Risks, benefits and possible side effects of Medications:   Risks, benefits, and possible side effects of medications explained to patient and patient verbalizes understanding        - fall precaution  - Encourage early mobility and having a structured day  - Provide frequent re-orientation, and cognitive stimulation  - Ensure assistive devices are in proper working order (eye-glasses, hearing aids)  - Encourage adequate hydration, nutrition and monitor bowel movements  - Maintain sleep-wake cycle: Uninterrupted sleep time; low-level lighting at night  - PT/OT eval  - Speech therapy eval for dysphagia  - EKG to monitor QTc  - f/u SLIM recs regarding the medical problems  - Continue medication titration and treatment plan; adjust medication to optimize treatment response and as clinically indicated       Scheduled medications:  Current Facility-Administered Medications   Medication Dose Route Frequency Provider Last Rate   • acetaminophen  650 mg Oral Q4H PRN Ankita Blinks, CRNP     • acetaminophen  650 mg Oral Q4H PRN Ankita Blinks, CRNP     • acetaminophen  975 mg Oral Q6H PRN JOHN Guan     • [START ON 5/2/2023] aspirin  81 mg Oral Daily JOHN Jameson     • atorvastatin  40 mg Oral Daily With JOHN Ornelas     • cephalexin  500 mg Oral Q12H [address] JOHN Friend     • clonazePAM  0 25 mg Oral HS Fabi Bauman MD     • clotrimazole  10 mg Oral TID JOHN Jameson     • cyanocobalamin  1,000 mcg Oral Daily JOHN Jameson     • enoxaparin  40 mg Subcutaneous Q24H [address] JOHN Jameson     • haloperidol lactate  5 mg Intramuscular Q4H PRN Max 4/day OJHN Guan     • insulin glargine  15 Units Subcutaneous HS JOHN Jameson     • insulin lispro  1-5 Units Subcutaneous TID AC JOHN Jameson     • insulin lispro  1-5 Units Subcutaneous HS JOHN Jameson     • metoprolol succinate  25 mg Oral Daily JOHN Jameson     • mirtazapine  7 5 mg Oral HS JOHN Guan     • OLANZapine  5 mg Oral HS JOHN Guan     • pantoprazole  20 mg Oral Early Morning JOHN Jameson     • risperiDONE  0 25 mg Oral Q4H PRN Max 6/day JOHN Guan     • risperiDONE  0 5 mg Oral Q4H PRN Max 3/day JOHN Guan     • risperiDONE  1 mg Oral Q2H PRN Max 3/day JOHN Guan     • torsemide  5 mg Oral Every Other Day JOHN Jameson     • traZODone  50 mg Oral HS PRN JOHN Guan     • ziprasidone  20 mg Oral BID With Meals Fabi Bauman MD          PRN:  •  acetaminophen  •  acetaminophen  •  acetaminophen  •  haloperidol lactate  •  risperiDONE  •  risperiDONE  •  risperiDONE  •  traZODone    - Observation: routine; fall precaution    - VS: as per unit protocol  - Legal status: 201     - Diet: Regular soft diet  - Psychoeducation (benefits and potential risks) discussed, importance of compliance with the psychiatric treatment reiterated, and the patient verbalized understanding of the matter  - Encourage group attendance and milieu therapy     - The pt was educated and agreed to verbalize any suicidal thoughts, frustrations or concerns to the nursing staff, immediately  - Dispo:  To be determined       Next of Kin  · Extended Emergency Contact Information  · Primary Emergency Contact: Maritza April Monte  · Mobile Phone: [phone]  · Relation: Daughter  · Secondary Emergency Contact: Arun Bowles  · Address: [facility], [address] [place] [address]  [facility]  · Mobile Phone: [phone]  · Relation: Spouse    Fabi Bauman MD  Attending [address]

## 2023-04-29 NOTE — NURSING NOTE
Pt is pleasant but very anxious  Pt isolative to self and room  Pt cooperative with care an takes med with apple sauce  Will continue to monitor

## 2023-04-29 NOTE — NURSING NOTE
"Pt calm and cooperative during interactions  Visible in dayroom for portion of shift  Ambulation encouraged during shift, responded well to prompts  Able to utilize walker appropriately with staff assist  Alejandrina Farfan on chair/bed alarm due to fall risk  Pt accepted tylenol for right leg pain, reported ineffective when reassessed  No distress noted during shift  Adherent with whole medications in applesauce  Limited intake during shift, pt reports not liking pureed diet  Denies wanting additional food items when offered  Pt spoke to daughter on phone  Pt endorses \"a little\" depression, reports feeling safe on unit  No paranoia or anxiety noted during shift     "

## 2023-04-29 NOTE — PLAN OF CARE
Problem: Potential for Falls  Goal: Patient will remain free of falls  Description: INTERVENTIONS:  - Educate patient/family on patient safety including physical limitations  - Instruct patient to call for assistance with activity   - Consult OT/PT to assist with strengthening/mobility   - Keep Call bell within reach  - Keep bed low and locked with side rails adjusted as appropriate  - Keep care items and personal belongings within reach  - Initiate and maintain comfort rounds  - Make Fall Risk Sign visible to staff  - Offer Toileting [id number], in advance of need  - Initiate/Maintain bed alarm  - Obtain necessary fall risk management equipment:   - Apply yellow socks and bracelet for high fall risk patients  - Consider moving patient to room near nurses station  Outcome: Progressing     Problem: DEPRESSION  Goal: Will be euthymic at discharge  Description: INTERVENTIONS:  - Administer medication as ordered  - Provide emotional support via 1:1 interaction with staff  - Encourage involvement in milieu/groups/activities  - Monitor for social isolation  Outcome: Progressing     Problem: PASCUAL  Goal: Will exhibit normal sleep and speech and no impulsivity  Description: INTERVENTIONS:  - Administer medication as ordered  - Set limits on impulsive behavior  - Make attempts to decrease external stimuli as possible  Outcome: Progressing     Problem: PSYCHOSIS  Goal: Will report no hallucinations or delusions  Description: Interventions:  - Administer medication as  ordered  - Every waking shifts and PRN assess for the presence of hallucinations and or delusions  - Assist with reality testing to support increasing orientation  - Assess if patient's hallucinations or delusions are encouraging self-harm or harm to others and intervene as appropriate  Outcome: Progressing     Problem: ANXIETY  Goal: Will report anxiety at manageable levels  Description: INTERVENTIONS:  - Administer medication as ordered  - Teach and encourage coping skills  - Provide emotional support  - Assess patient/family for anxiety and ability to cope  Outcome: Progressing     Problem: SELF CARE DEFICIT  Goal: Return ADL status to a safe level of function  Description: INTERVENTIONS:  - Administer medication as ordered  - Assess ADL deficits and provide assistive devices as needed  - Obtain PT/OT consults as needed  - Assist and instruct patient to increase activity and self care as tolerated  Outcome: Progressing

## 2023-04-29 NOTE — TREATMENT PLAN
TREATMENT PLAN REVIEW - Lisa Benz 8141 70 y o  1955 female MRN: [id number]    [address] Room / Bed: Meeta Jim [facility] 819-28 Encounter: [id number]          Admit Date/Time:  4/27/2023  4:59 PM    Treatment Team: Attending Provider: Jeovanny Hamilton MD; Patient Care Technician: Jose Worthington; Patient Care Technician: Cleopatra Holt; Patient Care Assistant: Juana Garnett; Patient Care Assistant: Sary Sellers; Registered Nurse:  Norah Law RN    Diagnosis: Principal Problem:    Bipolar disorder ([facility] [address] )  Active Problems:    Type 2 diabetes mellitus without complication, with long-term current use of insulin ([facility])    Essential hypertension    Hyponatremia    History of DVT of lower extremity    Dysphagia    UTI (urinary tract infection)    History of femur fracture    Medical clearance for psychiatric admission      Patient Strengths/Assets: communication skills, family ties, good support system    Patient Barriers/Limitations: difficulty adapting, poor physical health, poor self-care    Short Term Goals: decrease in depressive symptoms, decrease in paranoid thoughts, improvement in reasoning ability, improvement in self care, improvement in appetite, mood stabilization    Long Term Goals: stabilization of mood, free of suicidal thoughts, free of homicidal thoughts, resolution of psychotic symptoms, improvement in reality testing, able to express basic needs    Progress Towards Goals: starting psychiatric medications as prescribed    Recommended Treatment: medication management, patient medication education, group therapy, milieu therapy, continued Behavioral Health psychiatric evaluation/assessment process    Treatment Frequency: daily medication monitoring, group and milieu therapy daily, monitoring through interdisciplinary rounds, monitoring through weekly patient care conferences    Expected Discharge Date:  14 days    Discharge Plan: referral for outpatient medication management with a psychiatrist, return to previous living arrangement    Treatment Plan Created/Updated By: Yasmin Varela MD

## 2023-04-30 LAB
ATRIAL RATE: 101 BPM
ATRIAL RATE: 99 BPM
GLUCOSE SERPL-MCNC: 107 MG/DL (ref 65–140)
GLUCOSE SERPL-MCNC: 175 MG/DL (ref 65–140)
GLUCOSE SERPL-MCNC: 202 MG/DL (ref 65–140)
GLUCOSE SERPL-MCNC: 237 MG/DL (ref 65–140)
P AXIS: 66 DEGREES
P AXIS: 69 DEGREES
PR INTERVAL: 120 MS
PR INTERVAL: 124 MS
QRS AXIS: 11 DEGREES
QRS AXIS: 15 DEGREES
QRSD INTERVAL: 100 MS
QRSD INTERVAL: 104 MS
QT INTERVAL: 376 MS
QT INTERVAL: 382 MS
QTC INTERVAL: 487 MS
QTC INTERVAL: 490 MS
T WAVE AXIS: 33 DEGREES
T WAVE AXIS: 39 DEGREES
VENTRICULAR RATE: 101 BPM
VENTRICULAR RATE: 99 BPM

## 2023-04-30 RX ORDER — METOPROLOL SUCCINATE 25 MG/1
37.5 TABLET, EXTENDED RELEASE ORAL DAILY
Status: DISCONTINUED | OUTPATIENT
Start: 2023-05-01 | End: 2023-05-30 | Stop reason: HOSPADM

## 2023-04-30 RX ORDER — METOPROLOL SUCCINATE 25 MG/1
12.5 TABLET, EXTENDED RELEASE ORAL ONCE
Status: COMPLETED | OUTPATIENT
Start: 2023-04-30 | End: 2023-04-30

## 2023-04-30 RX ADMIN — CLOTRIMAZOLE 10 MG: 10 LOZENGE ORAL at 17:30

## 2023-04-30 RX ADMIN — DOCUSATE SODIUM 100 MG: 100 CAPSULE, LIQUID FILLED ORAL at 08:49

## 2023-04-30 RX ADMIN — INSULIN LISPRO 2 UNITS: 100 INJECTION, SOLUTION INTRAVENOUS; SUBCUTANEOUS at 17:30

## 2023-04-30 RX ADMIN — CLOTRIMAZOLE 10 MG: 10 LOZENGE ORAL at 22:19

## 2023-04-30 RX ADMIN — ENOXAPARIN SODIUM 40 MG: 40 INJECTION SUBCUTANEOUS at 08:48

## 2023-04-30 RX ADMIN — POLYETHYLENE GLYCOL 3350 17 G: 17 POWDER, FOR SOLUTION ORAL at 17:28

## 2023-04-30 RX ADMIN — OLANZAPINE 5 MG: 5 TABLET, FILM COATED ORAL at 22:10

## 2023-04-30 RX ADMIN — CYANOCOBALAMIN TAB 1000 MCG 1000 MCG: 1000 TAB at 08:50

## 2023-04-30 RX ADMIN — INSULIN LISPRO 1 UNITS: 100 INJECTION, SOLUTION INTRAVENOUS; SUBCUTANEOUS at 12:39

## 2023-04-30 RX ADMIN — ATORVASTATIN CALCIUM 40 MG: 40 TABLET, FILM COATED ORAL at 17:28

## 2023-04-30 RX ADMIN — ZIPRASIDONE HYDROCHLORIDE 20 MG: 20 CAPSULE ORAL at 08:43

## 2023-04-30 RX ADMIN — METOPROLOL SUCCINATE 25 MG: 25 TABLET, EXTENDED RELEASE ORAL at 08:44

## 2023-04-30 RX ADMIN — CEPHALEXIN 500 MG: 500 CAPSULE ORAL at 08:49

## 2023-04-30 RX ADMIN — ACETAMINOPHEN 975 MG: 325 TABLET ORAL at 11:49

## 2023-04-30 RX ADMIN — CEPHALEXIN 500 MG: 500 CAPSULE ORAL at 22:10

## 2023-04-30 RX ADMIN — PANTOPRAZOLE SODIUM 20 MG: 20 TABLET, DELAYED RELEASE ORAL at 06:28

## 2023-04-30 RX ADMIN — ZIPRASIDONE HYDROCHLORIDE 20 MG: 20 CAPSULE ORAL at 17:28

## 2023-04-30 RX ADMIN — DOCUSATE SODIUM 100 MG: 100 CAPSULE, LIQUID FILLED ORAL at 17:28

## 2023-04-30 RX ADMIN — CLOTRIMAZOLE 10 MG: 10 LOZENGE ORAL at 08:50

## 2023-04-30 RX ADMIN — INSULIN GLARGINE 15 UNITS: 100 INJECTION, SOLUTION SUBCUTANEOUS at 22:13

## 2023-04-30 RX ADMIN — CLONAZEPAM 0.25 MG: 0.5 TABLET ORAL at 22:10

## 2023-04-30 RX ADMIN — METOPROLOL SUCCINATE 12.5 MG: 25 TABLET, EXTENDED RELEASE ORAL at 11:48

## 2023-04-30 RX ADMIN — INSULIN LISPRO 1 UNITS: 100 INJECTION, SOLUTION INTRAVENOUS; SUBCUTANEOUS at 22:14

## 2023-04-30 RX ADMIN — MIRTAZAPINE 7.5 MG: 7.5 TABLET, FILM COATED ORAL at 22:10

## 2023-04-30 NOTE — NURSING NOTE
Patient was visible in the milieu but kept to herself  VSS  Report anxiety, denies all other psych s/s  No behaviors noted  Took her HS medications  Safety checks ongoing

## 2023-04-30 NOTE — PLAN OF CARE
Problem: DEPRESSION  Goal: Will be euthymic at discharge  Description: INTERVENTIONS:  - Administer medication as ordered  - Provide emotional support via 1:1 interaction with staff  - Encourage involvement in milieu/groups/activities  - Monitor for social isolation  Outcome: Progressing     Problem: PASCUAL  Goal: Will exhibit normal sleep and speech and no impulsivity  Description: INTERVENTIONS:  - Administer medication as ordered  - Set limits on impulsive behavior  - Make attempts to decrease external stimuli as possible  Outcome: Progressing     Problem: PSYCHOSIS  Goal: Will report no hallucinations or delusions  Description: Interventions:  - Administer medication as  ordered  - Every waking shifts and PRN assess for the presence of hallucinations and or delusions  - Assist with reality testing to support increasing orientation  - Assess if patient's hallucinations or delusions are encouraging self-harm or harm to others and intervene as appropriate  Outcome: Progressing     Problem: ANXIETY  Goal: Will report anxiety at manageable levels  Description: INTERVENTIONS:  - Administer medication as ordered  - Teach and encourage coping skills  - Provide emotional support  - Assess patient/family for anxiety and ability to cope  Outcome: Progressing

## 2023-04-30 NOTE — NURSING NOTE
"Pt visible on unit, cooperative with care  Improved intake at meals  Pt appeared anxious while stating \"I can't do anything\"  Pt stated she was unable to reach feet in order to dress self, as well as other tasks  Pt assured that staff would provide assistance  Pt ambulated well with walker with staff standby  Placed on chair alarm for safety  Pt showered by staff, pt appeared visibly anxious, fearing she would fall during shower  Responded well to reassurances  Alleyn placed on sacrum and bilateral buttocks  Pressure wound with small open area on right buttock, skin intact on left buttock and sacrum  Tylenol administered for generalized pain, pt reported minimally effective  Pt reports mood is \"not good\" but unable to describe further  Medication and insulin adherent      "

## 2023-04-30 NOTE — PROGRESS NOTES
"Progress Note - Mae [age] y o  female MRN: [id number]  Unit/Bed#: [facility] 846-35 Encounter: [id number]    Assessment/Plan   Principal Problem:    Bipolar disorder ([address] )  Active Problems:    Type 2 diabetes mellitus without complication, with long-term current use of insulin ([facility])    Essential hypertension    Hyponatremia    History of DVT of lower extremity    Dysphagia    UTI (urinary tract infection)    History of femur fracture    Medical clearance for psychiatric admission    Progress Toward Goals: Unchanged  No significant changes in behaviors or clinical status over the last 24 hours  Rakesh Howard will continue working on current treatment goals which include: 1  Continue with group therapy, milieu therapy and occupational therapy  2  Behavioral Health checks every 7 minutes  3  Continue frequent safety checks and vitals per unit protocol  4  Continue with SLIM medical management as indicated  5  The patient will be maintained on the following medications: Cross titration from Geodon to Zyprexa given prolonged QTc (Geodon 20mg PO BID to Zyprexa 5mg PO QHS), Remeron 7 5mg PO QHS, Klonopon 0 25mg PO QHS  · QTc improved on EKG from yesterday (from 500 to 470), will repeat EKG today    Psychiatric Review of Systems:  Behavior over the last 24 hours: Unchanged  Sleep: sleeping okay throughout the night  Appetite: adequate  Medication side effects: none reported  ROS:no complaints, all other systems are negative    Patient was seen today for continuation of care, records reviewed and patient was discussed with the morning case review team   No behavioral outbursts or acute events noted overnight and no significant changes in behaviors or clinical status over the last 24 hours  Rakesh Howard was seen today while sitting in the halls  She appears very anxious and reports feeling this way  She also says she is \"confused\" and needed re-orientation  She is tearful as well    She was placed by the " "nurses station in a [place] chair  She reports sleep was \"fine\"  She is unhappy with her soft diet (not able to chew)  She seems like a poor historian in relation to her mental health, anxiety appears to a barrier throughout psychiatric assessment  Other than that, she is adjusting to the unit and has not been agitated or aggressive  Edward Pool denies suicidal ideation/intent/plan  Edward Pool also denies HI/AH/VH, does not voice any paranoia and delusional content, and does not appear overtly manic  Edward Pool states that she feels safe on the unit  No medication changes indicated at this time, continue current medication regimen  Vitals:  Vitals:    04/30/23 0846   BP: (!) 180/82   Pulse: (!) 117   Resp: 16   Temp: 97 6 °F (36 4 °C)   SpO2: 97%     Laboratory Results:    I have personally reviewed all pertinent laboratory/tests results    Most Recent Labs:   Lab Results   Component Value Date    WBC 6 05 04/29/2023    RBC 3 01 (L) 04/29/2023    HGB 8 4 (L) 04/29/2023    HCT 27 0 (L) 04/29/2023     04/29/2023    RDW 15 6 (H) 04/29/2023    NEUTROABS 3 91 04/29/2023    SODIUM 134 (L) 04/29/2023    K 4 1 04/29/2023    CL 96 04/29/2023    CO2 28 04/29/2023    BUN 12 04/29/2023    CREATININE 0 61 04/29/2023    GLUC 86 04/29/2023    GLUF 86 04/29/2023    CALCIUM 9 0 04/29/2023    AST 25 04/29/2023    ALT 22 04/29/2023    ALKPHOS 85 04/29/2023    TP 6 1 (L) 04/29/2023    ALB 3 3 (L) 04/29/2023    TBILI 0 35 04/29/2023    CHOLESTEROL 161 02/25/2020    HDL 61 02/25/2020    TRIG 176 (H) 02/25/2020    LDLCALC 65 02/25/2020    NONHDLC 100 02/25/2020    AMMONIA 14 10/15/2022    [id number] 0 886 04/29/2023    HGBA1C 5 8 (H) 03/22/2023     03/22/2023     Mental Status Evaluation:  Appearance:  casually dressed, adequate grooming   Behavior:  guarded, poor eye contact   Speech:  scant, soft   Mood:  depressed, anxious   Affect:  constricted   Thought Process:  slowing of thoughts   Associations: tangential " associations   Thought Content:  No overt paranoia noted on exam   Perceptual Disturbances: no auditory hallucinations, no visual hallucinations, denies when asked, does not appear responding to internal stimuli   Risk Potential: Suicidal ideation - None at present, contracts for safety on the unit, would talk to staff if not feeling safe on the unit  Homicidal ideation - None at present  Potential for aggression - Not at present   Sensorium:  oriented to person   Memory:  recent memory impaired   Consciousness:  alert and awake   Attention/Concentration: decreased concentration and decreased attention span   Insight:  limited   Judgment: limited   Gait/Station: Sophia chair   Motor Activity: no abnormal movements     Current Facility-Administered Medications   Medication Dose Route Frequency Provider Last Rate   • acetaminophen  650 mg Oral Q4H PRN Berneta Flatter, CRNP     • acetaminophen  650 mg Oral Q4H PRN Berneta Flatter, CRNP     • acetaminophen  975 mg Oral Q6H PRN Berneta Flatter, CRNP     • [START ON 5/2/2023] aspirin  81 mg Oral Daily Murtis Clause, CRNP     • atorvastatin  40 mg Oral Daily With JOHN Siddiqi     • cephalexin  500 mg Oral Q12H 950 Ricky Phuc, KARLEENP     • clonazePAM  0 25 mg Oral HS Logan Sesay MD     • clotrimazole  10 mg Oral TID Murtis Clause, CRNP     • cyanocobalamin  1,000 mcg Oral Daily Murtis Clause, CRNP     • docusate sodium  100 mg Oral BID Kev Giles PA-C     • enoxaparin  40 mg Subcutaneous Q24H [facility] Murtis Clause, CRNP     • haloperidol lactate  5 mg Intramuscular Q4H PRN Max 4/day Berneta Flatter, CRNP     • insulin glargine  15 Units Subcutaneous HS Murtis Clause, CRNP     • insulin lispro  1-5 Units Subcutaneous TID AC Murtis Clause, CRNP     • insulin lispro  1-5 Units Subcutaneous HS Murtis Clause, CRNP     • metoprolol succinate  25 mg Oral Daily Gale Tsai JOHN Rizo     • mirtazapine  7 5 mg Oral HS Evorn Punch, CRNP     • OLANZapine  5 mg Oral HS Evorn Punch, CRNP     • pantoprazole  20 mg Oral Early Morning JOHN Goodman     • polyethylene glycol  17 g Oral Daily PRN Chary Costello PA-C     • risperiDONE  0 25 mg Oral Q4H PRN Max 6/day Evorn Punch, CRNP     • risperiDONE  0 5 mg Oral Q4H PRN Max 3/day Evorn Punch, CRNP     • risperiDONE  1 mg Oral Q2H PRN Max 3/day Evorn Punch, CRNP     • torsemide  5 mg Oral Every Other Day JOHN Goodman     • traZODone  50 mg Oral HS PRN Evorn Punch, CRNP     • ziprasidone  20 mg Oral BID With Meals Kristen Martínez MD       Discharge Disposition: Discharge disposition and planning remain ongoing    Risks / Benefits of Treatment:  Risks, benefits, and possible side effects of medications explained to patient  Patient has limited understanding of risks and benefits of treatment at this time, but agrees to take medications as prescribed  Counseling / Coordination of Care: Total floor/unit time spent today 25 minutes  Greater than 50% of total time was spent with the patient and / or family counseling and / or coordination of care  A description of the counseling / coordination of care:   Patient's progress discussed with staff in treatment team meeting  Medications, treatment progress and treatment plan reviewed with patient  Educated on importance of medication and treatment compliance  Reassurance and supportive therapy provided  Encouraged participation in milieu and group therapy on the unit      JOHN Spivey

## 2023-04-30 NOTE — NURSING NOTE
C/o left leg pain 6/10, tylenol 650 mg given at 1933 was moderately effective as patient pain level went down to 3/10 on reassessment

## 2023-04-30 NOTE — PLAN OF CARE
Problem: ANXIETY  Goal: Will report anxiety at manageable levels  Description: INTERVENTIONS:  - Administer medication as ordered  - Teach and encourage coping skills  - Provide emotional support  - Assess patient/family for anxiety and ability to cope  Outcome: Progressing     Problem: Potential for Falls  Goal: Patient will remain free of falls  Description: INTERVENTIONS:  - Educate patient/family on patient safety including physical limitations  - Instruct patient to call for assistance with activity   - Consult OT/PT to assist with strengthening/mobility   - Keep Call bell within reach  - Keep bed low and locked with side rails adjusted as appropriate  - Keep care items and personal belongings within reach  - Initiate and maintain comfort rounds  - Make Fall Risk Sign visible to staff  - Offer Toileting every Hours, in advance of need  - Initiate/Maintain alarm  - Obtain necessary fall risk management equipment:   - Apply yellow socks and bracelet for high fall risk patients  - Consider moving patient to room near nurses station  Outcome: Progressing

## 2023-05-01 PROBLEM — F31.5 BIPOLAR AFFECTIVE DISORDER, DEPRESSED, SEVERE, WITH PSYCHOTIC BEHAVIOR (HCC): Status: ACTIVE | Noted: 2017-04-13

## 2023-05-01 LAB
ATRIAL RATE: 85 BPM
ATRIAL RATE: 86 BPM
GLUCOSE SERPL-MCNC: 157 MG/DL (ref 65–140)
GLUCOSE SERPL-MCNC: 190 MG/DL (ref 65–140)
GLUCOSE SERPL-MCNC: 200 MG/DL (ref 65–140)
GLUCOSE SERPL-MCNC: 216 MG/DL (ref 65–140)
P AXIS: 57 DEGREES
P AXIS: 63 DEGREES
PR INTERVAL: 120 MS
PR INTERVAL: 122 MS
QRS AXIS: 10 DEGREES
QRS AXIS: 8 DEGREES
QRSD INTERVAL: 108 MS
QRSD INTERVAL: 108 MS
QT INTERVAL: 406 MS
QT INTERVAL: 408 MS
QTC INTERVAL: 483 MS
QTC INTERVAL: 488 MS
T WAVE AXIS: 18 DEGREES
T WAVE AXIS: 20 DEGREES
VENTRICULAR RATE: 85 BPM
VENTRICULAR RATE: 86 BPM

## 2023-05-01 RX ADMIN — INSULIN LISPRO 2 UNITS: 100 INJECTION, SOLUTION INTRAVENOUS; SUBCUTANEOUS at 21:31

## 2023-05-01 RX ADMIN — CEPHALEXIN 500 MG: 500 CAPSULE ORAL at 08:26

## 2023-05-01 RX ADMIN — ZIPRASIDONE HYDROCHLORIDE 20 MG: 20 CAPSULE ORAL at 17:29

## 2023-05-01 RX ADMIN — POLYETHYLENE GLYCOL 3350 17 G: 17 POWDER, FOR SOLUTION ORAL at 17:36

## 2023-05-01 RX ADMIN — ZIPRASIDONE HYDROCHLORIDE 20 MG: 20 CAPSULE ORAL at 08:27

## 2023-05-01 RX ADMIN — INSULIN LISPRO 1 UNITS: 100 INJECTION, SOLUTION INTRAVENOUS; SUBCUTANEOUS at 17:31

## 2023-05-01 RX ADMIN — MIRTAZAPINE 7.5 MG: 7.5 TABLET, FILM COATED ORAL at 21:29

## 2023-05-01 RX ADMIN — CLONAZEPAM 0.25 MG: 0.5 TABLET ORAL at 21:30

## 2023-05-01 RX ADMIN — ENOXAPARIN SODIUM 40 MG: 40 INJECTION SUBCUTANEOUS at 08:30

## 2023-05-01 RX ADMIN — CEPHALEXIN 500 MG: 500 CAPSULE ORAL at 21:29

## 2023-05-01 RX ADMIN — TORSEMIDE 5 MG: 5 TABLET ORAL at 08:29

## 2023-05-01 RX ADMIN — OLANZAPINE 7.5 MG: 2.5 TABLET, FILM COATED ORAL at 21:30

## 2023-05-01 RX ADMIN — INSULIN LISPRO 1 UNITS: 100 INJECTION, SOLUTION INTRAVENOUS; SUBCUTANEOUS at 12:44

## 2023-05-01 RX ADMIN — PANTOPRAZOLE SODIUM 20 MG: 20 TABLET, DELAYED RELEASE ORAL at 06:23

## 2023-05-01 RX ADMIN — CLOTRIMAZOLE 10 MG: 10 LOZENGE ORAL at 08:27

## 2023-05-01 RX ADMIN — DOCUSATE SODIUM 100 MG: 100 CAPSULE, LIQUID FILLED ORAL at 08:27

## 2023-05-01 RX ADMIN — ATORVASTATIN CALCIUM 40 MG: 40 TABLET, FILM COATED ORAL at 17:29

## 2023-05-01 RX ADMIN — INSULIN LISPRO 1 UNITS: 100 INJECTION, SOLUTION INTRAVENOUS; SUBCUTANEOUS at 08:30

## 2023-05-01 RX ADMIN — DOCUSATE SODIUM 100 MG: 100 CAPSULE, LIQUID FILLED ORAL at 17:29

## 2023-05-01 RX ADMIN — INSULIN GLARGINE 15 UNITS: 100 INJECTION, SOLUTION SUBCUTANEOUS at 21:31

## 2023-05-01 RX ADMIN — METOPROLOL SUCCINATE 37.5 MG: 25 TABLET, EXTENDED RELEASE ORAL at 08:25

## 2023-05-01 RX ADMIN — CYANOCOBALAMIN TAB 1000 MCG 1000 MCG: 1000 TAB at 08:25

## 2023-05-01 NOTE — MALNUTRITION/BMI
This medical record reflects one or more clinical indicators suggestive of malnutrition and/or morbid obesity  Malnutrition Findings:   Adult Malnutrition type: Social/enviromental  Adult Degree of Malnutrition: Other severe protein calorie malnutrition  Malnutrition Characteristics: Inadequate energy, Weight loss                  360 Statement: Severe pro/fred malnutrition r/t poor self care as evidenced by 11% unplanned weight loss since 1/13/23, consuming < 50% energy intake compared to estimated energy needs > 1 month, Open pressure wounds on buttock  Treated with Modified diet texture and Ensure Compact bid         See Nutrition note dated 5/1/23 for additional details  Completed nutrition assessment is viewable in the nutrition documentation

## 2023-05-01 NOTE — PROGRESS NOTES
05/01/23 1000 05/01/23 1100   Activity/Group Checklist   Group Community meeting  (vitality discussion) Life Skills  (vitality planning)   Attendance Attended Attended   Attendance Duration (min) 31-45 31-45   Interactions Disorganized interaction Disorganized interaction  (needed assistance to explore her desire to be more physically active ,vague in her responses )   Affect/Mood Other (Comment); Incongruent Constricted   Goals Achieved Able to listen to others Able to listen to others;Discussed coping strategies

## 2023-05-01 NOTE — SPEECH THERAPY NOTE
Speech Pathology Bedside Swallow Evaluation:                    SLP RECOMMENDATIONS:         Diet: Level 3 Dental soft        Liquids: Thin liquids          Medications: as tolerated         Strategies: upright, slow rate        Summary:  Pt seen for bedside swallow evaluation  Per chart review, pt was seen by ST services while in acute rehab and discharged on a Level 3 Dental soft/thin diet  Pt is currently on a Level 2 [facility] soft/thin diet  Pt participated in Barium Swallow Esophagram with GI on 3/25/23 which showed no evidence for esophageal obstruction or stricture, significant spontaneous gastroesophageal reflux to the level of the upper esophagus and tertiary contractions throughout the esophagus consistent with some degree of impaired motility  Per notes, SLP was present for GI Esophagram and noted no aspiration or penetration during study  Pt's most recent CXR 3/25/23 showed no acute process  Pt has remained afebrile and WBC has been WNL  Pt endorses some difficulty chew 2/2 missing dentition  Pt wears partials  Pt observed with thin liquids via single and consecutive straw sips with no overt s/s aspiration or apparent difficulty  Pt trialed with regular solids (cracker) with prolonged mastication and mild oral residue  Pt reports that while she prefers her foods to be softer, she feels the Level 2 [facility] soft diet is too soft for her  Pt reports occasional coughing with thin liquids, however says this does not occur frequently  Pt presents with mild oral phase dysphagia  Recommend diet upgrade to Level 3 Dental soft/thin liquids  SLP reviewed recommendations with pt  SLP to follow to ensure diet tolerance  Therapy Prognosis: Good   Prognosis considerations:  Motivated for diet upgrade   Frequency: 2-4x/week       Vitals:    04/30/23 1801 04/30/23 2143 05/01/23 0823 05/01/23 0834   BP: (!) 171/85 162/79 (!) 146/7 146/72   BP Location: Left arm Left arm Left arm    Pulse: 86 98 92    Resp:  16 17 "  Temp:  (!) 96 8 °F (36 °C) (!) 97 2 °F (36 2 °C)    TempSrc:  Temporal Temporal    SpO2:  94% 95%    Weight:         Lab Results   Component Value Date    WBC 6 05 04/29/2023    HGB 8 4 (L) 04/29/2023    HCT 27 0 (L) 04/29/2023    MCV 90 04/29/2023     04/29/2023         Goal(s):  Pt will tolerate least restrictive diet w/out s/s aspiration or oral/pharyngeal difficulties  H&P/Admit info/ pertinent provider notes: (PMH noted above)  Royal Soria is a 70 y o   female,  (has one daughter and one son), domiciled w/  and their son w/ PMH of multiple medical conditions including recent femur fracture and s/p knee replacement, OAB, dysphagia, HLD, h/o endometrial and colon cancer in remission, thyroid nodule and SIADH and PPH of Bipolar Disorder, one prior psychiatric admissions, no prior SA, no h/o self-injurious behavior, f/u in outpatient setting with Dr Elvie Gil, on Geodon 80 mg BID, Klonopin 0 5 mg BID, Remeron 7 5 mg nightly, last visited by psych consult on 3/29/23 by Dr Sindy Arguelles who was BIB her daughter to the [facility] ED on 4/27/23 due to worsening of mood and paranoid ideations since broke her femur in March  The patient signed 12 and got admitted to the inpatient psychiatry unit 6T for further psychiatric stabilization  Special Studies:    Barium Swallow Esophagram 3/27/23: IMPRESSION:  No evidence for esophageal obstruction or stricture  Significant spontaneous gastroesophageal reflux to the level of the upper esophagus  Tertiary contractions throughout the esophagus consistent with some degree of impaired motility  CXR 3/25/23:  IMPRESSION:  No acute cardiopulmonary disease  Previous VBS:  None     Patient's goal: \"to have better foods\"    Did the pt report pain? No   If yes, was nursing notified/was it addressed?     Reason for consult:  R/o aspiration  Determine safest and least restrictive diet  C/o solid food dysphagia  C/o liquid " dysphagia    Precautions:  Aspiration      Food allergies: Allergies   Allergen Reactions   • Wellbutrin [Bupropion] Anxiety     Patient has tried Wellbutrin which resulted in increased paranoia  Patient wishes to not try this medication again          Current diet:  Level 2 Mech soft/thin    Premorbid diet:  Level 3 Dental soft/thin    O2 requirements:  RA   Voice/Speech:  WNL   Follows commands:  Intact    Cognitive status:  Alert, variable orientation          Results d/w:  Pt, nursing, physician

## 2023-05-01 NOTE — PROGRESS NOTES
Progress Note - Gary [age] y o  female MRN: [id number]  Unit/Bed#: [facility] 307-21 Encounter: [id number]    Assessment/Plan   Principal Problem:    Bipolar affective disorder, depressed, severe, with psychotic behavior ([facility] [address] )  Active Problems:    Type 2 diabetes mellitus without complication, with long-term current use of insulin ([facility] [address] )    Essential hypertension    Hyponatremia    History of DVT of lower extremity    Dysphagia    UTI (urinary tract infection)    History of femur fracture    Medical clearance for psychiatric admission      Recommended Treatment:     1  Will make the following medication changes:   a  Continue cross taper from geodon to zyprexa due to patient's history of prolonged QTc  i  Will discontinue geodon tomorrow after AM dose, for now taking 20mg BID  ii  Increase zyprexa to 7 5mg HS tonight for psychosis and mood stabilization  2  Continue with current medications:  a  Klonopin 0 25mg HS for anxiety, is being tapered with goal to discontinue  b  Remeron 7 5mg HS for sleep, appetite, mood  3  Continue with group therapy, milieu therapy and occupational therapy  4  Continue frequent safety checks and vitals per unit protocol  5  Continue with SLIM medical management as indicated  6  Repeat BMP tomorrow AM to monitor sodium  7  Continue coordinating with case management regarding disposition    Legal Status: 201  Disposition: To be determined, coordinating with case management    Case discussed with treatment team   Risks, benefits and possible side effects of Medications: Risks, benefits, and possible side effects of medications have previously been explained  No new medications at this time  ------------------------------------------------------------    Subjective: Patient's chart was reviewed, and patient's progress and plan was discussed with treatment team  Per nursing report, Rashawn Pires has been cooperative on the unit and compliant with medications   She has "been visibly anxious, stating she is not able to reach her feet to dress herself, was afraid of falling during shower  She is noted to have a small pressure wound on right buttock  Today patient was perseverative to staff about the unit phone being \"bad\" due to her pressing a number on the phone yesterday  She reported to staff she was feeling depressed and anxious  Nimo Stubbs was evaluated this morning for continuity of care  On examination, Nimo Stubbs is found seated in the day room but is cooperative with evaluation  Her movement is slow, but with assistance and walker she is able to ambulate to a chair outside of the day room  She states her mood is \"okay,\" and denies any depression or anxiety today to this writer  She reports adequate sleep but poor appetite (though is eating most of meals), and states she does not drink enough water  Tried to discuss events leading up to admission and she reports that \"I fell\" then \"I broke my leg  \" Asked patient specifically why she was in the [facility] and she responded, [facility] doctor thought I needed to be here  \" Patient was then able to say she was very anxious at home  When asked directly about paranoia she does agree she believed the  were coming after her and her  but is denying this now  When asked if she feels safe on the unit she stared at this writer for several minutes before replying, \"I don't know what to say  \"  She denies any adverse effects from medications  She denies suicidal ideation, homicidal ideation, auditory hallucinations, and visual hallucinations       VS: Reviewed, within normal limits (VS flagged for erroneous entry of 146/7 which was corrected to 146/72)    Progress Toward Goal2s: Small improvements - denies paranoia, had been reporting anxiety and depression to staff, still perseverative at times    Psychiatric Review of Systems:  Behavior over the last 24 hours: unchanged  Sleep: normal  Appetite: adequate - eating % meals  Medication side " "effects: none verbalized  Medical ROS: Complete review of systems is negative except as noted above      Vital signs in last 24 hours:  Temp:  [96 8 °F (36 °C)-97 7 °F (36 5 °C)] 97 2 °F (36 2 °C)  HR:  [86-98] 92  Resp:  [16-17] 17  BP: (146-180)/(7-86) 146/72    Mental Status Exam:    Appearance:  alert, prolonged eye contact, appears stated age, casually dressed and disheveled   Behavior:  calm and guarded   Speech:  slow, soft, scant and coherent   Mood:  \"okay\"   Affect:  blunted   Thought Process:  goal directed   Associations: perseveration   Thought Content:  no verbalized delusions or overt paranoia   Perceptual Disturbances: denies current hallucinations and does not appear to be responding to internal stimuli at this time   Risk Potential: Suicidal ideation - None  Homicidal ideation - None  Potential for aggression - No   Sensorium:  oriented to person   Memory:  recent and remote memory grossly intact   Consciousness:  alert and awake   Attention/Concentration: decreased concentration and decreased attention span   Insight:  limited   Judgment: limited   Gait/Station: slow gait, uses walker   Motor Activity: no abnormal movements     Current Medications:  Current Facility-Administered Medications   Medication Dose Route Frequency Provider Last Rate   • acetaminophen  650 mg Oral Q4H PRN JOHN Sanchez     • acetaminophen  650 mg Oral Q4H PRN JOHN Sanchez     • acetaminophen  975 mg Oral Q6H PRN JOHN Sanchez     • [START ON 5/2/2023] aspirin  81 mg Oral Daily JOHN Burkett     • atorvastatin  40 mg Oral Daily With JOHN Figueroa     • cephalexin  500 mg Oral Q12H [address] JOHN Friend     • clonazePAM  0 25 mg Oral HS Yasmin Varela MD     • cyanocobalamin  1,000 mcg Oral Daily JOHN Burkett     • docusate sodium  100 mg Oral BID Doc Rodriguez PA-C     • enoxaparin  40 mg Subcutaneous Q24H [address] Darryle Derrick " JOHN Rizo     • haloperidol lactate  5 mg Intramuscular Q4H PRN Max 4/day JOHN Solano     • insulin glargine  15 Units Subcutaneous HS JOHN Klein     • insulin lispro  1-5 Units Subcutaneous TID AC JOHN Klein     • insulin lispro  1-5 Units Subcutaneous HS JOHN Klein     • metoprolol succinate  37 5 mg Oral Daily JOHN Alston     • mirtazapine  7 5 mg Oral HS JOHN Solano     • OLANZapine  5 mg Oral HS JOHN Solano     • pantoprazole  20 mg Oral Early Morning JOHN Klein     • polyethylene glycol  17 g Oral Daily PRN Eileen Louie PA-C     • risperiDONE  0 25 mg Oral Q4H PRN Max 6/day JOHN Solano     • risperiDONE  0 5 mg Oral Q4H PRN Max 3/day JOHN Solano     • risperiDONE  1 mg Oral Q2H PRN Max 3/day JOHN Solano     • torsemide  5 mg Oral Every Other Day JOHN Klein     • traZODone  50 mg Oral HS PRN JOHN Solano     • ziprasidone  20 mg Oral BID With Meals Dusty Christopher MD         Behavioral Health Medications: all current active meds have been reviewed  Changes as in plan section above  Laboratory results:  I have personally reviewed all pertinent laboratory/tests results     Recent Results (from the past 48 hour(s))   ECG 12 lead    Collection Time: 04/29/23 12:49 PM   Result Value Ref Range    Ventricular Rate 88 BPM    Atrial Rate 88 BPM    ND Interval 130 ms    QRSD Interval 106 ms    QT Interval 392 ms    QTC Interval 474 ms    P Axis  degrees    QRS Axis 179 degrees    T Wave Axis 142 degrees   ECG 12 lead    Collection Time: 04/29/23 12:50 PM   Result Value Ref Range    Ventricular Rate 89 BPM    Atrial Rate 89 BPM    ND Interval 166 ms    QRSD Interval 106 ms    QT Interval 410 ms    QTC Interval 498 ms    P [place] 112 degrees    QRS Axis 162 degrees    T Wave Axis 150 degrees   ECG 12 lead    Collection Time: 04/29/23 12:52 PM Result Value Ref Range    Ventricular Rate 82 BPM    Atrial Rate 82 BPM    MS Interval 116 ms    QRSD Interval 110 ms    QT Interval 410 ms    QTC Interval 479 ms    P Axis 98 degrees    QRS Axis 158 degrees    T Wave Axis 134 degrees   ECG 12 lead    Collection Time: 04/29/23 12:54 PM   Result Value Ref Range    Ventricular Rate 83 BPM    Atrial Rate 83 BPM    MS Interval 118 ms    QRSD Interval 108 ms    QT Interval 410 ms    QTC Interval 481 ms    P Axis 110 degrees    QRS Axis 162 degrees    T Wave Axis 146 degrees   ECG 12 lead    Collection Time: 04/29/23  1:03 PM   Result Value Ref Range    Ventricular Rate 83 BPM    Atrial Rate 83 BPM    MS Interval 118 ms    QRSD Interval 106 ms    QT Interval 400 ms    QTC Interval 470 ms    P Axis 66 degrees    QRS Axis 11 degrees    T Wave Axis 28 degrees   Fingerstick Glucose (POCT)    Collection Time: 04/29/23  4:09 PM   Result Value Ref Range    POC Glucose 169 (H) 65 - 140 mg/dl   Fingerstick Glucose (POCT)    Collection Time: 04/29/23  8:44 PM   Result Value Ref Range    POC Glucose 159 (H) 65 - 140 mg/dl   Fingerstick Glucose (POCT)    Collection Time: 04/30/23  7:25 AM   Result Value Ref Range    POC Glucose 107 65 - 140 mg/dl   ECG 12 lead    Collection Time: 04/30/23 10:57 AM   Result Value Ref Range    Ventricular Rate 101 BPM    Atrial Rate 101 BPM    MS Interval 124 ms    QRSD Interval 100 ms    QT Interval 376 ms    QTC Interval 487 ms    P Axis 66 degrees    QRS Axis 11 degrees    T Wave Axis 33 degrees   ECG 12 lead    Collection Time: 04/30/23 10:58 AM   Result Value Ref Range    Ventricular Rate 99 BPM    Atrial Rate 99 BPM    MS Interval 120 ms    QRSD Interval 104 ms    QT Interval 382 ms    QTC Interval 490 ms    P [place] 69 degrees    QRS Axis 15 degrees    T Wave Axis 39 degrees   Fingerstick Glucose (POCT)    Collection Time: 04/30/23 12:08 PM   Result Value Ref Range    POC Glucose 202 (H) 65 - 140 mg/dl   Fingerstick Glucose (POCT)    Collection Time: 04/30/23  4:02 PM   Result Value Ref Range    POC Glucose 237 (H) 65 - 140 mg/dl   Fingerstick Glucose (POCT)    Collection Time: 04/30/23  9:08 PM   Result Value Ref Range    POC Glucose 175 (H) 65 - 140 mg/dl   Fingerstick Glucose (POCT)    Collection Time: 05/01/23  7:46 AM   Result Value Ref Range    POC Glucose 157 (H) 65 - 140 mg/dl        This note has been constructed using a voice recognition system  There may be translation, syntax, or grammatical errors  If you have any questions, please contact the dictating author      Chandler Liao MD  Psychiatry Residency, PGY-II

## 2023-05-01 NOTE — PLAN OF CARE
Pt  Was present in groups yet scant in responses    Problem: Ineffective Coping  Goal: Participates in unit activities  Description: Interventions:  - Provide therapeutic environment   - Provide required programming   - Redirect inappropriate behaviors   Outcome: Progressing

## 2023-05-01 NOTE — NURSING NOTE
Patient had a phone call with her  yesterday and told this writer she pressed a number on the phone and because of that the phone is bad and despite assuring her that the phone is still working patient kept saying the phone is bad  Patient woke up during the night to use the bathroom she still brought the phone issue again that the is bed and cannot be fixed  This morning again the patient continued with the phone issue again that the phone cannot be fix, this writer use the portable phone to call his cell phone yet patient continued saying the phone is bad and cannot be fixed

## 2023-05-01 NOTE — NURSING NOTE
Patient was withdrawn to her room lying in bed quietly  VSS  Report anxiety and depression, denieis all other psych s/s  No complaints offered  No behaviors noted  Took her HS medications  Safety checks ongoing

## 2023-05-01 NOTE — PHYSICAL THERAPY NOTE
Physical Therapy Evaluation    Patient's Name: Lorri Hinkle    Admitting Diagnosis  Major depressive disorder [F32 9]  Medical clearance for psychiatric admission [Z00 8]  Encounter for psychological evaluation [Z00 8]    Problem List  Patient Active Problem List   Diagnosis    History of endometrial cancer    Type 2 diabetes mellitus without complication, with long-term current use of insulin ([address] )    Essential hypertension    Hyperlipidemia    History of colon cancer    OAB (overactive bladder)    Axillary mass, right    Mass of axillary tail of right breast    Mass of axillary tail of left breast    Hyponatremia    Bipolar disorder (HCC)    Elevated brain natriuretic peptide (BNP) level    Osteoarthritis of both knees    Status post total left knee replacement using cement    Platelets decreased (HCC)    PONV (postoperative nausea and vomiting)    Delayed emergence from anesthesia    Bipolar depression ([address] )    Fracture of left femur ([address] )    History of DVT of lower extremity    Thyroid nodule    Anxiety    Acute blood loss anemia    Dysphagia    UTI (urinary tract infection)    Severe protein-calorie malnutrition ([address] )    Thrush    History of femur fracture    Medical clearance for psychiatric admission       Past Medical History  Past Medical History:   Diagnosis Date    Anesthesia complication     difficulty waking up    Anxiety     Arthritis     left knee    Bipolar 1 disorder (HCC)     Bone spur     left knee behing the knee cap    Cancer ([address] )     Chronic kidney disease     Chronic pain disorder     Colon cancer ([facility] [address] )     patient states about 2017    Colon polyp     Tubulovillous Adenoma High Grade Dysplasia - 2017    Depression     Diabetes mellitus ([address] )     Endometrial cancer ([address] )     grade I    Hx of bleeding disorder     vaginal bleeding started on 3/13/17     Hyperlipidemia     Hypertension     Hypomagnesemia 03/20/2023    Memory loss     PONV (postoperative nausea and vomiting)     Psychiatric disorder     Psychiatric illness     Psychosis ([address] )     Shortness of breath     with exertion    Sleep difficulties     Urinary incontinence     Vitamin D deficiency        Past Surgical History  Past Surgical History:   Procedure Laterality Date    BREAST BIOPSY Left     benign-maybe at ar age 58    CHOLECYSTECTOMY      open    COLONOSCOPY      DILATION AND CURETTAGE OF UTERUS N/A 04/05/2017    Procedure: DILATATION AND CURETTAGE (D&C); Surgeon: Marlena Schirmer, MD;  Location: [facility] MAIN OR;  Service:     HYSTERECTOMY      OOPHORECTOMY      PELVIC LAPAROSCOPY      SD ARTHRP KNE CONDYLE&PLATU MEDIAL&LAT COMPARTMENTS Left 12/06/2022    Procedure: ARTHROPLASTY KNEE TOTAL W ROBOT - CEMENTED - LEFT;  Surgeon: Vianney Nagy DO;  Location: [address] [place] [address];  Service: Orthopedics    SD LAPS TOTAL HYSTERECT 250 GM/< W/RMVL TUBE/OVARY N/A 05/04/2017    Procedure: ROBOTIC ASSISTED TOTAL LAPAROSCOPIC HYSTERECTOMY, BILATERAL SALPINGOOPHERECTOMY; CYSTOSCOPY;  Surgeon: Precious Yarbrough MD;  Location:  MAIN OR;  Service: Gynecology Oncology    SD OPTX FEM SHFT FX W/INSJ IMED IMPLT W/WO SCREW Left 3/20/2023    Procedure: INSERTION NAIL IM FEMUR RETROGRADE;  Surgeon: Ivan Walls MD;  Location: [address] [place] [address];  Service: Orthopedics    REPLACEMENT TOTAL KNEE      TONSILLECTOMY      TUBAL LIGATION         Recent Imaging  No orders to display       Recent Vital Signs  Vitals:    04/30/23 1801 04/30/23 2143 05/01/23 0823 05/01/23 0834   BP: (!) 171/85 162/79 (!) 146/7 146/72   BP Location: Left arm Left arm Left arm    Pulse: 86 98 92    Resp:  16 17    Temp:  (!) 96 8 °F (36 °C) (!) 97 2 °F (36 2 °C)    TempSrc:  Temporal Temporal    SpO2:  94% 95%    Weight:            05/01/23 0944   PT Last Visit   PT Visit Date 05/01/23   Note Type   Note type Evaluation   Pain Assessment   Pain Assessment Tool 0-10   Pain Score 8   Restrictions/Precautions   Weight Bearing Precautions Per Order No   Other Precautions Cognitive; Chair Alarm; Bed Alarm Home Living   Type of [address]  One level;Performs ADLs on one level;Stairs to enter with rails  (6STE)   Home Equipment Walker;Cane;Grab bars   Prior Function   Level of [place] Independent with functional mobility; Needs assistance with ADLs; Needs assistance with IADLS   Lives With Spouse; Son   Selvin Help From Family  (HHA 2/w)   Falls in the last 6 months 1 to 4   Comments Pt presents as a questional historian   General   Family/Caregiver Present No   Cognition   Arousal/Participation Alert   Orientation Level Oriented X4   Memory Decreased recall of recent events;Decreased short term memory   Following Commands Follows one step commands with increased time or repetition   Strength RLE   R Hip Flexion 4/5   R Knee Flexion 4/5   R Knee Extension 4/5   R Ankle Dorsiflexion 4/5   Strength LLE   L Hip Flexion 3+/5   L Knee Flexion 4-/5   L Knee Extension 3+/5   L Ankle Dorsiflexion 4/5   Coordination   Movements are Fluid and Coordinated 0   Coordination and Movement Description impulsive, unorganized   Sensation X   Light Touch   RLE Light Touch Grossly intact   LLE Light Touch Impaired   LLE Light Touch Comments Paresthesias   Transfers   Sit to Stand 4  Minimal assistance   Additional items Assist x 1; Armrests  (Painful)   Stand to Sit 5  Supervision   Ambulation/Elevation   Gait pattern Improper Weight shift;Narrow EVELIN; Antalgic;Decreased L stance; Short stride; Step to;Decreased toe off;Decreased heel strike   Gait Assistance 5  Supervision   Assistive Device Rolling walker   Distance 40ft   Balance   Static Sitting Fair +   Dynamic Sitting Fair   Static Standing Fair   Dynamic Standing Fair   Ambulatory Fair   Endurance Deficit   Endurance Deficit Yes   Endurance Deficit Description Below baseline decreased activity tolerance   Activity Tolerance   Activity Tolerance Patient limited by fatigue;Patient limited by pain   Medical Staff Made Aware spoke to Fifi Killeen  spoke to ELVER Assessment   Prognosis Good   Problem List Decreased strength;Decreased endurance; Impaired balance;Decreased mobility; Decreased coordination;Decreased cognition;Decreased safety awareness;Pain; Impaired sensation   Barriers to Discharge Inaccessible home environment   Goals   Patient Goals to go home   STG Expiration Date 05/15/23   Short Term Goal #1 See eval note   Plan   Treatment/Interventions ADL retraining;Functional transfer training;LE strengthening/ROM; Elevations; Therapeutic exercise; Endurance training;Patient/family training;Gait training;Bed mobility;Spoke to nursing;Spoke to case management   PT Frequency 2-3x/wk   Recommendation   PT Discharge Recommendation Home with home health rehabilitation   Equipment Recommended Walker   AM-PAC Basic Mobility Inpatient   Turning in Flat Bed Without Bedrails 4   Lying on Back to Sitting on Edge of Flat Bed Without Bedrails 4   Moving Bed to Chair 4   Standing Up From Chair Using Arms 4   Walk in Room 3   Climb 3-5 Stairs With Railing 2   Basic Mobility Inpatient Raw Score 21   Basic Mobility Standardized Score 45 55   Highest Level Of Mobility   JH-HLM Goal 6: Walk 10 steps or more   JH-HLM Achieved 7: Walk 25 feet or more   End of Consult   Patient Position at End of Consult All needs within reach;Bed/Chair alarm activated  (Dayroom)         ASSESSMENT                                                                                                                     Destiny Banks is a 70 y o  female admitted to [facility] on 4/27/2023 for Bipolar disorder ([facility] [address] )   Pt  has a past medical history of Anesthesia complication, Anxiety, Arthritis, Bipolar 1 disorder ([address] ), Bone spur, Cancer ([address] ), Chronic kidney disease, Chronic pain disorder, Colon cancer ([address] ), Colon polyp, Depression, Diabetes mellitus ([address] ), Endometrial cancer ([address] ), bleeding disorder, Hyperlipidemia, Hypertension, Hypomagnesemia (03/20/2023), Memory loss, PONV (postoperative nausea and vomiting), Psychiatric disorder, Psychiatric illness, Psychosis ([address] ), Shortness of breath, Sleep difficulties, Urinary incontinence, and Vitamin D deficiency    PT was consulted and pt was seen on 5/1/2023 for mobility assessment and d/c planning  Patient recently was in the [facility] for recent femur fracture  The patient is a poor historian, reporting inconsistencies of PLOF, Patient currently is minAx1 for STS transfer with increase in pain  During the gait training assessment, ambulated 40ft with RW supevision limited by antalgic gait  Impairments limiting pt at this time include weakness, impaired balance, decreased endurance, decreased coordination, increased fall risk, decreased ADLS, decreased IADLS, pain, decreased safety awareness, impaired judgement, decreased cognition, and decreased sensation  Pt is currently functioning at minimum assistance x1 level for transfers, supervision assistance x1 level for ambulation with Rolling Walker  The patient's AM-PAC Basic Mobility Inpatient Short Form Raw Score is 21  A Raw score of greater than 16 suggests the patient may benefit from discharge to home  Please also refer to the recommendation of the Physical Therapist for safe discharge planning  Goals                                                                                                                                    1) Bed mobility skills with modified independent assistance to facilitate safe return to previous living environment 2) Functional transfers with modified independent assistance to facilitate safe return to previous living environment  3) Ambulation with least restrictive AD modified independent assistance without LOB and stable vitals for safe ambulation home/ community distances  4) Stair training up/down flight 6 step/s with appropriate rail/s  and modified independent assistance for safe access to previous living environment  5) Improve balance grades to fair + to reduce risk of falls  6)Improve LE strength grades by 1 to increase independence w/ transfers and gait  7) PT for ongoing pt and family education; DME needs and D/C planning to promote highest level of function in least restrictive environment  Recommendations                                                                                                              Pt will benefit from continued skilled IP PT to address the above mentioned impairments in order to maximize recovery and increase functional independence when completing mobility and ADLs  See flow sheet for goals and POC       DME: [facility]    Discharge Disposition:  Home with Home Physical Therapy      Artur Chun, LUKE

## 2023-05-01 NOTE — NURSING NOTE
Pt visible on unit, cooperative with care  Pt placed on chair alarm for safety  Pt in dining room, positive interactions with select peers  Pt does not verbalize any symptoms currently  Pt spoke to  and daughter on phone  Adherent with medications in applesauce  Allevyn intact on pt's bilateral buttocks and sacrum  Pt accepted miralax prn constipation

## 2023-05-01 NOTE — TREATMENT TEAM
05/01/23 1455   Team Meeting   Meeting Type Tx Team Meeting   Initial Conference Date 05/01/23   Team Members Present   Team Members Present Physician;Nurse;   Physician Team Member Dr Jose M Wheat Team Member [address] [place] [address] Management Team Member Amador Cartwright   Patient/Family Present   Patient Present Yes   Patient's Family Present No     Treatment plan and goals reviewed with pt; pt in agreement and signed

## 2023-05-01 NOTE — ED NOTES
Insurance Authorization for admission:   Phone call placed to Jeff Delarosa number: [phone] to Lurdes Cutler  Level of care: inpatient mental health    Case form filled out and checking policy

## 2023-05-01 NOTE — PLAN OF CARE
Problem: Potential for Falls  Goal: Patient will remain free of falls  Description: INTERVENTIONS:  - Educate patient/family on patient safety including physical limitations  - Instruct patient to call for assistance with activity   - Consult OT/PT to assist with strengthening/mobility   - Keep Call bell within reach  - Keep bed low and locked with side rails adjusted as appropriate  - Keep care items and personal belongings within reach  - Initiate and maintain comfort rounds  - Make Fall Risk Sign visible to staff  - Offer Toileting every 2 Hours, in advance of need  - Initiate/Maintain bed/chair alarm  - Obtain necessary fall risk management equipment:   - Apply yellow socks and bracelet for high fall risk patients  - Consider moving patient to room near nurses station  Outcome: Progressing     Problem: DEPRESSION  Goal: Will be euthymic at discharge  Description: INTERVENTIONS:  - Administer medication as ordered  - Provide emotional support via 1:1 interaction with staff  - Encourage involvement in milieu/groups/activities  - Monitor for social isolation  Outcome: Progressing     Problem: PASCUAL  Goal: Will exhibit normal sleep and speech and no impulsivity  Description: INTERVENTIONS:  - Administer medication as ordered  - Set limits on impulsive behavior  - Make attempts to decrease external stimuli as possible  Outcome: Progressing     Problem: PSYCHOSIS  Goal: Will report no hallucinations or delusions  Description: Interventions:  - Administer medication as  ordered  - Every waking shifts and PRN assess for the presence of hallucinations and or delusions  - Assist with reality testing to support increasing orientation  - Assess if patient's hallucinations or delusions are encouraging self-harm or harm to others and intervene as appropriate  Outcome: Progressing     Problem: ANXIETY  Goal: Will report anxiety at manageable levels  Description: INTERVENTIONS:  - Administer medication as ordered  - Teach and encourage coping skills  - Provide emotional support  - Assess patient/family for anxiety and ability to cope  Outcome: Progressing     Problem: SELF CARE DEFICIT  Goal: Return ADL status to a safe level of function  Description: INTERVENTIONS:  - Administer medication as ordered  - Assess ADL deficits and provide assistive devices as needed  - Obtain PT/OT consults as needed  - Assist and instruct patient to increase activity and self care as tolerated  Outcome: Progressing

## 2023-05-01 NOTE — PLAN OF CARE
Problem: PHYSICAL THERAPY ADULT  Goal: Performs mobility at highest level of function for planned discharge setting  See evaluation for individualized goals  Description: Treatment/Interventions: ADL retraining, Functional transfer training, LE strengthening/ROM, Elevations, Therapeutic exercise, Endurance training, Patient/family training, Gait training, Bed mobility, Spoke to nursing, Spoke to case management  Equipment Recommended: Orlando Tavarez       See flowsheet documentation for full assessment, interventions and recommendations  Note: Prognosis: Good  Problem List: Decreased strength, Decreased endurance, Impaired balance, Decreased mobility, Decreased coordination, Decreased cognition, Decreased safety awareness, Pain, Impaired sensation     Barriers to Discharge: Inaccessible home environment     PT Discharge Recommendation: Home with home health rehabilitation    See flowsheet documentation for full assessment

## 2023-05-01 NOTE — TREATMENT TEAM
05/01/23 0833   Team Meeting   Meeting Type Daily Rounds   Initial Conference Date 05/01/23   Team Members Present   Team Members Present Physician;Nurse;;; Occupational Therapist   Physician Team Member Dr Rich Degroot Team Member VIPIN [facility] Management Team Member Jamari Butts Work Team Member Hill   OT Team Member Shea RAMIREZ   Patient/Family Present   Patient Present No   Patient's Family Present No     201 admission due to increased paranoia that the police were after her and her , negative mindset, thought broke phone on unit due to hitting button, poor self care, anxious related to fall, showered and anxious about falling in shower, focused on inability to care for self, Open pressure wounds on buttock, wound care consult   Transitioning from geodon to zyprexa due to prolonged QT

## 2023-05-01 NOTE — CASE MANAGEMENT
"Psychosocial Assessment 1:1   Cm met with pt, reviewed role of CM  Pt requested that her dtr sign all paperwork and answer all questions  CM reviewed benefit to pt participating in intake and treatment  Pt agreed to complete intake  Pt would pause prior to responding to questions  Pt refused to sign GILBERTO for her ; requested CM only speak with her dtr  Pt reports wanting to continue outpatient [address] treatment with psychiatrist at [facility]  Pt reports being scared of falling  Admission / Details: BIB dtr due to increased paranoia, thinks police are after pt and pt's , afraid to eat due to fear of choking, lost 50 lbs in past 4 months  [facility]: [facility]  Commitment Status:  [facility]  Insurance: [facility] [employer] [place]  Rx coverage: denies issues obtaining medications  Marital Status:   Children: 2 children, son Adrienne Vidal and dtr Randy Peña tel# [phone]  Family: parents , 2 sisters   Residence: private  Can return home: yes  Lives with:  and son  Level of Ed: 11th grade  Work History: past work as nursing aid at [employer]   Income/Source: SSI, unknown amount  Mormonism: [employer]  Transportation:  and dtr  Legal Issues: denies  Pharmacy:  [facility]   [facility]: [facility]; unable to recall dates  Trauma HX: denies  Family hx: reports sister although unknown diagnosis  D&A HX: denies  Medical: see H&P  DME: r/w  Tobacco: denies   HX: denies  Access to firearms: denies  UDS Results: normal  PCP: Jason Turner  Psych: Dr Anita Azevedo at [facility]  Therapist: denies  ICM/ACT:  denies  Community Supports: family  Stressors: worring about family  Strengths:  \"before I got sick I could be a happy person  \"  Coping Skills: \"take my medications\"  ROIS Signed: dtr Jane Rea, PCP, [facility],   Treatment Plan Signed: yes  IMM Signed: yes  Gildardo Text Reminder Signed: NA  Upcoming Appointments: unknown  Covid vaccine received:  Nataliia Watkins " Bivalent

## 2023-05-01 NOTE — ED NOTES
Insurance Authorization for admission:   Phone call placed to [employer] number: [phone]  Level of care: inpatient mental health    Case # [id number]      Call placed to [employer] to obtain insurance auth, told that their system is down and to call back

## 2023-05-02 LAB
ANION GAP SERPL CALCULATED.3IONS-SCNC: 9 MMOL/L (ref 4–13)
BUN SERPL-MCNC: 14 MG/DL (ref 5–25)
CALCIUM SERPL-MCNC: 9 MG/DL (ref 8.4–10.2)
CHLORIDE SERPL-SCNC: 91 MMOL/L (ref 96–108)
CO2 SERPL-SCNC: 28 MMOL/L (ref 21–32)
CREAT SERPL-MCNC: 0.63 MG/DL (ref 0.6–1.3)
GFR SERPL CREATININE-BSD FRML MDRD: 91 ML/MIN/1.73SQ M
GLUCOSE P FAST SERPL-MCNC: 128 MG/DL (ref 65–99)
GLUCOSE SERPL-MCNC: 128 MG/DL (ref 65–140)
GLUCOSE SERPL-MCNC: 142 MG/DL (ref 65–140)
GLUCOSE SERPL-MCNC: 169 MG/DL (ref 65–140)
GLUCOSE SERPL-MCNC: 194 MG/DL (ref 65–140)
GLUCOSE SERPL-MCNC: 278 MG/DL (ref 65–140)
POTASSIUM SERPL-SCNC: 4.2 MMOL/L (ref 3.5–5.3)
SODIUM SERPL-SCNC: 128 MMOL/L (ref 135–147)

## 2023-05-02 RX ORDER — TORSEMIDE 5 MG/1
10 TABLET ORAL EVERY OTHER DAY
Status: DISCONTINUED | OUTPATIENT
Start: 2023-05-03 | End: 2023-05-04

## 2023-05-02 RX ORDER — BISACODYL 10 MG
10 SUPPOSITORY, RECTAL RECTAL DAILY PRN
Status: DISCONTINUED | OUTPATIENT
Start: 2023-05-02 | End: 2023-05-03

## 2023-05-02 RX ADMIN — ASPIRIN 81 MG: 81 TABLET, COATED ORAL at 09:05

## 2023-05-02 RX ADMIN — CYANOCOBALAMIN TAB 1000 MCG 1000 MCG: 1000 TAB at 09:06

## 2023-05-02 RX ADMIN — ENOXAPARIN SODIUM 40 MG: 40 INJECTION SUBCUTANEOUS at 09:06

## 2023-05-02 RX ADMIN — ZIPRASIDONE HYDROCHLORIDE 20 MG: 20 CAPSULE ORAL at 09:04

## 2023-05-02 RX ADMIN — MAGNESIUM HYDROXIDE 30 ML: 400 SUSPENSION ORAL at 21:14

## 2023-05-02 RX ADMIN — INSULIN LISPRO 1 UNITS: 100 INJECTION, SOLUTION INTRAVENOUS; SUBCUTANEOUS at 21:21

## 2023-05-02 RX ADMIN — MIRTAZAPINE 7.5 MG: 7.5 TABLET, FILM COATED ORAL at 21:15

## 2023-05-02 RX ADMIN — INSULIN GLARGINE 15 UNITS: 100 INJECTION, SOLUTION SUBCUTANEOUS at 21:19

## 2023-05-02 RX ADMIN — INSULIN LISPRO 3 UNITS: 100 INJECTION, SOLUTION INTRAVENOUS; SUBCUTANEOUS at 12:07

## 2023-05-02 RX ADMIN — OLANZAPINE 7.5 MG: 2.5 TABLET, FILM COATED ORAL at 21:15

## 2023-05-02 RX ADMIN — BISACODYL 10 MG RECTAL SUPPOSITORY 10 MG: at 18:19

## 2023-05-02 RX ADMIN — DOCUSATE SODIUM 100 MG: 100 CAPSULE, LIQUID FILLED ORAL at 09:05

## 2023-05-02 RX ADMIN — PANTOPRAZOLE SODIUM 20 MG: 20 TABLET, DELAYED RELEASE ORAL at 06:01

## 2023-05-02 RX ADMIN — INSULIN LISPRO 1 UNITS: 100 INJECTION, SOLUTION INTRAVENOUS; SUBCUTANEOUS at 17:06

## 2023-05-02 RX ADMIN — CLONAZEPAM 0.25 MG: 0.5 TABLET ORAL at 21:15

## 2023-05-02 RX ADMIN — METOPROLOL SUCCINATE 37.5 MG: 25 TABLET, EXTENDED RELEASE ORAL at 09:03

## 2023-05-02 RX ADMIN — CEPHALEXIN 500 MG: 500 CAPSULE ORAL at 09:02

## 2023-05-02 RX ADMIN — ATORVASTATIN CALCIUM 40 MG: 40 TABLET, FILM COATED ORAL at 17:08

## 2023-05-02 RX ADMIN — DOCUSATE SODIUM 100 MG: 100 CAPSULE, LIQUID FILLED ORAL at 17:08

## 2023-05-02 NOTE — PLAN OF CARE
Problem: PHYSICAL THERAPY ADULT  Goal: Performs mobility at highest level of function for planned discharge setting  See evaluation for individualized goals  Description: Treatment/Interventions: ADL retraining, Functional transfer training, LE strengthening/ROM, Elevations, Therapeutic exercise, Endurance training, Bed mobility, Gait training, Spoke to case management, Spoke to nursing, OT  Equipment Recommended: Primus Go       See flowsheet documentation for full assessment, interventions and recommendations  Note: Prognosis: Good  Problem List: Decreased strength, Decreased endurance, Impaired balance, Decreased mobility, Decreased coordination, Decreased cognition, Decreased safety awareness, Pain, Impaired sensation  Assessment: Patient tolerated treatment well today, reporting increased difficulty with getting in and out to bed, bed mobility assessed noting increased LE management  STS were assessed from various chairs to improve independence with transfers  Patient continues to benefit from skilled PT to address LE strength, transfers and gait training  Barriers to Discharge: Inaccessible home environment     PT Discharge Recommendation: Home with home health rehabilitation    See flowsheet documentation for full assessment

## 2023-05-02 NOTE — PROGRESS NOTES
Progress Note - Mae [age] y o  female MRN: [id number]  Unit/Bed#: [facility] 621-63 Encounter: [id number]    Assessment/Plan   Principal Problem:    Bipolar affective disorder, depressed, severe, with psychotic behavior ([facility] )  Active Problems:    Type 2 diabetes mellitus without complication, with long-term current use of insulin ([facility] )    Essential hypertension    Hyponatremia    History of DVT of lower extremity    Dysphagia    UTI (urinary tract infection)    History of femur fracture    Medical clearance for psychiatric admission      Recommended Treatment:     1  No psychopharmacologic changes necessary at this time; will continue to assess for further optimization  2  Continue with current medications:  a  Remeron 7 5mg HS for sleep, mood, will hold off on further optimization due to hyponatremia  b  Zyprexa 7 5mg HS for mood, and psychosis  c  Geodon discontinued  d  Clonazepam 0 25mg HS for anxiety   3  Continue with group therapy, milieu therapy and occupational therapy  4  Continue frequent safety checks and vitals per unit protocol  5  Patient with Na of 128 this AM, SLIM notified and patient placed on decreased fluid restriction of 1 2L  On chart review, patient was charted to have consumed less than that yesterday, discussed with nursing to implement intake and output monitoring to more closely track fluid consumption  Will recheck BMP in the morning for the next few days to monitor this  6  Continue coordinating with case management regarding disposition    Legal Status: 201  Disposition: To be determined, coordinating with case management, date TBD    Case discussed with treatment team   Risks, benefits and possible side effects of Medications: Risks, benefits, and possible side effects of medications have previously been explained  No new medications at this time      ------------------------------------------------------------    Subjective: Patient's chart was reviewed, "and patient's progress and plan was discussed with treatment team  Per nursing report, Katie Billings has been cooperative on the unit and compliant with medications  Last night patient reported \"a little bit\" of anxiety and depression, as well as some confusion last night as to her location  She has been present in group but has had minimal participation in such  Per nursing discussion patient has also been experiencing constipation, has received PRN for such  Katie Billings was evaluated this morning for continuity of care  On examination, Katie Billings is found seated in the day room and is cooperative with walking to another room to complete evaluation  She states her mood is [place] of Man  \" She reports sleeping well last night but feels tired today, at one point she began to close her eyes during evaluation  Her responses continue to be delayed, and at times she appears to stare blankly at this writer  She states her medications make her feel \"Druggy  \" She is oriented to person, place, and date  She feels her anxiety and depression are present, but \"okay\" today  She has been in contact with her  and daughter, who she states \"want the best for me and want me to get better  \" I asked her what getting better would look like and she responded, \"to walk better  \" When prompted about her behavioral health goals, as this is a U, she responded, \"I don't know  \" She reports feeling safe here, but when asked if she is still worried about the police coming after her and her  she responded, \"Yes and no  \" And when asked to elaborate further she states, \"I'd rather not  \"    Today, Katie Billings is livan for safety on the unit  She denies suicidal ideation, homicidal ideation, auditory hallucinations, and visual hallucinations       At 12:18 attempted to call patient's daughter to provide update, left voicemail    VS: Reviewed, within normal limits    Progress Toward Goals: Slow improvement - still endorsing depression and anxiety to " "staff, some paranoia    Psychiatric Review of Systems:  Behavior over the last 24 hours: unchanged  Sleep: normal  Appetite: adequate  Medication side effects: as noted above  Medical ROS: Hip pain and lower back pain, which she reports is ongoing and not new  Otheriwse, complete review of systems is negative except as noted above      Vital signs in last 24 hours:  Temp:  [97 2 °F (36 2 °C)-98 3 °F (36 8 °C)] 98 3 °F (36 8 °C)  HR:  [83-99] 99  Resp:  [17-18] 18  BP: (120-170)/(70-84) 170/72    Mental Status Exam:    Appearance:  alert, prolonged eye contact, appears stated age, casually dressed and appropriate grooming and hygiene   Behavior:  calm and cooperative, guarded at times   Speech:  delayed initiation, slow and soft   Mood:  \"okay\"   Affect:  blunted   Thought Process:  decreased rate of thought, goal directed   Associations: intact associations   Thought Content:  mild paranoia   Perceptual Disturbances: no reported hallucinations and does not appear to be responding to internal stimuli at this time   Risk Potential: Suicidal ideation - None at present  Homicidal ideation - None  Potential for aggression - No   Sensorium:  oriented to person, place and time/date   Memory:  recent and remote memory grossly intact   Consciousness:  alert and awake   Attention/Concentration: attention span and concentration appear shorter than expected for age   Insight:  limited   Judgment: limited   Gait/Station: slow gait, uses walker   Motor Activity: no abnormal movements     Current Medications:  Current Facility-Administered Medications   Medication Dose Route Frequency Provider Last Rate   • acetaminophen  650 mg Oral Q4H PRN Ankita Blinks, CRNP     • acetaminophen  650 mg Oral Q4H PRN Ankita Blinks, CRNP     • acetaminophen  975 mg Oral Q6H PRN Ankita Blinks, CRNP     • aspirin  81 mg Oral Daily JOHN Carranza     • atorvastatin  40 mg Oral Daily With JOHN Harvey     • " bisacodyl  10 mg Rectal Daily PRN Dacia Mckinney, KARLEENP     • clonazePAM  0 25 mg Oral HS Valerie Hayes MD     • cyanocobalamin  1,000 mcg Oral Daily Arlyn Figueroa, JOHN     • docusate sodium  100 mg Oral BID Margarita Dominguez PA-C     • haloperidol lactate  5 mg Intramuscular Q4H PRN Max 4/day Ariella Course, CRNP     • insulin glargine  15 Units Subcutaneous HS Arlyn Figueroa, KARLEENP     • insulin lispro  1-5 Units Subcutaneous TID AC Arlyn Figueroa, CRNP     • insulin lispro  1-5 Units Subcutaneous HS Arlyn Figueroa, CRNP     • metoprolol succinate  37 5 mg Oral Daily Dacia Mckinney, JOHN     • mirtazapine  7 5 mg Oral HS Ariella Course, CRNP     • OLANZapine  7 5 mg Oral HS Latisha Allred MD     • pantoprazole  20 mg Oral Early Morning Arlyn Figueroa, JOHN     • polyethylene glycol  17 g Oral Daily PRN Margarita Dominguez PA-C     • risperiDONE  0 25 mg Oral Q4H PRN Max 6/day Ariella Course, CRNP     • risperiDONE  0 5 mg Oral Q4H PRN Max 3/day Ariella Course, CRNP     • risperiDONE  1 mg Oral Q2H PRN Max 3/day Ariella Course, CRNP     • torsemide  5 mg Oral Every Other Day Arlyn Figueroa, JOHN     • traZODone  50 mg Oral HS PRN Ariella Course, CRNP         Behavioral Health Medications: all current active meds have been reviewed  Changes as in plan section above  Laboratory results:  I have personally reviewed all pertinent laboratory/tests results     Recent Results (from the past 48 hour(s))   Fingerstick Glucose (POCT)    Collection Time: 04/30/23 12:08 PM   Result Value Ref Range    POC Glucose 202 (H) 65 - 140 mg/dl   Fingerstick Glucose (POCT)    Collection Time: 04/30/23  4:02 PM   Result Value Ref Range    POC Glucose 237 (H) 65 - 140 mg/dl   Fingerstick Glucose (POCT)    Collection Time: 04/30/23  9:08 PM   Result Value Ref Range    POC Glucose 175 (H) 65 - 140 mg/dl   Fingerstick Glucose (POCT)    Collection Time: 05/01/23  7:46 AM   Result Value Ref Range    POC Glucose 157 (H) 65 - 140 mg/dl   Fingerstick Glucose (POCT)    Collection Time: 05/01/23 12:42 PM   Result Value Ref Range    POC Glucose 190 (H) 65 - 140 mg/dl   Fingerstick Glucose (POCT)    Collection Time: 05/01/23  4:17 PM   Result Value Ref Range    POC Glucose 200 (H) 65 - 140 mg/dl   Fingerstick Glucose (POCT)    Collection Time: 05/01/23  8:40 PM   Result Value Ref Range    POC Glucose 216 (H) 65 - 140 mg/dl   Basic metabolic panel    Collection Time: 05/02/23  5:29 AM   Result Value Ref Range    Sodium 128 (L) 135 - 147 mmol/L    Potassium 4 2 3 5 - 5 3 mmol/L    Chloride 91 (L) 96 - 108 mmol/L    CO2 28 21 - 32 mmol/L    ANION GAP 9 4 - 13 mmol/L    BUN 14 5 - 25 mg/dL    Creatinine 0 63 0 60 - 1 30 mg/dL    Glucose 128 65 - 140 mg/dL    Glucose, Fasting 128 (H) 65 - 99 mg/dL    Calcium 9 0 8 4 - 10 2 mg/dL    eGFR 91 ml/min/1 73sq m   Fingerstick Glucose (POCT)    Collection Time: 05/02/23  7:16 AM   Result Value Ref Range    POC Glucose 142 (H) 65 - 140 mg/dl        This note has been constructed using a voice recognition system  There may be translation, syntax, or grammatical errors  If you have any questions, please contact the dictating author      Tessy Epstein MD  Psychiatry Residency, PGY-II

## 2023-05-02 NOTE — DISCHARGE INSTR - OTHER ORDERS
You are being discharged to [facility], [address], [address] [place] [address]    If you are unable to deal with your distressed mood alone please contact Crisis # [phone], dial 911 or go to the nearest emergency center

## 2023-05-02 NOTE — SPEECH THERAPY NOTE
Speech Language/Pathology    Speech/Language Pathology Progress Note    Patient Name: Ramsey Umanzor  VDUAG'I Date: 5/2/2023                     SLP RECOMMENDATIONS:         Diet: Level 3 Dental soft         Liquids: Thin liquids         Medications: as tolerated         Aspiration Precautions: upright, slow rate, small bites/sips      Summary:  Pt seen for ongoing dysphagia therapy  Pt seen with portion of lunch meal  Pt noted to still be receiving Level [facility]thin [facility]  Pt with slow, but functional mastication of soft solids  No overt s/s aspiration with thin liquids via single straw sips  Pt trialed with regular solids with prolonged mastication and mild oral residue, although ultimately functional  Pt agreeable to only a small amount of PO intake, reporting she was full  D/w provider who upgraded diet order to Level 3 dental soft/thin diet  SLP reviewed general aspiration precautions with pt  Pt demonstrated variable recall of precautions  Pt would benefit from review of recommendations  Assessment:  Slow, but functional mastication of Level 3 dental soft solids  No overt s/s aspiration with single sips of thin liquid       Plan/Recommendations:  Level 3 Dental soft/thin liquids   Aspiration precautions   SLP to follow         Lab Results   Component Value Date    WBC 6 05 04/29/2023    HGB 8 4 (L) 04/29/2023    HCT 27 0 (L) 04/29/2023    MCV 90 04/29/2023     04/29/2023           Problem List  Principal Problem:    Bipolar affective disorder, depressed, severe, with psychotic behavior ([address] )  Active Problems:    Type 2 diabetes mellitus without complication, with long-term current use of insulin ([address] )    Essential hypertension    Hyponatremia    History of DVT of lower extremity    Dysphagia    UTI (urinary tract infection)    History of femur fracture    Medical clearance for psychiatric admission       Past Medical History  Past Medical History:   Diagnosis Date   • Anesthesia complication difficulty waking up   • Anxiety    • Arthritis     left knee   • Bipolar 1 disorder (HCC)    • Bone spur     left knee behing the knee cap   • Cancer [facility])    • Chronic kidney disease    • Chronic pain disorder    • Colon cancer [facility])     patient states about 2017   • Colon polyp     Tubulovillous Adenoma High Grade Dysplasia - 2017   • Depression    • Diabetes mellitus ([facility] )    • Endometrial cancer ([facility] )     grade I   • Hx of bleeding disorder     vaginal bleeding started on 3/13/17    • Hyperlipidemia    • Hypertension    • Hypomagnesemia 03/20/2023   • Memory loss    • PONV (postoperative nausea and vomiting)    • Psychiatric disorder    • Psychiatric illness    • Psychosis ([facility] )    • Shortness of breath     with exertion   • Sleep difficulties    • Urinary incontinence    • Vitamin D deficiency         Past Surgical History  Past Surgical History:   Procedure Laterality Date   • BREAST BIOPSY Left     benign-maybe at ar age 58   • CHOLECYSTECTOMY      open   • COLONOSCOPY     • DILATION AND CURETTAGE OF UTERUS N/A 04/05/2017    Procedure: DILATATION AND CURETTAGE (D&C);   Surgeon: Hilda Koch MD;  Location: [facility] MAIN OR;  Service:    • HYSTERECTOMY     • OOPHORECTOMY     • PELVIC LAPAROSCOPY     • AZ ARTHRP KNE CONDYLE&PLATU MEDIAL&LAT COMPARTMENTS Left 12/06/2022    Procedure: ARTHROPLASTY KNEE TOTAL W ROBOT - CEMENTED - LEFT;  Surgeon: Guanakito Durbin DO;  Location: [address] [place] [address];  Service: Orthopedics   • AZ LAPS TOTAL HYSTERECT 250 GM/< W/RMVL TUBE/OVARY N/A 05/04/2017    Procedure: ROBOTIC ASSISTED TOTAL LAPAROSCOPIC HYSTERECTOMY, BILATERAL SALPINGOOPHERECTOMY; CYSTOSCOPY;  Surgeon: Arcelia Epps MD;  Location:  MAIN OR;  Service: Gynecology Oncology   • AZ OPTX FEM SHFT [address] W/INSJ IMED IMPLT W/WO SCREW Left 3/20/2023    Procedure: INSERTION NAIL IM FEMUR RETROGRADE;  Surgeon: Janelle Lyle MD;  Location: [address] [place] [address];  Service: Orthopedics   • REPLACEMENT TOTAL KNEE     • TONSILLECTOMY     • TUBAL LIGATION

## 2023-05-02 NOTE — PLAN OF CARE
Problem: Potential for Falls  Goal: Patient will remain free of falls  Description: INTERVENTIONS:  - Educate patient/family on patient safety including physical limitations  - Instruct patient to call for assistance with activity   - Consult OT/PT to assist with strengthening/mobility   - Keep Call bell within reach  - Keep bed low and locked with side rails adjusted as appropriate  - Keep care items and personal belongings within reach  - Initiate and maintain comfort rounds  - Make Fall Risk Sign visible to staff  Problem: PASCUAL  Goal: Will exhibit normal sleep and speech and no impulsivity  Description: INTERVENTIONS:  - Administer medication as ordered  - Set limits on impulsive behavior  - Make attempts to decrease external stimuli as possible  Outcome: Progressing     Problem: PSYCHOSIS  Goal: Will report no hallucinations or delusions  Description: Interventions:  - Administer medication as  ordered  - Every waking shifts and PRN assess for the presence of hallucinations and or delusions  - Assist with reality testing to support increasing orientation  - Assess if patient's hallucinations or delusions are encouraging self-harm or harm to others and intervene as appropriate  Outcome: Progressing     Problem: ANXIETY  Goal: Will report anxiety at manageable levels  Description: INTERVENTIONS:  - Administer medication as ordered  - Teach and encourage coping skills  - Provide emotional support  - Assess patient/family for anxiety and ability to cope  Outcome: Progressing     Problem: SELF CARE DEFICIT  Goal: Return ADL status to a safe level of function  Description: INTERVENTIONS:  - Administer medication as ordered  - Assess ADL deficits and provide assistive devices as needed  - Obtain PT/OT consults as needed  - Assist and instruct patient to increase activity and self care as tolerated  Outcome: Progressing     Problem: Nutrition/Hydration-ADULT  Goal: Nutrient/Hydration intake appropriate for improving, restoring or maintaining nutritional needs  Description: Monitor and assess patient's nutrition/hydration status for malnutrition  Collaborate with interdisciplinary team and initiate plan and interventions as ordered  Monitor patient's weight and dietary intake as ordered or per policy  Utilize nutrition screening tool and intervene as necessary  Determine patient's food preferences and provide high-protein, high-caloric foods as appropriate       INTERVENTIONS:  - Monitor oral intake, urinary output, labs, and treatment plans  - Assess nutrition and hydration status and recommend course of action  - Evaluate amount of meals eaten  - Assist patient with eating if necessary   - Allow adequate time for meals  - Recommend/ encourage appropriate diets, oral nutritional supplements, and vitamin/mineral supplements  - Order, calculate, and assess calorie counts as needed  - Recommend, monitor, and adjust tube feedings and TPN/PPN based on assessed needs  - Assess need for intravenous fluids  - Provide specific nutrition/hydration education as appropriate  - Include patient/family/caregiver in decisions related to nutrition  Outcome: Progressing     - Apply yellow socks and bracelet for high fall risk patients  - Consider moving patient to room near nurses station  Outcome: Progressing     Problem: DEPRESSION  Goal: Will be euthymic at discharge  Description: INTERVENTIONS:  - Administer medication as ordered  - Provide emotional support via 1:1 interaction with staff  - Encourage involvement in milieu/groups/activities  - Monitor for social isolation  Outcome: Progressing

## 2023-05-02 NOTE — NURSING NOTE
"Patient is visible on the unit and sitting in her chair in the day room  She is cooperative with medications  Patient is on chair alarm   Patient was encouraged to walk with the nurse but stated \"I'm afraid to walk\" Patient is able to let her needs known   Patient denies Dilip Garcia    "

## 2023-05-02 NOTE — TREATMENT TEAM
[facility]: self care action plan group ,  Pt able to stay for duration and mainly observed when prompted  05/02/23 1100   Activity/Group Checklist   Group Other (Comment)  ( recovery: self care action plan)   Attendance Attended   Attendance Duration (min) 46-60   Interactions Did not interact   Affect/Mood Constricted   Goals Achieved Identified feelings; Identified relapse prevention strategies; Discussed coping strategies; Discussed self-esteem issues; Able to listen to others

## 2023-05-02 NOTE — PROGRESS NOTES
Pt increasingly interactive and focused to task in this session on wellness     05/02/23 1330   Activity/Group Checklist   Group Wellness  (petals of wellness )   Attendance Attended   Attendance Duration (min) 46-60   Interactions Interacted appropriately   Affect/Mood Appropriate;Normal range;Calm   Goals Achieved Able to engage in interactions

## 2023-05-02 NOTE — PHYSICAL THERAPY NOTE
Physical TherapyTreatment Note    Patient's Name: Carmen Comes    Admitting Diagnosis  Major depressive disorder [F32 9]  Medical clearance for psychiatric admission [Z00 8]  Encounter for psychological evaluation [Z00 8]    Problem List  Patient Active Problem List   Diagnosis   • History of endometrial cancer   • Type 2 diabetes mellitus without complication, with long-term current use of insulin ([facility] )   • Essential hypertension   • Hyperlipidemia   • History of colon cancer   • OAB (overactive bladder)   • Axillary mass, right   • Mass of axillary tail of right breast   • Mass of axillary tail of left breast   • Hyponatremia   • Bipolar affective disorder, depressed, severe, with psychotic behavior ([facility] )   • Elevated brain natriuretic peptide (BNP) level   • Osteoarthritis of both knees   • Status post total left knee replacement using cement   • Platelets decreased (HCC)   • PONV (postoperative nausea and vomiting)   • Delayed emergence from anesthesia   • Bipolar depression ([facility] )   • Fracture of left femur ([facility] )   • History of DVT of lower extremity   • Thyroid nodule   • Anxiety   • Acute blood loss anemia   • Dysphagia   • UTI (urinary tract infection)   • Severe protein-calorie malnutrition (HCC)   • Thrush   • History of femur fracture   • Medical clearance for psychiatric admission       Past Medical History  Past Medical History:   Diagnosis Date   • Anesthesia complication     difficulty waking up   • Anxiety    • Arthritis     left knee   • Bipolar 1 disorder (HCC)    • Bone spur     left knee behing the knee cap   • Cancer ([facility] )    • Chronic kidney disease    • Chronic pain disorder    • Colon cancer [facility])     patient states about 2017   • Colon polyp     Tubulovillous Adenoma High Grade Dysplasia - 2017   • Depression    • Diabetes mellitus ([facility] )    • Endometrial cancer ([facility] )     grade I   • Hx of bleeding disorder     vaginal bleeding started on 3/13/17    • Hyperlipidemia    • Hypertension    • Hypomagnesemia 03/20/2023   • Memory loss    • PONV (postoperative nausea and vomiting)    • Psychiatric disorder    • Psychiatric illness    • Psychosis ([address] )    • Shortness of breath     with exertion   • Sleep difficulties    • Urinary incontinence    • Vitamin D deficiency        Past Surgical History  Past Surgical History:   Procedure Laterality Date   • BREAST BIOPSY Left     benign-maybe at ar age 58   • CHOLECYSTECTOMY      open   • COLONOSCOPY     • DILATION AND CURETTAGE OF UTERUS N/A 04/05/2017    Procedure: DILATATION AND CURETTAGE (D&C);   Surgeon: Jayshree Laguna MD;  Location: [facility] MAIN OR;  Service:    • HYSTERECTOMY     • OOPHORECTOMY     • PELVIC LAPAROSCOPY     • AR ARTHRP KNE CONDYLE&PLATU MEDIAL&LAT COMPARTMENTS Left 12/06/2022    Procedure: ARTHROPLASTY KNEE TOTAL W ROBOT - CEMENTED - LEFT;  Surgeon: Nely Araya DO;  Location: [address] [place] [address];  Service: Orthopedics   • AR LAPS TOTAL HYSTERECT 250 GM/< W/RMVL TUBE/OVARY N/A 05/04/2017    Procedure: ROBOTIC ASSISTED TOTAL LAPAROSCOPIC HYSTERECTOMY, BILATERAL SALPINGOOPHERECTOMY; CYSTOSCOPY;  Surgeon: Claire Ocampo MD;  Location:  MAIN OR;  Service: Gynecology Oncology   • AR OPTX FEM SHFT [address] W/INSJ IMED IMPLT W/WO SCREW Left 3/20/2023    Procedure: INSERTION NAIL IM FEMUR RETROGRADE;  Surgeon: Jeff Palacios MD;  Location: [address] [place] [address];  Service: Orthopedics   • REPLACEMENT TOTAL KNEE     • TONSILLECTOMY     • TUBAL LIGATION         Recent Imaging  No orders to display       Recent Vital Signs  Vitals:    05/01/23 2034 05/02/23 0748 05/02/23 0800 05/02/23 1538   BP: 163/84 170/72  140/80   BP Location: Left arm Left arm  Left arm   Pulse: 84 99  90   Resp: 18 18  17   Temp: 98 1 °F (36 7 °C) 98 3 °F (36 8 °C)  97 7 °F (36 5 °C)   TempSrc: Temporal Temporal  Temporal   SpO2: 97% 96%  97%   Weight:   64 8 kg (142 lb 13 7 oz)        PT Treatment Time: 45 minutes      05/02/23 1455   PT Last Visit   PT Visit Date 05/02/23   Note Type   Note Type Treatment   Pain Assessment   Pain Assessment Tool 0-10   Pain Score 7   Pain Location/Orientation Location: Generalized   Restrictions/Precautions   Weight Bearing Precautions Per Order No   Other Precautions Cognitive; Chair Alarm; Bed Alarm   General   Chart Reviewed Yes   Response to Previous Treatment Patient with no complaints from previous session  Family/Caregiver Present No   Cognition   Arousal/Participation Responsive   Orientation Level Oriented to person;Oriented to place;Oriented to time;Oriented to situation   Memory Decreased recall of recent events;Decreased short term memory   Following Commands Follows one step commands with increased time or repetition   Bed Mobility   Supine to Sit 4  Minimal assistance   Additional items LE management   Sit to Supine 4  Minimal assistance   Additional items LE management   Additional Comments Scoot EOB minAx1, Scooting in bed minAx1   Transfers   Sit to Stand 4  Minimal assistance   Additional items Assist x 1   Stand to Sit 5  Supervision   Toilet transfer 4  Minimal assistance   Additional items Assist x 1;Raised toilet seat   Additional Comments with RW   Ambulation/Elevation   Gait pattern Improper Weight shift;Narrow EVELIN; Antalgic;Decreased L stance; Short stride; Step to;Decreased toe off;Decreased heel strike;Decreased hip extension   Gait Assistance 5  Supervision   Assistive Device Rolling walker   Distance 150ft, 50ft, 100ft, 100ft, 100ft   Balance   Static Sitting Fair +   Dynamic Sitting Fair   Static Standing Fair   Dynamic Standing Fair   Ambulatory Fair   Endurance Deficit   Endurance Deficit Yes   Endurance Deficit Description below baseline decreased activity tolerance   Activity Tolerance   Activity Tolerance Patient tolerated treatment well   Medical Staff Made Aware spoke to Fifi Acevedo spoke to RN   Exercises   Bridging Supine;10 reps;PAVAN   Neuro re-ed 13x STS various surfaces   Assessment   Prognosis Good   Problem List Decreased strength;Decreased endurance; Impaired balance;Decreased mobility; Decreased coordination;Decreased cognition;Decreased safety awareness;Pain; Impaired sensation   Assessment Patient tolerated treatment well today, reporting increased difficulty with getting in and out to bed, bed mobility assessed noting increased LE management  STS were assessed from various chairs to improve independence with transfers  Patient continues to benefit from skilled PT to address LE strength, transfers and gait training  Barriers to Discharge Inaccessible home environment   Goals   Patient Goals to go home   STG Expiration Date 05/15/23   Short Term Goal #1 see eval note   PT Treatment Day 1   Plan   Treatment/Interventions ADL retraining;Functional transfer training;LE strengthening/ROM; Elevations; Therapeutic exercise; Endurance training;Bed mobility;Gait training;Spoke to case management;Spoke to nursing;OT   Progress Progressing toward goals   PT Frequency 2-3x/wk   Recommendation   PT Discharge Recommendation Home with home health rehabilitation   Equipment Recommended [address] Mobility Inpatient   Turning in Flat Bed Without Bedrails 4   Lying on Back to Sitting on Edge of Flat Bed Without Bedrails 3   Moving Bed to Chair 4   Standing Up From Chair Using Arms 4   Walk in Room 3   Climb 3-5 Stairs With Railing 2   Basic Mobility Inpatient Raw Score 20   Basic Mobility Standardized Score 43 99   Highest Level Of Mobility   JH-HLM Goal 6: Walk 10 steps or more   JH-HLM Achieved 8: Walk 250 feet ot more   Education   Education Provided Mobility training   Patient Demonstrates acceptance/verbal understanding   End of Consult   Patient Position at End of Consult All needs within reach;Bed/Chair alarm activated  (Dayroom)       Tiffany Belle, SPT

## 2023-05-02 NOTE — PLAN OF CARE
Pt  Attended 2/3 groups with increase spontaneity  Problem: Ineffective Coping  Goal: Participates in unit activities  Description: Interventions:  - Provide therapeutic environment   - Provide required programming   - Redirect inappropriate behaviors   Outcome: Progressing

## 2023-05-02 NOTE — CASE MANAGEMENT
Rec'd call from pt's dtr Red Mendez stating pt's  retired as May 1st and pt's [employer] plan is now termed  Pt has [employer] plan through [employer] although unable to provide any ID information  CM attempted to obtain prior auth for [employer]  Unable to process due to pt's [employer] plan termed and no info available for [facility] plan

## 2023-05-02 NOTE — WOUND OSTOMY CARE
Consult Note - Wound   Lavon Ring 70 y o  female MRN: [id number]  Unit/Bed#: [facility] 501-98 Encounter: [id number]      History and Present Illness:  70year old female seen today for initial wound care  Pt [address] wounds [address]  4/5/23  Patient able to ambulate with use of walker and assistance  Alert, wearing adult brief  PMH bipolar affective disorder type 2 DM History of DVT  Severe protein calorie malnutrition,       Assessment Findings:   1)POA stage 1 to sacrum pink intact tissue non blanchable   2)POA bilateral buttocks stage 2 partial thickness skin loss with 100% pink wound beds  Allevyn bordered foam applied to sacrum and bilateral buttocks   3)Bilateral heels intact     No induration, fluctuance, odor, warmth/temperature differences, redness, or purulence noted to the above noted wounds and skin areas assessed  New dressings applied per orders listed below  Patient tolerated well- no s/s of non-verbal pain or discomfort observed during the encounter  Bedside nurse aware of plan of care  See flow sheets for more detailed assessment findings  Wound care will continue to follow  Plan:   Cleanse sacrum and bilateral ischium with soap and water, pat dry   Apply Allevyn bordered foam, check beneath foam each shift, and reapply  Change each foam every 3 days and prn soil or dislodgement  Wound 04/28/23 Pressure Injury Sacrum (Active)   Wound Image   05/02/23 1038   Wound Description Intact; Non-blanchable erythema 05/02/23 1038   Pressure Injury Stage 1 05/02/23 1038   Giselle-wound Assessment Brown;Hyperpigmented 05/02/23 1038   Wound Length (cm) 6 cm 05/02/23 1038   Wound Width (cm) 4 cm 05/02/23 1038   Wound Surface Area (cm^2) 24 cm^2 05/02/23 1038   Drainage Amount None 05/02/23 1038   Treatments Site care 05/02/23 1038   Dressing Foam, Silicon (eg  Allevyn, etc) 05/02/23 1038   Dressing Changed Changed 05/02/23 1038   Patient Tolerance Tolerated well 05/02/23 1038   Dressing Status Clean;Dry; Intact 05/02/23 1038       Wound 04/28/23 Pressure Injury Buttocks Right (Active)   Wound Image   05/02/23 1039   Wound Description Beefy red 05/02/23 1039   Pressure Injury Stage 2 05/02/23 1039   Giselle-wound Assessment Hyperpigmented 05/02/23 1039   Wound Length (cm) 2 cm 05/02/23 1039   Wound Width (cm) 1 cm 05/02/23 1039   Wound Depth (cm) 0 1 cm 05/02/23 1039   Wound Surface Area (cm^2) 2 cm^2 05/02/23 1039   Wound Volume (cm^3) 0 2 cm^3 05/02/23 1039   Calculated Wound Volume (cm^3) 0 2 cm^3 05/02/23 1039   Drainage Amount None 05/02/23 1039   Treatments Cleansed;Site care 05/02/23 1039   Dressing Foam, Silicon (eg  Allevyn, etc) 05/02/23 1039   Dressing Changed Changed 05/02/23 1039   Patient Tolerance Tolerated well 05/02/23 1039   Dressing Status Clean;Dry; Intact 05/02/23 1039       Wound 05/02/23 Pressure Injury Buttocks Left (Active)   Wound Image   05/02/23 1040   Wound Description Intact; Pink 05/02/23 1040   Pressure Injury Stage 1 05/02/23 1040   Giselle-wound Assessment Hyperpigmented 05/02/23 1040   Wound Length (cm) 1 cm 05/02/23 1040   Wound Width (cm) 1 cm 05/02/23 1040   Wound Surface Area (cm^2) 1 cm^2 05/02/23 1040   Drainage Amount None 05/02/23 1040   Treatments Cleansed;Site care 05/02/23 1040   Dressing Foam, Silicon (eg  Allevyn, etc) 05/02/23 1040   Dressing Changed Changed 05/02/23 1040   Patient Tolerance Tolerated well 05/02/23 1040   Dressing Status Clean;Dry; Intact 05/02/23 1040       Call or tigertext with any questions  Wound Care will continue to follow    Stephanie PFEIFFER RN

## 2023-05-02 NOTE — NURSING NOTE
"Pt visible in day room, reports a \"little bit\" of anxiety and depression this evening  Soft speech and delayed in response  In evening, pt asking staff \"where am I going tonight? \", reoriented  Spoke to  and daughter on phone  Maintained on chair and bed alarm for fall risk, accompanied with ambulation  Before bed, pt insisting light be left on  upon further evaluation, pt stating she would like the light on because \"I broke my femur\"  Pt reassured if she gets OOB in the night, staff will be there to turn the light on / assist pt  Pt continues to be resistive, however agreeable to try light off  Took HS medications in apple sauce  Prn miralax not effective at this time     "

## 2023-05-02 NOTE — TREATMENT TEAM
05/02/23 0832   Team Meeting   Meeting Type Daily Rounds   Initial Conference Date 05/02/23   Team Members Present   Team Members Present Physician;Nurse;;; Occupational Therapist   Physician Team Member Dr Elvie Peng,  Dr Jose M Reeves Team Member Jamari Butts Work Team Member Hill   OT Team Member Calvin RAMIREZ   Patient/Family Present   Patient Present No   Patient's Family Present No     Reporting a little depression and a little anxiety, Na level = 128, medicine aware

## 2023-05-02 NOTE — NURSING NOTE
"Pt is visible in dayroom social with select peers  She is blunted on approach and noted to have slow/delayed speech pattern  Pt reports that her depression is \"not good\" but reports that her anxiety is \"fine\" this shift  Pt denies SI/HI and AH/VH  Pt received dulcolax suppository at 18:19 for constipation  Pt is cooperative with care and medications  Bed and chair alarm maintained  Safety checks ongoing     "

## 2023-05-02 NOTE — QUICK NOTE
Called patient's daughter Igor Rodríguez to provide update, discussed patient's progress, medication changes and projected adjustments, as well as ongoing issue with hyponatremia today  She states her mother had previously been very talkative and pressured and now is flat and withdrawn with latency to respond  She is concerned about the treatment of her depression  Discussed that we would like to increase remeron further but with her hyponatremia worsening today this is not feasible, she expressed understanding  Patient's daughter reports at home Garfield Morrow is on 1500mL fluid restriction, with toresemide 10mg every other day to manage hyponatremia  She reports historically this has kept her sodium in check, and that fluid boluses have worsened it in the past  She is requesting nephrology be involved, will discuss this with SLIM

## 2023-05-03 LAB
ANION GAP SERPL CALCULATED.3IONS-SCNC: 8 MMOL/L (ref 4–13)
BUN SERPL-MCNC: 16 MG/DL (ref 5–25)
CALCIUM SERPL-MCNC: 8.9 MG/DL (ref 8.4–10.2)
CHLORIDE SERPL-SCNC: 93 MMOL/L (ref 96–108)
CO2 SERPL-SCNC: 29 MMOL/L (ref 21–32)
CREAT SERPL-MCNC: 0.62 MG/DL (ref 0.6–1.3)
GFR SERPL CREATININE-BSD FRML MDRD: 92 ML/MIN/1.73SQ M
GLUCOSE SERPL-MCNC: 128 MG/DL (ref 65–140)
GLUCOSE SERPL-MCNC: 140 MG/DL (ref 65–140)
GLUCOSE SERPL-MCNC: 164 MG/DL (ref 65–140)
GLUCOSE SERPL-MCNC: 171 MG/DL (ref 65–140)
GLUCOSE SERPL-MCNC: 218 MG/DL (ref 65–140)
POTASSIUM SERPL-SCNC: 4.2 MMOL/L (ref 3.5–5.3)
SODIUM SERPL-SCNC: 130 MMOL/L (ref 135–147)

## 2023-05-03 RX ORDER — BISACODYL 10 MG
10 SUPPOSITORY, RECTAL RECTAL DAILY PRN
Status: DISCONTINUED | OUTPATIENT
Start: 2023-05-03 | End: 2023-05-30 | Stop reason: HOSPADM

## 2023-05-03 RX ADMIN — INSULIN GLARGINE 15 UNITS: 100 INJECTION, SOLUTION SUBCUTANEOUS at 21:48

## 2023-05-03 RX ADMIN — CYANOCOBALAMIN TAB 1000 MCG 1000 MCG: 1000 TAB at 09:04

## 2023-05-03 RX ADMIN — ATORVASTATIN CALCIUM 40 MG: 40 TABLET, FILM COATED ORAL at 17:04

## 2023-05-03 RX ADMIN — INSULIN LISPRO 1 UNITS: 100 INJECTION, SOLUTION INTRAVENOUS; SUBCUTANEOUS at 21:50

## 2023-05-03 RX ADMIN — POLYETHYLENE GLYCOL 3350 17 G: 17 POWDER, FOR SOLUTION ORAL at 10:23

## 2023-05-03 RX ADMIN — PANTOPRAZOLE SODIUM 20 MG: 20 TABLET, DELAYED RELEASE ORAL at 05:59

## 2023-05-03 RX ADMIN — DOCUSATE SODIUM 100 MG: 100 CAPSULE, LIQUID FILLED ORAL at 17:04

## 2023-05-03 RX ADMIN — CLONAZEPAM 0.25 MG: 0.5 TABLET ORAL at 21:46

## 2023-05-03 RX ADMIN — INSULIN LISPRO 1 UNITS: 100 INJECTION, SOLUTION INTRAVENOUS; SUBCUTANEOUS at 17:05

## 2023-05-03 RX ADMIN — METOPROLOL SUCCINATE 37.5 MG: 25 TABLET, EXTENDED RELEASE ORAL at 09:03

## 2023-05-03 RX ADMIN — OLANZAPINE 7.5 MG: 2.5 TABLET, FILM COATED ORAL at 21:39

## 2023-05-03 RX ADMIN — TORSEMIDE 10 MG: 5 TABLET ORAL at 09:04

## 2023-05-03 RX ADMIN — INSULIN LISPRO 2 UNITS: 100 INJECTION, SOLUTION INTRAVENOUS; SUBCUTANEOUS at 12:07

## 2023-05-03 RX ADMIN — BISACODYL 10 MG: 10 SUPPOSITORY RECTAL at 19:17

## 2023-05-03 RX ADMIN — MIRTAZAPINE 7.5 MG: 7.5 TABLET, FILM COATED ORAL at 21:39

## 2023-05-03 RX ADMIN — DOCUSATE SODIUM 100 MG: 100 CAPSULE, LIQUID FILLED ORAL at 09:07

## 2023-05-03 RX ADMIN — ASPIRIN 81 MG: 81 TABLET, COATED ORAL at 09:04

## 2023-05-03 NOTE — PLAN OF CARE
Problem: Potential for Falls  Goal: Patient will remain free of falls  Description: INTERVENTIONS:  - Educate patient/family on patient safety including physical limitations  - Instruct patient to call for assistance with activity   - Consult OT/PT to assist with strengthening/mobility   - Keep Call bell within reach  - Keep bed low and locked with side rails adjusted as appropriate  - Keep care items and personal belongings within reach  - Initiate and maintain comfort rounds  - Make Fall Risk Sign visible to staff  - Apply yellow socks and bracelet for high fall risk patients  - Consider moving patient to room near nurses station  Outcome: Progressing     Problem: DEPRESSION  Goal: Will be euthymic at discharge  Description: INTERVENTIONS:  - Administer medication as ordered  - Provide emotional support via 1:1 interaction with staff  - Encourage involvement in milieu/groups/activities  - Monitor for social isolation  Outcome: Progressing     Problem: PASCUAL  Goal: Will exhibit normal sleep and speech and no impulsivity  Description: INTERVENTIONS:  - Administer medication as ordered  - Set limits on impulsive behavior  - Make attempts to decrease external stimuli as possible  Outcome: Progressing     Problem: PSYCHOSIS  Goal: Will report no hallucinations or delusions  Description: Interventions:  - Administer medication as  ordered  - Every waking shifts and PRN assess for the presence of hallucinations and or delusions  - Assist with reality testing to support increasing orientation  - Assess if patient's hallucinations or delusions are encouraging self-harm or harm to others and intervene as appropriate  Outcome: Progressing     Problem: ANXIETY  Goal: Will report anxiety at manageable levels  Description: INTERVENTIONS:  - Administer medication as ordered  - Teach and encourage coping skills  - Provide emotional support  - Assess patient/family for anxiety and ability to cope  Outcome: Progressing Problem: SELF CARE DEFICIT  Goal: Return ADL status to a safe level of function  Description: INTERVENTIONS:  - Administer medication as ordered  - Assess ADL deficits and provide assistive devices as needed  - Obtain PT/OT consults as needed  - Assist and instruct patient to increase activity and self care as tolerated  Outcome: Progressing     Problem: Nutrition/Hydration-ADULT  Goal: Nutrient/Hydration intake appropriate for improving, restoring or maintaining nutritional needs  Description: Monitor and assess patient's nutrition/hydration status for malnutrition  Collaborate with interdisciplinary team and initiate plan and interventions as ordered  Monitor patient's weight and dietary intake as ordered or per policy  Utilize nutrition screening tool and intervene as necessary  Determine patient's food preferences and provide high-protein, high-caloric foods as appropriate       INTERVENTIONS:  - Monitor oral intake, urinary output, labs, and treatment plans  - Assess nutrition and hydration status and recommend course of action  - Evaluate amount of meals eaten  - Assist patient with eating if necessary   - Allow adequate time for meals  - Recommend/ encourage appropriate diets, oral nutritional supplements, and vitamin/mineral supplements  - Order, calculate, and assess calorie counts as needed  - Recommend, monitor, and adjust tube feedings and TPN/PPN based on assessed needs  - Assess need for intravenous fluids  - Provide specific nutrition/hydration education as appropriate  - Include patient/family/caregiver in decisions related to nutrition  Outcome: Progressing

## 2023-05-03 NOTE — PROGRESS NOTES
Progress Note - Mae 108 70 y o  female MRN: [id number]  Unit/Bed#: [facility]85 Encounter: [id number]    Assessment/Plan   Principal Problem:    Bipolar affective disorder, depressed, severe, with psychotic behavior ([facility] )  Active Problems:    Type 2 diabetes mellitus without complication, with long-term current use of insulin ([facility] )    Essential hypertension    Hyponatremia    History of DVT of lower extremity    Dysphagia    UTI (urinary tract infection)    History of femur fracture    Medical clearance for psychiatric admission      Recommended Treatment:     1  Will make the following medication changes:   a  Considering switching zyprexa/remeron to latuda pending insurance converage information, for bipolar depression  2  Continue with current medications:  a  Zyprexa 7 5mg HS for psychosis and mood stabilization  b  Klonopin 0 25mg HS for anxiety  c  Remeron 7 5mg HS for sleep, mood   3  Continue with group therapy, milieu therapy and occupational therapy  4  Continue frequent safety checks and vitals per unit protocol  5  Continue with SLIM medical management as indicated  6  Continue coordinating with case management regarding disposition  7  BMP in AM to monitor sodium    Case discussed with treatment team   Risks, benefits and possible side effects of Medications: Risks, benefits, and possible side effects of medications have previously been explained  No new medications at this time  ------------------------------------------------------------    Subjective: Patient's chart was reviewed, and patient's progress and plan was discussed with treatment team  Per nursing report, Obdulia Denson has been cooperative on the unit and compliant with medications  She has been visible in the milieu and social with select peers   She was seen by wound care yesterday due to sacral wounds present on arrival to hospital  Patient has been experiencing constipation despite multiple PRNs, "suppositories, had one small bowel movement  Paresh Guthrie was evaluated this morning for continuity of care  On examination, Paresh Guthrie is found seated in the day room and is cooperative with evaluation  She states her mood is \"not very good  \" She reports sleeping well last night but reports poor appetite, though she is charted to be eating well  She denies any adverse effects from medications  We discussed her hyponatremia and she states she has been struggling with this for a while  She is guarded regarding paranoia questioning but does report feeling safe here in the hospital and does not believe anyone is trying to hurt her  She denies suicidal ideation, homicidal ideation, auditory hallucinations, and visual hallucinations  She requests to shower today  She is reporting some anxiety today, but denies feeling depressed  VS: Reviewed, within normal limits    Progress Toward Goals: Slow improvements - still blunted and delayed speech    Psychiatric Review of Systems:  Behavior over the last 24 hours: unchanged  Sleep: early awakening  Appetite: adequate  Medication side effects: none verbalized  Medical ROS: Complete review of systems is negative except as noted above      Vital signs in last 24 hours:  Temp:  [97 1 °F (36 2 °C)-97 7 °F (36 5 °C)] 97 5 °F (36 4 °C)  HR:  [87-92] 92  Resp:  [17-18] 17  BP: (140-149)/(68-82) 147/68    Mental Status Exam:    Appearance:  alert, intermittent eye contact, appears stated age and appropriate grooming and hygiene   Behavior:  calm, cooperative and guarded   Speech:  delayed initiation, slow and soft   Mood:  \"good\"   Affect:  blunted   Thought Process:  goal directed, decreased rate   Associations: intact associations   Thought Content:  mild paranoia   Perceptual Disturbances: denies current hallucinations and does not appear to be responding to internal stimuli at this time   Risk Potential: Suicidal ideation - None  Homicidal ideation - None  Potential for aggression - No " Sensorium:  oriented to person and place   Memory:  recent and remote memory grossly intact   Consciousness:  alert and awake   Attention/Concentration: attention span and concentration appear shorter than expected for age   Insight:  limited   Judgment: limited   Gait/Station: seated   Motor Activity: Somewhat restless and fidgety     Current Medications:  Current Facility-Administered Medications   Medication Dose Route Frequency Provider Last Rate   • acetaminophen  650 mg Oral Q4H PRN Logan Her, CRNP     • acetaminophen  650 mg Oral Q4H PRN Logan Her, CRNP     • acetaminophen  975 mg Oral Q6H PRN Logan Her, CRNP     • aspirin  81 mg Oral Daily Júnior Davison CRNP     • atorvastatin  40 mg Oral Daily With Moshe Guzman CRSOHAM     • bisacodyl  10 mg Rectal Daily PRN Jimmy Hernández MD     • clonazePAM  0 25 mg Oral HS Crystal Ny MD     • cyanocobalamin  1,000 mcg Oral Daily JOHN Cruz     • docusate sodium  100 mg Oral BID Zofia Adams PA-C     • haloperidol lactate  5 mg Intramuscular Q4H PRN Max 4/day Logan Her, CRNP     • insulin glargine  15 Units Subcutaneous HS KARLEE CruzNP     • insulin lispro  1-5 Units Subcutaneous TID AC KARLEE CruzNP     • insulin lispro  1-5 Units Subcutaneous HS JOHN Cruz     • metoprolol succinate  37 5 mg Oral Daily Dacia Mckinney, JOHN     • mirtazapine  7 5 mg Oral HS Logan , CRNP     • OLANZapine  7 5 mg Oral HS Loida Spivey MD     • pantoprazole  20 mg Oral Early Morning JOHN Cruz     • polyethylene glycol  17 g Oral Daily PRN Zofia Adams PA-C     • risperiDONE  0 25 mg Oral Q4H PRN Max 6/day Logan Her, CRNP     • risperiDONE  0 5 mg Oral Q4H PRN Max 3/day Logan Her, CRNP     • risperiDONE  1 mg Oral Q2H PRN Max 3/day Logan Her, CRNP     • torsemide  10 mg Oral Every Other Day Dacia JOHN Mckinney     • traZODone  50 mg Oral HS PRN JOHN Rodríguez         Behavioral Health Medications: all current active meds have been reviewed  Changes as in plan section above  Laboratory results:  I have personally reviewed all pertinent laboratory/tests results     Recent Results (from the past 48 hour(s))   Fingerstick Glucose (POCT)    Collection Time: 05/01/23 12:42 PM   Result Value Ref Range    POC Glucose 190 (H) 65 - 140 mg/dl   Fingerstick Glucose (POCT)    Collection Time: 05/01/23  4:17 PM   Result Value Ref Range    POC Glucose 200 (H) 65 - 140 mg/dl   Fingerstick Glucose (POCT)    Collection Time: 05/01/23  8:40 PM   Result Value Ref Range    POC Glucose 216 (H) 65 - 140 mg/dl   Basic metabolic panel    Collection Time: 05/02/23  5:29 AM   Result Value Ref Range    Sodium 128 (L) 135 - 147 mmol/L    Potassium 4 2 3 5 - 5 3 mmol/L    Chloride 91 (L) 96 - 108 mmol/L    CO2 28 21 - 32 mmol/L    ANION GAP 9 4 - 13 mmol/L    BUN 14 5 - 25 mg/dL    Creatinine 0 63 0 60 - 1 30 mg/dL    Glucose 128 65 - 140 mg/dL    Glucose, Fasting 128 (H) 65 - 99 mg/dL    Calcium 9 0 8 4 - 10 2 mg/dL    eGFR 91 ml/min/1 73sq m   Fingerstick Glucose (POCT)    Collection Time: 05/02/23  7:16 AM   Result Value Ref Range    POC Glucose 142 (H) 65 - 140 mg/dl   Fingerstick Glucose (POCT)    Collection Time: 05/02/23 11:37 AM   Result Value Ref Range    POC Glucose 278 (H) 65 - 140 mg/dl   Fingerstick Glucose (POCT)    Collection Time: 05/02/23  3:59 PM   Result Value Ref Range    POC Glucose 169 (H) 65 - 140 mg/dl   Fingerstick Glucose (POCT)    Collection Time: 05/02/23  8:52 PM   Result Value Ref Range    POC Glucose 194 (H) 65 - 140 mg/dl   Basic metabolic panel    Collection Time: 05/03/23  6:36 AM   Result Value Ref Range    Sodium 130 (L) 135 - 147 mmol/L    Potassium 4 2 3 5 - 5 3 mmol/L    Chloride 93 (L) 96 - 108 mmol/L    CO2 29 21 - 32 mmol/L    ANION GAP 8 4 - 13 mmol/L    BUN 16 5 - 25 mg/dL Creatinine 0 62 0 60 - 1 30 mg/dL    Glucose 128 65 - 140 mg/dL    Calcium 8 9 8 4 - 10 2 mg/dL    eGFR 92 ml/min/1 73sq m   Fingerstick Glucose (POCT)    Collection Time: 05/03/23  8:11 AM   Result Value Ref Range    POC Glucose 140 65 - 140 mg/dl        This note has been constructed using a voice recognition system  There may be translation, syntax, or grammatical errors  If you have any questions, please contact the dictating author      Chandler Liao MD  Psychiatry Residency, PGY-II

## 2023-05-03 NOTE — SPEECH THERAPY NOTE
Speech Language/Pathology    Speech/Language Pathology Progress Note    Patient Name: Rolando Mclean  MPKFA'K Date: 5/3/2023                     SLP RECOMMENDATIONS:         Diet: Level 3 Dental soft         Liquids: Thin liquids         Medications: as tolerated         Aspiration Precautions: upright, slow rate        Summary:  Pt seen for ongoing dysphagia therapy  Pt observed with portion of AM meal of Level 3 Dental soft/thin liquids, however pt had primarily chosen pureed foods from the menu (pudding, yogurt, applesauce)  Pt with functional mastication/manipulation of bolus  No overt s/s aspiration observed with thin liquids via single or consecutive straw sips  Pt agreeable to a few bites of soft solids (peaches) with slow, but functional mastication/manipulation of bolus  SLP reviewed aspiration precautions with pt  Recommend continuing Level 3 Dental soft/thin diet  SLP to follow x1 and then likely d/c from [address]  Assessment:  No overt s/s aspiration with thin liquids  Functional mastication with soft solids       Plan/Recommendations:  Level 3 Dental soft/thin   General aspiration precautions  SLP to follow x1 to ensure diet tolerance and then will likely d/c from [facility]         Lab Results   Component Value Date    WBC 6 05 04/29/2023    HGB 8 4 (L) 04/29/2023    HCT 27 0 (L) 04/29/2023    MCV 90 04/29/2023     04/29/2023           Problem List  Principal Problem:    Bipolar affective disorder, depressed, severe, with psychotic behavior ([address] )  Active Problems:    Type 2 diabetes mellitus without complication, with long-term current use of insulin ([address] )    Essential hypertension    Hyponatremia    History of DVT of lower extremity    Dysphagia    UTI (urinary tract infection)    History of femur fracture    Medical clearance for psychiatric admission       Past Medical History  Past Medical History:   Diagnosis Date   • Anesthesia complication     difficulty waking up   • Anxiety    • Arthritis     left knee   • Bipolar 1 disorder (HCC)    • Bone spur     left knee behing the knee cap   • Cancer [facility])    • Chronic kidney disease    • Chronic pain disorder    • Colon cancer [facility])     patient states about 2017   • Colon polyp     Tubulovillous Adenoma High Grade Dysplasia - 2017   • Depression    • Diabetes mellitus ([facility] )    • Endometrial cancer ([facility] )     grade I   • Hx of bleeding disorder     vaginal bleeding started on 3/13/17    • Hyperlipidemia    • Hypertension    • Hypomagnesemia 03/20/2023   • Memory loss    • PONV (postoperative nausea and vomiting)    • Psychiatric disorder    • Psychiatric illness    • Psychosis ([facility] [address] )    • Shortness of breath     with exertion   • Sleep difficulties    • Urinary incontinence    • Vitamin D deficiency         Past Surgical History  Past Surgical History:   Procedure Laterality Date   • BREAST BIOPSY Left     benign-maybe at ar age 58   • CHOLECYSTECTOMY      open   • COLONOSCOPY     • DILATION AND CURETTAGE OF UTERUS N/A 04/05/2017    Procedure: DILATATION AND CURETTAGE (D&C);   Surgeon: Felicitas Thomas MD;  Location: [facility] MAIN OR;  Service:    • HYSTERECTOMY     • OOPHORECTOMY     • PELVIC LAPAROSCOPY     • AR ARTHRP KNE CONDYLE&PLATU MEDIAL&LAT COMPARTMENTS Left 12/06/2022    Procedure: ARTHROPLASTY KNEE TOTAL W ROBOT - CEMENTED - LEFT;  Surgeon: Bg Srinivasan DO;  Location: [address] [place] [address];  Service: Orthopedics   • AR LAPS TOTAL HYSTERECT 250 GM/< W/RMVL TUBE/OVARY N/A 05/04/2017    Procedure: ROBOTIC ASSISTED TOTAL LAPAROSCOPIC HYSTERECTOMY, BILATERAL SALPINGOOPHERECTOMY; CYSTOSCOPY;  Surgeon: Ember Augustine MD;  Location:  MAIN OR;  Service: Gynecology Oncology   • AR OPTX FEM SHFT [address] W/INSJ IMED IMPLT W/WO SCREW Left 3/20/2023    Procedure: INSERTION NAIL IM FEMUR RETROGRADE;  Surgeon: Ya Campa MD;  Location: [address] [place] [address];  Service: Orthopedics   • REPLACEMENT TOTAL KNEE     • TONSILLECTOMY     • TUBAL LIGATION

## 2023-05-03 NOTE — NURSING NOTE
"Pt is visible in dayroom withdrawn to self, speech pattern delayed  Pt reports \"a lot\" of anxiety and depression this shift  Pt is unable to elaborate  Pt denies SI/HI and AH/VH  Pt is cooperative with medications and care  Bed and chair alarm maintained  Safety checks ongoing     "

## 2023-05-03 NOTE — TREATMENT TEAM
05/02/23 2031   Service   Service SLIM   Provider Name Mary Ann Rob unable to pass bowel movement despite rectal suppository  Provider made aware

## 2023-05-03 NOTE — TREATMENT TEAM
05/03/23 0836   Team Meeting   Meeting Type Daily Rounds   Initial Conference Date 05/03/23   Team Members Present   Team Members Present Physician;Nurse;Occupational Therapist;;   Physician Team Member Dr Araceli Wilder, Dr Jessica Keenan Team Member [facility] Management Team Member Jamari Butts Work Team Member [place]   OT Team Member María Elena RAMIREZ   Patient/Family Present   Patient Present No   Patient's Family Present No     Reporting depression, denies anxiety although appears anxious, afraid to move in general, delayed in speech, cooperative, med compliant, sleeping at night, self limiting with mobility and self care

## 2023-05-03 NOTE — TREATMENT TEAM
Pt attended short  terrm problem solving group  Pt anxious, restless and scant responses  Pt did nod in agreement at times  05/03/23 1100   Activity/Group Checklist   Group Other (Comment)  (short  terrm problem solving)   Attendance Attended   Attendance Duration (min) 31-45   Interactions Other (Comment)  (scant)   Affect/Mood Other (Comment)  (restless in chair)   Goals Achieved Identified feelings; Identified relapse prevention strategies; Discussed coping strategies; Able to listen to others; Able to engage in interactions

## 2023-05-03 NOTE — NURSING NOTE
Patient is pleasant and cooperative with the care and medications  Patient was able let her needs known and was ambulating with nurses assistance  Patient needed to be assured that she will be not left alone and nurse will stay with her  Patient stated that she is scared and depressed  Patient didn't want to open up more when nurse asked questions  Patient received PRN Myralax

## 2023-05-03 NOTE — CASE MANAGEMENT
CM spoke with pt's dtr Benny Tatum tel# [phone]  Jenifer expressed concern regarding pt's documented stage II wound  CM deferred questions to nursing  Benny Tatum also reports pt does not have Rx coverage and  family ability to assist pt with cost of medications if generic Jon Quirk is ordered and filled with use of Good Rx discount card  CM notified pt's RN Jonh Parry of dtr's concerns and provided Benny Tatum' contact info or RN call to dtr

## 2023-05-03 NOTE — TREATMENT TEAM
"   05/02/23 3210   Service   Service SLIM   Provider Name Mary Ann     Pt received milk of magnesia at 21:14  Pt able to have small bowel movement  Reports still feeling \"constipated\"  Provider made aware     "

## 2023-05-04 LAB
ANION GAP SERPL CALCULATED.3IONS-SCNC: 9 MMOL/L (ref 4–13)
BUN SERPL-MCNC: 15 MG/DL (ref 5–25)
CALCIUM SERPL-MCNC: 8.9 MG/DL (ref 8.4–10.2)
CHLORIDE SERPL-SCNC: 90 MMOL/L (ref 96–108)
CO2 SERPL-SCNC: 28 MMOL/L (ref 21–32)
CREAT SERPL-MCNC: 0.6 MG/DL (ref 0.6–1.3)
GFR SERPL CREATININE-BSD FRML MDRD: 93 ML/MIN/1.73SQ M
GLUCOSE SERPL-MCNC: 129 MG/DL (ref 65–140)
GLUCOSE SERPL-MCNC: 134 MG/DL (ref 65–140)
GLUCOSE SERPL-MCNC: 162 MG/DL (ref 65–140)
GLUCOSE SERPL-MCNC: 173 MG/DL (ref 65–140)
GLUCOSE SERPL-MCNC: 214 MG/DL (ref 65–140)
OSMOLALITY UR: 392 MMOL/KG
POTASSIUM SERPL-SCNC: 4 MMOL/L (ref 3.5–5.3)
SODIUM 24H UR-SCNC: 35 MOL/L
SODIUM SERPL-SCNC: 127 MMOL/L (ref 135–147)

## 2023-05-04 RX ORDER — SODIUM CHLORIDE 1 G/1
3 TABLET ORAL ONCE
Status: COMPLETED | OUTPATIENT
Start: 2023-05-04 | End: 2023-05-04

## 2023-05-04 RX ORDER — LURASIDONE HYDROCHLORIDE 20 MG/1
20 TABLET, FILM COATED ORAL
Status: DISCONTINUED | OUTPATIENT
Start: 2023-05-04 | End: 2023-05-05

## 2023-05-04 RX ADMIN — SODIUM CHLORIDE 3 G: 1 TABLET ORAL at 16:17

## 2023-05-04 RX ADMIN — METOPROLOL SUCCINATE 37.5 MG: 25 TABLET, EXTENDED RELEASE ORAL at 09:17

## 2023-05-04 RX ADMIN — LURASIDONE HYDROCHLORIDE 20 MG: 20 TABLET, FILM COATED ORAL at 16:17

## 2023-05-04 RX ADMIN — ASPIRIN 81 MG: 81 TABLET, COATED ORAL at 09:11

## 2023-05-04 RX ADMIN — CYANOCOBALAMIN TAB 1000 MCG 1000 MCG: 1000 TAB at 09:11

## 2023-05-04 RX ADMIN — ATORVASTATIN CALCIUM 40 MG: 40 TABLET, FILM COATED ORAL at 16:17

## 2023-05-04 RX ADMIN — PANTOPRAZOLE SODIUM 20 MG: 20 TABLET, DELAYED RELEASE ORAL at 06:29

## 2023-05-04 RX ADMIN — DOCUSATE SODIUM 100 MG: 100 CAPSULE, LIQUID FILLED ORAL at 17:15

## 2023-05-04 RX ADMIN — INSULIN LISPRO 2 UNITS: 100 INJECTION, SOLUTION INTRAVENOUS; SUBCUTANEOUS at 12:43

## 2023-05-04 RX ADMIN — MIRTAZAPINE 7.5 MG: 7.5 TABLET, FILM COATED ORAL at 21:54

## 2023-05-04 RX ADMIN — INSULIN LISPRO 1 UNITS: 100 INJECTION, SOLUTION INTRAVENOUS; SUBCUTANEOUS at 17:17

## 2023-05-04 RX ADMIN — INSULIN GLARGINE 15 UNITS: 100 INJECTION, SOLUTION SUBCUTANEOUS at 21:55

## 2023-05-04 RX ADMIN — DOCUSATE SODIUM 100 MG: 100 CAPSULE, LIQUID FILLED ORAL at 09:11

## 2023-05-04 RX ADMIN — INSULIN LISPRO 1 UNITS: 100 INJECTION, SOLUTION INTRAVENOUS; SUBCUTANEOUS at 21:56

## 2023-05-04 RX ADMIN — CLONAZEPAM 0.25 MG: 0.5 TABLET ORAL at 21:54

## 2023-05-04 NOTE — TREATMENT TEAM
Pt attended [address] open discussion: strengths and self esteem group  Pt able to identify strength with prompting  Pt sits with peers and interacts  (Left early with doctor)     05/04/23 1330   Activity/Group Checklist   Group Other (Comment)  ( open discussion: strengths and self esteem)   Attendance Attended; Other (Comment)  (left with doctor)   Attendance Duration (min) 46-60   Interactions Other (Comment)  (scant)   Affect/Mood Constricted   Goals Achieved Identified feelings; Increased hopefulness; Discussed coping strategies; Able to listen to others; Able to engage in interactions; Able to manage/cope with feelings; Able to reflect/comment on own behavior;Able to self-disclose

## 2023-05-04 NOTE — PROGRESS NOTES
Progress Note - Mae 108 70 y o  female MRN: [id number]  Unit/Bed#: [facility] 148-34 Encounter: [id number]    The patient was seen for continuing care and reviewed with treatment team  Patient remains on fluid restriction 1200mg but Na dropped from 130 to 127 today  Nephro consulted and recommendations appreciated  Pt remains incontinent at times and resistant to assist with her ADLS  Woundcare on broad for sacral wound  Patient remains paranoid, isolative, scant in her speech, depressed and anxious  Sleep fair  Appetite: fair %  Energy: low  No suicidal or homicidal ideations  No EPS, denies any side effects of medication      /60 (BP Location: Right arm)   Pulse 82   Temp 97 7 °F (36 5 °C) (Temporal)   Resp 17   Wt 64 8 kg (142 lb 13 7 oz)   SpO2 94%   BMI 24 52 kg/m²     Current Mental Status Evaluation:  Appearance:  Poor eye contact and disheveled but clean, ambulates with walker, needs a chair alarm   Behavior:  calm, cooperative, guarded and psychomotor retardation   Mood:  anxious and depressed   Affect: blunted   Speech: Increased latency of response, Soft and Slow   Thought Process:  Goal directed and coherent   Thought Content:  Paranoid and mistrustful   Perceptual Disturbances: Denies hallucinations and does not appear to be responding to internal stimuli   Risk Potential: No suicidal or homicidal ideation   Orientation:   Patient is alert, oriented to person and place    Patient has limited insight, judgment and impulse control      Recent Results (from the past 24 hour(s))   Fingerstick Glucose (POCT)    Collection Time: 05/03/23  4:48 PM   Result Value Ref Range    POC Glucose 164 (H) 65 - 140 mg/dl   Fingerstick Glucose (POCT)    Collection Time: 05/03/23  9:40 PM   Result Value Ref Range    POC Glucose 171 (H) 65 - 140 mg/dl   Basic metabolic panel    Collection Time: 05/04/23  6:37 AM   Result Value Ref Range    Sodium 127 (L) 135 - 147 mmol/L Potassium 4 0 3 5 - 5 3 mmol/L    Chloride 90 (L) 96 - 108 mmol/L    CO2 28 21 - 32 mmol/L    ANION GAP 9 4 - 13 mmol/L    BUN 15 5 - 25 mg/dL    Creatinine 0 60 0 60 - 1 30 mg/dL    Glucose 129 65 - 140 mg/dL    Calcium 8 9 8 4 - 10 2 mg/dL    eGFR 93 ml/min/1 73sq m   Fingerstick Glucose (POCT)    Collection Time: 05/04/23  7:26 AM   Result Value Ref Range    POC Glucose 134 65 - 140 mg/dl   Fingerstick Glucose (POCT)    Collection Time: 05/04/23 11:26 AM   Result Value Ref Range    POC Glucose 214 (H) 65 - 140 mg/dl   Fingerstick Glucose (POCT)    Collection Time: 05/04/23  3:58 PM   Result Value Ref Range    POC Glucose 162 (H) 65 - 140 mg/dl       Progress Toward Goals: Minimal progress, Family is agreement with plan for Marie Dunn, will pay with help of good RX  Assessment     Principal Problem:    Bipolar affective disorder, depressed, severe, with psychotic behavior ([facility] )  Active Problems:    Type 2 diabetes mellitus without complication, with long-term current use of insulin ([facility])    Essential hypertension    Hyponatremia    History of DVT of lower extremity    Dysphagia    UTI (urinary tract infection)    History of femur fracture    Medical clearance for psychiatric admission        Plan :    - Medications;   Psychiatric: start Latuda 20mg daily with food at dinner for bipolar depression  D/C olanzapine    Continue mirtazapine 7 5mg HS for now for anxiety, sleep but will not adjust due to ongoing hyponatremia  Plan to DC if Na level does not Improve     Medical: Nephrology consulted- will follow recommendations      -Therapy: occupational therapy, milieu and group therapy   -Disposition: to be determined

## 2023-05-04 NOTE — PLAN OF CARE
Problem: Potential for Falls  Goal: Patient will remain free of falls  Description: INTERVENTIONS:  - Educate patient/family on patient safety including physical limitations  - Instruct patient to call for assistance with activity   - Consult OT/PT to assist with strengthening/mobility   - Keep Call bell within reach  - Keep bed low and locked with side rails adjusted as appropriate  - Keep care items and personal belongings within reach  - Initiate and maintain comfort rounds  - Make Fall Risk Sign visible to staff  - Offer Toileting every  Hours, in advance of need  - Initiate/Maintain alarm  - Obtain necessary fall risk management equipment:   - Apply yellow socks and bracelet for high fall risk patients  - Consider moving patient to room near nurses station  Outcome: Not Progressing     Problem: DEPRESSION  Goal: Will be euthymic at discharge  Description: INTERVENTIONS:  - Administer medication as ordered  - Provide emotional support via 1:1 interaction with staff  - Encourage involvement in milieu/groups/activities  - Monitor for social isolation  Outcome: Not Progressing     Problem: PASCUAL  Goal: Will exhibit normal sleep and speech and no impulsivity  Description: INTERVENTIONS:  - Administer medication as ordered  - Set limits on impulsive behavior  - Make attempts to decrease external stimuli as possible  Outcome: Not Progressing     Problem: PSYCHOSIS  Goal: Will report no hallucinations or delusions  Description: Interventions:  - Administer medication as  ordered  - Every waking shifts and PRN assess for the presence of hallucinations and or delusions  - Assist with reality testing to support increasing orientation  - Assess if patient's hallucinations or delusions are encouraging self-harm or harm to others and intervene as appropriate  Outcome: Not Progressing     Problem: ANXIETY  Goal: Will report anxiety at manageable levels  Description: INTERVENTIONS:  - Administer medication as ordered  - Teach and encourage coping skills  - Provide emotional support  - Assess patient/family for anxiety and ability to cope  Outcome: Not Progressing     Problem: SELF CARE DEFICIT  Goal: Return ADL status to a safe level of function  Description: INTERVENTIONS:  - Administer medication as ordered  - Assess ADL deficits and provide assistive devices as needed  - Obtain PT/OT consults as needed  - Assist and instruct patient to increase activity and self care as tolerated  Outcome: Not Progressing     Problem: DISCHARGE PLANNING - CARE MANAGEMENT  Goal: Discharge to post-acute care or home with appropriate resources  Description: INTERVENTIONS:  - Conduct assessment to determine patient/family and health care team treatment goals, and need for post-acute services based on payer coverage, community resources, and patient preferences, and barriers to discharge  - Address psychosocial, clinical, and financial barriers to discharge as identified in assessment in conjunction with the patient/family and health care team  - Arrange appropriate level of post-acute services according to patient’s   needs and preference and payer coverage in collaboration with the physician and health care team  - Communicate with and update the patient/family, physician, and health care team regarding progress on the discharge plan  - Arrange appropriate transportation to post-acute venues  Outcome: Not Progressing     Problem: Nutrition/Hydration-ADULT  Goal: Nutrient/Hydration intake appropriate for improving, restoring or maintaining nutritional needs  Description: Monitor and assess patient's nutrition/hydration status for malnutrition  Collaborate with interdisciplinary team and initiate plan and interventions as ordered  Monitor patient's weight and dietary intake as ordered or per policy  Utilize nutrition screening tool and intervene as necessary   Determine patient's food preferences and provide high-protein, high-caloric foods as appropriate       INTERVENTIONS:  - Monitor oral intake, urinary output, labs, and treatment plans  - Assess nutrition and hydration status and recommend course of action  - Evaluate amount of meals eaten  - Assist patient with eating if necessary   - Allow adequate time for meals  - Recommend/ encourage appropriate diets, oral nutritional supplements, and vitamin/mineral supplements  - Order, calculate, and assess calorie counts as needed  - Recommend, monitor, and adjust tube feedings and TPN/PPN based on assessed needs  - Assess need for intravenous fluids  - Provide specific nutrition/hydration education as appropriate  - Include patient/family/caregiver in decisions related to nutrition  Outcome: Not Progressing     Problem: Ineffective Coping  Goal: Participates in unit activities  Description: Interventions:  - Provide therapeutic environment   - Provide required programming   - Redirect inappropriate behaviors   Outcome: Not Progressing

## 2023-05-04 NOTE — TREATMENT TEAM
05/04/23 0832   Team Meeting   Meeting Type Daily Rounds   Initial Conference Date 05/04/23   Team Members Present   Team Members Present Physician;Nurse;;; Occupational Therapist   Physician Team Member Dr Monika Samayoa Team Member [facility] Management Team Member Jamari Butts Work Team Member [place]   OT Team Member Real RAMIREZ   Patient/Family Present   Patient Present No   Patient's Family Present No     Remains on chair and bed alarms, incontinent, requires encouragement with ADLs, becomes irritable with staff when they encourage her to complete ADLs, 1200 fluid restrictions, NA = 130, slept, unable to identify support systems, reviewed conversation with pt's dtr and pt's med regimen with use of [facility] and [employer] plans  Plan to initiate [place]

## 2023-05-04 NOTE — NURSING NOTE
Dalbert Radha is anxious, pleasant, and cooperative on approach  Aniyah Radha denies all  Dalbert Radha states she is sleeping and eating well  Dalbert Radha is delayed in her responses  Dalbert Radha is preoccupied  Mynorbert Radha attends group  Dalbert Radha is visible on the unit and social with a selected few  Dalbert Radha has a blunted flat   Will continue to monitor and assess for safety

## 2023-05-04 NOTE — NURSING NOTE
"Pt incontinent of loose BM  At first pt stating \"I can't get up\"  Required encouragement and maximum assistance  Pt provided with incontinence care by staff and ambulated x1 assist back to bed  New sacral and b/l buttocks Allevyn's placed at this time  During process, staff encouraging pt to assist with transfer into and out of bed as much as she could tolerate  Pt continued stating \"I can't do it\"  Pt was reassured staff was present to assist, however, wanted to encourage her to participate as much as tolerated  Bed alarm in place    "

## 2023-05-04 NOTE — NURSING NOTE
Pt is alert and present in dining room, able to make needs known  Chair alarm in place  Denies any pain/discomfort at this time  Medications administered and tolerated  Remains on a fluid restriction of 1200mL  Offers c/o of depression and anxiety this morning however unable to speak on anything in particular

## 2023-05-04 NOTE — NURSING NOTE
"Writer took pt to shower  Writer assisted pt with shower  Pt requires maximum assistance  During shower pt made accusatory statement  Pt stated \"You did me like that in the shower  You left me  \" writer reassured pt that she was never left alone in shower, pt unable to accept this explanation     "

## 2023-05-04 NOTE — PROGRESS NOTES
05/04/23 1000 05/04/23 1100   Activity/Group Checklist   Group [employer] meeting [employer]   Attendance Attended Attended  (sleepy with head down on table )   Attendance Duration (min) 31-45  --    Interactions Did not interact  (Pt  progressively more drowsy,with head on table unless prompted to look up )  --    Affect/Mood Blunted/flat  --    Goals Achieved Able to listen to others  --

## 2023-05-04 NOTE — CONSULTS
Consultation    Nephrology   Nataliia Bhagat 70 y o  female MRN: [id number]  Unit/Bed#: Samia Skaggs 862-38 Encounter: [id number]    History of Present Illness   Physician Requesting Consult: Pravin Calhoun MD  Reason for Consult : -Acute hyponatremia    ASSESSMENT/PLAN:   70 y o   female with pmh of hypertension, hyperlipidemia, diabetes, arthritis, history of endometrial and colon cancer in remission and bipolar disorder who was admitted for concerns for worsening paranoid ideations  Nephrology has been consulted for evaluation and management of acute hyponatremia  Acute hyponatremia:  Baseline sodium 134-136 meq  Admission sodium level of 134 meq on 4/29/2023  Most recent sodium level was 127 meq  Etiology of hyponatremia multifactorial likely due to pre-renal azotemia and possibly some component of SIADH (from medication such as Protonix)  Hold torsemide for now monitor weights daily if she gains more than 2 pounds in 24 hours then may resume torsemide at 5 mg p o every 48 hours  We will give salt tablets 3 g p o  x1 now  DC current torsemide  With a m  blood work ordered for plasma osmolality urine osmolality urine sodium and uric acid levels   In the past has had possibly some component of SIADH  No acute indication for Hypertonic saline (HTS) at this time  Consider discontinuation of Protonix  Adjust fluid restriction to 1 8 L/day for now   Strict I/O  Check BMP in a m  Goal to raise sodium by 8 meq in the next  24 hours  Most recent creatinine is 0 60 Mg/dL    Acid-base electrolytes:    Electrolytes:    Hyponatremia:  Most recent sodium at 127 mEq  See above    Acid-base:    Most recent bicarb at 28 stable    H/H/anemia:  most recent hemoglobin at 8 4 g/dL  maintain hemoglobin greater than 8 grams/deciliter  Management primary team    Blood pressure/hypertension:  home medications:  Torsemide 5 mg p o  every 48 hours, Toprol-XL 25 mg p o  daily  current medications: Toprol-XL 37 5 mg p o  daily, torsemide 10 mg p o  every 48 hours  recommendations: Hold torsemide for now may restart at 5 mg p o  every 48 hours if gains more than 2 pounds in 24 hours  Optimize hemodynamics  Maintain MAP > 65mmHg  Avoid BP fluctuations  Other medical problems:  Proteinuria: Most recent UA with trace protein as of 4/27/2023  Diabetes:  Management per primary team   On insulin  Bipolar:  Management per primary team   Follow-up with psych on Klonopin, Remeron, Latuda      Thanks for the consult  Will continue to follow  Please call with questions/ concerns  Above-mentioned orders and Plan with regards to holding torsemide for now likely can reinitiate next few days if patient gains weight were discussed with the team in depth and they agree with [facility] text  Moises Schultz MD, FASN, 5/4/2023, 2:28 PM          HISTORY OF PRESENT ILLNESS:   Deny Williamson is a 70 y o   female with pmh of hypertension, hyperlipidemia, diabetes, arthritis, history of endometrial and colon cancer in remission and bipolar disorder who was admitted for concerns for worsening paranoid ideations  Nephrology has been consulted for evaluation and management of acute hyponatremia  Patient admitted on 4/29/2023 with serum sodium 134 mEq sodium improved to 130 mEq on 5/3/2023 and then again today down to 127 mEq  Patient has been getting torsemide 10 mg p o  every 48 hours last dose was 5/3/2023  Baseline serum sodium around 134 to 136 mEq  She is seen and examined in psych department  Patient denies drinking excessively  Has no shortness of breath  Has no complaints at this time  Reports has not been eating or drinking much the last few days due to lack of appetite  History obtained from chart review and the patient    Inpatient consult to Nephrology  Consult performed by: Moises Schultz MD  Consult ordered by: Augustin Jaimes MD          Review of Systems   Constitutional: Positive for appetite change  Negative for chills and fatigue     HENT: Negative for congestion  Respiratory: Negative for cough, shortness of breath and wheezing  Cardiovascular: Negative for leg swelling  Gastrointestinal: Negative for abdominal pain, diarrhea and nausea  Genitourinary: Negative for dysuria  Musculoskeletal: Negative for back pain  Neurological: Negative for headaches  Psychiatric/Behavioral: Negative for agitation and confusion  All other systems reviewed and are negative         Historical Information   Patient Active Problem List   Diagnosis   • History of endometrial cancer   • Type 2 diabetes mellitus without complication, with long-term current use of insulin ([facility] )   • Essential hypertension   • Hyperlipidemia   • History of colon cancer   • OAB (overactive bladder)   • Axillary mass, right   • Mass of axillary tail of right breast   • Mass of axillary tail of left breast   • Hyponatremia   • Bipolar affective disorder, depressed, severe, with psychotic behavior ([facility] )   • Elevated brain natriuretic peptide (BNP) level   • Osteoarthritis of both knees   • Status post total left knee replacement using cement   • Platelets decreased ([facility])   • PONV (postoperative nausea and vomiting)   • Delayed emergence from anesthesia   • Bipolar depression ([facility] )   • Fracture of left femur ([facility])   • History of DVT of lower extremity   • Thyroid nodule   • Anxiety   • Acute blood loss anemia   • Dysphagia   • UTI (urinary tract infection)   • Severe protein-calorie malnutrition ([facility])   • Thrush   • History of femur fracture   • Medical clearance for psychiatric admission     Past Medical History:   Diagnosis Date   • Anesthesia complication     difficulty waking up   • Anxiety    • Arthritis     left knee   • Bipolar 1 disorder (HCC)    • Bone spur     left knee behing the knee cap   • Cancer [facility])    • Chronic kidney disease    • Chronic pain disorder    • Colon cancer [facility])     patient states about 2017   • Colon polyp     Tubulovillous Adenoma High Grade Dysplasia - 2017   • Depression    • Diabetes mellitus ([facility] )    • Endometrial cancer ([facility] )     grade I   • Hx of bleeding disorder     vaginal bleeding started on 3/13/17    • Hyperlipidemia    • Hypertension    • Hypomagnesemia 03/20/2023   • Memory loss    • PONV (postoperative nausea and vomiting)    • Psychiatric disorder    • Psychiatric illness    • Psychosis ([facility] )    • Shortness of breath     with exertion   • Sleep difficulties    • Urinary incontinence    • Vitamin D deficiency      Past Surgical History:   Procedure Laterality Date   • BREAST BIOPSY Left     benign-maybe at ar age 58   • CHOLECYSTECTOMY      open   • COLONOSCOPY     • DILATION AND CURETTAGE OF UTERUS N/A 04/05/2017    Procedure: DILATATION AND CURETTAGE (D&C);   Surgeon: Jalen Rubio MD;  Location: [facility] MAIN OR;  Service:    • HYSTERECTOMY     • OOPHORECTOMY     • PELVIC LAPAROSCOPY     • NV ARTHRP KNE CONDYLE&PLATU MEDIAL&LAT COMPARTMENTS Left 12/06/2022    Procedure: ARTHROPLASTY KNEE TOTAL W ROBOT - CEMENTED - LEFT;  Surgeon: Trent Cobb DO;  Location: [address] [place] [address];  Service: Orthopedics   • NV LAPS TOTAL HYSTERECT 250 GM/< W/RMVL TUBE/OVARY N/A 05/04/2017    Procedure: ROBOTIC ASSISTED TOTAL LAPAROSCOPIC HYSTERECTOMY, BILATERAL SALPINGOOPHERECTOMY; CYSTOSCOPY;  Surgeon: Ranjit Murphy MD;  Location:  MAIN OR;  Service: Gynecology Oncology   • NV OPTX FEM SHFT FX W/INSJ IMED IMPLT W/WO SCREW Left 3/20/2023    Procedure: INSERTION NAIL IM FEMUR RETROGRADE;  Surgeon: Kandis Najjar, MD;  Location: [address] [place] [address];  Service: Orthopedics   • REPLACEMENT TOTAL KNEE     • TONSILLECTOMY     • TUBAL LIGATION       Social History   Social History     Substance and Sexual Activity   Alcohol Use Never     Social History     Substance and Sexual Activity   Drug Use No     Social History     Tobacco Use   Smoking Status Never   • Passive exposure: Never   Smokeless Tobacco Never     Family History   Problem Relation Age of Onset   • Cancer Mother         Renal   • Heart disease Father         CHF   • Dementia Sister    • Heart disease Sister         atrial septal defect   • Dementia Sister    • Breast cancer Paternal Aunt 39       Meds/Allergies   current meds:   Current Facility-Administered Medications   Medication Dose Route Frequency   • acetaminophen (TYLENOL) tablet 650 mg  650 mg Oral Q4H PRN   • acetaminophen (TYLENOL) tablet 650 mg  650 mg Oral Q4H PRN   • acetaminophen (TYLENOL) tablet 975 mg  975 mg Oral Q6H PRN   • aspirin (ECOTRIN LOW STRENGTH) EC tablet 81 mg  81 mg Oral Daily   • atorvastatin (LIPITOR) tablet 40 mg  40 mg Oral Daily With Dinner   • bisacodyl (DULCOLAX) rectal suppository 10 mg  10 mg Rectal Daily PRN   • clonazePAM (KlonoPIN) tablet 0 25 mg  0 25 mg Oral HS   • cyanocobalamin (VITAMIN B-12) tablet 1,000 mcg  1,000 mcg Oral Daily   • docusate sodium (COLACE) capsule 100 mg  100 mg Oral BID   • haloperidol lactate (HALDOL) injection 5 mg  5 mg Intramuscular Q4H PRN Max 4/day   • insulin glargine (LANTUS) subcutaneous injection 15 Units 0 15 mL  15 Units Subcutaneous HS   • insulin lispro (HumaLOG) 100 units/mL subcutaneous injection 1-5 Units  1-5 Units Subcutaneous TID AC   • insulin lispro (HumaLOG) 100 units/mL subcutaneous injection 1-5 Units  1-5 Units Subcutaneous HS   • lurasidone (LATUDA) tablet 20 mg  20 mg Oral Daily With Dinner   • metoprolol succinate (TOPROL-XL) 24 hr tablet 37 5 mg  37 5 mg Oral Daily   • mirtazapine (REMERON) tablet 7 5 mg  7 5 mg Oral HS   • pantoprazole (PROTONIX) EC tablet 20 mg  20 mg Oral Early Morning   • polyethylene glycol (MIRALAX) packet 17 g  17 g Oral Daily PRN   • risperiDONE (RisperDAL) tablet 0 25 mg  0 25 mg Oral Q4H PRN Max 6/day   • risperiDONE (RisperDAL) tablet 0 5 mg  0 5 mg Oral Q4H PRN Max 3/day   • risperiDONE (RisperDAL) tablet 1 mg  1 mg Oral Q2H PRN Max 3/day   • sodium chloride tablet 3 g  3 g Oral Once   • traZODone (DESYREL) tablet 50 mg  50 mg Oral HS PRN and PTA meds:   Prior to Admission Medications   Prescriptions Last Dose Informant Patient Reported? Taking? BD Pen Needle Chelsey 2nd Gen 32G X 4 MM MISC   No No   Sig: USE 1  TWICE DAILY   Ferrous Sulfate (Iron) 325 (65 Fe) MG TABS 4/27/2023  Yes Yes   Sig: Take by mouth daily   OLANZapine (ZyPREXA) 2 5 mg tablet 4/27/2023  Yes Yes   Sig: Take 5 mg by mouth daily at bedtime   acetaminophen (TYLENOL) 325 mg tablet Past Month  No Yes   Sig: Take 3 tablets (975 mg total) by mouth 2 (two) times a day   Patient taking differently: Take 975 mg by mouth every 6 (six) hours as needed   cephalexin (KEFLEX) 500 mg capsule 4/28/2023  No Yes   Sig: Take 1 capsule (500 mg total) by mouth every 12 (twelve) hours for 7 days   clonazePAM (KlonoPIN) 0 5 mg tablet   No No   Sig: Take 0 5 tablets (0 25 mg total) by mouth daily at bedtime for 10 days   Patient taking differently: Take 0 5 mg by mouth daily at bedtime 0 5 HS and 0 25 prn   clotrimazole (MYCELEX) 10 mg arnaud 4/27/2023  No Yes   Sig: Take 1 tablet (10 mg total) by mouth 5 (five) times a day for 14 days   cyanocobalamin (VITAMIN B-12) 1000 MCG tablet 4/27/2023  No Yes   Sig: Take 1 tablet (1,000 mcg total) by mouth daily Do not start before March 29, 2023  docusate sodium (COLACE) 100 mg capsule 4/27/2023  Yes Yes   Sig: Take 100 mg by mouth 2 (two) times a day   enoxaparin (LOVENOX) 40 mg/0 4 mL 4/27/2023  No Yes   Sig: Inject 0 4 mL (40 mg total) under the skin every 24 hours for 21 days Do not start before April 11, 2023     Patient taking differently: Inject 40 mg under the skin every 24 hours Ends May 1st   insulin glargine (Toujeo SoloStar) 300 units/mL CONCENTRATED U-300 injection pen (1-unit dial) 4/27/2023  No Yes   Sig: Inject 20 Units under the skin daily   magnesium Oxide (MAG-OX) 400 mg TABS 4/27/2023  No Yes   Sig: Take 1 tablet (400 mg total) by mouth daily   metFORMIN (GLUCOPHAGE-XR) 500 mg 24 hr tablet 4/27/2023  No Yes   Sig: Take 1 tablet (500 mg total) by mouth 2 (two) times a day with meals   metoprolol succinate (TOPROL-XL) 25 mg 24 hr tablet 4/27/2023  No Yes   Sig: Take 1 tablet (25 mg total) by mouth daily   mirtazapine (REMERON) 15 mg tablet 4/27/2023  Yes Yes   Sig: Take 7 5 mg by mouth daily at bedtime   nitrofurantoin (MACROBID) 100 mg capsule   No No   Sig: Take 1 capsule (100 mg total) by mouth 2 (two) times a day for 5 days   nitroglycerin (NITROSTAT) 0 4 mg SL tablet More than a month  No No   Sig: Place 1 tablet (0 4 mg total) under the tongue every 5 (five) minutes as needed for chest pain   pantoprazole (PROTONIX) 40 mg tablet 4/27/2023  No Yes   Sig: Take 1 tablet (40 mg total) by mouth daily in the early morning   rosuvastatin (CRESTOR) 20 MG tablet 4/27/2023  No Yes   Sig: Take 1 tablet (20 mg total) by mouth daily   Patient taking differently: Take 20 mg by mouth daily with dinner   torsemide (DEMADEX) 5 MG tablet 4/27/2023  No Yes   Sig: Take 1 tablet (5 mg total) by mouth every other day Do not start before March 30, 2023     Patient taking differently: Take 5 mg by mouth every other day Next dose due 29th   ziprasidone (GEODON) 80 mg capsule 4/27/2023  No Yes   Sig: Take 1 capsule (80 mg total) by mouth 2 (two) times a day with meals      Facility-Administered Medications: None       Scheduled Meds:  Current Facility-Administered Medications   Medication Dose Route Frequency Provider Last Rate   • acetaminophen  650 mg Oral Q4H PRN JOHN Christopher     • acetaminophen  650 mg Oral Q4H PRN JOHN Christopher     • acetaminophen  975 mg Oral Q6H PRN JOHN Christopher     • aspirin  81 mg Oral Daily JOHN Kinney     • atorvastatin  40 mg [facility]     • bisacodyl  10 mg Rectal Daily PRN Chip Lei MD     • clonazePAM  0 25 mg Oral HS Zbigniew Edmondson MD     • cyanocobalamin  1,000 mcg Oral Daily JOHN Kinney     • docusate sodium  100 mg Oral BID Carolina Valencia PA-C     • haloperidol lactate  5 mg Intramuscular Q4H PRN Max 4/day Launie Plunk, CRNP     • insulin glargine  15 Units Subcutaneous HS Tanner Golder, CRNP     • insulin lispro  1-5 Units Subcutaneous TID AC Tanner Golder, CRNP     • insulin lispro  1-5 Units Subcutaneous HS Tanner Golder, CRNP     • lurasidone  20 mg Oral Daily With Clifton Ledesma MD     • metoprolol succinate  37 5 mg Oral Daily JOHN Alston     • mirtazapine  7 5 mg Oral HS Launie Plunk, CRNP     • pantoprazole  20 mg Oral Early Morning Tanner Golder, CRNP     • polyethylene glycol  17 g Oral Daily PRN Carolina Valencia PA-C     • risperiDONE  0 25 mg Oral Q4H PRN Max 6/day Launie Plunk, CRNP     • risperiDONE  0 5 mg Oral Q4H PRN Max 3/day Launie Plunk, CRNP     • risperiDONE  1 mg Oral Q2H PRN Max 3/day Launie Plunk, CRNP     • sodium chloride  3 g Oral Once Kristy Phlegm, MD     • traZODone  50 mg Oral HS PRN Launie Plunk, CRNP         PRN Meds: •  acetaminophen  •  acetaminophen  •  acetaminophen  •  bisacodyl  •  haloperidol lactate  •  polyethylene glycol  •  risperiDONE  •  risperiDONE  •  risperiDONE  •  traZODone    Continuous Infusions: Allergies   Allergen Reactions   • Wellbutrin [Bupropion] Anxiety     Patient has tried Wellbutrin which resulted in increased paranoia  Patient wishes to not try this medication again  Invasive Devices: Invasive Devices     None                   PHYSICAL EXAM  /60 (BP Location: Right arm)   Pulse 82   Temp 97 7 °F (36 5 °C) (Temporal)   Resp 17   Wt 64 8 kg (142 lb 13 7 oz)   SpO2 94%   BMI 24 52 kg/m²   Temp (24hrs), Av 6 °F (36 4 °C), Min:97 5 °F (36 4 °C), Max:97 7 °F (36 5 °C)        Intake/Output Summary (Last 24 hours) at 2023 1428  Last data filed at 2023 1300  Gross per 24 hour   Intake 480 ml   Output --   Net 480 ml       I/O last 24 hours:   In: 960 [P O :960]  Out: -           Current Weight: Weight - Scale: 64 8 kg (142 lb 13 7 oz)  First Weight: Weight - Scale: 68 3 kg (150 lb 9 6 oz)  Physical Exam  Vitals and nursing note reviewed  Constitutional:       General: She is not in acute distress  Appearance: Normal appearance  She is normal weight  She is not ill-appearing, toxic-appearing or diaphoretic  HENT:      Head: Normocephalic and atraumatic  Mouth/Throat:      Mouth: Mucous membranes are dry  Pharynx: Oropharynx is clear  No oropharyngeal exudate  Eyes:      General: No scleral icterus  Conjunctiva/sclera: Conjunctivae normal    Cardiovascular:      Rate and Rhythm: Normal rate  Heart sounds: Normal heart sounds  No friction rub  Pulmonary:      Effort: No respiratory distress  Breath sounds: Normal breath sounds  No stridor  Abdominal:      General: There is no distension  Palpations: Abdomen is soft  There is no mass  Tenderness: There is no abdominal tenderness  Musculoskeletal:         General: No swelling  Cervical back: Normal range of motion  No rigidity  Comments: Trace edema left lower extremity no edema right lower extremity   Skin:     General: Skin is warm  Coloration: Skin is not jaundiced  Neurological:      General: No focal deficit present  Mental Status: She is alert and oriented to person, place, and time     Psychiatric:         Mood and Affect: Mood normal          Behavior: Behavior normal            LABORATORY:    Results from last 7 days   Lab Units 05/04/23  0637 05/03/23  0636 05/02/23  0529 04/29/23  0544 04/27/23  1802 04/27/23  1434   WBC Thousand/uL  --   --   --  6 05 6 75  --    HEMOGLOBIN g/dL  --   --   --  8 4* 9 1*  --    HEMATOCRIT %  --   --   --  27 0* 28 3*  --    PLATELETS Thousands/uL  --   --   --  204 182  --    POTASSIUM mmol/L 4 0 4 2 4 2 4 1 3 7 4 2   CHLORIDE mmol/L 90* 93* 91* 96 94* 94*   CO2 mmol/L 28 29 28 28 27 27   BUN mg/dL "15 16 14 12 15 15   CREATININE mg/dL 0 60 0 62 0 63 0 61 0 67 0 72   CALCIUM mg/dL 8 9 8 9 9 0 9 0 9 0 9 6   MAGNESIUM mg/dL  --   --   --  1 7*  --   --    PHOSPHORUS mg/dL  --   --   --  3 4  --   --       rest all reviewed    RADIOLOGY:  No orders to display     Rest all reviewed    Portions of the record may have been created with voice recognition software  Occasional wrong word or \"sound a like\" substitutions may have occurred due to the inherent limitations of voice recognition software  Read the chart carefully and recognize, using context, where substitutions have occurred  If you have any questions, please contact the dictating provider      "

## 2023-05-05 LAB
ALBUMIN SERPL BCP-MCNC: 3.1 G/DL (ref 3.5–5)
ANION GAP SERPL CALCULATED.3IONS-SCNC: 7 MMOL/L (ref 4–13)
BUN SERPL-MCNC: 15 MG/DL (ref 5–25)
CALCIUM SERPL-MCNC: 8.6 MG/DL (ref 8.4–10.2)
CHLORIDE SERPL-SCNC: 90 MMOL/L (ref 96–108)
CO2 SERPL-SCNC: 29 MMOL/L (ref 21–32)
CREAT SERPL-MCNC: 0.6 MG/DL (ref 0.6–1.3)
GFR SERPL CREATININE-BSD FRML MDRD: 93 ML/MIN/1.73SQ M
GLUCOSE P FAST SERPL-MCNC: 90 MG/DL (ref 65–99)
GLUCOSE SERPL-MCNC: 115 MG/DL (ref 65–140)
GLUCOSE SERPL-MCNC: 172 MG/DL (ref 65–140)
GLUCOSE SERPL-MCNC: 177 MG/DL (ref 65–140)
GLUCOSE SERPL-MCNC: 213 MG/DL (ref 65–140)
GLUCOSE SERPL-MCNC: 90 MG/DL (ref 65–140)
OSMOLALITY UR/SERPL-RTO: 266 MMOL/KG (ref 282–298)
POTASSIUM SERPL-SCNC: 4 MMOL/L (ref 3.5–5.3)
SODIUM SERPL-SCNC: 126 MMOL/L (ref 135–147)
URATE SERPL-MCNC: 4.6 MG/DL (ref 2–7.5)

## 2023-05-05 RX ORDER — SODIUM CHLORIDE 1 G/1
3 TABLET ORAL
Status: DISCONTINUED | OUTPATIENT
Start: 2023-05-05 | End: 2023-05-07

## 2023-05-05 RX ORDER — LURASIDONE HYDROCHLORIDE 20 MG/1
40 TABLET, FILM COATED ORAL
Status: DISCONTINUED | OUTPATIENT
Start: 2023-05-07 | End: 2023-05-10

## 2023-05-05 RX ORDER — FAMOTIDINE 20 MG/1
20 TABLET, FILM COATED ORAL 2 TIMES DAILY
Status: DISCONTINUED | OUTPATIENT
Start: 2023-05-06 | End: 2023-05-30 | Stop reason: HOSPADM

## 2023-05-05 RX ORDER — TORSEMIDE 5 MG/1
5 TABLET ORAL
Status: DISCONTINUED | OUTPATIENT
Start: 2023-05-05 | End: 2023-05-05

## 2023-05-05 RX ORDER — SODIUM CHLORIDE 1 G/1
3 TABLET ORAL 2 TIMES DAILY WITH MEALS
Status: DISCONTINUED | OUTPATIENT
Start: 2023-05-05 | End: 2023-05-05

## 2023-05-05 RX ORDER — LURASIDONE HYDROCHLORIDE 20 MG/1
20 TABLET, FILM COATED ORAL
Status: DISCONTINUED | OUTPATIENT
Start: 2023-05-05 | End: 2023-05-06

## 2023-05-05 RX ADMIN — INSULIN GLARGINE 15 UNITS: 100 INJECTION, SOLUTION SUBCUTANEOUS at 21:52

## 2023-05-05 RX ADMIN — SODIUM CHLORIDE 3 G: 1 TABLET ORAL at 13:05

## 2023-05-05 RX ADMIN — SODIUM CHLORIDE 3 G: 1 TABLET ORAL at 09:25

## 2023-05-05 RX ADMIN — PANTOPRAZOLE SODIUM 20 MG: 20 TABLET, DELAYED RELEASE ORAL at 06:02

## 2023-05-05 RX ADMIN — ASPIRIN 81 MG: 81 TABLET, COATED ORAL at 09:25

## 2023-05-05 RX ADMIN — LURASIDONE HYDROCHLORIDE 20 MG: 20 TABLET, FILM COATED ORAL at 17:05

## 2023-05-05 RX ADMIN — DOCUSATE SODIUM 100 MG: 100 CAPSULE, LIQUID FILLED ORAL at 09:26

## 2023-05-05 RX ADMIN — CLONAZEPAM 0.25 MG: 0.5 TABLET ORAL at 21:53

## 2023-05-05 RX ADMIN — INSULIN LISPRO 1 UNITS: 100 INJECTION, SOLUTION INTRAVENOUS; SUBCUTANEOUS at 17:01

## 2023-05-05 RX ADMIN — INSULIN LISPRO 1 UNITS: 100 INJECTION, SOLUTION INTRAVENOUS; SUBCUTANEOUS at 21:48

## 2023-05-05 RX ADMIN — DOCUSATE SODIUM 100 MG: 100 CAPSULE, LIQUID FILLED ORAL at 17:05

## 2023-05-05 RX ADMIN — CYANOCOBALAMIN TAB 1000 MCG 1000 MCG: 1000 TAB at 09:25

## 2023-05-05 RX ADMIN — SODIUM CHLORIDE 3 G: 1 TABLET ORAL at 17:04

## 2023-05-05 RX ADMIN — ATORVASTATIN CALCIUM 40 MG: 40 TABLET, FILM COATED ORAL at 17:05

## 2023-05-05 RX ADMIN — INSULIN LISPRO 2 UNITS: 100 INJECTION, SOLUTION INTRAVENOUS; SUBCUTANEOUS at 13:05

## 2023-05-05 RX ADMIN — METOPROLOL SUCCINATE 37.5 MG: 25 TABLET, EXTENDED RELEASE ORAL at 09:25

## 2023-05-05 NOTE — TREATMENT TEAM
05/05/23 0834   Team Meeting   Meeting Type Daily Rounds   Initial Conference Date 05/05/23   Team Members Present   Team Members Present Physician;Nurse;;; Occupational Therapist   Physician Team Member Dr Oliver Goode Team Member Sulaiman Mukherjee, [address] Management Team Member [address] Work Team Member [place]   OT Team Member Calvin RAMIREZ   Patient/Family Present   Patient Present No   Patient's Family Present No     [id number], nephrology aware, sodium tabs and meds adjusted, remains on fluid restriction, started latuda, remains anxious and depressed, psychomotor retardation, delayed speech

## 2023-05-05 NOTE — NURSING NOTE
"Pt is visible in dayroom, social with select peers at times  Pt has soft delayed speech pattern  Pt reports that her anxiety and depression are \"bad\" today  Pt unable to elaborate  Pt denies SI/HI and AH/VH  Pt is cooperative with medications and care  Monitored for safety and support    "

## 2023-05-05 NOTE — SPEECH THERAPY NOTE
Speech Language/Pathology    Speech/Language Pathology Progress Note    Patient Name: Yesi Robertson  [id number] Date: 5/5/2023                     SLP RECOMMENDATIONS:         Diet: Level 3 Dental soft         Liquids: Thin liquids         Medications: as tolerated         Aspiration Precautions: upright, slow rate        Summary:  Pt seen for ongoing dysphagia therapy  Pt reports she feels she's been tolerating her current diet well  Pt observed with thin liquid via single and consecutive cup sips with no overt s/s aspiration  Pt trialed with small amount of soft solids with slow, but functional mastication  Pt demonstrated good recall of aspiration precautions (slow rate, small bites/sips, upright)  Pt has met all ST goals at this time and appropriate for d/c from [address] [place] [address] Dr  Recommend continuing current diet  Please re-consult with any new concerns  Assessment:  No overt s/s aspiration with thin liquids   Functional mastication of soft solids     Plan/Recommendations:  Level 3 dental soft/thin liquids   D/c from ST services         Lab Results   Component Value Date    WBC 6 05 04/29/2023    HGB 8 4 (L) 04/29/2023    HCT 27 0 (L) 04/29/2023    MCV 90 04/29/2023     04/29/2023           Problem List  Principal Problem:    Bipolar affective disorder, depressed, severe, with psychotic behavior ([address] )  Active Problems:    Type 2 diabetes mellitus without complication, with long-term current use of insulin ([address] )    Essential hypertension    Hyponatremia    History of DVT of lower extremity    Dysphagia    UTI (urinary tract infection)    History of femur fracture    Medical clearance for psychiatric admission       Past Medical History  Past Medical History:   Diagnosis Date   • Anesthesia complication     difficulty waking up   • Anxiety    • Arthritis     left knee   • Bipolar 1 disorder ([address] )    • Bone spur     left knee behing the knee cap   • Cancer (HCC)    • Chronic kidney disease    • Chronic pain disorder    • Colon cancer [facility])     patient states about 2017   • Colon polyp     Tubulovillous Adenoma High Grade Dysplasia - 2017   • Depression    • Diabetes mellitus ([facility] )    • Endometrial cancer ([facility] )     grade I   • Hx of bleeding disorder     vaginal bleeding started on 3/13/17    • Hyperlipidemia    • Hypertension    • Hypomagnesemia 03/20/2023   • Memory loss    • PONV (postoperative nausea and vomiting)    • Psychiatric disorder    • Psychiatric illness    • Psychosis ([facility] )    • Shortness of breath     with exertion   • Sleep difficulties    • Urinary incontinence    • Vitamin D deficiency         Past Surgical History  Past Surgical History:   Procedure Laterality Date   • BREAST BIOPSY Left     benign-maybe at ar age 58   • CHOLECYSTECTOMY      open   • COLONOSCOPY     • DILATION AND CURETTAGE OF UTERUS N/A 04/05/2017    Procedure: DILATATION AND CURETTAGE (D&C);   Surgeon: Jalen Rubio MD;  Location: [facility] MAIN OR;  Service:    • HYSTERECTOMY     • OOPHORECTOMY     • PELVIC LAPAROSCOPY     • NH ARTHRP KNE CONDYLE&PLATU MEDIAL&LAT COMPARTMENTS Left 12/06/2022    Procedure: ARTHROPLASTY KNEE TOTAL W ROBOT - CEMENTED - LEFT;  Surgeon: Trent Cobb DO;  Location: [address] [place] [address];  Service: Orthopedics   • NH LAPS TOTAL HYSTERECT 250 GM/< W/RMVL TUBE/OVARY N/A 05/04/2017    Procedure: ROBOTIC ASSISTED TOTAL LAPAROSCOPIC HYSTERECTOMY, BILATERAL SALPINGOOPHERECTOMY; CYSTOSCOPY;  Surgeon: Ranjit Murphy MD;  Location:  MAIN OR;  Service: Gynecology Oncology   • NH OPTX FEM SHFT [address] W/INSJ IMED IMPLT W/WO SCREW Left 3/20/2023    Procedure: INSERTION NAIL IM FEMUR RETROGRADE;  Surgeon: Kandis Najjar, MD;  Location: [address] [place] [address];  Service: Orthopedics   • REPLACEMENT TOTAL KNEE     • TONSILLECTOMY     • TUBAL LIGATION

## 2023-05-05 NOTE — PROGRESS NOTES
Follow up Consultation    Nephrology   Morales Comes 70 y o  female MRN: [id number]  Unit/Bed#: [facility] 862-91 Encounter: [id number]      Physician Requesting Consult: Moise Rodríguez MD        ASSESSMENT/PLAN:   70 y o   female with pmh of hypertension, hyperlipidemia, diabetes, arthritis, history of endometrial and colon cancer in remission and bipolar disorder who was admitted for concerns for worsening paranoid ideations  Nephrology has been consulted for evaluation and management of acute hyponatremia        Acute hyponatremia:  Baseline sodium 134-136 meq  Admission sodium level of 134 meq on 4/29/2023  Most recent sodium level was 126 meq, decreased from prior  Etiology of hyponatremia multifactorial likely due to pre-renal azotemia and possibly some component of SIADH (from medication such as Protonix)  Hold torsemide for now monitor weights daily if she gains more than 2 pounds in 24 hours then may resume torsemide at 5 mg p o every 48 hours  Placed on salt tablets 3 g p o  3 times daily for now  Check BMP in a m  Plasma osmolality pending  Uric acid 4 6  In the past has had possibly some component of SIADH  No acute indication for Hypertonic saline (HTS) at this time  Consider discontinuation of Protonix  Continue fluid restriction to 1 8 L/day for now   Strict I/O  Check BMP in a m  Most recent creatinine is 0 60 Mg/dL     Acid-base electrolytes:     Electrolytes:    Hyponatremia:  Most recent sodium at 126 mEq  See above     Acid-base:    Most recent bicarb at 29 stable     H/H/anemia:  most recent hemoglobin at 8 4 g/dL  maintain hemoglobin greater than 8 grams/deciliter  Management primary team     Blood pressure/hypertension:  home medications:  Torsemide 5 mg p o  every 48 hours, Toprol-XL 25 mg p o  daily  current medications: Toprol-XL 37 5 mg p o  daily,   recommendations: Hold torsemide for now may restart at 5 mg p o  every 48 hours if gains more than 2 pounds in 24 hours  Optimize hemodynamics  Maintain MAP > 65mmHg  Avoid BP fluctuations      Other medical problems:  Proteinuria: Most recent UA with trace protein as of 2023  Diabetes:  Management per primary team   On insulin  Bipolar:  Management per primary team   Follow-up with psych on Klonopin, Remeron, Latuda    Thanks for the consult  Will continue to follow  Please call with questions/ concerns  Above-mentioned orders and Plan in terms of starting salt tabs 3 g p o  twice daily for now check BMP in a m  and nephrology will follow peripherally over the weekend were discussed with the team in depth and they agree  Elidia Edwards MD, FASN, 2023, 8:12 AM              Objective :   Patient seen and examined in her room no overnight events medically stable remains afebrile urine output not adequately documented has no complaints  Denies increased fluid intake  Status post salt tablets 3 g p o  x1 yesterday  PHYSICAL EXAM  /83 (BP Location: Left arm)   Pulse 91   Temp 97 8 °F (36 6 °C) (Temporal)   Resp 18   Wt 68 3 kg (150 lb 9 6 oz)   SpO2 97%   BMI 25 85 kg/m²   Temp (24hrs), Av 7 °F (36 5 °C), Min:97 5 °F (36 4 °C), Max:97 8 °F (36 6 °C)        Intake/Output Summary (Last 24 hours) at 2023 0812  Last data filed at 2023 1800  Gross per 24 hour   Intake 600 ml   Output --   Net 600 ml       I/O last 24 hours: In: 600 [P O :600]  Out: -       Current Weight: Weight - Scale: 68 3 kg (150 lb 9 6 oz)  First Weight: Weight - Scale: 68 3 kg (150 lb 9 6 oz)  Physical Exam  Vitals and nursing note reviewed  Constitutional:       General: She is not in acute distress  Appearance: Normal appearance  She is normal weight  She is not ill-appearing, toxic-appearing or diaphoretic  HENT:      Head: Normocephalic and atraumatic  Mouth/Throat:      Mouth: Mucous membranes are moist       Pharynx: Oropharynx is clear  No oropharyngeal exudate  Eyes:      General: No scleral icterus  Conjunctiva/sclera: Conjunctivae normal    Cardiovascular:      Rate and Rhythm: Normal rate  Heart sounds: Normal heart sounds  No friction rub  Pulmonary:      Effort: No respiratory distress  Breath sounds: Normal breath sounds  No stridor  Abdominal:      General: There is no distension  Palpations: Abdomen is soft  There is no mass  Tenderness: There is no abdominal tenderness  Musculoskeletal:         General: No swelling  Cervical back: Normal range of motion  No rigidity  Skin:     Coloration: Skin is not jaundiced  Neurological:      General: No focal deficit present  Mental Status: She is alert and oriented to person, place, and time  Psychiatric:         Mood and Affect: Mood normal          Behavior: Behavior normal              Review of Systems   Constitutional: Negative for chills and fatigue  HENT: Negative for congestion  Respiratory: Negative for cough and shortness of breath  Cardiovascular: Negative for leg swelling  Gastrointestinal: Negative for abdominal pain, constipation and diarrhea  Genitourinary: Negative for dysuria  Musculoskeletal: Negative for back pain  Neurological: Negative for headaches  Psychiatric/Behavioral: Negative for agitation and confusion  All other systems reviewed and are negative        Scheduled Meds:  Current Facility-Administered Medications   Medication Dose Route Frequency Provider Last Rate   • acetaminophen  650 mg Oral Q4H PRN KARLEE BainsNP     • acetaminophen  650 mg Oral Q4H PRN Lizbeth Sero, CRNP     • acetaminophen  975 mg Oral Q6H PRN Lizbeth Sero, CRNP     • aspirin  81 mg Oral Daily Soledad JOHN Beal     • atorvastatin  40 mg Oral Daily With JOHN Eduardo     • bisacodyl  10 mg Rectal Daily PRN Pravin Calhoun MD     • clonazePAM  0 25 mg Oral HS Carlos Swain MD     • cyanocobalamin  1,000 mcg Oral Daily Soledad JOHN Beal     • docusate sodium  100 mg Oral BID Mimi Guerrero PA-C     • haloperidol lactate  5 mg Intramuscular Q4H PRN Max 4/day Ana Maria Murphy CRNP     • insulin glargine  15 Units Subcutaneous HS Bibirigo Clark, CRSOHAM     • insulin lispro  1-5 Units Subcutaneous TID AC Bibirigo Clark, CRNP     • insulin lispro  1-5 Units Subcutaneous HS Bibirigo Clark, CRNP     • lurasidone  20 mg Oral Daily With Connie Frecnh MD     • metoprolol succinate  37 5 mg Oral Daily JOHN Alston     • mirtazapine  7 5 mg Oral HS Ana Maria Rhyme, CRNP     • pantoprazole  20 mg Oral Early Morning Bibi Clark CRSOHAM     • polyethylene glycol  17 g Oral Daily PRN Mimi Guerrero PA-C     • risperiDONE  0 25 mg Oral Q4H PRN Max 6/day Ana Maria Rhyme, CRNP     • risperiDONE  0 5 mg Oral Q4H PRN Max 3/day Ana Maria Murphy CRNP     • risperiDONE  1 mg Oral Q2H PRN Max 3/day Ana Maria Murphy CRNP     • torsemide  5 mg Oral Q48H Precious Glasgow PA-C     • traZODone  50 mg Oral HS PRN JOHN Kraft         PRN Meds: •  acetaminophen  •  acetaminophen  •  acetaminophen  •  bisacodyl  •  haloperidol lactate  •  polyethylene glycol  •  risperiDONE  •  risperiDONE  •  risperiDONE  •  traZODone    Continuous Infusions:       Invasive Devices:      Invasive Devices     None                   LABORATORY:    Results from last 7 days   Lab Units 05/05/23  0546 05/04/23  0637 05/03/23  0636 05/02/23  0529 04/29/23  0544   WBC Thousand/uL  --   --   --   --  6 05   HEMOGLOBIN g/dL  --   --   --   --  8 4*   HEMATOCRIT %  --   --   --   --  27 0*   PLATELETS Thousands/uL  --   --   --   --  204   POTASSIUM mmol/L 4 0 4 0 4 2 4 2 4 1   CHLORIDE mmol/L 90* 90* 93* 91* 96   CO2 mmol/L 29 28 29 28 28   BUN mg/dL 15 15 16 14 12   CREATININE mg/dL 0 60 0 60 0 62 0 63 0 61   CALCIUM mg/dL 8 6 8 9 8 9 9 0 9 0   MAGNESIUM mg/dL  --   --   --   --  1 7*   PHOSPHORUS mg/dL  --   --   --   --  3 4 "rest all reviewed    RADIOLOGY:  No orders to display     Rest all reviewed    Portions of the record may have been created with voice recognition software  Occasional wrong word or \"sound a like\" substitutions may have occurred due to the inherent limitations of voice recognition software  Read the chart carefully and recognize, using context, where substitutions have occurred  If you have any questions, please contact the dictating provider      "

## 2023-05-05 NOTE — NURSING NOTE
"Pt alert and visible on the unit  Limited peer interactions  Medication compliant; pills whole in applesauce  Continues to report depression and anxiety  Ambulates with assistance of ross Garcia MD aware and orders noted  Maintained on 1500cc Fluid restriction  Pt reported \"not feeling good\" unable to report why  Slow to respond to questions and guarded  Notified of fluid restriction and understanding limited  Denies any pain  Appetite 75% for breakfast and 100% for lunch  Will continue to monitor and maintain q 7 min checks     "

## 2023-05-05 NOTE — PROGRESS NOTES
Progress Note - Mae 108 70 y o  female MRN: [id number]  Unit/Bed#: Lorie Lynn 651-05 Encounter: [id number]    Assessment/Plan   Principal Problem:    Bipolar affective disorder, depressed, severe, with psychotic behavior ([facility] [address] )  Active Problems:    Type 2 diabetes mellitus without complication, with long-term current use of insulin ([facility] [address] )    Essential hypertension    Hyponatremia    History of DVT of lower extremity    Dysphagia    UTI (urinary tract infection)    History of femur fracture    Medical clearance for psychiatric admission      Recommended Treatment:     1  Will make the following medication changes:   a  Discontinue Remeron due to hyponatremia, though Remeron has low risk for hyponatremia, as her sodium is not improving we will discontinue to minimize any possible medication influences on sodium  2  Continue with current medications:  a  Klonopin 0 25mg HS for anxiety  b  Latuda 20mg daily with dinner for psychosis and bipolar depression  3  Continue with group therapy, milieu therapy and occupational therapy  4  Continue frequent safety checks and vitals per unit protocol  5  Continue with [facility] medical management as indicated  6  Nephrology following for management of hyponatremia, sodium dropped to 126 today from 127 yesterday  7  Continue coordinating with case management regarding disposition    Legal Status: 201  Disposition: To be determined, coordinating with case management, date TBD    Case discussed with treatment team   Risks, benefits and possible side effects of Medications: Risks, benefits, and possible side effects of medications have previously been explained  No new medications at this time  ------------------------------------------------------------    Subjective: Patient's chart was reviewed, and patient's progress and plan was discussed with treatment team  Per nursing report, Shen Garber has been cooperative on the unit and compliant with medications   She "has been noted to be attending most groups but tends to not interact during them  Patient continues to have hyponatremia, nephrology following  Shen Garber was evaluated this morning for continuity of care  On examination, Shen Garber is seated in the day room surrounded by and social with select peers  She has delayed responses, and selectively quiet when this writer asks questions, did not wish to get up and leave the day room despite peer encouragement  She reports feeling \"unsafe\" and initially is unable to express about what, but does eventually say she is worried about being in the bathroom alone  Provided reassurance she will be assisted to the bathroom  She denies suicidal ideation, homicidal ideation, auditory hallucinations, and visual hallucinations  VS: Reviewed, within normal limits    Progress Toward Goals: Slow improvement - still keeping to self, continues to be blunted in affect    Psychiatric Review of Systems:  Behavior over the last 24 hours: unchanged  Sleep: normal  Appetite: adequate, 50% meals  Medication side effects: none verbalized  Medical ROS: Complete review of systems is negative except as noted above      Vital signs in last 24 hours:  Temp:  [97 5 °F (36 4 °C)-97 8 °F (36 6 °C)] 97 8 °F (36 6 °C)  HR:  [69-91] 91  Resp:  [17-18] 18  BP: (118-132)/(59-83) 132/83    Mental Status Exam:    Appearance:  alert, poor eye contact, appears stated age and disheveled   Behavior:  calm, cooperative and guarded   Speech:  delayed initiation, slow and soft   Mood:  \"okay\"   Affect:  blunted   Thought Process:  goal directed   Associations: intact associations   Thought Content:  paranoid ideation   Perceptual Disturbances: denies current hallucinations and does not appear to be responding to internal stimuli at this time   Risk Potential: Suicidal ideation - None  Homicidal ideation - None  Potential for aggression - No   Sensorium:  oriented to person and place   Memory:  recent and remote memory " grossly intact   Consciousness:  alert and awake   Attention/Concentration: attention span and concentration appear shorter than expected for age   Insight:  limited   Judgment: limited   Gait/Station: slow gait, uses walker   Motor Activity: psychomotor slowing     Current Medications:  Current Facility-Administered Medications   Medication Dose Route Frequency Provider Last Rate   • acetaminophen  650 mg Oral Q4H PRN Jovani Solorio CRNP     • acetaminophen  650 mg Oral Q4H PRN Jovani Solorio, CRNP     • acetaminophen  975 mg Oral Q6H PRN Jovani Solorio CRNP     • aspirin  81 mg Oral Daily Eliana Abdul CRNP     • atorvastatin  40 mg Oral Daily With JOHN Champagne     • bisacodyl  10 mg Rectal Daily PRN Margaret Pollard MD     • clonazePAM  0 25 mg Oral HS Maggie Barreto MD     • cyanocobalamin  1,000 mcg Oral Daily JOHN Morton     • docusate sodium  100 mg Oral BID Dudley Stewart PA-C     • haloperidol lactate  5 mg Intramuscular Q4H PRN Max 4/day Jovani Solorio CRNP     • insulin glargine  15 Units Subcutaneous HS KARLEE MortonNP     • insulin lispro  1-5 Units Subcutaneous TID AC Eliana Abdul CRNP     • insulin lispro  1-5 Units Subcutaneous HS Eliana Abdul CRNP     • lurasidone  20 mg Oral Daily With Chasity Mercado MD     • metoprolol succinate  37 5 mg Oral Daily JOHN Alston     • mirtazapine  7 5 mg Oral HS Jovani Solorio CRNP     • pantoprazole  20 mg Oral Early Morning KARLEE MortonNP     • polyethylene glycol  17 g Oral Daily PRN Dudley Stewart PA-C     • risperiDONE  0 25 mg Oral Q4H PRN Max 6/day Jovani Solorio, CRNP     • risperiDONE  0 5 mg Oral Q4H PRN Max 3/day Jovani Solorio, CRNP     • risperiDONE  1 mg Oral Q2H PRN Max 3/day Jovani Solorio, CRNP     • sodium chloride  3 g Oral TID With Meals Ivon Rab Jose Senior MD     • traZODone  50 mg Oral HS PRN Jovani Solorio, JOHN         Behavioral Health Medications: all current active meds have been reviewed  Changes as in plan section above  Laboratory results:  I have personally reviewed all pertinent laboratory/tests results     Recent Results (from the past 48 hour(s))   Fingerstick Glucose (POCT)    Collection Time: 05/03/23 11:16 AM   Result Value Ref Range    POC Glucose 218 (H) 65 - 140 mg/dl   Fingerstick Glucose (POCT)    Collection Time: 05/03/23  4:48 PM   Result Value Ref Range    POC Glucose 164 (H) 65 - 140 mg/dl   Fingerstick Glucose (POCT)    Collection Time: 05/03/23  9:40 PM   Result Value Ref Range    POC Glucose 171 (H) 65 - 140 mg/dl   Basic metabolic panel    Collection Time: 05/04/23  6:37 AM   Result Value Ref Range    Sodium 127 (L) 135 - 147 mmol/L    Potassium 4 0 3 5 - 5 3 mmol/L    Chloride 90 (L) 96 - 108 mmol/L    CO2 28 21 - 32 mmol/L    ANION GAP 9 4 - 13 mmol/L    BUN 15 5 - 25 mg/dL    Creatinine 0 60 0 60 - 1 30 mg/dL    Glucose 129 65 - 140 mg/dL    Calcium 8 9 8 4 - 10 2 mg/dL    eGFR 93 ml/min/1 73sq m   Fingerstick Glucose (POCT)    Collection Time: 05/04/23  7:26 AM   Result Value Ref Range    POC Glucose 134 65 - 140 mg/dl   Fingerstick Glucose (POCT)    Collection Time: 05/04/23 11:26 AM   Result Value Ref Range    POC Glucose 214 (H) 65 - 140 mg/dl   Fingerstick Glucose (POCT)    Collection Time: 05/04/23  3:58 PM   Result Value Ref Range    POC Glucose 162 (H) 65 - 140 mg/dl   Sodium, urine, random    Collection Time: 05/04/23  4:56 PM   Result Value Ref Range    Sodium, Ur 35    Osmolality, urine    Collection Time: 05/04/23  4:56 PM   Result Value Ref Range    Osmolality, Ur 392 250 - 900 mmol/KG   Fingerstick Glucose (POCT)    Collection Time: 05/04/23  9:01 PM   Result Value Ref Range    POC Glucose 173 (H) 65 - 140 mg/dl   Basic metabolic panel    Collection Time: 05/05/23  5:46 AM   Result Value Ref Range    Sodium 126 (L) 135 - 147 mmol/L    Potassium 4 0 3 5 - 5 3 mmol/L Chloride 90 (L) 96 - 108 mmol/L    CO2 29 21 - 32 mmol/L    ANION GAP 7 4 - 13 mmol/L    BUN 15 5 - 25 mg/dL    Creatinine 0 60 0 60 - 1 30 mg/dL    Glucose 90 65 - 140 mg/dL    Glucose, Fasting 90 65 - 99 mg/dL    Calcium 8 6 8 4 - 10 2 mg/dL    eGFR 93 ml/min/1 73sq m   Uric acid    Collection Time: 05/05/23  5:46 AM   Result Value Ref Range    Uric Acid 4 6 2 0 - 7 5 mg/dL   Albumin    Collection Time: 05/05/23  5:46 AM   Result Value Ref Range    Albumin 3 1 (L) 3 5 - 5 0 g/dL   Fingerstick Glucose (POCT)    Collection Time: 05/05/23  7:35 AM   Result Value Ref Range    POC Glucose 115 65 - 140 mg/dl        This note has been constructed using a voice recognition system  There may be translation, syntax, or grammatical errors  If you have any questions, please contact the dictating author      Fausto Louie MD  Psychiatry Residency, PGY-II

## 2023-05-05 NOTE — TREATMENT TEAM
Pt attended Mental health recovery and mindfulness group  Pr psychomotor retardation and delayed responses  Pt more alert, brighter and improved eye contact  Slight tremor noted  05/05/23 1100   Activity/Group Checklist   Group Other (Comment)  (Mental health recovery and mindfulness)   Attendance Attended   Attendance Duration (min) 46-60   Interactions Interacted appropriately   Affect/Mood Other (Comment)  (psychomotor retardation)   Goals Achieved Identified feelings; Identified relapse prevention strategies; Discussed coping strategies; Able to listen to others; Able to engage in interactions; Able to reflect/comment on own behavior;Able to manage/cope with feelings;Verbalized increased hopefulness; Able to self-disclose; Able to recieve feedback

## 2023-05-05 NOTE — TREATMENT TEAM
05/05/23 4381   Service   Service [facility]   Provider Name Gayenifer Cancel out to provider regarding pt weight increase  Wt this AM 68 312 kg  Wt on 5/2 was 64 8 kg  See new order

## 2023-05-06 LAB
ANION GAP SERPL CALCULATED.3IONS-SCNC: 9 MMOL/L (ref 4–13)
BUN SERPL-MCNC: 15 MG/DL (ref 5–25)
CALCIUM SERPL-MCNC: 8.6 MG/DL (ref 8.4–10.2)
CHLORIDE SERPL-SCNC: 96 MMOL/L (ref 96–108)
CO2 SERPL-SCNC: 26 MMOL/L (ref 21–32)
CREAT SERPL-MCNC: 0.61 MG/DL (ref 0.6–1.3)
GFR SERPL CREATININE-BSD FRML MDRD: 92 ML/MIN/1.73SQ M
GLUCOSE P FAST SERPL-MCNC: 132 MG/DL (ref 65–99)
GLUCOSE SERPL-MCNC: 119 MG/DL (ref 65–140)
GLUCOSE SERPL-MCNC: 132 MG/DL (ref 65–140)
GLUCOSE SERPL-MCNC: 138 MG/DL (ref 65–140)
GLUCOSE SERPL-MCNC: 183 MG/DL (ref 65–140)
GLUCOSE SERPL-MCNC: 219 MG/DL (ref 65–140)
POTASSIUM SERPL-SCNC: 3.8 MMOL/L (ref 3.5–5.3)
SODIUM SERPL-SCNC: 131 MMOL/L (ref 135–147)

## 2023-05-06 RX ORDER — LURASIDONE HYDROCHLORIDE 20 MG/1
40 TABLET, FILM COATED ORAL
Status: DISCONTINUED | OUTPATIENT
Start: 2023-05-06 | End: 2023-05-06

## 2023-05-06 RX ADMIN — FAMOTIDINE 20 MG: 20 TABLET ORAL at 08:47

## 2023-05-06 RX ADMIN — CLONAZEPAM 0.25 MG: 0.5 TABLET ORAL at 21:18

## 2023-05-06 RX ADMIN — INSULIN LISPRO 1 UNITS: 100 INJECTION, SOLUTION INTRAVENOUS; SUBCUTANEOUS at 21:20

## 2023-05-06 RX ADMIN — INSULIN LISPRO 2 UNITS: 100 INJECTION, SOLUTION INTRAVENOUS; SUBCUTANEOUS at 12:29

## 2023-05-06 RX ADMIN — SODIUM CHLORIDE 3 G: 1 TABLET ORAL at 13:03

## 2023-05-06 RX ADMIN — CYANOCOBALAMIN TAB 1000 MCG 1000 MCG: 1000 TAB at 08:47

## 2023-05-06 RX ADMIN — ACETAMINOPHEN 975 MG: 325 TABLET ORAL at 15:19

## 2023-05-06 RX ADMIN — SODIUM CHLORIDE 3 G: 1 TABLET ORAL at 17:33

## 2023-05-06 RX ADMIN — DOCUSATE SODIUM 100 MG: 100 CAPSULE, LIQUID FILLED ORAL at 08:48

## 2023-05-06 RX ADMIN — FAMOTIDINE 20 MG: 20 TABLET ORAL at 17:32

## 2023-05-06 RX ADMIN — INSULIN GLARGINE 15 UNITS: 100 INJECTION, SOLUTION SUBCUTANEOUS at 21:22

## 2023-05-06 RX ADMIN — ASPIRIN 81 MG: 81 TABLET, COATED ORAL at 08:46

## 2023-05-06 RX ADMIN — DOCUSATE SODIUM 100 MG: 100 CAPSULE, LIQUID FILLED ORAL at 17:33

## 2023-05-06 RX ADMIN — METOPROLOL SUCCINATE 37.5 MG: 25 TABLET, EXTENDED RELEASE ORAL at 08:45

## 2023-05-06 RX ADMIN — SODIUM CHLORIDE 3 G: 1 TABLET ORAL at 08:47

## 2023-05-06 RX ADMIN — ATORVASTATIN CALCIUM 40 MG: 40 TABLET, FILM COATED ORAL at 17:33

## 2023-05-06 RX ADMIN — ACETAMINOPHEN 975 MG: 325 TABLET ORAL at 21:18

## 2023-05-06 NOTE — NURSING NOTE
Pt reported urinary pain to staff, also noted to having increased frequency and urge  Pt's daughter reported pt had recent UTI, [facility] notified

## 2023-05-06 NOTE — PLAN OF CARE
Problem: Potential for Falls  Goal: Patient will remain free of falls  Description: INTERVENTIONS:  - Educate patient/family on patient safety including physical limitations  - Instruct patient to call for assistance with activity   - Consult OT/PT to assist with strengthening/mobility   - Keep Call bell within reach  - Keep bed low and locked with side rails adjusted as appropriate  - Keep care items and personal belongings within reach  - Initiate and maintain comfort rounds  - Make Fall Risk Sign visible to staff  - Offer Toileting every2 Hours, in advance of need  - Initiate/Maintain chair alarm  - Obtain necessary fall risk management equipment  - Apply yellow socks and bracelet for high fall risk patients  - Consider moving patient to room near nurses station  Outcome: Progressing     Problem: DEPRESSION  Goal: Will be euthymic at discharge  Description: INTERVENTIONS:  - Administer medication as ordered  - Provide emotional support via 1:1 interaction with staff  - Encourage involvement in milieu/groups/activities  - Monitor for social isolation  Outcome: Progressing    Problem: ANXIETY  Goal: Will report anxiety at manageable levels  Description: INTERVENTIONS:  - Administer medication as ordered  - Teach and encourage coping skills  - Provide emotional support  - Assess patient/family for anxiety and ability to cope  Outcome: Progressing     Problem: SELF CARE DEFICIT  Goal: Return ADL status to a safe level of function  Description: INTERVENTIONS:  - Administer medication as ordered  - Assess ADL deficits and provide assistive devices as needed  - Obtain PT/OT consults as needed  - Assist and instruct patient to increase activity and self care as tolerated  Outcome: Progressing     Problem: Nutrition/Hydration-ADULT  Goal: Nutrient/Hydration intake appropriate for improving, restoring or maintaining nutritional needs  Description: Monitor and assess patient's nutrition/hydration status for malnutrition  Collaborate with interdisciplinary team and initiate plan and interventions as ordered  Monitor patient's weight and dietary intake as ordered or per policy  Utilize nutrition screening tool and intervene as necessary  Determine patient's food preferences and provide high-protein, high-caloric foods as appropriate       INTERVENTIONS:  - Monitor oral intake, urinary output, labs, and treatment plans  - Assess nutrition and hydration status and recommend course of action  - Evaluate amount of meals eaten  - Assist patient with eating if necessary   - Allow adequate time for meals  - Recommend/ encourage appropriate diets, oral nutritional supplements, and vitamin/mineral supplements  - Order, calculate, and assess calorie counts as needed  - Recommend, monitor, and adjust tube feedings and TPN/PPN based on assessed needs  - Assess need for intravenous fluids  - Provide specific nutrition/hydration education as appropriate  - Include patient/family/caregiver in decisions related to nutrition  Outcome: Progressing        Problem: PSYCHOSIS  Goal: Will report no hallucinations or delusions  Description: Interventions:  - Administer medication as  ordered  - Every waking shifts and PRN assess for the presence of hallucinations and or delusions  - Assist with reality testing to support increasing orientation  - Assess if patient's hallucinations or delusions are encouraging self-harm or harm to others and intervene as appropriate  Outcome: Progressing

## 2023-05-06 NOTE — QUICK NOTE
Hyponatremia improving  Continue salt tabs 3g TID with meals  Continue 1 5L/day fluid restriction  Consider restarting torsemide 5mg q48hr if patient gaining weight

## 2023-05-06 NOTE — PROGRESS NOTES
Progress Note - Mae [age] y o  female MRN: [id number]  Unit/Bed#: [facility] 905-29 Encounter: [id number]    The patient was seen for continuing care and reviewed with treatment team   The patient does not have any particular complaints  Her sodium level from this morning was 131  No confusion was noted  She is slept all night  She has had adequate oral intake    Current Mental Status Evaluation:  Appearance:  Adequate hygiene and grooming and Good eye contact   Behavior:  psychomotor retardation   Mood:  Dysphoric   Affect: blunted and constricted   Speech: Sparse and Soft   Thought Process:  Goal directed and coherent   Thought Content:  Does not verbalize delusional material   Perceptual Disturbances: Denies hallucinations and does not appear to be responding to internal stimuli   Risk Potential: No suicidal or homicidal ideation   Orientation:        Progress Toward Goals: No significant events in the past 24 hours  Principal Problem:    Bipolar affective disorder, depressed, severe, with psychotic behavior ([facility] [address] )  Active Problems:    Type 2 diabetes mellitus without complication, with long-term current use of insulin ([facility] [address] )    Essential hypertension    Hyponatremia    History of DVT of lower extremity    Dysphagia    UTI (urinary tract infection)    History of femur fracture    Medical clearance for psychiatric admission    Discharge planning update: The patient will return to previous living arrangement    Recommended Treatment: Continue with pharmacotherapy, group therapy, milieu therapy and occupational therapy    The patient will be maintained on the following medications:  Current Facility-Administered Medications   Medication Dose Route Frequency Provider Last Rate   • acetaminophen  650 mg Oral Q4H PRN KARLEE MirandaNP     • acetaminophen  650 mg Oral Q4H PRN KARLEE MirandaNP     • acetaminophen  975 mg Oral Q6H PRN JOHN Miranda     • aspirin  81 mg Oral Daily JOHN Cintron     • atorvastatin  40 mg Oral Daily With JOHN Whitt     • bisacodyl  10 mg Rectal Daily PRN Terrence Garcia MD     • clonazePAM  0 25 mg Oral HS Aminata Bryan MD     • cyanocobalamin  1,000 mcg Oral Daily JOHN Cintron     • docusate sodium  100 mg Oral BID Macario Lyons PA-C     • famotidine  20 mg Oral BID Macario Lyons PA-C     • haloperidol lactate  5 mg Intramuscular Q4H PRN Max 4/day JOHN Miranda     • insulin glargine  15 Units Subcutaneous HS JOHN Cintron     • insulin lispro  1-5 Units Subcutaneous TID AC JOHN Cintron     • insulin lispro  1-5 Units Subcutaneous HS JOHN Cintron     • [START ON 5/7/2023] lurasidone  40 mg Oral Daily With Berenice Gallegos MD     • metoprolol succinate  37 5 mg Oral Daily JOHN Alston     • polyethylene glycol  17 g Oral Daily PRN Macario Lyons PA-C     • risperiDONE  0 25 mg Oral Q4H PRN Max 6/day JOHN Miranda     • risperiDONE  0 5 mg Oral Q4H PRN Max 3/day JOHN Miranda     • risperiDONE  1 mg Oral Q2H PRN Max 3/day JOHN Miranda     • sodium chloride  3 g Oral TID With Meals Ivon Pascual MD     • traZODone  50 mg Oral HS PRN JOHN Miranda

## 2023-05-06 NOTE — NURSING NOTE
Pt calm and cooperative on unit  Pt able to follow prompts  Pt ambulating well with walker  Reported right hip pain, tylenol effective  Pt showered with staff assist  Nette Nip changed on pt sacrum and bilateral buttocks  Skin on right side of sacrum appears to have some partial thickness skin loss, wound bed appears pink  Pt encouraged to change position frequently, utilizing chair pad when sitting  No symptoms reported during shift  Pt's daughter Patsy Bravo called and would like to have video call tomorrow

## 2023-05-07 LAB
AMORPH PHOS CRY URNS QL MICRO: ABNORMAL /HPF
ANION GAP SERPL CALCULATED.3IONS-SCNC: 8 MMOL/L (ref 4–13)
BACTERIA UR QL AUTO: ABNORMAL /HPF
BILIRUB UR QL STRIP: NEGATIVE
BUN SERPL-MCNC: 16 MG/DL (ref 5–25)
CALCIUM SERPL-MCNC: 8.7 MG/DL (ref 8.4–10.2)
CHLORIDE SERPL-SCNC: 99 MMOL/L (ref 96–108)
CLARITY UR: CLEAR
CO2 SERPL-SCNC: 27 MMOL/L (ref 21–32)
COLOR UR: ABNORMAL
CREAT SERPL-MCNC: 0.63 MG/DL (ref 0.6–1.3)
GFR SERPL CREATININE-BSD FRML MDRD: 91 ML/MIN/1.73SQ M
GLUCOSE P FAST SERPL-MCNC: 120 MG/DL (ref 65–99)
GLUCOSE SERPL-MCNC: 120 MG/DL (ref 65–140)
GLUCOSE SERPL-MCNC: 123 MG/DL (ref 65–140)
GLUCOSE SERPL-MCNC: 147 MG/DL (ref 65–140)
GLUCOSE SERPL-MCNC: 156 MG/DL (ref 65–140)
GLUCOSE SERPL-MCNC: 224 MG/DL (ref 65–140)
GLUCOSE UR STRIP-MCNC: NEGATIVE MG/DL
HGB UR QL STRIP.AUTO: NEGATIVE
KETONES UR STRIP-MCNC: NEGATIVE MG/DL
LEUKOCYTE ESTERASE UR QL STRIP: 25
NITRITE UR QL STRIP: NEGATIVE
NON-SQ EPI CELLS URNS QL MICRO: ABNORMAL /HPF
PH UR STRIP.AUTO: 7 [PH]
POTASSIUM SERPL-SCNC: 3.9 MMOL/L (ref 3.5–5.3)
PROT UR STRIP-MCNC: ABNORMAL MG/DL
RBC #/AREA URNS AUTO: ABNORMAL /HPF
SODIUM SERPL-SCNC: 134 MMOL/L (ref 135–147)
SP GR UR STRIP.AUTO: 1.01 (ref 1–1.04)
UROBILINOGEN UA: NEGATIVE MG/DL
WBC #/AREA URNS AUTO: ABNORMAL /HPF

## 2023-05-07 RX ADMIN — ACETAMINOPHEN 975 MG: 325 TABLET ORAL at 21:42

## 2023-05-07 RX ADMIN — INSULIN LISPRO 2 UNITS: 100 INJECTION, SOLUTION INTRAVENOUS; SUBCUTANEOUS at 21:45

## 2023-05-07 RX ADMIN — INSULIN LISPRO 1 UNITS: 100 INJECTION, SOLUTION INTRAVENOUS; SUBCUTANEOUS at 12:15

## 2023-05-07 RX ADMIN — FAMOTIDINE 20 MG: 20 TABLET ORAL at 17:32

## 2023-05-07 RX ADMIN — LURASIDONE HYDROCHLORIDE 40 MG: 20 TABLET, FILM COATED ORAL at 17:32

## 2023-05-07 RX ADMIN — CYANOCOBALAMIN TAB 1000 MCG 1000 MCG: 1000 TAB at 09:07

## 2023-05-07 RX ADMIN — INSULIN GLARGINE 15 UNITS: 100 INJECTION, SOLUTION SUBCUTANEOUS at 21:45

## 2023-05-07 RX ADMIN — SODIUM CHLORIDE 3 G: 1 TABLET ORAL at 09:07

## 2023-05-07 RX ADMIN — ASPIRIN 81 MG: 81 TABLET, COATED ORAL at 09:06

## 2023-05-07 RX ADMIN — FAMOTIDINE 20 MG: 20 TABLET ORAL at 09:06

## 2023-05-07 RX ADMIN — DOCUSATE SODIUM 100 MG: 100 CAPSULE, LIQUID FILLED ORAL at 09:06

## 2023-05-07 RX ADMIN — ATORVASTATIN CALCIUM 40 MG: 40 TABLET, FILM COATED ORAL at 17:32

## 2023-05-07 RX ADMIN — CLONAZEPAM 0.25 MG: 0.5 TABLET ORAL at 21:39

## 2023-05-07 RX ADMIN — DOCUSATE SODIUM 100 MG: 100 CAPSULE, LIQUID FILLED ORAL at 17:32

## 2023-05-07 RX ADMIN — METOPROLOL SUCCINATE 37.5 MG: 25 TABLET, EXTENDED RELEASE ORAL at 09:06

## 2023-05-07 NOTE — PLAN OF CARE
Problem: Potential for Falls  Goal: Patient will remain free of falls  Description: INTERVENTIONS:  - Educate patient/family on patient safety including physical limitations  - Instruct patient to call for assistance with activity   - Consult OT/PT to assist with strengthening/mobility   - Keep Call bell within reach  - Keep bed low and locked with side rails adjusted as appropriate  - Keep care items and personal belongings within reach  - Initiate and maintain comfort rounds  - Make Fall Risk Sign visible to staff  - Offer Toileting every Hours, in advance of need  - Initiate/Maintain alarm  - Obtain necessary fall risk management equipment:   - Apply yellow socks and bracelet for high fall risk patients  - Consider moving patient to room near nurses station  Outcome: Progressing     Problem: ANXIETY  Goal: Will report anxiety at manageable levels  Description: INTERVENTIONS:  - Administer medication as ordered  - Teach and encourage coping skills  - Provide emotional support  - Assess patient/family for anxiety and ability to cope  Outcome: Progressing

## 2023-05-07 NOTE — PLAN OF CARE
Problem: DEPRESSION  Goal: Will be euthymic at discharge  Description: INTERVENTIONS:  - Administer medication as ordered  - Provide emotional support via 1:1 interaction with staff  - Encourage involvement in milieu/groups/activities  - Monitor for social isolation  Outcome: Progressing     Problem: PASCUAL  Goal: Will exhibit normal sleep and speech and no impulsivity  Description: INTERVENTIONS:  - Administer medication as ordered  - Set limits on impulsive behavior  - Make attempts to decrease external stimuli as possible  Outcome: Progressing     Problem: PSYCHOSIS  Goal: Will report no hallucinations or delusions  Description: Interventions:  - Administer medication as  ordered  - Every waking shifts and PRN assess for the presence of hallucinations and or delusions  - Assist with reality testing to support increasing orientation  - Assess if patient's hallucinations or delusions are encouraging self-harm or harm to others and intervene as appropriate  Outcome: Progressing     Problem: ANXIETY  Goal: Will report anxiety at manageable levels  Description: INTERVENTIONS:  - Administer medication as ordered  - Teach and encourage coping skills  - Provide emotional support  - Assess patient/family for anxiety and ability to cope  Outcome: Progressing     Problem: Nutrition/Hydration-ADULT  Goal: Nutrient/Hydration intake appropriate for improving, restoring or maintaining nutritional needs  Description: Monitor and assess patient's nutrition/hydration status for malnutrition  Collaborate with interdisciplinary team and initiate plan and interventions as ordered  Monitor patient's weight and dietary intake as ordered or per policy  Utilize nutrition screening tool and intervene as necessary  Determine patient's food preferences and provide high-protein, high-caloric foods as appropriate       INTERVENTIONS:  - Monitor oral intake, urinary output, labs, and treatment plans  - Assess nutrition and hydration status and recommend course of action  - Evaluate amount of meals eaten  - Assist patient with eating if necessary   - Allow adequate time for meals  - Recommend/ encourage appropriate diets, oral nutritional supplements, and vitamin/mineral supplements  - Order, calculate, and assess calorie counts as needed  - Recommend, monitor, and adjust tube feedings and TPN/PPN based on assessed needs  - Assess need for intravenous fluids  - Provide specific nutrition/hydration education as appropriate  - Include patient/family/caregiver in decisions related to nutrition  Outcome: Progressing

## 2023-05-07 NOTE — NURSING NOTE
Pt calm and cooperative during shift  Pt had positive video call with family  Pt visible in dayroom during shift, pt working on [employer]  Pt placed in fabi chair with alarm for safety  Pt ambulates well with walker, requires some assistance during transitions  Cooperative with prompts  Medication and insulin adherent  Allevyn intact on pt's sacrum and buttocks

## 2023-05-07 NOTE — NURSING NOTE
Patient was visible in the milieu with minimal peer interaction  VSS  Denies all psych s/s  No behaviors noted  Took her HS medications  Safety checks ongoing

## 2023-05-07 NOTE — PROGRESS NOTES
Progress Note - Mae [age] y o  female MRN: [id number]  Unit/Bed#: [facility] 321-96 Encounter: [id number]    The patient was seen for continuing care and reviewed with treatment team   There has been no change in clinical status  She has been compliant with fluid restriction  Sodium level from this morning was 134 which shows improvement from yesterday  She slept well  Oral intake remains variable      Current Mental Status Evaluation:  Appearance:  Adequate hygiene and grooming and Good eye contact   Behavior:  psychomotor retardation   Mood:  Euthymic   Affect: blunted   Speech: Impoverished and Soft   Thought Process:  Poverty of thoughts   Thought Content:  Does not verbalize delusional material   Perceptual Disturbances: Denies hallucinations and does not appear to be responding to internal stimuli   Risk Potential: No suicidal or homicidal ideation   Orientation:        Progress Toward Goals: No significant events in the past 24 hours  Principal Problem:    Bipolar affective disorder, depressed, severe, with psychotic behavior ([facility] [address] )  Active Problems:    Type 2 diabetes mellitus without complication, with long-term current use of insulin ([facility] [address] )    Essential hypertension    Hyponatremia    History of DVT of lower extremity    Dysphagia    UTI (urinary tract infection)    History of femur fracture    Medical clearance for psychiatric admission    Discharge planning update: The patient will return to previous living arrangement    Recommended Treatment: Continue with pharmacotherapy, group therapy, milieu therapy and occupational therapy    The patient will be maintained on the following medications:  Current Facility-Administered Medications   Medication Dose Route Frequency Provider Last Rate   • acetaminophen  650 mg Oral Q4H PRN Berneta Flatter, CRNP     • acetaminophen  650 mg Oral Q4H PRN Berneta Flatter, CRNP     • acetaminophen  975 mg Oral Q6H PRN Berneta Flatter, CRNP     • aspirin  81 mg Oral Daily JOHN Chau     • atorvastatin  40 mg Oral Daily With Correne Brunner, CRNP     • bisacodyl  10 mg Rectal Daily PRN Rachelle Menendez MD     • clonazePAM  0 25 mg Oral HS Meño Mesa MD     • cyanocobalamin  1,000 mcg Oral Daily JOHN Chau     • docusate sodium  100 mg Oral BID Merrill Monte PA-C     • famotidine  20 mg Oral BID Merrill Monte PA-C     • haloperidol lactate  5 mg Intramuscular Q4H PRN Max 4/day JOHN Pavon     • insulin glargine  15 Units Subcutaneous HS JOHN Chau     • insulin lispro  1-5 Units Subcutaneous TID AC JOHN Chau     • insulin lispro  1-5 Units Subcutaneous HS JOHN Chau     • lurasidone  40 mg Oral Daily With Fernandez Mas MD     • metoprolol succinate  37 5 mg Oral Daily JOHN Alston     • polyethylene glycol  17 g Oral Daily PRN Merrill Monte PA-C     • risperiDONE  0 25 mg Oral Q4H PRN Max 6/day JOHN Pavon     • risperiDONE  0 5 mg Oral Q4H PRN Max 3/day JOHN Pavon     • risperiDONE  1 mg Oral Q2H PRN Max 3/day JOHN Pavon     • traZODone  50 mg Oral HS PRN JOHN Pavon

## 2023-05-07 NOTE — NURSING NOTE
C/o lower back pain 9/10, tylenol 975 mg given at 2118 was effective as patient was asleep on reassessment

## 2023-05-07 NOTE — QUICK NOTE
sNa improved significantly up to 134  Will d/c salt tabs 3g TID  Continue 1 5L/day fluid restriction  F/u am BMP  Avoid torsemide reinitiation for now

## 2023-05-08 LAB
ANION GAP SERPL CALCULATED.3IONS-SCNC: 10 MMOL/L (ref 4–13)
BUN SERPL-MCNC: 13 MG/DL (ref 5–25)
CALCIUM SERPL-MCNC: 9.4 MG/DL (ref 8.4–10.2)
CHLORIDE SERPL-SCNC: 100 MMOL/L (ref 96–108)
CO2 SERPL-SCNC: 25 MMOL/L (ref 21–32)
CREAT SERPL-MCNC: 0.64 MG/DL (ref 0.6–1.3)
GFR SERPL CREATININE-BSD FRML MDRD: 91 ML/MIN/1.73SQ M
GLUCOSE P FAST SERPL-MCNC: 116 MG/DL (ref 65–99)
GLUCOSE SERPL-MCNC: 109 MG/DL (ref 65–140)
GLUCOSE SERPL-MCNC: 116 MG/DL (ref 65–140)
GLUCOSE SERPL-MCNC: 146 MG/DL (ref 65–140)
GLUCOSE SERPL-MCNC: 169 MG/DL (ref 65–140)
GLUCOSE SERPL-MCNC: 195 MG/DL (ref 65–140)
POTASSIUM SERPL-SCNC: 4 MMOL/L (ref 3.5–5.3)
SODIUM SERPL-SCNC: 135 MMOL/L (ref 135–147)

## 2023-05-08 RX ORDER — TORSEMIDE 5 MG/1
5 TABLET ORAL DAILY
Status: DISCONTINUED | OUTPATIENT
Start: 2023-05-08 | End: 2023-05-09

## 2023-05-08 RX ADMIN — INSULIN LISPRO 1 UNITS: 100 INJECTION, SOLUTION INTRAVENOUS; SUBCUTANEOUS at 12:10

## 2023-05-08 RX ADMIN — CYANOCOBALAMIN TAB 1000 MCG 1000 MCG: 1000 TAB at 08:34

## 2023-05-08 RX ADMIN — POLYETHYLENE GLYCOL 3350 17 G: 17 POWDER, FOR SOLUTION ORAL at 10:59

## 2023-05-08 RX ADMIN — DOCUSATE SODIUM 100 MG: 100 CAPSULE, LIQUID FILLED ORAL at 08:35

## 2023-05-08 RX ADMIN — CLONAZEPAM 0.25 MG: 0.5 TABLET ORAL at 21:23

## 2023-05-08 RX ADMIN — DOCUSATE SODIUM 100 MG: 100 CAPSULE, LIQUID FILLED ORAL at 17:09

## 2023-05-08 RX ADMIN — INSULIN GLARGINE 15 UNITS: 100 INJECTION, SOLUTION SUBCUTANEOUS at 21:23

## 2023-05-08 RX ADMIN — TORSEMIDE 5 MG: 5 TABLET ORAL at 14:31

## 2023-05-08 RX ADMIN — LURASIDONE HYDROCHLORIDE 40 MG: 20 TABLET, FILM COATED ORAL at 16:48

## 2023-05-08 RX ADMIN — FAMOTIDINE 20 MG: 20 TABLET ORAL at 17:09

## 2023-05-08 RX ADMIN — FAMOTIDINE 20 MG: 20 TABLET ORAL at 08:34

## 2023-05-08 RX ADMIN — INSULIN LISPRO 1 UNITS: 100 INJECTION, SOLUTION INTRAVENOUS; SUBCUTANEOUS at 21:23

## 2023-05-08 RX ADMIN — ATORVASTATIN CALCIUM 40 MG: 40 TABLET, FILM COATED ORAL at 16:48

## 2023-05-08 RX ADMIN — METOPROLOL SUCCINATE 37.5 MG: 25 TABLET, EXTENDED RELEASE ORAL at 08:34

## 2023-05-08 RX ADMIN — ASPIRIN 81 MG: 81 TABLET, COATED ORAL at 08:34

## 2023-05-08 NOTE — PROGRESS NOTES
05/08/23 1000 05/08/23 1100 05/08/23 1315   Activity/Group Checklist   Group Community meeting  (think fast icebreaker/national reward yourself day) Exercise  (movement made simple) Life Skills  (managing your mental health)   Attendance Attended Attended Did not attend   Attendance Duration (min) 16-30 31-45  --    Interactions Interacted appropriately Interacted appropriately  --    Affect/Mood Appropriate Blunted/flat  --    Goals Achieved Able to engage in interactions; Able to listen to others Able to listen to others; Able to engage in interactions  (participated for half, then fell asleep)  --

## 2023-05-08 NOTE — PLAN OF CARE
Problem: Potential for Falls  Goal: Patient will remain free of falls  Description: INTERVENTIONS:  - Educate patient/family on patient safety including physical limitations  - Instruct patient to call for assistance with activity   - Consult OT/PT to assist with strengthening/mobility   - Keep Call bell within reach  - Keep bed low and locked with side rails adjusted as appropriate  - Keep care items and personal belongings within reach  - Initiate and maintain comfort rounds  - Make Fall Risk Sign visible to staff  - Offer Toileting every 2 Hours, in advance of need  - Initiate/Maintain chair alarm  - Obtain necessary fall risk management equipment:   - Apply yellow socks and bracelet for high fall risk patients  - Consider moving patient to room near nurses station  Outcome: Progressing     Problem: DEPRESSION  Goal: Will be euthymic at discharge  Description: INTERVENTIONS:  - Administer medication as ordered  - Provide emotional support via 1:1 interaction with staff  - Encourage involvement in milieu/groups/activities  - Monitor for social isolation  Outcome: Progressing     Problem: PSYCHOSIS  Goal: Will report no hallucinations or delusions  Description: Interventions:  - Administer medication as  ordered  - Every waking shifts and PRN assess for the presence of hallucinations and or delusions  - Assist with reality testing to support increasing orientation  - Assess if patient's hallucinations or delusions are encouraging self-harm or harm to others and intervene as appropriate  Outcome: Progressing     Problem: ANXIETY  Goal: Will report anxiety at manageable levels  Description: INTERVENTIONS:  - Administer medication as ordered  - Teach and encourage coping skills  - Provide emotional support  - Assess patient/family for anxiety and ability to cope  Outcome: Progressing     Problem: SELF CARE DEFICIT  Goal: Return ADL status to a safe level of function  Description: INTERVENTIONS:  - Administer medication as ordered  - Assess ADL deficits and provide assistive devices as needed  - Obtain PT/OT consults as needed  - Assist and instruct patient to increase activity and self care as tolerated  Outcome: Progressing     Problem: Nutrition/Hydration-ADULT  Goal: Nutrient/Hydration intake appropriate for improving, restoring or maintaining nutritional needs  Description: Monitor and assess patient's nutrition/hydration status for malnutrition  Collaborate with interdisciplinary team and initiate plan and interventions as ordered  Monitor patient's weight and dietary intake as ordered or per policy  Utilize nutrition screening tool and intervene as necessary  Determine patient's food preferences and provide high-protein, high-caloric foods as appropriate       INTERVENTIONS:  - Monitor oral intake, urinary output, labs, and treatment plans  - Assess nutrition and hydration status and recommend course of action  - Evaluate amount of meals eaten  - Assist patient with eating if necessary   - Allow adequate time for meals  - Recommend/ encourage appropriate diets, oral nutritional supplements, and vitamin/mineral supplements  - Order, calculate, and assess calorie counts as needed  - Recommend, monitor, and adjust tube feedings and TPN/PPN based on assessed needs  - Assess need for intravenous fluids  - Provide specific nutrition/hydration education as appropriate  - Include patient/family/caregiver in decisions related to nutrition  Outcome: Progressing

## 2023-05-08 NOTE — NURSING NOTE
Patient was visible in the milieu with minimal peer interaction  VSS  Report depression, denies all other psych s/s    No behaviors noted  Took her HS medications  Safety checks ongoing

## 2023-05-08 NOTE — TREATMENT TEAM
05/08/23 0851   Team Meeting   Meeting Type Daily Rounds   Initial Conference Date 05/08/23   Team Members Present   Team Members Present Physician;Nurse;;   Physician Team Member Dr Roberto De La Paz Team Member Fadi [id number], C/ Naresh Valero 41 Management Team Member Jamari 116 Work Team Member Gulshan Goins   Patient/Family Present   Patient Present No   Patient's Family Present No     Cont to report depression and anxiety, had video call with family which went well, cont to not want to do own self care, slept through night, latuda increased, sodium= 135

## 2023-05-08 NOTE — NURSING NOTE
C/o back pain 8/10, tylenol 975 mg given at 2142 was effective as patient was asleep on reassessment

## 2023-05-08 NOTE — PROGRESS NOTES
NEPHROLOGY PROGRESS NOTE   Davonte Kidney 70 y o  female MRN: [id number]  Unit/Bed#: [facility] 307-85 Encounter: [id number]  Reason for Consult: Hyponatremia    ASSESSMENT/PLAN:  1  Acute hyponatremia, etiology multifactorial, suspecting component from prerenal azotemia, possible SIADH and increased fluid intake  2  Hypertension, blood pressure currently acceptable  3  Bipolar disorder, as per primary service    PLAN:  · Sodium overall has normalized  · Sodium chloride tablets have been discontinued  · Continue with fluid restriction  · Resume low-dose diuretic therapy  SUBJECTIVE:  Seen and examined  Patient awake and alert  Offers no new complaints  Appetite appears stable  Denies any excessive thirst   Does complain of some mild lower extremity swelling and abdominal bloating  Review of Systems    OBJECTIVE:  Current Weight: Weight - Scale: 71 5 kg (157 lb 10 1 oz)  Vitals:    05/07/23 1547 05/07/23 2025 05/08/23 0600 05/08/23 0742   BP: 152/70 157/70  119/66   BP Location: Right arm Right arm  Left arm   Pulse: 81 79  76   Resp: 16 17  16   Temp: 97 7 °F (36 5 °C) 97 5 °F (36 4 °C)  (!) 97 3 °F (36 3 °C)   TempSrc: Temporal Temporal  Temporal   SpO2: 97% 98%  93%   Weight:   71 5 kg (157 lb 10 1 oz)        Intake/Output Summary (Last 24 hours) at 5/8/2023 1239  Last data filed at 5/8/2023 1222  Gross per 24 hour   Intake 580 ml   Output --   Net 580 ml       Physical Exam  Constitutional:       Appearance: She is not ill-appearing  Eyes:      General: No scleral icterus  Cardiovascular:      Rate and Rhythm: Normal rate and regular rhythm  Pulmonary:      Effort: Pulmonary effort is normal       Breath sounds: Normal breath sounds  Abdominal:      General: There is no distension  Palpations: Abdomen is soft  Musculoskeletal:      Right lower leg: No edema  Left lower leg: No edema  Skin:     General: Skin is warm and dry     Neurological:      Mental Status: She is alert and oriented to person, place, and time           Medications:    Current Facility-Administered Medications:   •  acetaminophen (TYLENOL) tablet 650 mg, 650 mg, Oral, Q4H PRN, JOHN Holder  •  acetaminophen (TYLENOL) tablet 650 mg, 650 mg, Oral, Q4H PRN, JOHN Holder, 650 mg at 04/29/23 1933  •  acetaminophen (TYLENOL) tablet 975 mg, 975 mg, Oral, Q6H PRN, JOHN Holder, 975 mg at 05/07/23 2142  •  aspirin (ECOTRIN LOW STRENGTH) EC tablet 81 mg, 81 mg, Oral, Daily, JOHN Duarte, 81 mg at 05/08/23 2020  •  atorvastatin (LIPITOR) tablet 40 mg, 40 mg, Oral, Daily With JOHN Christian, 40 mg at 05/07/23 1732  •  bisacodyl (DULCOLAX) rectal suppository 10 mg, 10 mg, Rectal, Daily PRN, Madeline Leong MD, 10 mg at 05/03/23 1917  •  clonazePAM (KlonoPIN) tablet 0 25 mg, 0 25 mg, Oral, HS, Danforthmery Calix MD, 0 25 mg at 05/07/23 2139  •  cyanocobalamin (VITAMIN B-12) tablet 1,000 mcg, 1,000 mcg, Oral, Daily, JOHN Duarte, 1,000 mcg at 05/08/23 4346  •  docusate sodium (COLACE) capsule 100 mg, 100 mg, Oral, BID, Mariia Bar PA-C, 100 mg at 05/08/23 3969  •  famotidine (PEPCID) tablet 20 mg, 20 mg, Oral, BID, Mariia Bar PA-C, 20 mg at 05/08/23 5468  •  haloperidol lactate (HALDOL) injection 5 mg, 5 mg, Intramuscular, Q4H PRN Max 4/day, JOHN Holder  •  insulin glargine (LANTUS) subcutaneous injection 15 Units 0 15 mL, 15 Units, Subcutaneous, HS, JOHN Duarte, 15 Units at 05/07/23 2145  •  insulin lispro (HumaLOG) 100 units/mL subcutaneous injection 1-5 Units, 1-5 Units, Subcutaneous, TID AC, 1 Units at 05/08/23 1210 **AND** Fingerstick Glucose (POCT), , , TID Yoselin ARCOS CRNP  •  insulin lispro (HumaLOG) 100 units/mL subcutaneous injection 1-5 Units, 1-5 Units, Subcutaneous, HS, JOHN Duarte, 2 Units at 05/07/23 2145  •  lurasidone (LATUDA) tablet 40 mg, 40 mg, Oral, Daily With Johnson Moreira Araceli Wilder MD, 40 mg at 05/07/23 1732  •  metoprolol succinate (TOPROL-XL) 24 hr tablet 37 5 mg, 37 5 mg, Oral, Daily, JOHN Alston, 37 5 mg at 05/08/23 7950  •  polyethylene glycol (MIRALAX) packet 17 g, 17 g, Oral, Daily PRN, Nasra Goins PA-C, 17 g at 05/08/23 1059  •  risperiDONE (RisperDAL) tablet 0 25 mg, 0 25 mg, Oral, Q4H PRN Max 6/day, Vilinda Boeck, CRNP  •  risperiDONE (RisperDAL) tablet 0 5 mg, 0 5 mg, Oral, Q4H PRN Max 3/day, Vilinda Boeck, CRNP  •  risperiDONE (RisperDAL) tablet 1 mg, 1 mg, Oral, Q2H PRN Max 3/day, Vilinda Boeck, CRNP  •  traZODone (DESYREL) tablet 50 mg, 50 mg, Oral, HS PRN, Vilinda Boeck, CRNP    Laboratory Results:  Results from last 7 days   Lab Units 05/08/23  0621 05/07/23  0506 05/06/23  0527 05/05/23  0546 05/04/23  0637 05/03/23  0636 05/02/23  0529   POTASSIUM mmol/L 4 0 3 9 3 8 4 0 4 0 4 2 4 2   CHLORIDE mmol/L 100 99 96 90* 90* 93* 91*   CO2 mmol/L 25 27 26 29 28 29 28   BUN mg/dL 13 16 15 15 15 16 14   CREATININE mg/dL 0 64 0 63 0 61 0 60 0 60 0 62 0 63   CALCIUM mg/dL 9 4 8 7 8 6 8 6 8 9 8 9 9 0

## 2023-05-08 NOTE — PLAN OF CARE
Problem: Potential for Falls  Goal: Patient will remain free of falls  Description: INTERVENTIONS:  - Educate patient/family on patient safety including physical limitations  - Instruct patient to call for assistance with activity   - Consult OT/PT to assist with strengthening/mobility   - Keep Call bell within reach  - Keep bed low and locked with side rails adjusted as appropriate  - Keep care items and personal belongings within reach  - Initiate and maintain comfort rounds  - Make Fall Risk Sign visible to staff  - Offer Toileting every  Hours, in advance of need  - Initiate/Maintain alarm  - Obtain necessary fall risk management equipment:   - Apply yellow socks and bracelet for high fall risk patients  - Consider moving patient to room near nurses station  Outcome: Progressing     Problem: SELF CARE DEFICIT  Goal: Return ADL status to a safe level of function  Description: INTERVENTIONS:  - Administer medication as ordered  - Assess ADL deficits and provide assistive devices as needed  - Obtain PT/OT consults as needed  - Assist and instruct patient to increase activity and self care as tolerated  Outcome: Progressing

## 2023-05-08 NOTE — PROGRESS NOTES
Progress Note - Mae 108 70 y o  female MRN: [id number]  Unit/Bed#: Jennifer Nicole 751-30 Encounter: [id number]    The patient was seen for continuing care and reviewed with treatment team   Na level normalizing 135 this morning  Pt remains isolative, scant in conversation, blunted  She engages minimally with peers and staff, she still requires a chair alarm for safety  She continues to need encourage to get around and ambulate, she is paranoid that someone want to hurt her but she is not sure who  Sleep fair  Appetite 25-75%  Energy: low  No suicidal or homicidal ideations  No EPS, denies any side effects of medication   She is tolerating the Latuda and her other medications  ROS : negative      PLAN: By nephrology  • Sodium overall has normalized  • Sodium chloride tablets have been discontinued  • Continue with fluid restriction  • Resume low-dose diuretic therapy        /66 (BP Location: Left arm)   Pulse 76   Temp (!) 97 3 °F (36 3 °C) (Temporal)   Resp 16   Wt 71 5 kg (157 lb 10 1 oz)   SpO2 93%   BMI 27 06 kg/m²     Current Mental Status Evaluation:  Appearance:  Poor eye contact and disheveled but clean, ambulates with walker, needs a chair alarm   Behavior:  calm, cooperative, guarded and psychomotor retardation   Mood:  anxious and depressed   Affect: blunted   Speech: Increased latency of response, Soft and Slow   Thought Process:  Goal directed and coherent   Thought Content:  Paranoid and mistrustful   Perceptual Disturbances: Denies hallucinations and does not appear to be responding to internal stimuli   Risk Potential: No suicidal or homicidal ideation   Orientation:   Patient is alert, oriented to person and place    Patient has limited insight, judgment and impulse control      Recent Results (from the past 24 hour(s))   Fingerstick Glucose (POCT)    Collection Time: 05/07/23  4:54 PM   Result Value Ref Range    POC Glucose 147 (H) 65 - 140 mg/dl   Fingerstick Glucose (POCT)    Collection Time: 05/07/23  8:59 PM   Result Value Ref Range    POC Glucose 224 (H) 65 - 140 mg/dl   Basic metabolic panel    Collection Time: 05/08/23  6:21 AM   Result Value Ref Range    Sodium 135 135 - 147 mmol/L    Potassium 4 0 3 5 - 5 3 mmol/L    Chloride 100 96 - 108 mmol/L    CO2 25 21 - 32 mmol/L    ANION GAP 10 4 - 13 mmol/L    BUN 13 5 - 25 mg/dL    Creatinine 0 64 0 60 - 1 30 mg/dL    Glucose 116 65 - 140 mg/dL    Glucose, Fasting 116 (H) 65 - 99 mg/dL    Calcium 9 4 8 4 - 10 2 mg/dL    eGFR 91 ml/min/1 73sq m   Fingerstick Glucose (POCT)    Collection Time: 05/08/23  7:36 AM   Result Value Ref Range    POC Glucose 109 65 - 140 mg/dl   Fingerstick Glucose (POCT)    Collection Time: 05/08/23 10:59 AM   Result Value Ref Range    POC Glucose 195 (H) 65 - 140 mg/dl       Progress Toward Goals: Minimal progress, Latuda increased yesterday will continue optimizing this q 2-3 days  Assessment     Principal Problem:    Bipolar affective disorder, depressed, severe, with psychotic behavior ([facility] [address] )  Active Problems:    Type 2 diabetes mellitus without complication, with long-term current use of insulin ([facility])    Essential hypertension    Hyponatremia    History of DVT of lower extremity    Dysphagia    UTI (urinary tract infection)    History of femur fracture    Medical clearance for psychiatric admission        Plan :    - Medications;   Psychiatric:  Latuda 40mg daily with food at dinner for bipolar depression  klonopin 0 25mg HS                         Medical: Nephrology  on board for hyponatremia    Continue to monitor Na levels    -Therapy: occupational therapy, milieu and group therapy   -Disposition: to be determined

## 2023-05-08 NOTE — NURSING NOTE
"Pt appears calm, cooperative on unit  Pt able to make needs known  Pt ambulating with walker and resting in fabi chair during shift, placed on bed and chair alarm for safety  Pt preoccupied with care at times due to needing assistance, stating \"I can't bathe myself  \" Pt states \"What will happen? \" Pt states her  will send her away due to needing care  Pt also worries a male staff \"will send her away  \" Reassurances given, pt continues to appear doubtful  Pt encouraged to remain visible in dayroom during meals and groups, pt cooperative  Fair intake at meals  Pt reports constipation, accepted miralax  Denies wanting suppository when offered  Allevyn intact on sacrum and buttocks, no change in skin condition noted     "

## 2023-05-09 LAB
ANION GAP SERPL CALCULATED.3IONS-SCNC: 8 MMOL/L (ref 4–13)
BUN SERPL-MCNC: 12 MG/DL (ref 5–25)
CALCIUM SERPL-MCNC: 9 MG/DL (ref 8.4–10.2)
CHLORIDE SERPL-SCNC: 97 MMOL/L (ref 96–108)
CO2 SERPL-SCNC: 27 MMOL/L (ref 21–32)
CREAT SERPL-MCNC: 0.62 MG/DL (ref 0.6–1.3)
GFR SERPL CREATININE-BSD FRML MDRD: 92 ML/MIN/1.73SQ M
GLUCOSE SERPL-MCNC: 122 MG/DL (ref 65–140)
GLUCOSE SERPL-MCNC: 132 MG/DL (ref 65–140)
GLUCOSE SERPL-MCNC: 149 MG/DL (ref 65–140)
GLUCOSE SERPL-MCNC: 159 MG/DL (ref 65–140)
GLUCOSE SERPL-MCNC: 172 MG/DL (ref 65–140)
POTASSIUM SERPL-SCNC: 4.1 MMOL/L (ref 3.5–5.3)
SODIUM SERPL-SCNC: 132 MMOL/L (ref 135–147)

## 2023-05-09 RX ORDER — TORSEMIDE 5 MG/1
10 TABLET ORAL DAILY
Status: DISCONTINUED | OUTPATIENT
Start: 2023-05-10 | End: 2023-05-30 | Stop reason: HOSPADM

## 2023-05-09 RX ORDER — AMLODIPINE BESYLATE 2.5 MG/1
2.5 TABLET ORAL DAILY
Status: DISCONTINUED | OUTPATIENT
Start: 2023-05-09 | End: 2023-05-19

## 2023-05-09 RX ORDER — SODIUM CHLORIDE 1 G/1
1 TABLET ORAL 2 TIMES DAILY WITH MEALS
Status: DISCONTINUED | OUTPATIENT
Start: 2023-05-09 | End: 2023-05-12

## 2023-05-09 RX ADMIN — LURASIDONE HYDROCHLORIDE 40 MG: 20 TABLET, FILM COATED ORAL at 17:33

## 2023-05-09 RX ADMIN — CLONAZEPAM 0.25 MG: 0.5 TABLET ORAL at 21:30

## 2023-05-09 RX ADMIN — INSULIN LISPRO 1 UNITS: 100 INJECTION, SOLUTION INTRAVENOUS; SUBCUTANEOUS at 12:33

## 2023-05-09 RX ADMIN — FAMOTIDINE 20 MG: 20 TABLET ORAL at 17:33

## 2023-05-09 RX ADMIN — FAMOTIDINE 20 MG: 20 TABLET ORAL at 08:39

## 2023-05-09 RX ADMIN — METOPROLOL SUCCINATE 37.5 MG: 25 TABLET, EXTENDED RELEASE ORAL at 08:38

## 2023-05-09 RX ADMIN — DOCUSATE SODIUM 100 MG: 100 CAPSULE, LIQUID FILLED ORAL at 08:38

## 2023-05-09 RX ADMIN — ACETAMINOPHEN 650 MG: 325 TABLET ORAL at 17:32

## 2023-05-09 RX ADMIN — INSULIN LISPRO 1 UNITS: 100 INJECTION, SOLUTION INTRAVENOUS; SUBCUTANEOUS at 21:31

## 2023-05-09 RX ADMIN — CYANOCOBALAMIN TAB 1000 MCG 1000 MCG: 1000 TAB at 08:38

## 2023-05-09 RX ADMIN — SODIUM CHLORIDE 1 G: 1 TABLET ORAL at 08:39

## 2023-05-09 RX ADMIN — INSULIN GLARGINE 15 UNITS: 100 INJECTION, SOLUTION SUBCUTANEOUS at 21:31

## 2023-05-09 RX ADMIN — SODIUM CHLORIDE 1 G: 1 TABLET ORAL at 17:33

## 2023-05-09 RX ADMIN — DOCUSATE SODIUM 100 MG: 100 CAPSULE, LIQUID FILLED ORAL at 17:33

## 2023-05-09 RX ADMIN — ATORVASTATIN CALCIUM 40 MG: 40 TABLET, FILM COATED ORAL at 17:33

## 2023-05-09 RX ADMIN — TORSEMIDE 5 MG: 5 TABLET ORAL at 08:38

## 2023-05-09 RX ADMIN — ASPIRIN 81 MG: 81 TABLET, COATED ORAL at 08:38

## 2023-05-09 NOTE — PROGRESS NOTES
05/09/23 1000   Activity/Group Checklist   Group [facility]  (match game task on wellness)   Attendance Attended   Attendance Duration (min) 31-45   Interactions Other (Comment)  (Pt  more alert this session yet not fully mindful during the memory game )   Affect/Mood Constricted   Goals Achieved Able to engage in interactions; Able to listen to others;Discussed coping strategies

## 2023-05-09 NOTE — TREATMENT TEAM
05/09/23 0852   Team Meeting   Meeting Type Daily Rounds   Initial Conference Date 05/09/23   Team Members Present   Team Members Present Physician;Nurse;Occupational Therapist;;   Physician Team Member Dr Lorraine Schaefer Team Member Forest Dubose [id number] Management Team Member Jamari Butts Work Team Member [place]   OT Team Member Baron RAMIREZ   Patient/Family Present   Patient Present No   Patient's Family Present No     Remains hopeless and helpless, delusional that male staff will hurt her, redirectable, reports anxiety, slept through night, cont to believe unable to do self care

## 2023-05-09 NOTE — PLAN OF CARE
Problem: Potential for Falls  Goal: Patient will remain free of falls  Description: INTERVENTIONS:  - Educate patient/family on patient safety including physical limitations  - Instruct patient to call for assistance with activity   - Consult OT/PT to assist with strengthening/mobility   - Keep Call bell within reach  - Keep bed low and locked with side rails adjusted as appropriate  - Keep care items and personal belongings within reach  - Initiate and maintain comfort rounds  - Make Fall Risk Sign visible to staff  - Offer Toileting every 3 Hours, in advance of need  - Initiate/Maintain alarms  - Obtain necessary fall risk management equipment:   - Apply yellow socks and bracelet for high fall risk patients  - Consider moving patient to room near nurses station  Outcome: Progressing     Problem: DEPRESSION  Goal: Will be euthymic at discharge  Description: INTERVENTIONS:  - Administer medication as ordered  - Provide emotional support via 1:1 interaction with staff  - Encourage involvement in milieu/groups/activities  - Monitor for social isolation  Outcome: Progressing     Problem: PASCUAL  Goal: Will exhibit normal sleep and speech and no impulsivity  Description: INTERVENTIONS:  - Administer medication as ordered  - Set limits on impulsive behavior  - Make attempts to decrease external stimuli as possible  Outcome: Progressing     Problem: PSYCHOSIS  Goal: Will report no hallucinations or delusions  Description: Interventions:  - Administer medication as  ordered  - Every waking shifts and PRN assess for the presence of hallucinations and or delusions  - Assist with reality testing to support increasing orientation  - Assess if patient's hallucinations or delusions are encouraging self-harm or harm to others and intervene as appropriate  Outcome: Progressing     Problem: ANXIETY  Goal: Will report anxiety at manageable levels  Description: INTERVENTIONS:  - Administer medication as ordered  - Teach and encourage coping skills  - Provide emotional support  - Assess patient/family for anxiety and ability to cope  Outcome: Progressing     Problem: SELF CARE DEFICIT  Goal: Return ADL status to a safe level of function  Description: INTERVENTIONS:  - Administer medication as ordered  - Assess ADL deficits and provide assistive devices as needed  - Obtain PT/OT consults as needed  - Assist and instruct patient to increase activity and self care as tolerated  Outcome: Progressing     Problem: Nutrition/Hydration-ADULT  Goal: Nutrient/Hydration intake appropriate for improving, restoring or maintaining nutritional needs  Description: Monitor and assess patient's nutrition/hydration status for malnutrition  Collaborate with interdisciplinary team and initiate plan and interventions as ordered  Monitor patient's weight and dietary intake as ordered or per policy  Utilize nutrition screening tool and intervene as necessary  Determine patient's food preferences and provide high-protein, high-caloric foods as appropriate       INTERVENTIONS:  - Monitor oral intake, urinary output, labs, and treatment plans  - Assess nutrition and hydration status and recommend course of action  - Evaluate amount of meals eaten  - Assist patient with eating if necessary   - Allow adequate time for meals  - Recommend/ encourage appropriate diets, oral nutritional supplements, and vitamin/mineral supplements  - Order, calculate, and assess calorie counts as needed  - Recommend, monitor, and adjust tube feedings and TPN/PPN based on assessed needs  - Assess need for intravenous fluids  - Provide specific nutrition/hydration education as appropriate  - Include patient/family/caregiver in decisions related to nutrition  Outcome: Progressing

## 2023-05-09 NOTE — PROGRESS NOTES
Pt attended all groups  Pt with chair alarm and anxious toward the end of group asking for assistance  Notified [facility]  Pt able to provide positive strengths with delayed and not spontaneous responses  05/09/23 1330   Activity/Group Checklist   Group Other (Comment)  (positive strengths and self esteem)   Attendance Attended   Attendance Duration (min) 46-60   Interactions Other (Comment)  (anxious)   Affect/Mood Appropriate   Goals Achieved Identified feelings; Discussed coping strategies; Discussed self-esteem issues; Able to self-disclose; Able to listen to others; Able to engage in interactions

## 2023-05-09 NOTE — PROGRESS NOTES
NEPHROLOGY PROGRESS NOTE   Fernando Engle 70 y o  female MRN: [id number]  Unit/Bed#: Lauryn Gonzales 925-66 Encounter: [id number]  Reason for Consult: Hyponatremia    ASSESSMENT/PLAN:  1  Hyponatremia, etiology multifactorial, possible initial component due to prerenal azotemia  Possible underlying SIADH, recommend repeating urine studies  2  Bipolar disorder, management as per primary service  3  Hypertension, blood pressure currently appears suboptimal, add low-dose calcium channel blocker, continue with metoprolol    PLAN:  · Fortunately sodium level slightly worse today at 128, maintain fluid restriction, increase torsemide to 10 mg daily, start low-dose sodium chloride tablets  · Recommend repeating urinary studies  SUBJECTIVE:  Seen and examined  Patient awake and alert  Currently complains of constipation  No reports of chest pain or shortness of breath  Review of Systems    OBJECTIVE:  Current Weight: Weight - Scale: 69 9 kg (154 lb 1 6 oz)  Vitals:    05/08/23 1530 05/08/23 2023 05/09/23 0751 05/09/23 0932   BP: 146/95 164/81 (!) 197/84    BP Location: Left arm Right arm Left arm    Pulse: 96 79 93    Resp: 16 17 18    Temp: (!) 97 1 °F (36 2 °C) (!) 96 4 °F (35 8 °C) 97 8 °F (36 6 °C)    TempSrc: Temporal Temporal Temporal    SpO2: 97% 96% 97%    Weight:    69 9 kg (154 lb 1 6 oz)       Intake/Output Summary (Last 24 hours) at 5/9/2023 1405  Last data filed at 5/9/2023 1300  Gross per 24 hour   Intake 1260 ml   Output --   Net 1260 ml       Physical Exam  Constitutional:       Appearance: She is obese  She is not ill-appearing  Eyes:      General: No scleral icterus  Cardiovascular:      Rate and Rhythm: Normal rate and regular rhythm  Pulmonary:      Effort: Pulmonary effort is normal       Breath sounds: Normal breath sounds  Abdominal:      General: There is distension  Palpations: Abdomen is soft  Tenderness: There is no abdominal tenderness     Musculoskeletal:      Right lower leg: Edema present  Left lower leg: Edema present  Skin:     General: Skin is warm and dry  Neurological:      Mental Status: She is alert and oriented to person, place, and time           Medications:    Current Facility-Administered Medications:   •  acetaminophen (TYLENOL) tablet 650 mg, 650 mg, Oral, Q4H PRN, Berneta Flatter, CRNP  •  acetaminophen (TYLENOL) tablet 650 mg, 650 mg, Oral, Q4H PRN, Berneta Flatter, CRNP, 650 mg at 04/29/23 1933  •  acetaminophen (TYLENOL) tablet 975 mg, 975 mg, Oral, Q6H PRN, Berneta Flatter, CRNP, 975 mg at 05/07/23 2142  •  aspirin (ECOTRIN LOW STRENGTH) EC tablet 81 mg, 81 mg, Oral, Daily, Murtis Clause, CRNP, 81 mg at 05/09/23 0699  •  atorvastatin (LIPITOR) tablet 40 mg, 40 mg, Oral, Daily With Alexandra Lopez, CRNP, 40 mg at 05/08/23 1648  •  bisacodyl (DULCOLAX) rectal suppository 10 mg, 10 mg, Rectal, Daily PRN, Max Thurman MD, 10 mg at 05/03/23 1917  •  clonazePAM (KlonoPIN) tablet 0 25 mg, 0 25 mg, Oral, HS, Logan Sesay MD, 0 25 mg at 05/08/23 2123  •  cyanocobalamin (VITAMIN B-12) tablet 1,000 mcg, 1,000 mcg, Oral, Daily, Murtis Clause, CRNP, 1,000 mcg at 05/09/23 7176  •  docusate sodium (COLACE) capsule 100 mg, 100 mg, Oral, BID, Mariia Bar PA-C, 100 mg at 05/09/23 6510  •  famotidine (PEPCID) tablet 20 mg, 20 mg, Oral, BID, Mariia Bar PA-C, 20 mg at 05/09/23 9991  •  haloperidol lactate (HALDOL) injection 5 mg, 5 mg, Intramuscular, Q4H PRN Max 4/day, Berneta Flatter, CRNP  •  insulin glargine (LANTUS) subcutaneous injection 15 Units 0 15 mL, 15 Units, Subcutaneous, HS, Murtis Clause, CRNP, 15 Units at 05/08/23 2123  •  insulin lispro (HumaLOG) 100 units/mL subcutaneous injection 1-5 Units, 1-5 Units, Subcutaneous, TID AC, 1 Units at 05/09/23 1233 **AND** Fingerstick Glucose (POCT), , , TID AC, JOHN Jeffrey  •  insulin lispro (HumaLOG) 100 units/mL subcutaneous injection 1-5 Units, 1-5 Units, Subcutaneous, HS, Mac Mas, CRNP, 1 Units at 05/08/23 2123  •  lurasidone (LATUDA) tablet 40 mg, 40 mg, Oral, Daily With Cosme Lyle MD, 40 mg at 05/08/23 1648  •  metoprolol succinate (TOPROL-XL) 24 hr tablet 37 5 mg, 37 5 mg, Oral, Daily, JOHN Alston, 37 5 mg at 05/09/23 1771  •  polyethylene glycol (MIRALAX) packet 17 g, 17 g, Oral, Daily PRN, Madina Aceves PA-C, 17 g at 05/08/23 1059  •  risperiDONE (RisperDAL) tablet 0 25 mg, 0 25 mg, Oral, Q4H PRN Max 6/day, KARLEE WatsonNP  •  risperiDONE (RisperDAL) tablet 0 5 mg, 0 5 mg, Oral, Q4H PRN Max 3/day, KARLEE WatsonNP  •  risperiDONE (RisperDAL) tablet 1 mg, 1 mg, Oral, Q2H PRN Max 3/day, JOHN Watson  •  sodium chloride tablet 1 g, 1 g, Oral, BID With Meals, Saskia Castillo Olsen, DO, 1 g at 05/09/23 3165  •  torsemide (DEMADEX) tablet 5 mg, 5 mg, Oral, Daily, Saskiakelly Castillo Olsen, DO, 5 mg at 05/09/23 7012  •  traZODone (DESYREL) tablet 50 mg, 50 mg, Oral, HS PRN, JOHN Watson    Laboratory Results:  Results from last 7 days   Lab Units 05/09/23  0621 05/08/23  6985 05/07/23  0506 05/06/23  0527 05/05/23  0546 05/04/23  0637 05/03/23  0636   POTASSIUM mmol/L 4 1 4 0 3 9 3 8 4 0 4 0 4 2   CHLORIDE mmol/L 97 100 99 96 90* 90* 93*   CO2 mmol/L 27 25 27 26 29 28 29   BUN mg/dL 12 13 16 15 15 15 16   CREATININE mg/dL 0 62 0 64 0 63 0 61 0 60 0 60 0 62   CALCIUM mg/dL 9 0 9 4 8 7 8 6 8 6 8 9 8 9

## 2023-05-09 NOTE — NURSING NOTE
"Pt alert and visible on the unit  Reported depression and anxiety are \"still there\"  When asked why she was depressed stated, \"I don't know\"  Slow to respond and offers little in conversation  Maintained on 1500cc fluid restriction  Appetite 75% for breakfast and 25% for lunch  Ambulates with assistance of walker  Will continue to monitor and maintain q 7 min checks     "

## 2023-05-09 NOTE — PROGRESS NOTES
Progress Note - Mae 108 70 y o  female MRN: [id number]  Unit/Bed#: Reynaldo [facility] 862-56 Encounter: [id number]    The patient was seen for continuing care and reviewed with treatment team   Na level  Slightly dropped today from 135 to 132  Salt tablets resumed at 1mg BID  Patient remains scant in conversation , resistant to getting out of chair to ambulate but later agreed  She is able to make her basic needs known  She still requires a chair alarm but she was able to get up independently from her chair and ambulate with walker  She remains anxious, seen asking her right leg , then moved it to her right arm  She still reports fee ling suspicious of people's intentions  Sleep fair  Appetite 25-75%  Energy: low  No suicidal or homicidal ideations  No EPS, denies any side effects of medication   She is tolerating the Latuda and her other medications  ROS : negative       BP (!) 197/84 (BP Location: Left arm)   Pulse 93   Temp 97 8 °F (36 6 °C) (Temporal)   Resp 18   Wt 69 9 kg (154 lb 1 6 oz)   SpO2 97%   BMI 26 45 kg/m²     Current Mental Status Evaluation:  Appearance:  Poor eye contact and disheveled but clean, ambulates with walker, needs a chair alarm   Behavior:  calm, cooperative, guarded and psychomotor retardation   Mood:  anxious and depressed   Affect: blunted   Speech: Increased latency of response, Soft and Slow   Thought Process:  Goal directed and coherent   Thought Content:  Paranoid and mistrustful   Perceptual Disturbances: Denies hallucinations and does not appear to be responding to internal stimuli   Risk Potential: No suicidal or homicidal ideation   Orientation:   Patient is alert, oriented to person and place    Patient has limited insight, judgment and impulse control      Recent Results (from the past 24 hour(s))   Fingerstick Glucose (POCT)    Collection Time: 05/08/23  4:24 PM   Result Value Ref Range    POC Glucose 146 (H) 65 - 140 mg/dl   Fingerstick Glucose (POCT)    Collection Time: 05/08/23  8:49 PM   Result Value Ref Range    POC Glucose 169 (H) 65 - 140 mg/dl   Basic metabolic panel    Collection Time: 05/09/23  6:21 AM   Result Value Ref Range    Sodium 132 (L) 135 - 147 mmol/L    Potassium 4 1 3 5 - 5 3 mmol/L    Chloride 97 96 - 108 mmol/L    CO2 27 21 - 32 mmol/L    ANION GAP 8 4 - 13 mmol/L    BUN 12 5 - 25 mg/dL    Creatinine 0 62 0 60 - 1 30 mg/dL    Glucose 122 65 - 140 mg/dL    Calcium 9 0 8 4 - 10 2 mg/dL    eGFR 92 ml/min/1 73sq m   Fingerstick Glucose (POCT)    Collection Time: 05/09/23  7:46 AM   Result Value Ref Range    POC Glucose 132 65 - 140 mg/dl   Fingerstick Glucose (POCT)    Collection Time: 05/09/23 12:03 PM   Result Value Ref Range    POC Glucose 159 (H) 65 - 140 mg/dl       Progress Toward Goals: Minimal progress, Latuda  Is well tolerated, plan to continue increasing to a target of 60-80mg daily as tolerated  Plan to update her family this week  Assessment     Principal Problem:    Bipolar affective disorder, depressed, severe, with psychotic behavior ([facility] [address] )  Active Problems:    Type 2 diabetes mellitus without complication, with long-term current use of insulin ([facility])    Essential hypertension    Hyponatremia    History of DVT of lower extremity    Dysphagia    UTI (urinary tract infection)    History of femur fracture    Medical clearance for psychiatric admission        Plan :    - Medications;   Psychiatric:  Latuda 40mg daily with food at dinner for bipolar depression - increase to 60mg tomorrow   klonopin 0 25mg HS                         Medical: Nephrology  on board for hyponatremia    Continue to monitor Na levels    -Therapy: occupational therapy, milieu and group therapy   -Disposition: to be determined

## 2023-05-09 NOTE — WOUND OSTOMY CARE
Progress Note - Wound   Deny Williamson 70 y o  female MRN: [id number]  Unit/Bed#: Pepe Schumacher 674-53 Encounter: [id number]    History and Present Illness:  70year old female seen today for follow up wound assessment  Pt has wounds POA at [address]  4/5/23  Patient able to ambulate with use of walker and assistance  Alert, wearing adult brief  PMH bipolar affective disorder type 2 DM History of DVT  Severe protein calorie malnutrition,     Assessment:   1)Bilateral buttocks inner medial buttocks dry brown intact, no open areas  Allevyn foam applied as protective measures  Anticipate Allevyn foam can be discontinued in one week  Pt is ambulatory  2)Right ischium wound is dry intact brown scaly tissue  No open areas  3)Left ischium wound dry intact brown flaky skin  Allevyn foams to both ischium, as protective measure  Pressure injuries present on admission, are now 90% resolved  No induration, fluctuance, odor, warmth/temperature differences, redness, or purulence noted to the above noted wounds and skin areas assessed  New dressings applied per orders listed below  Patient tolerated well- no s/s of non-verbal pain or discomfort observed during the encounter  Bedside nurse aware of plan of care  See flow sheets for more detailed assessment findings  Wound care will continue to follow  Skin care Plan:  1-Cleanse sacro-buttocks with soap and water  Apply Allevyn foam  Power with T for treatment  Change every three days and PRN  2-Turn/reposition q2h or when medically stable for pressure re-distribution on skin   3-Elevate heels to offload pressure  4-Moisturize skin daily with skin nourishing cream  5-Ehob cushion in chair when out of bed  6-Hydraguard to bilateral heels BID and PRN  Wound 04/28/23 Pressure Injury Sacrum (Active)   Wound Image   05/09/23 0942   Wound Description Dry; Intact; Brown 05/09/23 0942   Pressure Injury Stage 2 05/09/23 0942   Giselle-wound Assessment Dry; Intact 05/09/23 0942   Wound Length (cm) 6 cm 05/09/23 0942   Wound Width (cm) 4 cm 05/09/23 0942   Wound Surface Area (cm^2) 24 cm^2 05/09/23 0942   Drainage Amount None 05/09/23 0942   Treatments Site care;Cleansed 05/09/23 0942   Dressing Foam, Silicon (eg  Allevyn, etc) 05/09/23 0942   Dressing Changed Changed 05/09/23 0942   Patient Tolerance Tolerated well 05/09/23 0942   Dressing Status Clean;Dry 05/09/23 0942       Wound 04/28/23 Pressure Injury Buttocks Right (Active)   Wound Image   05/09/23 0943   Wound Description Dry; Intact; Brown 05/09/23 0943   Pressure Injury Stage 2 05/09/23 0943   Giselle-wound Assessment Brown;Scaly 05/09/23 0943   Wound Length (cm) 2 cm 05/09/23 0943   Wound Width (cm) 1 cm 05/09/23 0943   Wound Surface Area (cm^2) 2 cm^2 05/09/23 0943   Drainage Amount None 05/09/23 0943   Treatments Cleansed;Site care 05/09/23 0943   Dressing Foam, Silicon (eg  Allevyn, etc) 05/09/23 0943   Dressing Changed Changed 05/09/23 0943   Patient Tolerance Tolerated well 05/09/23 0943   Dressing Status Clean;Dry; Intact 05/09/23 0943       Wound 05/02/23 Pressure Injury Buttocks Left (Active)   Wound Image   05/09/23 0942   Wound Description Dry; Intact; Brown;Pink 05/09/23 0942   Pressure Injury Stage 1 05/09/23 0942   Giselle-wound Assessment Dry;Brown;Scaly 05/09/23 0942   Wound Length (cm) 1 cm 05/09/23 0942   Wound Width (cm) 1 cm 05/09/23 0942   Wound Surface Area (cm^2) 1 cm^2 05/09/23 0942   Drainage Amount None 05/09/23 0942   Treatments Site care;Cleansed 05/09/23 0942   Dressing Foam, Silicon (eg  Allevyn, etc) 05/09/23 0942   Dressing Changed Changed 05/09/23 0942   Patient Tolerance Tolerated well 05/09/23 0942   Dressing Status Clean;Dry; Intact 05/09/23 0942     Call or tigertext with any questions  Wound Care will continue to follow    Brayan Naik BSN RN [facility]

## 2023-05-09 NOTE — NURSING NOTE
Pt visible in day room  Scant in conversation  Delayed and soft speech, at times does not answer questions  Denies all s/s  Took meds in apple sauce  Utilizing walker for safety  Bed and chair alarm maintained  BS check 172, provided with lantus and coverage  Showered with staff assistance this evening  Kia's changed

## 2023-05-09 NOTE — NURSING NOTE
Pt visible in day room, denies all s/s  Scant in conversation and delayed in speech  Spoke with  on phone    Provided with lantus and sliding scale coverage  Allevyn intact  Will maintain q7min checks

## 2023-05-09 NOTE — PLAN OF CARE
Pt  attended 3/3 groups today displaying some anxiety yet more alert in sessions    Problem: Ineffective Coping  Goal: Participates in unit activities  Description: Interventions:  - Provide therapeutic environment   - Provide required programming   - Redirect inappropriate behaviors   Outcome: Progressing

## 2023-05-10 LAB
GLUCOSE SERPL-MCNC: 128 MG/DL (ref 65–140)
GLUCOSE SERPL-MCNC: 141 MG/DL (ref 65–140)
GLUCOSE SERPL-MCNC: 156 MG/DL (ref 65–140)
GLUCOSE SERPL-MCNC: 181 MG/DL (ref 65–140)
OSMOLALITY UR: 392 MMOL/KG
SODIUM 24H UR-SCNC: 94 MOL/L

## 2023-05-10 RX ORDER — LURASIDONE HYDROCHLORIDE 20 MG/1
60 TABLET, FILM COATED ORAL
Status: DISCONTINUED | OUTPATIENT
Start: 2023-05-10 | End: 2023-05-16

## 2023-05-10 RX ADMIN — INSULIN LISPRO 1 UNITS: 100 INJECTION, SOLUTION INTRAVENOUS; SUBCUTANEOUS at 21:34

## 2023-05-10 RX ADMIN — INSULIN GLARGINE 15 UNITS: 100 INJECTION, SOLUTION SUBCUTANEOUS at 21:34

## 2023-05-10 RX ADMIN — LURASIDONE HYDROCHLORIDE 60 MG: 20 TABLET, FILM COATED ORAL at 17:39

## 2023-05-10 RX ADMIN — SODIUM CHLORIDE 1 G: 1 TABLET ORAL at 17:39

## 2023-05-10 RX ADMIN — DOCUSATE SODIUM 100 MG: 100 CAPSULE, LIQUID FILLED ORAL at 17:39

## 2023-05-10 RX ADMIN — SODIUM CHLORIDE 1 G: 1 TABLET ORAL at 09:32

## 2023-05-10 RX ADMIN — ACETAMINOPHEN 650 MG: 325 TABLET ORAL at 09:33

## 2023-05-10 RX ADMIN — DOCUSATE SODIUM 100 MG: 100 CAPSULE, LIQUID FILLED ORAL at 09:34

## 2023-05-10 RX ADMIN — FAMOTIDINE 20 MG: 20 TABLET ORAL at 17:39

## 2023-05-10 RX ADMIN — FAMOTIDINE 20 MG: 20 TABLET ORAL at 09:32

## 2023-05-10 RX ADMIN — ATORVASTATIN CALCIUM 40 MG: 40 TABLET, FILM COATED ORAL at 17:39

## 2023-05-10 RX ADMIN — AMLODIPINE BESYLATE 2.5 MG: 2.5 TABLET ORAL at 09:34

## 2023-05-10 RX ADMIN — METOPROLOL SUCCINATE 37.5 MG: 25 TABLET, EXTENDED RELEASE ORAL at 09:34

## 2023-05-10 RX ADMIN — CLONAZEPAM 0.25 MG: 0.5 TABLET ORAL at 21:08

## 2023-05-10 RX ADMIN — ACETAMINOPHEN 975 MG: 325 TABLET ORAL at 16:26

## 2023-05-10 RX ADMIN — ASPIRIN 81 MG: 81 TABLET, COATED ORAL at 09:32

## 2023-05-10 RX ADMIN — INSULIN LISPRO 1 UNITS: 100 INJECTION, SOLUTION INTRAVENOUS; SUBCUTANEOUS at 13:00

## 2023-05-10 RX ADMIN — CYANOCOBALAMIN TAB 1000 MCG 1000 MCG: 1000 TAB at 09:34

## 2023-05-10 RX ADMIN — TORSEMIDE 10 MG: 5 TABLET ORAL at 09:32

## 2023-05-10 NOTE — PROGRESS NOTES
Pt attended short term problem solving group  Pt alert and able to stay for duration  Delayed responses at times  Positive interaction with peer  05/10/23 1100   Activity/Group Checklist   Group Other (Comment)  (short term problem solving)   Attendance Attended   Attendance Duration (min) 31-45   Interactions Interacted appropriately   Affect/Mood Appropriate   Goals Achieved Identified feelings; Discussed coping strategies; Able to listen to others; Able to engage in interactions

## 2023-05-10 NOTE — PROGRESS NOTES
Pt  Cooperative and focused to task yet needed prompting to speak  05/10/23 1000   Activity/Group Checklist   Group Community meeting  (5 finger peace task )   Attendance Attended   Attendance Duration (min) 31-45   Interactions Interacted appropriately  (Pt  made topic related comments when called upon  Displayed fluctuating interest and alertness in the session on reminiscing  Pt  recalled having fun in [address] years ago with her child )   Affect/Mood Appropriate;Calm   Goals Achieved Able to engage in interactions; Able to listen to others;Discussed coping strategies; Identified feelings

## 2023-05-10 NOTE — NURSING NOTE
Patient awake periodically through night, asking to get up and get dressed for the day  Pt reoriented to time mult times

## 2023-05-10 NOTE — PROGRESS NOTES
Progress Note - Mae 108 70 y o  female MRN: [id number]  Unit/Bed#: [facility] 395-62 Encounter: [id number]    The patient was seen for continuing care and reviewed with treatment team    Patient's responses remain delayed, she has limited interactions with peers and at times seen with her head on the table  She has no interest in coloring, reading or interacting with others  She has been making her needs known, slightly  brighter today  Sleep fair  Appetite %  Energy: low, needs promptings to do things  No suicidal or homicidal ideations  No EPS, denies any side effects of medication  She is tolerating the Latuda, does not reports side effects , will increase the dose today      ROS : negative       /81 (BP Location: Left arm)   Pulse 85   Temp 97 6 °F (36 4 °C) (Temporal)   Resp 18   Wt 68 2 kg (150 lb 4 oz)   SpO2 96%   BMI 25 79 kg/m²     Current Mental Status Evaluation:  Appearance:  disheveled but clean, ambulates with walker, needs a chair alarm, fair eye contact,  her right leg repeatedly during the interview   Behavior:  calm, cooperative, guarded and psychomotor retardation   Mood:  anxious and depressed   Affect: constricted, but slightly reactive today   Speech: Increased latency of response, Soft and Slow   Thought Process:  Poverty of thoughts   Thought Content:  Paranoid and mistrustful   Perceptual Disturbances: Denies hallucinations and does not appear to be responding to internal stimuli   Risk Potential: No suicidal or homicidal ideation   Orientation:   Patient is alert, oriented to person and place    Patient has limited insight, judgment and impulse control      Recent Results (from the past 24 hour(s))   Fingerstick Glucose (POCT)    Collection Time: 05/09/23  4:03 PM   Result Value Ref Range    POC Glucose 149 (H) 65 - 140 mg/dl   Fingerstick Glucose (POCT)    Collection Time: 05/09/23  9:11 PM   Result Value Ref Range    POC Glucose 172 (H) 65 - 140 mg/dl   Fingerstick Glucose (POCT)    Collection Time: 05/10/23  7:04 AM   Result Value Ref Range    POC Glucose 128 65 - 140 mg/dl   Sodium, urine, random    Collection Time: 05/10/23  8:19 AM   Result Value Ref Range    Sodium, Ur 94    Osmolality, urine    Collection Time: 05/10/23  8:19 AM   Result Value Ref Range    Osmolality, Ur 392 250 - 900 mmol/KG   Fingerstick Glucose (POCT)    Collection Time: 05/10/23 12:02 PM   Result Value Ref Range    POC Glucose 156 (H) 65 - 140 mg/dl       Progress Toward Goals: Minimal progress,  Increase Latuda today  Spoke with daughter, answered her questions regarding medications, UTI and discharge planning  Assessment     Principal Problem:    Bipolar affective disorder, depressed, severe, with psychotic behavior ([facility] [address] )  Active Problems:    Type 2 diabetes mellitus without complication, with long-term current use of insulin ([facility])    Essential hypertension    Hyponatremia    History of DVT of lower extremity    Dysphagia    UTI (urinary tract infection)    History of femur fracture    Medical clearance for psychiatric admission        Plan :    - Medications;   Psychiatric: Increase Latuda  60mg  with dinner today   klonopin 0 25mg HS                         Medical: Nephrology  on board for hyponatremia    Continue to monitor Na levels( 132 yesterday),     -Therapy: occupational therapy, milieu and group therapy   -Disposition: to be determined

## 2023-05-10 NOTE — PLAN OF CARE
Problem: Potential for Falls  Goal: Patient will remain free of falls  Description: INTERVENTIONS:  - Educate patient/family on patient safety including physical limitations  - Instruct patient to call for assistance with activity   - Consult OT/PT to assist with strengthening/mobility   - Keep Call bell within reach  - Keep bed low and locked with side rails adjusted as appropriate  - Keep care items and personal belongings within reach  - Initiate and maintain comfort rounds  - Make Fall Risk Sign visible to staff  - Obtain necessary fall risk management equipment:   - Apply yellow socks and bracelet for high fall risk patients  - Consider moving patient to room near nurses station  Outcome: Progressing     Problem: DEPRESSION  Goal: Will be euthymic at discharge  Description: INTERVENTIONS:  - Administer medication as ordered  - Provide emotional support via 1:1 interaction with staff  - Encourage involvement in milieu/groups/activities  - Monitor for social isolation  Outcome: Progressing     Problem: PASCUAL  Goal: Will exhibit normal sleep and speech and no impulsivity  Description: INTERVENTIONS:  - Administer medication as ordered  - Set limits on impulsive behavior  - Make attempts to decrease external stimuli as possible  Outcome: Progressing     Problem: PSYCHOSIS  Goal: Will report no hallucinations or delusions  Description: Interventions:  - Administer medication as  ordered  - Every waking shifts and PRN assess for the presence of hallucinations and or delusions  - Assist with reality testing to support increasing orientation  - Assess if patient's hallucinations or delusions are encouraging self-harm or harm to others and intervene as appropriate  Outcome: Progressing     Problem: ANXIETY  Goal: Will report anxiety at manageable levels  Description: INTERVENTIONS:  - Administer medication as ordered  - Teach and encourage coping skills  - Provide emotional support  - Assess patient/family for anxiety and ability to cope  Outcome: Progressing     Problem: SELF CARE DEFICIT  Goal: Return ADL status to a safe level of function  Description: INTERVENTIONS:  - Administer medication as ordered  - Assess ADL deficits and provide assistive devices as needed  - Obtain PT/OT consults as needed  - Assist and instruct patient to increase activity and self care as tolerated  Outcome: Progressing     Problem: DISCHARGE PLANNING - CARE MANAGEMENT  Goal: Discharge to post-acute care or home with appropriate resources  Description: INTERVENTIONS:  - Conduct assessment to determine patient/family and health care team treatment goals, and need for post-acute services based on payer coverage, community resources, and patient preferences, and barriers to discharge  - Address psychosocial, clinical, and financial barriers to discharge as identified in assessment in conjunction with the patient/family and health care team  - Arrange appropriate level of post-acute services according to patient’s   needs and preference and payer coverage in collaboration with the physician and health care team  - Communicate with and update the patient/family, physician, and health care team regarding progress on the discharge plan  - Arrange appropriate transportation to post-acute venues  Outcome: Progressing     Problem: Nutrition/Hydration-ADULT  Goal: Nutrient/Hydration intake appropriate for improving, restoring or maintaining nutritional needs  Description: Monitor and assess patient's nutrition/hydration status for malnutrition  Collaborate with interdisciplinary team and initiate plan and interventions as ordered  Monitor patient's weight and dietary intake as ordered or per policy  Utilize nutrition screening tool and intervene as necessary  Determine patient's food preferences and provide high-protein, high-caloric foods as appropriate       INTERVENTIONS:  - Monitor oral intake, urinary output, labs, and treatment plans  - Assess nutrition and hydration status and recommend course of action  - Evaluate amount of meals eaten  - Assist patient with eating if necessary   - Allow adequate time for meals  - Recommend/ encourage appropriate diets, oral nutritional supplements, and vitamin/mineral supplements  - Order, calculate, and assess calorie counts as needed  - Recommend, monitor, and adjust tube feedings and TPN/PPN based on assessed needs  - Assess need for intravenous fluids  - Provide specific nutrition/hydration education as appropriate  - Include patient/family/caregiver in decisions related to nutrition  Outcome: Progressing

## 2023-05-10 NOTE — NURSING NOTE
"Pt alert and visible on the unit  Ambulates with walker  Pt remains preoccupied with toileting often asking for assistance to use the BR every hour  Pt placed on a 2 hour voiding schedule with has been effective thus far today  Frequently requesting assistance with all tasks of ADL's  Needs limits for the same  Pt continues to report depression and is preoccupied with going home and how her \"85y/o  will care\" for her  Appetite 100% for meals  Will continue to monitor and maintain q 7 min checks     "

## 2023-05-10 NOTE — PLAN OF CARE
Pt attended several groups and although needing some staff prompting to interct with others    Problem: Ineffective Coping  Goal: Participates in unit activities  Description: Interventions:  - Provide therapeutic environment   - Provide required programming   - Redirect inappropriate behaviors   Outcome: Progressing

## 2023-05-10 NOTE — TREATMENT TEAM
05/10/23 0855   Team Meeting   Meeting Type Daily Rounds   Initial Conference Date 05/10/23   Team Members Present   Team Members Present Physician;Nurse;Occupational Therapist;;; Other (Discipline and Name)   Physician Team Member Dr Luke Brito Team Member Jamari Butts Work Team Member [facility]   OT Team Member Sonny Kayser   Other (Discipline and Name) Elnoria Due- pharmacist   Patient/Family Present   Patient Present No   Patient's Family Present No     Delayed responses, cont to report depression and anxiety, increase latuda to 60 mg

## 2023-05-11 LAB
ANION GAP SERPL CALCULATED.3IONS-SCNC: 8 MMOL/L (ref 4–13)
BUN SERPL-MCNC: 12 MG/DL (ref 5–25)
CALCIUM SERPL-MCNC: 9 MG/DL (ref 8.4–10.2)
CHLORIDE SERPL-SCNC: 97 MMOL/L (ref 96–108)
CO2 SERPL-SCNC: 28 MMOL/L (ref 21–32)
CREAT SERPL-MCNC: 0.63 MG/DL (ref 0.6–1.3)
GFR SERPL CREATININE-BSD FRML MDRD: 91 ML/MIN/1.73SQ M
GLUCOSE SERPL-MCNC: 103 MG/DL (ref 65–140)
GLUCOSE SERPL-MCNC: 106 MG/DL (ref 65–140)
GLUCOSE SERPL-MCNC: 117 MG/DL (ref 65–140)
GLUCOSE SERPL-MCNC: 214 MG/DL (ref 65–140)
GLUCOSE SERPL-MCNC: 258 MG/DL (ref 65–140)
POTASSIUM SERPL-SCNC: 3.7 MMOL/L (ref 3.5–5.3)
SODIUM SERPL-SCNC: 133 MMOL/L (ref 135–147)

## 2023-05-11 RX ADMIN — CLONAZEPAM 0.25 MG: 0.5 TABLET ORAL at 21:13

## 2023-05-11 RX ADMIN — FAMOTIDINE 20 MG: 20 TABLET ORAL at 09:33

## 2023-05-11 RX ADMIN — ATORVASTATIN CALCIUM 40 MG: 40 TABLET, FILM COATED ORAL at 17:21

## 2023-05-11 RX ADMIN — FAMOTIDINE 20 MG: 20 TABLET ORAL at 17:20

## 2023-05-11 RX ADMIN — INSULIN LISPRO 2 UNITS: 100 INJECTION, SOLUTION INTRAVENOUS; SUBCUTANEOUS at 21:15

## 2023-05-11 RX ADMIN — METOPROLOL SUCCINATE 37.5 MG: 25 TABLET, EXTENDED RELEASE ORAL at 09:20

## 2023-05-11 RX ADMIN — INSULIN GLARGINE 15 UNITS: 100 INJECTION, SOLUTION SUBCUTANEOUS at 21:15

## 2023-05-11 RX ADMIN — CYANOCOBALAMIN TAB 1000 MCG 1000 MCG: 1000 TAB at 09:32

## 2023-05-11 RX ADMIN — DOCUSATE SODIUM 100 MG: 100 CAPSULE, LIQUID FILLED ORAL at 09:32

## 2023-05-11 RX ADMIN — SODIUM CHLORIDE 1 G: 1 TABLET ORAL at 09:33

## 2023-05-11 RX ADMIN — AMLODIPINE BESYLATE 2.5 MG: 2.5 TABLET ORAL at 09:30

## 2023-05-11 RX ADMIN — DOCUSATE SODIUM 100 MG: 100 CAPSULE, LIQUID FILLED ORAL at 17:21

## 2023-05-11 RX ADMIN — TORSEMIDE 10 MG: 5 TABLET ORAL at 09:31

## 2023-05-11 RX ADMIN — SODIUM CHLORIDE 1 G: 1 TABLET ORAL at 17:20

## 2023-05-11 RX ADMIN — LURASIDONE HYDROCHLORIDE 60 MG: 20 TABLET, FILM COATED ORAL at 17:20

## 2023-05-11 RX ADMIN — ASPIRIN 81 MG: 81 TABLET, COATED ORAL at 09:32

## 2023-05-11 RX ADMIN — ACETAMINOPHEN 650 MG: 325 TABLET ORAL at 09:27

## 2023-05-11 RX ADMIN — INSULIN LISPRO 2 UNITS: 100 INJECTION, SOLUTION INTRAVENOUS; SUBCUTANEOUS at 12:05

## 2023-05-11 NOTE — PROGRESS NOTES
NEPHROLOGY PROGRESS NOTE   Ramsey Umanzor 70 y o  female MRN: [id number]  Unit/Bed#: [facility]it[facility] 442-43 Encounter: [id number]  Reason for Consult: Hyponatremia    ASSESSMENT/PLAN:  1  Hyponatremia, etiology multifactorial, possible initial component due to prerenal azotemia  Urine studies consistent with SIADH  · Previous CTA of the chest abdomen pelvis in October 2022 showed no evidence of malignancy  · Urine osmolality 392, urine sodium 94  2  Bipolar disorder, management as per primary service  3  Hypertension, blood pressure currently appears suboptimal, continue low-dose calcium channel blocker and metoprolol  4  History of bilateral adrenal adenomas, stable seen on previous imaging  Recommend aldosterone, renin as well as plasma metanephrines    PLAN:  · Given history of adrenal adenomas and hypertension, no previous work-up identified  Check aldosterone, and renin as well as plasma metanephrines  · Continue with sodium chloride tablets plus loop diuretic therapy  · Recommend repeat laboratory studies tomorrow  SUBJECTIVE:  Seen and examined  Patient offers no new complaints  Tolerating sodium chloride tablets without difficulty  No reports of nausea or vomiting  Denies any shortness of breath  No reported chest pain  Denies any increasing lower extremity swelling      Review of Systems    OBJECTIVE:  Current Weight: Weight - Scale: 67 6 kg (149 lb)  Vitals:    05/10/23 2046 05/11/23 0820 05/11/23 1119 05/11/23 1525   BP: 158/71 146/74  137/66   BP Location: Left arm Right arm  Left arm   Pulse: 81 87  75   Resp: 18 18  18   Temp: 97 5 °F (36 4 °C) 98 6 °F (37 °C)  98 2 °F (36 8 °C)   TempSrc: Temporal Temporal  Temporal   SpO2: 99% 96%  99%   Weight:   67 6 kg (149 lb)        Intake/Output Summary (Last 24 hours) at 5/11/2023 1530  Last data filed at 5/11/2023 1240  Gross per 24 hour   Intake 1000 ml   Output --   Net 1000 ml       Physical Exam    Medications:    Current Facility-Administered Medications:   •  acetaminophen (TYLENOL) tablet 650 mg, 650 mg, Oral, Q4H PRN, JOHN Spaulding  •  acetaminophen (TYLENOL) tablet 650 mg, 650 mg, Oral, Q4H PRN, JOHN Spaulding, 650 mg at 05/11/23 4811  •  acetaminophen (TYLENOL) tablet 975 mg, 975 mg, Oral, Q6H PRN, JOHN Spaulding, 975 mg at 05/10/23 1626  •  amLODIPine (NORVASC) tablet 2 5 mg, 2 5 mg, Oral, Daily, Jack Olsen DO, 2 5 mg at 05/11/23 0930  •  aspirin (ECOTRIN LOW STRENGTH) EC tablet 81 mg, 81 mg, Oral, Daily, JOHN Francisco, 81 mg at 05/11/23 0932  •  atorvastatin (LIPITOR) tablet 40 mg, 40 mg, Oral, Daily With JOHN Lutz, 40 mg at 05/10/23 1739  •  bisacodyl (DULCOLAX) rectal suppository 10 mg, 10 mg, Rectal, Daily PRN, Rufino Degroot MD, 10 mg at 05/03/23 1917  •  clonazePAM (KlonoPIN) tablet 0 25 mg, 0 25 mg, Oral, HS, Aida Cordoba MD, 0 25 mg at 05/10/23 2108  •  cyanocobalamin (VITAMIN B-12) tablet 1,000 mcg, 1,000 mcg, Oral, Daily, JOHN Francisco, 1,000 mcg at 05/11/23 0932  •  docusate sodium (COLACE) capsule 100 mg, 100 mg, Oral, BID, Mariia Bar PA-C, 100 mg at 05/11/23 0932  •  famotidine (PEPCID) tablet 20 mg, 20 mg, Oral, BID, Mariia Bar PA-C, 20 mg at 05/11/23 5817  •  haloperidol lactate (HALDOL) injection 5 mg, 5 mg, Intramuscular, Q4H PRN Max 4/day, JOHN Spaulding  •  insulin glargine (LANTUS) subcutaneous injection 15 Units 0 15 mL, 15 Units, Subcutaneous, HS, JOHN Francisco, 15 Units at 05/10/23 2134  •  insulin lispro (HumaLOG) 100 units/mL subcutaneous injection 1-5 Units, 1-5 Units, Subcutaneous, TID AC, 2 Units at 05/11/23 1205 **AND** Fingerstick Glucose (POCT), , , TID ACJose CRNP  •  insulin lispro (HumaLOG) 100 units/mL subcutaneous injection 1-5 Units, 1-5 Units, Subcutaneous, HS, JOHN Francisco, 1 Units at 05/10/23 2134  •  lurasidone (LATUDA) tablet 60 mg, 60 mg, Oral, Daily With Nick Orozco MD, 60 mg at 05/10/23 1739  •  metoprolol succinate (TOPROL-XL) 24 hr tablet 37 5 mg, 37 5 mg, Oral, Daily, JOHN Alsotn, 37 5 mg at 05/11/23 0920  •  polyethylene glycol (MIRALAX) packet 17 g, 17 g, Oral, Daily PRN, Josh Wagoner PA-C, 17 g at 05/08/23 1059  •  risperiDONE (RisperDAL) tablet 0 25 mg, 0 25 mg, Oral, Q4H PRN Max 6/day, JOHN Spaulding  •  risperiDONE (RisperDAL) tablet 0 5 mg, 0 5 mg, Oral, Q4H PRN Max 3/day, JOHN Spaulding  •  risperiDONE (RisperDAL) tablet 1 mg, 1 mg, Oral, Q2H PRN Max 3/day, JOHN Spaulding  •  sodium chloride tablet 1 g, 1 g, Oral, BID With Meals, Jack Olsen DO, 1 g at 05/11/23 0933  •  torsemide (DEMADEX) tablet 10 mg, 10 mg, Oral, Daily, Jack Olsen DO, 10 mg at 05/11/23 0931  •  traZODone (DESYREL) tablet 50 mg, 50 mg, Oral, HS PRN, JOHN Spaulding    Laboratory Results:  Results from last 7 days   Lab Units 05/11/23  0531 05/09/23  0621 05/08/23  0621 05/07/23  0506 05/06/23  0527 05/05/23  0546   POTASSIUM mmol/L 3 7 4 1 4 0 3 9 3 8 4 0   CHLORIDE mmol/L 97 97 100 99 96 90*   CO2 mmol/L 28 27 25 27 26 29   BUN mg/dL 12 12 13 16 15 15   CREATININE mg/dL 0 63 0 62 0 64 0 63 0 61 0 60   CALCIUM mg/dL 9 0 9 0 9 4 8 7 8 6 8 6

## 2023-05-11 NOTE — PROGRESS NOTES
Pt  Present for morning groups yet more withdrawn with head on table    05/11/23 1000 05/11/23 1100   Activity/Group Checklist   Group Community meeting Life Skills  (topic assertiveness rights )   Attendance Attended Attended   Attendance Duration (min) 31-45 31-45   Interactions Did not interact  (pt  withdrawn and head on table ) Did not interact   Affect/Mood Blunted/flat Blunted/flat   Goals Achieved Able to listen to others Able to listen to others

## 2023-05-11 NOTE — NURSING NOTE
"Patient visible in the dayroom  Frequently asking to use the bathroom \"because I'm passing gas and I'm embarrassed that the men on the unit are going to know  \" When told that nobody minds that she's passing gas she stated \"they can smell it  \" Reports being confused about where her room is and apologizes that she can't remember  Patient toileted before bed  Denies anxiety, depression, SI/HI/AVH  Medication compliant  Encouraged to inform staff of any needs     "

## 2023-05-11 NOTE — TREATMENT TEAM
05/11/23 0835   Team Meeting   Meeting Type Daily Rounds   Initial Conference Date 05/11/23   Team Members Present   Team Members Present Physician;Nurse;Occupational Therapist;;   Physician Team Member Dr Ashutosh Bundy Team Member VIPIN [facility] Management Team Member Jamari Butts Work Team Member [facility]   OT Team Member Gonzales RAMIREZ   Patient/Family Present   Patient Present No   Patient's Family Present No     Reporting anxiety and depression, hoping for d/c soon, attended all groups, focused on fear of farting and everyone hearing her, Dr Manda Capellan spoke with dtr

## 2023-05-11 NOTE — NURSING NOTE
"Pt alert and visible on the unit  Remains preoccupied with toileting  Periods of confusion noted while ambulating  Ambulates with the assistance of walker  Maintained on toileting schedule, compliant with the same  Reports depression and anxiety are \"better\" and that she only has [place] little\" of the same  Appetite  75% for breakfast and 50% for lunch  Reported effectiveness after receiving tylenol this am for leg pain  Speech remains sparse and limited  Will continue to monitor and maintain q 7 min checks      "

## 2023-05-11 NOTE — PROGRESS NOTES
Progress Note - Mae 108 70 y o  female MRN: [id number]  Unit/Bed#: Adryan Pena 219-42 Encounter: [id number]    The patient was seen for continuing care and reviewed with treatment team   Pt reports anxiety and depression area little better, but she remains scant in conversation  She does not ambulate much , requires a chair alarm  She makes her needs known, needs prompting and encouragement to engage in activities  During the assessment today, she asked to use the restroom and was bale to clean hers elf and wear her pants independently  Sleep fair  Appetite %  Energy: low, needs promptings to do things  No suicidal or homicidal ideations  No EPS, denies any side effects of medication  She has been complaint with her medications     ROS : negative       /74 (BP Location: Right arm)   Pulse 87   Temp 98 6 °F (37 °C) (Temporal)   Resp 18   Wt 67 6 kg (149 lb)   SpO2 96%   BMI 25 58 kg/m²     Current Mental Status Evaluation:  Appearance:  disheveled but clean,  needs a chair alarm, fair eye contact,  Ambulates with walker, no tremors noted today   Behavior:  calm, cooperative, guarded and psychomotor retardation   Mood:  anxious and depressed   Affect: constricted, but slightly reactive today   Speech: Soft and Slow   Thought Process:  Poverty of thoughts   Thought Content:  Paranoid and mistrustful   Perceptual Disturbances: Denies hallucinations and does not appear to be responding to internal stimuli   Risk Potential: No suicidal or homicidal ideation   Orientation:   Patient is alert, oriented to person and place    Patient has limited insight, judgment and impulse control      Recent Results (from the past 24 hour(s))   Fingerstick Glucose (POCT)    Collection Time: 05/10/23  4:10 PM   Result Value Ref Range    POC Glucose 141 (H) 65 - 140 mg/dl   Fingerstick Glucose (POCT)    Collection Time: 05/10/23  9:12 PM   Result Value Ref Range    POC Glucose 181 (H) 65 - 140 mg/dl   Basic metabolic panel    Collection Time: 05/11/23  5:31 AM   Result Value Ref Range    Sodium 133 (L) 135 - 147 mmol/L    Potassium 3 7 3 5 - 5 3 mmol/L    Chloride 97 96 - 108 mmol/L    CO2 28 21 - 32 mmol/L    ANION GAP 8 4 - 13 mmol/L    BUN 12 5 - 25 mg/dL    Creatinine 0 63 0 60 - 1 30 mg/dL    Glucose 103 65 - 140 mg/dL    Calcium 9 0 8 4 - 10 2 mg/dL    eGFR 91 ml/min/1 73sq m   Fingerstick Glucose (POCT)    Collection Time: 05/11/23  7:03 AM   Result Value Ref Range    POC Glucose 106 65 - 140 mg/dl   Fingerstick Glucose (POCT)    Collection Time: 05/11/23 11:12 AM   Result Value Ref Range    POC Glucose 258 (H) 65 - 140 mg/dl       Progress Toward Goals: Making slow steady progress    Assessment     Principal Problem:    Bipolar affective disorder, depressed, severe, with psychotic behavior ([facility])  Active Problems:    Type 2 diabetes mellitus without complication, with long-term current use of insulin ([facility])    Essential hypertension    Hyponatremia    History of DVT of lower extremity    Dysphagia    UTI (urinary tract infection)    History of femur fracture    Medical clearance for psychiatric admission        Plan :    - Medications;   Psychiatric: Continue  Latuda  60mg  with dinner today   klonopin 0 25mg HS                         Medical: Nephrology  on board for hyponatremia    Continue to monitor Na levels( 133  today),     -Therapy: occupational therapy, milieu and group therapy   -Disposition: to be determined

## 2023-05-11 NOTE — TREATMENT TEAM
Pt attended communication and reminiscing group  Pt engaged when prompted  Pt smiled as she was able to reminisce about her favorite outfit  Pt was descriptive in her communication with delayed communicative response  05/11/23 1330   Activity/Group Checklist   Group Other (Comment)  (Communication and reminiscing)   Attendance Attended   Attendance Duration (min) 46-60   Interactions Interacted appropriately   Affect/Mood Bright; Appropriate   Goals Achieved Identified feelings; Discussed coping strategies; Increased hopefulness; Able to listen to others; Able to engage in interactions; Able to manage/cope with feelings; Able to self-disclose;Verbalized increased hopefulness; Able to recieve feedback

## 2023-05-11 NOTE — CMS CERTIFICATION NOTE
Certification: Based upon physical, mental and social evaluations, I certify that inpatient psychiatric services are medically necessary for this patient for a duration of 30 midnights for the treatment of Bipolar depression

## 2023-05-12 LAB
ANION GAP SERPL CALCULATED.3IONS-SCNC: 7 MMOL/L (ref 4–13)
BUN SERPL-MCNC: 15 MG/DL (ref 5–25)
CALCIUM SERPL-MCNC: 9.3 MG/DL (ref 8.4–10.2)
CHLORIDE SERPL-SCNC: 97 MMOL/L (ref 96–108)
CO2 SERPL-SCNC: 31 MMOL/L (ref 21–32)
CREAT SERPL-MCNC: 0.69 MG/DL (ref 0.6–1.3)
GFR SERPL CREATININE-BSD FRML MDRD: 89 ML/MIN/1.73SQ M
GLUCOSE SERPL-MCNC: 118 MG/DL (ref 65–140)
GLUCOSE SERPL-MCNC: 174 MG/DL (ref 65–140)
GLUCOSE SERPL-MCNC: 227 MG/DL (ref 65–140)
GLUCOSE SERPL-MCNC: 97 MG/DL (ref 65–140)
GLUCOSE SERPL-MCNC: 98 MG/DL (ref 65–140)
POTASSIUM SERPL-SCNC: 4.1 MMOL/L (ref 3.5–5.3)
SODIUM SERPL-SCNC: 135 MMOL/L (ref 135–147)
URATE SERPL-MCNC: 5.4 MG/DL (ref 2–7.5)

## 2023-05-12 RX ORDER — SODIUM CHLORIDE 1 G/1
1 TABLET ORAL DAILY
Status: DISCONTINUED | OUTPATIENT
Start: 2023-05-13 | End: 2023-05-15

## 2023-05-12 RX ADMIN — TORSEMIDE 10 MG: 5 TABLET ORAL at 09:05

## 2023-05-12 RX ADMIN — ASPIRIN 81 MG: 81 TABLET, COATED ORAL at 09:03

## 2023-05-12 RX ADMIN — CLONAZEPAM 0.25 MG: 0.5 TABLET ORAL at 21:37

## 2023-05-12 RX ADMIN — AMLODIPINE BESYLATE 2.5 MG: 2.5 TABLET ORAL at 09:04

## 2023-05-12 RX ADMIN — FAMOTIDINE 20 MG: 20 TABLET ORAL at 09:03

## 2023-05-12 RX ADMIN — DOCUSATE SODIUM 100 MG: 100 CAPSULE, LIQUID FILLED ORAL at 17:00

## 2023-05-12 RX ADMIN — INSULIN GLARGINE 15 UNITS: 100 INJECTION, SOLUTION SUBCUTANEOUS at 21:38

## 2023-05-12 RX ADMIN — CYANOCOBALAMIN TAB 1000 MCG 1000 MCG: 1000 TAB at 09:05

## 2023-05-12 RX ADMIN — INSULIN LISPRO 2 UNITS: 100 INJECTION, SOLUTION INTRAVENOUS; SUBCUTANEOUS at 21:38

## 2023-05-12 RX ADMIN — ATORVASTATIN CALCIUM 40 MG: 40 TABLET, FILM COATED ORAL at 16:35

## 2023-05-12 RX ADMIN — METOPROLOL SUCCINATE 37.5 MG: 25 TABLET, EXTENDED RELEASE ORAL at 09:01

## 2023-05-12 RX ADMIN — ACETAMINOPHEN 975 MG: 325 TABLET ORAL at 17:07

## 2023-05-12 RX ADMIN — INSULIN LISPRO 1 UNITS: 100 INJECTION, SOLUTION INTRAVENOUS; SUBCUTANEOUS at 12:05

## 2023-05-12 RX ADMIN — SODIUM CHLORIDE 1 G: 1 TABLET ORAL at 09:04

## 2023-05-12 RX ADMIN — DOCUSATE SODIUM 100 MG: 100 CAPSULE, LIQUID FILLED ORAL at 09:04

## 2023-05-12 RX ADMIN — LURASIDONE HYDROCHLORIDE 60 MG: 20 TABLET, FILM COATED ORAL at 16:35

## 2023-05-12 RX ADMIN — FAMOTIDINE 20 MG: 20 TABLET ORAL at 17:00

## 2023-05-12 NOTE — NURSING NOTE
Patient was visible in day, remained calm and mildly confused while slow on responses  Patient was compliant with med when given in a/s, was given a shower before bed time  Wound care was done  Will continue to monitor

## 2023-05-12 NOTE — TREATMENT TEAM
05/12/23 1100   Activity/Group Checklist   Group Other (Comment)  ( Recovery: Where am I now?)   Attendance Attended; Other (Comment)  (limited interaction)   Attendance Duration (min) 31-45   Interactions Other (Comment)  (slept during most group with head down)   Affect/Mood Calm; Appropriate   Goals Achieved Identified feelings; Able to listen to others

## 2023-05-12 NOTE — PROGRESS NOTES
Progress Note - Mae [age] y o  female MRN: [id number]  Unit/Bed#: Jannet Baer 238-46 Encounter: [id number]    The patient was seen for continuing care and reviewed with treatment team  Na continues to fluctuate, Blood work ordered By Nephrology due to history of adrenal adenomas  Salt tablet continued na level is improved at 135  Pt is on a toileting schedule  Patient seen sitting alone in the day room today with her head on the table  I offered her a magazine and tools to color and she agreed  Her mood and anxiety are improving  She does not report any thoughts or concerns that people want to hurt her  No hallucinations  No delusions elicited  Sleep: ok  Appetite: Improved about 50%  Energy: good  No suicidal or homicidal ideations  No EPS, denies any side effects of medication  ROS : Leg pain but tolerable - pt tells me she is doing leg exercises  /63 (BP Location: Left arm)   Pulse 84   Temp 97 5 °F (36 4 °C) (Temporal)   Resp 18   Wt 67 2 kg (148 lb 3 2 oz)   SpO2 97%   BMI 25 44 kg/m²     Current Mental Status Evaluation:  Appearance:   grooming is improved  She has a walker  Behavior:  calm, cooperative and psychomotor retardation   Mood:  depressed   Affect: constricted   Speech: Soft and Slow but less   Thought Process:  Goal directed and coherent, engaging more in conversation   Thought Content:  Does not verbalize delusional material   Perceptual Disturbances: Denies hallucinations and does not appear to be responding to internal stimuli   Risk Potential: No suicidal or homicidal ideation   Orientation:   Patient is alert, awake oriented to time, place, situation  Knows the president is Movea        Recent Results (from the past 72 hour(s))   Fingerstick Glucose (POCT)    Collection Time: 05/09/23  4:03 PM   Result Value Ref Range    POC Glucose 149 (H) 65 - 140 mg/dl   Fingerstick Glucose (POCT)    Collection Time: 05/09/23  9:11 PM   Result Value Ref Range    POC Glucose 172 (H) 65 - 140 mg/dl   Fingerstick Glucose (POCT)    Collection Time: 05/10/23  7:04 AM   Result Value Ref Range    POC Glucose 128 65 - 140 mg/dl   Sodium, urine, random    Collection Time: 05/10/23  8:19 AM   Result Value Ref Range    Sodium, Ur 94    Osmolality, urine    Collection Time: 05/10/23  8:19 AM   Result Value Ref Range    Osmolality, Ur 392 250 - 900 mmol/KG   Fingerstick Glucose (POCT)    Collection Time: 05/10/23 12:02 PM   Result Value Ref Range    POC Glucose 156 (H) 65 - 140 mg/dl   Fingerstick Glucose (POCT)    Collection Time: 05/10/23  4:10 PM   Result Value Ref Range    POC Glucose 141 (H) 65 - 140 mg/dl   Fingerstick Glucose (POCT)    Collection Time: 05/10/23  9:12 PM   Result Value Ref Range    POC Glucose 181 (H) 65 - 140 mg/dl   Basic metabolic panel    Collection Time: 05/11/23  5:31 AM   Result Value Ref Range    Sodium 133 (L) 135 - 147 mmol/L    Potassium 3 7 3 5 - 5 3 mmol/L    Chloride 97 96 - 108 mmol/L    CO2 28 21 - 32 mmol/L    ANION GAP 8 4 - 13 mmol/L    BUN 12 5 - 25 mg/dL    Creatinine 0 63 0 60 - 1 30 mg/dL    Glucose 103 65 - 140 mg/dL    Calcium 9 0 8 4 - 10 2 mg/dL    eGFR 91 ml/min/1 73sq m   Fingerstick Glucose (POCT)    Collection Time: 05/11/23  7:03 AM   Result Value Ref Range    POC Glucose 106 65 - 140 mg/dl   Fingerstick Glucose (POCT)    Collection Time: 05/11/23 11:12 AM   Result Value Ref Range    POC Glucose 258 (H) 65 - 140 mg/dl   Fingerstick Glucose (POCT)    Collection Time: 05/11/23  4:08 PM   Result Value Ref Range    POC Glucose 117 65 - 140 mg/dl   Fingerstick Glucose (POCT)    Collection Time: 05/11/23  8:58 PM   Result Value Ref Range    POC Glucose 214 (H) 65 - 140 mg/dl   Basic metabolic panel    Collection Time: 05/12/23  4:58 AM   Result Value Ref Range    Sodium 135 135 - 147 mmol/L    Potassium 4 1 3 5 - 5 3 mmol/L    Chloride 97 96 - 108 mmol/L    CO2 31 21 - 32 mmol/L    ANION GAP 7 4 - 13 mmol/L    BUN 15 5 - 25 mg/dL Creatinine 0 69 0 60 - 1 30 mg/dL    Glucose 118 65 - 140 mg/dL    Calcium 9 3 8 4 - 10 2 mg/dL    eGFR 89 ml/min/1 73sq m   Uric acid    Collection Time: 05/12/23  4:58 AM   Result Value Ref Range    Uric Acid 5 4 2 0 - 7 5 mg/dL   Fingerstick Glucose (POCT)    Collection Time: 05/12/23  7:32 AM   Result Value Ref Range    POC Glucose 98 65 - 140 mg/dl   Fingerstick Glucose (POCT)    Collection Time: 05/12/23 11:43 AM   Result Value Ref Range    POC Glucose 174 (H) 65 - 140 mg/dl       Progress Toward Goals: Making progress    Assessment     Principal Problem:    Bipolar affective disorder, depressed, severe, with psychotic behavior ([facility])  Active Problems:    Type 2 diabetes mellitus without complication, with long-term current use of insulin ([facility])    Essential hypertension    Hyponatremia    History of DVT of lower extremity    Dysphagia    UTI (urinary tract infection)    History of femur fracture    Medical clearance for psychiatric admission        Plan :    - Medications;   Psychiatric: Continue Latuda 60mg daily with dinner    clonazepem 0 25mg daily      Medical: per SLIM    -Therapy: occupational therapy, milieu and group therapy     -Disposition:pt lives at home with  and son- home health rehabilitation will be needed

## 2023-05-12 NOTE — PROGRESS NOTES
05/12/23 0901   Team Meeting   Initial Conference Date 05/12/23   Team Members Present   Team Members Present Physician;Nurse;;; Occupational Therapist   Physician Team Member Jluis Ann 10[address] Team Member [address] Management Team Member Lucía Talavera   Social Work Team Member [place]   OT Team Member King Burkitt   Patient/Family Present   Patient Present No   Patient's Family Present No     Pt very anxious and was placed on a toileting schedule every two hours yesterday  A bit brighter yesterday  Discharge is pending

## 2023-05-12 NOTE — TREATMENT TEAM
Pt  With more alertness yet poor self motivation to fully engage in mentally stimulating tasks with others  Has been showing an interest in coloring projects  05/12/23 1000 05/12/23 1330   Activity/Group Checklist   Group [facility]  (topic getting unstuck) Life Skills  (social bingo)   Attendance Attended Attended   Attendance Duration (min) 31-45 16-30   Interactions Did not interact  (lsitened to peers yet did not engage in discussion ) Other (Comment)  (pt  inititally attempted to be taken out of session for bathroom,began engaging briefly in assigned self reflection game yet refused to continue and took out her coloring project  Pt  left session prior to end with staff assistance )   Affect/Mood Blunted/flat Other (Comment)  (Pt  displaying disinterest in active leisure with peers )   Goals Achieved Able to listen to others Able to listen to others; Other (Comment)  (Pt  alert for coloring/leisure alone poor self motivation)

## 2023-05-12 NOTE — NURSING NOTE
"Pt alert and visible on the unit  Maintained on 2 hour toileting schedule  Reported depression is \"good\" but did state she had \"some\" anxiety  Often with head down on the table  Slow to respond to questions  Appetite 75% for breakfast and lunch  Limited peer interactions  Will continue to monitor and maintain q 7 min checks     "

## 2023-05-13 LAB
GLUCOSE SERPL-MCNC: 118 MG/DL (ref 65–140)
GLUCOSE SERPL-MCNC: 174 MG/DL (ref 65–140)
GLUCOSE SERPL-MCNC: 217 MG/DL (ref 65–140)
GLUCOSE SERPL-MCNC: 238 MG/DL (ref 65–140)

## 2023-05-13 RX ADMIN — INSULIN LISPRO 2 UNITS: 100 INJECTION, SOLUTION INTRAVENOUS; SUBCUTANEOUS at 21:23

## 2023-05-13 RX ADMIN — INSULIN LISPRO 1 UNITS: 100 INJECTION, SOLUTION INTRAVENOUS; SUBCUTANEOUS at 17:48

## 2023-05-13 RX ADMIN — INSULIN GLARGINE 15 UNITS: 100 INJECTION, SOLUTION SUBCUTANEOUS at 21:23

## 2023-05-13 RX ADMIN — POLYETHYLENE GLYCOL 3350 17 G: 17 POWDER, FOR SOLUTION ORAL at 10:05

## 2023-05-13 RX ADMIN — FAMOTIDINE 20 MG: 20 TABLET ORAL at 08:12

## 2023-05-13 RX ADMIN — METOPROLOL SUCCINATE 37.5 MG: 25 TABLET, EXTENDED RELEASE ORAL at 08:13

## 2023-05-13 RX ADMIN — ACETAMINOPHEN 975 MG: 325 TABLET ORAL at 08:16

## 2023-05-13 RX ADMIN — FAMOTIDINE 20 MG: 20 TABLET ORAL at 17:48

## 2023-05-13 RX ADMIN — ASPIRIN 81 MG: 81 TABLET, COATED ORAL at 08:14

## 2023-05-13 RX ADMIN — TORSEMIDE 10 MG: 5 TABLET ORAL at 08:14

## 2023-05-13 RX ADMIN — DOCUSATE SODIUM 100 MG: 100 CAPSULE, LIQUID FILLED ORAL at 08:12

## 2023-05-13 RX ADMIN — SODIUM CHLORIDE 1 G: 1 TABLET ORAL at 08:12

## 2023-05-13 RX ADMIN — INSULIN LISPRO 2 UNITS: 100 INJECTION, SOLUTION INTRAVENOUS; SUBCUTANEOUS at 12:44

## 2023-05-13 RX ADMIN — LURASIDONE HYDROCHLORIDE 60 MG: 20 TABLET, FILM COATED ORAL at 17:47

## 2023-05-13 RX ADMIN — AMLODIPINE BESYLATE 2.5 MG: 2.5 TABLET ORAL at 08:12

## 2023-05-13 RX ADMIN — DOCUSATE SODIUM 100 MG: 100 CAPSULE, LIQUID FILLED ORAL at 17:48

## 2023-05-13 RX ADMIN — CYANOCOBALAMIN TAB 1000 MCG 1000 MCG: 1000 TAB at 08:12

## 2023-05-13 RX ADMIN — CLONAZEPAM 0.25 MG: 0.5 TABLET ORAL at 21:25

## 2023-05-13 RX ADMIN — ATORVASTATIN CALCIUM 40 MG: 40 TABLET, FILM COATED ORAL at 17:47

## 2023-05-13 NOTE — NURSING NOTE
"Patient is visible in dinning room coloring  Patient is calm and cooperative with staff  Patient reports anxiety and depression  Patient states, \"I am afraid to get locked in the bathroom\"  Patient was advised to asked for help/assistance when needed  Patient is currently denying SI/HI/AVH at this time  Patient is medications and meals complaint  Patient is preoccupied she forgets where her room is and what time she can go to bed  Writer explained her staff is always around to help when needed  Patient is on bed and chair alarm for safety  Patient is scant and attentive in conversation  Able to make needs known     "

## 2023-05-13 NOTE — NURSING NOTE
Pt calm and cooperative, visible on unit  Cooperative with prompts  Pt placed on chair alarm for safety  Fair intake for meals  Tylenol administered for generalized pain, effective  Miralax administered for constipation, will monitor  Pt reports anxiety, denies additional symptoms  Pt appears calm during shift, sitting in dayroom  Declines coloring activity when offered  Medication and insulin adherent

## 2023-05-13 NOTE — PLAN OF CARE
Problem: Potential for Falls  Goal: Patient will remain free of falls  Description: INTERVENTIONS:  - Educate patient/family on patient safety including physical limitations  - Instruct patient to call for assistance with activity   - Consult OT/PT to assist with strengthening/mobility   - Keep Call bell within reach  - Keep bed low and locked with side rails adjusted as appropriate  - Keep care items and personal belongings within reach  - Initiate and maintain comfort rounds  - Make Fall Risk Sign visible to staff  - Offer Toileting every  Hours, in advance of need  - Initiate/Maintain alarm  - Obtain necessary fall risk management equipment:  - Apply yellow socks and bracelet for high fall risk patients  - Consider moving patient to room near nurses station  Outcome: Progressing     Problem: DEPRESSION  Goal: Will be euthymic at discharge  Description: INTERVENTIONS:  - Administer medication as ordered  - Provide emotional support via 1:1 interaction with staff  - Encourage involvement in milieu/groups/activities  - Monitor for social isolation  Outcome: Progressing     Problem: PASCUAL  Goal: Will exhibit normal sleep and speech and no impulsivity  Description: INTERVENTIONS:  - Administer medication as ordered  - Set limits on impulsive behavior  - Make attempts to decrease external stimuli as possible  Outcome: Progressing     Problem: PSYCHOSIS  Goal: Will report no hallucinations or delusions  Description: Interventions:  - Administer medication as  ordered  - Every waking shifts and PRN assess for the presence of hallucinations and or delusions  - Assist with reality testing to support increasing orientation  - Assess if patient's hallucinations or delusions are encouraging self-harm or harm to others and intervene as appropriate  Outcome: Progressing     Problem: ANXIETY  Goal: Will report anxiety at manageable levels  Description: INTERVENTIONS:  - Administer medication as ordered  - Teach and encourage coping skills  - Provide emotional support  - Assess patient/family for anxiety and ability to cope  Outcome: Progressing     Problem: SELF CARE DEFICIT  Goal: Return ADL status to a safe level of function  Description: INTERVENTIONS:  - Administer medication as ordered  - Assess ADL deficits and provide assistive devices as needed  - Obtain PT/OT consults as needed  - Assist and instruct patient to increase activity and self care as tolerated  Outcome: Progressing     Problem: Nutrition/Hydration-ADULT  Goal: Nutrient/Hydration intake appropriate for improving, restoring or maintaining nutritional needs  Description: Monitor and assess patient's nutrition/hydration status for malnutrition  Collaborate with interdisciplinary team and initiate plan and interventions as ordered  Monitor patient's weight and dietary intake as ordered or per policy  Utilize nutrition screening tool and intervene as necessary  Determine patient's food preferences and provide high-protein, high-caloric foods as appropriate       INTERVENTIONS:  - Monitor oral intake, urinary output, labs, and treatment plans  - Assess nutrition and hydration status and recommend course of action  - Evaluate amount of meals eaten  - Assist patient with eating if necessary   - Allow adequate time for meals  - Recommend/ encourage appropriate diets, oral nutritional supplements, and vitamin/mineral supplements  - Order, calculate, and assess calorie counts as needed  - Recommend, monitor, and adjust tube feedings and TPN/PPN based on assessed needs  - Assess need for intravenous fluids  - Provide specific nutrition/hydration education as appropriate  - Include patient/family/caregiver in decisions related to nutrition  Outcome: Progressing

## 2023-05-13 NOTE — PROGRESS NOTES
"  Progress Note - Mae 108 70 y o  female MRN: [id number]  Unit/Bed#: Primitivo Armijo 782-62 Encounter: [id number]    The patient was seen for continuing care and reviewed with treatment team   Reports less anxiety and depression today, she is tolerating her medications well  She will be willing to participate in activities today  no AVH, no paranoia or delusions  Sleep: ok  Appetite: good  Energy: good  No suicidal or homicidal ideations  No EPS, denies any side effects of medication  ROS : some leg pain, no constipation     /64 (BP Location: Left arm)   Pulse 77   Temp 98 °F (36 7 °C) (Temporal)   Resp 16   Wt 67 2 kg (148 lb 2 4 oz)   SpO2 98%   BMI 25 43 kg/m²     Current Mental Status Evaluation:  Appearance:   grooming is improved  She has a walker  Behavior:  calm, cooperative and psychomotor retardation   Mood:  \" less depressed\"   Affect: constricted, brighter today   Speech: Soft and Slow    Thought Process:  Goal directed and coherent, engaging more in conversation   Thought Content:  Does not verbalize delusional material, less negative ruminations, No paranoia reported   Perceptual Disturbances: Denies hallucinations and does not appear to be responding to internal stimuli   Risk Potential: No suicidal or homicidal ideation   Orientation:   Patient is alert, awake oriented to time, place, situation  Biden  Insight, Judgment and impulse control are improving      Recent Results (from the past 48 hour(s))   Fingerstick Glucose (POCT)    Collection Time: 05/11/23  8:58 PM   Result Value Ref Range    POC Glucose 214 (H) 65 - 140 mg/dl   Basic metabolic panel    Collection Time: 05/12/23  4:58 AM   Result Value Ref Range    Sodium 135 135 - 147 mmol/L    Potassium 4 1 3 5 - 5 3 mmol/L    Chloride 97 96 - 108 mmol/L    CO2 31 21 - 32 mmol/L    ANION GAP 7 4 - 13 mmol/L    BUN 15 5 - 25 mg/dL    Creatinine 0 69 0 60 - 1 30 mg/dL    Glucose 118 65 - 140 mg/dL    Calcium 9 3 " 8 4 - 10 2 mg/dL    eGFR 89 ml/min/1 73sq m   Uric acid    Collection Time: 05/12/23  4:58 AM   Result Value Ref Range    Uric Acid 5 4 2 0 - 7 5 mg/dL   Fingerstick Glucose (POCT)    Collection Time: 05/12/23  7:32 AM   Result Value Ref Range    POC Glucose 98 65 - 140 mg/dl   Fingerstick Glucose (POCT)    Collection Time: 05/12/23 11:43 AM   Result Value Ref Range    POC Glucose 174 (H) 65 - 140 mg/dl   Fingerstick Glucose (POCT)    Collection Time: 05/12/23  4:33 PM   Result Value Ref Range    POC Glucose 97 65 - 140 mg/dl   Fingerstick Glucose (POCT)    Collection Time: 05/12/23  8:59 PM   Result Value Ref Range    POC Glucose 227 (H) 65 - 140 mg/dl   Fingerstick Glucose (POCT)    Collection Time: 05/13/23  7:32 AM   Result Value Ref Range    POC Glucose 118 65 - 140 mg/dl   Fingerstick Glucose (POCT)    Collection Time: 05/13/23 11:57 AM   Result Value Ref Range    POC Glucose 217 (H) 65 - 140 mg/dl       Progress Toward Goals: Improving slowly    Assessment     Principal Problem:    Bipolar affective disorder, depressed, severe, with psychotic behavior ([facility])  Active Problems:    Type 2 diabetes mellitus without complication, with long-term current use of insulin ([facility])    Essential hypertension    Hyponatremia    History of DVT of lower extremity    Dysphagia    UTI (urinary tract infection)    History of femur fracture    Medical clearance for psychiatric admission        Plan :    - Medications;   Psychiatric:    Latuda 60mg daily with dinner    Clonazepem 0 25mg daily      Medical: per SLIM    -Therapy: occupational therapy, milieu and group therapy     -Disposition: will return home with  and son- home health rehabilitation will be needed at discharge

## 2023-05-13 NOTE — PLAN OF CARE
Problem: DEPRESSION  Goal: Will be euthymic at discharge  Description: INTERVENTIONS:  - Administer medication as ordered  - Provide emotional support via 1:1 interaction with staff  - Encourage involvement in milieu/groups/activities  - Monitor for social isolation  Outcome: Progressing     Problem: PSYCHOSIS  Goal: Will report no hallucinations or delusions  Description: Interventions:  - Administer medication as  ordered  - Every waking shifts and PRN assess for the presence of hallucinations and or delusions  - Assist with reality testing to support increasing orientation  - Assess if patient's hallucinations or delusions are encouraging self-harm or harm to others and intervene as appropriate  Outcome: Progressing     Problem: ANXIETY  Goal: Will report anxiety at manageable levels  Description: INTERVENTIONS:  - Administer medication as ordered  - Teach and encourage coping skills  - Provide emotional support  - Assess patient/family for anxiety and ability to cope  Outcome: Progressing     Problem: SELF CARE DEFICIT  Goal: Return ADL status to a safe level of function  Description: INTERVENTIONS:  - Administer medication as ordered  - Assess ADL deficits and provide assistive devices as needed  - Obtain PT/OT consults as needed  - Assist and instruct patient to increase activity and self care as tolerated  Outcome: Progressing     Problem: Nutrition/Hydration-ADULT  Goal: Nutrient/Hydration intake appropriate for improving, restoring or maintaining nutritional needs  Description: Monitor and assess patient's nutrition/hydration status for malnutrition  Collaborate with interdisciplinary team and initiate plan and interventions as ordered  Monitor patient's weight and dietary intake as ordered or per policy  Utilize nutrition screening tool and intervene as necessary  Determine patient's food preferences and provide high-protein, high-caloric foods as appropriate       INTERVENTIONS:  - Monitor oral intake, urinary output, labs, and treatment plans  - Assess nutrition and hydration status and recommend course of action  - Evaluate amount of meals eaten  - Assist patient with eating if necessary   - Allow adequate time for meals  - Recommend/ encourage appropriate diets, oral nutritional supplements, and vitamin/mineral supplements  - Order, calculate, and assess calorie counts as needed  - Recommend, monitor, and adjust tube feedings and TPN/PPN based on assessed needs  - Assess need for intravenous fluids  - Provide specific nutrition/hydration education as appropriate  - Include patient/family/caregiver in decisions related to nutrition  Outcome: Progressing

## 2023-05-14 LAB
GLUCOSE SERPL-MCNC: 126 MG/DL (ref 65–140)
GLUCOSE SERPL-MCNC: 190 MG/DL (ref 65–140)
GLUCOSE SERPL-MCNC: 192 MG/DL (ref 65–140)
GLUCOSE SERPL-MCNC: 244 MG/DL (ref 65–140)

## 2023-05-14 RX ADMIN — ACETAMINOPHEN 975 MG: 325 TABLET ORAL at 08:47

## 2023-05-14 RX ADMIN — SODIUM CHLORIDE 1 G: 1 TABLET ORAL at 08:13

## 2023-05-14 RX ADMIN — INSULIN LISPRO 1 UNITS: 100 INJECTION, SOLUTION INTRAVENOUS; SUBCUTANEOUS at 12:13

## 2023-05-14 RX ADMIN — DOCUSATE SODIUM 100 MG: 100 CAPSULE, LIQUID FILLED ORAL at 08:17

## 2023-05-14 RX ADMIN — INSULIN LISPRO 1 UNITS: 100 INJECTION, SOLUTION INTRAVENOUS; SUBCUTANEOUS at 17:31

## 2023-05-14 RX ADMIN — FAMOTIDINE 20 MG: 20 TABLET ORAL at 17:30

## 2023-05-14 RX ADMIN — INSULIN LISPRO 2 UNITS: 100 INJECTION, SOLUTION INTRAVENOUS; SUBCUTANEOUS at 21:34

## 2023-05-14 RX ADMIN — FAMOTIDINE 20 MG: 20 TABLET ORAL at 08:13

## 2023-05-14 RX ADMIN — LURASIDONE HYDROCHLORIDE 60 MG: 20 TABLET, FILM COATED ORAL at 17:30

## 2023-05-14 RX ADMIN — METOPROLOL SUCCINATE 37.5 MG: 25 TABLET, EXTENDED RELEASE ORAL at 08:19

## 2023-05-14 RX ADMIN — TORSEMIDE 10 MG: 5 TABLET ORAL at 08:17

## 2023-05-14 RX ADMIN — INSULIN GLARGINE 15 UNITS: 100 INJECTION, SOLUTION SUBCUTANEOUS at 21:21

## 2023-05-14 RX ADMIN — AMLODIPINE BESYLATE 2.5 MG: 2.5 TABLET ORAL at 08:15

## 2023-05-14 RX ADMIN — ATORVASTATIN CALCIUM 40 MG: 40 TABLET, FILM COATED ORAL at 17:30

## 2023-05-14 RX ADMIN — CLONAZEPAM 0.25 MG: 0.5 TABLET ORAL at 21:26

## 2023-05-14 RX ADMIN — DOCUSATE SODIUM 100 MG: 100 CAPSULE, LIQUID FILLED ORAL at 17:30

## 2023-05-14 RX ADMIN — ASPIRIN 81 MG: 81 TABLET, COATED ORAL at 08:16

## 2023-05-14 RX ADMIN — CYANOCOBALAMIN TAB 1000 MCG 1000 MCG: 1000 TAB at 08:17

## 2023-05-14 NOTE — PROGRESS NOTES
"  Progress Note - Mae 108 70 y o  female MRN: [id number]  Unit/Bed#: Alejandro Posadas 005-43 Encounter: [id number]    The patient was seen for continuing care and reviewed with treatment team    She offers no complaints  Able to make her needs known  still depressed but believes the medication is helping  No AVH, no delusions reported  Sleep: ok  Appetite: good  Energy: good  No suicidal or homicidal ideations  No EPS, denies any side effects of medication  ROS : some leg pain, no constipation     /62 (BP Location: Left arm)   Pulse 93   Temp 98 1 °F (36 7 °C) (Temporal)   Resp 17   Wt 67 2 kg (148 lb 2 4 oz)   SpO2 100%   BMI 25 43 kg/m²     Current Mental Status Evaluation:  Appearance:   grooming is improved  She has a walker  Behavior:  calm, cooperative and psychomotor retardation   Mood:  \" less depressed\"   Affect: constricted, brighter today   Speech: Soft and Slow    Thought Process:  Goal directed and coherent,    Thought Content:  Does not verbalize delusional material,  No paranoia reported   Perceptual Disturbances: Denies hallucinations and does not appear to be responding to internal stimuli   Risk Potential: No suicidal or homicidal ideation   Orientation:   Patient is alert, awake oriented to time, place, situation  Biden  Insight, Judgment and impulse control are improving      Recent Results (from the past 48 hour(s))   Fingerstick Glucose (POCT)    Collection Time: 05/12/23  4:33 PM   Result Value Ref Range    POC Glucose 97 65 - 140 mg/dl   Fingerstick Glucose (POCT)    Collection Time: 05/12/23  8:59 PM   Result Value Ref Range    POC Glucose 227 (H) 65 - 140 mg/dl   Fingerstick Glucose (POCT)    Collection Time: 05/13/23  7:32 AM   Result Value Ref Range    POC Glucose 118 65 - 140 mg/dl   Fingerstick Glucose (POCT)    Collection Time: 05/13/23 11:57 AM   Result Value Ref Range    POC Glucose 217 (H) 65 - 140 mg/dl   Fingerstick Glucose (POCT)    Collection " Time: 05/13/23  5:20 PM   Result Value Ref Range    POC Glucose 174 (H) 65 - 140 mg/dl   Fingerstick Glucose (POCT)    Collection Time: 05/13/23  9:06 PM   Result Value Ref Range    POC Glucose 238 (H) 65 - 140 mg/dl   Fingerstick Glucose (POCT)    Collection Time: 05/14/23  7:36 AM   Result Value Ref Range    POC Glucose 126 65 - 140 mg/dl   Fingerstick Glucose (POCT)    Collection Time: 05/14/23 11:48 AM   Result Value Ref Range    POC Glucose 192 (H) 65 - 140 mg/dl       Progress Toward Goals: Improving slowly, No medication change required today    Assessment     Principal Problem:    Bipolar affective disorder, depressed, severe, with psychotic behavior ([facility] [address] )  Active Problems:    Type 2 diabetes mellitus without complication, with long-term current use of insulin ([facility])    Essential hypertension    Hyponatremia    History of DVT of lower extremity    Dysphagia    UTI (urinary tract infection)    History of femur fracture    Medical clearance for psychiatric admission        Plan :    - Medications;   Psychiatric:   Continue  Latuda 60mg daily with dinner   Continue  Clonazepem 0 25mg daily      Medical: per SLIM    -Therapy: occupational therapy, milieu and group therapy     -Disposition: will return home with  and son- home health rehabilitation will be needed at discharge

## 2023-05-14 NOTE — NURSING NOTE
Pt calm and cooperative, visible on unit  Pt appears bright during interactions  Pt able to make needs known appropriately  Pt ambulating well with walker, placed on chair alarm for safety  Pt visible in dayroom during shift  Pt attended group and working on coloring activity in dayroom  Pt denies all symptoms currently, including anxiety and depression  Pt reports having positive phone calls with family  Medication and insulin adherent

## 2023-05-14 NOTE — PLAN OF CARE
Problem: DEPRESSION  Goal: Will be euthymic at discharge  Description: INTERVENTIONS:  - Administer medication as ordered  - Provide emotional support via 1:1 interaction with staff  - Encourage involvement in milieu/groups/activities  - Monitor for social isolation  5/14/2023 1317 by Ainsley Mclean RN  Outcome: Progressing  5/14/2023 1153 by Ainsley Mclean RN  Outcome: Progressing     Problem: ANXIETY  Goal: Will report anxiety at manageable levels  Description: INTERVENTIONS:  - Administer medication as ordered  - Teach and encourage coping skills  - Provide emotional support  - Assess patient/family for anxiety and ability to cope  5/14/2023 1317 by Ainsley Mclean RN  Outcome: Progressing  5/14/2023 1153 by Ainsley Mclean RN  Outcome: Progressing     Problem: SELF CARE DEFICIT  Goal: Return ADL status to a safe level of function  Description: INTERVENTIONS:  - Administer medication as ordered  - Assess ADL deficits and provide assistive devices as needed  - Obtain PT/OT consults as needed  - Assist and instruct patient to increase activity and self care as tolerated  5/14/2023 1317 by Ainsley Mclean RN  Outcome: Progressing  5/14/2023 1153 by Ainsley Mclean RN  Outcome: Progressing     Problem: Nutrition/Hydration-ADULT  Goal: Nutrient/Hydration intake appropriate for improving, restoring or maintaining nutritional needs  Description: Monitor and assess patient's nutrition/hydration status for malnutrition  Collaborate with interdisciplinary team and initiate plan and interventions as ordered  Monitor patient's weight and dietary intake as ordered or per policy  Utilize nutrition screening tool and intervene as necessary  Determine patient's food preferences and provide high-protein, high-caloric foods as appropriate       INTERVENTIONS:  - Monitor oral intake, urinary output, labs, and treatment plans  - Assess nutrition and hydration status and recommend course of action  - Evaluate amount of meals eaten  - Assist patient with eating if necessary   - Allow adequate time for meals  - Recommend/ encourage appropriate diets, oral nutritional supplements, and vitamin/mineral supplements  - Order, calculate, and assess calorie counts as needed  - Recommend, monitor, and adjust tube feedings and TPN/PPN based on assessed needs  - Assess need for intravenous fluids  - Provide specific nutrition/hydration education as appropriate  - Include patient/family/caregiver in decisions related to nutrition  5/14/2023 1317 by Brenden Diza RN  Outcome: Progressing  5/14/2023 1153 by Brenden Diaz, RN  Outcome: Progressing

## 2023-05-15 LAB
ANION GAP SERPL CALCULATED.3IONS-SCNC: 6 MMOL/L (ref 4–13)
BILIRUB UR QL STRIP: NEGATIVE
BUN SERPL-MCNC: 11 MG/DL (ref 5–25)
CALCIUM SERPL-MCNC: 9.3 MG/DL (ref 8.4–10.2)
CHLORIDE SERPL-SCNC: 97 MMOL/L (ref 96–108)
CLARITY UR: CLEAR
CO2 SERPL-SCNC: 29 MMOL/L (ref 21–32)
COLOR UR: NORMAL
CREAT SERPL-MCNC: 0.66 MG/DL (ref 0.6–1.3)
GFR SERPL CREATININE-BSD FRML MDRD: 90 ML/MIN/1.73SQ M
GLUCOSE P FAST SERPL-MCNC: 113 MG/DL (ref 65–99)
GLUCOSE SERPL-MCNC: 113 MG/DL (ref 65–140)
GLUCOSE SERPL-MCNC: 115 MG/DL (ref 65–140)
GLUCOSE SERPL-MCNC: 160 MG/DL (ref 65–140)
GLUCOSE SERPL-MCNC: 189 MG/DL (ref 65–140)
GLUCOSE SERPL-MCNC: 199 MG/DL (ref 65–140)
GLUCOSE UR STRIP-MCNC: NEGATIVE MG/DL
HGB UR QL STRIP.AUTO: NEGATIVE
KETONES UR STRIP-MCNC: NEGATIVE MG/DL
LEUKOCYTE ESTERASE UR QL STRIP: NEGATIVE
NITRITE UR QL STRIP: NEGATIVE
PH UR STRIP.AUTO: 8 [PH]
POTASSIUM SERPL-SCNC: 4 MMOL/L (ref 3.5–5.3)
PROT UR STRIP-MCNC: NEGATIVE MG/DL
SODIUM SERPL-SCNC: 132 MMOL/L (ref 135–147)
SP GR UR STRIP.AUTO: 1.01 (ref 1–1.04)
UROBILINOGEN UA: NEGATIVE MG/DL

## 2023-05-15 RX ORDER — GABAPENTIN 100 MG/1
100 CAPSULE ORAL 2 TIMES DAILY
Status: DISCONTINUED | OUTPATIENT
Start: 2023-05-15 | End: 2023-05-30 | Stop reason: HOSPADM

## 2023-05-15 RX ORDER — SODIUM CHLORIDE 1 G/1
1 TABLET ORAL
Status: DISCONTINUED | OUTPATIENT
Start: 2023-05-15 | End: 2023-05-26

## 2023-05-15 RX ORDER — SODIUM CHLORIDE 1 G/1
1 TABLET ORAL 2 TIMES DAILY WITH MEALS
Status: DISCONTINUED | OUTPATIENT
Start: 2023-05-15 | End: 2023-05-15

## 2023-05-15 RX ADMIN — INSULIN GLARGINE 15 UNITS: 100 INJECTION, SOLUTION SUBCUTANEOUS at 21:33

## 2023-05-15 RX ADMIN — GABAPENTIN 100 MG: 100 CAPSULE ORAL at 13:08

## 2023-05-15 RX ADMIN — LURASIDONE HYDROCHLORIDE 60 MG: 20 TABLET, FILM COATED ORAL at 17:29

## 2023-05-15 RX ADMIN — AMLODIPINE BESYLATE 2.5 MG: 2.5 TABLET ORAL at 08:18

## 2023-05-15 RX ADMIN — SODIUM CHLORIDE 1 G: 1 TABLET ORAL at 17:31

## 2023-05-15 RX ADMIN — CLONAZEPAM 0.25 MG: 0.5 TABLET ORAL at 21:33

## 2023-05-15 RX ADMIN — FAMOTIDINE 20 MG: 20 TABLET ORAL at 08:19

## 2023-05-15 RX ADMIN — FAMOTIDINE 20 MG: 20 TABLET ORAL at 17:31

## 2023-05-15 RX ADMIN — ATORVASTATIN CALCIUM 40 MG: 40 TABLET, FILM COATED ORAL at 17:30

## 2023-05-15 RX ADMIN — ASPIRIN 81 MG: 81 TABLET, COATED ORAL at 08:19

## 2023-05-15 RX ADMIN — METOPROLOL SUCCINATE 37.5 MG: 25 TABLET, EXTENDED RELEASE ORAL at 08:18

## 2023-05-15 RX ADMIN — DOCUSATE SODIUM 100 MG: 100 CAPSULE, LIQUID FILLED ORAL at 17:31

## 2023-05-15 RX ADMIN — ACETAMINOPHEN 975 MG: 325 TABLET ORAL at 08:26

## 2023-05-15 RX ADMIN — INSULIN LISPRO 1 UNITS: 100 INJECTION, SOLUTION INTRAVENOUS; SUBCUTANEOUS at 21:34

## 2023-05-15 RX ADMIN — TORSEMIDE 10 MG: 5 TABLET ORAL at 08:19

## 2023-05-15 RX ADMIN — SODIUM CHLORIDE 1 G: 1 TABLET ORAL at 08:19

## 2023-05-15 RX ADMIN — DICLOFENAC SODIUM 2 G: 10 GEL TOPICAL at 19:21

## 2023-05-15 RX ADMIN — INSULIN LISPRO 1 UNITS: 100 INJECTION, SOLUTION INTRAVENOUS; SUBCUTANEOUS at 17:31

## 2023-05-15 RX ADMIN — CYANOCOBALAMIN TAB 1000 MCG 1000 MCG: 1000 TAB at 08:19

## 2023-05-15 RX ADMIN — ACETAMINOPHEN 975 MG: 325 TABLET ORAL at 17:28

## 2023-05-15 RX ADMIN — INSULIN LISPRO 1 UNITS: 100 INJECTION, SOLUTION INTRAVENOUS; SUBCUTANEOUS at 12:02

## 2023-05-15 RX ADMIN — DOCUSATE SODIUM 100 MG: 100 CAPSULE, LIQUID FILLED ORAL at 08:19

## 2023-05-15 RX ADMIN — GABAPENTIN 100 MG: 100 CAPSULE ORAL at 17:30

## 2023-05-15 NOTE — PROGRESS NOTES
"  Progress Note - Mae 108 70 y o  female MRN: [id number]  Unit/Bed#: [facility] 189-85 Encounter: [id number]    The patient was seen for continuing care and reviewed with treatment team   Spoke with daughter, pt was making progress but yesterday she thought people are out to get her, she thought she had  done something bad  She thought people didn't like her  She never had incontinence at home and usually wears panties ( repeat UA yesterday was  within normal limits)  Today, pt express paranoid thoughts about the police being out to get her \" I don't like them  \"  Sleep- good  Appetite- good  Energy:low  No suicidal or homicidal ideations  No EPS, denies any side effects of medication  ROS : right thigh pain today, 10/10- she took tylenol today    /74 (BP Location: Right arm)   Pulse 77   Temp 97 7 °F (36 5 °C) (Temporal)   Resp 18   Wt 67 2 kg (148 lb 2 4 oz)   SpO2 98%   BMI 25 43 kg/m²     Current Mental Status Evaluation:  Appearance:  Adequate hygiene and grooming   Behavior:  calm, cooperative and friendly   Mood:  anxious   Affect: constricted   Speech: Soft and Slow   Thought Process:  Goal directed and coherent, illogical at times   Thought Content:  Paranoid and mistrustful   Perceptual Disturbances: Denies hallucinations and does not appear to be responding to internal stimuli   Risk Potential: No suicidal or homicidal ideation   Orientation:    Patient is alert, awake and oriented x 3    Patient  has limited insight, Judgment and Impulse control         Recent Results (from the past 72 hour(s))   Fingerstick Glucose (POCT)    Collection Time: 05/13/23  5:20 PM   Result Value Ref Range    POC Glucose 174 (H) 65 - 140 mg/dl   Fingerstick Glucose (POCT)    Collection Time: 05/13/23  9:06 PM   Result Value Ref Range    POC Glucose 238 (H) 65 - 140 mg/dl   Fingerstick Glucose (POCT)    Collection Time: 05/14/23  7:36 AM   Result Value Ref Range    POC Glucose 126 65 - 140 " mg/dl   Fingerstick Glucose (POCT)    Collection Time: 05/14/23 11:48 AM   Result Value Ref Range    POC Glucose 192 (H) 65 - 140 mg/dl   Fingerstick Glucose (POCT)    Collection Time: 05/14/23  4:12 PM   Result Value Ref Range    POC Glucose 190 (H) 65 - 140 mg/dl   Fingerstick Glucose (POCT)    Collection Time: 05/14/23  9:28 PM   Result Value Ref Range    POC Glucose 244 (H) 65 - 140 mg/dl   Basic metabolic panel    Collection Time: 05/15/23  6:03 AM   Result Value Ref Range    Sodium 132 (L) 135 - 147 mmol/L    Potassium 4 0 3 5 - 5 3 mmol/L    Chloride 97 96 - 108 mmol/L    CO2 29 21 - 32 mmol/L    ANION GAP 6 4 - 13 mmol/L    BUN 11 5 - 25 mg/dL    Creatinine 0 66 0 60 - 1 30 mg/dL    Glucose 113 65 - 140 mg/dL    Glucose, Fasting 113 (H) 65 - 99 mg/dL    Calcium 9 3 8 4 - 10 2 mg/dL    eGFR 90 ml/min/1 73sq m   Fingerstick Glucose (POCT)    Collection Time: 05/15/23  7:30 AM   Result Value Ref Range    POC Glucose 115 65 - 140 mg/dl   Fingerstick Glucose (POCT)    Collection Time: 05/15/23 11:51 AM   Result Value Ref Range    POC Glucose 199 (H) 65 - 140 mg/dl   UA w Reflex to Microscopic w Reflex to Culture    Collection Time: 05/15/23  2:49 PM    Specimen: Urine, Other   Result Value Ref Range    Color, UA Straw Straw, Yellow, Pale Yellow    Clarity, UA Clear Clear, Other    Specific Gravity, UA 1 015 1 003 - 1 040    pH, UA 8 0 4 5, 5 0, 5 5, 6 0, 6 5, 7 0, 7 5, 8 0    Leukocytes, UA Negative Negative    Nitrite, UA Negative Negative    Protein, UA Negative Negative mg/dl    Glucose, UA Negative Negative mg/dl    Ketones, UA Negative Negative mg/dl    Bilirubin, UA Negative Negative    Occult Blood, UA Negative Negative    UROBILINOGEN UA Negative 1 0, Negative mg/dL   Fingerstick Glucose (POCT)    Collection Time: 05/15/23  4:14 PM   Result Value Ref Range    POC Glucose 189 (H) 65 - 140 mg/dl   Fingerstick Glucose (POCT)    Collection Time: 05/15/23  9:28 PM   Result Value Ref Range    POC Glucose 160 (H) 65 - 140 mg/dl   Fingerstick Glucose (POCT)    Collection Time: 05/16/23  7:12 AM   Result Value Ref Range    POC Glucose 118 65 - 140 mg/dl   Fingerstick Glucose (POCT)    Collection Time: 05/16/23 11:40 AM   Result Value Ref Range    POC Glucose 221 (H) 65 - 140 mg/dl       Progress Toward Goals: some regression noted today  She reports paranoid  Daughter noticed same  Assessment     Principal Problem:    Bipolar affective disorder, depressed, severe, with psychotic behavior ([facility] [address] )  Active Problems:    Type 2 diabetes mellitus without complication, with long-term current use of insulin ([facility])    Essential hypertension    Hyponatremia    History of DVT of lower extremity    Dysphagia    UTI (urinary tract infection)    History of femur fracture    Medical clearance for psychiatric admission        Plan :    - Medications;   Psychiatric:   Increase Latuda to 80mg daily for Bipolar depression and psychosis     Gabapentin 100mg BID for anxiety and pain   BMP tomorrow      Medical: per SLIM    -Therapy: occupational therapy, milieu and group therapy   -Disposition: Home when stable

## 2023-05-15 NOTE — TREATMENT TEAM
05/15/23 0838   Team Meeting   Meeting Type Daily Rounds   Initial Conference Date 05/15/23   Team Members Present   Team Members Present Physician;Nurse;Occupational Therapist;;   Physician Team Member Dr Lucie Prasad Team Member Navarro Adames, [address] Management Team Member [address] Work Team Member Hill   OT Team Member Iver Olszewski T   Patient/Family Present   Patient Present No   Patient's Family Present No     Denies all s/s, spoke with family over weekend- went well, social, visible, slept through night, brighter affect, incontinent, cont to report depression, improved self care

## 2023-05-15 NOTE — NURSING NOTE
Pt reported pain in  right upper thigh, area is raised and firm to touch  Pt reports tender to touch  No erythema or warmth noted  Tylenol administered for pain  SLIM provider notified, will continue to monitor  Voltaren prn recommended for relief  Pt reported tylenol effective for pain

## 2023-05-15 NOTE — PROGRESS NOTES
Progress Note - Mae 108 70 y o  female MRN: [id number]  Unit/Bed#: [place] [address] Encounter: [id number]    The patient was seen for continuing care and reviewed with treatment team  Patient initially bright during our conversation today but the  suddenly avoided eye contact and reported feeling sad because she misses her   She has been making most of her needs known  She feels acute paranoid and psychotic thoughts  Sleep- good  Appetite-  Eats about 50-75 % of her meals  Energy: good  No suicidal or homicidal ideations  No EPS, denies any side effects of medication  /74 (BP Location: Right arm)   Pulse 77   Temp 97 7 °F (36 5 °C) (Temporal)   Resp 18   Wt 67 2 kg (148 lb 2 4 oz)   SpO2 98%   BMI 25 43 kg/m²     Current Mental Status Evaluation:  Appearance:   fair hygiene, moving her right foot back and forth   Behavior:  calm, cooperative and friendly   Mood:  anxious, sad because she misses her    Affect:  anxious, reactive at times  Speech: Normal volume and Normal rate   Thought Process:  Poverty of thoughts, But conversing more   Thought Content:  Does not verbalize delusional material   Perceptual Disturbances: Denies hallucinations and does not appear to be responding to internal stimuli   Risk Potential: No suicidal or homicidal ideation   Orientation:   Patient is alert,awake and oriented x 3    Patient has limited insight, judgement and impulse control        Recent Results (from the past 168 hour(s))   Fingerstick Glucose (POCT)    Collection Time: 05/08/23  4:24 PM   Result Value Ref Range    POC Glucose 146 (H) 65 - 140 mg/dl   Fingerstick Glucose (POCT)    Collection Time: 05/08/23  8:49 PM   Result Value Ref Range    POC Glucose 169 (H) 65 - 140 mg/dl   Basic metabolic panel    Collection Time: 05/09/23  6:21 AM   Result Value Ref Range    Sodium 132 (L) 135 - 147 mmol/L    Potassium 4 1 3 5 - 5 3 mmol/L    Chloride 97 96 - 108 mmol/L    CO2 27 21 - 32 mmol/L    ANION GAP 8 4 - 13 mmol/L    BUN 12 5 - 25 mg/dL    Creatinine 0 62 0 60 - 1 30 mg/dL    Glucose 122 65 - 140 mg/dL    Calcium 9 0 8 4 - 10 2 mg/dL    eGFR 92 ml/min/1 73sq m   Fingerstick Glucose (POCT)    Collection Time: 05/09/23  7:46 AM   Result Value Ref Range    POC Glucose 132 65 - 140 mg/dl   Fingerstick Glucose (POCT)    Collection Time: 05/09/23 12:03 PM   Result Value Ref Range    POC Glucose 159 (H) 65 - 140 mg/dl   Fingerstick Glucose (POCT)    Collection Time: 05/09/23  4:03 PM   Result Value Ref Range    POC Glucose 149 (H) 65 - 140 mg/dl   Fingerstick Glucose (POCT)    Collection Time: 05/09/23  9:11 PM   Result Value Ref Range    POC Glucose 172 (H) 65 - 140 mg/dl   Fingerstick Glucose (POCT)    Collection Time: 05/10/23  7:04 AM   Result Value Ref Range    POC Glucose 128 65 - 140 mg/dl   Sodium, urine, random    Collection Time: 05/10/23  8:19 AM   Result Value Ref Range    Sodium, Ur 94    Osmolality, urine    Collection Time: 05/10/23  8:19 AM   Result Value Ref Range    Osmolality, Ur 392 250 - 900 mmol/KG   Fingerstick Glucose (POCT)    Collection Time: 05/10/23 12:02 PM   Result Value Ref Range    POC Glucose 156 (H) 65 - 140 mg/dl   Fingerstick Glucose (POCT)    Collection Time: 05/10/23  4:10 PM   Result Value Ref Range    POC Glucose 141 (H) 65 - 140 mg/dl   Fingerstick Glucose (POCT)    Collection Time: 05/10/23  9:12 PM   Result Value Ref Range    POC Glucose 181 (H) 65 - 140 mg/dl   Basic metabolic panel    Collection Time: 05/11/23  5:31 AM   Result Value Ref Range    Sodium 133 (L) 135 - 147 mmol/L    Potassium 3 7 3 5 - 5 3 mmol/L    Chloride 97 96 - 108 mmol/L    CO2 28 21 - 32 mmol/L    ANION GAP 8 4 - 13 mmol/L    BUN 12 5 - 25 mg/dL    Creatinine 0 63 0 60 - 1 30 mg/dL    Glucose 103 65 - 140 mg/dL    Calcium 9 0 8 4 - 10 2 mg/dL    eGFR 91 ml/min/1 73sq m   Fingerstick Glucose (POCT)    Collection Time: 05/11/23  7:03 AM   Result Value Ref Range    POC Glucose 106 65 - 140 mg/dl   Fingerstick Glucose (POCT)    Collection Time: 05/11/23 11:12 AM   Result Value Ref Range    POC Glucose 258 (H) 65 - 140 mg/dl   Fingerstick Glucose (POCT)    Collection Time: 05/11/23  4:08 PM   Result Value Ref Range    POC Glucose 117 65 - 140 mg/dl   Fingerstick Glucose (POCT)    Collection Time: 05/11/23  8:58 PM   Result Value Ref Range    POC Glucose 214 (H) 65 - 140 mg/dl   Basic metabolic panel    Collection Time: 05/12/23  4:58 AM   Result Value Ref Range    Sodium 135 135 - 147 mmol/L    Potassium 4 1 3 5 - 5 3 mmol/L    Chloride 97 96 - 108 mmol/L    CO2 31 21 - 32 mmol/L    ANION GAP 7 4 - 13 mmol/L    BUN 15 5 - 25 mg/dL    Creatinine 0 69 0 60 - 1 30 mg/dL    Glucose 118 65 - 140 mg/dL    Calcium 9 3 8 4 - 10 2 mg/dL    eGFR 89 ml/min/1 73sq m   Uric acid    Collection Time: 05/12/23  4:58 AM   Result Value Ref Range    Uric Acid 5 4 2 0 - 7 5 mg/dL   Fingerstick Glucose (POCT)    Collection Time: 05/12/23  7:32 AM   Result Value Ref Range    POC Glucose 98 65 - 140 mg/dl   Fingerstick Glucose (POCT)    Collection Time: 05/12/23 11:43 AM   Result Value Ref Range    POC Glucose 174 (H) 65 - 140 mg/dl   Fingerstick Glucose (POCT)    Collection Time: 05/12/23  4:33 PM   Result Value Ref Range    POC Glucose 97 65 - 140 mg/dl   Fingerstick Glucose (POCT)    Collection Time: 05/12/23  8:59 PM   Result Value Ref Range    POC Glucose 227 (H) 65 - 140 mg/dl   Fingerstick Glucose (POCT)    Collection Time: 05/13/23  7:32 AM   Result Value Ref Range    POC Glucose 118 65 - 140 mg/dl   Fingerstick Glucose (POCT)    Collection Time: 05/13/23 11:57 AM   Result Value Ref Range    POC Glucose 217 (H) 65 - 140 mg/dl   Fingerstick Glucose (POCT)    Collection Time: 05/13/23  5:20 PM   Result Value Ref Range    POC Glucose 174 (H) 65 - 140 mg/dl   Fingerstick Glucose (POCT)    Collection Time: 05/13/23  9:06 PM   Result Value Ref Range    POC Glucose 238 (H) 65 - 140 mg/dl   Fingerstick Glucose (POCT)    Collection Time: 05/14/23  7:36 AM   Result Value Ref Range    POC Glucose 126 65 - 140 mg/dl   Fingerstick Glucose (POCT)    Collection Time: 05/14/23 11:48 AM   Result Value Ref Range    POC Glucose 192 (H) 65 - 140 mg/dl   Fingerstick Glucose (POCT)    Collection Time: 05/14/23  4:12 PM   Result Value Ref Range    POC Glucose 190 (H) 65 - 140 mg/dl   Fingerstick Glucose (POCT)    Collection Time: 05/14/23  9:28 PM   Result Value Ref Range    POC Glucose 244 (H) 65 - 140 mg/dl   Basic metabolic panel    Collection Time: 05/15/23  6:03 AM   Result Value Ref Range    Sodium 132 (L) 135 - 147 mmol/L    Potassium 4 0 3 5 - 5 3 mmol/L    Chloride 97 96 - 108 mmol/L    CO2 29 21 - 32 mmol/L    ANION GAP 6 4 - 13 mmol/L    BUN 11 5 - 25 mg/dL    Creatinine 0 66 0 60 - 1 30 mg/dL    Glucose 113 65 - 140 mg/dL    Glucose, Fasting 113 (H) 65 - 99 mg/dL    Calcium 9 3 8 4 - 10 2 mg/dL    eGFR 90 ml/min/1 73sq m   Fingerstick Glucose (POCT)    Collection Time: 05/15/23  7:30 AM   Result Value Ref Range    POC Glucose 115 65 - 140 mg/dl   Fingerstick Glucose (POCT)    Collection Time: 05/15/23 11:51 AM   Result Value Ref Range    POC Glucose 199 (H) 65 - 140 mg/dl       Progress Toward Goals:  Progressing  Slowly   Na reduced to 132 today  Will obtain UA as noted below  Assessment     Principal Problem:    Bipolar affective disorder, depressed, severe, with psychotic behavior ([facility] [address] )  Active Problems:    Type 2 diabetes mellitus without complication, with long-term current use of insulin ([facility])    Essential hypertension    Hyponatremia    History of DVT of lower extremity    Dysphagia    UTI (urinary tract infection)    History of femur fracture    Medical clearance for psychiatric admission        Plan :    - Medications;   Psychiatric:  Continue Latuda 60mg daily    Will repeat a UA due to increased urine frequency and recent incontinence episodes    Plan to d/c klonopin 0 25mg tomorrow  Starte Gabapentin 100mg BID to help with anxiety and pain   Medical; per SLIM    -Therapy: occupational therapy, milieu and group therapy  - Legal: 201   -Disposition: Home when stable

## 2023-05-15 NOTE — WOUND OSTOMY CARE
Progress Note - Wound   Royal Soria 70 y o  female MRN: [id number]  Unit/Bed#: Archana Sanchez 029-08 Encounter: [id number]        Assessment:   Patient seen for wound care follow up assessment  Patient is now resolved  Recommend moisture/barrier  Wound care will sign off at this time  Wound 04/28/23 Pressure Injury Sacrum (Active)   Wound Image   05/15/23 1107   Wound Description Dry; Intact; Brown 05/09/23 0942   Pressure Injury Stage 2 05/09/23 0942   Giselle-wound Assessment Dry; Intact 05/09/23 0942   Wound Length (cm) 6 cm 05/09/23 0942   Wound Width (cm) 4 cm 05/09/23 0942   Wound Surface Area (cm^2) 24 cm^2 05/09/23 0942   Drainage Amount None 05/09/23 0942   Treatments Cleansed 05/13/23 1100   Dressing Foam, Silicon (eg  Allevyn, etc) 05/13/23 1100   Dressing Changed Changed 05/13/23 1100   Patient Tolerance Tolerated well 05/13/23 1100   Dressing Status Clean;Dry; Intact 05/13/23 1100       Wound 04/28/23 Pressure Injury Buttocks Right (Active)   Wound Image   05/09/23 0943   Wound Description Dry; Intact; Brown 05/09/23 0943   Pressure Injury Stage 2 05/09/23 0943   Giselle-wound Assessment Brown;Scaly 05/09/23 0943   Wound Length (cm) 2 cm 05/09/23 0943   Wound Width (cm) 1 cm 05/09/23 0943   Wound Depth (cm) 0 1 cm 05/02/23 1039   Wound Surface Area (cm^2) 2 cm^2 05/09/23 0943   Wound Volume (cm^3) 0 2 cm^3 05/02/23 1039   Calculated Wound Volume (cm^3) 0 2 cm^3 05/02/23 1039   Drainage Amount None 05/09/23 0943   Treatments Cleansed 05/13/23 1100   Dressing Foam, Silicon (eg  Allevyn, etc) 05/13/23 1100   Dressing Changed Changed 05/13/23 1100   Patient Tolerance Tolerated well 05/13/23 1100   Dressing Status Clean;Dry; Intact 05/13/23 1100       Wound 05/02/23 Pressure Injury Buttocks Left (Active)   Wound Image   05/09/23 0942   Wound Description Dry; Intact; Brown;Pink 05/09/23 0942   Pressure Injury Stage 1 05/09/23 0942   Giselle-wound Assessment Dry;Brown;Scaly 05/09/23 0942   Wound Length (cm) 1 cm 05/09/23 1444   Wound Width (cm) 1 cm 05/09/23 0942   Wound Surface Area (cm^2) 1 cm^2 05/09/23 0942   Drainage Amount None 05/09/23 0942   Treatments Cleansed 05/13/23 1100   Dressing Foam, Silicon (eg  Allevyn, etc) 05/13/23 1100   Dressing Changed Changed 05/13/23 1100   Patient Tolerance Tolerated well 05/13/23 1100   Dressing Status Clean;Dry; Intact 05/09/23 5420 Elvie [place] BSN, RN, [facility] [employer]

## 2023-05-15 NOTE — NURSING NOTE
Pt visible, bright, and pleasant on approach  Reporting pain in R upper thigh as previously  Provided with prn Voltaren topical gel at [facility] for pain  Denies all other s/s  Pt reports effective  Medication complaint in apple sauce  Pt ambulating with walker and standby assist  Maintained on bed and chair alarm for safety  Will maintain q7min checks

## 2023-05-15 NOTE — NURSING NOTE
Pt calm and cooperative on unit  Visible for meals with fair intake  Pt able to make needs known  Tylenol effective for generalized pain  Denies all symptoms currently, pleasant and cooperative during interactions  Ambulates well with walker  Cooperative with chair alarm  Pt provided urine sample for UA, normal result  Pt medication and insulin adherent

## 2023-05-15 NOTE — PLAN OF CARE
Pt  Was present in group with progressing alertness as the day went on    Problem: Ineffective Coping  Goal: Participates in unit activities  Description: Interventions:  - Provide therapeutic environment   - Provide required programming   - Redirect inappropriate behaviors   Outcome: Progressing

## 2023-05-15 NOTE — PROGRESS NOTES
05/15/23 1000 05/15/23 1100   Activity/Group Checklist   Group Xena Patel  (ocean relxation discussion on coping with anxiety )   Attendance Attended Attended  (pt  Leda Lesch joined group in progress )   Attendance Duration (min) 31-45 0-15   Interactions Other (Comment)  (Pt primarily sat with head down on table and didnt set goal not intereacting  with others until prompted by staff ) Did not interact  (Pt  sitting up more in this session yet eye contact is minimal)   Affect/Mood Blunted/flat Constricted   Goals Achieved Able to listen to others Able to listen to others;Discussed coping strategies

## 2023-05-15 NOTE — PLAN OF CARE
Problem: DEPRESSION  Goal: Will be euthymic at discharge  Description: INTERVENTIONS:  - Administer medication as ordered  - Provide emotional support via 1:1 interaction with staff  - Encourage involvement in milieu/groups/activities  - Monitor for social isolation  Outcome: Progressing     Problem: ANXIETY  Goal: Will report anxiety at manageable levels  Description: INTERVENTIONS:  - Administer medication as ordered  - Teach and encourage coping skills  - Provide emotional support  - Assess patient/family for anxiety and ability to cope  Outcome: Progressing     Problem: SELF CARE DEFICIT  Goal: Return ADL status to a safe level of function  Description: INTERVENTIONS:  - Administer medication as ordered  - Assess ADL deficits and provide assistive devices as needed  - Obtain PT/OT consults as needed  - Assist and instruct patient to increase activity and self care as tolerated  Outcome: Progressing     Problem: Nutrition/Hydration-ADULT  Goal: Nutrient/Hydration intake appropriate for improving, restoring or maintaining nutritional needs  Description: Monitor and assess patient's nutrition/hydration status for malnutrition  Collaborate with interdisciplinary team and initiate plan and interventions as ordered  Monitor patient's weight and dietary intake as ordered or per policy  Utilize nutrition screening tool and intervene as necessary  Determine patient's food preferences and provide high-protein, high-caloric foods as appropriate       INTERVENTIONS:  - Monitor oral intake, urinary output, labs, and treatment plans  - Assess nutrition and hydration status and recommend course of action  - Evaluate amount of meals eaten  - Assist patient with eating if necessary   - Allow adequate time for meals  - Recommend/ encourage appropriate diets, oral nutritional supplements, and vitamin/mineral supplements  - Order, calculate, and assess calorie counts as needed  - Recommend, monitor, and adjust tube feedings and TPN/PPN based on assessed needs  - Assess need for intravenous fluids  - Provide specific nutrition/hydration education as appropriate  - Include patient/family/caregiver in decisions related to nutrition  Outcome: Progressing

## 2023-05-15 NOTE — PROGRESS NOTES
NEPHROLOGY PROGRESS NOTE   Angel Mann 70 y o  female MRN: [id number]  Unit/Bed#: [facility] 585-43 Encounter: [id number]        ASSESSMENT & PLAN    1  Hyponatremia  • Multifactorial  • Initial component of prerenal azotemia  • Urine studies consistent with SIADH  • Previous CT of the chest abdomen pelvis showed no evidence of malignancy  • Sodium slightly lower this morning  • Continue fluid restriction and increase salt tablets to 1 g 3 times daily  2  Bipolar disorder  • Ongoing management in psychiatric unit  3  Hypertension  • Continue low-dose calcium channel blocker and metoprolol  • Does have bilateral adrenal adenomas  • Metanephrines, renin and Joey pending  • Current blood pressures are acceptable    SUBJECTIVE:    Patient was seen today  Sitting up resting comfortably  Tolerating salt tablets without difficulty    12 point review of systems was otherwise negative besides what is mentioned above      Medications:    Current Facility-Administered Medications:   •  acetaminophen (TYLENOL) tablet 650 mg, 650 mg, Oral, Q4H PRN, JOHN Bhagat  •  acetaminophen (TYLENOL) tablet 650 mg, 650 mg, Oral, Q4H PRN, JOHN Bhagat, 650 mg at 05/11/23 9199  •  acetaminophen (TYLENOL) tablet 975 mg, 975 mg, Oral, Q6H PRN, JOHN Bhagat, 975 mg at 05/15/23 2253  •  amLODIPine (NORVASC) tablet 2 5 mg, 2 5 mg, Oral, Daily, Gabriele Olsen DO, 2 5 mg at 05/15/23 0818  •  aspirin (ECOTRIN LOW STRENGTH) EC tablet 81 mg, 81 mg, Oral, Daily, JOHN Garcia, 81 mg at 05/15/23 6594  •  atorvastatin (LIPITOR) tablet 40 mg, 40 mg, Oral, Daily With Margarette BrothersJOHN, 40 mg at 05/14/23 1730  •  bisacodyl (DULCOLAX) rectal suppository 10 mg, 10 mg, Rectal, Daily PRN, Luis Banerjee MD, 10 mg at 05/03/23 1917  •  clonazePAM (KlonoPIN) tablet 0 25 mg, 0 25 mg, Oral, HS, Johnathan Mclaughlin MD, 0 25 mg at 05/14/23 2126  •  cyanocobalamin (VITAMIN B-12) tablet 1,000 mcg, 1,000 mcg, Oral, Daily, JOHN Ann, 1,000 mcg at 05/15/23 8814  •  docusate sodium (COLACE) capsule 100 mg, 100 mg, Oral, BID, Mariia Bar PA-C, 100 mg at 05/15/23 9633  •  famotidine (PEPCID) tablet 20 mg, 20 mg, Oral, BID, Mariia Bar PA-C, 20 mg at 05/15/23 7890  •  gabapentin (NEURONTIN) capsule 100 mg, 100 mg, Oral, BID, May Fournier MD, 100 mg at 05/15/23 1308  •  haloperidol lactate (HALDOL) injection 5 mg, 5 mg, Intramuscular, Q4H PRN Max 4/day, JOHN Santamaria  •  insulin glargine (LANTUS) subcutaneous injection 15 Units 0 15 mL, 15 Units, Subcutaneous, HS, JOHN Ann, 15 Units at 05/14/23 2121  •  insulin lispro (HumaLOG) 100 units/mL subcutaneous injection 1-5 Units, 1-5 Units, Subcutaneous, TID AC, 1 Units at 05/15/23 1202 **AND** Fingerstick Glucose (POCT), , , TID AC, JOHN Ann  •  insulin lispro (HumaLOG) 100 units/mL subcutaneous injection 1-5 Units, 1-5 Units, Subcutaneous, HS, JOHN Ann, 2 Units at 05/14/23 2134  •  lurasidone (LATUDA) tablet 60 mg, 60 mg, Oral, Daily With Daily Louie MD, 60 mg at 05/14/23 1730  •  metoprolol succinate (TOPROL-XL) 24 hr tablet 37 5 mg, 37 5 mg, Oral, Daily, JOHN Alston, 37 5 mg at 05/15/23 0818  •  polyethylene glycol (MIRALAX) packet 17 g, 17 g, Oral, Daily PRN, Margarita Dominguez PA-C, 17 g at 05/13/23 1005  •  risperiDONE (RisperDAL) tablet 0 25 mg, 0 25 mg, Oral, Q4H PRN Max 6/day, JOHN Santamaria  •  risperiDONE (RisperDAL) tablet 0 5 mg, 0 5 mg, Oral, Q4H PRN Max 3/day, Ariella Course, CRNP  •  risperiDONE (RisperDAL) tablet 1 mg, 1 mg, Oral, Q2H PRN Max 3/day, Ariella Course, CRNP  •  sodium chloride tablet 1 g, 1 g, Oral, TID With Meals, Romana Pulliam DO  •  torsemide (DEMADEX) tablet 10 mg, 10 mg, Oral, Daily, Marta Olsen DO, 10 mg at 05/15/23 4275  •  traZODone (DESYREL) tablet 50 mg, 50 mg, Oral, HS PRN, Ariella Course, "CRNP    OBJECTIVE:    Vitals:    05/14/23 1535 05/14/23 2038 05/15/23 0815 05/15/23 1537   BP: 134/62 156/68 161/74 118/56   BP Location: Left arm Right arm Right arm Right arm   Pulse: 93 88 77 66   Resp: 17 16 18 18   Temp: 98 1 °F (36 7 °C) 98 °F (36 7 °C) 97 7 °F (36 5 °C) 97 9 °F (36 6 °C)   TempSrc: Temporal Temporal Temporal Temporal   SpO2: 100% 98% 98% 99%   Weight:            Temp:  [97 7 °F (36 5 °C)-98 °F (36 7 °C)] 97 9 °F (36 6 °C)  HR:  [66-88] 66  Resp:  [16-18] 18  BP: (118-161)/(56-74) 118/56  SpO2:  [98 %-99 %] 99 %     Body mass index is 25 43 kg/m²  Weight (last 2 days)     Date/Time Weight    05/13/23 0600 67 2 (148 15)          I/O last 3 completed shifts: In: 560 [P O :560]  Out: -     I/O this shift: In: 480 [P O :480]  Out: -       Physical exam:    General: no acute distress, cooperative  Eyes: conjunctivae pink, anicteric sclerae  ENT: lips and mucous membranes moist, no exudates, normal external ears  Neck: ROM intact, no JVD  Chest: No respiratory distress, no accessory muscle use  CVS: normal rate, non pericardial friction rub  Abdomen: soft, non-tender, non-distended, normoactive bowel sounds  Extremities: no edema of both legs  Skin: no rash  Neuro: awake, alert, oriented, grossly intact  Psych:  Pleasant affect    Invasive Devices:      Lab Results:   Results from last 7 days   Lab Units 05/15/23  1449 05/15/23  0603 05/12/23  0458 05/11/23  0531 05/09/23  0621   POTASSIUM mmol/L  --  4 0 4 1 3 7 4 1   CHLORIDE mmol/L  --  97 97 97 97   CO2 mmol/L  --  29 31 28 27   BUN mg/dL  --  11 15 12 12   CREATININE mg/dL  --  0 66 0 69 0 63 0 62   CALCIUM mg/dL  --  9 3 9 3 9 0 9 0   LEUKOCYTES UA  Negative  --   --   --   --    BLOOD UA  Negative  --   --   --   --          Portions of the record may have been created with voice recognition software  Occasional wrong word or \"sound a like\" substitutions may have occurred due to the inherent limitations of voice recognition software   " Read the chart carefully and recognize, using context, where substitutions have occurred  If you have any questions, please contact the dictating provider

## 2023-05-16 LAB
GLUCOSE SERPL-MCNC: 118 MG/DL (ref 65–140)
GLUCOSE SERPL-MCNC: 171 MG/DL (ref 65–140)
GLUCOSE SERPL-MCNC: 221 MG/DL (ref 65–140)
GLUCOSE SERPL-MCNC: 258 MG/DL (ref 65–140)

## 2023-05-16 RX ORDER — LURASIDONE HYDROCHLORIDE 80 MG/1
80 TABLET, FILM COATED ORAL
Status: DISCONTINUED | OUTPATIENT
Start: 2023-05-16 | End: 2023-05-30 | Stop reason: HOSPADM

## 2023-05-16 RX ADMIN — FAMOTIDINE 20 MG: 20 TABLET ORAL at 17:34

## 2023-05-16 RX ADMIN — INSULIN LISPRO 2 UNITS: 100 INJECTION, SOLUTION INTRAVENOUS; SUBCUTANEOUS at 21:24

## 2023-05-16 RX ADMIN — INSULIN LISPRO 2 UNITS: 100 INJECTION, SOLUTION INTRAVENOUS; SUBCUTANEOUS at 12:11

## 2023-05-16 RX ADMIN — SODIUM CHLORIDE 1 G: 1 TABLET ORAL at 08:38

## 2023-05-16 RX ADMIN — GABAPENTIN 100 MG: 100 CAPSULE ORAL at 08:39

## 2023-05-16 RX ADMIN — AMLODIPINE BESYLATE 2.5 MG: 2.5 TABLET ORAL at 08:39

## 2023-05-16 RX ADMIN — DOCUSATE SODIUM 100 MG: 100 CAPSULE, LIQUID FILLED ORAL at 08:39

## 2023-05-16 RX ADMIN — CYANOCOBALAMIN TAB 1000 MCG 1000 MCG: 1000 TAB at 08:39

## 2023-05-16 RX ADMIN — INSULIN GLARGINE 15 UNITS: 100 INJECTION, SOLUTION SUBCUTANEOUS at 21:24

## 2023-05-16 RX ADMIN — INSULIN LISPRO 1 UNITS: 100 INJECTION, SOLUTION INTRAVENOUS; SUBCUTANEOUS at 16:47

## 2023-05-16 RX ADMIN — DICLOFENAC SODIUM 2 G: 10 GEL TOPICAL at 19:06

## 2023-05-16 RX ADMIN — SODIUM CHLORIDE 1 G: 1 TABLET ORAL at 12:12

## 2023-05-16 RX ADMIN — ASPIRIN 81 MG: 81 TABLET, COATED ORAL at 08:38

## 2023-05-16 RX ADMIN — DICLOFENAC SODIUM 2 G: 10 GEL TOPICAL at 08:36

## 2023-05-16 RX ADMIN — METOPROLOL SUCCINATE 37.5 MG: 25 TABLET, EXTENDED RELEASE ORAL at 08:36

## 2023-05-16 RX ADMIN — SODIUM CHLORIDE 1 G: 1 TABLET ORAL at 16:52

## 2023-05-16 RX ADMIN — TORSEMIDE 10 MG: 5 TABLET ORAL at 08:38

## 2023-05-16 RX ADMIN — LURASIDONE HYDROCHLORIDE 80 MG: 80 TABLET, FILM COATED ORAL at 16:51

## 2023-05-16 RX ADMIN — GABAPENTIN 100 MG: 100 CAPSULE ORAL at 17:33

## 2023-05-16 RX ADMIN — ACETAMINOPHEN 975 MG: 325 TABLET ORAL at 08:40

## 2023-05-16 RX ADMIN — DOCUSATE SODIUM 100 MG: 100 CAPSULE, LIQUID FILLED ORAL at 17:34

## 2023-05-16 RX ADMIN — FAMOTIDINE 20 MG: 20 TABLET ORAL at 08:38

## 2023-05-16 RX ADMIN — ATORVASTATIN CALCIUM 40 MG: 40 TABLET, FILM COATED ORAL at 16:51

## 2023-05-16 NOTE — NURSING NOTE
Pt visible in day room, pleasant on approach  Reports pain in R thigh  Provided with prn voltaren at 1906  Effective  Denies all other s/s this evening  Last BM 5/15  , provided with lantus and coverage  Will maintain q7min checks

## 2023-05-16 NOTE — PLAN OF CARE
Problem: Potential for Falls  Goal: Patient will remain free of falls  Description: INTERVENTIONS:  - Educate patient/family on patient safety including physical limitations  - Instruct patient to call for assistance with activity   - Consult OT/PT to assist with strengthening/mobility   - Keep Call bell within reach  - Keep bed low and locked with side rails adjusted as appropriate  - Keep care items and personal belongings within reach  - Initiate and maintain comfort rounds  - Make Fall Risk Sign visible to staff  - Offer Toileting every 2 Hours, in advance of need  - Initiate/Maintain bed and chair alarm alarm  - Obtain necessary fall risk management equipment: bed and chair alarms; yellow socks  - Apply yellow socks and bracelet for high fall risk patients  - Consider moving patient to room near nurses station  Outcome: Progressing     Problem: DEPRESSION  Goal: Will be euthymic at discharge  Description: INTERVENTIONS:  - Administer medication as ordered  - Provide emotional support via 1:1 interaction with staff  - Encourage involvement in milieu/groups/activities  - Monitor for social isolation  Outcome: Progressing     Problem: PASCUAL  Goal: Will exhibit normal sleep and speech and no impulsivity  Description: INTERVENTIONS:  - Administer medication as ordered  - Set limits on impulsive behavior  - Make attempts to decrease external stimuli as possible  Outcome: Progressing     Problem: PSYCHOSIS  Goal: Will report no hallucinations or delusions  Description: Interventions:  - Administer medication as  ordered  - Every waking shifts and PRN assess for the presence of hallucinations and or delusions  - Assist with reality testing to support increasing orientation  - Assess if patient's hallucinations or delusions are encouraging self-harm or harm to others and intervene as appropriate  Outcome: Progressing     Problem: ANXIETY  Goal: Will report anxiety at manageable levels  Description: INTERVENTIONS:  - Administer medication as ordered  - Teach and encourage coping skills  - Provide emotional support  - Assess patient/family for anxiety and ability to cope  Outcome: Progressing     Problem: SELF CARE DEFICIT  Goal: Return ADL status to a safe level of function  Description: INTERVENTIONS:  - Administer medication as ordered  - Assess ADL deficits and provide assistive devices as needed  - Obtain PT/OT consults as needed  - Assist and instruct patient to increase activity and self care as tolerated  Outcome: Progressing     Problem: Nutrition/Hydration-ADULT  Goal: Nutrient/Hydration intake appropriate for improving, restoring or maintaining nutritional needs  Description: Monitor and assess patient's nutrition/hydration status for malnutrition  Collaborate with interdisciplinary team and initiate plan and interventions as ordered  Monitor patient's weight and dietary intake as ordered or per policy  Utilize nutrition screening tool and intervene as necessary  Determine patient's food preferences and provide high-protein, high-caloric foods as appropriate       INTERVENTIONS:  - Monitor oral intake, urinary output, labs, and treatment plans  - Assess nutrition and hydration status and recommend course of action  - Evaluate amount of meals eaten  - Assist patient with eating if necessary   - Allow adequate time for meals  - Recommend/ encourage appropriate diets, oral nutritional supplements, and vitamin/mineral supplements  - Order, calculate, and assess calorie counts as needed  - Recommend, monitor, and adjust tube feedings and TPN/PPN based on assessed needs  - Assess need for intravenous fluids  - Provide specific nutrition/hydration education as appropriate  - Include patient/family/caregiver in decisions related to nutrition  Outcome: Progressing     Problem: Ineffective Coping  Goal: Participates in unit activities  Description: Interventions:  - Provide therapeutic environment   - Provide required programming   - Redirect inappropriate behaviors   Outcome: Progressing

## 2023-05-16 NOTE — PLAN OF CARE
Pt  Attending groups with more interest continues to remain quiet when seated withpeers in day room unless prompted to engage in conversation    Problem: Ineffective Coping  Goal: Participates in unit activities  Description: Interventions:  - Provide therapeutic environment   - Provide required programming   - Redirect inappropriate behaviors   Outcome: Progressing

## 2023-05-16 NOTE — PROGRESS NOTES
Pt  notably more alert and interactive with moderate initial prompting  05/16/23 1000   Activity/Group Checklist   Group Community meeting  (guided musical breathing exercises )   Attendance Attended   Attendance Duration (min) 31-45   Interactions Interacted appropriately  (Pt  with neat appearance this morning and increased self motivation to engage in breathing exercises as instructed )   Affect/Mood Appropriate;Calm;Normal range   Goals Achieved Able to engage in interactions; Able to listen to others

## 2023-05-16 NOTE — TREATMENT TEAM
05/16/23 0834   Team Meeting   Meeting Type Daily Rounds   Initial Conference Date 05/16/23   Team Members Present   Team Members Present Physician;Nurse;;; Occupational Therapist   Physician Team Member Dr Oliver Goode Team Member VIPIN [facility] Management Team Member Jamari Butts Work Team Member [place]   OT Team Member Calvin RAMIREZ   Patient/Family Present   Patient Present No   Patient's Family Present No     Denies all s/s, bright, pleasant, social with peers, sleeping through night, cont with fear of falling, attended group, improved participation as day progressed; incontinent of urine   UA normal

## 2023-05-16 NOTE — PROGRESS NOTES
[facility]  Pt needed prompting  Pt anxious and express concern with getting help for shower  Spoke to [facility] who noted pt makes requests during groups, meals or med times  05/16/23 1100   Activity/Group Checklist   Group Other (Comment)  ( recovery: healthy lifestyle and wellness)   Attendance Attended   Attendance Duration (min) 46-60   Interactions Other (Comment)  (needed prompting)   Affect/Mood Constricted   Goals Achieved Identified feelings; Able to engage in interactions; Able to listen to others; Able to reflect/comment on own behavior;Able to manage/cope with feelings;Verbalized increased hopefulness; Able to self-disclose

## 2023-05-16 NOTE — NURSING NOTE
Patient C/O right thigh pain 10/10  Patient requested medication for pain  Patient was offered and accepted Tylenol 975 MG PO  Upon reassessment at 3420, patient reported her pain was better, 4/10  Medication was somewhat effective

## 2023-05-16 NOTE — NURSING NOTE
Patient is visible on the unit and cooperative with care  Patient denies all symptoms  Bright and pleasant upon approach  Appetite is good  Medication compliant  Ambulates well with walker

## 2023-05-17 LAB
ALDOST SERPL-MCNC: 2.8 NG/DL (ref 0–30)
ANION GAP SERPL CALCULATED.3IONS-SCNC: 8 MMOL/L (ref 4–13)
BUN SERPL-MCNC: 15 MG/DL (ref 5–25)
CALCIUM SERPL-MCNC: 9.4 MG/DL (ref 8.4–10.2)
CHLORIDE SERPL-SCNC: 97 MMOL/L (ref 96–108)
CO2 SERPL-SCNC: 30 MMOL/L (ref 21–32)
CREAT SERPL-MCNC: 0.71 MG/DL (ref 0.6–1.3)
GFR SERPL CREATININE-BSD FRML MDRD: 87 ML/MIN/1.73SQ M
GLUCOSE SERPL-MCNC: 135 MG/DL (ref 65–140)
GLUCOSE SERPL-MCNC: 152 MG/DL (ref 65–140)
GLUCOSE SERPL-MCNC: 190 MG/DL (ref 65–140)
GLUCOSE SERPL-MCNC: 198 MG/DL (ref 65–140)
GLUCOSE SERPL-MCNC: 242 MG/DL (ref 65–140)
POTASSIUM SERPL-SCNC: 4.2 MMOL/L (ref 3.5–5.3)
SODIUM SERPL-SCNC: 135 MMOL/L (ref 135–147)

## 2023-05-17 RX ADMIN — TRAZODONE HYDROCHLORIDE 50 MG: 50 TABLET ORAL at 21:49

## 2023-05-17 RX ADMIN — ASPIRIN 81 MG: 81 TABLET, COATED ORAL at 08:34

## 2023-05-17 RX ADMIN — FAMOTIDINE 20 MG: 20 TABLET ORAL at 17:19

## 2023-05-17 RX ADMIN — ACETAMINOPHEN 975 MG: 325 TABLET ORAL at 08:38

## 2023-05-17 RX ADMIN — SODIUM CHLORIDE 1 G: 1 TABLET ORAL at 12:36

## 2023-05-17 RX ADMIN — TORSEMIDE 10 MG: 5 TABLET ORAL at 08:34

## 2023-05-17 RX ADMIN — INSULIN LISPRO 2 UNITS: 100 INJECTION, SOLUTION INTRAVENOUS; SUBCUTANEOUS at 12:34

## 2023-05-17 RX ADMIN — CYANOCOBALAMIN TAB 1000 MCG 1000 MCG: 1000 TAB at 08:34

## 2023-05-17 RX ADMIN — DICLOFENAC SODIUM 2 G: 10 GEL TOPICAL at 11:05

## 2023-05-17 RX ADMIN — GABAPENTIN 100 MG: 100 CAPSULE ORAL at 17:19

## 2023-05-17 RX ADMIN — DOCUSATE SODIUM 100 MG: 100 CAPSULE, LIQUID FILLED ORAL at 17:19

## 2023-05-17 RX ADMIN — AMLODIPINE BESYLATE 2.5 MG: 2.5 TABLET ORAL at 08:34

## 2023-05-17 RX ADMIN — FAMOTIDINE 20 MG: 20 TABLET ORAL at 08:34

## 2023-05-17 RX ADMIN — INSULIN LISPRO 1 UNITS: 100 INJECTION, SOLUTION INTRAVENOUS; SUBCUTANEOUS at 17:22

## 2023-05-17 RX ADMIN — INSULIN GLARGINE 15 UNITS: 100 INJECTION, SOLUTION SUBCUTANEOUS at 21:36

## 2023-05-17 RX ADMIN — ACETAMINOPHEN 975 MG: 325 TABLET ORAL at 15:51

## 2023-05-17 RX ADMIN — ATORVASTATIN CALCIUM 40 MG: 40 TABLET, FILM COATED ORAL at 15:48

## 2023-05-17 RX ADMIN — INSULIN LISPRO 1 UNITS: 100 INJECTION, SOLUTION INTRAVENOUS; SUBCUTANEOUS at 21:37

## 2023-05-17 RX ADMIN — SODIUM CHLORIDE 1 G: 1 TABLET ORAL at 08:34

## 2023-05-17 RX ADMIN — METOPROLOL SUCCINATE 37.5 MG: 25 TABLET, EXTENDED RELEASE ORAL at 08:34

## 2023-05-17 RX ADMIN — LURASIDONE HYDROCHLORIDE 80 MG: 80 TABLET, FILM COATED ORAL at 15:48

## 2023-05-17 RX ADMIN — SODIUM CHLORIDE 1 G: 1 TABLET ORAL at 15:48

## 2023-05-17 RX ADMIN — GABAPENTIN 100 MG: 100 CAPSULE ORAL at 08:34

## 2023-05-17 RX ADMIN — DOCUSATE SODIUM 100 MG: 100 CAPSULE, LIQUID FILLED ORAL at 08:34

## 2023-05-17 RX ADMIN — INSULIN LISPRO 1 UNITS: 100 INJECTION, SOLUTION INTRAVENOUS; SUBCUTANEOUS at 08:39

## 2023-05-17 NOTE — PLAN OF CARE
Notable improvement in eye contact and volume when speaking during structured group  and expressing her thoughts/feelings of frustrations on the unit    Problem: Ineffective Coping  Goal: Participates in unit activities  Description: Interventions:  - Provide therapeutic environment   - Provide required programming   - Redirect inappropriate behaviors   Outcome: Progressing

## 2023-05-17 NOTE — NURSING NOTE
Tylenol 975mg was effective for right leg pain  0/10 pain   Will continue to monitor and assess for safety

## 2023-05-17 NOTE — NURSING NOTE
Viky Ferguson requested pain medication for her right leg  10/10 pain  Tylenol 975mg was provided   Will continue to monitor and assess for safety

## 2023-05-17 NOTE — TREATMENT PLAN
Sodium reviewed improved to 135  Continue current salt tablets and fluid restriction  Repeat bmp Friday and monitor

## 2023-05-17 NOTE — PROGRESS NOTES
Progress Note - Mae 108 70 y o  female MRN: [id number]  Unit/Bed#: [facility] 904-82 Encounter: [id number]    The patient was seen for continuing care and reviewed with treatment team    She is not reports paranoid thoughts today  She is visible, today  Speech remains soft and slow  She is visible in milieu, Makes her needs known  Sleep- good  Appetite- good  Energy:fair  No suicidal or homicidal ideations  No EPS, denies any side effects of medication  ROS :  Leg pain but improved, she denies any incontinence  /57 (BP Location: Left arm)   Pulse 77   Temp 97 7 °F (36 5 °C) (Temporal)   Resp 17   Wt 66 4 kg (146 lb 6 2 oz)   SpO2 98%   BMI 25 13 kg/m²     Current Mental Status Evaluation:  Appearance:  Adequate hygiene and grooming, ambulates with walker   Behavior:  calm, cooperative and friendly   Mood:  anxious   Affect: reactive   Speech: Soft and Slow   Thought Process:  Goal directed and coherent,    Thought Content:   No overt paranoid thoughts reported today   Perceptual Disturbances: Denies hallucinations and does not appear to be responding to internal stimuli   Risk Potential: No suicidal or homicidal ideation   Orientation:    Patient is alert, awake and oriented x 3    Patient  has limited insight, Judgment and Impulse control         Recent Results (from the past 72 hour(s))   Fingerstick Glucose (POCT)    Collection Time: 05/14/23  4:12 PM   Result Value Ref Range    POC Glucose 190 (H) 65 - 140 mg/dl   Fingerstick Glucose (POCT)    Collection Time: 05/14/23  9:28 PM   Result Value Ref Range    POC Glucose 244 (H) 65 - 140 mg/dl   Basic metabolic panel    Collection Time: 05/15/23  6:03 AM   Result Value Ref Range    Sodium 132 (L) 135 - 147 mmol/L    Potassium 4 0 3 5 - 5 3 mmol/L    Chloride 97 96 - 108 mmol/L    CO2 29 21 - 32 mmol/L    ANION GAP 6 4 - 13 mmol/L    BUN 11 5 - 25 mg/dL    Creatinine 0 66 0 60 - 1 30 mg/dL    Glucose 113 65 - 140 mg/dL Glucose, Fasting 113 (H) 65 - 99 mg/dL    Calcium 9 3 8 4 - 10 2 mg/dL    eGFR 90 ml/min/1 73sq m   Fingerstick Glucose (POCT)    Collection Time: 05/15/23  7:30 AM   Result Value Ref Range    POC Glucose 115 65 - 140 mg/dl   Fingerstick Glucose (POCT)    Collection Time: 05/15/23 11:51 AM   Result Value Ref Range    POC Glucose 199 (H) 65 - 140 mg/dl   UA w Reflex to Microscopic w Reflex to Culture    Collection Time: 05/15/23  2:49 PM    Specimen: Urine, Other   Result Value Ref Range    Color, UA Straw Straw, Yellow, Pale Yellow    Clarity, UA Clear Clear, Other    Specific Gravity, UA 1 015 1 003 - 1 040    pH, UA 8 0 4 5, 5 0, 5 5, 6 0, 6 5, 7 0, 7 5, 8 0    Leukocytes, UA Negative Negative    Nitrite, UA Negative Negative    Protein, UA Negative Negative mg/dl    Glucose, UA Negative Negative mg/dl    Ketones, UA Negative Negative mg/dl    Bilirubin, UA Negative Negative    Occult Blood, UA Negative Negative    UROBILINOGEN UA Negative 1 0, Negative mg/dL   Fingerstick Glucose (POCT)    Collection Time: 05/15/23  4:14 PM   Result Value Ref Range    POC Glucose 189 (H) 65 - 140 mg/dl   Fingerstick Glucose (POCT)    Collection Time: 05/15/23  9:28 PM   Result Value Ref Range    POC Glucose 160 (H) 65 - 140 mg/dl   Fingerstick Glucose (POCT)    Collection Time: 05/16/23  7:12 AM   Result Value Ref Range    POC Glucose 118 65 - 140 mg/dl   Fingerstick Glucose (POCT)    Collection Time: 05/16/23 11:40 AM   Result Value Ref Range    POC Glucose 221 (H) 65 - 140 mg/dl   Fingerstick Glucose (POCT)    Collection Time: 05/16/23  4:00 PM   Result Value Ref Range    POC Glucose 171 (H) 65 - 140 mg/dl   Fingerstick Glucose (POCT)    Collection Time: 05/16/23  9:18 PM   Result Value Ref Range    POC Glucose 258 (H) 65 - 140 mg/dl   Basic metabolic panel    Collection Time: 05/17/23  6:22 AM   Result Value Ref Range    Sodium 135 135 - 147 mmol/L    Potassium 4 2 3 5 - 5 3 mmol/L    Chloride 97 96 - 108 mmol/L    CO2 30 21 - 32 mmol/L    ANION GAP 8 4 - 13 mmol/L    BUN 15 5 - 25 mg/dL    Creatinine 0 71 0 60 - 1 30 mg/dL    Glucose 135 65 - 140 mg/dL    Calcium 9 4 8 4 - 10 2 mg/dL    eGFR 87 ml/min/1 73sq m   Fingerstick Glucose (POCT)    Collection Time: 05/17/23  7:16 AM   Result Value Ref Range    POC Glucose 152 (H) 65 - 140 mg/dl   Fingerstick Glucose (POCT)    Collection Time: 05/17/23 11:32 AM   Result Value Ref Range    POC Glucose 242 (H) 65 - 140 mg/dl       Progress Toward Goals:  Progressing, tolerating Latuda 80mg  Well   Assessment     Principal Problem:    Bipolar affective disorder, depressed, severe, with psychotic behavior ([facility] [address] )  Active Problems:    Type 2 diabetes mellitus without complication, with long-term current use of insulin ([facility])    Essential hypertension    Hyponatremia    History of DVT of lower extremity    Dysphagia    UTI (urinary tract infection)    History of femur fracture    Medical clearance for psychiatric admission        Plan :    - Medications;   Psychiatric:   Latuda to 80mg daily for Bipolar depression and psychosis     Gabapentin 100mg BID for anxiety and pain   BMP  - Na is stable -  Continuing salt tablets and fluid restriction     Medical: per SLIM    -Therapy: occupational therapy, milieu and group therapy   -Disposition: Home when stable

## 2023-05-17 NOTE — TREATMENT TEAM
05/17/23 0853   Team Meeting   Meeting Type Daily Rounds   Initial Conference Date 05/17/23   Team Members Present   Team Members Present Physician;Nurse;Occupational Therapist;;   Physician Team Member Dr Shamika Boss Team Member VIPIN [facility] Management Team Member Jamari Butts Work Team Member [place]   OT Team Member Cameron RAMIREZ   Patient/Family Present   Patient Present No   Patient's Family Present No     Pleasant, bright, expressed paranoia regarding police, regressed, increase latuda

## 2023-05-17 NOTE — PROGRESS NOTES
Pt sleepy and calm during most of discussion  05/17/23 1100   Activity/Group Checklist   Group Other (Comment)  (short term problem solving)   Attendance Attended   Attendance Duration (min) 31-45   Interactions Other (Comment)  (slept)   Affect/Mood Calm   Goals Achieved Identified feelings; Identified relapse prevention strategies; Able to listen to others

## 2023-05-17 NOTE — NURSING NOTE
Patient calm and cooperative  Medications taken  Makes needs known  Visible in milieu  Denies all s/s of psych  Safety precautions maintained

## 2023-05-18 LAB
GLUCOSE SERPL-MCNC: 147 MG/DL (ref 65–140)
GLUCOSE SERPL-MCNC: 188 MG/DL (ref 65–140)
GLUCOSE SERPL-MCNC: 234 MG/DL (ref 65–140)
GLUCOSE SERPL-MCNC: 247 MG/DL (ref 65–140)

## 2023-05-18 RX ADMIN — INSULIN LISPRO 2 UNITS: 100 INJECTION, SOLUTION INTRAVENOUS; SUBCUTANEOUS at 21:23

## 2023-05-18 RX ADMIN — INSULIN GLARGINE 15 UNITS: 100 INJECTION, SOLUTION SUBCUTANEOUS at 21:23

## 2023-05-18 RX ADMIN — METOPROLOL SUCCINATE 37.5 MG: 25 TABLET, EXTENDED RELEASE ORAL at 08:39

## 2023-05-18 RX ADMIN — INSULIN LISPRO 2 UNITS: 100 INJECTION, SOLUTION INTRAVENOUS; SUBCUTANEOUS at 12:32

## 2023-05-18 RX ADMIN — SODIUM CHLORIDE 1 G: 1 TABLET ORAL at 16:53

## 2023-05-18 RX ADMIN — AMLODIPINE BESYLATE 2.5 MG: 2.5 TABLET ORAL at 08:39

## 2023-05-18 RX ADMIN — GABAPENTIN 100 MG: 100 CAPSULE ORAL at 08:38

## 2023-05-18 RX ADMIN — ACETAMINOPHEN 975 MG: 325 TABLET ORAL at 16:52

## 2023-05-18 RX ADMIN — ASPIRIN 81 MG: 81 TABLET, COATED ORAL at 08:39

## 2023-05-18 RX ADMIN — SODIUM CHLORIDE 1 G: 1 TABLET ORAL at 12:33

## 2023-05-18 RX ADMIN — SODIUM CHLORIDE 1 G: 1 TABLET ORAL at 08:38

## 2023-05-18 RX ADMIN — FAMOTIDINE 20 MG: 20 TABLET ORAL at 08:38

## 2023-05-18 RX ADMIN — TORSEMIDE 10 MG: 5 TABLET ORAL at 08:39

## 2023-05-18 RX ADMIN — GABAPENTIN 100 MG: 100 CAPSULE ORAL at 17:03

## 2023-05-18 RX ADMIN — ATORVASTATIN CALCIUM 40 MG: 40 TABLET, FILM COATED ORAL at 16:53

## 2023-05-18 RX ADMIN — DICLOFENAC SODIUM 2 G: 10 GEL TOPICAL at 09:50

## 2023-05-18 RX ADMIN — LURASIDONE HYDROCHLORIDE 80 MG: 80 TABLET, FILM COATED ORAL at 16:52

## 2023-05-18 RX ADMIN — FAMOTIDINE 20 MG: 20 TABLET ORAL at 17:03

## 2023-05-18 RX ADMIN — DOCUSATE SODIUM 100 MG: 100 CAPSULE, LIQUID FILLED ORAL at 17:03

## 2023-05-18 RX ADMIN — INSULIN LISPRO 1 UNITS: 100 INJECTION, SOLUTION INTRAVENOUS; SUBCUTANEOUS at 16:57

## 2023-05-18 RX ADMIN — DOCUSATE SODIUM 100 MG: 100 CAPSULE, LIQUID FILLED ORAL at 08:39

## 2023-05-18 RX ADMIN — CYANOCOBALAMIN TAB 1000 MCG 1000 MCG: 1000 TAB at 08:38

## 2023-05-18 NOTE — PROGRESS NOTES
"  Progress Note - Mae [age] y o  female MRN: [id number]  Unit/Bed#: Stephanie Samuel 738-98 Encounter: [id number]    The patient was seen for continuing care and reviewed with treatment team  Patient is calm and cooperative  Seen coloring with peers today  She is brighter reports improving well but does not feel ready to go home  She has been in contact with her family almost daily  No paranoid reported today, some PMR still noted but patient seems to be engaging more in self care  Sleep: Good  Appetite: fair 50-75 % of her meals  Energy: fair, improving  No suicidal or homicidal ideations  No EPS, denies any side effects of medication  /62 (BP Location: Right arm)   Pulse 80   Temp 97 6 °F (36 4 °C) (Temporal)   Resp 18   Wt 66 kg (145 lb 8 1 oz)   SpO2 96%   BMI 24 98 kg/m²     Current Mental Status Evaluation:  Appearance:  Adequate hygiene and grooming   Behavior:  calm, cooperative and friendly   Mood:   \" Less depressed\"   Affect: constricted, some reactivity   Speech: Loud and Slow   Thought Process:  Goal directed and coherent   Thought Content:  Does not verbalize delusional material   Perceptual Disturbances: Denies hallucinations and does not appear to be responding to internal stimuli   Risk Potential: No suicidal or homicidal ideation   Orientation:   Patient is alert, awake and oriented x 3   Patient 's insight, Judgement and impulse control are improving       Recent Results (from the past 72 hour(s))   Fingerstick Glucose (POCT)    Collection Time: 05/15/23  4:14 PM   Result Value Ref Range    POC Glucose 189 (H) 65 - 140 mg/dl   Fingerstick Glucose (POCT)    Collection Time: 05/15/23  9:28 PM   Result Value Ref Range    POC Glucose 160 (H) 65 - 140 mg/dl   Fingerstick Glucose (POCT)    Collection Time: 05/16/23  7:12 AM   Result Value Ref Range    POC Glucose 118 65 - 140 mg/dl   Fingerstick Glucose (POCT)    Collection Time: 05/16/23 11:40 AM   Result Value Ref " Range    POC Glucose 221 (H) 65 - 140 mg/dl   Fingerstick Glucose (POCT)    Collection Time: 05/16/23  4:00 PM   Result Value Ref Range    POC Glucose 171 (H) 65 - 140 mg/dl   Fingerstick Glucose (POCT)    Collection Time: 05/16/23  9:18 PM   Result Value Ref Range    POC Glucose 258 (H) 65 - 140 mg/dl   Basic metabolic panel    Collection Time: 05/17/23  6:22 AM   Result Value Ref Range    Sodium 135 135 - 147 mmol/L    Potassium 4 2 3 5 - 5 3 mmol/L    Chloride 97 96 - 108 mmol/L    CO2 30 21 - 32 mmol/L    ANION GAP 8 4 - 13 mmol/L    BUN 15 5 - 25 mg/dL    Creatinine 0 71 0 60 - 1 30 mg/dL    Glucose 135 65 - 140 mg/dL    Calcium 9 4 8 4 - 10 2 mg/dL    eGFR 87 ml/min/1 73sq m   Fingerstick Glucose (POCT)    Collection Time: 05/17/23  7:16 AM   Result Value Ref Range    POC Glucose 152 (H) 65 - 140 mg/dl   Fingerstick Glucose (POCT)    Collection Time: 05/17/23 11:32 AM   Result Value Ref Range    POC Glucose 242 (H) 65 - 140 mg/dl   Fingerstick Glucose (POCT)    Collection Time: 05/17/23  4:19 PM   Result Value Ref Range    POC Glucose 190 (H) 65 - 140 mg/dl   Fingerstick Glucose (POCT)    Collection Time: 05/17/23  8:48 PM   Result Value Ref Range    POC Glucose 198 (H) 65 - 140 mg/dl   Fingerstick Glucose (POCT)    Collection Time: 05/18/23  6:57 AM   Result Value Ref Range    POC Glucose 147 (H) 65 - 140 mg/dl   Fingerstick Glucose (POCT)    Collection Time: 05/18/23 11:20 AM   Result Value Ref Range    POC Glucose 247 (H) 65 - 140 mg/dl     Progress Toward Goals: progressing,tolerating medication well,  Sodium is stable       Assessment     Principal Problem:    Bipolar affective disorder, depressed, severe, with psychotic behavior ([facility] [address] )  Active Problems:    Type 2 diabetes mellitus without complication, with long-term current use of insulin (HCC)    Essential hypertension    Hyponatremia    History of DVT of lower extremity    Dysphagia    UTI (urinary tract infection)    History of femur fracture Medical clearance for psychiatric admission        Plan :    - Medications;   Psychiatric: She is tolerating the Latuda 80mg daily                      Medical: per [facility]                  Nephrology on board: continue salt tablets     -Therapy: occupational therapy, milieu and group therapy   -Disposition: She will return home with  and son when stable

## 2023-05-18 NOTE — NURSING NOTE
Vivienne Hamman is calm, pleasant, and cooperative on approach  Vivienne Hamman denies all  Rowena Hamman is isolative to self, visible on the unit  Vivienne Hamman states she is sleeping well  Vivienne Hamman has a fair appetite  Vivienne Hamman is slow to respond  Vivienne Hamman is anxious at times  Vivienne Hamman attends group  Rowena Joyan has a flat affect  Vivienne Hamman expressed concerns at her new roommate stating she was afraid of her  Verbal redirection was effective   Will continue to monitor and assess for safety

## 2023-05-18 NOTE — PLAN OF CARE
Problem: Potential for Falls  Goal: Patient will remain free of falls  Description: INTERVENTIONS:  - Educate patient/family on patient safety including physical limitations  - Instruct patient to call for assistance with activity   - Consult OT/PT to assist with strengthening/mobility   - Keep Call bell within reach  - Keep bed low and locked with side rails adjusted as appropriate  - Keep care items and personal belongings within reach  - Initiate and maintain comfort rounds  - Make Fall Risk Sign visible to staff  - Offer Toileting every  Hours, in advance of need  - Initiate/Maintain alarm  - Obtain necessary fall risk management equipment:  - Apply yellow socks and bracelet for high fall risk patients  - Consider moving patient to room near nurses station  Outcome: Progressing     Problem: DEPRESSION  Goal: Will be euthymic at discharge  Description: INTERVENTIONS:  - Administer medication as ordered  - Provide emotional support via 1:1 interaction with staff  - Encourage involvement in milieu/groups/activities  - Monitor for social isolation  Outcome: Progressing     Problem: PASCUAL  Goal: Will exhibit normal sleep and speech and no impulsivity  Description: INTERVENTIONS:  - Administer medication as ordered  - Set limits on impulsive behavior  - Make attempts to decrease external stimuli as possible  Outcome: Progressing     Problem: PSYCHOSIS  Goal: Will report no hallucinations or delusions  Description: Interventions:  - Administer medication as  ordered  - Every waking shifts and PRN assess for the presence of hallucinations and or delusions  - Assist with reality testing to support increasing orientation  - Assess if patient's hallucinations or delusions are encouraging self-harm or harm to others and intervene as appropriate  Outcome: Progressing     Problem: ANXIETY  Goal: Will report anxiety at manageable levels  Description: INTERVENTIONS:  - Administer medication as ordered  - Teach and encourage coping skills  - Provide emotional support  - Assess patient/family for anxiety and ability to cope  Outcome: Progressing     Problem: SELF CARE DEFICIT  Goal: Return ADL status to a safe level of function  Description: INTERVENTIONS:  - Administer medication as ordered  - Assess ADL deficits and provide assistive devices as needed  - Obtain PT/OT consults as needed  - Assist and instruct patient to increase activity and self care as tolerated  Outcome: Progressing     Problem: DISCHARGE PLANNING - CARE MANAGEMENT  Goal: Discharge to post-acute care or home with appropriate resources  Description: INTERVENTIONS:  - Conduct assessment to determine patient/family and health care team treatment goals, and need for post-acute services based on payer coverage, community resources, and patient preferences, and barriers to discharge  - Address psychosocial, clinical, and financial barriers to discharge as identified in assessment in conjunction with the patient/family and health care team  - Arrange appropriate level of post-acute services according to patient’s   needs and preference and payer coverage in collaboration with the physician and health care team  - Communicate with and update the patient/family, physician, and health care team regarding progress on the discharge plan  - Arrange appropriate transportation to post-acute venues  Outcome: Progressing     Problem: Nutrition/Hydration-ADULT  Goal: Nutrient/Hydration intake appropriate for improving, restoring or maintaining nutritional needs  Description: Monitor and assess patient's nutrition/hydration status for malnutrition  Collaborate with interdisciplinary team and initiate plan and interventions as ordered  Monitor patient's weight and dietary intake as ordered or per policy  Utilize nutrition screening tool and intervene as necessary  Determine patient's food preferences and provide high-protein, high-caloric foods as appropriate       INTERVENTIONS:  - Monitor oral intake, urinary output, labs, and treatment plans  - Assess nutrition and hydration status and recommend course of action  - Evaluate amount of meals eaten  - Assist patient with eating if necessary   - Allow adequate time for meals  - Recommend/ encourage appropriate diets, oral nutritional supplements, and vitamin/mineral supplements  - Order, calculate, and assess calorie counts as needed  - Recommend, monitor, and adjust tube feedings and TPN/PPN based on assessed needs  - Assess need for intravenous fluids  - Provide specific nutrition/hydration education as appropriate  - Include patient/family/caregiver in decisions related to nutrition  Outcome: Progressing     Problem: Ineffective Coping  Goal: Participates in unit activities  Description: Interventions:  - Provide therapeutic environment   - Provide required programming   - Redirect inappropriate behaviors   Outcome: Progressing

## 2023-05-18 NOTE — TREATMENT TEAM
05/18/23 0838   Team Meeting   Meeting Type Daily Rounds   Initial Conference Date 05/18/23   Team Members Present   Team Members Present Physician;Nurse;Occupational Therapist;;   Physician Team Member Dr Ankush Thomas Team Member [facility] [id number] Management Team Member Jamari Butts Work Team Member [place]   OT Team Member Joseph RAMIREZ   Patient/Family Present   Patient Present No   Patient's Family Present No     Reports anxiety at times, pleasant, social, sleeping at night, upset about room changes, attended group, spoke up in group, sodium fluctuates but stable

## 2023-05-18 NOTE — TREATMENT TEAM
Pt attended Positive reflection: favorite things group  Pt needed prompting to participate  Pt was able to reminisce and share her favorite place to visit was [place] with her grandkids and family  05/18/23 1330   Activity/Group Checklist   Group Other (Comment)  (Positive reflection: favorite things group)   Attendance Attended   Attendance Duration (min) 31-45   Interactions Other (Comment)  (needed prompting)   Affect/Mood Appropriate   Goals Achieved Identified feelings; Discussed coping strategies; Able to engage in interactions; Able to listen to others; Able to reflect/comment on own behavior;Able to manage/cope with feelings;Verbalized increased hopefulness; Able to self-disclose; Able to recieve feedback

## 2023-05-18 NOTE — NURSING NOTE
"Patient noted to be walking from the dining room with walker to the room back and forth  Asking staff to go with her and watch her get in bed  Patient partially assisted once with encouragement to perform ADL  She laid down in bed and got herself up and walked out the room with walker,  without any assistance physical assistance from staff  Patient walked to dayroom and within seconds walked back to the nurse's station  She asked for staff to watch her get in bed again  Patient encouraged to ambulate self to room as she's visible at nurses's station  Patient stated \"I'll report you all\" when her demands were unmet  Patient remains anxious and paranoid and despite the ability to perform ADL's she insists staff go with her to room/bathroom and perform ADLs/offer her extensive assistance  Patient redirected multiple time this tour by multiple staff and educated on safety, needing reinforcement    "

## 2023-05-18 NOTE — TREATMENT TEAM
05/18/23 6816 [facility]   Provider Name Annabelle Sanchez  Contacted provider-Order for U/A discontinued because patient had it done on 5/15 and resulted negative

## 2023-05-18 NOTE — PLAN OF CARE
Problem: Potential for Falls  Goal: Patient will remain free of falls  Description: INTERVENTIONS:  - Educate patient/family on patient safety including physical limitations  - Instruct patient to call for assistance with activity   - Consult OT/PT to assist with strengthening/mobility   - Keep Call bell within reach  - Keep bed low and locked with side rails adjusted as appropriate  - Keep care items and personal belongings within reach  - Initiate and maintain comfort rounds  - Make Fall Risk Sign visible to staff  - Offer Toileting every  Hours, in advance of need  - Initiate/Maintain alarm  - Obtain necessary fall risk management equipment:  - Apply yellow socks and bracelet for high fall risk patients  - Consider moving patient to room near nurses station  Outcome: Not Progressing     Problem: DEPRESSION  Goal: Will be euthymic at discharge  Description: INTERVENTIONS:  - Administer medication as ordered  - Provide emotional support via 1:1 interaction with staff  - Encourage involvement in milieu/groups/activities  - Monitor for social isolation  Outcome: Not Progressing     Problem: SELF CARE DEFICIT  Goal: Return ADL status to a safe level of function  Description: INTERVENTIONS:  - Administer medication as ordered  - Assess ADL deficits and provide assistive devices as needed  - Obtain PT/OT consults as needed  - Assist and instruct patient to increase activity and self care as tolerated  Outcome: Not Progressing     Problem: Ineffective Coping  Goal: Participates in unit activities  Description: Interventions:  - Provide therapeutic environment   - Provide required programming   - Redirect inappropriate behaviors   Outcome: Not Progressing

## 2023-05-18 NOTE — NURSING NOTE
Patient pleasant and cooperative  Medications taken with apple sauce  Makes needs known  Encouraged to actively perform ADLs this tour  Visible in milieu, social with select peers  Denies anxiety/depression  Denies AH/VH, SI/HI  Safety precautions maintained

## 2023-05-19 LAB
ANION GAP SERPL CALCULATED.3IONS-SCNC: 7 MMOL/L (ref 4–13)
BUN SERPL-MCNC: 14 MG/DL (ref 5–25)
CALCIUM SERPL-MCNC: 9.3 MG/DL (ref 8.4–10.2)
CHLORIDE SERPL-SCNC: 99 MMOL/L (ref 96–108)
CO2 SERPL-SCNC: 31 MMOL/L (ref 21–32)
CREAT SERPL-MCNC: 0.7 MG/DL (ref 0.6–1.3)
GFR SERPL CREATININE-BSD FRML MDRD: 88 ML/MIN/1.73SQ M
GLUCOSE SERPL-MCNC: 135 MG/DL (ref 65–140)
GLUCOSE SERPL-MCNC: 158 MG/DL (ref 65–140)
GLUCOSE SERPL-MCNC: 159 MG/DL (ref 65–140)
GLUCOSE SERPL-MCNC: 215 MG/DL (ref 65–140)
GLUCOSE SERPL-MCNC: 241 MG/DL (ref 65–140)
METANEPH FREE SERPL-MCNC: 18.4 PG/ML (ref 0–88)
NORMETANEPHRINE SERPL-MCNC: 70.6 PG/ML (ref 0–285.2)
POTASSIUM SERPL-SCNC: 4.3 MMOL/L (ref 3.5–5.3)
RENIN PLAS-CCNC: 2.28 NG/ML/HR (ref 0.17–5.38)
SODIUM SERPL-SCNC: 137 MMOL/L (ref 135–147)

## 2023-05-19 RX ADMIN — INSULIN LISPRO 1 UNITS: 100 INJECTION, SOLUTION INTRAVENOUS; SUBCUTANEOUS at 17:27

## 2023-05-19 RX ADMIN — GABAPENTIN 100 MG: 100 CAPSULE ORAL at 17:26

## 2023-05-19 RX ADMIN — ATORVASTATIN CALCIUM 40 MG: 40 TABLET, FILM COATED ORAL at 17:26

## 2023-05-19 RX ADMIN — INSULIN GLARGINE 15 UNITS: 100 INJECTION, SOLUTION SUBCUTANEOUS at 21:22

## 2023-05-19 RX ADMIN — TORSEMIDE 10 MG: 5 TABLET ORAL at 08:47

## 2023-05-19 RX ADMIN — SODIUM CHLORIDE 1 G: 1 TABLET ORAL at 08:47

## 2023-05-19 RX ADMIN — FAMOTIDINE 20 MG: 20 TABLET ORAL at 17:26

## 2023-05-19 RX ADMIN — ASPIRIN 81 MG: 81 TABLET, COATED ORAL at 08:48

## 2023-05-19 RX ADMIN — LURASIDONE HYDROCHLORIDE 80 MG: 80 TABLET, FILM COATED ORAL at 17:26

## 2023-05-19 RX ADMIN — INSULIN LISPRO 2 UNITS: 100 INJECTION, SOLUTION INTRAVENOUS; SUBCUTANEOUS at 21:22

## 2023-05-19 RX ADMIN — INSULIN LISPRO 1 UNITS: 100 INJECTION, SOLUTION INTRAVENOUS; SUBCUTANEOUS at 08:49

## 2023-05-19 RX ADMIN — DOCUSATE SODIUM 100 MG: 100 CAPSULE, LIQUID FILLED ORAL at 17:26

## 2023-05-19 RX ADMIN — DOCUSATE SODIUM 100 MG: 100 CAPSULE, LIQUID FILLED ORAL at 08:48

## 2023-05-19 RX ADMIN — GABAPENTIN 100 MG: 100 CAPSULE ORAL at 08:47

## 2023-05-19 RX ADMIN — SODIUM CHLORIDE 1 G: 1 TABLET ORAL at 12:15

## 2023-05-19 RX ADMIN — METOPROLOL SUCCINATE 37.5 MG: 25 TABLET, EXTENDED RELEASE ORAL at 08:46

## 2023-05-19 RX ADMIN — SODIUM CHLORIDE 1 G: 1 TABLET ORAL at 17:26

## 2023-05-19 RX ADMIN — CYANOCOBALAMIN TAB 1000 MCG 1000 MCG: 1000 TAB at 08:48

## 2023-05-19 RX ADMIN — INSULIN LISPRO 2 UNITS: 100 INJECTION, SOLUTION INTRAVENOUS; SUBCUTANEOUS at 12:16

## 2023-05-19 RX ADMIN — AMLODIPINE BESYLATE 2.5 MG: 2.5 TABLET ORAL at 08:47

## 2023-05-19 RX ADMIN — ACETAMINOPHEN 975 MG: 325 TABLET ORAL at 12:16

## 2023-05-19 RX ADMIN — ACETAMINOPHEN 975 MG: 325 TABLET ORAL at 21:21

## 2023-05-19 RX ADMIN — FAMOTIDINE 20 MG: 20 TABLET ORAL at 08:48

## 2023-05-19 NOTE — TREATMENT TEAM
05/19/23 0829   Team Meeting   Meeting Type Daily Rounds   Initial Conference Date 05/19/23   Team Members Present   Team Members Present Physician;Nurse;;; Occupational Therapist   Physician Team Member Dr Adonis Cotto Team Member [facility] Management Team Member Jamari Butts Work Team Member [place]   OT Team Member John RAMIREZ   Patient/Family Present   Patient Present No   Patient's Family Present No     Trying to increase independence with self care and ADLs, pt resistive and report unable to complete these tasks, cont to request staff for assistance

## 2023-05-19 NOTE — PROGRESS NOTES
NEPHROLOGY PROGRESS NOTE   Danii Fleming 70 y o  female MRN: [id number]  Unit/Bed#: Leila Justice 267-19 Encounter: [id number]        ASSESSMENT & PLAN    1  Hyponatremia  • Multifactorial  • Initial component of prerenal azotemia  • Urine studies consistent with SIADH  • Previous CT of the chest abdomen pelvis showed no evidence of malignancy  • Sodium slightly lower this morning  • 1 gram tid salt tabs and torsemide 10 mg daily and sodium now normalized  2  Bipolar disorder  • Ongoing management in psychiatric unit  3  Hypertension  • Now with some low bps d/c amlodipine  • Can continue metoprolol  • Does have bilateral adrenal adenomas  • Metanephrines, renin and Joey unreaveling  • Current blood pressures are acceptable     DISCUSSION:    Can check a repeat bmp next Monday  Will see as needed over the weekend please call with any questions or concerns    SUBJECTIVE:    No cp, sob, fevers, chills  Tolerating medications  Ambulating with a walker  Tolerating salt tablets    12 point review of systems was otherwise negative besides what is mentioned above      Medications:    Current Facility-Administered Medications:   •  acetaminophen (TYLENOL) tablet 650 mg, 650 mg, Oral, Q4H PRN, JOHN Tirado  •  acetaminophen (TYLENOL) tablet 650 mg, 650 mg, Oral, Q4H PRN, JOHN Tirado, 650 mg at 05/11/23 3527  •  acetaminophen (TYLENOL) tablet 975 mg, 975 mg, Oral, Q6H PRN, JOHN Tirado, 975 mg at 05/19/23 1216  •  amLODIPine (NORVASC) tablet 2 5 mg, 2 5 mg, Oral, Daily, Aubrey Olsen DO, 2 5 mg at 05/19/23 0847  •  aspirin (ECOTRIN LOW STRENGTH) EC tablet 81 mg, 81 mg, Oral, Daily, Vester Angelucci, CRNP, 81 mg at 05/19/23 0848  •  atorvastatin (LIPITOR) tablet 40 mg, 40 mg, Oral, Daily With JOHN Ray, 40 mg at 05/18/23 1653  •  bisacodyl (DULCOLAX) rectal suppository 10 mg, 10 mg, Rectal, Daily PRN, James Keys MD, 10 mg at 05/03/23 1917  •  cyanocobalamin (VITAMIN B-12) tablet 1,000 mcg, 1,000 mcg, Oral, Daily, JOHN Pizarro, 1,000 mcg at 05/19/23 0848  •  Diclofenac Sodium (VOLTAREN) 1 % topical gel 2 g, 2 g, Topical, 4x Daily PRN, Volodymyr Friedman MD, 2 g at 05/18/23 0950  •  docusate sodium (COLACE) capsule 100 mg, 100 mg, Oral, BID, Mariia Bar PA-C, 100 mg at 05/19/23 0848  •  famotidine (PEPCID) tablet 20 mg, 20 mg, Oral, BID, Mariia Bar PA-C, 20 mg at 05/19/23 0848  •  gabapentin (NEURONTIN) capsule 100 mg, 100 mg, Oral, BID, Jeovanny Hamilton MD, 100 mg at 05/19/23 0847  •  haloperidol lactate (HALDOL) injection 5 mg, 5 mg, Intramuscular, Q4H PRN Max 4/day, JOHN Richardson  •  insulin glargine (LANTUS) subcutaneous injection 15 Units 0 15 mL, 15 Units, Subcutaneous, HS, JOHN Pizarro, 15 Units at 05/18/23 2123  •  insulin lispro (HumaLOG) 100 units/mL subcutaneous injection 1-5 Units, 1-5 Units, Subcutaneous, TID AC, 2 Units at 05/19/23 1216 **AND** Fingerstick Glucose (POCT), , , TID AC, JOHN Pizarro  •  insulin lispro (HumaLOG) 100 units/mL subcutaneous injection 1-5 Units, 1-5 Units, Subcutaneous, HS, JOHN Pizarro, 2 Units at 05/18/23 2123  •  lurasidone (LATUDA) tablet 80 mg, 80 mg, Oral, Daily With Gabriele Dugan MD, 80 mg at 05/18/23 1652  •  metoprolol succinate (TOPROL-XL) 24 hr tablet 37 5 mg, 37 5 mg, Oral, Daily, JOHN Alston, 37 5 mg at 05/19/23 0846  •  polyethylene glycol (MIRALAX) packet 17 g, 17 g, Oral, Daily PRN, Devon Pierre PA-C, 17 g at 05/13/23 1005  •  risperiDONE (RisperDAL) tablet 0 25 mg, 0 25 mg, Oral, Q4H PRN Max 6/day, JOHN Richardson  •  risperiDONE (RisperDAL) tablet 0 5 mg, 0 5 mg, Oral, Q4H PRN Max 3/day, JOHN Richardson  •  risperiDONE (RisperDAL) tablet 1 mg, 1 mg, Oral, Q2H PRN Max 3/day, JOHN Richardson  •  sodium chloride tablet 1 g, 1 g, Oral, TID With Meals, Joselin Olivera DO, 1 g at 05/19/23 1215  •  torsemide [facility]) tablet 10 mg, 10 mg, Oral, Daily, [facility] Olsen, DO, 10 mg at 05/19/23 0847  •  traZODone (DESYREL) tablet 50 mg, 50 mg, Oral, HS PRN, Regenia Maker, CRNP, 50 mg at 05/17/23 2149    OBJECTIVE:    Vitals:    05/18/23 0956 05/18/23 1535 05/18/23 2029 05/19/23 0751   BP:  153/74 97/59 153/67   BP Location:  Left arm Right arm Left arm   Pulse:  84 79 78   Resp:  18 18 17   Temp:  98 °F (36 7 °C) 97 6 °F (36 4 °C) (!) 97 2 °F (36 2 °C)   TempSrc:  Temporal Temporal Temporal   SpO2:  99% 93% 98%   Weight: 66 kg (145 lb 8 1 oz)           Temp:  [97 2 °F (36 2 °C)-98 °F (36 7 °C)] 97 2 °F (36 2 °C)  HR:  [78-84] 78  Resp:  [17-18] 17  BP: ()/(59-74) 153/67  SpO2:  [93 %-99 %] 98 %     Body mass index is 24 98 kg/m²  Weight (last 2 days)     Date/Time Weight    05/18/23 0956 66 (145 5)    05/18/23 0600 66 (145 5)    05/17/23 0719 66 4 (146 39)          I/O last 3 completed shifts: In: 840 [P O :840]  Out: -     I/O this shift:   In: 5 [P O :420]  Out: -       Physical exam:    General: no acute distress, cooperative  Eyes: conjunctivae pink, anicteric sclerae  ENT: lips and mucous membranes moist, no exudates, normal external ears  Neck: ROM intact, no JVD  Chest: No respiratory distress, no accessory muscle use  CVS: normal rate, non pericardial friction rub  Abdomen: soft, non-tender, non-distended, normoactive bowel sounds  Extremities: no edema of both legs  Skin: no rash  Neuro: awake, alert, oriented, grossly intact  Psych:  Pleasant affect    Invasive Devices:      Lab Results:   Results from last 7 days   Lab Units 05/19/23  0505 05/17/23  0622 05/15/23  1449 05/15/23  0603   POTASSIUM mmol/L 4 3 4 2  --  4 0   CHLORIDE mmol/L 99 97  --  97   CO2 mmol/L 31 30  --  29   BUN mg/dL 14 15  --  11   CREATININE mg/dL 0 70 0 71  --  0 66   CALCIUM mg/dL 9 3 9 4  --  9 3   LEUKOCYTES UA   --   --  Negative  --    BLOOD UA   --   --  Negative  --          Portions of the "record may have been created with voice recognition software  Occasional wrong word or \"sound a like\" substitutions may have occurred due to the inherent limitations of voice recognition software  Read the chart carefully and recognize, using context, where substitutions have occurred  If you have any questions, please contact the dictating provider      "

## 2023-05-19 NOTE — NURSING NOTE
Patient reports anxiety and depression  She demonstrates feelings of helplessness  She is present in milieu  Expresses somatic concerns frequently  Minimal engagement with peers

## 2023-05-19 NOTE — PROGRESS NOTES
05/19/23 1000 05/19/23 1330   Activity/Group Checklist   Group Community [facility]  [facility] and relating to positive words ) Life Skills  (password game)   Attendance Attended Attended   Attendance Duration (min) 16-30 46-60   Interactions Interacted appropriately  (Pt  needed extra time/ staff prompting yet was focused to topic  Pt left unit post group to see a Dr  and was not redirectable at that time yet returned calmly to apologize for not listening to the staff as they explained Dr  would approach her when able ) Interacted appropriately  (pt  inititally reported that reading the Bible and sewing were her only leisure interests  Pt  able to recall words appropriately in password game today )   Affect/Mood Other (Comment)  (tone soft yet able to remain in topic ) Appropriate;Calm;Normal range   Goals Achieved Able to listen to others; Able to engage in interactions; Discussed coping strategies; Identified feelings Able to listen to others; Able to engage in interactions

## 2023-05-19 NOTE — PROGRESS NOTES
"  Progress Note - Mae [age] y o  female MRN: [id number]  Unit/Bed#: Sarah Garcia 243-01 Encounter: [id number]    The patient was seen for continuing care and reviewed with treatment team   Pennie Bashir was sitting in group and seeming to enjoy herself  She walked with me down the diaz to talk  She continues with some PMR  She states that her mood is an 8/10  She slept well last night and is eating adequately  I asked her about the possibility of doing a MOCA and she asked me if we have to do that  I let her know that it is voluntary and she said, \"We'll see on Monday  \" I was able to get her to smile once during our conversation  She retained what the Nephrologist said to her about her sodium level and salt tablets  Current Mental Status Evaluation:  Appearance:  Adequate hygiene and grooming   Behavior:  calm, cooperative and psychomotor retardation   Mood:  Uncertain   Affect: blunted   Speech: Soft and Normal rate   Thought Process:  Goal directed and coherent   Thought Content:  Does not verbalize delusional material   Perceptual Disturbances: Denies hallucinations and does not appear to be responding to internal stimuli   Risk Potential: No suicidal or homicidal ideation   Orientation:   Oriented x 3     Progress Toward Goals: No significant events in the past 24 hours, Slowly improving, Tolerating medications  Principal Problem:    Bipolar affective disorder, depressed, severe, with psychotic behavior ([facility] [address] )  Active Problems:    Type 2 diabetes mellitus without complication, with long-term current use of insulin ([facility])    Essential hypertension    Hyponatremia    History of DVT of lower extremity    Dysphagia    UTI (urinary tract infection)    History of femur fracture    Medical clearance for psychiatric admission    Discharge planning update:Disposition to be determined    Recommended Treatment: Continue with pharmacotherapy, group therapy, milieu therapy and occupational therapy    The " patient will be maintained on the following medications:  Current Facility-Administered Medications   Medication Dose Route Frequency Provider Last Rate   • acetaminophen  650 mg Oral Q4H PRN JOHN Miranda     • acetaminophen  650 mg Oral Q4H PRN JHON Miranda     • acetaminophen  975 mg Oral Q6H PRN KARLEE MirandaNP     • amLODIPine  2 5 mg Oral Daily Nathan Olsen DO     • aspirin  81 mg Oral Daily JOHN Cintron     • atorvastatin  40 mg Oral Daily With JOHN Whitt     • bisacodyl  10 mg Rectal Daily PRN Terrence Garcia MD     • cyanocobalamin  1,000 mcg Oral Daily JOHN Cintron     • Diclofenac Sodium  2 g Topical 4x Daily PRN Volodymyr Friedman MD     • docusate sodium  100 mg Oral BID Macario Lyons PA-C     • famotidine  20 mg Oral BID Macario Lyons PA-C     • gabapentin  100 mg Oral BID Terrence Garcia MD     • haloperidol lactate  5 mg Intramuscular Q4H PRN Max 4/day JOHN Miranda     • insulin glargine  15 Units Subcutaneous HS JOHN Cintron     • insulin lispro  1-5 Units Subcutaneous TID AC JOHN Cintron     • insulin lispro  1-5 Units Subcutaneous HS JOHN Cintron     • lurasidone  80 mg Oral Daily With Berenice Gallegos MD     • metoprolol succinate  37 5 mg Oral Daily JOHN Alston     • polyethylene glycol  17 g Oral Daily PRN Macario Lyons PA-C     • risperiDONE  0 25 mg Oral Q4H PRN Max 6/day JOHN Miranda     • risperiDONE  0 5 mg Oral Q4H PRN Max 3/day JOHN Miranda     • risperiDONE  1 mg Oral Q2H PRN Max 3/day KARLEE MirandaNP     • sodium chloride  1 g Oral TID With Meals Manny Gil, DO     • torsemide  10 mg Oral Daily Nathan Olsen DO     • traZODone  50 mg Oral HS PRN JOHN Miranda       PLAN:  1  Continue with current medication regimen  2   Possible MOCA on Monday

## 2023-05-19 NOTE — TREATMENT TEAM
05/19/23 1100   Activity/Group Checklist   Group Other (Comment)  ( Recovery: supports and MARIANA)   Attendance Attended   Attendance Duration (min) 31-45   Interactions Interacted appropriately   Affect/Mood Other (Comment)  (needed prompting)   Goals Achieved Identified feelings; Discussed self-esteem issues; Identified relapse prevention strategies; Able to listen to others; Able to engage in interactions; Identified resources and support systems; Able to self-disclose;Discussed coping strategies

## 2023-05-20 LAB
GLUCOSE SERPL-MCNC: 147 MG/DL (ref 65–140)
GLUCOSE SERPL-MCNC: 182 MG/DL (ref 65–140)
GLUCOSE SERPL-MCNC: 192 MG/DL (ref 65–140)
GLUCOSE SERPL-MCNC: 198 MG/DL (ref 65–140)

## 2023-05-20 RX ORDER — LIDOCAINE 50 MG/G
1 PATCH TOPICAL DAILY
Status: DISCONTINUED | OUTPATIENT
Start: 2023-05-20 | End: 2023-05-30 | Stop reason: HOSPADM

## 2023-05-20 RX ADMIN — FAMOTIDINE 20 MG: 20 TABLET ORAL at 08:56

## 2023-05-20 RX ADMIN — ACETAMINOPHEN 975 MG: 325 TABLET ORAL at 12:53

## 2023-05-20 RX ADMIN — INSULIN LISPRO 1 UNITS: 100 INJECTION, SOLUTION INTRAVENOUS; SUBCUTANEOUS at 17:26

## 2023-05-20 RX ADMIN — DOCUSATE SODIUM 100 MG: 100 CAPSULE, LIQUID FILLED ORAL at 08:56

## 2023-05-20 RX ADMIN — LIDOCAINE 1 PATCH: 700 PATCH TOPICAL at 10:58

## 2023-05-20 RX ADMIN — GABAPENTIN 100 MG: 100 CAPSULE ORAL at 08:56

## 2023-05-20 RX ADMIN — CYANOCOBALAMIN TAB 1000 MCG 1000 MCG: 1000 TAB at 08:56

## 2023-05-20 RX ADMIN — SODIUM CHLORIDE 1 G: 1 TABLET ORAL at 12:51

## 2023-05-20 RX ADMIN — INSULIN LISPRO 1 UNITS: 100 INJECTION, SOLUTION INTRAVENOUS; SUBCUTANEOUS at 12:51

## 2023-05-20 RX ADMIN — TORSEMIDE 10 MG: 5 TABLET ORAL at 08:56

## 2023-05-20 RX ADMIN — INSULIN LISPRO 1 UNITS: 100 INJECTION, SOLUTION INTRAVENOUS; SUBCUTANEOUS at 21:36

## 2023-05-20 RX ADMIN — DOCUSATE SODIUM 100 MG: 100 CAPSULE, LIQUID FILLED ORAL at 17:25

## 2023-05-20 RX ADMIN — TRAZODONE HYDROCHLORIDE 50 MG: 50 TABLET ORAL at 21:41

## 2023-05-20 RX ADMIN — METOPROLOL SUCCINATE 37.5 MG: 25 TABLET, EXTENDED RELEASE ORAL at 08:57

## 2023-05-20 RX ADMIN — SODIUM CHLORIDE 1 G: 1 TABLET ORAL at 08:56

## 2023-05-20 RX ADMIN — GABAPENTIN 100 MG: 100 CAPSULE ORAL at 17:25

## 2023-05-20 RX ADMIN — SODIUM CHLORIDE 1 G: 1 TABLET ORAL at 17:25

## 2023-05-20 RX ADMIN — INSULIN GLARGINE 15 UNITS: 100 INJECTION, SOLUTION SUBCUTANEOUS at 21:36

## 2023-05-20 RX ADMIN — ASPIRIN 81 MG: 81 TABLET, COATED ORAL at 08:56

## 2023-05-20 RX ADMIN — FAMOTIDINE 20 MG: 20 TABLET ORAL at 17:25

## 2023-05-20 RX ADMIN — ATORVASTATIN CALCIUM 40 MG: 40 TABLET, FILM COATED ORAL at 17:25

## 2023-05-20 RX ADMIN — LURASIDONE HYDROCHLORIDE 80 MG: 80 TABLET, FILM COATED ORAL at 17:25

## 2023-05-20 NOTE — NURSING NOTE
Patient denies symptoms other then right leg pain  Pain was unrelieved by both tylenol and lidocaine patch  She is present in the milieu  Limited interactions with others

## 2023-05-20 NOTE — NURSING NOTE
Patient was withdrawn to her room lying in bed quietly  VSS  Denies all psych s/s but c/o right leg pain 10/10  No behaviors noted  Took her HS medications  Safety checks ongoing

## 2023-05-20 NOTE — PLAN OF CARE
Problem: Potential for Falls  Goal: Patient will remain free of falls  Description: INTERVENTIONS:  - Educate patient/family on patient safety including physical limitations  - Instruct patient to call for assistance with activity   - Consult OT/PT to assist with strengthening/mobility   - Keep Call bell within reach  - Keep bed low and locked with side rails adjusted as appropriate  - Keep care items and personal belongings within reach  - Initiate and maintain comfort rounds  - Make Fall Risk Sign visible to staff  - Offer Toileting every Hours, in advance of need  - Initiate/Maintain alarm  - Obtain necessary fall risk management equipment:   Problem: PSYCHOSIS  Goal: Will report no hallucinations or delusions  Description: Interventions:  - Administer medication as  ordered  - Every waking shifts and PRN assess for the presence of hallucinations and or delusions  - Assist with reality testing to support increasing orientation  - Assess if patient's hallucinations or delusions are encouraging self-harm or harm to others and intervene as appropriate  Outcome: Progressing     - Apply yellow socks and bracelet for high fall risk patients  - Consider moving patient to room near nurses station  Outcome: Progressing

## 2023-05-20 NOTE — PROGRESS NOTES
"Progress Note - Gary [age] y o  female MRN: @MRN   Unit/Bed#Anabel Rowley 173-74 Encounter: [id number]    Per nursing report and sign out, patient has been somatic focused and acts helpless to do things for self at times but did sleep  Mika Torres reports today that \"I have been doing ok\"  Sleep is delayed with current sleep regiment, but otherwise no complaints  Feels depression and anxiety are both managed and low  No SI, Hi, no AVH  R leg pain is 10/10  When asked if it had hurt more in past she said yes  I tried to use that as reference and she persisted with 10/10  Does not appear in distress, calm, and not wincing or having issue with ambulation (uses walker)  Not congruent with presentation or personal stated history  Denies other physical issues or concerns       Energy: low  Sleep: insomnia  Appetite: poor  Medication side effects: No   ROS: all other systems are negative    Mental Status Evaluation:    Appearance:  age appropriate   Behavior:  pleasant, cooperative, with good eye contact   Speech:  Normal volume, regular rate and rhythm   Mood:  euthymic   Affect:  dysphoric, constricted       Thought Process:  Linear and goal directed   Associations: intact associations   Thought Content:  negative thinking and cognitive distortions, no overt delusions   Perceptual Disturbances: no auditory or visual hallcunations   Risk Potential: Suicidal ideation - None  Homicidal ideation - None  Potential for aggression - No   Sensorium:  Grossly oriented   Cognition:  grossly intact   Consciousness:  Alert & Awake   Memory:  recent and remote memory grossly intact   Attention: attention span and concentration are age appropriate           Insight:  limited   Judgment: limited   Muscle Strength  Muscle Tone normal  normal   Gait/Station: uses walker   Motor Activity: no abnormal movements       Vital signs in last 24 hours:    Temp:  [97 3 °F (36 3 °C)-97 6 °F (36 4 °C)] 97 3 °F (36 3 " °C)  HR:  [77-87] 87  Resp:  [16-18] 18  BP: (121-141)/(60-65) 141/65    Laboratory results:  I have personally reviewed all pertinent laboratory/tests results  Assessment/Plan   Principal Problem:    Bipolar affective disorder, depressed, severe, with psychotic behavior ([facility] [address] )  Active Problems:    Type 2 diabetes mellitus without complication, with long-term current use of insulin ([facility])    Essential hypertension    Hyponatremia    History of DVT of lower extremity    Dysphagia    UTI (urinary tract infection)    History of femur fracture    Medical clearance for psychiatric admission      Anselmo Robles is seeming to make progress  Recommended Treatment:     Planned medication and treatment changes: All current active medications have been reviewed    Encourage group therapy, milieu therapy and occupational therapy  [facility] checks as unit standard unless ordered or noted otherwise    Current Facility-Administered Medications   Medication Dose Route Frequency Provider Last Rate   • acetaminophen  650 mg Oral Q4H PRN JOHN Miranda     • acetaminophen  650 mg Oral Q4H PRN JOHN Miranda     • acetaminophen  975 mg Oral Q6H PRN JOHN Miranda     • aspirin  81 mg Oral Daily JOHN Cintron     • atorvastatin  40 mg Oral Daily With JOHN Whitt     • bisacodyl  10 mg Rectal Daily PRN Terrence Garcia MD     • cyanocobalamin  1,000 mcg Oral Daily JOHN Cintron     • Diclofenac Sodium  2 g Topical 4x Daily PRN Volodymyr Friedman MD     • docusate sodium  100 mg Oral BID GenicREILLY PuentesC     • famotidine  20 mg Oral BID Macario Lyons PA-C     • gabapentin  100 mg Oral BID Terrence Garcia MD     • haloperidol lactate  5 mg Intramuscular Q4H PRN Max 4/day JOHN Miranda     • insulin glargine  15 Units Subcutaneous HS JOHN Cintron     • insulin lispro  1-5 Units Subcutaneous TID AC Radha Allen JOHN     • insulin lispro  1-5 Units Subcutaneous HS JOHN Garcia     • lidocaine  1 patch Topical Daily Nasra Goins PA-C     • lurasidone  80 mg Oral Daily With Delilah Batista MD     • metoprolol succinate  37 5 mg Oral Daily JOHN Alston     • polyethylene glycol  17 g Oral Daily PRN Nasra Goins PA-C     • risperiDONE  0 25 mg Oral Q4H PRN Max 6/day Vilinda Boeck, CRNP     • risperiDONE  0 5 mg Oral Q4H PRN Max 3/day Vilinda Boeck, CRNP     • risperiDONE  1 mg Oral Q2H PRN Max 3/day Vilinda Boeck, CRNP     • sodium chloride  1 g Oral TID With Meals Annalisa Sherman DO     • torsemide  10 mg Oral Daily Mónica Olsen DO     • traZODone  50 mg Oral HS PRN Vilinda Boeck, CRNP         1) continue current treatment  2) Continue to support patient and engage them in the programs available as feasible and appropriate  Continue case management support and therapy  Continue discharge planning  Risks / Benefits of Treatment:    Risks, benefits, and possible side effects of medications explained to patient and patient verbalizes understanding and agreement for treatment  Counseling / Coordination of Care:    Medication changes reviewed with nursing staff  Medications, treatment progress and treatment plan reviewed with patient        Yang Dorantes III, DO  5/20/2023  9:16 AM

## 2023-05-20 NOTE — NURSING NOTE
C/o right leg pain 10/10, tylenol 975 mg given at 2121 was effective as patient was asleep on reassessment

## 2023-05-21 LAB
GLUCOSE SERPL-MCNC: 153 MG/DL (ref 65–140)
GLUCOSE SERPL-MCNC: 203 MG/DL (ref 65–140)
GLUCOSE SERPL-MCNC: 206 MG/DL (ref 65–140)
GLUCOSE SERPL-MCNC: 209 MG/DL (ref 65–140)

## 2023-05-21 RX ADMIN — INSULIN LISPRO 1 UNITS: 100 INJECTION, SOLUTION INTRAVENOUS; SUBCUTANEOUS at 21:27

## 2023-05-21 RX ADMIN — ASPIRIN 81 MG: 81 TABLET, COATED ORAL at 09:12

## 2023-05-21 RX ADMIN — SODIUM CHLORIDE 1 G: 1 TABLET ORAL at 09:12

## 2023-05-21 RX ADMIN — LURASIDONE HYDROCHLORIDE 80 MG: 80 TABLET, FILM COATED ORAL at 17:09

## 2023-05-21 RX ADMIN — TORSEMIDE 10 MG: 5 TABLET ORAL at 09:12

## 2023-05-21 RX ADMIN — INSULIN LISPRO 1 UNITS: 100 INJECTION, SOLUTION INTRAVENOUS; SUBCUTANEOUS at 09:13

## 2023-05-21 RX ADMIN — LIDOCAINE 1 PATCH: 700 PATCH TOPICAL at 09:13

## 2023-05-21 RX ADMIN — INSULIN LISPRO 1 UNITS: 100 INJECTION, SOLUTION INTRAVENOUS; SUBCUTANEOUS at 17:09

## 2023-05-21 RX ADMIN — DOCUSATE SODIUM 100 MG: 100 CAPSULE, LIQUID FILLED ORAL at 17:09

## 2023-05-21 RX ADMIN — POLYETHYLENE GLYCOL 3350 17 G: 17 POWDER, FOR SOLUTION ORAL at 09:25

## 2023-05-21 RX ADMIN — CYANOCOBALAMIN TAB 1000 MCG 1000 MCG: 1000 TAB at 09:12

## 2023-05-21 RX ADMIN — ACETAMINOPHEN 975 MG: 325 TABLET ORAL at 12:10

## 2023-05-21 RX ADMIN — SODIUM CHLORIDE 1 G: 1 TABLET ORAL at 12:10

## 2023-05-21 RX ADMIN — FAMOTIDINE 20 MG: 20 TABLET ORAL at 09:12

## 2023-05-21 RX ADMIN — METOPROLOL SUCCINATE 37.5 MG: 25 TABLET, EXTENDED RELEASE ORAL at 09:12

## 2023-05-21 RX ADMIN — INSULIN LISPRO 1 UNITS: 100 INJECTION, SOLUTION INTRAVENOUS; SUBCUTANEOUS at 12:10

## 2023-05-21 RX ADMIN — FAMOTIDINE 20 MG: 20 TABLET ORAL at 17:09

## 2023-05-21 RX ADMIN — ATORVASTATIN CALCIUM 40 MG: 40 TABLET, FILM COATED ORAL at 17:09

## 2023-05-21 RX ADMIN — SODIUM CHLORIDE 1 G: 1 TABLET ORAL at 17:09

## 2023-05-21 RX ADMIN — INSULIN GLARGINE 15 UNITS: 100 INJECTION, SOLUTION SUBCUTANEOUS at 21:28

## 2023-05-21 RX ADMIN — GABAPENTIN 100 MG: 100 CAPSULE ORAL at 17:09

## 2023-05-21 RX ADMIN — DOCUSATE SODIUM 100 MG: 100 CAPSULE, LIQUID FILLED ORAL at 09:12

## 2023-05-21 RX ADMIN — GABAPENTIN 100 MG: 100 CAPSULE ORAL at 09:12

## 2023-05-21 NOTE — NURSING NOTE
Patient's room was changed expresses a lot of distress r/t this  She expresses concerns that her room was changed because she is moving to a nursing home  She asked to be shown to her new room multiple times and have furniture moved and was accommodated  She has other frequent illogical concerns such as if the dining room has moved

## 2023-05-21 NOTE — PROGRESS NOTES
Progress Note - Gary [age] y o  female MRN: @MRN   Unit/Bed#[facility]al[facility] Idol 441-16 Encounter: [id number]    Per nursing report and sign out, patient continues to complain of leg pain, lidocaine helping some  Very somatic focused  Dependent behavior, brightens when accomodated    Rolando Mclean reports today that she is 'ok'  Depression and anxiety both 5/10  Leg pain still 10/10 and also says lidocaine helping 'a little bit'  Still very fixated on pain always being a 10 no matter what  No SI, HI, no AVH  No other complaints    Energy: low  Sleep: normal  Appetite: low, not new  Medication side effects: No   ROS: all other systems are negative    Mental Status Evaluation:    Appearance:  age appropriate   Behavior:  pleasant, cooperative, with good eye contact   Speech:  Normal volume, regular rate and rhythm   Mood:  dysphoric, anxious   Affect:  dysphoric, constricted       Thought Process:  Linear and goal directed   Associations: intact associations   Thought Content:  negative thinking and cognitive distortions   Perceptual Disturbances: no auditory or visual hallcunations   Risk Potential: Suicidal ideation - None  Homicidal ideation - None  Potential for aggression - No   Sensorium:  Grossly oriented   Cognition:  grossly intact   Consciousness:  Alert & Awake   Memory:  recent and remote memory grossly intact   Attention: attention span and concentration are age appropriate           Insight:  limited   Judgment: limited   Muscle Strength  Muscle Tone normal  normal   Gait/Station: uses walker   Motor Activity: no abnormal movements       Vital signs in last 24 hours:    Temp:  [97 5 °F (36 4 °C)] 97 5 °F (36 4 °C)  HR:  [74-86] 78  Resp:  [16-18] 18  BP: (115-127)/(58-62) 123/60    Laboratory results:  I have personally reviewed all pertinent laboratory/tests results      Assessment/Plan   Principal Problem:    Bipolar affective disorder, depressed, severe, with psychotic behavior [facility])  Active Problems:    Type 2 diabetes mellitus without complication, with long-term current use of insulin ([facility])    Essential hypertension    Hyponatremia    History of DVT of lower extremity    Dysphagia    UTI (urinary tract infection)    History of femur fracture    Medical clearance for psychiatric admission      Lisa Mendiola is about the same    Recommended Treatment:     Planned medication and treatment changes: All current active medications have been reviewed    Encourage group therapy, milieu therapy and occupational therapy  [address] checks as unit standard unless ordered or noted otherwise    Current Facility-Administered Medications   Medication Dose Route Frequency Provider Last Rate   • acetaminophen  650 mg Oral Q4H PRN JOHN Holder     • acetaminophen  650 mg Oral Q4H PRN JOHN Holder     • acetaminophen  975 mg Oral Q6H PRN JOHN Holder     • aspirin  81 mg Oral Daily JOHN Duarte     • atorvastatin  40 mg Oral Daily With JOHN Wilkerson     • bisacodyl  10 mg Rectal Daily PRN Madeline Leong MD     • cyanocobalamin  1,000 mcg Oral Daily JOHN Duarte     • Diclofenac Sodium  2 g Topical 4x Daily PRN Volodymyr Friedman MD     • docusate sodium  100 mg Oral BID Tania Lang PA-C     • famotidine  20 mg Oral BID Tania Lang PA-C     • gabapentin  100 mg Oral BID Madeline Leong MD     • haloperidol lactate  5 mg Intramuscular Q4H PRN Max 4/day JOHN Holder     • insulin glargine  15 Units Subcutaneous HS JOHN Duarte     • insulin lispro  1-5 Units Subcutaneous TID AC JOHN Duarte     • insulin lispro  1-5 Units Subcutaneous HS JOHN Duarte     • lidocaine  1 patch Topical Daily Tania Lang PA-C     • lurasidone  80 mg Oral Daily With Lamberto Lee MD     • metoprolol succinate  37 5 mg Oral Daily JOHN Alston     • polyethylene glycol  17 g Oral Daily PRN Tania Lang PA-C     • risperiDONE  0 25 mg Oral Q4H PRN Max 6/day Jeimy Esmerge, CRNP     • risperiDONE  0 5 mg Oral Q4H PRN Max 3/day Jeimy Grange, CRNP     • risperiDONE  1 mg Oral Q2H PRN Max 3/day Jeimy Grange, CRNP     • sodium chloride  1 g Oral TID With Meals Marcella Perdue, DO     • torsemide  10 mg Oral Daily Omar Olsen DO     • traZODone  50 mg Oral HS PRN Jeimydk Stanton, CRNP         1) continue current treatment  2) Continue to support patient and engage them in the programs available as feasible and appropriate  Continue case management support and therapy  Continue discharge planning  Risks / Benefits of Treatment:    Risks, benefits, and possible side effects of medications explained to patient and patient verbalizes understanding and agreement for treatment  Counseling / Coordination of Care:    Medication changes reviewed with nursing staff  Medications, treatment progress and treatment plan reviewed with patient        Mikael Durant III, DO  5/21/2023  8:20 AM

## 2023-05-21 NOTE — PLAN OF CARE
Problem: Potential for Falls  Goal: Patient will remain free of falls  Description: INTERVENTIONS:  - Educate patient/family on patient safety including physical limitations  - Instruct patient to call for assistance with activity   - Consult OT/PT to assist with strengthening/mobility   - Keep Call bell within reach  - Keep bed low and locked with side rails adjusted as appropriate  - Keep care items and personal belongings within reach  - Initiate and maintain comfort rounds  - Make Fall Risk Sign visible to staff  - Offer Toileting every 2 Hours, in advance of need  - Initiate/Maintain bed alarm  - Obtain necessary fall risk management equipment: walker, bracelet  socks, anti fall bed  - Apply yellow socks and bracelet for high fall risk patients  - Consider moving patient to room near nurses station  Outcome: Progressing     Problem: DEPRESSION  Goal: Will be euthymic at discharge  Description: INTERVENTIONS:  - Administer medication as ordered  - Provide emotional support via 1:1 interaction with staff  - Encourage involvement in milieu/groups/activities  - Monitor for social isolation  Outcome: Progressing     Problem: PASCUAL  Goal: Will exhibit normal sleep and speech and no impulsivity  Description: INTERVENTIONS:  - Administer medication as ordered  - Set limits on impulsive behavior  - Make attempts to decrease external stimuli as possible  Outcome: Progressing     Problem: PSYCHOSIS  Goal: Will report no hallucinations or delusions  Description: Interventions:  - Administer medication as  ordered  - Every waking shifts and PRN assess for the presence of hallucinations and or delusions  - Assist with reality testing to support increasing orientation  - Assess if patient's hallucinations or delusions are encouraging self-harm or harm to others and intervene as appropriate  Outcome: Progressing     Problem: ANXIETY  Goal: Will report anxiety at manageable levels  Description: INTERVENTIONS:  - Administer medication as ordered  - Teach and encourage coping skills  - Provide emotional support  - Assess patient/family for anxiety and ability to cope  Outcome: Progressing     Problem: SELF CARE DEFICIT  Goal: Return ADL status to a safe level of function  Description: INTERVENTIONS:  - Administer medication as ordered  - Assess ADL deficits and provide assistive devices as needed  - Obtain PT/OT consults as needed  - Assist and instruct patient to increase activity and self care as tolerated  Outcome: Progressing     Problem: Nutrition/Hydration-ADULT  Goal: Nutrient/Hydration intake appropriate for improving, restoring or maintaining nutritional needs  Description: Monitor and assess patient's nutrition/hydration status for malnutrition  Collaborate with interdisciplinary team and initiate plan and interventions as ordered  Monitor patient's weight and dietary intake as ordered or per policy  Utilize nutrition screening tool and intervene as necessary  Determine patient's food preferences and provide high-protein, high-caloric foods as appropriate       INTERVENTIONS:  - Monitor oral intake, urinary output, labs, and treatment plans  - Assess nutrition and hydration status and recommend course of action  - Evaluate amount of meals eaten  - Assist patient with eating if necessary   - Allow adequate time for meals  - Recommend/ encourage appropriate diets, oral nutritional supplements, and vitamin/mineral supplements  - Order, calculate, and assess calorie counts as needed  - Recommend, monitor, and adjust tube feedings and TPN/PPN based on assessed needs  - Assess need for intravenous fluids  - Provide specific nutrition/hydration education as appropriate  - Include patient/family/caregiver in decisions related to nutrition  Outcome: Progressing

## 2023-05-22 PROBLEM — D64.9 ANEMIA: Status: ACTIVE | Noted: 2023-05-22

## 2023-05-22 LAB
ANION GAP SERPL CALCULATED.3IONS-SCNC: 7 MMOL/L (ref 4–13)
BUN SERPL-MCNC: 18 MG/DL (ref 5–25)
CALCIUM SERPL-MCNC: 8.9 MG/DL (ref 8.4–10.2)
CHLORIDE SERPL-SCNC: 101 MMOL/L (ref 96–108)
CO2 SERPL-SCNC: 31 MMOL/L (ref 21–32)
CREAT SERPL-MCNC: 0.72 MG/DL (ref 0.6–1.3)
GFR SERPL CREATININE-BSD FRML MDRD: 85 ML/MIN/1.73SQ M
GLUCOSE P FAST SERPL-MCNC: 107 MG/DL (ref 65–99)
GLUCOSE SERPL-MCNC: 107 MG/DL (ref 65–140)
GLUCOSE SERPL-MCNC: 136 MG/DL (ref 65–140)
GLUCOSE SERPL-MCNC: 185 MG/DL (ref 65–140)
GLUCOSE SERPL-MCNC: 194 MG/DL (ref 65–140)
GLUCOSE SERPL-MCNC: 243 MG/DL (ref 65–140)
POTASSIUM SERPL-SCNC: 4.1 MMOL/L (ref 3.5–5.3)
SODIUM SERPL-SCNC: 139 MMOL/L (ref 135–147)

## 2023-05-22 RX ORDER — HYDROXYZINE 50 MG/1
50 TABLET, FILM COATED ORAL ONCE
Status: COMPLETED | OUTPATIENT
Start: 2023-05-22 | End: 2023-05-22

## 2023-05-22 RX ADMIN — SODIUM CHLORIDE 1 G: 1 TABLET ORAL at 08:30

## 2023-05-22 RX ADMIN — METOPROLOL SUCCINATE 37.5 MG: 25 TABLET, EXTENDED RELEASE ORAL at 08:31

## 2023-05-22 RX ADMIN — TORSEMIDE 10 MG: 5 TABLET ORAL at 08:30

## 2023-05-22 RX ADMIN — SODIUM CHLORIDE 1 G: 1 TABLET ORAL at 16:12

## 2023-05-22 RX ADMIN — LIDOCAINE 1 PATCH: 700 PATCH TOPICAL at 11:52

## 2023-05-22 RX ADMIN — ACETAMINOPHEN 975 MG: 325 TABLET ORAL at 09:11

## 2023-05-22 RX ADMIN — GABAPENTIN 100 MG: 100 CAPSULE ORAL at 17:29

## 2023-05-22 RX ADMIN — ATORVASTATIN CALCIUM 40 MG: 40 TABLET, FILM COATED ORAL at 16:12

## 2023-05-22 RX ADMIN — ACETAMINOPHEN 975 MG: 325 TABLET ORAL at 16:20

## 2023-05-22 RX ADMIN — FAMOTIDINE 20 MG: 20 TABLET ORAL at 17:30

## 2023-05-22 RX ADMIN — ASPIRIN 81 MG: 81 TABLET, COATED ORAL at 08:30

## 2023-05-22 RX ADMIN — INSULIN GLARGINE 15 UNITS: 100 INJECTION, SOLUTION SUBCUTANEOUS at 21:27

## 2023-05-22 RX ADMIN — DOCUSATE SODIUM 100 MG: 100 CAPSULE, LIQUID FILLED ORAL at 08:31

## 2023-05-22 RX ADMIN — SODIUM CHLORIDE 1 G: 1 TABLET ORAL at 12:14

## 2023-05-22 RX ADMIN — FAMOTIDINE 20 MG: 20 TABLET ORAL at 08:30

## 2023-05-22 RX ADMIN — INSULIN LISPRO 1 UNITS: 100 INJECTION, SOLUTION INTRAVENOUS; SUBCUTANEOUS at 21:28

## 2023-05-22 RX ADMIN — DOCUSATE SODIUM 100 MG: 100 CAPSULE, LIQUID FILLED ORAL at 17:29

## 2023-05-22 RX ADMIN — CYANOCOBALAMIN TAB 1000 MCG 1000 MCG: 1000 TAB at 08:31

## 2023-05-22 RX ADMIN — INSULIN LISPRO 1 UNITS: 100 INJECTION, SOLUTION INTRAVENOUS; SUBCUTANEOUS at 16:47

## 2023-05-22 RX ADMIN — GABAPENTIN 100 MG: 100 CAPSULE ORAL at 08:40

## 2023-05-22 RX ADMIN — LURASIDONE HYDROCHLORIDE 80 MG: 80 TABLET, FILM COATED ORAL at 16:12

## 2023-05-22 RX ADMIN — INSULIN LISPRO 2 UNITS: 100 INJECTION, SOLUTION INTRAVENOUS; SUBCUTANEOUS at 12:14

## 2023-05-22 RX ADMIN — HYDROXYZINE HYDROCHLORIDE 50 MG: 50 TABLET, FILM COATED ORAL at 20:45

## 2023-05-22 NOTE — NURSING NOTE
Pt is calm, cooperative and visible on unit Pt denies depression and anxiety; denies SI/HI/AH/VH  Pt is compliant with scheduled medications; PRN Tylenol for pain effective  Consumed 50% of breakfast and 75% of lunch  Able to make needs known  Will maintain q7 min checks

## 2023-05-22 NOTE — NURSING NOTE
Patient visible on the unit, ambulating with a walker  She is able to care for ADLs independently and showered with minimal assistance  Patient spoke with her family on the phone this shift  She denies all psych s/s  Medication compliant  Encouraged to alert staff to any needs or concerns

## 2023-05-22 NOTE — NURSING NOTE
"Patient c/o 10/10 leg pain  Stated \"I shouldn't be walking so much  \" Educated on being safely independent on the unit  PRN Tylenol 975 mg administered at 1620     "

## 2023-05-22 NOTE — TREATMENT TEAM
05/22/23 0837   Team Meeting   Meeting Type Daily Rounds   Initial Conference Date 05/22/23   Team Members Present   Team Members Present Physician;Nurse;Occupational Therapist;;   Physician Team Member Dr Sepideh Waldron Team Member VIPIN [facility] Management Team Member Jamari Butts Work Team Member    OT Team Member    Patient/Family Present   Patient Present No   Patient's Family Present No     Anxious due to room changes and reports being unhappy, no reports unable to stand however makes this statement while standing and walking, pt is able to ambulate, slept through night

## 2023-05-22 NOTE — PROGRESS NOTES
05/22/23 1000 05/22/23 1100   Activity/Group Checklist   Group [employer] [facility] musical instrument day-music trivia) Exercise  (simply stretch)   Attendance Attended Attended   Attendance Duration (min) 16-30 16-30   Interactions Interacted appropriately Interacted appropriately   Affect/Mood Appropriate Appropriate   Goals Achieved Able to listen to others; Able to engage in interactions  (enjoyed trivia and then music  Actively engaged in both ) Able to listen to others; Able to engage in interactions

## 2023-05-22 NOTE — PLAN OF CARE
Problem: Potential for Falls  Goal: Patient will remain free of falls  Description: INTERVENTIONS:  - Educate patient/family on patient safety including physical limitations  - Instruct patient to call for assistance with activity   - Consult OT/PT to assist with strengthening/mobility   - Keep Call bell within reach  - Keep bed low and locked with side rails adjusted as appropriate  - Keep care items and personal belongings within reach  - Initiate and maintain comfort rounds  - Make Fall Risk Sign visible to staff  - Offer Toileting every 2 Hours, in advance of need  - Initiate/Maintain bed alarm  - Obtain necessary fall risk management equipment: walker, bracelet  socks, anti fall bed  - Apply yellow socks and bracelet for high fall risk patients  - Consider moving patient to room near nurses station  Outcome: Progressing     Problem: DEPRESSION  Goal: Will be euthymic at discharge  Description: INTERVENTIONS:  - Administer medication as ordered  - Provide emotional support via 1:1 interaction with staff  - Encourage involvement in milieu/groups/activities  - Monitor for social isolation  Outcome: Progressing     Problem: PSYCHOSIS  Goal: Will report no hallucinations or delusions  Description: Interventions:  - Administer medication as  ordered  - Every waking shifts and PRN assess for the presence of hallucinations and or delusions  - Assist with reality testing to support increasing orientation  - Assess if patient's hallucinations or delusions are encouraging self-harm or harm to others and intervene as appropriate  Outcome: Progressing

## 2023-05-22 NOTE — PROGRESS NOTES
Progress Note - Nephrology   Yesi Robertson 70 y o  female MRN: [id number]  Unit/Bed#: UXWCO 899-78 Encounter: [id number]      Assessment / Plan:  1  Hyponatremia likely d/t SIADH +/- perrenal azotemia  -prior CT negative for malignancy  -sNa has improved to normal range, 139 on last check  -continue salt tabs 1g TID, torsemide 10mg daily, 1 5L/day fluid restriction  -consider decreasing salt tabs once sNa > 140  -suspect hyponatremia in part due to poor solute intake as well as SIADH    2  Bipolar disorder - management per psych    3  Anemia - Hgb 8 4, monitor CBC, transfuse prn    4  HTN - metanephrines/renin/aldosterone negative despite b/l adrenal adenomas  BP acceptable on metoprolol 37 5mg po daily, off amlodipine    Subjective:   Denies chest pain or shortness of breath  No complaints  Objective:     Vitals: Blood pressure 135/65, pulse 85, temperature 97 5 °F (36 4 °C), temperature source Temporal, resp  rate 18, weight 68 9 kg (151 lb 14 4 oz), SpO2 96 %, not currently breastfeeding  ,Body mass index is 26 07 kg/m²  Temp (24hrs), Av 6 °F (36 4 °C), Min:96 8 °F (36 °C), Max:98 5 °F (36 9 °C)      Weight (last 2 days)     Date/Time Weight    23 0600 68 9 (151 9)            Intake/Output Summary (Last 24 hours) at 2023 1136  Last data filed at 2023 0920  Gross per 24 hour   Intake 840 ml   Output --   Net 840 ml     I/O last 24 hours: In: 1200 [P O :1200]  Out: -         Physical Exam:   Physical Exam  Vitals and nursing note reviewed  Constitutional:       General: She is not in acute distress  Appearance: Normal appearance  She is well-developed  She is not diaphoretic  HENT:      Head: Normocephalic and atraumatic  Nose: Nose normal       Mouth/Throat:      Mouth: Mucous membranes are moist       Pharynx: No oropharyngeal exudate  Eyes:      General: No scleral icterus  Right eye: No discharge  Left eye: No discharge        Comments: eyeglasses   Neck: Thyroid: No thyromegaly  Cardiovascular:      Rate and Rhythm: Normal rate and regular rhythm  Heart sounds: No murmur heard  Pulmonary:      Effort: Pulmonary effort is normal  No respiratory distress  Breath sounds: Normal breath sounds  No wheezing  Abdominal:      General: Bowel sounds are normal  There is no distension  Palpations: Abdomen is soft  Musculoskeletal:         General: No swelling  Cervical back: Normal range of motion and neck supple  Skin:     General: Skin is warm and dry  Coloration: Skin is not jaundiced  Findings: No rash  Neurological:      Mental Status: She is alert  Motor: No abnormal muscle tone        Comments: awake   Psychiatric:      Comments: Flat affect         Invasive Devices     None                 Medications:    Scheduled Meds:  Current Facility-Administered Medications   Medication Dose Route Frequency Provider Last Rate   • acetaminophen  650 mg Oral Q4H PRN Lizbeth Sero, CRNP     • acetaminophen  650 mg Oral Q4H PRN Lizbeth Sero, CRNP     • acetaminophen  975 mg Oral Q6H PRN Lizbeth Sero, CRNP     • aspirin  81 mg Oral Daily Soledad Notice, CRNP     • atorvastatin  40 mg Oral Daily With JOHN Eduardo     • bisacodyl  10 mg Rectal Daily PRN Pravin Calhoun MD     • cyanocobalamin  1,000 mcg Oral Daily Soledad Notice, CRNP     • Diclofenac Sodium  2 g Topical 4x Daily PRN Volodymyr Friedman MD     • docusate sodium  100 mg Oral BID Judy Crisostomo PA-C     • famotidine  20 mg Oral BID Judy Crisostomo PA-C     • gabapentin  100 mg Oral BID Pravin Calhoun MD     • haloperidol lactate  5 mg Intramuscular Q4H PRN Max 4/day Lizbeth Sero, CRNP     • insulin glargine  15 Units Subcutaneous HS Soledad Notice, CRNP     • insulin lispro  1-5 Units Subcutaneous TID AC Soledad Notice, CRNP     • insulin lispro  1-5 Units Subcutaneous HS Soledad Notice, CRNP     • "lidocaine  1 patch Topical Daily aNsra Goins PA-C     • lurasidone  80 mg Oral Daily With Delilah Batista MD     • metoprolol succinate  37 5 mg Oral Daily JOHN Alston     • polyethylene glycol  17 g Oral Daily PRN Nasra Goins PA-C     • risperiDONE  0 25 mg Oral Q4H PRN Max 6/day Vilinda Boeck, CRNP     • risperiDONE  0 5 mg Oral Q4H PRN Max 3/day Vilinda Boeck, CRNP     • risperiDONE  1 mg Oral Q2H PRN Max 3/day Vilinda Boeck, CRNP     • sodium chloride  1 g Oral TID With Meals Annalisa Sherman, DO     • torsemide  10 mg Oral Daily Frandy Bishop, DO     • traZODone  50 mg Oral HS PRN Vilinda Boeck, CRNP         PRN Meds: •  acetaminophen  •  acetaminophen  •  acetaminophen  •  bisacodyl  •  Diclofenac Sodium  •  haloperidol lactate  •  polyethylene glycol  •  risperiDONE  •  risperiDONE  •  risperiDONE  •  traZODone    Continuous Infusions:         LAB RESULTS:      Results from last 7 days   Lab Units 05/22/23  0529 05/19/23  0505 05/17/23  0622   POTASSIUM mmol/L 4 1 4 3 4 2   CHLORIDE mmol/L 101 99 97   CO2 mmol/L 31 31 30   BUN mg/dL 18 14 15   CREATININE mg/dL 0 72 0 70 0 71   CALCIUM mg/dL 8 9 9 3 9 4       CUTURES:  Lab Results   Component Value Date    BLOODCX No Growth After 5 Days  10/15/2022    BLOODCX No Growth After 5 Days  10/15/2022    URINECX >100,000 cfu/ml Staphylococcus coagulase negative (A) 04/26/2023    URINECX >100,000 cfu/ml Pseudomonas aeruginosa (A) 03/29/2023    URINECX No Growth <1000 cfu/mL 10/15/2022                 Portions of the record may have been created with voice recognition software  Occasional wrong word or \"sound a like\" substitutions may have occurred due to the inherent limitations of voice recognition software  Read the chart carefully and recognize, using context, where substitutions have occurred  If you have any questions, please contact the dictating provider      "

## 2023-05-22 NOTE — PROGRESS NOTES
"  Progress Note - Mae [age] y o  female MRN: [id number]  Unit/Bed#: IGN[facility] 777-50 Encounter: [id number]    The patient was seen for continuing care and reviewed with treatment team   Laurennancie Bashir was walking around, using her walker  I asked her how she is doing and she said, \"Not so good  \" I asked why and she told me that her legs hurt her  I asked her if her legs feel better when they are slightly elevated and she asked to try it, so I helped her to sit in a chair in the day room and got a stool for her to put her legs on  Her legs felt kind of stiff as I helped lift them onto the stool  Yris's face looks somewhat mask-like  She remains focused on the pain in her legs  I might re-consult PT  Pennie Bashir is eating and sleeping adequately and attends some groups  No acute psychotic symptoms      Current Mental Status Evaluation:  Appearance:  Adequate hygiene and grooming   Behavior:  cooperative and psychomotor retardation   Mood:  Apathetic   Affect: mood-congruent   Speech: Sparse   Thought Process:  Poverty of thoughts   Thought Content:  Does not verbalize delusional material but she is preoccupied with the pain in her legs   Perceptual Disturbances: Denies hallucinations and does not appear to be responding to internal stimuli   Risk Potential: No suicidal or homicidal ideation   Orientation:   Oriented to person, place and situation     Progress Toward Goals: No significant events in the past 24 hours, Slowly improving, Tolerating medications  Principal Problem:    Bipolar affective disorder, depressed, severe, with psychotic behavior ([address] )  Active Problems:    Type 2 diabetes mellitus without complication, with long-term current use of insulin ([address] )    Essential hypertension    Hyponatremia    History of DVT of lower extremity    Dysphagia    UTI (urinary tract infection)    History of femur fracture    Medical clearance for psychiatric admission    Anemia    Discharge planning " update:Disposition to be determined    Recommended Treatment: Continue with pharmacotherapy, group therapy, milieu therapy and occupational therapy    The patient will be maintained on the following medications:  Current Facility-Administered Medications   Medication Dose Route Frequency Provider Last Rate   • acetaminophen  650 mg Oral Q4H PRN JOHN House     • acetaminophen  650 mg Oral Q4H PRN Perla Piña CRNP     • acetaminophen  975 mg Oral Q6H PRN Perla Piña CRNP     • aspirin  81 mg Oral Daily Bhanu Days, CRNP     • atorvastatin  40 mg Oral Daily With JOHN Agarwal     • bisacodyl  10 mg Rectal Daily PRN Wilfrido Soto MD     • cyanocobalamin  1,000 mcg Oral Daily Bhanu Days, CRNP     • Diclofenac Sodium  2 g Topical 4x Daily PRN Volodymyr Friedman MD     • docusate sodium  100 mg Oral BID Ave EZEKIEL Brar     • famotidine  20 mg Oral BID Avrigo Brar PA-C     • gabapentin  100 mg Oral BID Wilfrido Soto MD     • haloperidol lactate  5 mg Intramuscular Q4H PRN Max 4/day KARLEE HouseNP     • insulin glargine  15 Units Subcutaneous HS Bhanu Days, CRNP     • insulin lispro  1-5 Units Subcutaneous TID AC Bhanu Days, CRNP     • insulin lispro  1-5 Units Subcutaneous HS Bhanu Days, CRNP     • lidocaine  1 patch Topical Daily Avrigo Brar PA-C     • lurasidone  80 mg Oral Daily With Analia Johnson MD     • metoprolol succinate  37 5 mg Oral Daily JOHN Alston     • polyethylene glycol  17 g Oral Daily PRN Irlanda Brar PA-C     • risperiDONE  0 25 mg Oral Q4H PRN Max 6/day KARLEE HouseNP     • risperiDONE  0 5 mg Oral Q4H PRN Max 3/day KARLEE HouseNP     • risperiDONE  1 mg Oral Q2H PRN Max 3/day KARLEE HouseNP     • sodium chloride  1 g Oral TID With Meals Tasia Cross DO     • torsemide  10 mg Oral Daily Kayce Jewell DO     • traZODone  50 mg Oral HS PRN JOHN Eugene           Plan:  1  Continue present medication regimen  2   Discharge planning ongoing

## 2023-05-23 LAB
ANION GAP SERPL CALCULATED.3IONS-SCNC: 8 MMOL/L (ref 4–13)
BASOPHILS # BLD AUTO: 0.03 THOUSANDS/ÂΜL (ref 0–0.1)
BASOPHILS NFR BLD AUTO: 1 % (ref 0–1)
BUN SERPL-MCNC: 19 MG/DL (ref 5–25)
CALCIUM SERPL-MCNC: 8.9 MG/DL (ref 8.4–10.2)
CHLORIDE SERPL-SCNC: 100 MMOL/L (ref 96–108)
CO2 SERPL-SCNC: 30 MMOL/L (ref 21–32)
CREAT SERPL-MCNC: 0.71 MG/DL (ref 0.6–1.3)
EOSINOPHIL # BLD AUTO: 0.11 THOUSAND/ÂΜL (ref 0–0.61)
EOSINOPHIL NFR BLD AUTO: 2 % (ref 0–6)
ERYTHROCYTE [DISTWIDTH] IN BLOOD BY AUTOMATED COUNT: 15.9 % (ref 11.6–15.1)
GFR SERPL CREATININE-BSD FRML MDRD: 87 ML/MIN/1.73SQ M
GLUCOSE SERPL-MCNC: 104 MG/DL (ref 65–140)
GLUCOSE SERPL-MCNC: 121 MG/DL (ref 65–140)
GLUCOSE SERPL-MCNC: 124 MG/DL (ref 65–140)
GLUCOSE SERPL-MCNC: 145 MG/DL (ref 65–140)
GLUCOSE SERPL-MCNC: 211 MG/DL (ref 65–140)
HCT VFR BLD AUTO: 28.3 % (ref 34.8–46.1)
HGB BLD-MCNC: 8.9 G/DL (ref 11.5–15.4)
IMM GRANULOCYTES # BLD AUTO: 0.01 THOUSAND/UL (ref 0–0.2)
IMM GRANULOCYTES NFR BLD AUTO: 0 % (ref 0–2)
LYMPHOCYTES # BLD AUTO: 1.52 THOUSANDS/ÂΜL (ref 0.6–4.47)
LYMPHOCYTES NFR BLD AUTO: 30 % (ref 14–44)
MCH RBC QN AUTO: 29.3 PG (ref 26.8–34.3)
MCHC RBC AUTO-ENTMCNC: 31.4 G/DL (ref 31.4–37.4)
MCV RBC AUTO: 93 FL (ref 82–98)
MONOCYTES # BLD AUTO: 0.5 THOUSAND/ÂΜL (ref 0.17–1.22)
MONOCYTES NFR BLD AUTO: 10 % (ref 4–12)
NEUTROPHILS # BLD AUTO: 2.96 THOUSANDS/ÂΜL (ref 1.85–7.62)
NEUTS SEG NFR BLD AUTO: 57 % (ref 43–75)
NRBC BLD AUTO-RTO: 0 /100 WBCS
PLATELET # BLD AUTO: 176 THOUSANDS/UL (ref 149–390)
PMV BLD AUTO: 9.1 FL (ref 8.9–12.7)
POTASSIUM SERPL-SCNC: 3.8 MMOL/L (ref 3.5–5.3)
RBC # BLD AUTO: 3.04 MILLION/UL (ref 3.81–5.12)
SODIUM SERPL-SCNC: 138 MMOL/L (ref 135–147)
WBC # BLD AUTO: 5.13 THOUSAND/UL (ref 4.31–10.16)

## 2023-05-23 RX ORDER — HYDROXYZINE 50 MG/1
50 TABLET, FILM COATED ORAL ONCE
Status: COMPLETED | OUTPATIENT
Start: 2023-05-23 | End: 2023-05-23

## 2023-05-23 RX ADMIN — DOCUSATE SODIUM 100 MG: 100 CAPSULE, LIQUID FILLED ORAL at 08:12

## 2023-05-23 RX ADMIN — GABAPENTIN 100 MG: 100 CAPSULE ORAL at 17:01

## 2023-05-23 RX ADMIN — CYANOCOBALAMIN TAB 1000 MCG 1000 MCG: 1000 TAB at 08:12

## 2023-05-23 RX ADMIN — BISACODYL 10 MG: 10 SUPPOSITORY RECTAL at 19:31

## 2023-05-23 RX ADMIN — INSULIN LISPRO 2 UNITS: 100 INJECTION, SOLUTION INTRAVENOUS; SUBCUTANEOUS at 22:08

## 2023-05-23 RX ADMIN — METOPROLOL SUCCINATE 37.5 MG: 25 TABLET, EXTENDED RELEASE ORAL at 08:12

## 2023-05-23 RX ADMIN — ACETAMINOPHEN 650 MG: 325 TABLET ORAL at 08:11

## 2023-05-23 RX ADMIN — SODIUM CHLORIDE 1 G: 1 TABLET ORAL at 08:12

## 2023-05-23 RX ADMIN — GABAPENTIN 100 MG: 100 CAPSULE ORAL at 08:11

## 2023-05-23 RX ADMIN — FAMOTIDINE 20 MG: 20 TABLET ORAL at 08:12

## 2023-05-23 RX ADMIN — HYDROXYZINE HYDROCHLORIDE 50 MG: 50 TABLET, FILM COATED ORAL at 19:21

## 2023-05-23 RX ADMIN — SODIUM CHLORIDE 1 G: 1 TABLET ORAL at 17:01

## 2023-05-23 RX ADMIN — TORSEMIDE 10 MG: 5 TABLET ORAL at 08:11

## 2023-05-23 RX ADMIN — LIDOCAINE 1 PATCH: 700 PATCH TOPICAL at 08:14

## 2023-05-23 RX ADMIN — DOCUSATE SODIUM 100 MG: 100 CAPSULE, LIQUID FILLED ORAL at 17:01

## 2023-05-23 RX ADMIN — INSULIN GLARGINE 15 UNITS: 100 INJECTION, SOLUTION SUBCUTANEOUS at 22:07

## 2023-05-23 RX ADMIN — FAMOTIDINE 20 MG: 20 TABLET ORAL at 17:01

## 2023-05-23 RX ADMIN — SODIUM CHLORIDE 1 G: 1 TABLET ORAL at 12:23

## 2023-05-23 RX ADMIN — ATORVASTATIN CALCIUM 40 MG: 40 TABLET, FILM COATED ORAL at 17:01

## 2023-05-23 RX ADMIN — ASPIRIN 81 MG: 81 TABLET, COATED ORAL at 08:11

## 2023-05-23 RX ADMIN — LURASIDONE HYDROCHLORIDE 80 MG: 80 TABLET, FILM COATED ORAL at 17:01

## 2023-05-23 NOTE — TREATMENT TEAM
05/22/23 2100   Service   Service   (Psych)   Provider Name Norris Oshea   Multi-disciplinary Rounds   Diagnosis  Reviewed     Patient c/o anxiety  Walker score 20  OTD of Atarax 50 mg ordered and administered

## 2023-05-23 NOTE — CASE MANAGEMENT
Call made to pt's dtr Benny Tatum, tel# [phone], reviewed pt's status and goals for d/c  Benny Tatum requesting family video call with pt on Friday at 1730  CM to send TEAMS invite for video meeting  Benny Tatum reports wanting CM to explore options for pt to have new psychiatrist  Papo Meza reports pt has good rapport with current psychiatrist although Benny Tatum has concerns that psychiatrist kept pt on long term [place] without regard to pt's long QT interval  CM offered to have conference call with Benny Tatum, pt, and CM; scheduled for tomorrow at 2pm      CM sent TEAMS invite for video call on Friday

## 2023-05-23 NOTE — PROGRESS NOTES
"Progress Note - Gary [age] y o  female MRN: [id number]   Unit/Bed#: [facility] 881-95 Encounter: [id number]    Behavior over the last 24 hours: fan Naranjo is a 70 y o  female diagnosed with bipolar affective disorder, depressed w/ psychotic features seen today in follow-up  Per staff, was loud and demanding last evening  Did eventually accept atarax PRN  This morning she is seen in community room with her head down  She is constricted, but cooperative and pleasant  Short and scant with answers, but agrees her depression and anxiety are improving  Appears to be tolerating her medications  Is reporting some right hip discomfort today  Engaging in some increased ADLs and took shower yesterday with minimal  assistance       Sleep: normal  Appetite: normal  Medication side effects: No   ROS: no complaints, all other systems are negative    Mental Status Evaluation:    Appearance:  age appropriate, casually dressed, looks stated age   Behavior:  cooperative, calm, still some PMR   Speech:  scant, soft   Mood:  \"good\"   Affect:  constricted    Thought Process:  goal directed   Associations: concrete associations   Thought Content:  no overt delusions   Perceptual Disturbances: none   Risk Potential: Suicidal ideation - None at present  Homicidal ideation - None at present  Potential for aggression - No   Sensorium:  oriented to person, place and time/date   Memory:  recent and remote memory grossly intact   Consciousness:  alert and awake   Attention/Concentration: attention span and concentration are age appropriate   Insight:  limited   Judgment: limited   Gait/Station: uses walker   Motor Activity: no abnormal movements     Vital signs in last 24 hours:    Temp:  [96 7 °F (35 9 °C)-97 7 °F (36 5 °C)] 97 7 °F (36 5 °C)  HR:  [84-85] 85  Resp:  [17] 17  BP: (114-153)/(56-69) 141/69    Laboratory results: I have personally reviewed all pertinent laboratory/tests results    Results from the " past 24 hours:   Recent Results (from the past 24 hour(s))   Fingerstick Glucose (POCT)    Collection Time: 05/22/23 11:50 AM   Result Value Ref Range    POC Glucose 243 (H) 65 - 140 mg/dl   Fingerstick Glucose (POCT)    Collection Time: 05/22/23  4:23 PM   Result Value Ref Range    POC Glucose 185 (H) 65 - 140 mg/dl   Fingerstick Glucose (POCT)    Collection Time: 05/22/23  9:18 PM   Result Value Ref Range    POC Glucose 194 (H) 65 - 140 mg/dl   Basic metabolic panel    Collection Time: 05/23/23  6:29 AM   Result Value Ref Range    Sodium 138 135 - 147 mmol/L    Potassium 3 8 3 5 - 5 3 mmol/L    Chloride 100 96 - 108 mmol/L    CO2 30 21 - 32 mmol/L    ANION GAP 8 4 - 13 mmol/L    BUN 19 5 - 25 mg/dL    Creatinine 0 71 0 60 - 1 30 mg/dL    Glucose 104 65 - 140 mg/dL    Calcium 8 9 8 4 - 10 2 mg/dL    eGFR 87 ml/min/1 73sq m   CBC and differential    Collection Time: 05/23/23  6:29 AM   Result Value Ref Range    WBC 5 13 4 31 - 10 16 Thousand/uL    RBC 3 04 (L) 3 81 - 5 12 Million/uL    Hemoglobin 8 9 (L) 11 5 - 15 4 g/dL    Hematocrit 28 3 (L) 34 8 - 46 1 %    MCV 93 82 - 98 fL    MCH 29 3 26 8 - 34 3 pg    MCHC 31 4 31 4 - 37 4 g/dL    RDW 15 9 (H) 11 6 - 15 1 %    MPV 9 1 8 9 - 12 7 fL    Platelets 130 580 - 199 Thousands/uL    nRBC 0 /100 WBCs    Neutrophils Relative 57 43 - 75 %    Immat GRANS % 0 0 - 2 %    Lymphocytes Relative 30 14 - 44 %    Monocytes Relative 10 4 - 12 %    Eosinophils Relative 2 0 - 6 %    Basophils Relative 1 0 - 1 %    Neutrophils Absolute 2 96 1 85 - 7 62 Thousands/µL    Immature Grans Absolute 0 01 0 00 - 0 20 Thousand/uL    Lymphocytes Absolute 1 52 0 60 - 4 47 Thousands/µL    Monocytes Absolute 0 50 0 17 - 1 22 Thousand/µL    Eosinophils Absolute 0 11 0 00 - 0 61 Thousand/µL    Basophils Absolute 0 03 0 00 - 0 10 Thousands/µL   Fingerstick Glucose (POCT)    Collection Time: 05/23/23  7:18 AM   Result Value Ref Range    POC Glucose 124 65 - 140 mg/dl     Most Recent Labs:   Lab Results Component Value Date    WBC 5 13 05/23/2023    RBC 3 04 (L) 05/23/2023    HGB 8 9 (L) 05/23/2023    HCT 28 3 (L) 05/23/2023     05/23/2023    RDW 15 9 (H) 05/23/2023    NEUTROABS 2 96 05/23/2023    SODIUM 138 05/23/2023    K 3 8 05/23/2023     05/23/2023    CO2 30 05/23/2023    BUN 19 05/23/2023    CREATININE 0 71 05/23/2023    GLUC 104 05/23/2023    CALCIUM 8 9 05/23/2023    AST 25 04/29/2023    ALT 22 04/29/2023    ALKPHOS 85 04/29/2023    TP 6 1 (L) 04/29/2023    ALB 3 1 (L) 05/05/2023    TBILI 0 35 04/29/2023    CHOLESTEROL 161 02/25/2020    HDL 61 02/25/2020    TRIG 176 (H) 02/25/2020    LDLCALC 65 02/25/2020    [place] 100 02/25/2020    AMMONIA 14 10/15/2022    [id number] 0 886 04/29/2023    HGBA1C 5 8 (H) 03/22/2023     03/22/2023       Progress Toward Goals: progressing slowly    Assessment/Plan   Principal Problem:    Bipolar affective disorder, depressed, severe, with psychotic behavior ([facility] )  Active Problems:    Type 2 diabetes mellitus without complication, with long-term current use of insulin ([facility] )    Essential hypertension    Hyponatremia    History of DVT of lower extremity    Dysphagia    UTI (urinary tract infection)    History of femur fracture    Medical clearance for psychiatric admission    Anemia      Recommended Treatment:     Planned medication and treatment changes:     All current active medications have been reviewed  Encourage group therapy, milieu therapy and occupational therapy  Behavioral Health checks every 7 minutes  Continue current medications and therapy    Current Facility-Administered Medications   Medication Dose Route Frequency Provider Last Rate   • acetaminophen  650 mg Oral Q4H PRN JOHN Solano     • acetaminophen  650 mg Oral Q4H PRN JOHN Solano     • acetaminophen  975 mg Oral Q6H PRN JOHN Solano     • aspirin  81 mg Oral Daily JOHN Klein     • atorvastatin  40 mg Oral Daily With Dinner Zelpha Pen Steven Mcbride     • bisacodyl  10 mg Rectal Daily PRN Moise Rodríguez MD     • cyanocobalamin  1,000 mcg Oral Daily JOHN Chopra     • Diclofenac Sodium  2 g Topical 4x Daily PRN Volodymyr Friedman MD     • docusate sodium  100 mg Oral BID Nehemias Herrera PA-C     • famotidine  20 mg Oral BID Nehemias Herrera PA-C     • gabapentin  100 mg Oral BID Moise Rodríguez MD     • haloperidol lactate  5 mg Intramuscular Q4H PRN Max 4/day Basia KARLEE RoaNP     • insulin glargine  15 Units Subcutaneous HS HaroldJOHN Ureña     • insulin lispro  1-5 Units Subcutaneous TID AC Hararlethine JOHN Alfonso     • insulin lispro  1-5 Units Subcutaneous HS JOHN Chopra     • lidocaine  1 patch Topical Daily Nehemias Herrera PA-C     • lurasidone  80 mg Oral Daily With Vee Morrison MD     • metoprolol succinate  37 5 mg Oral Daily JOHN Alston     • polyethylene glycol  17 g Oral Daily PRN Nehemias Herrera PA-C     • risperiDONE  0 25 mg Oral Q4H PRN Max 6/day Basia JOHN Roa     • risperiDONE  0 5 mg Oral Q4H PRN Max 3/day Basia JOHN Roa     • risperiDONE  1 mg Oral Q2H PRN Max 3/day Basia JOHN Roa     • sodium chloride  1 g Oral TID With Meals Dimas Cortez DO     • torsemide  10 mg Oral Daily Nat Pollack DO     • traZODone  50 mg Oral HS PRN JOHN Muller       Risks / Benefits of Treatment:    Risks, benefits, and possible side effects of medications explained to patient and patient verbalizes understanding and agreement for treatment  Counseling / Coordination of Care: Total floor / unit time spent today 20 minutes  Greater than 50% of total time was spent with the patient and / or family counseling and / or coordination of care  A description of counseling / coordination of care:  Patient's progress discussed with staff in treatment team meeting    Medications, treatment progress and treatment plan reviewed with patient      Heike Burr PA-C 05/23/23

## 2023-05-23 NOTE — QUICK NOTE
sNa stable  Continue current management: continue salt tabs 1g TID, 1 5L/day fluid restriction  Monitor BMP  Renal will follow peripherally

## 2023-05-23 NOTE — TREATMENT TEAM
Pt attended [facility] Recovery: Reframing group  Pt able to self express and provide positive affirmation with prompting  Pt noted she is trying and making progress  Pt was sleepy during group and put head down  Pt did not want to leave group to rest in bed       05/23/23 1100   Activity/Group Checklist   Group Other (Comment)  ( Recovery: Reframing group)   Attendance Attended   Attendance Duration (min) 31-45   Interactions Interacted appropriately   Affect/Mood Appropriate   Goals Achieved Identified feelings; Increased hopefulness; Discussed coping strategies; Able to engage in interactions; Able to listen to others; Able to manage/cope with feelings; Able to reflect/comment on own behavior;Verbalized increased hopefulness; Able to self-disclose; Able to recieve feedback

## 2023-05-23 NOTE — TREATMENT TEAM
05/23/23 0834   Team Meeting   Meeting Type Daily Rounds   Initial Conference Date 05/23/23   Team Members Present   Team Members Present Physician;Nurse;Occupational Therapist;;   Physician Team Member Dr Oly Fontanez Team Member Forest Dubose [address] Management Team Member Jamari [address] Work Team Member Hill   OT Team Member Garrett Wilhelm   Patient/Family Present   Patient Present No   Patient's Family Present No     Reporting anxiety and depression, demanding, dependent on staff for care, shouting at staff to tuck pt into bed, showered self, PRN atarax in evening with good effect

## 2023-05-23 NOTE — NURSING NOTE
Patient is visible on the unit, constricted, able to let her needs known  Patient is stating she feels ok, c/o of pain bilateral legs and lower back  PRN Tylenol given in the morning and was effective   Patient denies A,V/H

## 2023-05-24 LAB
ANION GAP SERPL CALCULATED.3IONS-SCNC: 8 MMOL/L (ref 4–13)
BUN SERPL-MCNC: 19 MG/DL (ref 5–25)
CALCIUM SERPL-MCNC: 9.3 MG/DL (ref 8.4–10.2)
CHLORIDE SERPL-SCNC: 100 MMOL/L (ref 96–108)
CO2 SERPL-SCNC: 31 MMOL/L (ref 21–32)
CREAT SERPL-MCNC: 0.74 MG/DL (ref 0.6–1.3)
GFR SERPL CREATININE-BSD FRML MDRD: 82 ML/MIN/1.73SQ M
GLUCOSE P FAST SERPL-MCNC: 130 MG/DL (ref 65–99)
GLUCOSE SERPL-MCNC: 122 MG/DL (ref 65–140)
GLUCOSE SERPL-MCNC: 130 MG/DL (ref 65–140)
GLUCOSE SERPL-MCNC: 161 MG/DL (ref 65–140)
GLUCOSE SERPL-MCNC: 193 MG/DL (ref 65–140)
GLUCOSE SERPL-MCNC: 261 MG/DL (ref 65–140)
POTASSIUM SERPL-SCNC: 4.1 MMOL/L (ref 3.5–5.3)
SODIUM SERPL-SCNC: 139 MMOL/L (ref 135–147)

## 2023-05-24 RX ADMIN — SODIUM CHLORIDE 1 G: 1 TABLET ORAL at 12:20

## 2023-05-24 RX ADMIN — SODIUM CHLORIDE 1 G: 1 TABLET ORAL at 17:03

## 2023-05-24 RX ADMIN — FAMOTIDINE 20 MG: 20 TABLET ORAL at 08:57

## 2023-05-24 RX ADMIN — GABAPENTIN 100 MG: 100 CAPSULE ORAL at 17:02

## 2023-05-24 RX ADMIN — LURASIDONE HYDROCHLORIDE 80 MG: 80 TABLET, FILM COATED ORAL at 17:03

## 2023-05-24 RX ADMIN — ASPIRIN 81 MG: 81 TABLET, COATED ORAL at 08:56

## 2023-05-24 RX ADMIN — INSULIN LISPRO 1 UNITS: 100 INJECTION, SOLUTION INTRAVENOUS; SUBCUTANEOUS at 12:19

## 2023-05-24 RX ADMIN — TORSEMIDE 10 MG: 5 TABLET ORAL at 08:57

## 2023-05-24 RX ADMIN — CYANOCOBALAMIN TAB 1000 MCG 1000 MCG: 1000 TAB at 08:58

## 2023-05-24 RX ADMIN — METOPROLOL SUCCINATE 37.5 MG: 25 TABLET, EXTENDED RELEASE ORAL at 08:56

## 2023-05-24 RX ADMIN — DOCUSATE SODIUM 100 MG: 100 CAPSULE, LIQUID FILLED ORAL at 17:03

## 2023-05-24 RX ADMIN — SODIUM CHLORIDE 1 G: 1 TABLET ORAL at 08:58

## 2023-05-24 RX ADMIN — FAMOTIDINE 20 MG: 20 TABLET ORAL at 17:02

## 2023-05-24 RX ADMIN — LIDOCAINE 1 PATCH: 700 PATCH TOPICAL at 08:59

## 2023-05-24 RX ADMIN — GABAPENTIN 100 MG: 100 CAPSULE ORAL at 08:58

## 2023-05-24 RX ADMIN — INSULIN LISPRO 2 UNITS: 100 INJECTION, SOLUTION INTRAVENOUS; SUBCUTANEOUS at 21:19

## 2023-05-24 RX ADMIN — INSULIN GLARGINE 15 UNITS: 100 INJECTION, SOLUTION SUBCUTANEOUS at 21:18

## 2023-05-24 RX ADMIN — INSULIN LISPRO 1 UNITS: 100 INJECTION, SOLUTION INTRAVENOUS; SUBCUTANEOUS at 17:00

## 2023-05-24 RX ADMIN — ATORVASTATIN CALCIUM 40 MG: 40 TABLET, FILM COATED ORAL at 17:03

## 2023-05-24 NOTE — NURSING NOTE
Per report and patient last BM was 5/19/23  Stool softer and PRN Miralax not effective  PRN Dulcolax rectal suppository given at 1931

## 2023-05-24 NOTE — NURSING NOTE
"Pt is seated in dayroom withdrawn to self  Pt is noted to be negative and anxious this shift  She reports that her anxiety and depression are \"up\" this shift  Pt denies SI/HI and AH/VH  Pt noted to be preoccupied with being able to get into bed later this evening and whether her bed was made  Rn provided reassurance and counseling  Pt accepts medications whole in applesauce  Cooperative with care  Safety checks ongoing     "

## 2023-05-24 NOTE — TREATMENT TEAM
05/23/23 1914   Walker Anxiety Scale   Anxious Mood 4   Tension 3   Fears 3   Insomnia 0   Intellectual 1   Depressed Mood 2   Somatic Complaints: Muscular 2   Somatic Complaints: Sensory 1   Cardiovascular Symptoms 0   Respiratory Symptoms 0   Gastrointestinal Symptoms 0   Genitourinary Symptoms 0   Autonomic Symptoms 0   Behavior at Interview 3   Walker Anxiety Score 19     Writer contacted on call provider requesting once dose anxiety medication  Patient reported feeling anxious to be alone in her room  Patient refused to stay in dinning room  Patient appears tearful and sad  Patient agreed to take PRN for anxiety medication  Once dose Atarax 50 mg at [facility]

## 2023-05-24 NOTE — NURSING NOTE
Patient is brighter on approach and visible on the unit  Patient is ambulating more often and makes her needs known  Patient attended groups and stated her depression is better   Denies SI,HI

## 2023-05-24 NOTE — NURSING NOTE
"Patient is visible in dinning room and stacey, isolated to self in room at times throughout the shift  Patient is medications and meals complaint  Patient reports depression  Patient states, \"I do not why I feel depressed  I do not want my daughter gets upset because I am not getting any better  I think she is mad\"  Patient is currently denying anxiety, SI/HI/AVH at this time  Patient appears sad and withdrawn  Patient requests assistance from staff when needed  Patient is noted to have very minimal interaction with peers and staff  Able to make needs known      "

## 2023-05-24 NOTE — TREATMENT TEAM
Pt attended short term problem solving group  Pt was able to interact with prompting and provide coping skills,  Pt was fixated on getting a shower  Encouraged to to wait until after group sessions and focus on programming  Restless at times  05/24/23 1100   Activity/Group Checklist   Group Other (Comment)  (short term problem solving)   Attendance Attended   Attendance Duration (min) 31-45   Interactions Interacted appropriately   Affect/Mood Appropriate   Goals Achieved Identified feelings; Discussed coping strategies; Identified relapse prevention strategies; Able to listen to others; Able to engage in interactions; Able to reflect/comment on own behavior;Able to manage/cope with feelings;Verbalized increased hopefulness; Able to self-disclose

## 2023-05-24 NOTE — TREATMENT TEAM
05/24/23 0834   Team Meeting   Meeting Type Daily Rounds   Initial Conference Date 05/24/23   Team Members Present   Team Members Present Physician;Nurse;;; Other (Discipline and Name)   Physician Team Member Dr Teetee Gutiérrez Team Member Jamari [facility] Work Team Member Romina Little   Other (Discipline and Name) Ada Shadow- pharmacist   Patient/Family Present   Patient Present No   Patient's Family Present No     1500 fluid restrictions and daily weights, reports upset that dtr is mad at pt for pt not doing more for self, reports anxiety, PRN One time dose atarax with good effect, slept

## 2023-05-24 NOTE — PROGRESS NOTES
"Progress Note - Gary [age] y o  female MRN: [id number]   Unit/Bed#: [facility] 133-92 Encounter: [id number]    Behavior over the last 24 hours: fan Ferguson is a 70 y o  female diagnosed with bipolar affective disorder, depressed w/ psychotic features seen today in follow-up  On presentation seen in group room eating a snack  She appears brighter and smiling upon approach  She states that she is doing okay but feels upset since she was upset after she felt that she got in trouble for, \"not taking care of herself  \" She states she still has ongoing pain which makes it difficult for her to do some things without help  Otherwise is looking forward to visit with daughter  Reports improved mood  No SI/HI      Sleep: normal  Appetite: normal  Medication side effects: No   ROS: no complaints, all other systems are negative    Mental Status Evaluation:    Appearance:  age appropriate, casually dressed, looks stated age   Behavior:  cooperative, calm, smiling on approach   Speech:  soft, decreased volume   Mood:  \"good\"   Affect:  brighter    Thought Process:  goal directed   Associations: intact associations   Thought Content:  no overt delusions   Perceptual Disturbances: none   Risk Potential: Suicidal ideation - None at present  Homicidal ideation - None at present  Potential for aggression - No   Sensorium:  oriented to person, place and time/date   Memory:  recent and remote memory grossly intact   Consciousness:  alert and awake   Attention/Concentration: attention span and concentration are age appropriate   Insight:  limited   Judgment: limited   Gait/Station: uses walker   Motor Activity: no abnormal movements     Vital signs in last 24 hours:    Temp:  [97 2 °F (36 2 °C)-97 8 °F (36 6 °C)] 97 2 °F (36 2 °C)  HR:  [] 84  Resp:  [16-17] 16  BP: (119-164)/(62-75) 119/62    Laboratory results: I have personally reviewed all pertinent laboratory/tests results    Results from the " past 24 hours:   Recent Results (from the past 24 hour(s))   Fingerstick Glucose (POCT)    Collection Time: 05/23/23 11:31 AM   Result Value Ref Range    POC Glucose 145 (H) 65 - 140 mg/dl   Fingerstick Glucose (POCT)    Collection Time: 05/23/23  4:06 PM   Result Value Ref Range    POC Glucose 121 65 - 140 mg/dl   Fingerstick Glucose (POCT)    Collection Time: 05/23/23  9:04 PM   Result Value Ref Range    POC Glucose 211 (H) 65 - 140 mg/dl   Basic metabolic panel    Collection Time: 05/24/23  6:47 AM   Result Value Ref Range    Sodium 139 135 - 147 mmol/L    Potassium 4 1 3 5 - 5 3 mmol/L    Chloride 100 96 - 108 mmol/L    CO2 31 21 - 32 mmol/L    ANION GAP 8 4 - 13 mmol/L    BUN 19 5 - 25 mg/dL    Creatinine 0 74 0 60 - 1 30 mg/dL    Glucose 130 65 - 140 mg/dL    Glucose, Fasting 130 (H) 65 - 99 mg/dL    Calcium 9 3 8 4 - 10 2 mg/dL    eGFR 82 ml/min/1 73sq m   Fingerstick Glucose (POCT)    Collection Time: 05/24/23  7:12 AM   Result Value Ref Range    POC Glucose 122 65 - 140 mg/dl     Most Recent Labs:   Lab Results   Component Value Date    ALB 3 1 (L) 05/05/2023    ALKPHOS 85 04/29/2023    ALT 22 04/29/2023    AMMONIA 14 10/15/2022    AST 25 04/29/2023    BUN 19 05/24/2023    CALCIUM 9 3 05/24/2023    CHOLESTEROL 161 02/25/2020     05/24/2023    CO2 31 05/24/2023    CREATININE 0 74 05/24/2023     03/22/2023    GLUC 130 05/24/2023    HCT 28 3 (L) 05/23/2023    HDL 61 02/25/2020    HGB 8 9 (L) 05/23/2023    HGBA1C 5 8 (H) 03/22/2023    K 4 1 05/24/2023    LDLCALC 65 02/25/2020    NEUTROABS 2 96 05/23/2023    NONHDLC 100 02/25/2020     05/23/2023    RBC 3 04 (L) 05/23/2023    RDW 15 9 (H) 05/23/2023    SODIUM 139 05/24/2023    TBILI 0 35 04/29/2023    TP 6 1 (L) 04/29/2023    TRIG 176 (H) 02/25/2020    [id number] 0 886 04/29/2023    WBC 5 13 05/23/2023       Progress Toward Goals: progressing slowly    Assessment/Plan   Principal Problem:    Bipolar affective disorder, depressed, severe, with psychotic behavior ([facility] [address] )  Active Problems:    Type 2 diabetes mellitus without complication, with long-term current use of insulin ([facility])    Essential hypertension    Hyponatremia    History of DVT of lower extremity    Dysphagia    UTI (urinary tract infection)    History of femur fracture    Medical clearance for psychiatric admission    Anemia      Recommended Treatment:     Planned medication and treatment changes:     All current active medications have been reviewed  Encourage group therapy, milieu therapy and occupational therapy  Behavioral Health checks every 7 minutes  Continue current medications and therapy    Current Facility-Administered Medications   Medication Dose Route Frequency Provider Last Rate   • acetaminophen  650 mg Oral Q4H PRN JOHN Beyer     • acetaminophen  650 mg Oral Q4H PRN JOHN Beyer     • acetaminophen  975 mg Oral Q6H PRN JOHN Beyer     • aspirin  81 mg Oral Daily JOHN Guerrier     • atorvastatin  40 mg Oral Daily With JOHN Little     • bisacodyl  10 mg Rectal Daily PRN Kosta Alvarez MD     • cyanocobalamin  1,000 mcg Oral Daily JOHN Guerrier     • Diclofenac Sodium  2 g Topical 4x Daily PRN Volodymyr Friedman MD     • docusate sodium  100 mg Oral BID Angela Waters PA-C     • famotidine  20 mg Oral BID Angela Waters PA-C     • gabapentin  100 mg Oral BID Kosta Alvarez MD     • haloperidol lactate  5 mg Intramuscular Q4H PRN Max 4/day JOHN Beyer     • insulin glargine  15 Units Subcutaneous HS JOHN Guerrier     • insulin lispro  1-5 Units Subcutaneous TID AC JOHN Guerrier     • insulin lispro  1-5 Units Subcutaneous HS JOHN Guerrier     • lidocaine  1 patch Topical Daily Angela Waters PA-C     • lurasidone  80 mg Oral Daily With Noah Marquez MD     • metoprolol succinate  37 5 mg Oral Daily JOHN Alston • polyethylene glycol  17 g Oral Daily PRN Fam Lott PA-C     • risperiDONE  0 25 mg Oral Q4H PRN Max 6/day Kelle Jump, CRNP     • risperiDONE  0 5 mg Oral Q4H PRN Max 3/day Kelle Jump, CRNP     • risperiDONE  1 mg Oral Q2H PRN Max 3/day Kelle Jump, CRNP     • sodium chloride  1 g Oral TID With Meals Particia Bitter, DO     • torsemide  10 mg Oral Daily Baron Ortiz, DO     • traZODone  50 mg Oral HS PRN Kelle Jump, CRNP       Risks / Benefits of Treatment:    Risks, benefits, and possible side effects of medications explained to patient and patient verbalizes understanding and agreement for treatment  Counseling / Coordination of Care: Total floor / unit time spent today 20 minutes  Greater than 50% of total time was spent with the patient and / or family counseling and / or coordination of care  A description of counseling / coordination of care:  Patient's progress discussed with staff in treatment team meeting  Medications, treatment progress and treatment plan reviewed with patient      Karthik Armijo PA-C 05/24/23

## 2023-05-24 NOTE — CASE MANAGEMENT
"Family meeting held via [address] video with pt, pt's , pt's dtr Jenifer and CM  Pt happy to see family although initially reported not being ready for d/c  Pt's family questioned pt's readiness to return home and what pt though needed to be done for d/c  Pt reports she still needs assistance with \"things  \" Upon further discussion and review; pt identified ability to complete upper body dressing, toiletting, and bathing  Pt reports unable to manage lower body dressing  Pt also reports still needing someone near her to complete bathing and toiletting due to fear of falling  Pt acknowledged not needing any hands on assistance  Pt reports being incontinent of urine and needing assistance with changing her depends  Pt's dtr reports pt was not incontinent during day prior to admission  Towards end of meeting pt acknowledged being d/c ready  CM informed pt's family of need for pt to be assessed by Dr Merian Hammans prior to determining d/c date  CM to update pt's dtr tomorrow     "

## 2023-05-24 NOTE — CMS CERTIFICATION NOTE
Certification: Based upon physical, mental and social evaluations, I certify that inpatient psychiatric services are medically necessary for this patient for a duration of 10 midnights for the treatment of Bipolar Disorder  I further attest that an established written individualized plan of care has been implemented and is outlined in the patient's medical records        Jose Luis Shields MD

## 2023-05-25 LAB
GLUCOSE SERPL-MCNC: 133 MG/DL (ref 65–140)
GLUCOSE SERPL-MCNC: 143 MG/DL (ref 65–140)
GLUCOSE SERPL-MCNC: 205 MG/DL (ref 65–140)
GLUCOSE SERPL-MCNC: 219 MG/DL (ref 65–140)

## 2023-05-25 RX ORDER — HYDROXYZINE HYDROCHLORIDE 25 MG/1
25 TABLET, FILM COATED ORAL EVERY 6 HOURS PRN
Status: DISCONTINUED | OUTPATIENT
Start: 2023-05-25 | End: 2023-05-30 | Stop reason: HOSPADM

## 2023-05-25 RX ORDER — GABAPENTIN 100 MG/1
200 CAPSULE ORAL
Status: DISCONTINUED | OUTPATIENT
Start: 2023-05-25 | End: 2023-05-30 | Stop reason: HOSPADM

## 2023-05-25 RX ADMIN — METOPROLOL SUCCINATE 37.5 MG: 25 TABLET, EXTENDED RELEASE ORAL at 08:15

## 2023-05-25 RX ADMIN — ATORVASTATIN CALCIUM 40 MG: 40 TABLET, FILM COATED ORAL at 17:05

## 2023-05-25 RX ADMIN — GABAPENTIN 200 MG: 100 CAPSULE ORAL at 21:33

## 2023-05-25 RX ADMIN — LURASIDONE HYDROCHLORIDE 80 MG: 80 TABLET, FILM COATED ORAL at 17:05

## 2023-05-25 RX ADMIN — DOCUSATE SODIUM 100 MG: 100 CAPSULE, LIQUID FILLED ORAL at 08:15

## 2023-05-25 RX ADMIN — TORSEMIDE 10 MG: 5 TABLET ORAL at 08:15

## 2023-05-25 RX ADMIN — INSULIN LISPRO 2 UNITS: 100 INJECTION, SOLUTION INTRAVENOUS; SUBCUTANEOUS at 12:17

## 2023-05-25 RX ADMIN — DOCUSATE SODIUM 100 MG: 100 CAPSULE, LIQUID FILLED ORAL at 17:05

## 2023-05-25 RX ADMIN — ASPIRIN 81 MG: 81 TABLET, COATED ORAL at 08:16

## 2023-05-25 RX ADMIN — SODIUM CHLORIDE 1 G: 1 TABLET ORAL at 17:05

## 2023-05-25 RX ADMIN — INSULIN LISPRO 1 UNITS: 100 INJECTION, SOLUTION INTRAVENOUS; SUBCUTANEOUS at 21:28

## 2023-05-25 RX ADMIN — HYDROXYZINE HYDROCHLORIDE 25 MG: 25 TABLET ORAL at 19:02

## 2023-05-25 RX ADMIN — SODIUM CHLORIDE 1 G: 1 TABLET ORAL at 08:15

## 2023-05-25 RX ADMIN — GABAPENTIN 100 MG: 100 CAPSULE ORAL at 08:16

## 2023-05-25 RX ADMIN — SODIUM CHLORIDE 1 G: 1 TABLET ORAL at 12:17

## 2023-05-25 RX ADMIN — GABAPENTIN 100 MG: 100 CAPSULE ORAL at 17:05

## 2023-05-25 RX ADMIN — CYANOCOBALAMIN TAB 1000 MCG 1000 MCG: 1000 TAB at 08:15

## 2023-05-25 RX ADMIN — ACETAMINOPHEN 975 MG: 325 TABLET ORAL at 02:07

## 2023-05-25 RX ADMIN — ACETAMINOPHEN 975 MG: 325 TABLET ORAL at 19:48

## 2023-05-25 RX ADMIN — LIDOCAINE 1 PATCH: 700 PATCH TOPICAL at 08:18

## 2023-05-25 RX ADMIN — FAMOTIDINE 20 MG: 20 TABLET ORAL at 17:05

## 2023-05-25 RX ADMIN — ACETAMINOPHEN 975 MG: 325 TABLET ORAL at 08:13

## 2023-05-25 RX ADMIN — FAMOTIDINE 20 MG: 20 TABLET ORAL at 08:15

## 2023-05-25 RX ADMIN — INSULIN GLARGINE 15 UNITS: 100 INJECTION, SOLUTION SUBCUTANEOUS at 21:28

## 2023-05-25 NOTE — TREATMENT TEAM
05/25/23 0903   Team Meeting   Meeting Type Daily Rounds   Initial Conference Date 05/25/23   Team Members Present   Team Members Present Physician;Nurse;;   Physician Team Member Dr Jayla Cherry Team Member VIPIN [facility] Team Member Jamari Butts Work Team Member Lena Estevez   Patient/Family Present   Patient Present No   Patient's Family Present No     Incontinent during daytime hours and overnight, family reports day time incontinence is new, plan for neuropsych and OT evals prior to d/c, cont to want assistance from staff

## 2023-05-25 NOTE — NURSING NOTE
Patient cooperative and anxious, isolative to self, visible in milieu, requires encouragement with ADLs  Patient often asks staff to watch her during care that she is able to perform herself  Patient redirected and encouraged often to perform ADL and staff will assist if needed  Patient provided with Lidocain patch for her right hip to alleviate pain  Patient endorses anxiety and depression, Denies SI/HI, Denies AH/VH  Safety precautions maintained

## 2023-05-25 NOTE — CASE MANAGEMENT
Call made to pt's dtr Jane Rea, tel# [phone], lvm notifying pt's dtr of pending consults for pt's d/c

## 2023-05-25 NOTE — NURSING NOTE
Provided with prn tylenol at 0207 for b/l leg pain  Pt awake at this time to use the bathroom  Ambulated with walker  Bed alarm maintained

## 2023-05-25 NOTE — PROGRESS NOTES
"  Progress Note - Mae [age] y o  female MRN: [id number]  Unit/Bed#: [facility] 962-06 Encounter: [id number]    The patient was seen for continuing care and reviewed with treatment team  She has been calm and visible  Continues to require encouragement to complete her ADLs  She does not feel ready to go home \" because my right leg thigh hurts\"  Patient worries she might not be able to get anything done at home because of her functional limitations  She appears brighter  She became upset and walked out of the room because I  encouraged her to try to be more independent with her ADLs  she has been eating, sleeping well  ROS : pain in side of both thighs which moves towards her back  /54 (BP Location: Right arm)   Pulse 80   Temp (!) 97 4 °F (36 3 °C) (Temporal)   Resp 16   Wt 65 1 kg (143 lb 9 6 oz)   SpO2 97%   BMI 24 65 kg/m²     Current Mental Status Evaluation:  Appearance:  Adequate hygiene and grooming, ambulates with walker   Behavior:  calm, cooperative and Superficial   Mood:  \" upset because every says I can take are of myself\"   Affect: mood-congruent   Speech: Normal volume and Normal rate   Thought Process:  Goal directed and coherent   Thought Content:  Does not verbalize delusional material, negative ruminations and thinking   Perceptual Disturbances: Denies hallucinations and does not appear to be responding to internal stimuli   Risk Potential: No suicidal or homicidal ideation   Orientation:   She is alert, awake and oriented x 3   Insight judgment and Impulse control limited          Recent Results (from the past 72 hour(s))   Basic metabolic panel    Collection Time: 05/23/23  6:29 AM   Result Value Ref Range    Sodium 138 135 - 147 mmol/L    Potassium 3 8 3 5 - 5 3 mmol/L    Chloride 100 96 - 108 mmol/L    CO2 30 21 - 32 mmol/L    ANION GAP 8 4 - 13 mmol/L    BUN 19 5 - 25 mg/dL    Creatinine 0 71 0 60 - 1 30 mg/dL    Glucose 104 65 - 140 mg/dL    " Calcium 8 9 8 4 - 10 2 mg/dL    eGFR 87 ml/min/1 73sq m   CBC and differential    Collection Time: 05/23/23  6:29 AM   Result Value Ref Range    WBC 5 13 4 31 - 10 16 Thousand/uL    RBC 3 04 (L) 3 81 - 5 12 Million/uL    Hemoglobin 8 9 (L) 11 5 - 15 4 g/dL    Hematocrit 28 3 (L) 34 8 - 46 1 %    MCV 93 82 - 98 fL    MCH 29 3 26 8 - 34 3 pg    MCHC 31 4 31 4 - 37 4 g/dL    RDW 15 9 (H) 11 6 - 15 1 %    MPV 9 1 8 9 - 12 7 fL    Platelets 093 564 - 172 Thousands/uL    nRBC 0 /100 WBCs    Neutrophils Relative 57 43 - 75 %    Immat GRANS % 0 0 - 2 %    Lymphocytes Relative 30 14 - 44 %    Monocytes Relative 10 4 - 12 %    Eosinophils Relative 2 0 - 6 %    Basophils Relative 1 0 - 1 %    Neutrophils Absolute 2 96 1 85 - 7 62 Thousands/µL    Immature Grans Absolute 0 01 0 00 - 0 20 Thousand/uL    Lymphocytes Absolute 1 52 0 60 - 4 47 Thousands/µL    Monocytes Absolute 0 50 0 17 - 1 22 Thousand/µL    Eosinophils Absolute 0 11 0 00 - 0 61 Thousand/µL    Basophils Absolute 0 03 0 00 - 0 10 Thousands/µL   Fingerstick Glucose (POCT)    Collection Time: 05/23/23  7:18 AM   Result Value Ref Range    POC Glucose 124 65 - 140 mg/dl   Fingerstick Glucose (POCT)    Collection Time: 05/23/23 11:31 AM   Result Value Ref Range    POC Glucose 145 (H) 65 - 140 mg/dl   Fingerstick Glucose (POCT)    Collection Time: 05/23/23  4:06 PM   Result Value Ref Range    POC Glucose 121 65 - 140 mg/dl   Fingerstick Glucose (POCT)    Collection Time: 05/23/23  9:04 PM   Result Value Ref Range    POC Glucose 211 (H) 65 - 140 mg/dl   Basic metabolic panel    Collection Time: 05/24/23  6:47 AM   Result Value Ref Range    Sodium 139 135 - 147 mmol/L    Potassium 4 1 3 5 - 5 3 mmol/L    Chloride 100 96 - 108 mmol/L    CO2 31 21 - 32 mmol/L    ANION GAP 8 4 - 13 mmol/L    BUN 19 5 - 25 mg/dL    Creatinine 0 74 0 60 - 1 30 mg/dL    Glucose 130 65 - 140 mg/dL    Glucose, Fasting 130 (H) 65 - 99 mg/dL    Calcium 9 3 8 4 - 10 2 mg/dL    eGFR 82 ml/min/1 73sq m Fingerstick Glucose (POCT)    Collection Time: 05/24/23  7:12 AM   Result Value Ref Range    POC Glucose 122 65 - 140 mg/dl   Fingerstick Glucose (POCT)    Collection Time: 05/24/23 11:57 AM   Result Value Ref Range    POC Glucose 161 (H) 65 - 140 mg/dl   Fingerstick Glucose (POCT)    Collection Time: 05/24/23  4:04 PM   Result Value Ref Range    POC Glucose 193 (H) 65 - 140 mg/dl   Fingerstick Glucose (POCT)    Collection Time: 05/24/23  8:58 PM   Result Value Ref Range    POC Glucose 261 (H) 65 - 140 mg/dl   Fingerstick Glucose (POCT)    Collection Time: 05/25/23  7:13 AM   Result Value Ref Range    POC Glucose 133 65 - 140 mg/dl   Fingerstick Glucose (POCT)    Collection Time: 05/25/23 11:39 AM   Result Value Ref Range    POC Glucose 219 (H) 65 - 140 mg/dl   Fingerstick Glucose (POCT)    Collection Time: 05/25/23  4:21 PM   Result Value Ref Range    POC Glucose 143 (H) 65 - 140 mg/dl   Fingerstick Glucose (POCT)    Collection Time: 05/25/23  9:04 PM   Result Value Ref Range    POC Glucose 205 (H) 65 - 140 mg/dl     Progress Toward Goals: No significant events in the past 24 hours  Neuropsych evaluation and OT cog eval for discharge, Likely discharge Monday  Will discuss with daughter  Sodium is stable  Principal Problem:    Bipolar affective disorder, depressed, severe, with psychotic behavior ([address] )  Active Problems:    Type 2 diabetes mellitus without complication, with long-term current use of insulin ([facility])    Essential hypertension    Hyponatremia    History of DVT of lower extremity    Dysphagia    UTI (urinary tract infection)    History of femur fracture    Medical clearance for psychiatric admission    Anemia    Discharge planning update: The patient will return to previous living arrangement    Recommended Treatment: Continue with pharmacotherapy, group therapy, milieu therapy and occupational therapy    The patient will be maintained on the following medications:  Current Facility-Administered Medications   Medication Dose Route Frequency Provider Last Rate   • acetaminophen  650 mg Oral Q4H PRN JOHN House     • acetaminophen  650 mg Oral Q4H PRN JOHN House     • acetaminophen  975 mg Oral Q6H PRN KARLEE HouseNP     • aspirin  81 mg Oral Daily Bhanu Days, CRNP     • atorvastatin  40 mg Oral Daily With KARLEE AgarwalNP     • bisacodyl  10 mg Rectal Daily PRN Wilfrido Soto MD     • cyanocobalamin  1,000 mcg Oral Daily Bhanu Days, CRNP     • Diclofenac Sodium  2 g Topical 4x Daily PRN Volodymyr Friedman MD     • docusate sodium  100 mg Oral BID Ave Maykel PADaronC     • famotidine  20 mg Oral BID Ave EZEKIEL Brar     • gabapentin  100 mg Oral BID Wilfrido Soto MD     • haloperidol lactate  5 mg Intramuscular Q4H PRN Max 4/day KARLEE HouseNP     • insulin glargine  15 Units Subcutaneous HS Bhanu Days, CRNP     • insulin lispro  1-5 Units Subcutaneous TID AC Bhanu Days, CRNP     • insulin lispro  1-5 Units Subcutaneous HS Bhanu Days, CRNP     • lidocaine  1 patch Topical Daily Ave REILLY BrarC     • lurasidone  80 mg Oral Daily With Analia Johnson MD     • metoprolol succinate  37 5 mg Oral Daily JOHN Alston     • polyethylene glycol  17 g Oral Daily PRN Irlanda Brar PA-C     • risperiDONE  0 25 mg Oral Q4H PRN Max 6/day KARLEE HouseNP     • risperiDONE  0 5 mg Oral Q4H PRN Max 3/day JOHN House     • risperiDONE  1 mg Oral Q2H PRN Max 3/day JOHN House     • sodium chloride  1 g Oral TID With Meals Tasia Cross, DO     • torsemide  10 mg Oral Daily Jatinder Olsen, DO     • traZODone  50 mg Oral HS PRN JOHN House

## 2023-05-25 NOTE — TREATMENT TEAM
05/25/23 1902   Walker Anxiety Scale   Anxious Mood 2   Tension 3   Fears 2   Insomnia 0   Intellectual 3   Depressed Mood 0   Somatic Complaints: Muscular 0   Somatic Complaints: Sensory 0   Cardiovascular Symptoms 0   Respiratory Symptoms 0   Gastrointestinal Symptoms 0   Genitourinary Symptoms 0   Autonomic Symptoms 0   Behavior at Interview 3   Walker Anxiety Score 13     Patient administered Atarax for mild anxiety  Patient pacing back and forth noted visibly anxious

## 2023-05-25 NOTE — TREATMENT TEAM
Pt was visible for all groups although late or restless and did not interact with prompts  05/25/23 1000   Activity/Group Checklist   Group Other (Comment)  (Greet the day)   Attendance Attended   Attendance Duration (min) 46-60   Interactions Other (Comment)  (restless and left room)   Affect/Mood Wide   Goals Achieved Identified feelings; Increased hopefulness; Discussed coping strategies; Able to listen to others; Able to engage in interactions; Able to self-disclose

## 2023-05-26 PROBLEM — E87.1 HYPONATREMIA: Status: RESOLVED | Noted: 2022-10-15 | Resolved: 2023-05-26

## 2023-05-26 LAB
ANION GAP SERPL CALCULATED.3IONS-SCNC: 11 MMOL/L (ref 4–13)
BUN SERPL-MCNC: 17 MG/DL (ref 5–25)
CALCIUM SERPL-MCNC: 8.9 MG/DL (ref 8.4–10.2)
CHLORIDE SERPL-SCNC: 100 MMOL/L (ref 96–108)
CO2 SERPL-SCNC: 30 MMOL/L (ref 21–32)
CREAT SERPL-MCNC: 0.65 MG/DL (ref 0.6–1.3)
GFR SERPL CREATININE-BSD FRML MDRD: 90 ML/MIN/1.73SQ M
GLUCOSE P FAST SERPL-MCNC: 114 MG/DL (ref 65–99)
GLUCOSE SERPL-MCNC: 114 MG/DL (ref 65–140)
GLUCOSE SERPL-MCNC: 139 MG/DL (ref 65–140)
GLUCOSE SERPL-MCNC: 140 MG/DL (ref 65–140)
GLUCOSE SERPL-MCNC: 152 MG/DL (ref 65–140)
GLUCOSE SERPL-MCNC: 260 MG/DL (ref 65–140)
POTASSIUM SERPL-SCNC: 4.1 MMOL/L (ref 3.5–5.3)
SODIUM SERPL-SCNC: 141 MMOL/L (ref 135–147)

## 2023-05-26 RX ADMIN — DOCUSATE SODIUM 100 MG: 100 CAPSULE, LIQUID FILLED ORAL at 17:12

## 2023-05-26 RX ADMIN — SODIUM CHLORIDE 1 G: 1 TABLET ORAL at 08:20

## 2023-05-26 RX ADMIN — FAMOTIDINE 20 MG: 20 TABLET ORAL at 08:20

## 2023-05-26 RX ADMIN — GABAPENTIN 100 MG: 100 CAPSULE ORAL at 08:20

## 2023-05-26 RX ADMIN — INSULIN LISPRO 1 UNITS: 100 INJECTION, SOLUTION INTRAVENOUS; SUBCUTANEOUS at 21:38

## 2023-05-26 RX ADMIN — LURASIDONE HYDROCHLORIDE 80 MG: 80 TABLET, FILM COATED ORAL at 17:12

## 2023-05-26 RX ADMIN — LIDOCAINE 1 PATCH: 700 PATCH TOPICAL at 08:20

## 2023-05-26 RX ADMIN — CYANOCOBALAMIN TAB 1000 MCG 1000 MCG: 1000 TAB at 08:20

## 2023-05-26 RX ADMIN — ASPIRIN 81 MG: 81 TABLET, COATED ORAL at 08:20

## 2023-05-26 RX ADMIN — INSULIN GLARGINE 15 UNITS: 100 INJECTION, SOLUTION SUBCUTANEOUS at 21:37

## 2023-05-26 RX ADMIN — INSULIN LISPRO 2 UNITS: 100 INJECTION, SOLUTION INTRAVENOUS; SUBCUTANEOUS at 12:01

## 2023-05-26 RX ADMIN — FAMOTIDINE 20 MG: 20 TABLET ORAL at 17:12

## 2023-05-26 RX ADMIN — GABAPENTIN 200 MG: 100 CAPSULE ORAL at 21:38

## 2023-05-26 RX ADMIN — ATORVASTATIN CALCIUM 40 MG: 40 TABLET, FILM COATED ORAL at 17:12

## 2023-05-26 RX ADMIN — GABAPENTIN 100 MG: 100 CAPSULE ORAL at 17:12

## 2023-05-26 RX ADMIN — DOCUSATE SODIUM 100 MG: 100 CAPSULE, LIQUID FILLED ORAL at 08:20

## 2023-05-26 RX ADMIN — METOPROLOL SUCCINATE 37.5 MG: 25 TABLET, EXTENDED RELEASE ORAL at 08:19

## 2023-05-26 RX ADMIN — TORSEMIDE 10 MG: 5 TABLET ORAL at 08:20

## 2023-05-26 NOTE — PROGRESS NOTES
Progress Note - Nephrology   Fernando Engle 70 y o  female MRN: [id number]  Unit/Bed#: FFQVU 548-60 Encounter: [id number]      Assessment / Plan:  1  Hyponatremia likely d/t SIADH +/- perrenal azotemia  -prior CT negative for malignancy  -sNa has improved to normal range, 141 on last check  -will d/c salt tabs 1g TID as sNa > 140  -continue torsemide 10mg daily, 1 5L/day fluid restriction  -suspect hyponatremia in part due to poor solute intake as well as SIADH  -nephrology will follow peripherally through the weekend  Call/text renal with questions      2  Bipolar disorder - management per psych     3  Anemia - Hgb 8 9, monitor CBC, transfuse prn     4  HTN - metanephrines/renin/aldosterone negative despite b/l adrenal adenomas  BP acceptable on metoprolol 37 5mg po daily, off amlodipine, Demadex 10mg daily     Will check BMP in am and again , Monday to ensure stability off salt tabs  Subjective:   She denies chest pain or shortness of breath  Urinating well  No complaints      Objective:     Vitals: Blood pressure 138/65, pulse 79, temperature 97 5 °F (36 4 °C), temperature source Temporal, resp  rate 16, weight 65 1 kg (143 lb 9 6 oz), SpO2 96 %, not currently breastfeeding  ,Body mass index is 24 65 kg/m²  Temp (24hrs), Av 5 °F (36 4 °C), Min:97 4 °F (36 3 °C), Max:97 6 °F (36 4 °C)      Weight (last 2 days)     Date/Time Weight    23 0600 65 1 (143 6)    23 0600 65 1 (143 52)            Intake/Output Summary (Last 24 hours) at 2023 0944  Last data filed at 2023 0926  Gross per 24 hour   Intake 920 ml   Output --   Net 920 ml     I/O last 24 hours: In: 1280 [P O :1280]  Out: -         Physical Exam:   Physical Exam  Vitals and nursing note reviewed  Constitutional:       General: She is not in acute distress  Appearance: She is well-developed  She is not diaphoretic  HENT:      Head: Normocephalic and atraumatic        Mouth/Throat:      Pharynx: No oropharyngeal exudate  Eyes:      General: No scleral icterus  Right eye: No discharge  Left eye: No discharge  Comments: eyeglasses   Neck:      Thyroid: No thyromegaly  Cardiovascular:      Rate and Rhythm: Normal rate and regular rhythm  Heart sounds: No murmur heard  Pulmonary:      Effort: Pulmonary effort is normal  No respiratory distress  Breath sounds: Normal breath sounds  No wheezing  Abdominal:      General: Bowel sounds are normal  There is no distension  Palpations: Abdomen is soft  Musculoskeletal:         General: No swelling  Cervical back: Normal range of motion and neck supple  Skin:     General: Skin is warm and dry  Coloration: Skin is not jaundiced  Findings: No rash  Neurological:      General: No focal deficit present  Mental Status: She is alert  Motor: No abnormal muscle tone        Comments: Awake, slow to respond to questions at times   Psychiatric:      Comments: Odd affect/flat         Invasive Devices     None                 Medications:    Scheduled Meds:  Current Facility-Administered Medications   Medication Dose Route Frequency Provider Last Rate   • acetaminophen  650 mg Oral Q4H PRN JOHN Vera     • acetaminophen  650 mg Oral Q4H PRN JOHN Vera     • acetaminophen  975 mg Oral Q6H PRN JOHN Vera     • aspirin  81 mg Oral Daily [place]     • atorvastatin  40 mg Oral Daily With JOHN Mcintyre     • bisacodyl  10 mg Rectal Daily PRN Shi Copeland MD     • cyanocobalamin  1,000 mcg Oral Daily [place]     • Diclofenac Sodium  2 g Topical 4x Daily PRN Volodymyr Friedman MD     • docusate sodium  100 mg Oral BID Sharona Zarate PA-C     • famotidine  20 mg Oral BID Sharona Zarate PA-C     • gabapentin  100 mg Oral BID Shi Copeland MD     • gabapentin  200 mg Oral HS Shi Copeland MD     • haloperidol lactate  5 mg Intramuscular Q4H PRN Max 4/day JOHN Strickland     • hydrOXYzine HCL  25 mg Oral Q6H PRN Jorge Crocker MD     • insulin glargine  15 Units Subcutaneous HS JOHN Stephens     • insulin lispro  1-5 Units Subcutaneous TID AC JOHN Stephens     • insulin lispro  1-5 Units Subcutaneous HS JOHN Stephens     • lidocaine  1 patch Topical Daily Lisa Valiente PA-C     • lurasidone  80 mg Oral Daily With Jenny Cerrato MD     • metoprolol succinate  37 5 mg Oral Daily JOHN Alston     • polyethylene glycol  17 g Oral Daily PRN Lisa Valiente PA-C     • risperiDONE  0 25 mg Oral Q4H PRN Max 6/day JOHN Strickland     • risperiDONE  0 5 mg Oral Q4H PRN Max 3/day JOHN Strickland     • risperiDONE  1 mg Oral Q2H PRN Max 3/day JOHN Strickland     • sodium chloride  1 g Oral TID With Meals Marsha Still DO     • torsemide  10 mg Oral Daily Marysue Lundborg Dunn, DO     • traZODone  50 mg Oral HS PRN JOHN Strickland         PRN Meds: •  acetaminophen  •  acetaminophen  •  acetaminophen  •  bisacodyl  •  Diclofenac Sodium  •  haloperidol lactate  •  hydrOXYzine HCL  •  polyethylene glycol  •  risperiDONE  •  risperiDONE  •  risperiDONE  •  traZODone    Continuous Infusions:         LAB RESULTS:      Results from last 7 days   Lab Units 05/26/23  0530 05/24/23  0647 05/23/23  0629 05/22/23  0529   BUN mg/dL 17 19 19 18   CALCIUM mg/dL 8 9 9 3 8 9 8 9   CHLORIDE mmol/L 100 100 100 101   CO2 mmol/L 30 31 30 31   CREATININE mg/dL 0 65 0 74 0 71 0 72   EOS PCT %  --   --  2  --    HEMATOCRIT %  --   --  28 3*  --    HEMOGLOBIN g/dL  --   --  8 9*  --    POTASSIUM mmol/L 4 1 4 1 3 8 4 1   LYMPHS PCT %  --   --  30  --    MONOS PCT %  --   --  10  --    NEUTROS PCT %  --   --  57  --    PLATELETS Thousands/uL  --   --  176  --    WBC Thousand/uL  --   --  5 13  --        CUTURES:  Lab Results   Component Value Date    BLOODCX No Growth "After 5 Days  10/15/2022    BLOODCX No Growth After 5 Days  10/15/2022    URINECX >100,000 cfu/ml Staphylococcus coagulase negative (A) 04/26/2023    URINECX >100,000 cfu/ml Pseudomonas aeruginosa (A) 03/29/2023    URINECX No Growth <1000 cfu/mL 10/15/2022                 Portions of the record may have been created with voice recognition software  Occasional wrong word or \"sound a like\" substitutions may have occurred due to the inherent limitations of voice recognition software  Read the chart carefully and recognize, using context, where substitutions have occurred  If you have any questions, please contact the dictating provider      "

## 2023-05-26 NOTE — CASE MANAGEMENT
Call made to pt's dtr Sung Muro, tel# [phone]; reviewed pt's OT eval and pt's refusal of neuropsych eval  Pt's dtr reports concern that pt might have some onset of dementia due to pt's sister having dementia although in agreement potential for it to be behavioral  Sung Muro reports agreement with pt's d/c on Tuesday although would like to speak wit pt over weekend for pt to agree to comply with neuropsych eval prior to d/c  CM informed Sung Muro that if neurocog eval unable to be completed inpatient, then recommendation is for outpatient assessment  Jenifer in agreement   Plan for pt's d/c Tue; family  at 11 PM

## 2023-05-26 NOTE — PROGRESS NOTES
"  Progress Note - Mae [address] 70 y o  female MRN: [id number]  Unit/Bed#: IKJIS 478-82 Encounter: [id number]    The patient was seen for continuing care and reviewed with treatment team  Pt requested to speak with writer, has concerns about not being able to pull up her pants when she goes to use the restroom  During the interview today, she was able to do it by herself  She then decided  not to use the unit  restroom  \" because it stinks\", Her episodes of incontinence are behavioral   Sleep- good  Appetite- good  Energy: fair  No suicidal or homicidal ideations  No EPS, denies any side effects of medication     ROS : foot pain, tolerating the gabapentin increase    /65 (BP Location: Right arm)   Pulse 79   Temp 97 5 °F (36 4 °C) (Temporal)   Resp 16   Wt 65 5 kg (144 lb 6 4 oz)   SpO2 96%   BMI 24 79 kg/m²     Current Mental Status Evaluation:  Appearance:  Adequate hygiene and grooming   Behavior:  calm, cooperative and friendly   Mood:   upset at times   Affect: mood-congruent, reactive, bright   Speech: Normal volume and Normal rate   Thought Process:  Goal directed and coherent   Thought Content:  Does not verbalize delusional material, concerns about her ADLs, somatic compliants   Perceptual Disturbances: Denies hallucinations and does not appear to be responding to internal stimuli   Risk Potential: No suicidal or homicidal ideation   Orientation:    she is alert, awake and oriented x 3   Patient that improving insight, Judgment and impulse control    Recent Results (from the past 72 hour(s))   Fingerstick Glucose (POCT)    Collection Time: 05/23/23  4:06 PM   Result Value Ref Range    POC Glucose 121 65 - 140 mg/dl   Fingerstick Glucose (POCT)    Collection Time: 05/23/23  9:04 PM   Result Value Ref Range    POC Glucose 211 (H) 65 - 140 mg/dl   Basic metabolic panel    Collection Time: 05/24/23  6:47 AM   Result Value Ref Range    Sodium 139 135 - 147 mmol/L    Potassium " Name: Adrián Sandhu      : 2003      MRN: [id number]  Encounter Provider: OTNI Davidson  Encounter Date: 10/14/2022   Encounter department: [place] [address]  Psychophysiological insomnia  Assessment & Plan:  Patient was advised to take hydroxyzine at least 30-45 minutes before bed to help with insomnia  I will follow-up with patient in 1 month to reassess her symptoms  TSH and T4 levels were also ordered to assess for any thyroid abnormalities which could be contributing to anxiety and insomnia  Orders:  -     TSH, 3rd generation; Future  -     T4, free; Future  -     hydrOXYzine HCL (ATARAX) 25 mg tablet; Take 1 tablet (25 mg total) by mouth every 6 (six) hours as needed for anxiety (As needed for anxiety and insomnia )    2  Anxiety  Assessment & Plan:  Patient reports daily anxiety and I feel this is most likely contributing to her insomnia as well  Patient was not interested in a daily medication for anxiety so she was started on hydroxyzine 25 mg to be used as needed up to every 6 hours  I spoke with patient about potential side effects of medication including drowsiness  I will have patient return to the office in 1 month to reassess her symptoms  Orders:  -     TSH, 3rd generation; Future  -     T4, free; Future  -     hydrOXYzine HCL (ATARAX) 25 mg tablet; Take 1 tablet (25 mg total) by mouth every 6 (six) hours as needed for anxiety (As needed for anxiety and insomnia )        Depression Screening and Follow-up Plan: Patient was screened for depression during today's encounter  They screened negative with a PHQ-2 score of 0  Subjective      Insomnia: The patient reports she has been having chronic issues with insomnia since she was a child  She reports she has difficulty falling asleep and staying asleep   She does report having some anxiety at bedtime but also reports having difficulty sleeping on the weekends when she is not 4 1 3 5 - 5 3 mmol/L    Chloride 100 96 - 108 mmol/L    CO2 31 21 - 32 mmol/L    ANION GAP 8 4 - 13 mmol/L    BUN 19 5 - 25 mg/dL    Creatinine 0 74 0 60 - 1 30 mg/dL    Glucose 130 65 - 140 mg/dL    Glucose, Fasting 130 (H) 65 - 99 mg/dL    Calcium 9 3 8 4 - 10 2 mg/dL    eGFR 82 ml/min/1 73sq m   Fingerstick Glucose (POCT)    Collection Time: 05/24/23  7:12 AM   Result Value Ref Range    POC Glucose 122 65 - 140 mg/dl   Fingerstick Glucose (POCT)    Collection Time: 05/24/23 11:57 AM   Result Value Ref Range    POC Glucose 161 (H) 65 - 140 mg/dl   Fingerstick Glucose (POCT)    Collection Time: 05/24/23  4:04 PM   Result Value Ref Range    POC Glucose 193 (H) 65 - 140 mg/dl   Fingerstick Glucose (POCT)    Collection Time: 05/24/23  8:58 PM   Result Value Ref Range    POC Glucose 261 (H) 65 - 140 mg/dl   Fingerstick Glucose (POCT)    Collection Time: 05/25/23  7:13 AM   Result Value Ref Range    POC Glucose 133 65 - 140 mg/dl   Fingerstick Glucose (POCT)    Collection Time: 05/25/23 11:39 AM   Result Value Ref Range    POC Glucose 219 (H) 65 - 140 mg/dl   Fingerstick Glucose (POCT)    Collection Time: 05/25/23  4:21 PM   Result Value Ref Range    POC Glucose 143 (H) 65 - 140 mg/dl   Fingerstick Glucose (POCT)    Collection Time: 05/25/23  9:04 PM   Result Value Ref Range    POC Glucose 205 (H) 65 - 140 mg/dl   Basic metabolic panel    Collection Time: 05/26/23  5:30 AM   Result Value Ref Range    Sodium 141 135 - 147 mmol/L    Potassium 4 1 3 5 - 5 3 mmol/L    Chloride 100 96 - 108 mmol/L    CO2 30 21 - 32 mmol/L    ANION GAP 11 4 - 13 mmol/L    BUN 17 5 - 25 mg/dL    Creatinine 0 65 0 60 - 1 30 mg/dL    Glucose 114 65 - 140 mg/dL    Glucose, Fasting 114 (H) 65 - 99 mg/dL    Calcium 8 9 8 4 - 10 2 mg/dL    eGFR 90 ml/min/1 73sq m   Fingerstick Glucose (POCT)    Collection Time: 05/26/23  7:15 AM   Result Value Ref Range    POC Glucose 140 65 - 140 mg/dl   Fingerstick Glucose (POCT)    Collection Time: 05/26/23 11:56 anxious and can relax  The patient does report having daily anxiety but denies any panic attacks or palpitations  The patient has never taken medication for anxiety in the past   Patient denies any snoring or noted periods of apnea  Review of Systems   Constitutional: Negative for chills and fever  HENT: Negative for ear pain and sore throat  Eyes: Negative for pain and visual disturbance  Respiratory: Negative for cough, chest tightness, shortness of breath and wheezing  Cardiovascular: Negative for chest pain, palpitations and leg swelling  Gastrointestinal: Negative for abdominal pain, constipation, diarrhea, nausea and vomiting  Endocrine: Negative for cold intolerance and heat intolerance  Genitourinary: Negative for decreased urine volume, dysuria and hematuria  Musculoskeletal: Negative for arthralgias, back pain and myalgias  Skin: Negative for color change and rash  Allergic/Immunologic: Negative for environmental allergies  Neurological: Negative for dizziness, seizures, syncope, weakness, light-headedness, numbness and headaches  Hematological: Negative for adenopathy  Psychiatric/Behavioral: Positive for sleep disturbance (insomnia)  Negative for confusion, decreased concentration, self-injury and suicidal ideas  The patient is nervous/anxious (daily)  All other systems reviewed and are negative        Current Outpatient Medications on File Prior to Visit   Medication Sig   • Ascorbic Acid (vitamin C) 1000 MG tablet Take 1,000 mg by mouth daily   • Biotin 1 MG CAPS Take 1 capsule by mouth daily   • cholecalciferol (VITAMIN D3) 1,000 units tablet Take 1,000 Units by mouth daily   • co-enzyme Q-10 30 MG capsule Take 30 mg by mouth daily   • folic acid (FOLVITE) 1 mg tablet Take 1 mg by mouth daily   • Multiple Vitamins-Minerals (MULTIVITAMIN ADULT) TABS Take by mouth   • bisacodyl (DULCOLAX) 5 mg EC tablet Take 5 mg by mouth   • cetirizine (ZyrTEC) 10 mg tablet Take 1 AM   Result Value Ref Range    POC Glucose 260 (H) 65 - 140 mg/dl     Progress Toward Goals: progressing, Pt needs to be discharged Tuesday  She has reached her baseline  Nephro still on Board  Will obtain a Bmp Monday  She decline neuropsychiatry eval today  OT cog pending  Discharge Tuesday      Assessment     Principal Problem:    Bipolar affective disorder, depressed, severe, with psychotic behavior ([facility] [address] )  Active Problems:    Type 2 diabetes mellitus without complication, with long-term current use of insulin ([facility])    Essential hypertension    History of DVT of lower extremity    Dysphagia    UTI (urinary tract infection)    History of femur fracture    Medical clearance for psychiatric admission    Anemia        Plan :    - Medications;   Psychiatric:Latuda 80mg daily for bipolar depression   Gabapentin 100mg BID and 200mg HS   Medical :BMP monday    -Therapy: occupational therapy, milieu and group therapy     -Disposition: Home tuesday tablet (10 mg total) by mouth daily (Patient taking differently: Take 10 mg by mouth daily PRN)   • naproxen sodium (ANAPROX) 550 mg tablet Take 550 mg by mouth   • ondansetron (ZOFRAN-ODT) 8 mg disintegrating tablet Take 8 mg by mouth every 8 (eight) hours as needed (Patient not taking: Reported on 10/14/2022)   • Polyethylene Glycol 1000 POWD Take 17 g by mouth daily PRN (Patient not taking: Reported on 10/14/2022)       Objective     BP 96/52 (BP Location: Right arm, Patient Position: Sitting)   Pulse 64   Ht 5' 2" (1 575 m)   Wt 56 7 kg (125 lb)   SpO2 98%   BMI 22 86 kg/m²     Physical Exam  Vitals and nursing note reviewed  Constitutional:       General: She is not in acute distress  Appearance: Normal appearance  She is not ill-appearing  HENT:      Head: Normocephalic  Eyes:      Conjunctiva/sclera: Conjunctivae normal    Cardiovascular:      Rate and Rhythm: Normal rate and regular rhythm  Pulses: Normal pulses  Carotid pulses are 2+ on the right side and 2+ on the left side  Radial pulses are 2+ on the right side and 2+ on the left side  Posterior tibial pulses are 2+ on the right side and 2+ on the left side  Heart sounds: Normal heart sounds  No murmur heard  Pulmonary:      Effort: Pulmonary effort is normal  No respiratory distress  Breath sounds: Normal breath sounds  No wheezing or rhonchi  Abdominal:      General: Abdomen is flat  Bowel sounds are normal  There is no distension  Palpations: Abdomen is soft  Tenderness: There is no abdominal tenderness  There is no guarding  Musculoskeletal:         General: Normal range of motion  Cervical back: Normal range of motion  Right lower leg: No edema  Left lower leg: No edema  Skin:     General: Skin is warm and dry  Capillary Refill: Capillary refill takes less than 2 seconds  Neurological:      General: No focal deficit present        Mental Status: She is alert and oriented to person, place, and time  Psychiatric:         Mood and Affect: Mood normal          Behavior: Behavior normal          Thought Content:  Thought content normal          Judgment: Judgment normal        Chestine Belt, CRNP

## 2023-05-26 NOTE — PLAN OF CARE
Problem: ANXIETY  Goal: Will report anxiety at manageable levels  Description: INTERVENTIONS:  - Administer medication as ordered  - Teach and encourage coping skills  - Provide emotional support  - Assess patient/family for anxiety and ability to cope  Outcome: Progressing     Problem: Potential for Falls  Goal: Patient will remain free of falls  Description: INTERVENTIONS:  - Educate patient/family on patient safety including physical limitations  - Instruct patient to call for assistance with activity   - Consult OT/PT to assist with strengthening/mobility   - Keep Call bell within reach  - Keep bed low and locked with side rails adjusted as appropriate  - Keep care items and personal belongings within reach  - Initiate and maintain comfort rounds  - Make Fall Risk Sign visible to staff  - Offer Toileting every 2 Hours, in advance of need  - Initiate/Maintain bed alarm  - Obtain necessary fall risk management equipment: walker, bracelet  socks, anti fall bed  - Apply yellow socks and bracelet for high fall risk patients  - Consider moving patient to room near nurses station  Outcome: Progressing     Problem: SELF CARE DEFICIT  Goal: Return ADL status to a safe level of function  Description: INTERVENTIONS:  - Administer medication as ordered  - Assess ADL deficits and provide assistive devices as needed  - Obtain PT/OT consults as needed  - Assist and instruct patient to increase activity and self care as tolerated  Outcome: Progressing

## 2023-05-26 NOTE — OCCUPATIONAL THERAPY NOTE
Occupational Therapy Cognitive Evaluation     Patient Name: Carmen Humphrey  Today's Date: 5/26/2023  Problem List  Principal Problem:    Bipolar affective disorder, depressed, severe, with psychotic behavior ([facility] [address] )  Active Problems:    Type 2 diabetes mellitus without complication, with long-term current use of insulin ([address] )    Essential hypertension    History of DVT of lower extremity    Dysphagia    UTI (urinary tract infection)    History of femur fracture    Medical clearance for psychiatric admission    Anemia    Past Medical History  Past Medical History:   Diagnosis Date    Anesthesia complication     difficulty waking up    Anxiety     Arthritis     left knee    Bipolar 1 disorder ([address] )     Bone spur     left knee behing the knee cap    Cancer ([address] )     Chronic kidney disease     Chronic pain disorder     Colon cancer ([facility] [address] )     patient states about 2017    Colon polyp     Tubulovillous Adenoma High Grade Dysplasia - 2017    Depression     Diabetes mellitus ([address] )     Endometrial cancer ([facility] [address] )     grade I    Hx of bleeding disorder     vaginal bleeding started on 3/13/17     Hyperlipidemia     Hypertension     Hypomagnesemia 03/20/2023    Memory loss     PONV (postoperative nausea and vomiting)     Psychiatric disorder     Psychiatric illness     Psychosis ([address] )     Shortness of breath     with exertion    Sleep difficulties     Urinary incontinence     Vitamin D deficiency      Past Surgical History  Past Surgical History:   Procedure Laterality Date    BREAST BIOPSY Left     benign-maybe at ar age 58    CHOLECYSTECTOMY      open    COLONOSCOPY      DILATION AND CURETTAGE OF UTERUS N/A 04/05/2017    Procedure: DILATATION AND CURETTAGE (D&C);   Surgeon: Dara Sharma MD;  Location: [facility] MAIN OR;  Service:     HYSTERECTOMY      OOPHORECTOMY      PELVIC LAPAROSCOPY      OK ARTHRP KNE CONDYLE&PLATU MEDIAL&LAT COMPARTMENTS Left 12/06/2022    Procedure: ARTHROPLASTY KNEE TOTAL W ROBOT - CEMENTED - LEFT; Surgeon: Sarah Beard DO;  Location: [address] [place] [address];  Service: Orthopedics    AK LAPS TOTAL HYSTERECT 250 GM/< W/RMVL TUBE/OVARY N/A 05/04/2017    Procedure: ROBOTIC ASSISTED TOTAL LAPAROSCOPIC HYSTERECTOMY, BILATERAL SALPINGOOPHERECTOMY; CYSTOSCOPY;  Surgeon: Gay Mcgrath MD;  Location:  MAIN OR;  Service: Gynecology Oncology    AK OPTX FEM SHFT FX W/INSJ IMED IMPLT W/WO SCREW Left 3/20/2023    Procedure: INSERTION NAIL IM FEMUR RETROGRADE;  Surgeon: Filomena Zamora MD;  Location: [address] [place] [address];  Service: Orthopedics    REPLACEMENT TOTAL KNEE      TONSILLECTOMY      TUBAL LIGATION               05/26/23 1345   OT Last Visit   OT Visit Date 05/26/23   Note Type   Note type Evaluation   Pain Assessment   Pain Assessment Tool 0-10   Pain Score 7   Pain Location/Orientation Location: Rib Cage   Pain Onset/Description Onset: Ongoing   Restrictions/Precautions   Weight Bearing Precautions Per Order No   Other Precautions Cognitive   Home Living   Type of Home Apartment   Home Layout One level;Performs ADLs on one level   Bathroom Shower/Tub Walk-in shower   Home Equipment Walker;Cane;Grab bars   Prior Function   Level of [place] Independent with ADLs   Lives With Spouse; Son   Nadine Whitley [facility] in the last 6 months 1 to 4   ADL   Eating Assistance 6  Modified independent   Grooming Assistance 6  Modified Independent   UB Bathing Assistance 6  Modified Independent   LB Bathing Assistance 4  Minimal Assistance   [address] 6  Modified independent   LB Dressing Assistance 4  Minimal [address]  4  Minimal Assistance   Bed Mobility   Supine to Sit 5  Supervision   Sit to Supine 5  Supervision   Transfers   Sit to Stand   (CGA)   Stand to Sit   (CGA)   Toilet transfer   (CGA)   Functional Mobility   Functional Mobility 5  Supervision   Additional items Rolling walker   Balance   Static Sitting Fair +   Dynamic Sitting Fair   Static Standing Fair   Dynamic Standing Fair Activity Tolerance   Activity Tolerance Patient limited by pain   RUE Assessment   RUE Assessment WFL   LUE Assessment   LUE Assessment WFL   Hand Function   Gross Motor Coordination Functional   Sensation   Light Touch No apparent deficits   Proprioception   Proprioception No apparent deficits   Perception   Inattention/Neglect Appears intact   Cognition   Arousal/Participation Alert   Attention Attends with cues to redirect   Orientation Level Oriented to person;Oriented to place   Memory Decreased recall of recent events;Decreased recall of precautions   Following Commands Follows one step commands with increased time or repetition   Cognition Assessment Tools ACLS   Score   4 4    Administered Jamie Amor Cognitive Level Screen (ACLS)  Pt scored 4 4/6 0 indicating it is recommended that the person live with someone who does a daily check on the environment to remove any safety hazards and solve problems when minor changes in the home occur  The person may be alone for part of the day with a pre-established procedure for getting help from a neighbor  Behavior:  Knows day/date  Follows routine sequence of activities  Will learn schedule and follow daily routine  Hazards are not anticipated  Memorization is slow  Will need long term repetitive training for all new tasks  May become upset if routine is altered  May seek assist if lost   Do not expect to be aware of needs of others  May need reminders to keep appointments  Grooming:  [place], brushes and styles hair  Applies makeup  May insist on familiar products  Finds supplies in familiar locations  May be unable to master use of new tools  Hazards are not anticipated  Dressing: Finds garments in familiar locations  Initiates dressing at usual times  Selects familiar combinations of outfits and may wear same outfit over and over  Resists new combinations  May fail to consider weather conditions, season or occasion when selecting clothing    May argue with suggested corrections of errors  Bathing:  Initiates bath/shower at customary times and follows typical routine  May collect supplies from a familiar location  May not vary rate  May want to use same products  May use excessive amounts of product  May have difficulty opening small or unusual containers  May leave towels on floor  Protect from unseen hazards (wet floors, electrical appliances near water)  Walking/exercising:  Ambulates within fitness capacity in neighborhood  Chooses to go by familiar routes  May fail to attend to signs or activities outside visual field  Needs new route identified by others and may learn after several weeks of practice  May become anxious in high stimulus environments (malls, airports, casinos)  Eating: May notice and clean highly visible dropped food items  May not be able to eat and converse at the same time  May resist changes in diet and menu  Watch handling of hot foods/liquids and heavy items  Toileting: Follows a routine of toileting in a familiar environment  Needs to have public rest rooms pointed out  May use too much paper  Does not consider needs of others  May spend excessive time in rest room  Medications: May follow routine rigidly and resist changes in prescriptions based on erroneous beliefs of effects  Does not note adverse side effects  Does not anticipate need to renew prescriptions  May be able to open child proof containers  Can use DAILY pill box marked with day/time (filled by another person)  Use of adaptive equipment:  May be trained to use adaptive equipment that requires a sequence of familiar actions  Does not anticipate hazards  Once learned, may resist changes in equipment  May abandon use of assistive device or use in unsafe manner  Housekeeping:  Sweeps, polishes and puts away objects to match previous performance  Fails to see dirt or dust in corners    May use excessive amounts of , polish or water   May resist changes in routine or products used  May fail to differentiate between similar cleaning products  Food preparation:  May follow a fixed diet and go hungry if usual food items are not available  Does not check inventory and may run out of essentials frequently  Does not consider nutritional needs  Goes to familiar restaurants of grocery stores  Is able to prepare simple hot/cold dishes  Spending money:  Manages routine purchases for immediate needs  May count cash very slowly  May use credit cards or checks  May need assistance for making monthly budget  May not remember to account for taxes or tips  Can manage a daily allowance  Shopping:  Goes to familiar stores for routine items  Does not compare product prices or quality  May not consider cost of item in relation to overall budget  May overspend  Laundry:  Initiates and completes laundry routine at usual time  May sort items by color  May follow schedule rigidly  May forget to clean lint traps  May forget to turn iron off  Traveling:  Needs new routes and travel procedures identified by others  May express interest in driving a car but fails to attend to all environmental cues to do so safely  May not allow adequate time for travel  Telephone:  Writes down messages and new numbers slowly  May attempt to use an address book  May make calls at odd hours without consideration of other’s schedule or cost of call  May run up large telephone bills  Driving: Should NOT operate a motor vehicle  Assessment   Limitation Decreased cognition;Decreased high-level ADLs   Assessment   Pt is a 70 y o  female seen for OT evaluation s/p [facility] on 4/27/2023 w/ Bipolar affective disorder, depressed, severe, with psychotic behavior ([facility] [address] )  See medical history above for extensive list of comorbidities affecting Pt's functional performance at time of assessment   Personal factors affecting Pt at time of IE include:difficulty performing IADLS , limited insight into deficits, and health management   Up assessment, Pt difficult to engage  Pt very self-limiting  Pt at first able to reach BLEs for LB dressing, however reported sudden onset of rib pain and Pt abruptly returning to supine  Pt did require CGA/Lizett from lower surfaces at times, although inconsistent, also demonstrated ability to complete with supervision  Pt limited in novel tasks due to impaired problem-solving, insight, and judgment  Pt identified 2/3 home safety scenarios  Pt able to verbalize calling 911 in an emergency, however stated she forgot her address  Noted discrepancies between Pt report of function as well as variable performance throughout session  Recommend daily assist/supervision for safety with ADLs/IADLs as Pt required max encouragement to engage in self-care and for thorough completion of task  See below for results of formal cognitive assessment  Plan   OT Frequency Eval only   Recommendation   OT Discharge Recommendation Pt scored 4 4/6 0 indicating it is recommended that the person live with someone who does a daily check on the environment to remove any safety hazards and solve problems when minor changes in the home occur  The person may be alone for part of the day with a pre-established procedure for getting help from a neighbor

## 2023-05-26 NOTE — NURSING NOTE
Patient is visible in milieu, socializing with peers  Denies SI/HI/AVH and depression reports mild anxiety  Patient c/o b/l leg pain 10/10 PRN tylenol 975 administered at [address]  Pt is medication compliant  Pt is pleasant and cooperative  No other issues or concerns noted at this time  Safety checks maintained

## 2023-05-26 NOTE — TREATMENT TEAM
05/26/23 0841   Team Meeting   Meeting Type Daily Rounds   Initial Conference Date 05/26/23   Team Members Present   Team Members Present Physician;Nurse;;Occupational Therapist   Physician Team Member Dr Roberto De La Paz Team Member [facility] Team Member aJmari Butts Work Team Member [place]   Patient/Family Present   Patient Present No   Patient's Family Present No     reporting leg pain, received tylenol, + anxiety, PRN atarax with good effect, gabapentin increased at bedtime, pending neuropsych eval and OT eval, cont to report inability to care for self, plan d/c Tuesday

## 2023-05-26 NOTE — TREATMENT TEAM
Pt restless and left group early  Pt negative with another peer  05/26/23 1100   Activity/Group Checklist   Group Other (Comment)  ( Recovery and Mindfulness)   Attendance Attended; Other (Comment)  (left early and restless)

## 2023-05-26 NOTE — NURSING NOTE
"Pt visible in dayroom upon assessment  She appears preoccupied with her hygiene saying \"people think she stinks\"  Pt denies SI/HI/AV/HV  Pt takes medication whole in applesauce without issue   Will continue to monitor  "

## 2023-05-27 LAB
ANION GAP SERPL CALCULATED.3IONS-SCNC: 8 MMOL/L (ref 4–13)
BUN SERPL-MCNC: 13 MG/DL (ref 5–25)
CALCIUM SERPL-MCNC: 9.7 MG/DL (ref 8.4–10.2)
CHLORIDE SERPL-SCNC: 99 MMOL/L (ref 96–108)
CO2 SERPL-SCNC: 32 MMOL/L (ref 21–32)
CREAT SERPL-MCNC: 0.72 MG/DL (ref 0.6–1.3)
GFR SERPL CREATININE-BSD FRML MDRD: 85 ML/MIN/1.73SQ M
GLUCOSE P FAST SERPL-MCNC: 133 MG/DL (ref 65–99)
GLUCOSE SERPL-MCNC: 133 MG/DL (ref 65–140)
GLUCOSE SERPL-MCNC: 136 MG/DL (ref 65–140)
GLUCOSE SERPL-MCNC: 158 MG/DL (ref 65–140)
GLUCOSE SERPL-MCNC: 164 MG/DL (ref 65–140)
GLUCOSE SERPL-MCNC: 174 MG/DL (ref 65–140)
POTASSIUM SERPL-SCNC: 4.2 MMOL/L (ref 3.5–5.3)
SODIUM SERPL-SCNC: 139 MMOL/L (ref 135–147)

## 2023-05-27 RX ADMIN — INSULIN LISPRO 1 UNITS: 100 INJECTION, SOLUTION INTRAVENOUS; SUBCUTANEOUS at 17:10

## 2023-05-27 RX ADMIN — ACETAMINOPHEN 975 MG: 325 TABLET ORAL at 12:26

## 2023-05-27 RX ADMIN — ATORVASTATIN CALCIUM 40 MG: 40 TABLET, FILM COATED ORAL at 17:09

## 2023-05-27 RX ADMIN — INSULIN LISPRO 1 UNITS: 100 INJECTION, SOLUTION INTRAVENOUS; SUBCUTANEOUS at 12:21

## 2023-05-27 RX ADMIN — CYANOCOBALAMIN TAB 1000 MCG 1000 MCG: 1000 TAB at 08:57

## 2023-05-27 RX ADMIN — LIDOCAINE 1 PATCH: 700 PATCH TOPICAL at 09:00

## 2023-05-27 RX ADMIN — INSULIN GLARGINE 15 UNITS: 100 INJECTION, SOLUTION SUBCUTANEOUS at 21:35

## 2023-05-27 RX ADMIN — INSULIN LISPRO 1 UNITS: 100 INJECTION, SOLUTION INTRAVENOUS; SUBCUTANEOUS at 21:35

## 2023-05-27 RX ADMIN — GABAPENTIN 200 MG: 100 CAPSULE ORAL at 21:35

## 2023-05-27 RX ADMIN — FAMOTIDINE 20 MG: 20 TABLET ORAL at 17:10

## 2023-05-27 RX ADMIN — GABAPENTIN 100 MG: 100 CAPSULE ORAL at 09:01

## 2023-05-27 RX ADMIN — TORSEMIDE 10 MG: 5 TABLET ORAL at 08:58

## 2023-05-27 RX ADMIN — DOCUSATE SODIUM 100 MG: 100 CAPSULE, LIQUID FILLED ORAL at 08:58

## 2023-05-27 RX ADMIN — ASPIRIN 81 MG: 81 TABLET, COATED ORAL at 09:02

## 2023-05-27 RX ADMIN — DOCUSATE SODIUM 100 MG: 100 CAPSULE, LIQUID FILLED ORAL at 17:09

## 2023-05-27 RX ADMIN — LURASIDONE HYDROCHLORIDE 80 MG: 80 TABLET, FILM COATED ORAL at 17:10

## 2023-05-27 RX ADMIN — METOPROLOL SUCCINATE 37.5 MG: 25 TABLET, EXTENDED RELEASE ORAL at 08:59

## 2023-05-27 RX ADMIN — FAMOTIDINE 20 MG: 20 TABLET ORAL at 08:58

## 2023-05-27 RX ADMIN — GABAPENTIN 100 MG: 100 CAPSULE ORAL at 17:09

## 2023-05-27 NOTE — NURSING NOTE
Patient is irritable and argumentative  She has been fixated on staff not doing enough for her such as helping her with toileting and making her bed  She is compliant with medication  Present in milieu but not engaging with others

## 2023-05-27 NOTE — QUICK NOTE
Nephrology following for hyponatremia  Sodium remains stable at 139 today  Continue on torsemide 10 mg daily with fluid restriction 1 5 L daily  Plan to re-evaluate patient Monday with updated blood work    Please call nephrology if we can be of further assistance

## 2023-05-28 ENCOUNTER — APPOINTMENT (INPATIENT)
Dept: CT IMAGING | Facility: HOSPITAL | Age: 68
End: 2023-05-28

## 2023-05-28 PROBLEM — F31.5 BIPOLAR AFFECTIVE DISORDER, DEPRESSED, SEVERE, WITH PSYCHOTIC BEHAVIOR (HCC): Chronic | Status: ACTIVE | Noted: 2017-04-13

## 2023-05-28 LAB
GLUCOSE SERPL-MCNC: 141 MG/DL (ref 65–140)
GLUCOSE SERPL-MCNC: 158 MG/DL (ref 65–140)
GLUCOSE SERPL-MCNC: 207 MG/DL (ref 65–140)
GLUCOSE SERPL-MCNC: 261 MG/DL (ref 65–140)

## 2023-05-28 RX ADMIN — INSULIN LISPRO 1 UNITS: 100 INJECTION, SOLUTION INTRAVENOUS; SUBCUTANEOUS at 12:29

## 2023-05-28 RX ADMIN — DOCUSATE SODIUM 100 MG: 100 CAPSULE, LIQUID FILLED ORAL at 08:34

## 2023-05-28 RX ADMIN — LIDOCAINE 1 PATCH: 700 PATCH TOPICAL at 08:35

## 2023-05-28 RX ADMIN — ACETAMINOPHEN 975 MG: 325 TABLET ORAL at 12:35

## 2023-05-28 RX ADMIN — LURASIDONE HYDROCHLORIDE 80 MG: 80 TABLET, FILM COATED ORAL at 17:19

## 2023-05-28 RX ADMIN — INSULIN LISPRO 1 UNITS: 100 INJECTION, SOLUTION INTRAVENOUS; SUBCUTANEOUS at 17:19

## 2023-05-28 RX ADMIN — GABAPENTIN 100 MG: 100 CAPSULE ORAL at 17:19

## 2023-05-28 RX ADMIN — ATORVASTATIN CALCIUM 40 MG: 40 TABLET, FILM COATED ORAL at 17:19

## 2023-05-28 RX ADMIN — INSULIN LISPRO 2 UNITS: 100 INJECTION, SOLUTION INTRAVENOUS; SUBCUTANEOUS at 22:01

## 2023-05-28 RX ADMIN — INSULIN GLARGINE 15 UNITS: 100 INJECTION, SOLUTION SUBCUTANEOUS at 22:05

## 2023-05-28 RX ADMIN — CYANOCOBALAMIN TAB 1000 MCG 1000 MCG: 1000 TAB at 08:33

## 2023-05-28 RX ADMIN — GABAPENTIN 100 MG: 100 CAPSULE ORAL at 08:34

## 2023-05-28 RX ADMIN — ASPIRIN 81 MG: 81 TABLET, COATED ORAL at 08:34

## 2023-05-28 RX ADMIN — GABAPENTIN 200 MG: 100 CAPSULE ORAL at 22:00

## 2023-05-28 RX ADMIN — TORSEMIDE 10 MG: 5 TABLET ORAL at 08:33

## 2023-05-28 RX ADMIN — FAMOTIDINE 20 MG: 20 TABLET ORAL at 08:33

## 2023-05-28 RX ADMIN — FAMOTIDINE 20 MG: 20 TABLET ORAL at 17:19

## 2023-05-28 RX ADMIN — DOCUSATE SODIUM 100 MG: 100 CAPSULE, LIQUID FILLED ORAL at 17:19

## 2023-05-28 RX ADMIN — METOPROLOL SUCCINATE 37.5 MG: 25 TABLET, EXTENDED RELEASE ORAL at 08:34

## 2023-05-28 NOTE — PROGRESS NOTES
"Progress Note - Mae 108 70 y o  female MRN: [id number]  Unit/Bed#: AWATU 095-09 Encounter: [id number]    Patient was seen today for continuation of care, records reviewed and patient was discussed with the morning case review team   Per staff, Yris SHETH stable at 139  Edward Pool slept last night  No acute behaviors  Edward Pool was seen today for psychiatric follow-up  On assessment today, Edward Pool was seen resting in bed  She states she is here because she needs help showering  She appears pleasantly confused  She is denying any psychiatric symptoms and reports her mood is \"fine\"  She is minimal in conversation  Edward Pool denies acute suicidal/self-harm ideation/intent/plan  Edward Pool remains behaviorally controlled with no agitation or aggression noted on exam or in report  Edward Pool denies HI/AH/VH, and does not appear internally preoccupied  No overt delusions or paranoia are verbalized  Edward Pool remains adherent to her current psychotropic medication regimen and denies any side effects from medications, as well as none noted on exam     Continue Neurontin 100mg PO BID and 200mg PO at HS, and Latuda 80mg PO with dinner  Laboratory Results:    I have personally reviewed all pertinent laboratory/tests results    Most Recent Labs:   Lab Results   Component Value Date    ALB 3 1 (L) 05/05/2023    ALKPHOS 85 04/29/2023    ALT 22 04/29/2023    AMMONIA 14 10/15/2022    AST 25 04/29/2023    BUN 13 05/27/2023    CALCIUM 9 7 05/27/2023    CHOLESTEROL 161 02/25/2020    CL 99 05/27/2023    CO2 32 05/27/2023    CREATININE 0 72 05/27/2023     03/22/2023    GLUC 133 05/27/2023    GLUF 133 (H) 05/27/2023    HCT 28 3 (L) 05/23/2023    HDL 61 02/25/2020    HGB 8 9 (L) 05/23/2023    HGBA1C 5 8 (H) 03/22/2023    K 4 2 05/27/2023    LDLCALC 65 02/25/2020    NEUTROABS 2 96 05/23/2023    NONHDLC 100 02/25/2020     05/23/2023    RBC 3 04 (L) 05/23/2023    RDW 15 9 (H) 05/23/2023    SODIUM " 139 05/27/2023    TBILI 0 35 04/29/2023    TP 6 1 (L) 04/29/2023    TRIG 176 (H) 02/25/2020    [id number] 0 886 04/29/2023    WBC 5 13 05/23/2023       Psychiatric Review of Systems:  Behavior over the last 24 hours:  unchanged  Sleep: adequate  Appetite: adequate  Medication side effects:   ROS: no complaints, denies shortness of breath or chest pain and all other systems are negative for acute changes    Mental Status Evaluation:    Appearance:  casually dressed, adequate grooming   Behavior:  calm, guarded   Speech:  scant, delayed, soft   Mood:  euthymic   Affect:  flat   Thought Process:  poverty of thought   Associations: intact associations   Thought Content:  no overt delusions   Perceptual Disturbances: denies auditory or visual hallucinations when asked, does not appear responding to internal stimuli   Risk Potential: Suicidal ideation - None at present  Homicidal ideation - None at present  Potential for aggression - No   Sensorium:  oriented to person   Memory:  short term memory impaired   Consciousness:  alert and awake   Attention/Concentration: attention span and concentration appear shorter than expected for age   Insight:  limited   Judgment: limited   Gait/Station: in bed   Motor Activity: no abnormal movements     Progress Toward Goals:   Aniyah Garcia is progressing towards goals of inpatient psychiatric treatment by continued medication compliance and is attending therapeutic modalities on the milieu  However, the patient continues to require inpatient psychiatric hospitalization for continued medication management and titration to optimize symptom reduction, improve sleep hygiene, and demonstrate adequate self-care      Risk of Harm to Self:   Nursing Suicide Risk Assessment Last 24 hours: C-SSRS Risk (Since Last Contact)  Calculated C-SSRS Risk Score (Since Last Contact): No Risk Indicated    Risk of Harm to Others:  Nursing Homicide Risk Assessment: Violence Risk to Others: Denies within past 6 months    The following interventions are recommended: behavioral checks every 7 minutes, continued hospitalization on locked unit      Assessment/Plan   Principal Problem:    Bipolar affective disorder, depressed, severe, with psychotic behavior ([facility] )  Active Problems:    Type 2 diabetes mellitus without complication, with long-term current use of insulin ([facility] [address] )    Essential hypertension    History of DVT of lower extremity    Dysphagia    UTI (urinary tract infection)    History of femur fracture    Medical clearance for psychiatric admission    Anemia      Recommended Treatment: Treatment plan and medication changes discussed and per the attending physician the plan is: 1  Continue with group therapy, milieu therapy and occupational therapy  2  Behavioral Health checks every 7 minutes  3  Continue frequent safety checks and vitals per unit protocol  4  Continue with SLIM medical management as indicated  5  Continue with current medication regimen  6  Will review labs in the a m 7 Disposition Planning: Discharge planning and efforts remain ongoing    Behavioral Health Medications: all current active meds have been reviewed and continue current psychiatric medications    Current Facility-Administered Medications   Medication Dose Route Frequency Provider Last Rate   • acetaminophen  650 mg Oral Q4H PRN KARLEE OrtizNP     • acetaminophen  650 mg Oral Q4H PRN KARLEE OrtizNP     • acetaminophen  975 mg Oral Q6H PRN KARLEE OrtizNP     • aspirin  81 mg Oral Daily JOHN Ravi     • atorvastatin  40 mg Oral Daily With JOHN Anand     • bisacodyl  10 mg Rectal Daily PRN Kat Ríos MD     • cyanocobalamin  1,000 mcg Oral Daily JOHN Ravi     • Diclofenac Sodium  2 g Topical 4x Daily PRN Volodymyr Friedman MD     • docusate sodium  100 mg Oral BID Carolina Valencia PA-C     • famotidine  20 mg Oral BID Carolina Valencia PA-C     • gabapentin  100 mg Oral BID Rufino Degroot MD     • gabapentin  200 mg Oral HS Rufino Degroot MD     • haloperidol lactate  5 mg Intramuscular Q4H PRN Max 4/day JOHN Spaulding     • hydrOXYzine HCL  25 mg Oral Q6H PRN Glendy Betancur MD     • insulin glargine  15 Units Subcutaneous HS Garcia Anita, KARLEENP     • insulin lispro  1-5 Units Subcutaneous TID AC Garcia JOHN Howard     • insulin lispro  1-5 Units Subcutaneous HS Garcia Loosejaja, JOHN     • lidocaine  1 patch Topical Daily Josh Wagoner PA-C     • lurasidone  80 mg Oral Daily With Nicki Crocker MD     • metoprolol succinate  37 5 mg Oral Daily JOHN Alston     • polyethylene glycol  17 g Oral Daily PRN Josh Wagoner PA-C     • risperiDONE  0 25 mg Oral Q4H PRN Max 6/day JOHN Spaulding     • risperiDONE  0 5 mg Oral Q4H PRN Max 3/day JOHN Spaulding     • risperiDONE  1 mg Oral Q2H PRN Max 3/day JOHN Spaulding     • torsemide  10 mg Oral Daily Angelic Winters DO     • traZODone  50 mg Oral HS PRN JOHN Spaulding         Risks / Benefits of Treatment:  Risks, benefits, and possible side effects of medications explained to patient  Patient does not verbalize understanding of benefits or risks of treatment refusal at this time and needs ongoing explanation of treatment benefits and treatment plan  Counseling / Coordination of Care:  Patient's progress reviewed with nursing staff  Medications, treatment progress and treatment plan reviewed with patient  Supportive counseling provided to the patient  Total floor/unit time spent today 25 minutes  Greater than 50% of total time was spent with the patient and / or family counseling and / or coordination of care  A description of the counseling / coordination of care: medication education, treatment plan, supportive therapy

## 2023-05-28 NOTE — NURSING NOTE
Patient observed to have left pupil larger then right pupil  Pupils are PERRL but not accommodating otherwise, neuro check was WDL and patient does not complain of any associated symptoms  SLIM MD aware and advising nursing

## 2023-05-28 NOTE — QUICK NOTE
Received the message from RN stating that patient left pupil is larger than right pupil  PERRL but not accommodating per patient  Pt is at baseline and normal neurocheck  Ordered CT head wo contrast to rule out neurological cause  Would recommend ophthalmology consult for possible ophthalmological etiology

## 2023-05-28 NOTE — PROGRESS NOTES
"Progress Note - Mae [age] y o  female MRN: [id number]  Unit/Bed#: [facility] 480-30 Encounter: [id number]    Patient was seen today for continuation of care, records reviewed and patient was discussed with the morning case review team   Per staff, Vivienne Hamman was argumentative and irritable last night  She is meal and medication compliant  She is visible in milieu but isolative to self  Vivienne Hamman was seen today for psychiatric follow-up  On assessment today, Vivienne Hamman was seen in the group room away from peers  She is asking if she will get showered today  We discussed that she had mentioned she was showered yesterday but she did not seem to remember that  Discussed with PCA's who confirmed she was showered yesterday and Vivienne Hamman asks often to be showered throughout the day  Vivienne Hamman is denying any psychiatric symptoms today  She reports her mood is \"fine\" but was perseverative about getting help showering  Vivienne Hamman denies acute suicidal/self-harm ideation/intent/plan  Vivienne Hamman remains behaviorally controlled, irritable at times, with no agitation or aggression noted on exam or in report  Vivienne Hamman denies HI/AH/VH, and does not appear internally preoccupied  No overt delusions or paranoia are verbalized  Vivienne Hamman remains adherent to her current psychotropic medication regimen and denies any side effects from medications, as well as none noted on exam     Continue current medications    Vitals:    05/28/23 0736   BP: 144/65   Pulse:    Resp:    Temp:    SpO2:          Laboratory Results:    I have personally reviewed all pertinent laboratory/tests results    Most Recent Labs:   Lab Results   Component Value Date    ALB 3 1 (L) 05/05/2023    ALKPHOS 85 04/29/2023    ALT 22 04/29/2023    AMMONIA 14 10/15/2022    AST 25 04/29/2023    BUN 13 05/27/2023    CALCIUM 9 7 05/27/2023    CHOLESTEROL 161 02/25/2020    CL 99 05/27/2023    CO2 32 05/27/2023    CREATININE 0 72 05/27/2023     " 03/22/2023    GLUC 133 05/27/2023    GLUF 133 (H) 05/27/2023    HCT 28 3 (L) 05/23/2023    HDL 61 02/25/2020    HGB 8 9 (L) 05/23/2023    HGBA1C 5 8 (H) 03/22/2023    K 4 2 05/27/2023    LDLCALC 65 02/25/2020    NEUTROABS 2 96 05/23/2023    NONHDLC 100 02/25/2020     05/23/2023    RBC 3 04 (L) 05/23/2023    RDW 15 9 (H) 05/23/2023    SODIUM 139 05/27/2023    TBILI 0 35 04/29/2023    TP 6 1 (L) 04/29/2023    TRIG 176 (H) 02/25/2020    [id number] 0 886 04/29/2023    WBC 5 13 05/23/2023       Psychiatric Review of Systems:  Behavior over the last 24 hours:  unchanged  Sleep: adequate  Appetite: adequate  Medication side effects: denies, none observed  ROS: no complaints, denies shortness of breath or chest pain and all other systems are negative for acute changes    Mental Status Evaluation:    Appearance:  casually dressed, adequate grooming   Behavior:  calm, guarded   Speech:  scant   Mood:  euthymic   Affect:  flat   Thought Process:  poverty of thought   Associations: intact associations   Thought Content:  no overt delusions, perseverative about showering   Perceptual Disturbances: denies auditory or visual hallucinations when asked, does not appear responding to internal stimuli   Risk Potential: Suicidal ideation - None at present  Homicidal ideation - None at present  Potential for aggression - No   Sensorium:  oriented to person   Memory:  short term memory impaired   Consciousness:  alert and awake   Attention/Concentration: attention span and concentration appear shorter than expected for age   Insight:  limited   Judgment: limited   Gait/Station: In chair   Motor Activity: no abnormal movements     Progress Toward Goals:   Viky Ferguson is progressing towards goals of inpatient psychiatric treatment by continued medication compliance and is attending therapeutic modalities on the milieu   However, the patient continues to require inpatient psychiatric hospitalization for continued medication management and titration to optimize symptom reduction, improve sleep hygiene, and demonstrate adequate self-care  Risk of Harm to Self:   Nursing Suicide Risk Assessment Last 24 hours: C-SSRS Risk (Since Last Contact)  Calculated C-SSRS Risk Score (Since Last Contact): No Risk Indicated    Risk of Harm to Others:  Nursing Homicide Risk Assessment: Violence Risk to Others: Denies within past 6 months    The following interventions are recommended: behavioral checks every 7 minutes, continued hospitalization on locked unit      Assessment/Plan   Principal Problem:    Bipolar affective disorder, depressed, severe, with psychotic behavior ([facility] )  Active Problems:    Type 2 diabetes mellitus without complication, with long-term current use of insulin ([facility] )    Essential hypertension    History of DVT of lower extremity    Dysphagia    UTI (urinary tract infection)    History of femur fracture    Medical clearance for psychiatric admission    Anemia      Recommended Treatment: Treatment plan and medication changes discussed and per the attending physician the plan is: 1  Continue with group therapy, milieu therapy and occupational therapy  2  Behavioral Health checks every 7 minutes  3  Continue frequent safety checks and vitals per unit protocol  4  Continue with SLIM medical management as indicated  5  Continue with current medication regimen  6  Will review labs in the a m  7 Disposition Planning: Discharge planning and efforts remain ongoing    Behavioral Health Medications: all current active meds have been reviewed and continue current psychiatric medications    Current Facility-Administered Medications   Medication Dose Route Frequency Provider Last Rate   • acetaminophen  650 mg Oral Q4H PRN JOHN Chambers     • acetaminophen  650 mg Oral Q4H PRN JOHN Chambers     • acetaminophen  975 mg Oral Q6H PRN JOHN Chambers     • aspirin  81 mg Oral Daily JOHN Morton     • atorvastatin  40 mg Oral Daily With 86 JOHN Stephen     • bisacodyl  10 mg Rectal Daily PRN Corina Le MD     • cyanocobalamin  1,000 mcg Oral Daily JOHN Macario     • Diclofenac Sodium  2 g Topical 4x Daily PRN Volodymyr Friedman MD     • docusate sodium  100 mg Oral BID Tod Sendy PA-C     • famotidine  20 mg Oral BID Tod Sendy PA-C     • gabapentin  100 mg Oral BID Corina Le MD     • gabapentin  200 mg Oral HS Corina Le MD     • haloperidol lactate  5 mg Intramuscular Q4H PRN Max 4/day JOHN Browning     • hydrOXYzine HCL  25 mg Oral Q6H PRN Marian Kasper MD     • insulin glargine  15 Units Subcutaneous HS JOHN Macario     • insulin lispro  1-5 Units Subcutaneous TID AC JOHN Macario     • insulin lispro  1-5 Units Subcutaneous HS JOHN Macario     • lidocaine  1 patch Topical Daily Toemmanuel Kaba PADaronC     • lurasidone  80 mg Oral Daily With Mary De Jesus MD     • metoprolol succinate  37 5 mg Oral Daily JOHN Alston     • polyethylene glycol  17 g Oral Daily PRN ToKENDY Garduno-C     • risperiDONE  0 25 mg Oral Q4H PRN Max 6/day JOHN Browning     • risperiDONE  0 5 mg Oral Q4H PRN Max 3/day JOHN Browning     • risperiDONE  1 mg Oral Q2H PRN Max 3/day JOHN Browning     • torsemide  10 mg Oral Daily Lukasz Dooley DO     • traZODone  50 mg Oral HS PRN JOHN Browning         Risks / Benefits of Treatment:  Risks, benefits, and possible side effects of medications explained to patient  Patient does not verbalize understanding of benefits or risks of treatment refusal at this time and needs ongoing explanation of treatment benefits and treatment plan  Counseling / Coordination of Care:  Patient's progress reviewed with nursing staff  Medications, treatment progress and treatment plan reviewed with patient    Supportive counseling provided to the patient  Total floor/unit time spent today 25 minutes  Greater than 50% of total time was spent with the patient and / or family counseling and / or coordination of care  A description of the counseling / coordination of care: medication education, treatment plan, supportive therapy

## 2023-05-28 NOTE — NURSING NOTE
"Patient c/o anxiety and depression she denies other psychiatric symptoms  She expresses insight into her psychiatric needs and acknowledged without prompting that sometimes she exaggerates her needs in order to get attention  States, \"I lied I am not having any pain I just wanted attention  \",\"I know people do a lot for me\"     "

## 2023-05-29 LAB
ANION GAP SERPL CALCULATED.3IONS-SCNC: 8 MMOL/L (ref 4–13)
BUN SERPL-MCNC: 16 MG/DL (ref 5–25)
CALCIUM SERPL-MCNC: 9 MG/DL (ref 8.4–10.2)
CHLORIDE SERPL-SCNC: 99 MMOL/L (ref 96–108)
CO2 SERPL-SCNC: 30 MMOL/L (ref 21–32)
CREAT SERPL-MCNC: 0.74 MG/DL (ref 0.6–1.3)
GFR SERPL CREATININE-BSD FRML MDRD: 82 ML/MIN/1.73SQ M
GLUCOSE P FAST SERPL-MCNC: 115 MG/DL (ref 65–99)
GLUCOSE SERPL-MCNC: 115 MG/DL (ref 65–140)
GLUCOSE SERPL-MCNC: 143 MG/DL (ref 65–140)
GLUCOSE SERPL-MCNC: 144 MG/DL (ref 65–140)
GLUCOSE SERPL-MCNC: 186 MG/DL (ref 65–140)
GLUCOSE SERPL-MCNC: 229 MG/DL (ref 65–140)
POTASSIUM SERPL-SCNC: 4.1 MMOL/L (ref 3.5–5.3)
SODIUM SERPL-SCNC: 137 MMOL/L (ref 135–147)

## 2023-05-29 RX ADMIN — INSULIN LISPRO 2 UNITS: 100 INJECTION, SOLUTION INTRAVENOUS; SUBCUTANEOUS at 21:23

## 2023-05-29 RX ADMIN — GABAPENTIN 200 MG: 100 CAPSULE ORAL at 21:21

## 2023-05-29 RX ADMIN — GABAPENTIN 100 MG: 100 CAPSULE ORAL at 08:31

## 2023-05-29 RX ADMIN — GABAPENTIN 100 MG: 100 CAPSULE ORAL at 17:00

## 2023-05-29 RX ADMIN — INSULIN LISPRO 1 UNITS: 100 INJECTION, SOLUTION INTRAVENOUS; SUBCUTANEOUS at 17:02

## 2023-05-29 RX ADMIN — METOPROLOL SUCCINATE 37.5 MG: 25 TABLET, EXTENDED RELEASE ORAL at 08:31

## 2023-05-29 RX ADMIN — ASPIRIN 81 MG: 81 TABLET, COATED ORAL at 08:31

## 2023-05-29 RX ADMIN — DOCUSATE SODIUM 100 MG: 100 CAPSULE, LIQUID FILLED ORAL at 17:00

## 2023-05-29 RX ADMIN — ACETAMINOPHEN 975 MG: 325 TABLET ORAL at 08:31

## 2023-05-29 RX ADMIN — INSULIN GLARGINE 15 UNITS: 100 INJECTION, SOLUTION SUBCUTANEOUS at 21:23

## 2023-05-29 RX ADMIN — ATORVASTATIN CALCIUM 40 MG: 40 TABLET, FILM COATED ORAL at 17:00

## 2023-05-29 RX ADMIN — FAMOTIDINE 20 MG: 20 TABLET ORAL at 08:31

## 2023-05-29 RX ADMIN — FAMOTIDINE 20 MG: 20 TABLET ORAL at 17:00

## 2023-05-29 RX ADMIN — LIDOCAINE 1 PATCH: 700 PATCH TOPICAL at 08:34

## 2023-05-29 RX ADMIN — LURASIDONE HYDROCHLORIDE 80 MG: 80 TABLET, FILM COATED ORAL at 17:00

## 2023-05-29 RX ADMIN — DOCUSATE SODIUM 100 MG: 100 CAPSULE, LIQUID FILLED ORAL at 08:31

## 2023-05-29 RX ADMIN — CYANOCOBALAMIN TAB 1000 MCG 1000 MCG: 1000 TAB at 08:31

## 2023-05-29 NOTE — PLAN OF CARE
Problem: Potential for Falls  Goal: Patient will remain free of falls  Description: INTERVENTIONS:  - Educate patient/family on patient safety including physical limitations  - Instruct patient to call for assistance with activity   - Consult OT/PT to assist with strengthening/mobility   - Keep Call bell within reach  - Keep bed low and locked with side rails adjusted as appropriate  - Keep care items and personal belongings within reach  - Initiate and maintain comfort rounds  - Make Fall Risk Sign visible to staff  - Offer Toileting every 2 Hours, in advance of need  - Initiate/Maintain bed alarm  - Obtain necessary fall risk management equipment: walker, bracelet  socks, anti fall bed  - Apply yellow socks and bracelet for high fall risk patients  - Consider moving patient to room near nurses station  Outcome: Progressing     Problem: ANXIETY  Goal: Will report anxiety at manageable levels  Description: INTERVENTIONS:  - Administer medication as ordered  - Teach and encourage coping skills  - Provide emotional support  - Assess patient/family for anxiety and ability to cope  Outcome: Progressing     Problem: SELF CARE DEFICIT  Goal: Return ADL status to a safe level of function  Description: INTERVENTIONS:  - Administer medication as ordered  - Assess ADL deficits and provide assistive devices as needed  - Obtain PT/OT consults as needed  - Assist and instruct patient to increase activity and self care as tolerated  Outcome: Progressing

## 2023-05-29 NOTE — QUICK NOTE
Labs reviewed  Sodium remains stable at 137    Continue torsemide 10 mg daily and 1 5 L/day oral fluid restriction

## 2023-05-29 NOTE — NURSING NOTE
"Patient visible on the unit intermittently to attend meals and make needs known, otherwise isolative to self in room  Patient soft spoken and minimally social on approach, cooperative with assessment questions  Patient initially denies depression, anxiety, SI/HI/AVH  Patient reporting anxiety later in the shift related to needing \"more help\" with ADLS, reassured  Patient frequently requesting staff assistance, needing encouragement and redirection at times  Compliant with medications and meals, appetite fair  No further signs of distress noted    "

## 2023-05-29 NOTE — PROGRESS NOTES
"Progress Note - Gary [age] y o  female MRN: [id number]   Unit/Bed#: [facility] 210-42 Encounter: [id number]    Behavior over the last 24 hours: unchanged  Estee Duvall was seen and evaluated today  Per nursing, patient c/o anxiety and depression she denies other psychiatric symptoms  She expresses insight into her psychiatric needs and acknowledged without prompting that sometimes she exaggerates her needs in order to get attention  States, \"I lied I am not having any pain I just wanted attention  \" She was noted to have unequal pupils, evaluated by MD who ordered CT head w/o contrast and recommends possibly ophthalmology consult  Today patient states her mood is \"not good\"  She complains of chronic leg pain and states that no one is helping her with showering and \"getting cleaned up\"  She then admits that wants to go home and has been advised that she needs to work on performing ADLs independently in order to be discharged home successfully  Per chart review, patient often perseverates on showering and requests multiple showers throughout the day and that staff does assist her  She states that she was admitted for depression and anxiety and that this has been improving  She is looking forward to discharge, which, per chart nursing, is scheduled for tomorrow  In regard to medication tolerance, denies side effects  Patient denies SI/HI/AH/VH  In regard to sleep and appetite, denies disturbances  No acute events over the past 24H  CT yesterday for L pupil larger than R pupil was  unremarkable with no acute intracranial abnormality  Patient with normal neurocheck   Medical recommending possible ophthalmology consult if symptoms persist        ROS: all other systems are negative, chronic leg pain    Mental Status Evaluation:  Appearance:  casually dressed, adequate grooming, sitting in bed   Behavior:  cooperative, guarded   Speech:  coherent   Mood:  \"not good\"   Affect:  flat   Thought " Process:  Somatically preoccupied   Associations: intact associations   Thought Content:  no overt delusions, perseverative about showering   Perceptual Disturbances: denies auditory or visual hallucinations when asked, does not appear responding to internal stimuli   Risk Potential: Suicidal ideation - None at present  Homicidal ideation - None at present  Potential for aggression - No   Sensorium:  oriented to person   Memory:  short term memory impaired   Consciousness:  alert and awake   Attention/Concentration: attention span and concentration appear shorter than expected for age   Insight:  limited   Judgment: limited   Gait/Station: In bed   Motor Activity: no abnormal movements         Vital signs in last 24 hours:    Temp:  [96 5 °F (35 8 °C)-97 7 °F (36 5 °C)] 97 7 °F (36 5 °C)  HR:  [84-92] 84  Resp:  [17-18] 18  BP: (109-142)/(56-64) 109/56    Laboratory results: I have personally reviewed all pertinent laboratory/tests results    Results from the past 24 hours:   Recent Results (from the past 24 hour(s))   Fingerstick Glucose (POCT)    Collection Time: 05/28/23  4:08 PM   Result Value Ref Range    POC Glucose 158 (H) 65 - 140 mg/dl   Fingerstick Glucose (POCT)    Collection Time: 05/28/23  9:05 PM   Result Value Ref Range    POC Glucose 261 (H) 65 - 140 mg/dl   Basic metabolic panel    Collection Time: 05/29/23  6:01 AM   Result Value Ref Range    Sodium 137 135 - 147 mmol/L    Potassium 4 1 3 5 - 5 3 mmol/L    Chloride 99 96 - 108 mmol/L    CO2 30 21 - 32 mmol/L    ANION GAP 8 4 - 13 mmol/L    BUN 16 5 - 25 mg/dL    Creatinine 0 74 0 60 - 1 30 mg/dL    Glucose 115 65 - 140 mg/dL    Glucose, Fasting 115 (H) 65 - 99 mg/dL    Calcium 9 0 8 4 - 10 2 mg/dL    eGFR 82 ml/min/1 73sq m   Fingerstick Glucose (POCT)    Collection Time: 05/29/23  7:24 AM   Result Value Ref Range    POC Glucose 143 (H) 65 - 140 mg/dl   Fingerstick Glucose (POCT)    Collection Time: 05/29/23 11:34 AM   Result Value Ref Range    POC Glucose 144 (H) 65 - 140 mg/dl     -Elevated fasting glucose on most recent labs  Patient currently with DM II and taking scheduled insulin with meals  Most recent Hgb A1c 5 8 3/22/23   -Sodium WNL  Nephrology following    Progress Toward Goals: progressing, discharge for tomorrow    Assessment/Plan   Principal Problem:    Bipolar affective disorder, depressed, severe, with psychotic behavior ([facility] )  Active Problems:    Type 2 diabetes mellitus without complication, with long-term current use of insulin ([facility] )    Essential hypertension    History of DVT of lower extremity    Dysphagia    UTI (urinary tract infection)    History of femur fracture    Medical clearance for psychiatric admission    Anemia      Recommended Treatment:     Planned medication and treatment changes:     All current active medications have been reviewed  Encourage group therapy, milieu therapy and occupational therapy  Behavioral Health checks every 7 minutes  Continue current medications:    Current Facility-Administered Medications   Medication Dose Route Frequency Provider Last Rate   • acetaminophen  650 mg Oral Q4H PRN JOHN Ortiz     • acetaminophen  650 mg Oral Q4H PRN KARLEE OrtizNP     • acetaminophen  975 mg Oral Q6H PRN JOHN Ortiz     • aspirin  81 mg Oral Daily JOHN Ravi     • atorvastatin  40 mg Oral Daily With JOHN Anand     • bisacodyl  10 mg Rectal Daily PRN Kat Ríos MD     • cyanocobalamin  1,000 mcg Oral Daily JOHN Ravi     • Diclofenac Sodium  2 g Topical 4x Daily PRN Volodymyr Friedman MD     • docusate sodium  100 mg Oral BID Carolina Valencia PA-C     • famotidine  20 mg Oral BID Carolina Valencia PA-C     • gabapentin  100 mg Oral BID Kat Ríos MD     • gabapentin  200 mg Oral HS Kat Ríos MD     • haloperidol lactate  5 mg Intramuscular Q4H PRN Max 4/day JOHN Ortiz     • hydrOXYzine HCL  25 mg Oral Q6H PRN Author MD Tamir     • insulin glargine  15 Units Subcutaneous HS JOHN Garcia     • insulin lispro  1-5 Units Subcutaneous TID AC JOHN Garcia     • insulin lispro  1-5 Units Subcutaneous HS JOHN Garcia     • lidocaine  1 patch Topical Daily Nasra Goins PA-C     • lurasidone  80 mg Oral Daily With Delilah Batista MD     • metoprolol succinate  37 5 mg Oral Daily JOHN Alston     • polyethylene glycol  17 g Oral Daily PRN Nasra Goins PA-C     • risperiDONE  0 25 mg Oral Q4H PRN Max 6/day Vilinda Boeck, CRNP     • risperiDONE  0 5 mg Oral Q4H PRN Max 3/day Vilinda Boeck, CRNP     • risperiDONE  1 mg Oral Q2H PRN Max 3/day Vilinda Boeck, CRNP     • torsemide  10 mg Oral Daily Frandy Bishop, DO     • traZODone  50 mg Oral HS PRN Vilinda Boeck, CRNP       Risks / Benefits of Treatment:    Risks, benefits, and possible side effects of medications explained to patient and patient verbalizes understanding and agreement for treatment  Counseling / Coordination of Care:    Patient's progress discussed with staff in treatment team meeting  Medications, treatment progress and treatment plan reviewed with patient      Sugar Dias PA-C 05/29/23

## 2023-05-29 NOTE — NURSING NOTE
Patient continues to approach nurse's station and makes random demands  Staff continues to assist as appropriate and redirect patient

## 2023-05-29 NOTE — NURSING NOTE
Patient self reported that she bumped her breasts on the bathroom sink while using the bathroom  Patient was assessed no visual injury noted, no pain or tenderness to breasts, no complaints of pain voiced

## 2023-05-29 NOTE — NURSING NOTE
"Patient is calm, pleasant and cooperative, visible in milieu  C/O b/l leg pain, PRN tylenol offered and pt refused  CT scan of the brain was performed on day shift and the result came back normal for the patient's age  Pt's daughter Celina Bailey made aware of the normal CT scan results  Pt's daughter also voiced concerns of her mother continuous c/o b/l leg pain  She would like for her  to be evaluated, she thinks her mother may need an order for a  x-ray of the lumbar/spine  Sticky notes  for physician to follow up  Pt is medication compliant  Pt reports mild anxiety due to her upcoming discharge to home with her   She states\" I am really feeling excited  to go home but also I feeling very nervous and anxious\" she denies all other psych s/s  No other issues observed this shift  Q 7 min safety checks ongoing    "

## 2023-05-29 NOTE — NURSING NOTE
Patient cooperative with care, medications taken,  makes needs known, encouraged by staff to perform ADL care with minimal assistance  Endorses anxiety, denies depression  Visible in milieu, denies all s/s of psych this tour  Safety precautions maintained

## 2023-05-30 VITALS
DIASTOLIC BLOOD PRESSURE: 63 MMHG | TEMPERATURE: 97.9 F | RESPIRATION RATE: 18 BRPM | BODY MASS INDEX: 24.07 KG/M2 | SYSTOLIC BLOOD PRESSURE: 140 MMHG | WEIGHT: 140.21 LBS | HEART RATE: 76 BPM | OXYGEN SATURATION: 98 %

## 2023-05-30 PROBLEM — R52 PAIN: Status: ACTIVE | Noted: 2023-05-30

## 2023-05-30 LAB
GLUCOSE SERPL-MCNC: 137 MG/DL (ref 65–140)
GLUCOSE SERPL-MCNC: 156 MG/DL (ref 65–140)
GLUCOSE SERPL-MCNC: 180 MG/DL (ref 65–140)

## 2023-05-30 RX ORDER — LURASIDONE HYDROCHLORIDE 80 MG/1
80 TABLET, FILM COATED ORAL
Qty: 30 TABLET | Refills: 0 | Status: SHIPPED | OUTPATIENT
Start: 2023-05-30

## 2023-05-30 RX ORDER — METOPROLOL SUCCINATE 25 MG/1
37.5 TABLET, EXTENDED RELEASE ORAL DAILY
Qty: 90 TABLET | Refills: 0
Start: 2023-05-30

## 2023-05-30 RX ORDER — INSULIN GLARGINE 300 U/ML
15 INJECTION, SOLUTION SUBCUTANEOUS DAILY
Qty: 6 ML | Refills: 0
Start: 2023-05-30

## 2023-05-30 RX ORDER — LIDOCAINE 50 MG/G
1 PATCH TOPICAL DAILY
Qty: 30 PATCH | Refills: 0 | Status: SHIPPED | OUTPATIENT
Start: 2023-05-31

## 2023-05-30 RX ORDER — FAMOTIDINE 20 MG/1
20 TABLET, FILM COATED ORAL 2 TIMES DAILY
Qty: 30 TABLET | Refills: 0 | Status: SHIPPED | OUTPATIENT
Start: 2023-05-30 | End: 2023-06-08 | Stop reason: SDUPTHER

## 2023-05-30 RX ORDER — TORSEMIDE 10 MG/1
10 TABLET ORAL DAILY
Qty: 15 TABLET | Refills: 0
Start: 2023-05-30 | End: 2023-06-05 | Stop reason: SDUPTHER

## 2023-05-30 RX ORDER — GABAPENTIN 100 MG/1
CAPSULE ORAL
Qty: 80 CAPSULE | Refills: 0 | Status: SHIPPED | OUTPATIENT
Start: 2023-05-30

## 2023-05-30 RX ADMIN — LURASIDONE HYDROCHLORIDE 80 MG: 80 TABLET, FILM COATED ORAL at 16:25

## 2023-05-30 RX ADMIN — DOCUSATE SODIUM 100 MG: 100 CAPSULE, LIQUID FILLED ORAL at 08:17

## 2023-05-30 RX ADMIN — ASPIRIN 81 MG: 81 TABLET, COATED ORAL at 08:16

## 2023-05-30 RX ADMIN — CYANOCOBALAMIN TAB 1000 MCG 1000 MCG: 1000 TAB at 08:17

## 2023-05-30 RX ADMIN — INSULIN LISPRO 1 UNITS: 100 INJECTION, SOLUTION INTRAVENOUS; SUBCUTANEOUS at 12:02

## 2023-05-30 RX ADMIN — METOPROLOL SUCCINATE 37.5 MG: 25 TABLET, EXTENDED RELEASE ORAL at 08:17

## 2023-05-30 RX ADMIN — TORSEMIDE 10 MG: 5 TABLET ORAL at 08:18

## 2023-05-30 RX ADMIN — FAMOTIDINE 20 MG: 20 TABLET ORAL at 08:17

## 2023-05-30 RX ADMIN — GABAPENTIN 100 MG: 100 CAPSULE ORAL at 08:17

## 2023-05-30 RX ADMIN — ACETAMINOPHEN 975 MG: 325 TABLET ORAL at 08:17

## 2023-05-30 RX ADMIN — ATORVASTATIN CALCIUM 40 MG: 40 TABLET, FILM COATED ORAL at 16:24

## 2023-05-30 RX ADMIN — LIDOCAINE 1 PATCH: 700 PATCH TOPICAL at 08:25

## 2023-05-30 NOTE — DISCHARGE INSTR - APPOINTMENTS
Raf Laboy or Sia, our Arron and Alison, will be calling you after your discharge, on the phone number that you provided  They will be available as an additional support, if needed  If you wish to speak with one of them, you may contact Joana Sousa at [phone] or Hemant Askew at [phone]

## 2023-05-30 NOTE — PROGRESS NOTES
05/30/23 1330   Activity/Group Checklist   Group Other (Comment)  (Art Therapy)   Attendance Attended   Attendance Duration (min) 31-45   Interactions Interacted appropriately   Affect/Mood Blunted/flat   Goals Achieved Able to listen to others; Able to engage in interactions

## 2023-05-30 NOTE — PLAN OF CARE
Problem: Potential for Falls  Goal: Patient will remain free of falls  Description: INTERVENTIONS:  - Educate patient/family on patient safety including physical limitations  - Instruct patient to call for assistance with activity   - Consult OT/PT to assist with strengthening/mobility   - Keep Call bell within reach  - Keep bed low and locked with side rails adjusted as appropriate  - Keep care items and personal belongings within reach  - Initiate and maintain comfort rounds  - Make Fall Risk Sign visible to staff  - Offer Toileting every 2 Hours, in advance of need  - Initiate/Maintain bed alarm  - Obtain necessary fall risk management equipment: walker, bracelet  socks, anti fall bed  - Apply yellow socks and bracelet for high fall risk patients  - Consider moving patient to room near nurses station  Outcome: Adequate for Discharge     Problem: DEPRESSION  Goal: Will be euthymic at discharge  Description: INTERVENTIONS:  - Administer medication as ordered  - Provide emotional support via 1:1 interaction with staff  - Encourage involvement in milieu/groups/activities  - Monitor for social isolation  Outcome: Adequate for Discharge     Problem: PASCUAL  Goal: Will exhibit normal sleep and speech and no impulsivity  Description: INTERVENTIONS:  - Administer medication as ordered  - Set limits on impulsive behavior  - Make attempts to decrease external stimuli as possible  Outcome: Adequate for Discharge     Problem: PSYCHOSIS  Goal: Will report no hallucinations or delusions  Description: Interventions:  - Administer medication as  ordered  - Every waking shifts and PRN assess for the presence of hallucinations and or delusions  - Assist with reality testing to support increasing orientation  - Assess if patient's hallucinations or delusions are encouraging self-harm or harm to others and intervene as appropriate  Outcome: Adequate for Discharge     Problem: ANXIETY  Goal: Will report anxiety at manageable levels  Description: INTERVENTIONS:  - Administer medication as ordered  - Teach and encourage coping skills  - Provide emotional support  - Assess patient/family for anxiety and ability to cope  Outcome: Adequate for Discharge     Problem: SELF CARE DEFICIT  Goal: Return ADL status to a safe level of function  Description: INTERVENTIONS:  - Administer medication as ordered  - Assess ADL deficits and provide assistive devices as needed  - Obtain PT/OT consults as needed  - Assist and instruct patient to increase activity and self care as tolerated  Outcome: Adequate for Discharge     Problem: DISCHARGE PLANNING - CARE MANAGEMENT  Goal: Discharge to post-acute care or home with appropriate resources  Description: INTERVENTIONS:  - Conduct assessment to determine patient/family and health care team treatment goals, and need for post-acute services based on payer coverage, community resources, and patient preferences, and barriers to discharge  - Address psychosocial, clinical, and financial barriers to discharge as identified in assessment in conjunction with the patient/family and health care team  - Arrange appropriate level of post-acute services according to patient’s   needs and preference and payer coverage in collaboration with the physician and health care team  - Communicate with and update the patient/family, physician, and health care team regarding progress on the discharge plan  - Arrange appropriate transportation to post-acute venues  Outcome: Adequate for Discharge     Problem: Nutrition/Hydration-ADULT  Goal: Nutrient/Hydration intake appropriate for improving, restoring or maintaining nutritional needs  Description: Monitor and assess patient's nutrition/hydration status for malnutrition  Collaborate with interdisciplinary team and initiate plan and interventions as ordered  Monitor patient's weight and dietary intake as ordered or per policy   Utilize nutrition screening tool and intervene as necessary  Determine patient's food preferences and provide high-protein, high-caloric foods as appropriate       INTERVENTIONS:  - Monitor oral intake, urinary output, labs, and treatment plans  - Assess nutrition and hydration status and recommend course of action  - Evaluate amount of meals eaten  - Assist patient with eating if necessary   - Allow adequate time for meals  - Recommend/ encourage appropriate diets, oral nutritional supplements, and vitamin/mineral supplements  - Order, calculate, and assess calorie counts as needed  - Recommend, monitor, and adjust tube feedings and TPN/PPN based on assessed needs  - Assess need for intravenous fluids  - Provide specific nutrition/hydration education as appropriate  - Include patient/family/caregiver in decisions related to nutrition  Outcome: Adequate for Discharge     Problem: Ineffective Coping  Goal: Participates in unit activities  Description: Interventions:  - Provide therapeutic environment   - Provide required programming   - Redirect inappropriate behaviors   Outcome: Adequate for Discharge

## 2023-05-30 NOTE — DISCHARGE SUMMARY
Discharge Summary - Mae [age] y o  female MRN: [id number]  Unit/Bed#: [facility] 717-48 Encounter: [id number]     Admission Date: 4/27/2023         Discharge Date: 5/30/2023    Attending Psychiatrist: Moise Rodríguez MD    Reason for Admission/HPI: copied from Initial H and P Note by  Dr Allen Batsheva is a 70 y o   female,  (has one daughter and one son), domiciled w/  and their son w/ PMH of multiple medical conditions including recent femur fracture and s/p knee replacement, OAB, dysphagia, HLD, h/o endometrial and colon cancer in remission, thyroid nodule and SIADH and PPH of Bipolar Disorder, one prior psychiatric admissions, no prior SA, no h/o self-injurious behavior, f/u in outpatient setting with Dr Dino Lentz, on Geodon 80 mg BID, Klonopin 0 5 mg BID, Remeron 7 5 mg nightly, last visited by psych consult on 3/29/23 by Dr Augustine Bartholomew who was BIB her daughter to the [facility] ED on 4/27/23 due to worsening of mood and paranoid ideations since broke her femur in March  The patient signed 12 and got admitted to the inpatient psychiatry unit 6T for further psychiatric stabilization  I called and spoke with patient's daughter Phi Torresid: [phone]) who reportedly works for [employer]  She reported that her mother was stable with her psychiatric regimen until November 2022 when first had the knee surgery and then reportedly her sister passed away and recently had a fall with L femur fracture in March, and as per her daughter, the patient has been deteriorating and regressing  She noted that she called her father last night and reported that the  are coming to get their son as he did not do the taxes  She stated that her mother is A&Ox3 at her baseline, and has been stable for many years  She mentioned her concerns regarding hyponatremia, h/o SIADH and also prolonged QTc, and reportedly patient's psychiatrist was not willing to make any changes      Please see H and P note for full details  Meds/Allergies     all current active meds have been reviewed    Allergies   Allergen Reactions   • Wellbutrin [Bupropion] Anxiety     Patient has tried Wellbutrin which resulted in increased paranoia  Patient wishes to not try this medication again  Objective     Vital signs in last 24 hours:  Temp:  [97 4 °F (36 3 °C)-98 °F (36 7 °C)] 98 °F (36 7 °C)  HR:  [82-88] 86  Resp:  [17-18] 17  BP: (118-158)/(58-79) 121/63      Intake/Output Summary (Last 24 hours) at 5/30/2023 0826  Last data filed at 5/29/2023 2000  Gross per 24 hour   Intake 840 ml   Output --   Net 840 ml       Hospital Course: The patient was admitted to the inpatient psychiatric unit and started on every 15 minutes precautions  A treatment plan was formed with focus on pharmacotherapy and milieu therapy, group therapy and individual psychotherapy when indicated  Given Prolonged QTC and risk fo arrhythmias Geodon initially switched to Zyprexa  and Mirtazapine add to help with poor appetite and sleep  Given persistent Hyponatremia Mirtazapine was eventually discontinued despite its low risk for causing low serum sodium    To address the patient's symptoms,the patient was prescribed  Latuda titrated up to 80mg daily for Bipolar depression  Medication doses were titrated during the hospital course  The patient did not display self destructive or aggressive behaviors and did not require restraints  Throughout the hospitalization, the patient did not have falls  Patient's symptoms improved gradually over the hospital course  At the end of treatment the patient was doing well  Mood was stable at the time of discharge  The patient denied suicidal ideation, intent or plan at the time of discharge and denied homicidal ideation, intent or plan at the time of discharge  There was no overt psychosis at the time of discharge  Sleep and appetite were improved   The patient was tolerating medications and was not reporting any significant side effects at the time of discharge  The patient has maximally benefitted from inpatient treatment and can be safely discharged to outpatient care  She is not suicidal or Homicidal  Risk has been mitigated by inpatient stay and treatment  She remains at low chronic risk due to recent surgery, fracture, functional limitations  The outpatient follow up with a psychiatrist was arranged by the unit  upon discharge  She declined neuropsychological evaluation- can be Obtained on an outpatient basis     OT cognitive Evaluation completed on 5/26 by Harini Velasco Pt scored 4 4/6 0 indicating it is recommended that the person live with someone who does a daily check on the environment to remove any safety hazards and solve problems when minor changes in the home occur  The person may be alone for part of the day with a pre-established procedure for getting help from a neighbor      Mental Status at Time of Discharge:   Appearance:  Adequate hygiene and grooming   Behavior:  calm, cooperative and friendly   Speech:   Language: Normal volume and Normal rate  No overt abnormality   Mood:  Euthymic   Affect:   Associations: mood-congruent  Tightly connected   Thought Process:  Goal directed and coherent   Thought Content:  Does not verbalize delusional material   Perceptual Disturbances: Denies hallucinations and does not appear to be responding to internal stimuli     Risk Potential: No suicidal or homicidal ideation   Attention/Concentration attention span and concentration were age appropriate   Insight:  fair   Judgment: Good judgment   Gait/Station: fair   Motor Activity: No abnormal movement noted       Admission Diagnosis:  Principal Problem:    Bipolar affective disorder, depressed, severe, with psychotic behavior ([address] )  Active Problems:    Type 2 diabetes mellitus without complication, with long-term current use of insulin ([address] )    Essential hypertension    History of DVT of lower extremity    Dysphagia    UTI (urinary tract infection)    History of femur fracture    Medical clearance for psychiatric admission    Anemia      Discharge Diagnosis:     Principal Problem:    Bipolar affective disorder, depressed, severe, with psychotic behavior ([facility] )  Active Problems:    Type 2 diabetes mellitus without complication, with long-term current use of insulin ([facility] )    Essential hypertension    History of DVT of lower extremity    Dysphagia    UTI (urinary tract infection)    History of femur fracture    Medical clearance for psychiatric admission    Anemia  Resolved Problems:    Hyponatremia      Lab results:    No results displayed because visit has over 200 results  Discharge Medications:    See after visit summary for reconciled discharge medications provided to patient and family  Discharge instructions/Information to patient and family:     See after visit summary for information provided to patient and family  Provisions for Follow-Up Care:    See after visit summary for information related to follow-up care and any pertinent home health orders  Discharge Statement     I spent 35 minutes discharging the patient  This time was spent on the day of discharge  I had direct contact with the patient on the day of discharge  Additional documentation is required if more than 30 minutes were spent on discharge:    I reviewed with Aniyah Garcia importance of compliance with medications and outpatient treatment after discharge  I discussed the medication regimen and possible side effects of the medications with Aniyah Garcia prior to discharge  At the time of discharge she was tolerating psychiatric medications  I discussed outpatient follow up with Aniyah Garcia  I reviewed with Aniyah Garcia crisis plan and safety plan upon discharge

## 2023-05-30 NOTE — PROGRESS NOTES
Pt  Calm yet quiet in session,alert and able to interact when prompted in conversation     05/30/23 1000   Activity/Group Checklist   Group Community meeting  (topic creativity )   Attendance Attended   Attendance Duration (min) 31-45   Interactions Interacted appropriately  (Pt  reflected on past hobbies yet remained Sneha Redman unless directly prompted)   Affect/Mood Calm;Constricted   Goals Achieved Able to listen to others

## 2023-05-30 NOTE — CASE MANAGEMENT
Cm informed start of care with [facility] will be 24-48 hours after discharge  Call made to [facility], informed pt does not see Dr Breann Perez at their office  CM contact Dr Breann Perez through private office tel# [phone]; no answer  Call made to Dr Breann Perez directly #[phone]; lvm requesting c/b

## 2023-05-30 NOTE — TREATMENT TEAM
05/30/23 0837   Team Meeting   Meeting Type Daily Rounds   Initial Conference Date 05/30/23   Team Members Present   Team Members Present Physician;Nurse;Occupational Therapist;   Physician Team Member Dr Charissa Victoria Team Member VIPIN [facility] Management Team Member Mohamud   OT Team Member Maren RAMIREZ   Patient/Family Present   Patient Present No   Patient's Family Present No     Refused neuropsych eval, anxious related to discharge and showers, awake early to shower, limit setting has bene working, plan d/c today at 5 PM

## 2023-05-30 NOTE — CASE MANAGEMENT
Yissel referral sent to [facility]  Rec'd notification from [facility] confirming that pt is active with their services  CM pending start of care date

## 2023-05-30 NOTE — NURSING NOTE
"Pt visible in dayroom  Pt reports some anxiety about being discharged home today  She stated \"what about my pain\" asked how she felt about going home  She continues to be preoccupied with showering  Pt reported pain in b/l legs  PRN tylenol administered with no effect   Will continue to monitor   "

## 2023-05-30 NOTE — PROGRESS NOTES
NEPHROLOGY PROGRESS NOTE   Renetta Gaxiola 70 y o  female MRN: [id number]  Unit/Bed#: [facility] 537-81 Encounter: [id number]      ASSESSMENT & PLAN:  #1 hyponatremia as per previous note suspected secondary to SIADH as well as prerenal azotemia  Per previous notes prior CT scan negative for malignancy  Serum sodium improved, last serum sodium 137 yesterday  Patient is off salt tablets, continue with fluid restriction  Reported by nurse that patient is possible to be discharged today  Stable from renal standpoint of view to be discharged, she should continue follow-up with her primary care doctor after discharge with labs in a week  2 bipolar disorder, management as per psych    3 anemia, last hemoglobin 8 9 on 5/23    4 hypertension, blood pressure acceptable on current regimen  Continue with metoprolol and Demadex        SUBJECTIVE:  Patient seen and examined, no complaints, no chest pain, no shortness of breath, no nausea, no vomiting, no abdominal pain        OBJECTIVE:  Current Weight: Weight - Scale: 63 6 kg (140 lb 3 4 oz)  Vitals:    05/30/23 0815   BP: 121/63   Pulse: 86   Resp: 17   Temp: 98 °F (36 7 °C)   SpO2: 97%       Intake/Output Summary (Last 24 hours) at 5/30/2023 3451  Last data filed at 5/29/2023 2000  Gross per 24 hour   Intake 840 ml   Output --   Net 840 ml       General: conscious, cooperative, in not acute distress  Eyes: conjunctivae pink, anicteric sclerae  ENT: lips and mucous membranes moist  Neck: supple, no JVD  Chest: clear breath sounds bilateral, no crackles, ronchus or wheezings  CVS: distinct S1 & S2, normal rate, regular rhythm  Abdomen: soft, non-tender, non-distended, normoactive bowel sounds  Extremities: no edema of both legs  Skin: no rash  Neuro: awake, alert, oriented        Medications:    Current Facility-Administered Medications:   •  acetaminophen (TYLENOL) tablet 650 mg, 650 mg, Oral, Q4H PRN, JOHN Power  •  acetaminophen (TYLENOL) tablet 650 mg, 650 mg, Oral, Q4H PRN, JOHN Ramsey, 650 mg at 05/23/23 8489  •  acetaminophen (TYLENOL) tablet 975 mg, 975 mg, Oral, Q6H PRN, JOHN Ramsey, 975 mg at 05/30/23 9486  •  aspirin (ECOTRIN LOW STRENGTH) EC tablet 81 mg, 81 mg, Oral, Daily, JOHN Rodriguez, 81 mg at 05/30/23 5813  •  atorvastatin (LIPITOR) tablet 40 mg, 40 mg, Oral, Daily With JOHN Chong, 40 mg at 05/29/23 1700  •  bisacodyl (DULCOLAX) rectal suppository 10 mg, 10 mg, Rectal, Daily PRN, Joanna Elias MD, 10 mg at 05/23/23 1931  •  cyanocobalamin (VITAMIN B-12) tablet 1,000 mcg, 1,000 mcg, Oral, Daily, JOHN Rodriguez, 1,000 mcg at 05/30/23 8054  •  Diclofenac Sodium (VOLTAREN) 1 % topical gel 2 g, 2 g, Topical, 4x Daily PRN, Volodymyr Friedman MD, 2 g at 05/18/23 0950  •  docusate sodium (COLACE) capsule 100 mg, 100 mg, Oral, BID, Mariia Bar PA-C, 100 mg at 05/30/23 6517  •  famotidine (PEPCID) tablet 20 mg, 20 mg, Oral, BID, Mariia Bar PA-C, 20 mg at 05/30/23 3758  •  gabapentin (NEURONTIN) capsule 100 mg, 100 mg, Oral, BID, Joanna Elias MD, 100 mg at 05/30/23 2346  •  gabapentin (NEURONTIN) capsule 200 mg, 200 mg, Oral, HS, Joanna Elias MD, 200 mg at 05/29/23 2121  •  haloperidol lactate (HALDOL) injection 5 mg, 5 mg, Intramuscular, Q4H PRN Max 4/day, JOHN Ramsey  •  hydrOXYzine HCL (ATARAX) tablet 25 mg, 25 mg, Oral, Q6H PRN, Morales Ward MD, 25 mg at 05/25/23 1902  •  insulin glargine (LANTUS) subcutaneous injection 15 Units 0 15 mL, 15 Units, Subcutaneous, HS, JOHN Rodriguez, 15 Units at 05/29/23 2123  •  insulin lispro (HumaLOG) 100 units/mL subcutaneous injection 1-5 Units, 1-5 Units, Subcutaneous, TID AC, 1 Units at 05/29/23 1702 **AND** Fingerstick Glucose (POCT), , , TID AC, JOHN Rodriguez  •  insulin lispro (HumaLOG) 100 units/mL subcutaneous injection 1-5 Units, 1-5 Units, Subcutaneous, HS, JOHN Rodriguez, "2 Units at 05/29/23 2123  •  lidocaine (LIDODERM) 5 % patch 1 patch, 1 patch, Topical, Daily, Macario Lyons PA-C, 1 patch at 05/30/23 0825  •  lurasidone (LATUDA) tablet 80 mg, 80 mg, Oral, Daily With Vicki Gage MD, 80 mg at 05/29/23 1700  •  metoprolol succinate (TOPROL-XL) 24 hr tablet 37 5 mg, 37 5 mg, Oral, Daily, JOHN Alston, 37 5 mg at 05/30/23 1817  •  polyethylene glycol (MIRALAX) packet 17 g, 17 g, Oral, Daily PRN, Macario Lyons PA-C, 17 g at 05/21/23 1169  •  risperiDONE (RisperDAL) tablet 0 25 mg, 0 25 mg, Oral, Q4H PRN Max 6/day, JOHN Miranda  •  risperiDONE (RisperDAL) tablet 0 5 mg, 0 5 mg, Oral, Q4H PRN Max 3/day, JOHN Miranda  •  risperiDONE (RisperDAL) tablet 1 mg, 1 mg, Oral, Q2H PRN Max 3/day, JOHN Miranda  •  torsemide [facility]) tablet 10 mg, 10 mg, Oral, Daily, Nathan Olsen DO, 10 mg at 05/30/23 0818  •  traZODone (DESYREL) tablet 50 mg, 50 mg, Oral, HS PRN, JOHN Miranda, 50 mg at 05/20/23 2141    Invasive Devices:        Lab Results:   Results from last 7 days   Lab Units 05/29/23  0601 05/27/23  0538 05/26/23  0530   BUN mg/dL 16 13 17   CALCIUM mg/dL 9 0 9 7 8 9   CHLORIDE mmol/L 99 99 100   CO2 mmol/L 30 32 30   CREATININE mg/dL 0 74 0 72 0 65   POTASSIUM mmol/L 4 1 4 2 4 1   SODIUM mmol/L 137 139 141       Previous work up:  See previous notes      Portions of the record may have been created with voice recognition software  Occasional wrong word or \"sound a like\" substitutions may have occurred due to the inherent limitations of voice recognition software  Read the chart carefully and recognize, using context, where substitutions have occurred  If you have any questions, please contact the dictating provider    "

## 2023-05-30 NOTE — NURSING NOTE
Patient discharged today from the unit  Patient has denies any SI/HI/AH/VH or psychosis  No questions verbalized  Patient in agreement with discharge plan  Patient given after visit summary  Patient denied medication review  Patient accompanied to lobby by Sherle Mohs    where patient was met by her family  Patient transported home by family  All belongings returned

## 2023-05-30 NOTE — CASE MANAGEMENT
Call made to pt's , reviewed plan for d/c today  Pt's  in agreement with d/c and 5 pm   Pt's  reports unable to recall name of [facility] pt was using for home therapy  CM met with pt, reviewed d/c today and IMM notice  Pt in agreement with d/c today and signed IMM notice  Pt requesting to cont with [facility] upon d/c and reports wanting dtr to assist with finding new psychiatrist if needed  Pt aware of 5 PM  time  Call made to pt's dtr Melisa Records, informed pt uses [id number] and requesting referral for PT, OT, and Speech which is what pt was receiving prior to admission  Jenifer in agreement with [facility] although cont to request referrals for alternate providers  Call made to [facility] intake tel# [phone], [facility] for intake [phone] requesting c/b  Call made to pt's PCP, scheduled follow up appt for pt  CM informed pt's PCP does not have any available appts   Appt made for pt with JOHN Coleman on 6/5 at 1 PM

## 2023-05-30 NOTE — PLAN OF CARE
Problem: DISCHARGE PLANNING - CARE MANAGEMENT  Goal: Discharge to post-acute care or home with appropriate resources  Description: INTERVENTIONS:  - Conduct assessment to determine patient/family and health care team treatment goals, and need for post-acute services based on payer coverage, community resources, and patient preferences, and barriers to discharge  - Address psychosocial, clinical, and financial barriers to discharge as identified in assessment in conjunction with the patient/family and health care team  - Arrange appropriate level of post-acute services according to patient’s   needs and preference and payer coverage in collaboration with the physician and health care team  - Communicate with and update the patient/family, physician, and health care team regarding progress on the discharge plan  - Arrange appropriate transportation to post-acute venues  Outcome: Adequate for Discharge    Discharge home with  and son; follow up with [facility] and PCP  Home PT, OT, Speech with [facility]  Pt's family to transport  DC meds to pt's preferred pharmacy

## 2023-05-30 NOTE — BH TRANSITION RECORD
Contact Information: If you have any questions, concerns, pended studies, tests and/or procedures, or emergencies regarding your inpatient behavioral health visit  Please contact [facility] older adult behavioral health unit 6T [phone] and ask to speak to a , nurse or physician  A contact is available 24 hours/ 7 days a week at this number  Summary of Procedures Performed During your Stay:  Below is a list of major procedures performed during your hospital stay and a summary of results:  - Cardiac Procedures/Studies: EKG  Sinus rhythm with Premature atrial complexes  Incomplete right bundle branch block  Nonspecific ST and T wave abnormality  Abnormal ECG    Pending Studies (From admission, onward)    None        Please follow up on the above pending studies with your PCP and/or referring provider

## 2023-05-31 ENCOUNTER — TELEPHONE (OUTPATIENT)
Dept: FAMILY MEDICINE CLINIC | Facility: CLINIC | Age: 68
End: 2023-05-31

## 2023-05-31 NOTE — TELEPHONE ENCOUNTER
Mirtha from [facility] called and left a message stating that the patient was discharged from [address]  They will be resuming services for her for physical and occupational therapy  Any questions please call [phone]

## 2023-06-01 ENCOUNTER — APPOINTMENT (OUTPATIENT)
Dept: RADIOLOGY | Facility: CLINIC | Age: 68
End: 2023-06-01
Payer: MEDICARE

## 2023-06-01 ENCOUNTER — OFFICE VISIT (OUTPATIENT)
Dept: FAMILY MEDICINE CLINIC | Facility: CLINIC | Age: 68
End: 2023-06-01

## 2023-06-01 VITALS
HEART RATE: 107 BPM | SYSTOLIC BLOOD PRESSURE: 120 MMHG | HEIGHT: 64 IN | RESPIRATION RATE: 16 BRPM | BODY MASS INDEX: 23.73 KG/M2 | WEIGHT: 139 LBS | DIASTOLIC BLOOD PRESSURE: 60 MMHG | TEMPERATURE: 97.5 F | OXYGEN SATURATION: 97 %

## 2023-06-01 DIAGNOSIS — M54.50 ACUTE BILATERAL LOW BACK PAIN WITHOUT SCIATICA: ICD-10-CM

## 2023-06-01 DIAGNOSIS — Z13.820 SCREENING FOR OSTEOPOROSIS: ICD-10-CM

## 2023-06-01 DIAGNOSIS — Z87.81 HISTORY OF FEMUR FRACTURE: ICD-10-CM

## 2023-06-01 DIAGNOSIS — I10 ESSENTIAL HYPERTENSION: ICD-10-CM

## 2023-06-01 DIAGNOSIS — M25.551 BILATERAL HIP PAIN: ICD-10-CM

## 2023-06-01 DIAGNOSIS — Z78.0 POST-MENOPAUSAL: ICD-10-CM

## 2023-06-01 DIAGNOSIS — E78.2 MIXED HYPERLIPIDEMIA: Chronic | ICD-10-CM

## 2023-06-01 DIAGNOSIS — M25.552 BILATERAL HIP PAIN: ICD-10-CM

## 2023-06-01 DIAGNOSIS — M54.50 ACUTE BILATERAL LOW BACK PAIN WITHOUT SCIATICA: Primary | ICD-10-CM

## 2023-06-01 PROCEDURE — 72110 X-RAY EXAM L-2 SPINE 4/>VWS: CPT

## 2023-06-01 PROCEDURE — 73522 X-RAY EXAM HIPS BI 3-4 VIEWS: CPT

## 2023-06-01 NOTE — CASE MANAGEMENT
6/1/23 @ 1019: Rec'd call from [address] [place] [address] at Dr Diann Dinero office; pt's psychiatrist  Milana Christianson scheduled for June 8th at 330 PM  CM to fax d/c paperwork to fax# [phone]    Call made to pt's dtr Eder Hollingsworth tel# [phone], informed her of pt's appt with Dr Francisca Burgess to ensure pt is at appt

## 2023-06-01 NOTE — PROGRESS NOTES
"Assessment/Plan:    1  Acute bilateral low back pain without sciatica  -     XR spine lumbar minimum 4 views non injury; Future; Expected date: 06/01/2023  -     Ambulatory Referral to Physical Therapy; Future    2  Bilateral hip pain  -     XR hip/pelv 2-3 vws left if performed; Future; Expected date: 06/01/2023  -     XR hip/pelv 2-3 vws right if performed; Future; Expected date: 06/01/2023  -     Ambulatory Referral to Physical Therapy; Future    3  Post-menopausal  -     DXA bone density spine hip and pelvis; Future; Expected date: 06/01/2023    4  Screening for osteoporosis  -     DXA bone density spine hip and pelvis; Future; Expected date: 06/01/2023    5  History of femur fracture  -     DXA bone density spine hip and pelvis; Future; Expected date: 06/01/2023    6  Essential hypertension  Assessment & Plan:  Stable with current regimen      7  Mixed hyperlipidemia  Assessment & Plan: On crestor but its not new and as per daughter from long time              Patient Instructions:  Supportive care discussed and advised  Advised to RTO for any worsening and no improvement  Follow up for no improvement and worsening of conditions  Patient advised and educated when to see immediate medical care  Return if symptoms worsen or fail to improve  Future Appointments   Date Time Provider Chandan Baxter   6/5/2023  1:00 PM JOHN Lima [facility]   7/28/2023  3:45 PM DO HIU Bliss [facility] [address]   12/15/2023  3:30 PM DO CORDELL Phillips [facility]           Subjective:      Patient ID: Guillermo Abreu is a 70 y o  female      Chief Complaint   Patient presents with   • Back Pain     wmcma         Vitals:  /60 (BP Location: Left arm, Patient Position: Sitting, Cuff Size: Standard)   Pulse (!) 107   Temp 97 5 °F (36 4 °C) (Temporal)   Resp 16   Ht 5' 4\" (1 626 m)   Wt 63 kg (139 lb)   SpO2 97%   BMI 23 86 kg/m²     HPI  Patient accompanied with " daughter  Patient was recently hospitalized  Stated that complaining of lower back pain and hip pain but denies any pain radiation to legs  Had femur fracture coupe of months ago  Currently taking gabapentin but not much relief  Daughter would like to xrays and PT  Complaint with medications for chronic illnesses          The following portions of the patient's history were reviewed and updated as appropriate: allergies, current medications, past family history, past medical history, past social history, past surgical history and problem list       Review of Systems   HENT: Negative  Respiratory: Negative  Cardiovascular: Negative  Musculoskeletal: Positive for arthralgias and back pain  As noted in HPI           Objective:    Social History     Tobacco Use   Smoking Status Never   • Passive exposure: Never   Smokeless Tobacco Never       Allergies: Allergies   Allergen Reactions   • Wellbutrin [Bupropion] Anxiety     Patient has tried Wellbutrin which resulted in increased paranoia  Patient wishes to not try this medication again           Current Outpatient Medications   Medication Sig Dispense Refill   • acetaminophen (TYLENOL) 325 mg tablet Take 3 tablets (975 mg total) by mouth 2 (two) times a day (Patient taking differently: Take 975 mg by mouth every 6 (six) hours as needed)  0   • aspirin (ECOTRIN LOW STRENGTH) 81 mg EC tablet Take 1 tablet (81 mg total) by mouth daily Do not start before May 31, 2023  30 tablet 0   • BD Pen Needle Chelsey 2nd Gen 32G X 4 MM MISC USE 1  TWICE DAILY 60 each 0   • cyanocobalamin (VITAMIN B-12) 1000 MCG tablet Take 1 tablet (1,000 mcg total) by mouth daily Do not start before March 29, 2023  30 tablet 0   • docusate sodium (COLACE) 100 mg capsule Take 100 mg by mouth 2 (two) times a day     • famotidine (PEPCID) 20 mg tablet Take 1 tablet (20 mg total) by mouth 2 (two) times a day 30 tablet 0   • gabapentin (NEURONTIN) 100 mg capsule 100mg twice daily ( 8AM, 6PM) and 200mg at Bedtime 80 capsule 0   • insulin glargine (Toujeo SoloStar) 300 units/mL CONCENTRATED U-300 injection pen (1-unit dial) Inject 15 Units under the skin daily 6 mL 0   • lidocaine (LIDODERM) 5 % Apply 1 patch topically over 12 hours daily Remove & Discard patch within 12 hours or as directed by MD Do not start before May 31, 2023  30 patch 0   • lurasidone (LATUDA) 80 mg tablet Take 1 tablet (80 mg total) by mouth daily with dinner 30 tablet 0   • metFORMIN (GLUCOPHAGE-XR) 500 mg 24 hr tablet Take 1 tablet (500 mg total) by mouth 2 (two) times a day with meals 60 tablet 3   • metoprolol succinate (TOPROL-XL) 25 mg 24 hr tablet Take 1 5 tablets (37 5 mg total) by mouth daily 90 tablet 0   • nitroglycerin (NITROSTAT) 0 4 mg SL tablet Place 1 tablet (0 4 mg total) under the tongue every 5 (five) minutes as needed for chest pain 30 tablet 1   • rosuvastatin (CRESTOR) 20 MG tablet Take 1 tablet (20 mg total) by mouth daily (Patient taking differently: Take 20 mg by mouth daily with dinner) 90 tablet 3   • torsemide (DEMADEX) 10 mg tablet Take 1 tablet (10 mg total) by mouth daily 15 tablet 0     No current facility-administered medications for this visit  Physical Exam  Constitutional:       Appearance: Normal appearance  HENT:      Head: Normocephalic and atraumatic  Nose: Nose normal    Eyes:      Conjunctiva/sclera: Conjunctivae normal    Cardiovascular:      Rate and Rhythm: Normal rate and regular rhythm  Pulses: Normal pulses  Pulmonary:      Effort: Pulmonary effort is normal       Breath sounds: Normal breath sounds  Musculoskeletal:      Lumbar back: No tenderness  Right hip: Tenderness present  Left hip: Tenderness present        Comments: Patient pointing for pain on bilateral hip area and lower lumbar spine area but no tenderness on lumbar spine area but having tenderness on bilateral hip area    Uses walker for ambulation and unsteady and at falls risk Neurological:      Mental Status: She is alert  Comments: Oriented to person but not good historian             Falls Plan of Care: Balance, strength, and gait training instructions were provided        JOHN Lauren

## 2023-06-02 ENCOUNTER — TELEPHONE (OUTPATIENT)
Dept: NEPHROLOGY | Facility: CLINIC | Age: 68
End: 2023-06-02

## 2023-06-02 DIAGNOSIS — S32.030A COMPRESSION FRACTURE OF L3 VERTEBRA, INITIAL ENCOUNTER (HCC): ICD-10-CM

## 2023-06-02 DIAGNOSIS — S22.080A COMPRESSION FRACTURE OF T11 VERTEBRA, INITIAL ENCOUNTER (HCC): ICD-10-CM

## 2023-06-02 DIAGNOSIS — S32.020A COMPRESSION FRACTURE OF L2 VERTEBRA, INITIAL ENCOUNTER (HCC): Primary | ICD-10-CM

## 2023-06-02 RX ORDER — TRAMADOL HYDROCHLORIDE 50 MG/1
50 TABLET ORAL 2 TIMES DAILY PRN
Qty: 10 TABLET | Refills: 0 | Status: SHIPPED | OUTPATIENT
Start: 2023-06-02 | End: 2023-06-07

## 2023-06-05 DIAGNOSIS — I10 ESSENTIAL HYPERTENSION: ICD-10-CM

## 2023-06-05 RX ORDER — TORSEMIDE 10 MG/1
10 TABLET ORAL DAILY
Qty: 30 TABLET | Refills: 3 | Status: SHIPPED | OUTPATIENT
Start: 2023-06-05 | End: 2023-06-08 | Stop reason: SDUPTHER

## 2023-06-06 ENCOUNTER — TELEPHONE (OUTPATIENT)
Dept: OBGYN CLINIC | Facility: HOSPITAL | Age: 68
End: 2023-06-06

## 2023-06-06 ENCOUNTER — OFFICE VISIT (OUTPATIENT)
Dept: OBGYN CLINIC | Facility: HOSPITAL | Age: 68
End: 2023-06-06

## 2023-06-06 ENCOUNTER — HOSPITAL ENCOUNTER (OUTPATIENT)
Dept: RADIOLOGY | Facility: HOSPITAL | Age: 68
Discharge: HOME | End: 2023-06-06
Attending: ORTHOPAEDIC SURGERY
Payer: MEDICARE

## 2023-06-06 VITALS
SYSTOLIC BLOOD PRESSURE: 114 MMHG | HEART RATE: 81 BPM | DIASTOLIC BLOOD PRESSURE: 67 MMHG | BODY MASS INDEX: 23.73 KG/M2 | WEIGHT: 139 LBS | HEIGHT: 64 IN

## 2023-06-06 DIAGNOSIS — S72.92XA CLOSED FRACTURE OF LEFT FEMUR, UNSPECIFIED FRACTURE MORPHOLOGY, UNSPECIFIED PORTION OF FEMUR, INITIAL ENCOUNTER (HCC): ICD-10-CM

## 2023-06-06 DIAGNOSIS — S72.452A CLOSED DISPLACED SUPRACONDYLAR FRACTURE OF DISTAL END OF LEFT FEMUR WITHOUT INTRACONDYLAR EXTENSION, INITIAL ENCOUNTER (HCC): ICD-10-CM

## 2023-06-06 PROCEDURE — 73552 X-RAY EXAM OF FEMUR 2/>: CPT

## 2023-06-06 PROCEDURE — 99024 POSTOP FOLLOW-UP VISIT: CPT

## 2023-06-06 RX ORDER — OXYCODONE AND ACETAMINOPHEN 2.5; 325 MG/1; MG/1
1 TABLET ORAL EVERY 6 HOURS PRN
Qty: 30 TABLET | Refills: 0 | Status: SHIPPED | OUTPATIENT
Start: 2023-06-06 | End: 2023-06-06

## 2023-06-06 RX ORDER — OXYCODONE HYDROCHLORIDE AND ACETAMINOPHEN 5; 325 MG/1; MG/1
0.5 TABLET ORAL EVERY 6 HOURS PRN
Qty: 10 TABLET | Refills: 0 | Status: SHIPPED | OUTPATIENT
Start: 2023-06-06 | End: 2023-06-12 | Stop reason: SDUPTHER

## 2023-06-06 RX ORDER — OXYCODONE HYDROCHLORIDE AND ACETAMINOPHEN 5; 325 MG/1; MG/1
0.5 TABLET ORAL EVERY 6 HOURS PRN
Qty: 20 TABLET | Refills: 0 | Status: SHIPPED | OUTPATIENT
Start: 2023-06-06 | End: 2023-06-06

## 2023-06-06 NOTE — TELEPHONE ENCOUNTER
Tessy Blanchard insurance is no longer active since 5/1/23 per patient  She also mention that the only two insurances that currently have is Medicare and [address]  Patient stated that will call Medicare so there aware that Tessy Blanchard is no longer active and that her  is no longer working      Thank you

## 2023-06-06 NOTE — PROGRESS NOTES
Assessment:   Diagnosis ICD-10-CM Associated Orders   1  Closed fracture of left femur, unspecified fracture morphology, unspecified portion of femur, initial encounter ([facility] [address] )  S72  92XA Ambulatory referral to Orthopedic Surgery     oxyCODONE-acetaminophen (Percocet) 2 5-325 MG per tablet          Plan:  · A discussion was had with the patient and her daugher that her imaging looks very good at today's visit  · She may continue to be WBAT to the left lower extremity  · Due to the patient's severe back pain and known compression fractures, Percocet was prescribed for a week until she can see her PCP and Dr Apollo Barillas  · Once her back pain is under better control, hopefully she can start to participate more with therapy  To do next visit:  Follow-up in 2 months for further imaging and to assess post-op pain and function  The above stated was discussed in layman's terms and the patient expressed understanding  All questions were answered to the patient's satisfaction  Subjective:   Ale Goodman is a 70 y o  female who presents to the office today 11 weeks out from her surgical fixation left distal femur periprosthetic fracture with retrograde IM nail on 3/20/2023  She was also recently discovered to have several compression fractures in her spine, and she has an appointment with Dr Apollo Barillas next week regarding this  Most of the history was provided by the patient's daughter as the patient seemed confused today  The patient has been experiencing severe back pain which is preventing her from participating in PT  The pain radiates down the lateral bilateral thighs  She very rarely complains of pain in her left knee        Review of systems negative unless otherwise specified in HPI    Past Medical History:   Diagnosis Date   • Anesthesia complication     difficulty waking up   • Anxiety    • Arthritis     left knee   • Bipolar 1 disorder (HCC)    • Bone spur     left knee behing the knee cap   • Cancer [facility])    • Chronic kidney disease    • Chronic pain disorder    • Colon cancer [facility])     patient states about 2017   • Colon polyp     Tubulovillous Adenoma High Grade Dysplasia - 2017   • Depression    • Diabetes mellitus ([facility] [address] )    • Endometrial cancer ([facility] [address] )     grade I   • Hx of bleeding disorder     vaginal bleeding started on 3/13/17    • Hyperlipidemia    • Hypertension    • Hypomagnesemia 03/20/2023   • Memory loss    • PONV (postoperative nausea and vomiting)    • Psychiatric disorder    • Psychiatric illness    • Psychosis ([facility] [address] )    • Shortness of breath     with exertion   • Sleep difficulties    • Urinary incontinence    • Vitamin D deficiency        Past Surgical History:   Procedure Laterality Date   • BREAST BIOPSY Left     benign-maybe at ar age 58   • CHOLECYSTECTOMY      open   • COLONOSCOPY     • DILATION AND CURETTAGE OF UTERUS N/A 04/05/2017    Procedure: DILATATION AND CURETTAGE (D&C);   Surgeon: Mercedes Fatima MD;  Location: [facility] MAIN OR;  Service:    • HYSTERECTOMY     • OOPHORECTOMY     • PELVIC LAPAROSCOPY     • PA ARTHRP KNE CONDYLE&PLATU MEDIAL&LAT COMPARTMENTS Left 12/06/2022    Procedure: ARTHROPLASTY KNEE TOTAL W ROBOT - CEMENTED - LEFT;  Surgeon: Camelia Olea DO;  Location: [address] [place] [address];  Service: Orthopedics   • PA LAPS TOTAL HYSTERECT 250 GM/< W/RMVL TUBE/OVARY N/A 05/04/2017    Procedure: ROBOTIC ASSISTED TOTAL LAPAROSCOPIC HYSTERECTOMY, BILATERAL SALPINGOOPHERECTOMY; CYSTOSCOPY;  Surgeon: Mack Hendrickson MD;  Location:  MAIN OR;  Service: Gynecology Oncology   • PA OPTX FEM SHFT FX W/INSJ IMED IMPLT W/WO SCREW Left 3/20/2023    Procedure: INSERTION NAIL IM FEMUR RETROGRADE;  Surgeon: Los Iverson MD;  Location: [facility] MAIN OR;  Service: Orthopedics   • REPLACEMENT TOTAL KNEE     • TONSILLECTOMY     • TUBAL LIGATION         Family History   Problem Relation Age of Onset   • Cancer Mother         Renal   • Heart disease Father         CHF   • Dementia Sister    • Heart disease Sister         atrial septal defect   • Dementia Sister    • Breast cancer Paternal Aunt 40       Social History     Occupational History   • Not on file   Tobacco Use   • Smoking status: Never     Passive exposure: Never   • Smokeless tobacco: Never   Vaping Use   • Vaping Use: Never used   Substance and Sexual Activity   • Alcohol use: Never   • Drug use: No   • Sexual activity: Not Currently     Birth control/protection: Post-menopausal, Surgical         Current Outpatient Medications:   •  acetaminophen (TYLENOL) 325 mg tablet, Take 3 tablets (975 mg total) by mouth 2 (two) times a day (Patient taking differently: Take 975 mg by mouth every 6 (six) hours as needed), Disp: , Rfl: 0  •  aspirin (ECOTRIN LOW STRENGTH) 81 mg EC tablet, Take 1 tablet (81 mg total) by mouth daily Do not start before May 31, 2023 , Disp: 30 tablet, Rfl: 0  •  BD Pen Needle Chelsey 2nd Gen 32G X 4 MM MISC, USE 1  TWICE DAILY, Disp: 60 each, Rfl: 0  •  docusate sodium (COLACE) 100 mg capsule, Take 100 mg by mouth 2 (two) times a day, Disp: , Rfl:   •  famotidine (PEPCID) 20 mg tablet, Take 1 tablet (20 mg total) by mouth 2 (two) times a day, Disp: 30 tablet, Rfl: 0  •  gabapentin (NEURONTIN) 100 mg capsule, 100mg twice daily ( 8AM, 6PM) and 200mg at Bedtime, Disp: 80 capsule, Rfl: 0  •  insulin glargine (Toujeo SoloStar) 300 units/mL CONCENTRATED U-300 injection pen (1-unit dial), Inject 15 Units under the skin daily, Disp: 6 mL, Rfl: 0  •  lidocaine (LIDODERM) 5 %, Apply 1 patch topically over 12 hours daily Remove & Discard patch within 12 hours or as directed by MD Do not start before May 31, 2023 , Disp: 30 patch, Rfl: 0  •  lurasidone (LATUDA) 80 mg tablet, Take 1 tablet (80 mg total) by mouth daily with dinner, Disp: 30 tablet, Rfl: 0  •  metFORMIN (GLUCOPHAGE-XR) 500 mg 24 hr tablet, Take 1 tablet (500 mg total) by mouth 2 (two) times a day with meals, Disp: 60 tablet, Rfl: 3  •  metoprolol succinate (TOPROL-XL) 25 mg 24 hr tablet, Take 1 5 tablets (37 5 mg total) by mouth daily, Disp: 90 tablet, Rfl: 0  •  nitroglycerin (NITROSTAT) 0 4 mg SL tablet, Place 1 tablet (0 4 mg total) under the tongue every 5 (five) minutes as needed for chest pain, Disp: 30 tablet, Rfl: 1  •  oxyCODONE-acetaminophen (Percocet) 2 5-325 MG per tablet, Take 1 tablet by mouth every 6 (six) hours as needed for severe pain Max Daily Amount: 4 tablets, Disp: 30 tablet, Rfl: 0  •  rosuvastatin (CRESTOR) 20 MG tablet, Take 1 tablet (20 mg total) by mouth daily (Patient taking differently: Take 20 mg by mouth daily with dinner), Disp: 90 tablet, Rfl: 3  •  torsemide (DEMADEX) 10 mg tablet, Take 1 tablet (10 mg total) by mouth daily, Disp: 30 tablet, Rfl: 3  •  traMADol (Ultram) 50 mg tablet, Take 1 tablet (50 mg total) by mouth 2 (two) times a day as needed for moderate pain for up to 5 days, Disp: 10 tablet, Rfl: 0  •  cyanocobalamin (VITAMIN B-12) 1000 MCG tablet, Take 1 tablet (1,000 mcg total) by mouth daily Do not start before March 29, 2023 , Disp: 30 tablet, Rfl: 0    Allergies   Allergen Reactions   • Wellbutrin [Bupropion] Anxiety     Patient has tried Wellbutrin which resulted in increased paranoia  Patient wishes to not try this medication again  Vitals:    06/06/23 1047   BP: 114/67   Pulse: 81       Objective:    General:  Patient is WDWN, alert and oriented, appears stated age, and is in no acute distress  Musculoskeletal:    Left Leg:    Inspection:  Incisions are well-approximated and well-healed without erythema or purulent material indicative of infection  Range of Motion:  She is able to achieve full active extension of the knee and approximately 110 degrees of active flexion  Palpation:  TTP over the knee  Special Tests:  Negative Ballottement test         Diagnostics, reviewed and taken today if performed as documented:     The attending physician has personally reviewed the pertinent films in PACS and interpretation is as "follows:  Left Femur X-rays:  The patient's fractures are held in stable alignment  There is no evidence of hardware loosening or failure  Procedures, if performed today:    None performed      Portions of the record may have been created with voice recognition software  Occasional wrong word or \"sound a like\" substitutions may have occurred due to the inherent limitations of voice recognition software  Read the chart carefully and recognize, using context, where substitutions have occurred    "

## 2023-06-07 ENCOUNTER — APPOINTMENT (OUTPATIENT)
Dept: LAB | Facility: HOSPITAL | Age: 68
End: 2023-06-07
Payer: MEDICARE

## 2023-06-07 DIAGNOSIS — I10 ESSENTIAL HYPERTENSION: ICD-10-CM

## 2023-06-07 DIAGNOSIS — D62 ACUTE POSTHEMORRHAGIC ANEMIA: Primary | ICD-10-CM

## 2023-06-07 LAB
ANION GAP SERPL CALCULATED.3IONS-SCNC: 11 MMOL/L (ref 4–13)
BUN SERPL-MCNC: 28 MG/DL (ref 5–25)
CALCIUM SERPL-MCNC: 9.4 MG/DL (ref 8.4–10.2)
CHLORIDE SERPL-SCNC: 100 MMOL/L (ref 96–108)
CO2 SERPL-SCNC: 29 MMOL/L (ref 21–32)
CREAT SERPL-MCNC: 0.91 MG/DL (ref 0.6–1.3)
GFR SERPL CREATININE-BSD FRML MDRD: 64 ML/MIN/1.73SQ M
GLUCOSE SERPL-MCNC: 192 MG/DL (ref 65–140)
IRON SERPL-MCNC: 50 UG/DL (ref 50–170)
POTASSIUM SERPL-SCNC: 3.8 MMOL/L (ref 3.5–5.3)
SODIUM SERPL-SCNC: 140 MMOL/L (ref 135–147)

## 2023-06-07 PROCEDURE — 80048 BASIC METABOLIC PNL TOTAL CA: CPT

## 2023-06-07 PROCEDURE — 36415 COLL VENOUS BLD VENIPUNCTURE: CPT

## 2023-06-07 PROCEDURE — 83540 ASSAY OF IRON: CPT

## 2023-06-08 ENCOUNTER — OFFICE VISIT (OUTPATIENT)
Dept: NEPHROLOGY | Facility: CLINIC | Age: 68
End: 2023-06-08
Payer: MEDICARE

## 2023-06-08 VITALS
DIASTOLIC BLOOD PRESSURE: 64 MMHG | WEIGHT: 134 LBS | SYSTOLIC BLOOD PRESSURE: 116 MMHG | HEIGHT: 64 IN | BODY MASS INDEX: 22.88 KG/M2 | HEART RATE: 82 BPM

## 2023-06-08 DIAGNOSIS — E87.1 HYPONATREMIA: ICD-10-CM

## 2023-06-08 DIAGNOSIS — F31.9 BIPOLAR DEPRESSION (HCC): ICD-10-CM

## 2023-06-08 DIAGNOSIS — I10 ESSENTIAL HYPERTENSION: ICD-10-CM

## 2023-06-08 DIAGNOSIS — R13.10 DYSPHAGIA, UNSPECIFIED TYPE: ICD-10-CM

## 2023-06-08 DIAGNOSIS — E22.2 SIADH (SYNDROME OF INAPPROPRIATE ADH PRODUCTION) (HCC): Primary | ICD-10-CM

## 2023-06-08 PROCEDURE — 99214 OFFICE O/P EST MOD 30 MIN: CPT | Performed by: INTERNAL MEDICINE

## 2023-06-08 RX ORDER — FAMOTIDINE 20 MG/1
20 TABLET, FILM COATED ORAL 2 TIMES DAILY
Qty: 30 TABLET | Refills: 5 | Status: SHIPPED | OUTPATIENT
Start: 2023-06-08

## 2023-06-08 RX ORDER — TORSEMIDE 10 MG/1
5 TABLET ORAL DAILY
Qty: 30 TABLET | Refills: 3
Start: 2023-06-08

## 2023-06-08 NOTE — PROGRESS NOTES
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 - felt to be due to psych meds  • Sodium level is 140 from June 7, 2023 - she is not on any inciting meds at this time and has a normal sodium level  • In light of her worse mood, I recommend that she start psych medications through her psychiatrist, Dr Javan Sherman, without restrictions for now  • Once medications are started, I recommend checking a weekly BMP to make sure that she does not become hyponatremic  • If her sodium worsens, we will have to place her on salt tablets  • Current medications: Torsemide 10 mg daily  • We will decrease torsemide to 5 mg daily in light of rising BUN  2  Bipolar disorder:   • Clinically worse  • She has an appointment with Dr Javan Sherman soon  3  Hypertension:  • BP controlled and at goal    • Current medications: Metoprolol succinate 37 5 mg daily, torsemide 10 mg daily  • Decrease torsemide to 5 mg daily in light of elevated BUN  4  Bilateral adrenal nodules:  · Aldosterone and metanephrines were unremarkable  · Suspected to be adenomas  5  Diabetes mellitus: on Metformin and Toujeo  Patient Instructions   Please talk with Dr Javan Sherman regarding her psych meds  In light of her current situation, I will not place any restrictions on her psych meds  When she gets started on a psych medication, please do weekly BMPs  We will call you with medication adjustments depending on the BMP results  Please decrease torsemide to 5 mg daily  Follow up in 6 months  SUBJECTIVE / INTERVAL HISTORY:  Sandra Mcintyre is a 28-year-old lady who I am seeing in the office for the first time for continued evaluation of hyponatremia    We initially met her during an admission to [facility] in October 2022 when she presented with a change in mental status  We were consulted for hyponatremia - Na was 120 on presentation  Working diagnosis during that time was SIADH due to psych medications  She was discharged on salt tablets, fluid restriction, and torsemide  After that admission, she followed up in the office in 2022 and she was maintained on salt tablets, fluid restriction, and torsemide  She had a left knee replacement done in 2022 and was apparently okay during that time  Unfortunately, her sister  thereafter which exacerbated her depression  Since then, she has had multiple medical and psychiatric issues  She fell in 2023 and had a left femur fracture  She required inpatient rehabilitation which she completed on April 10, 2023  Unfortunately, she had to be admitted to the behavioral health unit at [facility] on 2023 due to worsening depression  Her stay was complicated by hyponatremia which we were reconsulted for  She was discharged from [facility] on May 30, 2023  She is now off Geodon due to a prolonged QT interval  Also off Wellbutrin  She is currently on [place]  Her psych meds are managed by Dr Jada Evans  Unfortunately, she is not doing well from a psych standpoint  Her daughter, Cheyenne Stoner, was in tears because of how bad she is now  She is worried that nobody is addressing her psych issues because of limitations on medication use  Cheyenne Stoner works at [facility] and was previously one of the [facility]  PMH/PSH: HTN, DM, HLP, depression, bipolar disorder, endometrial and colon cancer, DJD s/p L TKR, left femur fracture  Previous work-up:  May 2023: Serum osmolality 266, Urine osmolality 392, urine sodium 94, uric acid 5 4, aldosterone 2 8, renin 2 281, metanephrines 18 4, normetanephrine 70 6  2023: TSH 0 886    October 15, 2022: Serum osmolality 255, urine osmolality 518, urine sodium 56, uric acid 3 7, cortisol 25 0      Imaging:  2022: "CTA of chest, abdomen, pelvis: No malignancy  March 23, 2023: MRI of brain: No acute infarction, edema, or mass effect  ALLERGIES:   Allergies   Allergen Reactions   • Wellbutrin [Bupropion] Anxiety     Patient has tried Wellbutrin which resulted in increased paranoia  Patient wishes to not try this medication again  REVIEW OF SYSTEMS:  Review of Systems   Constitutional: Negative for chills and fever  Respiratory: Negative for shortness of breath  Cardiovascular: Negative for chest pain and leg swelling  Gastrointestinal: Negative for abdominal pain, diarrhea, nausea and vomiting  Genitourinary: Negative for hematuria  Musculoskeletal: Negative for back pain  Neurological: Negative for dizziness  Psychiatric/Behavioral: Positive for behavioral problems and decreased concentration  The patient is nervous/anxious and is hyperactive  OBJECTIVE:  /64 (BP Location: Left arm, Patient Position: Sitting, Cuff Size: Standard)   Pulse 82   Ht 5' 4\" (1 626 m)   Wt 60 8 kg (134 lb)   BMI 23 00 kg/m²   Current Weight: Weight - Scale: 60 8 kg (134 lb) Body mass index is 23 kg/m²  Physical Exam  Constitutional:       General: She is not in acute distress  Appearance: Normal appearance  She is well-developed  She is not ill-appearing or diaphoretic  HENT:      Head: Normocephalic and atraumatic  Eyes:      General: No scleral icterus  Conjunctiva/sclera: Conjunctivae normal    Neck:      Vascular: No JVD  Cardiovascular:      Rate and Rhythm: Normal rate and regular rhythm  Heart sounds: Normal heart sounds  Pulmonary:      Effort: Pulmonary effort is normal       Breath sounds: Normal breath sounds  Abdominal:      General: Bowel sounds are normal       Palpations: Abdomen is soft  Musculoskeletal:      Right lower leg: No edema  Left lower leg: No edema  Skin:     General: Skin is warm and dry     Neurological:      Mental Status: She is alert and oriented " to person, place, and time  Psychiatric:         Behavior: Behavior is cooperative  Comments: Very anxious, wanting to leave the office, unable to concentrate, poor insight          Medications:  Current Outpatient Medications:   •  acetaminophen (TYLENOL) 325 mg tablet, Take 3 tablets (975 mg total) by mouth 2 (two) times a day (Patient taking differently: Take 975 mg by mouth every 6 (six) hours as needed), Disp: , Rfl: 0  •  aspirin (ECOTRIN LOW STRENGTH) 81 mg EC tablet, Take 1 tablet (81 mg total) by mouth daily Do not start before May 31, 2023 , Disp: 30 tablet, Rfl: 0  •  cyanocobalamin (VITAMIN B-12) 1000 MCG tablet, Take 1 tablet (1,000 mcg total) by mouth daily Do not start before March 29, 2023 , Disp: 30 tablet, Rfl: 0  •  docusate sodium (COLACE) 100 mg capsule, Take 100 mg by mouth 2 (two) times a day, Disp: , Rfl:   •  famotidine (PEPCID) 20 mg tablet, Take 1 tablet (20 mg total) by mouth 2 (two) times a day, Disp: 30 tablet, Rfl: 0  •  gabapentin (NEURONTIN) 100 mg capsule, 100mg twice daily ( 8AM, 6PM) and 200mg at Bedtime, Disp: 80 capsule, Rfl: 0  •  insulin glargine (Toujeo SoloStar) 300 units/mL CONCENTRATED U-300 injection pen (1-unit dial), Inject 15 Units under the skin daily, Disp: 6 mL, Rfl: 0  •  lurasidone (LATUDA) 80 mg tablet, Take 1 tablet (80 mg total) by mouth daily with dinner, Disp: 30 tablet, Rfl: 0  •  metFORMIN (GLUCOPHAGE-XR) 500 mg 24 hr tablet, Take 1 tablet (500 mg total) by mouth 2 (two) times a day with meals, Disp: 60 tablet, Rfl: 3  •  metoprolol succinate (TOPROL-XL) 25 mg 24 hr tablet, Take 1 5 tablets (37 5 mg total) by mouth daily, Disp: 90 tablet, Rfl: 0  •  oxyCODONE-acetaminophen (Percocet) 5-325 mg per tablet, Take 0 5 tablets by mouth every 6 (six) hours as needed for severe pain for up to 5 days Max Daily Amount: 2 tablets, Disp: 10 tablet, Rfl: 0  •  rosuvastatin (CRESTOR) 20 MG tablet, Take 1 tablet (20 mg total) by mouth daily (Patient taking differently: Take 20 mg by mouth daily with dinner), Disp: 90 tablet, Rfl: 3  •  torsemide (DEMADEX) 10 mg tablet, Take 1 tablet (10 mg total) by mouth daily, Disp: 30 tablet, Rfl: 3  •  BD Pen Needle Chelsey 2nd Gen 32G X 4 MM MISC, USE 1  TWICE DAILY, Disp: 60 each, Rfl: 0  •  lidocaine (LIDODERM) 5 %, Apply 1 patch topically over 12 hours daily Remove & Discard patch within 12 hours or as directed by MD Do not start before May 31, 2023 , Disp: 30 patch, Rfl: 0  •  nitroglycerin (NITROSTAT) 0 4 mg SL tablet, Place 1 tablet (0 4 mg total) under the tongue every 5 (five) minutes as needed for chest pain, Disp: 30 tablet, Rfl: 1    Laboratory Results:  Results for orders placed or performed in visit on 73/08/53   Basic metabolic panel   Result Value Ref Range    Sodium 140 135 - 147 mmol/L    Potassium 3 8 3 5 - 5 3 mmol/L    Chloride 100 96 - 108 mmol/L    CO2 29 21 - 32 mmol/L    ANION GAP 11 4 - 13 mmol/L    BUN 28 (H) 5 - 25 mg/dL    Creatinine 0 91 0 60 - 1 30 mg/dL    Glucose 192 (H) 65 - 140 mg/dL    Calcium 9 4 8 4 - 10 2 mg/dL    eGFR 64 ml/min/1 73sq m   Iron   Result Value Ref Range    Iron 50 50 - 170 ug/dL

## 2023-06-08 NOTE — PATIENT INSTRUCTIONS
Please talk with Dr Kacey Oro regarding her psych meds  In light of her current situation, I will not place any restrictions on her psych meds  When she gets started on a psych medication, please do weekly BMPs  We will call you with medication adjustments depending on the BMP results  Please decrease torsemide to 5 mg daily  Follow up in 6 months

## 2023-06-08 NOTE — TELEPHONE ENCOUNTER
----- Message from Josiah Orozco sent at 6/7/2023  8:29 PM EDT -----  Regarding: Zyprexa  Contact: [phone]  Good evening,  When you find time can you please order my Pepcid  Thank you!   Emili

## 2023-06-12 ENCOUNTER — OFFICE VISIT (OUTPATIENT)
Dept: FAMILY MEDICINE CLINIC | Facility: CLINIC | Age: 68
End: 2023-06-12
Payer: MEDICARE

## 2023-06-12 VITALS
SYSTOLIC BLOOD PRESSURE: 120 MMHG | HEIGHT: 64 IN | DIASTOLIC BLOOD PRESSURE: 70 MMHG | BODY MASS INDEX: 23.05 KG/M2 | TEMPERATURE: 97.6 F | HEART RATE: 104 BPM | WEIGHT: 135 LBS | RESPIRATION RATE: 16 BRPM | OXYGEN SATURATION: 98 %

## 2023-06-12 DIAGNOSIS — S22.080D COMPRESSION FRACTURE OF T11 VERTEBRA WITH ROUTINE HEALING, SUBSEQUENT ENCOUNTER: ICD-10-CM

## 2023-06-12 DIAGNOSIS — S72.92XA CLOSED FRACTURE OF LEFT FEMUR, UNSPECIFIED FRACTURE MORPHOLOGY, UNSPECIFIED PORTION OF FEMUR, INITIAL ENCOUNTER (HCC): ICD-10-CM

## 2023-06-12 DIAGNOSIS — Z79.4 TYPE 2 DIABETES MELLITUS WITHOUT COMPLICATION, WITH LONG-TERM CURRENT USE OF INSULIN (HCC): ICD-10-CM

## 2023-06-12 DIAGNOSIS — S32.020D COMPRESSION FRACTURE OF L2 VERTEBRA WITH ROUTINE HEALING, SUBSEQUENT ENCOUNTER: Primary | ICD-10-CM

## 2023-06-12 DIAGNOSIS — I10 ESSENTIAL HYPERTENSION: ICD-10-CM

## 2023-06-12 DIAGNOSIS — S32.030D COMPRESSION FRACTURE OF L3 VERTEBRA WITH ROUTINE HEALING, SUBSEQUENT ENCOUNTER: ICD-10-CM

## 2023-06-12 DIAGNOSIS — E11.9 TYPE 2 DIABETES MELLITUS WITHOUT COMPLICATION, WITH LONG-TERM CURRENT USE OF INSULIN (HCC): ICD-10-CM

## 2023-06-12 PROCEDURE — 99214 OFFICE O/P EST MOD 30 MIN: CPT | Performed by: FAMILY MEDICINE

## 2023-06-12 RX ORDER — LURASIDONE HYDROCHLORIDE 40 MG/1
TABLET, FILM COATED ORAL
COMMUNITY
Start: 2023-06-09

## 2023-06-12 RX ORDER — MIRTAZAPINE 7.5 MG/1
TABLET, FILM COATED ORAL
COMMUNITY
Start: 2023-06-08

## 2023-06-12 RX ORDER — OXYCODONE HYDROCHLORIDE AND ACETAMINOPHEN 5; 325 MG/1; MG/1
0.5 TABLET ORAL EVERY 6 HOURS PRN
Qty: 60 TABLET | Refills: 0 | Status: SHIPPED | OUTPATIENT
Start: 2023-06-12

## 2023-06-12 NOTE — ASSESSMENT & PLAN NOTE
Lab Results   Component Value Date    HGBA1C 5 8 (H) 03/22/2023     Sugars are low and she has lost weight  toujeo discontinued

## 2023-06-12 NOTE — PROGRESS NOTES
Name: Shawanda Zafar      : 1955      MRN: [id number]  Encounter Provider: Shirley   Encounter Date: 2023   Encounter department: [facility]     1  Compression fracture of L2 vertebra with routine healing, subsequent encounter  -     oxyCODONE-acetaminophen (Percocet) 5-325 mg per tablet; Take 0 5 tablets by mouth every 6 (six) hours as needed for severe pain Max Daily Amount: 2 tablets    2  Compression fracture of L3 vertebra with routine healing, subsequent encounter  -     oxyCODONE-acetaminophen (Percocet) 5-325 mg per tablet; Take 0 5 tablets by mouth every 6 (six) hours as needed for severe pain Max Daily Amount: 2 tablets    3  Compression fracture of T11 vertebra with routine healing, subsequent encounter  -     oxyCODONE-acetaminophen (Percocet) 5-325 mg per tablet; Take 0 5 tablets by mouth every 6 (six) hours as needed for severe pain Max Daily Amount: 2 tablets    4  Type 2 diabetes mellitus without complication, with long-term current use of insulin ([facility])  Assessment & Plan:    Lab Results   Component Value Date    HGBA1C 5 8 (H) 2023     Sugars are low and she has lost weight  toujeo discontinued     Orders:  -     Hemoglobin A1C; Future    5  Closed fracture of left femur, unspecified fracture morphology, unspecified portion of femur, initial encounter ([facility] )    6  Essential hypertension  Assessment & Plan:  Blood pressure is controlled at this time  We will continue metoprolol 37 5 mg daily    Orders:  -     CBC; Future  -     Comprehensive metabolic panel; Future  -     Lipid Panel with Direct LDL reflex; Future         Return for Scheduled follow up in  [place] prescription monitoring program report reviewed and is appropriate  She has multiple compression fractures and is screaming out in pain at home at times  She does not want to leave her bed  Patient continued on Percocet to help with pain control    She is going to follow with pain management as well as a spine specialist     Subjective      She is not eating and won't get out of bed due to not eating  The pain has been going on since March when she fell and has had 3 compression fractures  Has an appointment with pain management in 2 weeks and back pain in 1 week  Review of Systems    Current Outpatient Medications on File Prior to Visit   Medication Sig   • acetaminophen (TYLENOL) 325 mg tablet Take 3 tablets (975 mg total) by mouth 2 (two) times a day (Patient taking differently: Take 975 mg by mouth every 6 (six) hours as needed)   • aspirin (ECOTRIN LOW STRENGTH) 81 mg EC tablet Take 1 tablet (81 mg total) by mouth daily Do not start before May 31, 2023  • BD Pen Needle Chelsey 2nd Gen 32G X 4 MM MISC USE 1  TWICE DAILY   • cyanocobalamin (VITAMIN B-12) 1000 MCG tablet Take 1 tablet (1,000 mcg total) by mouth daily Do not start before March 29, 2023  • docusate sodium (COLACE) 100 mg capsule Take 100 mg by mouth 2 (two) times a day   • famotidine (PEPCID) 20 mg tablet Take 1 tablet (20 mg total) by mouth 2 (two) times a day   • gabapentin (NEURONTIN) 100 mg capsule 100mg twice daily ( 8AM, 6PM) and 200mg at Bedtime   • lidocaine (LIDODERM) 5 % Apply 1 patch topically over 12 hours daily Remove & Discard patch within 12 hours or as directed by MD Do not start before May 31, 2023     • lurasidone (LATUDA) 40 mg tablet TAKE 1 TABLET BY MOUTH IN THE MORNING WITH BREAKFAST   • lurasidone (LATUDA) 80 mg tablet Take 1 tablet (80 mg total) by mouth daily with dinner   • metFORMIN (GLUCOPHAGE-XR) 500 mg 24 hr tablet Take 1 tablet (500 mg total) by mouth 2 (two) times a day with meals   • metoprolol succinate (TOPROL-XL) 25 mg 24 hr tablet Take 1 5 tablets (37 5 mg total) by mouth daily   • mirtazapine (REMERON) 7 5 MG tablet TAKE 1 TABLET BY MOUTH ONCE DAILY AT BEDTIME  WATCH FOR SEDATION   • nitroglycerin (NITROSTAT) 0 4 mg SL tablet Place 1 tablet (0 4 mg total) under the tongue every 5 "(five) minutes as needed for chest pain   • rosuvastatin (CRESTOR) 20 MG tablet Take 1 tablet (20 mg total) by mouth daily (Patient taking differently: Take 20 mg by mouth daily with dinner)   • torsemide (DEMADEX) 10 mg tablet Take 0 5 tablets (5 mg total) by mouth daily   • [DISCONTINUED] insulin glargine (Toujeo SoloStar) 300 units/mL CONCENTRATED U-300 injection pen (1-unit dial) Inject 15 Units under the skin daily   • [DISCONTINUED] oxyCODONE-acetaminophen (Percocet) 5-325 mg per tablet Take 0 5 tablets by mouth every 6 (six) hours as needed for severe pain for up to 5 days Max Daily Amount: 2 tablets   • [DISCONTINUED] traMADol (Ultram) 50 mg tablet Take 1 tablet (50 mg total) by mouth 2 (two) times a day as needed for moderate pain for up to 5 days       Objective     /70   Pulse 104   Temp 97 6 °F (36 4 °C) (Tympanic)   Resp 16   Ht 5' 4\" (1 626 m)   Wt 61 2 kg (135 lb)   SpO2 98%   BMI 23 17 kg/m²     Physical Exam  Vitals and nursing note reviewed  Constitutional:       Appearance: She is well-developed  HENT:      Head: Normocephalic and atraumatic  Right Ear: Tympanic membrane and external ear normal       Left Ear: Tympanic membrane and external ear normal    Cardiovascular:      Rate and Rhythm: Normal rate and regular rhythm  Heart sounds: Normal heart sounds  No murmur heard  No friction rub  Pulmonary:      Effort: Pulmonary effort is normal  No respiratory distress  Breath sounds: Normal breath sounds  No wheezing or rales  Musculoskeletal:         General: Tenderness ( Bilateral lateral hips) present  Right lower leg: No edema  Left lower leg: No edema  Neurological:      Gait: Gait abnormal ( Using walker)         Charmaine Lima, DO  "

## 2023-06-16 ENCOUNTER — HOSPITAL ENCOUNTER (OUTPATIENT)
Dept: RADIOLOGY | Facility: HOSPITAL | Age: 68
Discharge: HOME | End: 2023-06-16
Attending: ORTHOPAEDIC SURGERY
Payer: MEDICARE

## 2023-06-16 ENCOUNTER — OFFICE VISIT (OUTPATIENT)
Dept: OBGYN CLINIC | Facility: HOSPITAL | Age: 68
End: 2023-06-16
Payer: MEDICARE

## 2023-06-16 ENCOUNTER — APPOINTMENT (OUTPATIENT)
Dept: LAB | Facility: HOSPITAL | Age: 68
End: 2023-06-16
Payer: MEDICARE

## 2023-06-16 ENCOUNTER — TELEPHONE (OUTPATIENT)
Dept: NEPHROLOGY | Facility: CLINIC | Age: 68
End: 2023-06-16

## 2023-06-16 VITALS
BODY MASS INDEX: 23.03 KG/M2 | DIASTOLIC BLOOD PRESSURE: 74 MMHG | WEIGHT: 134.92 LBS | HEIGHT: 64 IN | SYSTOLIC BLOOD PRESSURE: 148 MMHG | HEART RATE: 93 BPM

## 2023-06-16 DIAGNOSIS — E78.2 MIXED HYPERLIPIDEMIA: ICD-10-CM

## 2023-06-16 DIAGNOSIS — S32.029D CLOSED FRACTURE OF SECOND LUMBAR VERTEBRA WITH ROUTINE HEALING, UNSPECIFIED FRACTURE MORPHOLOGY, SUBSEQUENT ENCOUNTER: ICD-10-CM

## 2023-06-16 DIAGNOSIS — S22.089D CLOSED FRACTURE OF ELEVENTH THORACIC VERTEBRA WITH ROUTINE HEALING, UNSPECIFIED FRACTURE MORPHOLOGY, SUBSEQUENT ENCOUNTER: ICD-10-CM

## 2023-06-16 DIAGNOSIS — Z01.810 PRE-OPERATIVE CARDIOVASCULAR EXAMINATION: ICD-10-CM

## 2023-06-16 DIAGNOSIS — E11.9 TYPE 2 DIABETES MELLITUS WITHOUT COMPLICATION, WITH LONG-TERM CURRENT USE OF INSULIN (HCC): ICD-10-CM

## 2023-06-16 DIAGNOSIS — S22.089D CLOSED FRACTURE OF ELEVENTH THORACIC VERTEBRA WITH ROUTINE HEALING, UNSPECIFIED FRACTURE MORPHOLOGY, SUBSEQUENT ENCOUNTER: Primary | ICD-10-CM

## 2023-06-16 DIAGNOSIS — I10 ESSENTIAL HYPERTENSION: ICD-10-CM

## 2023-06-16 DIAGNOSIS — Z79.4 TYPE 2 DIABETES MELLITUS WITHOUT COMPLICATION, WITH LONG-TERM CURRENT USE OF INSULIN (HCC): ICD-10-CM

## 2023-06-16 LAB
ANION GAP SERPL CALCULATED.3IONS-SCNC: 9 MMOL/L (ref 4–13)
BUN SERPL-MCNC: 17 MG/DL (ref 5–25)
CALCIUM SERPL-MCNC: 9.5 MG/DL (ref 8.4–10.2)
CHLORIDE SERPL-SCNC: 104 MMOL/L (ref 96–108)
CO2 SERPL-SCNC: 27 MMOL/L (ref 21–32)
CREAT SERPL-MCNC: 0.81 MG/DL (ref 0.6–1.3)
GFR SERPL CREATININE-BSD FRML MDRD: 74 ML/MIN/1.73SQ M
GLUCOSE SERPL-MCNC: 176 MG/DL (ref 65–140)
POTASSIUM SERPL-SCNC: 4.1 MMOL/L (ref 3.5–5.3)
SODIUM SERPL-SCNC: 140 MMOL/L (ref 135–147)

## 2023-06-16 PROCEDURE — 80048 BASIC METABOLIC PNL TOTAL CA: CPT

## 2023-06-16 PROCEDURE — 99214 OFFICE O/P EST MOD 30 MIN: CPT | Performed by: ORTHOPAEDIC SURGERY

## 2023-06-16 PROCEDURE — 72100 X-RAY EXAM L-S SPINE 2/3 VWS: CPT

## 2023-06-16 PROCEDURE — 36415 COLL VENOUS BLD VENIPUNCTURE: CPT

## 2023-06-16 NOTE — TELEPHONE ENCOUNTER
I called the patient's daughter, Ani Live, and we spoke over the phone  Recent laboratory data were reviewed  Lab Results   Component Value Date    SODIUM 140 06/16/2023    K 4 1 06/16/2023     06/16/2023    CO2 27 06/16/2023    BUN 17 06/16/2023    CREATININE 0 81 06/16/2023    GLUC 176 (H) 06/16/2023    CALCIUM 9 5 06/16/2023     Patient currently on [address] - this was increased recently  Remeron added  Na level stable  Patient has follow up with psych in a few weeks  No changes needed since Na level stable  Dionne Bhardwaj to check a BMP 2 weeks after med changes are made (if done during next psych visit)

## 2023-06-16 NOTE — PROGRESS NOTES
NAME: Tiffanie Bradford  : 1955  PCP: Ervin Doty DO      Chief Complaint:     HPI:  Tiffanie Bradford is a 70 y o  female presenting for initial visit with chief complaint of back pain after mechanical fall 3/20/2023  Pt presents with her daughter  Pt uses rolling walker to ambulate  Daughter states her mother fell in the bath tub in 2023 and broke her Left femur  Pt was seen in the ED  IM nail for Femur Fx  Was performed by Dr Aleida Schultz on 3/20/2023  Daughter states that since the fall, she has had complaints of back pain  She has 10/10 pain according to there daughter and she has times where she is non-functional due to the pain   She has not had any treatment other than pain management for the back pain    Conservative therapy includes the followin/2 percocet medications by mouth for pain in lumbar spine       physical therapy for Left LE       FAMILY HISTORY   Family History   Problem Relation Age of Onset   • Cancer Mother         Renal   • Heart disease Father         CHF   • Dementia Sister    • Heart disease Sister         atrial septal defect   • Dementia Sister    • Breast cancer Paternal Aunt 39       PAST MEDICAL HISTORY:   Past Medical History:   Diagnosis Date   • Anesthesia complication     difficulty waking up   • Anxiety    • Arthritis     left knee   • Bipolar 1 disorder ([facility] [address] )    • Bone spur     left knee behing the knee cap   • Cancer [facility])    • Chronic kidney disease    • Chronic pain disorder    • Colon cancer [facility])     patient states about 2017   • Colon polyp     Tubulovillous Adenoma High Grade Dysplasia - 2017   • Depression    • Diabetes mellitus ([facility] [address] )    • Endometrial cancer (HCC)     grade I   • Hx of bleeding disorder     vaginal bleeding started on 3/13/17    • Hyperlipidemia    • Hypertension    • Hypomagnesemia 2023   • Memory loss    • PONV (postoperative nausea and vomiting)    • Psychiatric disorder    • Psychiatric illness    • Psychosis ([facility] [address] )    • Shortness of breath     with exertion   • Sleep difficulties    • Urinary incontinence    • Vitamin D deficiency        MEDICATIONS:  Current Outpatient Medications   Medication Sig Dispense Refill   • acetaminophen (TYLENOL) 325 mg tablet Take 3 tablets (975 mg total) by mouth 2 (two) times a day (Patient taking differently: Take 975 mg by mouth every 6 (six) hours as needed)  0   • aspirin (ECOTRIN LOW STRENGTH) 81 mg EC tablet Take 1 tablet (81 mg total) by mouth daily Do not start before May 31, 2023  30 tablet 0   • BD Pen Needle Chelsey 2nd Gen 32G X 4 MM MISC USE 1  TWICE DAILY 60 each 0   • cyanocobalamin (VITAMIN B-12) 1000 MCG tablet Take 1 tablet (1,000 mcg total) by mouth daily Do not start before March 29, 2023  30 tablet 0   • docusate sodium (COLACE) 100 mg capsule Take 100 mg by mouth 2 (two) times a day     • famotidine (PEPCID) 20 mg tablet Take 1 tablet (20 mg total) by mouth 2 (two) times a day 30 tablet 5   • gabapentin (NEURONTIN) 100 mg capsule 100mg twice daily ( 8AM, 6PM) and 200mg at Bedtime 80 capsule 0   • lidocaine (LIDODERM) 5 % Apply 1 patch topically over 12 hours daily Remove & Discard patch within 12 hours or as directed by MD Do not start before May 31, 2023  30 patch 0   • lurasidone (LATUDA) 40 mg tablet TAKE 1 TABLET BY MOUTH IN THE MORNING WITH BREAKFAST     • lurasidone (LATUDA) 80 mg tablet Take 1 tablet (80 mg total) by mouth daily with dinner 30 tablet 0   • metFORMIN (GLUCOPHAGE-XR) 500 mg 24 hr tablet Take 1 tablet (500 mg total) by mouth 2 (two) times a day with meals 60 tablet 3   • metoprolol succinate (TOPROL-XL) 25 mg 24 hr tablet Take 1 5 tablets (37 5 mg total) by mouth daily 90 tablet 0   • mirtazapine (REMERON) 7 5 MG tablet TAKE 1 TABLET BY MOUTH ONCE DAILY AT BEDTIME  WATCH FOR SEDATION     • nitroglycerin (NITROSTAT) 0 4 mg SL tablet Place 1 tablet (0 4 mg total) under the tongue every 5 (five) minutes as needed for chest pain 30 tablet 1   • oxyCODONE-acetaminophen (Percocet) 5-325 mg per tablet Take 0 5 tablets by mouth every 6 (six) hours as needed for severe pain Max Daily Amount: 2 tablets 60 tablet 0   • rosuvastatin (CRESTOR) 20 MG tablet Take 1 tablet (20 mg total) by mouth daily (Patient taking differently: Take 20 mg by mouth daily with dinner) 90 tablet 3   • torsemide (DEMADEX) 10 mg tablet Take 0 5 tablets (5 mg total) by mouth daily 30 tablet 3     No current facility-administered medications for this visit  PAST SURGICAL HISTORY:  Past Surgical History:   Procedure Laterality Date   • BREAST BIOPSY Left     benign-maybe at ar age 58   • CHOLECYSTECTOMY      open   • COLONOSCOPY     • DILATION AND CURETTAGE OF UTERUS N/A 04/05/2017    Procedure: DILATATION AND CURETTAGE (D&C);   Surgeon: Aisha Brandon MD;  [facility] MAIN OR;  Service:    • HYSTERECTOMY     • OOPHORECTOMY     • PELVIC LAPAROSCOPY     • MN ARTHRP KNE CONDYLE&PLATU MEDIAL&LAT COMPARTMENTS Left 12/06/2022    Procedure: ARTHROPLASTY KNEE TOTAL W ROBOT - CEMENTED - LEFT;  Surgeon: Sosa Medellin DO;  Location: [address] [place] [address];  Service: Orthopedics   • MN LAPS TOTAL HYSTERECT 250 GM/< W/RMVL TUBE/OVARY N/A 05/04/2017    Procedure: ROBOTIC ASSISTED TOTAL LAPAROSCOPIC HYSTERECTOMY, BILATERAL SALPINGOOPHERECTOMY; CYSTOSCOPY;  Surgeon: Terry Trejo MD;  Location:  MAIN OR;  Service: Gynecology Oncology   • MN OPTX FEM SHFT FX W/INSJ IMED IMPLT W/WO SCREW Left 3/20/2023    Procedure: INSERTION NAIL IM FEMUR RETROGRADE;  Surgeon: Roberto Irwin MD;  Location: [address] [place] [address];  Service: Orthopedics   • REPLACEMENT TOTAL KNEE     • TONSILLECTOMY     • TUBAL LIGATION         SOCIAL HISTORY:  Social History     Socioeconomic History   • Marital status: /Civil Union     Spouse name: Not on file   • Number of children: Not on file   • Years of education: Not on file   • Highest education level: Not on file   Occupational History   • Not on file   Tobacco Use   • Smoking status: Never     Passive exposure: Never   • Smokeless tobacco: Never   Vaping Use   • Vaping Use: Never used   Substance and Sexual Activity   • Alcohol use: Never   • Drug use: No   • Sexual activity: Not Currently     Birth control/protection: Post-menopausal, Surgical   Other Topics Concern   • Not on file   Social History Narrative   • Not on file     Social Determinants of Health     Financial Resource Strain: Not on file   Food Insecurity: No Food Insecurity (3/21/2023)    Hunger Vital Sign    • Worried About Running Out of Food in the Last Year: Never true    • Ran Out of Food in the Last Year: Never true   Transportation Needs: No Transportation Needs (3/21/2023)    PRAPARE - Transportation    • Lack of Transportation (Medical): No    • Lack of Transportation (Non-Medical): No   Physical Activity: Not on file   Stress: Not on file   Social Connections: Not on file   Intimate Partner Violence: Not on file   Housing Stability: Low Risk  (3/21/2023)    Housing Stability Vital Sign    • Unable to Pay for Housing in the Last Year: No    • Number of Places Lived in the Last Year: 1    • Unstable Housing in the Last Year: No       ALLERGIES:  Allergies   Allergen Reactions   • Wellbutrin [Bupropion] Anxiety     Patient has tried Wellbutrin which resulted in increased paranoia  Patient wishes to not try this medication again  ROS:   Constitutional:  No fever, chills, night sweats, loss of appetite   HEENT No no sore throat, difficulty swallowing   Cardiovascular:  No chest pain, palpitations, BLE edema, DEL RIO   Respiratory:  No SOB, coughing, chest congestion   Gastrointestinal:  No nausea, vomiting, abdominal pain   Genitourinary:  No dysuria, hematuria, urinary urgency/frequency   Musculoskeletal:  See HPI   Skin:  No rash, erythema, edema   Neurologic:  See HPI   Psychiatric Illness:  No depression, anxiety, mood disorder, substance abuse disorder     PHYSICAL EXAM:  There were no vitals taken for this visit            General: Well-developed,appears well, no acute distress   Respiratory:  No SOB, no abnormal effort (use of accessory muscles)  GI / Abdominal:  Soft  No abdominal masses or tenderness  Nondistended  Gait & balance No evidence of myelopathic gait  Lumbar spine range of motion:  Not assessed due to known fracture  +tenderness in TL spine    Neurologic:    Lower Extremity Motor Function    Right  Left    Iliopsoas  5/5  5/5    Quadriceps 5/5 5/5   Tibialis anterior  5/5  5/5    EHL  5/5  5/5    Gastroc  muscle  5/5  5/5      Sensory: light touch is intact to bilateral upper and lower extremities     Reflexes:    Right Left   Patellar 1+ 1+   Achilles 1+ 0   Babinski neg neg     IMAGING: I have personally reviewed the images and these are my findings:  Lumbar spine xrays from 6/16/23 and 6/1/23:  L2 compression fracture with interval progression with about 50% loss of height, also with known L3 compression fracture with >50% loss of height, mild TL kyphosis      ASSESSMENT / Medical Decision Making (MDM):  70 y o  female with history of back pain  No incontinence of bowel/bladder, no fever, no chills, no night sweats  Physical exam showing no focal neurologic deficits    Imaging reviewed as above  Findings most notable for lumbar compression fractures    Impression of multiple compression fractures, osteoporosis    Plan: The above clinical, physical and imaging findings were reviewed with the patient  Tania Lund  has a constellation of findings consistent with low back pain in the setting of recent fall, osteoporosis and compression fractures  Beverely appears disabled by pain at this time  She has not had any treatment besides pain medications  We fitted her with a TLSO brace and noted her pain improved from 10/10 to 6/10 and felt stable  We also discussed with her and daughter the role of kyphoplasty but they would like to hold off for now considering cognitive issues recently    Recommend continue with pain medications as needed  Follow up in 1 month  Patient fitted for TLSO brace at today's visit  Patient instructed to wear the brace when sitting (>45 degrees), standing, and walking  Patient should not have brace on when driving or sleeping  Discussed purpose of brace, proper usage, and brace care  Limit bending, twisting, heavy lifting over the next few weeks to facilitate healing of compression fracture  She required immediate fitting of brace at today's visit due to continued pain in setting acute L2 vertebral fracture

## 2023-06-22 DIAGNOSIS — R52 PAIN: ICD-10-CM

## 2023-06-22 RX ORDER — GABAPENTIN 100 MG/1
CAPSULE ORAL
Qty: 80 CAPSULE | Refills: 0 | Status: SHIPPED | OUTPATIENT
Start: 2023-06-22

## 2023-06-25 DIAGNOSIS — Z79.4 TYPE 2 DIABETES MELLITUS WITHOUT COMPLICATION, WITH LONG-TERM CURRENT USE OF INSULIN (HCC): Primary | ICD-10-CM

## 2023-06-25 DIAGNOSIS — E11.9 TYPE 2 DIABETES MELLITUS WITHOUT COMPLICATION, WITH LONG-TERM CURRENT USE OF INSULIN (HCC): Primary | ICD-10-CM

## 2023-06-25 RX ORDER — MULTIVITAMIN
1 TABLET ORAL DAILY
COMMUNITY
End: 2023-06-25 | Stop reason: SDUPTHER

## 2023-06-25 RX ORDER — MULTIVITAMIN
1 TABLET ORAL DAILY
Qty: 90 TABLET | Refills: 1 | Status: SHIPPED | OUTPATIENT
Start: 2023-06-25 | End: 2023-06-29 | Stop reason: SDUPTHER

## 2023-06-27 PROBLEM — N39.0 UTI (URINARY TRACT INFECTION): Status: RESOLVED | Noted: 2023-03-28 | Resolved: 2023-06-27

## 2023-06-29 DIAGNOSIS — Z79.4 TYPE 2 DIABETES MELLITUS WITHOUT COMPLICATION, WITH LONG-TERM CURRENT USE OF INSULIN (HCC): ICD-10-CM

## 2023-06-29 DIAGNOSIS — E11.9 TYPE 2 DIABETES MELLITUS WITHOUT COMPLICATION, WITH LONG-TERM CURRENT USE OF INSULIN (HCC): ICD-10-CM

## 2023-06-29 RX ORDER — MULTIVITAMIN
1 TABLET ORAL DAILY
Qty: 90 TABLET | Refills: 1 | Status: SHIPPED | OUTPATIENT
Start: 2023-06-29

## 2023-07-06 ENCOUNTER — APPOINTMENT (OUTPATIENT)
Dept: LAB | Facility: HOSPITAL | Age: 68
End: 2023-07-06
Payer: MEDICARE

## 2023-07-06 ENCOUNTER — TELEPHONE (OUTPATIENT)
Dept: NEPHROLOGY | Facility: CLINIC | Age: 68
End: 2023-07-06

## 2023-07-06 DIAGNOSIS — I10 ESSENTIAL HYPERTENSION: ICD-10-CM

## 2023-07-06 LAB
ALBUMIN SERPL BCP-MCNC: 3.9 G/DL (ref 3.5–5)
ALP SERPL-CCNC: 123 U/L (ref 34–104)
ALT SERPL W P-5'-P-CCNC: 23 U/L (ref 7–52)
ANION GAP SERPL CALCULATED.3IONS-SCNC: 8 MMOL/L
AST SERPL W P-5'-P-CCNC: 27 U/L (ref 13–39)
BILIRUB SERPL-MCNC: 0.7 MG/DL (ref 0.2–1)
BUN SERPL-MCNC: 10 MG/DL (ref 5–25)
CALCIUM SERPL-MCNC: 9.2 MG/DL (ref 8.4–10.2)
CHLORIDE SERPL-SCNC: 102 MMOL/L (ref 96–108)
CHOLEST SERPL-MCNC: 107 MG/DL
CO2 SERPL-SCNC: 27 MMOL/L (ref 21–32)
CREAT SERPL-MCNC: 0.81 MG/DL (ref 0.6–1.3)
ERYTHROCYTE [DISTWIDTH] IN BLOOD BY AUTOMATED COUNT: 15.7 % (ref 11.6–15.1)
GFR SERPL CREATININE-BSD FRML MDRD: 74 ML/MIN/1.73SQ M
GLUCOSE P FAST SERPL-MCNC: 146 MG/DL (ref 65–99)
HCT VFR BLD AUTO: 35.4 % (ref 34.8–46.1)
HDLC SERPL-MCNC: 40 MG/DL
HGB BLD-MCNC: 11.3 G/DL (ref 11.5–15.4)
LDLC SERPL CALC-MCNC: 37 MG/DL (ref 0–100)
MCH RBC QN AUTO: 29 PG (ref 26.8–34.3)
MCHC RBC AUTO-ENTMCNC: 31.9 G/DL (ref 31.4–37.4)
MCV RBC AUTO: 91 FL (ref 82–98)
PLATELET # BLD AUTO: 195 THOUSANDS/UL (ref 149–390)
PMV BLD AUTO: 8.9 FL (ref 8.9–12.7)
POTASSIUM SERPL-SCNC: 3.9 MMOL/L (ref 3.5–5.3)
PROT SERPL-MCNC: 6.8 G/DL (ref 6.4–8.4)
RBC # BLD AUTO: 3.9 MILLION/UL (ref 3.81–5.12)
SODIUM SERPL-SCNC: 137 MMOL/L (ref 135–147)
TRIGL SERPL-MCNC: 151 MG/DL
WBC # BLD AUTO: 4.12 THOUSAND/UL (ref 4.31–10.16)

## 2023-07-06 PROCEDURE — 80053 COMPREHEN METABOLIC PANEL: CPT

## 2023-07-06 PROCEDURE — 83036 HEMOGLOBIN GLYCOSYLATED A1C: CPT

## 2023-07-06 PROCEDURE — 85027 COMPLETE CBC AUTOMATED: CPT

## 2023-07-06 PROCEDURE — 80061 LIPID PANEL: CPT

## 2023-07-06 PROCEDURE — 83704 LIPOPROTEIN BLD QUAN PART: CPT

## 2023-07-06 NOTE — TELEPHONE ENCOUNTER
----- Message from Zuleyka Thompson PA-C sent at 7/6/2023  2:50 PM EDT -----  Labs reviewed. Please call patient let her know her sodium levels are stable. Renal function is stable.   Follow-up in office as previously scheduled

## 2023-07-07 DIAGNOSIS — R52 PAIN: ICD-10-CM

## 2023-07-07 RX ORDER — GABAPENTIN 100 MG/1
CAPSULE ORAL
Qty: 80 CAPSULE | Refills: 0 | Status: SHIPPED | OUTPATIENT
Start: 2023-07-07

## 2023-07-08 LAB
CHOLEST SERPL-MCNC: 113 MG/DL (ref 100–199)
HDL SERPL-SCNC: 30.3 UMOL/L
HDLC SERPL-MCNC: 48 MG/DL
LDL SERPL QN: 20.4 NM
LDL SERPL QN: 353 NMOL/L
LDL SERPL-SCNC: 623 NMOL/L
LDLC SERPL CALC-MCNC: 39 MG/DL (ref 0–99)
LP-IR SCORE SERPL: 37
TRIGL SERPL-MCNC: 157 MG/DL (ref 0–149)

## 2023-07-09 LAB
EST. AVERAGE GLUCOSE BLD GHB EST-MCNC: 131 MG/DL
HBA1C MFR BLD: 6.2 %

## 2023-07-10 NOTE — TELEPHONE ENCOUNTER
Called and spoke to the patient's daughter, Melia Jurado, to relay the message above. Jenifer expressed understanding and had no further questions or concerns at this time.

## 2023-07-17 ENCOUNTER — OFFICE VISIT (OUTPATIENT)
Dept: OBGYN CLINIC | Facility: HOSPITAL | Age: 68
End: 2023-07-17
Payer: MEDICARE

## 2023-07-17 ENCOUNTER — HOSPITAL ENCOUNTER (OUTPATIENT)
Dept: RADIOLOGY | Facility: HOSPITAL | Age: 68
Discharge: HOME | End: 2023-07-17
Attending: ORTHOPAEDIC SURGERY
Payer: MEDICARE

## 2023-07-17 VITALS
DIASTOLIC BLOOD PRESSURE: 73 MMHG | SYSTOLIC BLOOD PRESSURE: 117 MMHG | HEART RATE: 72 BPM | HEIGHT: 64 IN | BODY MASS INDEX: 23.03 KG/M2 | WEIGHT: 134.92 LBS

## 2023-07-17 DIAGNOSIS — S32.029D CLOSED FRACTURE OF SECOND LUMBAR VERTEBRA WITH ROUTINE HEALING, UNSPECIFIED FRACTURE MORPHOLOGY, SUBSEQUENT ENCOUNTER: ICD-10-CM

## 2023-07-17 DIAGNOSIS — S22.089D CLOSED FRACTURE OF ELEVENTH THORACIC VERTEBRA WITH ROUTINE HEALING, UNSPECIFIED FRACTURE MORPHOLOGY, SUBSEQUENT ENCOUNTER: Primary | ICD-10-CM

## 2023-07-17 DIAGNOSIS — S22.089D CLOSED FRACTURE OF ELEVENTH THORACIC VERTEBRA WITH ROUTINE HEALING, UNSPECIFIED FRACTURE MORPHOLOGY, SUBSEQUENT ENCOUNTER: ICD-10-CM

## 2023-07-17 PROCEDURE — 72100 X-RAY EXAM L-S SPINE 2/3 VWS: CPT

## 2023-07-17 PROCEDURE — 99214 OFFICE O/P EST MOD 30 MIN: CPT | Performed by: ORTHOPAEDIC SURGERY

## 2023-07-17 RX ORDER — OXYCODONE AND ACETAMINOPHEN 2.5; 325 MG/1; MG/1
1 TABLET ORAL 2 TIMES DAILY PRN
Qty: 30 TABLET | Refills: 0 | Status: SHIPPED | OUTPATIENT
Start: 2023-07-17 | End: 2023-07-18 | Stop reason: DRUGHIGH

## 2023-07-17 NOTE — PROGRESS NOTES
NAME: Arlette Solitario  : 1955  PCP: Vanessa Hill DO      Chief Complaint: back pain - FOLLOW UP    HPI:  Arlette Sloitario is a 70 y.o. female presenting for initial visit with chief complaint of back pain after mechanical fall 3/20/2023. Pt presents with her daughter. Pt uses rolling walker to ambulate  Daughter states her mother fell in the bath tub in 2023 and broke her Left femur. Pt was seen in the ED. Left IM nail for Femur Fx  Was performed by Dr Gaetano Arreola on 3/20/2023. Daughter states that since the fall, she has had complaints of back pain. She has 10/10 pain according to there daughter and she has times where she is non-functional due to the pain . She has not had any treatment other than pain management for the back pain    Conservative therapy includes the followin/2 percocet medications by mouth for pain in lumbar spine. Physical therapy for left LE     Update on 2023:  Rosie Sanchez is here for follow up. She was previously fitted for TLSO brace at previous visit. She denies significant back pain. Currently describes ongoing lateral right thigh pain, worse with pressure. She ambulates with a walker at baseline. Per patient's daughter, her pain seems to be improving since previous visit. Taking percocet and gabapentin with some relief.     FAMILY HISTORY   Family History   Problem Relation Age of Onset   • Cancer Mother         Renal   • Heart disease Father         CHF   • Dementia Sister    • Heart disease Sister         atrial septal defect   • Dementia Sister    • Breast cancer Paternal Aunt 39       PAST MEDICAL HISTORY:   Past Medical History:   Diagnosis Date   • Anesthesia complication     difficulty waking up   • Anxiety    • Arthritis     left knee   • Bipolar 1 disorder ([address])    • Bone spur     left knee behing the knee cap   • Cancer [facility])    • Chronic kidney disease    • Chronic pain disorder    • Colon cancer [facility])     patient states about 2017   • Colon polyp Tubulovillous Adenoma High Grade Dysplasia - 2017   • Depression    • Diabetes mellitus ([address])    • Endometrial cancer ([address])     grade I   • Hx of bleeding disorder     vaginal bleeding started on 3/13/17    • Hyperlipidemia    • Hypertension    • Hypomagnesemia 03/20/2023   • Memory loss    • PONV (postoperative nausea and vomiting)    • Psychiatric disorder    • Psychiatric illness    • Psychosis ([address])    • Shortness of breath     with exertion   • Sleep difficulties    • Urinary incontinence    • Vitamin D deficiency        MEDICATIONS:  Current Outpatient Medications   Medication Sig Dispense Refill   • gabapentin (NEURONTIN) 100 mg capsule 100mg twice daily ( 8AM, 6PM) and 200mg at Bedtime 80 capsule 0   • oxyCODONE-acetaminophen (Percocet) 2.5-325 MG per tablet Take 1 tablet by mouth 2 (two) times a day as needed for moderate pain Max Daily Amount: 2 tablets 30 tablet 0   • acetaminophen (TYLENOL) 325 mg tablet Take 3 tablets (975 mg total) by mouth 2 (two) times a day (Patient taking differently: Take 975 mg by mouth every 6 (six) hours as needed)  0   • aspirin (ECOTRIN LOW STRENGTH) 81 mg EC tablet Take 1 tablet (81 mg total) by mouth daily Do not start before May 31, 2023. 30 tablet 0   • BD Pen Needle Chelsey 2nd Gen 32G X 4 MM MISC USE 1  TWICE DAILY 60 each 0   • cyanocobalamin (VITAMIN B-12) 1000 MCG tablet Take 1 tablet (1,000 mcg total) by mouth daily Do not start before March 29, 2023. 30 tablet 0   • docusate sodium (COLACE) 100 mg capsule Take 100 mg by mouth 2 (two) times a day     • famotidine (PEPCID) 20 mg tablet Take 1 tablet (20 mg total) by mouth 2 (two) times a day 30 tablet 5   • lidocaine (LIDODERM) 5 % Apply 1 patch topically over 12 hours daily Remove & Discard patch within 12 hours or as directed by MD Do not start before May 31, 2023. 30 patch 0   • lurasidone (LATUDA) 40 mg tablet TAKE 1 TABLET BY MOUTH IN THE MORNING WITH BREAKFAST     • lurasidone (LATUDA) 80 mg tablet Take 1 tablet (80 mg total) by mouth daily with dinner 30 tablet 0   • metFORMIN (GLUCOPHAGE-XR) 500 mg 24 hr tablet Take 1 tablet (500 mg total) by mouth 2 (two) times a day with meals 60 tablet 3   • metoprolol succinate (TOPROL-XL) 25 mg 24 hr tablet Take 1.5 tablets (37.5 mg total) by mouth daily 90 tablet 0   • mirtazapine (REMERON) 7.5 MG tablet TAKE 1 TABLET BY MOUTH ONCE DAILY AT BEDTIME. WATCH FOR SEDATION     • Multiple Vitamin (multivitamin) tablet Take 1 tablet by mouth daily 90 tablet 1   • nitroglycerin (NITROSTAT) 0.4 mg SL tablet Place 1 tablet (0.4 mg total) under the tongue every 5 (five) minutes as needed for chest pain 30 tablet 1   • rosuvastatin (CRESTOR) 20 MG tablet Take 1 tablet (20 mg total) by mouth daily (Patient taking differently: Take 20 mg by mouth daily with dinner) 90 tablet 3   • torsemide (DEMADEX) 10 mg tablet Take 0.5 tablets (5 mg total) by mouth daily 30 tablet 3     No current facility-administered medications for this visit. PAST SURGICAL HISTORY:  Past Surgical History:   Procedure Laterality Date   • BREAST BIOPSY Left     benign-maybe at ar age 58   • CHOLECYSTECTOMY      open   • COLONOSCOPY     • DILATION AND CURETTAGE OF UTERUS N/A 04/05/2017    Procedure: DILATATION AND CURETTAGE (D&C);   Surgeon: Fredy Mosley MD;  Location: [facility] MAIN OR;  Service:    • HYSTERECTOMY     • OOPHORECTOMY     • PELVIC LAPAROSCOPY     • FL ARTHRP KNE CONDYLE&PLATU MEDIAL&LAT COMPARTMENTS Left 12/06/2022    Procedure: ARTHROPLASTY KNEE TOTAL W ROBOT - CEMENTED - LEFT;  Surgeon: Cristin Daley DO;  Location: [facility];  Service: Orthopedics   • FL LAPS TOTAL HYSTERECT 250 GM/< W/RMVL TUBE/OVARY N/A 05/04/2017    Procedure: ROBOTIC ASSISTED TOTAL LAPAROSCOPIC HYSTERECTOMY, BILATERAL SALPINGOOPHERECTOMY; CYSTOSCOPY;  Surgeon: Cynthia Moore MD;  Location:  MAIN OR;  Service: Gynecology Oncology   • FL OPTX FEM SHFT FX W/INSJ IMED IMPLT W/WO SCREW Left 3/20/2023    Procedure: INSERTION NAIL IM FEMUR RETROGRADE;  Surgeon: Duane Ellen, MD;  Location: [facility];  Service: Orthopedics   • REPLACEMENT TOTAL KNEE     • TONSILLECTOMY     • TUBAL LIGATION         SOCIAL HISTORY:  Social History     Socioeconomic History   • Marital status: /Civil Union     Spouse name: Not on file   • Number of children: Not on file   • Years of education: Not on file   • Highest education level: Not on file   Occupational History   • Not on file   Tobacco Use   • Smoking status: Never     Passive exposure: Never   • Smokeless tobacco: Never   Vaping Use   • Vaping Use: Never used   Substance and Sexual Activity   • Alcohol use: Never   • Drug use: No   • Sexual activity: Not Currently     Birth control/protection: Post-menopausal, Surgical   Other Topics Concern   • Not on file   Social History Narrative   • Not on file     Social Determinants of Health     Financial Resource Strain: Not on file   Food Insecurity: No Food Insecurity (3/21/2023)    Hunger Vital Sign    • Worried About Running Out of Food in the Last Year: Never true    • Ran Out of Food in the Last Year: Never true   Transportation Needs: No Transportation Needs (3/21/2023)    PRAPARE - Transportation    • Lack of Transportation (Medical): No    • Lack of Transportation (Non-Medical): No   Physical Activity: Not on file   Stress: Not on file   Social Connections: Not on file   Intimate Partner Violence: Not on file   Housing Stability: Low Risk  (3/21/2023)    Housing Stability Vital Sign    • Unable to Pay for Housing in the Last Year: No    • Number of Places Lived in the Last Year: 1    • Unstable Housing in the Last Year: No       ALLERGIES:  Allergies   Allergen Reactions   • Wellbutrin [Bupropion] Anxiety     Patient has tried Wellbutrin which resulted in increased paranoia. Patient wishes to not try this medication again.        ROS:   Constitutional:  No fever, chills, night sweats, loss of appetite   HEENT No no sore throat, difficulty swallowing   Cardiovascular:  No chest pain, palpitations, BLE edema, DEL RIO   Respiratory:  No SOB, coughing, chest congestion   Gastrointestinal:  No nausea, vomiting, abdominal pain   Genitourinary:  No dysuria, hematuria, urinary urgency/frequency   Musculoskeletal:  See HPI   Skin:  No rash, erythema, edema   Neurologic:  See HPI   Psychiatric Illness:  No depression, anxiety, mood disorder, substance abuse disorder     PHYSICAL EXAM:  /73   Pulse 72   Ht 5' 4" (1.626 m)   Wt 61.2 kg (134 lb 14.7 oz)   BMI 23.16 kg/m²           General:  Well-developed,appears well, no acute distress   Respiratory:  No SOB, no abnormal effort (use of accessory muscles). GI / Abdominal:  Soft. No abdominal masses or tenderness. Nondistended. Gait & balance No evidence of myelopathic gait. Lumbar spine range of motion:  Not assessed due to known fracture  +tenderness in TL spine    +TTP lateral aspect right thigh    Neurologic:    Lower Extremity Motor Function    Right  Left    Iliopsoas  5/5  5/5    Quadriceps 5/5 5/5   Tibialis anterior  5/5  5/5    EHL  5/5  5/5    Gastroc. muscle  5/5  5/5      Sensory: light touch is intact to bilateral upper and lower extremities     Reflexes:    Right Left   Patellar 1+ 1+   Achilles 1+ 0   Babinski neg neg     IMAGING: I have personally reviewed the images and these are my findings:  Lumbar spine xrays from 6/16/23 and 6/1/23:  L2 compression fracture with interval progression with about 50% loss of height, also with known L3 compression fracture with >50% loss of height, mild TL kyphosis      Lumbar spine xray from 7/17/2023: L2 compression fracture with interval progression with about 50% loss of height, also with known L3 compression fracture with >50% loss of height, mild TL kyphosis; similar in appearance from x-rays on 6/16/2023      ASSESSMENT / Medical Decision Making (MDM):  70 y.o. female with history of back pain. Patient here for follow up.  No incontinence of bowel/bladder, no fever, no chills, no night sweats. Physical exam showing no focal neurologic deficits    Imaging reviewed as above. Findings most notable for lumbar compression fractures    Impression of multiple compression fractures, osteoporosis    Plan: The above clinical, physical and imaging findings were reviewed with the patient. Antoine Warner  has a constellation of findings consistent with low back pain in the setting of recent fall, osteoporosis and compression fractures. Antoine Warner is here for follow up. Currently denies significant back pain. Reports ongoing lateral right thigh pain, worse with palpation. Uses walker for ambulation at baseline. Given continued pain, will send refill percocet rx to patient's pharmacy. Discussed reasoning for prescribing medication, proper usage, and potential side effects. Would also recommend possible trigger point injections to target areas of pain. Patient's daughter noted she already has pain specialist that she has seen in past. Discussed potential role of steroid injection at or near the source of pain to provide targeted relief. Did not require new referral to pain management at today's visit. Continue with medications as previously prescribed if providing pain/symptom relief. Follow up in 1 month for repeat evaluation if pain does not improve. Patient instructed to return to office/ER sooner if symptoms are not improving, getting worse, or new worrisome/neurologic symptoms arise.

## 2023-07-18 ENCOUNTER — TELEPHONE (OUTPATIENT)
Dept: OBGYN CLINIC | Facility: HOSPITAL | Age: 68
End: 2023-07-18

## 2023-07-18 DIAGNOSIS — S22.089D CLOSED FRACTURE OF ELEVENTH THORACIC VERTEBRA WITH ROUTINE HEALING, UNSPECIFIED FRACTURE MORPHOLOGY, SUBSEQUENT ENCOUNTER: Primary | ICD-10-CM

## 2023-07-18 DIAGNOSIS — S32.029D CLOSED FRACTURE OF SECOND LUMBAR VERTEBRA WITH ROUTINE HEALING, UNSPECIFIED FRACTURE MORPHOLOGY, SUBSEQUENT ENCOUNTER: ICD-10-CM

## 2023-07-18 DIAGNOSIS — Z86.79 HISTORY OF HYPERTENSION: ICD-10-CM

## 2023-07-18 RX ORDER — OXYCODONE HYDROCHLORIDE AND ACETAMINOPHEN 5; 325 MG/1; MG/1
0.5 TABLET ORAL 2 TIMES DAILY PRN
Qty: 15 TABLET | Refills: 0 | Status: SHIPPED | OUTPATIENT
Start: 2023-07-18

## 2023-07-18 NOTE — TELEPHONE ENCOUNTER
Spoke with pharmacy over the phone. They do not have the 2.5mg-325mg oxycodone-acetaminophen (percocet) available at the pharmacy. Changed prescription to 5mg-325mg oxycodone-acetaminophen (percocet), half a tablet bid prn.     Thanks,  Tresa Candelario PA-C

## 2023-07-18 NOTE — TELEPHONE ENCOUNTER
Caller: [facility]    Doctor: iggy    Reason for call: pharmacy asking for revision of percocet script for 5-325 mg taking half 2x daily instead    Call back#: [phone]

## 2023-07-20 ENCOUNTER — TELEPHONE (OUTPATIENT)
Dept: NEPHROLOGY | Facility: CLINIC | Age: 68
End: 2023-07-20

## 2023-07-20 ENCOUNTER — APPOINTMENT (OUTPATIENT)
Dept: LAB | Facility: HOSPITAL | Age: 68
End: 2023-07-20
Attending: INTERNAL MEDICINE
Payer: MEDICARE

## 2023-07-20 DIAGNOSIS — I10 ESSENTIAL HYPERTENSION: ICD-10-CM

## 2023-07-20 LAB
ANION GAP SERPL CALCULATED.3IONS-SCNC: 9 MMOL/L
BUN SERPL-MCNC: 10 MG/DL (ref 5–25)
CALCIUM SERPL-MCNC: 9.4 MG/DL (ref 8.4–10.2)
CHLORIDE SERPL-SCNC: 102 MMOL/L (ref 96–108)
CO2 SERPL-SCNC: 28 MMOL/L (ref 21–32)
CREAT SERPL-MCNC: 0.88 MG/DL (ref 0.6–1.3)
GFR SERPL CREATININE-BSD FRML MDRD: 67 ML/MIN/1.73SQ M
GLUCOSE SERPL-MCNC: 121 MG/DL (ref 65–140)
POTASSIUM SERPL-SCNC: 4 MMOL/L (ref 3.5–5.3)
SODIUM SERPL-SCNC: 139 MMOL/L (ref 135–147)

## 2023-07-20 PROCEDURE — 36415 COLL VENOUS BLD VENIPUNCTURE: CPT

## 2023-07-20 PROCEDURE — 80048 BASIC METABOLIC PNL TOTAL CA: CPT

## 2023-07-20 NOTE — TELEPHONE ENCOUNTER
Advised daughter that labs of 7/20/23 show normal sodium of 139 per Dr. Francine Peng.      ----- Message from Claire Pierre MD sent at 7/20/2023 12:40 PM EDT -----  Please inform patient's daughter, Shayla Jimenes, that most recent labs (7/20/23) show that sodium level is normal - 139.

## 2023-07-28 ENCOUNTER — OFFICE VISIT (OUTPATIENT)
Dept: FAMILY MEDICINE CLINIC | Facility: CLINIC | Age: 68
End: 2023-07-28
Payer: MEDICARE

## 2023-07-28 VITALS
WEIGHT: 130 LBS | HEART RATE: 81 BPM | BODY MASS INDEX: 22.2 KG/M2 | RESPIRATION RATE: 16 BRPM | TEMPERATURE: 97.4 F | SYSTOLIC BLOOD PRESSURE: 118 MMHG | HEIGHT: 64 IN | DIASTOLIC BLOOD PRESSURE: 78 MMHG

## 2023-07-28 DIAGNOSIS — E78.2 MIXED HYPERLIPIDEMIA: Chronic | ICD-10-CM

## 2023-07-28 DIAGNOSIS — Z00.00 MEDICARE ANNUAL WELLNESS VISIT, SUBSEQUENT: Primary | ICD-10-CM

## 2023-07-28 DIAGNOSIS — E11.9 TYPE 2 DIABETES MELLITUS WITHOUT COMPLICATION, WITH LONG-TERM CURRENT USE OF INSULIN (HCC): ICD-10-CM

## 2023-07-28 DIAGNOSIS — Z79.4 TYPE 2 DIABETES MELLITUS WITHOUT COMPLICATION, WITH LONG-TERM CURRENT USE OF INSULIN (HCC): ICD-10-CM

## 2023-07-28 DIAGNOSIS — Z12.31 SCREENING MAMMOGRAM, ENCOUNTER FOR: ICD-10-CM

## 2023-07-28 DIAGNOSIS — I10 ESSENTIAL HYPERTENSION: ICD-10-CM

## 2023-07-28 PROBLEM — R32 URINARY INCONTINENCE: Status: ACTIVE | Noted: 2019-11-15

## 2023-07-28 PROCEDURE — G0438 PPPS, INITIAL VISIT: HCPCS | Performed by: FAMILY MEDICINE

## 2023-07-28 RX ORDER — ESCITALOPRAM OXALATE 10 MG/1
TABLET ORAL
COMMUNITY
Start: 2023-07-06

## 2023-07-28 RX ORDER — METFORMIN HYDROCHLORIDE 500 MG/1
500 TABLET, EXTENDED RELEASE ORAL
Qty: 30 TABLET | Refills: 3
Start: 2023-07-28 | End: 2023-08-04

## 2023-07-28 RX ORDER — CLONAZEPAM 0.5 MG/1
TABLET ORAL
COMMUNITY
Start: 2023-07-06

## 2023-07-28 NOTE — PROGRESS NOTES
Assessment and Plan:     Problem List Items Addressed This Visit     Essential hypertension     Blood pressure is stable           Mixed hyperlipidemia    Type 2 diabetes mellitus without complication, with long-term current use of insulin ([address])       Lab Results   Component Value Date    HGBA1C 6.2 (H) 07/06/2023     Well controlled   Metformin decreased from 2 pills a day to once a day         Relevant Medications    metFORMIN (GLUCOPHAGE-XR) 500 mg 24 hr tablet    Other Relevant Orders    Comprehensive metabolic panel    Hemoglobin A1C   Other Visit Diagnoses     Medicare annual wellness visit, subsequent    -  Primary    Screening mammogram, encounter for              Urinary Incontinence Plan of Care: counseling topics discussed: use restroom every 2 hours. Preventive health issues were discussed with patient, and age appropriate screening tests were ordered as noted in patient's After Visit Summary. Personalized health advice and appropriate referrals for health education or preventive services given if needed, as noted in patient's After Visit Summary.      History of Present Illness:     Patient presents for a Medicare Wellness Visit    HPI   Patient Care Team:  Dora Ward DO as PCP - General (Family Medicine)  Kristina Spice, MD Val Aschoff, MD Thelda Lo, MD Nedra Claude, DO (Cardiology)     Review of Systems:     Review of Systems     Problem List:     Patient Active Problem List   Diagnosis   • History of endometrial cancer   • Type 2 diabetes mellitus without complication, with long-term current use of insulin ([address])   • Essential hypertension   • Mixed hyperlipidemia   • History of colon cancer   • Urinary incontinence   • Axillary mass, right   • Mass of axillary tail of right breast   • Mass of axillary tail of left breast   • Hyponatremia   • Bipolar affective disorder, depressed, severe, with psychotic behavior ([address])   • Elevated brain natriuretic peptide (BNP) level   • Osteoarthritis of both knees   • Status post total left knee replacement using cement   • Platelets decreased ([facility])   • PONV (postoperative nausea and vomiting)   • Delayed emergence from anesthesia   • Bipolar depression ([address])   • Fracture of left femur ([facility])   • History of DVT of lower extremity   • Thyroid nodule   • Anxiety   • Acute blood loss anemia   • Dysphagia   • Severe protein-calorie malnutrition ([facility])   • Thrush   • History of femur fracture   • Medical clearance for psychiatric admission   • Anemia   • Pain   • SIADH (syndrome of inappropriate ADH production) ([facility])   • Closed fracture of second lumbar vertebra with routine healing   • Closed fracture of T11 vertebra with routine healing      Past Medical and Surgical History:     Past Medical History:   Diagnosis Date   • Anesthesia complication     difficulty waking up   • Anxiety    • Arthritis     left knee   • Bipolar 1 disorder ([facility])    • Bone spur     left knee behing the knee cap   • Cancer ([facility])    • Chronic kidney disease    • Chronic pain disorder    • Colon cancer ([facility])     patient states about 2017   • Colon polyp     Tubulovillous Adenoma High Grade Dysplasia - 2017   • Depression    • Diabetes mellitus ([address])    • Endometrial cancer ([facility])     grade I   • Hx of bleeding disorder     vaginal bleeding started on 3/13/17    • Hyperlipidemia    • Hypertension    • Hypomagnesemia 03/20/2023   • Memory loss    • PONV (postoperative nausea and vomiting)    • Psychiatric disorder    • Psychiatric illness    • Psychosis ([address])    • Shortness of breath     with exertion   • Sleep difficulties    • Urinary incontinence    • Vitamin D deficiency      Past Surgical History:   Procedure Laterality Date   • BREAST BIOPSY Left     benign-maybe at ar age 58   • CHOLECYSTECTOMY      open   • COLONOSCOPY     • DILATION AND CURETTAGE OF UTERUS N/A 04/05/2017    Procedure: DILATATION AND CURETTAGE (D&C);   Surgeon: Patricia Ulloa MD;  Location: [address] [facility] MAIN OR;  Service:    • HYSTERECTOMY     • OOPHORECTOMY     • PELVIC LAPAROSCOPY     • MT ARTHRP KNE CONDYLE&PLATU MEDIAL&LAT COMPARTMENTS Left 12/06/2022    Procedure: ARTHROPLASTY KNEE TOTAL W ROBOT - CEMENTED - LEFT;  Surgeon: Julianna Mortimer, DO;  Location: [facility];  Service: Orthopedics   • MT LAPS TOTAL HYSTERECT 250 GM/< W/RMVL TUBE/OVARY N/A 05/04/2017    Procedure: ROBOTIC ASSISTED TOTAL LAPAROSCOPIC HYSTERECTOMY, BILATERAL SALPINGOOPHERECTOMY; CYSTOSCOPY;  Surgeon: Orlin Bowser MD;  Location:  MAIN OR;  Service: Gynecology Oncology   • MT OPTX FEM SHFT FX W/INSJ IMED IMPLT W/WO SCREW Left 3/20/2023    Procedure: INSERTION NAIL IM FEMUR RETROGRADE;  Surgeon: Hudson Verma MD;  Location: [facility];  Service: Orthopedics   • REPLACEMENT TOTAL KNEE     • TONSILLECTOMY     • TUBAL LIGATION        Family History:     Family History   Problem Relation Age of Onset   • Cancer Mother         Renal   • Heart disease Father         CHF   • Dementia Sister    • Heart disease Sister         atrial septal defect   • Dementia Sister    • Breast cancer Paternal Aunt 39      Social History:     Social History     Socioeconomic History   • Marital status: /Civil Union     Spouse name: None   • Number of children: None   • Years of education: None   • Highest education level: None   Occupational History   • None   Tobacco Use   • Smoking status: Never     Passive exposure: Never   • Smokeless tobacco: Never   Vaping Use   • Vaping Use: Never used   Substance and Sexual Activity   • Alcohol use: Never   • Drug use: No   • Sexual activity: Not Currently     Birth control/protection: Post-menopausal, Surgical   Other Topics Concern   • None   Social History Narrative   • None     Social Determinants of Health     Financial Resource Strain: Low Risk  (7/28/2023)    Overall Financial Resource Strain (CARDIA)    • Difficulty of Paying Living Expenses: Not hard at all   Food Insecurity: No Food Insecurity (3/21/2023)    Hunger Vital Sign    • Worried About Running Out of Food in the Last Year: Never true    • Ran Out of Food in the Last Year: Never true   Transportation Needs: No Transportation Needs (7/28/2023)    PRAPARE - Transportation    • Lack of Transportation (Medical): No    • Lack of Transportation (Non-Medical): No   Physical Activity: Not on file   Stress: Not on file   Social Connections: Not on file   Intimate Partner Violence: Not on file   Housing Stability: Low Risk  (3/21/2023)    Housing Stability Vital Sign    • Unable to Pay for Housing in the Last Year: No    • Number of Places Lived in the Last Year: 1    • Unstable Housing in the Last Year: No      Medications and Allergies:     Current Outpatient Medications   Medication Sig Dispense Refill   • acetaminophen (TYLENOL) 325 mg tablet Take 3 tablets (975 mg total) by mouth 2 (two) times a day (Patient taking differently: Take 975 mg by mouth every 6 (six) hours as needed)  0   • aspirin (ECOTRIN LOW STRENGTH) 81 mg EC tablet Take 1 tablet (81 mg total) by mouth daily Do not start before May 31, 2023. 30 tablet 0   • BD Pen Needle Chelsey 2nd Gen 32G X 4 MM MISC USE 1  TWICE DAILY 60 each 0   • clonazePAM (KlonoPIN) 0.5 mg tablet TAKE 1/2 (ONE-HALF) TO ONE TABLET BY MOUTH ONCE DAILY AS NEEDED.  WATCH FOR SEDATION     • cyanocobalamin (VITAMIN B-12) 1000 MCG tablet Take 1 tablet (1,000 mcg total) by mouth daily Do not start before March 29, 2023. 30 tablet 0   • docusate sodium (COLACE) 100 mg capsule Take 100 mg by mouth 2 (two) times a day     • escitalopram (LEXAPRO) 10 mg tablet TAKE 1/2 (ONE-HALF) TABLET BY MOUTH IN THE MORNING FOR 6 DAYS, THEN 1 TABLET BY MOUTH ONCE DAILY IN THE MORNING THEREAFTER     • famotidine (PEPCID) 20 mg tablet Take 1 tablet (20 mg total) by mouth 2 (two) times a day 30 tablet 5   • gabapentin (NEURONTIN) 100 mg capsule 100mg twice daily ( 8AM, 6PM) and 200mg at Bedtime 80 capsule 0   • lidocaine (LIDODERM) 5 % Apply 1 patch topically over 12 hours daily Remove & Discard patch within 12 hours or as directed by MD Do not start before May 31, 2023. 30 patch 0   • lurasidone (LATUDA) 40 mg tablet TAKE 1 TABLET BY MOUTH IN THE MORNING WITH BREAKFAST     • lurasidone (LATUDA) 80 mg tablet Take 1 tablet (80 mg total) by mouth daily with dinner 30 tablet 0   • metFORMIN (GLUCOPHAGE-XR) 500 mg 24 hr tablet Take 1 tablet (500 mg total) by mouth daily with breakfast 30 tablet 3   • metoprolol succinate (TOPROL-XL) 25 mg 24 hr tablet Take 1.5 tablets (37.5 mg total) by mouth daily (Patient taking differently: Take 37.5 mg by mouth daily One tablet daily) 90 tablet 0   • mirtazapine (REMERON) 7.5 MG tablet TAKE 1 TABLET BY MOUTH ONCE DAILY AT BEDTIME. WATCH FOR SEDATION     • Multiple Vitamin (multivitamin) tablet Take 1 tablet by mouth daily 90 tablet 1   • nitroglycerin (NITROSTAT) 0.4 mg SL tablet Place 1 tablet (0.4 mg total) under the tongue every 5 (five) minutes as needed for chest pain 30 tablet 1   • oxyCODONE-acetaminophen (Percocet) 5-325 mg per tablet Take 0.5 tablets by mouth 2 (two) times a day as needed for moderate pain Take half a tablet (0.5mg total)  two times a day as needed for moderate pain. Do not drive or operate machinery while taking percocet. Do not take other sedative medications while taking percocet. Max Daily Amount: 1 tablet 15 tablet 0   • rosuvastatin (CRESTOR) 20 MG tablet Take 1 tablet (20 mg total) by mouth daily (Patient taking differently: Take 20 mg by mouth daily with dinner) 90 tablet 3   • torsemide (DEMADEX) 10 mg tablet Take 0.5 tablets (5 mg total) by mouth daily 30 tablet 3     No current facility-administered medications for this visit. Allergies   Allergen Reactions   • Wellbutrin [Bupropion] Anxiety     Patient has tried Wellbutrin which resulted in increased paranoia. Patient wishes to not try this medication again.       Immunizations:     Immunization History   Administered Date(s) Administered   • COVID-19 Moderna Vac BIVALENT 12 Yr+ IM (BOOSTER ONLY) 0.5 ML 10/19/2022   • INFLUENZA 10/19/2020, 10/16/2022   • Influenza, Seasonal Vaccine, Quadrivalent, Adjuvanted, .5e 12/10/2021   • Influenza, high dose seasonal 0.7 mL 10/16/2022   • Pneumococcal Conjugate 13-Valent 01/04/2019   • Unknown 05/14/2021, 06/11/2021   • influenza, trivalent, adjuvanted 10/19/2020      Health Maintenance:         Topic Date Due   • Breast Cancer Screening: Mammogram  03/31/2023   • Hepatitis C Screening  Completed   • Colorectal Cancer Screening  Discontinued         Topic Date Due   • Pneumococcal Vaccine: 65+ Years (2 - PPSV23 if available, else PCV20) 03/01/2019   • COVID-19 Vaccine (2 - Moderna series) 02/19/2023   • Influenza Vaccine (1) 09/01/2023      Medicare Screening Tests and Risk Assessments:     Yasmine Bentley is here for her Subsequent Wellness visit. Health Risk Assessment:   Patient rates overall health as poor. Patient feels that their physical health rating is much worse. Patient is dissatisfied with their life. Eyesight was rated as same. Hearing was rated as same. Patient feels that their emotional and mental health rating is much worse. Patients states they are always angry. Patient states they are often unusually tired/fatigued. Pain experienced in the last 7 days has been a lot. Patient's pain rating has been 5/10. Patient states that she has experienced weight loss or gain in last 6 months. Pt doesn't eat a lot     Depression Screening:   PHQ-2 Score: 4  PHQ-9 Score: 6      Fall Risk Screening: In the past year, patient has experienced: history of falling in past year    Number of falls: 1  Injured during fall?: Yes    Feels unsteady when standing or walking?: Yes    Worried about falling?: Yes      Urinary Incontinence Screening:   Patient has leaked urine accidently in the last six months. Home Safety:  Patient has trouble with stairs inside or outside of their home.  Patient has working smoke alarms and has working carbon monoxide detector. Home safety hazards include: none. Nutrition:   Current diet is Low Carb, Regular and Limited junk food. Medications:   Patient is currently taking over-the-counter supplements. OTC medications include: see medication list. Patient is not able to manage medications. Daughter manages     Activities of Daily Living (ADLs)/Instrumental Activities of Daily Living (IADLs):   Walk and transfer into and out of bed and chair?: Yes  Dress and groom yourself?: Yes    Bathe or shower yourself?: No    Feed yourself?  Yes  Do your laundry/housekeeping?: No  Manage your money, pay your bills and track your expenses?: No  Make your own meals?: No    Do your own shopping?: No    ADL comments:  helps with everything     Previous Hospitalizations:   Any hospitalizations or ED visits within the last 12 months?: Yes    How many hospitalizations have you had in the last year?: 3-4    Hospitalization Comments: Dec-knee done- left   March/april- broke femur  Mental health    Advance Care Planning:   Living will: No    Durable POA for healthcare: No    Advanced directive: No    Advanced directive counseling given: Yes    End of Life Decisions reviewed with patient: Yes    Provider agrees with end of life decisions: Yes      Cognitive Screening:   Provider or family/friend/caregiver concerned regarding cognition?: No    PREVENTIVE SCREENINGS      Cardiovascular Screening:    General: Screening Not Indicated and History Lipid Disorder      Diabetes Screening:     General: Screening Not Indicated and History Diabetes      Colorectal Cancer Screening:     General: History Colorectal Cancer      Breast Cancer Screening:     General: Screening Current      Cervical Cancer Screening:    General: Screening Not Indicated      Osteoporosis Screening:    General: Risks and Benefits Discussed    Due for: DXA Axial      Abdominal Aortic Aneurysm (AAA) Screening:        General: Screening Not Indicated      Lung Cancer Screening:     General: Screening Not Indicated      Hepatitis C Screening:    General: Screening Current    Screening, Brief Intervention, and Referral to Treatment (SBIRT)    Screening  Typical number of drinks in a day: 0  Typical number of drinks in a week: 0  Interpretation: Low risk drinking behavior. AUDIT-C Screenin) How often did you have a drink containing alcohol in the past year? never  2) How many drinks did you have on a typical day when you were drinking in the past year? 0  3) How often did you have 6 or more drinks on one occasion in the past year? never    AUDIT-C Score: 0  Interpretation: Score 0-2 (female): Negative screen for alcohol misuse    Single Item Drug Screening:  How often have you used an illegal drug (including marijuana) or a prescription medication for non-medical reasons in the past year? never    Single Item Drug Screen Score: 0  Interpretation: Negative screen for possible drug use disorder    Brief Intervention  Alcohol & drug use screenings were reviewed. No concerns regarding substance use disorder identified. Review of Current Opioid Use  Opioid Risk Tool (ORT) Score: 0  Opioid Risk Tool (ORT) Interpretation: Score 0-3: Low risk for opioid misuse    No results found. Physical Exam:     /78   Pulse 81   Temp (!) 97.4 °F (36.3 °C) (Tympanic)   Resp 16   Ht 5' 4" (1.626 m)   Wt 59 kg (130 lb)   BMI 22.31 kg/m²     Physical Exam  Vitals and nursing note reviewed. Constitutional:       Appearance: She is well-developed. HENT:      Head: Normocephalic and atraumatic. Right Ear: External ear normal.      Left Ear: External ear normal.      Nose: Nose normal.   Cardiovascular:      Rate and Rhythm: Normal rate and regular rhythm. Heart sounds: Normal heart sounds. No murmur heard. No friction rub. Pulmonary:      Effort: No respiratory distress. Breath sounds: Normal breath sounds.  No wheezing or rales. Musculoskeletal:      Right lower leg: No edema. Left lower leg: No edema. Neurological:      Mental Status: She is oriented to person, place, and time. Cranial Nerves: No cranial nerve deficit. Gait: Gait abnormal ( Using walker).           Charisma Mann DO

## 2023-07-28 NOTE — PATIENT INSTRUCTIONS
Medicare Preventive Visit Patient Instructions  Thank you for completing your Welcome to Medicare Visit or Medicare Annual Wellness Visit today. Your next wellness visit will be due in one year (7/28/2024). The screening/preventive services that you may require over the next 5-10 years are detailed below. Some tests may not apply to you based off risk factors and/or age. Screening tests ordered at today's visit but not completed yet may show as past due. Also, please note that scanned in results may not display below. Preventive Screenings:  Service Recommendations Previous Testing/Comments   Colorectal Cancer Screening  * Colonoscopy    * Fecal Occult Blood Test (FOBT)/Fecal Immunochemical Test (FIT)  * Fecal DNA/Cologuard Test  * Flexible Sigmoidoscopy Age: 43-73 years old   Colonoscopy: every 10 years (may be performed more frequently if at higher risk)  OR  FOBT/FIT: every 1 year  OR  Cologuard: every 3 years  OR  Sigmoidoscopy: every 5 years  Screening may be recommended earlier than age 39 if at higher risk for colorectal cancer. Also, an individualized decision between you and your healthcare provider will decide whether screening between the ages of 77-80 would be appropriate. Colonoscopy: 01/13/2021  FOBT/FIT: Not on file  Cologuard: Not on file  Sigmoidoscopy: Not on file    History Colorectal Cancer     Breast Cancer Screening Age: 36 years old  Frequency: every 1-2 years  Not required if history of left and right mastectomy Mammogram: 03/31/2022    Screening Current   Cervical Cancer Screening Between the ages of 21-29, pap smear recommended once every 3 years. Between the ages of 32-69, can perform pap smear with HPV co-testing every 5 years.    Recommendations may differ for women with a history of total hysterectomy, cervical cancer, or abnormal pap smears in past. Pap Smear: Not on file    Screening Not Indicated   Hepatitis C Screening Once for adults born between [address] [place] [address] frequently in patients at high risk for Hepatitis C Hep C Antibody: 06/07/2021    Screening Current   Diabetes Screening 1-2 times per year if you're at risk for diabetes or have pre-diabetes Fasting glucose: 146 mg/dL (7/6/2023)  A1C: 6.2 % (7/6/2023)  Screening Not Indicated  History Diabetes   Cholesterol Screening Once every 5 years if you don't have a lipid disorder. May order more often based on risk factors. Lipid panel: 07/06/2023    Screening Not Indicated  History Lipid Disorder     Other Preventive Screenings Covered by Medicare:  1. Abdominal Aortic Aneurysm (AAA) Screening: covered once if your at risk. You're considered to be at risk if you have a family history of AAA. 2. Lung Cancer Screening: covers low dose CT scan once per year if you meet all of the following conditions: (1) Age 48-67; (2) No signs or symptoms of lung cancer; (3) Current smoker or have quit smoking within the last 15 years; (4) You have a tobacco smoking history of at least 20 pack years (packs per day multiplied by number of years you smoked); (5) You get a written order from a healthcare provider. 3. Glaucoma Screening: covered annually if you're considered high risk: (1) You have diabetes OR (2) Family history of glaucoma OR (3)  aged 48 and older OR (3)  American aged 72 and older  3. Osteoporosis Screening: covered every 2 years if you meet one of the following conditions: (1) You're estrogen deficient and at risk for osteoporosis based off medical history and other findings; (2) Have a vertebral abnormality; (3) On glucocorticoid therapy for more than 3 months; (4) Have primary hyperparathyroidism; (5) On osteoporosis medications and need to assess response to drug therapy. · Last bone density test (DXA Scan): Not on file. 5. HIV Screening: covered annually if you're between the age of 14-79. Also covered annually if you are younger than 13 and older than 72 with risk factors for HIV infection. For pregnant patients, it is covered up to 3 times per pregnancy. Immunizations:  Immunization Recommendations   Influenza Vaccine Annual influenza vaccination during flu season is recommended for all persons aged >= 6 months who do not have contraindications   Pneumococcal Vaccine   * Pneumococcal conjugate vaccine = PCV13 (Prevnar 13), PCV15 (Vaxneuvance), PCV20 (Prevnar 20)  * Pneumococcal polysaccharide vaccine = PPSV23 (Pneumovax) Adults 20-63 years old: 1-3 doses may be recommended based on certain risk factors  Adults 72 years old: 1-2 doses may be recommended based off what pneumonia vaccine you previously received   Hepatitis B Vaccine 3 dose series if at intermediate or high risk (ex: diabetes, end stage renal disease, liver disease)   Tetanus (Td) Vaccine - COST NOT COVERED BY MEDICARE PART B Following completion of primary series, a booster dose should be given every 10 years to maintain immunity against tetanus. Td may also be given as tetanus wound prophylaxis. Tdap Vaccine - COST NOT COVERED BY MEDICARE PART B Recommended at least once for all adults. For pregnant patients, recommended with each pregnancy. Shingles Vaccine (Shingrix) - COST NOT COVERED BY MEDICARE PART B  2 shot series recommended in those aged 48 and above     Health Maintenance Due:      Topic Date Due   • Breast Cancer Screening: Mammogram  03/31/2023   • Hepatitis C Screening  Completed   • Colorectal Cancer Screening  Discontinued     Immunizations Due:      Topic Date Due   • Pneumococcal Vaccine: 65+ Years (2 - PPSV23 if available, else PCV20) 03/01/2019   • COVID-19 Vaccine (2 - Moderna series) 02/19/2023   • Influenza Vaccine (1) 09/01/2023     Advance Directives   What are advance directives? Advance directives are legal documents that state your wishes and plans for medical care. These plans are made ahead of time in case you lose your ability to make decisions for yourself.  Advance directives can apply to any medical decision, such as the treatments you want, and if you want to donate organs. What are the types of advance directives? There are many types of advance directives, and each state has rules about how to use them. You may choose a combination of any of the following:  · Living will: This is a written record of the treatment you want. You can also choose which treatments you do not want, which to limit, and which to stop at a certain time. This includes surgery, medicine, IV fluid, and tube feedings. · Durable power of  for healthcare [facility]): This is a written record that states who you want to make healthcare choices for you when you are unable to make them for yourself. This person, called a proxy, is usually a family member or a friend. You may choose more than 1 proxy. · Do not resuscitate (DNR) order:  A DNR order is used in case your heart stops beating or you stop breathing. It is a request not to have certain forms of treatment, such as CPR. A DNR order may be included in other types of advance directives. · Medical directive: This covers the care that you want if you are in a coma, near death, or unable to make decisions for yourself. You can list the treatments you want for each condition. Treatment may include pain medicine, surgery, blood transfusions, dialysis, IV or tube feedings, and a ventilator (breathing machine). · Values history: This document has questions about your views, beliefs, and how you feel and think about life. This information can help others choose the care that you would choose. Why are advance directives important? An advance directive helps you control your care. Although spoken wishes may be used, it is better to have your wishes written down. Spoken wishes can be misunderstood, or not followed. Treatments may be given even if you do not want them. An advance directive may make it easier for your family to make difficult choices about your care.    Fall Prevention    Fall prevention  includes ways to make your home and other areas safer. It also includes ways you can move more carefully to prevent a fall. Health conditions that cause changes in your blood pressure, vision, or muscle strength and coordination may increase your risk for falls. Medicines may also increase your risk for falls if they make you dizzy, weak, or sleepy. Fall prevention tips:   · Stand or sit up slowly. · Use assistive devices as directed. · Wear shoes that fit well and have soles that . · Wear a personal alarm. · Stay active. · Manage your medical conditions. Home Safety Tips:  · Add items to prevent falls in the bathroom. · Keep paths clear. · Install bright lights in your home. · Keep items you use often on shelves within reach. · Paint or place reflective tape on the edges of your stairs. Urinary Incontinence   Urinary incontinence (UI)  is when you lose control of your bladder. UI develops because your bladder cannot store or empty urine properly. The 3 most common types of UI are stress incontinence, urge incontinence, or both. Medicines:   · May be given to help strengthen your bladder control. Report any side effects of medication to your healthcare provider. Do pelvic muscle exercises often:  Your pelvic muscles help you stop urinating. Squeeze these muscles tight for 5 seconds, then relax for 5 seconds. Gradually work up to squeezing for 10 seconds. Do 3 sets of 15 repetitions a day, or as directed. This will help strengthen your pelvic muscles and improve bladder control. Train your bladder:  Go to the bathroom at set times, such as every 2 hours, even if you do not feel the urge to go. You can also try to hold your urine when you feel the urge to go. For example, hold your urine for 5 minutes when you feel the urge to go. As that becomes easier, hold your urine for 10 minutes. Self-care:   · Keep a UI record.   Write down how often you leak urine and how much you leak. Make a note of what you were doing when you leaked urine. · Drink liquids as directed. You may need to limit the amount of liquid you drink to help control your urine leakage. Do not drink any liquid right before you go to bed. Limit or do not have drinks that contain caffeine or alcohol. · Prevent constipation. Eat a variety of high-fiber foods. Good examples are high-fiber cereals, beans, vegetables, and whole-grain breads. Walking is the best way to trigger your intestines to have a bowel movement. · Exercise regularly and maintain a healthy weight. Weight loss and exercise will decrease pressure on your bladder and help you control your leakage. · Use a catheter as directed  to help empty your bladder. A catheter is a tiny, plastic tube that is put into your bladder to drain your urine. · Go to behavior therapy as directed. Behavior therapy may be used to help you learn to control your urge to urinate. Narcotic (Opioid) Safety    Use narcotics safely:  · Take prescribed narcotics exactly as directed  · Do not give narcotics to others or take narcotics that belong to someone else  · Do not mix narcotics without medicines or alcohol  · Do not drive or operate heavy machinery after you take the narcotic  · Monitor for side effects and notify your healthcare provider if you experienced side effects such as nausea, sleepiness, itching, or trouble thinking clearly. Manage constipation:    Constipation is the most common side effect of narcotic medicine. Constipation is when you have hard, dry bowel movements, or you go longer than usual between bowel movements. Tell your healthcare provider about all changes in your bowel movements while you are taking narcotics. He or she may recommend laxative medicine to help you have a bowel movement. He or she may also change the kind of narcotic you are taking, or change when you take it.  The following are more ways you can prevent or relieve constipation:    · Drink liquids as directed. You may need to drink extra liquids to help soften and move your bowels. Ask how much liquid to drink each day and which liquids are best for you. · Eat high-fiber foods. This may help decrease constipation by adding bulk to your bowel movements. High-fiber foods include fruits, vegetables, whole-grain breads and cereals, and beans. Your healthcare provider or dietitian can help you create a high-fiber meal plan. Your provider may also recommend a fiber supplement if you cannot get enough fiber from food. · Exercise regularly. Regular physical activity can help stimulate your intestines. Walking is a good exercise to prevent or relieve constipation. Ask which exercises are best for you. · Schedule a time each day to have a bowel movement. This may help train your body to have regular bowel movements. Bend forward while you are on the toilet to help move the bowel movement out. Sit on the toilet for at least 10 minutes, even if you do not have a bowel movement. Store narcotics safely:   · Store narcotics where others cannot easily get them. Keep them in a locked cabinet or secure area. Do not  keep them in a purse or other bag you carry with you. A person may be looking for something else and find the narcotics. · Make sure narcotics are stored out of the reach of children. A child can easily overdose on narcotics. Narcotics may look like candy to a small child. The best way to dispose of narcotics: The laws vary by country and area. In the [facility], the best way is to return the narcotics through a take-back program. This program is offered by the [employer]). The following are options for using the program:  · Take the narcotics to a [employer] collection site. The site is often a law enforcement center. Call your local law enforcement center for scheduled take-back days in your area.  You will be given information on where to go if the collection site is in a different location. · Take the narcotics to an approved pharmacy or hospital.  A pharmacy or hospital may be set up as a collection site. You will need to ask if it is a [employer] collection site if you were not directed there. A pharmacy or doctor's office may not be able to take back narcotics unless it is a [employer] site. · Use a mail-back system. This means you are given containers to put the narcotics into. You will then mail them in the containers. · Use a take-back drop box. This is a place to leave the narcotics at any time. People and animals will not be able to get into the box. Your local law enforcement agency can tell you where to find a drop box in your area. Other ways to manage pain:   · Ask your healthcare provider about non-narcotic medicines to control pain. Nonprescription medicines include NSAIDs (such as ibuprofen) and acetaminophen. Prescription medicines include muscle relaxers, antidepressants, and steroids. · Pain may be managed without any medicines. Some ways to relieve pain include massage, aromatherapy, or meditation. Physical or occupational therapy may also help. For more information:   · Drug Enforcement Administration  [address] [facility] , [address] [facility]  Phone: 7- 275 - [phone]  Web Address: [employer] MediaGood [facility] CenterNopsec.. [employer].gov/drug_disposal/    · [address] [facility] S and Drug Administration  [address] [facility] , [address] [place] [address]  Phone: 4- 940 - [phone]  Web Address: http://[employer]/     © Copyright [employer] 2018 Information is for End User's use only and may not be sold, redistributed or otherwise used for commercial purposes.  All illustrations and images included in CareNotes® are the copyrighted property of A.D.A.M., Inc. or  [employer]

## 2023-07-28 NOTE — ASSESSMENT & PLAN NOTE
Lab Results   Component Value Date    HGBA1C 6.2 (H) 07/06/2023     Well controlled   Metformin decreased from 2 pills a day to once a day

## 2023-07-29 ENCOUNTER — APPOINTMENT (OUTPATIENT)
Dept: RADIOLOGY | Facility: CLINIC | Age: 68
End: 2023-07-29
Payer: MEDICARE

## 2023-07-29 ENCOUNTER — OFFICE VISIT (OUTPATIENT)
Dept: FAMILY MEDICINE CLINIC | Facility: CLINIC | Age: 68
End: 2023-07-29
Payer: MEDICARE

## 2023-07-29 VITALS
HEIGHT: 64 IN | SYSTOLIC BLOOD PRESSURE: 100 MMHG | HEART RATE: 84 BPM | OXYGEN SATURATION: 98 % | WEIGHT: 132 LBS | RESPIRATION RATE: 18 BRPM | TEMPERATURE: 97.6 F | DIASTOLIC BLOOD PRESSURE: 60 MMHG | BODY MASS INDEX: 22.53 KG/M2
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
## 2023-07-29 DIAGNOSIS — M54.50 ACUTE BILATERAL LOW BACK PAIN WITHOUT SCIATICA: ICD-10-CM

## 2023-07-29 DIAGNOSIS — S22.089D CLOSED FRACTURE OF ELEVENTH THORACIC VERTEBRA WITH ROUTINE HEALING, UNSPECIFIED FRACTURE MORPHOLOGY, SUBSEQUENT ENCOUNTER: ICD-10-CM
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
## 2023-07-29 PROCEDURE — 72072 X-RAY EXAM THORAC SPINE 3VWS: CPT

## 2023-07-29 PROCEDURE — 72100 X-RAY EXAM L-S SPINE 2/3 VWS: CPT

## 2023-07-29 PROCEDURE — 99214 OFFICE O/P EST MOD 30 MIN: CPT | Performed by: FAMILY MEDICINE

## 2023-07-29 NOTE — PROGRESS NOTES
Name: Zelda Link      : 1955      MRN: [id number]  Encounter Provider: Sonja Olivas DO  Encounter Date: 2023   Encounter department: [facility]     1. Fall, initial encounter  Comments:  neurologicall intact, no head pain, CT of head not indicated at this time  Orders:  -     XR spine lumbar minimum 4 views non injury; Future; Expected date: 2023  -     XR spine thoracic 3 vw; Future; Expected date: 2023    2. Acute bilateral low back pain without sciatica  Comments:  acute pain with fall, x-rays ordered, history of spinal fractures   Orders:  -     XR spine lumbar minimum 4 views non injury; Future; Expected date: 2023  -     XR spine thoracic 3 vw; Future; Expected date: 2023    3. Closed fracture of eleventh thoracic vertebra with routine healing, unspecified fracture morphology, subsequent encounter  Assessment & Plan:  Following with orthopedist  X-rays ordered today to check for any changes with recent fall           Return if symptoms worsen or fail to improve. Subjective      She went to lock the front door and pushed her walker to her side and lost her balance and fell flat on her back. She is having pain in her mid and lower back. Review of Systems    Current Outpatient Medications on File Prior to Visit   Medication Sig   • acetaminophen (TYLENOL) 325 mg tablet Take 3 tablets (975 mg total) by mouth 2 (two) times a day (Patient taking differently: Take 975 mg by mouth every 6 (six) hours as needed)   • aspirin (ECOTRIN LOW STRENGTH) 81 mg EC tablet Take 1 tablet (81 mg total) by mouth daily Do not start before May 31, 2023. • BD Pen Needle Chelsey 2nd Gen 32G X 4 MM MISC USE 1  TWICE DAILY   • clonazePAM (KlonoPIN) 0.5 mg tablet TAKE 1/2 (ONE-HALF) TO ONE TABLET BY MOUTH ONCE DAILY AS NEEDED.  WATCH FOR SEDATION   • cyanocobalamin (VITAMIN B-12) 1000 MCG tablet Take 1 tablet (1,000 mcg total) by mouth daily Do not start before March 29, 2023. • docusate sodium (COLACE) 100 mg capsule Take 100 mg by mouth 2 (two) times a day   • escitalopram (LEXAPRO) 10 mg tablet TAKE 1/2 (ONE-HALF) TABLET BY MOUTH IN THE MORNING FOR 6 DAYS, THEN 1 TABLET BY MOUTH ONCE DAILY IN THE MORNING THEREAFTER   • famotidine (PEPCID) 20 mg tablet Take 1 tablet (20 mg total) by mouth 2 (two) times a day   • gabapentin (NEURONTIN) 100 mg capsule 100mg twice daily ( 8AM, 6PM) and 200mg at Bedtime   • lidocaine (LIDODERM) 5 % Apply 1 patch topically over 12 hours daily Remove & Discard patch within 12 hours or as directed by MD Do not start before May 31, 2023. • lurasidone (LATUDA) 40 mg tablet TAKE 1 TABLET BY MOUTH IN THE MORNING WITH BREAKFAST   • lurasidone (LATUDA) 80 mg tablet Take 1 tablet (80 mg total) by mouth daily with dinner   • metFORMIN (GLUCOPHAGE-XR) 500 mg 24 hr tablet Take 1 tablet (500 mg total) by mouth daily with breakfast   • metoprolol succinate (TOPROL-XL) 25 mg 24 hr tablet Take 1.5 tablets (37.5 mg total) by mouth daily (Patient taking differently: Take 37.5 mg by mouth daily One tablet daily)   • mirtazapine (REMERON) 7.5 MG tablet TAKE 1 TABLET BY MOUTH ONCE DAILY AT BEDTIME. WATCH FOR SEDATION   • Multiple Vitamin (multivitamin) tablet Take 1 tablet by mouth daily   • nitroglycerin (NITROSTAT) 0.4 mg SL tablet Place 1 tablet (0.4 mg total) under the tongue every 5 (five) minutes as needed for chest pain   • oxyCODONE-acetaminophen (Percocet) 5-325 mg per tablet Take 0.5 tablets by mouth 2 (two) times a day as needed for moderate pain Take half a tablet (0.5mg total)  two times a day as needed for moderate pain. Do not drive or operate machinery while taking percocet. Do not take other sedative medications while taking percocet.  Max Daily Amount: 1 tablet   • rosuvastatin (CRESTOR) 20 MG tablet Take 1 tablet (20 mg total) by mouth daily (Patient taking differently: Take 20 mg by mouth daily with dinner)   • torsemide (DEMADEX) 10 mg tablet Take 0.5 tablets (5 mg total) by mouth daily       Objective     /60   Pulse 84   Temp 97.6 °F (36.4 °C)   Resp 18   Ht 5' 4" (1.626 m)   Wt 59.9 kg (132 lb)   SpO2 98%   BMI 22.66 kg/m²     Physical Exam  Vitals and nursing note reviewed. Constitutional:       Appearance: She is well-developed. HENT:      Head: Normocephalic and atraumatic. Right Ear: Tympanic membrane and external ear normal.      Left Ear: Tympanic membrane and external ear normal.   Eyes:      Extraocular Movements: Extraocular movements intact. Pupils: Pupils are equal, round, and reactive to light. Cardiovascular:      Rate and Rhythm: Normal rate and regular rhythm. Heart sounds: Normal heart sounds. No murmur heard. No friction rub. Pulmonary:      Effort: Pulmonary effort is normal. No respiratory distress. Breath sounds: Normal breath sounds. No wheezing or rales. Musculoskeletal:         General: Tenderness (bilateral low back) present. Right lower leg: No edema. Left lower leg: No edema. Neurological:      General: No focal deficit present. Gait: Gait abnormal (using walker).        Sonja Lacks, DO

## 2023-07-31 ENCOUNTER — TELEPHONE (OUTPATIENT)
Dept: FAMILY MEDICINE CLINIC | Facility: CLINIC | Age: 68
End: 2023-07-31

## 2023-07-31 DIAGNOSIS — S22.060A COMPRESSION FRACTURE OF T8 VERTEBRA, INITIAL ENCOUNTER (HCC): Primary | ICD-10-CM

## 2023-07-31 NOTE — TELEPHONE ENCOUNTER
7/31/2023 8:20 AM Called her daughter to discuss her x-ray findings of a new compression fracture at T8  Emili will follow up with her orthopedist who is following her for her previous fractures    Kat Huff DO

## 2023-08-02 ENCOUNTER — APPOINTMENT (OUTPATIENT)
Dept: LAB | Facility: HOSPITAL | Age: 68
End: 2023-08-02
Payer: MEDICARE

## 2023-08-02 ENCOUNTER — OFFICE VISIT (OUTPATIENT)
Dept: OBGYN CLINIC | Facility: HOSPITAL | Age: 68
End: 2023-08-02
Payer: MEDICARE

## 2023-08-02 VITALS — BODY MASS INDEX: 22.55 KG/M2 | WEIGHT: 132.06 LBS | HEIGHT: 64 IN

## 2023-08-02 DIAGNOSIS — E11.9 TYPE 2 DIABETES MELLITUS WITHOUT COMPLICATION, WITH LONG-TERM CURRENT USE OF INSULIN (HCC): ICD-10-CM

## 2023-08-02 DIAGNOSIS — I10 ESSENTIAL HYPERTENSION: ICD-10-CM

## 2023-08-02 DIAGNOSIS — Z79.4 TYPE 2 DIABETES MELLITUS WITHOUT COMPLICATION, WITH LONG-TERM CURRENT USE OF INSULIN (HCC): ICD-10-CM

## 2023-08-02 DIAGNOSIS — S22.000A COMPRESSION FRACTURE OF BODY OF THORACIC VERTEBRA (HCC): Primary | ICD-10-CM

## 2023-08-02 LAB
ANION GAP SERPL CALCULATED.3IONS-SCNC: 7 MMOL/L
BUN SERPL-MCNC: 7 MG/DL (ref 5–25)
CALCIUM SERPL-MCNC: 9.1 MG/DL (ref 8.4–10.2)
CHLORIDE SERPL-SCNC: 103 MMOL/L (ref 96–108)
CO2 SERPL-SCNC: 28 MMOL/L (ref 21–32)
CREAT SERPL-MCNC: 0.77 MG/DL (ref 0.6–1.3)
GFR SERPL CREATININE-BSD FRML MDRD: 79 ML/MIN/1.73SQ M
GLUCOSE SERPL-MCNC: 148 MG/DL (ref 65–140)
POTASSIUM SERPL-SCNC: 4 MMOL/L (ref 3.5–5.3)
SODIUM SERPL-SCNC: 138 MMOL/L (ref 135–147)

## 2023-08-02 PROCEDURE — 36415 COLL VENOUS BLD VENIPUNCTURE: CPT

## 2023-08-02 PROCEDURE — 80048 BASIC METABOLIC PNL TOTAL CA: CPT

## 2023-08-02 PROCEDURE — 99213 OFFICE O/P EST LOW 20 MIN: CPT | Performed by: ORTHOPAEDIC SURGERY

## 2023-08-02 NOTE — PROGRESS NOTES
NAME: Mark Sandhu  : 1955  PCP: Micheal Sesay DO      Chief Complaint: back pain - FOLLOW UP    HPI:  Mark Sandhu is a 70 y.o. female presenting for initial visit with chief complaint of back pain after mechanical fall 3/20/2023. Pt presents with her daughter. Pt uses rolling walker to ambulate  Daughter states her mother fell in the bath tub in 2023 and broke her Left femur. Pt was seen in the ED. Left IM nail for Femur Fx  Was performed by Dr Balbina Reynolds on 3/20/2023. Daughter states that since the fall, she has had complaints of back pain. She has 10/10 pain according to there daughter and she has times where she is non-functional due to the pain . She has not had any treatment other than pain management for the back pain    Conservative therapy includes the followin/2 percocet medications by mouth for pain in lumbar spine. Physical therapy for left LE     Update on 2023:  Lucero Ricketts is here for follow up. She was previously fitted for TLSO brace at previous visit. She denies significant back pain. Currently describes ongoing lateral right thigh pain, worse with pressure. She ambulates with a walker at baseline. Per patient's daughter, her pain seems to be improving since previous visit. Taking percocet and gabapentin with some relief. Update on 2023:  Lucero Ricketts is here for follow up. She fell on Saturday landing onto her back at onset of worsening back pain. Back pain from previous compression fracture was improving. She was subsequently seen by her PCP the next day and found to have a new T8 vertebral fracture. She was fitted for a TLSO brace at her initial visit but chooses to not wear the brace. She has been taking tylenol, gabapentin, and percocet with some relief but notes the percocet made her confused when trying to increase to twice a day. Also using lidocaine patches at needed. Back pain worse with standing and walking. She uses walker for ambulation.     DEXA scan scheduled for 9/11/2023 to evaluate bone mineral density. FAMILY HISTORY   Family History   Problem Relation Age of Onset   • Cancer Mother         Renal   • Heart disease Father         CHF   • Dementia Sister    • Heart disease Sister         atrial septal defect   • Dementia Sister    • Breast cancer Paternal Aunt 39       PAST MEDICAL HISTORY:   Past Medical History:   Diagnosis Date   • Anesthesia complication     difficulty waking up   • Anxiety    • Arthritis     left knee   • Bipolar 1 disorder ([address])    • Bone spur     left knee behing the knee cap   • Cancer [facility])    • Chronic kidney disease    • Chronic pain disorder    • Colon cancer ([address])     patient states about 2017   • Colon polyp     Tubulovillous Adenoma High Grade Dysplasia - 2017   • Depression    • Diabetes mellitus ([address])    • Endometrial cancer (HCC)     grade I   • Hx of bleeding disorder     vaginal bleeding started on 3/13/17    • Hyperlipidemia    • Hypertension    • Hypomagnesemia 03/20/2023   • Memory loss    • PONV (postoperative nausea and vomiting)    • Psychiatric disorder    • Psychiatric illness    • Psychosis ([address])    • Shortness of breath     with exertion   • Sleep difficulties    • Urinary incontinence    • Vitamin D deficiency        MEDICATIONS:  Current Outpatient Medications   Medication Sig Dispense Refill   • acetaminophen (TYLENOL) 325 mg tablet Take 3 tablets (975 mg total) by mouth 2 (two) times a day (Patient taking differently: Take 975 mg by mouth every 6 (six) hours as needed)  0   • aspirin (ECOTRIN LOW STRENGTH) 81 mg EC tablet Take 1 tablet (81 mg total) by mouth daily Do not start before May 31, 2023. 30 tablet 0   • BD Pen Needle Chelsey 2nd Gen 32G X 4 MM MISC USE 1  TWICE DAILY 60 each 0   • clonazePAM (KlonoPIN) 0.5 mg tablet TAKE 1/2 (ONE-HALF) TO ONE TABLET BY MOUTH ONCE DAILY AS NEEDED.  WATCH FOR SEDATION     • cyanocobalamin (VITAMIN B-12) 1000 MCG tablet Take 1 tablet (1,000 mcg total) by mouth daily Do not start before March 29, 2023. 30 tablet 0   • docusate sodium (COLACE) 100 mg capsule Take 100 mg by mouth 2 (two) times a day     • escitalopram (LEXAPRO) 10 mg tablet TAKE 1/2 (ONE-HALF) TABLET BY MOUTH IN THE MORNING FOR 6 DAYS, THEN 1 TABLET BY MOUTH ONCE DAILY IN THE MORNING THEREAFTER     • famotidine (PEPCID) 20 mg tablet Take 1 tablet (20 mg total) by mouth 2 (two) times a day 30 tablet 5   • gabapentin (NEURONTIN) 100 mg capsule 100mg twice daily ( 8AM, 6PM) and 200mg at Bedtime 80 capsule 0   • lidocaine (LIDODERM) 5 % Apply 1 patch topically over 12 hours daily Remove & Discard patch within 12 hours or as directed by MD Do not start before May 31, 2023. 30 patch 0   • lurasidone (LATUDA) 40 mg tablet TAKE 1 TABLET BY MOUTH IN THE MORNING WITH BREAKFAST     • lurasidone (LATUDA) 80 mg tablet Take 1 tablet (80 mg total) by mouth daily with dinner 30 tablet 0   • metFORMIN (GLUCOPHAGE-XR) 500 mg 24 hr tablet Take 1 tablet (500 mg total) by mouth daily with breakfast 30 tablet 3   • metoprolol succinate (TOPROL-XL) 25 mg 24 hr tablet Take 1.5 tablets (37.5 mg total) by mouth daily (Patient taking differently: Take 37.5 mg by mouth daily One tablet daily) 90 tablet 0   • mirtazapine (REMERON) 7.5 MG tablet TAKE 1 TABLET BY MOUTH ONCE DAILY AT BEDTIME. WATCH FOR SEDATION     • Multiple Vitamin (multivitamin) tablet Take 1 tablet by mouth daily 90 tablet 1   • nitroglycerin (NITROSTAT) 0.4 mg SL tablet Place 1 tablet (0.4 mg total) under the tongue every 5 (five) minutes as needed for chest pain 30 tablet 1   • oxyCODONE-acetaminophen (Percocet) 5-325 mg per tablet Take 0.5 tablets by mouth 2 (two) times a day as needed for moderate pain Take half a tablet (0.5mg total)  two times a day as needed for moderate pain. Do not drive or operate machinery while taking percocet. Do not take other sedative medications while taking percocet.  Max Daily Amount: 1 tablet 15 tablet 0   • rosuvastatin (CRESTOR) 20 MG tablet Take 1 tablet (20 mg total) by mouth daily (Patient taking differently: Take 20 mg by mouth daily with dinner) 90 tablet 3   • torsemide (DEMADEX) 10 mg tablet Take 0.5 tablets (5 mg total) by mouth daily 30 tablet 3     No current facility-administered medications for this visit. PAST SURGICAL HISTORY:  Past Surgical History:   Procedure Laterality Date   • BREAST BIOPSY Left     benign-maybe at ar age 58   • CHOLECYSTECTOMY      open   • COLONOSCOPY     • DILATION AND CURETTAGE OF UTERUS N/A 04/05/2017    Procedure: DILATATION AND CURETTAGE (D&C);   Surgeon: Rajani Ahmadi MD;  Location: [facility] MAIN OR;  Service:    • HYSTERECTOMY     • OOPHORECTOMY     • PELVIC LAPAROSCOPY     • KS ARTHRP KNE CONDYLE&PLATU MEDIAL&LAT COMPARTMENTS Left 12/06/2022    Procedure: ARTHROPLASTY KNEE TOTAL W ROBOT - CEMENTED - LEFT;  Surgeon: Anibal Jeffery DO;  Location: [facility];  Service: Orthopedics   • KS LAPS TOTAL HYSTERECT 250 GM/< W/RMVL TUBE/OVARY N/A 05/04/2017    Procedure: ROBOTIC ASSISTED TOTAL LAPAROSCOPIC HYSTERECTOMY, BILATERAL SALPINGOOPHERECTOMY; CYSTOSCOPY;  Surgeon: Irasema Ayala MD;  Location:  MAIN OR;  Service: Gynecology Oncology   • KS OPTX FEM SHFT FX W/INSJ IMED IMPLT W/WO SCREW Left 3/20/2023    Procedure: INSERTION NAIL IM FEMUR RETROGRADE;  Surgeon: Robbin Lizama MD;  Location: [facility];  Service: Orthopedics   • REPLACEMENT TOTAL KNEE     • TONSILLECTOMY     • TUBAL LIGATION         SOCIAL HISTORY:  Social History     Socioeconomic History   • Marital status: /Civil Union     Spouse name: Not on file   • Number of children: Not on file   • Years of education: Not on file   • Highest education level: Not on file   Occupational History   • Not on file   Tobacco Use   • Smoking status: Never     Passive exposure: Never   • Smokeless tobacco: Never   Vaping Use   • Vaping Use: Never used   Substance and Sexual Activity   • Alcohol use: Never   • Drug use: No   • Sexual activity: Not Currently     Birth control/protection: Post-menopausal, Surgical   Other Topics Concern   • Not on file   Social History Narrative   • Not on file     Social Determinants of Health     Financial Resource Strain: Low Risk  (7/28/2023)    Overall Financial Resource Strain (CARDIA)    • Difficulty of Paying Living Expenses: Not hard at all   Food Insecurity: No Food Insecurity (3/21/2023)    Hunger Vital Sign    • Worried About Running Out of Food in the Last Year: Never true    • Ran Out of Food in the Last Year: Never true   Transportation Needs: No Transportation Needs (7/28/2023)    PRAPARE - Transportation    • Lack of Transportation (Medical): No    • Lack of Transportation (Non-Medical): No   Physical Activity: Not on file   Stress: Not on file   Social Connections: Not on file   Intimate Partner Violence: Not on file   Housing Stability: Low Risk  (3/21/2023)    Housing Stability Vital Sign    • Unable to Pay for Housing in the Last Year: No    • Number of Places Lived in the Last Year: 1    • Unstable Housing in the Last Year: No       ALLERGIES:  Allergies   Allergen Reactions   • Wellbutrin [Bupropion] Anxiety     Patient has tried Wellbutrin which resulted in increased paranoia. Patient wishes to not try this medication again.        ROS:   Constitutional:  No fever, chills, night sweats, loss of appetite   HEENT No no sore throat, difficulty swallowing   Cardiovascular:  No chest pain, palpitations, BLE edema, DEL RIO   Respiratory:  No SOB, coughing, chest congestion   Gastrointestinal:  No nausea, vomiting, abdominal pain   Genitourinary:  No dysuria, hematuria, urinary urgency/frequency   Musculoskeletal:  See HPI   Skin:  No rash, erythema, edema   Neurologic:  See HPI   Psychiatric Illness:  No depression, anxiety, mood disorder, substance abuse disorder     PHYSICAL EXAM:  Ht 5' 4" (1.626 m)   Wt 59.9 kg (132 lb 0.9 oz)   BMI 22.67 kg/m²           General:  Well-developed,appears well, no acute distress   Respiratory:  No SOB, no abnormal effort (use of accessory muscles). GI / Abdominal:  Soft. No abdominal masses or tenderness. Nondistended. Gait & balance No evidence of myelopathic gait. Lumbar spine range of motion:  Not assessed due to known fracture  +tenderness in TL spine    +TTP lateral aspect right thigh    Neurologic:    Lower Extremity Motor Function    Right  Left    Iliopsoas  5/5  5/5    Quadriceps 5/5 5/5   Tibialis anterior  5/5  5/5    EHL  5/5  5/5    Gastroc. muscle  5/5  5/5      Sensory: light touch is intact to bilateral upper and lower extremities     Reflexes:    Right Left   Patellar 1+ 1+   Achilles 1+ 0   Babinski neg neg     IMAGING: I have personally reviewed the images and these are my findings:  Lumbar spine xrays from 6/16/23 and 6/1/23:  L2 compression fracture with interval progression with about 50% loss of height, also with known L3 compression fracture with >50% loss of height, mild TL kyphosis      Lumbar spine xray from 7/17/2023: L2 compression fracture with interval progression with about 50% loss of height, also with known L3 compression fracture with >50% loss of height, mild TL kyphosis; similar in appearance from x-rays on 6/16/2023      Thoracic spine xray from 7/29/2023: T8 compression deformity involving the superior endplate; multiple other chronic compression deformities noted; no obvious instability on static radiographs       Lumbar spine xray from 7/29/2023: L2 compression fracture with about 50% loss of height, also with known L3 compression fracture with >50% loss of height, mild TL kyphosis; similar in appearance from x-rays on 7/17/2023      ASSESSMENT / Medical Decision Making (MDM):  70 y.o. female with history of back pain. Patient here for follow up, new T8 compression fracture. No incontinence of bowel/bladder, no fever, no chills, no night sweats. Physical exam showing no focal neurologic deficits    Imaging reviewed as above. Findings most notable for lumbar and thoracic compression fractures    Impression of multiple compression fractures, osteoporosis    Plan: The above clinical, physical and imaging findings were reviewed with the patient. Bozena Rojas  has a constellation of findings consistent with low back pain in the setting of recent fall, osteoporosis and compression fractures. Bozena Rojas is here for follow up, new T8 compression fracture after fall on Saturday. She notes midline upper back pain, worse with movement. She is able to ambulate using walker without much relief. No new radicular symptoms or focal neurologic deficits. Continue with medications as previously prescribed if providing pain/symptom relief. Follow up as needed. Patient instructed to return to office/ER sooner if symptoms are not improving, getting worse, or new worrisome/neurologic symptoms arise.

## 2023-08-03 ENCOUNTER — TELEPHONE (OUTPATIENT)
Dept: NEPHROLOGY | Facility: CLINIC | Age: 68
End: 2023-08-03

## 2023-08-03 NOTE — TELEPHONE ENCOUNTER
Talked with patient's daughter. Advised that sodium level is normal per Dr. Willy Thacker  (labs of 8/2/23). Daughter states her psych meds are being adjusted currently; mentality is the same however.        ----- Message from Diana Tenorio MD sent at 8/2/2023  9:53 PM EDT -----  Please inform patient's daughter that most recent labs (8/2/23) show that sodium level is normal. Have her psych meds been adjusted? Is she doing better?

## 2023-08-04 DIAGNOSIS — Z79.4 TYPE 2 DIABETES MELLITUS WITHOUT COMPLICATION, WITH LONG-TERM CURRENT USE OF INSULIN (HCC): ICD-10-CM

## 2023-08-04 DIAGNOSIS — R52 PAIN: ICD-10-CM

## 2023-08-04 DIAGNOSIS — E11.9 TYPE 2 DIABETES MELLITUS WITHOUT COMPLICATION, WITH LONG-TERM CURRENT USE OF INSULIN (HCC): ICD-10-CM

## 2023-08-04 RX ORDER — METFORMIN HYDROCHLORIDE 500 MG/1
500 TABLET, EXTENDED RELEASE ORAL
Qty: 90 TABLET | Refills: 1 | Status: SHIPPED | OUTPATIENT
Start: 2023-08-04

## 2023-08-05 RX ORDER — GABAPENTIN 100 MG/1
CAPSULE ORAL
Qty: 80 CAPSULE | Refills: 0 | Status: SHIPPED | OUTPATIENT
Start: 2023-08-05

## 2023-08-07 ENCOUNTER — OFFICE VISIT (OUTPATIENT)
Dept: CARDIOLOGY CLINIC | Facility: CLINIC | Age: 68
End: 2023-08-07
Payer: MEDICARE

## 2023-08-07 VITALS
SYSTOLIC BLOOD PRESSURE: 110 MMHG | BODY MASS INDEX: 21.68 KG/M2 | HEIGHT: 64 IN | HEART RATE: 74 BPM | DIASTOLIC BLOOD PRESSURE: 58 MMHG | OXYGEN SATURATION: 97 % | WEIGHT: 127 LBS

## 2023-08-07 DIAGNOSIS — E11.9 TYPE 2 DIABETES MELLITUS WITHOUT COMPLICATION, WITH LONG-TERM CURRENT USE OF INSULIN (HCC): ICD-10-CM

## 2023-08-07 DIAGNOSIS — I15.9 SECONDARY HYPERTENSION: ICD-10-CM

## 2023-08-07 DIAGNOSIS — Z79.4 TYPE 2 DIABETES MELLITUS WITHOUT COMPLICATION, WITH LONG-TERM CURRENT USE OF INSULIN (HCC): ICD-10-CM

## 2023-08-07 DIAGNOSIS — E78.2 MIXED HYPERLIPIDEMIA: ICD-10-CM

## 2023-08-07 DIAGNOSIS — I10 HYPERTENSION, UNSPECIFIED TYPE: Chronic | ICD-10-CM

## 2023-08-07 DIAGNOSIS — R00.2 PALPITATIONS: ICD-10-CM

## 2023-08-07 DIAGNOSIS — R06.09 DYSPNEA ON EXERTION: Primary | ICD-10-CM

## 2023-08-07 DIAGNOSIS — I10 ESSENTIAL HYPERTENSION: ICD-10-CM

## 2023-08-07 DIAGNOSIS — R07.9 CHEST PAIN IN ADULT: ICD-10-CM

## 2023-08-07 PROCEDURE — 99214 OFFICE O/P EST MOD 30 MIN: CPT | Performed by: INTERNAL MEDICINE

## 2023-08-07 PROCEDURE — 93000 ELECTROCARDIOGRAM COMPLETE: CPT | Performed by: INTERNAL MEDICINE

## 2023-08-07 RX ORDER — METOPROLOL SUCCINATE 25 MG/1
25 TABLET, EXTENDED RELEASE ORAL DAILY
Qty: 90 TABLET | Refills: 1 | Status: SHIPPED | COMMUNITY
Start: 2023-08-07

## 2023-08-07 RX ORDER — GABAPENTIN 100 MG/1
CAPSULE ORAL
Qty: 80 CAPSULE | Refills: 1 | Status: SHIPPED | OUTPATIENT
Start: 2023-08-07 | End: 2023-08-11 | Stop reason: SDUPTHER

## 2023-08-07 NOTE — PROGRESS NOTES
[facility]  [address] [place] [place] [address] [address] [place] [address]. [address], [address]   [place], [address] [facility]  Cardiology Follow-up  Chai Eisenberg  1955  [id number]  [address] [place] [address] [facility] [place] [address] [facility]  [address], [address]  [address] [address] [phone]    1. Dyspnea on exertion  POCT ECG      2. Palpitations  POCT ECG      3. Mixed hyperlipidemia  POCT ECG      4. Type 2 diabetes mellitus without complication, with long-term current use of insulin (HCC)  POCT ECG      5. Essential hypertension  POCT ECG         Discussion/Summary:     Exertional dyspnea- secondary to deconditioning. Heart rate improved  No hx of smoking. Continue metoprolol therapy with 35mg. Improve exercise conditioning. Hyponatremia- currently on salt tablets with loop diuretic. No evidence of major volume overload on examination. Trace mitral regurgitation- unchanged on auscultation    Diabetes mellitus- currently on metformin + lantus    Hld- rosuvastatin + aspirin. Contolled. Continue omega 3 supplementation. Recheck lipids in 6 months      History:   79-year-old woman with long history of diabetes mellitus, family history for CAD presents with worsening exertional shortness of breath. She reports with light activities feeling significant dyspnea and chest tightness. She had a recent nuclear stress test which did not show focal ischemia but did have some transient ischemic dilatation. She she has not had no prior history of myocardial infarction or CVA. No prior history of atrial fibrillation. She has periodic lower extremity edema . Her blood pressures have been controlled. She is compliant with her medications. 02/11/2019:  She continues to have exertional shortness of breath. She is not challenge herself with exercise. Her daughter is present and states she has been non compliant. She is compliant with her medications. We have not received her repeat blood work.    09/28/2020:   She denies having recurrence of chest discomfort. She is improving her shortness of breath. She is using an exercise bike. She will try improve on her conditioning. She is compliant with her medications. She has had recent blood work. We need to obtain her last PCP blood work.    07/20/2021:  She has been compliant with her medications. She denies having any chest discomfort. She denies having exertional shortness of breath. Her last blood work showed a cholesterol was controlled. 11/01/2022:  Recent hospitalization secondary to hyponatremia. She was discharged on sodium chloride tablets with torsemide. She currently denies having active chest discomfort. Her shortness of breath is at her baseline. She has not had major lower extremity swelling. She has been mentating well. 8/7/2023: Her sodiums have been stable. She had an unfortunate fall. She is currently in sinus rhythm. She has had significant weight loss secondary to low calorie intake.     PMH  Diabetes Mellitus- 15 years      Past Medical History:   Diagnosis Date   • Anesthesia complication     difficulty waking up   • Anxiety    • Arthritis     left knee   • Bipolar 1 disorder (HCC)    • Bone spur     left knee behing the knee cap   • Cancer [facility])    • Chronic kidney disease    • Chronic pain disorder    • Colon cancer [facility])     patient states about 2017   • Colon polyp     Tubulovillous Adenoma High Grade Dysplasia - 2017   • Depression    • Diabetes mellitus ([address])    • Endometrial cancer ([address])     grade I   • Hx of bleeding disorder     vaginal bleeding started on 3/13/17    • Hyperlipidemia    • Hypertension    • Hypomagnesemia 03/20/2023   • Memory loss    • PONV (postoperative nausea and vomiting)    • Psychiatric disorder    • Psychiatric illness    • Psychosis ([address])    • Shortness of breath     with exertion   • Sleep difficulties    • Urinary incontinence    • Vitamin D deficiency      Social History     Socioeconomic History   • Marital status: /Civil Union     Spouse name: Not on file   • Number of children: Not on file   • Years of education: Not on file   • Highest education level: Not on file   Occupational History   • Not on file   Tobacco Use   • Smoking status: Never     Passive exposure: Never   • Smokeless tobacco: Never   Vaping Use   • Vaping Use: Never used   Substance and Sexual Activity   • Alcohol use: Never   • Drug use: No   • Sexual activity: Not Currently     Birth control/protection: Post-menopausal, Surgical   Other Topics Concern   • Not on file   Social History Narrative   • Not on file     Social Determinants of Health     Financial Resource Strain: Low Risk  (7/28/2023)    Overall Financial Resource Strain (CARDIA)    • Difficulty of Paying Living Expenses: Not hard at all   Food Insecurity: No Food Insecurity (3/21/2023)    Hunger Vital Sign    • Worried About Running Out of Food in the Last Year: Never true    • Ran Out of Food in the Last Year: Never true   Transportation Needs: No Transportation Needs (7/28/2023)    PRAPARE - Transportation    • Lack of Transportation (Medical): No    • Lack of Transportation (Non-Medical):  No   Physical Activity: Not on file   Stress: Not on file   Social Connections: Not on file   Intimate Partner Violence: Not on file   Housing Stability: Low Risk  (3/21/2023)    Housing Stability Vital Sign    • Unable to Pay for Housing in the Last Year: No    • Number of Places Lived in the Last Year: 1    • Unstable Housing in the Last Year: No      Family History   Problem Relation Age of Onset   • Cancer Mother         Renal   • Heart disease Father         CHF   • Dementia Sister    • Heart disease Sister         atrial septal defect   • Dementia Sister    • Breast cancer Paternal Aunt 40     Past Surgical History:   Procedure Laterality Date   • BREAST BIOPSY Left     benign-maybe at ar age 58   • CHOLECYSTECTOMY      open   • COLONOSCOPY     • DILATION AND CURETTAGE OF UTERUS N/A 04/05/2017    Procedure: DILATATION AND CURETTAGE (D&C); Surgeon: Huber Stauffer MD;  Location: [facility] MAIN OR;  Service:    • HYSTERECTOMY     • OOPHORECTOMY     • PELVIC LAPAROSCOPY     • NY ARTHRP KNE CONDYLE&PLATU MEDIAL&LAT COMPARTMENTS Left 12/06/2022    Procedure: ARTHROPLASTY KNEE TOTAL W ROBOT - CEMENTED - LEFT;  Surgeon: Zeeshan Hamilton DO;  Location: [facility];  Service: Orthopedics   • NY LAPS TOTAL HYSTERECT 250 GM/< W/RMVL TUBE/OVARY N/A 05/04/2017    Procedure: ROBOTIC ASSISTED TOTAL LAPAROSCOPIC HYSTERECTOMY, BILATERAL SALPINGOOPHERECTOMY; CYSTOSCOPY;  Surgeon: Day Juarez MD;  Location:  MAIN OR;  Service: Gynecology Oncology   • NY OPTX FEM SHFT FX W/INSJ IMED IMPLT W/WO SCREW Left 3/20/2023    Procedure: INSERTION NAIL IM FEMUR RETROGRADE;  Surgeon: Janie Omalley MD;  Location: [facility];  Service: Orthopedics   • REPLACEMENT TOTAL KNEE     • TONSILLECTOMY     • TUBAL LIGATION         Current Outpatient Medications:   •  acetaminophen (TYLENOL) 325 mg tablet, Take 3 tablets (975 mg total) by mouth 2 (two) times a day (Patient taking differently: Take 975 mg by mouth every 6 (six) hours as needed), Disp: , Rfl: 0  •  aspirin (ECOTRIN LOW STRENGTH) 81 mg EC tablet, Take 1 tablet (81 mg total) by mouth daily Do not start before May 31, 2023., Disp: 30 tablet, Rfl: 0  •  BD Pen Needle Chelsey 2nd Gen 32G X 4 MM MISC, USE 1  TWICE DAILY, Disp: 60 each, Rfl: 0  •  clonazePAM (KlonoPIN) 0.5 mg tablet, TAKE 1/2 (ONE-HALF) TO ONE TABLET BY MOUTH ONCE DAILY AS NEEDED.  WATCH FOR SEDATION, Disp: , Rfl:   •  cyanocobalamin (VITAMIN B-12) 1000 MCG tablet, Take 1 tablet (1,000 mcg total) by mouth daily Do not start before March 29, 2023., Disp: 30 tablet, Rfl: 0  •  docusate sodium (COLACE) 100 mg capsule, Take 100 mg by mouth 2 (two) times a day, Disp: , Rfl:   •  famotidine (PEPCID) 20 mg tablet, Take 1 tablet (20 mg total) by mouth 2 (two) times a day, Disp: 30 tablet, Rfl: 5  •  gabapentin (NEURONTIN) 100 mg capsule, TAKE 1 CAPSULE BY MOUTH TWICE DAILY  (8AM,6PM) AND 2 AT BEDTIME, Disp: 80 capsule, Rfl: 1  •  gabapentin (NEURONTIN) 100 mg capsule, 100mg twice daily ( 8AM, 6PM) and 200mg at Bedtime, Disp: 80 capsule, Rfl: 0  •  lidocaine (LIDODERM) 5 %, Apply 1 patch topically over 12 hours daily Remove & Discard patch within 12 hours or as directed by MD Do not start before May 31, 2023., Disp: 30 patch, Rfl: 0  •  lurasidone (LATUDA) 40 mg tablet, TAKE 1 TABLET BY MOUTH IN THE MORNING WITH BREAKFAST, Disp: , Rfl:   •  metFORMIN (GLUCOPHAGE-XR) 500 mg 24 hr tablet, Take 1 tablet (500 mg total) by mouth daily with breakfast, Disp: 90 tablet, Rfl: 1  •  metoprolol succinate (TOPROL-XL) 25 mg 24 hr tablet, Take 1.5 tablets (37.5 mg total) by mouth daily (Patient taking differently: Take 25 mg by mouth daily One tablet daily), Disp: 90 tablet, Rfl: 0  •  mirtazapine (REMERON) 7.5 MG tablet, TAKE 1 TABLET BY MOUTH ONCE DAILY AT BEDTIME. WATCH FOR SEDATION, Disp: , Rfl:   •  Multiple Vitamin (multivitamin) tablet, Take 1 tablet by mouth daily, Disp: 90 tablet, Rfl: 1  •  nitroglycerin (NITROSTAT) 0.4 mg SL tablet, Place 1 tablet (0.4 mg total) under the tongue every 5 (five) minutes as needed for chest pain, Disp: 30 tablet, Rfl: 1  •  oxyCODONE-acetaminophen (Percocet) 5-325 mg per tablet, Take 0.5 tablets by mouth 2 (two) times a day as needed for moderate pain Take half a tablet (0.5mg total)  two times a day as needed for moderate pain. Do not drive or operate machinery while taking percocet. Do not take other sedative medications while taking percocet.  Max Daily Amount: 1 tablet, Disp: 15 tablet, Rfl: 0  •  rosuvastatin (CRESTOR) 20 MG tablet, Take 1 tablet (20 mg total) by mouth daily (Patient taking differently: Take 20 mg by mouth daily with dinner), Disp: 90 tablet, Rfl: 3  •  sertraline (ZOLOFT) 50 mg tablet, Take 50 mg by mouth daily, Disp: , Rfl:   •  torsemide (DEMADEX) 10 mg tablet, Take 0.5 tablets (5 mg total) by mouth daily, Disp: 30 tablet, Rfl: 3  •  escitalopram (LEXAPRO) 10 mg tablet, TAKE 1/2 (ONE-HALF) TABLET BY MOUTH IN THE MORNING FOR 6 DAYS, THEN 1 TABLET BY MOUTH ONCE DAILY IN THE MORNING THEREAFTER (Patient not taking: Reported on 8/7/2023), Disp: , Rfl:   •  lurasidone (LATUDA) 80 mg tablet, Take 1 tablet (80 mg total) by mouth daily with dinner (Patient not taking: Reported on 8/7/2023), Disp: 30 tablet, Rfl: 0  Allergies   Allergen Reactions   • Wellbutrin [Bupropion] Anxiety     Patient has tried Wellbutrin which resulted in increased paranoia. Patient wishes to not try this medication again. Vitals:    08/07/23 1622   BP: 110/58   BP Location: Right arm   Patient Position: Sitting   Cuff Size: Standard   Pulse: 74   SpO2: 97%   Weight: 57.6 kg (127 lb)   Height: 5' 4" (1.626 m)       Review of Systems:   Review of Systems   Constitutional: Positive for fatigue. Negative for activity change, appetite change, chills, diaphoresis, fever and unexpected weight change. HENT: Negative. Negative for congestion, dental problem, drooling, ear discharge, ear pain, facial swelling, hearing loss, mouth sores, nosebleeds, postnasal drip, rhinorrhea, sinus pressure, sinus pain, sneezing, sore throat, tinnitus, trouble swallowing and voice change. Eyes: Negative. Negative for photophobia, pain, redness, itching and visual disturbance. Respiratory: Positive for shortness of breath. Negative for apnea, cough, choking, chest tightness, wheezing and stridor. Cardiovascular: Negative for chest pain, palpitations and leg swelling. Gastrointestinal: Negative. Negative for abdominal distention, abdominal pain, anal bleeding, blood in stool, constipation, diarrhea, nausea, rectal pain and vomiting. Endocrine: Negative. Negative for cold intolerance, heat intolerance, polydipsia, polyphagia and polyuria. Genitourinary: Negative.   Negative for decreased urine volume, difficulty urinating, dyspareunia, dysuria, enuresis, flank pain, frequency, genital sores, hematuria, menstrual problem, pelvic pain, urgency, vaginal bleeding, vaginal discharge and vaginal pain. Musculoskeletal: Positive for gait problem. Negative for arthralgias, back pain, joint swelling, myalgias, neck pain and neck stiffness. Skin: Negative. Negative for color change, pallor, rash and wound. Allergic/Immunologic: Negative. Negative for environmental allergies, food allergies and immunocompromised state. Neurological: Negative for dizziness, tremors, seizures, syncope, facial asymmetry, speech difficulty, weakness, light-headedness, numbness and headaches. Hematological: Negative. Negative for adenopathy. Does not bruise/bleed easily. Psychiatric/Behavioral: Negative. Negative for agitation, behavioral problems, confusion, decreased concentration, dysphoric mood, hallucinations, self-injury, sleep disturbance and suicidal ideas. The patient is not nervous/anxious and is not hyperactive. All other systems reviewed and are negative. Vitals:    08/07/23 1622   BP: 110/58   BP Location: Right arm   Patient Position: Sitting   Cuff Size: Standard   Pulse: 74   SpO2: 97%   Weight: 57.6 kg (127 lb)   Height: 5' 4" (1.626 m)     Physical Examination:   Physical Exam  Constitutional:       General: She is not in acute distress. Appearance: She is well-developed. She is ill-appearing. She is not diaphoretic. HENT:      Head: Normocephalic and atraumatic. Right Ear: External ear normal.      Left Ear: External ear normal.   Eyes:      General: No scleral icterus. Right eye: No discharge. Left eye: No discharge. Conjunctiva/sclera: Conjunctivae normal.      Pupils: Pupils are equal, round, and reactive to light. Neck:      Thyroid: No thyromegaly. Vascular: No JVD. Trachea: No tracheal deviation. Cardiovascular:      Rate and Rhythm: Normal rate and regular rhythm. Heart sounds: Murmur heard. No friction rub. Gallop present. Pulmonary:      Effort: Pulmonary effort is normal. No respiratory distress. Breath sounds: Normal breath sounds. No stridor. No wheezing or rales. Chest:      Chest wall: No tenderness. Abdominal:      General: Bowel sounds are normal. There is no distension. Palpations: Abdomen is soft. There is no mass. Tenderness: There is no abdominal tenderness. There is no guarding or rebound. Musculoskeletal:         General: No tenderness or deformity. Normal range of motion. Cervical back: Normal range of motion and neck supple. Skin:     General: Skin is warm and dry. Coloration: Skin is not pale. Findings: No erythema or rash. Neurological:      Mental Status: She is alert and oriented to person, place, and time. Cranial Nerves: No cranial nerve deficit. Motor: No abnormal muscle tone. Coordination: Coordination normal.      Deep Tendon Reflexes: Reflexes are normal and symmetric. Reflexes normal.   Psychiatric:         Behavior: Behavior normal.         Thought Content:  Thought content normal.         Judgment: Judgment normal.         Labs:     Lab Results   Component Value Date    WBC 4.12 (L) 07/06/2023    HGB 11.3 (L) 07/06/2023    HCT 35.4 07/06/2023    MCV 91 07/06/2023    RDW 15.7 (H) 07/06/2023     07/06/2023     BMP:  Lab Results   Component Value Date    SODIUM 138 08/02/2023    K 4.0 08/02/2023     08/02/2023    CO2 28 08/02/2023    BUN 7 08/02/2023    CREATININE 0.77 08/02/2023    GLUC 148 (H) 08/02/2023    GLUF 146 (H) 07/06/2023    CALCIUM 9.1 08/02/2023    CORRECTEDCA 9.6 04/29/2023    EGFR 79 08/02/2023    MG 1.7 (L) 04/29/2023     LFT:  Lab Results   Component Value Date    AST 27 07/06/2023    ALT 23 07/06/2023    ALKPHOS 123 (H) 07/06/2023    TP 6.8 07/06/2023    ALB 3.9 07/06/2023      Lab Results   Component Value Date    OCV3PKDHDPID 0.886 04/29/2023     Lab Results   Component Value Date    HGBA1C 6.2 (H) 2023       Imaging & Testing   I have personally reviewed pertinent reports. Cardiac Testing     Results for orders placed during the hospital encounter of 10/25/19   Echo complete with contrast if indicated    Narrative [address] [place] [address] [address] [facility] [address] [address]  [phone]    Transthoracic Echocardiogram  2D, M-mode, Doppler, and Color Doppler    Study date:  25-Oct-2019    Patient: Elaine Foss  MR number: [id number]  Account number: [de-identified]  : 1955  Age: 71 years  Gender: Female  Status: Outpatient  Location: Echo lab  Height: 65 in  Weight: 190.5 lb  BP: 134/ 75 mmHg    Indications: SOB    Diagnoses: R06.02 - Shortness of breath    Sonographer:  VALERIE Bustamante  Primary Physician:  Sky Brar MD  Referring Physician:  Sky Brar MD  Group:  [facility]  Interpreting Physician:  Gabe Martin DO    SUMMARY    LEFT VENTRICLE:  Systolic function was normal by visual assessment. Ejection fraction was estimated to be 55 %. There were no regional wall motion abnormalities. There was mild concentric hypertrophy. Doppler parameters were consistent with abnormal left ventricular relaxation (grade 1 diastolic dysfunction). MITRAL VALVE:  There was mild regurgitation. AORTIC VALVE:  There was no evidence for stenosis. There was no regurgitation. TRICUSPID VALVE:  There was mild regurgitation. Pulmonary artery systolic pressure was within the normal range. HISTORY: PRIOR HISTORY: Diabetes, Endometrial Cancer and Colon Cancer,HTN,hyperlipidemia. PROCEDURE: The procedure was performed in the echo lab. This was a routine study. The transthoracic approach was used. The study included complete 2D imaging, M-mode, complete spectral Doppler, and color Doppler. The heart rate was 81 bpm,  at the start of the study. Image quality was adequate.     LEFT VENTRICLE: Size was normal. Systolic function was normal by visual assessment. Ejection fraction was estimated to be 55 %. There were no regional wall motion abnormalities. There was mild concentric hypertrophy. DOPPLER: Doppler  parameters were consistent with abnormal left ventricular relaxation (grade 1 diastolic dysfunction). RIGHT VENTRICLE: The size was normal. Systolic function was normal. DOPPLER: Systolic pressure was within the normal range. LEFT ATRIUM: The atrium was mildly dilated. RIGHT ATRIUM: Size was normal.    MITRAL VALVE: Valve structure was normal. There was normal leaflet separation. No echocardiographic evidence for prolapse. DOPPLER: The transmitral velocity was within the normal range. There was no evidence for stenosis. There was mild  regurgitation. AORTIC VALVE: The valve was trileaflet. Leaflets exhibited normal thickness and normal cuspal separation. DOPPLER: Transaortic velocity was within the normal range. There was no evidence for stenosis. There was no regurgitation. TRICUSPID VALVE: The valve structure was normal. There was normal leaflet separation. DOPPLER: The transtricuspid velocity was within the normal range. There was mild regurgitation. Pulmonary artery systolic pressure was within the normal  range. Estimated peak PA pressure was 30 mmHg. PULMONIC VALVE: Leaflets exhibited normal thickness, no calcification, and normal cuspal separation. DOPPLER: The transpulmonic velocity was within the normal range. There was no regurgitation. PERICARDIUM: There was no thickening. There was no pericardial effusion. AORTA: The root exhibited normal size.     PULMONARY ARTERY: The size was normal. The morphology appeared normal.    SYSTEM MEASUREMENT TABLES    2D mode  AoR Diam 2D: 3.2 cm  LA Diam (2D): 4 cm  LA/Ao (2D): 1.25  FS (2D Teich): 27.7 %  IVSd (2D): 1.09 cm  LVDEV: 129 cmï¾³  LVESV: 60 cmï¾³  LVIDd(2D): 5.19 cm  LVISd (2D): 3.75 cm  LVOT Area 2D: 3.14 cmï¾²  LVPWd (2D): 1.13 cm  SV (Teich): 69 cmï¾³    Apical four chamber  LVEF A4C: 59 %    Unspecified Scan Mode  ARLENE Cont Eq (Peak Andres): 2.4 cmï¾²  LVOT Diam.: 2 cm  LVOT Vmax: 1160 mm/s  LVOT Vmax; Mean: 1160 mm/s  Peak Grad.; Mean: 5 mm[Hg]  MV Peak A Andres: 755 mm/s  MV Peak E Andres. Mean: 607 mm/s  MVA (PHT): 4.07 cmï¾²  PHT: 54 ms  Max P mm[Hg]  V Max: 2100 mm/s  Vmax: 2210 mm/s  RA Area: 16.5 cmï¾²  RA Volume: 40.4 cmï¾³  TAPSE: 1.6 cm    [employer] Commission Accredited Echocardiography Laboratory    Prepared and electronically signed by    Jalil Tomas DO  Signed 25-Oct-2019 17:50:25         Results for orders placed during the hospital encounter of 10/17/19   NM myocardial perfusion spect (rx stress and/or rest)    [address]  [phone]    Rest/Stress Gated SPECT Myocardial Perfusion Imaging After Regadenoson    Patient: Kesha Diaz  MR number: [id number]  Account number: [de-identified]  : 1955  Age: 71 years  Gender: Female  Status: Outpatient  Location: Stress lab  Height: 64 in  Weight: 190 lb  BP: 129/ 64 mmHg    Allergies: NO KNOWN ALLERGIES    Diagnosis: I10. - Essential (primary) hypertension    Primary Physician:  Kristin Suárez MD  Technician:  Merlin Amaya  RN:  Mauricio Sharma RN  Referring Physician:  Kristin Suárez MD  Group:  Michael Grubbs  Report Prepared By[de-identified]  KENTRELL Larson  RN:  KENTRELL Larson  Interpreting Physician:  Marcial Wagner MD    INDICATIONS: Evaluation for coronary artery disease. HISTORY: The patient is a 71year old  female. Chest pain status: no chest pain. Other symptoms: dyspnea. Coronary artery disease risk factors: dyslipidemia, hypertension, family history of premature coronary artery disease, and  diabetes mellitus. Cardiovascular history: none significant. Co-morbidity: obesity.  Medications: a lipid lowering agent, an antihypertensive agent, and diabetic medications. PHYSICAL EXAM: Baseline physical exam screening: no wheezes audible. REST ECG: RBBB Normal sinus rhythm. PROCEDURE: The study was performed in the the Stress lab. A regadenoson infusion pharmacologic stress test was performed. Gated SPECT myocardial perfusion imaging was performed after stress and at rest. Systolic blood pressure was 129  mmHg, at the start of the study. Diastolic blood pressure was 64 mmHg, at the start of the study. The heart rate was 72 bpm, at the start of the study. IV double checked. Regadenoson protocol:  Time HR bpm SBP mmHg DBP mmHg Symptoms Rhythm/conduct  Baseline 09:14 72 129 64 none NSR  Immediate 09:18 90 140 72 mild dyspnea same as above  1 min 09:19 94 138 64 same as above --  2 min 09:20 90 126 58 subsiding --  3 min 09:21 89 123 58 none same as above  No medications or fluids given. STRESS SUMMARY: Duration of pharmacologic stress was 3 min. Maximal heart rate during stress was 101 bpm. The heart rate response to stress was normal. There was normal resting blood pressure with an appropriate response to stress. The  rate-pressure product for the peak heart rate and blood pressure was 94527. There was no chest pain during stress. The stress test was terminated due to protocol completion. Pre oxygen saturation: 96 %. Peak oxygen saturation: 98 %. The  stress ECG was equivocal for ischemia. There were no stress arrhythmias or conduction abnormalities. ISOTOPE ADMINISTRATION:  Resting isotope administration Stress isotope administration  Agent Tetrofosmin Tetrofosmin  Dose 11 mCi 33 mCi  Date 10/17/2019 10/17/2019    The radiopharmaceutical was injected at the peak effect of pharmacologic stress. MYOCARDIAL PERFUSION IMAGING:  The image quality was good. Rotating projection images reveal mild breast attenuation and prone imaging completed for attenuation correction. Left ventricular size was normal. The TID ratio was 1.2.     PERFUSION DEFECTS:  -  There was a small, paradoxical (reverse redistribution) myocardial perfusion defect of the apical wall likely due to low counts. -  There was a reversible myocardial perfusion defect of the anteroseptal wall which improved with prone imaging likely due to attenuation from breast tissue. GATED SPECT:  The calculated left ventricular ejection fraction was 60 %. Left ventricular ejection fraction was within normal limits by visual estimate. SUMMARY:  -  Stress results: There was no chest pain during stress. -  ECG conclusions: The stress ECG was equivocal for ischemia. -  Perfusion imaging: There was a small, paradoxical (reverse redistribution) myocardial perfusion defect of the apical wall likely due to low counts. There was a reversible myocardial perfusion defect of the anteroseptal wall which  improved with prone imaging likely due to attenuation from breast tissue.  -  Gated SPECT: The calculated left ventricular ejection fraction was 60 %. Left ventricular ejection fraction was within normal limits by visual estimate. -  Impressions and recommendations: Likely normal study after pharmacologic vasodilation. No evidence of prior infarction and no focal reversible perfusion defect with preserved ejection fraction. Cannot rule out balanced ischemia from  pharmacological vasodilation study but TID ratio of 1.2 and preserved EF. IMPRESSIONS: Likely normal study after pharmacologic vasodilation. No evidence of prior infarction and no focal reversible perfusion defect with preserved ejection fraction. Cannot rule out balanced ischemia from pharmacological  vasodilation study but TID ratio of 1.2 and preserved EF. Prepared and signed by    Tha Antoine MD  Signed 10/18/2019 10:59:38         EKG: Personally reviewed.     Normal sinus rhythm incomplete rbbb unchanged from prior EKG      Tha Antoine  [facility]  [phone]  Please call with any questions or suggestions    Counseling :  A description of the counseling:   Goals and Barriers:  Patient's ability to self care:  Medication side effect reviewed with patient in detail and all their questions answered. "Portions of the record may have been created with voice recognition software. Occasional wrong word or "sound a like" substitutions may have occurred due to the inherent limitations of voice recognition software. Read the chart carefully and recognize, using context, where substitutions have occurred.  Please call if you have any questions. "

## 2023-08-11 DIAGNOSIS — R13.10 DYSPHAGIA, UNSPECIFIED TYPE: ICD-10-CM

## 2023-08-11 DIAGNOSIS — R52 PAIN: ICD-10-CM

## 2023-08-11 RX ORDER — GABAPENTIN 100 MG/1
CAPSULE ORAL
Qty: 80 CAPSULE | Refills: 3 | Status: SHIPPED | OUTPATIENT
Start: 2023-08-11

## 2023-08-11 RX ORDER — FAMOTIDINE 20 MG/1
20 TABLET, FILM COATED ORAL 2 TIMES DAILY
Qty: 30 TABLET | Refills: 3 | Status: SHIPPED | OUTPATIENT
Start: 2023-08-11

## 2023-08-18 ENCOUNTER — LAB (OUTPATIENT)
Dept: LAB | Facility: HOSPITAL | Age: 68
End: 2023-08-18
Attending: INTERNAL MEDICINE
Payer: MEDICARE

## 2023-08-18 ENCOUNTER — TELEPHONE (OUTPATIENT)
Dept: NEPHROLOGY | Facility: CLINIC | Age: 68
End: 2023-08-18

## 2023-08-18 DIAGNOSIS — I10 ESSENTIAL HYPERTENSION: ICD-10-CM

## 2023-08-18 DIAGNOSIS — F31.32 MODERATE DEPRESSED BIPOLAR I DISORDER (HCC): ICD-10-CM

## 2023-08-18 DIAGNOSIS — Z79.899 ENCOUNTER FOR LONG-TERM (CURRENT) USE OF OTHER MEDICATIONS: ICD-10-CM

## 2023-08-18 LAB
ALBUMIN SERPL BCP-MCNC: 3.8 G/DL (ref 3.5–5)
ALP SERPL-CCNC: 148 U/L (ref 34–104)
ALT SERPL W P-5'-P-CCNC: 14 U/L (ref 7–52)
ANION GAP SERPL CALCULATED.3IONS-SCNC: 7 MMOL/L
AST SERPL W P-5'-P-CCNC: 24 U/L (ref 13–39)
BASOPHILS # BLD AUTO: 0.03 THOUSANDS/ÂΜL (ref 0–0.1)
BASOPHILS NFR BLD AUTO: 1 % (ref 0–1)
BILIRUB SERPL-MCNC: 0.56 MG/DL (ref 0.2–1)
BUN SERPL-MCNC: 11 MG/DL (ref 5–25)
CALCIUM SERPL-MCNC: 9.4 MG/DL (ref 8.4–10.2)
CHLORIDE SERPL-SCNC: 100 MMOL/L (ref 96–108)
CHOLEST SERPL-MCNC: 113 MG/DL
CO2 SERPL-SCNC: 29 MMOL/L (ref 21–32)
CREAT SERPL-MCNC: 0.81 MG/DL (ref 0.6–1.3)
EOSINOPHIL # BLD AUTO: 0.18 THOUSAND/ÂΜL (ref 0–0.61)
EOSINOPHIL NFR BLD AUTO: 4 % (ref 0–6)
ERYTHROCYTE [DISTWIDTH] IN BLOOD BY AUTOMATED COUNT: 14.7 % (ref 11.6–15.1)
EST. AVERAGE GLUCOSE BLD GHB EST-MCNC: 126 MG/DL
GFR SERPL CREATININE-BSD FRML MDRD: 74 ML/MIN/1.73SQ M
GLUCOSE P FAST SERPL-MCNC: 131 MG/DL (ref 65–99)
HBA1C MFR BLD: 6 %
HCT VFR BLD AUTO: 33.7 % (ref 34.8–46.1)
HDLC SERPL-MCNC: 45 MG/DL
HGB BLD-MCNC: 10.8 G/DL (ref 11.5–15.4)
IMM GRANULOCYTES # BLD AUTO: 0.01 THOUSAND/UL (ref 0–0.2)
IMM GRANULOCYTES NFR BLD AUTO: 0 % (ref 0–2)
LDLC SERPL CALC-MCNC: 37 MG/DL (ref 0–100)
LYMPHOCYTES # BLD AUTO: 1.11 THOUSANDS/ÂΜL (ref 0.6–4.47)
LYMPHOCYTES NFR BLD AUTO: 23 % (ref 14–44)
MCH RBC QN AUTO: 30.4 PG (ref 26.8–34.3)
MCHC RBC AUTO-ENTMCNC: 32 G/DL (ref 31.4–37.4)
MCV RBC AUTO: 95 FL (ref 82–98)
MONOCYTES # BLD AUTO: 0.39 THOUSAND/ÂΜL (ref 0.17–1.22)
MONOCYTES NFR BLD AUTO: 8 % (ref 4–12)
NEUTROPHILS # BLD AUTO: 3.21 THOUSANDS/ÂΜL (ref 1.85–7.62)
NEUTS SEG NFR BLD AUTO: 64 % (ref 43–75)
NONHDLC SERPL-MCNC: 68 MG/DL
NRBC BLD AUTO-RTO: 0 /100 WBCS
PLATELET # BLD AUTO: 200 THOUSANDS/UL (ref 149–390)
PMV BLD AUTO: 9 FL (ref 8.9–12.7)
POTASSIUM SERPL-SCNC: 4.3 MMOL/L (ref 3.5–5.3)
PROT SERPL-MCNC: 6.9 G/DL (ref 6.4–8.4)
RBC # BLD AUTO: 3.55 MILLION/UL (ref 3.81–5.12)
SODIUM SERPL-SCNC: 136 MMOL/L (ref 135–147)
TRIGL SERPL-MCNC: 157 MG/DL
WBC # BLD AUTO: 4.93 THOUSAND/UL (ref 4.31–10.16)

## 2023-08-18 PROCEDURE — 80053 COMPREHEN METABOLIC PANEL: CPT

## 2023-08-18 PROCEDURE — 36415 COLL VENOUS BLD VENIPUNCTURE: CPT

## 2023-08-18 PROCEDURE — 85025 COMPLETE CBC W/AUTO DIFF WBC: CPT

## 2023-08-18 PROCEDURE — 80061 LIPID PANEL: CPT

## 2023-08-18 PROCEDURE — 83036 HEMOGLOBIN GLYCOSYLATED A1C: CPT

## 2023-08-18 NOTE — RESULT ENCOUNTER NOTE
Please call Amos Thompson and let her know that sodium level remains normal.  Current sodium level is 136. No changes at this time.

## 2023-08-18 NOTE — TELEPHONE ENCOUNTER
LM for patient's daughter, Monse Coker, about the following, asked her to call back if she has any questions:    ----- Message from [address] [place] [address] sent at 8/18/2023  2:05 PM EDT -----  Please call Arcenio Seek and let her know that sodium level remains normal.  Current sodium level is 136. No changes at this time.

## 2023-08-28 DIAGNOSIS — R13.10 DYSPHAGIA, UNSPECIFIED TYPE: ICD-10-CM

## 2023-08-28 RX ORDER — FAMOTIDINE 20 MG/1
20 TABLET, FILM COATED ORAL 2 TIMES DAILY
Qty: 180 TABLET | Refills: 0 | Status: SHIPPED | OUTPATIENT
Start: 2023-08-28

## 2023-08-30 DIAGNOSIS — R52 PAIN: ICD-10-CM

## 2023-08-31 ENCOUNTER — OFFICE VISIT (OUTPATIENT)
Dept: FAMILY MEDICINE CLINIC | Facility: CLINIC | Age: 68
End: 2023-08-31
Payer: MEDICARE

## 2023-08-31 ENCOUNTER — APPOINTMENT (OUTPATIENT)
Dept: LAB | Facility: HOSPITAL | Age: 68
End: 2023-08-31
Payer: MEDICARE

## 2023-08-31 ENCOUNTER — TELEPHONE (OUTPATIENT)
Dept: NEPHROLOGY | Facility: CLINIC | Age: 68
End: 2023-08-31

## 2023-08-31 VITALS
DIASTOLIC BLOOD PRESSURE: 74 MMHG | HEIGHT: 64 IN | RESPIRATION RATE: 18 BRPM | HEART RATE: 72 BPM | SYSTOLIC BLOOD PRESSURE: 110 MMHG | TEMPERATURE: 97.7 F | BODY MASS INDEX: 23.15 KG/M2 | OXYGEN SATURATION: 98 % | WEIGHT: 135.6 LBS

## 2023-08-31 DIAGNOSIS — I10 ESSENTIAL HYPERTENSION: ICD-10-CM

## 2023-08-31 DIAGNOSIS — R52 PAIN: ICD-10-CM

## 2023-08-31 DIAGNOSIS — R74.8 ELEVATED ALKALINE PHOSPHATASE LEVEL: Primary | ICD-10-CM

## 2023-08-31 DIAGNOSIS — E22.2 SIADH (SYNDROME OF INAPPROPRIATE ADH PRODUCTION) (HCC): Primary | ICD-10-CM

## 2023-08-31 DIAGNOSIS — N39.0 ACUTE UTI (URINARY TRACT INFECTION): Primary | ICD-10-CM

## 2023-08-31 LAB
ANION GAP SERPL CALCULATED.3IONS-SCNC: 9 MMOL/L
BUN SERPL-MCNC: 12 MG/DL (ref 5–25)
CALCIUM SERPL-MCNC: 9 MG/DL (ref 8.4–10.2)
CHLORIDE SERPL-SCNC: 91 MMOL/L (ref 96–108)
CO2 SERPL-SCNC: 30 MMOL/L (ref 21–32)
CREAT SERPL-MCNC: 0.72 MG/DL (ref 0.6–1.3)
GFR SERPL CREATININE-BSD FRML MDRD: 85 ML/MIN/1.73SQ M
GLUCOSE SERPL-MCNC: 124 MG/DL (ref 65–140)
POTASSIUM SERPL-SCNC: 4 MMOL/L (ref 3.5–5.3)
SL AMB  POCT GLUCOSE, UA: 0
SL AMB LEUKOCYTE ESTERASE,UA: 0
SL AMB POCT BILIRUBIN,UA: 0
SL AMB POCT BLOOD,UA: 0
SL AMB POCT CLARITY,UA: CLEAR
SL AMB POCT COLOR,UA: YELLOW
SL AMB POCT KETONES,UA: 0
SL AMB POCT NITRITE,UA: 0
SL AMB POCT PH,UA: 6
SL AMB POCT SPECIFIC GRAVITY,UA: 1.02
SL AMB POCT URINE PROTEIN: 0
SL AMB POCT UROBILINOGEN: 0.2
SODIUM SERPL-SCNC: 130 MMOL/L (ref 135–147)

## 2023-08-31 PROCEDURE — 36415 COLL VENOUS BLD VENIPUNCTURE: CPT

## 2023-08-31 PROCEDURE — 99214 OFFICE O/P EST MOD 30 MIN: CPT | Performed by: FAMILY MEDICINE

## 2023-08-31 PROCEDURE — 81003 URINALYSIS AUTO W/O SCOPE: CPT | Performed by: FAMILY MEDICINE

## 2023-08-31 PROCEDURE — 80048 BASIC METABOLIC PNL TOTAL CA: CPT

## 2023-08-31 PROCEDURE — 87086 URINE CULTURE/COLONY COUNT: CPT | Performed by: FAMILY MEDICINE

## 2023-08-31 RX ORDER — DIVALPROEX SODIUM 250 MG/1
TABLET, EXTENDED RELEASE ORAL
COMMUNITY
Start: 2023-08-16

## 2023-08-31 RX ORDER — SULFAMETHOXAZOLE AND TRIMETHOPRIM 800; 160 MG/1; MG/1
1 TABLET ORAL 2 TIMES DAILY
Qty: 10 TABLET | Refills: 0 | Status: SHIPPED | OUTPATIENT
Start: 2023-08-31 | End: 2023-09-05

## 2023-08-31 RX ORDER — GABAPENTIN 100 MG/1
CAPSULE ORAL
Qty: 120 CAPSULE | Refills: 3 | Status: SHIPPED | OUTPATIENT
Start: 2023-08-31

## 2023-08-31 RX ORDER — SODIUM CHLORIDE 1 G/1
1 TABLET ORAL 3 TIMES DAILY
Qty: 90 TABLET | Refills: 3 | Status: SHIPPED | OUTPATIENT
Start: 2023-08-31

## 2023-08-31 RX ORDER — GABAPENTIN 100 MG/1
CAPSULE ORAL
Qty: 80 CAPSULE | Refills: 0 | OUTPATIENT
Start: 2023-08-31

## 2023-08-31 NOTE — PROGRESS NOTES
Name: Zeeshan Nash      : 1955      MRN: [id number]  Encounter Provider: Audi Becerra MD  Encounter Date: 2023   Encounter department: 2 [facility]     1. Acute UTI (urinary tract infection)  -     POCT urine dip auto non-scope  -     Urine culture  -     sulfamethoxazole-trimethoprim (BACTRIM DS) 800-160 mg per tablet; Take 1 tablet by mouth 2 (two) times a day for 5 days    Urine dip with no significant abnormalities, however given symptoms and the fact that we are going into a long weekend, will treat with antibiotic while waiting for urine culture. Subjective      Urinary Tract Infection   This is a new problem. Episode onset: 3 days. The problem occurs intermittently. The problem has been unchanged. The quality of the pain is described as burning. The patient is experiencing no pain. There has been no fever. Associated symptoms include flank pain and hesitancy. Pertinent negatives include no chills, discharge, frequency, hematuria, nausea, possible pregnancy, sweats, urgency or vomiting. Review of Systems   Constitutional: Negative. Negative for chills. HENT: Negative. Eyes: Negative. Respiratory: Negative. Cardiovascular: Negative. Gastrointestinal: Negative. Negative for nausea and vomiting. Endocrine: Negative. Genitourinary: Positive for flank pain and hesitancy. Negative for frequency, hematuria and urgency. Skin: Negative. Allergic/Immunologic: Negative. Neurological: Negative. Hematological: Negative. Psychiatric/Behavioral: Negative.         Current Outpatient Medications on File Prior to Visit   Medication Sig   • acetaminophen (TYLENOL) 325 mg tablet Take 3 tablets (975 mg total) by mouth 2 (two) times a day (Patient taking differently: Take 975 mg by mouth every 6 (six) hours as needed)   • aspirin (ECOTRIN LOW STRENGTH) 81 mg EC tablet Take 1 tablet (81 mg total) by mouth daily Do not start before May 31, 2023.   • BD Pen Needle Chelsey 2nd Gen 32G X 4 MM MISC USE 1  TWICE DAILY   • clonazePAM (KlonoPIN) 0.5 mg tablet TAKE 1/2 (ONE-HALF) TO ONE TABLET BY MOUTH ONCE DAILY AS NEEDED. WATCH FOR SEDATION   • cyanocobalamin (VITAMIN B-12) 1000 MCG tablet Take 1 tablet (1,000 mcg total) by mouth daily Do not start before March 29, 2023. • divalproex sodium (DEPAKOTE ER) 250 mg 24 hr tablet TAKE 1 TABLET BY MOUTH AT BEDTIME FOR 7 DAYS, THEN TAKE 2 TABLETS AT BEDTIME THEREAFTER   • docusate sodium (COLACE) 100 mg capsule Take 100 mg by mouth 2 (two) times a day   • famotidine (PEPCID) 20 mg tablet Take 1 tablet (20 mg total) by mouth 2 (two) times a day   • gabapentin (NEURONTIN) 100 mg capsule 100mg twice daily ( 8AM, 6PM) and 200mg at Bedtime   • lidocaine (LIDODERM) 5 % Apply 1 patch topically over 12 hours daily Remove & Discard patch within 12 hours or as directed by MD Do not start before May 31, 2023.    • lurasidone (LATUDA) 40 mg tablet TAKE 1 TABLET BY MOUTH IN THE MORNING WITH BREAKFAST   • metFORMIN (GLUCOPHAGE-XR) 500 mg 24 hr tablet Take 1 tablet (500 mg total) by mouth daily with breakfast   • metoprolol succinate (TOPROL-XL) 25 mg 24 hr tablet Take 1 tablet (25 mg total) by mouth daily One tablet daily   • mirtazapine (REMERON) 7.5 MG tablet TAKE 1 TABLET BY MOUTH ONCE DAILY AT BEDTIME. WATCH FOR SEDATION   • Multiple Vitamin (multivitamin) tablet Take 1 tablet by mouth daily   • nitroglycerin (NITROSTAT) 0.4 mg SL tablet Place 1 tablet (0.4 mg total) under the tongue every 5 (five) minutes as needed for chest pain   • rosuvastatin (CRESTOR) 20 MG tablet Take 1 tablet (20 mg total) by mouth daily (Patient taking differently: Take 20 mg by mouth daily with dinner)   • sertraline (ZOLOFT) 50 mg tablet Take 50 mg by mouth daily   • torsemide (DEMADEX) 10 mg tablet Take 0.5 tablets (5 mg total) by mouth daily   • escitalopram (LEXAPRO) 10 mg tablet TAKE 1/2 (ONE-HALF) TABLET BY MOUTH IN THE MORNING FOR 6 DAYS, THEN 1 TABLET BY MOUTH ONCE DAILY IN THE MORNING THEREAFTER (Patient not taking: Reported on 8/7/2023)   • gabapentin (NEURONTIN) 100 mg capsule TAKE 1 CAPSULE BY MOUTH TWICE DAILY  (8AM,6PM) AND 2 AT BEDTIME (Patient not taking: Reported on 8/31/2023)   • lurasidone (LATUDA) 80 mg tablet Take 1 tablet (80 mg total) by mouth daily with dinner (Patient not taking: Reported on 8/7/2023)   • oxyCODONE-acetaminophen (Percocet) 5-325 mg per tablet Take 0.5 tablets by mouth 2 (two) times a day as needed for moderate pain Take half a tablet (0.5mg total)  two times a day as needed for moderate pain. Do not drive or operate machinery while taking percocet. Do not take other sedative medications while taking percocet. Max Daily Amount: 1 tablet       Objective     /74   Pulse 72   Temp 97.7 °F (36.5 °C) (Temporal)   Resp 18   Ht 5' 4" (1.626 m)   Wt 61.5 kg (135 lb 9.6 oz)   SpO2 98%   BMI 23.28 kg/m²     Physical Exam  Constitutional:       General: She is not in acute distress. Appearance: She is well-developed. She is not diaphoretic. HENT:      Head: Normocephalic and atraumatic. Cardiovascular:      Rate and Rhythm: Normal rate and regular rhythm. Heart sounds: Normal heart sounds. No murmur heard. No friction rub. No gallop. Pulmonary:      Effort: Pulmonary effort is normal. No respiratory distress. Breath sounds: Normal breath sounds. No wheezing or rales. Chest:      Chest wall: No tenderness. Abdominal:      General: Abdomen is flat. Bowel sounds are normal. There is no distension. Palpations: Abdomen is soft. There is no mass. Tenderness: There is no abdominal tenderness. There is no right CVA tenderness, left CVA tenderness, guarding or rebound. Hernia: No hernia is present. Musculoskeletal:         General: No deformity. Normal range of motion. Cervical back: Normal range of motion and neck supple. Skin:     General: Skin is warm and dry. Neurological:      Mental Status: She is alert and oriented to person, place, and time. Psychiatric:         Behavior: Behavior normal.         Thought Content:  Thought content normal.         Judgment: Judgment normal.       Luis Howard MD

## 2023-08-31 NOTE — TELEPHONE ENCOUNTER
Called and spoke with Betty Wright. Zoloft 50mg BID recently started and sodium level has dropped to 130. Will start salt tablets 1g TID, continue torsemide, continue fluid restriction 1500ml/day. Daughter aware and will relay the message to patient. Blood work to continue every 2 weeks.

## 2023-09-01 ENCOUNTER — TELEPHONE (OUTPATIENT)
Dept: FAMILY MEDICINE CLINIC | Facility: CLINIC | Age: 68
End: 2023-09-01

## 2023-09-01 NOTE — TELEPHONE ENCOUNTER
----- Message from Phyllis Gonzáles sent at 8/31/2023  3:28 PM EDT -----  Regarding: FW: Abnormal lab  Contact: [phone]    ----- Message -----  From: Vince Navarrete  Sent: 8/31/2023   1:37 PM EDT  To: [facility]  Subject: Abnormal lab                                     Do I need to get more bloodwork?   Emili

## 2023-09-01 NOTE — TELEPHONE ENCOUNTER
9/1/2023 8:53  Marbin De Chris Alarcon and spoke with her daughter    We discussed her mom's elevated alkaline phosphatase and that she should get follow-up done. We also discussed the low sodium.   Her daughter is already spoken to her nephrologist and they have adjusted her sodium supplement    Khalif Gupta DO

## 2023-09-02 LAB — BACTERIA UR CULT: NORMAL

## 2023-09-06 DIAGNOSIS — Z12.31 ENCOUNTER FOR SCREENING MAMMOGRAM FOR BREAST CANCER: Primary | ICD-10-CM

## 2023-09-07 ENCOUNTER — APPOINTMENT (OUTPATIENT)
Dept: LAB | Facility: HOSPITAL | Age: 68
End: 2023-09-07
Payer: MEDICARE

## 2023-09-07 ENCOUNTER — TELEPHONE (OUTPATIENT)
Dept: NEPHROLOGY | Facility: CLINIC | Age: 68
End: 2023-09-07

## 2023-09-07 DIAGNOSIS — R07.9 CHEST PAIN IN ADULT: ICD-10-CM

## 2023-09-07 DIAGNOSIS — R74.8 ELEVATED ALKALINE PHOSPHATASE LEVEL: ICD-10-CM

## 2023-09-07 DIAGNOSIS — I10 ESSENTIAL HYPERTENSION: ICD-10-CM

## 2023-09-07 LAB
ANION GAP SERPL CALCULATED.3IONS-SCNC: 6 MMOL/L
BUN SERPL-MCNC: 14 MG/DL (ref 5–25)
CALCIUM SERPL-MCNC: 8.9 MG/DL (ref 8.4–10.2)
CHLORIDE SERPL-SCNC: 100 MMOL/L (ref 96–108)
CO2 SERPL-SCNC: 28 MMOL/L (ref 21–32)
CREAT SERPL-MCNC: 0.75 MG/DL (ref 0.6–1.3)
GFR SERPL CREATININE-BSD FRML MDRD: 81 ML/MIN/1.73SQ M
GLUCOSE SERPL-MCNC: 145 MG/DL (ref 65–140)
POTASSIUM SERPL-SCNC: 4.2 MMOL/L (ref 3.5–5.3)
SODIUM SERPL-SCNC: 134 MMOL/L (ref 135–147)

## 2023-09-07 PROCEDURE — 80048 BASIC METABOLIC PNL TOTAL CA: CPT

## 2023-09-07 PROCEDURE — 36415 COLL VENOUS BLD VENIPUNCTURE: CPT

## 2023-09-07 NOTE — TELEPHONE ENCOUNTER
I spoke to [facility] patients daughter and she aware sodium has improved to 134 . She will continue biweekly las per  last encounter and f/u then.

## 2023-09-07 NOTE — TELEPHONE ENCOUNTER
----- Message from Fernando Hawkins, [address] [facility] sent at 9/7/2023 12:07 PM EDT -----  Please call patient and let her know her sodium level is improved to 134 from 130 prior.   We will continue her current regimen for now, we will repeat a renal function panel in 4 weeks to continue monitoring closely, we can increase the interval of lab tests as long as her sodium level stays stable

## 2023-09-11 ENCOUNTER — HOSPITAL ENCOUNTER (OUTPATIENT)
Dept: RADIOLOGY | Facility: HOSPITAL | Age: 68
Discharge: HOME | End: 2023-09-11
Payer: MEDICARE

## 2023-09-11 DIAGNOSIS — Z13.820 SCREENING FOR OSTEOPOROSIS: ICD-10-CM

## 2023-09-11 DIAGNOSIS — Z78.0 POST-MENOPAUSAL: ICD-10-CM

## 2023-09-11 DIAGNOSIS — Z87.81 HISTORY OF FEMUR FRACTURE: ICD-10-CM

## 2023-09-11 PROCEDURE — 77080 DXA BONE DENSITY AXIAL: CPT

## 2023-09-14 ENCOUNTER — APPOINTMENT (OUTPATIENT)
Dept: LAB | Facility: HOSPITAL | Age: 68
End: 2023-09-14
Attending: INTERNAL MEDICINE
Payer: MEDICARE

## 2023-09-14 ENCOUNTER — TELEPHONE (OUTPATIENT)
Dept: NEPHROLOGY | Facility: CLINIC | Age: 68
End: 2023-09-14

## 2023-09-14 DIAGNOSIS — R74.8 ACID PHOSPHATASE ELEVATED: ICD-10-CM

## 2023-09-14 DIAGNOSIS — I10 ESSENTIAL HYPERTENSION: ICD-10-CM

## 2023-09-14 LAB
ANION GAP SERPL CALCULATED.3IONS-SCNC: 6 MMOL/L
BUN SERPL-MCNC: 17 MG/DL (ref 5–25)
CALCIUM SERPL-MCNC: 8.9 MG/DL (ref 8.4–10.2)
CHLORIDE SERPL-SCNC: 100 MMOL/L (ref 96–108)
CO2 SERPL-SCNC: 30 MMOL/L (ref 21–32)
CREAT SERPL-MCNC: 0.78 MG/DL (ref 0.6–1.3)
GFR SERPL CREATININE-BSD FRML MDRD: 77 ML/MIN/1.73SQ M
GLUCOSE P FAST SERPL-MCNC: 103 MG/DL (ref 65–99)
POTASSIUM SERPL-SCNC: 4.6 MMOL/L (ref 3.5–5.3)
SODIUM SERPL-SCNC: 136 MMOL/L (ref 135–147)

## 2023-09-14 PROCEDURE — 84075 ASSAY ALKALINE PHOSPHATASE: CPT

## 2023-09-14 PROCEDURE — 36415 COLL VENOUS BLD VENIPUNCTURE: CPT

## 2023-09-14 PROCEDURE — 84080 ASSAY ALKALINE PHOSPHATASES: CPT

## 2023-09-14 PROCEDURE — 80048 BASIC METABOLIC PNL TOTAL CA: CPT

## 2023-09-14 NOTE — TELEPHONE ENCOUNTER
Pt daughter advised that labs of 9/14/23 show normal sodium level at 136. Per  Dr. Domenica Garcia, continue salt tablets 1 gm. TID and fluid restriction 1500 cc/day.    ----- Message from Esteban Hurtado MD sent at 9/14/2023  5:09 PM EDT -----  Please inform patient's daughter that most recent labs (9/14/23) show that Na level is normal (136). Continue salt tabs 1 gm TID and fluid restriction 1500 cc/24 hours.

## 2023-09-19 ENCOUNTER — TELEPHONE (OUTPATIENT)
Dept: FAMILY MEDICINE CLINIC | Facility: CLINIC | Age: 68
End: 2023-09-19

## 2023-09-19 DIAGNOSIS — Z85.038 HISTORY OF COLON CANCER: Chronic | ICD-10-CM

## 2023-09-19 DIAGNOSIS — C18.7 MALIGNANT NEOPLASM OF SIGMOID COLON (HCC): ICD-10-CM

## 2023-09-19 DIAGNOSIS — Z85.42 HISTORY OF ENDOMETRIAL CANCER: Primary | Chronic | ICD-10-CM

## 2023-09-19 DIAGNOSIS — E87.1 HYPONATREMIA: ICD-10-CM

## 2023-09-19 LAB
ALP BONE CFR SERPL: 51 % (ref 14–68)
ALP INTEST CFR SERPL: 23 % (ref 0–18)
ALP LIVER CFR SERPL: 26 % (ref 18–85)
ALP SERPL-CCNC: 124 IU/L (ref 44–121)

## 2023-09-19 NOTE — TELEPHONE ENCOUNTER
9/19/2023 2:27 PM Called Andrew Pender regarding her mom's alk phos results. Her level has improved and is down to 124  We should discussed the elevated intestine fraction. Left message on her voicemail to call the office.    Maciej Harding,

## 2023-09-19 NOTE — TELEPHONE ENCOUNTER
9/19/2023 4:46 PM returned call to [facility] regarding her mom    Left message on her voicemail to call the office.    Siri Keane, DO

## 2023-09-20 ENCOUNTER — TELEPHONE (OUTPATIENT)
Dept: FAMILY MEDICINE CLINIC | Facility: CLINIC | Age: 68
End: 2023-09-20

## 2023-09-20 NOTE — TELEPHONE ENCOUNTER
9/20/2023 2:13 PM spoke with Jim Garsia    Discussed results  She is concerned because her mom has a history of colon cancer and needed a PET scan that her previous insurance wouldn't cover    PET ordered    Message complete  Mark Ny, DO

## 2023-09-20 NOTE — TELEPHONE ENCOUNTER
A CT order printed in the front office for Rashida Salter ( [birth year]). Her next appt isn't until Oct 30. Does she need to have this CT before that appt?

## 2023-09-21 ENCOUNTER — TELEPHONE (OUTPATIENT)
Dept: NEPHROLOGY | Facility: CLINIC | Age: 68
End: 2023-09-21

## 2023-09-21 ENCOUNTER — APPOINTMENT (OUTPATIENT)
Dept: LAB | Facility: HOSPITAL | Age: 68
End: 2023-09-21
Attending: INTERNAL MEDICINE
Payer: MEDICARE

## 2023-09-21 DIAGNOSIS — I10 ESSENTIAL HYPERTENSION: ICD-10-CM

## 2023-09-21 LAB
ANION GAP SERPL CALCULATED.3IONS-SCNC: 8 MMOL/L
BUN SERPL-MCNC: 22 MG/DL (ref 5–25)
CALCIUM SERPL-MCNC: 9.1 MG/DL (ref 8.4–10.2)
CHLORIDE SERPL-SCNC: 106 MMOL/L (ref 96–108)
CO2 SERPL-SCNC: 27 MMOL/L (ref 21–32)
CREAT SERPL-MCNC: 0.84 MG/DL (ref 0.6–1.3)
GFR SERPL CREATININE-BSD FRML MDRD: 71 ML/MIN/1.73SQ M
GLUCOSE P FAST SERPL-MCNC: 107 MG/DL (ref 65–99)
POTASSIUM SERPL-SCNC: 4.5 MMOL/L (ref 3.5–5.3)
SODIUM SERPL-SCNC: 141 MMOL/L (ref 135–147)

## 2023-09-21 PROCEDURE — 80048 BASIC METABOLIC PNL TOTAL CA: CPT

## 2023-09-21 PROCEDURE — 36415 COLL VENOUS BLD VENIPUNCTURE: CPT

## 2023-09-21 NOTE — TELEPHONE ENCOUNTER
Daughter advised that sodium and CR stable. No changes per [facility].    ----- Message from [place], [place] sent at 9/21/2023  4:44 PM EDT -----  Sodium and creatinine stable. No changes needed.

## 2023-09-25 ENCOUNTER — TELEPHONE (OUTPATIENT)
Dept: NEUROLOGY | Facility: CLINIC | Age: 68
End: 2023-09-25

## 2023-09-28 ENCOUNTER — TELEPHONE (OUTPATIENT)
Dept: NEPHROLOGY | Facility: CLINIC | Age: 68
End: 2023-09-28

## 2023-09-28 ENCOUNTER — APPOINTMENT (OUTPATIENT)
Dept: LAB | Facility: HOSPITAL | Age: 68
End: 2023-09-28
Attending: INTERNAL MEDICINE
Payer: MEDICARE

## 2023-09-28 DIAGNOSIS — I10 ESSENTIAL HYPERTENSION: ICD-10-CM

## 2023-09-28 LAB
ANION GAP SERPL CALCULATED.3IONS-SCNC: 7 MMOL/L
BUN SERPL-MCNC: 20 MG/DL (ref 5–25)
CALCIUM SERPL-MCNC: 9.1 MG/DL (ref 8.4–10.2)
CHLORIDE SERPL-SCNC: 100 MMOL/L (ref 96–108)
CO2 SERPL-SCNC: 29 MMOL/L (ref 21–32)
CREAT SERPL-MCNC: 0.92 MG/DL (ref 0.6–1.3)
GFR SERPL CREATININE-BSD FRML MDRD: 63 ML/MIN/1.73SQ M
GLUCOSE SERPL-MCNC: 120 MG/DL (ref 65–140)
POTASSIUM SERPL-SCNC: 4.5 MMOL/L (ref 3.5–5.3)
SODIUM SERPL-SCNC: 136 MMOL/L (ref 135–147)

## 2023-09-28 PROCEDURE — 36415 COLL VENOUS BLD VENIPUNCTURE: CPT

## 2023-09-28 PROCEDURE — 80048 BASIC METABOLIC PNL TOTAL CA: CPT

## 2023-09-28 NOTE — TELEPHONE ENCOUNTER
Pt daughter advised that CR and lytes are WLN per [facility].    ----- Message from [facility], Sherlyn Aquino sent at 9/28/2023  3:24 PM EDT -----  Creatinine and electrolytes within normal limits.

## 2023-10-05 ENCOUNTER — OFFICE VISIT (OUTPATIENT)
Dept: OBGYN CLINIC | Facility: CLINIC | Age: 68
End: 2023-10-05
Payer: MEDICARE

## 2023-10-05 ENCOUNTER — APPOINTMENT (OUTPATIENT)
Dept: LAB | Facility: HOSPITAL | Age: 68
End: 2023-10-05
Attending: INTERNAL MEDICINE
Payer: MEDICARE

## 2023-10-05 ENCOUNTER — APPOINTMENT (OUTPATIENT)
Dept: RADIOLOGY | Facility: CLINIC | Age: 68
End: 2023-10-05
Payer: MEDICARE

## 2023-10-05 ENCOUNTER — TELEPHONE (OUTPATIENT)
Dept: NEPHROLOGY | Facility: CLINIC | Age: 68
End: 2023-10-05

## 2023-10-05 VITALS
HEART RATE: 70 BPM | WEIGHT: 150 LBS | SYSTOLIC BLOOD PRESSURE: 116 MMHG | HEIGHT: 64 IN | BODY MASS INDEX: 25.61 KG/M2 | DIASTOLIC BLOOD PRESSURE: 71 MMHG

## 2023-10-05 DIAGNOSIS — Z96.652 STATUS POST TOTAL LEFT KNEE REPLACEMENT USING CEMENT: ICD-10-CM

## 2023-10-05 DIAGNOSIS — Z87.81 HISTORY OF FEMUR FRACTURE: ICD-10-CM

## 2023-10-05 DIAGNOSIS — M76.52 PATELLAR TENDONITIS OF LEFT KNEE: Primary | ICD-10-CM

## 2023-10-05 DIAGNOSIS — I10 ESSENTIAL HYPERTENSION: ICD-10-CM

## 2023-10-05 DIAGNOSIS — M67.80 HETEROTOPIC OSSIFICATION OF TENDON: ICD-10-CM

## 2023-10-05 LAB
ALBUMIN SERPL BCP-MCNC: 3.9 G/DL (ref 3.5–5)
ALP SERPL-CCNC: 133 U/L (ref 34–104)
ALT SERPL W P-5'-P-CCNC: 10 U/L (ref 7–52)
ANION GAP SERPL CALCULATED.3IONS-SCNC: 7 MMOL/L
AST SERPL W P-5'-P-CCNC: 19 U/L (ref 13–39)
BILIRUB SERPL-MCNC: 0.34 MG/DL (ref 0.2–1)
BUN SERPL-MCNC: 21 MG/DL (ref 5–25)
CALCIUM SERPL-MCNC: 9 MG/DL (ref 8.4–10.2)
CHLORIDE SERPL-SCNC: 97 MMOL/L (ref 96–108)
CO2 SERPL-SCNC: 28 MMOL/L (ref 21–32)
CREAT SERPL-MCNC: 0.83 MG/DL (ref 0.6–1.3)
GFR SERPL CREATININE-BSD FRML MDRD: 72 ML/MIN/1.73SQ M
GLUCOSE SERPL-MCNC: 111 MG/DL (ref 65–140)
POTASSIUM SERPL-SCNC: 4.7 MMOL/L (ref 3.5–5.3)
PROT SERPL-MCNC: 6.5 G/DL (ref 6.4–8.4)
SODIUM SERPL-SCNC: 132 MMOL/L (ref 135–147)

## 2023-10-05 PROCEDURE — 99214 OFFICE O/P EST MOD 30 MIN: CPT | Performed by: ORTHOPAEDIC SURGERY

## 2023-10-05 PROCEDURE — 73552 X-RAY EXAM OF FEMUR 2/>: CPT

## 2023-10-05 PROCEDURE — 73562 X-RAY EXAM OF KNEE 3: CPT

## 2023-10-05 RX ORDER — DIVALPROEX SODIUM 500 MG/1
TABLET, EXTENDED RELEASE ORAL
COMMUNITY
Start: 2023-09-24

## 2023-10-05 NOTE — TELEPHONE ENCOUNTER
Talked with patient's daughter. Advised that sodium is low at 132. Daughter states she is taking her salt tablets but has wavered somewhat on fluid restrictions. Will enforce fluid restrictions and have labs done next week as previously ordered. ----- Message from [place], [place] sent at 10/5/2023  1:31 PM EDT -----  Sodium level slightly lower than past few readings at 132. Please make sure patient taking salt tablets and following fluid restriction. Any med changes? Will monitor.

## 2023-10-05 NOTE — PROGRESS NOTES
Assessment/Plan:  1. Patellar tendonitis of left knee  Ambulatory Referral to Physical Therapy      2. Heterotopic ossification of tendon  Ambulatory Referral to Physical Therapy      3. Status post total left knee replacement using cement  XR knee 3 vw left non injury    XR femur 2 vw left    Ambulatory Referral to Physical Therapy      4. History of femur fracture  XR knee 3 vw left non injury    XR femur 2 vw left    Ambulatory Referral to Physical Therapy        Scribe Attestation    I,:  Mauri Rojas am acting as a scribe while in the presence of the attending physician.:       I,:  Marilyn Ramirez DO personally performed the services described in this documentation    as scribed in my presence.:         Chanda Marley is a very pleasant 58-year-old female who returns today for follow-up evaluation for her left knee. She is now approximately 10 months status post left total knee arthroplasty. After reviewing her imaging and performing a thorough history and physical exam I explained that she appears symptomatic of patellar tendinitis as well as the heterotopic ossification that appears to be residing in the patellar tendon. I recommended that she initiate physical therapy for this and provided her with a referral today. I also encouraged her to begin using Voltaren gel. She will obtain this over-the-counter. Today's visit will serve as her anniversary appointment. We will see her back as needed. All of her questions and concerns were addressed today. Subjective: Follow-up evaluation for left knee    Patient ID: Marylene Sayer is a 70 y.o. female who returns today for follow-up evaluation for her left knee. She is now approximately 10-month status post left total knee arthroplasty. She is also now 6 and half months status post left femur retrograde nailing. At today's visit, she reports pain about her anterior knee for the last 4 weeks. She also reports that her knee feels heavy.   She has been using a walker for ambulatory assistance. She has not recently attended physical therapy. She denies any recent injury or trauma. Review of Systems   Constitutional: Positive for activity change. Negative for chills, fever and unexpected weight change. HENT: Negative for hearing loss, nosebleeds and sore throat. Eyes: Negative for pain, redness and visual disturbance. Respiratory: Negative for cough, shortness of breath and wheezing. Cardiovascular: Negative for chest pain, palpitations and leg swelling. Gastrointestinal: Negative for abdominal pain, nausea and vomiting. Endocrine: Negative for polydipsia and polyuria. Genitourinary: Negative for dysuria and hematuria. Musculoskeletal: Positive for arthralgias and myalgias. Negative for joint swelling. See HPI   Skin: Negative for rash and wound. Neurological: Negative for dizziness, numbness and headaches. Psychiatric/Behavioral: Negative for decreased concentration and suicidal ideas. The patient is not nervous/anxious.           Past Medical History:   Diagnosis Date   • Anesthesia complication     difficulty waking up   • Anxiety    • Arthritis     left knee   • Bipolar 1 disorder (HCC)    • Bone spur     left knee behing the knee cap   • Cancer [facility])    • Chronic kidney disease    • Chronic pain disorder    • Colon cancer [facility])     patient states about 2017   • Colon polyp     Tubulovillous Adenoma High Grade Dysplasia - 2017   • Depression    • Diabetes mellitus ([address])    • Endometrial cancer ([address])     grade I   • Hx of bleeding disorder     vaginal bleeding started on 3/13/17    • Hyperlipidemia    • Hypertension    • Hypomagnesemia 03/20/2023   • Memory loss    • PONV (postoperative nausea and vomiting)    • Psychiatric disorder    • Psychiatric illness    • Psychosis ([address])    • Shortness of breath     with exertion   • Sleep difficulties    • Urinary incontinence    • Vitamin D deficiency        Past Surgical History:   Procedure Laterality Date   • BREAST BIOPSY Left     benign-maybe at ar age 58   • CHOLECYSTECTOMY      open   • COLONOSCOPY     • DILATION AND CURETTAGE OF UTERUS N/A 04/05/2017    Procedure: DILATATION AND CURETTAGE (D&C);   Surgeon: Susu Haro MD;  Location[facility] MAIN OR;  Service:    • HYSTERECTOMY     • OOPHORECTOMY     • PELVIC LAPAROSCOPY     • NC ARTHRP KNE CONDYLE&PLATU MEDIAL&LAT COMPARTMENTS Left 12/06/2022    Procedure: ARTHROPLASTY KNEE TOTAL W ROBOT - CEMENTED - LEFT;  Surgeon: Zuhair Aburto DO;  Location: [facility];  Service: Orthopedics   • NC LAPS TOTAL HYSTERECT 250 GM/< W/RMVL TUBE/OVARY N/A 05/04/2017    Procedure: ROBOTIC ASSISTED TOTAL LAPAROSCOPIC HYSTERECTOMY, BILATERAL SALPINGOOPHERECTOMY; CYSTOSCOPY;  Surgeon: Juliana Warren MD;  Location:  MAIN OR;  Service: Gynecology Oncology   • NC OPTX FEM SHFT FX W/INSJ IMED IMPLT W/WO SCREW Left 3/20/2023    Procedure: INSERTION NAIL IM FEMUR RETROGRADE;  Surgeon: Elsi Diane MD;  Location: [facility];  Service: Orthopedics   • REPLACEMENT TOTAL KNEE     • TONSILLECTOMY     • TUBAL LIGATION         Family History   Problem Relation Age of Onset   • Cancer Mother         Renal   • Heart disease Father         CHF   • Dementia Sister    • Heart disease Sister         atrial septal defect   • Dementia Sister    • Breast cancer Paternal Aunt 40       Social History     Occupational History   • Not on file   Tobacco Use   • Smoking status: Never     Passive exposure: Never   • Smokeless tobacco: Never   Vaping Use   • Vaping Use: Never used   Substance and Sexual Activity   • Alcohol use: Never   • Drug use: No   • Sexual activity: Not Currently     Birth control/protection: Post-menopausal, Surgical         Current Outpatient Medications:   •  acetaminophen (TYLENOL) 325 mg tablet, Take 3 tablets (975 mg total) by mouth 2 (two) times a day (Patient taking differently: Take 975 mg by mouth every 6 (six) hours as needed), Disp: , Rfl: 0  •  aspirin (ECOTRIN LOW STRENGTH) 81 mg EC tablet, Take 1 tablet (81 mg total) by mouth daily, Disp: 30 tablet, Rfl: 5  •  BD Pen Needle Chelsey 2nd Gen 32G X 4 MM MISC, USE 1  TWICE DAILY, Disp: 60 each, Rfl: 0  •  clonazePAM (KlonoPIN) 0.5 mg tablet, TAKE 1/2 (ONE-HALF) TO ONE TABLET BY MOUTH ONCE DAILY AS NEEDED.  WATCH FOR SEDATION, Disp: , Rfl:   •  divalproex sodium (DEPAKOTE ER) 250 mg 24 hr tablet, TAKE 1 TABLET BY MOUTH AT BEDTIME FOR 7 DAYS, THEN TAKE 2 TABLETS AT BEDTIME THEREAFTER, Disp: , Rfl:   •  divalproex sodium (DEPAKOTE ER) 500 mg 24 hr tablet, TAKE 1 TABLET BY MOUTH EVERY DAY AT BEDTIME WATCH FOR SEDATION, Disp: , Rfl:   •  docusate sodium (COLACE) 100 mg capsule, Take 100 mg by mouth 2 (two) times a day, Disp: , Rfl:   •  famotidine (PEPCID) 20 mg tablet, Take 1 tablet (20 mg total) by mouth 2 (two) times a day, Disp: 180 tablet, Rfl: 0  •  gabapentin (NEURONTIN) 100 mg capsule, 100mg twice daily ( 8AM, 6PM) and 200mg at Bedtime, Disp: 80 capsule, Rfl: 0  •  gabapentin (NEURONTIN) 100 mg capsule, TAKE 1 CAPSULE BY MOUTH TWICE DAILY  (8AM,6PM) AND 2 AT BEDTIME, Disp: 120 capsule, Rfl: 3  •  lidocaine (LIDODERM) 5 %, Apply 1 patch topically over 12 hours daily Remove & Discard patch within 12 hours or as directed by MD Do not start before May 31, 2023., Disp: 30 patch, Rfl: 0  •  lurasidone (LATUDA) 40 mg tablet, TAKE 1 TABLET BY MOUTH IN THE MORNING WITH BREAKFAST, Disp: , Rfl:   •  metFORMIN (GLUCOPHAGE-XR) 500 mg 24 hr tablet, Take 1 tablet (500 mg total) by mouth daily with breakfast, Disp: 90 tablet, Rfl: 1  •  metoprolol succinate (TOPROL-XL) 25 mg 24 hr tablet, Take 1 tablet (25 mg total) by mouth daily One tablet daily, Disp: 90 tablet, Rfl: 1  •  mirtazapine (REMERON) 7.5 MG tablet, TAKE 1 TABLET BY MOUTH ONCE DAILY AT BEDTIME. WATCH FOR SEDATION, Disp: , Rfl:   •  Multiple Vitamin (multivitamin) tablet, Take 1 tablet by mouth daily, Disp: 90 tablet, Rfl: 1  •  nitroglycerin (NITROSTAT) 0.4 mg SL tablet, Place 1 tablet (0.4 mg total) under the tongue every 5 (five) minutes as needed for chest pain, Disp: 30 tablet, Rfl: 1  •  oxyCODONE-acetaminophen (Percocet) 5-325 mg per tablet, Take 0.5 tablets by mouth 2 (two) times a day as needed for moderate pain Take half a tablet (0.5mg total)  two times a day as needed for moderate pain. Do not drive or operate machinery while taking percocet. Do not take other sedative medications while taking percocet. Max Daily Amount: 1 tablet, Disp: 15 tablet, Rfl: 0  •  rosuvastatin (CRESTOR) 20 MG tablet, Take 1 tablet (20 mg total) by mouth daily (Patient taking differently: Take 20 mg by mouth daily with dinner), Disp: 90 tablet, Rfl: 3  •  sertraline (ZOLOFT) 50 mg tablet, Take 50 mg by mouth daily, Disp: , Rfl:   •  sodium chloride 1 g tablet, Take 1 tablet (1 g total) by mouth 3 (three) times a day, Disp: 90 tablet, Rfl: 3  •  torsemide (DEMADEX) 10 mg tablet, Take 0.5 tablets (5 mg total) by mouth daily, Disp: 30 tablet, Rfl: 3  •  cyanocobalamin (VITAMIN B-12) 1000 MCG tablet, Take 1 tablet (1,000 mcg total) by mouth daily Do not start before March 29, 2023., Disp: 30 tablet, Rfl: 0  •  escitalopram (LEXAPRO) 10 mg tablet, TAKE 1/2 (ONE-HALF) TABLET BY MOUTH IN THE MORNING FOR 6 DAYS, THEN 1 TABLET BY MOUTH ONCE DAILY IN THE MORNING THEREAFTER (Patient not taking: Reported on 8/7/2023), Disp: , Rfl:   •  lurasidone (LATUDA) 80 mg tablet, Take 1 tablet (80 mg total) by mouth daily with dinner (Patient not taking: Reported on 8/7/2023), Disp: 30 tablet, Rfl: 0    Allergies   Allergen Reactions   • Wellbutrin [Bupropion] Anxiety     Patient has tried Wellbutrin which resulted in increased paranoia. Patient wishes to not try this medication again. Objective:  Vitals:    10/05/23 1353   BP: 116/71   Pulse: 70       Body mass index is 25.75 kg/m². Left Knee Exam     Tenderness   The patient is experiencing tenderness in the patellar tendon.     Range of Motion Extension: 0   Flexion: 120     Tests   Varus: negative Valgus: negative  Drawer:  Anterior - negative     Posterior - negative    Other   Erythema: absent  Scars: present  Sensation: normal  Pulse: present  Swelling: none  Effusion: no effusion present    Comments:  No pain with passive motion of the knee  Stable at 0, 30 and 90 degrees  No warmth or erythema          Observations   Left Knee   Negative for effusion. Physical Exam  Vitals and nursing note reviewed. Constitutional:       Appearance: Normal appearance. She is well-developed. HENT:      Head: Normocephalic and atraumatic. Right Ear: External ear normal.      Left Ear: External ear normal.   Eyes:      General: No scleral icterus. Extraocular Movements: Extraocular movements intact. Conjunctiva/sclera: Conjunctivae normal.   Cardiovascular:      Rate and Rhythm: Normal rate. Pulmonary:      Effort: Pulmonary effort is normal. No respiratory distress. Musculoskeletal:      Cervical back: Normal range of motion and neck supple. Left knee: No effusion. Comments: See Ortho exam   Skin:     General: Skin is warm and dry. Neurological:      General: No focal deficit present. Mental Status: She is alert and oriented to person, place, and time. Psychiatric:         Behavior: Behavior normal.         I have personally reviewed pertinent films in PACS. X-ray of the left knee obtained on 10/5/2023 reviewed demonstrating a well-positioned and aligned total knee arthroplasty without evidence of failure. There is heterotopic ossification noted in the anterior knee. There is no acute fracture, dislocation, lytic or blastic lesion. X-ray of the left femur obtained on 10/5/2023 reviewed demonstrating well-positioned and aligned orthopedic hardware consistent with prior retrograde nailing. There is no evidence of hardware loosening or failure. This document was created using speech voice recognition software. Grammatical errors, random word insertions, pronoun errors, and incomplete sentences are an occasional consequence of this system due to software limitations, ambient noise, and hardware issues. Any formal questions or concerns about content, text, or information contained within the body of this dictation should be directly addressed to the provider for clarification.

## 2023-10-09 ENCOUNTER — OFFICE VISIT (OUTPATIENT)
Dept: NEUROLOGY | Facility: CLINIC | Age: 68
End: 2023-10-09
Payer: MEDICARE

## 2023-10-09 VITALS
BODY MASS INDEX: 26.12 KG/M2 | HEIGHT: 64 IN | SYSTOLIC BLOOD PRESSURE: 122 MMHG | HEART RATE: 70 BPM | DIASTOLIC BLOOD PRESSURE: 68 MMHG | WEIGHT: 153 LBS

## 2023-10-09 DIAGNOSIS — R68.89 FORGETFULNESS: ICD-10-CM

## 2023-10-09 DIAGNOSIS — G31.84 MCI (MILD COGNITIVE IMPAIRMENT): ICD-10-CM

## 2023-10-09 DIAGNOSIS — E55.9 VITAMIN D DEFICIENCY: Primary | ICD-10-CM

## 2023-10-09 PROCEDURE — 99214 OFFICE O/P EST MOD 30 MIN: CPT | Performed by: NURSE PRACTITIONER

## 2023-10-09 NOTE — PATIENT INSTRUCTIONS
Will get Vitamin levels and RPR re: memory (TSH is normal)  Pt to consider and/or discuss with family further eval with NeuroPsychology eval and MRI with [facility]  Consider OT for memory and cognitive therapy  Consider diet changes to Mediterranean or DASH diets  Good exercise program for increased memory health  Increased Games/Puzzles/Reading, activities to increase brain health.   Follow up with Neurology office in 5 months or sooner

## 2023-10-09 NOTE — PROGRESS NOTES
Patient ID: Marylene Sayer is a 70 y.o. female. Assessment/Plan:       Diagnoses and all orders for this visit:    Vitamin D deficiency  -     Vitamin D 25 hydroxy; Future    Forgetfulness  -     Vitamin B12/Folate, Serum Panel; Future  -     RPR (DX) W/REFL TITER AND CONFIRM TESTING (REFL); Future    MCI (mild cognitive impairment)  -     Vitamin B12/Folate, Serum Panel; Future  -     RPR (DX) W/REFL TITER AND CONFIRM TESTING (REFL); Future       Will get Vitamin levels and RPR re: memory (TSH is normal)  Pt to consider and/or discuss with family further eval with NeuroPsychology eval and MRI with NeuroQuant  Consider OT for memory and cognitive therapy  Consider diet changes to Mediterranean or DASH diets  Good exercise program for increased memory health  Increased Games/Puzzles/Reading, activities to increase brain health. Follow up with Neurology office in 5 months or sooner    To Note:  Question the possibility of 2ndary Parkinsonism r/t her psychiatric medications. Presents with RLE tremor, soft voice, delayed responses and masked facies    Subjective/HPI: Marylene Sayer is a 70yo female presents to the neurology office today as a hospital follow-up from 3/23/2023. During her hospitalization patient's daughter raised concerns with regards to changes in her memory. She had increased slowing of her responses and difficulties with her memory, more so over the past few weeks. Patient had been on Wellbutrin however was taken off by her psychiatrist due to hallucinations and confusion. She was then placed on Lexapro in February 2023. She continued the use of Lexapro however was concerned with regards to possible hyponatremia therefore this was proceeded with continued observation of her sodium levels. MRI of the brain was ordered, negative for any acute ischemic event. No other acute abnormality seen.   Discussed with the patient and her family ideal time for evaluation of dementia or memory loss would not be during an acute issue while hospitalized, would follow-up with the patient in the outpatient setting for further evaluation and recommendations for dementia/memory loss evaluations. Patient had polypharmacy on board, Lexapro was added. It was felt that her memory issues started over a few weeks in February 2023. Suspected etiology to be ongoing changes in her psychiatric medications. Patient went to acute rehab post discharge from the hospital, she was on Geodon, clonazepam and neuropsychology had been following her. She was started on Remeron as well. Patient is unaware of her medication list and we are currently running through them with her daughter today. Patient presents to neurology office today, she is not accompanied by any family members. Patient's  drove her and opted to stay in the car today. Attempted a MoCA testing however patient became frustrated and was unable to complete the testing. Testing was therefore stopped as it would have been inaccurate. Discussed patient's reason for follow-up, she was unaware as she does not remember the consultation done in the hospital.  With regards to her memory, patient reports she has good days and bad days. She reports some days she can remember things other days she cannot. Patient reported that her fall leading to the hospitalization was nobody's fault. She was in the bath, there is a board that goes across her tub to sit, she slipped and fell from the board. Her  tried to catch her but was unable. Patient did not feel her fall was related in any way to any neurological issues. We discussed her memory, patient does not appear to be impacted or concerns with regards to her current state. She does have significant issues psychiatrically and is on multiple medications.   Patient presents to the office today with shaking of the right lower extremity, she reports that that happens all the time and notes that she has gotten some shaking to her hands as well. Patient has hypophonia however she reports this is atypical vocal tone for her. Patient also presents with a masked facies, she is able to articulate her needs however is delayed in her responses and often times expressionless during this conversation. We discussed her ambulation, patient is limited at this point due to her femur fracture. She walks with a rolling walker. She reports that prior to her fall and even after she has not had any difficulties with getting her gait started. She does ambulate with a forward flexion posture while using her walker. Patient states her gait is slightly limited post fall due to tendonitis and has to put creams on it. Currently patient denies any headache, dizziness, lightheadedness, speech or swallowing changes, paresthesias, weaknesses imbalances or falls. She does have some visual changes with blurred vision however has been progressive over time. She is aware she is at the neurology office however unclear as to why. Patient does not recall hospitalization or meeting neurology during that time. We reviewed her options with regards to memory loss. Advised patient we could potentially do neuropsychology evaluations to separate psych versus neurological etiology of her memory issues. Discussed MRI with neuro quant in order to evaluate the hippocampal size and atrophy and also discussed possible OT for memory and cognitive therapies. Patient does not have much concern with regards to her memory at this time however is willing to consider these options. She would like time to think about it. Patient advised that her family had requested for this initial evaluation during hospitalization, can continue to have this conversation with her family and discuss the above options. In the meantime we discussed possible reversible causes. We will get vitamin B-12, D and RPR levels.   Her TSH level had been normal, this is assessed regulated. We reviewed her psychiatric medications, currently patient is no longer on Geodon or Lexapro. She takes Remeron, Zoloft, Klonopin and Depakote. She does get Depakote levels drawn, no she will be due for level soon. At which point, she can get her vitamin levels drawn at the same time. Suspect patient has a probable secondary PD secondary to her psychiatric medications. She is not impacted by them physically however may be having some mentation changes related to this. She will consider the options regarding she was unable to complete the MoCA testing due to frustration and not knowing why she was taking the test to begin with. We can retry this with a mini mental examination at her follow-up appointment in 5 months or sooner if needed. Encourage patient to change her diet to a Mediterranean type diet and increase her exercise. Currently she sits at home and watches TV without much exercise at all. She has to be careful with regards to her oral hydration as she is on fluid restriction secondary to her hyponatremia. Patient's last sodium level was 132. Patient will follow-up in 5 months or sooner if needed.         The following portions of the patient's history were reviewed and updated as appropriate: allergies, current medications, past family history, past medical history, past social history, past surgical history and problem list.      Past Medical History:   Diagnosis Date   • Anesthesia complication     difficulty waking up   • Anxiety    • Arthritis     left knee   • Bipolar 1 disorder ([address])    • Bone spur     left knee behing the knee cap   • Cancer [facility])    • Chronic kidney disease    • Chronic pain disorder    • Colon cancer [facility])     patient states about 2017   • Colon polyp     Tubulovillous Adenoma High Grade Dysplasia - 2017   • Depression    • Diabetes mellitus ([address])    • Endometrial cancer ([address])     grade I   • Hx of bleeding disorder     vaginal bleeding started on 3/13/17 • Hyperlipidemia    • Hypertension    • Hypomagnesemia 03/20/2023   • Memory loss    • PONV (postoperative nausea and vomiting)    • Psychiatric disorder    • Psychiatric illness    • Psychosis ([address])    • Shortness of breath     with exertion   • Sleep difficulties    • Urinary incontinence    • Vitamin D deficiency        Past Surgical History:   Procedure Laterality Date   • BREAST BIOPSY Left     benign-maybe at ar age 58   • CHOLECYSTECTOMY      open   • COLONOSCOPY     • DILATION AND CURETTAGE OF UTERUS N/A 04/05/2017    Procedure: DILATATION AND CURETTAGE (D&C);   Surgeon: Chad Morfin MD;  Location: [facility] MAIN OR;  Service:    • HYSTERECTOMY     • OOPHORECTOMY     • PELVIC LAPAROSCOPY     • VA ARTHRP KNE CONDYLE&PLATU MEDIAL&LAT COMPARTMENTS Left 12/06/2022    Procedure: ARTHROPLASTY KNEE TOTAL W ROBOT - CEMENTED - LEFT;  Surgeon: Deyvi Rodriguez DO;  Location: [facility];  Service: Orthopedics   • VA LAPS TOTAL HYSTERECT 250 GM/< W/RMVL TUBE/OVARY N/A 05/04/2017    Procedure: ROBOTIC ASSISTED TOTAL LAPAROSCOPIC HYSTERECTOMY, BILATERAL SALPINGOOPHERECTOMY; CYSTOSCOPY;  Surgeon: Sergio Mosqueda MD;  Location:  MAIN OR;  Service: Gynecology Oncology   • VA OPTX FEM SHFT [address] W/INSJ IMED IMPLT W/WO SCREW Left 3/20/2023    Procedure: INSERTION NAIL IM FEMUR RETROGRADE;  Surgeon: Darius Oreilly MD;  Location: [facility];  Service: Orthopedics   • REPLACEMENT TOTAL KNEE     • TONSILLECTOMY     • TUBAL LIGATION         Social History     Socioeconomic History   • Marital status: /Civil Union     Spouse name: None   • Number of children: None   • Years of education: None   • Highest education level: None   Occupational History   • None   Tobacco Use   • Smoking status: Never     Passive exposure: Never   • Smokeless tobacco: Never   Vaping Use   • Vaping Use: Never used   Substance and Sexual Activity   • Alcohol use: Never   • Drug use: No   • Sexual activity: Not Currently     Birth control/protection: Post-menopausal, Surgical   Other Topics Concern   • None   Social History Narrative   • None     Social Determinants of Health     Financial Resource Strain: Low Risk  (7/28/2023)    Overall Financial Resource Strain (CARDIA)    • Difficulty of Paying Living Expenses: Not hard at all   Food Insecurity: No Food Insecurity (3/21/2023)    Hunger Vital Sign    • Worried About Running Out of Food in the Last Year: Never true    • Ran Out of Food in the Last Year: Never true   Transportation Needs: No Transportation Needs (7/28/2023)    PRAPARE - Transportation    • Lack of Transportation (Medical): No    • Lack of Transportation (Non-Medical): No   Physical Activity: Not on file   Stress: Not on file   Social Connections: Not on file   Intimate Partner Violence: Not on file   Housing Stability: Low Risk  (3/21/2023)    Housing Stability Vital Sign    • Unable to Pay for Housing in the Last Year: No    • Number of Places Lived in the Last Year: 1    • Unstable Housing in the Last Year: No       Family History   Problem Relation Age of Onset   • Cancer Mother         Renal   • Heart disease Father         CHF   • Dementia Sister    • Heart disease Sister         atrial septal defect   • Dementia Sister    • Breast cancer Paternal Aunt 39         Current Outpatient Medications:   •  acetaminophen (TYLENOL) 325 mg tablet, Take 3 tablets (975 mg total) by mouth 2 (two) times a day (Patient taking differently: Take 975 mg by mouth every 6 (six) hours as needed), Disp: , Rfl: 0  •  aspirin (ECOTRIN LOW STRENGTH) 81 mg EC tablet, Take 1 tablet (81 mg total) by mouth daily, Disp: 30 tablet, Rfl: 5  •  BD Pen Needle Chelsey 2nd Gen 32G X 4 MM MISC, USE 1  TWICE DAILY, Disp: 60 each, Rfl: 0  •  clonazePAM (KlonoPIN) 0.5 mg tablet, TAKE 1/2 (ONE-HALF) TO ONE TABLET BY MOUTH ONCE DAILY AS NEEDED.  WATCH FOR SEDATION, Disp: , Rfl:   •  divalproex sodium (DEPAKOTE ER) 500 mg 24 hr tablet, TAKE 1 TABLET BY MOUTH EVERY DAY AT BEDTIME WATCH FOR SEDATION, Disp: , Rfl:   •  docusate sodium (COLACE) 100 mg capsule, Take 100 mg by mouth 2 (two) times a day, Disp: , Rfl:   •  famotidine (PEPCID) 20 mg tablet, Take 1 tablet (20 mg total) by mouth 2 (two) times a day, Disp: 180 tablet, Rfl: 0  •  gabapentin (NEURONTIN) 100 mg capsule, 100mg twice daily ( 8AM, 6PM) and 200mg at Bedtime (Patient taking differently: Take 100 mg by mouth 100mg twice daily ( 8AM, 6PM)), Disp: 80 capsule, Rfl: 0  •  gabapentin (NEURONTIN) 100 mg capsule, TAKE 1 CAPSULE BY MOUTH TWICE DAILY  (8AM,6PM) AND 2 AT BEDTIME, Disp: 120 capsule, Rfl: 3  •  lurasidone (LATUDA) 40 mg tablet, 40 mg 40 mg at 8 am, 40 mg 12 pm , 80 mg at 6 pm, Disp: , Rfl:   •  metFORMIN (GLUCOPHAGE-XR) 500 mg 24 hr tablet, Take 1 tablet (500 mg total) by mouth daily with breakfast, Disp: 90 tablet, Rfl: 1  •  metoprolol succinate (TOPROL-XL) 25 mg 24 hr tablet, Take 1 tablet (25 mg total) by mouth daily One tablet daily, Disp: 90 tablet, Rfl: 1  •  mirtazapine (REMERON) 7.5 MG tablet, TAKE 1 TABLET BY MOUTH ONCE DAILY AT BEDTIME. WATCH FOR SEDATION, Disp: , Rfl:   •  Multiple Vitamin (multivitamin) tablet, Take 1 tablet by mouth daily, Disp: 90 tablet, Rfl: 1  •  nitroglycerin (NITROSTAT) 0.4 mg SL tablet, Place 1 tablet (0.4 mg total) under the tongue every 5 (five) minutes as needed for chest pain, Disp: 30 tablet, Rfl: 1  •  rosuvastatin (CRESTOR) 20 MG tablet, Take 1 tablet (20 mg total) by mouth daily (Patient taking differently: Take 20 mg by mouth daily with dinner), Disp: 90 tablet, Rfl: 3  •  sertraline (ZOLOFT) 50 mg tablet, Take 50 mg by mouth 2 (two) times a day, Disp: , Rfl:   •  sodium chloride 1 g tablet, Take 1 tablet (1 g total) by mouth 3 (three) times a day, Disp: 90 tablet, Rfl: 3  •  torsemide (DEMADEX) 10 mg tablet, Take 0.5 tablets (5 mg total) by mouth daily, Disp: 30 tablet, Rfl: 3  •  cyanocobalamin (VITAMIN B-12) 1000 MCG tablet, Take 1 tablet (1,000 mcg total) by mouth daily Do not start before March 29, 2023. (Patient not taking: Reported on 10/9/2023), Disp: 30 tablet, Rfl: 0  •  divalproex sodium (DEPAKOTE ER) 250 mg 24 hr tablet, TAKE 1 TABLET BY MOUTH AT BEDTIME FOR 7 DAYS, THEN TAKE 2 TABLETS AT BEDTIME THEREAFTER (Patient not taking: Reported on 10/9/2023), Disp: , Rfl:   •  escitalopram (LEXAPRO) 10 mg tablet, TAKE 1/2 (ONE-HALF) TABLET BY MOUTH IN THE MORNING FOR 6 DAYS, THEN 1 TABLET BY MOUTH ONCE DAILY IN THE MORNING THEREAFTER (Patient not taking: Reported on 8/7/2023), Disp: , Rfl:   •  lidocaine (LIDODERM) 5 %, Apply 1 patch topically over 12 hours daily Remove & Discard patch within 12 hours or as directed by MD Do not start before May 31, 2023. (Patient not taking: Reported on 10/9/2023), Disp: 30 patch, Rfl: 0  •  lurasidone (LATUDA) 80 mg tablet, Take 1 tablet (80 mg total) by mouth daily with dinner (Patient not taking: Reported on 10/9/2023), Disp: 30 tablet, Rfl: 0  •  oxyCODONE-acetaminophen (Percocet) 5-325 mg per tablet, Take 0.5 tablets by mouth 2 (two) times a day as needed for moderate pain Take half a tablet (0.5mg total)  two times a day as needed for moderate pain. Do not drive or operate machinery while taking percocet. Do not take other sedative medications while taking percocet. Max Daily Amount: 1 tablet (Patient not taking: Reported on 10/9/2023), Disp: 15 tablet, Rfl: 0    Allergies   Allergen Reactions   • Wellbutrin [Bupropion] Anxiety     Patient has tried Wellbutrin which resulted in increased paranoia. Patient wishes to not try this medication again. Blood pressure 122/68, pulse 70, height 5' 4" (1.626 m), weight 69.4 kg (153 lb), not currently breastfeeding. Objective:    Blood pressure 122/68, pulse 70, height 5' 4" (1.626 m), weight 69.4 kg (153 lb), not currently breastfeeding. Physical Exam  Vitals reviewed. Constitutional:       Appearance: Normal appearance. She is well-developed. HENT:      Head: Normocephalic.       Nose: Nose normal.      Mouth/Throat:      Mouth: Mucous membranes are moist.   Eyes:      General: Lids are normal.      Extraocular Movements: Extraocular movements intact. Pupils: Pupils are equal, round, and reactive to light. Cardiovascular:      Rate and Rhythm: Normal rate. Pulmonary:      Effort: Pulmonary effort is normal.   Abdominal:      Palpations: Abdomen is soft. Musculoskeletal:      Cervical back: Normal range of motion. Skin:     General: Skin is warm and dry. Neurological:      Mental Status: She is alert. Coordination: Romberg sign negative. Deep Tendon Reflexes:      Reflex Scores:       Patellar reflexes are 1+ on the right side. Psychiatric:         Attention and Perception: Attention normal.         Mood and Affect: Affect is flat. Speech: Speech is delayed. Behavior: Behavior is slowed. Behavior is cooperative. Thought Content: Thought content normal.         Cognition and Memory: Memory is impaired. Judgment: Judgment normal.      Comments: Patient with delayed verbal responses, flat affect and masked facies         Neurological Exam  Mental Status  Alert. Oriented to person, place, time and situation. Recent and remote memory are intact. Language is fluent with no aphasia. Attention and concentration are normal. Fund of knowledge is appropriate for level of education. Cranial Nerves  CN II: Visual acuity is normal. Visual fields full to confrontation. CN III, IV, VI: Extraocular movements intact bilaterally. Normal lids and orbits bilaterally. Pupils equal round and reactive to light bilaterally. CN V: Facial sensation is normal.  CN VII: Full and symmetric facial movement. CN VIII: Hearing is normal.  CN IX, X: Palate elevates symmetrically. Normal gag reflex. CN XI: Shoulder shrug strength is normal.  CN XII: Tongue midline without atrophy or fasciculations. Motor  Normal muscle bulk throughout. Normal muscle tone.  The following abnormal movements were seen: + Tremor/shake of the RLE, mild tremor to the hands. No pronator drift. Right                     Left  Hip adductor                                                 4   Iliopsoas                                                       4   Quadriceps                                                   4    Sensory  Light touch is normal in upper and lower extremities. Temperature is normal in upper and lower extremities. Vibration is normal in upper and lower extremities. Proprioception is normal in upper and lower extremities. Reflexes                                            Right                      Left  Patellar                                1+                         Tr    Right pathological reflexes: Samara's absent. Left pathological reflexes: Samara's absent. Coordination  Right: Finger-to-nose normal. Rapid alternating movement normal. Heel-to-shin normal.Left: Finger-to-nose normal. Rapid alternating movement normal. Heel-to-shin normal.    Gait  Casual gait: Wide stance. Reduced stride length. Antalgic gait. Reduced right arm swing. Reduced left arm swing. Romberg is absent. Unable to rise from chair without using arms. Patient probably walker for stability. ROS:    Review of Systems   Constitutional: Negative for appetite change, fatigue and fever. HENT: Negative. Negative for hearing loss, tinnitus, trouble swallowing and voice change. Eyes: Negative. Negative for photophobia, pain and visual disturbance. Respiratory: Negative. Negative for shortness of breath. Cardiovascular: Negative. Negative for palpitations. Gastrointestinal: Negative. Negative for nausea and vomiting. Endocrine: Negative for cold intolerance. Genitourinary: Negative. Negative for dysuria, frequency and urgency. Musculoskeletal: Positive for gait problem (use walker).  Negative for back pain, myalgias and neck pain. Skin: Negative. Negative for rash. Allergic/Immunologic: Negative. Neurological: Positive for tremors (bilateral hands,right leg). Negative for dizziness, seizures, syncope, facial asymmetry, speech difficulty, weakness, light-headedness, numbness and headaches. Hematological: Bruises/bleeds easily (arms ). Psychiatric/Behavioral: Negative. Negative for confusion, hallucinations and sleep disturbance. ROS reviewed and discussed with patient.

## 2023-10-11 ENCOUNTER — HOSPITAL ENCOUNTER (OUTPATIENT)
Dept: RADIOLOGY | Age: 68
Discharge: HOME | End: 2023-10-11
Payer: MEDICARE

## 2023-10-11 DIAGNOSIS — E87.1 HYPONATREMIA: ICD-10-CM

## 2023-10-11 DIAGNOSIS — Z85.42 HISTORY OF ENDOMETRIAL CANCER: ICD-10-CM

## 2023-10-11 DIAGNOSIS — C18.7 MALIGNANT NEOPLASM OF SIGMOID COLON (HCC): ICD-10-CM

## 2023-10-11 DIAGNOSIS — Z85.038 HISTORY OF COLON CANCER: ICD-10-CM

## 2023-10-11 LAB — GLUCOSE SERPL-MCNC: 106 MG/DL (ref 65–140)

## 2023-10-11 PROCEDURE — A9552 F18 FDG: HCPCS

## 2023-10-11 PROCEDURE — 82948 REAGENT STRIP/BLOOD GLUCOSE: CPT

## 2023-10-11 PROCEDURE — G1004 CDSM NDSC: HCPCS

## 2023-10-11 PROCEDURE — 78815 PET IMAGE W/CT SKULL-THIGH: CPT

## 2023-10-12 ENCOUNTER — APPOINTMENT (OUTPATIENT)
Dept: LAB | Facility: HOSPITAL | Age: 68
End: 2023-10-12
Attending: INTERNAL MEDICINE
Payer: MEDICARE

## 2023-10-12 ENCOUNTER — TELEPHONE (OUTPATIENT)
Dept: NEPHROLOGY | Facility: CLINIC | Age: 68
End: 2023-10-12

## 2023-10-12 DIAGNOSIS — I10 ESSENTIAL HYPERTENSION: ICD-10-CM

## 2023-10-12 DIAGNOSIS — E55.9 VITAMIN D DEFICIENCY: ICD-10-CM

## 2023-10-12 DIAGNOSIS — R68.89 FORGETFULNESS: ICD-10-CM

## 2023-10-12 DIAGNOSIS — G31.84 MCI (MILD COGNITIVE IMPAIRMENT): ICD-10-CM

## 2023-10-12 LAB
25(OH)D3 SERPL-MCNC: 33.4 NG/ML (ref 30–100)
ANION GAP SERPL CALCULATED.3IONS-SCNC: 7 MMOL/L
BUN SERPL-MCNC: 24 MG/DL (ref 5–25)
CALCIUM SERPL-MCNC: 8.6 MG/DL (ref 8.4–10.2)
CHLORIDE SERPL-SCNC: 100 MMOL/L (ref 96–108)
CO2 SERPL-SCNC: 28 MMOL/L (ref 21–32)
CREAT SERPL-MCNC: 0.94 MG/DL (ref 0.6–1.3)
EST. AVERAGE GLUCOSE BLD GHB EST-MCNC: 114 MG/DL
FOLATE SERPL-MCNC: >22.3 NG/ML
GFR SERPL CREATININE-BSD FRML MDRD: 62 ML/MIN/1.73SQ M
GLUCOSE SERPL-MCNC: 128 MG/DL (ref 65–140)
HBA1C MFR BLD: 5.6 %
POTASSIUM SERPL-SCNC: 4.4 MMOL/L (ref 3.5–5.3)
SODIUM SERPL-SCNC: 135 MMOL/L (ref 135–147)
TREPONEMA PALLIDUM IGG+IGM AB [PRESENCE] IN SERUM OR PLASMA BY IMMUNOASSAY: NORMAL
VIT B12 SERPL-MCNC: 487 PG/ML (ref 180–914)

## 2023-10-12 PROCEDURE — 82607 VITAMIN B-12: CPT

## 2023-10-12 PROCEDURE — 80048 BASIC METABOLIC PNL TOTAL CA: CPT

## 2023-10-12 PROCEDURE — 86780 TREPONEMA PALLIDUM: CPT

## 2023-10-12 PROCEDURE — 82746 ASSAY OF FOLIC ACID SERUM: CPT

## 2023-10-12 PROCEDURE — 82306 VITAMIN D 25 HYDROXY: CPT

## 2023-10-12 PROCEDURE — 36415 COLL VENOUS BLD VENIPUNCTURE: CPT

## 2023-10-12 NOTE — TELEPHONE ENCOUNTER
Pt daughter advised that labs of 10/12/23 show sodium level is normal at 135. Daughter is asking if labs can be Q2 weeks instead of weekly?      ----- Message from Rafael Roblero MD sent at 10/12/2023  1:39 PM EDT -----  Please inform patient that most recent labs (10/12/23) show that sodium level is normal at 135.

## 2023-10-13 ENCOUNTER — TELEPHONE (OUTPATIENT)
Dept: NEUROLOGY | Facility: CLINIC | Age: 68
End: 2023-10-13

## 2023-10-13 NOTE — TELEPHONE ENCOUNTER
Spoke to patient's daughter and informed her of her BW results and the provider directives.      ----- Message from [facility], [address] [place] [address] sent at 10/12/2023  5:56 PM EDT -----  Please call the patient regarding her abnormal result. I am sending pt a message through her [employer], but her Vit D is in low normal range. She may benefit from some supplementation to help boost her levels a bit. I would recommend 1000u of Vitamin D3 daily.      Prosper Dunham

## 2023-10-26 ENCOUNTER — APPOINTMENT (OUTPATIENT)
Dept: LAB | Facility: HOSPITAL | Age: 68
End: 2023-10-26
Attending: INTERNAL MEDICINE
Payer: MEDICARE

## 2023-10-26 DIAGNOSIS — I10 ESSENTIAL HYPERTENSION: ICD-10-CM

## 2023-10-26 LAB
ANION GAP SERPL CALCULATED.3IONS-SCNC: 7 MMOL/L
BUN SERPL-MCNC: 29 MG/DL (ref 5–25)
CALCIUM SERPL-MCNC: 9 MG/DL (ref 8.4–10.2)
CHLORIDE SERPL-SCNC: 105 MMOL/L (ref 96–108)
CO2 SERPL-SCNC: 29 MMOL/L (ref 21–32)
CREAT SERPL-MCNC: 1.07 MG/DL (ref 0.6–1.3)
GFR SERPL CREATININE-BSD FRML MDRD: 53 ML/MIN/1.73SQ M
GLUCOSE SERPL-MCNC: 121 MG/DL (ref 65–140)
POTASSIUM SERPL-SCNC: 4.4 MMOL/L (ref 3.5–5.3)
SODIUM SERPL-SCNC: 141 MMOL/L (ref 135–147)

## 2023-10-26 PROCEDURE — 36415 COLL VENOUS BLD VENIPUNCTURE: CPT

## 2023-10-26 PROCEDURE — 80048 BASIC METABOLIC PNL TOTAL CA: CPT

## 2023-10-27 ENCOUNTER — TELEPHONE (OUTPATIENT)
Dept: NEPHROLOGY | Facility: CLINIC | Age: 68
End: 2023-10-27

## 2023-10-27 NOTE — TELEPHONE ENCOUNTER
Daughter advised that labs of 10/26/23 show normal sodium level per Dr. Michael Espana. Daughter is concerned that her GFR is decreasing.   Any further recommendations regarding this?      ----- Message from Will García MD sent at 10/26/2023  9:27 PM EDT -----  Please inform patient or daughter that most recent labs (10/26/23) show that sodium level is normal.

## 2023-10-30 ENCOUNTER — OFFICE VISIT (OUTPATIENT)
Dept: FAMILY MEDICINE CLINIC | Facility: CLINIC | Age: 68
End: 2023-10-30
Payer: MEDICARE

## 2023-10-30 VITALS
TEMPERATURE: 97.6 F | HEART RATE: 68 BPM | HEIGHT: 64 IN | SYSTOLIC BLOOD PRESSURE: 124 MMHG | OXYGEN SATURATION: 97 % | DIASTOLIC BLOOD PRESSURE: 60 MMHG | RESPIRATION RATE: 16 BRPM | BODY MASS INDEX: 26.98 KG/M2 | WEIGHT: 158 LBS

## 2023-10-30 DIAGNOSIS — I10 ESSENTIAL HYPERTENSION: Primary | ICD-10-CM

## 2023-10-30 DIAGNOSIS — E87.1 HYPONATREMIA: ICD-10-CM

## 2023-10-30 DIAGNOSIS — G89.29 CHRONIC PAIN OF LEFT KNEE: ICD-10-CM

## 2023-10-30 DIAGNOSIS — E11.9 TYPE 2 DIABETES MELLITUS WITHOUT COMPLICATION, WITH LONG-TERM CURRENT USE OF INSULIN (HCC): ICD-10-CM

## 2023-10-30 DIAGNOSIS — Z23 NEED FOR INFLUENZA VACCINATION: ICD-10-CM

## 2023-10-30 DIAGNOSIS — Z79.4 TYPE 2 DIABETES MELLITUS WITHOUT COMPLICATION, WITH LONG-TERM CURRENT USE OF INSULIN (HCC): ICD-10-CM

## 2023-10-30 DIAGNOSIS — M80.00XD AGE-RELATED OSTEOPOROSIS WITH CURRENT PATHOLOGICAL FRACTURE WITH ROUTINE HEALING: ICD-10-CM

## 2023-10-30 DIAGNOSIS — G31.84 MILD COGNITIVE IMPAIRMENT: ICD-10-CM

## 2023-10-30 DIAGNOSIS — M25.562 CHRONIC PAIN OF LEFT KNEE: ICD-10-CM

## 2023-10-30 PROCEDURE — G0008 ADMIN INFLUENZA VIRUS VAC: HCPCS

## 2023-10-30 PROCEDURE — 99214 OFFICE O/P EST MOD 30 MIN: CPT | Performed by: FAMILY MEDICINE

## 2023-10-30 PROCEDURE — 90662 IIV NO PRSV INCREASED AG IM: CPT

## 2023-10-30 RX ORDER — MELATONIN
1000 DAILY
COMMUNITY

## 2023-10-30 NOTE — ASSESSMENT & PLAN NOTE
Reviewed abnormal DEXA scan with vertebral compression fractures  Will start Prolia  Risks and benefits of medication discussed.

## 2023-10-30 NOTE — ASSESSMENT & PLAN NOTE
Lab Results   Component Value Date    HGBA1C 5.6 10/12/2023       Is going to see Dr. Steve Pradhan for her eye exam.

## 2023-10-30 NOTE — PROGRESS NOTES
Name: Preethi Beltran      : 1955      MRN: [id number]  Encounter Provider: Harvey Thomas DO  Encounter Date: 10/30/2023   Encounter department: [facility]     1. Essential hypertension  Assessment & Plan:  Stable  Recommended compression stockings for mild edema       2. Hyponatremia  Assessment & Plan:  Well controlled by nephrology      3. Chronic pain of left knee  -     Diclofenac Sodium (VOLTAREN) 1 %; Apply 4 g topically 4 (four) times a day    4. Type 2 diabetes mellitus without complication, with long-term current use of insulin [facility])  Assessment & Plan:    Lab Results   Component Value Date    HGBA1C 5.6 10/12/2023       Is going to see Dr. Christian Szymanski for her eye exam.       5. Mild cognitive impairment  Assessment & Plan:  She doesn't want PT/OT at this time, but she will think about   Reviewed neurology visit      6. Need for influenza vaccination  -     influenza vaccine, high-dose, PF 0.7 mL (FLUZONE HIGH-DOSE)    7. Age-related osteoporosis with current pathological fracture with routine healing  Assessment & Plan:  Reviewed abnormal DEXA scan with vertebral compression fractures  Will start Prolia  Risks and benefits of medication discussed. Return in about 3 months (around 2024) for Next scheduled follow up. Subjective      She is accompanied by her daughter today for her appointment. She reports that her back pain has been better. She is no longer taking the opioid medication. She did see the neurologist.  Currently she is not willing to go for physical therapy or occupational therapy. She is concerned about her osteoporosis. She has had vertebral fractures this year.   She is ready to start treatment      Review of Systems    Current Outpatient Medications on File Prior to Visit   Medication Sig   • acetaminophen (TYLENOL) 325 mg tablet Take 3 tablets (975 mg total) by mouth 2 (two) times a day (Patient taking differently: Take 975 mg by mouth every 6 (six) hours as needed)   • aspirin (ECOTRIN LOW STRENGTH) 81 mg EC tablet Take 1 tablet (81 mg total) by mouth daily   • BD Pen Needle Chelsey 2nd Gen 32G X 4 MM MISC USE 1  TWICE DAILY   • cholecalciferol (VITAMIN D3) 1,000 units tablet Take 1,000 Units by mouth daily   • clonazePAM (KlonoPIN) 0.5 mg tablet TAKE 1/2 (ONE-HALF) TO ONE TABLET BY MOUTH ONCE DAILY AS NEEDED.  WATCH FOR SEDATION   • divalproex sodium (DEPAKOTE ER) 500 mg 24 hr tablet TAKE 1 TABLET BY MOUTH EVERY DAY AT BEDTIME WATCH FOR SEDATION   • docusate sodium (COLACE) 100 mg capsule Take 100 mg by mouth 2 (two) times a day   • famotidine (PEPCID) 20 mg tablet Take 1 tablet (20 mg total) by mouth 2 (two) times a day   • gabapentin (NEURONTIN) 100 mg capsule 100mg twice daily ( 8AM, 6PM) and 200mg at Bedtime (Patient taking differently: Take 100 mg by mouth 100mg twice daily ( 8AM, 6PM))   • lurasidone (LATUDA) 40 mg tablet 40 mg 40 mg at 8 am, 40 mg 12 pm , 80 mg at 6 pm   • lurasidone (LATUDA) 80 mg tablet Take 1 tablet (80 mg total) by mouth daily with dinner   • metFORMIN (GLUCOPHAGE-XR) 500 mg 24 hr tablet Take 1 tablet (500 mg total) by mouth daily with breakfast   • metoprolol succinate (TOPROL-XL) 25 mg 24 hr tablet Take 1 tablet (25 mg total) by mouth daily One tablet daily   • mirtazapine (REMERON) 7.5 MG tablet TAKE 1 TABLET BY MOUTH ONCE DAILY AT BEDTIME. WATCH FOR SEDATION   • Multiple Vitamin (multivitamin) tablet Take 1 tablet by mouth daily   • nitroglycerin (NITROSTAT) 0.4 mg SL tablet Place 1 tablet (0.4 mg total) under the tongue every 5 (five) minutes as needed for chest pain   • rosuvastatin (CRESTOR) 20 MG tablet Take 1 tablet (20 mg total) by mouth daily (Patient taking differently: Take 20 mg by mouth daily with dinner)   • sertraline (ZOLOFT) 50 mg tablet Take 50 mg by mouth 2 (two) times a day   • sodium chloride 1 g tablet Take 1 tablet (1 g total) by mouth 3 (three) times a day   • torsemide (DEMADEX) 10 mg tablet Take 0.5 tablets (5 mg total) by mouth daily   • cyanocobalamin (VITAMIN B-12) 1000 MCG tablet Take 1 tablet (1,000 mcg total) by mouth daily Do not start before March 29, 2023. (Patient not taking: Reported on 10/9/2023)   • divalproex sodium (DEPAKOTE ER) 250 mg 24 hr tablet TAKE 1 TABLET BY MOUTH AT BEDTIME FOR 7 DAYS, THEN TAKE 2 TABLETS AT BEDTIME THEREAFTER (Patient not taking: Reported on 10/9/2023)   • escitalopram (LEXAPRO) 10 mg tablet TAKE 1/2 (ONE-HALF) TABLET BY MOUTH IN THE MORNING FOR 6 DAYS, THEN 1 TABLET BY MOUTH ONCE DAILY IN THE MORNING THEREAFTER (Patient not taking: Reported on 8/7/2023)   • gabapentin (NEURONTIN) 100 mg capsule TAKE 1 CAPSULE BY MOUTH TWICE DAILY  (8AM,6PM) AND 2 AT BEDTIME (Patient not taking: Reported on 10/30/2023)   • lidocaine (LIDODERM) 5 % Apply 1 patch topically over 12 hours daily Remove & Discard patch within 12 hours or as directed by MD Do not start before May 31, 2023. (Patient not taking: Reported on 10/9/2023)   • [DISCONTINUED] oxyCODONE-acetaminophen (Percocet) 5-325 mg per tablet Take 0.5 tablets by mouth 2 (two) times a day as needed for moderate pain Take half a tablet (0.5mg total)  two times a day as needed for moderate pain. Do not drive or operate machinery while taking percocet. Do not take other sedative medications while taking percocet. Max Daily Amount: 1 tablet (Patient not taking: Reported on 10/9/2023)       Objective     /60   Pulse 68   Temp 97.6 °F (36.4 °C)   Resp 16   Ht 5' 4" (1.626 m)   Wt 71.7 kg (158 lb)   SpO2 97%   BMI 27.12 kg/m²     Physical Exam  Vitals and nursing note reviewed. Constitutional:       Appearance: She is well-developed. HENT:      Head: Normocephalic and atraumatic. Right Ear: Tympanic membrane and external ear normal.      Left Ear: Tympanic membrane and external ear normal.   Cardiovascular:      Rate and Rhythm: Normal rate and regular rhythm. Heart sounds: Normal heart sounds. No murmur heard. No friction rub. Pulmonary:      Effort: Pulmonary effort is normal. No respiratory distress. Breath sounds: Normal breath sounds. No wheezing or rales. Musculoskeletal:      Right lower leg: No edema. Left lower leg: No edema. Neurological:      Gait: Gait abnormal (Using walker).    Psychiatric:      Comments: Flat affect       Michael Lloyd DO

## 2023-11-02 ENCOUNTER — HOSPITAL ENCOUNTER (OUTPATIENT)
Dept: RADIOLOGY | Facility: HOSPITAL | Age: 68
Discharge: HOME | End: 2023-11-02
Payer: MEDICARE

## 2023-11-02 DIAGNOSIS — Z12.31 ENCOUNTER FOR SCREENING MAMMOGRAM FOR BREAST CANCER: ICD-10-CM

## 2023-11-02 PROCEDURE — 77067 SCR MAMMO BI INCL CAD: CPT

## 2023-11-02 PROCEDURE — 77063 BREAST TOMOSYNTHESIS BI: CPT

## 2023-11-03 ENCOUNTER — TELEPHONE (OUTPATIENT)
Dept: FAMILY MEDICINE CLINIC | Facility: CLINIC | Age: 68
End: 2023-11-03

## 2023-11-03 NOTE — TELEPHONE ENCOUNTER
Ramandeep does not need a PA  Notified Maren Giles who will schedule the apt with her mother.   NFA at this time  [facility]

## 2023-11-03 NOTE — TELEPHONE ENCOUNTER
I called the patients insurance, they stated that the prolia infusion does not require an authorization.      Gavi Barrera LPN

## 2023-11-03 NOTE — TELEPHONE ENCOUNTER
Message  Received: 4 days ago  [facility]  Please check for insurance authorization  Thank you,  Norah Acosta, DO          Comments    They closed at 430. Have to call medicare in the AM to inquire about this.  [phone] [facility]

## 2023-11-06 DIAGNOSIS — I10 ESSENTIAL HYPERTENSION: Primary | ICD-10-CM

## 2023-11-09 ENCOUNTER — APPOINTMENT (OUTPATIENT)
Dept: LAB | Facility: HOSPITAL | Age: 68
End: 2023-11-09
Payer: MEDICARE

## 2023-11-09 DIAGNOSIS — M80.00XD AGE-RELATED OSTEOPOROSIS WITH CURRENT PATHOLOGICAL FRACTURE WITH ROUTINE HEALING: ICD-10-CM

## 2023-11-09 LAB
ALBUMIN SERPL BCP-MCNC: 4.2 G/DL (ref 3.5–5)
ALP SERPL-CCNC: 89 U/L (ref 34–104)
ALT SERPL W P-5'-P-CCNC: 10 U/L (ref 7–52)
ANION GAP SERPL CALCULATED.3IONS-SCNC: 7 MMOL/L
AST SERPL W P-5'-P-CCNC: 17 U/L (ref 13–39)
BILIRUB SERPL-MCNC: 0.36 MG/DL (ref 0.2–1)
BUN SERPL-MCNC: 25 MG/DL (ref 5–25)
CALCIUM SERPL-MCNC: 9.3 MG/DL (ref 8.4–10.2)
CHLORIDE SERPL-SCNC: 101 MMOL/L (ref 96–108)
CO2 SERPL-SCNC: 29 MMOL/L (ref 21–32)
CREAT SERPL-MCNC: 0.96 MG/DL (ref 0.6–1.3)
GFR SERPL CREATININE-BSD FRML MDRD: 60 ML/MIN/1.73SQ M
GLUCOSE SERPL-MCNC: 114 MG/DL (ref 65–140)
POTASSIUM SERPL-SCNC: 4.2 MMOL/L (ref 3.5–5.3)
PROT SERPL-MCNC: 7.2 G/DL (ref 6.4–8.4)
SODIUM SERPL-SCNC: 137 MMOL/L (ref 135–147)

## 2023-11-09 PROCEDURE — 36415 COLL VENOUS BLD VENIPUNCTURE: CPT

## 2023-11-09 PROCEDURE — 80053 COMPREHEN METABOLIC PANEL: CPT

## 2023-11-13 ENCOUNTER — TELEPHONE (OUTPATIENT)
Dept: INFUSION CENTER | Facility: HOSPITAL | Age: 68
End: 2023-11-13

## 2023-11-13 NOTE — TELEPHONE ENCOUNTER
New pt call completed with pt's daughter. Aware of appointment date, time and place. All questions answered.

## 2023-11-14 ENCOUNTER — HOSPITAL ENCOUNTER (OUTPATIENT)
Dept: INFUSION CENTER | Facility: HOSPITAL | Age: 68
Discharge: HOME | End: 2023-11-14
Payer: MEDICARE

## 2023-11-14 VITALS
OXYGEN SATURATION: 96 % | RESPIRATION RATE: 18 BRPM | TEMPERATURE: 98.1 F | SYSTOLIC BLOOD PRESSURE: 152 MMHG | HEART RATE: 78 BPM | BODY MASS INDEX: 27.47 KG/M2 | DIASTOLIC BLOOD PRESSURE: 68 MMHG | WEIGHT: 160.05 LBS

## 2023-11-14 DIAGNOSIS — M80.00XD AGE-RELATED OSTEOPOROSIS WITH CURRENT PATHOLOGICAL FRACTURE WITH ROUTINE HEALING: Primary | ICD-10-CM

## 2023-11-14 PROCEDURE — 96372 THER/PROPH/DIAG INJ SC/IM: CPT

## 2023-11-14 RX ADMIN — DENOSUMAB 60 MG: 60 INJECTION SUBCUTANEOUS at 11:36

## 2023-11-22 ENCOUNTER — APPOINTMENT (OUTPATIENT)
Dept: LAB | Facility: HOSPITAL | Age: 68
End: 2023-11-22
Attending: INTERNAL MEDICINE
Payer: MEDICARE

## 2023-11-22 DIAGNOSIS — I10 ESSENTIAL HYPERTENSION: ICD-10-CM

## 2023-11-22 LAB
ALBUMIN SERPL BCP-MCNC: 4 G/DL (ref 3.5–5)
ALP SERPL-CCNC: 89 U/L (ref 34–104)
ALT SERPL W P-5'-P-CCNC: 9 U/L (ref 7–52)
ANION GAP SERPL CALCULATED.3IONS-SCNC: 7 MMOL/L
AST SERPL W P-5'-P-CCNC: 17 U/L (ref 13–39)
BILIRUB SERPL-MCNC: 0.41 MG/DL (ref 0.2–1)
BUN SERPL-MCNC: 21 MG/DL (ref 5–25)
CALCIUM SERPL-MCNC: 8.7 MG/DL (ref 8.4–10.2)
CHLORIDE SERPL-SCNC: 101 MMOL/L (ref 96–108)
CO2 SERPL-SCNC: 26 MMOL/L (ref 21–32)
CREAT SERPL-MCNC: 0.89 MG/DL (ref 0.6–1.3)
GFR SERPL CREATININE-BSD FRML MDRD: 66 ML/MIN/1.73SQ M
GLUCOSE SERPL-MCNC: 136 MG/DL (ref 65–140)
POTASSIUM SERPL-SCNC: 4.6 MMOL/L (ref 3.5–5.3)
PROT SERPL-MCNC: 7 G/DL (ref 6.4–8.4)
SODIUM SERPL-SCNC: 134 MMOL/L (ref 135–147)

## 2023-11-22 PROCEDURE — 36415 COLL VENOUS BLD VENIPUNCTURE: CPT

## 2023-11-22 PROCEDURE — 80053 COMPREHEN METABOLIC PANEL: CPT

## 2023-11-24 ENCOUNTER — TELEPHONE (OUTPATIENT)
Dept: NEPHROLOGY | Facility: CLINIC | Age: 68
End: 2023-11-24

## 2023-11-24 DIAGNOSIS — R13.10 DYSPHAGIA, UNSPECIFIED TYPE: ICD-10-CM

## 2023-11-24 NOTE — TELEPHONE ENCOUNTER
----- Message from [place], [place] sent at 11/24/2023  1:27 PM EST -----  Sodium level acceptable at 134. Continue to monitor.

## 2023-11-27 RX ORDER — FAMOTIDINE 20 MG/1
20 TABLET, FILM COATED ORAL 2 TIMES DAILY
Qty: 180 TABLET | Refills: 1 | Status: SHIPPED | OUTPATIENT
Start: 2023-11-27

## 2023-11-30 ENCOUNTER — OFFICE VISIT (OUTPATIENT)
Dept: FAMILY MEDICINE CLINIC | Facility: CLINIC | Age: 68
End: 2023-11-30
Payer: MEDICARE

## 2023-11-30 VITALS
SYSTOLIC BLOOD PRESSURE: 128 MMHG | HEART RATE: 63 BPM | DIASTOLIC BLOOD PRESSURE: 70 MMHG | TEMPERATURE: 98.4 F | RESPIRATION RATE: 16 BRPM | BODY MASS INDEX: 27.66 KG/M2 | WEIGHT: 162 LBS | HEIGHT: 64 IN

## 2023-11-30 DIAGNOSIS — J06.9 UPPER RESPIRATORY TRACT INFECTION, UNSPECIFIED TYPE: Primary | ICD-10-CM

## 2023-11-30 PROCEDURE — 99213 OFFICE O/P EST LOW 20 MIN: CPT | Performed by: FAMILY MEDICINE

## 2023-11-30 RX ORDER — BENZONATATE 200 MG/1
200 CAPSULE ORAL 3 TIMES DAILY PRN
Qty: 30 CAPSULE | Refills: 0 | Status: SHIPPED | OUTPATIENT
Start: 2023-11-30

## 2023-11-30 RX ORDER — AZITHROMYCIN 250 MG/1
TABLET, FILM COATED ORAL
Qty: 6 TABLET | Refills: 0 | Status: SHIPPED | OUTPATIENT
Start: 2023-11-30 | End: 2023-12-05

## 2023-11-30 NOTE — PROGRESS NOTES
Assessment/Plan:    1. Upper respiratory tract infection, unspecified type  -     azithromycin (ZITHROMAX) 250 mg tablet; 2 tabs on day 1, 1 tab a day for 4 days after  -     benzonatate (TESSALON) 200 MG capsule; Take 1 capsule (200 mg total) by mouth 3 (three) times a day as needed for cough            There are no Patient Instructions on file for this visit. No follow-ups on file. Subjective:      Patient ID: Kym Song is a 70 y.o. female. Chief Complaint   Patient presents with   • Chills     Symptoms started about 3 weeks ago Federico Serrano LPN     • Nasal Congestion   • Cough       Pt states she hasa  head cold for 3 weeks  Hopt and cold feeling  Cough - productive  Congested          The following portions of the patient's history were reviewed and updated as appropriate: allergies, current medications, past family history, past medical history, past social history, past surgical history and problem list.    Review of Systems   Constitutional:  Positive for chills. HENT:  Positive for congestion. Respiratory:  Positive for cough. Current Outpatient Medications   Medication Sig Dispense Refill   • acetaminophen (TYLENOL) 325 mg tablet Take 3 tablets (975 mg total) by mouth 2 (two) times a day (Patient taking differently: Take 975 mg by mouth every 6 (six) hours as needed)  0   • aspirin (ECOTRIN LOW STRENGTH) 81 mg EC tablet Take 1 tablet (81 mg total) by mouth daily 30 tablet 5   • azithromycin (ZITHROMAX) 250 mg tablet 2 tabs on day 1, 1 tab a day for 4 days after 6 tablet 0   • BD Pen Needle Chelsey 2nd Gen 32G X 4 MM MISC USE 1  TWICE DAILY 60 each 0   • benzonatate (TESSALON) 200 MG capsule Take 1 capsule (200 mg total) by mouth 3 (three) times a day as needed for cough 30 capsule 0   • cholecalciferol (VITAMIN D3) 1,000 units tablet Take 1,000 Units by mouth daily     • clonazePAM (KlonoPIN) 0.5 mg tablet TAKE 1/2 (ONE-HALF) TO ONE TABLET BY MOUTH ONCE DAILY AS NEEDED.  [facility] FOR SEDATION     • Diclofenac Sodium (VOLTAREN) 1 % Apply 4 g topically 4 (four) times a day 100 g 1   • divalproex sodium (DEPAKOTE ER) 500 mg 24 hr tablet TAKE 1 TABLET BY MOUTH EVERY DAY AT BEDTIME WATCH FOR SEDATION     • docusate sodium (COLACE) 100 mg capsule Take 100 mg by mouth 2 (two) times a day     • famotidine (PEPCID) 20 mg tablet Take 1 tablet (20 mg total) by mouth 2 (two) times a day 180 tablet 1   • gabapentin (NEURONTIN) 100 mg capsule 100mg twice daily ( 8AM, 6PM) and 200mg at Bedtime (Patient taking differently: Take 100 mg by mouth 100mg twice daily ( 8AM, 6PM)) 80 capsule 0   • lidocaine (LIDODERM) 5 % Apply 1 patch topically over 12 hours daily Remove & Discard patch within 12 hours or as directed by MD Do not start before May 31, 2023.  (Patient taking differently: Apply 1 patch topically as needed Remove & Discard patch within 12 hours or as directed by MD) 30 patch 0   • lurasidone (LATUDA) 40 mg tablet 40 mg 40 mg at 8 am, 40 mg 12 pm , 80 mg at 6 pm     • lurasidone (LATUDA) 80 mg tablet Take 1 tablet (80 mg total) by mouth daily with dinner 30 tablet 0   • metFORMIN (GLUCOPHAGE-XR) 500 mg 24 hr tablet Take 1 tablet (500 mg total) by mouth daily with breakfast 90 tablet 1   • Multiple Vitamin (multivitamin) tablet Take 1 tablet by mouth daily 90 tablet 1   • nitroglycerin (NITROSTAT) 0.4 mg SL tablet Place 1 tablet (0.4 mg total) under the tongue every 5 (five) minutes as needed for chest pain 30 tablet 1   • rosuvastatin (CRESTOR) 20 MG tablet Take 1 tablet (20 mg total) by mouth daily (Patient taking differently: Take 20 mg by mouth daily with dinner) 90 tablet 3   • sertraline (ZOLOFT) 50 mg tablet Take 50 mg by mouth 2 (two) times a day     • sodium chloride 1 g tablet Take 1 tablet (1 g total) by mouth 3 (three) times a day 90 tablet 3   • torsemide (DEMADEX) 10 mg tablet Take 0.5 tablets (5 mg total) by mouth daily 30 tablet 3   • divalproex sodium (DEPAKOTE ER) 250 mg 24 hr tablet TAKE 1 TABLET BY MOUTH AT BEDTIME FOR 7 DAYS, THEN TAKE 2 TABLETS AT BEDTIME THEREAFTER (Patient not taking: Reported on 10/9/2023)     • escitalopram (LEXAPRO) 10 mg tablet TAKE 1/2 (ONE-HALF) TABLET BY MOUTH IN THE MORNING FOR 6 DAYS, THEN 1 TABLET BY MOUTH ONCE DAILY IN THE MORNING THEREAFTER (Patient not taking: Reported on 8/7/2023)     • gabapentin (NEURONTIN) 100 mg capsule TAKE 1 CAPSULE BY MOUTH TWICE DAILY  (8AM,6PM) AND 2 AT BEDTIME (Patient not taking: Reported on 10/30/2023) 120 capsule 3   • mirtazapine (REMERON) 7.5 MG tablet TAKE 1 TABLET BY MOUTH ONCE DAILY AT BEDTIME. WATCH FOR SEDATION       No current facility-administered medications for this visit. Objective:    /70   Pulse 63   Temp 98.4 °F (36.9 °C)   Resp 16   Ht 5' 4" (1.626 m)   Wt 73.5 kg (162 lb)   BMI 27.81 kg/m²        Physical Exam  HENT:      Nose: Congestion and rhinorrhea present.                 Para Corpus, DO

## 2023-12-08 ENCOUNTER — APPOINTMENT (OUTPATIENT)
Dept: LAB | Facility: HOSPITAL | Age: 68
End: 2023-12-08
Payer: MEDICARE

## 2023-12-08 DIAGNOSIS — I10 ESSENTIAL HYPERTENSION: ICD-10-CM

## 2023-12-08 LAB
ANION GAP SERPL CALCULATED.3IONS-SCNC: 7 MMOL/L
BUN SERPL-MCNC: 25 MG/DL (ref 5–25)
CALCIUM SERPL-MCNC: 9.1 MG/DL (ref 8.4–10.2)
CHLORIDE SERPL-SCNC: 99 MMOL/L (ref 96–108)
CO2 SERPL-SCNC: 28 MMOL/L (ref 21–32)
CREAT SERPL-MCNC: 0.95 MG/DL (ref 0.6–1.3)
GFR SERPL CREATININE-BSD FRML MDRD: 61 ML/MIN/1.73SQ M
GLUCOSE SERPL-MCNC: 130 MG/DL (ref 65–140)
POTASSIUM SERPL-SCNC: 4.4 MMOL/L (ref 3.5–5.3)
SODIUM SERPL-SCNC: 134 MMOL/L (ref 135–147)

## 2023-12-08 PROCEDURE — 80048 BASIC METABOLIC PNL TOTAL CA: CPT

## 2023-12-08 PROCEDURE — 36415 COLL VENOUS BLD VENIPUNCTURE: CPT

## 2023-12-11 ENCOUNTER — TELEPHONE (OUTPATIENT)
Dept: NEPHROLOGY | Facility: CLINIC | Age: 68
End: 2023-12-11

## 2023-12-11 NOTE — TELEPHONE ENCOUNTER
Pt daughter advised that labs of 12/8/23 show stable sodium level at 134. No changes. Continue to monitor.    ----- Message from [place], [place] sent at 12/8/2023  2:40 PM EST -----  Sodium level stable at 134. Continue to monitor.

## 2023-12-14 ENCOUNTER — APPOINTMENT (OUTPATIENT)
Dept: RADIOLOGY | Facility: CLINIC | Age: 68
End: 2023-12-14
Payer: MEDICARE

## 2023-12-14 ENCOUNTER — OFFICE VISIT (OUTPATIENT)
Dept: OBGYN CLINIC | Facility: CLINIC | Age: 68
End: 2023-12-14
Payer: MEDICARE

## 2023-12-14 VITALS — BODY MASS INDEX: 28.1 KG/M2 | HEIGHT: 64 IN | WEIGHT: 164.6 LBS

## 2023-12-14 DIAGNOSIS — Z96.652 STATUS POST TOTAL LEFT KNEE REPLACEMENT USING CEMENT: Primary | ICD-10-CM

## 2023-12-14 DIAGNOSIS — Z96.652 STATUS POST TOTAL LEFT KNEE REPLACEMENT USING CEMENT: ICD-10-CM

## 2023-12-14 DIAGNOSIS — M67.80 HETEROTOPIC OSSIFICATION OF TENDON: ICD-10-CM

## 2023-12-14 PROCEDURE — 73562 X-RAY EXAM OF KNEE 3: CPT

## 2023-12-14 PROCEDURE — 99214 OFFICE O/P EST MOD 30 MIN: CPT | Performed by: ORTHOPAEDIC SURGERY

## 2023-12-14 NOTE — PROGRESS NOTES
Assessment/Plan:  1. Status post total left knee replacement using cement  XR knee 3 vw left non injury    XR knee 3 vw left non injury      2. Heterotopic ossification of tendon          Scribe Attestation      I,:  Matilda Garcia am acting as a scribe while in the presence of the attending physician.:       I,:  Latrice Rothman, DO personally performed the services described in this documentation    as scribed in my presence.:           Kary Jones is a pleasant 67-year-old female who returns today for follow-up evaluation 1 year status post left total knee arthroplasty. I am very pleased with her imaging and her clinical presentation today in the office. I explained that I do not appreciate any swelling in her knee on exam.  I do believe the fullness she experiences is due to scar tissue. She may continue with all activity to her tolerance. She understands she requires prophylactic antibiotics prior to any dental procedure moving forward. All of her questions and concerns were addressed today. We will see her back as needed. Subjective: Follow-up evaluation 1 year status post left total knee arthroplasty    Patient ID: Amador Villareal is a 70 y.o. female who returns today for follow-up evaluation 1 year status post left total knee arthroplasty. At today's visit, she reports some persistent swelling about the knee. She also reports some mild aching discomfort about the anterior knee, but this is not limiting for her. She is using a walker for ambulatory assistance. She denies any new injury or trauma. Review of Systems   Constitutional:  Negative for activity change, chills, fever and unexpected weight change. HENT:  Negative for hearing loss, nosebleeds and sore throat. Eyes:  Negative for pain, redness and visual disturbance. Respiratory:  Negative for cough, shortness of breath and wheezing. Cardiovascular:  Negative for chest pain, palpitations and leg swelling.    Gastrointestinal: Negative for abdominal pain, nausea and vomiting. Endocrine: Negative for polydipsia and polyuria. Genitourinary:  Negative for dysuria and hematuria. Musculoskeletal:  Negative for arthralgias, joint swelling and myalgias. See HPI   Skin:  Negative for rash and wound. Neurological:  Negative for dizziness, numbness and headaches. Psychiatric/Behavioral:  Negative for decreased concentration and suicidal ideas. The patient is not nervous/anxious. Past Medical History:   Diagnosis Date    Anesthesia complication     difficulty waking up    Anxiety     Arthritis     left knee    Bipolar 1 disorder (HCC)     Bone spur     left knee behing the knee cap    Cancer ([address])     Chronic kidney disease     Chronic pain disorder     Colon cancer ([address])     patient states about 2017    Colon polyp     Tubulovillous Adenoma High Grade Dysplasia - 2017    Depression     Diabetes mellitus ([address])     Endometrial cancer ([address])     grade I    Hx of bleeding disorder     vaginal bleeding started on 3/13/17     Hyperlipidemia     Hypertension     Hypomagnesemia 03/20/2023    Memory loss     PONV (postoperative nausea and vomiting)     Psychiatric disorder     Psychiatric illness     Psychosis ([address])     Shortness of breath     with exertion    Sleep difficulties     Urinary incontinence     Vitamin D deficiency        Past Surgical History:   Procedure Laterality Date    BREAST BIOPSY Left     benign-maybe at ar age 58    CHOLECYSTECTOMY      open    COLONOSCOPY      DILATION AND CURETTAGE OF UTERUS N/A 04/05/2017    Procedure: DILATATION AND CURETTAGE (D&C);   Surgeon: Michael Nicole MD;  Location: [facility] MAIN OR;  Service:     HYSTERECTOMY      OOPHORECTOMY      PELVIC LAPAROSCOPY      NC ARTHRP KNE CONDYLE&PLATU MEDIAL&LAT COMPARTMENTS Left 12/06/2022    Procedure: ARTHROPLASTY KNEE TOTAL W ROBOT - CEMENTED - LEFT;  Surgeon: Jaclyn Kendall DO;  Location: [facility];  Service: Orthopedics    NC LAPS TOTAL HYSTERECT 250 GM/< W/RMVL TUBE/OVARY N/A 05/04/2017    Procedure: ROBOTIC ASSISTED TOTAL LAPAROSCOPIC HYSTERECTOMY, BILATERAL SALPINGOOPHERECTOMY; CYSTOSCOPY;  Surgeon: Fidencio Hatchet, MD;  Location:  MAIN OR;  Service: Gynecology Oncology    UT OPTX FEM SHFT FX W/INSJ IMED IMPLT W/WO SCREW Left 3/20/2023    Procedure: INSERTION NAIL IM FEMUR RETROGRADE;  Surgeon: Berenice Barba MD;  Location: [facility] MAIN OR;  Service: Orthopedics    REPLACEMENT TOTAL KNEE      TONSILLECTOMY      TUBAL LIGATION         Family History   Problem Relation Age of Onset    Cancer Mother         Renal    Heart disease Father         CHF    Dementia Sister     Heart disease Sister         atrial septal defect    Dementia Sister     Breast cancer Paternal Aunt 40       Social History     Occupational History    Not on file   Tobacco Use    Smoking status: Never     Passive exposure: Never    Smokeless tobacco: Never   Vaping Use    Vaping status: Never Used   Substance and Sexual Activity    Alcohol use: Never    Drug use: No    Sexual activity: Not Currently     Birth control/protection: Post-menopausal, Surgical         Current Outpatient Medications:     acetaminophen (TYLENOL) 325 mg tablet, Take 3 tablets (975 mg total) by mouth 2 (two) times a day (Patient taking differently: Take 975 mg by mouth every 6 (six) hours as needed), Disp: , Rfl: 0    aspirin (ECOTRIN LOW STRENGTH) 81 mg EC tablet, Take 1 tablet (81 mg total) by mouth daily, Disp: 30 tablet, Rfl: 5    BD Pen Needle Chelsey 2nd Gen 32G X 4 MM MISC, USE 1  TWICE DAILY, Disp: 60 each, Rfl: 0    benzonatate (TESSALON) 200 MG capsule, Take 1 capsule (200 mg total) by mouth 3 (three) times a day as needed for cough, Disp: 30 capsule, Rfl: 0    cholecalciferol (VITAMIN D3) 1,000 units tablet, Take 1,000 Units by mouth daily, Disp: , Rfl:     clonazePAM (KlonoPIN) 0.5 mg tablet, TAKE 1/2 (ONE-HALF) TO ONE TABLET BY MOUTH ONCE DAILY AS NEEDED.  WATCH FOR SEDATION, Disp: , Rfl: Diclofenac Sodium (VOLTAREN) 1 %, Apply 4 g topically 4 (four) times a day, Disp: 100 g, Rfl: 1    divalproex sodium (DEPAKOTE ER) 500 mg 24 hr tablet, TAKE 1 TABLET BY MOUTH EVERY DAY AT BEDTIME WATCH FOR SEDATION, Disp: , Rfl:     docusate sodium (COLACE) 100 mg capsule, Take 100 mg by mouth 2 (two) times a day, Disp: , Rfl:     gabapentin (NEURONTIN) 100 mg capsule, TAKE 1 CAPSULE BY MOUTH TWICE DAILY  (8AM,6PM) AND 2 AT BEDTIME, Disp: 120 capsule, Rfl: 3    lidocaine (LIDODERM) 5 %, Apply 1 patch topically over 12 hours daily Remove & Discard patch within 12 hours or as directed by MD Do not start before May 31, 2023.  (Patient taking differently: Apply 1 patch topically as needed Remove & Discard patch within 12 hours or as directed by MD), Disp: 30 patch, Rfl: 0    lurasidone (LATUDA) 40 mg tablet, 40 mg 40 mg at 8 am, 40 mg 12 pm , 80 mg at 6 pm, Disp: , Rfl:     lurasidone (LATUDA) 80 mg tablet, Take 1 tablet (80 mg total) by mouth daily with dinner, Disp: 30 tablet, Rfl: 0    metFORMIN (GLUCOPHAGE-XR) 500 mg 24 hr tablet, Take 1 tablet (500 mg total) by mouth daily with breakfast, Disp: 90 tablet, Rfl: 1    Multiple Vitamin (multivitamin) tablet, Take 1 tablet by mouth daily, Disp: 90 tablet, Rfl: 1    nitroglycerin (NITROSTAT) 0.4 mg SL tablet, Place 1 tablet (0.4 mg total) under the tongue every 5 (five) minutes as needed for chest pain, Disp: 30 tablet, Rfl: 1    rosuvastatin (CRESTOR) 20 MG tablet, Take 1 tablet (20 mg total) by mouth daily (Patient taking differently: Take 20 mg by mouth daily with dinner), Disp: 90 tablet, Rfl: 3    sertraline (ZOLOFT) 50 mg tablet, Take 50 mg by mouth 2 (two) times a day, Disp: , Rfl:     sodium chloride 1 g tablet, Take 1 tablet (1 g total) by mouth 3 (three) times a day, Disp: 90 tablet, Rfl: 3    torsemide (DEMADEX) 10 mg tablet, Take 0.5 tablets (5 mg total) by mouth daily, Disp: 30 tablet, Rfl: 3    divalproex sodium (DEPAKOTE ER) 250 mg 24 hr tablet, TAKE 1 TABLET BY MOUTH AT BEDTIME FOR 7 DAYS, THEN TAKE 2 TABLETS AT BEDTIME THEREAFTER (Patient not taking: Reported on 10/9/2023), Disp: , Rfl:     escitalopram (LEXAPRO) 10 mg tablet, TAKE 1/2 (ONE-HALF) TABLET BY MOUTH IN THE MORNING FOR 6 DAYS, THEN 1 TABLET BY MOUTH ONCE DAILY IN THE MORNING THEREAFTER (Patient not taking: Reported on 8/7/2023), Disp: , Rfl:     famotidine (PEPCID) 20 mg tablet, Take 1 tablet (20 mg total) by mouth 2 (two) times a day, Disp: 180 tablet, Rfl: 1    gabapentin (NEURONTIN) 100 mg capsule, 100mg twice daily ( 8AM, 6PM) and 200mg at Bedtime (Patient not taking: Reported on 12/14/2023), Disp: 80 capsule, Rfl: 0    mirtazapine (REMERON) 7.5 MG tablet, TAKE 1 TABLET BY MOUTH ONCE DAILY AT BEDTIME. WATCH FOR SEDATION (Patient not taking: Reported on 12/14/2023), Disp: , Rfl:     Allergies   Allergen Reactions    Wellbutrin [Bupropion] Anxiety     Patient has tried Wellbutrin which resulted in increased paranoia. Patient wishes to not try this medication again. Objective: There were no vitals filed for this visit. Body mass index is 28.25 kg/m². Left Knee Exam     Tenderness   The patient is experiencing tenderness in the patella. Range of Motion   Extension:  0   Flexion:  120     Tests   Varus: negative Valgus: negative  Drawer:  Anterior - negative     Posterior - negative    Other   Erythema: absent  Scars: present  Sensation: normal  Pulse: present  Swelling: none  Effusion: no effusion present    Comments: Well healed surgical scars  No pain with passive motion of the knee  Stable at 0, 30 and 90 degrees  No warmth or erythema          Observations   Left Knee   Negative for effusion. Physical Exam  Vitals and nursing note reviewed. Constitutional:       Appearance: Normal appearance. She is well-developed. HENT:      Head: Normocephalic and atraumatic.       Right Ear: External ear normal.      Left Ear: External ear normal.   Eyes:      General: No scleral icterus. Extraocular Movements: Extraocular movements intact. Conjunctiva/sclera: Conjunctivae normal.   Cardiovascular:      Rate and Rhythm: Normal rate. Pulmonary:      Effort: Pulmonary effort is normal. No respiratory distress. Musculoskeletal:      Cervical back: Normal range of motion and neck supple. Left knee: No effusion. Comments: See Ortho exam   Skin:     General: Skin is warm and dry. Neurological:      General: No focal deficit present. Mental Status: She is alert and oriented to person, place, and time. Psychiatric:         Behavior: Behavior normal.         I have personally reviewed pertinent films in PACS. X-ray of the left knee obtained on 12/14/2023 reviewed demonstrating a well-positioned and aligned cemented total knee arthroplasty without evidence of failure. There is no new fracture, dislocation, lytic or blastic lesion. There is a retained intramedullary kong in the femur as well as distal screws with interval callus formation. This document was created using speech voice recognition software. Grammatical errors, random word insertions, pronoun errors, and incomplete sentences are an occasional consequence of this system due to software limitations, ambient noise, and hardware issues. Any formal questions or concerns about content, text, or information contained within the body of this dictation should be directly addressed to the provider for clarification.

## 2023-12-21 ENCOUNTER — APPOINTMENT (OUTPATIENT)
Dept: LAB | Facility: HOSPITAL | Age: 68
End: 2023-12-21
Attending: INTERNAL MEDICINE
Payer: MEDICARE

## 2023-12-21 DIAGNOSIS — I10 ESSENTIAL HYPERTENSION: ICD-10-CM

## 2023-12-21 LAB
ANION GAP SERPL CALCULATED.3IONS-SCNC: 9 MMOL/L
BUN SERPL-MCNC: 19 MG/DL (ref 5–25)
CALCIUM SERPL-MCNC: 9.1 MG/DL (ref 8.4–10.2)
CHLORIDE SERPL-SCNC: 96 MMOL/L (ref 96–108)
CO2 SERPL-SCNC: 26 MMOL/L (ref 21–32)
CREAT SERPL-MCNC: 0.87 MG/DL (ref 0.6–1.3)
GFR SERPL CREATININE-BSD FRML MDRD: 68 ML/MIN/1.73SQ M
GLUCOSE SERPL-MCNC: 139 MG/DL (ref 65–140)
POTASSIUM SERPL-SCNC: 4.3 MMOL/L (ref 3.5–5.3)
SODIUM SERPL-SCNC: 131 MMOL/L (ref 135–147)

## 2023-12-21 PROCEDURE — 36415 COLL VENOUS BLD VENIPUNCTURE: CPT

## 2023-12-21 PROCEDURE — 80048 BASIC METABOLIC PNL TOTAL CA: CPT

## 2023-12-22 ENCOUNTER — TELEPHONE (OUTPATIENT)
Dept: NEPHROLOGY | Facility: CLINIC | Age: 68
End: 2023-12-22

## 2023-12-22 NOTE — TELEPHONE ENCOUNTER
Daughter advised that labs of 12/21/23 show sodium level is lower at 131.  Still acceptable.   Per Dr. Lambert, reinforce fluid restriction of 1500 cc/24 hours.    ----- Message from Yosef Lambert MD sent at 12/21/2023  4:31 PM EST -----  Please inform patient or daughter that most recent labs (12/21/23) show that sodium level is lower at 131 but still acceptable. Please remind her to stick with a fluid restriction 1500 cc/24 hours.

## 2023-12-28 DIAGNOSIS — Z79.4 TYPE 2 DIABETES MELLITUS WITHOUT COMPLICATION, WITH LONG-TERM CURRENT USE OF INSULIN (HCC): ICD-10-CM

## 2023-12-28 DIAGNOSIS — E22.2 SIADH (SYNDROME OF INAPPROPRIATE ADH PRODUCTION) (HCC): ICD-10-CM

## 2023-12-28 DIAGNOSIS — E11.9 TYPE 2 DIABETES MELLITUS WITHOUT COMPLICATION, WITH LONG-TERM CURRENT USE OF INSULIN (HCC): ICD-10-CM

## 2023-12-28 RX ORDER — MULTIVITAMIN
1 TABLET ORAL DAILY
Qty: 90 TABLET | Refills: 0 | Status: SHIPPED | OUTPATIENT
Start: 2023-12-28

## 2023-12-29 DIAGNOSIS — I10 ESSENTIAL HYPERTENSION: Primary | ICD-10-CM

## 2023-12-29 RX ORDER — METOPROLOL SUCCINATE 25 MG/1
25 TABLET, EXTENDED RELEASE ORAL DAILY
Qty: 90 TABLET | Refills: 1 | Status: SHIPPED | OUTPATIENT
Start: 2023-12-29

## 2023-12-29 RX ORDER — SODIUM CHLORIDE 1 G/1
1 TABLET ORAL 3 TIMES DAILY
Qty: 270 TABLET | Refills: 0 | Status: SHIPPED | OUTPATIENT
Start: 2023-12-29 | End: 2024-01-04 | Stop reason: SDUPTHER

## 2024-01-04 ENCOUNTER — OFFICE VISIT (OUTPATIENT)
Dept: NEPHROLOGY | Facility: CLINIC | Age: 69
End: 2024-01-04
Payer: MEDICARE

## 2024-01-04 ENCOUNTER — APPOINTMENT (OUTPATIENT)
Dept: LAB | Facility: HOSPITAL | Age: 69
End: 2024-01-04
Attending: INTERNAL MEDICINE
Payer: MEDICARE

## 2024-01-04 VITALS
HEART RATE: 91 BPM | WEIGHT: 168 LBS | OXYGEN SATURATION: 98 % | BODY MASS INDEX: 28.68 KG/M2 | SYSTOLIC BLOOD PRESSURE: 168 MMHG | DIASTOLIC BLOOD PRESSURE: 90 MMHG | HEIGHT: 64 IN

## 2024-01-04 DIAGNOSIS — I10 ESSENTIAL HYPERTENSION: ICD-10-CM

## 2024-01-04 DIAGNOSIS — E87.1 HYPONATREMIA: Primary | ICD-10-CM

## 2024-01-04 DIAGNOSIS — E22.2 SIADH (SYNDROME OF INAPPROPRIATE ADH PRODUCTION) (HCC): Primary | ICD-10-CM

## 2024-01-04 DIAGNOSIS — E87.1 HYPONATREMIA: ICD-10-CM

## 2024-01-04 LAB
ANION GAP SERPL CALCULATED.3IONS-SCNC: 9 MMOL/L
BUN SERPL-MCNC: 23 MG/DL (ref 5–25)
CALCIUM SERPL-MCNC: 9.2 MG/DL (ref 8.4–10.2)
CHLORIDE SERPL-SCNC: 97 MMOL/L (ref 96–108)
CO2 SERPL-SCNC: 26 MMOL/L (ref 21–32)
CREAT SERPL-MCNC: 0.94 MG/DL (ref 0.6–1.3)
GFR SERPL CREATININE-BSD FRML MDRD: 62 ML/MIN/1.73SQ M
GLUCOSE SERPL-MCNC: 158 MG/DL (ref 65–140)
POTASSIUM SERPL-SCNC: 4.5 MMOL/L (ref 3.5–5.3)
SODIUM SERPL-SCNC: 132 MMOL/L (ref 135–147)

## 2024-01-04 PROCEDURE — 80048 BASIC METABOLIC PNL TOTAL CA: CPT

## 2024-01-04 PROCEDURE — 99214 OFFICE O/P EST MOD 30 MIN: CPT | Performed by: INTERNAL MEDICINE

## 2024-01-04 PROCEDURE — 36415 COLL VENOUS BLD VENIPUNCTURE: CPT

## 2024-01-04 RX ORDER — SODIUM CHLORIDE 1 G/1
1 TABLET ORAL 3 TIMES DAILY
Qty: 270 TABLET | Refills: 3 | Status: SHIPPED | OUTPATIENT
Start: 2024-01-04

## 2024-01-04 RX ORDER — TORSEMIDE 5 MG/1
5 TABLET ORAL DAILY
Qty: 90 TABLET | Refills: 2 | Status: SHIPPED | OUTPATIENT
Start: 2024-01-04

## 2024-01-04 NOTE — PROGRESS NOTES
NEPHROLOGY OFFICE PROGRESS NOTE   Yris Bowles 68 y.o. female MRN: [id number]  DATE: 01/04/24  Reason for visit: Continued evaluation and management of hyponatremia    ASSESSMENT & PLAN:  Hyponatremia (hypoosmolar):  Etiology is SIADH - felt to be due to psych meds.   Na level is stable - has been 131 to 134 over the past 2 months.   Most recent Na is 132 from today.   Rx: salt tabs 1 gm TID, Torsemide 5 mg OD, FR 1500 cc/24 hours.   No changes today.      Bipolar disorder:   Clinically better overall.   Follow up with Dr. Nichols.   Now on Latuda, Sertraline, Depakote and Klonopin.      Hypertension:  BP is usually controlled but high today.   Rx: Metoprolol succinate 25 mg OD, Torsemide 5 mg OD.   No changes.   Check home BP and call me if >130/80 mm Hg.      Bilateral adrenal nodules:  Aldosterone and metanephrines were unremarkable.  Suspected to be adenomas.     Diabetes mellitus: on metformin.   Hyperlipidemia: on rosuvastatin.      Patient Instructions   The current sodium level is acceptable.   Continue salt tablets, 1 tab three times daily.   Continue Torsemide 5 mg daily.   Continue fluid restriction 1500 cc/24 hours.   Check monthly labs.   Follow up in 6 months.     SUBJECTIVE / INTERVAL HISTORY:  Yris was last seen in the office in June 2023.  Due to her frail emotional and mental state at that time, we recommended that she discuss with her psychiatrist the initiation of meds despite a history of hyponatremia.   Since then, she has started meds with close watch on her sodium level.   She is currently on Sertraline, Latuda, Depakote and Klonopin.  Jenifer reports that her mood has improved over the past 6 months and she is doing much better overall.   However, her mood has been off the past 2 days.   She is also now on salt tabs.   BMPs done every 2 weeks show Na level of 131 to 134 since Nov 2023.   Not checking BP at home.     PMH/PSH: HTN, DM, HLP, depression, bipolar disorder, endometrial and colon  "cancer, DJD s/p L TKR, left femur fracture.      Previous work-up:  May 2023: Serum osmolality 266, Urine osmolality 392, urine sodium 94, uric acid 5.4, aldosterone 2.8, renin 2.281, metanephrines 18.4, normetanephrine 70.6.     April 2023: TSH 0.886     October 15, 2022: Serum osmolality 255, urine osmolality 518, urine sodium 56, uric acid 3.7, cortisol 25.0.     Imaging:  October 17, 2022: CTA of chest, abdomen, pelvis: No malignancy.     March 23, 2023: MRI of brain: No acute infarction, edema, or mass effect.    ALLERGIES:   Allergies   Allergen Reactions    Wellbutrin [Bupropion] Anxiety     Patient has tried Wellbutrin which resulted in increased paranoia. Patient wishes to not try this medication again.     REVIEW OF SYSTEMS:  Review of Systems   Constitutional:  Negative for appetite change, chills and fever.   Respiratory:  Negative for cough and shortness of breath.    Cardiovascular:  Negative for chest pain and leg swelling.   Gastrointestinal:  Negative for abdominal pain, diarrhea, nausea and vomiting.   Genitourinary:  Negative for hematuria.   Musculoskeletal:  Positive for back pain. Negative for arthralgias.   Neurological:  Positive for light-headedness.   Psychiatric/Behavioral:  Positive for behavioral problems.      OBJECTIVE:  /90 (BP Location: Left arm, Patient Position: Sitting, Cuff Size: Standard)   Pulse 91   Ht 5' 4\" (1.626 m)   Wt 76.2 kg (168 lb)   SpO2 98%   BMI 28.84 kg/m²   Current Weight:   Body mass index is 28.84 kg/m².  Physical Exam  Constitutional:       Appearance: She is well-developed. She is not toxic-appearing.   HENT:      Head: Normocephalic and atraumatic.   Eyes:      General: No scleral icterus.     Conjunctiva/sclera: Conjunctivae normal.   Neck:      Vascular: No JVD.   Cardiovascular:      Rate and Rhythm: Normal rate and regular rhythm.      Heart sounds: Normal heart sounds.   Pulmonary:      Effort: Pulmonary effort is normal.      Breath sounds: " Normal breath sounds.   Abdominal:      General: Bowel sounds are normal.      Palpations: Abdomen is soft.   Musculoskeletal:      Right lower leg: No edema.      Left lower leg: No edema.   Skin:     General: Skin is warm and dry.   Neurological:      Mental Status: She is alert and oriented to person, place, and time.   Psychiatric:         Behavior: Behavior normal. Behavior is cooperative.         Judgment: Judgment normal.       Medications:  Current Outpatient Medications:     acetaminophen (TYLENOL) 325 mg tablet, Take 3 tablets (975 mg total) by mouth 2 (two) times a day (Patient taking differently: Take 975 mg by mouth every 6 (six) hours as needed), Disp: , Rfl: 0    aspirin (ECOTRIN LOW STRENGTH) 81 mg EC tablet, Take 1 tablet (81 mg total) by mouth daily, Disp: 30 tablet, Rfl: 5    BD Pen Needle Chelsey 2nd Gen 32G X 4 MM MISC, USE 1  TWICE DAILY, Disp: 60 each, Rfl: 0    benzonatate (TESSALON) 200 MG capsule, Take 1 capsule (200 mg total) by mouth 3 (three) times a day as needed for cough, Disp: 30 capsule, Rfl: 0    cholecalciferol (VITAMIN D3) 1,000 units tablet, Take 1,000 Units by mouth daily, Disp: , Rfl:     clonazePAM (KlonoPIN) 0.5 mg tablet, TAKE 1/2 (ONE-HALF) TO ONE TABLET BY MOUTH ONCE DAILY AS NEEDED. WATCH FOR SEDATION, Disp: , Rfl:     Diclofenac Sodium (VOLTAREN) 1 %, Apply 4 g topically 4 (four) times a day, Disp: 100 g, Rfl: 1    divalproex sodium (DEPAKOTE ER) 250 mg 24 hr tablet, TAKE 1 TABLET BY MOUTH AT BEDTIME FOR 7 DAYS, THEN TAKE 2 TABLETS AT BEDTIME THEREAFTER (Patient not taking: Reported on 10/9/2023), Disp: , Rfl:     divalproex sodium (DEPAKOTE ER) 500 mg 24 hr tablet, TAKE 1 TABLET BY MOUTH EVERY DAY AT BEDTIME WATCH FOR SEDATION, Disp: , Rfl:     docusate sodium (COLACE) 100 mg capsule, Take 100 mg by mouth 2 (two) times a day, Disp: , Rfl:     escitalopram (LEXAPRO) 10 mg tablet, TAKE 1/2 (ONE-HALF) TABLET BY MOUTH IN THE MORNING FOR 6 DAYS, THEN 1 TABLET BY MOUTH ONCE  DAILY IN THE MORNING THEREAFTER (Patient not taking: Reported on 8/7/2023), Disp: , Rfl:     famotidine (PEPCID) 20 mg tablet, Take 1 tablet (20 mg total) by mouth 2 (two) times a day, Disp: 180 tablet, Rfl: 1    gabapentin (NEURONTIN) 100 mg capsule, 100mg twice daily ( 8AM, 6PM) and 200mg at Bedtime (Patient not taking: Reported on 12/14/2023), Disp: 80 capsule, Rfl: 0    gabapentin (NEURONTIN) 100 mg capsule, TAKE 1 CAPSULE BY MOUTH TWICE DAILY  (8AM,6PM) AND 2 AT BEDTIME, Disp: 120 capsule, Rfl: 3    lidocaine (LIDODERM) 5 %, Apply 1 patch topically over 12 hours daily Remove & Discard patch within 12 hours or as directed by MD Do not start before May 31, 2023. (Patient taking differently: Apply 1 patch topically as needed Remove & Discard patch within 12 hours or as directed by MD), Disp: 30 patch, Rfl: 0    lurasidone (LATUDA) 40 mg tablet, 40 mg 40 mg at 8 am, 40 mg 12 pm , 80 mg at 6 pm, Disp: , Rfl:     lurasidone (LATUDA) 80 mg tablet, Take 1 tablet (80 mg total) by mouth daily with dinner, Disp: 30 tablet, Rfl: 0    metFORMIN (GLUCOPHAGE-XR) 500 mg 24 hr tablet, Take 1 tablet (500 mg total) by mouth daily with breakfast, Disp: 90 tablet, Rfl: 1    metoprolol succinate (TOPROL-XL) 25 mg 24 hr tablet, Take 1 tablet (25 mg total) by mouth daily, Disp: 90 tablet, Rfl: 1    mirtazapine (REMERON) 7.5 MG tablet, TAKE 1 TABLET BY MOUTH ONCE DAILY AT BEDTIME. WATCH FOR SEDATION (Patient not taking: Reported on 12/14/2023), Disp: , Rfl:     Multiple Vitamin (multivitamin) tablet, Take 1 tablet by mouth daily, Disp: 90 tablet, Rfl: 0    nitroglycerin (NITROSTAT) 0.4 mg SL tablet, Place 1 tablet (0.4 mg total) under the tongue every 5 (five) minutes as needed for chest pain, Disp: 30 tablet, Rfl: 1    rosuvastatin (CRESTOR) 20 MG tablet, Take 1 tablet (20 mg total) by mouth daily (Patient taking differently: Take 20 mg by mouth daily with dinner), Disp: 90 tablet, Rfl: 3    sertraline (ZOLOFT) 50 mg tablet, Take 50  mg by mouth 2 (two) times a day, Disp: , Rfl:     sodium chloride 1 g tablet, Take 1 tablet (1 g total) by mouth 3 (three) times a day, Disp: 270 tablet, Rfl: 0    torsemide (DEMADEX) 10 mg tablet, Take 0.5 tablets (5 mg total) by mouth daily, Disp: 30 tablet, Rfl: 3    Laboratory Results:  Results for orders placed or performed in visit on 01/04/24   Basic metabolic panel   Result Value Ref Range    Sodium 132 (L) 135 - 147 mmol/L    Potassium 4.5 3.5 - 5.3 mmol/L    Chloride 97 96 - 108 mmol/L    CO2 26 21 - 32 mmol/L    ANION GAP 9 mmol/L    BUN 23 5 - 25 mg/dL    Creatinine 0.94 0.60 - 1.30 mg/dL    Glucose 158 (H) 65 - 140 mg/dL    Calcium 9.2 8.4 - 10.2 mg/dL    eGFR 62 ml/min/1.73sq m

## 2024-01-22 ENCOUNTER — RA CDI HCC (OUTPATIENT)
Dept: OTHER | Facility: HOSPITAL | Age: 69
End: 2024-01-22

## 2024-01-26 DIAGNOSIS — R07.9 CHEST PAIN IN ADULT: ICD-10-CM

## 2024-01-26 DIAGNOSIS — E78.5 HYPERLIPIDEMIA, UNSPECIFIED HYPERLIPIDEMIA TYPE: ICD-10-CM

## 2024-01-26 DIAGNOSIS — R06.09 DYSPNEA ON EXERTION: ICD-10-CM

## 2024-01-26 NOTE — TELEPHONE ENCOUNTER
----- Message from Yris Bowles sent at 1/26/2024  5:12 PM EST -----  Regarding: Medication refill  Contact: [phone]  Can you please refill my rosuvastatin 20mg once daily. Thank you.  Emili

## 2024-01-29 RX ORDER — ROSUVASTATIN CALCIUM 20 MG/1
20 TABLET, COATED ORAL DAILY
Qty: 90 TABLET | Refills: 1 | Status: SHIPPED | OUTPATIENT
Start: 2024-01-29

## 2024-02-02 ENCOUNTER — APPOINTMENT (OUTPATIENT)
Dept: LAB | Facility: HOSPITAL | Age: 69
End: 2024-02-02
Payer: MEDICARE

## 2024-02-02 DIAGNOSIS — E22.2 SIADH (SYNDROME OF INAPPROPRIATE ADH PRODUCTION) (HCC): ICD-10-CM

## 2024-02-02 LAB
ANION GAP SERPL CALCULATED.3IONS-SCNC: 10 MMOL/L
BUN SERPL-MCNC: 18 MG/DL (ref 5–25)
CALCIUM SERPL-MCNC: 9.8 MG/DL (ref 8.4–10.2)
CHLORIDE SERPL-SCNC: 93 MMOL/L (ref 96–108)
CO2 SERPL-SCNC: 30 MMOL/L (ref 21–32)
CREAT SERPL-MCNC: 0.93 MG/DL (ref 0.6–1.3)
GFR SERPL CREATININE-BSD FRML MDRD: 62 ML/MIN/1.73SQ M
GLUCOSE SERPL-MCNC: 139 MG/DL (ref 65–140)
POTASSIUM SERPL-SCNC: 4.2 MMOL/L (ref 3.5–5.3)
SODIUM SERPL-SCNC: 133 MMOL/L (ref 135–147)

## 2024-02-02 PROCEDURE — 80048 BASIC METABOLIC PNL TOTAL CA: CPT

## 2024-02-02 PROCEDURE — 36415 COLL VENOUS BLD VENIPUNCTURE: CPT

## 2024-02-05 ENCOUNTER — TELEPHONE (OUTPATIENT)
Dept: NEPHROLOGY | Facility: CLINIC | Age: 69
End: 2024-02-05

## 2024-02-05 NOTE — TELEPHONE ENCOUNTER
Pt daughter advised that labs of 2/2/24 show stable sodium level of 133 per Dr. Lambert.    ----- Message from Yosef Lambert MD sent at 2/2/2024  4:54 PM EST -----  Please inform patient or daughter that most recent labs (2/2/24) show that sodium level is stable at 133.

## 2024-02-07 DIAGNOSIS — E11.9 TYPE 2 DIABETES MELLITUS WITHOUT COMPLICATION, WITH LONG-TERM CURRENT USE OF INSULIN (HCC): ICD-10-CM

## 2024-02-07 DIAGNOSIS — Z79.4 TYPE 2 DIABETES MELLITUS WITHOUT COMPLICATION, WITH LONG-TERM CURRENT USE OF INSULIN (HCC): ICD-10-CM

## 2024-02-08 RX ORDER — METFORMIN HYDROCHLORIDE 500 MG/1
500 TABLET, EXTENDED RELEASE ORAL
Qty: 90 TABLET | Refills: 0 | Status: SHIPPED | OUTPATIENT
Start: 2024-02-08

## 2024-02-19 ENCOUNTER — RA CDI HCC (OUTPATIENT)
Dept: OTHER | Facility: HOSPITAL | Age: 69
End: 2024-02-19

## 2024-02-26 ENCOUNTER — OFFICE VISIT (OUTPATIENT)
Dept: FAMILY MEDICINE CLINIC | Facility: CLINIC | Age: 69
End: 2024-02-26
Payer: MEDICARE

## 2024-02-26 VITALS
HEIGHT: 64 IN | SYSTOLIC BLOOD PRESSURE: 130 MMHG | BODY MASS INDEX: 29.19 KG/M2 | DIASTOLIC BLOOD PRESSURE: 64 MMHG | WEIGHT: 171 LBS | TEMPERATURE: 97.3 F | RESPIRATION RATE: 16 BRPM | OXYGEN SATURATION: 98 % | HEART RATE: 75 BPM

## 2024-02-26 DIAGNOSIS — F31.9 BIPOLAR DEPRESSION (HCC): ICD-10-CM

## 2024-02-26 DIAGNOSIS — F31.5 BIPOLAR AFFECTIVE DISORDER, DEPRESSED, SEVERE, WITH PSYCHOTIC BEHAVIOR (HCC): ICD-10-CM

## 2024-02-26 DIAGNOSIS — Z79.899 ENCOUNTER FOR LONG-TERM CURRENT USE OF MEDICATION: Primary | ICD-10-CM

## 2024-02-26 DIAGNOSIS — E11.9 TYPE 2 DIABETES MELLITUS WITHOUT COMPLICATION, WITHOUT LONG-TERM CURRENT USE OF INSULIN (HCC): ICD-10-CM

## 2024-02-26 DIAGNOSIS — I10 ESSENTIAL HYPERTENSION: ICD-10-CM

## 2024-02-26 DIAGNOSIS — D69.6 PLATELETS DECREASED (HCC): ICD-10-CM

## 2024-02-26 PROBLEM — E11.22 TYPE 2 DIABETES MELLITUS WITH CHRONIC KIDNEY DISEASE, WITHOUT LONG-TERM CURRENT USE OF INSULIN, UNSPECIFIED CKD STAGE (HCC): Status: ACTIVE | Noted: 2017-05-04

## 2024-02-26 PROBLEM — E43 SEVERE PROTEIN-CALORIE MALNUTRITION (HCC): Status: RESOLVED | Noted: 2023-04-01 | Resolved: 2024-02-26

## 2024-02-26 PROCEDURE — 99214 OFFICE O/P EST MOD 30 MIN: CPT | Performed by: FAMILY MEDICINE

## 2024-02-26 RX ORDER — TRAZODONE HYDROCHLORIDE 50 MG/1
TABLET ORAL
COMMUNITY
Start: 2024-02-19

## 2024-02-26 NOTE — PROGRESS NOTES
Name: Yirs Bowles      : 1955      MRN: [id number]  Encounter Provider: Teressa Prakash DO  Encounter Date: 2024   Encounter department: [facility]    Assessment & Plan   1. Encounter for long-term current use of medication  -     Hepatic function panel; Future    2. Bipolar affective disorder, depressed, severe, with psychotic behavior (HCC)  Assessment & Plan:  Managed by psychiatry      3. Bipolar depression (HCC)  Assessment & Plan:  Managed by psychiatry      4. Platelets decreased ([facility])  -     CBC and differential; Future    5. Type 2 diabetes mellitus without complication, without long-term current use of insulin (HCC)  Assessment & Plan:    Lab Results   Component Value Date    HGBA1C 5.6 10/12/2023   Has appointment with eye doctor  Form given    Orders:  -     Lipid Panel with Direct LDL reflex; Future  -     Hemoglobin A1C; Future    6. Essential hypertension  -     Lipid Panel with Direct LDL reflex; Future  -     CBC and differential; Future  -     Hepatic function panel; Future  -     TSH, 3rd generation; Future       Assessment & Plan  1. Diabetes.  She has gained 7 pounds since 2023. I will check her hemoglobin A1c. If her blood glucose is high, we will make a new regimen for her.    2. Fatigue.  All of her medications can cause fatigue. Her thyroid was normal a year ago. Her vitamin B12 was normal in 10/2023. She was encouraged [place]. I will check her thyroid.    3. Memory loss.  She was encouraged to read more.  I also encouraged her to go out to the [facility] and be more social.  Also discussed the senior dinner program at the hospital    4. Health maintenance.  I will check her lipids, hepatic panel, and platelets.    History of low platelets  CBC ordered    Follow-up  The patient will follow up in 2024 for a wellness visit.     Orders Placed This Encounter   Procedures    Lipid Panel with Direct LDL reflex    Hemoglobin A1C  "   CBC and differential    Hepatic function panel    TSH, 3rd generation       Subjective      History of Present Illness  The patient presents to the office for follow-up of multiple medical concerns. She is accompanied by an adult female.    Her appetite has been better. She does not want to gain more weight. She is on a low dose of metformin.    She has been on Depakote for a while. Her psychiatrist has not checked her liver enzymes.    She is sleeping a lot. She goes to bed at 6:30 PM or 7:00 PM and wakes up through the night. She is on vitamin B12 and vitamin D 1000 and 2000 daily.   They are working on getting her to the eye doctor because she needs a new prescription of glasses. Her back hurts, but it has gotten much better. She gets out of the house 2 to 3 times a week.  Review of Systems      Objective     /64   Pulse 75   Temp (!) 97.3 °F (36.3 °C)   Resp 16   Ht 5' 4\" (1.626 m)   Wt 77.6 kg (171 lb)   SpO2 98%   BMI 29.35 kg/m²     Physical Exam  Clear.  Regular.    Vital Signs  Blood pressure is 130/64.  Physical Exam  Vitals and nursing note reviewed.   Constitutional:       Appearance: She is well-developed.   HENT:      Head: Normocephalic and atraumatic.      Right Ear: Tympanic membrane and external ear normal.      Left Ear: Tympanic membrane and external ear normal.   Cardiovascular:      Rate and Rhythm: Normal rate and regular rhythm.      Heart sounds: Normal heart sounds. No murmur heard.     No friction rub.   Pulmonary:      Effort: Pulmonary effort is normal. No respiratory distress.      Breath sounds: Normal breath sounds. No wheezing or rales.   Musculoskeletal:      Right lower leg: No edema.      Left lower leg: No edema.   Neurological:      Gait: Gait abnormal (Using walker).           "

## 2024-02-27 NOTE — ASSESSMENT & PLAN NOTE
Lab Results   Component Value Date    HGBA1C 5.6 10/12/2023   Has appointment with eye doctor  Form given

## 2024-03-06 ENCOUNTER — APPOINTMENT (OUTPATIENT)
Dept: LAB | Facility: HOSPITAL | Age: 69
End: 2024-03-06
Payer: MEDICARE

## 2024-03-06 DIAGNOSIS — Z79.899 ENCOUNTER FOR LONG-TERM CURRENT USE OF MEDICATION: ICD-10-CM

## 2024-03-06 DIAGNOSIS — E22.2 SIADH (SYNDROME OF INAPPROPRIATE ADH PRODUCTION) (HCC): ICD-10-CM

## 2024-03-06 DIAGNOSIS — E11.9 TYPE 2 DIABETES MELLITUS WITHOUT COMPLICATION, WITHOUT LONG-TERM CURRENT USE OF INSULIN (HCC): ICD-10-CM

## 2024-03-06 DIAGNOSIS — M80.00XD AGE-RELATED OSTEOPOROSIS WITH CURRENT PATHOLOGICAL FRACTURE WITH ROUTINE HEALING: ICD-10-CM

## 2024-03-06 DIAGNOSIS — D69.6 PLATELETS DECREASED (HCC): ICD-10-CM

## 2024-03-06 DIAGNOSIS — I10 ESSENTIAL HYPERTENSION: ICD-10-CM

## 2024-03-06 LAB
ALBUMIN SERPL BCP-MCNC: 4.1 G/DL (ref 3.5–5)
ALP SERPL-CCNC: 64 U/L (ref 34–104)
ALT SERPL W P-5'-P-CCNC: 11 U/L (ref 7–52)
ANION GAP SERPL CALCULATED.3IONS-SCNC: 9 MMOL/L
AST SERPL W P-5'-P-CCNC: 15 U/L (ref 13–39)
BASOPHILS # BLD AUTO: 0.02 THOUSANDS/ÂΜL (ref 0–0.1)
BASOPHILS NFR BLD AUTO: 0 % (ref 0–1)
BILIRUB DIRECT SERPL-MCNC: 0.1 MG/DL (ref 0–0.2)
BILIRUB SERPL-MCNC: 0.42 MG/DL (ref 0.2–1)
BUN SERPL-MCNC: 21 MG/DL (ref 5–25)
CALCIUM SERPL-MCNC: 9.7 MG/DL (ref 8.4–10.2)
CHLORIDE SERPL-SCNC: 98 MMOL/L (ref 96–108)
CO2 SERPL-SCNC: 27 MMOL/L (ref 21–32)
CREAT SERPL-MCNC: 0.92 MG/DL (ref 0.6–1.3)
EOSINOPHIL # BLD AUTO: 0.11 THOUSAND/ÂΜL (ref 0–0.61)
EOSINOPHIL NFR BLD AUTO: 2 % (ref 0–6)
ERYTHROCYTE [DISTWIDTH] IN BLOOD BY AUTOMATED COUNT: 13.3 % (ref 11.6–15.1)
GFR SERPL CREATININE-BSD FRML MDRD: 63 ML/MIN/1.73SQ M
GLUCOSE SERPL-MCNC: 121 MG/DL (ref 65–140)
HCT VFR BLD AUTO: 34.5 % (ref 34.8–46.1)
HGB BLD-MCNC: 11.7 G/DL (ref 11.5–15.4)
IMM GRANULOCYTES # BLD AUTO: 0.02 THOUSAND/UL (ref 0–0.2)
IMM GRANULOCYTES NFR BLD AUTO: 0 % (ref 0–2)
LYMPHOCYTES # BLD AUTO: 1.4 THOUSANDS/ÂΜL (ref 0.6–4.47)
LYMPHOCYTES NFR BLD AUTO: 28 % (ref 14–44)
MCH RBC QN AUTO: 31.7 PG (ref 26.8–34.3)
MCHC RBC AUTO-ENTMCNC: 33.9 G/DL (ref 31.4–37.4)
MCV RBC AUTO: 94 FL (ref 82–98)
MONOCYTES # BLD AUTO: 0.44 THOUSAND/ÂΜL (ref 0.17–1.22)
MONOCYTES NFR BLD AUTO: 9 % (ref 4–12)
NEUTROPHILS # BLD AUTO: 2.96 THOUSANDS/ÂΜL (ref 1.85–7.62)
NEUTS SEG NFR BLD AUTO: 61 % (ref 43–75)
NRBC BLD AUTO-RTO: 0 /100 WBCS
PLATELET # BLD AUTO: 151 THOUSANDS/UL (ref 149–390)
PMV BLD AUTO: 8.8 FL (ref 8.9–12.7)
POTASSIUM SERPL-SCNC: 4.2 MMOL/L (ref 3.5–5.3)
PROT SERPL-MCNC: 7.1 G/DL (ref 6.4–8.4)
RBC # BLD AUTO: 3.69 MILLION/UL (ref 3.81–5.12)
SODIUM SERPL-SCNC: 134 MMOL/L (ref 135–147)
TSH SERPL DL<=0.05 MIU/L-ACNC: 1.09 UIU/ML (ref 0.45–4.5)
WBC # BLD AUTO: 4.95 THOUSAND/UL (ref 4.31–10.16)

## 2024-03-06 PROCEDURE — 80076 HEPATIC FUNCTION PANEL: CPT

## 2024-03-06 PROCEDURE — 80048 BASIC METABOLIC PNL TOTAL CA: CPT

## 2024-03-06 PROCEDURE — 36415 COLL VENOUS BLD VENIPUNCTURE: CPT

## 2024-03-06 PROCEDURE — 84443 ASSAY THYROID STIM HORMONE: CPT

## 2024-03-06 PROCEDURE — 85025 COMPLETE CBC W/AUTO DIFF WBC: CPT

## 2024-03-06 PROCEDURE — 83036 HEMOGLOBIN GLYCOSYLATED A1C: CPT

## 2024-03-07 ENCOUNTER — TELEPHONE (OUTPATIENT)
Dept: NEPHROLOGY | Facility: CLINIC | Age: 69
End: 2024-03-07

## 2024-03-07 LAB
EST. AVERAGE GLUCOSE BLD GHB EST-MCNC: 140 MG/DL
HBA1C MFR BLD: 6.5 %

## 2024-03-07 NOTE — TELEPHONE ENCOUNTER
Pt daughter advised that labs of 3/6/24 show stable sodium level at 134.  Daughter states she is following her fluid restriction and taking her meds as prescribed.    ----- Message from Yosef Lambert MD sent at 3/6/2024  4:51 PM EST -----  Please inform patient's daughter that most recent labs (3/6/24) show that Na is stable at 134. Continue salt tablets, 1 tab three times daily + Torsemide 5 mg daily + fluid restriction 1500 cc/24 hours.

## 2024-03-08 DIAGNOSIS — R07.9 CHEST PAIN IN ADULT: ICD-10-CM

## 2024-03-14 DIAGNOSIS — E22.2 SIADH (SYNDROME OF INAPPROPRIATE ADH PRODUCTION) (HCC): ICD-10-CM

## 2024-03-15 RX ORDER — SODIUM CHLORIDE 1 G/1
1 TABLET ORAL 3 TIMES DAILY
Qty: 270 TABLET | Refills: 0 | Status: SHIPPED | OUTPATIENT
Start: 2024-03-15

## 2024-03-24 DIAGNOSIS — E11.9 TYPE 2 DIABETES MELLITUS WITHOUT COMPLICATION, WITH LONG-TERM CURRENT USE OF INSULIN (HCC): ICD-10-CM

## 2024-03-24 DIAGNOSIS — Z79.4 TYPE 2 DIABETES MELLITUS WITHOUT COMPLICATION, WITH LONG-TERM CURRENT USE OF INSULIN (HCC): ICD-10-CM

## 2024-03-25 RX ORDER — MULTIVITAMIN
1 TABLET ORAL DAILY
Qty: 90 TABLET | Refills: 1 | Status: SHIPPED | OUTPATIENT
Start: 2024-03-25

## 2024-03-28 DIAGNOSIS — G31.84 MILD COGNITIVE IMPAIRMENT: Primary | ICD-10-CM

## 2024-04-02 ENCOUNTER — EVALUATION (OUTPATIENT)
Facility: CLINIC | Age: 69
End: 2024-04-02
Payer: MEDICARE

## 2024-04-02 DIAGNOSIS — G31.84 MILD COGNITIVE IMPAIRMENT: ICD-10-CM

## 2024-04-02 DIAGNOSIS — Z78.9 DECREASED ACTIVITIES OF DAILY LIVING (ADL): Primary | ICD-10-CM

## 2024-04-02 PROCEDURE — 97166 OT EVAL MOD COMPLEX 45 MIN: CPT

## 2024-04-02 NOTE — PROGRESS NOTES
"OCCUPATIONAL THERAPY INITIAL EVALUATION    Today's Date: 2024  Patient Name: Yris Bowles  : 1955  MRN: [id number]  Referring Provider: Teressa Prakash DO  Dx: Decreased activities of daily living (ADL) [Z78.9]      Eval/ Re-eval POC expires Auth #/ Referral # Total units  Start date  Expiration date Extension                                                        Date               Units:  Used               Authed:  Remaining                    SKILLED ANALYSIS:  Pt is a 69 year old female seen for initial OT evaluation after being referred by both PCP and neurology 2* functioannl cognition deficits.  Pt presents with daughter and both report ADL dysfunction for multiple months.  Pt currently requires assist with dressing, bathing, IADLs and primarily spends the day in bed.  Pt is currently demonstrating deficits in the following domains: sustained and divided attn, EF, , immediate and delayed recall.  Pt also noted to have significant ambulatory dysfunction, however daughter reports they want to see if patient is compliant with OT prior to scheduling an additional therapy service.  Recommend OP OT 2x/wk for 8-12 weeks with focus on aforementioned deficits to maximize functional recovery s/p CVA and improve QOL.  Findings and recommendations discussed with pt, and they are in agreement.    Educated pt on charges of insurance, acknowledge understanding, and are in agreement.    PLAN OF CARE START: 24  PLAN OF CARE END: 24  FREQUENCY: 2x/wk  PRECAUTIONS: PSYCH, fall risk, cognitive deficits    Subjective: \"I'm gonna tell you that I'll do the stuff, but then I'm not actually going to do it.\"  Educated pt on importance of participating in sessions and HEP to make progress and pt and daughter acknowledge understanding    Mechanism of Injury  Pt with multiple month h/o cognitive and functional deficits.  Per chart, cognitive deficits possibly related to psych drugs.    Occupational Profile  Pt " "resides with her  in a 1st floor apt with 6STE and laundry in the basement.   has been assisting with ADLs, but having significant difficulty.  Pt currently requires assist with sponge bathing, dressing, transfers, IADLs.  She owns a variety of LHAE s/p hip sx last year, but per daughter refuses to use it and generally refuses to participate in self care tasks.  Pt spends a majority of her day in bed.  She is retired from working as a CNA at a nursing home.  She is not a  and  assists with med mgmt.  Daughter reports 2 falls in the past 6 months, 1 resulting in a femur fracture.  Pt reports symptoms of apathy and depression, reports she sees a psychiatrist weekly and they have adjusted her meds.    PATIENT GOAL: \"to be a CNA again\"    HISTORY OF PRESENT ILLNESS:     PMH:   Past Medical History:   Diagnosis Date    Anesthesia complication     difficulty waking up    Anxiety     Arthritis     left knee    Bipolar 1 disorder (HCC)     Bone spur     left knee behing the knee cap    Cancer (HCC)     Chronic kidney disease     Chronic pain disorder     Colon cancer (HCC)     patient states about 2017    Colon polyp     Tubulovillous Adenoma High Grade Dysplasia - 2017    Depression     Diabetes mellitus (HCC)     Endometrial cancer (HCC)     grade I    Hx of bleeding disorder     vaginal bleeding started on 3/13/17     Hyperlipidemia     Hypertension     Hypomagnesemia 03/20/2023    Memory loss     PONV (postoperative nausea and vomiting)     Psychiatric disorder     Psychiatric illness     Psychosis (HCC)     Shortness of breath     with exertion    Sleep difficulties     Urinary incontinence     Vitamin D deficiency        Past Surgical Hx:   Past Surgical History:   Procedure Laterality Date    BREAST BIOPSY Left     benign-maybe at ar age 63    CHOLECYSTECTOMY      open    COLONOSCOPY      DILATION AND CURETTAGE OF UTERUS N/A 04/05/2017    Procedure: DILATATION AND CURETTAGE (D&C);  Surgeon: " Primitivo Weber MD;  Location: [facility] MAIN OR;  Service:     HYSTERECTOMY      OOPHORECTOMY      PELVIC LAPAROSCOPY      GA ARTHRP KNE CONDYLE&PLATU MEDIAL&LAT COMPARTMENTS Left 2022    Procedure: ARTHROPLASTY KNEE TOTAL W ROBOT - CEMENTED - LEFT;  Surgeon: Juan Jose Guerra DO;  Location: [facility] MAIN OR;  Service: Orthopedics    GA LAPS TOTAL HYSTERECT 250 GM/< W/RMVL TUBE/OVARY N/A 2017    Procedure: ROBOTIC ASSISTED TOTAL LAPAROSCOPIC HYSTERECTOMY, BILATERAL SALPINGOOPHERECTOMY; CYSTOSCOPY;  Surgeon: Damir Reyes MD;  Location:  MAIN OR;  Service: Gynecology Oncology    GA OPTX FEM SHFT FX W/INSJ IMED IMPLT W/WO SCREW Left 3/20/2023    Procedure: INSERTION NAIL IM FEMUR RETROGRADE;  Surgeon: Terry White MD;  Location: [facility] MAIN OR;  Service: Orthopedics    REPLACEMENT TOTAL KNEE      TONSILLECTOMY      TUBAL LIGATION          Pain:    Functional Cognition:  Highest level of education: GED    Andres Cognitive Assessment Version 8.2 (MoCA V8.2)  Visuospatial/executive functionin/5  Naming:  3/3  Memory: 1st trial:  3/5, 2nd trial:  4/5  Attention/concentration: 0/2  List of letters: 0/1  Serial Seven Subtraction:  0/3   Language/sentence repetition:  1/2  Language Fluency:  0/1, 6 words  Abstract/Correlational Thinkin/2  Delayed Recall:  0/5  Orientation:  4/6.  Incorrect year () and unable to identify place   Memory Index Score: 2/15  MoCA V1 8.2 Raw Score:  9+1=10/30, MIS:  2/15, indicative of moderate neurocognitive impairments.    MoCA Scoring        Normal: 26+         Mild Cognitive Impairment: 18-25          Moderate Cognitive Impairment: 10-17         Severe Cognitive Impairment: <10    Trail making Part A and Part B:   Part A: 1:49 with 2 cues for attention  Part B: 5:29 to complete to G with 12 cues  Indicating deficits: Part A > 42 seconds and Part B > 109 seconds      Contextual Memory Test:  Immediate:   Delayed:     GOALS:   Short Term Goals:  Pt will demonstrate  improved overall functional cognition by scoring at least 12/30 on MoCA for carry over with daily activities  Pt will demonstrate improved immediate and delayed visual memory by scoring at least 9, 6 on immediate and delayed portions of CMT  Pt will demonstrate improved sustained attn by completing Trail Making Test A within 1:35 with no more than 2 cues  Pt will demonstrate improved divided attn by completing Trail Making Test B within 10 min with no more than 10 cues  Pt will complete UB dressing and bathing with Lizett with use of DME/AD as appropriate per family report  Pt will complete LB dressing and bathing with modA with use of DME/AD as appropriate per family report      Short Term Goals:  Pt will demonstrate improved overall functional cognition by scoring at least 15/30 on MoCA for carry over with daily activities  Pt will demonstrate improved immediate and delayed visual memory by scoring at least 11, 8 on immediate and delayed portions of CMT  Pt will demonstrate improved sustained attn by completing Trail Making Test A within 1:15 with no more than 2 cues  Pt will demonstrate improved divided attn by completing Trail Making Test B within 10 min with no more than 5 cues  Pt will complete UB dressing and bathing with supervision/setup with use of DME/AD as appropriate per family report  Pt will complete LB dressing and bathing with Lizett with use of DME/AD as appropriate per family report    OTHER PLANNED THERAPY INTERVENTIONS:     Closed chain activities  Open chain activities  Internal and external memory aides  Multimatrix for saccades/ visual clutter/attention  Hypersensitivity strategies education  Multi-modal environment  Sustained/alternating/divided attention  Work stations with timed transitions  Temporal Awareness  Memory and mental manipulation  Auditory processing with immediate recall  Memory retention with immediate and delayed recall  Edu on cog/vision apps

## 2024-04-04 ENCOUNTER — APPOINTMENT (OUTPATIENT)
Dept: LAB | Facility: HOSPITAL | Age: 69
End: 2024-04-04
Payer: MEDICARE

## 2024-04-04 DIAGNOSIS — E22.2 SIADH (SYNDROME OF INAPPROPRIATE ADH PRODUCTION) (HCC): ICD-10-CM

## 2024-04-04 LAB
ANION GAP SERPL CALCULATED.3IONS-SCNC: 9 MMOL/L (ref 4–13)
BUN SERPL-MCNC: 22 MG/DL (ref 5–25)
CALCIUM SERPL-MCNC: 9.4 MG/DL (ref 8.4–10.2)
CHLORIDE SERPL-SCNC: 99 MMOL/L (ref 96–108)
CO2 SERPL-SCNC: 29 MMOL/L (ref 21–32)
CREAT SERPL-MCNC: 1.05 MG/DL (ref 0.6–1.3)
GFR SERPL CREATININE-BSD FRML MDRD: 53 ML/MIN/1.73SQ M
GLUCOSE SERPL-MCNC: 202 MG/DL (ref 65–140)
POTASSIUM SERPL-SCNC: 4.2 MMOL/L (ref 3.5–5.3)
SODIUM SERPL-SCNC: 137 MMOL/L (ref 135–147)

## 2024-04-04 PROCEDURE — 80048 BASIC METABOLIC PNL TOTAL CA: CPT

## 2024-04-04 PROCEDURE — 36415 COLL VENOUS BLD VENIPUNCTURE: CPT

## 2024-04-05 ENCOUNTER — TELEPHONE (OUTPATIENT)
Dept: NEPHROLOGY | Facility: CLINIC | Age: 69
End: 2024-04-05

## 2024-04-05 NOTE — TELEPHONE ENCOUNTER
Advised daughter that sodium level of  4/4/24 normal at 137 per Dr. Lambert.     ----- Message from Yosef Lambert MD sent at 4/4/2024  5:20 PM EDT -----  Please inform patient or daughter that most recent labs (4/4/24) show that sodium level is normal.

## 2024-04-09 ENCOUNTER — OFFICE VISIT (OUTPATIENT)
Facility: CLINIC | Age: 69
End: 2024-04-09
Payer: MEDICARE

## 2024-04-09 DIAGNOSIS — Z78.9 DECREASED ACTIVITIES OF DAILY LIVING (ADL): ICD-10-CM

## 2024-04-09 DIAGNOSIS — G31.84 MILD COGNITIVE IMPAIRMENT: Primary | ICD-10-CM

## 2024-04-09 PROCEDURE — 97530 THERAPEUTIC ACTIVITIES: CPT

## 2024-04-09 NOTE — PROGRESS NOTES
"Daily Note     Today's date: 2024  Patient name: Yris Bowles  : 1955  MRN: [id number]  Referring provider: Teressa Prakash DO  Dx:   Encounter Diagnoses   Name Primary?    Mild cognitive impairment Yes    Decreased activities of daily living (ADL)                   PLAN OF CARE START: 24  PLAN OF CARE END: 24  FREQUENCY: 2x/wk  PRECAUTIONS: PSYCH, fall risk, cognitive deficits       Subjective: \"Yes and no\"  Reports she is starting to try to be awake more during the day.      Objective: See treatment below.  TA:  STS with medicine ball handoff and elbow flexion with 6lb medicine ball and pt stating the name of a food that begins with each letter.  Focus on divided attn, following 3 step directions, incorporating aerobic exercise and strengthening for improved cognition and functioning, endurance.     -Double spot design copy performed.  Initially trialed stating a word from category based on color inserted, but downgraded 2* being too difficult for patient to follow additional step.  Focus on sustained attn, working memory, spatial relationships.  Requires mod-maxA and significantly inc time (4 pieces in 12 min)    Assessment: Tolerated treatment fairly. Pt demonstrating improved attn to task when incorporated with physical activity. Pt would benefit from continued OT services to address functional cognition and independence with daily activities.      Plan: Continued skilled OT per POC.      "

## 2024-04-10 ENCOUNTER — OFFICE VISIT (OUTPATIENT)
Facility: CLINIC | Age: 69
End: 2024-04-10
Payer: MEDICARE

## 2024-04-10 DIAGNOSIS — Z78.9 DECREASED ACTIVITIES OF DAILY LIVING (ADL): ICD-10-CM

## 2024-04-10 DIAGNOSIS — G31.84 MILD COGNITIVE IMPAIRMENT: Primary | ICD-10-CM

## 2024-04-10 PROCEDURE — 97530 THERAPEUTIC ACTIVITIES: CPT

## 2024-04-10 NOTE — PROGRESS NOTES
Daily Note     Today's date: 4/10/2024  Patient name: Yris Bowles  : 1955  MRN: [id number]  Referring provider: Teressa Prakash DO  Dx:   Encounter Diagnoses   Name Primary?    Mild cognitive impairment Yes    Decreased activities of daily living (ADL)          Start Time: 1630  Stop Time: 1715  Total time in clinic (min): 45 minutes    PLAN OF CARE START: 24  PLAN OF CARE END: 24  FREQUENCY: 2x/wk  PRECAUTIONS: PSYCH, fall risk, cognitive deficits       Subjective: Pt's daughter reports they got in an argument today because pt continues to sleep all day and didn't do worksheets provided yesterday.      Objective: See treatment below.  TA:  -Tile matching game activity performed in stance with 2lb wrist weights with focus on sustained attn, .  Requires significantly inc time, 1 seated rest break  -Beverage crossword puzzle activity performed in stance on vertical white board with 2lb wrist weights.  Requires 2 seated rest breaks, mod cues for attn.  Pt able to identify words that should be in correct places, but then difficulty initiating next steps of finding and placing tiles on board.    Assessment: Tolerated treatment fairly. Pt demonstrating improved attn to task when incorporated with physical activity. Pt would benefit from continued OT services to address functional cognition and independence with daily activities.      Plan: Continued skilled OT per POC.

## 2024-04-15 ENCOUNTER — OFFICE VISIT (OUTPATIENT)
Facility: CLINIC | Age: 69
End: 2024-04-15
Payer: MEDICARE

## 2024-04-15 DIAGNOSIS — G31.84 MILD COGNITIVE IMPAIRMENT: Primary | ICD-10-CM

## 2024-04-15 DIAGNOSIS — Z78.9 DECREASED ACTIVITIES OF DAILY LIVING (ADL): ICD-10-CM

## 2024-04-15 PROCEDURE — 97530 THERAPEUTIC ACTIVITIES: CPT

## 2024-04-15 NOTE — PROGRESS NOTES
Daily Note     Today's date: 4/15/2024  Patient name: Yris Bowles  : 1955  MRN: [id number]  Referring provider: Teressa Prakash DO  Dx:   No diagnosis found.                   PLAN OF CARE START: 24  PLAN OF CARE END: 24  FREQUENCY: 2x/wk  PRECAUTIONS: PSYCH, fall risk, cognitive deficits       Subjective: Pt's daughter reports they got in an argument today because pt continues to sleep all day and didn't do worksheets provided yesterday.      Objective: See treatment below.  TA:  -STS with 5lb medicine ball held in alternating UE, and completing serial 3 subtraction.  ~25 reps with mod cues for calculations.  Focus on divided attn, sustained attn, following multi step directions    -120 number board with serial 3 addition with 2 cues.  Focus on sustained attn, simple calculations.    Assessment: Tolerated treatment fairly. Pt demonstrating improved attn to task when incorporated with physical activity. Pt would benefit from continued OT services to address functional cognition and independence with daily activities.      Plan: Continued skilled OT per POC.

## 2024-04-16 ENCOUNTER — OFFICE VISIT (OUTPATIENT)
Facility: CLINIC | Age: 69
End: 2024-04-16
Payer: MEDICARE

## 2024-04-16 DIAGNOSIS — Z78.9 DECREASED ACTIVITIES OF DAILY LIVING (ADL): ICD-10-CM

## 2024-04-16 DIAGNOSIS — G31.84 MILD COGNITIVE IMPAIRMENT: Primary | ICD-10-CM

## 2024-04-16 PROCEDURE — 97530 THERAPEUTIC ACTIVITIES: CPT

## 2024-04-16 NOTE — PROGRESS NOTES
Daily Note     Today's date: 2024  Patient name: Yris Bowles  : 1955  MRN: [id number]  Referring provider: Teressa Prakash DO  Dx:   Encounter Diagnoses   Name Primary?    Mild cognitive impairment Yes    Decreased activities of daily living (ADL)            Start Time: 1500  Stop Time: 1545  Total time in clinic (min): 45 minutes    PLAN OF CARE START: 24  PLAN OF CARE END: 24  FREQUENCY: 2x/wk  PRECAUTIONS: PSYCH, fall risk, cognitive deficits       Subjective: Pt reports no new changes. She reports that she woke up and then took a nap after breakfast.       Objective: See treatment below.    NMR:     -Pt performed Connect 4 activity. Pt required to stand up, insert pieces based on visual diagram, and say an item from categories (red- animal, yellow-place). Categories presented visually. Pt required mod vcs d/t difficulty with following multi step directions, and divided attention. Focused on activity tolerance, standing balance, , working memory, divided attention, FMC, dexterity.     - Pt performed I spy Dig In x3. Pt required to study cards with 3 items revealed. Pt educated on using internal memory strategies such as rehearsal for recall. Pt required to locate 3 items with visual removed. Pt required mod vcs for recall of colors and items, and for correcting errors. Focused on working memory, , FMC, immediate recall.    Assessment: Tolerated treatment fairly. Pt demonstrating improved attn to task when incorporated with physical activity. Pt would benefit from continued OT services to address functional cognition and independence with daily activities.      Plan: Continued skilled OT per POC.

## 2024-04-22 ENCOUNTER — OFFICE VISIT (OUTPATIENT)
Facility: CLINIC | Age: 69
End: 2024-04-22
Payer: MEDICARE

## 2024-04-22 DIAGNOSIS — G31.84 MILD COGNITIVE IMPAIRMENT: Primary | ICD-10-CM

## 2024-04-22 DIAGNOSIS — Z78.9 DECREASED ACTIVITIES OF DAILY LIVING (ADL): ICD-10-CM

## 2024-04-22 PROCEDURE — 97530 THERAPEUTIC ACTIVITIES: CPT

## 2024-04-22 NOTE — PROGRESS NOTES
Daily Note     Today's date: 2024  Patient name: Yris Bowles  : 1955  MRN: [id number]  Referring provider: Teressa Prakash DO  Dx:   Encounter Diagnoses   Name Primary?    Mild cognitive impairment Yes    Decreased activities of daily living (ADL)                       PLAN OF CARE START: 24  PLAN OF CARE END: 24  FREQUENCY: 2x/wk  PRECAUTIONS: PSYCH, fall risk, cognitive deficits       Subjective: Pt reports no new changes.       Objective: See treatment below.    NMR:     -Pt performed Q-bitz x 1 while practicing sit to stand. Pt required to stand while inserting pieces and sit down before retrieving another piece. Pt required 2 seated breaks d/t fatigue. Pt requires ~30 mins to complete design. Pt demonstrated difficulty with  skills and required mod vcs for correcting errors in design. Focused on , FMC, dexterity, standing balance, improving activity tolerance.     - Pt performed manipulation of multi matrix cubes. Pt required to rotate cubes and locate letters in alphabetical order. Pt then required to follow written categories to state an item for each colored cube. Focused on fmc, dexterity, alternating attention, working memory. Pt required mod vcs for direction following and for stating an item from color category.    Assessment: Tolerated treatment fairly. Pt demonstrating improved attn to task when incorporated with physical activity but requires seated rest breaks. Pt demonstrated difficulty with  skills during Q-bitz puzzle. Pt would benefit from continued OT services to address functional cognition and independence with daily activities.      Plan: Continued skilled OT per POC.

## 2024-04-23 ENCOUNTER — OFFICE VISIT (OUTPATIENT)
Facility: CLINIC | Age: 69
End: 2024-04-23
Payer: MEDICARE

## 2024-04-23 DIAGNOSIS — G31.84 MILD COGNITIVE IMPAIRMENT: Primary | ICD-10-CM

## 2024-04-23 DIAGNOSIS — Z78.9 DECREASED ACTIVITIES OF DAILY LIVING (ADL): ICD-10-CM

## 2024-04-23 PROCEDURE — 97530 THERAPEUTIC ACTIVITIES: CPT

## 2024-04-23 NOTE — PROGRESS NOTES
Daily Note     Today's date: 2024  Patient name: Yris Bowles  : 1955  MRN: [id number]  Referring provider: Teressa Prakash DO  Dx:   Encounter Diagnoses   Name Primary?    Mild cognitive impairment Yes    Decreased activities of daily living (ADL)                       PLAN OF CARE START: 24  PLAN OF CARE END: 24  FREQUENCY: 2x/wk  PRECAUTIONS: PSYCH, fall risk, cognitive deficits       Subjective: Pt reports no new changes.       Objective: See treatment below.    TA:    Pt performed wooden intelligence puzzle. Pt required to follow visual model to complete puzzle while following color categories to state an item per piece inserted. Mod vcs for correcting errors in design, and for direction following. Focused on , working memory, divided attention. Pt externally distracted in multimodal environment    TE:   Pt performed sit to stand with weighted medicine ball. Pt required to state places and alternate sides when holding the ball upon standing. Pt reported challenges with standing up while holding ball- activity transitioned to aerobic exercise on NuStep. Pt required to push arms and legs while stating places in alphabetical order. Pt requires increased time and mod vcs for recalling letter order. Focused on activity tolerance, working memory, divided attention. Pt demonstrates difficulty with divided attention in multi modal environment.      Assessment: Tolerated treatment fairly. Pt externally distracted in multimodal environment and requires vcs for redirection. Pt demonstrated difficulty with  skills during wooden intelligence puzzle. Pt performs with minimal effort throughout session and verbally reports that she is demonstrating minimal effort. Pt would benefit from continued OT services to address functional cognition and independence with daily activities.      Plan: Continued skilled OT per POC.

## 2024-04-26 DIAGNOSIS — R52 PAIN: ICD-10-CM

## 2024-04-27 RX ORDER — GABAPENTIN 100 MG/1
CAPSULE ORAL
Qty: 120 CAPSULE | Refills: 5 | Status: SHIPPED | OUTPATIENT
Start: 2024-04-27

## 2024-04-29 ENCOUNTER — OFFICE VISIT (OUTPATIENT)
Facility: CLINIC | Age: 69
End: 2024-04-29
Payer: MEDICARE

## 2024-04-29 DIAGNOSIS — G31.84 MILD COGNITIVE IMPAIRMENT: Primary | ICD-10-CM

## 2024-04-29 DIAGNOSIS — Z78.9 DECREASED ACTIVITIES OF DAILY LIVING (ADL): ICD-10-CM

## 2024-04-29 PROCEDURE — 97530 THERAPEUTIC ACTIVITIES: CPT

## 2024-04-29 NOTE — PROGRESS NOTES
Daily Note     Today's date: 2024  Patient name: Yris Bowles  : 1955  MRN: [id number]  Referring provider: Teressa Prakash DO  Dx:   Encounter Diagnoses   Name Primary?    Mild cognitive impairment Yes    Decreased activities of daily living (ADL)                       PLAN OF CARE START: 24  PLAN OF CARE END: 24  FREQUENCY: 2x/wk  PRECAUTIONS: PSYCH, fall risk, cognitive deficits       Subjective: Pt reports having a fever and muscle aches. Reports taking Tylenol. [facility] to [facility] if [facility] do not improve. Temperature taken in session 99.1*      Objective: See treatment below.    TA:  Pt performed pickles to penguins card activity. Pt required to locate similarities between 2 cards and then work on forward chaining to find similarity between last card drawn and another card on table. Pt required min vcs and hints for locating similarities. Focused on working memory, sustained attention, abstraction and planning    NMR:  Pt performed 120 board. Pt required to perform serial 3 subtraction from 120 and insert tiles onto board. Pt required to perform sit to stand before inserting a new tile, and state an item following categories: odd: animal, even: food. Pt required min vcs for recalling directions and following categories. Focused on working memory, standing balance, activity tolerance.       Assessment: Tolerated treatment well. Increased participation during session today. Pt would benefit from continued OT services to address functional cognition and independence with daily activities.      Plan: Continued skilled OT per POC.

## 2024-05-02 ENCOUNTER — APPOINTMENT (OUTPATIENT)
Dept: LAB | Facility: HOSPITAL | Age: 69
End: 2024-05-02
Payer: MEDICARE

## 2024-05-02 ENCOUNTER — OFFICE VISIT (OUTPATIENT)
Facility: CLINIC | Age: 69
End: 2024-05-02
Payer: MEDICARE

## 2024-05-02 ENCOUNTER — TELEPHONE (OUTPATIENT)
Dept: FAMILY MEDICINE CLINIC | Facility: CLINIC | Age: 69
End: 2024-05-02

## 2024-05-02 ENCOUNTER — TELEPHONE (OUTPATIENT)
Dept: NEPHROLOGY | Facility: CLINIC | Age: 69
End: 2024-05-02

## 2024-05-02 DIAGNOSIS — Z78.9 DECREASED ACTIVITIES OF DAILY LIVING (ADL): ICD-10-CM

## 2024-05-02 DIAGNOSIS — G31.84 MILD COGNITIVE IMPAIRMENT: Primary | ICD-10-CM

## 2024-05-02 DIAGNOSIS — I10 ESSENTIAL HYPERTENSION: ICD-10-CM

## 2024-05-02 DIAGNOSIS — E22.2 SIADH (SYNDROME OF INAPPROPRIATE ADH PRODUCTION) (HCC): ICD-10-CM

## 2024-05-02 DIAGNOSIS — E11.9 TYPE 2 DIABETES MELLITUS WITHOUT COMPLICATION, WITHOUT LONG-TERM CURRENT USE OF INSULIN (HCC): ICD-10-CM

## 2024-05-02 LAB
CHOLEST SERPL-MCNC: 160 MG/DL
HDLC SERPL-MCNC: 57 MG/DL
LDLC SERPL CALC-MCNC: 56 MG/DL (ref 0–100)
TRIGL SERPL-MCNC: 236 MG/DL

## 2024-05-02 PROCEDURE — 97530 THERAPEUTIC ACTIVITIES: CPT | Performed by: OCCUPATIONAL THERAPIST

## 2024-05-02 PROCEDURE — 80053 COMPREHEN METABOLIC PANEL: CPT

## 2024-05-02 PROCEDURE — 80061 LIPID PANEL: CPT

## 2024-05-02 PROCEDURE — 97110 THERAPEUTIC EXERCISES: CPT | Performed by: OCCUPATIONAL THERAPIST

## 2024-05-02 NOTE — TELEPHONE ENCOUNTER
Daughter advised that sodium level today, 5/2/24 is normal at 138 per Dr. Lambert.    ----- Message from Yosef Lambert MD sent at 5/2/2024  3:52 PM EDT -----  Please inform patient's daughter that most recent labs (5/2/24) show that sodium level is normal.

## 2024-05-02 NOTE — TELEPHONE ENCOUNTER
----- Message from Jose Barrett sent at 5/2/2024 11:35 AM EDT -----    ----- Message -----  From: Lab, Background User  Sent: 5/2/2024  10:48 AM EDT  To: Teressa Prakash DO

## 2024-05-02 NOTE — PROGRESS NOTES
Daily Note     Today's date: 2024  Patient name: Yris Bowles  : 1955  MRN: [id number]  Referring provider: Teressa Prakash DO  Dx:   Encounter Diagnoses   Name Primary?    Mild cognitive impairment Yes    Decreased activities of daily living (ADL)      Start Time: 1240  Stop Time: 1315  Total time in clinic (min): 35 minutes    PLAN OF CARE START: 24  PLAN OF CARE END: 24  FREQUENCY: 2x/wk  PRECAUTIONS: PSYCH, fall risk, cognitive deficits       Subjective: Pt arrived 10 min late this session.       Objective: See treatment below.    TE:  -Pt on NuStep to address aerobic priming for cognitive functioning. Pt required to push arms and legs while performing serial addition by 3. Pt took break after 4 minutes. Completed for 9:30. Pt required mod vcs to continue pushing arms/legs while dual tasking and min vcs to correct arithmetic error. Performed to address EF skills, activity tolerance, working memory, divided attention.      TA:  -Pt completed scattergories worksheet to address EF skills and sustained attention to facilitate increased independence with ADL participation. Pt required max vcs to list responses starting with the letter S to 7 categories. Pt able to list 4/7 responses. Pt demonstrated difficulty with abstract thinking and rule following.        Assessment: Tolerated treatment fairly. Pt was distracted in environment. Lack of focus impacted her ability to complete scattergories this session. Pt would benefit from continued OT services to address functional cognition and independence with daily activities.      Plan: Continued skilled OT per POC.

## 2024-05-03 DIAGNOSIS — R07.9 CHEST PAIN IN ADULT: ICD-10-CM

## 2024-05-06 ENCOUNTER — EVALUATION (OUTPATIENT)
Facility: CLINIC | Age: 69
End: 2024-05-06
Payer: MEDICARE

## 2024-05-06 DIAGNOSIS — Z78.9 DECREASED ACTIVITIES OF DAILY LIVING (ADL): ICD-10-CM

## 2024-05-06 DIAGNOSIS — G31.84 MILD COGNITIVE IMPAIRMENT: Primary | ICD-10-CM

## 2024-05-06 PROCEDURE — 97530 THERAPEUTIC ACTIVITIES: CPT

## 2024-05-06 NOTE — PROGRESS NOTES
"OCCUPATIONAL THERAPY INITIAL EVALUATION    Today's Date: 2024  Patient Name: Yris Bowles  : 1955  MRN: [id number]  Referring Provider: Teressa Prakash DO  Dx: Mild cognitive impairment [G31.84]      Eval/ Re-eval POC expires Auth #/ Referral # Total units  Start date  Expiration date Extension                                                        Date               Units:  Used               Authed:  Remaining                    SKILLED ANALYSIS:  Pt seen for OT re-evaluation after ~1 month of OT services with focus on functional cognition retraining and inc independence with daily activities.  Pt reports she is unsure if she has made progress.  Of note, pt with minimal-no compliance with recommendations in home environment.  Pt demonstrating very mild improvements with overall functional cognition on MoCA (10 to ).  Pt continues to sleep a majority of the day, requires assist with ADLs and IADLs.  Pt continues to demonstrate deficits in the following domains: generalized weakness, endurance, sustained and divided attn, EF, , immediate and delayed recall.  Recommend OP OT 2x/wk for 8 additional weeks with focus on aforementioned deficits to maximize functional recovery s/p CVA and improve QOL.  Findings and recommendations discussed with pt, and they are in agreement.    Educated pt on charges of insurance, acknowledge understanding, and are in agreement.    PLAN OF CARE START: 24  PLAN OF CARE END: 24  FREQUENCY: 2x/wk  PRECAUTIONS: PSYCH, fall risk, cognitive deficits    Subjective: \"I'm gonna tell you that I'll do the stuff, but then I'm not actually going to do it.\"  Educated pt on importance of participating in sessions and HEP to make progress and pt and daughter acknowledge understanding    Mechanism of Injury  Pt with multiple month h/o cognitive and functional deficits.  Per chart, cognitive deficits possibly related to psych drugs.    Occupational Profile  Pt resides with her " " in a 1st floor apt with 6STE and laundry in the basement.   has been assisting with ADLs, but having significant difficulty.  Pt currently requires assist with sponge bathing, dressing, transfers, IADLs.  She owns a variety of LHAE s/p hip sx last year, but per daughter refuses to use it and generally refuses to participate in self care tasks.  Pt spends a majority of her day in bed.  She is retired from working as a CNA at a nursing home.  She is not a  and  assists with med mgmt.  Daughter reports 2 falls in the past 6 months, 1 resulting in a femur fracture.  Pt reports symptoms of apathy and depression, reports she sees a psychiatrist weekly and they have adjusted her meds.    PATIENT GOAL: \"to be a CNA again\"    HISTORY OF PRESENT ILLNESS:     PMH:   Past Medical History:   Diagnosis Date    Anesthesia complication     difficulty waking up    Anxiety     Arthritis     left knee    Bipolar 1 disorder (HCC)     Bone spur     left knee behing the knee cap    Cancer (HCC)     Chronic kidney disease     Chronic pain disorder     Colon cancer (HCC)     patient states about 2017    Colon polyp     Tubulovillous Adenoma High Grade Dysplasia - 2017    Depression     Diabetes mellitus (HCC)     Endometrial cancer (HCC)     grade I    Hx of bleeding disorder     vaginal bleeding started on 3/13/17     Hyperlipidemia     Hypertension     Hypomagnesemia 03/20/2023    Memory loss     PONV (postoperative nausea and vomiting)     Psychiatric disorder     Psychiatric illness     Psychosis (HCC)     Shortness of breath     with exertion    Sleep difficulties     Urinary incontinence     Vitamin D deficiency        Past Surgical Hx:   Past Surgical History:   Procedure Laterality Date    BREAST BIOPSY Left     benign-maybe at ar age 63    CHOLECYSTECTOMY      open    COLONOSCOPY      DILATION AND CURETTAGE OF UTERUS N/A 04/05/2017    Procedure: DILATATION AND CURETTAGE (D&C);  Surgeon: Primitivo Weber, " MD;  Location: [facility] MAIN OR;  Service:     HYSTERECTOMY      OOPHORECTOMY      PELVIC LAPAROSCOPY      IL ARTHRP KNE CONDYLE&PLATU MEDIAL&LAT COMPARTMENTS Left 2022    Procedure: ARTHROPLASTY KNEE TOTAL W ROBOT - CEMENTED - LEFT;  Surgeon: Juan Jose Guerra DO;  Location: [facility] MAIN OR;  Service: Orthopedics    IL LAPS TOTAL HYSTERECT 250 GM/< W/RMVL TUBE/OVARY N/A 2017    Procedure: ROBOTIC ASSISTED TOTAL LAPAROSCOPIC HYSTERECTOMY, BILATERAL SALPINGOOPHERECTOMY; CYSTOSCOPY;  Surgeon: Damir Reyes MD;  Location:  MAIN OR;  Service: Gynecology Oncology    IL OPTX FEM SHFT FX W/INSJ IMED IMPLT W/WO SCREW Left 3/20/2023    Procedure: INSERTION NAIL IM FEMUR RETROGRADE;  Surgeon: Terry White MD;  Location: [facility] MAIN OR;  Service: Orthopedics    REPLACEMENT TOTAL KNEE      TONSILLECTOMY      TUBAL LIGATION          Pain:    Functional Cognition:  Highest level of education: [facility]    [place] Cognitive Assessment Version 8.3 (MoCA V8.3)  Visuospatial/executive functionin/5  Naming:  3/3  Memory: 1st trial:  5/5, 2nd trial:  4/5  Attention/concentration: 1/2  List of letters: 0/1  Serial Seven Subtraction:  0/3   Language/sentence repetition:  0/2  Language Fluency:  0/1, 4 words  Abstract/Correlational Thinkin/2  Delayed Recall:  0/5  Orientation:  3/6.  Incorrect year (), date and day   Memory Index Score: 2/15  MoCA V1 8.3 Raw Score:  10+1=, MIS:  2/15, indicative of moderate neurocognitive impairments.    MoCA Scoring        Normal: 26+         Mild Cognitive Impairment: 18-25          Moderate Cognitive Impairment: 10-17         Severe Cognitive Impairment: <10    Trail making Part A and Part B: PLEASE ASSESS NEXT SESSION, PT DIDN'T BRING HER GLASSES    Contextual Memory Test:  Immediate:   Delayed: NA    GOALS:   Short Term Goals:  Pt will demonstrate improved overall functional cognition by scoring at least 12/30 on MoCA for carry over with daily activities  Pt will demonstrate  improved immediate and delayed visual memory by scoring at least 9, 6 on immediate and delayed portions of CMT  Pt will demonstrate improved sustained attn by completing Trail Making Test A within 1:35 with no more than 2 cues  Pt will demonstrate improved divided attn by completing Trail Making Test B within 10 min with no more than 10 cues  Pt will complete UB dressing and bathing with Lizett with use of DME/AD as appropriate per family report  Pt will complete LB dressing and bathing with modA with use of DME/AD as appropriate per family report      Short Term Goals:  Pt will demonstrate improved overall functional cognition by scoring at least 15/30 on MoCA for carry over with daily activities  Pt will demonstrate improved immediate and delayed visual memory by scoring at least 11, 8 on immediate and delayed portions of CMT  Pt will demonstrate improved sustained attn by completing Trail Making Test A within 1:15 with no more than 2 cues  Pt will demonstrate improved divided attn by completing Trail Making Test B within 10 min with no more than 5 cues  Pt will complete UB dressing and bathing with supervision/setup with use of DME/AD as appropriate per family report  Pt will complete LB dressing and bathing with Lizett with use of DME/AD as appropriate per family report    OTHER PLANNED THERAPY INTERVENTIONS:     Closed chain activities  Open chain activities  Internal and external memory aides  Multimatrix for saccades/ visual clutter/attention  Hypersensitivity strategies education  Multi-modal environment  Sustained/alternating/divided attention  Work stations with timed transitions  Temporal Awareness  Memory and mental manipulation  Auditory processing with immediate recall  Memory retention with immediate and delayed recall  Edu on cog/vision apps      TA following re-eval:  You've been framed activity performed with focus on spatial relationships, logical and deductive reasoning.  Requires modA.  Internally  distracted

## 2024-05-08 ENCOUNTER — OFFICE VISIT (OUTPATIENT)
Facility: CLINIC | Age: 69
End: 2024-05-08
Payer: MEDICARE

## 2024-05-08 DIAGNOSIS — Z79.4 TYPE 2 DIABETES MELLITUS WITHOUT COMPLICATION, WITH LONG-TERM CURRENT USE OF INSULIN (HCC): ICD-10-CM

## 2024-05-08 DIAGNOSIS — E11.9 TYPE 2 DIABETES MELLITUS WITHOUT COMPLICATION, WITH LONG-TERM CURRENT USE OF INSULIN (HCC): ICD-10-CM

## 2024-05-08 DIAGNOSIS — Z78.9 DECREASED ACTIVITIES OF DAILY LIVING (ADL): ICD-10-CM

## 2024-05-08 DIAGNOSIS — G31.84 MILD COGNITIVE IMPAIRMENT: Primary | ICD-10-CM

## 2024-05-08 LAB
ALBUMIN SERPL BCP-MCNC: 4.2 G/DL (ref 3.5–5)
ALP SERPL-CCNC: 58 U/L (ref 34–104)
ALT SERPL W P-5'-P-CCNC: 19 U/L (ref 7–52)
ANION GAP SERPL CALCULATED.3IONS-SCNC: 10 MMOL/L (ref 4–13)
AST SERPL W P-5'-P-CCNC: 22 U/L (ref 13–39)
BILIRUB SERPL-MCNC: 0.55 MG/DL (ref 0.2–1)
BUN SERPL-MCNC: 18 MG/DL (ref 5–25)
CALCIUM SERPL-MCNC: 9.2 MG/DL (ref 8.4–10.2)
CHLORIDE SERPL-SCNC: 98 MMOL/L (ref 96–108)
CO2 SERPL-SCNC: 30 MMOL/L (ref 21–32)
CREAT SERPL-MCNC: 0.96 MG/DL (ref 0.6–1.3)
GFR SERPL CREATININE-BSD FRML MDRD: 60 ML/MIN/1.73SQ M
GLUCOSE P FAST SERPL-MCNC: 184 MG/DL (ref 65–99)
POTASSIUM SERPL-SCNC: 4.2 MMOL/L (ref 3.5–5.3)
PROT SERPL-MCNC: 7.2 G/DL (ref 6.4–8.4)
SODIUM SERPL-SCNC: 138 MMOL/L (ref 135–147)

## 2024-05-08 PROCEDURE — 97530 THERAPEUTIC ACTIVITIES: CPT

## 2024-05-08 RX ORDER — METFORMIN HYDROCHLORIDE 500 MG/1
500 TABLET, EXTENDED RELEASE ORAL
Qty: 90 TABLET | Refills: 1 | Status: SHIPPED | OUTPATIENT
Start: 2024-05-08 | End: 2024-05-09

## 2024-05-08 NOTE — TELEPHONE ENCOUNTER
----- Message from Yris Bowles sent at 5/8/2024  3:32 PM EDT -----  Regarding: Metformin  Contact: [phone]  Can you please refill my Metformin. Have a great day!  Emili

## 2024-05-08 NOTE — PROGRESS NOTES
"Daily Note     Today's date: 2024  Patient name: Yris Bowles  : 1955  MRN: [id number]  Referring provider: Teressa Prakash DO  Dx:   Encounter Diagnoses   Name Primary?    Mild cognitive impairment Yes    Decreased activities of daily living (ADL)      Start Time: 1500  Stop Time: 1545  Total time in clinic (min): 45 minutes    PLAN OF CARE START: 24  PLAN OF CARE END: 24  FREQUENCY: 2x/wk  PRECAUTIONS: PSYCH, fall risk, cognitive deficits       Subjective: Pt arrived 5 min late this session. \"I am tired and my back hurts\". Discussed benefit of exercising at home and performing activities throughout the day for carryover at therapy. Completed TM A&B since pt brought her glasses today.      Objective: See treatment below.    TM A: 3:43 with 3 vcs  TM B: 13.27 mins with 14 vcs.     Norms: TM A: 42.47; TM B: 1:09.95    Very slow movements when drawing lines to connect dots which significantly impacts time.      TA:  Pt performed tile puzzle x1. Pt required to perform sit to stands for each piece inserted. Pt demonstrated difficulty with  skills, requiring min vcs. Pt requires min assistance while standing d/t decreased endurance. Min vcs for reaching for the chair while sitting and vcs for safety regarding holding onto the rolling table while standing and sitting. Focused on , standing tolerance, standing balance, problem solving.     Assessment: Tolerated treatment fairly. Pt demonstrated difficulty completing TM A&B requiring significantly increased time and vcs, with scores falling significantly below norms for age. Pt was distracted in environment and demonstrated a lack of effort throughout session. Pt would benefit from continued OT services to address functional cognition and independence with daily activities.      Plan: Continued skilled OT per POC.      "

## 2024-05-09 ENCOUNTER — APPOINTMENT (OUTPATIENT)
Dept: LAB | Facility: HOSPITAL | Age: 69
End: 2024-05-09
Payer: MEDICARE

## 2024-05-09 DIAGNOSIS — Z79.899 ENCOUNTER FOR LONG-TERM (CURRENT) USE OF OTHER MEDICATIONS: ICD-10-CM

## 2024-05-09 DIAGNOSIS — F25.9 SCHIZOAFFECTIVE DISORDER, SUBCHRONIC CONDITION (HCC): ICD-10-CM

## 2024-05-09 LAB — VALPROATE SERPL-MCNC: 63 UG/ML (ref 50–100)

## 2024-05-09 PROCEDURE — 80164 ASSAY DIPROPYLACETIC ACD TOT: CPT

## 2024-05-09 RX ORDER — METFORMIN HYDROCHLORIDE 500 MG/1
500 TABLET, EXTENDED RELEASE ORAL
Qty: 90 TABLET | Refills: 1 | Status: SHIPPED | OUTPATIENT
Start: 2024-05-09

## 2024-05-13 ENCOUNTER — OFFICE VISIT (OUTPATIENT)
Facility: CLINIC | Age: 69
End: 2024-05-13
Payer: MEDICARE

## 2024-05-13 DIAGNOSIS — Z78.9 DECREASED ACTIVITIES OF DAILY LIVING (ADL): ICD-10-CM

## 2024-05-13 DIAGNOSIS — G31.84 MILD COGNITIVE IMPAIRMENT: Primary | ICD-10-CM

## 2024-05-13 PROCEDURE — 97530 THERAPEUTIC ACTIVITIES: CPT

## 2024-05-13 PROCEDURE — 97110 THERAPEUTIC EXERCISES: CPT

## 2024-05-13 NOTE — PROGRESS NOTES
Daily Note     Today's date: 2024  Patient name: Yris Bowles  : 1955  MRN: [id number]  Referring provider: Teressa Prakash DO  Dx:   Encounter Diagnoses   Name Primary?    Mild cognitive impairment Yes    Decreased activities of daily living (ADL)        Start Time: 164  Stop Time: 1715  Total time in clinic (min): 31 minutes    PLAN OF CARE START: 24  PLAN OF CARE END: 24  FREQUENCY: 2x/wk  PRECAUTIONS: PSYCH, fall risk, cognitive deficits       Subjective: Pt arrived 14 min late this session. Pt became tearful in the middle of STS exercise - did not provide reason as to why she became visibly upset.       Objective: See treatment below.    TE:  -Pt completed STS holding 5 lb medicine ball seated on airex pad on chair with arm rests. Pt required seated rest break d/t fatigue. Pt required mod vcs to keep ball close to body to maintain safe and functional body mechanics.    TA:  -Sequence the dates activity performed with min vcs for direction following/problem solving. Provided Pt with various dates on sticky notes and asked her to sequence them in calendar order to address EF skills and functional participation in life roles.       Assessment: Tolerated treatment fairly. Pt demonstrated a lack of effort during STS and benefited from encouragement from therapist. Pt tolerated sequencing dates activity well with need for min vcs to complete task effectively. Pt would benefit from continued OT services to address functional cognition and independence with daily activities.      Plan: Continued skilled OT per POC.

## 2024-05-15 ENCOUNTER — HOSPITAL ENCOUNTER (OUTPATIENT)
Dept: INFUSION CENTER | Facility: HOSPITAL | Age: 69
Discharge: HOME | End: 2024-05-15
Payer: MEDICARE

## 2024-05-15 VITALS
RESPIRATION RATE: 18 BRPM | TEMPERATURE: 97.9 F | WEIGHT: 177 LBS | BODY MASS INDEX: 30.22 KG/M2 | DIASTOLIC BLOOD PRESSURE: 76 MMHG | OXYGEN SATURATION: 96 % | SYSTOLIC BLOOD PRESSURE: 160 MMHG | HEART RATE: 91 BPM | HEIGHT: 64 IN

## 2024-05-15 DIAGNOSIS — M80.00XD AGE-RELATED OSTEOPOROSIS WITH CURRENT PATHOLOGICAL FRACTURE WITH ROUTINE HEALING: Primary | ICD-10-CM

## 2024-05-15 PROCEDURE — 96372 THER/PROPH/DIAG INJ SC/IM: CPT

## 2024-05-15 RX ADMIN — DENOSUMAB 60 MG: 60 INJECTION SUBCUTANEOUS at 13:13

## 2024-05-15 NOTE — PROGRESS NOTES
Pt to clinic for Prolia injection. Pt tolerated injection without incident. Pt aware of next apt on 10/11/24 1300, appointment calendar provided and reviewed.

## 2024-05-16 ENCOUNTER — OFFICE VISIT (OUTPATIENT)
Facility: CLINIC | Age: 69
End: 2024-05-16
Payer: MEDICARE

## 2024-05-16 DIAGNOSIS — Z78.9 DECREASED ACTIVITIES OF DAILY LIVING (ADL): ICD-10-CM

## 2024-05-16 DIAGNOSIS — G31.84 MILD COGNITIVE IMPAIRMENT: Primary | ICD-10-CM

## 2024-05-16 PROCEDURE — 97530 THERAPEUTIC ACTIVITIES: CPT

## 2024-05-16 PROCEDURE — 97110 THERAPEUTIC EXERCISES: CPT

## 2024-05-16 NOTE — PROGRESS NOTES
Daily Note     Today's date: 2024  Patient name: Yris Bowles  : 1955  MRN: [id number]  Referring provider: Teressa Prakash DO  Dx:   Encounter Diagnoses   Name Primary?    Mild cognitive impairment Yes    Decreased activities of daily living (ADL)          Start Time: 1545  Stop Time: 1630  Total time in clinic (min): 45 minutes    PLAN OF CARE START: 24  PLAN OF CARE END: 24  FREQUENCY: 2x/wk  PRECAUTIONS: PSYCH, fall risk, cognitive deficits       Subjective: Pt reported to be in good spirits this session.       Objective: See treatment below.    TE:  -Pt completed 60 rotations on arm bike to facilitate aerobic priming for cognitive functioning. Pt required to push arms while listing foods in alphabetical order. Performed to address EF skills, activity tolerance, working memory, divided attention. Pt required mod vcs d/t word finding difficulties.     TA:  -Magnetic parquetry design copy completed for  skills, sustained attention, dexterity. Pt participated in activity while standing for standing tolerance and static standing balance. Pt requested to finish task seated after ~7 min standing d/t fatigue. Pt completed 2 moderate complexity puzzles.       Assessment: Tolerated treatment well. Pt continues to move at a slow pace with decreased processing speed impacting her occupational performance. Pt would benefit from continued OT services to address functional cognition and independence with daily activities.      Plan: Continued skilled OT per POC.

## 2024-05-20 ENCOUNTER — OFFICE VISIT (OUTPATIENT)
Facility: CLINIC | Age: 69
End: 2024-05-20
Payer: MEDICARE

## 2024-05-20 DIAGNOSIS — G31.84 MILD COGNITIVE IMPAIRMENT: Primary | ICD-10-CM

## 2024-05-20 DIAGNOSIS — Z78.9 DECREASED ACTIVITIES OF DAILY LIVING (ADL): ICD-10-CM

## 2024-05-20 PROCEDURE — 97530 THERAPEUTIC ACTIVITIES: CPT

## 2024-05-20 NOTE — PROGRESS NOTES
Daily Note     Today's date: 2024  Patient name: Yris Bowles  : 1955  MRN: [id number]  Referring provider: Teressa Prakash DO  Dx:   Encounter Diagnoses   Name Primary?    Mild cognitive impairment Yes    Decreased activities of daily living (ADL)            Start Time: 1630  Stop Time: 1715  Total time in clinic (min): 45 minutes    PLAN OF CARE START: 24  PLAN OF CARE END: 24  FREQUENCY: 2x/wk  PRECAUTIONS: PSYCH, fall risk, cognitive deficits       Subjective: Pt reported to be in good spirits this session.       Objective: See treatment below.    TE:  Pt completed 15 min warmup on NuStep for aerobic priming for cognitive tasks. Pt asked to push arm and legs while operating NuStep and simultaneously naming animals in alphabetical order. Pt required mod vcs for producing answers following category. Pt successful in naming 20 animals, activity terminated d/t difficulty producing answers, and observed fatigue. Pt demonstrated difficulty with dual tasking, requiring mod vcs for pushing arms and legs during pauses in responses. Focused on increased endurance for carryover with ADLs, dual tasking, working memory.    TA:  Pt performed wooden tangrams puzzles x2. Pt asked to follow visual model and insert pieces to match design using wooden shapes. Pt required min vcs for correcting errors in design, and for adding missing pieces. Focused on , sustained attention.      Assessment: Tolerated treatment well. Pt continues to move at a slow pace with decreased processing speed impacting her occupational performance. Pt required mod vcs for dual tasking while on NuStep, and for producing appropriate answers following category. Pt would benefit from continued OT services to address functional cognition and independence with daily activities.      Plan: Continued skilled OT per POC.

## 2024-05-22 ENCOUNTER — OFFICE VISIT (OUTPATIENT)
Facility: CLINIC | Age: 69
End: 2024-05-22
Payer: MEDICARE

## 2024-05-22 DIAGNOSIS — G31.84 MILD COGNITIVE IMPAIRMENT: Primary | ICD-10-CM

## 2024-05-22 DIAGNOSIS — Z78.9 DECREASED ACTIVITIES OF DAILY LIVING (ADL): ICD-10-CM

## 2024-05-22 PROCEDURE — 97530 THERAPEUTIC ACTIVITIES: CPT

## 2024-05-22 NOTE — PROGRESS NOTES
Daily Note     Today's date: 2024  Patient name: Yris Bowles  : 1955  MRN: [id number]  Referring provider: Teressa Prakash DO  Dx:   Encounter Diagnoses   Name Primary?    Mild cognitive impairment Yes    Decreased activities of daily living (ADL)            Start Time: 1545  Stop Time: 1630  Total time in clinic (min): 45 minutes    PLAN OF CARE START: 24  PLAN OF CARE END: 24  FREQUENCY: 2x/wk  PRECAUTIONS: PSYCH, fall risk, cognitive deficits       Subjective: Pt reported no changes today.       Objective: See treatment below.    TE:  Completed on nustep  aerobic priming for cognitive task- alphabet stating names required mod Vcs for problem solving and recall of alphabet sequence       TA:  Pt performed IQ stars task focusing on  skills, sustained attention with pt able to complete simple puzzles with min Vcs initially and progressed to independence- completed x 4 puzzles  Completed simple wheelbarrow puzzle with outline on back focusing on  skills, sustained attention, required min-moderate cues for problem solving.       Assessment: Tolerated treatment well. Pt requires increased time to complete tasks.  Pt would benefit from continued OT services to address functional cognition and independence with daily activities.      Plan: Continued skilled OT per POC.

## 2024-05-24 DIAGNOSIS — R13.10 DYSPHAGIA, UNSPECIFIED TYPE: ICD-10-CM

## 2024-05-25 RX ORDER — FAMOTIDINE 20 MG/1
20 TABLET, FILM COATED ORAL 2 TIMES DAILY
Qty: 180 TABLET | Refills: 1 | Status: SHIPPED | OUTPATIENT
Start: 2024-05-25

## 2024-05-29 ENCOUNTER — OFFICE VISIT (OUTPATIENT)
Facility: CLINIC | Age: 69
End: 2024-05-29
Payer: MEDICARE

## 2024-05-29 DIAGNOSIS — Z78.9 DECREASED ACTIVITIES OF DAILY LIVING (ADL): ICD-10-CM

## 2024-05-29 DIAGNOSIS — G31.84 MILD COGNITIVE IMPAIRMENT: Primary | ICD-10-CM

## 2024-05-29 PROCEDURE — 97530 THERAPEUTIC ACTIVITIES: CPT

## 2024-05-29 NOTE — PROGRESS NOTES
Daily Note     Today's date: 2024  Patient name: Yris Bowles  : 1955  MRN: [id number]  Referring provider: Teressa Prakash DO  Dx:   No diagnosis found.                     PLAN OF CARE START: 24  PLAN OF CARE END: 24  FREQUENCY: 2x/wk  PRECAUTIONS: PSYCH, fall risk, cognitive deficits       Subjective: Pt reported that her  was in the hospital for a few days but is feeling better. Pt reports that she sat out on the porch this weekend.      Objective: See treatment below.    TA:    Pt performed matching talent x1. Pt asked to rotate cubes and match them with design presented visually. Pt required min vcs and occlusion of design to reveal one row at a time d/t difficulty with  skills. Focused on , spatial relationships, sustained attention.     Pt performed direction following worksheet. Pt asked to read clues and determine whether the problem was solved correctly or incorrectly. Pt required min vcs for selecting the correct answer, and for reasoning through the problems. Focused on working memory, problem solving, sustained attention, logical reasoning.    Pt performed double spot activity while performing sit to stands per each piece inserted. Pt asked to follow visual model and insert pieces to match design. Pt then asked to follow color categories and state an item per each piece inserted. Pt required min vcs for following color categories and producing an item per each category        Assessment: Tolerated treatment well. Pt requires increased time to complete tasks.  Pt would benefit from continued OT services to address functional cognition and independence with daily activities.      Plan: Continued skilled OT per POC.

## 2024-05-31 ENCOUNTER — TELEPHONE (OUTPATIENT)
Dept: FAMILY MEDICINE CLINIC | Facility: CLINIC | Age: 69
End: 2024-05-31

## 2024-05-31 NOTE — TELEPHONE ENCOUNTER
Pt's son dropped off [employer] form for him to stay home and take care of her. In nurse pending 2, for Dr. Prakash. Please call when complete.

## 2024-06-03 ENCOUNTER — OFFICE VISIT (OUTPATIENT)
Facility: CLINIC | Age: 69
End: 2024-06-03
Payer: MEDICARE

## 2024-06-03 ENCOUNTER — TELEPHONE (OUTPATIENT)
Dept: NEUROLOGY | Facility: CLINIC | Age: 69
End: 2024-06-03

## 2024-06-03 DIAGNOSIS — Z78.9 DECREASED ACTIVITIES OF DAILY LIVING (ADL): ICD-10-CM

## 2024-06-03 DIAGNOSIS — G31.84 MILD COGNITIVE IMPAIRMENT: Primary | ICD-10-CM

## 2024-06-03 PROCEDURE — 97110 THERAPEUTIC EXERCISES: CPT | Performed by: OCCUPATIONAL THERAPIST

## 2024-06-03 PROCEDURE — 97530 THERAPEUTIC ACTIVITIES: CPT | Performed by: OCCUPATIONAL THERAPIST

## 2024-06-03 NOTE — PROGRESS NOTES
"Daily Note     Today's date: 6/3/2024  Patient name: Yris Bowles  : 1955  MRN: [id number]  Referring provider: Teressa Prakash DO  Dx:   Encounter Diagnoses   Name Primary?    Mild cognitive impairment Yes    Decreased activities of daily living (ADL)        Start Time: 1500  Stop Time: 1545  Total time in clinic (min): 45 minutes    PLAN OF CARE START: 24  PLAN OF CARE END: 24  FREQUENCY: 2x/wk  PRECAUTIONS: PSYCH, fall risk, cognitive deficits       Subjective: \"I'm afraid of falling\" (when preparing to sit)  Discussed referral to PT for assessment of balance/LE strength. Pt said she would think about it.      Objective: See treatment below.  TE:  -Nustep level 1 with Stroop Test dual task for cognitive priming, EF (impulse inhibition), and attention. Pt required max cues for direction following initially, improved to min cues. Accuracy overall ~40% with very slow pace. Completed for 5 minutes. Then continued with nustep for an additional 5 minutes at increased pace to increase HR in preparation for cognitive tabletop tasks.    TA:  -Spot it x10 minutes focusing on sustained attention. Required significantly increased time and min cues overall.  -BITS visual scanning with 26 letters completed to address sustained attention. Required min cues for correct sequence of letters, and took 3 minutes 30 seconds with 56% accuracy.  -STS transfers required CGA-Lizett and min verbal/tactile cues.         Assessment: Tolerated treatment well. Pt requires increased time to complete tasks, cues and assistance for STS transfers. Pt would benefit from continued OT services to address functional cognition and independence with daily activities.      Plan: Continued skilled OT per POC.      "

## 2024-06-04 NOTE — TELEPHONE ENCOUNTER
Pt's son called in stating that Jacinto (his friend) will be coming to  [place] paperwork for him. Per directions from clerical staff, I asked that she bring her ID with her for identification.

## 2024-06-05 ENCOUNTER — EVALUATION (OUTPATIENT)
Facility: CLINIC | Age: 69
End: 2024-06-05
Payer: MEDICARE

## 2024-06-05 DIAGNOSIS — G31.84 MILD COGNITIVE IMPAIRMENT: Primary | ICD-10-CM

## 2024-06-05 PROCEDURE — 97530 THERAPEUTIC ACTIVITIES: CPT

## 2024-06-05 NOTE — PROGRESS NOTES
"OCCUPATIONAL THERAPY Re- EVALUATION    Today's Date: 2024  Patient Name: Yris Bowles  : 1955  MRN: [id number]  Referring Provider: Teressa Prkaash DO  Dx: Mild cognitive impairment [G31.84]      Eval/ Re-eval POC expires Auth #/ Referral # Total units  Start date  Expiration date Extension                                                        Date               Units:  Used               Authed:  Remaining                    SKILLED ANALYSIS:  Pt seen for OT re-evaluation after ~2 month of OT services with focus on functional cognition retraining and inc independence with daily activities.  Completed today CMT and MOCA to assess cognition. Pt reports she had forgotten her glasses today and was unable to complete TM A/B.  Pt demonstrating improvements in visual memory on CMT. Pt demonstrating impairments in sustained attention, EF skills,  skills, STM and LTM. Recommending pt to complete therapy on a restorative approach for 4 more weeks and dependant on results of next re-assessment pt may benefit from maintenance due to her cognitive decline. Findings and recommendations discussed with pt, and they are in agreement.    COMPLETE TM  A AND B-pt forgot glasses      Educated pt on charges of insurance, acknowledge understanding, and are in agreement.    PLAN OF CARE START: 24  PLAN OF CARE END: 24  FREQUENCY: 2x/wk  PRECAUTIONS: PSYCH, fall risk, cognitive deficits    Subjective: \"I'm gonna tell you that I'll do the stuff, but then I'm not actually going to do it.\"  Educated pt on importance of participating in sessions and HEP to make progress and pt and daughter acknowledge understanding    Mechanism of Injury  Pt with multiple month h/o cognitive and functional deficits.  Per chart, cognitive deficits possibly related to psych drugs.    Occupational Profile  Pt resides with her  in a 1st floor apt with 6STE and laundry in the basement.   has been assisting with ADLs, but having " "significant difficulty.  Pt currently requires assist with sponge bathing, dressing, transfers, IADLs.  She owns a variety of LHAE s/p hip sx last year, but per daughter refuses to use it and generally refuses to participate in self care tasks.  Pt spends a majority of her day in bed.  She is retired from working as a CNA at a nursing home.  She is not a  and  assists with med mgmt.  Daughter reports 2 falls in the past 6 months, 1 resulting in a femur fracture.  Pt reports symptoms of apathy and depression, reports she sees a psychiatrist weekly and they have adjusted her meds.    PATIENT GOAL: \"to be a CNA again\"    HISTORY OF PRESENT ILLNESS:     PMH:   Past Medical History:   Diagnosis Date    Anesthesia complication     difficulty waking up    Anxiety     Arthritis     left knee    Bipolar 1 disorder (HCC)     Bone spur     left knee behing the knee cap    Cancer (HCC)     Chronic kidney disease     Chronic pain disorder     Colon cancer (HCC)     patient states about 2017    Colon polyp     Tubulovillous Adenoma High Grade Dysplasia - 2017    Depression     Diabetes mellitus (HCC)     Endometrial cancer (HCC)     grade I    Hx of bleeding disorder     vaginal bleeding started on 3/13/17     Hyperlipidemia     Hypertension     Hypomagnesemia 03/20/2023    Memory loss     PONV (postoperative nausea and vomiting)     Psychiatric disorder     Psychiatric illness     Psychosis (HCC)     Shortness of breath     with exertion    Sleep difficulties     Urinary incontinence     Vitamin D deficiency        Past Surgical Hx:   Past Surgical History:   Procedure Laterality Date    BREAST BIOPSY Left     benign-maybe at ar age 63    CHOLECYSTECTOMY      open    COLONOSCOPY      DILATION AND CURETTAGE OF UTERUS N/A 04/05/2017    Procedure: DILATATION AND CURETTAGE (D&C);  Surgeon: Primitivo Weber MD;  Location: [facility] MAIN OR;  Service:     HYSTERECTOMY      OOPHORECTOMY      PELVIC LAPAROSCOPY      ME ARTHRP " KNE CONDYLE&PLATU MEDIAL&LAT COMPARTMENTS Left 2022    Procedure: ARTHROPLASTY KNEE TOTAL W ROBOT - CEMENTED - LEFT;  Surgeon: Juan Jose Guerra DO;  Location: [facility] MAIN OR;  Service: Orthopedics    AZ LAPS TOTAL HYSTERECT 250 GM/< W/RMVL TUBE/OVARY N/A 2017    Procedure: ROBOTIC ASSISTED TOTAL LAPAROSCOPIC HYSTERECTOMY, BILATERAL SALPINGOOPHERECTOMY; CYSTOSCOPY;  Surgeon: Damir Reyes MD;  Location:  MAIN OR;  Service: Gynecology Oncology    AZ OPTX FEM SHFT FX W/INSJ IMED IMPLT W/WO SCREW Left 3/20/2023    Procedure: INSERTION NAIL IM FEMUR RETROGRADE;  Surgeon: Terry White MD;  Location: [facility] MAIN OR;  Service: Orthopedics    REPLACEMENT TOTAL KNEE      TONSILLECTOMY      TUBAL LIGATION          Pain:    Functional Cognition:  Highest level of education: [facility]    Andres Cognitive Assessment Version 8.1 (MoCA V8.1)  Visuospatial/executive functionin5  Namin/3  Memory: 1st trial:  3/5, 2nd trial:  5  Attention/concentration: 1/2  List of letters: 1/  Serial Seven Subtraction:  0/3   Language/sentence repetition:  1/2  Language Fluency:  0/1, 2 words  Abstract/Correlational Thinkin2  Delayed Recall:  0/5  Orientation:  2/6.  Incorrect month, unable to think of [facility], date and day   Memory Index Score: 5/15  MoCA V1 8.3 Raw Score:  9+1=10/30, MIS:  5/15, indicative of moderate neurocognitive impairments.    MoCA Scoring        Normal: 26+         Mild Cognitive Impairment: 18-25          Moderate Cognitive Impairment: 10-17         Severe Cognitive Impairment: <10    Trail making Part A and Part B: PLEASE ASSESS NEXT SESSION, PT DIDN'T BRING HER GLASSES    Contextual Memory Test:  Immediate:   Delayed:     GOALS:   Short Term Goals:  Pt will demonstrate improved overall functional cognition by scoring at least 12/30 on MoCA for carry over with daily activities  Pt will demonstrate improved immediate and delayed visual memory by scoring at least 9, 6 on immediate and  delayed portions of CMT  Pt will demonstrate improved sustained attn by completing Trail Making Test A within 1:35 with no more than 2 cues  Pt will demonstrate improved divided attn by completing Trail Making Test B within 10 min with no more than 10 cues  Pt will complete UB dressing and bathing with Lizett with use of DME/AD as appropriate per family report  Pt will complete LB dressing and bathing with modA with use of DME/AD as appropriate per family report      Short Term Goals:  Pt will demonstrate improved overall functional cognition by scoring at least 15/30 on MoCA for carry over with daily activities  Pt will demonstrate improved immediate and delayed visual memory by scoring at least 11, 8 on immediate and delayed portions of CMT  Pt will demonstrate improved sustained attn by completing Trail Making Test A within 1:15 with no more than 2 cues  Pt will demonstrate improved divided attn by completing Trail Making Test B within 10 min with no more than 5 cues  Pt will complete UB dressing and bathing with supervision/setup with use of DME/AD as appropriate per family report  Pt will complete LB dressing and bathing with Lizett with use of DME/AD as appropriate per family report    OTHER PLANNED THERAPY INTERVENTIONS:     Closed chain activities  Open chain activities  Internal and external memory aides  Multimatrix for saccades/ visual clutter/attention  Hypersensitivity strategies education  Multi-modal environment  Sustained/alternating/divided attention  Work stations with timed transitions  Temporal Awareness  Memory and mental manipulation  Auditory processing with immediate recall  Memory retention with immediate and delayed recall  Edu on cog/vision apps

## 2024-06-06 ENCOUNTER — TELEPHONE (OUTPATIENT)
Dept: NEPHROLOGY | Facility: CLINIC | Age: 69
End: 2024-06-06

## 2024-06-06 ENCOUNTER — APPOINTMENT (OUTPATIENT)
Dept: LAB | Facility: HOSPITAL | Age: 69
End: 2024-06-06
Payer: MEDICARE

## 2024-06-06 DIAGNOSIS — E22.2 SIADH (SYNDROME OF INAPPROPRIATE ADH PRODUCTION) (HCC): ICD-10-CM

## 2024-06-06 DIAGNOSIS — E87.1 HYPONATREMIA: Primary | ICD-10-CM

## 2024-06-06 LAB
ANION GAP SERPL CALCULATED.3IONS-SCNC: 21 MMOL/L (ref 4–13)
BUN SERPL-MCNC: 16 MG/DL (ref 5–25)
CALCIUM SERPL-MCNC: 9.6 MG/DL (ref 8.4–10.2)
CHLORIDE SERPL-SCNC: 91 MMOL/L (ref 96–108)
CO2 SERPL-SCNC: 26 MMOL/L (ref 21–32)
CREAT SERPL-MCNC: 1.04 MG/DL (ref 0.6–1.3)
GFR SERPL CREATININE-BSD FRML MDRD: 54 ML/MIN/1.73SQ M
GLUCOSE SERPL-MCNC: 209 MG/DL (ref 65–140)
POTASSIUM SERPL-SCNC: 4.2 MMOL/L (ref 3.5–5.3)
SODIUM SERPL-SCNC: 138 MMOL/L (ref 135–147)

## 2024-06-06 PROCEDURE — 80048 BASIC METABOLIC PNL TOTAL CA: CPT

## 2024-06-06 PROCEDURE — 36415 COLL VENOUS BLD VENIPUNCTURE: CPT

## 2024-06-06 NOTE — TELEPHONE ENCOUNTER
I called the patient's daughter, Jenifer, and we spoke over the phone.   Recent laboratory data were reviewed.     Lab Results   Component Value Date    SODIUM 138 06/06/2024    K 4.2 06/06/2024    CL 91 (L) 06/06/2024    CO2 26 06/06/2024    BUN 16 06/06/2024    CREATININE 1.04 06/06/2024    GLUC 209 (H) 06/06/2024    CALCIUM 9.6 06/06/2024     Anion gap is unusually high at 21 but CO2 is normal.   Suspect lab error.   Patient without acute complaints.     Plan:  Repeat BMP.

## 2024-06-07 ENCOUNTER — TELEPHONE (OUTPATIENT)
Dept: OTHER | Facility: HOSPITAL | Age: 69
End: 2024-06-07

## 2024-06-07 ENCOUNTER — OFFICE VISIT (OUTPATIENT)
Dept: NEUROLOGY | Facility: CLINIC | Age: 69
End: 2024-06-07
Payer: MEDICARE

## 2024-06-07 ENCOUNTER — APPOINTMENT (OUTPATIENT)
Dept: LAB | Facility: HOSPITAL | Age: 69
End: 2024-06-07
Payer: MEDICARE

## 2024-06-07 VITALS
WEIGHT: 178 LBS | SYSTOLIC BLOOD PRESSURE: 170 MMHG | DIASTOLIC BLOOD PRESSURE: 90 MMHG | HEART RATE: 80 BPM | BODY MASS INDEX: 30.39 KG/M2 | HEIGHT: 64 IN

## 2024-06-07 DIAGNOSIS — E22.2 SIADH (SYNDROME OF INAPPROPRIATE ADH PRODUCTION) (HCC): ICD-10-CM

## 2024-06-07 DIAGNOSIS — F41.9 ANXIETY: ICD-10-CM

## 2024-06-07 DIAGNOSIS — F31.5 BIPOLAR AFFECTIVE DISORDER, DEPRESSED, SEVERE, WITH PSYCHOTIC BEHAVIOR (HCC): Chronic | ICD-10-CM

## 2024-06-07 DIAGNOSIS — R68.89 FORGETFULNESS: ICD-10-CM

## 2024-06-07 DIAGNOSIS — R07.9 CHEST PAIN IN ADULT: ICD-10-CM

## 2024-06-07 DIAGNOSIS — G31.84 MCI (MILD COGNITIVE IMPAIRMENT): Primary | ICD-10-CM

## 2024-06-07 LAB
ANION GAP SERPL CALCULATED.3IONS-SCNC: 10 MMOL/L (ref 4–13)
BUN SERPL-MCNC: 16 MG/DL (ref 5–25)
CALCIUM SERPL-MCNC: 9.4 MG/DL (ref 8.4–10.2)
CHLORIDE SERPL-SCNC: 96 MMOL/L (ref 96–108)
CO2 SERPL-SCNC: 26 MMOL/L (ref 21–32)
CREAT SERPL-MCNC: 0.96 MG/DL (ref 0.6–1.3)
GFR SERPL CREATININE-BSD FRML MDRD: 60 ML/MIN/1.73SQ M
GLUCOSE SERPL-MCNC: 204 MG/DL (ref 65–140)
POTASSIUM SERPL-SCNC: 4.2 MMOL/L (ref 3.5–5.3)
SODIUM SERPL-SCNC: 132 MMOL/L (ref 135–147)

## 2024-06-07 PROCEDURE — 99215 OFFICE O/P EST HI 40 MIN: CPT | Performed by: NURSE PRACTITIONER

## 2024-06-07 PROCEDURE — 80048 BASIC METABOLIC PNL TOTAL CA: CPT

## 2024-06-07 PROCEDURE — 36415 COLL VENOUS BLD VENIPUNCTURE: CPT

## 2024-06-07 RX ORDER — PHENOL 1.4 %
600 AEROSOL, SPRAY (ML) MUCOUS MEMBRANE 2 TIMES DAILY WITH MEALS
COMMUNITY

## 2024-06-07 RX ORDER — SERTRALINE HYDROCHLORIDE 100 MG/1
50 TABLET, FILM COATED ORAL 2 TIMES DAILY
COMMUNITY
Start: 2024-06-02

## 2024-06-07 NOTE — PROGRESS NOTES
Patient ID: Yris Bowles is a 69 y.o. female.    Assessment/Plan:       Diagnoses and all orders for this visit:    MCI (mild cognitive impairment)    Forgetfulness    Bipolar affective disorder, depressed, severe, with psychotic behavior (HCC)    Anxiety     Things to consider:  MRI of the Brain w/wo contrast NeuroQuant study to eval hippocampus size for neurodegeneration  NeuroPsych evaluation to assist in sorting out the neuro vs psych component  Memantine 10mg daily for memory enhancement  Continue with OT for cognitive therapy   Follow up with the Neurology office as needed    Subjective/HPI:  Yris Bowles is a 70yo female with PMH of anxiety, bipolar 1, CKD, colon/cervical CA, DM, HLD, HTN, vitamin D deficiencies and sleep difficulties who follows in the outpatient neurology office for medical evaluation and management of memory change.  Patient had hospitalization in March 2023, during her hospitalization there were concerns raised with regards to changes in her memory and cognitive functions.  She had been on Wellbutrin but was discontinued due to hallucinations and confusion.  She was then placed on Lexapro in February 2023.  There were some concerns of hyponatremia therefore continue to observe her levels over period of time.  Patient had polypharmacy with Geodon, clonazepam in addition to the Lexapro raising concerns for her psychiatric polypharmacy etiology however neurological evaluation was also completed with MRI of the brain.  MRI of the brain was negative for any acute ischemic event, no other acute abnormalities were seen.    Patient was having some other metabolic issues during her hospitalization, was discussed with the patient's family follow-up in the outpatient clinic for further evaluations of possible dementia versus memory loss evaluations.  Patient was discharged from the hospital and went to acute rehabilitation.  He was there they started her on Remeron as well.  Patient was then seen  in the outpatient neurology office on 10/9/2023, she presented to the office by herself however her  was in the car waiting for her.  We attempted to do a MoCA testing however patient became very frustrated and was unable to complete the testing.  Due to her increased frustration, the testing was stopped.  Patient reported being unaware as to why she was having neurological consultation.  Stated during her hospitalization she did not have any memory of having neurological issues.  Patient states she suffered a fall with a slipped in the bathtub, she fell from the seat.  Stated her  tried to catch her but was unable to.  She did not feel in any way her issues were neurologically related.    We did have some discussion at that point in time with regards to her memory.  Patient stated at times she does forget things but for the most part she was not concerned.  She states she has good days and bad days overall.    Patient reports she does have psychiatric issues and is on multiple medications, she has difficulties remembering her medications therefore was reviewed with her daughter telephonically.  During her office evaluation she was noted to have shaking of the right lower extremity, she reports that it happens all the time and notes that she is had some shaking in her hands as well.  She presents with hypophonia and notes that this is atypical of her vocal tone.  She had masked facies but was able to articulate her words and needs clearly.  She has delay in her reception and her responses and is often expressionless during conversation.  Patient states she is limited to ambulation due to her femur fracture, she uses a rolling walker for stability.  She ambulates with a forward flexion with the use of her walker however her gait testing is limited due to tendinitis, post femur fracture and weakness related to the same.  Patient reports some visual changes with blurred vision however noted it was  progressing over time.  We discussed her options with regards to memory workup and dementia screening.  We talked about MRI with neuro quant to evaluate the hippocampal size and evaluate for atypical atrophy.  We talked about neuropsych evaluations 2 separate psych versus neurological etiology of her memory issues.  Patient was not interested in either of these options.  We talked about OT for memory and cognitive therapies.  She did not have much concerns and was not considering OT at that time but noted that she would discuss it with her family and consider it.  We talked about obtaining labs to assess for reversible causes leading to memory issues and memory loss including her vitamin levels and RPR.  Patient already had TSH level which was normal.  Patient was no longer on the Geodon and Lexapro as of her last office appointment.  She was taking Remeron, Zoloft, Depakote and Klonopin.  Suspected at that time possible secondary PD related to her psychiatric medications.  She did not feel as though they impacted her physically however may have some mentation changes related to her medications.  We talked about memory enhancing medications pending outcomes of Mini-Mental examination however patient was not willing to discuss at that time.  We talked about Mediterranean type diet, increasing her exercise and hydration.  Patient reports at this time currently she sits at home and watches TV without much exercise at all.  Patient is on fluid restrictions related to secondary hyponatremia.    Patient returns to neurology office today, she is accompanied by her daughter who assisted in providing medical information during this clinical evaluation.  Patient had Mini-Mental examination completed, she tolerated this better today.  She scored 24/30.  Personal review of patient's most recent lab works including a Depakote level which was 63.  She had an A1c of 7.2 and a cholesterol of 159 with LDL of 50.  Patient continues  on Depakote, clonazepam, Zoloft patient reports that she was switched off of Geodon/Remeron on Latuda.  Patient's daughter issues some concerns with regards to her lack of motivation at home.  She hollers out inappropriately and has some behaviors that are concerning for possible dementia.  She reports that she does not get up much and exercise, she does not get up to take care of her own oral hydration needs.  She reports she will holler from the couch to her  to get her drinks either refrigerator.  She has some increased difficulties with physical mobility including weakness in the left lower extremity second to her femur fracture however she has had physical therapy and had improvements over time but she does not continue her home exercise program.  Overall it seems as if patient lacks motivation to take care of herself.  She is unable by her  who prefers to follow her needs rather than get hollered out which is what happens when he does not do it she asks.  Patient states that she is able to get her own drinks and get up and move around, she notes that sometimes she just does not want to and knows that her  will do this for her.  Per patient's daughter she is having ongoing issues with urinary incontinence which is not something that she has had in the past.  She sleeps during the day, is always fatigued.  She does snore at night however is aware there is a possibility of sleep apnea.  Discussed with her last echocardiogram done in October 2022 where she had slightly dilated atrium.  There is a possibility of some degree of sleep apnea which may be causing her some increased daytime fatigue.  Per the daughter she would not be able to tolerate the BiPAP/CPAP mask did not think it would be worth doing the testing for that reason.  Patient's daughter recounted an episode where patient's  had a doctor's office appointment, he took her with them and she stayed in the car while he went in  for his appointment.  Patient proceeded to continuously honk on the horn until somebody paid attention and was able to assist her.  Patient stated she saw a man she was afraid was going to try to get in the car so she used the horn to alert people.  Reviewed patient's MRI with the daughter, no significant enlargement of the ventricles, she does have bilateral temporal atrophy possibly related to some form of vascular versus other cause of dementia.  We talked about her Mini-Mental examination, scored 24/30.  Patient is already undergoing OT for cognitive therapies will likely be having PT next for her knee as she has had increased weakness due to immobility.  We discussed starting on Aricept however patient has had issues with loose stools, would defer this at this time.  We talked about memantine 10 mg daily initially for memory enhancement in combination with OT.  Patient does not wish to take any new medications, patient's daughter prefers no additional medication at this time either however would consider this if it would proved to be helpful.  They will continue to talk about this among some cells however at this time following patient's wishes no additional medications have been ordered.  We talked about oral hydration and nutrition, ideas were provided on patient's AVS for food sources to help nourish the brain.  Patient has psychiatric follow-up which the patient's daughter will have conversation regarding some of the medications, likely depression/anxiety and decreased motivation.  We talked about possible Senior program however this does not appear to be eligible in this patient's general vicinity, would need to go to [place] for this kind program and patient's  cannot drive that far.  They will talk about psychiatric medications and psychiatric etiology for some of patient's behaviors at her next office appointment.  In the meantime no additional neurological needs are assessed.  She has no focal  deficits on her exam.  No additional diagnostics or interventions at this time.    Patient's daughter will continue to evaluate and discuss memory enhancing medications with her occupational therapy, they will reach out to neurology if they feel they want to make a change regarding medicating the patient.  In the meantime they will continue to follow with psychiatry.  They can follow-up in the outpatient neurology office as needed, daughter is aware she can contact us at any time for any questions or needs.      The following portions of the patient's history were reviewed and updated as appropriate: allergies, current medications, past family history, past medical history, past social history, past surgical history, and problem list.      Past Medical History:   Diagnosis Date    Anesthesia complication     difficulty waking up    Anxiety     Arthritis     left knee    Bipolar 1 disorder (HCC)     Bone spur     left knee behing the knee cap    Cancer (HCC)     Chronic kidney disease     Chronic pain disorder     Colon cancer (HCC)     patient states about 2017    Colon polyp     Tubulovillous Adenoma High Grade Dysplasia - 2017    Depression     Diabetes mellitus (HCC)     Endometrial cancer (HCC)     grade I    Hx of bleeding disorder     vaginal bleeding started on 3/13/17     Hyperlipidemia     Hypertension     Hypomagnesemia 03/20/2023    Memory loss     PONV (postoperative nausea and vomiting)     Psychiatric disorder     Psychiatric illness     Psychosis (HCC)     Shortness of breath     with exertion    Sleep difficulties     Urinary incontinence     Vitamin D deficiency        Past Surgical History:   Procedure Laterality Date    BREAST BIOPSY Left     benign-maybe at ar age 63    CHOLECYSTECTOMY      open    COLONOSCOPY      DILATION AND CURETTAGE OF UTERUS N/A 04/05/2017    Procedure: DILATATION AND CURETTAGE (D&C);  Surgeon: Primitivo Weber MD;  Location: [facility] MAIN OR;  Service:     HYSTERECTOMY       OOPHORECTOMY      PELVIC LAPAROSCOPY      SD ARTHRP KNE CONDYLE&PLATU MEDIAL&LAT COMPARTMENTS Left 12/06/2022    Procedure: ARTHROPLASTY KNEE TOTAL W ROBOT - CEMENTED - LEFT;  Surgeon: Juan Jose Guerra DO;  Location: [facility] MAIN OR;  Service: Orthopedics    SD LAPS TOTAL HYSTERECT 250 GM/< W/RMVL TUBE/OVARY N/A 05/04/2017    Procedure: ROBOTIC ASSISTED TOTAL LAPAROSCOPIC HYSTERECTOMY, BILATERAL SALPINGOOPHERECTOMY; CYSTOSCOPY;  Surgeon: Damir Reyes MD;  Location:  MAIN OR;  Service: Gynecology Oncology    SD OPTX FEM SHFT FX W/INSJ IMED IMPLT W/WO SCREW Left 3/20/2023    Procedure: INSERTION NAIL IM FEMUR RETROGRADE;  Surgeon: Terry White MD;  Location: [facility] MAIN OR;  Service: Orthopedics    REPLACEMENT TOTAL KNEE      TONSILLECTOMY      TUBAL LIGATION         Social History     Socioeconomic History    Marital status: /Civil Union     Spouse name: None    Number of children: None    Years of education: None    Highest education level: None   Occupational History    None   Tobacco Use    Smoking status: Never     Passive exposure: Never    Smokeless tobacco: Never   Vaping Use    Vaping status: Never Used   Substance and Sexual Activity    Alcohol use: Never    Drug use: No    Sexual activity: Not Currently     Birth control/protection: Post-menopausal, Surgical   Other Topics Concern    None   Social History Narrative    None     Social Determinants of Health     Financial Resource Strain: Low Risk  (7/28/2023)    Overall Financial Resource Strain (CARDIA)     Difficulty of Paying Living Expenses: Not hard at all   Food Insecurity: No Food Insecurity (3/21/2023)    Hunger Vital Sign     Worried About Running Out of Food in the Last Year: Never true     Ran Out of Food in the Last Year: Never true   Transportation Needs: No Transportation Needs (7/28/2023)    PRAPARE - Transportation     Lack of Transportation (Medical): No     Lack of Transportation (Non-Medical): No   Physical Activity: Not on file    Stress: Not on file   Social Connections: Feeling Socially Integrated (4/11/2023)    Received from [facility]    OASIS : Social Isolation     Frequency of experiencing loneliness or isolation: Never   Intimate Partner Violence: Not on file   Housing Stability: Low Risk  (3/21/2023)    Housing Stability Vital Sign     Unable to Pay for Housing in the Last Year: No     Number of Places Lived in the Last Year: 1     Unstable Housing in the Last Year: No       Family History   Problem Relation Age of Onset    Cancer Mother         Renal    Heart disease Father         CHF    Dementia Sister     Heart disease Sister         atrial septal defect    Dementia Sister     Breast cancer Paternal Aunt 45         Current Outpatient Medications:     acetaminophen (TYLENOL) 325 mg tablet, Take 3 tablets (975 mg total) by mouth 2 (two) times a day (Patient taking differently: Take 975 mg by mouth every 6 (six) hours as needed), Disp: , Rfl: 0    aspirin (ECOTRIN LOW STRENGTH) 81 mg EC tablet, Take 1 tablet (81 mg total) by mouth daily, Disp: 30 tablet, Rfl: 0    BD Pen Needle Chelsey 2nd Gen 32G X 4 MM MISC, USE 1  TWICE DAILY, Disp: 60 each, Rfl: 0    calcium carbonate (OS-MARVEL) 600 MG tablet, Take 600 mg by mouth 2 (two) times a day with meals, Disp: , Rfl:     cholecalciferol (VITAMIN D3) 1,000 units tablet, Take 1,000 Units by mouth daily, Disp: , Rfl:     clonazePAM (KlonoPIN) 0.5 mg tablet, TAKE 1/2 (ONE-HALF) TO ONE TABLET BY MOUTH ONCE DAILY AS NEEDED. WATCH FOR SEDATION, Disp: , Rfl:     Diclofenac Sodium (VOLTAREN) 1 %, Apply 4 g topically 4 (four) times a day, Disp: 100 g, Rfl: 1    divalproex sodium (DEPAKOTE ER) 500 mg 24 hr tablet, TAKE 1 TABLET BY MOUTH EVERY DAY AT BEDTIME WATCH FOR SEDATION, Disp: , Rfl:     docusate sodium (COLACE) 100 mg capsule, Take 100 mg by mouth 2 (two) times a day, Disp: , Rfl:     famotidine (PEPCID) 20 mg tablet, Take 1 tablet (20 mg total) by mouth 2 (two) times  a day, Disp: 180 tablet, Rfl: 1    gabapentin (NEURONTIN) 100 mg capsule, TAKE 1 CAPSULE BY MOUTH TWICE DAILY ( 8AM AND 6PM) AND 2 CAPSULE AT BEDTIME, Disp: 120 capsule, Rfl: 5    lidocaine (LIDODERM) 5 %, Apply 1 patch topically over 12 hours daily Remove & Discard patch within 12 hours or as directed by MD Do not start before May 31, 2023. (Patient taking differently: Apply 1 patch topically as needed Remove & Discard patch within 12 hours or as directed by MD), Disp: 30 patch, Rfl: 0    metFORMIN (GLUCOPHAGE-XR) 500 mg 24 hr tablet, Take 1 tablet by mouth once daily with breakfast, Disp: 90 tablet, Rfl: 1    metoprolol succinate (TOPROL-XL) 25 mg 24 hr tablet, Take 1 tablet (25 mg total) by mouth daily, Disp: 90 tablet, Rfl: 1    Multiple Vitamin (multivitamin) tablet, Take 1 tablet by mouth daily, Disp: 90 tablet, Rfl: 1    Multiple Vitamin (One-Daily Multi-Vitamin) TABS, Take 1 tablet by mouth once daily, Disp: 90 tablet, Rfl: 1    nitroglycerin (NITROSTAT) 0.4 mg SL tablet, Place 1 tablet (0.4 mg total) under the tongue every 5 (five) minutes as needed for chest pain, Disp: 30 tablet, Rfl: 1    Omega-3 Fatty Acids (FISH OIL PO), Take 2,000 mg by mouth, Disp: , Rfl:     rosuvastatin (CRESTOR) 20 MG tablet, Take 1 tablet (20 mg total) by mouth daily, Disp: 90 tablet, Rfl: 1    sertraline (ZOLOFT) 100 mg tablet, Take 50 mg by mouth 2 (two) times a day, Disp: , Rfl:     sodium chloride 1 g tablet, Take 1 tablet (1 g total) by mouth 3 (three) times a day, Disp: 270 tablet, Rfl: 0    torsemide (DEMADEX) 5 MG tablet, Take 1 tablet (5 mg total) by mouth daily, Disp: 90 tablet, Rfl: 2    lurasidone (LATUDA) 40 mg tablet, 40 mg 40 mg at 8 am, 40 mg 12 pm , 80 mg at 6 pm (Patient not taking: Reported on 2/26/2024), Disp: , Rfl:     lurasidone (LATUDA) 80 mg tablet, Take 1 tablet (80 mg total) by mouth daily with dinner (Patient not taking: Reported on 6/7/2024), Disp: 30 tablet, Rfl: 0    sertraline (ZOLOFT) 50 mg  "tablet, Take 50 mg by mouth 2 (two) times a day (Patient not taking: Reported on 6/7/2024), Disp: , Rfl:     Allergies   Allergen Reactions    Wellbutrin [Bupropion] Anxiety     Patient has tried Wellbutrin which resulted in increased paranoia. Patient wishes to not try this medication again.        Blood pressure 170/90, pulse 80, height 5' 4\" (1.626 m), weight 80.7 kg (178 lb), not currently breastfeeding.               Objective:    Blood pressure 170/90, pulse 80, height 5' 4\" (1.626 m), weight 80.7 kg (178 lb), not currently breastfeeding.    Physical Exam  Vitals reviewed.   Constitutional:       Appearance: Normal appearance. She is well-developed.   HENT:      Head: Normocephalic.      Nose: Nose normal.      Mouth/Throat:      Mouth: Mucous membranes are moist.   Eyes:      General: Lids are normal.      Extraocular Movements: Extraocular movements intact.      Pupils: Pupils are equal, round, and reactive to light.   Pulmonary:      Effort: Pulmonary effort is normal.   Abdominal:      Palpations: Abdomen is soft.   Musculoskeletal:      Cervical back: Normal range of motion.   Skin:     General: Skin is warm and dry.   Neurological:      Mental Status: She is alert.   Psychiatric:         Attention and Perception: Attention and perception normal.         Mood and Affect: Mood normal. Affect is flat.         Speech: Speech normal.         Behavior: Behavior normal. Behavior is cooperative.         Thought Content: Thought content normal.         Cognition and Memory: Memory normal. Cognition is impaired.         Judgment: Judgment is inappropriate.         Neurological Exam  Mental Status  Alert. Oriented to person, place, time and situation. Memory is normal. Recent and remote memory are intact. Speech is normal. Language is fluent with no aphasia. Attention and concentration are normal. Fund of knowledge is appropriate for level of education.    Cranial Nerves  CN II: Visual acuity is normal. Visual " fields full to confrontation.  CN III, IV, VI: Extraocular movements intact bilaterally. Normal lids and orbits bilaterally. Pupils equal round and reactive to light bilaterally.  CN V: Facial sensation is normal.  CN VII: Full and symmetric facial movement. Masked facies noted at rest.  CN VIII: Hearing is normal.  CN IX, X: Palate elevates symmetrically. Normal gag reflex.  CN XI: Shoulder shrug strength is normal.  CN XII: Tongue midline without atrophy or fasciculations.    Motor  Normal muscle bulk throughout. Normal muscle tone. No abnormal involuntary movements. Strength is 5/5 in all four extremities except as noted. No pronator drift.                                             Right                     Left   Iliopsoas                                                        5-   Quadriceps                                                    5-    Sensory  Light touch is normal in upper and lower extremities. Temperature is normal in upper and lower extremities. Vibration is normal in upper and lower extremities. Proprioception is normal in upper and lower extremities.     Reflexes  Deep tendon reflexes are 2+ and symmetric except as noted.                                            Right                      Left  Patellar                                                        Tr    Right pathological reflexes: Samara's absent.  Left pathological reflexes: Samara's absent.    Coordination  Right: Finger-to-nose normal.Left: Finger-to-nose normal.    Gait  Casual gait: Wide stance. Reduced stride length. Unable to rise from chair without using arms.  Patient using rolling walker for gait stability and assistance, slightly forward flexion minimal shuffle noted.        ROS:    Review of Systems   Constitutional:  Negative for appetite change, fatigue and fever.   HENT:  Positive for trouble swallowing. Negative for hearing loss, tinnitus and voice change.    Eyes: Negative.  Negative for photophobia, pain  and visual disturbance.   Respiratory: Negative.  Negative for shortness of breath.    Cardiovascular: Negative.  Negative for palpitations.   Gastrointestinal: Negative.  Negative for nausea and vomiting.   Endocrine: Negative.  Negative for cold intolerance.   Genitourinary: Negative.  Negative for dysuria, frequency and urgency.   Musculoskeletal:  Positive for gait problem and neck stiffness. Negative for back pain, myalgias and neck pain.   Skin: Negative.  Negative for rash.   Allergic/Immunologic: Negative.    Neurological:  Positive for tremors, weakness (legs and arms) and numbness (legs). Negative for dizziness, seizures, syncope, facial asymmetry, speech difficulty, light-headedness and headaches.   Hematological: Negative.  Does not bruise/bleed easily.   Psychiatric/Behavioral: Negative.  Negative for confusion, hallucinations and sleep disturbance.          ROS reviewed and discussed with the patient and her daughter.  I have spent a total time of 45 minutes on 06/07/24 in caring for this patient including Diagnostic results, Risks and benefits of tx options, Instructions for management, Patient and family education, Impressions, Counseling / Coordination of care, and Reviewing / ordering tests, medicine, procedures  .

## 2024-06-07 NOTE — PATIENT INSTRUCTIONS
Things to consider:  MRI of the Brain w/wo contrast NeuroQuant study to eval hippocampus size for neurodegeneration  NeuroPsych evaluation to assist in sorting out the neuro vs psych component  Memantine 10mg daily for memory enhancement  Continue with OT for cognitive therapy   Follow up with the Neurology office as needed    Memory Loss in Older Adults   AMBULATORY CARE:   Some memory loss  is common with aging. You may have sharp long-term memories from many years ago but have trouble remembering new information. Normal memory loss does not get worse and does not affect daily activities. Memory loss that gets worse over time or affects daily activities can be a sign of a serious medical problem, such as Alzheimer disease. Talk with your healthcare provider if you or someone close to you notices that your memory is worsening.  Signs and symptoms of memory loss that may happen with aging:   Not remembering where you put your keys or glasses    Trouble recalling a familiar person's name, but then remembering it    Trouble remembering why you walked into a room    Missing an appointment because you forgot about it    Forgetting someone's birthday or an anniversary    Signs and symptoms of severe memory loss:  The following may be signs of a more serious health problem that needs treatment:  Not knowing how to do something you used to do    Trouble learning new facts, or trouble learning skills that need you to remember steps    Trouble following directions, or getting lost, even in an area you know    Not remembering if you took your medicine or finished a task    Not being able to remember events from your past, such as a trip you took    Thinking events that happened years ago happened recently    Forgetting to bathe, brush your teeth, or do other daily care tasks    Not recognizing a family member or friend you have known a long time    You or someone close to you should contact your healthcare provider if:   You  have new, sudden, or worsening memory problems.    You have questions or concerns about your condition or care.    Follow up with your healthcare provider as directed:  You may need to have regular memory tests to check for new or worsening problems. Write down your questions so you remember to ask them during your visits.  Manage memory loss:  Some memory loss cannot be treated, but you may be able to stop it from getting worse. Your healthcare provider may need to stop or change certain medicines you are taking, or change the dose. The provider may also recommend vitamins or supplements to help improve your memory. The following are ways to help manage memory loss:  Ask someone to help you if needed.  Ask the person to help you create lists of things you need to do or set up medicine reminders. The person might be able to call you to remind you of an upcoming task, event, or anniversary.    Find a place for items you use often.  You may be able to remember where your keys, wallet, glasses, or other items are if you create a place for each item. It may also help if you say that you are returning an item to its place. This may trigger your memory later when you are looking for the item.    Set up reminders.  Calendars, timers, or alarm clocks can help you remember to do tasks such as taking your medicine. Some medicine dispensers can be set to sound an alarm when it is time to take the medicine. Containers are also available that are labelled for each day of the week. These containers can help you remember if you need to take the medicine or already took it. You may be able to set up reminders with your bank to help you remember to pay your bills on time.    Write down anything you need to remember.  Examples include upcoming healthcare appointments and medication refills. Put the reminders where you will see them, such as on your bathroom mirror or refrigerator. You may want to make a list of things you need to do  each day. You can cross the item off when the task is finished.     Create a quiet learning environment.  This may help you remember new information more easily. Try to find a place where you will not be distracted by noise, light, or other people. Go slowly and repeat the information to help you remember.    Prevent your memory loss from getting worse:   Play brain games.  Games such as crossword puzzles, jigsaw puzzles, or math games can help sharpen your memory.     Spend time with other people.  This can help strengthen your memory, especially if you talk about past or upcoming events.     Take a class to learn something new.  This will help you think in new ways and make your memory work harder.     Eat a variety of healthy foods.  Healthy foods include fruits, vegetables, low-fat dairy products, lean meats, fish, whole-grain breads, and cooked beans. Eat more foods with high amounts of omega-3 fatty acids. Examples include salmon, tuna, walnuts, and flaxseeds. Ask your healthcare provider for a list of foods that contain fatty acids and how much you should eat each day.         Exercise regularly.  Exercise improves blood flow and can help you think and remember more easily. Most healthy adults should try to get at least 30 minutes of exercise on most days of the week. Ask your healthcare provider how much exercise you need and which exercises are best for you.     Create a sleep routine.  Try to go to bed and wake up at the same times each day. Sleep is important for memory. Talk to your healthcare provider if you are having trouble sleeping.    Do not smoke.  Nicotine and other chemicals in cigarettes and cigars can reduce the amount of oxygen going to your brain. This can make memory problems worse. Ask your healthcare provider for information if you currently smoke and need help to quit. E-cigarettes or smokeless tobacco still contain nicotine. Talk to your healthcare provider before you use these  products.     Limit or do not drink alcohol.  Alcohol can lead to both short-term and long-term memory problems. Ask your healthcare provider if alcohol is safe for you, and how much is safe to drink.    © Copyright [employer] 2023 Information is for End User's use only and may not be sold, redistributed or otherwise used for commercial purposes.  The above information is an  only. It is not intended as medical advice for individual conditions or treatments. Talk to your doctor, nurse or pharmacist before following any medical regimen to see if it is safe and effective for you.

## 2024-06-07 NOTE — TELEPHONE ENCOUNTER
I called the patient's daughter, Jenifer, and we spoke over the phone.   Recent laboratory data were reviewed.     Lab Results   Component Value Date    SODIUM 132 (L) 06/07/2024    K 4.2 06/07/2024    CL 96 06/07/2024    CO2 26 06/07/2024    BUN 16 06/07/2024    CREATININE 0.96 06/07/2024    GLUC 204 (H) 06/07/2024    CALCIUM 9.4 06/07/2024     High anion gap resolved - likely lab error.     Plan:  No changes.

## 2024-06-08 RX ORDER — SODIUM CHLORIDE 1 G/1
1 TABLET ORAL 3 TIMES DAILY
Qty: 270 TABLET | Refills: 1 | Status: SHIPPED | OUTPATIENT
Start: 2024-06-08

## 2024-06-10 ENCOUNTER — OFFICE VISIT (OUTPATIENT)
Facility: CLINIC | Age: 69
End: 2024-06-10
Payer: MEDICARE

## 2024-06-10 DIAGNOSIS — G31.84 MILD COGNITIVE IMPAIRMENT: Primary | ICD-10-CM

## 2024-06-10 DIAGNOSIS — Z78.9 DECREASED ACTIVITIES OF DAILY LIVING (ADL): ICD-10-CM

## 2024-06-10 PROCEDURE — 97530 THERAPEUTIC ACTIVITIES: CPT

## 2024-06-10 NOTE — PROGRESS NOTES
"Daily Note     Today's date: 6/10/2024  Patient name: Yris Bowles  : 1955  MRN: [id number]  Referring provider: Teressa Prakash DO  Dx:   Encounter Diagnoses   Name Primary?    Mild cognitive impairment Yes    Decreased activities of daily living (ADL)                     PLAN OF CARE START: 24  PLAN OF CARE END: 24  FREQUENCY: 2x/wk  PRECAUTIONS: PSYCH, fall risk, cognitive deficits       Subjective: Pt reports that she is short of breath during session. Pt reports that she wasn't doing anything differently, and was going to cancel today. Vitals taken at start of session: O2 98%, HR 84, /80. Pt reports decreased SOB towards end of session, but reports feeling generally unwell. \"Is it almost time to be finished?\"      Objective: See treatment below.    TE/TA:  Pt performed double spot activity while performing sit to stands. Pt asked to follow colors presented on visual model and insert pieces to match design. Pt then asked to follow color categories and state an item per each piece inserted. Pt asked to stand prior to inserting piece and stating an item from categories. Pt c/o SOB during session, therefore sit to stands terminated and vitals taken. Pt required mod vcs for following colored pattern and stating items from color categories. Focused on working memory, sustained and divided attention, endurance.     Pt performed  looking chart x 2 rounds with pt asked to study and locate 3 items with the card removed. Pt successful with recalling 3/3 items with inc time for locating items on the puzzle d/t difficulty with  skills. Pt required min vcs and hints for locating items on the chart. Focused on sustained attention, , recall, working memory.      Assessment: Tolerated treatment fairly. Pt requires increased time and vcs to complete tasks, and demonstrates slow processing speed throughout. Pt c/o SOB during session requiring remainder of activities to be performed in seated. Pt " would benefit from continued OT services to address functional cognition and independence with daily activities.      Plan: Continued skilled OT per POC.

## 2024-06-12 ENCOUNTER — APPOINTMENT (OUTPATIENT)
Facility: CLINIC | Age: 69
End: 2024-06-12
Payer: MEDICARE

## 2024-06-12 LAB
LEFT EYE DIABETIC RETINOPATHY: NORMAL
RIGHT EYE DIABETIC RETINOPATHY: NORMAL
SEVERITY (EYE EXAM): NORMAL

## 2024-06-17 ENCOUNTER — OFFICE VISIT (OUTPATIENT)
Facility: CLINIC | Age: 69
End: 2024-06-17
Payer: MEDICARE

## 2024-06-17 DIAGNOSIS — G31.84 MILD COGNITIVE IMPAIRMENT: Primary | ICD-10-CM

## 2024-06-17 PROCEDURE — 97530 THERAPEUTIC ACTIVITIES: CPT

## 2024-06-17 NOTE — PROGRESS NOTES
Daily Note     Today's date: 2024  Patient name: Yris Bowles  : 1955  MRN: [id number]  Referring provider: Teressa Prakash DO  Dx:   Encounter Diagnosis   Name Primary?    Mild cognitive impairment Yes         Start Time: 1415  Stop Time: 1500  Total time in clinic (min): 45 minutes    PLAN OF CARE START: 24  PLAN OF CARE END: 24  FREQUENCY: 2x/wk  PRECAUTIONS: PSYCH, fall risk, cognitive deficits       Subjective: Pt reports no changes since last session       Objective: See treatment below.  Completed initially with door open then trialed with door closed with pt able to complete with    TA:  Performed word formation of missing letter (2 letters) to form words focusing on  skills, sustained attention, EF skills, required moderate prompting for problem solving  Performed matching talent with pt required moderate Vcs for problem solving and required break up of image into quadrants.   Attempted concetta's closet however pt demonstrating decreased ability to follow directions and discontinued  Performed  words worksheet with mod Vcs for problem solving       Assessment: Tolerated treatment fairly. Pt requires increased time and moderate Vcs to complete. pt would benefit from continued OT services to address functional cognition and independence with daily activities.      Plan: Continued skilled OT per POC.

## 2024-06-19 ENCOUNTER — OFFICE VISIT (OUTPATIENT)
Facility: CLINIC | Age: 69
End: 2024-06-19
Payer: MEDICARE

## 2024-06-19 DIAGNOSIS — G31.84 MILD COGNITIVE IMPAIRMENT: Primary | ICD-10-CM

## 2024-06-19 DIAGNOSIS — Z78.9 DECREASED ACTIVITIES OF DAILY LIVING (ADL): ICD-10-CM

## 2024-06-19 PROCEDURE — 97530 THERAPEUTIC ACTIVITIES: CPT | Performed by: OCCUPATIONAL THERAPIST

## 2024-06-19 NOTE — PROGRESS NOTES
"Daily Note     Today's date: 2024  Patient name: Yris Bowles  : 1955  MRN: [id number]  Referring provider: Teressa Prakash,   Dx:   Encounter Diagnoses   Name Primary?    Mild cognitive impairment Yes    Decreased activities of daily living (ADL)            Start Time: 1415  Stop Time: 1500  Total time in clinic (min): 45 minutes    PLAN OF CARE START: 24  PLAN OF CARE END: 24  FREQUENCY: 2x/wk  PRECAUTIONS: PSYCH, fall risk, cognitive deficits       Subjective:     \"I can't do it\" - during STS   \"I will atleast give it a try\" - in regards to PT evaluation      Objective: See treatment below.    TA:  -30 second sit to stand = 4 STS using arm chair and RW (unable without armrests); falling below normal limit for age range - 12.9 STS norm   -TUG 47.28 seconds ; falling below normal limit for age range - <8.39 seconds norm  -TUG COG 48.18 seconds ; falling below normal limit for age range - <9.82 seconds norm    -Nu Step level 1 with BUE and BLE completed to facilitate aerobic priming for cognitive functioning. Pt required to push arms and legs with incorporation of dual task component - naming girls names in alphabetical order. Performed to address EF skills, activity tolerance, working memory, divided attention. Pt required mod vcs for word finding during cognitive task and required for reminder to engage in exercise d/t starting and stopping machine. Completed for 7 minutes.  -[employer] design copy task in stance with RW to address standing tolerance/balance. Completed to facilitate visuospatial skills, EF skills, dexterity, FMC,  skills, and sustained attention. Pt requested to complete seated after standing for ~2 minutes d/t lower extremity weakness/fatigue.  -Discussion about benefit of PT evaluation and impact that participating in PT could have on her QOL. Pt reports she is interested and will try to participate in an evaluation if it works with her daughter's schedule. Spoke with pt's " "daughter, who was agreeable with scheduling PT eval.    Assessment: Tolerated treatment fairly. Pt completed 30 second STS, TUG, and TUG COG this session to gather normative data in regards to lower extremity strength and endurance. Pt demonstrating scores outside of the normal ranges, signifying decreased lower extremity strength and endurance. Pt educated on what participating in PT would entail and reported she \"will at least try\" to complete a PT evaluation. Pt requires increased time and moderate Vcs when on Nu Step performing dual task. Pt would benefit from continued OT services to address functional cognition and independence with daily activities.      Plan: Continued skilled OT per POC. Incorporate LB strength/endurance as able to improve safety and independence with mobility.      "

## 2024-06-24 ENCOUNTER — OFFICE VISIT (OUTPATIENT)
Facility: CLINIC | Age: 69
End: 2024-06-24
Payer: MEDICARE

## 2024-06-24 DIAGNOSIS — G31.84 MILD COGNITIVE IMPAIRMENT: Primary | ICD-10-CM

## 2024-06-24 DIAGNOSIS — Z78.9 DECREASED ACTIVITIES OF DAILY LIVING (ADL): ICD-10-CM

## 2024-06-24 PROCEDURE — 97110 THERAPEUTIC EXERCISES: CPT | Performed by: OCCUPATIONAL THERAPIST

## 2024-06-24 PROCEDURE — 97112 NEUROMUSCULAR REEDUCATION: CPT | Performed by: OCCUPATIONAL THERAPIST

## 2024-06-24 PROCEDURE — 97530 THERAPEUTIC ACTIVITIES: CPT | Performed by: OCCUPATIONAL THERAPIST

## 2024-06-24 NOTE — PROGRESS NOTES
"Daily Note     Today's date: 2024  Patient name: Yris Bowles  : 1955  MRN: [id number]  Referring provider: Teressa Prakash DO  Dx:   Encounter Diagnoses   Name Primary?    Mild cognitive impairment Yes    Decreased activities of daily living (ADL)              Start Time: 1500  Stop Time: 1545  Total time in clinic (min): 45 minutes    PLAN OF CARE START: 24  PLAN OF CARE END: 24  FREQUENCY: 2x/wk  PRECAUTIONS: PSYCH, fall risk, cognitive deficits       Subjective:     \"My hair is a mess today, I am a mess today\"       Objective: See treatment below.    TE:  -9 minutes on nu step level 2 with cognitive component of saying foods. Able to get detention through the alphabet while using UE to push/pull. Mod reminders to initiate exercise while participating in cognitive activity. Difficulty noted with dual tasking component. Completed to address UE strength/ROM, dual tasking, working memory and endurance.     NMR:  -squiggs on wall while in stance and using RW. Mod-max cues to bring hand off walker increase challenge on balance. 3 trials completed before fatiguing but able to complete activity. Contact guard while in stance. Completed to address standing balance, BUE ROM & strength.     TA:  -qbitz completed to address  skills, EF skills, sustained attention. Required moderate reminders for repetition of directions and occulusion of rows to increase success in activity. Completed 1 1/2 cards, unable to finish second card due to time restraint.         Assessment: Tolerated treatment well. Pt required increased time and moderate Vcs when on Nu Step performing dual task. Required frequent cues for completing static balance activity without UE support. Pt would benefit from continued OT services to address functional cognition and independence with daily activities.      Plan: Continued skilled OT per POC. Incorporate LB strength/endurance as able to improve safety and independence with mobility.      "

## 2024-06-25 ENCOUNTER — TELEPHONE (OUTPATIENT)
Dept: NEPHROLOGY | Facility: CLINIC | Age: 69
End: 2024-06-25

## 2024-06-26 ENCOUNTER — OFFICE VISIT (OUTPATIENT)
Facility: CLINIC | Age: 69
End: 2024-06-26
Payer: MEDICARE

## 2024-06-26 DIAGNOSIS — G31.84 MILD COGNITIVE IMPAIRMENT: Primary | ICD-10-CM

## 2024-06-26 DIAGNOSIS — I10 ESSENTIAL HYPERTENSION: ICD-10-CM

## 2024-06-26 PROCEDURE — 97530 THERAPEUTIC ACTIVITIES: CPT

## 2024-06-26 NOTE — PROGRESS NOTES
"Daily Note     Today's date: 2024  Patient name: Yris Bowles  : 1955  MRN: [id number]  Referring provider: Teressa Prakash DO  Dx:   Encounter Diagnosis   Name Primary?    Mild cognitive impairment Yes                        PLAN OF CARE START: 24  PLAN OF CARE END: 24  FREQUENCY: 2x/wk  PRECAUTIONS: PSYCH, fall risk, cognitive deficits       Subjective:   \"Are people talking about me?\" - pt appeared to be paranoid at beginning of today's session and required cues for console ment       Objective: See treatment below.    TE:      NMR:  -     TA:  Completed simple mathematics of adding coins focusing on EF skills, sustained attention- pt required increased time to complete  and required min Vcs for 2/10 problems   Completed functional mathematics of solving arithmetic problems required cues for maintaining attention during session and increased time for processing- pt required cues for problem solving addition and division signs; reuqired moderate prompting throughout.     Pt was tearful during session due to being concerned regarding her 's car needing repair; pt reports no issue with self harm when asked and feels safe at home. Required cues for consolement.     Assessment: Tolerated treatment well. Pt required increased time and moderate Vcs during tasks. Pt was internally distracted and required cues for  redirection/consolemnet with fair effect. Pt would benefit from continued OT services to address functional cognition and independence with daily activities.      Plan: Continued skilled OT per POC. Incorporate LB strength/endurance as able to improve safety and independence with mobility.      "

## 2024-06-27 RX ORDER — METOPROLOL SUCCINATE 25 MG/1
25 TABLET, EXTENDED RELEASE ORAL DAILY
Qty: 90 TABLET | Refills: 1 | Status: SHIPPED | OUTPATIENT
Start: 2024-06-27

## 2024-07-03 ENCOUNTER — OFFICE VISIT (OUTPATIENT)
Facility: CLINIC | Age: 69
End: 2024-07-03
Payer: MEDICARE

## 2024-07-03 ENCOUNTER — EVALUATION (OUTPATIENT)
Facility: CLINIC | Age: 69
End: 2024-07-03
Payer: MEDICARE

## 2024-07-03 DIAGNOSIS — R53.1 WEAKNESS: ICD-10-CM

## 2024-07-03 DIAGNOSIS — R26.89 OTHER ABNORMALITIES OF GAIT AND MOBILITY: ICD-10-CM

## 2024-07-03 DIAGNOSIS — R26.89 IMBALANCE: Primary | ICD-10-CM

## 2024-07-03 DIAGNOSIS — Z78.9 DECREASED ACTIVITIES OF DAILY LIVING (ADL): ICD-10-CM

## 2024-07-03 DIAGNOSIS — G31.84 MILD COGNITIVE IMPAIRMENT: Primary | ICD-10-CM

## 2024-07-03 PROCEDURE — 97530 THERAPEUTIC ACTIVITIES: CPT | Performed by: OCCUPATIONAL THERAPIST

## 2024-07-03 PROCEDURE — 97162 PT EVAL MOD COMPLEX 30 MIN: CPT

## 2024-07-03 NOTE — PROGRESS NOTES
PT Evaluation          POC expires Unit limit Auth Expiration date PT/OT + Visit Limit? Co-Insurance   24 NA NA NA Yes                                            Today's date: 7/3/2024  Patient name: Yris Bowles  : 1955  MRN: [id number]  Referring provider: Teressa Prakash DO  Dx:   Encounter Diagnosis     ICD-10-CM    1. Imbalance  R26.89       2. Weakness  R53.1       3. Other abnormalities of gait and mobility  R26.89             Assessment  Assessment details: Patient is a 69 y.o. Female who presents to skilled outpatient PT with complaints of balance and gait difficulties which is impacting her independence and participation in her community. Yris has been participating with skilled outpatient OT services since April to address cognitive limitations. Sensation screen revealed diminished perception to light touch at R C6 distribution. Possible dysdiadochokinesia as per coordination screening, reflexes normal and symmetrical. Patient falls below age normative values for 5x STS, and six minute walk test suggesting limitations in lower extremity strength, and cardiovascular endurance respectively. Patient displays difficulty with functional mobility and dual tasking as per TUG and TUG cog which can further perpetuate her risk for falls. She was unable to complete 2nd, 3rd, and 4th conditions of mCTSIB with greatest difficulty when vision was occluded suggesting HIGH visual reliance to maintain balance. Patient classifies as a limited community ambulator as per 10 meter walk speed. She is considered at HIGH risk for falls per APTA and Rehab Measures Lab cutoff scores for aformentioned outcome measures. Plan to complete TAMEZ next session to further assess patient's balance and safety. She will undoubtedly benefit from skilled OPPT services to address limitations, reduce risk for falls, and improve overall independence.   Impairments:  Abnormal coordination, Abnormal gait, Abnormal muscle tone, Abnormal or restricted ROM, Activity intolerance, Impaired balance, Impaired physical strength, Lacks appropriate HEP, Poor posture, Poor body mechanics, Pain with function, Safety issue, Weight-bearing intolerance, Abnormal movement, Difficulty understanding, Abnormal muscle firing  Understanding of Dx/Px/POC: Good  Prognosis: Good    Patient verbalized understanding of POC.         Please contact me if you have any questions or recommendations. Thank you for the referral and the opportunity to share in Yris Bowles's care.        Plan  Plan details: balance training, gait training, strengthening, and endurance  Patient would benefit from: PT Eval, Skilled PT, and Skilled OT  Planned modality interventions: Biofeedback, Cryotherapy, TENS, Thermotherapy  Planned therapy interventions: Abdominal trunk stabilization, ADL training, Balance, Balance/WB training, Breathing training, Body mechanics training, Coordination, Functional ROM exercises, Gait training, HEP, Joint Mobilization, Manual Therapy, Samuels taping, Motor coordination training, Neuromuscular re-education, Patient education, Postural training, Strengthening, Stretching, Therapeutic activities, Therapeutic exercises, Therapeutic training, Transfer training, Activity modification, Work reintegration  Frequency: 2x/wk  Duration in weeks: 12 weeks  Plan of Care beginning date: 7/3/2024  Plan of Care expiration date: 12 weeks - 9/25/24  Treatment plan discussed with: Patient       Goals  Short Term Goals (4 weeks):    - Patient will improve time on TUG by 2.9 seconds to facilitate improved safety in all ambulation  - Patient will be independent in basic HEP 2-3 weeks  - Patient will improve 5xSTS score by 2.3 seconds to promote improved LE functional strength needed for ADLs    Long Term Goals (12 weeks):  - Patient will be independent in a comprehensive home exercise program  - Patient will  improve gait speed by 0.18 m/s to improve safety with community ambulation  - Patient will improve TAMEZ by 6 points in order to improve static balance and reduce risk for falls  - Patient will be able to demonstrate HT in gait without veering  - Patient will improve 6 Minute Walk Test score by 190 feet to promote improved cardiovascular endurance  - Patient will report 50% reduction in near falls in order to improve safety with functional tasks and reduce his risk for falls  - Patient will be able to ascend/descend stairs reciprocally with 1 UE assist to promote independence and safety with ADLs  - Patient will report 50% reduction in near falls when ambulating on uneven terrain      Cut off score    All date taken from APTA Neuro Section or Rehab Measures      Tamez/56  MDC: 6 pts  Age Norms:  60-69: M - 55   F - 55  70-79: M - 54   F - 53  80-89: M - 53   F - 50 5xSTS: Susan et al 2010  MDC: 2.3 sec  Age Norms:  60-69: 11.4 sec  70-79: 12.6 sec  80-89: 14.8 sec   TUG  MDC: 4.14 sec  Cut off score:  >13.5 sec community dwelling adults  >32.2 frail elderly  <20 I for basic transfers  >30 dependent on transfers 10 Meter Walk Test: Jean-Pierre et al 2011  MDC: 0.18 m/s  20-29: M - 1.35 m   F - 1.34 m  30-39: M - 1.43 m   F - 1.34 m  40-49: M - 1.43 m   F - 1.39 m  50-59: M - 1.43 m   F - 1.31 m  60-69: M - 1.34 m   F - 1.24 m  70-79: M - 1.26 m   F - 1.13 m  80-89: M - 0.97 m   F - 0.94 m    Household Ambulator < 0.4 m/s  Limited Community Ambulator 0.4 - 0.8 m/s  Community Ambulator 0.8 - 1.2 m/s  Safely cross the street > 1.2 m/s   FGA  MCID: 4 pts  Geriatrics/community < 22/30 fall risk  Geriatrics/community < 20/30 unexplained falls    DGI  MDC: vestibular - 4 pts  MDC: geriatric/community - 3 pts  Falls risk <19/24 mCTSIB  Norm: 20-60 yrs  Eyes open firm: norm sway 0.21-0.48  Eyes closed firm: norm sway 0.48-0.99  Eyes open foam: norm sway 0.38-0.71  Eyes closed foam: norm sway 0.70-2.22   6 Minute Walk  Test  MDC: 190.98 ft  MCID: 164 ft    Age Norms  60-69: M - 1876 ft (571.80 m)  F - 1765 ft (537.98 m)  70-79: M - 1729 ft (527.00 m)  F - 1545 ft (470.92 m)  80-89: M - 1368 ft (416.97 m)  F - 1286 ft (391.97 m) ABC: Joann & Roel, 2003  <67% increased risk for falls   [place] Monofilaments  Evaluator Size:        Force (grams):          Hand/Dorsal Thresholds:        Plantar Thresholds:  - 1.65                       - 0.008                       - Normal                                 - Normal  - 2.36                       - 0.02                         - Normal                                 - Normal  - 2.44                       - 0.04                         - Normal                                 - Normal  - 2.83                       - 0.07                         - Normal                                 - Normal  - 3.22                       - 0.16                         - Diminished light touch          - Normal  - 3.61                       - 0.40                         - Diminished light touch          - Normal  - 3.84                       - 0.60                         - Diminished protective           - Diminished light touch  - 4.08                       - 1.00                         - Diminished protective           - Diminished light touch  - 4.17                       - 1.40                         - Diminished protective           - Diminished light touch  - 4.31                       - 2.00                         - Diminished protective           - Diminished light touch  - 4.56                       - 4.00                         - Loss of protective sense      - Diminished protective  - 4.74                       - 6.00                         - Loss of protective sense      - Diminished protective  - 4.93                       - 8.00                         - Loss of protective sense      - Diminished protective  - 5.07                       - 10.0                          "- Loss of protective sense     - Loss of protective sense  - 5.18                       - 15.0                         - Loss of protective sense     - Loss of protective sense  - 5.46                       - 26.0                         - Loss of protective sense     - Loss of protective sense  - 5.88                       - 60.0                         - Loss of protective sense     - Loss of protective sense  - 6.10                       - 100                          - Loss of protective sense     - Loss of protective sense  - 6.45                       - 180                          - Loss of protective sense     - Loss of protective sense  - 6.65                       - 300                          - Deep pressure sense only  - Deep pressure sense only         Subjective    History of Present Illness  - Mechanism of injury: Patient presents to PT with complaints of difficulty with ambulation and balance. She has been participating with skilled outpatient OT services since April to address cognitive limitations. Patient reports she has not had any recent falls however, is worried about falling and always utilizes her rolling walker in her home and in her community because of FoF. Her major goal is to improve her walking abilities and independence as she requires assistance from her  to perform ADLs.  - Primary AD: RW  - Assist level at home: Lizett from  for ADLs    Patient goal: \"walk better\"    Pain  NA    Social Support  - Steps to enter house: 6 HERNAN  - Stairs in house: none   - Lives in: 1st floor apt  - Lives with:     - Employment status: retired CNA  - Hand dominance: RHD    Treatments  - Previous treatment: none  - Current treatment: OT  - Diagnostic Testing: none      Objective   HR: 77 bpm  SpO2: 98%  BP: 112/68 mmHg    LE MMT  - R Hip Flexion: 3/5  L Hip Flexion: 3+/5  - R Hip Extension: 3/5  L Hip Extension: 3/5  - R Hip Abduction: 3+/5  L Hip Abduction: 3+/5  - R Hip Adduction: " 4-/5  L Hip Adduction: 4-/5  - R Knee Extension: 4/5  L Knee Extension: 4/5  - R Knee Flexion: 4/5  L Knee Flexion: 4-/5  - R Ankle DF: 4-/5   L Ankle DF: 4-/5  - R Ankle PF: 4/5   L Ankle PF: 4/5    Sensation  - Light touch: diminished at R C6 distribution    Coordination  - Dysdiadochokinesia: slowed  - Alternate Toe Taps: slowed  - Finger to Nose: normal    Reflexes/Clonus  - Clonus: No    Myelopathy Screen (>3/5 +)  - Jewell's Reflex: -  - Babinski Reflex: NA  - Inverted Supinator Sign: -  - Age > 45: Yes  - Gait Deviation: Yes      Gait  - Abnormalities: Forwards flexed posture, slow turning, decreased foot clearance, and decreased step length           Outcome Measures Initial Eval  7/3/24        5xSTS 17.53 sec, 2UE        TUG  - Regular  - Cognitive   32.19 sec, w/ RW  46.16 sec, w/ RW        10 meter 0.55 m/s        TAMEZ defer        mCTSIB  - FTEO (firm)  - FTEC (firm)  - FTEO (foam)  - FTEC (foam)   30 sec  5 sec  8 sec  1 sec        6MWT 470 ft                                     Precautions:   Past Medical History:   Diagnosis Date    Anesthesia complication     difficulty waking up    Anxiety     Arthritis     left knee    Bipolar 1 disorder (HCC)     Bone spur     left knee behing the knee cap    Cancer (HCC)     Chronic kidney disease     Chronic pain disorder     Colon cancer (HCC)     patient states about 2017    Colon polyp     Tubulovillous Adenoma High Grade Dysplasia - 2017    Depression     Diabetes mellitus (HCC)     Endometrial cancer (HCC)     grade I    Hx of bleeding disorder     vaginal bleeding started on 3/13/17     Hyperlipidemia     Hypertension     Hypomagnesemia 03/20/2023    Memory loss     PONV (postoperative nausea and vomiting)     Psychiatric disorder     Psychiatric illness     Psychosis (HCC)     Shortness of breath     with exertion    Sleep difficulties     Urinary incontinence     Vitamin D deficiency

## 2024-07-03 NOTE — PROGRESS NOTES
"Daily Note     Today's date: 7/3/2024  Patient name: Yris Bowles  : 1955  MRN: [id number]  Referring provider: Teressa Prakash DO  Dx:   Encounter Diagnoses   Name Primary?    Mild cognitive impairment Yes    Decreased activities of daily living (ADL)              Start Time: 1414  Stop Time: 1459  Total time in clinic (min): 45 minutes    PLAN OF CARE START: 24  PLAN OF CARE END: 24  FREQUENCY: 2x/wk  PRECAUTIONS: PSYCH, fall risk, cognitive deficits       Subjective:  \"Are they making fun of me?\" - pt reassured that no one in clinic was speaking about her and she did not verbalize any other paranoid thoughts      Objective: See treatment below.    TE:  -8 minutes on nustep level 1 at self selected pace x5 minutes, then cued to increase pace slightly to increase HR in preparation for cognitive activities. Dual tasking deferred d/t significant impact on pace during exercise. Pt completed .13 miles total.    TA:  -Pt completed wooden intelligence puzzle to address sustained attention and  skills. She required min cues and intermittent encouragement to continue with task. Completed entire puzzle in about 30 minutes.    Assessment: Tolerated treatment well. Pt required increased time and moderate Vcs during tasks. Pt was internally distracted and required cues for  redirection/consolemnet with fair effect. Pt would benefit from continued OT services to address functional cognition and independence with daily activities.      Plan: Continued skilled OT per POC. Incorporate LB strength/endurance as able to improve safety and independence with mobility.      "

## 2024-07-04 DIAGNOSIS — I10 ESSENTIAL HYPERTENSION: ICD-10-CM

## 2024-07-05 RX ORDER — TORSEMIDE 5 MG/1
5 TABLET ORAL DAILY
Qty: 90 TABLET | Refills: 1 | Status: SHIPPED | OUTPATIENT
Start: 2024-07-05

## 2024-07-05 RX ORDER — TORSEMIDE 5 MG/1
5 TABLET ORAL DAILY
Qty: 90 TABLET | Refills: 0 | OUTPATIENT
Start: 2024-07-05

## 2024-07-09 ENCOUNTER — EVALUATION (OUTPATIENT)
Facility: CLINIC | Age: 69
End: 2024-07-09
Payer: MEDICARE

## 2024-07-09 ENCOUNTER — OFFICE VISIT (OUTPATIENT)
Facility: CLINIC | Age: 69
End: 2024-07-09
Payer: MEDICARE

## 2024-07-09 DIAGNOSIS — R26.89 OTHER ABNORMALITIES OF GAIT AND MOBILITY: ICD-10-CM

## 2024-07-09 DIAGNOSIS — Z78.9 DECREASED ACTIVITIES OF DAILY LIVING (ADL): ICD-10-CM

## 2024-07-09 DIAGNOSIS — R53.1 WEAKNESS: ICD-10-CM

## 2024-07-09 DIAGNOSIS — R26.89 IMBALANCE: Primary | ICD-10-CM

## 2024-07-09 DIAGNOSIS — G31.84 MILD COGNITIVE IMPAIRMENT: Primary | ICD-10-CM

## 2024-07-09 PROCEDURE — 97530 THERAPEUTIC ACTIVITIES: CPT

## 2024-07-09 PROCEDURE — 97112 NEUROMUSCULAR REEDUCATION: CPT

## 2024-07-09 NOTE — PROGRESS NOTES
"Daily Note     Today's date: 2024  Patient name: Yris Bowles  : 1955  MRN: [id number]  Referring provider: No ref. provider found  Dx:   Encounter Diagnosis     ICD-10-CM    1. Imbalance  R26.89       2. Weakness  R53.1       3. Other abnormalities of gait and mobility  R26.89           Start Time: 1332  Stop Time: 1415  Total time in clinic (min): 43 minutes    Subjective: Patient states at home she ascends/descends 4 porch steps holding a railing on one side. Spouse is doing the cooking and laundry (laundry room is down stairs.)       Objective: See treatment diary below. Pt arrived with her rolling walker.  She is transported by her spouse who does not accompany her. He gets shopping done while she is in therapy.   TA:  Dena completed:   - stair climbing assessment: up/down 4 steps with 2 rails, reciprocal stepping up steps, \"step-to\" pattern descending, with L lead.    NMR:  - step taps 1 UE light touch 4\" step 8 reps then deferred additional reps  -step ups 2 UE support 4\" step 7 reaps each lead leg    Assessment: Tolerated treatment well. Patient would benefit from continued PT Patient appears hesitant and fearful.  She required encouragement to try new activities and use less support.  He eccentric control for sitting (with 2 UE support) is excellent. Tamez score indicates high fall risk.       Plan: Continue per plan of care.  Progress as tolerated.           Outcome Measures Initial Eval  7/3/24  7/9/24           5xSTS 17.53 sec, 2UE             TUG  - Regular  - Cognitive    32.19 sec, w/ RW  46.16 sec, w/ RW             10 meter 0.55 m/s             TAMEZ defer             mCTSIB  - FTEO (firm)  - FTEC (firm)  - FTEO (foam)  - FTEC (foam)    30 sec  5 sec  8 sec  1 sec             6MWT 470 ft                                                  POC expires Unit limit Auth Expiration date PT/OT + Visit Limit? Co-Insurance   24 NA NA NA Yes                                          "

## 2024-07-09 NOTE — PROGRESS NOTES
OCCUPATIONAL THERAPY Re- EVALUATION & Discharge     Today's Date: 2024  Patient Name: Yris Bowles  : 1955  MRN: [id number]  Referring Provider: Teressa Prakash DO  Dx: Mild cognitive impairment [G31.84]      Eval/ Re-eval POC expires Auth #/ Referral # Total units  Start date  Expiration date Extension                                                        Date               Units:  Used               Authed:  Remaining                    SKILLED ANALYSIS:  Pt seen for OT re-evaluation after ~3 month of OT services with focus on functional cognition retraining and inc independence with daily activities.  Completed today CMT, MOCA, and TM part A to assess cognition. Pt declined on the CMT and there has been no significant change on the MOCA or trail making assessments.  Pt demonstrating impairments in sustained attention, EF skills,  skills, STM and LTM. Recommending pt to discharge from skilled therapy at this point based on results of this re-assessment. Pt may benefit from re-evaluation in another 3 months, returning to outpatient OT as needed. Findings and recommendations discussed with pt, and they are in agreement.    PLAN OF CARE START: 24  PLAN OF CARE END: 10/9/24  FREQUENCY: 2x/wk  PRECAUTIONS: PSYCH, fall risk, cognitive deficits    Subjective: Pt states she does not do anything at home.     Mechanism of Injury  Pt with multiple month h/o cognitive and functional deficits.  Per chart, cognitive deficits possibly related to psych drugs.    Occupational Profile  Pt resides with her  in a 1st floor apt with 6STE and laundry in the basement.   has been assisting with ADLs, but having significant difficulty.  Pt currently requires assist with sponge bathing, dressing, transfers, IADLs.  She owns a variety of LHAE s/p hip sx last year, but per daughter refuses to use it and generally refuses to participate in self care tasks.  Pt spends a majority of her day in bed.  She is  "retired from working as a CNA at a nursing home.  She is not a  and  assists with med mgmt.  Daughter reports 2 falls in the past 6 months, 1 resulting in a femur fracture.  Pt reports symptoms of apathy and depression, reports she sees a psychiatrist weekly and they have adjusted her meds.    PATIENT GOAL: \"to be a CNA again\"    HISTORY OF PRESENT ILLNESS:     PMH:   Past Medical History:   Diagnosis Date    Anesthesia complication     difficulty waking up    Anxiety     Arthritis     left knee    Bipolar 1 disorder (HCC)     Bone spur     left knee behing the knee cap    Cancer (HCC)     Chronic kidney disease     Chronic pain disorder     Colon cancer (HCC)     patient states about 2017    Colon polyp     Tubulovillous Adenoma High Grade Dysplasia - 2017    Depression     Diabetes mellitus (HCC)     Endometrial cancer (HCC)     grade I    Hx of bleeding disorder     vaginal bleeding started on 3/13/17     Hyperlipidemia     Hypertension     Hypomagnesemia 03/20/2023    Memory loss     PONV (postoperative nausea and vomiting)     Psychiatric disorder     Psychiatric illness     Psychosis (HCC)     Shortness of breath     with exertion    Sleep difficulties     Urinary incontinence     Vitamin D deficiency        Past Surgical Hx:   Past Surgical History:   Procedure Laterality Date    BREAST BIOPSY Left     benign-maybe at ar age 63    CHOLECYSTECTOMY      open    COLONOSCOPY      DILATION AND CURETTAGE OF UTERUS N/A 04/05/2017    Procedure: DILATATION AND CURETTAGE (D&C);  Surgeon: Primitivo Weber MD;  Location: [facility] MAIN OR;  Service:     HYSTERECTOMY      OOPHORECTOMY      PELVIC LAPAROSCOPY      WY ARTHRP KNE CONDYLE&PLATU MEDIAL&LAT COMPARTMENTS Left 12/06/2022    Procedure: ARTHROPLASTY KNEE TOTAL W ROBOT - CEMENTED - LEFT;  Surgeon: Juan Jose Guerra DO;  Location: [facility] MAIN OR;  Service: Orthopedics    WY LAPS TOTAL HYSTERECT 250 GM/< W/RMVL TUBE/OVARY N/A 05/04/2017    Procedure: ROBOTIC " ASSISTED TOTAL LAPAROSCOPIC HYSTERECTOMY, BILATERAL SALPINGOOPHERECTOMY; CYSTOSCOPY;  Surgeon: Damir Reyes MD;  Location:  MAIN OR;  Service: Gynecology Oncology    NE OPTX FEM SHFT FX W/INSJ IMED IMPLT W/WO SCREW Left 3/20/2023    Procedure: INSERTION NAIL IM FEMUR RETROGRADE;  Surgeon: Terry White MD;  Location: [facility] MAIN OR;  Service: Orthopedics    REPLACEMENT TOTAL KNEE      TONSILLECTOMY      TUBAL LIGATION          Pain:    Functional Cognition:  Highest level of education: GED    [place] Cognitive Assessment Version 8.2 (MoCA V8.2)  Visuospatial/executive functionin/5  Namin/3  Memory: 1st trial:  3/5, 2nd trial:  5  Attention/concentration: 0/2  List of letters:   Serial Seven Subtraction:  03   Language/sentence repetition:  12  Language Fluency:  0/1, 2 words  Abstract/Correlational Thinkin2  Delayed Recall:  05  Orientation:  3/6.    Memory Index Score: 5/15  MoCA V1 8.3 Raw Score:  8+1=9/30, MIS:  5/15, indicative of moderate neurocognitive impairments.    MoCA Scoring        Normal: 26+         Mild Cognitive Impairment: 18-25          Moderate Cognitive Impairment: 10-17         Severe Cognitive Impairment: <10    Trail making Part A and Part B:   Part A: 2 minutes and 13 seconds independently    Contextual Memory Test:  Immediate: 20  Delayed: 020    GOALS:   Short Term Goals:  Pt will demonstrate improved overall functional cognition by scoring at least 12/30 on MoCA for carry over with daily activities Not Achieved  Pt will demonstrate improved immediate and delayed visual memory by scoring at least 9, 6 on immediate and delayed portions of CMT Not Achieved  Pt will demonstrate improved sustained attn by completing Trail Making Test A within 1:35 with no more than 2 cues Not Achieved  Pt will demonstrate improved divided attn by completing Trail Making Test B within 10 min with no more than 10 cues Not Achieved  Pt will complete UB dressing and bathing with Lizett  with use of DME/AD as appropriate per family report Not Achieved  Pt will complete LB dressing and bathing with modA with use of DME/AD as appropriate per family report Not Achieved      Short Term Goals:  Pt will demonstrate improved overall functional cognition by scoring at least 15/30 on MoCA for carry over with daily activities Not Achieved  Pt will demonstrate improved immediate and delayed visual memory by scoring at least 11, 8 on immediate and delayed portions of CMT Not Achieved  Pt will demonstrate improved sustained attn by completing Trail Making Test A within 1:15 with no more than 2 cues Not Achieved  Pt will demonstrate improved divided attn by completing Trail Making Test B within 10 min with no more than 5 cues Not Achieved  Pt will complete UB dressing and bathing with supervision/setup with use of DME/AD as appropriate per family report Not Achieved  Pt will complete LB dressing and bathing with Lizett with use of DME/AD as appropriate per family report Not Achieved

## 2024-07-12 ENCOUNTER — APPOINTMENT (OUTPATIENT)
Dept: LAB | Facility: HOSPITAL | Age: 69
End: 2024-07-12
Payer: MEDICARE

## 2024-07-12 DIAGNOSIS — E22.2 SIADH (SYNDROME OF INAPPROPRIATE ADH PRODUCTION) (HCC): ICD-10-CM

## 2024-07-12 LAB
ANION GAP SERPL CALCULATED.3IONS-SCNC: 8 MMOL/L (ref 4–13)
BUN SERPL-MCNC: 20 MG/DL (ref 5–25)
CALCIUM SERPL-MCNC: 9.5 MG/DL (ref 8.4–10.2)
CHLORIDE SERPL-SCNC: 100 MMOL/L (ref 96–108)
CO2 SERPL-SCNC: 29 MMOL/L (ref 21–32)
CREAT SERPL-MCNC: 1.13 MG/DL (ref 0.6–1.3)
GFR SERPL CREATININE-BSD FRML MDRD: 49 ML/MIN/1.73SQ M
GLUCOSE SERPL-MCNC: 190 MG/DL (ref 65–140)
POTASSIUM SERPL-SCNC: 4.4 MMOL/L (ref 3.5–5.3)
SODIUM SERPL-SCNC: 137 MMOL/L (ref 135–147)

## 2024-07-12 PROCEDURE — 36415 COLL VENOUS BLD VENIPUNCTURE: CPT

## 2024-07-12 PROCEDURE — 80048 BASIC METABOLIC PNL TOTAL CA: CPT

## 2024-07-15 ENCOUNTER — APPOINTMENT (OUTPATIENT)
Facility: CLINIC | Age: 69
End: 2024-07-15
Payer: MEDICARE

## 2024-07-15 ENCOUNTER — OFFICE VISIT (OUTPATIENT)
Facility: CLINIC | Age: 69
End: 2024-07-15
Payer: MEDICARE

## 2024-07-15 DIAGNOSIS — R26.89 OTHER ABNORMALITIES OF GAIT AND MOBILITY: ICD-10-CM

## 2024-07-15 DIAGNOSIS — R53.1 WEAKNESS: ICD-10-CM

## 2024-07-15 DIAGNOSIS — R21 RASH: Primary | ICD-10-CM

## 2024-07-15 DIAGNOSIS — R26.89 IMBALANCE: Primary | ICD-10-CM

## 2024-07-15 PROCEDURE — 97112 NEUROMUSCULAR REEDUCATION: CPT

## 2024-07-15 RX ORDER — KETOCONAZOLE 20 MG/G
CREAM TOPICAL DAILY
Qty: 30 G | Refills: 0 | Status: SHIPPED | OUTPATIENT
Start: 2024-07-15

## 2024-07-15 NOTE — PROGRESS NOTES
Daily Note     Today's date: 7/15/2024  Patient name: Yris Bowles  : 1955  MRN: [id number]  Referring provider: No ref. provider found  Dx:   Encounter Diagnosis     ICD-10-CM    1. Imbalance  R26.89       2. Weakness  R53.1       3. Other abnormalities of gait and mobility  R26.89           Subjective: Patient with no new reports.       Objective: See treatment diary below. Pt arrived with her rolling walker.  She is transported by her spouse who does not accompany her. He gets shopping done while she is in therapy.     NMR:  - Stair negotiation with 2 UE: 5 min   - Step taps: 20x  - Seated marchinx  - Ball squeezes: 30x   - Side stepping at bar with 2 UE support: 2 laps  - Bwk ambulation at bar with 1 UE support: 1.5 laps     Assessment: Tolerated treatment well. Patient with most difficulty on stair negotiation as noted by use of 2UE, slowed movement and frequent rest breaks which may attributed to decreased LE strength and CV fatigue. She was unable to complete backwards ambulation exercise as she requested to take a break due to muscular and cardiovascular fatigue. Decreased step lengths noted with side stepping and bwd ambulation likely due to decreased hamstring and gluteus medius strength, respectively. Patient will continue to benefit from skilled therapy to maximize her function.       Plan: Continue per plan of care.  Progress as tolerated.           Outcome Measures Initial Eval  7/3/24  7/9/24           5xSTS 17.53 sec, 2UE             TUG  - Regular  - Cognitive    32.19 sec, w/ RW  46.16 sec, w/ RW             10 meter 0.55 m/s             TAMEZ defer  37           mCTSIB  - FTEO (firm)  - FTEC (firm)  - FTEO (foam)  - FTEC (foam)    30 sec  5 sec  8 sec  1 sec             6MWT 470 ft                                                  POC expires Unit limit Auth Expiration date PT/OT + Visit Limit? Co-Insurance   24 NA NA NA Yes

## 2024-07-17 ENCOUNTER — OFFICE VISIT (OUTPATIENT)
Facility: CLINIC | Age: 69
End: 2024-07-17
Payer: MEDICARE

## 2024-07-17 ENCOUNTER — APPOINTMENT (OUTPATIENT)
Facility: CLINIC | Age: 69
End: 2024-07-17
Payer: MEDICARE

## 2024-07-17 DIAGNOSIS — R53.1 WEAKNESS: ICD-10-CM

## 2024-07-17 DIAGNOSIS — R26.89 IMBALANCE: Primary | ICD-10-CM

## 2024-07-17 DIAGNOSIS — R26.89 OTHER ABNORMALITIES OF GAIT AND MOBILITY: ICD-10-CM

## 2024-07-17 PROCEDURE — 97110 THERAPEUTIC EXERCISES: CPT

## 2024-07-17 NOTE — PROGRESS NOTES
Daily Note     Today's date: 2024  Patient name: Yris Bowles  : 1955  MRN: [id number]  Referring provider: No ref. provider found  Dx:   Encounter Diagnosis     ICD-10-CM    1. Imbalance  R26.89       2. Weakness  R53.1       3. Other abnormalities of gait and mobility  R26.89           Subjective: Patient reports she has a rash on her face and was prescribed cream for it. Advised discussing further with medical team with any concerns.       Objective: See treatment diary below. Pt arrived with her rolling walker.  She is transported by her spouse who does not accompany her. He gets shopping done while she is in therapy.     Vitals:   - BP: 126/76 mmHg   - HR: 77 bpm   - SpO2: 98%      TE:  - Ball squeezes: 30x   - Step taps to tall [place]: 15 reps each leg   - Seated marchinx each leg  - Side stepping with 2 UE support: 8 laps x 5 feet  - LAQ with 3 second hold x 20 reps each leg      Assessment: Tolerated treatment fairly well. She demonstrates high degree of reliance on upper extremity support for balance. Continues to require frequent rest breaks due to fatigue; alternated seated and standing exercises to maximize participation. Patient will continue to benefit from skilled physical therapy to maximize her function.       Plan: Continue per plan of care.  Progress as tolerated.           Outcome Measures Initial Eval  7/3/24  7/9/24           5xSTS 17.53 sec, 2UE             TUG  - Regular  - Cognitive    32.19 sec, w/ RW  46.16 sec, w/ RW             10 meter 0.55 m/s             TAMEZ defer  37/           mCTSIB  - FTEO (firm)  - FTEC (firm)  - FTEO (foam)  - FTEC (foam)    30 sec  5 sec  8 sec  1 sec             6MWT 470 ft                                                  POC expires Unit limit Auth Expiration date PT/OT + Visit Limit? Co-Insurance   24 NA NA NA Yes

## 2024-07-22 ENCOUNTER — OFFICE VISIT (OUTPATIENT)
Facility: CLINIC | Age: 69
End: 2024-07-22
Payer: MEDICARE

## 2024-07-22 ENCOUNTER — APPOINTMENT (OUTPATIENT)
Facility: CLINIC | Age: 69
End: 2024-07-22
Payer: MEDICARE

## 2024-07-22 DIAGNOSIS — R53.1 WEAKNESS: ICD-10-CM

## 2024-07-22 DIAGNOSIS — R26.89 OTHER ABNORMALITIES OF GAIT AND MOBILITY: ICD-10-CM

## 2024-07-22 DIAGNOSIS — R26.89 IMBALANCE: Primary | ICD-10-CM

## 2024-07-22 PROCEDURE — 97112 NEUROMUSCULAR REEDUCATION: CPT | Performed by: PHYSICAL THERAPIST
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
## 2024-07-24 ENCOUNTER — APPOINTMENT (OUTPATIENT)
Facility: CLINIC | Age: 69
End: 2024-07-24
Payer: MEDICARE

## 2024-07-24 ENCOUNTER — OFFICE VISIT (OUTPATIENT)
Facility: CLINIC | Age: 69
End: 2024-07-24
Payer: MEDICARE

## 2024-07-24 DIAGNOSIS — E78.5 HYPERLIPIDEMIA, UNSPECIFIED HYPERLIPIDEMIA TYPE: ICD-10-CM

## 2024-07-24 DIAGNOSIS — R06.09 DYSPNEA ON EXERTION: ICD-10-CM

## 2024-07-24 DIAGNOSIS — R26.89 OTHER ABNORMALITIES OF GAIT AND MOBILITY: ICD-10-CM

## 2024-07-24 DIAGNOSIS — R26.89 IMBALANCE: Primary | ICD-10-CM

## 2024-07-24 DIAGNOSIS — R53.1 WEAKNESS: ICD-10-CM

## 2024-07-24 DIAGNOSIS — R07.9 CHEST PAIN IN ADULT: ICD-10-CM

## 2024-07-24 PROCEDURE — 97112 NEUROMUSCULAR REEDUCATION: CPT

## 2024-07-24 RX ORDER — ROSUVASTATIN CALCIUM 20 MG/1
20 TABLET, COATED ORAL DAILY
Qty: 90 TABLET | Refills: 1 | Status: SHIPPED | OUTPATIENT
Start: 2024-07-24

## 2024-07-29 ENCOUNTER — APPOINTMENT (OUTPATIENT)
Facility: CLINIC | Age: 69
End: 2024-07-29
Payer: MEDICARE

## 2024-07-29 ENCOUNTER — OFFICE VISIT (OUTPATIENT)
Facility: CLINIC | Age: 69
End: 2024-07-29
Payer: MEDICARE

## 2024-07-29 DIAGNOSIS — R26.89 OTHER ABNORMALITIES OF GAIT AND MOBILITY: ICD-10-CM

## 2024-07-29 DIAGNOSIS — R53.1 WEAKNESS: ICD-10-CM

## 2024-07-29 DIAGNOSIS — R26.89 IMBALANCE: Primary | ICD-10-CM

## 2024-07-29 PROCEDURE — 97112 NEUROMUSCULAR REEDUCATION: CPT

## 2024-07-29 NOTE — PROGRESS NOTES
"Daily Note     Today's date: 2024  Patient name: Yris Bowles  : 1955  MRN: [id number]  Referring provider: No ref. provider found  Dx:   Encounter Diagnosis     ICD-10-CM    1. Imbalance  R26.89       2. Weakness  R53.1       3. Other abnormalities of gait and mobility  R26.89               IE or PN  POC expires Unit limit Auth Expiration date PT/OT + Visit Limit? Co-Insurance   IE 7/3/24  AV 24 NA NA NA Yes                                         Subjective: Patient presents to PT with RW and no new changes, complaints, or falls.     Objective: See treatment diary below.     NMR/TE: 2.5# ankle weights  - STS w/ foam pad on chair:20x, 0UE  - Side stepping w/ 1 UE: 5ft, 7 laps down/back  - Fwd hurdles 6\": 5 ft, 3 laps, 2 sets    Active rest breaks  - Ball squeezes  - Seated Marching  - LAQ    Assessment: Pt tolerated treatment fairly well with focus on strengthening and balance training. Patient demonstrates great FoF as she is heavily reliant on upper extremity support during balance activities. Plan to continue to challenge patient's balance and tolerance to standing activities. Ended session early today secondary to patient request urinary problems. Patient will continue to benefit from skilled physical therapy to maximize her function.       Plan: Continue per plan of care.  Progress as tolerated.           Outcome Measures Initial Eval  7/3/24  7/9/24           5xSTS 17.53 sec, 2UE             TUG  - Regular  - Cognitive    32.19 sec, w/ RW  46.16 sec, w/ RW             10 meter 0.55 m/s             TAMEZ defer             mCTSIB  - FTEO (firm)  - FTEC (firm)  - FTEO (foam)  - FTEC (foam)    30 sec  5 sec  8 sec  1 sec             6MWT 470 ft                                                           "

## 2024-07-31 ENCOUNTER — OFFICE VISIT (OUTPATIENT)
Facility: CLINIC | Age: 69
End: 2024-07-31
Payer: MEDICARE

## 2024-07-31 ENCOUNTER — APPOINTMENT (OUTPATIENT)
Facility: CLINIC | Age: 69
End: 2024-07-31
Payer: MEDICARE

## 2024-07-31 DIAGNOSIS — R26.89 IMBALANCE: Primary | ICD-10-CM

## 2024-07-31 DIAGNOSIS — R53.1 WEAKNESS: ICD-10-CM

## 2024-07-31 DIAGNOSIS — R26.89 OTHER ABNORMALITIES OF GAIT AND MOBILITY: ICD-10-CM

## 2024-07-31 PROCEDURE — 97112 NEUROMUSCULAR REEDUCATION: CPT

## 2024-07-31 NOTE — PROGRESS NOTES
"Daily Note     Today's date: 2024  Patient name: Yris Bowles  : 1955  MRN: [id number]  Referring provider: No ref. provider found  Dx:   Encounter Diagnosis     ICD-10-CM    1. Imbalance  R26.89       2. Weakness  R53.1       3. Other abnormalities of gait and mobility  R26.89               IE or PN  POC expires Unit limit Auth Expiration date PT/OT + Visit Limit? Co-Insurance   IE 7/3/24  AV 24 NA NA NA Yes                                         Subjective: Patient presents to PT with RW and no new changes, complaints, or falls.     Objective: See treatment diary below.     NMR/TE: 2# ankle weights, gait belt  - STS w/ foam pad on chair: 20x, 0UE  - Side stepping w/ no UE: 5 ft, 4 laps down/back  - Standing on foam + bean bag toss: 2 reps with 1 UE support, 6 reps with 0 UE support   - Step up 4\": 10 reps B/L, 2 UE   - Lateral stepping over 6\" gail, 1 UE: 10 reps    Active rest breaks  - LAQ with 2-3 second hold: 2 minutes (2 sets)  - Seated Marchin minutes   - Ball squeezes: 2 minutes       Assessment: Pt tolerated treatment fairly well with focus on strengthening and balance training. Challenged patient to complete some activities with no UE support and she was able to do so without loss of balance. Most challenged with gail clearance. Alternated standing and seated exercises to maximize participation. Patient will continue to benefit from skilled physical therapy to maximize her function.       Plan: Continue per plan of care.  Progress as tolerated.           Outcome Measures Initial Eval  7/3/24  7/9/24           5xSTS 17.53 sec, 2UE             TUG  - Regular  - Cognitive    32.19 sec, w/ RW  46.16 sec, w/ RW             10 meter 0.55 m/s             TAMEZ defer  37/56           mCTSIB  - FTEO (firm)  - FTEC (firm)  - FTEO (foam)  - FTEC (foam)    30 sec  5 sec  8 sec  1 sec             6MWT 470 ft                                                           "

## 2024-08-01 ENCOUNTER — OFFICE VISIT (OUTPATIENT)
Dept: NEPHROLOGY | Facility: CLINIC | Age: 69
End: 2024-08-01
Payer: MEDICARE

## 2024-08-01 VITALS
SYSTOLIC BLOOD PRESSURE: 139 MMHG | BODY MASS INDEX: 29.53 KG/M2 | OXYGEN SATURATION: 96 % | DIASTOLIC BLOOD PRESSURE: 78 MMHG | HEART RATE: 75 BPM | HEIGHT: 64 IN | WEIGHT: 173 LBS

## 2024-08-01 DIAGNOSIS — E87.1 HYPONATREMIA: ICD-10-CM

## 2024-08-01 DIAGNOSIS — E22.2 SIADH (SYNDROME OF INAPPROPRIATE ADH PRODUCTION) (HCC): Primary | ICD-10-CM

## 2024-08-01 DIAGNOSIS — I10 ESSENTIAL HYPERTENSION: ICD-10-CM

## 2024-08-01 PROCEDURE — 99214 OFFICE O/P EST MOD 30 MIN: CPT | Performed by: INTERNAL MEDICINE

## 2024-08-01 NOTE — PROGRESS NOTES
NEPHROLOGY OFFICE PROGRESS NOTE   Yris Bowles 69 y.o. 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   Na level has been normal over the past 4 months - mostly around 137 to 138.   Na 137 in July 2024.   Rx: salt tabs 1 gm TID, Torsemide 5 mg OD, FR 1500 cc/24 hours.   No changes.     Bipolar disorder:   Doing much better overall.   Follow up with Dr. Nichols.   Now on Latuda, Sertraline, Depakote.     Hypertension:  BP is acceptable.   Rx: Metoprolol succinate 25 mg OD, Torsemide 5 mg OD.   No changes     Bilateral adrenal nodules:  Aldosterone and metanephrines were unremarkable.  Suspected to be adenomas.     Diabetes mellitus:   On Metformin.   Management deferred to PCP.       Hyperlipidemia:  On Rosuvastatin.   Management deferred to PCP.       Patient Instructions   Your sodium level is doing really well and has been normal for the past 3-4 months.   I recommend no changes to the medications.   Check BMP every 2 months.   Follow up in 1 year.     SUBJECTIVE / INTERVAL HISTORY:  Yris was last seen in the office in Jan 2024.   No hospital stays or ER visits since then.   Her mood is much better overall and Jenifer is extremely pleased with her progress.   She continues to follow up with psychiatry.   She does not do much physical activity and Jenifer is working on getting her to do more.   She has been doing PT over the past 2 months.     PMH/PSH: HTN, DM, HLP, depression, bipolar disorder, endometrial and colon cancer, DJD s/p L TKR, left femur fracture.      Previous work-up:  May 2023: Serum osmolality 266, Urine osmolality 392, urine sodium 94, uric acid 5.4, aldosterone 2.8, renin 2.281, metanephrines 18.4, normetanephrine 70.6.     April 2023: TSH 0.886     October 15, 2022: Serum osmolality 255, urine osmolality 518, urine sodium 56, uric  "acid 3.7, cortisol 25.0.     Imaging:  October 17, 2022: CTA of chest, abdomen, pelvis: No malignancy.     March 23, 2023: MRI of brain: No acute infarction, edema, or mass effect.    ALLERGIES:   Allergies   Allergen Reactions    Wellbutrin [Bupropion] Anxiety     Patient has tried Wellbutrin which resulted in increased paranoia. Patient wishes to not try this medication again.     REVIEW OF SYSTEMS:  Review of Systems   Constitutional:  Positive for fatigue. Negative for appetite change, chills and fever.   Respiratory:  Positive for shortness of breath (on exertion.). Negative for cough.    Cardiovascular:  Negative for chest pain and leg swelling.   Gastrointestinal:  Positive for diarrhea. Negative for abdominal pain, nausea and vomiting.   Genitourinary:  Negative for hematuria.   Musculoskeletal:  Positive for back pain. Negative for arthralgias.   Neurological:  Negative for dizziness and light-headedness.     OBJECTIVE:  /78 (BP Location: Left arm, Patient Position: Sitting, Cuff Size: Standard)   Pulse 75   Ht 5' 4\" (1.626 m)   Wt 78.5 kg (173 lb)   SpO2 96%   BMI 29.70 kg/m²   Current Weight:   There is no height or weight on file to calculate BMI.  Physical Exam  Constitutional:       General: She is not in acute distress.     Appearance: She is well-developed. She is obese. She is not toxic-appearing.   HENT:      Head: Normocephalic and atraumatic.   Eyes:      General: No scleral icterus.     Conjunctiva/sclera: Conjunctivae normal.   Neck:      Vascular: No JVD.   Cardiovascular:      Rate and Rhythm: Normal rate and regular rhythm.      Heart sounds: Normal heart sounds.   Pulmonary:      Effort: Pulmonary effort is normal.      Breath sounds: Normal breath sounds.   Abdominal:      General: Bowel sounds are normal.      Palpations: Abdomen is soft.   Musculoskeletal:      Right lower leg: No edema.      Left lower leg: No edema.   Skin:     General: Skin is warm and dry.   Neurological: "      Mental Status: She is alert and oriented to person, place, and time.   Psychiatric:         Mood and Affect: Mood normal.         Behavior: Behavior is cooperative.       Medications:  Current Outpatient Medications:     acetaminophen (TYLENOL) 325 mg tablet, Take 3 tablets (975 mg total) by mouth 2 (two) times a day (Patient taking differently: Take 975 mg by mouth every 6 (six) hours as needed), Disp: , Rfl: 0    aspirin (ECOTRIN LOW STRENGTH) 81 mg EC tablet, Take 1 tablet (81 mg total) by mouth daily, Disp: 30 tablet, Rfl: 5    BD Pen Needle Chelsey 2nd Gen 32G X 4 MM MISC, USE 1  TWICE DAILY, Disp: 60 each, Rfl: 0    calcium carbonate (OS-MARVEL) 600 MG tablet, Take 600 mg by mouth 2 (two) times a day with meals, Disp: , Rfl:     cholecalciferol (VITAMIN D3) 1,000 units tablet, Take 1,000 Units by mouth daily, Disp: , Rfl:     clonazePAM (KlonoPIN) 0.5 mg tablet, TAKE 1/2 (ONE-HALF) TO ONE TABLET BY MOUTH ONCE DAILY AS NEEDED. WATCH FOR SEDATION, Disp: , Rfl:     Diclofenac Sodium (VOLTAREN) 1 %, Apply 4 g topically 4 (four) times a day, Disp: 100 g, Rfl: 1    divalproex sodium (DEPAKOTE ER) 500 mg 24 hr tablet, TAKE 1 TABLET BY MOUTH EVERY DAY AT BEDTIME WATCH FOR SEDATION, Disp: , Rfl:     docusate sodium (COLACE) 100 mg capsule, Take 100 mg by mouth 2 (two) times a day, Disp: , Rfl:     famotidine (PEPCID) 20 mg tablet, Take 1 tablet (20 mg total) by mouth 2 (two) times a day, Disp: 180 tablet, Rfl: 1    gabapentin (NEURONTIN) 100 mg capsule, TAKE 1 CAPSULE BY MOUTH TWICE DAILY ( 8AM AND 6PM) AND 2 CAPSULE AT BEDTIME, Disp: 120 capsule, Rfl: 5    ketoconazole (NIZORAL) 2 % cream, Apply topically daily, Disp: 30 g, Rfl: 0    lidocaine (LIDODERM) 5 %, Apply 1 patch topically over 12 hours daily Remove & Discard patch within 12 hours or as directed by MD Do not start before May 31, 2023. (Patient taking differently: Apply 1 patch topically as needed Remove & Discard patch within 12 hours or as directed by MD),  Disp: 30 patch, Rfl: 0    lurasidone (LATUDA) 40 mg tablet, 40 mg 40 mg at 8 am, 40 mg 12 pm , 80 mg at 6 pm (Patient not taking: Reported on 2/26/2024), Disp: , Rfl:     lurasidone (LATUDA) 80 mg tablet, Take 1 tablet (80 mg total) by mouth daily with dinner (Patient not taking: Reported on 6/7/2024), Disp: 30 tablet, Rfl: 0    metFORMIN (GLUCOPHAGE-XR) 500 mg 24 hr tablet, Take 1 tablet by mouth once daily with breakfast, Disp: 90 tablet, Rfl: 1    metoprolol succinate (TOPROL-XL) 25 mg 24 hr tablet, Take 1 tablet (25 mg total) by mouth daily, Disp: 90 tablet, Rfl: 1    Multiple Vitamin (multivitamin) tablet, Take 1 tablet by mouth daily, Disp: 90 tablet, Rfl: 1    Multiple Vitamin (One-Daily Multi-Vitamin) TABS, Take 1 tablet by mouth once daily, Disp: 90 tablet, Rfl: 1    nitroglycerin (NITROSTAT) 0.4 mg SL tablet, Place 1 tablet (0.4 mg total) under the tongue every 5 (five) minutes as needed for chest pain, Disp: 30 tablet, Rfl: 1    Omega-3 Fatty Acids (FISH OIL PO), Take 2,000 mg by mouth, Disp: , Rfl:     rosuvastatin (CRESTOR) 20 MG tablet, Take 1 tablet by mouth once daily, Disp: 90 tablet, Rfl: 1    sertraline (ZOLOFT) 100 mg tablet, Take 50 mg by mouth 2 (two) times a day, Disp: , Rfl:     sertraline (ZOLOFT) 50 mg tablet, Take 50 mg by mouth 2 (two) times a day (Patient not taking: Reported on 6/7/2024), Disp: , Rfl:     sodium chloride 1 g tablet, Take 1 tablet (1 g total) by mouth 3 (three) times a day, Disp: 270 tablet, Rfl: 1    torsemide (DEMADEX) 5 MG tablet, Take 1 tablet by mouth once daily, Disp: 90 tablet, Rfl: 1    Laboratory Results:  Results for orders placed or performed in visit on 07/12/24   Basic metabolic panel   Result Value Ref Range    Sodium 137 135 - 147 mmol/L    Potassium 4.4 3.5 - 5.3 mmol/L    Chloride 100 96 - 108 mmol/L    CO2 29 21 - 32 mmol/L    ANION GAP 8 4 - 13 mmol/L    BUN 20 5 - 25 mg/dL    Creatinine 1.13 0.60 - 1.30 mg/dL    Glucose 190 (H) 65 - 140 mg/dL     Calcium 9.5 8.4 - 10.2 mg/dL    eGFR 49 ml/min/1.73sq m

## 2024-08-01 NOTE — PATIENT INSTRUCTIONS
Your sodium level is doing really well and has been normal for the past 3-4 months.   I recommend no changes to the medications.   Check BMP every 2 months.   Follow up in 1 year.
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
## 2024-08-05 ENCOUNTER — TELEPHONE (OUTPATIENT)
Dept: FAMILY MEDICINE CLINIC | Facility: CLINIC | Age: 69
End: 2024-08-05

## 2024-08-05 ENCOUNTER — OFFICE VISIT (OUTPATIENT)
Facility: CLINIC | Age: 69
End: 2024-08-05
Payer: MEDICARE

## 2024-08-05 DIAGNOSIS — R26.89 OTHER ABNORMALITIES OF GAIT AND MOBILITY: ICD-10-CM

## 2024-08-05 DIAGNOSIS — R26.89 IMBALANCE: Primary | ICD-10-CM

## 2024-08-05 DIAGNOSIS — R09.02 HYPOXIA: Primary | ICD-10-CM

## 2024-08-05 DIAGNOSIS — R53.1 WEAKNESS: ICD-10-CM

## 2024-08-05 PROCEDURE — 97530 THERAPEUTIC ACTIVITIES: CPT

## 2024-08-05 PROCEDURE — 97112 NEUROMUSCULAR REEDUCATION: CPT

## 2024-08-05 NOTE — TELEPHONE ENCOUNTER
8/5/2024 6:11 PM Called her daughter regarding her symptoms    She will go to her mom's house tomorrow and recheck the oxygen levels  She will also set up an appointment with pulmonology    Teressa Prakash DO

## 2024-08-05 NOTE — TELEPHONE ENCOUNTER
----- Message from Bertha MILLS sent at 8/5/2024  3:00 PM EDT -----  Dr. Prakash, I wanted to make you aware of a finding for Yris that occurred in PT today.  She was side-stepping without UE support which is difficult for her, and after 10 feet  she complained of shortness of breath.  Her PO2 was in the low 80's and as I walked her back to her seat, her PO2 dropped to the low 70's.  Her PO2 tami quickly with resting, but she continued to c/o SOB for a few minutes. 98% PO2 and 78 HR within 1-2 minutes of rest.  I continued to monitor her PO2 as she was walking out and it dropped to the low 90's and upper 80'% while walking 75' with her RW.  She had no other cardiovascular symptoms.  She has follow up with you on 8/12/24.  Thank you,   Bertha

## 2024-08-07 ENCOUNTER — EVALUATION (OUTPATIENT)
Facility: CLINIC | Age: 69
End: 2024-08-07
Payer: MEDICARE

## 2024-08-07 DIAGNOSIS — R53.1 WEAKNESS: ICD-10-CM

## 2024-08-07 DIAGNOSIS — R26.89 IMBALANCE: Primary | ICD-10-CM

## 2024-08-07 DIAGNOSIS — R26.89 OTHER ABNORMALITIES OF GAIT AND MOBILITY: ICD-10-CM

## 2024-08-07 PROCEDURE — 97530 THERAPEUTIC ACTIVITIES: CPT

## 2024-08-07 NOTE — PROGRESS NOTES
PT Re-Evaluation / Progress Note         POC expires Unit limit Auth Expiration date PT/OT + Visit Limit? Co-Insurance   24 NA NA NA Yes                                            Today's date: 2024  Patient name: Yris Bowles  : 1955  MRN: [id number]  Referring provider: Teressa Prakash DO  Dx:   Encounter Diagnosis     ICD-10-CM    1. Imbalance  R26.89       2. Weakness  R53.1       3. Other abnormalities of gait and mobility  R26.89               Assessment  Assessment details: Patient is a 69 y.o. Female who has been presenting to skilled outpatient PT to address balance and gait difficulties which has been impacting her independence and participation in her community. Yris has been discharged from outpatient OT service since commencing OPPT services. Since her evaluation she has made improvements in functional mobility, dual tasking, gait speed, and cardiovascular endurance as per TUG, TUG cog, 10 meter walk test, and 6 MWT respectively. She displays slight regression in 5x STS, and TAMEZ suggesting regression in muscular strength and static balance. Regression can possibly be attributed to emphasis on generalized exercise and mobility during treatment sessions versus specific balance and lower extremity strengthening. During testing today patient was able to maintain appropriate SpO2 rate without complaints of shortness of breath which has been demonstrated in a previous treatment session. She continues to be at HIGH risk for falls per APTA and Rehab Measures Lab cutoff score for aformentioned outcome measures. Yris has great difficulty maintaining balance when visual field is obstructed with increased FoF which can further perpetuate her risk for falls. She has met one short term and one long term goal thus far. She will undoubtedly benefit from skilled OPPT services to address limitations, reduce risk for falls, and  improve overall independence.   Impairments: Abnormal coordination, Abnormal gait, Abnormal muscle tone, Abnormal or restricted ROM, Activity intolerance, Impaired balance, Impaired physical strength, Lacks appropriate HEP, Poor posture, Poor body mechanics, Pain with function, Safety issue, Weight-bearing intolerance, Abnormal movement, Difficulty understanding, Abnormal muscle firing  Understanding of Dx/Px/POC: Good  Prognosis: Good    Patient verbalized understanding of POC.         Please contact me if you have any questions or recommendations. Thank you for the referral and the opportunity to share in Yris Bowles's care.      Plan  Plan details: balance training, gait training, strengthening, and endurance  Patient would benefit from: PT Eval, Skilled PT, and Skilled OT  Planned modality interventions: Biofeedback, Cryotherapy, TENS, Thermotherapy  Planned therapy interventions: Abdominal trunk stabilization, ADL training, Balance, Balance/WB training, Breathing training, Body mechanics training, Coordination, Functional ROM exercises, Gait training, HEP, Joint Mobilization, Manual Therapy, Samuels taping, Motor coordination training, Neuromuscular re-education, Patient education, Postural training, Strengthening, Stretching, Therapeutic activities, Therapeutic exercises, Therapeutic training, Transfer training, Activity modification, Work reintegration  Frequency: 2x/wk  Duration in weeks: 12 weeks  Plan of Care beginning date: 7/3/2024  Plan of Care expiration date: 12 weeks - 9/25/24  Treatment plan discussed with: Patient       Goals  Short Term Goals (4 weeks):    - Patient will improve time on TUG by 2.9 seconds to facilitate improved safety in all ambulation- MET  - Patient will be independent in basic HEP 2-3 weeks- ongoing  - Patient will improve 5xSTS score by 2.3 seconds to promote improved LE functional strength needed for ADLs- NOT MET    Long Term Goals (12 weeks):  - Patient will be  independent in a comprehensive home exercise program- ongoing  - Patient will improve gait speed by 0.18 m/s to improve safety with community ambulation- MET  - Patient will improve TAMEZ by 6 points in order to improve static balance and reduce risk for falls-NOT MET  - Patient will be able to demonstrate HT in gait without veering- ongoing  - Patient will improve 6 Minute Walk Test score by 190 feet to promote improved cardiovascular endurance- ongoing  - Patient will report 50% reduction in near falls in order to improve safety with functional tasks and reduce his risk for falls- ongoing  - Patient will be able to ascend/descend stairs reciprocally with 1 UE assist to promote independence and safety with ADLs- ongoing  - Patient will report 50% reduction in near falls when ambulating on uneven terrain- ongoing      Cut off score    All date taken from APTA Neuro Section or Rehab Measures      Tamez/56  MDC: 6 pts  Age Norms:  60-69: M - 55   F - 55  70-79: M - 54   F - 53  80-89: M - 53   F - 50 5xSTS: Susan et al 2010  MDC: 2.3 sec  Age Norms:  60-69: 11.4 sec  70-79: 12.6 sec  80-89: 14.8 sec   TUG  MDC: 4.14 sec  Cut off score:  >13.5 sec community dwelling adults  >32.2 frail elderly  <20 I for basic transfers  >30 dependent on transfers 10 Meter Walk Test: Jean-Pierre et al 2011  MDC: 0.18 m/s  20-29: M - 1.35 m   F - 1.34 m  30-39: M - 1.43 m   F - 1.34 m  40-49: M - 1.43 m   F - 1.39 m  50-59: M - 1.43 m   F - 1.31 m  60-69: M - 1.34 m   F - 1.24 m  70-79: M - 1.26 m   F - 1.13 m  80-89: M - 0.97 m   F - 0.94 m    Household Ambulator < 0.4 m/s  Limited Community Ambulator 0.4 - 0.8 m/s  Community Ambulator 0.8 - 1.2 m/s  Safely cross the street > 1.2 m/s   FGA  MCID: 4 pts  Geriatrics/community < 22/30 fall risk  Geriatrics/community < 20/30 unexplained falls    DGI  MDC: vestibular - 4 pts  MDC: geriatric/community - 3 pts  Falls risk <19/24 mCTSIB  Norm: 20-60 yrs  Eyes open firm: norm sway  0.21-0.48  Eyes closed firm: norm sway 0.48-0.99  Eyes open foam: norm sway 0.38-0.71  Eyes closed foam: norm sway 0.70-2.22   6 Minute Walk Test  MDC: 190.98 ft  MCID: 164 ft    Age Norms  60-69: M - 1876 ft (571.80 m)  F - 1765 ft (537.98 m)  70-79: M - 1729 ft (527.00 m)  F - 1545 ft (470.92 m)  80-89: M - 1368 ft (416.97 m)  F - 1286 ft (391.97 m) ABC: Joann & Roel, 2003  <67% increased risk for falls   [place] Monofilaments  Evaluator Size:        Force (grams):          Hand/Dorsal Thresholds:        Plantar Thresholds:  - 1.65                       - 0.008                       - Normal                                 - Normal  - 2.36                       - 0.02                         - Normal                                 - Normal  - 2.44                       - 0.04                         - Normal                                 - Normal  - 2.83                       - 0.07                         - Normal                                 - Normal  - 3.22                       - 0.16                         - Diminished light touch          - Normal  - 3.61                       - 0.40                         - Diminished light touch          - Normal  - 3.84                       - 0.60                         - Diminished protective           - Diminished light touch  - 4.08                       - 1.00                         - Diminished protective           - Diminished light touch  - 4.17                       - 1.40                         - Diminished protective           - Diminished light touch  - 4.31                       - 2.00                         - Diminished protective           - Diminished light touch  - 4.56                       - 4.00                         - Loss of protective sense      - Diminished protective  - 4.74                       - 6.00                         - Loss of protective sense      - Diminished protective  - 4.93                       - 8.00   "                       - Loss of protective sense      - Diminished protective  - 5.07                       - 10.0                         - Loss of protective sense     - Loss of protective sense  - 5.18                       - 15.0                         - Loss of protective sense     - Loss of protective sense  - 5.46                       - 26.0                         - Loss of protective sense     - Loss of protective sense  - 5.88                       - 60.0                         - Loss of protective sense     - Loss of protective sense  - 6.10                       - 100                          - Loss of protective sense     - Loss of protective sense  - 6.45                       - 180                          - Loss of protective sense     - Loss of protective sense  - 6.65                       - 300                          - Deep pressure sense only  - Deep pressure sense only         Subjective    History of Present Illness  - Mechanism of injury: Patient presents to PT with complaints of difficulty with ambulation and balance. She has been participating with skilled outpatient OT services since April to address cognitive limitations. Patient reports she has not had any recent falls however, is worried about falling and always utilizes her rolling walker in her home and in her community because of FoF. Her major goal is to improve her walking abilities and independence as she requires assistance from her  to perform ADLs.    Updated 8/7/24: Patient states she feels as though her balance has improved since staring therapy however, she states gail negotiation is still difficult for her.     - Primary AD: RW  - Assist level at home: Lizett from  for ADLs    Patient goal: \"walk better\"    Pain  NA    Social Support  - Steps to enter house: 6 HERNAN  - Stairs in house: none   - Lives in: 1st floor apt  - Lives with:     - Employment status: retired CNA  - Hand dominance: " RHD    Treatments  - Previous treatment: none  - Current treatment: OT  - Diagnostic Testing: none      Objective   HR: 77 bpm  SpO2: 98%  BP: 112/68 mmHg    LE MMT  - R Hip Flexion: 3/5  L Hip Flexion: 3+/5  - R Hip Extension: 3/5  L Hip Extension: 3/5  - R Hip Abduction: 3+/5  L Hip Abduction: 3+/5  - R Hip Adduction: 4-/5  L Hip Adduction: 4-/5  - R Knee Extension: 4/5  L Knee Extension: 4/5  - R Knee Flexion: 4/5  L Knee Flexion: 4-/5  - R Ankle DF: 4-/5   L Ankle DF: 4-/5  - R Ankle PF: 4/5   L Ankle PF: 4/5    Sensation  - Light touch: diminished at R C6 distribution    Coordination  - Dysdiadochokinesia: slowed  - Alternate Toe Taps: slowed  - Finger to Nose: normal    Reflexes/Clonus  - Clonus: No    Myelopathy Screen (>3/5 +)  - Jewell's Reflex: -  - Babinski Reflex: NA  - Inverted Supinator Sign: -  - Age > 45: Yes  - Gait Deviation: Yes      Gait  - Abnormalities: Forwards flexed posture, slow turning, decreased foot clearance, and decreased step length           Outcome Measures Initial Eval  7/3/24 PN  8/7/24       5xSTS 17.53 sec, 2UE 19.65 sec, 2 UE       TUG  - Regular  - Cognitive   32.19 sec, w/ RW  46.16 sec, w/ RW   26.85 sec w/ RW  34.97 sec w/ RW counting bwds from 100       10 meter 0.55 m/s 0.76 m/s       DASHAWN 37/56 32/56       mCTSIB  - FTEO (firm)  - FTEC (firm)  - FTEO (foam)  - FTEC (foam)   30 sec  5 sec  8 sec  1 sec   30 sec  4 sec  8 sec  unable       6MWT 470 ft 530 ft, SpO2: 97%                                    Precautions:   Past Medical History:   Diagnosis Date    Anesthesia complication     difficulty waking up    Anxiety     Arthritis     left knee    Bipolar 1 disorder (HCC)     Bone spur     left knee behing the knee cap    Cancer (HCC)     Chronic kidney disease     Chronic pain disorder     Colon cancer (HCC)     patient states about 2017    Colon polyp     Tubulovillous Adenoma High Grade Dysplasia - 2017    Depression     Diabetes mellitus (HCC)     Endometrial cancer  (HCC)     grade I    Hx of bleeding disorder     vaginal bleeding started on 3/13/17     Hyperlipidemia     Hypertension     Hypomagnesemia 03/20/2023    Memory loss     PONV (postoperative nausea and vomiting)     Psychiatric disorder     Psychiatric illness     Psychosis (HCC)     Shortness of breath     with exertion    Sleep difficulties     Urinary incontinence     Vitamin D deficiency

## 2024-08-12 ENCOUNTER — OFFICE VISIT (OUTPATIENT)
Facility: CLINIC | Age: 69
End: 2024-08-12
Payer: MEDICARE

## 2024-08-12 ENCOUNTER — OFFICE VISIT (OUTPATIENT)
Dept: FAMILY MEDICINE CLINIC | Facility: CLINIC | Age: 69
End: 2024-08-12
Payer: MEDICARE

## 2024-08-12 VITALS
TEMPERATURE: 97.4 F | BODY MASS INDEX: 29.37 KG/M2 | DIASTOLIC BLOOD PRESSURE: 62 MMHG | WEIGHT: 172 LBS | HEART RATE: 82 BPM | RESPIRATION RATE: 16 BRPM | SYSTOLIC BLOOD PRESSURE: 116 MMHG | HEIGHT: 64 IN

## 2024-08-12 DIAGNOSIS — R26.89 OTHER ABNORMALITIES OF GAIT AND MOBILITY: ICD-10-CM

## 2024-08-12 DIAGNOSIS — R26.89 IMBALANCE: Primary | ICD-10-CM

## 2024-08-12 DIAGNOSIS — R53.1 WEAKNESS: ICD-10-CM

## 2024-08-12 DIAGNOSIS — R92.30 DENSE BREAST: ICD-10-CM

## 2024-08-12 DIAGNOSIS — E11.9 TYPE 2 DIABETES MELLITUS WITHOUT COMPLICATION, WITH LONG-TERM CURRENT USE OF INSULIN (HCC): ICD-10-CM

## 2024-08-12 DIAGNOSIS — Z12.11 COLON CANCER SCREENING: ICD-10-CM

## 2024-08-12 DIAGNOSIS — Z12.31 ENCOUNTER FOR SCREENING MAMMOGRAM FOR BREAST CANCER: ICD-10-CM

## 2024-08-12 DIAGNOSIS — E11.9 TYPE 2 DIABETES MELLITUS WITHOUT COMPLICATION, WITHOUT LONG-TERM CURRENT USE OF INSULIN (HCC): ICD-10-CM

## 2024-08-12 DIAGNOSIS — Z85.42 HISTORY OF ENDOMETRIAL CANCER: Chronic | ICD-10-CM

## 2024-08-12 DIAGNOSIS — Z79.4 TYPE 2 DIABETES MELLITUS WITHOUT COMPLICATION, WITH LONG-TERM CURRENT USE OF INSULIN (HCC): ICD-10-CM

## 2024-08-12 DIAGNOSIS — Z12.31 SCREENING MAMMOGRAM, ENCOUNTER FOR: ICD-10-CM

## 2024-08-12 DIAGNOSIS — I10 ESSENTIAL HYPERTENSION: ICD-10-CM

## 2024-08-12 DIAGNOSIS — Z00.00 MEDICARE ANNUAL WELLNESS VISIT, SUBSEQUENT: Primary | ICD-10-CM

## 2024-08-12 DIAGNOSIS — E87.1 HYPONATREMIA: ICD-10-CM

## 2024-08-12 LAB — SL AMB POCT HEMOGLOBIN AIC: 7.9 (ref ?–6.5)

## 2024-08-12 PROCEDURE — 97112 NEUROMUSCULAR REEDUCATION: CPT

## 2024-08-12 PROCEDURE — G0439 PPPS, SUBSEQ VISIT: HCPCS | Performed by: FAMILY MEDICINE

## 2024-08-12 PROCEDURE — 83036 HEMOGLOBIN GLYCOSYLATED A1C: CPT | Performed by: FAMILY MEDICINE

## 2024-08-12 PROCEDURE — 99214 OFFICE O/P EST MOD 30 MIN: CPT | Performed by: FAMILY MEDICINE

## 2024-08-12 PROCEDURE — 97110 THERAPEUTIC EXERCISES: CPT

## 2024-08-12 RX ORDER — DIVALPROEX SODIUM 250 MG/1
TABLET, EXTENDED RELEASE ORAL
COMMUNITY
Start: 2024-08-10

## 2024-08-12 RX ORDER — METFORMIN HCL 500 MG
500 TABLET, EXTENDED RELEASE 24 HR ORAL 2 TIMES DAILY WITH MEALS
Qty: 180 TABLET | Refills: 1 | Status: SHIPPED | OUTPATIENT
Start: 2024-08-12

## 2024-08-12 NOTE — PATIENT INSTRUCTIONS

## 2024-08-12 NOTE — ASSESSMENT & PLAN NOTE
Blood pressure is well-controlled  Continue torsemide 5 mg daily  Orders:    CBC; Future    Comprehensive metabolic panel; Future    Lipid Panel with Direct LDL reflex; Future

## 2024-08-12 NOTE — PROGRESS NOTES
"Daily Note       Today's date: 2024  Patient name: Yris Bowles  : 1955  MRN: [id number]  Referring provider: Juan Jose Guerra DO  Dx:   Encounter Diagnosis     ICD-10-CM    1. Imbalance  R26.89       2. Weakness  R53.1       3. Other abnormalities of gait and mobility  R26.89                   IE or PN  POC expires Unit limit Auth Expiration date PT/OT + Visit Limit? Co-Insurance   IE 7/3/24  AV 24 NA NA NA Yes                                         Subjective: Patient presents to PT with RW and no new changes, complaints, or falls.     Objective: See treatment diary below.     NMR/TE: 2.5# ankle weights, gait belt  - STS w/ foam pad on chair + 5.5# TT: 15x, 0UE   - FTEC on foam pad: 5 sec bursts, 10x, SpO2: 96%  - Step-ups 6\" at ballet bar + HHA: 12x, SpO2: 96-97%  - Lateral step over hurdles: 5 ft, 2 laps, 2 UE SpO2: 96%    Active rest breaks  - Seated ball squeeze: 20x  - Seated marchinx    Assessment: Patient tolerated treatment well with focus on strengthening and balance training. Oxygen levels monitored throughout session today with appropriate responses to exercise recorded throughout. Patient displays FoF as she is heavily reliant on upper extremity strategies during balance based activities. Limited seated exercises this date to further promote improvements in tolerance to activity. Patient will continue to benefit from skilled physical therapy to maximize her function.       Plan: Continue per plan of care.  Progress as tolerated.             Outcome Measures Initial Eval  7/3/24 PN  24       5xSTS 17.53 sec, 2UE 19.65 sec, 2 UE       TUG  - Regular  - Cognitive   32.19 sec, w/ RW  46.16 sec, w/ RW   26.85 sec w/ RW  34.97 sec w/ RW counting bwds from 100       10 meter 0.55 m/s 0.76 m/s       DASHAWN        mCTSIB  - FTEO (firm)  - FTEC (firm)  - FTEO (foam)  - FTEC (foam)   30 sec  5 sec  8 sec  1 sec   30 sec  4 sec  8 sec  unable       6MWT 470 ft 530 ft, SpO2: 97% "

## 2024-08-12 NOTE — PROGRESS NOTES
Ambulatory Visit  Name: Yris Bowles      : 1955      MRN: [id number]  Encounter Provider: Teressa Prakash DO  Encounter Date: 2024   Encounter department: [facility]    Assessment & Plan   Assessment & Plan  Medicare annual wellness visit, subsequent           Type 2 diabetes mellitus without complication, without long-term current use of insulin (HCC)  Diabetes is not controlled  Metformin dose increased from 500 mg once a day to 500 mg twice daily  She recently had her eye exam  Copy of her records requested  Lab Results   Component Value Date    HGBA1C 7.9 (A) 2024     Orders:  •  POCT hemoglobin A1c  •  Hemoglobin A1C; Future    Encounter for screening mammogram for breast cancer    Orders:  •  Mammo screening bilateral w 3d & cad; Future      Screening mammogram, encounter for    Orders:  •  US breast screening bilateral complete (ABUS); Future      Dense breast    Orders:  •  US breast screening bilateral complete (ABUS); Future      Hyponatremia  Sodium levels have improved       History of endometrial cancer  She is overdue for follow-up  Orders:  •  Ambulatory Referral to Gynecologic Oncology; Future    Colon cancer screening    Orders:  •  Ambulatory referral to Gastroenterology; Future      Type 2 diabetes mellitus without complication, with long-term current use of insulin (HCC)    Lab Results   Component Value Date    HGBA1C 7.9 (A) 2024     Orders:  •  metFORMIN (GLUCOPHAGE-XR) 500 mg 24 hr tablet; Take 1 tablet (500 mg total) by mouth 2 (two) times a day with meals      Essential hypertension  Blood pressure is well-controlled  Continue torsemide 5 mg daily  Orders:  •  CBC; Future  •  Comprehensive metabolic panel; Future  •  Lipid Panel with Direct LDL reflex; Future          Preventive health issues were discussed with patient, and age appropriate screening tests were ordered as noted in patient's After Visit Summary. Personalized health advice and appropriate  referrals for health education or preventive services given if needed, as noted in patient's After Visit Summary.    Return in about 3 months (around 11/12/2024) for Diabetic follow up.      History of Present Illness     She is here today accompanied by her daughter.  Her mood has improved significantly.  Things have stabilized more at home.  She continues to go to physical therapy for her balance.  She has been eating more food though since she has been on this current regimen.       Patient Care Team:  Teressa Prakash DO as PCP - General (Family Medicine)  MD Lonnie Chong MD Nicholas Parrish Taylor, MD Navtej Brar, DO (Cardiology)  Yosef Lambert MD (Nephrology)    Review of Systems  Medical History Reviewed by provider this encounter:  Tobacco  Allergies  Meds  Problems  Med Hx  Surg Hx  Fam Hx       Annual Wellness Visit Questionnaire   Yris is here for her Subsequent Wellness visit.     Health Risk Assessment:   Patient rates overall health as good. Patient feels that their physical health rating is much better. Patient is satisfied with their life. Eyesight was rated as same. Hearing was rated as same. Patient feels that their emotional and mental health rating is slightly better. Patients states they are never, rarely angry. Patient states they are always unusually tired/fatigued. Pain experienced in the last 7 days has been a lot. Patient's pain rating has been 10/10. Patient states that she has experienced weight loss or gain in last 6 months.     Depression Screening:   PHQ-2 Score: 5  PHQ-9 Score: 15      Fall Risk Screening:   In the past year, patient has experienced: history of falling in past year    Number of falls: 2 or more  Injured during fall?: Yes    Feels unsteady when standing or walking?: Yes    Worried about falling?: Yes      Urinary Incontinence Screening:   Patient has leaked urine accidently in the last six months.     Home  Safety:  Patient has trouble with stairs inside or outside of their home. Patient has working smoke alarms and has working carbon monoxide detector. Home safety hazards include: none.     Nutrition:   Current diet is Regular.     Medications:   Patient is currently taking over-the-counter supplements. OTC medications include: see medication list. Patient is not able to manage medications.     Activities of Daily Living (ADLs)/Instrumental Activities of Daily Living (IADLs):   Walk and transfer into and out of bed and chair?: Yes  Dress and groom yourself?: No    Bathe or shower yourself?: No    Feed yourself? Yes  Do your laundry/housekeeping?: No  Manage your money, pay your bills and track your expenses?: No  Make your own meals?: No    Do your own shopping?: No    Previous Hospitalizations:   Any hospitalizations or ED visits within the last 12 months?: Yes    How many hospitalizations have you had in the last year?: 1-2    Advance Care Planning:   Living will: Yes    Advanced directive: Yes    Advanced directive counseling given: Yes    End of Life Decisions reviewed with patient: Yes    Provider agrees with end of life decisions: Yes      Cognitive Screening:   Provider or family/friend/caregiver concerned regarding cognition?: No    PREVENTIVE SCREENINGS      Cardiovascular Screening:    General: Screening Not Indicated and History Lipid Disorder      Diabetes Screening:     General: Screening Not Indicated and History Diabetes      Colorectal Cancer Screening:     General: History Colorectal Cancer      Breast Cancer Screening:     General: Screening Current    Due for: Mammogram        Cervical Cancer Screening:    General: Screening Not Indicated      Osteoporosis Screening:    General: Screening Not Indicated and History Osteoporosis      Abdominal Aortic Aneurysm (AAA) Screening:        General: Screening Not Indicated      Lung Cancer Screening:     General: Screening Not Indicated      Hepatitis C  "Screening:    General: Screening Current    Screening, Brief Intervention, and Referral to Treatment (SBIRT)    Screening  Typical number of drinks in a day: 0  Typical number of drinks in a week: 0  Interpretation: Low risk drinking behavior.    AUDIT-C Screenin) How often did you have a drink containing alcohol in the past year? never  2) How many drinks did you have on a typical day when you were drinking in the past year? 0  3) How often did you have 6 or more drinks on one occasion in the past year? never    AUDIT-C Score: 0  Interpretation: Score 0-2 (female): Negative screen for alcohol misuse    Single Item Drug Screening:  How often have you used an illegal drug (including marijuana) or a prescription medication for non-medical reasons in the past year? never    Single Item Drug Screen Score: 0  Interpretation: Negative screen for possible drug use disorder    Brief Intervention  Alcohol & drug use screenings were reviewed. No concerns regarding substance use disorder identified.     Social Determinants of Health     Financial Resource Strain: Low Risk  (2023)    Overall Financial Resource Strain (CARDIA)    • Difficulty of Paying Living Expenses: Not hard at all   Food Insecurity: No Food Insecurity (2024)    Hunger Vital Sign    • Worried About Running Out of Food in the Last Year: Never true    • Ran Out of Food in the Last Year: Never true   Transportation Needs: No Transportation Needs (2024)    PRAPARE - Transportation    • Lack of Transportation (Medical): No    • Lack of Transportation (Non-Medical): No   Housing Stability: Unknown (2024)    Housing Stability Vital Sign    • Unable to Pay for Housing in the Last Year: No    • Homeless in the Last Year: No   Utilities: Not At Risk (2024)    [facility] Utilities    • Threatened with loss of utilities: No     No results found.    Objective     /62   Pulse 82   Temp (!) 97.4 °F (36.3 °C)   Resp 16   Ht 5' 4\" (1.626 m)   " Wt 78 kg (172 lb)   BMI 29.52 kg/m²     Physical Exam  Vitals and nursing note reviewed.   Constitutional:       Appearance: She is well-developed.   HENT:      Head: Normocephalic and atraumatic.   Cardiovascular:      Rate and Rhythm: Normal rate and regular rhythm.      Pulses: no weak pulses.           Dorsalis pedis pulses are 2+ on the right side and 2+ on the left side.        Posterior tibial pulses are 2+ on the right side and 2+ on the left side.      Heart sounds: Normal heart sounds. No murmur heard.     No friction rub.   Pulmonary:      Effort: No respiratory distress.      Breath sounds: Normal breath sounds. No wheezing or rales.   Musculoskeletal:      Right lower leg: No edema.      Left lower leg: No edema.   Feet:      Right foot:      Skin integrity: No ulcer, skin breakdown, erythema, warmth, callus or dry skin.      Left foot:      Skin integrity: No ulcer, skin breakdown, erythema, warmth, callus or dry skin.      Patient's shoes and socks removed.    Right Foot/Ankle   Right Foot Inspection  Skin Exam: skin normal. Skin not intact, no dry skin, no warmth, no callus, no erythema, no maceration, no abnormal color, no pre-ulcer, no ulcer and no callus.     Toe Exam: ROM and strength within normal limits.     Sensory   Monofilament testing: intact    Vascular  Capillary refills: < 3 seconds  The right DP pulse is 2+. The right PT pulse is 2+.     Left Foot/Ankle  Left Foot Inspection  Skin Exam: skin normal. Skin not intact, no dry skin, no warmth, no erythema, no maceration, normal color, no pre-ulcer, no ulcer and no callus.     Toe Exam: ROM and strength within normal limits.     Sensory   Monofilament testing: intact    Vascular  Capillary refills: < 3 seconds  The left DP pulse is 2+. The left PT pulse is 2+.     Assign Risk Category  No deformity present  No loss of protective sensation  No weak pulses  Risk: 0

## 2024-08-12 NOTE — ASSESSMENT & PLAN NOTE
Diabetes is not controlled  Metformin dose increased from 500 mg once a day to 500 mg twice daily  She recently had her eye exam  Copy of her records requested  Lab Results   Component Value Date    HGBA1C 7.9 (A) 08/12/2024     Orders:    POCT hemoglobin A1c    Hemoglobin A1C; Future

## 2024-08-13 ENCOUNTER — DOCUMENTATION (OUTPATIENT)
Dept: HEMATOLOGY ONCOLOGY | Facility: CLINIC | Age: 69
End: 2024-08-13

## 2024-08-13 ENCOUNTER — TELEPHONE (OUTPATIENT)
Dept: ADMINISTRATIVE | Facility: OTHER | Age: 69
End: 2024-08-13

## 2024-08-13 NOTE — PROGRESS NOTES
Chart rvw'd on 8/13/2024      Referred by:  Dr. Teressa Prakash    Diagnosis:  Hx of endometrial cancer    Imaging:  N/A      Pathology:  N/A      Surgery:  5/4/2017  Robotic assisted total laparoscopic hysterectomy, bilateral salpingoopherectomy      Post surgical pathology:  A. 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  -Tumor invades more than one half of the myometrium; tumor depth is 1.0 cm out of a total myometrial thickness of 1.5 cm.  -The tumor does not involve the lower uterine segment.  -The tumor does not involve the cervix  -Superficial adenomyosis.  -Nabothian cysts.  -Benign bilateral tubes and ovaries.  Left ovary: calcified fibrous nodule, 4 mm                   Inclusion cyst                   Endosalpingiosis     Right ovary: Inclusion cyst                     Endosalpingiosis  -The surgical margins are widely negative for malignancy.     B. Soft Tissue, Other, Epiploic appendage:  -Benign encapsulated nodules composed of infarcted adipose tissue with dense dystrophic calcifications, consistent with Epiploic appendage   -Negative for malignancy           Tumor staging protocol  Carcinoma of Endometrium (2016 CAP Tumor Template)  1. Specimen identification:     - Specimen:  Uterus w/Bilateral Ovaries and Fallopian Tubes     - Procedure: Hysterectomy with bilateral salpingo-oophorectomy:        - Specimen integrity:  intact specimen   2. Lymph node sampling: not performed   3. Tumor     - Tumor site:   Fundus, Anterior & posterior endo myometrium     - Tumor size:  Tumor measures 4.4 x 2.9 cm      - Histologic type:   Endometrioid carcinoma with squamous differentiation     - Histologic grade (G1): Well- differentiated     - Myometrial invasion (pT1b.): Tumor invades more than one half  of the myometrium       -- Depth of invasion:       1.0 cm       -- Myometrial thickness: 1.5 cm.     - Involvement of cervix:   not seen     - Extent of involvement of other organs: not identified   4. Peritoneal washings or ascites fluid: negative for tumor ([id number])   5. Margins: All surgical margins are widely negative for malignancy                     -Parametrial margin:      negative for tumor by 1.1 cm                         -Cervical margin:          negative for tumor by 1.4 cm   6. Lymph-vascular invasion:  present   7. Regional lymph nodes (pNx): none submitted with this specimen   8. Additional pathologic findings:         -Superficial adenomyosis.         -Nabothian cysts.   9. Ancillary studies : Block A10 contain many tumor cells could be used for future molecular testing if clinically requested.  10. 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 7th Ed AJCC Stage:  at least Stage IB (pT1b, pNx, G1).         70 yo F who was last seen by GYN [facility] on 9/2/2021 for hx of endometrial cancer. Stage IB, grade 1 w/ + LVSI. S/p chemo/RT in Aug 2017. Pt referred by PCP to cont w follow up.

## 2024-08-13 NOTE — LETTER
Diabetic Eye Exam Form    Date Requested: 24  Patient: Yris Bowles  Patient : 1955   Referring Provider: Teressa Prakash,       DIABETIC Eye Exam Date _______________________________      Type of Exam MUST be documented for Diabetic Eye Exams. Please CHECK ONE.     Retinal Exam       Dilated Retinal Exam       OCT       Optomap-Iris Exam      Fundus Photography       Left Eye - Please check Retinopathy or No Retinopathy        Exam did show retinopathy    Exam did not show retinopathy       Right Eye - Please check Retinopathy or No Retinopathy       Exam did show retinopathy    Exam did not show retinopathy       Comments _Within   _________________________________________________________    Practice Providing Exam ______________________________________________    Exam Performed By (print name) _______________________________________      Provider Signature ___________________________________________________      These reports are needed for  compliance.  Please fax this completed form and a copy of the Diabetic Eye Exam report to our office located at [address] [place] [address] as soon as possible via Fax [phone] attention Bina: Phone [phone]  We thank you for your assistance in treating our mutual patient.

## 2024-08-13 NOTE — TELEPHONE ENCOUNTER
----- Message from Teressa Prakash DO sent at 8/12/2024  5:17 PM EDT -----  08/12/24 5:17 PM    Hello, our patient Yris Bowles has had Diabetic Eye Exam completed/performed. Please assist in updating the patient chart by making an External outreach to Dr. Butler facility located in [place]. The date of service is 2024.    Thank you,  Teressa Prakash DO  [facility]

## 2024-08-13 NOTE — TELEPHONE ENCOUNTER
Upon review of the In Basket request we were able to locate, review, and update the patient chart as requested for Diabetic Eye Exam.08/13/24 12:44 PM     Chart reviewed for Diabetic Eye Exam was/were not submitted to the patient's insurance.     Bina Daley MA   [facility]    Any additional questions or concerns should be emailed to the Practice Liaisons via the appropriate education email address, please do not reply via In Basket.    Thank you  Bina Daley MA   [facility]

## 2024-08-13 NOTE — TELEPHONE ENCOUNTER
Upon review of the In Basket request and the patient's chart, initial outreach has been made via fax to facility. Please see Contacts section for details.     Thank you  Bina Daley MA

## 2024-08-14 ENCOUNTER — OFFICE VISIT (OUTPATIENT)
Facility: CLINIC | Age: 69
End: 2024-08-14
Payer: MEDICARE

## 2024-08-14 DIAGNOSIS — R26.89 IMBALANCE: Primary | ICD-10-CM

## 2024-08-14 DIAGNOSIS — R53.1 WEAKNESS: ICD-10-CM

## 2024-08-14 DIAGNOSIS — R26.89 OTHER ABNORMALITIES OF GAIT AND MOBILITY: ICD-10-CM

## 2024-08-14 PROCEDURE — 97110 THERAPEUTIC EXERCISES: CPT

## 2024-08-14 PROCEDURE — 97112 NEUROMUSCULAR REEDUCATION: CPT

## 2024-08-14 NOTE — PROGRESS NOTES
"Daily Note       Today's date: 2024  Patient name: Yris Bowles  : 1955  MRN: [id number]  Referring provider: Juan Jose Guerra DO  Dx:   Encounter Diagnosis     ICD-10-CM    1. Imbalance  R26.89       2. Weakness  R53.1       3. Other abnormalities of gait and mobility  R26.89                     IE or PN  POC expires Unit limit Auth Expiration date PT/OT + Visit Limit? Co-Insurance   IE 7/3/24  AV 24 NA NA NA Yes                                         Subjective: Patient presents to PT with RW and no new changes, complaints, or falls.     Objective: See treatment diary below.     NMR/TE: gait belt  - STS w/ foam pad on chair + 5.5# TT: 15x, 0UE   - Lateral step over hurdles: 5 ft, 2 laps, 2 UE SpO2: 92%  - FTEC on foam pad: 5 sec bursts, 10x, SpO2: 97%  - Step-ups 6\" at ballet bar + HHA: 12x, SpO2: 96-97%    Active rest breaks  - Seated ball squeeze: 20x  - Seated marchinx  - LAQ: 10x    Assessment: Patient tolerated treatment well with focus on strengthening and balance training. Oxygen levels continued to be monitored throughout session today with appropriate responses to exercise recorded throughout. Patient continues to be heavily reliant on upper extremity support during balance activities however, with improved overall tolerance to session today. Plan to re-introduce ankle weights next session to further improve tolerance to activity. Patient will continue to benefit from skilled physical therapy to maximize her function.       Plan: Continue per plan of care.  Progress as tolerated.             Outcome Measures Initial Eval  7/3/24 PN  24       5xSTS 17.53 sec, 2UE 19.65 sec, 2 UE       TUG  - Regular  - Cognitive   32.19 sec, w/ RW  46.16 sec, w/ RW   26.85 sec w/ RW  34.97 sec w/ RW counting bwds from 100       10 meter 0.55 m/s 0.76 m/s       TAMEZ        mCTSIB  - FTEO (firm)  - FTEC (firm)  - FTEO (foam)  - FTEC (foam)   30 sec  5 sec  8 sec  1 sec   30 sec  4 " sec  8 sec  unable       6MWT 470 ft 530 ft, SpO2: 97%

## 2024-08-19 ENCOUNTER — OFFICE VISIT (OUTPATIENT)
Facility: CLINIC | Age: 69
End: 2024-08-19
Payer: MEDICARE

## 2024-08-19 DIAGNOSIS — R53.1 WEAKNESS: ICD-10-CM

## 2024-08-19 DIAGNOSIS — R26.89 IMBALANCE: Primary | ICD-10-CM

## 2024-08-19 DIAGNOSIS — R26.89 OTHER ABNORMALITIES OF GAIT AND MOBILITY: ICD-10-CM

## 2024-08-19 PROCEDURE — 97110 THERAPEUTIC EXERCISES: CPT

## 2024-08-19 PROCEDURE — 97112 NEUROMUSCULAR REEDUCATION: CPT

## 2024-08-19 NOTE — PROGRESS NOTES
"Daily Note       Today's date: 2024  Patient name: Yris Bowles  : 1955  MRN: [id number]  Referring provider: Juan Jose Guerra DO  Dx:   Encounter Diagnosis     ICD-10-CM    1. Imbalance  R26.89       2. Weakness  R53.1       3. Other abnormalities of gait and mobility  R26.89                     IE or PN  POC expires Unit limit Auth Expiration date PT/OT + Visit Limit? Co-Insurance   IE 7/3/24  AV 24 NA NA NA Yes                                         Subjective: Patient presents to PT with RW independently. Denies fall since last session. Denies pain currently.    Objective: See treatment diary below. PT verbal and tactile cues for technique and progression.    Vitals: 98% O2 Sat; 78 BPM HR     NMR/TE: gait belt  - STS w/ foam pad on chair + 5.5# TT: 15x, 0UE   - Lateral step over hurdles: 10 ft, 2.5 laps, 3 hurdles 2 UE SpO2: 98%  - FTEC on foam pad: 5 sec bursts, 10x, 4 LOB SpO2 98%  - FAEOHT foam pad 10x H/V each 2 LOB  - Step-ups 6\" at ballet bar + HHA: 10x, each LE lead SpO2: 96%  - NuStep L1 x 3 min O2 sat 93%    Active rest breaks  - Seated ball squeeze: 20x  - Seated marchinx  - LAQ: 10x    Assessment: Patient tolerated treatment well with focus on strengthening and balance training. Tolerated increased repetitions with normal CV response to activity. Oxygen saturations WNL t/o session. Visually dependent for balance. Patient will continue to benefit from skilled physical therapy to maximize her function.       Plan: Continue per plan of care.  Progress as tolerated. Add ankle weights to exercises next session.             Outcome Measures Initial Eval  7/3/24 PN  24       5xSTS 17.53 sec, 2UE 19.65 sec, 2 UE       TUG  - Regular  - Cognitive   32.19 sec, w/ RW  46.16 sec, w/ RW   26.85 sec w/ RW  34.97 sec w/ RW counting bwds from 100       10 meter 0.55 m/s 0.76 m/s       TAMEZ        mCTSIB  - FTEO (firm)  - FTEC (firm)  - FTEO (foam)  - FTEC (foam)   30 sec  5 " sec  8 sec  1 sec   30 sec  4 sec  8 sec  unable       6MWT 470 ft 530 ft, SpO2: 97%

## 2024-08-21 ENCOUNTER — OFFICE VISIT (OUTPATIENT)
Facility: CLINIC | Age: 69
End: 2024-08-21
Payer: MEDICARE

## 2024-08-21 DIAGNOSIS — R53.1 WEAKNESS: ICD-10-CM

## 2024-08-21 DIAGNOSIS — R26.89 IMBALANCE: Primary | ICD-10-CM

## 2024-08-21 DIAGNOSIS — R26.89 OTHER ABNORMALITIES OF GAIT AND MOBILITY: ICD-10-CM

## 2024-08-21 PROCEDURE — 97110 THERAPEUTIC EXERCISES: CPT

## 2024-08-21 PROCEDURE — 97112 NEUROMUSCULAR REEDUCATION: CPT

## 2024-08-21 NOTE — PROGRESS NOTES
Daily Note       Today's date: 2024  Patient name: Yris Bowles  : 1955  MRN: [id number]  Referring provider: Juan Jose Guerra DO  Dx:   Encounter Diagnosis     ICD-10-CM    1. Imbalance  R26.89       2. Weakness  R53.1       3. Other abnormalities of gait and mobility  R26.89                       IE or PN  POC expires Unit limit Auth Expiration date PT/OT + Visit Limit? Co-Insurance   IE 7/3/24  AV 24 NA NA NA Yes                                         Subjective: Patient presents to PT with RW with no new changes, complaints, or falls.    Objective: See treatment diary below. PT verbal and tactile cues for technique and progression.    Vitals: 97% O2 Sat; HR: 76 bpm     NMR/TE: gait belt  - STS w/ foam pad on chair + 5.5# TT: 15x, 2 sets 0UE   - FTEC on foam beam: 5 sec bursts, 10x  - Lateral step over hurdles: 10 ft, 3 laps 2 UE   - Trainer stairs: 4 laps, 2UE    Active rest breaks  - Seated ball squeeze: 20x  - Seated marchinx  - LAQ: 10x    Assessment: Patient tolerated treatment fairly with focus on strengthening and balance training. Patient continues to have difficulty with tolerance to exercise as she requires seated rest periods following exercise. However, with improved tolerance to duration of exercises this date. Consider trialing circuit based approach to further improve this. Patient will continue to benefit from skilled physical therapy to maximize her function.     Plan: Continue per plan of care.  Progress as tolerated.             Outcome Measures Initial Eval  7/3/24 PN  24       5xSTS 17.53 sec, 2UE 19.65 sec, 2 UE       TUG  - Regular  - Cognitive   32.19 sec, w/ RW  46.16 sec, w/ RW   26.85 sec w/ RW  34.97 sec w/ RW counting bwds from 100       10 meter 0.55 m/s 0.76 m/s       DASHAWN        mCTSIB  - FTEO (firm)  - FTEC (firm)  - FTEO (foam)  - FTEC (foam)   30 sec  5 sec  8 sec  1 sec   30 sec  4 sec  8 sec  unable       6MWT 470 ft 530 ft, SpO2: 97%

## 2024-08-26 ENCOUNTER — OFFICE VISIT (OUTPATIENT)
Facility: CLINIC | Age: 69
End: 2024-08-26
Payer: MEDICARE

## 2024-08-26 DIAGNOSIS — R26.89 OTHER ABNORMALITIES OF GAIT AND MOBILITY: ICD-10-CM

## 2024-08-26 DIAGNOSIS — R53.1 WEAKNESS: ICD-10-CM

## 2024-08-26 DIAGNOSIS — R26.89 IMBALANCE: Primary | ICD-10-CM

## 2024-08-26 PROCEDURE — 97112 NEUROMUSCULAR REEDUCATION: CPT

## 2024-08-26 PROCEDURE — 97110 THERAPEUTIC EXERCISES: CPT

## 2024-08-26 NOTE — PROGRESS NOTES
"Daily Note       Today's date: 2024  Patient name: Yris Bowles  : 1955  MRN: [id number]  Referring provider: Juan Jose Guerra DO  Dx:   Encounter Diagnosis     ICD-10-CM    1. Imbalance  R26.89       2. Weakness  R53.1       3. Other abnormalities of gait and mobility  R26.89           IE or PN  POC expires Unit limit Auth Expiration date PT/OT + Visit Limit? Co-Insurance   IE 7/3/24  AV 24 NA NA NA Yes                                         Subjective: Patient presents to PT with RW with no new changes, complaints, or falls. No lightheadedness.      Objective: See treatment diary below. PT verbal and tactile cues for technique and progression. NV trial of circuit based approach to increase endurance.     Vitals: 98% O2 Sat; HR: 83 bpm     NMR/TE: gait belt  - STS w/ foam pad on chair + 5.5# TT: 15x, 2 sets 0UE   - FTEC on foam beam: 5 sec bursts, 10x  - Lateral step over 6 \"hurdles (x3) : 10 ft, 3 laps 2 UE  1# ankle weights  - Trainer stairs: 3 laps, 2UE 1# ankles some alternating, some \"step-to\" pattern    Active rest breaks  - Seated ball squeeze: 20x  - Seated marching: 30 x 1# ankles  - LAQ: 10x 1# ankles     Assessment: Patient tolerated treatment fairly with focus on strengthening and balance training. Requires encouragement with EC activity as she reaches for support despite small sway, indicating perception of increased movement. Patient continues to have difficulty with tolerance to exercise as she requires seated rest periods following exercise. However, with improved tolerance to resistance during exercises this date with addition of ankle weights. Pt insists on armed chair but she is able to stand without UE support holding TT x many reps. Consider trialing circuit based approach to further improve endurance. Patient will continue to benefit from skilled physical therapy to maximize her function.     Plan: Continue per plan of care.  Progress as tolerated.             Outcome " Measures Initial Eval  7/3/24 PN  8/7/24       5xSTS 17.53 sec, 2UE 19.65 sec, 2 UE       TUG  - Regular  - Cognitive   32.19 sec, w/ RW  46.16 sec, w/ RW   26.85 sec w/ RW  34.97 sec w/ RW counting bwds from 100       10 meter 0.55 m/s 0.76 m/s       DASHAWN 37/56 32/56       mCTSIB  - FTEO (firm)  - FTEC (firm)  - FTEO (foam)  - FTEC (foam)   30 sec  5 sec  8 sec  1 sec   30 sec  4 sec  8 sec  unable       6MWT 470 ft 530 ft, SpO2: 97%

## 2024-08-27 ENCOUNTER — OFFICE VISIT (OUTPATIENT)
Dept: GYNECOLOGIC ONCOLOGY | Facility: CLINIC | Age: 69
End: 2024-08-27
Payer: MEDICARE

## 2024-08-27 VITALS
TEMPERATURE: 97.7 F | WEIGHT: 172 LBS | SYSTOLIC BLOOD PRESSURE: 120 MMHG | HEART RATE: 78 BPM | BODY MASS INDEX: 29.52 KG/M2 | DIASTOLIC BLOOD PRESSURE: 72 MMHG | OXYGEN SATURATION: 97 %

## 2024-08-27 DIAGNOSIS — Z85.42 HISTORY OF ENDOMETRIAL CANCER: Chronic | ICD-10-CM

## 2024-08-27 PROCEDURE — 99459 PELVIC EXAMINATION: CPT | Performed by: OBSTETRICS & GYNECOLOGY

## 2024-08-27 PROCEDURE — 99213 OFFICE O/P EST LOW 20 MIN: CPT | Performed by: OBSTETRICS & GYNECOLOGY

## 2024-08-27 NOTE — ASSESSMENT & PLAN NOTE
69-year-old with a history of on stage Ib grade 1 endometrial cancer with lymphovascular space invasion status post surgery and adjuvant whole pelvic radiation therapy that completed in August 2017.  I reviewed CBC, BMP.  She is clinically without evidence of disease recurrence.  Her performance status is 3.  1.  Return in 1 year for endometrial cancer surveillance.

## 2024-08-28 ENCOUNTER — OFFICE VISIT (OUTPATIENT)
Facility: CLINIC | Age: 69
End: 2024-08-28
Payer: MEDICARE

## 2024-08-28 DIAGNOSIS — R53.1 WEAKNESS: ICD-10-CM

## 2024-08-28 DIAGNOSIS — R26.89 IMBALANCE: Primary | ICD-10-CM

## 2024-08-28 PROCEDURE — 97110 THERAPEUTIC EXERCISES: CPT

## 2024-08-28 PROCEDURE — 97112 NEUROMUSCULAR REEDUCATION: CPT

## 2024-08-28 NOTE — PROGRESS NOTES
"Daily Note       Today's date: 2024  Patient name: Yris Bowles  : 1955  MRN: [id number]  Referring provider: Juan Jose Guerra DO  Dx:   Encounter Diagnosis     ICD-10-CM    1. Imbalance  R26.89       2. Weakness  R53.1             IE or PN  POC expires Unit limit Auth Expiration date PT/OT + Visit Limit? Co-Insurance   IE 7/3/24  AV 24 NA NA NA Yes                                         Subjective: Patient presents to PT with RW with no new changes, complaints, or falls. No lightheadedness.      Objective: See treatment diary below.     Vitals: 97% O2 Sat; HR: 88 bpm     NMR/TE: gait belt, 1# Jose Manuel  Circuit 1 (1 minute on, 90 seconds off, 2 rounds)  - STS w/ foam pad on chair + 5.5# TT:   - Step ups to medium river rocks  - Lateral step over 6 \"hurdles, 2 UE    Circuit 2 (1 minute on, 90 seconds off, 1 round)  - Fwd/retro step over 6\" gail, 1 UE  - Sidestepping on foam beam  - Alt LE tap to large river rock, holding 5# TT, other UE on rail       Active rest breaks btwn circuits (1 minute each)  - Seated ball squeeze:   - Seated marching  - LAQ    Assessment: Patient tolerated treatment fairly with focus on strengthening and balance training. Initiated circuit training for improved endurance which pt tolerated well. She had difficulty with step ups and LE taps with RLE, due to impaired weakness and motor control compared to left side.  She had difficulty clearing gail with fwd/retro stepping, but able to improve with repeared practice. Patient will continue to benefit from skilled physical therapy to maximize her function.     Plan: Continue per plan of care.  Progress as tolerated.             Outcome Measures Initial Eval  7/3/24 PN  24       5xSTS 17.53 sec, 2UE 19.65 sec, 2 UE       TUG  - Regular  - Cognitive   32.19 sec, w/ RW  46.16 sec, w/ RW   26.85 sec w/ RW  34.97 sec w/ RW counting bwds from 100       10 meter 0.55 m/s 0.76 m/s       TAMEZ        mCTSIB  - FTEO " (firm)  - FTEC (firm)  - FTEO (foam)  - FTEC (foam)   30 sec  5 sec  8 sec  1 sec   30 sec  4 sec  8 sec  unable       6MWT 470 ft 530 ft, SpO2: 97%

## 2024-08-29 ENCOUNTER — PREP FOR PROCEDURE (OUTPATIENT)
Age: 69
End: 2024-08-29

## 2024-08-29 DIAGNOSIS — Z12.11 SCREENING FOR COLON CANCER: Primary | ICD-10-CM

## 2024-09-04 ENCOUNTER — EVALUATION (OUTPATIENT)
Facility: CLINIC | Age: 69
End: 2024-09-04
Payer: MEDICARE

## 2024-09-04 DIAGNOSIS — R26.89 IMBALANCE: Primary | ICD-10-CM

## 2024-09-04 DIAGNOSIS — R26.89 OTHER ABNORMALITIES OF GAIT AND MOBILITY: ICD-10-CM

## 2024-09-04 DIAGNOSIS — R53.1 WEAKNESS: ICD-10-CM

## 2024-09-04 PROCEDURE — 97530 THERAPEUTIC ACTIVITIES: CPT

## 2024-09-04 NOTE — PROGRESS NOTES
PT Re-Evaluation / Progress Note         POC expires Unit limit Auth Expiration date PT/OT + Visit Limit? Co-Insurance   24 NA NA NA Yes                                            Today's date: 2024  Patient name: Yris Bowles  : 1955  MRN: [id number]  Referring provider: Teressa Prakash DO  Dx:   Encounter Diagnosis     ICD-10-CM    1. Imbalance  R26.89       2. Weakness  R53.1       3. Other abnormalities of gait and mobility  R26.89                 Assessment  Assessment details: Patient is a 69 y.o. Female who has been presenting to skilled outpatient PT to address balance and gait difficulties which has been impacting her independence and participation in her community. Since her last evaluation Yris has demonstrated improvements in lower extremity strength and power as per 5x STS and great improvements in static balance per TAMEZ and mCTSIB. Patient greatest improvement is in maintaining static balance when visual field is occluded suggesting improved balance confidence and vestibular system function as it pertains to balance. Minimal changes in TUG, TUG cog, and 6 minute walk test this date which can likely be attributed to emphasis on static balance exercises during treatment sessions. Despite improvements in balance she remains at HIGH risk for falls as per APTA and Rehab Measures Lab cutoff scores for aforementioned outcome measures. She has met one additional goal at this time and is progressing towards remaining. She will undoubtedly benefit from skilled OPPT services to address limitations, reduce risk for falls, and improve overall independence.   Impairments: Abnormal coordination, Abnormal gait, Abnormal muscle tone, Abnormal or restricted ROM, Activity intolerance, Impaired balance, Impaired physical strength, Lacks appropriate HEP, Poor posture, Poor body mechanics, Pain with function, Safety issue, Weight-bearing  intolerance, Abnormal movement, Difficulty understanding, Abnormal muscle firing  Understanding of Dx/Px/POC: Good  Prognosis: Good    Patient verbalized understanding of POC.         Please contact me if you have any questions or recommendations. Thank you for the referral and the opportunity to share in Yris Bowles's care.      Plan  Plan details: balance training, gait training, strengthening, and endurance  Patient would benefit from: PT Eval, Skilled PT, and Skilled OT  Planned modality interventions: Biofeedback, Cryotherapy, TENS, Thermotherapy  Planned therapy interventions: Abdominal trunk stabilization, ADL training, Balance, Balance/WB training, Breathing training, Body mechanics training, Coordination, Functional ROM exercises, Gait training, HEP, Joint Mobilization, Manual Therapy, Samuels taping, Motor coordination training, Neuromuscular re-education, Patient education, Postural training, Strengthening, Stretching, Therapeutic activities, Therapeutic exercises, Therapeutic training, Transfer training, Activity modification, Work reintegration  Frequency: 2x/wk  Duration in weeks: 12 weeks  Plan of Care beginning date: 9/4/24  Plan of Care expiration date: 12 weeks - 11/27/24  Treatment plan discussed with: Patient       Goals  Short Term Goals (4 weeks):    - Patient will improve time on TUG by 2.9 seconds to facilitate improved safety in all ambulation- MET  - Patient will be independent in basic HEP 2-3 weeks- ongoing  - Patient will improve 5xSTS score by 2.3 seconds to promote improved LE functional strength needed for ADLs- MET    Long Term Goals (12 weeks):  - Patient will be independent in a comprehensive home exercise program- ongoing  - Patient will improve gait speed by 0.18 m/s to improve safety with community ambulation- MET  - Patient will improve TAMEZ by 6 points in order to improve static balance and reduce risk for falls-NOT MET  - Patient will be able to demonstrate HT in gait  without veering- ongoing  - Patient will improve 6 Minute Walk Test score by 190 feet to promote improved cardiovascular endurance- ongoing  - Patient will report 50% reduction in near falls in order to improve safety with functional tasks and reduce his risk for falls- ongoing  - Patient will be able to ascend/descend stairs reciprocally with 1 UE assist to promote independence and safety with ADLs- ongoing  - Patient will report 50% reduction in near falls when ambulating on uneven terrain- ongoing      Cut off score    All date taken from APTA Neuro Section or Rehab Measures      Fernandes/56  MDC: 6 pts  Age Norms:  60-69: M - 55   F - 55  70-79: M - 54   F - 53  80-89: M - 53   F - 50 5xSTS: Susan et al 2010  MDC: 2.3 sec  Age Norms:  60-69: 11.4 sec  70-79: 12.6 sec  80-89: 14.8 sec   TUG  MDC: 4.14 sec  Cut off score:  >13.5 sec community dwelling adults  >32.2 frail elderly  <20 I for basic transfers  >30 dependent on transfers 10 Meter Walk Test: Jean-Pierre et al 2011  MDC: 0.18 m/s  20-29: M - 1.35 m   F - 1.34 m  30-39: M - 1.43 m   F - 1.34 m  40-49: M - 1.43 m   F - 1.39 m  50-59: M - 1.43 m   F - 1.31 m  60-69: M - 1.34 m   F - 1.24 m  70-79: M - 1.26 m   F - 1.13 m  80-89: M - 0.97 m   F - 0.94 m    Household Ambulator < 0.4 m/s  Limited Community Ambulator 0.4 - 0.8 m/s  Community Ambulator 0.8 - 1.2 m/s  Safely cross the street > 1.2 m/s   FGA  MCID: 4 pts  Geriatrics/community < 22/30 fall risk  Geriatrics/community < 20/30 unexplained falls    DGI  MDC: vestibular - 4 pts  MDC: geriatric/community - 3 pts  Falls risk <19/24 mCTSIB  Norm: 20-60 yrs  Eyes open firm: norm sway 0.21-0.48  Eyes closed firm: norm sway 0.48-0.99  Eyes open foam: norm sway 0.38-0.71  Eyes closed foam: norm sway 0.70-2.22   6 Minute Walk Test  MDC: 190.98 ft  MCID: 164 ft    Age Norms  60-69: M - 1876 ft (571.80 m)  F - 1765 ft (537.98 m)  70-79: M - 1729 ft (527.00 m)  F - 1545 ft (470.92 m)  80-89: M - 1368 ft  (416.97 m)  F - 1286 ft (391.97 m) ABC: Joann & Roel, 2003  <67% increased risk for falls   Hiram-Vishal Monofilaments  Evaluator Size:        Force (grams):          Hand/Dorsal Thresholds:        Plantar Thresholds:  - 1.65                       - 0.008                       - Normal                                 - Normal  - 2.36                       - 0.02                         - Normal                                 - Normal  - 2.44                       - 0.04                         - Normal                                 - Normal  - 2.83                       - 0.07                         - Normal                                 - Normal  - 3.22                       - 0.16                         - Diminished light touch          - Normal  - 3.61                       - 0.40                         - Diminished light touch          - Normal  - 3.84                       - 0.60                         - Diminished protective           - Diminished light touch  - 4.08                       - 1.00                         - Diminished protective           - Diminished light touch  - 4.17                       - 1.40                         - Diminished protective           - Diminished light touch  - 4.31                       - 2.00                         - Diminished protective           - Diminished light touch  - 4.56                       - 4.00                         - Loss of protective sense      - Diminished protective  - 4.74                       - 6.00                         - Loss of protective sense      - Diminished protective  - 4.93                       - 8.00                         - Loss of protective sense      - Diminished protective  - 5.07                       - 10.0                         - Loss of protective sense     - Loss of protective sense  - 5.18                       - 15.0                         - Loss of protective sense     - Loss of protective  "sense  - 5.46                       - 26.0                         - Loss of protective sense     - Loss of protective sense  - 5.88                       - 60.0                         - Loss of protective sense     - Loss of protective sense  - 6.10                       - 100                          - Loss of protective sense     - Loss of protective sense  - 6.45                       - 180                          - Loss of protective sense     - Loss of protective sense  - 6.65                       - 300                          - Deep pressure sense only  - Deep pressure sense only         Subjective    History of Present Illness  - Mechanism of injury: Patient presents to PT with complaints of difficulty with ambulation and balance. She has been participating with skilled outpatient OT services since April to address cognitive limitations. Patient reports she has not had any recent falls however, is worried about falling and always utilizes her rolling walker in her home and in her community because of FoF. Her major goal is to improve her walking abilities and independence as she requires assistance from her  to perform ADLs.    Updated 8/7/24: Patient states she feels as though her balance has improved since staring therapy however, she states gail negotiation is still difficult for her.     Updated 9/4/24: Patient reports she feels her walking has improved since starting therapy however, relays she would like to continue to work on her balance.     - Primary AD: RW  - Assist level at home: Lizett from  for ADLs    Patient goal: \"walk better\"    Pain  NA    Social Support  - Steps to enter house: 6 HERNAN  - Stairs in house: none   - Lives in: 1st floor apt  - Lives with:     - Employment status: retired CNA  - Hand dominance: RHD    Treatments  - Previous treatment: none  - Current treatment: OT  - Diagnostic Testing: none      Objective   HR: 77 bpm  SpO2: 98%  BP: 112/68 mmHg    LE " MMT  - R Hip Flexion: 3/5  L Hip Flexion: 3+/5  - R Hip Extension: 3/5  L Hip Extension: 3/5  - R Hip Abduction: 3+/5  L Hip Abduction: 3+/5  - R Hip Adduction: 4-/5  L Hip Adduction: 4-/5  - R Knee Extension: 4/5  L Knee Extension: 4/5  - R Knee Flexion: 4/5  L Knee Flexion: 4-/5  - R Ankle DF: 4-/5   L Ankle DF: 4-/5  - R Ankle PF: 4/5   L Ankle PF: 4/5    Sensation  - Light touch: diminished at R C6 distribution    Coordination  - Dysdiadochokinesia: slowed  - Alternate Toe Taps: slowed  - Finger to Nose: normal    Reflexes/Clonus  - Clonus: No    Myelopathy Screen (>3/5 +)  - Jewell's Reflex: -  - Babinski Reflex: NA  - Inverted Supinator Sign: -  - Age > 45: Yes  - Gait Deviation: Yes      Gait  - Abnormalities: Forwards flexed posture, slow turning, decreased foot clearance, and decreased step length           Outcome Measures Initial Eval  7/3/24 PN  8/7/24 PN  9/4/24      5xSTS 17.53 sec, 2UE 19.65 sec, 2 UE 16.69 sec, 2 UE      TUG  - Regular  - Cognitive   32.19 sec, w/ RW  46.16 sec, w/ RW   26.85 sec w/ RW  34.97 sec w/ RW counting bwds from 100   26.63 sec, w/ RW  34.65 sec w/ RW  counting bwds from 100      10 meter 0.55 m/s 0.76 m/s 0.64 m/s      DASHAWN 37/56 32/56 35/56      mCTSIB  - FTEO (firm)  - FTEC (firm)  - FTEO (foam)  - FTEC (foam)   30 sec  5 sec  8 sec  1 sec   30 sec  4 sec  8 sec  unable   30 sec  30 sec  30 sec  3 sec      6MWT 470 ft 530 ft, SpO2: 97% 575 ft w/ RW                                   Precautions:   Past Medical History:   Diagnosis Date    Anesthesia complication     difficulty waking up    Anxiety     Arthritis     left knee    Bipolar 1 disorder (HCC)     Bone spur     left knee behing the knee cap    Cancer (HCC)     Chronic kidney disease     Chronic pain disorder     Colon cancer (HCC)     patient states about 2017    Colon polyp     Tubulovillous Adenoma High Grade Dysplasia - 2017    Depression     Diabetes mellitus (HCC)     Endometrial cancer (HCC)     grade I     Hx of bleeding disorder     vaginal bleeding started on 3/13/17     Hyperlipidemia     Hypertension     Hypomagnesemia 03/20/2023    Memory loss     PONV (postoperative nausea and vomiting)     Psychiatric disorder     Psychiatric illness     Psychosis (HCC)     Shortness of breath     with exertion    Sleep difficulties     Urinary incontinence     Vitamin D deficiency

## 2024-09-05 ENCOUNTER — APPOINTMENT (OUTPATIENT)
Dept: LAB | Facility: HOSPITAL | Age: 69
End: 2024-09-05
Payer: MEDICARE

## 2024-09-05 DIAGNOSIS — E22.2 SIADH (SYNDROME OF INAPPROPRIATE ADH PRODUCTION) (HCC): ICD-10-CM

## 2024-09-05 DIAGNOSIS — M80.00XD AGE-RELATED OSTEOPOROSIS WITH CURRENT PATHOLOGICAL FRACTURE WITH ROUTINE HEALING: ICD-10-CM

## 2024-09-05 LAB
ALBUMIN SERPL BCG-MCNC: 4.4 G/DL (ref 3.5–5)
ALP SERPL-CCNC: 56 U/L (ref 34–104)
ALT SERPL W P-5'-P-CCNC: 24 U/L (ref 7–52)
ANION GAP SERPL CALCULATED.3IONS-SCNC: 9 MMOL/L (ref 4–13)
AST SERPL W P-5'-P-CCNC: 33 U/L (ref 13–39)
BILIRUB SERPL-MCNC: 0.51 MG/DL (ref 0.2–1)
BUN SERPL-MCNC: 18 MG/DL (ref 5–25)
CALCIUM SERPL-MCNC: 9.9 MG/DL (ref 8.4–10.2)
CHLORIDE SERPL-SCNC: 102 MMOL/L (ref 96–108)
CO2 SERPL-SCNC: 27 MMOL/L (ref 21–32)
CREAT SERPL-MCNC: 1.06 MG/DL (ref 0.6–1.3)
GFR SERPL CREATININE-BSD FRML MDRD: 53 ML/MIN/1.73SQ M
GLUCOSE SERPL-MCNC: 193 MG/DL (ref 65–140)
POTASSIUM SERPL-SCNC: 4.4 MMOL/L (ref 3.5–5.3)
PROT SERPL-MCNC: 7.4 G/DL (ref 6.4–8.4)
SODIUM SERPL-SCNC: 138 MMOL/L (ref 135–147)

## 2024-09-05 PROCEDURE — 80053 COMPREHEN METABOLIC PANEL: CPT

## 2024-09-05 PROCEDURE — 36415 COLL VENOUS BLD VENIPUNCTURE: CPT

## 2024-09-06 ENCOUNTER — TELEPHONE (OUTPATIENT)
Dept: NEPHROLOGY | Facility: CLINIC | Age: 69
End: 2024-09-06

## 2024-09-06 NOTE — TELEPHONE ENCOUNTER
Daughter advised that labs of 9/5/24 show normal sodium level per Dr. Lambert.    ----- Message from Yosef Lambert MD sent at 9/5/2024  6:19 PM EDT -----  Please inform patient's daughter that most recent labs (9/5/24) show that sodium level is normal.

## 2024-09-09 ENCOUNTER — OFFICE VISIT (OUTPATIENT)
Facility: CLINIC | Age: 69
End: 2024-09-09
Payer: MEDICARE

## 2024-09-09 DIAGNOSIS — R26.89 OTHER ABNORMALITIES OF GAIT AND MOBILITY: ICD-10-CM

## 2024-09-09 DIAGNOSIS — R26.89 IMBALANCE: Primary | ICD-10-CM

## 2024-09-09 DIAGNOSIS — R53.1 WEAKNESS: ICD-10-CM

## 2024-09-09 PROCEDURE — 97112 NEUROMUSCULAR REEDUCATION: CPT

## 2024-09-09 PROCEDURE — 97110 THERAPEUTIC EXERCISES: CPT

## 2024-09-09 NOTE — PROGRESS NOTES
"Daily Note       Today's date: 2024  Patient name: Yris Bowles  : 1955  MRN: [id number]  Referring provider: Juan Jose Guerra DO  Dx:   Encounter Diagnosis     ICD-10-CM    1. Imbalance  R26.89       2. Weakness  R53.1       3. Other abnormalities of gait and mobility  R26.89             IE or PN  POC expires Unit limit Auth Expiration date PT/OT + Visit Limit? Co-Insurance   IE 7/3/24  AV 24 NA NA NA Yes                                         Subjective: Patient presents to PT with RW with no new changes, complaints, or falls. No lightheadedness.      Objective: See treatment diary below.     Vitals: 97% O2 Sat; HR: 78 bpm     NMR/TE: gait belt, 1# Jose Manuel  Circuit 1 (90 seconds on on, 90 seconds off, 2 rounds)  - STS w/ foam pad on chair + 5.5# TT:   - Step ups to medium river rocks  - Lateral step ups to 6\" step, 1 UE support    Circuit 2 (90 seconds on, 90 seconds off, 1 round)  - Sidestepping on foam beam  - Alt LE tap to large river rock, holding 5# TT, other UE on rail       Active rest breaks btwn circuits (1 minute each)  - Seated ball squeeze:   - Seated marching  - LAQ    Assessment: Patient tolerated treatment fairly with focus on strengthening and balance training. Continued with circuit training for improved endurance which pt tolerated well. Increased each cycle to 90 seconds from 1 minute. Ambulated without an AD in between each exercise with cueing to increase bilateral step length. Noted poor foot clearance with toe taps to large RR likely due to decreased RLE weakness. Patient will continue to benefit from skilled physical therapy to maximize her function.     Plan: Continue per plan of care.  Progress as tolerated.             Outcome Measures Initial Eval  7/3/24 PN  24       5xSTS 17.53 sec, 2UE 19.65 sec, 2 UE       TUG  - Regular  - Cognitive   32.19 sec, w/ RW  46.16 sec, w/ RW   26.85 sec w/ RW  34.97 sec w/ RW counting bwds from 100       10 meter 0.55 m/s 0.76 m/s     "   DASHAWN 37/56 32/56       mCTSIB  - FTEO (firm)  - FTEC (firm)  - FTEO (foam)  - FTEC (foam)   30 sec  5 sec  8 sec  1 sec   30 sec  4 sec  8 sec  unable       6MWT 470 ft 530 ft, SpO2: 97%

## 2024-09-11 ENCOUNTER — OFFICE VISIT (OUTPATIENT)
Dept: PHYSICAL THERAPY | Facility: CLINIC | Age: 69
End: 2024-09-11
Payer: MEDICARE

## 2024-09-11 DIAGNOSIS — R26.89 OTHER ABNORMALITIES OF GAIT AND MOBILITY: ICD-10-CM

## 2024-09-11 DIAGNOSIS — R26.89 IMBALANCE: Primary | ICD-10-CM

## 2024-09-11 DIAGNOSIS — R53.1 WEAKNESS: ICD-10-CM

## 2024-09-11 PROCEDURE — 97530 THERAPEUTIC ACTIVITIES: CPT | Performed by: PHYSICAL THERAPIST

## 2024-09-11 PROCEDURE — 97110 THERAPEUTIC EXERCISES: CPT | Performed by: PHYSICAL THERAPIST

## 2024-09-11 NOTE — PROGRESS NOTES
"Daily Note       Today's date: 2024  Patient name: Yris Bowles  : 1955  MRN: [id number]  Referring provider: Juan Jose Guerra DO  Dx:   Encounter Diagnosis     ICD-10-CM    1. Imbalance  R26.89       2. Weakness  R53.1       3. Other abnormalities of gait and mobility  R26.89             IE or PN  POC expires Unit limit Auth Expiration date PT/OT + Visit Limit? Co-Insurance   IE 7/3/24  AV 24 NA NA NA Yes                                         Subjective: Patient presents to PT with RW with no new changes, complaints, or falls. No lightheadedness.      Objective: See treatment diary below.     Vitals: 98% O2 Sat; HR: 82 bpm     NMR/TE: gait belt, 1# Jose Manuel  Circuit 1 (90 seconds on on, 90 seconds off, 2 rounds)  - STS w/ foam pad on chair + 5.5# TT:   - Step ups to medium river rocks  - Lateral step ups to 6\" step, 1 UE support    Circuit 2 (90 seconds on, 90 seconds off, 1 round)  - Sidestepping on foam beam  - Alt LE tap to large river rock, holding 5.5# TT, other UE on rail       Active rest breaks btwn circuits (1 minute each)  - Seated ball squeeze:   - Seated marching  - LAQ    Assessment: Patient tolerated treatment fairly with focus on strengthening and balance training. Continued with circuit training for improved endurance which pt tolerated without LOB or c/o SOB. Maintained each cycle to 90 seconds from 1 minute. Ambulated without an AD in between each exercise with cueing to increase bilateral step length. Pt had difficulty with lifting L LE onto large river rock due to weakness L LE. Pt fatigues easlily with therex. Pt challenged appropriately with all therex. Patient will continue to benefit from skilled physical therapy to maximize her function.     Plan: Continue per plan of care.  Progress as per primary PT.             Outcome Measures Initial Eval  7/3/24 PN  24       5xSTS 17.53 sec, 2UE 19.65 sec, 2 UE       TUG  - Regular  - Cognitive   32.19 sec, w/ RW  46.16 sec, w/ RW "   26.85 sec w/ RW  34.97 sec w/ RW counting bwds from 100       10 meter 0.55 m/s 0.76 m/s       DASHAWN 37/56 32/56       mCTSIB  - FTEO (firm)  - FTEC (firm)  - FTEO (foam)  - FTEC (foam)   30 sec  5 sec  8 sec  1 sec   30 sec  4 sec  8 sec  unable       6MWT 470 ft 530 ft, SpO2: 97%

## 2024-09-12 ENCOUNTER — OFFICE VISIT (OUTPATIENT)
Dept: FAMILY MEDICINE CLINIC | Facility: CLINIC | Age: 69
End: 2024-09-12
Payer: MEDICARE

## 2024-09-12 VITALS
RESPIRATION RATE: 17 BRPM | SYSTOLIC BLOOD PRESSURE: 132 MMHG | WEIGHT: 172.4 LBS | TEMPERATURE: 99.2 F | DIASTOLIC BLOOD PRESSURE: 74 MMHG | HEIGHT: 64 IN | BODY MASS INDEX: 29.43 KG/M2 | HEART RATE: 81 BPM

## 2024-09-12 DIAGNOSIS — E78.2 MIXED HYPERLIPIDEMIA: ICD-10-CM

## 2024-09-12 DIAGNOSIS — H61.23 BILATERAL IMPACTED CERUMEN: Primary | ICD-10-CM

## 2024-09-12 DIAGNOSIS — I10 ESSENTIAL HYPERTENSION: ICD-10-CM

## 2024-09-12 PROCEDURE — 99214 OFFICE O/P EST MOD 30 MIN: CPT | Performed by: NURSE PRACTITIONER

## 2024-09-12 PROCEDURE — 69210 REMOVE IMPACTED EAR WAX UNI: CPT | Performed by: NURSE PRACTITIONER

## 2024-09-12 NOTE — PROGRESS NOTES
"Assessment/Plan:    1. Bilateral impacted cerumen  -     Ear cerumen removal  2. Mixed hyperlipidemia  Assessment & Plan:  Complaint with statin and tolerating it well  3. Essential hypertension  Assessment & Plan:  Stable with current regimen          Future Appointments   Date Time Provider Department Center   9/16/2024  1:30 PM [facility]var[facility], [facility] [place] [facility]   9/18/2024  2:15 PM Bas[facility]ren[facility]   9/23/2024  2:15 PM [facility] Robison, [facility]   9/25/2024  2:15 PM Arisellrigo Ortizs, PT [place] [facility]   10/11/2024  1:00 PM WA INF CHAIR 10 [place] Infusion [facility]   11/11/2024  5:15 PM Teressa Prakash DO [facility]   12/27/2024  1:00 PM [place] MAMMO 2 [place] Mammo [facility]   1/31/2025  3:40 PM Reji Medina MD [facility]   9/8/2025  4:00 PM Damir Reyes MD GYN ONC [facility]           Subjective:      Patient ID: Yris Bowles is a 69 y.o. female.    Chief Complaint   Patient presents with    Cerumen Impaction     Bilateral ears Stephanie Dunbar LPN           Vitals:  /74   Pulse 81   Temp 99.2 °F (37.3 °C)   Resp 17   Ht 5' 4\" (1.626 m)   Wt 78.2 kg (172 lb 6.4 oz)   BMI 29.59 kg/m²     HPI  Patient stated that having cerumen impaction and affecting her hearing and would like to have ears flushed. Denies fever and chills.  Complaint with medications for chronic illnesses.         The following portions of the patient's history were reviewed and updated as appropriate: allergies, current medications, past family history, past medical history, past social history, past surgical history and problem list.      Review of Systems   Constitutional:  Negative for chills, diaphoresis, fatigue, fever and unexpected weight change.   HENT:  Negative for congestion, dental problem, drooling, ear discharge, ear pain, facial swelling, mouth sores, nosebleeds, postnasal drip, rhinorrhea, sinus pressure, sinus pain, " sneezing, sore throat, tinnitus, trouble swallowing and voice change.         As noted in HPI       Respiratory:  Negative for cough, chest tightness, shortness of breath and wheezing.    Cardiovascular: Negative.    Gastrointestinal:  Negative for abdominal pain, constipation, diarrhea, nausea and vomiting.         Objective:    Social History     Tobacco Use   Smoking Status Never    Passive exposure: Never   Smokeless Tobacco Never       Allergies:   Allergies   Allergen Reactions    Wellbutrin [Bupropion] Anxiety     Patient has tried Wellbutrin which resulted in increased paranoia. Patient wishes to not try this medication again.         Current Outpatient Medications   Medication Sig Dispense Refill    acetaminophen (TYLENOL) 325 mg tablet Take 3 tablets (975 mg total) by mouth 2 (two) times a day (Patient taking differently: Take 975 mg by mouth every 6 (six) hours as needed)  0    aspirin (ECOTRIN LOW STRENGTH) 81 mg EC tablet Take 1 tablet (81 mg total) by mouth daily 30 tablet 5    BD Pen Needle Chelsey 2nd Gen 32G X 4 MM MISC USE 1  TWICE DAILY 60 each 0    calcium carbonate (OS-MARVEL) 600 MG tablet Take 600 mg by mouth 2 (two) times a day with meals      cholecalciferol (VITAMIN D3) 1,000 units tablet Take 1,000 Units by mouth daily      Diclofenac Sodium (VOLTAREN) 1 % Apply 4 g topically 4 (four) times a day 100 g 1    divalproex sodium (DEPAKOTE ER) 250 mg 24 hr tablet       docusate sodium (COLACE) 100 mg capsule Take 100 mg by mouth 2 (two) times a day      famotidine (PEPCID) 20 mg tablet Take 1 tablet (20 mg total) by mouth 2 (two) times a day 180 tablet 1    gabapentin (NEURONTIN) 100 mg capsule TAKE 1 CAPSULE BY MOUTH TWICE DAILY ( 8AM AND 6PM) AND 2 CAPSULE AT BEDTIME (Patient taking differently: 100 mg 2 (two) times a day) 120 capsule 5    lidocaine (LIDODERM) 5 % Apply 1 patch topically over 12 hours daily Remove & Discard patch within 12 hours or as directed by MD Do not start before May 31,  2023. (Patient taking differently: Apply 1 patch topically as needed Remove & Discard patch within 12 hours or as directed by MD) 30 patch 0    lurasidone (LATUDA) 40 mg tablet 40 mg 40 mg at 8 am, 40 mg 12 pm , 80 mg at 6 pm      metFORMIN (GLUCOPHAGE-XR) 500 mg 24 hr tablet Take 1 tablet (500 mg total) by mouth 2 (two) times a day with meals 180 tablet 1    metoprolol succinate (TOPROL-XL) 25 mg 24 hr tablet Take 1 tablet (25 mg total) by mouth daily 90 tablet 1    Multiple Vitamin (One-Daily Multi-Vitamin) TABS Take 1 tablet by mouth once daily 90 tablet 1    nitroglycerin (NITROSTAT) 0.4 mg SL tablet Place 1 tablet (0.4 mg total) under the tongue every 5 (five) minutes as needed for chest pain 30 tablet 1    Omega-3 Fatty Acids (FISH OIL PO) Take 2,000 mg by mouth 2 (two) times a day      rosuvastatin (CRESTOR) 20 MG tablet Take 1 tablet by mouth once daily 90 tablet 1    sertraline (ZOLOFT) 100 mg tablet Take 50 mg by mouth 2 (two) times a day      sodium chloride 1 g tablet Take 1 tablet (1 g total) by mouth 3 (three) times a day 270 tablet 1    torsemide (DEMADEX) 5 MG tablet Take 1 tablet by mouth once daily 90 tablet 1    clonazePAM (KlonoPIN) 0.5 mg tablet TAKE 1/2 (ONE-HALF) TO ONE TABLET BY MOUTH ONCE DAILY AS NEEDED. WATCH FOR SEDATION (Patient not taking: Reported on 8/1/2024)      ketoconazole (NIZORAL) 2 % cream Apply topically daily (Patient not taking: Reported on 8/12/2024) 30 g 0    lurasidone (LATUDA) 80 mg tablet Take 1 tablet (80 mg total) by mouth daily with dinner (Patient not taking: Reported on 8/27/2024) 30 tablet 0    sertraline (ZOLOFT) 50 mg tablet Take 50 mg by mouth 2 (two) times a day (Patient not taking: Reported on 6/7/2024)       No current facility-administered medications for this visit.          Physical Exam  Vitals reviewed.   Constitutional:       Appearance: Normal appearance. She is well-developed.   HENT:      Head: Normocephalic.      Right Ear: Tympanic membrane, ear  "canal and external ear normal.      Left Ear: Tympanic membrane, ear canal and external ear normal.      Ears:      Comments: Bilateral ear with cerumen impaction     Nose: Nose normal.      Right Sinus: No maxillary sinus tenderness or frontal sinus tenderness.      Left Sinus: No maxillary sinus tenderness or frontal sinus tenderness.      Mouth/Throat:      Mouth: No oral lesions.      Pharynx: No oropharyngeal exudate or posterior oropharyngeal erythema.   Cardiovascular:      Rate and Rhythm: Normal rate and regular rhythm.      Heart sounds: Normal heart sounds.   Pulmonary:      Effort: Pulmonary effort is normal.      Breath sounds: Normal breath sounds.   Musculoskeletal:      Cervical back: Neck supple.   Lymphadenopathy:      Cervical:      Right cervical: No superficial or posterior cervical adenopathy.     Left cervical: No superficial or posterior cervical adenopathy.   Skin:     General: Skin is warm and dry.   Neurological:      Mental Status: She is alert and oriented to person, place, and time.   Psychiatric:         Behavior: Behavior normal.           Ear cerumen removal    Date/Time: 9/12/2024 1:10 PM    Performed by: JOHN Mcconnell  Authorized by: JOHN Mcconnell  Universal Protocol:  procedure performed by consultantConsent: Verbal consent obtained. Written consent not obtained.  Risks and benefits: risks, benefits and alternatives were discussed  Consent given by: patient  Time out: Immediately prior to procedure a \"time out\" was called to verify the correct patient, procedure, equipment, support staff and site/side marked as required.  Timeout called at: 9/12/2024 1:10 PM.  Patient understanding: patient states understanding of the procedure being performed  Patient consent: the patient's understanding of the procedure matches consent given  Site marked: the operative site was marked  Patient identity confirmed: verbally with patient    Patient location:  Clinic  Indications / " Diagnosis:  Cerumen impaction  Procedure details:     Location:  L ear and R ear    Procedure type: irrigation with instrumentation      Instrumentation: curette      Approach:  External    Visualization (free text):  Otoscope  Post-procedure details:     Complication:  None    Hearing quality:  Normal    Patient tolerance of procedure:  Tolerated well, no immediate complications              JOHN Mcconnell

## 2024-09-16 ENCOUNTER — OFFICE VISIT (OUTPATIENT)
Facility: CLINIC | Age: 69
End: 2024-09-16
Payer: MEDICARE

## 2024-09-16 DIAGNOSIS — R26.89 IMBALANCE: Primary | ICD-10-CM

## 2024-09-16 DIAGNOSIS — R53.1 WEAKNESS: ICD-10-CM

## 2024-09-16 DIAGNOSIS — R26.89 OTHER ABNORMALITIES OF GAIT AND MOBILITY: ICD-10-CM

## 2024-09-16 PROCEDURE — 97110 THERAPEUTIC EXERCISES: CPT

## 2024-09-16 PROCEDURE — 97112 NEUROMUSCULAR REEDUCATION: CPT

## 2024-09-16 NOTE — PROGRESS NOTES
"Daily Note       Today's date: 2024  Patient name: Yris Bowles  : 1955  MRN: [id number]  Referring provider: Juan Jose Guerra DO  Dx:   Encounter Diagnosis     ICD-10-CM    1. Imbalance  R26.89       2. Weakness  R53.1       3. Other abnormalities of gait and mobility  R26.89             IE or PN  POC expires Unit limit Auth Expiration date PT/OT + Visit Limit? Co-Insurance   IE 7/3/24  AV 24 NA NA NA Yes                                         Subjective: Patient presents to PT with RW with no new changes, complaints, or falls.     Objective: See treatment diary below.     Vitals: 98% O2 Sat; HR: 82 bpm   BP: 133/76 mmHg     NMR/TE: gait belt, 2# Jose Manuel  Circuit 1 (90 seconds on on, 90 seconds off, 2 rounds)  - STS w/o foam pad on chair + 5.5# TT: 15x  - Step ups to uneven river rocks (medium/small)  - Lateral step ups to 6\" step, 1 UE support    Active rest breaks btwn circuits (1 minute each)  - Seated ball squeeze:   - Seated marching  - LAQ    Assessment: Patient tolerated treatment well with focus on strengthening and balance training within circuit based approach. She displays improvements in balance confidence as she was able to ambulate within clinic without AD and with HHA from PT which she has unable to do prior. Increased ankle weights this session to progress exercise intensity and improve tolerance to activity patient required prolonged seated rest periods as a result. Patient will continue to benefit from skilled OPPT services to improve balance, reduce risk for falls, and maximize her function.     Plan: Continue per plan of care.  Progress as per primary PT.             Outcome Measures Initial Eval  7/3/24 PN  24 PN  24      5xSTS 17.53 sec, 2UE 19.65 sec, 2 UE 16.69 sec, 2 UE      TUG  - Regular  - Cognitive   32.19 sec, w/ RW  46.16 sec, w/ RW   26.85 sec w/ RW  34.97 sec w/ RW counting bwds from 100   26.63 sec, w/ RW  34.65 sec w/ RW  counting bwds from 100      10 meter " 0.55 m/s 0.76 m/s 0.64 m/s      TAMEZ 37/56 32/56 35/56      mCTSIB  - FTEO (firm)  - FTEC (firm)  - FTEO (foam)  - FTEC (foam)   30 sec  5 sec  8 sec  1 sec   30 sec  4 sec  8 sec  unable   30 sec  30 sec  30 sec  3 sec      6MWT 470 ft 530 ft, SpO2: 97% 575 ft w/ RW

## 2024-09-18 ENCOUNTER — OFFICE VISIT (OUTPATIENT)
Facility: CLINIC | Age: 69
End: 2024-09-18
Payer: MEDICARE

## 2024-09-18 DIAGNOSIS — R26.89 OTHER ABNORMALITIES OF GAIT AND MOBILITY: ICD-10-CM

## 2024-09-18 DIAGNOSIS — R26.89 IMBALANCE: Primary | ICD-10-CM

## 2024-09-18 DIAGNOSIS — R53.1 WEAKNESS: ICD-10-CM

## 2024-09-18 PROCEDURE — 97112 NEUROMUSCULAR REEDUCATION: CPT

## 2024-09-18 PROCEDURE — 97110 THERAPEUTIC EXERCISES: CPT

## 2024-09-18 NOTE — PROGRESS NOTES
"Daily Note       Today's date: 2024  Patient name: Yris Bowles  : 1955  MRN: [id number]  Referring provider: Juan Jose Guerra DO  Dx:   Encounter Diagnosis     ICD-10-CM    1. Imbalance  R26.89       2. Weakness  R53.1       3. Other abnormalities of gait and mobility  R26.89             IE or PN  POC expires Unit limit Auth Expiration date PT/OT + Visit Limit? Co-Insurance   IE 7/3/24  AV 24 NA NA NA Yes                                       Subjective: Patient presents to PT with RW with no new changes, complaints, or falls.     Objective: See treatment diary below.     Vitals: 97% O2 Sat; HR: 73 bpm   BP: 128/78 mmHg     NMR/TE: gait belt, 2.5# Jose Manuel  Circuit 1 (90 seconds on on, 90 seconds off, 2 rounds)  - STS w/o foam pad on chair + 5.5# TT: 15x  - Step ups to uneven river rocks (medium/small) with HHA   - Lateral step ups to 6\" step, 1 UE support  - Standing core rotations with 5.5# TT: 20 x    Active rest breaks btwn circuits (1 minute each)  - Seated ball squeeze:   - Seated marching  - LAQ    Assessment: Patient tolerated treatment well with focus on strengthening and balance training within circuit based approach. Patient required use of HHA with step ups to RR, likely due to difficulties in SLS and gluteus medius strength deficit. She required frequent verbal cueing to lead with LE closest to stairs, however this improved with repeated reps, suggesting good motor learning and carryover. Patient will continue to benefit from skilled OPPT services to improve balance, reduce risk for falls, and maximize her function.     Plan: Continue per plan of care.  Progress as per primary PT.             Outcome Measures Initial Eval  7/3/24 PN  24 PN  24      5xSTS 17.53 sec, 2UE 19.65 sec, 2 UE 16.69 sec, 2 UE      TUG  - Regular  - Cognitive   32.19 sec, w/ RW  46.16 sec, w/ RW   26.85 sec w/ RW  34.97 sec w/ RW counting bwds from 100   26.63 sec, w/ RW  34.65 sec w/ RW  counting bwds from " 100      10 meter 0.55 m/s 0.76 m/s 0.64 m/s      TAMEZ 37/56 32/56 35/56      mCTSIB  - FTEO (firm)  - FTEC (firm)  - FTEO (foam)  - FTEC (foam)   30 sec  5 sec  8 sec  1 sec   30 sec  4 sec  8 sec  unable   30 sec  30 sec  30 sec  3 sec      6MWT 470 ft 530 ft, SpO2: 97% 575 ft w/ RW

## 2024-09-20 DIAGNOSIS — I10 ESSENTIAL HYPERTENSION: ICD-10-CM

## 2024-09-20 DIAGNOSIS — E11.9 TYPE 2 DIABETES MELLITUS WITHOUT COMPLICATION, WITH LONG-TERM CURRENT USE OF INSULIN (HCC): ICD-10-CM

## 2024-09-20 DIAGNOSIS — Z79.4 TYPE 2 DIABETES MELLITUS WITHOUT COMPLICATION, WITH LONG-TERM CURRENT USE OF INSULIN (HCC): ICD-10-CM

## 2024-09-21 RX ORDER — MULTIVITAMIN
1 TABLET ORAL DAILY
Qty: 90 TABLET | Refills: 0 | Status: SHIPPED | OUTPATIENT
Start: 2024-09-21

## 2024-09-21 RX ORDER — METOPROLOL SUCCINATE 25 MG/1
25 TABLET, EXTENDED RELEASE ORAL DAILY
Qty: 90 TABLET | Refills: 0 | Status: SHIPPED | OUTPATIENT
Start: 2024-09-21

## 2024-09-23 ENCOUNTER — OFFICE VISIT (OUTPATIENT)
Facility: CLINIC | Age: 69
End: 2024-09-23
Payer: MEDICARE

## 2024-09-23 DIAGNOSIS — R53.1 WEAKNESS: ICD-10-CM

## 2024-09-23 DIAGNOSIS — R26.89 IMBALANCE: Primary | ICD-10-CM

## 2024-09-23 DIAGNOSIS — R26.89 OTHER ABNORMALITIES OF GAIT AND MOBILITY: ICD-10-CM

## 2024-09-23 PROCEDURE — 97110 THERAPEUTIC EXERCISES: CPT

## 2024-09-23 NOTE — PROGRESS NOTES
"Daily Note       Today's date: 2024  Patient name: Yris Bowles  : 1955  MRN: [id number]  Referring provider: Juan Jose Guerra DO  Dx:   Encounter Diagnosis     ICD-10-CM    1. Imbalance  R26.89       2. Weakness  R53.1       3. Other abnormalities of gait and mobility  R26.89             IE or PN  POC expires Unit limit Auth Expiration date PT/OT + Visit Limit? Co-Insurance   IE 7/3/24  AV 24 NA NA NA Yes                                       Subjective: Patient presents to PT with RW with no new changes, complaints, or falls.     Objective: See treatment diary below.     BP start of session: 155/99 mmHg (seated, LUE)   After 5 minutes seated rest: 155/88 mmHg      NMR/TE: gait belt, 2.5# Jose Manuel  Circuit 1 (90 seconds on on, 90 seconds off, 2 rounds) - 1 ROUND ONLY TODAY  - STS w/o foam pad on chair + 5.5# TT: 15x  - Step ups to uneven river rocks (medium/small) with HHA   - Lateral step ups to 6\" step, 1 UE support  - Standing core rotations with 5.5# TT: 20 x  - Sidestepping w/ resistance (green TB around knees)    Active rest breaks btwn circuits (1 minute each)  - Seated ball squeeze:   - Seated marching  - LAQ    BP end of session: 157/85 mmHg (seated, LUE)    Assessment: Patient tolerated treatment well with focus on strengthening and balance training within circuit based approach. Patient demonstrated high initial BP reading that improved with 5 minutes of seated rest. BP within safe parameters for exercise according to [employer]. Patient illustrated considerable hesitancy and slowed performance when performing step ups to river rocks. Patient will continue to benefit from skilled OPPT services to improve balance, reduce risk for falls, and maximize her function.       Plan: Continue per plan of care.  Progress as per primary PT.             Outcome Measures Initial Eval  7/3/24 PN  24 PN  24      5xSTS 17.53 sec, 2UE 19.65 sec, 2 UE 16.69 sec, 2 UE      TUG  - " Regular  - Cognitive   32.19 sec, w/ RW  46.16 sec, w/ RW   26.85 sec w/ RW  34.97 sec w/ RW counting bwds from 100   26.63 sec, w/ RW  34.65 sec w/ RW  counting bwds from 100      10 meter 0.55 m/s 0.76 m/s 0.64 m/s      DASHAWN 37/56 32/56 35/56      mCTSIB  - FTEO (firm)  - FTEC (firm)  - FTEO (foam)  - FTEC (foam)   30 sec  5 sec  8 sec  1 sec   30 sec  4 sec  8 sec  unable   30 sec  30 sec  30 sec  3 sec      6MWT 470 ft 530 ft, SpO2: 97% 575 ft w/ RW

## 2024-09-25 ENCOUNTER — OFFICE VISIT (OUTPATIENT)
Facility: CLINIC | Age: 69
End: 2024-09-25
Payer: MEDICARE

## 2024-09-25 DIAGNOSIS — R53.1 WEAKNESS: ICD-10-CM

## 2024-09-25 DIAGNOSIS — R26.89 IMBALANCE: Primary | ICD-10-CM

## 2024-09-25 DIAGNOSIS — R26.89 OTHER ABNORMALITIES OF GAIT AND MOBILITY: ICD-10-CM

## 2024-09-25 PROCEDURE — 97112 NEUROMUSCULAR REEDUCATION: CPT

## 2024-09-25 NOTE — PROGRESS NOTES
"Daily Note       Today's date: 2024  Patient name: Yris Bowles  : 1955  MRN: [id number]  Referring provider: Juan Jose Guerra DO  Dx:   Encounter Diagnosis     ICD-10-CM    1. Imbalance  R26.89       2. Weakness  R53.1       3. Other abnormalities of gait and mobility  R26.89               IE or PN  POC expires Unit limit Auth Expiration date PT/OT + Visit Limit? Co-Insurance   IE 7/3/24  AV 24 NA NA NA Yes                                       Subjective: Patient presents to PT with RW with no new changes, complaints, or falls.     Objective: See treatment diary below.     NMR: gait belt, 3# Jose Manuel  Circuit 1 (90 seconds on on, 90 seconds off, 2 rounds)   - STS w/o foam pad on chair + 5.5# TT: 10x, 0UE  - Agility ladder in/outs in // bars  - Fwd hurdles 6-9\" w/ intermittent foam pads in // bars  - Standing on foam pad while reach outside [employer] to [employer] game    Assessment: Patient tolerated treatment well with focus on strengthening and balance training within circuit based approach. Avoided usage of RW during PT session this date and utilized 1 HHA to ambulate to and from tasks to improve patient's balance confidence in clinic. Progressed balance exercises today and preformed them in parallel bars however, patient utilized bilateral upper extremity support to complete exercises. She displayed difficulty with motor planning and step sequencing during agility ladder task. Patient will continue to benefit from skilled OPPT services to improve balance, reduce risk for falls, and maximize her function.       Plan: Continue per plan of care.  Progress as per primary PT.             Outcome Measures Initial Eval  7/3/24 PN  24 PN  24      5xSTS 17.53 sec, 2UE 19.65 sec, 2 UE 16.69 sec, 2 UE      TUG  - Regular  - Cognitive   32.19 sec, w/ RW  46.16 sec, w/ RW   26.85 sec w/ RW  34.97 sec w/ RW counting bwds from 100   26.63 sec, w/ RW  34.65 sec w/ RW  counting bwds from 100      10 meter 0.55 m/s " 0.76 m/s 0.64 m/s      TAMEZ 37/56 32/56 35/56      mCTSIB  - FTEO (firm)  - FTEC (firm)  - FTEO (foam)  - FTEC (foam)   30 sec  5 sec  8 sec  1 sec   30 sec  4 sec  8 sec  unable   30 sec  30 sec  30 sec  3 sec      6MWT 470 ft 530 ft, SpO2: 97% 575 ft w/ RW

## 2024-09-30 ENCOUNTER — OFFICE VISIT (OUTPATIENT)
Facility: CLINIC | Age: 69
End: 2024-09-30
Payer: MEDICARE

## 2024-09-30 DIAGNOSIS — R53.1 WEAKNESS: ICD-10-CM

## 2024-09-30 DIAGNOSIS — R26.89 IMBALANCE: Primary | ICD-10-CM

## 2024-09-30 DIAGNOSIS — R26.89 OTHER ABNORMALITIES OF GAIT AND MOBILITY: ICD-10-CM

## 2024-09-30 PROCEDURE — 97112 NEUROMUSCULAR REEDUCATION: CPT

## 2024-09-30 NOTE — PROGRESS NOTES
"Daily Note       Today's date: 2024  Patient name: Yris Bowles  : 1955  MRN: [id number]  Referring provider: Juan Jose Guerra DO  Dx:   Encounter Diagnosis     ICD-10-CM    1. Imbalance  R26.89       2. Weakness  R53.1       3. Other abnormalities of gait and mobility  R26.89               IE or PN  POC expires Unit limit Auth Expiration date PT/OT + Visit Limit? Co-Insurance   IE 7/3/24  AV 24 NA NA NA Yes                                       Subjective: Patient presents to PT with RW with no new changes, complaints, or falls. Arrived to therapy 10 minutes late.     Objective: See treatment diary below.     NMR: gait belt, 3# Jose Manuel  Circuit 1 (90 seconds on on, 90 seconds off, 2 rounds)   - STS w/o foam pad on chair + 5.5# TT: , 0UE  - Forward step up to 6\" step with 1 UE support, 90s  - Step up to airex with 1 UE support   - Ambulation throughout the clinic with 1 UE support     Assessment: Patient tolerated treatment well with focus on strengthening and balance training within circuit based approach. Avoided usage of RW during PT session this date and utilized 1 HHA to ambulate to and from tasks to improve patient's balance confidence in clinic. Gait speed and ambulation distance significantly reduces with hand held support likely due to poor confidence and generalized BLE weakness. Patient will continue to benefit from skilled OPPT services to improve balance, reduce risk for falls, and maximize her function.       Plan: Continue per plan of care.  Progress as per primary PT.             Outcome Measures Initial Eval  7/3/24 PN  24 PN  24      5xSTS 17.53 sec, 2UE 19.65 sec, 2 UE 16.69 sec, 2 UE      TUG  - Regular  - Cognitive   32.19 sec, w/ RW  46.16 sec, w/ RW   26.85 sec w/ RW  34.97 sec w/ RW counting bwds from 100   26.63 sec, w/ RW  34.65 sec w/ RW  counting bwds from 100      10 meter 0.55 m/s 0.76 m/s 0.64 m/s      DASHWAN  35/56      mCTSIB  - FTEO (firm)  - FTEC " (firm)  - FTEO (foam)  - FTEC (foam)   30 sec  5 sec  8 sec  1 sec   30 sec  4 sec  8 sec  unable   30 sec  30 sec  30 sec  3 sec      6MWT 470 ft 530 ft, SpO2: 97% 575 ft w/ RW

## 2024-10-03 DIAGNOSIS — E11.9 TYPE 2 DIABETES MELLITUS WITHOUT COMPLICATION, WITH LONG-TERM CURRENT USE OF INSULIN (HCC): ICD-10-CM

## 2024-10-03 DIAGNOSIS — Z79.4 TYPE 2 DIABETES MELLITUS WITHOUT COMPLICATION, WITH LONG-TERM CURRENT USE OF INSULIN (HCC): ICD-10-CM

## 2024-10-03 RX ORDER — MULTIVITAMIN
1 TABLET ORAL DAILY
Qty: 90 TABLET | Refills: 0 | Status: CANCELLED | OUTPATIENT
Start: 2024-10-03

## 2024-10-07 ENCOUNTER — EVALUATION (OUTPATIENT)
Facility: CLINIC | Age: 69
End: 2024-10-07
Payer: MEDICARE

## 2024-10-07 DIAGNOSIS — R26.89 IMBALANCE: Primary | ICD-10-CM

## 2024-10-07 DIAGNOSIS — R26.89 OTHER ABNORMALITIES OF GAIT AND MOBILITY: ICD-10-CM

## 2024-10-07 DIAGNOSIS — R53.1 WEAKNESS: ICD-10-CM

## 2024-10-07 PROCEDURE — 97112 NEUROMUSCULAR REEDUCATION: CPT

## 2024-10-07 NOTE — PROGRESS NOTES
PT Re-Evaluation / Progress Note         POC expires Unit limit Auth Expiration date PT/OT + Visit Limit? Co-Insurance   24 NA NA NA Yes                                            Today's date: 10/7/2024  Patient name: Yris Bowles  : 1955  MRN: [id number]  Referring provider: Teressa Prakash DO  Dx:   Encounter Diagnosis     ICD-10-CM    1. Imbalance  R26.89       2. Weakness  R53.1       3. Other abnormalities of gait and mobility  R26.89                   Assessment  Assessment details: Patient is a 69 y.o. Female who has been presenting to skilled outpatient PT to address balance and gait difficulties which has been impacting her independence and participation in her community. Since her last evaluation Yris has demonstrated improvements in lower extremity strength and power as per 5x STS and great improvements in static balance per TAMEZ.   She also improved on TUG and 6MWT, indicating improved confidence with ambulation and improved CV endurance. TUG cog did not improve, and she continues to have difficulty dual tasking. Despite improvements in balance she remains at HIGH risk for falls as per APTA and Rehab Measures Lab cutoff scores for aforementioned outcome measures. She has met one additional goal at this time and is progressing towards remaining. She will undoubtedly benefit from skilled OPPT services to address limitations, reduce risk for falls, and improve overall independence.       Impairments: Abnormal coordination, Abnormal gait, Abnormal muscle tone, Abnormal or restricted ROM, Activity intolerance, Impaired balance, Impaired physical strength, Lacks appropriate HEP, Poor posture, Poor body mechanics, Pain with function, Safety issue, Weight-bearing intolerance, Abnormal movement, Difficulty understanding, Abnormal muscle firing  Understanding of Dx/Px/POC: Good  Prognosis: Good    Patient verbalized understanding  of POC.         Please contact me if you have any questions or recommendations. Thank you for the referral and the opportunity to share in Yris Bowles's care.      Plan  Plan details: balance training, gait training, strengthening, and endurance  Patient would benefit from: PT Eval, Skilled PT, and Skilled OT  Planned modality interventions: Biofeedback, Cryotherapy, TENS, Thermotherapy  Planned therapy interventions: Abdominal trunk stabilization, ADL training, Balance, Balance/WB training, Breathing training, Body mechanics training, Coordination, Functional ROM exercises, Gait training, HEP, Joint Mobilization, Manual Therapy, Samuels taping, Motor coordination training, Neuromuscular re-education, Patient education, Postural training, Strengthening, Stretching, Therapeutic activities, Therapeutic exercises, Therapeutic training, Transfer training, Activity modification, Work reintegration  Frequency: 2x/wk  Duration in weeks: 12 weeks  Plan of Care beginning date: 9/4/24  Plan of Care expiration date: 12 weeks - 11/27/24  Treatment plan discussed with: Patient       Goals  Short Term Goals (4 weeks):    - Patient will improve time on TUG by 2.9 seconds to facilitate improved safety in all ambulation- MET  - Patient will be independent in basic HEP 2-3 weeks- ongoing  - Patient will improve 5xSTS score by 2.3 seconds to promote improved LE functional strength needed for ADLs- MET    Long Term Goals (12 weeks):  - Patient will be independent in a comprehensive home exercise program- ongoing  - Patient will improve gait speed by 0.18 m/s to improve safety with community ambulation- MET  - Patient will improve TAMEZ by 6 points in order to improve static balance and reduce risk for falls-NOT MET  - Patient will be able to demonstrate HT in gait without veering- ongoing  - Patient will improve 6 Minute Walk Test score by 190 feet to promote improved cardiovascular endurance- ongoing  - Patient will report 50%  reduction in near falls in order to improve safety with functional tasks and reduce his risk for falls- ongoing  - Patient will be able to ascend/descend stairs reciprocally with 1 UE assist to promote independence and safety with ADLs- ongoing  - Patient will report 50% reduction in near falls when ambulating on uneven terrain- ongoing      Cut off score    All date taken from APTA Neuro Section or Rehab Measures      Fernandes/56  MDC: 6 pts  Age Norms:  60-69: M - 55   F - 55  70-79: M - 54   F - 53  80-89: M - 53   F - 50 5xSTS: Susan et al 2010  MDC: 2.3 sec  Age Norms:  60-69: 11.4 sec  70-79: 12.6 sec  80-89: 14.8 sec   TUG  MDC: 4.14 sec  Cut off score:  >13.5 sec community dwelling adults  >32.2 frail elderly  <20 I for basic transfers  >30 dependent on transfers 10 Meter Walk Test: Jean-Pierre et al 2011  MDC: 0.18 m/s  20-29: M - 1.35 m   F - 1.34 m  30-39: M - 1.43 m   F - 1.34 m  40-49: M - 1.43 m   F - 1.39 m  50-59: M - 1.43 m   F - 1.31 m  60-69: M - 1.34 m   F - 1.24 m  70-79: M - 1.26 m   F - 1.13 m  80-89: M - 0.97 m   F - 0.94 m    Household Ambulator < 0.4 m/s  Limited Community Ambulator 0.4 - 0.8 m/s  Community Ambulator 0.8 - 1.2 m/s  Safely cross the street > 1.2 m/s   FGA  MCID: 4 pts  Geriatrics/community < 22/30 fall risk  Geriatrics/community < 20/30 unexplained falls    DGI  MDC: vestibular - 4 pts  MDC: geriatric/community - 3 pts  Falls risk <19/24 mCTSIB  Norm: 20-60 yrs  Eyes open firm: norm sway 0.21-0.48  Eyes closed firm: norm sway 0.48-0.99  Eyes open foam: norm sway 0.38-0.71  Eyes closed foam: norm sway 0.70-2.22   6 Minute Walk Test  MDC: 190.98 ft  MCID: 164 ft    Age Norms  60-69: M - 1876 ft (571.80 m)  F - 1765 ft (537.98 m)  70-79: M - 1729 ft (527.00 m)  F - 1545 ft (470.92 m)  80-89: M - 1368 ft (416.97 m)  F - 1286 ft (391.97 m) ABC: Joann & Roel, 2003  <67% increased risk for falls   [place] Monofilaments  Evaluator Size:        Force (grams):           Hand/Dorsal Thresholds:        Plantar Thresholds:  - 1.65                       - 0.008                       - Normal                                 - Normal  - 2.36                       - 0.02                         - Normal                                 - Normal  - 2.44                       - 0.04                         - Normal                                 - Normal  - 2.83                       - 0.07                         - Normal                                 - Normal  - 3.22                       - 0.16                         - Diminished light touch          - Normal  - 3.61                       - 0.40                         - Diminished light touch          - Normal  - 3.84                       - 0.60                         - Diminished protective           - Diminished light touch  - 4.08                       - 1.00                         - Diminished protective           - Diminished light touch  - 4.17                       - 1.40                         - Diminished protective           - Diminished light touch  - 4.31                       - 2.00                         - Diminished protective           - Diminished light touch  - 4.56                       - 4.00                         - Loss of protective sense      - Diminished protective  - 4.74                       - 6.00                         - Loss of protective sense      - Diminished protective  - 4.93                       - 8.00                         - Loss of protective sense      - Diminished protective  - 5.07                       - 10.0                         - Loss of protective sense     - Loss of protective sense  - 5.18                       - 15.0                         - Loss of protective sense     - Loss of protective sense  - 5.46                       - 26.0                         - Loss of protective sense     - Loss of protective sense  - 5.88                       - 60.0                   "       - Loss of protective sense     - Loss of protective sense  - 6.10                       - 100                          - Loss of protective sense     - Loss of protective sense  - 6.45                       - 180                          - Loss of protective sense     - Loss of protective sense  - 6.65                       - 300                          - Deep pressure sense only  - Deep pressure sense only         Subjective    History of Present Illness  - Mechanism of injury: Patient presents to PT with complaints of difficulty with ambulation and balance. She has been participating with skilled outpatient OT services since April to address cognitive limitations. Patient reports she has not had any recent falls however, is worried about falling and always utilizes her rolling walker in her home and in her community because of FoF. Her major goal is to improve her walking abilities and independence as she requires assistance from her  to perform ADLs.    Updated 8/7/24: Patient states she feels as though her balance has improved since staring therapy however, she states gail negotiation is still difficult for her.     Updated 9/4/24: Patient reports she feels her walking has improved since starting therapy however, relays she would like to continue to work on her balance.       Update 10/7/24: Patient reports no new falls or issues. Says she wants to continue to work on her balance.       - Primary AD: RW  - Assist level at home: Lizett from  for ADLs    Patient goal: \"walk better\"    Pain  NA    Social Support  - Steps to enter house: 6 HERNAN  - Stairs in house: none   - Lives in: 1st floor apt  - Lives with:     - Employment status: retired CNA  - Hand dominance: RHD    Treatments  - Previous treatment: none  - Current treatment: PT  - Diagnostic Testing: none      Objective   HR: 77 bpm  SpO2: 98%  BP: 112/68 mmHg    LE MMT  - R Hip Flexion: 3/5  L Hip Flexion: 3+/5  - R Hip " Extension: 3/5  L Hip Extension: 3/5  - R Hip Abduction: 3+/5  L Hip Abduction: 3+/5  - R Hip Adduction: 4-/5  L Hip Adduction: 4-/5  - R Knee Extension: 4/5  L Knee Extension: 4/5  - R Knee Flexion: 4/5  L Knee Flexion: 4-/5  - R Ankle DF: 4-/5   L Ankle DF: 4-/5  - R Ankle PF: 4/5   L Ankle PF: 4/5    Sensation  - Light touch: diminished at R C6 distribution    Coordination  - Dysdiadochokinesia: slowed  - Alternate Toe Taps: slowed  - Finger to Nose: normal    Reflexes/Clonus  - Clonus: No    Myelopathy Screen (>3/5 +)  - Jewell's Reflex: -  - Babinski Reflex: NA  - Inverted Supinator Sign: -  - Age > 45: Yes  - Gait Deviation: Yes      Gait  - Abnormalities: Forwards flexed posture, slow turning, decreased foot clearance, and decreased step length           Outcome Measures Initial Eval  7/3/24 PN  8/7/24 PN  9/4/24 RE 10/7/24     5xSTS 17.53 sec, 2UE 19.65 sec, 2 UE 16.69 sec, 2 UE 15.03 sec, 2 UE     TUG  - Regular  - Cognitive   32.19 sec, w/ RW  46.16 sec, w/ RW   26.85 sec w/ RW  34.97 sec w/ RW counting bwds from 100   26.63 sec, w/ RW  34.65 sec w/ RW  counting bwds from 100   25.92 sec with RW  34.79 sec with RW, counting backward from 100     10 meter 0.55 m/s 0.76 m/s 0.64 m/s 0.66m/s with RW      TAMEZ 37/56 32/56 35/56 39/56     mCTSIB  - FTEO (firm)  - FTEC (firm)  - FTEO (foam)  - FTEC (foam)   30 sec  5 sec  8 sec  1 sec   30 sec  4 sec  8 sec  unable   30 sec  30 sec  30 sec  3 sec   30 sec  30 sec  30 sec  6 sec     6MWT 470 ft 530 ft, SpO2: 97% 575 ft w/  ft w/ RW; SpO2 95%                                  Precautions:   Past Medical History:   Diagnosis Date    Anesthesia complication     difficulty waking up    Anxiety     Arthritis     left knee    Bipolar 1 disorder (HCC)     Bone spur     left knee behing the knee cap    Cancer (HCC)     Chronic kidney disease     Chronic pain disorder     Colon cancer (HCC)     patient states about 2017    Colon polyp     Tubulovillous Adenoma High  Grade Dysplasia - 2017    Depression     Diabetes mellitus (HCC)     Endometrial cancer (HCC)     grade I    Hx of bleeding disorder     vaginal bleeding started on 3/13/17     Hyperlipidemia     Hypertension     Hypomagnesemia 03/20/2023    Memory loss     PONV (postoperative nausea and vomiting)     Psychiatric disorder     Psychiatric illness     Psychosis (HCC)     Shortness of breath     with exertion    Sleep difficulties     Urinary incontinence     Vitamin D deficiency

## 2024-10-09 ENCOUNTER — OFFICE VISIT (OUTPATIENT)
Facility: CLINIC | Age: 69
End: 2024-10-09
Payer: MEDICARE

## 2024-10-09 DIAGNOSIS — R26.89 OTHER ABNORMALITIES OF GAIT AND MOBILITY: ICD-10-CM

## 2024-10-09 DIAGNOSIS — R53.1 WEAKNESS: ICD-10-CM

## 2024-10-09 DIAGNOSIS — R26.89 IMBALANCE: Primary | ICD-10-CM

## 2024-10-09 PROCEDURE — 97112 NEUROMUSCULAR REEDUCATION: CPT

## 2024-10-09 NOTE — PROGRESS NOTES
Daily Note       Today's date: 10/9/2024  Patient name: Yris Bowles  : 1955  MRN: [id number]  Referring provider: Juan Jose Guerra DO  Dx:   Encounter Diagnosis     ICD-10-CM    1. Imbalance  R26.89       2. Weakness  R53.1       3. Other abnormalities of gait and mobility  R26.89               IE or PN  POC expires Unit limit Auth Expiration date PT/OT + Visit Limit? Co-Insurance   IE 7/3/24  AV 24 NA NA NA Yes                                       Subjective: Patient presents to PT with RW with no new changes, complaints, or falls. Arrived to therapy 5 minutes late.     Objective: See treatment diary below.   BP: 155/85 mmHg     NMR: gait belt, 3# Jose Manuel  Circuit 1 (90 seconds on on, 90 seconds off)  - STS w/o foam pad on chair + 5.5# TT: , 1 UE, 2 sets   - Step taps onto 6 in step while standing on foam  - Forward step up to foam pad with 1 UE support, 90s, 2 sets   - Ambulation throughout the clinic with 1 UE support: 75 ft, 15 ft  - Ambulation to  with walker: 350ft     Assessment: Patient tolerated treatment well with focus on strengthening and balance training within circuit based approach. Patient unable to lift RLE to tap onto 6in while standing on foam likely due to decreased gluteus medius strength and decreased core strength. Required HHA for all standing exercises secondary to fear of falling and overall poor confidence. Patient will continue to benefit from skilled OPPT services to improve balance, reduce risk for falls, and maximize her function.       Plan: Continue per plan of care.              Outcome Measures Initial Eval  7/3/24 PN  24 PN  24      5xSTS 17.53 sec, 2UE 19.65 sec, 2 UE 16.69 sec, 2 UE      TUG  - Regular  - Cognitive   32.19 sec, w/ RW  46.16 sec, w/ RW   26.85 sec w/ RW  34.97 sec w/ RW counting bwds from 100   26.63 sec, w/ RW  34.65 sec w/ RW  counting bwds from 100      10 meter 0.55 m/s 0.76 m/s 0.64 m/s      TAMEZ 37 32 35/56      mCTSIB  -  FTEO (firm)  - FTEC (firm)  - FTEO (foam)  - FTEC (foam)   30 sec  5 sec  8 sec  1 sec   30 sec  4 sec  8 sec  unable   30 sec  30 sec  30 sec  3 sec      6MWT 470 ft 530 ft, SpO2: 97% 575 ft w/ RW

## 2024-10-11 ENCOUNTER — HOSPITAL ENCOUNTER (OUTPATIENT)
Dept: INFUSION CENTER | Facility: HOSPITAL | Age: 69
Discharge: HOME | End: 2024-10-11

## 2024-10-14 ENCOUNTER — OFFICE VISIT (OUTPATIENT)
Facility: CLINIC | Age: 69
End: 2024-10-14
Payer: MEDICARE

## 2024-10-14 DIAGNOSIS — R53.1 WEAKNESS: ICD-10-CM

## 2024-10-14 DIAGNOSIS — R26.89 IMBALANCE: Primary | ICD-10-CM

## 2024-10-14 DIAGNOSIS — R26.89 OTHER ABNORMALITIES OF GAIT AND MOBILITY: ICD-10-CM

## 2024-10-14 PROCEDURE — 97112 NEUROMUSCULAR REEDUCATION: CPT

## 2024-10-14 NOTE — PROGRESS NOTES
"Daily Note       Today's date: 10/14/2024  Patient name: Yris Bowles  : 1955  MRN: [id number]  Referring provider: Juan Jose Guerra DO  Dx:   Encounter Diagnosis     ICD-10-CM    1. Imbalance  R26.89       2. Weakness  R53.1       3. Other abnormalities of gait and mobility  R26.89                 IE or PN  POC expires Unit limit Auth Expiration date PT/OT + Visit Limit? Co-Insurance   IE 7/3/24  AV 24 NA NA NA Yes                                       Subjective: Patient presents to PT ~ 10 minutes late with RW with no new changes, complaints, or falls.    Objective: See treatment diary below.   BP: 164/94 mmHg asymptomatic, after 5 min: 158/86 mmHg     NMR: gait belt, 3# Jose Manuel  Circuit 1 (90 seconds on on, 90 seconds off)  - STS w/o foam pad on chair 0 UE: 2 sets   - Ambulation throughout the clinic with SPC: 97ft  - Step taps onto 6 in step while standing on foam  - Fwd through 6\" hurdles w/ boomwhaker taps OH: 10ft    Assessment: Patient tolerated treatment well with focus on strengthening and balance training within circuit based approach. Patient initially with high BP which improved following ~5 minutes of quiet sitting. Limited upper extremity support during exercises today to further improve balance confidence and facilitate improve lower extremity strategies. Introduced ambulation with SPC this date, patient with overall good tolerance however, with decreased foot clearance and L stance time as she fatigued. Patient will continue to benefit from skilled OPPT services to improve balance, reduce risk for falls, and maximize her function.       Plan: Continue per plan of care.            Outcome Measures Initial Eval  7/3/24 PN  24 PN  24 RE 10/7/24     5xSTS 17.53 sec, 2UE 19.65 sec, 2 UE 16.69 sec, 2 UE 15.03 sec, 2 UE     TUG  - Regular  - Cognitive   32.19 sec, w/ RW  46.16 sec, w/ RW   26.85 sec w/ RW  34.97 sec w/ RW counting bwds from 100   26.63 sec, w/ RW  34.65 sec w/ " RW  counting bwds from 100   25.92 sec with RW  34.79 sec with RW, counting backward from 100     10 meter 0.55 m/s 0.76 m/s 0.64 m/s 0.66m/s with RW      TAMEZ 37/56 32/56 35/56 39/56     mCTSIB  - FTEO (firm)  - FTEC (firm)  - FTEO (foam)  - FTEC (foam)   30 sec  5 sec  8 sec  1 sec   30 sec  4 sec  8 sec  unable   30 sec  30 sec  30 sec  3 sec   30 sec  30 sec  30 sec  6 sec     6MWT 470 ft 530 ft, SpO2: 97% 575 ft w/  ft w/ RW; SpO2 95%

## 2024-10-16 ENCOUNTER — OFFICE VISIT (OUTPATIENT)
Facility: CLINIC | Age: 69
End: 2024-10-16
Payer: MEDICARE

## 2024-10-16 DIAGNOSIS — R26.89 IMBALANCE: Primary | ICD-10-CM

## 2024-10-16 DIAGNOSIS — R53.1 WEAKNESS: ICD-10-CM

## 2024-10-16 DIAGNOSIS — R26.89 OTHER ABNORMALITIES OF GAIT AND MOBILITY: ICD-10-CM

## 2024-10-16 PROCEDURE — 97110 THERAPEUTIC EXERCISES: CPT

## 2024-10-16 PROCEDURE — 97112 NEUROMUSCULAR REEDUCATION: CPT

## 2024-10-16 NOTE — PROGRESS NOTES
Daily Note       Today's date: 10/16/2024  Patient name: Yris Bowles  : 1955  MRN: [id number]  Referring provider: Juan Jose Guerra DO  Dx:   Encounter Diagnosis     ICD-10-CM    1. Imbalance  R26.89       2. Weakness  R53.1       3. Other abnormalities of gait and mobility  R26.89                   IE or PN  POC expires Unit limit Auth Expiration date PT/OT + Visit Limit? Co-Insurance   IE 7/3/24  AV 24 NA NA NA Yes                                       Subjective: Patient presents to PT with RW with no new changes, complaints, or falls.    Objective: See treatment diary below.   BP: 145/74 mmHg asymptomatic    NMR: gait belt, 3# Jose Manuel  Circuit 1 (90 seconds on on, 90 seconds off)  - STS w/o foam pad on chair 0 UE: 2 sets   - Ambulation throughout the clinic with SPC: 65ft, 2 sets  - Step taps onto 6 in step while standing on foam, HHA  -LAQ  - Seated trunk rotations w/ 5.5TT    Assessment: Patient tolerated treatment well with focus on strengthening and balance training within circuit based approach. Performed all exercises in open gym environment to further promote balance confidence as patient displays trepidation when performing activities without UE support. She reported onset of L thigh pain when attempting second set of step taps; performed LAQs to help manage pain which decreased pain. Patient will continue to benefit from skilled OPPT services to improve balance, reduce risk for falls, and maximize her function.       Plan: Continue per plan of care.            Outcome Measures Initial Eval  7/3/24 PN  24 PN  24 RE 10/7/24     5xSTS 17.53 sec, 2UE 19.65 sec, 2 UE 16.69 sec, 2 UE 15.03 sec, 2 UE     TUG  - Regular  - Cognitive   32.19 sec, w/ RW  46.16 sec, w/ RW   26.85 sec w/ RW  34.97 sec w/ RW counting bwds from 100   26.63 sec, w/ RW  34.65 sec w/ RW  counting bwds from 100   25.92 sec with RW  34.79 sec with RW, counting backward from 100     10 meter 0.55 m/s 0.76 m/s 0.64 m/s  0.66m/s with RW      TAMEZ 37/56 32/56 35/56 39/56     mCTSIB  - FTEO (firm)  - FTEC (firm)  - FTEO (foam)  - FTEC (foam)   30 sec  5 sec  8 sec  1 sec   30 sec  4 sec  8 sec  unable   30 sec  30 sec  30 sec  3 sec   30 sec  30 sec  30 sec  6 sec     6MWT 470 ft 530 ft, SpO2: 97% 575 ft w/  ft w/ RW; SpO2 95%

## 2024-10-21 ENCOUNTER — OFFICE VISIT (OUTPATIENT)
Facility: CLINIC | Age: 69
End: 2024-10-21
Payer: MEDICARE

## 2024-10-21 DIAGNOSIS — R26.89 IMBALANCE: Primary | ICD-10-CM

## 2024-10-21 DIAGNOSIS — R26.89 OTHER ABNORMALITIES OF GAIT AND MOBILITY: ICD-10-CM

## 2024-10-21 DIAGNOSIS — R53.1 WEAKNESS: ICD-10-CM

## 2024-10-21 PROCEDURE — 97112 NEUROMUSCULAR REEDUCATION: CPT

## 2024-10-21 NOTE — PROGRESS NOTES
Daily Note       Today's date: 10/21/2024  Patient name: Yris Bowles  : 1955  MRN: [id number]  Referring provider: Juan Jose Guerra DO  Dx:   Encounter Diagnosis     ICD-10-CM    1. Imbalance  R26.89       2. Weakness  R53.1       3. Other abnormalities of gait and mobility  R26.89                     IE or PN  POC expires Unit limit Auth Expiration date PT/OT + Visit Limit? Co-Insurance   IE 7/3/24  AV 24 NA NA NA Yes                                       Subjective: Patient presents to PT with RW with no new changes, complaints, or falls.    Objective: See treatment diary below.   BP: 145/74 mmHg asymptomatic    NMR: gait belt, 3# Jose Manuel  Circuit 1 (90 seconds on on, 90 seconds off)  - STS w/o foam pad on chair 0 UE: 2 sets   - Seated ball squeezes  - Ambulation throughout the clinic no AD, HHA: 50ft, 2 sets  - Seated trunk rotations w/ 5.5TT  - Seated marching     Assessment: Patient tolerated treatment well with focus on strengthening and balance training within circuit based approach. Ended session early secondary to patient request. Continued with performing exercises in open gym environment to improve balance confidence and reduce reliance of upper extremity support. Patient will continue to benefit from skilled OPPT services to improve balance, reduce risk for falls, and maximize her function.       Plan: Continue per plan of care.            Outcome Measures Initial Eval  7/3/24 PN  24 PN  24 RE 10/7/24     5xSTS 17.53 sec, 2UE 19.65 sec, 2 UE 16.69 sec, 2 UE 15.03 sec, 2 UE     TUG  - Regular  - Cognitive   32.19 sec, w/ RW  46.16 sec, w/ RW   26.85 sec w/ RW  34.97 sec w/ RW counting bwds from 100   26.63 sec, w/ RW  34.65 sec w/ RW  counting bwds from 100   25.92 sec with RW  34.79 sec with RW, counting backward from 100     10 meter 0.55 m/s 0.76 m/s 0.64 m/s 0.66m/s with RW      TAMEZ 37/56 32/56 35/56 39/56     mCTSIB  - FTEO (firm)  - FTEC (firm)  - FTEO (foam)  - FTEC (foam)   30  sec  5 sec  8 sec  1 sec   30 sec  4 sec  8 sec  unable   30 sec  30 sec  30 sec  3 sec   30 sec  30 sec  30 sec  6 sec     6MWT 470 ft 530 ft, SpO2: 97% 575 ft w/  ft w/ RW; SpO2 95%

## 2024-10-23 ENCOUNTER — OFFICE VISIT (OUTPATIENT)
Facility: CLINIC | Age: 69
End: 2024-10-23
Payer: MEDICARE

## 2024-10-23 DIAGNOSIS — R26.89 OTHER ABNORMALITIES OF GAIT AND MOBILITY: ICD-10-CM

## 2024-10-23 DIAGNOSIS — R26.89 IMBALANCE: Primary | ICD-10-CM

## 2024-10-23 DIAGNOSIS — R53.1 WEAKNESS: ICD-10-CM

## 2024-10-23 PROCEDURE — 97112 NEUROMUSCULAR REEDUCATION: CPT

## 2024-10-23 NOTE — PROGRESS NOTES
Daily Note       Today's date: 10/23/2024  Patient name: Yris Bowles  : 1955  MRN: [id number]  Referring provider: Juan Jose Guerra DO  Dx:   Encounter Diagnosis     ICD-10-CM    1. Imbalance  R26.89       2. Weakness  R53.1       3. Other abnormalities of gait and mobility  R26.89                       IE or PN  POC expires Unit limit Auth Expiration date PT/OT + Visit Limit? Co-Insurance   IE 7/3/24  AV 24 NA NA NA Yes                                       Subjective: Patient presents to PT with RW with no new changes, complaints, or falls.    Objective: See treatment diary below.   BP: 155/88 mmHg asymptomatic    NMR: gait belt, 3# Jose Manuel  Circuit 1 (90 seconds on on, 90 seconds off)  - STS w/o foam pad on chair 0 UE: 2 sets   - Seated ball squeezes  - Standing on medium RR while tossing Pball to partner:10x  - LAQs: 10x  - Ambulation throughout the clinic no AD, HHA: 50ft, 2 sets  - Seated marching  - Standing trunk rotations w/ 5.5TT    Assessment: Patient tolerated treatment well with focus on strengthening and balance training within circuit based approach. Patient was most challenged with aprtner activity as she displayed increased A-P postural sway however, no overt LoB. Patient was able to intermittently release RW and dance during session, consider gamefication of activities to improve balance confidence and intrinsic motivation. Patient will continue to benefit from skilled OPPT services to improve balance, reduce risk for falls, and maximize her function.     Plan: Continue per plan of care.            Outcome Measures Initial Eval  7/3/24 PN  24 PN  24 RE 10/7/24     5xSTS 17.53 sec, 2UE 19.65 sec, 2 UE 16.69 sec, 2 UE 15.03 sec, 2 UE     TUG  - Regular  - Cognitive   32.19 sec, w/ RW  46.16 sec, w/ RW   26.85 sec w/ RW  34.97 sec w/ RW counting bwds from 100   26.63 sec, w/ RW  34.65 sec w/ RW  counting bwds from 100   25.92 sec with RW  34.79 sec with RW, counting backward from 100      10 meter 0.55 m/s 0.76 m/s 0.64 m/s 0.66m/s with RW      TAMEZ 37/56 32/56 35/56 39/56     mCTSIB  - FTEO (firm)  - FTEC (firm)  - FTEO (foam)  - FTEC (foam)   30 sec  5 sec  8 sec  1 sec   30 sec  4 sec  8 sec  unable   30 sec  30 sec  30 sec  3 sec   30 sec  30 sec  30 sec  6 sec     6MWT 470 ft 530 ft, SpO2: 97% 575 ft w/  ft w/ RW; SpO2 95%

## 2024-10-27 NOTE — PROGRESS NOTES
Daily Note       Today's date: 10/28/2024  Patient name: Yris Bowles  : 1955  MRN: [id number]  Referring provider: Juan Jose Guerra DO  Dx:   Encounter Diagnosis     ICD-10-CM    1. Imbalance  R26.89       2. Other abnormalities of gait and mobility  R26.89       3. Weakness  R53.1                         IE or PN  POC expires Unit limit Auth Expiration date PT/OT + Visit Limit? Co-Insurance   IE 7/3/24  AV 24 NA NA NA Yes                                       Subjective: Patient presents to PT with RW with no new changes, complaints, or falls.    Objective: See treatment diary below.   BP: 1143/87 mmHg asymptomatic R UE seated     NMR: gait belt, 3# Jose Manuel  Circuit 1 (90 seconds on on, 90 seconds off)  - STS w/o foam pad on chair 0 UE: 2 sets   - Seated ball squeezes  - Standing on medium RR while tossing Pball to partner:10 reps  2 sets   - LAQs: 10x  - Ambulation throughout the clinic no AD, HHA: 650ft, 2 sets picking up and connecting geometric snowflakes   - not today Seated marching  - not today: Standing trunk rotations w/ 5.5TT    Assessment: Patient tolerated treatment well with focus on strengthening and balance training within circuit based approach. Patient required re-instruction and demonstration to perform sit to stand without UE support.   Patient was most challenged with ball toss activity as she displayed increased A-P postural sway with 2 overt LoB. Consider gamefication of activities to improve balance confidence and intrinsic motivation. Patient will continue to benefit from skilled OPPT services to improve balance, reduce risk for falls, and maximize her function.     Plan: Continue per plan of care.            Outcome Measures Initial Eval  7/3/24 PN  24 PN  24 RE 10/7/24     5xSTS 17.53 sec, 2UE 19.65 sec, 2 UE 16.69 sec, 2 UE 15.03 sec, 2 UE     TUG  - Regular  - Cognitive   32.19 sec, w/ RW  46.16 sec, w/ RW   26.85 sec w/ RW  34.97 sec w/ RW counting bwds from 100    26.63 sec, w/ RW  34.65 sec w/ RW  counting bwds from 100   25.92 sec with RW  34.79 sec with RW, counting backward from 100     10 meter 0.55 m/s 0.76 m/s 0.64 m/s 0.66m/s with RW      TAMEZ 37/56 32/56 35/56 39/56     mCTSIB  - FTEO (firm)  - FTEC (firm)  - FTEO (foam)  - FTEC (foam)   30 sec  5 sec  8 sec  1 sec   30 sec  4 sec  8 sec  unable   30 sec  30 sec  30 sec  3 sec   30 sec  30 sec  30 sec  6 sec     6MWT 470 ft 530 ft, SpO2: 97% 575 ft w/  ft w/ RW; SpO2 95%

## 2024-10-28 ENCOUNTER — OFFICE VISIT (OUTPATIENT)
Facility: CLINIC | Age: 69
End: 2024-10-28
Payer: MEDICARE

## 2024-10-28 DIAGNOSIS — R26.89 OTHER ABNORMALITIES OF GAIT AND MOBILITY: ICD-10-CM

## 2024-10-28 DIAGNOSIS — R26.89 IMBALANCE: Primary | ICD-10-CM

## 2024-10-28 DIAGNOSIS — R53.1 WEAKNESS: ICD-10-CM

## 2024-10-28 PROCEDURE — 97112 NEUROMUSCULAR REEDUCATION: CPT

## 2024-10-28 PROCEDURE — 97110 THERAPEUTIC EXERCISES: CPT

## 2024-10-30 ENCOUNTER — OFFICE VISIT (OUTPATIENT)
Facility: CLINIC | Age: 69
End: 2024-10-30
Payer: MEDICARE

## 2024-10-30 DIAGNOSIS — R53.1 WEAKNESS: ICD-10-CM

## 2024-10-30 DIAGNOSIS — R26.89 IMBALANCE: Primary | ICD-10-CM

## 2024-10-30 DIAGNOSIS — R26.89 OTHER ABNORMALITIES OF GAIT AND MOBILITY: ICD-10-CM

## 2024-10-30 PROCEDURE — 97112 NEUROMUSCULAR REEDUCATION: CPT

## 2024-10-30 PROCEDURE — 97110 THERAPEUTIC EXERCISES: CPT

## 2024-10-30 NOTE — PROGRESS NOTES
Daily Note       Today's date: 10/30/2024  Patient name: Yris Bowles  : 1955  MRN: [id number]  Referring provider: Juan Jose Guerra DO  Dx:   Encounter Diagnosis     ICD-10-CM    1. Imbalance  R26.89       2. Other abnormalities of gait and mobility  R26.89       3. Weakness  R53.1                     POC expires Unit limit Auth Expiration date PT/OT + Visit Limit? Co-Insurance   24 NA NA NA Yes                                   Subjective: Patient presents to PT with RW stating she has to leave at 2 pm today.     Objective: See treatment diary below.     NMR: gait belt, 3# Jose Manuel  Circuit 1 (90 seconds on on, 90 seconds off)  - STS w/o foam pad on chair w/ 5# TT: 2 sets   - Seated ball squeezes  - Ambulation throughout the clinic, no AD, while listing grocery store items: 50ft  - LAQs: 10x  - Standing on medium RR with Pball bounce  - Standing on foam pad + 5.5# TT trunk rotation    Assessment: Patient tolerated treatment well with focus on strengthening and balance training within circuit based approach. Performed all exercises away from upper extremity support apparatuses to promote balance confidence and lower extremity strategies to maintain balance. She was challenged when performing cognitive task during ambulation as she required verbal cueing for step continuity. Patient will continue to benefit from skilled OPPT services to improve balance, reduce risk for falls, and maximize her function.     Plan: Continue per plan of care.            Outcome Measures Initial Eval  7/3/24 PN  24 PN  24 RE 10/7/24     5xSTS 17.53 sec, 2UE 19.65 sec, 2 UE 16.69 sec, 2 UE 15.03 sec, 2 UE     TUG  - Regular  - Cognitive   32.19 sec, w/ RW  46.16 sec, w/ RW   26.85 sec w/ RW  34.97 sec w/ RW counting bwds from 100   26.63 sec, w/ RW  34.65 sec w/ RW  counting bwds from 100   25.92 sec with RW  34.79 sec with RW, counting backward from 100     10 meter 0.55 m/s 0.76 m/s 0.64 m/s 0.66m/s with RW      TAMEZ  37/56 32/56 35/56 39/56     mCTSIB  - FTEO (firm)  - FTEC (firm)  - FTEO (foam)  - FTEC (foam)   30 sec  5 sec  8 sec  1 sec   30 sec  4 sec  8 sec  unable   30 sec  30 sec  30 sec  3 sec   30 sec  30 sec  30 sec  6 sec     6MWT 470 ft 530 ft, SpO2: 97% 575 ft w/  ft w/ RW; SpO2 95%

## 2024-11-03 DIAGNOSIS — Z79.4 TYPE 2 DIABETES MELLITUS WITHOUT COMPLICATION, WITH LONG-TERM CURRENT USE OF INSULIN (HCC): ICD-10-CM

## 2024-11-03 DIAGNOSIS — E11.9 TYPE 2 DIABETES MELLITUS WITHOUT COMPLICATION, WITH LONG-TERM CURRENT USE OF INSULIN (HCC): ICD-10-CM

## 2024-11-04 ENCOUNTER — OFFICE VISIT (OUTPATIENT)
Facility: CLINIC | Age: 69
End: 2024-11-04
Payer: MEDICARE

## 2024-11-04 DIAGNOSIS — R26.89 OTHER ABNORMALITIES OF GAIT AND MOBILITY: ICD-10-CM

## 2024-11-04 DIAGNOSIS — R53.1 WEAKNESS: ICD-10-CM

## 2024-11-04 DIAGNOSIS — R26.89 IMBALANCE: Primary | ICD-10-CM

## 2024-11-04 PROCEDURE — 97112 NEUROMUSCULAR REEDUCATION: CPT

## 2024-11-04 RX ORDER — METFORMIN HYDROCHLORIDE 500 MG/1
500 TABLET, EXTENDED RELEASE ORAL
Qty: 90 TABLET | Refills: 1 | Status: SHIPPED | OUTPATIENT
Start: 2024-11-04

## 2024-11-04 NOTE — PROGRESS NOTES
Daily Note       Today's date: 2024  Patient name: Yris Bowles  : 1955  MRN: [id number]  Referring provider: Juan Jose Guerra DO  Dx:   Encounter Diagnosis     ICD-10-CM    1. Imbalance  R26.89       2. Weakness  R53.1       3. Other abnormalities of gait and mobility  R26.89                     POC expires Unit limit Auth Expiration date PT/OT + Visit Limit? Co-Insurance   24 NA NA NA Yes                                   Subjective: Patient presents to PT with RW stating she has to leave at 2 pm today.     Objective: See treatment diary below.     NMR: gait belt, 3# Jose Manuel  Circuit 1 (90 seconds on on, 90 seconds off)  - STS w/o foam pad on chair w/ 5# TT: 2 sets   - Seated ball squeezes  - Ambulation throughout the clinic, no AD, while naming animals + holidays: 50ft (2 sets)  - Standing on foam pad + 5.5# TT trunk rotation  - Step up to foam pad with UE support    Assessment: Patient tolerated treatment well with focus on strengthening and balance training within circuit based approach. Challenged with dual tasking when ambulating without AD. Multiple paucity of task with cognitive load even with verbal cues.  Patient will continue to benefit from skilled OPPT services to improve balance, reduce risk for falls, and maximize her function.     Plan: Continue per plan of care.            Outcome Measures Initial Eval  7/3/24 PN  24 PN  24 RE 10/7/24     5xSTS 17.53 sec, 2UE 19.65 sec, 2 UE 16.69 sec, 2 UE 15.03 sec, 2 UE     TUG  - Regular  - Cognitive   32.19 sec, w/ RW  46.16 sec, w/ RW   26.85 sec w/ RW  34.97 sec w/ RW counting bwds from 100   26.63 sec, w/ RW  34.65 sec w/ RW  counting bwds from 100   25.92 sec with RW  34.79 sec with RW, counting backward from 100     10 meter 0.55 m/s 0.76 m/s 0.64 m/s 0.66m/s with RW      TAMEZ 37/56 32/56 35/56 39/56     mCTSIB  - FTEO (firm)  - FTEC (firm)  - FTEO (foam)  - FTEC (foam)   30 sec  5 sec  8 sec  1 sec   30 sec  4 sec  8 sec  unable    30 sec  30 sec  30 sec  3 sec   30 sec  30 sec  30 sec  6 sec     6MWT 470 ft 530 ft, SpO2: 97% 575 ft w/  ft w/ RW; SpO2 95%

## 2024-11-06 ENCOUNTER — EVALUATION (OUTPATIENT)
Facility: CLINIC | Age: 69
End: 2024-11-06
Payer: MEDICARE

## 2024-11-06 DIAGNOSIS — R26.89 OTHER ABNORMALITIES OF GAIT AND MOBILITY: ICD-10-CM

## 2024-11-06 DIAGNOSIS — R26.89 IMBALANCE: Primary | ICD-10-CM

## 2024-11-06 DIAGNOSIS — R53.1 WEAKNESS: ICD-10-CM

## 2024-11-06 PROCEDURE — 97530 THERAPEUTIC ACTIVITIES: CPT

## 2024-11-06 NOTE — PROGRESS NOTES
PT Re-Evaluation / Progress Note         POC expires Unit limit Auth Expiration date PT/OT + Visit Limit? Co-Insurance   25 NA NA NA Yes                                            Today's date: 2024  Patient name: Yris Bowles  : 1955  MRN: [id number]  Referring provider: Teressa Prakash DO  Dx:   Encounter Diagnosis     ICD-10-CM    1. Imbalance  R26.89       2. Weakness  R53.1       3. Other abnormalities of gait and mobility  R26.89                     Assessment  Assessment details: Patient is a 69 y.o. Female who has been presenting to skilled outpatient PT to address balance and gait difficulties which has been impacting her independence and participation in her community. She currently ambulates with rolling walker at all times however, has become more confident and less reliant on upper extremity support during ambulation. Since her last evaluation she has made great improvements in lower extremity strength and power as well as improved gait speed as per 5x STS and 10 meter walk test. However, continues to classify as a limited community ambulator and not yet meeting criteria for safely crossing the street. Regressions demonstrated in functional mobility, dual tasking, and cardiovascular endurance as per TUG, TUG cog, and 6 MWT respectively. Patient with increased difficulty performing turns in ambulation which can attribute to performance on these outcome measures. Yris remains at HIGH risk for falls as per APTA and Rehab Measures Lab cutoff scores for aforementioned outcome measures. She has not met additional goals at this time. Consider transition onto maintenance program should patient reach therapeutic plateau. She will continue to benefit from skilled OPPT services to address limitations, reduce risk for falls, and improve overall independence.     Impairments: Abnormal coordination, Abnormal gait, Abnormal muscle  tone, Abnormal or restricted ROM, Activity intolerance, Impaired balance, Impaired physical strength, Lacks appropriate HEP, Poor posture, Poor body mechanics, Pain with function, Safety issue, Weight-bearing intolerance, Abnormal movement, Difficulty understanding, Abnormal muscle firing  Understanding of Dx/Px/POC: Good  Prognosis: Good    Patient verbalized understanding of POC.         Please contact me if you have any questions or recommendations. Thank you for the referral and the opportunity to share in Yris Bowles's care.      Plan  Plan details: balance training, gait training, strengthening, and endurance  Patient would benefit from: PT Eval, Skilled PT, and Skilled OT  Planned modality interventions: Biofeedback, Cryotherapy, TENS, Thermotherapy  Planned therapy interventions: Abdominal trunk stabilization, ADL training, Balance, Balance/WB training, Breathing training, Body mechanics training, Coordination, Functional ROM exercises, Gait training, HEP, Joint Mobilization, Manual Therapy, Samuels taping, Motor coordination training, Neuromuscular re-education, Patient education, Postural training, Strengthening, Stretching, Therapeutic activities, Therapeutic exercises, Therapeutic training, Transfer training, Activity modification, Work reintegration  Frequency: 2x/wk  Duration in weeks: 12 weeks  Plan of Care beginning date: 11/6/24  Plan of Care expiration date: 12 weeks - 1/29/25  Treatment plan discussed with: Patient       Goals  Short Term Goals (4 weeks):    - Patient will improve time on TUG by 2.9 seconds to facilitate improved safety in all ambulation- MET  - Patient will be independent in basic HEP 2-3 weeks- ongoing  - Patient will improve 5xSTS score by 2.3 seconds to promote improved LE functional strength needed for ADLs- MET    Long Term Goals (12 weeks):  - Patient will be independent in a comprehensive home exercise program- ongoing  - Patient will improve gait speed by 0.18 m/s to  improve safety with community ambulation- MET  - Patient will improve TAMEZ by 6 points in order to improve static balance and reduce risk for falls-NOT MET  - Patient will be able to demonstrate HT in gait without veering- ongoing  - Patient will improve 6 Minute Walk Test score by 190 feet to promote improved cardiovascular endurance- ongoing  - Patient will report 50% reduction in near falls in order to improve safety with functional tasks and reduce his risk for falls- ongoing  - Patient will be able to ascend/descend stairs reciprocally with 1 UE assist to promote independence and safety with ADLs- ongoing  - Patient will report 50% reduction in near falls when ambulating on uneven terrain- ongoing      Cut off score    All date taken from APTA Neuro Section or Rehab Measures      Tamez/56  MDC: 6 pts  Age Norms:  60-69: M - 55   F - 55  70-79: M - 54   F - 53  80-89: M - 53   F - 50 5xSTS: Susan et al 2010  MDC: 2.3 sec  Age Norms:  60-69: 11.4 sec  70-79: 12.6 sec  80-89: 14.8 sec   TUG  MDC: 4.14 sec  Cut off score:  >13.5 sec community dwelling adults  >32.2 frail elderly  <20 I for basic transfers  >30 dependent on transfers 10 Meter Walk Test: Miguel and Holger et al 2011  MDC: 0.18 m/s  20-29: M - 1.35 m   F - 1.34 m  30-39: M - 1.43 m   F - 1.34 m  40-49: M - 1.43 m   F - 1.39 m  50-59: M - 1.43 m   F - 1.31 m  60-69: M - 1.34 m   F - 1.24 m  70-79: M - 1.26 m   F - 1.13 m  80-89: M - 0.97 m   F - 0.94 m    Household Ambulator < 0.4 m/s  Limited Community Ambulator 0.4 - 0.8 m/s  Community Ambulator 0.8 - 1.2 m/s  Safely cross the street > 1.2 m/s   FGA  MCID: 4 pts  Geriatrics/community < 22/30 fall risk  Geriatrics/community < 20/30 unexplained falls    DGI  MDC: vestibular - 4 pts  MDC: geriatric/community - 3 pts  Falls risk <19/24 mCTSIB  Norm: 20-60 yrs  Eyes open firm: norm sway 0.21-0.48  Eyes closed firm: norm sway 0.48-0.99  Eyes open foam: norm sway 0.38-0.71  Eyes closed foam: norm sway  0.70-2.22   6 Minute Walk Test  MDC: 190.98 ft  MCID: 164 ft    Age Norms  60-69: M - 1876 ft (571.80 m)  F - 1765 ft (537.98 m)  70-79: M - 1729 ft (527.00 m)  F - 1545 ft (470.92 m)  80-89: M - 1368 ft (416.97 m)  F - 1286 ft (391.97 m) ABC: Joann & Roel, 2003  <67% increased risk for falls   [place] Monofilaments  Evaluator Size:        Force (grams):          Hand/Dorsal Thresholds:        Plantar Thresholds:  - 1.65                       - 0.008                       - Normal                                 - Normal  - 2.36                       - 0.02                         - Normal                                 - Normal  - 2.44                       - 0.04                         - Normal                                 - Normal  - 2.83                       - 0.07                         - Normal                                 - Normal  - 3.22                       - 0.16                         - Diminished light touch          - Normal  - 3.61                       - 0.40                         - Diminished light touch          - Normal  - 3.84                       - 0.60                         - Diminished protective           - Diminished light touch  - 4.08                       - 1.00                         - Diminished protective           - Diminished light touch  - 4.17                       - 1.40                         - Diminished protective           - Diminished light touch  - 4.31                       - 2.00                         - Diminished protective           - Diminished light touch  - 4.56                       - 4.00                         - Loss of protective sense      - Diminished protective  - 4.74                       - 6.00                         - Loss of protective sense      - Diminished protective  - 4.93                       - 8.00                         - Loss of protective sense      - Diminished protective  - 5.07                       -  10.0                         - Loss of protective sense     - Loss of protective sense  - 5.18                       - 15.0                         - Loss of protective sense     - Loss of protective sense  - 5.46                       - 26.0                         - Loss of protective sense     - Loss of protective sense  - 5.88                       - 60.0                         - Loss of protective sense     - Loss of protective sense  - 6.10                       - 100                          - Loss of protective sense     - Loss of protective sense  - 6.45                       - 180                          - Loss of protective sense     - Loss of protective sense  - 6.65                       - 300                          - Deep pressure sense only  - Deep pressure sense only         Subjective    History of Present Illness  - Mechanism of injury: Patient presents to PT with complaints of difficulty with ambulation and balance. She has been participating with skilled outpatient OT services since April to address cognitive limitations. Patient reports she has not had any recent falls however, is worried about falling and always utilizes her rolling walker in her home and in her community because of FoF. Her major goal is to improve her walking abilities and independence as she requires assistance from her  to perform ADLs.    Updated 8/7/24: Patient states she feels as though her balance has improved since staring therapy however, she states gail negotiation is still difficult for her.     Updated 9/4/24: Patient reports she feels her walking has improved since starting therapy however, relays she would like to continue to work on her balance.     Update 10/7/24: Patient reports no new falls or issues. Says she wants to continue to work on her balance.     Updated 11/6/24: Patient states she feels she has improved her ambulation and feels more confident walking. She states she would like to  "continue with therapy services to facilitate more improvements in balance and confidence.  She states her  has been sick and thus, has been leaving treatment sessions early.    - Primary AD: RW  - Assist level at home: Lizett from  for ADLs    Patient goal: \"walk better\"    Pain  NA    Social Support  - Steps to enter house: 6 HERNAN  - Stairs in house: none   - Lives in: 1st floor apt  - Lives with:     - Employment status: retired CNA  - Hand dominance: RHD    Treatments  - Previous treatment: none  - Current treatment: PT  - Diagnostic Testing: none      Objective   HR: 77 bpm  SpO2: 98%  BP: 112/68 mmHg    LE MMT  - R Hip Flexion: 3/5  L Hip Flexion: 3+/5  - R Hip Extension: 3/5  L Hip Extension: 3/5  - R Hip Abduction: 3+/5  L Hip Abduction: 3+/5  - R Hip Adduction: 4-/5  L Hip Adduction: 4-/5  - R Knee Extension: 4/5  L Knee Extension: 4/5  - R Knee Flexion: 4/5  L Knee Flexion: 4-/5  - R Ankle DF: 4-/5   L Ankle DF: 4-/5  - R Ankle PF: 4/5   L Ankle PF: 4/5    Sensation  - Light touch: diminished at R C6 distribution    Coordination  - Dysdiadochokinesia: slowed  - Alternate Toe Taps: slowed  - Finger to Nose: normal    Reflexes/Clonus  - Clonus: No    Myelopathy Screen (>3/5 +)  - Jewell's Reflex: -  - Babinski Reflex: NA  - Inverted Supinator Sign: -  - Age > 45: Yes  - Gait Deviation: Yes      Gait  - Abnormalities: Forwards flexed posture, slow turning, decreased foot clearance, and decreased step length           Outcome Measures Initial Eval  7/3/24 PN  8/7/24 PN  9/4/24 RE 10/7/24 RE/PN   11/6/24    5xSTS 17.53 sec, 2UE 19.65 sec, 2 UE 16.69 sec, 2 UE 15.03 sec, 2 UE 12.43 sec, 2 UE      TUG  - Regular  - Cognitive   32.19 sec, w/ RW  46.16 sec, w/ RW   26.85 sec w/ RW  34.97 sec w/ RW counting bwds from 100   26.63 sec, w/ RW  34.65 sec w/ RW  counting bwds from 100   25.92 sec with RW  34.79 sec with RW, counting backward from 100   27.47 sec w/ RW  39.81 sec w/ RW, counting bwds " [place] 100 by 3s    10 meter 0.55 m/s 0.76 m/s 0.64 m/s 0.66m/s with RW  0.75 m/s w/ RW    TAMEZ 37/56 32/56 35/56 39/56 37/56    mCTSIB  - FTEO (firm)  - FTEC (firm)  - FTEO (foam)  - FTEC (foam)   30 sec  5 sec  8 sec  1 sec   30 sec  4 sec  8 sec  unable   30 sec  30 sec  30 sec  3 sec   30 sec  30 sec  30 sec  6 sec   defer    6MWT 470 ft 530 ft, SpO2: 97% 575 ft w/  ft w/ RW; SpO2 95% 565 ft w/ RW                                 Precautions:   Past Medical History:   Diagnosis Date    Anesthesia complication     difficulty waking up    Anxiety     Arthritis     left knee    Bipolar 1 disorder (HCC)     Bone spur     left knee behing the knee cap    Cancer (HCC)     Chronic kidney disease     Chronic pain disorder     Colon cancer (HCC)     patient states about 2017    Colon polyp     Tubulovillous Adenoma High Grade Dysplasia - 2017    Depression     Diabetes mellitus (HCC)     Endometrial cancer (HCC)     grade I    Hx of bleeding disorder     vaginal bleeding started on 3/13/17     Hyperlipidemia     Hypertension     Hypomagnesemia 03/20/2023    Memory loss     PONV (postoperative nausea and vomiting)     Psychiatric disorder     Psychiatric illness     Psychosis (HCC)     Shortness of breath     with exertion    Sleep difficulties     Urinary incontinence     Vitamin D deficiency

## 2024-11-08 ENCOUNTER — APPOINTMENT (OUTPATIENT)
Dept: LAB | Facility: HOSPITAL | Age: 69
End: 2024-11-08
Attending: INTERNAL MEDICINE
Payer: MEDICARE

## 2024-11-08 DIAGNOSIS — I10 ESSENTIAL HYPERTENSION: ICD-10-CM

## 2024-11-08 DIAGNOSIS — E22.2 SIADH (SYNDROME OF INAPPROPRIATE ADH PRODUCTION) (HCC): ICD-10-CM

## 2024-11-08 DIAGNOSIS — M80.00XD AGE-RELATED OSTEOPOROSIS WITH CURRENT PATHOLOGICAL FRACTURE WITH ROUTINE HEALING: ICD-10-CM

## 2024-11-08 DIAGNOSIS — E11.9 TYPE 2 DIABETES MELLITUS WITHOUT COMPLICATION, WITHOUT LONG-TERM CURRENT USE OF INSULIN (HCC): ICD-10-CM

## 2024-11-08 LAB
ALBUMIN SERPL BCG-MCNC: 4.3 G/DL (ref 3.5–5)
ALP SERPL-CCNC: 55 U/L (ref 34–104)
ALT SERPL W P-5'-P-CCNC: 21 U/L (ref 7–52)
ANION GAP SERPL CALCULATED.3IONS-SCNC: 11 MMOL/L (ref 4–13)
AST SERPL W P-5'-P-CCNC: 30 U/L (ref 13–39)
BILIRUB SERPL-MCNC: 0.6 MG/DL (ref 0.2–1)
BUN SERPL-MCNC: 12 MG/DL (ref 5–25)
CALCIUM SERPL-MCNC: 9.5 MG/DL (ref 8.4–10.2)
CHLORIDE SERPL-SCNC: 102 MMOL/L (ref 96–108)
CHOLEST SERPL-MCNC: 121 MG/DL
CO2 SERPL-SCNC: 27 MMOL/L (ref 21–32)
CREAT SERPL-MCNC: 1.02 MG/DL (ref 0.6–1.3)
ERYTHROCYTE [DISTWIDTH] IN BLOOD BY AUTOMATED COUNT: 13.1 % (ref 11.6–15.1)
EST. AVERAGE GLUCOSE BLD GHB EST-MCNC: 174 MG/DL
GFR SERPL CREATININE-BSD FRML MDRD: 55 ML/MIN/1.73SQ M
GLUCOSE P FAST SERPL-MCNC: 165 MG/DL (ref 65–99)
HBA1C MFR BLD: 7.7 %
HCT VFR BLD AUTO: 34.4 % (ref 34.8–46.1)
HDLC SERPL-MCNC: 45 MG/DL
HGB BLD-MCNC: 11.4 G/DL (ref 11.5–15.4)
LDLC SERPL CALC-MCNC: 37 MG/DL (ref 0–100)
MCH RBC QN AUTO: 31.2 PG (ref 26.8–34.3)
MCHC RBC AUTO-ENTMCNC: 33.1 G/DL (ref 31.4–37.4)
MCV RBC AUTO: 94 FL (ref 82–98)
PLATELET # BLD AUTO: 152 THOUSANDS/UL (ref 149–390)
PMV BLD AUTO: 9.5 FL (ref 8.9–12.7)
POTASSIUM SERPL-SCNC: 4.2 MMOL/L (ref 3.5–5.3)
PROT SERPL-MCNC: 7.2 G/DL (ref 6.4–8.4)
RBC # BLD AUTO: 3.65 MILLION/UL (ref 3.81–5.12)
SODIUM SERPL-SCNC: 140 MMOL/L (ref 135–147)
TRIGL SERPL-MCNC: 195 MG/DL
WBC # BLD AUTO: 5.33 THOUSAND/UL (ref 4.31–10.16)

## 2024-11-08 PROCEDURE — 80053 COMPREHEN METABOLIC PANEL: CPT

## 2024-11-08 PROCEDURE — 83036 HEMOGLOBIN GLYCOSYLATED A1C: CPT

## 2024-11-08 PROCEDURE — 80061 LIPID PANEL: CPT

## 2024-11-08 PROCEDURE — 36415 COLL VENOUS BLD VENIPUNCTURE: CPT

## 2024-11-08 PROCEDURE — 85027 COMPLETE CBC AUTOMATED: CPT

## 2024-11-11 ENCOUNTER — OFFICE VISIT (OUTPATIENT)
Facility: CLINIC | Age: 69
End: 2024-11-11
Payer: MEDICARE

## 2024-11-11 ENCOUNTER — OFFICE VISIT (OUTPATIENT)
Dept: FAMILY MEDICINE CLINIC | Facility: CLINIC | Age: 69
End: 2024-11-11
Payer: MEDICARE

## 2024-11-11 ENCOUNTER — TELEPHONE (OUTPATIENT)
Dept: NEPHROLOGY | Facility: CLINIC | Age: 69
End: 2024-11-11

## 2024-11-11 VITALS
TEMPERATURE: 97.1 F | DIASTOLIC BLOOD PRESSURE: 78 MMHG | RESPIRATION RATE: 16 BRPM | HEART RATE: 76 BPM | BODY MASS INDEX: 28.34 KG/M2 | OXYGEN SATURATION: 97 % | WEIGHT: 166 LBS | SYSTOLIC BLOOD PRESSURE: 126 MMHG | HEIGHT: 64 IN

## 2024-11-11 DIAGNOSIS — I10 ESSENTIAL HYPERTENSION: ICD-10-CM

## 2024-11-11 DIAGNOSIS — Z23 NEED FOR INFLUENZA VACCINATION: ICD-10-CM

## 2024-11-11 DIAGNOSIS — E11.9 TYPE 2 DIABETES MELLITUS WITHOUT COMPLICATION, WITHOUT LONG-TERM CURRENT USE OF INSULIN (HCC): Primary | ICD-10-CM

## 2024-11-11 DIAGNOSIS — E78.2 MIXED HYPERLIPIDEMIA: ICD-10-CM

## 2024-11-11 DIAGNOSIS — N18.31 STAGE 3A CHRONIC KIDNEY DISEASE (HCC): ICD-10-CM

## 2024-11-11 DIAGNOSIS — Z79.899 ENCOUNTER FOR LONG-TERM CURRENT USE OF MEDICATION: ICD-10-CM

## 2024-11-11 DIAGNOSIS — R53.1 WEAKNESS: ICD-10-CM

## 2024-11-11 DIAGNOSIS — R26.89 IMBALANCE: Primary | ICD-10-CM

## 2024-11-11 DIAGNOSIS — R52 PAIN: ICD-10-CM

## 2024-11-11 DIAGNOSIS — E87.1 HYPONATREMIA: ICD-10-CM

## 2024-11-11 DIAGNOSIS — R26.89 OTHER ABNORMALITIES OF GAIT AND MOBILITY: ICD-10-CM

## 2024-11-11 PROCEDURE — 99214 OFFICE O/P EST MOD 30 MIN: CPT | Performed by: FAMILY MEDICINE

## 2024-11-11 PROCEDURE — G0008 ADMIN INFLUENZA VIRUS VAC: HCPCS | Performed by: FAMILY MEDICINE

## 2024-11-11 PROCEDURE — 90662 IIV NO PRSV INCREASED AG IM: CPT | Performed by: FAMILY MEDICINE

## 2024-11-11 PROCEDURE — 97110 THERAPEUTIC EXERCISES: CPT

## 2024-11-11 RX ORDER — DIVALPROEX SODIUM 125 MG/1
TABLET, DELAYED RELEASE ORAL
COMMUNITY
Start: 2024-10-17

## 2024-11-11 RX ORDER — GABAPENTIN 100 MG/1
100 CAPSULE ORAL
Start: 2024-11-11

## 2024-11-11 NOTE — PROGRESS NOTES
"Ambulatory Visit  Name: Yris Bowles      : 1955      MRN: [id number]  Encounter Provider: Teressa Prakash DO  Encounter Date: 2024   Encounter department: [facility]    Assessment & Plan  Stage 3a chronic kidney disease (HCC)  Lab Results   Component Value Date    EGFR 55 2024    EGFR 53 2024    EGFR 49 2024    CREATININE 1.02 2024    CREATININE 1.06 2024    CREATININE 1.13 2024            Type 2 diabetes mellitus without complication, without long-term current use of insulin (HCC)  A little improved, Hemoglobin A1c is down to 7.7     Lab Results   Component Value Date    HGBA1C 7.7 (H) 2024       Orders:    Hemoglobin A1C; Future    Albumin / creatinine urine ratio; Future    Mixed hyperlipidemia  Stable   Continue rosuvastatin 20 mg a day   Orders:    Comprehensive metabolic panel; Future    Lipid Panel with Direct LDL reflex; Future    Hyponatremia  Stable   Follows with Dr. Benites   Orders:    Comprehensive metabolic panel; Future    Essential hypertension  Stable   Continue metoprolol 25 mg a day   Orders:    CBC; Future    Comprehensive metabolic panel; Future    TSH, 3rd generation; Future    Pain    Orders:    gabapentin (NEURONTIN) 100 mg capsule; Take 1 capsule (100 mg total) by mouth daily with dinner    Need for influenza vaccination    Orders:    influenza vaccine, high-dose, PF 0.7 mL (FLUZONE HIGH-DOSE)    Encounter for long-term current use of medication    Orders:    Vitamin B12; Future     Return in about 6 months (around 2025) for Next scheduled follow up.  History of Present Illness     She is getting support from her daughter. She is feeling well. She has been able to wean down to one gabapentin a day.  She is following with Dr. Nichols for her psychiatric medication.           Review of Systems        Objective     /78   Pulse 76   Temp (!) 97.1 °F (36.2 °C) (Tympanic)   Resp 16   Ht 5' 4\" (1.626 m)   Wt 75.3 kg " (166 lb)   SpO2 97%   BMI 28.49 kg/m²     Physical Exam  Vitals and nursing note reviewed.   Constitutional:       General: She is not in acute distress.     Appearance: She is well-developed.   HENT:      Head: Normocephalic and atraumatic.      Right Ear: Tympanic membrane normal.      Left Ear: Tympanic membrane normal.   Cardiovascular:      Rate and Rhythm: Normal rate and regular rhythm.      Heart sounds: No murmur heard.  Pulmonary:      Effort: Pulmonary effort is normal. No respiratory distress.      Breath sounds: Normal breath sounds.   Musculoskeletal:      Cervical back: Neck supple.   Neurological:      Mental Status: She is alert.

## 2024-11-11 NOTE — PROGRESS NOTES
Daily Note       Today's date: 2024  Patient name: Yris Bowles  : 1955  MRN: [id number]  Referring provider: Juan Jose Guerra DO  Dx:   Encounter Diagnosis     ICD-10-CM    1. Imbalance  R26.89       2. Weakness  R53.1       3. Other abnormalities of gait and mobility  R26.89                     POC expires Unit limit Auth Expiration date PT/OT + Visit Limit? Co-Insurance   24 NA NA NA Yes                                   Subjective: Patient presents to PT with RW stating her daughter made her a doctor's appt for after her PT today and she will have to leave early.     Objective: See treatment diary below.     NMR/TE: gait belt, 3# Vitor (not today)  Circuit 1 (90 seconds on on, 90 seconds off)  - STS w foam pad on chair w/ 5# TT: 1 set  - Seated hip adduction/ball squeeze  - Ambulation with dual task (naming professions)  - Seated LAQ's    Assessment: Patient tolerated treatment well with focus on strengthening and balance training within circuit based approach. Patient unable to complete STS with tidal tank hold without foam to increase height of chair. Significant difficulty with ambulation with addition of dual task. Patient will continue to benefit from skilled OPPT services to improve balance, reduce risk for falls, and maximize her function.     Plan: Continue per plan of care.            Outcome Measures Initial Eval  7/3/24 PN  24 PN  24 RE 10/7/24 RE/PN   24    5xSTS 17.53 sec, 2UE 19.65 sec, 2 UE 16.69 sec, 2 UE 15.03 sec, 2 UE 12.43 sec, 2 UE      TUG  - Regular  - Cognitive   32.19 sec, w/ RW  46.16 sec, w/ RW   26.85 sec w/ RW  34.97 sec w/ RW counting bwds from 100   26.63 sec, w/ RW  34.65 sec w/ RW  counting bwds from 100   25.92 sec with RW  34.79 sec with RW, counting backward from 100   27.47 sec w/ RW  39.81 sec w/ RW, counting bwds [place] 100 by 3s    10 meter 0.55 m/s 0.76 m/s 0.64 m/s 0.66m/s with RW  0.75 m/s w/ RW    DASHAWN      mCTSIB  - FTEO (firm)  - FTEC (firm)  - FTEO (foam)  - FTEC (foam)   30 sec  5 sec  8 sec  1 sec   30 sec  4 sec  8 sec  unable   30 sec  30 sec  30 sec  3 sec   30 sec  30 sec  30 sec  6 sec   defer    6MWT 470 ft 530 ft, SpO2: 97% 575 ft w/  ft w/ RW; SpO2 95% 565 ft w/ RW

## 2024-11-11 NOTE — ASSESSMENT & PLAN NOTE
Orders:    gabapentin (NEURONTIN) 100 mg capsule; Take 1 capsule (100 mg total) by mouth daily with dinner    
A little improved, Hemoglobin A1c is down to 7.7     Lab Results   Component Value Date    HGBA1C 7.7 (H) 11/08/2024       Orders:    Hemoglobin A1C; Future    Albumin / creatinine urine ratio; Future    
Lab Results   Component Value Date    EGFR 55 11/08/2024    EGFR 53 09/05/2024    EGFR 49 07/12/2024    CREATININE 1.02 11/08/2024    CREATININE 1.06 09/05/2024    CREATININE 1.13 07/12/2024            
Stable   Continue metoprolol 25 mg a day   Orders:    CBC; Future    Comprehensive metabolic panel; Future    TSH, 3rd generation; Future    
Stable   Continue rosuvastatin 20 mg a day   Orders:    Comprehensive metabolic panel; Future    Lipid Panel with Direct LDL reflex; Future    
Stable   Follows with Dr. Benites   Orders:    Comprehensive metabolic panel; Future    
"My  put me under stress"

## 2024-11-11 NOTE — TELEPHONE ENCOUNTER
Daughter advised that labs of 11/8/24 show normal sodium level per Dr. Lambert.    ----- Message from Yosef Lambert MD sent at 11/8/2024  9:38 PM EST -----  Please inform patient's daughter that most recent labs (11/8/24) show that sodium level is normal.

## 2024-11-15 ENCOUNTER — TELEPHONE (OUTPATIENT)
Dept: FAMILY MEDICINE CLINIC | Facility: CLINIC | Age: 69
End: 2024-11-15

## 2024-11-15 ENCOUNTER — HOSPITAL ENCOUNTER (OUTPATIENT)
Dept: INFUSION CENTER | Facility: HOSPITAL | Age: 69
End: 2024-11-15
Payer: MEDICARE

## 2024-11-15 VITALS
HEIGHT: 64 IN | SYSTOLIC BLOOD PRESSURE: 154 MMHG | DIASTOLIC BLOOD PRESSURE: 70 MMHG | OXYGEN SATURATION: 98 % | TEMPERATURE: 97.8 F | WEIGHT: 168.8 LBS | HEART RATE: 73 BPM | RESPIRATION RATE: 18 BRPM | BODY MASS INDEX: 28.82 KG/M2

## 2024-11-15 DIAGNOSIS — M80.00XD AGE-RELATED OSTEOPOROSIS WITH CURRENT PATHOLOGICAL FRACTURE WITH ROUTINE HEALING: Primary | ICD-10-CM

## 2024-11-15 PROCEDURE — 96372 THER/PROPH/DIAG INJ SC/IM: CPT

## 2024-11-15 RX ADMIN — DENOSUMAB 60 MG: 60 INJECTION SUBCUTANEOUS at 13:48

## 2024-11-15 NOTE — PLAN OF CARE
Problem: Potential for Falls  Goal: Patient will remain free of falls  Description: INTERVENTIONS:  - Educate patient/family on patient safety including physical limitations  - Instruct patient to call for assistance with activity   - Consult OT/PT to assist with strengthening/mobility   - Keep Call bell within reach  - Keep bed low and locked with side rails adjusted as appropriate  - Keep care items and personal belongings within reach  - Initiate and maintain comfort rounds  - Make Fall Risk Sign visible to staff  - Offer Toileting every  hour in advance of need.  - Initiate/Maintain alarms.  - Obtain necessary fall risk management equipment.  - Apply yellow socks and bracelet for high fall risk patients  - Consider moving patient to room near nurses station  Outcome: Progressing

## 2024-11-15 NOTE — PROGRESS NOTES
Yris Bowles tolerated treatment well with no complications.      Yris Bowles is aware of future appt on 5/14/25 at 1330.     AVS printed and given to Yris Bowles: Yes.

## 2024-11-15 NOTE — TELEPHONE ENCOUNTER
Dr Prakash     Patients son dropped off FMLA forms to be filled out so he can care for the patient.

## 2024-11-18 ENCOUNTER — OFFICE VISIT (OUTPATIENT)
Facility: CLINIC | Age: 69
End: 2024-11-18
Payer: MEDICARE

## 2024-11-18 DIAGNOSIS — R53.1 WEAKNESS: ICD-10-CM

## 2024-11-18 DIAGNOSIS — R26.89 OTHER ABNORMALITIES OF GAIT AND MOBILITY: ICD-10-CM

## 2024-11-18 DIAGNOSIS — R26.89 IMBALANCE: Primary | ICD-10-CM

## 2024-11-18 PROCEDURE — 97112 NEUROMUSCULAR REEDUCATION: CPT

## 2024-11-22 ENCOUNTER — TELEPHONE (OUTPATIENT)
Dept: FAMILY MEDICINE CLINIC | Facility: CLINIC | Age: 69
End: 2024-11-22

## 2024-11-22 NOTE — TELEPHONE ENCOUNTER
Patient's daughter Jenifer Wagner dropped of [facility] paperwork for Dr Prakash to complete for Yris's care. Placed form in Dr Prakash's folder (in mailing envelope). Please call Jenifer once form in complete [phone]

## 2024-11-25 ENCOUNTER — OFFICE VISIT (OUTPATIENT)
Facility: CLINIC | Age: 69
End: 2024-11-25
Payer: MEDICARE

## 2024-11-25 DIAGNOSIS — R53.1 WEAKNESS: ICD-10-CM

## 2024-11-25 DIAGNOSIS — R26.89 IMBALANCE: Primary | ICD-10-CM

## 2024-11-25 DIAGNOSIS — R26.89 OTHER ABNORMALITIES OF GAIT AND MOBILITY: ICD-10-CM

## 2024-11-25 PROCEDURE — 97112 NEUROMUSCULAR REEDUCATION: CPT | Performed by: PHYSICAL THERAPIST

## 2024-11-25 PROCEDURE — 97110 THERAPEUTIC EXERCISES: CPT | Performed by: PHYSICAL THERAPIST

## 2024-11-25 NOTE — PROGRESS NOTES
Daily Note       Today's date: 2024  Patient name: Yris Bowles  : 1955  MRN: [id number]  Referring provider: Juan Jose Guerra DO  Dx:   Encounter Diagnosis     ICD-10-CM    1. Imbalance  R26.89       2. Weakness  R53.1       3. Other abnormalities of gait and mobility  R26.89                       POC expires Unit limit Auth Expiration date PT/OT + Visit Limit? Co-Insurance   24 NA NA NA Yes                                   Subjective: Patient presents to PT with RW.     Objective: See treatment diary below.     NMR/TE: gait belt, 3# ankle weights  Circuit 1 (90 seconds on, 90 seconds off)  - STS w foam pad on chair w/ 5# TT: 2 sets  - Seated LAQ's  - Seated ball squeezes  - Ambulation throughout the clinic, no AD, while holding conversation: 50ft (2 sets)  - Alternating LE taps to large RR, 1 UE  - Standing on foam pad + 5.5# TT trunk rotation  - Sidestepping along plinth with resistance (red TB around knees)    Assessment: Patient tolerated treatment well with focus on strengthening and balance training within circuit based approach. Pt requires multiple standing rest breaks during ambulation without AD due to fatigue. Patient will continue to benefit from skilled OPPT services to improve balance, reduce risk for falls, and maximize her function.     Plan: Continue per plan of care.            Outcome Measures Initial Eval  7/3/24 PN  24 PN  24 RE 10/7/24 RE/PN   24    5xSTS 17.53 sec, 2UE 19.65 sec, 2 UE 16.69 sec, 2 UE 15.03 sec, 2 UE 12.43 sec, 2 UE      TUG  - Regular  - Cognitive   32.19 sec, w/ RW  46.16 sec, w/ RW   26.85 sec w/ RW  34.97 sec w/ RW counting bwds from 100   26.63 sec, w/ RW  34.65 sec w/ RW  counting bwds from 100   25.92 sec with RW  34.79 sec with RW, counting backward from 100   27.47 sec w/ RW  39.81 sec w/ RW, counting bwds [place] 100 by 3s    10 meter 0.55 m/s 0.76 m/s 0.64 m/s 0.66m/s with RW  0.75 m/s w/ RW    DASHAWN      mCTSIB  - FTEO (firm)  - FTEC (firm)  - FTEO (foam)  - FTEC (foam)   30 sec  5 sec  8 sec  1 sec   30 sec  4 sec  8 sec  unable   30 sec  30 sec  30 sec  3 sec   30 sec  30 sec  30 sec  6 sec   defer    6MWT 470 ft 530 ft, SpO2: 97% 575 ft w/  ft w/ RW; SpO2 95% 565 ft w/ RW

## 2024-12-02 ENCOUNTER — TELEPHONE (OUTPATIENT)
Dept: FAMILY MEDICINE CLINIC | Facility: CLINIC | Age: 69
End: 2024-12-02

## 2024-12-05 DIAGNOSIS — E22.2 SIADH (SYNDROME OF INAPPROPRIATE ADH PRODUCTION) (HCC): ICD-10-CM

## 2024-12-05 RX ORDER — SODIUM CHLORIDE 1 G/1
1 TABLET ORAL 3 TIMES DAILY
Qty: 270 TABLET | Refills: 1 | Status: SHIPPED | OUTPATIENT
Start: 2024-12-05

## 2024-12-11 NOTE — TELEPHONE ENCOUNTER
Daughter dropped off forms to be filled out please call when complete, in nurse pending one.  
Faxed   confirmation recvd.   NFA  
Form completed  In clerical in basket  Thank you,  Teressa Prakash   
Pt's daughter called in to check on these forms. She's requesting a call back when they're ready.  
Yris's daughter Jenifer called about Yris's [place] paperwork. Dr. Prakash missed signing the form in one spot. It is already filled out and needs to be faxed to the number on the paperwork. This is high priority as it is needed by tomorrow. Please call Jenifer at [phone] when this has been completed. Thank you.  
[facility]

## 2024-12-12 DIAGNOSIS — E22.2 SIADH (SYNDROME OF INAPPROPRIATE ADH PRODUCTION) (HCC): Primary | ICD-10-CM

## 2024-12-20 DIAGNOSIS — I10 ESSENTIAL HYPERTENSION: ICD-10-CM

## 2024-12-20 DIAGNOSIS — E22.2 SIADH (SYNDROME OF INAPPROPRIATE ADH PRODUCTION) (HCC): ICD-10-CM

## 2024-12-20 RX ORDER — METOPROLOL SUCCINATE 25 MG/1
25 TABLET, EXTENDED RELEASE ORAL DAILY
Qty: 90 TABLET | Refills: 0 | Status: SHIPPED | OUTPATIENT
Start: 2024-12-20

## 2024-12-23 RX ORDER — SODIUM CHLORIDE 1 G/1
1 TABLET ORAL 3 TIMES DAILY
Qty: 270 TABLET | Refills: 0 | Status: SHIPPED | OUTPATIENT
Start: 2024-12-23

## 2024-12-28 DIAGNOSIS — I10 ESSENTIAL HYPERTENSION: ICD-10-CM

## 2024-12-30 RX ORDER — TORSEMIDE 5 MG/1
5 TABLET ORAL DAILY
Qty: 90 TABLET | Refills: 1 | Status: SHIPPED | OUTPATIENT
Start: 2024-12-30

## 2024-12-31 ENCOUNTER — APPOINTMENT (OUTPATIENT)
Dept: LAB | Facility: HOSPITAL | Age: 69
End: 2024-12-31
Attending: INTERNAL MEDICINE
Payer: MEDICARE

## 2024-12-31 ENCOUNTER — RESULTS FOLLOW-UP (OUTPATIENT)
Dept: OTHER | Facility: HOSPITAL | Age: 69
End: 2024-12-31

## 2024-12-31 DIAGNOSIS — E11.9 TYPE 2 DIABETES MELLITUS WITHOUT COMPLICATION, WITH LONG-TERM CURRENT USE OF INSULIN (HCC): ICD-10-CM

## 2024-12-31 DIAGNOSIS — E11.9 TYPE 2 DIABETES MELLITUS WITHOUT COMPLICATION, WITHOUT LONG-TERM CURRENT USE OF INSULIN (HCC): ICD-10-CM

## 2024-12-31 DIAGNOSIS — E22.2 SIADH (SYNDROME OF INAPPROPRIATE ADH PRODUCTION) (HCC): ICD-10-CM

## 2024-12-31 DIAGNOSIS — Z79.4 TYPE 2 DIABETES MELLITUS WITHOUT COMPLICATION, WITH LONG-TERM CURRENT USE OF INSULIN (HCC): ICD-10-CM

## 2024-12-31 LAB
ANION GAP SERPL CALCULATED.3IONS-SCNC: 7 MMOL/L (ref 4–13)
BUN SERPL-MCNC: 22 MG/DL (ref 5–25)
CALCIUM SERPL-MCNC: 9.9 MG/DL (ref 8.4–10.2)
CHLORIDE SERPL-SCNC: 103 MMOL/L (ref 96–108)
CO2 SERPL-SCNC: 28 MMOL/L (ref 21–32)
CREAT SERPL-MCNC: 1.01 MG/DL (ref 0.6–1.3)
GFR SERPL CREATININE-BSD FRML MDRD: 56 ML/MIN/1.73SQ M
GLUCOSE SERPL-MCNC: 209 MG/DL (ref 65–140)
POTASSIUM SERPL-SCNC: 3.9 MMOL/L (ref 3.5–5.3)
SODIUM SERPL-SCNC: 138 MMOL/L (ref 135–147)

## 2024-12-31 PROCEDURE — 80048 BASIC METABOLIC PNL TOTAL CA: CPT

## 2024-12-31 PROCEDURE — 36415 COLL VENOUS BLD VENIPUNCTURE: CPT

## 2024-12-31 NOTE — TELEPHONE ENCOUNTER
Daughter advised that labs show sodium stable at 138.  (Pt recently started Zoloft 100 mg OD)    ----- Message from Francisca Estrada PA-C sent at 12/31/2024  2:18 PM EST -----  Blood work stable.

## 2025-01-02 RX ORDER — MULTIVITAMIN
1 TABLET ORAL DAILY
Qty: 90 TABLET | Refills: 1 | Status: SHIPPED | OUTPATIENT
Start: 2025-01-02

## 2025-01-05 ENCOUNTER — HOSPITAL ENCOUNTER (OUTPATIENT)
Dept: RADIOLOGY | Facility: HOSPITAL | Age: 70
Discharge: HOME | End: 2025-01-05
Payer: MEDICARE

## 2025-01-05 DIAGNOSIS — Z12.31 ENCOUNTER FOR SCREENING MAMMOGRAM FOR BREAST CANCER: ICD-10-CM

## 2025-01-05 PROCEDURE — 77063 BREAST TOMOSYNTHESIS BI: CPT

## 2025-01-05 PROCEDURE — 77067 SCR MAMMO BI INCL CAD: CPT

## 2025-01-06 ENCOUNTER — RESULTS FOLLOW-UP (OUTPATIENT)
Dept: FAMILY MEDICINE CLINIC | Facility: CLINIC | Age: 70
End: 2025-01-06

## 2025-01-09 ENCOUNTER — APPOINTMENT (OUTPATIENT)
Dept: LAB | Facility: HOSPITAL | Age: 70
End: 2025-01-09
Attending: INTERNAL MEDICINE
Payer: MEDICARE

## 2025-01-09 DIAGNOSIS — E22.2 SIADH (SYNDROME OF INAPPROPRIATE ADH PRODUCTION) (HCC): ICD-10-CM

## 2025-01-09 LAB
ANION GAP SERPL CALCULATED.3IONS-SCNC: 6 MMOL/L (ref 4–13)
BUN SERPL-MCNC: 22 MG/DL (ref 5–25)
CALCIUM SERPL-MCNC: 9.7 MG/DL (ref 8.4–10.2)
CHLORIDE SERPL-SCNC: 102 MMOL/L (ref 96–108)
CO2 SERPL-SCNC: 28 MMOL/L (ref 21–32)
CREAT SERPL-MCNC: 1.09 MG/DL (ref 0.6–1.3)
GFR SERPL CREATININE-BSD FRML MDRD: 51 ML/MIN/1.73SQ M
GLUCOSE SERPL-MCNC: 153 MG/DL (ref 65–140)
POTASSIUM SERPL-SCNC: 4.3 MMOL/L (ref 3.5–5.3)
SODIUM SERPL-SCNC: 136 MMOL/L (ref 135–147)

## 2025-01-09 PROCEDURE — 36415 COLL VENOUS BLD VENIPUNCTURE: CPT

## 2025-01-09 PROCEDURE — 80048 BASIC METABOLIC PNL TOTAL CA: CPT

## 2025-01-15 DIAGNOSIS — R06.09 DYSPNEA ON EXERTION: ICD-10-CM

## 2025-01-15 DIAGNOSIS — E78.5 HYPERLIPIDEMIA, UNSPECIFIED HYPERLIPIDEMIA TYPE: ICD-10-CM

## 2025-01-15 DIAGNOSIS — R07.9 CHEST PAIN IN ADULT: ICD-10-CM

## 2025-01-15 RX ORDER — ROSUVASTATIN CALCIUM 20 MG/1
20 TABLET, COATED ORAL DAILY
Qty: 90 TABLET | Refills: 0 | OUTPATIENT
Start: 2025-01-15

## 2025-01-15 RX ORDER — ROSUVASTATIN CALCIUM 20 MG/1
20 TABLET, COATED ORAL DAILY
Qty: 90 TABLET | Refills: 1 | Status: SHIPPED | OUTPATIENT
Start: 2025-01-15

## 2025-01-16 ENCOUNTER — APPOINTMENT (OUTPATIENT)
Dept: LAB | Facility: HOSPITAL | Age: 70
End: 2025-01-16
Attending: INTERNAL MEDICINE
Payer: MEDICARE

## 2025-01-16 ENCOUNTER — TELEPHONE (OUTPATIENT)
Dept: NEPHROLOGY | Facility: CLINIC | Age: 70
End: 2025-01-16

## 2025-01-16 DIAGNOSIS — E22.2 SIADH (SYNDROME OF INAPPROPRIATE ADH PRODUCTION) (HCC): ICD-10-CM

## 2025-01-16 LAB
ANION GAP SERPL CALCULATED.3IONS-SCNC: 8 MMOL/L (ref 4–13)
BUN SERPL-MCNC: 23 MG/DL (ref 5–25)
CALCIUM SERPL-MCNC: 9.8 MG/DL (ref 8.4–10.2)
CHLORIDE SERPL-SCNC: 103 MMOL/L (ref 96–108)
CO2 SERPL-SCNC: 25 MMOL/L (ref 21–32)
CREAT SERPL-MCNC: 1.27 MG/DL (ref 0.6–1.3)
GFR SERPL CREATININE-BSD FRML MDRD: 42 ML/MIN/1.73SQ M
GLUCOSE SERPL-MCNC: 249 MG/DL (ref 65–140)
POTASSIUM SERPL-SCNC: 4.8 MMOL/L (ref 3.5–5.3)
SODIUM SERPL-SCNC: 136 MMOL/L (ref 135–147)

## 2025-01-16 PROCEDURE — 36415 COLL VENOUS BLD VENIPUNCTURE: CPT

## 2025-01-16 PROCEDURE — 80048 BASIC METABOLIC PNL TOTAL CA: CPT

## 2025-01-16 NOTE — TELEPHONE ENCOUNTER
Daughter advised sodium level is stable at 136 per Dr. Lambert.      ----- Message from Yosef Lambert MD sent at 1/16/2025  3:29 PM EST -----  Please inform patient or daughter that most recent labs (1/16/25) show that sodium level is stable.

## 2025-01-22 ENCOUNTER — HOSPITAL ENCOUNTER (EMERGENCY)
Facility: HOSPITAL | Age: 70
Discharge: HOME | End: 2025-01-22
Attending: EMERGENCY MEDICINE | Admitting: EMERGENCY MEDICINE
Payer: MEDICARE

## 2025-01-22 ENCOUNTER — APPOINTMENT (EMERGENCY)
Dept: RADIOLOGY | Facility: HOSPITAL | Age: 70
End: 2025-01-22
Payer: MEDICARE

## 2025-01-22 VITALS
HEART RATE: 66 BPM | OXYGEN SATURATION: 97 % | SYSTOLIC BLOOD PRESSURE: 127 MMHG | TEMPERATURE: 98.4 F | RESPIRATION RATE: 17 BRPM | DIASTOLIC BLOOD PRESSURE: 59 MMHG

## 2025-01-22 DIAGNOSIS — R10.9 ABDOMINAL PAIN: ICD-10-CM

## 2025-01-22 DIAGNOSIS — K52.9 ENTERITIS: Primary | ICD-10-CM

## 2025-01-22 DIAGNOSIS — R11.2 NAUSEA AND VOMITING: ICD-10-CM

## 2025-01-22 PROBLEM — R10.10 PAIN OF UPPER ABDOMEN: Status: ACTIVE | Noted: 2025-01-22

## 2025-01-22 LAB
ALBUMIN SERPL BCG-MCNC: 3.7 G/DL (ref 3.5–5)
ALP SERPL-CCNC: 69 U/L (ref 34–104)
ALT SERPL W P-5'-P-CCNC: 54 U/L (ref 7–52)
ANION GAP SERPL CALCULATED.3IONS-SCNC: 7 MMOL/L (ref 4–13)
AST SERPL W P-5'-P-CCNC: 92 U/L (ref 13–39)
BACTERIA UR QL AUTO: NORMAL /HPF
BASOPHILS # BLD AUTO: 0.02 THOUSANDS/ΜL (ref 0–0.1)
BASOPHILS NFR BLD AUTO: 1 % (ref 0–1)
BILIRUB SERPL-MCNC: 0.44 MG/DL (ref 0.2–1)
BILIRUB UR QL STRIP: NEGATIVE
BUN SERPL-MCNC: 16 MG/DL (ref 5–25)
CALCIUM SERPL-MCNC: 6.9 MG/DL (ref 8.4–10.2)
CHLORIDE SERPL-SCNC: 102 MMOL/L (ref 96–108)
CLARITY UR: CLEAR
CO2 SERPL-SCNC: 25 MMOL/L (ref 21–32)
COLOR UR: YELLOW
CREAT SERPL-MCNC: 0.85 MG/DL (ref 0.6–1.3)
EOSINOPHIL # BLD AUTO: 0.05 THOUSAND/ΜL (ref 0–0.61)
EOSINOPHIL NFR BLD AUTO: 1 % (ref 0–6)
ERYTHROCYTE [DISTWIDTH] IN BLOOD BY AUTOMATED COUNT: 13.3 % (ref 11.6–15.1)
FLUAV AG UPPER RESP QL IA.RAPID: NEGATIVE
FLUBV AG UPPER RESP QL IA.RAPID: NEGATIVE
GFR SERPL CREATININE-BSD FRML MDRD: 69 ML/MIN/1.73SQ M
GLUCOSE SERPL-MCNC: 259 MG/DL (ref 65–140)
GLUCOSE UR STRIP-MCNC: ABNORMAL MG/DL
HCT VFR BLD AUTO: 36.8 % (ref 34.8–46.1)
HGB BLD-MCNC: 11.9 G/DL (ref 11.5–15.4)
HGB UR QL STRIP.AUTO: NEGATIVE
IMM GRANULOCYTES # BLD AUTO: 0.01 THOUSAND/UL (ref 0–0.2)
IMM GRANULOCYTES NFR BLD AUTO: 0 % (ref 0–2)
KETONES UR STRIP-MCNC: NEGATIVE MG/DL
LACTATE SERPL-SCNC: 1.8 MMOL/L (ref 0.5–2)
LACTATE SERPL-SCNC: 2.3 MMOL/L (ref 0.5–2)
LEUKOCYTE ESTERASE UR QL STRIP: NEGATIVE
LIPASE SERPL-CCNC: 50 U/L (ref 11–82)
LYMPHOCYTES # BLD AUTO: 1.03 THOUSANDS/ΜL (ref 0.6–4.47)
LYMPHOCYTES NFR BLD AUTO: 27 % (ref 14–44)
MCH RBC QN AUTO: 30.8 PG (ref 26.8–34.3)
MCHC RBC AUTO-ENTMCNC: 32.3 G/DL (ref 31.4–37.4)
MCV RBC AUTO: 95 FL (ref 82–98)
MONOCYTES # BLD AUTO: 0.45 THOUSAND/ΜL (ref 0.17–1.22)
MONOCYTES NFR BLD AUTO: 12 % (ref 4–12)
NEUTROPHILS # BLD AUTO: 2.28 THOUSANDS/ΜL (ref 1.85–7.62)
NEUTS SEG NFR BLD AUTO: 59 % (ref 43–75)
NITRITE UR QL STRIP: NEGATIVE
NON-SQ EPI CELLS URNS QL MICRO: NORMAL /HPF
NRBC BLD AUTO-RTO: 0 /100 WBCS
PH UR STRIP.AUTO: 7 [PH]
PLATELET # BLD AUTO: 152 THOUSANDS/UL (ref 149–390)
PMV BLD AUTO: 9.7 FL (ref 8.9–12.7)
POTASSIUM SERPL-SCNC: 5.7 MMOL/L (ref 3.5–5.3)
PROT SERPL-MCNC: 6.7 G/DL (ref 6.4–8.4)
PROT UR STRIP-MCNC: ABNORMAL MG/DL
RBC # BLD AUTO: 3.86 MILLION/UL (ref 3.81–5.12)
RBC #/AREA URNS AUTO: NORMAL /HPF
SARS-COV+SARS-COV-2 AG RESP QL IA.RAPID: NEGATIVE
SODIUM SERPL-SCNC: 134 MMOL/L (ref 135–147)
SP GR UR STRIP.AUTO: <1.005 (ref 1–1.03)
UROBILINOGEN UR STRIP-ACNC: <2 MG/DL
WBC # BLD AUTO: 3.84 THOUSAND/UL (ref 4.31–10.16)
WBC #/AREA URNS AUTO: NORMAL /HPF

## 2025-01-22 PROCEDURE — 81001 URINALYSIS AUTO W/SCOPE: CPT | Performed by: EMERGENCY MEDICINE

## 2025-01-22 PROCEDURE — 87811 SARS-COV-2 COVID19 W/OPTIC: CPT | Performed by: EMERGENCY MEDICINE

## 2025-01-22 PROCEDURE — 36415 COLL VENOUS BLD VENIPUNCTURE: CPT | Performed by: EMERGENCY MEDICINE

## 2025-01-22 PROCEDURE — 96374 THER/PROPH/DIAG INJ IV PUSH: CPT

## 2025-01-22 PROCEDURE — 83605 ASSAY OF LACTIC ACID: CPT | Performed by: EMERGENCY MEDICINE

## 2025-01-22 PROCEDURE — 96361 HYDRATE IV INFUSION ADD-ON: CPT

## 2025-01-22 PROCEDURE — 99284 EMERGENCY DEPT VISIT MOD MDM: CPT | Performed by: PHYSICIAN ASSISTANT

## 2025-01-22 PROCEDURE — 85025 COMPLETE CBC W/AUTO DIFF WBC: CPT | Performed by: EMERGENCY MEDICINE

## 2025-01-22 PROCEDURE — 96375 TX/PRO/DX INJ NEW DRUG ADDON: CPT

## 2025-01-22 PROCEDURE — 99284 EMERGENCY DEPT VISIT MOD MDM: CPT

## 2025-01-22 PROCEDURE — 87804 INFLUENZA ASSAY W/OPTIC: CPT | Performed by: EMERGENCY MEDICINE

## 2025-01-22 PROCEDURE — 80053 COMPREHEN METABOLIC PANEL: CPT | Performed by: EMERGENCY MEDICINE

## 2025-01-22 PROCEDURE — 99285 EMERGENCY DEPT VISIT HI MDM: CPT | Performed by: EMERGENCY MEDICINE

## 2025-01-22 PROCEDURE — 74177 CT ABD & PELVIS W/CONTRAST: CPT

## 2025-01-22 PROCEDURE — 74176 CT ABD & PELVIS W/O CONTRAST: CPT

## 2025-01-22 PROCEDURE — 83690 ASSAY OF LIPASE: CPT | Performed by: EMERGENCY MEDICINE

## 2025-01-22 RX ORDER — ONDANSETRON 2 MG/ML
4 INJECTION INTRAMUSCULAR; INTRAVENOUS ONCE
Status: COMPLETED | OUTPATIENT
Start: 2025-01-22 | End: 2025-01-22

## 2025-01-22 RX ORDER — ACETAMINOPHEN 10 MG/ML
1000 INJECTION, SOLUTION INTRAVENOUS ONCE
Status: COMPLETED | OUTPATIENT
Start: 2025-01-22 | End: 2025-01-22

## 2025-01-22 RX ORDER — ONDANSETRON 4 MG/1
4 TABLET, ORALLY DISINTEGRATING ORAL EVERY 6 HOURS PRN
Qty: 20 TABLET | Refills: 0 | Status: SHIPPED | OUTPATIENT
Start: 2025-01-22

## 2025-01-22 RX ADMIN — IOHEXOL 100 ML: 350 INJECTION, SOLUTION INTRAVENOUS at 09:42

## 2025-01-22 RX ADMIN — SODIUM CHLORIDE 1000 ML: 0.9 INJECTION, SOLUTION INTRAVENOUS at 08:22

## 2025-01-22 RX ADMIN — ONDANSETRON 4 MG: 2 INJECTION INTRAMUSCULAR; INTRAVENOUS at 09:59

## 2025-01-22 RX ADMIN — ONDANSETRON 4 MG: 2 INJECTION INTRAMUSCULAR; INTRAVENOUS at 08:22

## 2025-01-22 RX ADMIN — ACETAMINOPHEN 1000 MG: 1000 INJECTION, SOLUTION INTRAVENOUS at 08:22

## 2025-01-22 NOTE — DISCHARGE INSTRUCTIONS
Follow-up with primary care for further care. Contact info provided below if needed.  Use over the counter medications as stated on the bottle as needed for symptom control.  Take your new medications as prescribed as needed for nausea and vomiting.  Stay hydrated.   Return to the ED with new or worsening symptoms.

## 2025-01-22 NOTE — CONSULTS
Consultation - Surgery-General   Name: Yris Bowles 69 y.o. female I MRN: [id number]  Unit/Bed#: ED 01 I Date of Admission: 1/22/2025   Date of Service: 1/22/2025 I Hospital Day: 0   Consults  Physician Requesting Evaluation: Anastacia Jose MD   Reason for Evaluation / Principal Problem: R/O SBO.     Assessment & Plan  Pain of upper abdomen  Intermittent nausea, vomiting, right sided abdominal pain x 4 days.     CT a/p   A few top normal to mildly dilated fluid-filled loops of small bowel in the central abdomen could be physiologic. An infectious or inflammatory process such as enteritis is in the differential diagnosis. Early small bowel obstruction is thought to be   less likely but not entirely excluded. No pneumatosis or extra luminal gas or fluid collection. Further clinical assessment recommended.    Past surgical hx of Hysterectomy, open gall bladder.     Lactic acid: 2.8>1.8    No leukocytosis.     Obtain CT with PO contrast r/o sbo.   PRN antiemetics.  IV fluids.         History of Present Illness   Yris Bowles is a 69 y.o. female past medical history of bipolar 1, DM, endometrial CA s/p hysterectomy, HTN, HLD, SIADH, CKD, cancerous colon polyp, open cholecystectomy, presenting with intermittent right sided abdominal pain, nausea, vomiting x 4 days. Patient's daughter provided most H&P. Intially patient had symptoms for 24 hours. Patient was eating and drinking normally for the next 2 days and then started with nausea, vomiting and right side abdominal pain last night again. Patient reports bowel movement yesterday and today. Denies history of small bowel obstruction in the past. Patient's daughter reports  and son recently had GI bug and assumed that's what she had.      Review of Systems   Constitutional:  Negative for chills, fatigue and fever.   HENT:  Negative for congestion, ear pain, hearing loss, postnasal drip, sinus pressure, sinus pain and sore throat.    Eyes:  Negative for pain  and discharge.   Respiratory:  Negative for chest tightness and shortness of breath.    Cardiovascular:  Negative for chest pain.   Gastrointestinal:  Positive for abdominal pain, diarrhea, nausea and vomiting. Negative for constipation.   Genitourinary:  Negative for difficulty urinating.   Musculoskeletal:  Negative for arthralgias and myalgias.   Skin:  Negative for rash.   Neurological:  Negative for dizziness and headaches.   Psychiatric/Behavioral:  Negative for behavioral problems.      I have reviewed the patient's PMH, PSH, Social History, Family History, Meds, and Allergies  Historical Information   Past Medical History:   Diagnosis Date    Anesthesia complication     difficulty waking up    Anxiety     Arthritis     left knee    Bipolar 1 disorder (HCC)     Bone spur     left knee behing the knee cap    Cancer (HCC)     Chronic kidney disease     Chronic pain disorder     Colon cancer (HCC)     patient states about 2017    Colon polyp     Tubulovillous Adenoma High Grade Dysplasia - 2017    Depression     Diabetes mellitus (HCC)     Endometrial cancer (HCC)     grade I    Hx of bleeding disorder     vaginal bleeding started on 3/13/17     Hyperlipidemia     Hypertension     Hypomagnesemia 03/20/2023    Memory loss     PONV (postoperative nausea and vomiting)     Psychiatric disorder     Psychiatric illness     Psychosis (HCC)     Shortness of breath     with exertion    Sleep difficulties     Urinary incontinence     Vitamin D deficiency      Past Surgical History:   Procedure Laterality Date    BREAST BIOPSY Left     benign-maybe at ar age 63    CHOLECYSTECTOMY      open    COLONOSCOPY      DILATION AND CURETTAGE OF UTERUS N/A 04/05/2017    Procedure: DILATATION AND CURETTAGE (D&C);  Surgeon: Primitivo Weber MD;  Location: [facility] MAIN OR;  Service:     HYSTERECTOMY      OOPHORECTOMY      PELVIC LAPAROSCOPY      GA ARTHRP KNE CONDYLE&PLATU MEDIAL&LAT COMPARTMENTS Left 12/06/2022    Procedure:  ARTHROPLASTY KNEE TOTAL W ROBOT - CEMENTED - LEFT;  Surgeon: Juan Jose Guerra DO;  Location: [facility] MAIN OR;  Service: Orthopedics    CO LAPS TOTAL HYSTERECT 250 GM/< W/RMVL TUBE/OVARY N/A 05/04/2017    Procedure: ROBOTIC ASSISTED TOTAL LAPAROSCOPIC HYSTERECTOMY, BILATERAL SALPINGOOPHERECTOMY; CYSTOSCOPY;  Surgeon: Damir Reyes MD;  Location:  MAIN OR;  Service: Gynecology Oncology    CO OPTX FEM SHFT FX W/INSJ IMED IMPLT W/WO SCREW Left 3/20/2023    Procedure: INSERTION NAIL IM FEMUR RETROGRADE;  Surgeon: Terry White MD;  Location: [facility] MAIN OR;  Service: Orthopedics    REPLACEMENT TOTAL KNEE      TONSILLECTOMY      TUBAL LIGATION       Social History     Tobacco Use    Smoking status: Never     Passive exposure: Never    Smokeless tobacco: Never   Vaping Use    Vaping status: Never Used   Substance and Sexual Activity    Alcohol use: Never    Drug use: No    Sexual activity: Not Currently     Birth control/protection: Post-menopausal, Surgical     E-Cigarette/Vaping    E-Cigarette Use Never User      E-Cigarette/Vaping Substances    Nicotine No     THC No     CBD No     Flavoring No     Other No     Unknown No      Family history non-contributory  Social History     Tobacco Use    Smoking status: Never     Passive exposure: Never    Smokeless tobacco: Never   Vaping Use    Vaping status: Never Used   Substance and Sexual Activity    Alcohol use: Never    Drug use: No    Sexual activity: Not Currently     Birth control/protection: Post-menopausal, Surgical     No current facility-administered medications for this encounter.  Prior to Admission Medications   Prescriptions Last Dose Informant Patient Reported? Taking?   Diclofenac Sodium (VOLTAREN) 1 %  Child No No   Sig: Apply 4 g topically 4 (four) times a day   Multiple Vitamin (One-Daily Multi-Vitamin) TABS   No No   Sig: Take 1 tablet by mouth daily   Omega-3 Fatty Acids (FISH OIL PO)  Child Yes No   Sig: Take 2,000 mg by mouth 2 (two) times a day    acetaminophen (TYLENOL) 325 mg tablet  Child No No   Sig: Take 3 tablets (975 mg total) by mouth 2 (two) times a day   Patient taking differently: Take 975 mg by mouth every 6 (six) hours as needed   aspirin (ECOTRIN LOW STRENGTH) 81 mg EC tablet  Child No No   Sig: Take 1 tablet (81 mg total) by mouth daily   calcium carbonate (OS-MARVEL) 600 MG tablet  Child Yes No   Sig: Take 600 mg by mouth 2 (two) times a day with meals   cholecalciferol (VITAMIN D3) 1,000 units tablet  Child Yes No   Sig: Take 1,000 Units by mouth daily   divalproex sodium (DEPAKOTE) 125 mg DR tablet   Yes No   Sig: TAKE 1 TABLET BY MOUTH AT BEDTIME WATCH FOR SEDATION   docusate sodium (COLACE) 100 mg capsule  Child Yes No   Sig: Take 100 mg by mouth 2 (two) times a day   famotidine (PEPCID) 20 mg tablet  Child No No   Sig: Take 1 tablet (20 mg total) by mouth 2 (two) times a day   gabapentin (NEURONTIN) 100 mg capsule   No No   Sig: Take 1 capsule (100 mg total) by mouth daily with dinner   lidocaine (LIDODERM) 5 %  Child No No   Sig: Apply 1 patch topically over 12 hours daily Remove & Discard patch within 12 hours or as directed by MD Do not start before May 31, 2023.   Patient taking differently: Apply 1 patch topically as needed Remove & Discard patch within 12 hours or as directed by MD   lurasidone (LATUDA) 40 mg tablet  Child Yes No   Si mg 40 mg at 8 am, 40 mg 12 pm , 80 mg at 6 pm   lurasidone (LATUDA) 80 mg tablet  Child No No   Sig: Take 1 tablet (80 mg total) by mouth daily with dinner   Patient not taking: Reported on 2024   metFORMIN (GLUCOPHAGE-XR) 500 mg 24 hr tablet   No No   Sig: Take 1 tablet by mouth once daily with breakfast   metoprolol succinate (TOPROL-XL) 25 mg 24 hr tablet   No No   Sig: Take 1 tablet (25 mg total) by mouth daily   nitroglycerin (NITROSTAT) 0.4 mg SL tablet  Child No No   Sig: Place 1 tablet (0.4 mg total) under the tongue every 5 (five) minutes as needed for chest pain   rosuvastatin  (CRESTOR) 20 MG tablet   No No   Sig: Take 1 tablet (20 mg total) by mouth daily   sertraline (ZOLOFT) 100 mg tablet  Child Yes No   Sig: Take 50 mg by mouth 2 (two) times a day   sertraline (ZOLOFT) 50 mg tablet  Child Yes No   Sig: Take 50 mg by mouth 2 (two) times a day   sodium chloride 1 g tablet   No No   Sig: Take 1 tablet (1 g total) by mouth 3 (three) times a day   torsemide (DEMADEX) 5 MG tablet   No No   Sig: Take 1 tablet by mouth once daily      Facility-Administered Medications: None     Patient has no known allergies.    Objective :  Temp:  [98.4 °F (36.9 °C)] 98.4 °F (36.9 °C)  HR:  [63-72] 65  BP: (138-168)/(66-91) 146/69  Resp:  [16] 16  SpO2:  [97 %-99 %] 99 %  O2 Device: None (Room air)      Physical Exam  Vitals and nursing note reviewed.   Constitutional:       Appearance: Normal appearance.   HENT:      Head: Normocephalic and atraumatic.      Nose: Nose normal.      Mouth/Throat:      Mouth: Mucous membranes are moist.   Eyes:      Extraocular Movements: Extraocular movements intact.   Cardiovascular:      Rate and Rhythm: Normal rate.   Pulmonary:      Effort: Pulmonary effort is normal.   Abdominal:      General: There is distension (mild distension upper abdomen.).      Palpations: Abdomen is soft.      Tenderness: There is abdominal tenderness in the right upper quadrant and epigastric area. There is no guarding or rebound.   Neurological:      Mental Status: She is alert.         Lab Results: I have reviewed the following results:  Recent Labs     01/22/25  0827 01/22/25  0911 01/22/25  1028   WBC 3.84*  --   --    HGB 11.9  --   --    HCT 36.8  --   --      --   --    SODIUM  --  134*  --    K  --  5.7*  --    CL  --  102  --    CO2  --  25  --    BUN  --  16  --    CREATININE  --  0.85  --    GLUC  --  259*  --    AST  --  92*  --    ALT  --  54*  --    ALB  --  3.7  --    TBILI  --  0.44  --    ALKPHOS  --  69  --    LACTICACID 2.3*  --  1.8     Imaging reviewed.   CT abdomen  pelvis with contrast   Final Result by Holger Griffith MD (01/22 1014)         1. A few top normal to mildly dilated fluid-filled loops of small bowel in the central abdomen could be physiologic. An infectious or inflammatory process such as enteritis is in the differential diagnosis. Early small bowel obstruction is thought to be    less likely but not entirely excluded. No pneumatosis or extra luminal gas or fluid collection. Further clinical assessment recommended.   2. Numerous vertebral compression deformities, progressed from the prior CT from 2022, some of which however are present on the prior thoracic radiograph from 7/29/2023. Correlation for osteoporotic risk factors advised. Does the patient have midline    back tenderness to indicate acute or subacute compression fracture?         Workstation performed: [id number]         CT abdomen pelvis wo contrast    (Results Pending)

## 2025-01-22 NOTE — ASSESSMENT & PLAN NOTE
Intermittent nausea, vomiting, right sided abdominal pain x 4 days.     CT a/p   A few top normal to mildly dilated fluid-filled loops of small bowel in the central abdomen could be physiologic. An infectious or inflammatory process such as enteritis is in the differential diagnosis. Early small bowel obstruction is thought to be   less likely but not entirely excluded. No pneumatosis or extra luminal gas or fluid collection. Further clinical assessment recommended.    Past surgical hx of Hysterectomy, open gall bladder.     Lactic acid: 2.8>1.8    No leukocytosis.     Obtain CT with PO contrast r/o sbo.   PRN antiemetics.  IV fluids.

## 2025-01-22 NOTE — ED PROVIDER NOTES
Time reflects when diagnosis was documented in both MDM as applicable and the Disposition within this note       Time User Action Codes Description Comment    1/22/2025 12:18 PM Anastacia Jose Add [K52.9] Enteritis     1/22/2025 12:19 PM Anastacia Jose Add [R10.9] Abdominal pain     1/22/2025 12:19 PM Anastacia Jose Add [R11.2] Nausea and vomiting           ED Disposition       ED Disposition   Discharge    Condition   Stable    Date/Time   Wed Jan 22, 2025  4:43 PM    Comment   Yris Bowles discharge to home/self care.                   Assessment & Plan       Medical Decision Making  Pt is a 70yo F who presents with abdominal pain, nausea, and vomiting.     Differential diagnosis to include but not limited to viral syndrome, obstruction, other intra-abdominal pathology, UTI, dehydration, electrolyte abnormality.  Will plan for labs and imaging.  Will treat symptomatically and reassess.  See ED course for results and details.    Plan to discharge pt with f/u to PCP. Discussed returning the ED with new or worsening of symptoms. Discussed use of over the counter medications as stated on the bottle as needed for pain. Discussed taking new medication as prescribed as needed for nausea and vomiting. Pt and daughter expressed understanding of discharge instructions, return precautions, and medication instructions and is stable for discharge at this time. All questions were answered and pt was discharged without incident.       Amount and/or Complexity of Data Reviewed  Labs: ordered. Decision-making details documented in ED Course.  Radiology: ordered. Decision-making details documented in ED Course.    Risk  Prescription drug management.        ED Course as of 01/22/25 1650   Wed Jan 22, 2025   0834 CBC and differential(!)  Reviewed and without actionable derangement.    0853 FLU/COVID Rapid Antigen (30 min. TAT) - Preferred screening test in ED  Negative.    0903 LACTIC ACID(!): 2.3  Elevated. Fluids in  process. Will trend.    0937 Potassium(!): 5.7  Moderately hemolyzed. Likely falsely elevated.    0937 GLUCOSE(!): 259  Elevated without gap.    0937 AST(!): 92  Mildly elevated. Non-diagnostic.    0938 ALT(!): 54  Mildly elevated. Non-diagnostic.    0938 LIPASE: 50  WNL   1019 CT abdomen pelvis with contrast  A few top normal to mildly dilated fluid-filled loops of small bowel in the central abdomen could be physiologic. An infectious or inflammatory process such as enteritis is in the differential diagnosis. Early small bowel obstruction is thought to be   less likely but not entirely excluded. No pneumatosis or extra luminal gas or fluid collection.   1024 Leukocytes, UA: Negative   1024 Nitrite, UA: Negative   1024 Blood, UA: Negative   1029 WBC, UA: 0-1   1029 Bacteria, UA: None Seen  No evidence of infection.    1029 Pt reassessed and states she is overall feeling better. Pain improved. No nausea currently. Abd soft, mild RLQ tenderness, no rebound or guarding.    1142 LACTIC ACID: 1.8  Interval improvement.    1152 Based on CT read, contacted gen surg who will eval.    1207 Surgery at bedside.    1224 Surgery requesting repeat CT with PO contrast. Ordered.    1550 Awaiting CT read.    1643 CT abdomen pelvis wo contrast  No bowel obstruction       Medications   sodium chloride 0.9 % bolus 1,000 mL (0 mL Intravenous Stopped 1/22/25 0922)   ondansetron (ZOFRAN) injection 4 mg (4 mg Intravenous Given 1/22/25 0822)   acetaminophen (Ofirmev) injection 1,000 mg (0 mg Intravenous Stopped 1/22/25 0837)   iohexol (OMNIPAQUE) 350 MG/ML injection (MULTI-DOSE) 100 mL (100 mL Intravenous Given 1/22/25 0942)   ondansetron (ZOFRAN) injection 4 mg (4 mg Intravenous Given 1/22/25 0959)   iohexol (OMNIPAQUE) 240 MG/ML solution 50 mL (50 mL Oral Given 1/22/25 1229)       ED Risk Strat Scores                          SBIRT 22yo+      Flowsheet Row Most Recent Value   Initial Alcohol Screen: US AUDIT-C     1. How often do you have  a drink containing alcohol? 0 Filed at: 01/22/2025 0805   2. How many drinks containing alcohol do you have on a typical day you are drinking?  0 Filed at: 01/22/2025 0805   3a. Male UNDER 65: How often do you have five or more drinks on one occasion? 0 Filed at: 01/22/2025 0805   3b. FEMALE Any Age, or MALE 65+: How often do you have 4 or more drinks on one occassion? 0 Filed at: 01/22/2025 0805   Audit-C Score 0 Filed at: 01/22/2025 0805   AZEB: How many times in the past year have you...    Used an illegal drug or used a prescription medication for non-medical reasons? Never Filed at: 01/22/2025 0805                            History of Present Illness       Chief Complaint   Patient presents with    Abdominal Pain     Right sided abdominal pain and vomiting that started 4 days ago with diarrhea starting last night.       Past Medical History:   Diagnosis Date    Anesthesia complication     difficulty waking up    Anxiety     Arthritis     left knee    Bipolar 1 disorder (HCC)     Bone spur     left knee behing the knee cap    Cancer (HCC)     Chronic kidney disease     Chronic pain disorder     Colon cancer (HCC)     patient states about 2017    Colon polyp     Tubulovillous Adenoma High Grade Dysplasia - 2017    Depression     Diabetes mellitus (HCC)     Endometrial cancer (HCC)     grade I    Hx of bleeding disorder     vaginal bleeding started on 3/13/17     Hyperlipidemia     Hypertension     Hypomagnesemia 03/20/2023    Memory loss     PONV (postoperative nausea and vomiting)     Psychiatric disorder     Psychiatric illness     Psychosis (HCC)     Shortness of breath     with exertion    Sleep difficulties     Urinary incontinence     Vitamin D deficiency       Past Surgical History:   Procedure Laterality Date    BREAST BIOPSY Left     benign-maybe at ar age 63    CHOLECYSTECTOMY      open    COLONOSCOPY      DILATION AND CURETTAGE OF UTERUS N/A 04/05/2017    Procedure: DILATATION AND CURETTAGE (D&C);   Surgeon: Primitivo Weber MD;  Location: [facility] MAIN OR;  Service:     HYSTERECTOMY      OOPHORECTOMY      PELVIC LAPAROSCOPY      OR ARTHRP KNE CONDYLE&PLATU MEDIAL&LAT COMPARTMENTS Left 12/06/2022    Procedure: ARTHROPLASTY KNEE TOTAL W ROBOT - CEMENTED - LEFT;  Surgeon: Juan Jose Guerra DO;  Location: [facility] MAIN OR;  Service: Orthopedics    OR LAPS TOTAL HYSTERECT 250 GM/< W/RMVL TUBE/OVARY N/A 05/04/2017    Procedure: ROBOTIC ASSISTED TOTAL LAPAROSCOPIC HYSTERECTOMY, BILATERAL SALPINGOOPHERECTOMY; CYSTOSCOPY;  Surgeon: Damir Reyes MD;  Location:  MAIN OR;  Service: Gynecology Oncology    OR OPTX FEM SHFT FX W/INSJ IMED IMPLT W/WO SCREW Left 3/20/2023    Procedure: INSERTION NAIL IM FEMUR RETROGRADE;  Surgeon: Terry White MD;  Location: [facility] MAIN OR;  Service: Orthopedics    REPLACEMENT TOTAL KNEE      TONSILLECTOMY      TUBAL LIGATION        Family History   Problem Relation Age of Onset    Cancer Mother         Renal    Heart disease Father         CHF    Dementia Sister     Heart disease Sister         atrial septal defect    Dementia Sister     Breast cancer Paternal Aunt 45      Social History     Tobacco Use    Smoking status: Never     Passive exposure: Never    Smokeless tobacco: Never   Vaping Use    Vaping status: Never Used   Substance Use Topics    Alcohol use: Never    Drug use: No      E-Cigarette/Vaping    E-Cigarette Use Never User       E-Cigarette/Vaping Substances    Nicotine No     THC No     CBD No     Flavoring No     Other No     Unknown No       I have reviewed and agree with the history as documented.     Pt is a 70yo F who presents for abdominal pain, nausea, and vomiting.  Much of history provided by daughter.  Daughter states that 4 days ago patient began with nausea, vomiting, and right sided abdominal pain.  She reports that family members recently had a GI bug and they therefore assumed it was that.  Patient improved and yesterday was doing well.  Patient had no symptoms  yesterday.  She had 2 normal bowel movements, no vomiting, and did not complain of pain.  Patient was tolerating p.o. and eating and drinking normally.  Patient's daughter went over to her house today and was informed by patient's son that she had vomited all evening.  Patient had a small amount of diarrhea this morning.  Patient points to the right side of her abdomen as location of pain.  Patient has had multiple previous abdominal surgeries including cholecystectomy and hysterectomy.          Objective       ED Triage Vitals [01/22/25 0807]   Temperature Pulse Blood Pressure Respirations SpO2 Patient Position - Orthostatic VS   98.4 °F (36.9 °C) 72 168/91 16 97 % Lying      Temp Source Heart Rate Source BP Location FiO2 (%) Pain Score    Tympanic Monitor Left arm -- 8      Vitals      Date and Time Temp Pulse SpO2 Resp BP Pain Score FACES Pain Rating User   01/22/25 1500 -- 66 97 % 17 127/59 -- --    01/22/25 1430 -- 66 97 % 18 146/75 -- --    01/22/25 1400 -- 65 99 % 20 136/73 -- -- [facility]   01/22/25 1356 -- 64 98 % 18 137/71 -- -- [facility]   01/22/25 1315 -- 60 99 % 17 176/83 -- --    01/22/25 1200 -- 65 97 % 17 144/64 -- --    01/22/25 1100 -- 65 99 % 16 146/69 -- --    01/22/25 1005 -- -- -- -- -- 3 --    01/22/25 0934 -- 63 98 % 16 138/66 -- --    01/22/25 0807 98.4 °F (36.9 °C) 72 97 % 16 168/91 8 -- CS            Physical Exam  Vitals reviewed.   Constitutional:       Appearance: She is well-developed. She is not diaphoretic.   HENT:      Head: Normocephalic and atraumatic.      Right Ear: External ear normal.      Left Ear: External ear normal.      Nose: Nose normal.      Mouth/Throat:      Pharynx: Oropharynx is clear.   Eyes:      Extraocular Movements: Extraocular movements intact.      Pupils: Pupils are equal, round, and reactive to light.   Cardiovascular:      Rate and Rhythm: Normal rate and regular rhythm.      Heart sounds: Normal heart sounds.   Pulmonary:      Effort: Pulmonary effort  is normal.      Breath sounds: Normal breath sounds.   Abdominal:      Palpations: Abdomen is soft. There is no mass.      Tenderness: There is abdominal tenderness (diffuse). There is no guarding or rebound.   Musculoskeletal:         General: Normal range of motion.      Cervical back: Normal range of motion and neck supple.      Right lower leg: No edema.      Left lower leg: No edema.   Skin:     General: Skin is warm and dry.      Capillary Refill: Capillary refill takes less than 2 seconds.      Coloration: Skin is pale.   Neurological:      General: No focal deficit present.      Mental Status: She is alert and oriented to person, place, and time.   Psychiatric:         Speech: Speech normal.         Behavior: Behavior is cooperative.         Results Reviewed       Procedure Component Value Units Date/Time    Lactic acid 2 Hours [[id number]]  (Normal) Collected: 01/22/25 1028    Lab Status: Final result Specimen: Blood from Arm, Left Updated: 01/22/25 1142     LACTIC ACID 1.8 mmol/L     Narrative:      Result may be elevated if tourniquet was used during collection.    Urine Microscopic [[id number]]  (Normal) Collected: 01/22/25 1008    Lab Status: Final result Specimen: Urine, Clean Catch Updated: 01/22/25 1027     RBC, UA None Seen /hpf      WBC, UA 0-1 /hpf      Epithelial Cells Occasional /hpf      Bacteria, UA None Seen /hpf     UA (URINE) with reflex to Scope [[id number]]  (Abnormal) Collected: 01/22/25 1008    Lab Status: Final result Specimen: Urine, Clean Catch Updated: 01/22/25 1020     Color, UA Yellow     Clarity, UA Clear     Specific Gravity, UA <1.005     pH, UA 7.0     Leukocytes, UA Negative     Nitrite, UA Negative     Protein, UA 30 (1+) mg/dl      Glucose,  (3/10%) mg/dl      Ketones, UA Negative mg/dl      Urobilinogen, UA <2.0 mg/dl      Bilirubin, UA Negative     Occult Blood, UA Negative    Comprehensive metabolic panel [[id number]]  (Abnormal) Collected: 01/22/25 0911    Lab Status:  Final result Specimen: Blood from Arm, Left Updated: 01/22/25 0936     Sodium 134 mmol/L      Potassium 5.7 mmol/L      Chloride 102 mmol/L      CO2 25 mmol/L      ANION GAP 7 mmol/L      BUN 16 mg/dL      Creatinine 0.85 mg/dL      Glucose 259 mg/dL      Calcium 6.9 mg/dL      AST 92 U/L      ALT 54 U/L      Alkaline Phosphatase 69 U/L      Total Protein 6.7 g/dL      Albumin 3.7 g/dL      Total Bilirubin 0.44 mg/dL      eGFR 69 ml/min/1.73sq m     Narrative:      National Kidney Disease Foundation guidelines for Chronic Kidney Disease (CKD):     Stage 1 with normal or high GFR (GFR > 90 mL/min/1.73 square meters)    Stage 2 Mild CKD (GFR = 60-89 mL/min/1.73 square meters)    Stage 3A Moderate CKD (GFR = 45-59 mL/min/1.73 square meters)    Stage 3B Moderate CKD (GFR = 30-44 mL/min/1.73 square meters)    Stage 4 Severe CKD (GFR = 15-29 mL/min/1.73 square meters)    Stage 5 End Stage CKD (GFR <15 mL/min/1.73 square meters)  Note: GFR calculation is accurate only with a steady state creatinine    Lipase [[id number]]  (Normal) Collected: 01/22/25 0911    Lab Status: Final result Specimen: Blood from Arm, Left Updated: 01/22/25 0936     Lipase 50 u/L     Lactic acid, plasma (w/reflex if result > 2.0) [[id number]]  (Abnormal) Collected: 01/22/25 0827    Lab Status: Final result Specimen: Blood from Arm, Right Updated: 01/22/25 0902     LACTIC ACID 2.3 mmol/L     Narrative:      Result may be elevated if tourniquet was used during collection.    FLU/COVID Rapid Antigen (30 min. TAT) - Preferred screening test in ED [[id number]]  (Normal) Collected: 01/22/25 0827    Lab Status: Final result Specimen: Nares from Nose Updated: 01/22/25 0851     SARS COV Rapid Antigen Negative     Influenza A Rapid Antigen Negative     Influenza B Rapid Antigen Negative    Narrative:      This test has been performed using the [facility] 2 FLU+SARS Antigen test under the Emergency Use Authorization (EUA). This test has been validated by the   and verified by the performing laboratory. The Mary uses lateral flow immunofluorescent sandwich assay to detect SARS-COV, Influenza A and Influenza B Antigen.     The [facility] Mary 2 SARS Antigen test does not differentiate between SARS-CoV and SARS-CoV-2.     Negative results are presumptive and may be confirmed with a molecular assay, if necessary, for patient management. Negative results do not rule out SARS-CoV-2 or influenza infection and should not be used as the sole basis for treatment or patient management decisions. A negative test result may occur if the level of antigen in a sample is below the limit of detection of this test.     Positive results are indicative of the presence of viral antigens, but do not rule out bacterial infection or co-infection with other viruses.     All test results should be used as an adjunct to clinical observations and other information available to the provider.    FOR PEDIATRIC PATIENTS - copy/paste COVID Guidelines URL to browser: [email or web address]    CBC and differential [[id number]]  (Abnormal) Collected: 01/22/25 0827    Lab Status: Final result Specimen: Blood from Arm, Right Updated: 01/22/25 0833     WBC 3.84 Thousand/uL      RBC 3.86 Million/uL      Hemoglobin 11.9 g/dL      Hematocrit 36.8 %      MCV 95 fL      MCH 30.8 pg      MCHC 32.3 g/dL      RDW 13.3 %      MPV 9.7 fL      Platelets 152 Thousands/uL      nRBC 0 /100 WBCs      Segmented % 59 %      Immature Grans % 0 %      Lymphocytes % 27 %      Monocytes % 12 %      Eosinophils Relative 1 %      Basophils Relative 1 %      Absolute Neutrophils 2.28 Thousands/µL      Absolute Immature Grans 0.01 Thousand/uL      Absolute Lymphocytes 1.03 Thousands/µL      Absolute Monocytes 0.45 Thousand/µL      Eosinophils Absolute 0.05 Thousand/µL      Basophils Absolute 0.02 Thousands/µL             CT abdomen pelvis wo contrast   Final Interpretation  by Nick Dennis MD (01/22 1640)      1.  No bowel obstruction or other acute visceral abnormalities in the abdomen or pelvis.      2.  No interval progression of vertebral compression deformities.         Workstation performed: [id number]         CT abdomen pelvis with contrast   Final Interpretation by Holger Griffith MD (01/22 1014)         1. A few top normal to mildly dilated fluid-filled loops of small bowel in the central abdomen could be physiologic. An infectious or inflammatory process such as enteritis is in the differential diagnosis. Early small bowel obstruction is thought to be    less likely but not entirely excluded. No pneumatosis or extra luminal gas or fluid collection. Further clinical assessment recommended.   2. Numerous vertebral compression deformities, progressed from the prior CT from 2022, some of which however are present on the prior thoracic radiograph from 7/29/2023. Correlation for osteoporotic risk factors advised. Does the patient have midline    back tenderness to indicate acute or subacute compression fracture?         Workstation performed: [id number]             Procedures    ED Medication and Procedure Management   Prior to Admission Medications   Prescriptions Last Dose Informant Patient Reported? Taking?   Diclofenac Sodium (VOLTAREN) 1 %  Child No No   Sig: Apply 4 g topically 4 (four) times a day   Multiple Vitamin (One-Daily Multi-Vitamin) TABS   No No   Sig: Take 1 tablet by mouth daily   Omega-3 Fatty Acids (FISH OIL PO)  Child Yes No   Sig: Take 2,000 mg by mouth 2 (two) times a day   acetaminophen (TYLENOL) 325 mg tablet  Child No No   Sig: Take 3 tablets (975 mg total) by mouth 2 (two) times a day   Patient taking differently: Take 975 mg by mouth every 6 (six) hours as needed   aspirin (ECOTRIN LOW STRENGTH) 81 mg EC tablet  Child No No   Sig: Take 1 tablet (81 mg total) by mouth daily   calcium carbonate (OS-MARVEL) 600 MG tablet  Child Yes No   Sig: Take  600 mg by mouth 2 (two) times a day with meals   cholecalciferol (VITAMIN D3) 1,000 units tablet  Child Yes No   Sig: Take 1,000 Units by mouth daily   divalproex sodium (DEPAKOTE) 125 mg DR tablet   Yes No   Sig: TAKE 1 TABLET BY MOUTH AT BEDTIME WATCH FOR SEDATION   docusate sodium (COLACE) 100 mg capsule  Child Yes No   Sig: Take 100 mg by mouth 2 (two) times a day   famotidine (PEPCID) 20 mg tablet  Child No No   Sig: Take 1 tablet (20 mg total) by mouth 2 (two) times a day   gabapentin (NEURONTIN) 100 mg capsule   No No   Sig: Take 1 capsule (100 mg total) by mouth daily with dinner   lidocaine (LIDODERM) 5 %  Child No No   Sig: Apply 1 patch topically over 12 hours daily Remove & Discard patch within 12 hours or as directed by MD Do not start before May 31, 2023.   Patient taking differently: Apply 1 patch topically as needed Remove & Discard patch within 12 hours or as directed by MD   lurasidone (LATUDA) 40 mg tablet  Child Yes No   Si mg 40 mg at 8 am, 40 mg 12 pm , 80 mg at 6 pm   lurasidone (LATUDA) 80 mg tablet  Child No No   Sig: Take 1 tablet (80 mg total) by mouth daily with dinner   Patient not taking: Reported on 2024   metFORMIN (GLUCOPHAGE-XR) 500 mg 24 hr tablet   No No   Sig: Take 1 tablet by mouth once daily with breakfast   metoprolol succinate (TOPROL-XL) 25 mg 24 hr tablet   No No   Sig: Take 1 tablet (25 mg total) by mouth daily   nitroglycerin (NITROSTAT) 0.4 mg SL tablet  Child No No   Sig: Place 1 tablet (0.4 mg total) under the tongue every 5 (five) minutes as needed for chest pain   rosuvastatin (CRESTOR) 20 MG tablet   No No   Sig: Take 1 tablet (20 mg total) by mouth daily   sertraline (ZOLOFT) 100 mg tablet  Child Yes No   Sig: Take 50 mg by mouth 2 (two) times a day   sertraline (ZOLOFT) 50 mg tablet  Child Yes No   Sig: Take 50 mg by mouth 2 (two) times a day   sodium chloride 1 g tablet   No No   Sig: Take 1 tablet (1 g total) by mouth 3 (three) times a day   torsemide  (DEMADEX) 5 MG tablet   No No   Sig: Take 1 tablet by mouth once daily      Facility-Administered Medications: None     Patient's Medications   Discharge Prescriptions    ONDANSETRON (ZOFRAN-ODT) 4 MG DISINTEGRATING TABLET    Take 1 tablet (4 mg total) by mouth every 6 (six) hours as needed for vomiting or nausea       Start Date: 1/22/2025 End Date: --       Order Dose: 4 mg       Quantity: 20 tablet    Refills: 0     No discharge procedures on file.  ED SEPSIS DOCUMENTATION   Time reflects when diagnosis was documented in both MDM as applicable and the Disposition within this note       Time User Action Codes Description Comment    1/22/2025 12:18 PM Anastacia Jose [K52.9] Enteritis     1/22/2025 12:19 PM Anastacia Jose [R10.9] Abdominal pain     1/22/2025 12:19 PM Anastacia Jose [R11.2] Nausea and vomiting                  Anastacia Jose MD  01/22/25 4578

## 2025-01-23 ENCOUNTER — VBI (OUTPATIENT)
Dept: FAMILY MEDICINE CLINIC | Facility: CLINIC | Age: 70
End: 2025-01-23

## 2025-01-23 NOTE — TELEPHONE ENCOUNTER
01/23/25 9:35 AM    Patient contacted post ED visit, [facility] department spoke with patient/caregiver and outreach was successful.    Thank you.  Danny Zaidi MA  [facility]

## 2025-01-27 ENCOUNTER — NURSING HOME VISIT (OUTPATIENT)
Age: 70
End: 2025-01-27

## 2025-01-27 ENCOUNTER — TELEPHONE (OUTPATIENT)
Age: 70
End: 2025-01-27

## 2025-01-27 VITALS
WEIGHT: 166 LBS | DIASTOLIC BLOOD PRESSURE: 88 MMHG | TEMPERATURE: 97.4 F | OXYGEN SATURATION: 96 % | SYSTOLIC BLOOD PRESSURE: 132 MMHG | RESPIRATION RATE: 18 BRPM | HEART RATE: 63 BPM | BODY MASS INDEX: 28.49 KG/M2

## 2025-01-27 DIAGNOSIS — M80.00XD AGE-RELATED OSTEOPOROSIS WITH CURRENT PATHOLOGICAL FRACTURE WITH ROUTINE HEALING: ICD-10-CM

## 2025-01-27 DIAGNOSIS — F31.5 BIPOLAR AFFECTIVE DISORDER, DEPRESSED, SEVERE, WITH PSYCHOTIC BEHAVIOR (HCC): Chronic | ICD-10-CM

## 2025-01-27 DIAGNOSIS — E11.9 TYPE 2 DIABETES MELLITUS WITHOUT COMPLICATION, WITHOUT LONG-TERM CURRENT USE OF INSULIN (HCC): ICD-10-CM

## 2025-01-27 DIAGNOSIS — Z79.4 TYPE 2 DIABETES MELLITUS WITHOUT COMPLICATION, WITH LONG-TERM CURRENT USE OF INSULIN (HCC): ICD-10-CM

## 2025-01-27 DIAGNOSIS — I10 ESSENTIAL HYPERTENSION: ICD-10-CM

## 2025-01-27 DIAGNOSIS — E11.9 TYPE 2 DIABETES MELLITUS WITHOUT COMPLICATION, WITH LONG-TERM CURRENT USE OF INSULIN (HCC): ICD-10-CM

## 2025-01-27 DIAGNOSIS — N18.31 STAGE 3A CHRONIC KIDNEY DISEASE (HCC): ICD-10-CM

## 2025-01-27 DIAGNOSIS — G31.84 MILD COGNITIVE IMPAIRMENT: ICD-10-CM

## 2025-01-27 DIAGNOSIS — Z78.9 SELF-CARE DEFICIT: Primary | ICD-10-CM

## 2025-01-27 DIAGNOSIS — E78.2 MIXED HYPERLIPIDEMIA: ICD-10-CM

## 2025-01-27 PROCEDURE — 99306 1ST NF CARE HIGH MDM 50: CPT | Performed by: FAMILY MEDICINE

## 2025-01-27 RX ORDER — CHLORAL HYDRATE 500 MG
1000 CAPSULE ORAL 2 TIMES DAILY
COMMUNITY

## 2025-01-27 RX ORDER — METFORMIN HYDROCHLORIDE 500 MG/1
1000 TABLET, EXTENDED RELEASE ORAL
Status: SHIPPED
Start: 2025-01-27

## 2025-01-27 RX ORDER — LURASIDONE HYDROCHLORIDE 40 MG/1
TABLET, FILM COATED ORAL
Status: SHIPPED
Start: 2025-01-27 | End: 2025-01-27

## 2025-01-27 RX ORDER — LURASIDONE HYDROCHLORIDE 40 MG/1
TABLET, FILM COATED ORAL
Start: 2025-01-27

## 2025-01-27 RX ORDER — DOCUSATE SODIUM 100 MG/1
100 CAPSULE, LIQUID FILLED ORAL DAILY
Status: SHIPPED
Start: 2025-01-27

## 2025-01-27 NOTE — ASSESSMENT & PLAN NOTE
Chronic, managed by psychiatry, Dr Nichols. Currently on Latuda 40mg AM and 120mg PM, Zoloft 50mg BID, & Wellbutrin 100mg SR AM. Today patient alert and oriented x3 with pleasant mood.  Daughter expressed concern with current dosage of wellbutrin making mother mildly altered during the day with plan to discuss with Dr Nichols about possibly decreasing the dose or changing it to be taken at bedtime  Will continue current regimen

## 2025-01-27 NOTE — ASSESSMENT & PLAN NOTE
Lab Results   Component Value Date    EGFR 69 01/22/2025    EGFR 42 01/16/2025    EGFR 51 01/09/2025    CREATININE 0.85 01/22/2025    CREATININE 1.27 01/16/2025    CREATININE 1.09 01/09/2025   Chronic, follows Dr Lambert regularly. Currently on Torsemide 5mg PO daily and Salt tabs 1g PO TID with meals. Today patient is euvolemic and reports regular urine habits.  Continue current regimen  Monthly BMPs to be sent to [facility] for review by Dr Lambert given history of SIADH

## 2025-01-27 NOTE — PROGRESS NOTES
Complete Care at [address] [address]     History and Physical      Records Reviewed include: Hospital records      Chief Complaint/ Reason for Admission: Self-Care Deficit    History of Present Illness:    Yris Bowles 68yo F with PMH of HTN, DM2, SIADH, CKD3a, anemia, osteoporosis with hx of multiple fractures, hx of DVT, bipolar depression, and mild cognitive impairment admitted to [facility] by request of her daughter due to concerns for patient's inability to care for herself at home. Daughter reports being her primary care giver at home, which is limited due to her work scheduled. Reports that patient's , Arun Bowles, used to assist with care at home, which is no longer possible since he is a current resident at [facility]. Per daughter patient requires moderate to significant assistance with ADLs and is a big fall risk as she ambulates with walker at baseline. Reports that with progressive cognitive decline, she thought it would be best for her to be admitted for assistance with ADLs and medication management.    Unable to obtain history from patient due to dementia.   History was obtained from the daughter, Jenifer Wagner.         Allergies  No Known Allergies    Past Medical History  Past Medical History:   Diagnosis Date    Anesthesia complication     difficulty waking up    Anxiety     Arthritis     left knee    Bipolar 1 disorder (HCC)     Bone spur     left knee behing the knee cap    Cancer (HCC)     Chronic kidney disease     Chronic pain disorder     Colon cancer (HCC)     patient states about 2017    Colon polyp     Tubulovillous Adenoma High Grade Dysplasia - 2017    Depression     Diabetes mellitus (HCC)     Endometrial cancer (HCC)     grade I    Hx of bleeding disorder     vaginal bleeding started on 3/13/17     Hyperlipidemia     Hypertension     Hypomagnesemia 03/20/2023    Memory loss     PONV (postoperative nausea and vomiting)     Psychiatric  disorder     Psychiatric illness     Psychosis (HCC)     Shortness of breath     with exertion    Sleep difficulties     Urinary incontinence     Vitamin D deficiency         Past Surgical History:   Procedure Laterality Date    BREAST BIOPSY Left     benign-maybe at ar age 63    CHOLECYSTECTOMY      open    COLONOSCOPY      DILATION AND CURETTAGE OF UTERUS N/A 04/05/2017    Procedure: DILATATION AND CURETTAGE (D&C);  Surgeon: Primitivo Weber MD;  Location: [facility] MAIN OR;  Service:     HYSTERECTOMY      OOPHORECTOMY      PELVIC LAPAROSCOPY      MS ARTHRP KNE CONDYLE&PLATU MEDIAL&LAT COMPARTMENTS Left 12/06/2022    Procedure: ARTHROPLASTY KNEE TOTAL W ROBOT - CEMENTED - LEFT;  Surgeon: Juan Jose Guerra DO;  Location: [facility] MAIN OR;  Service: Orthopedics    MS LAPS TOTAL HYSTERECT 250 GM/< W/RMVL TUBE/OVARY N/A 05/04/2017    Procedure: ROBOTIC ASSISTED TOTAL LAPAROSCOPIC HYSTERECTOMY, BILATERAL SALPINGOOPHERECTOMY; CYSTOSCOPY;  Surgeon: Damir Reyes MD;  Location:  MAIN OR;  Service: Gynecology Oncology    MS OPTX FEM SHFT FX W/INSJ IMED IMPLT W/WO SCREW Left 3/20/2023    Procedure: INSERTION NAIL IM FEMUR RETROGRADE;  Surgeon: Terry White MD;  Location: [facility] MAIN OR;  Service: Orthopedics    REPLACEMENT TOTAL KNEE      TONSILLECTOMY      TUBAL LIGATION         Family History  Family History   Problem Relation Age of Onset    Cancer Mother         Renal    Heart disease Father         CHF    Dementia Sister     Heart disease Sister         atrial septal defect    Dementia Sister     Breast cancer Paternal Aunt 45       Social History  Social History     Tobacco Use   Smoking Status Never    Passive exposure: Never   Smokeless Tobacco Never      Social History     Substance and Sexual Activity   Alcohol Use Never      Social History     Substance and Sexual Activity   Drug Use No        [facility]: [facility],  Social Support: Jenifer Wagner (Daughter) & Arun Bowles (/Co-resident)  Fall in the  past 12 months: yes  Use of assistance Device: Walker    Physical Exam    Vital Signs     Vitals:    01/27/25 1636   BP: 132/88   Pulse: 63   Resp: 18   Temp: (!) 97.4 °F (36.3 °C)   SpO2: 96%         Constitutional: Frail appearing patient      Physical Exam  Constitutional:       General: She is not in acute distress.     Appearance: She is not ill-appearing.   HENT:      Mouth/Throat:      Mouth: Mucous membranes are moist.      Pharynx: Oropharynx is clear.   Eyes:      Conjunctiva/sclera: Conjunctivae normal.      Pupils: Pupils are equal, round, and reactive to light.   Cardiovascular:      Rate and Rhythm: Normal rate and regular rhythm.      Pulses: Normal pulses.      Heart sounds: Normal heart sounds.   Pulmonary:      Effort: Pulmonary effort is normal. No respiratory distress.      Breath sounds: Normal breath sounds.   Abdominal:      General: Bowel sounds are normal. There is no distension.      Palpations: Abdomen is soft.      Tenderness: There is no abdominal tenderness.   Musculoskeletal:      Right lower leg: No edema.      Left lower leg: No edema.   Skin:     General: Skin is warm and dry.      Capillary Refill: Capillary refill takes less than 2 seconds.   Neurological:      General: No focal deficit present.      Mental Status: She is alert and oriented to person, place, and time.      Motor: No weakness.   Psychiatric:         Mood and Affect: Mood normal.         Review of Systems:  Review of Systems   Constitutional:  Negative for chills and fever.   HENT:  Negative for ear pain and sore throat.    Eyes:  Negative for pain and visual disturbance.   Respiratory:  Negative for cough and shortness of breath.    Cardiovascular:  Negative for chest pain and palpitations.   Gastrointestinal:  Negative for abdominal pain and vomiting.   Genitourinary:  Negative for dysuria and hematuria.   Musculoskeletal:  Negative for arthralgias and back pain.   Skin:  Negative for color change and rash.    Neurological:  Negative for seizures and syncope.   Psychiatric/Behavioral:  Negative for confusion.    All other systems reviewed and are negative.      List of Current Medications:    Medication reviewed. All orders signed. Complete list is in the paper chart.     Allergies  No Known Allergies    Labs/Diagnostics (reviewed by this provider):     I personally reviewed lab results and imaging studies. Full reports are in the paper chart.     Assessment/Plan:      Bipolar affective disorder, depressed, severe, with psychotic behavior (HCC)  Chronic, managed by psychiatry, Dr Nichols. Currently on Latuda 40mg AM and 120mg PM, Zoloft 50mg BID, & Wellbutrin 100mg SR AM. Today patient alert and oriented x3 with pleasant mood.  Daughter expressed concern with current dosage of wellbutrin making mother mildly altered during the day with plan to discuss with Dr Nichols about possibly decreasing the dose or changing it to be taken at bedtime  Will continue current regimen    Stage 3a chronic kidney disease (HCC)  Lab Results   Component Value Date    EGFR 69 01/22/2025    EGFR 42 01/16/2025    EGFR 51 01/09/2025    CREATININE 0.85 01/22/2025    CREATININE 1.27 01/16/2025    CREATININE 1.09 01/09/2025   Chronic, follows Dr Lambert regularly. Currently on Torsemide 5mg PO daily and Salt tabs 1g PO TID with meals. Today patient is euvolemic and reports regular urine habits.  Continue current regimen  Monthly BMPs to be sent to [facility] for review by Dr Lambert given history of SIADH    Age-related osteoporosis with current pathological fracture with routine healing  Dexa on 9/2023 significant for 10 year risk of hip fracture is 12.6% with the 10 year risk of major osteoporotic fracture being 31.9%. History of vertebral compression fracture, femur fracture, and s/p total L knee replacement.  Currently on Prolia Q6mo, next dose in 5/2025  Continue Calcium 600mg PO BID and Vit D 2000 units PO daily    Type 2 diabetes mellitus  without complication, without long-term current use of insulin ([facility])  Lab Results   Component Value Date    HGBA1C 7.7 (H) 11/08/2024   Chronic with improving A1c from 7.9 to 7.7. Currently on Metformin 500mg PO BID.  Continue current regimen  F/U repeat A1c in February  Diabetic Diet  Continue gabapentin 100mg PO daily for neuropathy    Essential hypertension  Chronic, well controlled. History of exertional dyspnea, followed by Dr Medina. Currently on metoprolol 25mg daily.  Continue current regimen  Follow-up cardiology appointment scheduled 1/31/25    Mixed hyperlipidemia  Chronic, stable. Last lipid panel 11/2024 with high triglycerides and low HDL  Continue Crestor 20mg daily & Fish oil 1000mg BID    Self-care deficit  Admitted to [facility] due to inability to perform ADLs, gait instability, and progressive cognitive decline per daughter.   PT/OT  Fall precautions    Mild cognitive impairment  MOCA performed at bedside scored 24 points, suggestive of mild cognitive impairment.  Continue to monitor for signs of worsening cognitive decline          Pain: 0/10  Rehab Potential:Fair  Patient Informed of Medical Condition: Yes   Patient is Capable of Understanding Their Right: Yes   Discharge Plan: LTR  Vaccination:   Immunization History   Administered Date(s) Administered    COVID-19 Moderna Vac BIVALENT 12 Yr+ IM 0.5 ML 10/19/2022    INFLUENZA 10/19/2020, 12/10/2021, 10/16/2022    Influenza Split High Dose Preservative Free IM 11/11/2024    Influenza, Seasonal Vaccine, Quadrivalent, Adjuvanted, .5e 12/10/2021    Influenza, high dose seasonal 0.7 mL 10/16/2022, 10/30/2023    Pneumococcal Conjugate 13-Valent 01/04/2019    Unknown 05/14/2021, 06/11/2021    influenza, trivalent, adjuvanted 10/19/2020     Advanced Directives: Health Care Proxy: Jenifer Wagner (daughter)   Code status:Full Code  PCP: DO Jeff Brush MD  [facility]   1/27/2025 5:12 PM

## 2025-01-27 NOTE — ASSESSMENT & PLAN NOTE
Chronic, well controlled. History of exertional dyspnea, followed by Dr Medina. Currently on metoprolol 25mg daily.  Continue current regimen  Follow-up cardiology appointment scheduled 1/31/25

## 2025-01-27 NOTE — TELEPHONE ENCOUNTER
Leanna from [facility] called to ask for the medication list of the patient to add it in the system since the patient's daughter provide a list but she not sure which medication her mother is taking or not and the patient still not receive the medication today waiting for the list.     Fax number [phone]

## 2025-01-27 NOTE — TELEPHONE ENCOUNTER
Called daughter and went over medication list with her to verify what she is currently taking. Printed the list and Dr Prakash is updating the list. Printing and faxing list to [facility] on [address]. No further action.  Alena Durant MA

## 2025-01-27 NOTE — ASSESSMENT & PLAN NOTE
Admitted to  [facility] due to inability to perform ADLs, gait instability, and progressive cognitive decline per daughter.   PT/OT  Fall precautions

## 2025-01-27 NOTE — ASSESSMENT & PLAN NOTE
Chronic, stable. Last lipid panel 11/2024 with high triglycerides and low HDL  Continue Crestor 20mg daily & Fish oil 1000mg BID

## 2025-01-27 NOTE — ASSESSMENT & PLAN NOTE
MOCA performed at bedside scored 24 points, suggestive of mild cognitive impairment.  Continue to monitor for signs of worsening cognitive decline

## 2025-01-27 NOTE — ASSESSMENT & PLAN NOTE
Lab Results   Component Value Date    HGBA1C 7.7 (H) 11/08/2024   Chronic with improving A1c from 7.9 to 7.7. Currently on Metformin 500mg PO BID.  Continue current regimen  F/U repeat A1c in February  Diabetic Diet  Continue gabapentin 100mg PO daily for neuropathy

## 2025-01-31 ENCOUNTER — OFFICE VISIT (OUTPATIENT)
Dept: CARDIOLOGY CLINIC | Facility: CLINIC | Age: 70
End: 2025-01-31
Payer: MEDICARE

## 2025-01-31 VITALS
DIASTOLIC BLOOD PRESSURE: 64 MMHG | BODY MASS INDEX: 28.17 KG/M2 | WEIGHT: 165 LBS | HEART RATE: 84 BPM | OXYGEN SATURATION: 96 % | HEIGHT: 64 IN | SYSTOLIC BLOOD PRESSURE: 102 MMHG

## 2025-01-31 DIAGNOSIS — I10 ESSENTIAL HYPERTENSION: ICD-10-CM

## 2025-01-31 DIAGNOSIS — I15.9 SECONDARY HYPERTENSION: ICD-10-CM

## 2025-01-31 DIAGNOSIS — R00.2 PALPITATIONS: ICD-10-CM

## 2025-01-31 DIAGNOSIS — R07.9 CHEST PAIN IN ADULT: ICD-10-CM

## 2025-01-31 DIAGNOSIS — R06.09 DYSPNEA ON EXERTION: Primary | ICD-10-CM

## 2025-01-31 PROCEDURE — 99214 OFFICE O/P EST MOD 30 MIN: CPT | Performed by: INTERNAL MEDICINE

## 2025-01-31 PROCEDURE — 93000 ELECTROCARDIOGRAM COMPLETE: CPT | Performed by: INTERNAL MEDICINE

## 2025-01-31 RX ORDER — BUPROPION HYDROCHLORIDE 100 MG/1
100 TABLET, EXTENDED RELEASE ORAL DAILY
COMMUNITY
Start: 2025-01-28

## 2025-01-31 NOTE — PROGRESS NOTES
[facility]  [address] [facility]   [place] [address]  Cardiology Follow-up  Yris Bowles  1955  [id number]  [facility]  [address] [facility]  [address]  [facility] [id number] [phone]    1. Dyspnea on exertion  POCT ECG      2. Palpitations  POCT ECG      3. Essential hypertension  POCT ECG      4. Secondary hypertension  POCT ECG      5. Chest pain in adult  POCT ECG         Discussion/Summary:     Recent Enteritis- stable. At rehab.    Exertional dyspnea- secondary to deconditioning. Heart rate improved  No hx of smoking. Continue metoprolol therapy w25mg. Improve exercise conditioning.    Trace mitral regurgitation- unchanged on auscultation    Diabetes mellitus- currently on metformin + lantus    Hld- rosuvastatin + aspirin. Contolled. Continue omega 3 supplementation.  Recheck lipids in 6 months      History:   64-year-old woman with long history of diabetes mellitus, family history for CAD presents with worsening exertional shortness of breath.  She reports with light activities feeling significant dyspnea and chest tightness.  She had a recent nuclear stress test which did not show focal ischemia but did have some transient ischemic dilatation.  She she has not had no prior history of myocardial infarction or CVA.  No prior history of atrial fibrillation.  She has periodic lower extremity edema .  Her blood pressures have been controlled.  She is compliant with her medications.    02/11/2019:  She continues to have exertional shortness of breath.  She is not challenge herself with exercise.  Her daughter is present and states she has been non compliant.  She is compliant with her medications.  We have not received her repeat blood work.    09/28/2020:  She denies having recurrence of chest discomfort.  She is improving her shortness of breath.  She is using an exercise bike.  She will try improve on her  conditioning.  She is compliant with her medications.  She has had recent blood work.  We need to obtain her last PCP blood work.    07/20/2021:  She has been compliant with her medications.  She denies having any chest discomfort.  She denies having exertional shortness of breath.  Her last blood work showed a cholesterol was controlled.    11/01/2022:  Recent hospitalization secondary to hyponatremia.  She was discharged on sodium chloride tablets with torsemide.  She currently denies having active chest discomfort.  Her shortness of breath is at her baseline.  She has not had major lower extremity swelling.  She has been mentating well.    8/7/2023: Her sodiums have been stable.  She had an unfortunate fall.  She is currently in sinus rhythm.  She has had significant weight loss secondary to low calorie intake.    1/31/2025: She was hospitalized for enteritis.  Reviewed hospital events.  She remains in normal sinus rhythm.  Blood pressures are stable.  Last lipids were at goal.  She is compliant with medical therapy.  Denies major weight gain.  No swelling in the legs.    PMH  Diabetes Mellitus- 15 years      Past Medical History:   Diagnosis Date    Anesthesia complication     difficulty waking up    Anxiety     Arthritis     left knee    Bipolar 1 disorder (HCC)     Bone spur     left knee behing the knee cap    Cancer (HCC)     Chronic kidney disease     Chronic pain disorder     Colon cancer (HCC)     patient states about 2017    Colon polyp     Tubulovillous Adenoma High Grade Dysplasia - 2017    Depression     Diabetes mellitus (HCC)     Endometrial cancer (HCC)     grade I    Hx of bleeding disorder     vaginal bleeding started on 3/13/17     Hyperlipidemia     Hypertension     Hypomagnesemia 03/20/2023    Memory loss     PONV (postoperative nausea and vomiting)     Psychiatric disorder     Psychiatric illness     Psychosis (HCC)     Shortness of breath     with exertion    Sleep difficulties     Urinary  incontinence     Vitamin D deficiency      Social History     Socioeconomic History    Marital status: /Civil Union     Spouse name: Not on file    Number of children: Not on file    Years of education: Not on file    Highest education level: Not on file   Occupational History    Not on file   Tobacco Use    Smoking status: Never     Passive exposure: Never    Smokeless tobacco: Never   Vaping Use    Vaping status: Never Used   Substance and Sexual Activity    Alcohol use: Never    Drug use: No    Sexual activity: Not Currently     Birth control/protection: Post-menopausal, Surgical   Other Topics Concern    Not on file   Social History Narrative    Not on file     Social Drivers of Health     Financial Resource Strain: Low Risk  (7/28/2023)    Overall Financial Resource Strain (CARDIA)     Difficulty of Paying Living Expenses: Not hard at all   Food Insecurity: No Food Insecurity (8/12/2024)    Nursing - Inadequate Food Risk Classification     Worried About Running Out of Food in the Last Year: Never true     Ran Out of Food in the Last Year: Never true     Ran Out of Food in the Last Year: Not on file   Transportation Needs: No Transportation Needs (8/12/2024)    PRAPARE - Transportation     Lack of Transportation (Medical): No     Lack of Transportation (Non-Medical): No   Physical Activity: Not on file   Stress: Not on file   Social Connections: Unknown (6/18/2024)    Received from [facility]    Social Inspro     How often do you feel lonely or isolated from those around you? (Adult - for ages 18 years and over): Not on file   Intimate Partner Violence: Not on file   Housing Stability: Unknown (8/12/2024)    Housing Stability Vital Sign     Unable to Pay for Housing in the Last Year: No     Number of Times Moved in the Last Year: Not on file     Homeless in the Last Year: No      Family History   Problem Relation Age of Onset    Cancer Mother         Renal    Heart disease Father         CHF     Dementia Sister     Heart disease Sister         atrial septal defect    Dementia Sister     Breast cancer Paternal Aunt 45     Past Surgical History:   Procedure Laterality Date    BREAST BIOPSY Left     benign-maybe at ar age 63    CHOLECYSTECTOMY      open    COLONOSCOPY      DILATION AND CURETTAGE OF UTERUS N/A 04/05/2017    Procedure: DILATATION AND CURETTAGE (D&C);  Surgeon: Primitivo Weber MD;  Location: [facility] MAIN OR;  Service:     HYSTERECTOMY      OOPHORECTOMY      PELVIC LAPAROSCOPY      LA ARTHRP KNE CONDYLE&PLATU MEDIAL&LAT COMPARTMENTS Left 12/06/2022    Procedure: ARTHROPLASTY KNEE TOTAL W ROBOT - CEMENTED - LEFT;  Surgeon: Juan Jose Guerra DO;  Location: [facility] MAIN OR;  Service: Orthopedics    LA LAPS TOTAL HYSTERECT 250 GM/< W/RMVL TUBE/OVARY N/A 05/04/2017    Procedure: ROBOTIC ASSISTED TOTAL LAPAROSCOPIC HYSTERECTOMY, BILATERAL SALPINGOOPHERECTOMY; CYSTOSCOPY;  Surgeon: Damir Reyes MD;  Location:  MAIN OR;  Service: Gynecology Oncology    LA OPTX FEM SHFT FX W/INSJ IMED IMPLT W/WO SCREW Left 3/20/2023    Procedure: INSERTION NAIL IM FEMUR RETROGRADE;  Surgeon: Terry White MD;  Location: [facility] MAIN OR;  Service: Orthopedics    REPLACEMENT TOTAL KNEE      TONSILLECTOMY      TUBAL LIGATION         Current Outpatient Medications:     aspirin (ECOTRIN LOW STRENGTH) 81 mg EC tablet, Take 1 tablet (81 mg total) by mouth daily, Disp: 30 tablet, Rfl: 5    buPROPion (WELLBUTRIN SR) 100 mg 12 hr tablet, Take 100 mg by mouth in the morning, Disp: , Rfl:     calcium carbonate (OS-MAVREL) 600 MG tablet, Take 600 mg by mouth 2 (two) times a day with meals, Disp: , Rfl:     cholecalciferol (VITAMIN D3) 1,000 units tablet, Take 1,000 Units by mouth daily, Disp: , Rfl:     docusate sodium (COLACE) 100 mg capsule, Take 1 capsule (100 mg total) by mouth daily, Disp: , Rfl:     famotidine (PEPCID) 20 mg tablet, Take 1 tablet (20 mg total) by mouth 2 (two) times a day, Disp: 180 tablet, Rfl: 1    gabapentin  "(NEURONTIN) 100 mg capsule, Take 1 capsule (100 mg total) by mouth daily with dinner, Disp: , Rfl:     lurasidone (LATUDA) 40 mg tablet, Take 1 tablet (40 mg total) by mouth daily with breakfast AND 3 tablets (120 mg total) daily with dinner., Disp: , Rfl:     metFORMIN (GLUCOPHAGE-XR) 500 mg 24 hr tablet, Take 2 tablets (1,000 mg total) by mouth daily with breakfast (Patient taking differently: Take 500 mg by mouth 2 (two) times a day with meals), Disp: , Rfl:     metoprolol succinate (TOPROL-XL) 25 mg 24 hr tablet, Take 1 tablet (25 mg total) by mouth daily, Disp: 90 tablet, Rfl: 0    Multiple Vitamin (One-Daily Multi-Vitamin) TABS, Take 1 tablet by mouth daily, Disp: 90 tablet, Rfl: 1    nitroglycerin (NITROSTAT) 0.4 mg SL tablet, Place 1 tablet (0.4 mg total) under the tongue every 5 (five) minutes as needed for chest pain, Disp: 30 tablet, Rfl: 1    Omega-3 Fatty Acids (fish oil) 1,000 mg, Take 1,000 mg by mouth 2 (two) times a day, Disp: , Rfl:     rosuvastatin (CRESTOR) 20 MG tablet, Take 1 tablet (20 mg total) by mouth daily, Disp: 90 tablet, Rfl: 1    sertraline (ZOLOFT) 100 mg tablet, Take 50 mg by mouth 2 (two) times a day, Disp: , Rfl:     sodium chloride 1 g tablet, Take 1 tablet (1 g total) by mouth 3 (three) times a day, Disp: 270 tablet, Rfl: 0    torsemide (DEMADEX) 5 MG tablet, Take 1 tablet by mouth once daily, Disp: 90 tablet, Rfl: 1    lidocaine (LIDODERM) 5 %, Apply 1 patch topically over 12 hours daily Remove & Discard patch within 12 hours or as directed by MD Do not start before May 31, 2023. (Patient not taking: Reported on 1/31/2025), Disp: 30 patch, Rfl: 0  No Known Allergies    Vitals:    01/31/25 1523 01/31/25 1531 01/31/25 1534   BP: 118/72 118/64 102/64   BP Location: Right arm Right arm Right arm   Patient Position: Supine Sitting Standing   Cuff Size: Standard Standard Standard   Pulse: 71 71 84   SpO2: 96%     Weight: 74.8 kg (165 lb)     Height: 5' 4\" (1.626 m)         Review of " Systems:   Review of Systems   Constitutional:  Positive for fatigue. Negative for activity change, appetite change, chills, diaphoresis, fever and unexpected weight change.   HENT: Negative.  Negative for congestion, dental problem, drooling, ear discharge, ear pain, facial swelling, hearing loss, mouth sores, nosebleeds, postnasal drip, rhinorrhea, sinus pressure, sinus pain, sneezing, sore throat, tinnitus, trouble swallowing and voice change.    Eyes: Negative.  Negative for photophobia, pain, redness, itching and visual disturbance.   Respiratory:  Positive for shortness of breath. Negative for apnea, cough, choking, chest tightness, wheezing and stridor.    Cardiovascular:  Negative for chest pain, palpitations and leg swelling.   Gastrointestinal: Negative.  Negative for abdominal distention, abdominal pain, anal bleeding, blood in stool, constipation, diarrhea, nausea, rectal pain and vomiting.   Endocrine: Negative.  Negative for cold intolerance, heat intolerance, polydipsia, polyphagia and polyuria.   Genitourinary: Negative.  Negative for decreased urine volume, difficulty urinating, dyspareunia, dysuria, enuresis, flank pain, frequency, genital sores, hematuria, menstrual problem, pelvic pain, urgency, vaginal bleeding, vaginal discharge and vaginal pain.   Musculoskeletal:  Positive for gait problem. Negative for arthralgias, back pain, joint swelling, myalgias, neck pain and neck stiffness.   Skin: Negative.  Negative for color change, pallor, rash and wound.   Allergic/Immunologic: Negative.  Negative for environmental allergies, food allergies and immunocompromised state.   Neurological:  Negative for dizziness, tremors, seizures, syncope, facial asymmetry, speech difficulty, weakness, light-headedness, numbness and headaches.   Hematological: Negative.  Negative for adenopathy. Does not bruise/bleed easily.   Psychiatric/Behavioral: Negative.  Negative for agitation, behavioral problems, confusion,  "decreased concentration, dysphoric mood, hallucinations, self-injury, sleep disturbance and suicidal ideas. The patient is not nervous/anxious and is not hyperactive.    All other systems reviewed and are negative.      Vitals:    01/31/25 1523 01/31/25 1531 01/31/25 1534   BP: 118/72 118/64 102/64   BP Location: Right arm Right arm Right arm   Patient Position: Supine Sitting Standing   Cuff Size: Standard Standard Standard   Pulse: 71 71 84   SpO2: 96%     Weight: 74.8 kg (165 lb)     Height: 5' 4\" (1.626 m)       Physical Examination:   Physical Exam  Constitutional:       General: She is not in acute distress.     Appearance: She is well-developed. She is ill-appearing. She is not diaphoretic.   HENT:      Head: Normocephalic and atraumatic.      Right Ear: External ear normal.      Left Ear: External ear normal.   Eyes:      General: No scleral icterus.        Right eye: No discharge.         Left eye: No discharge.      Conjunctiva/sclera: Conjunctivae normal.      Pupils: Pupils are equal, round, and reactive to light.   Neck:      Thyroid: No thyromegaly.      Vascular: No JVD.      Trachea: No tracheal deviation.   Cardiovascular:      Rate and Rhythm: Normal rate and regular rhythm.      Heart sounds: Murmur heard.      No friction rub. Gallop present.   Pulmonary:      Effort: Pulmonary effort is normal. No respiratory distress.      Breath sounds: Normal breath sounds. No stridor. No wheezing or rales.   Chest:      Chest wall: No tenderness.   Abdominal:      General: Bowel sounds are normal. There is no distension.      Palpations: Abdomen is soft. There is no mass.      Tenderness: There is no abdominal tenderness. There is no guarding or rebound.   Musculoskeletal:         General: No tenderness or deformity. Normal range of motion.      Cervical back: Normal range of motion and neck supple.   Skin:     General: Skin is warm and dry.      Coloration: Skin is not pale.      Findings: No erythema or " rash.   Neurological:      Mental Status: She is alert and oriented to person, place, and time.      Cranial Nerves: No cranial nerve deficit.      Motor: No abnormal muscle tone.      Coordination: Coordination normal.      Deep Tendon Reflexes: Reflexes are normal and symmetric. Reflexes normal.   Psychiatric:         Behavior: Behavior normal.         Thought Content: Thought content normal.         Judgment: Judgment normal.         Labs:     Lab Results   Component Value Date    WBC 3.84 (L) 2025    HGB 11.9 2025    HCT 36.8 2025    MCV 95 2025    RDW 13.3 2025     2025     BMP:  Lab Results   Component Value Date    SODIUM 134 (L) 2025    K 5.7 (H) 2025     2025    CO2 25 2025    BUN 16 2025    CREATININE 0.85 2025    GLUC 259 (H) 2025    GLUF 165 (H) 2024    CALCIUM 6.9 (L) 2025    CORRECTEDCA 9.6 2023    EGFR 69 2025    MG 1.7 (L) 2023     LFT:  Lab Results   Component Value Date    AST 92 (H) 2025    ALT 54 (H) 2025    ALKPHOS 69 2025    TP 6.7 2025    ALB 3.7 2025      Lab Results   Component Value Date    UOL1MJMTHPDJ 1.091 2024     Lab Results   Component Value Date    HGBA1C 7.7 (H) 2024       Imaging & Testing   I have personally reviewed pertinent reports.      Cardiac Testing     Results for orders placed during the hospital encounter of 10/25/19   Echo complete with contrast if indicated    Narrative [address] [address] [phone]    Transthoracic Echocardiogram  2D, M-mode, Doppler, and Color Doppler    Study date:  25-Oct-2019    Patient: MELE BARLOW  MR number: [id number]  Account number: [id number]  : 1955  Age: 64 years  Gender: Female  Status: Outpatient  Location: Echo lab  Height: 65 in  Weight: 190.5 lb  BP: 134/ 75 mmHg    Indications: SOB    Diagnoses:  R06.02 - Shortness of breath    Sonographer:  VALERIE Adorno  Primary Physician:  Lonnie Rodriguez MD  Referring Physician:  Lonnie Rodriguez MD  Group:  [facility]  Interpreting Physician:  Michelle Da Silva DO    SUMMARY    LEFT VENTRICLE:  Systolic function was normal by visual assessment. Ejection fraction was estimated to be 55 %.  There were no regional wall motion abnormalities.  There was mild concentric hypertrophy.  Doppler parameters were consistent with abnormal left ventricular relaxation (grade 1 diastolic dysfunction).    MITRAL VALVE:  There was mild regurgitation.    AORTIC VALVE:  There was no evidence for stenosis.  There was no regurgitation.    TRICUSPID VALVE:  There was mild regurgitation.  Pulmonary artery systolic pressure was within the normal range.    HISTORY: PRIOR HISTORY: Diabetes, Endometrial Cancer and Colon Cancer,HTN,hyperlipidemia.    PROCEDURE: The procedure was performed in the echo lab. This was a routine study. The transthoracic approach was used. The study included complete 2D imaging, M-mode, complete spectral Doppler, and color Doppler. The heart rate was 81 bpm,  at the start of the study. Image quality was adequate.    LEFT VENTRICLE: Size was normal. Systolic function was normal by visual assessment. Ejection fraction was estimated to be 55 %. There were no regional wall motion abnormalities. There was mild concentric hypertrophy. DOPPLER: Doppler  parameters were consistent with abnormal left ventricular relaxation (grade 1 diastolic dysfunction).    RIGHT VENTRICLE: The size was normal. Systolic function was normal. DOPPLER: Systolic pressure was within the normal range.    LEFT ATRIUM: The atrium was mildly dilated.    RIGHT ATRIUM: Size was normal.    MITRAL VALVE: Valve structure was normal. There was normal leaflet separation. No echocardiographic evidence for prolapse. DOPPLER: The transmitral velocity was within the normal range. There was  no evidence for stenosis. There was mild  regurgitation.    AORTIC VALVE: The valve was trileaflet. Leaflets exhibited normal thickness and normal cuspal separation. DOPPLER: Transaortic velocity was within the normal range. There was no evidence for stenosis. There was no regurgitation.    TRICUSPID VALVE: The valve structure was normal. There was normal leaflet separation. DOPPLER: The transtricuspid velocity was within the normal range. There was mild regurgitation. Pulmonary artery systolic pressure was within the normal  range. Estimated peak PA pressure was 30 mmHg.    PULMONIC VALVE: Leaflets exhibited normal thickness, no calcification, and normal cuspal separation. DOPPLER: The transpulmonic velocity was within the normal range. There was no regurgitation.    PERICARDIUM: There was no thickening. There was no pericardial effusion.    AORTA: The root exhibited normal size.    PULMONARY ARTERY: The size was normal. The morphology appeared normal.    SYSTEM MEASUREMENT TABLES    2D mode  AoR Diam 2D: 3.2 cm  LA Diam (2D): 4 cm  LA/Ao (2D): 1.25  FS (2D Teich): 27.7 %  IVSd (2D): 1.09 cm  LVDEV: 129 cmï¾³  LVESV: 60 cmï¾³  LVIDd(2D): 5.19 cm  LVISd (2D): 3.75 cm  LVOT Area 2D: 3.14 cmï¾²  LVPWd (2D): 1.13 cm  SV (Teich): 69 cmï¾³    Apical four chamber  LVEF A4C: 59 %    Unspecified Scan Mode  ARLENE Cont Eq (Peak Andres): 2.4 cmï¾²  LVOT Diam.: 2 cm  LVOT Vmax: 1160 mm/s  LVOT Vmax; Mean: 1160 mm/s  Peak Grad.; Mean: 5 mm[Hg]  MV Peak A Andres: 755 mm/s  MV Peak E Andrse. Mean: 607 mm/s  MVA (PHT): 4.07 cmï¾²  PHT: 54 ms  Max P mm[Hg]  V Max: 2100 mm/s  Vmax: 2210 mm/s  RA Area: 16.5 cmï¾²  RA Volume: 40.4 cmï¾³  TAPSE: 1.6 cm    [employer] Accredited Echocardiography Laboratory    Prepared and electronically signed by    Michelle Da Silva DO  Signed 25-Oct-2019 17:50:25         Results for orders placed during the hospital encounter of 10/17/19   NM myocardial perfusion spect (rx stress and/or rest)     Narrative [address] [address]  [phone]    Rest/Stress Gated SPECT Myocardial Perfusion Imaging After Regadenoson    Patient: MELE BARLOW  MR number: [id number]  Account number: [id number]  : 1955  Age: 64 years  Gender: Female  Status: Outpatient  Location: Stress lab  Height: 64 in  Weight: 190 lb  BP: 129/ 64 mmHg    Allergies: NO KNOWN ALLERGIES    Diagnosis: I10. - Essential (primary) hypertension    Primary Physician:  Lonnie Rodriguez MD  Technician:  Kayli Welch  RN:  Denita Ribeiro RN  Referring Physician:  Lonnie Rodriguez MD  Group:  [facility]  Report Prepared By::  KENTRELL Meyers  RN:  KENTRELL Meyers  Interpreting Physician:  Reji Medina MD    INDICATIONS: Evaluation for coronary artery disease.    HISTORY: The patient is a 64 year old  female. Chest pain status: no chest pain. Other symptoms: dyspnea. Coronary artery disease risk factors: dyslipidemia, hypertension, family history of premature coronary artery disease, and  diabetes mellitus. Cardiovascular history: none significant. Co-morbidity: obesity. Medications: a lipid lowering agent, an antihypertensive agent, and diabetic medications.    PHYSICAL EXAM: Baseline physical exam screening: no wheezes audible.    REST ECG: RBBB Normal sinus rhythm.    PROCEDURE: The study was performed in the the Stress lab. A regadenoson infusion pharmacologic stress test was performed. Gated SPECT myocardial perfusion imaging was performed after stress and at rest. Systolic blood pressure was 129  mmHg, at the start of the study. Diastolic blood pressure was 64 mmHg, at the start of the study. The heart rate was 72 bpm, at the start of the study. IV double checked.  Regadenoson protocol:  Time HR bpm SBP mmHg DBP mmHg Symptoms Rhythm/conduct  Baseline 09:14 72 129 64 none NSR  Immediate 09:18 90 140 72 mild dyspnea same as above  1 min 09:19 94 138 64 same as  above --  2 min 09:20 90 126 58 subsiding --  3 min 09:21 89 123 58 none same as above  No medications or fluids given.    STRESS SUMMARY: Duration of pharmacologic stress was 3 min. Maximal heart rate during stress was 101 bpm. The heart rate response to stress was normal. There was normal resting blood pressure with an appropriate response to stress. The  rate-pressure product for the peak heart rate and blood pressure was 11358. There was no chest pain during stress. The stress test was terminated due to protocol completion. Pre oxygen saturation: 96 %. Peak oxygen saturation: 98 %. The  stress ECG was equivocal for ischemia. There were no stress arrhythmias or conduction abnormalities.    ISOTOPE ADMINISTRATION:  Resting isotope administration Stress isotope administration  Agent Tetrofosmin Tetrofosmin  Dose 11 mCi 33 mCi  Date 10/17/2019 10/17/2019    The radiopharmaceutical was injected at the peak effect of pharmacologic stress.    MYOCARDIAL PERFUSION IMAGING:  The image quality was good. Rotating projection images reveal mild breast attenuation and prone imaging completed for attenuation correction. Left ventricular size was normal. The TID ratio was 1.2.    PERFUSION DEFECTS:  -  There was a small, paradoxical (reverse redistribution) myocardial perfusion defect of the apical wall likely due to low counts.  -  There was a reversible myocardial perfusion defect of the anteroseptal wall which improved with prone imaging likely due to attenuation from breast tissue.    GATED SPECT:  The calculated left ventricular ejection fraction was 60 %. Left ventricular ejection fraction was within normal limits by visual estimate.    SUMMARY:  -  Stress results: There was no chest pain during stress.  -  ECG conclusions: The stress ECG was equivocal for ischemia.  -  Perfusion imaging: There was a small, paradoxical (reverse redistribution) myocardial perfusion defect of the apical wall likely due to low counts. There  "was a reversible myocardial perfusion defect of the anteroseptal wall which  improved with prone imaging likely due to attenuation from breast tissue.  -  Gated SPECT: The calculated left ventricular ejection fraction was 60 %. Left ventricular ejection fraction was within normal limits by visual estimate.  -  Impressions and recommendations: Likely normal study after pharmacologic vasodilation. No evidence of prior infarction and no focal reversible perfusion defect with preserved ejection fraction. Cannot rule out balanced ischemia from  pharmacological vasodilation study but TID ratio of 1.2 and preserved EF.    IMPRESSIONS: Likely normal study after pharmacologic vasodilation. No evidence of prior infarction and no focal reversible perfusion defect with preserved ejection fraction. Cannot rule out balanced ischemia from pharmacological  vasodilation study but TID ratio of 1.2 and preserved EF.    Prepared and signed by    Reji Medina MD  Signed 10/18/2019 10:59:38         EKG: Personally reviewed.    Normal sinus rhythm incomplete rbbb unchanged from prior EKG      Reji Medina MD [facility]  [phone]  Please call with any questions or suggestions    Counseling :  A description of the counseling:   Goals and Barriers:  Patient's ability to self care:  Medication side effect reviewed with patient in detail and all their questions answered.    \"Portions of the record may have been created with voice recognition software. Occasional wrong word or \"sound a like\" substitutions may have occurred due to the inherent limitations of voice recognition software. Read the chart carefully and recognize, using context, where substitutions have occurred. Please call if you have any questions. \"    "

## 2025-02-04 ENCOUNTER — TELEPHONE (OUTPATIENT)
Dept: NEPHROLOGY | Facility: CLINIC | Age: 70
End: 2025-02-04

## 2025-02-04 ENCOUNTER — PATIENT MESSAGE (OUTPATIENT)
Dept: NEPHROLOGY | Facility: CLINIC | Age: 70
End: 2025-02-04

## 2025-02-04 NOTE — PATIENT COMMUNICATION
Sia from [facility] calling in regards to patient.    Requested fax number so she can fax patients most recent lab results.    Provided a phone number of [phone] and she is on the first floor.    When labs received requested provider review if any changes are needed.

## 2025-02-05 ENCOUNTER — TELEPHONE (OUTPATIENT)
Dept: NEPHROLOGY | Facility: CLINIC | Age: 70
End: 2025-02-05

## 2025-02-05 NOTE — TELEPHONE ENCOUNTER
I called the patient's daughter and we spoke over the phone.   Recent laboratory data were reviewed.     Lab Results   Component Value Date    SODIUM 143 02/03/2025    K 4.4 02/03/2025     02/03/2025    CO2 30 02/03/2025    BUN 22 02/03/2025    CREATININE 1.33 02/03/2025    GLUC 182 02/03/2025    CALCIUM 9.5 02/03/2025     Na is up to 143 - this is normal but higher than usual.   SCr is slightly up to 1.33.   Jenifer suspects that Mom is not taking as much fluid intake since being in the SNF.   Patient will likely be at the SNF for the foreseeable future.     Plan:  Will ask SNF to chart oral fluid intake x 3 days.   No change to FR, salt tabs and Torsemide for now.   Recheck BMP in 2 weeks.

## 2025-02-07 ENCOUNTER — TELEPHONE (OUTPATIENT)
Dept: NEPHROLOGY | Facility: CLINIC | Age: 70
End: 2025-02-07

## 2025-02-07 NOTE — TELEPHONE ENCOUNTER
Spoke with Shirley raya at [facility].  Advised to measure patient's oral intake daily x3 days (should be 1200 to 1500 cc per day).  Repeat BMP in two weeks per  Dr. Lambert.      ----- Message from Yosef Lambert MD sent at 2/6/2025  6:19 PM EST -----  I spoke to the daughter and she was concerned about Yris's oral fluid intake at the SNF.   Could you please call over to the SNF and have them measure her oral fluid intake daily x 3 days. She should be around 1200 cc to 1500 cc per day.   Please have her repeat a BMP in 2 weeks and send results to us.   Sia (the medical director at the [facility]) can help with the above request per daughterJenifer.

## 2025-03-26 ENCOUNTER — NURSING HOME VISIT (OUTPATIENT)
Age: 70
End: 2025-03-26

## 2025-03-26 DIAGNOSIS — E11.9 TYPE 2 DIABETES MELLITUS WITHOUT COMPLICATION, WITHOUT LONG-TERM CURRENT USE OF INSULIN (HCC): ICD-10-CM

## 2025-03-26 DIAGNOSIS — M80.00XD AGE-RELATED OSTEOPOROSIS WITH CURRENT PATHOLOGICAL FRACTURE WITH ROUTINE HEALING: ICD-10-CM

## 2025-03-26 DIAGNOSIS — G31.84 MILD COGNITIVE IMPAIRMENT: ICD-10-CM

## 2025-03-26 DIAGNOSIS — F31.5 BIPOLAR AFFECTIVE DISORDER, DEPRESSED, SEVERE, WITH PSYCHOTIC BEHAVIOR (HCC): Primary | Chronic | ICD-10-CM

## 2025-03-26 DIAGNOSIS — N18.31 STAGE 3A CHRONIC KIDNEY DISEASE (HCC): ICD-10-CM

## 2025-03-26 DIAGNOSIS — I10 ESSENTIAL HYPERTENSION: ICD-10-CM

## 2025-03-26 DIAGNOSIS — E78.2 MIXED HYPERLIPIDEMIA: ICD-10-CM

## 2025-03-26 PROCEDURE — 99308 SBSQ NF CARE LOW MDM 20: CPT | Performed by: FAMILY MEDICINE

## 2025-03-26 NOTE — ASSESSMENT & PLAN NOTE
9/2023 DEXA: 10 yr cuauhtemoc of hip fracture is 12.6% with 10 year cuauhtemoc of major osteoporotic fracture being 31.9%  H/o of vertebral compression fracture, femur fracture, s/p total L Knee replacement  Currently on Prolia Q6mo, next dose in 5/2025  Continue calcium 600 mg PO BID, Vit D 2000 Units PO daily

## 2025-03-26 NOTE — ASSESSMENT & PLAN NOTE
Chronic managed by  Psychiatry  Currently on Latuda 40 mg AM, 120 mg PM ,Zoloft 50 mg  BID, Wellbutrin 100 mg SR AM. Continue current regimen ,per psychiatry.

## 2025-03-26 NOTE — ASSESSMENT & PLAN NOTE
Lab Results   Component Value Date    HGBA1C 7.7 (H) 11/08/2024   Chronic, currently on Metformin 500 mg PO BID.  Continue current regimen  F/u POCT Hba1c (added today to labwork)  Diabetic Diet  Continue gabapentin 100 mg PO daily for neuropathy

## 2025-03-26 NOTE — ASSESSMENT & PLAN NOTE
Chronic, well controlled. H/o of exertional dyspnea followed by . Currently on Metoprolol 25 mg daily  Continue current regimen

## 2025-03-26 NOTE — ASSESSMENT & PLAN NOTE
Lab Results   Component Value Date    EGFR 43 02/03/2025    EGFR 69 01/22/2025    EGFR 42 01/16/2025    CREATININE 1.33 02/03/2025    CREATININE 0.85 01/22/2025    CREATININE 1.27 01/16/2025   Follows  (Nephrology). Currently on Torsemide 5 mg Po daily, salt tabs 1 g PO TID with meals. Today patient is euvolemic and reports regular urine habits. Continue current regimen per nephrology.     F/u with monthly BMP (has been sent to [facility] lab for review by  given h/o of [facility]).

## 2025-03-26 NOTE — PROGRESS NOTES
Name: Yris Bowles      : 1955      MRN: [id number]  Encounter Provider: Amparo Dunham MD  Encounter Date: 3/26/2025   Encounter department: [facility]  :  Assessment & Plan  Bipolar affective disorder, depressed, severe, with psychotic behavior (HCC)  Chronic managed by  Psychiatry  Currently on Latuda 40 mg AM, 120 mg PM ,Zoloft 50 mg  BID, Wellbutrin 100 mg SR AM. Continue current regimen ,per psychiatry.        Stage 3a chronic kidney disease (HCC)  Lab Results   Component Value Date    EGFR 43 2025    EGFR 69 2025    EGFR 42 2025    CREATININE 1.33 2025    CREATININE 0.85 2025    CREATININE 1.27 2025   Follows  (Nephrology). Currently on Torsemide 5 mg Po daily, salt tabs 1 g PO TID with meals. Today patient is euvolemic and reports regular urine habits. Continue current regimen per nephrology.     F/u with monthly BMP (has been sent to [facility]'s lab for review by  given h/o of SIADH).       Age-related osteoporosis with current pathological fracture with routine healing  2023 DEXA: 10 yr cuauhtemoc of hip fracture is 12.6% with 10 year cuauhtemoc of major osteoporotic fracture being 31.9%  H/o of vertebral compression fracture, femur fracture, s/p total L Knee replacement  Currently on Prolia Q6mo, next dose in 2025  Continue calcium 600 mg PO BID, Vit D 2000 Units PO daily       Type 2 diabetes mellitus without complication, without long-term current use of insulin (HCC)    Lab Results   Component Value Date    HGBA1C 7.7 (H) 2024   Chronic, currently on Metformin 500 mg PO BID.  Continue current regimen  F/u POCT Hba1c (added today to labwork)  Diabetic Diet  Continue gabapentin 100 mg PO daily for neuropathy       Essential hypertension  Chronic, well controlled. H/o of exertional dyspnea followed by . Currently on Metoprolol 25 mg daily  Continue current regimen       Mixed  hyperlipidemia  Chronic stable, continue crestor 20 mg daily, fish oil 1000 mg BID       Mild cognitive impairment  MOCA performed at bedside 24 points, suggestive of mild cognitive impairment  Continue to monitor for signs of worsening cognitive decline           BMI Counseling: There is no height or weight on file to calculate BMI. The BMI is above normal. Nutrition recommendations include encouraging healthy choices of fruits and vegetables, decreasing fast food intake, consuming healthier snacks, limiting drinks that contain sugar and reducing intake of cholesterol. Exercise recommendations include moderate physical activity 150 minutes/week and exercising 3-5 times per week. Rationale for BMI follow-up plan is due to patient being overweight or obese.       History of Present Illness   71 yo old Yris Bowles is seen and examined at bedside. She reports to be able to walk/ goes to commode. Patient is eating and drinking well. Patient is pleasant and conversational at bedside. No complaints of N/v/d/cp/sob/loc. No other concerns. Patient requires f/u POCT hba1c and BMP in view of her T2DM and CKD/h/o of SIADH respectively.       Review of Systems   Constitutional:  Negative for chills and fever.   HENT:  Negative for ear pain and sore throat.    Eyes:  Negative for pain and visual disturbance.   Respiratory:  Negative for cough and shortness of breath.    Cardiovascular:  Negative for chest pain and palpitations.   Gastrointestinal:  Negative for abdominal pain and vomiting.   Genitourinary:  Negative for dysuria and hematuria.   Musculoskeletal:  Negative for arthralgias and back pain.   Skin:  Negative for color change and rash.   Neurological:  Negative for seizures and syncope.   All other systems reviewed and are negative.      Objective   There were no vitals taken for this visit.     Physical Exam  Vitals and nursing note reviewed.   Constitutional:       General: She is not in acute distress.      Appearance: She is well-developed.   HENT:      Head: Normocephalic and atraumatic.   Eyes:      Conjunctiva/sclera: Conjunctivae normal.   Cardiovascular:      Rate and Rhythm: Normal rate and regular rhythm.      Pulses: Normal pulses.      Heart sounds: Normal heart sounds. No murmur heard.  Pulmonary:      Effort: Pulmonary effort is normal. No respiratory distress.      Breath sounds: Normal breath sounds.   Abdominal:      Palpations: Abdomen is soft.      Tenderness: There is no abdominal tenderness.   Musculoskeletal:         General: No swelling.      Cervical back: Neck supple.   Skin:     General: Skin is warm and dry.      Capillary Refill: Capillary refill takes less than 2 seconds.   Neurological:      Mental Status: She is alert.   Psychiatric:         Mood and Affect: Mood normal.

## 2025-03-26 NOTE — ASSESSMENT & PLAN NOTE
MOCA performed at bedside 24 points, suggestive of mild cognitive impairment  Continue to monitor for signs of worsening cognitive decline

## 2025-04-25 ENCOUNTER — NURSING HOME VISIT (OUTPATIENT)
Age: 70
End: 2025-04-25

## 2025-04-25 VITALS
DIASTOLIC BLOOD PRESSURE: 82 MMHG | TEMPERATURE: 97.2 F | OXYGEN SATURATION: 95 % | RESPIRATION RATE: 18 BRPM | HEART RATE: 68 BPM | SYSTOLIC BLOOD PRESSURE: 133 MMHG

## 2025-04-25 DIAGNOSIS — M80.00XD AGE-RELATED OSTEOPOROSIS WITH CURRENT PATHOLOGICAL FRACTURE WITH ROUTINE HEALING: ICD-10-CM

## 2025-04-25 DIAGNOSIS — I10 ESSENTIAL HYPERTENSION: ICD-10-CM

## 2025-04-25 DIAGNOSIS — F31.5 BIPOLAR AFFECTIVE DISORDER, DEPRESSED, SEVERE, WITH PSYCHOTIC BEHAVIOR (HCC): Chronic | ICD-10-CM

## 2025-04-25 DIAGNOSIS — G31.84 MILD COGNITIVE IMPAIRMENT: ICD-10-CM

## 2025-04-25 DIAGNOSIS — E78.2 MIXED HYPERLIPIDEMIA: ICD-10-CM

## 2025-04-25 DIAGNOSIS — E11.9 TYPE 2 DIABETES MELLITUS WITHOUT COMPLICATION, WITHOUT LONG-TERM CURRENT USE OF INSULIN (HCC): Primary | ICD-10-CM

## 2025-04-25 DIAGNOSIS — N18.31 STAGE 3A CHRONIC KIDNEY DISEASE (HCC): ICD-10-CM

## 2025-04-25 PROCEDURE — 99308 SBSQ NF CARE LOW MDM 20: CPT | Performed by: NURSE PRACTITIONER

## 2025-04-25 NOTE — ASSESSMENT & PLAN NOTE
Chronic, well controlled..  Continue metoprolol  BP today 133/82  Follow with cardiology as needed

## 2025-04-25 NOTE — PROGRESS NOTES
:  Assessment & Plan  Type 2 diabetes mellitus without complication, without long-term current use of insulin (HCC)    Lab Results   Component Value Date    HGBA1C 7.7 (H) 11/08/2024   Chronic, currently on Metformin 500 mg PO BID.  Continue current regimen  BG range up to 280  F/u POCT Hba1c to be completed  Diabetic Diet  Continue gabapentin 100 mg PO daily for neuropathy             Bipolar affective disorder, depressed, severe, with psychotic behavior (HCC)  Chronic managed by  Psychiatry  Not exhibiting depression or barney  Currently on Latuda 40 mg AM, 120 mg PM ,Zoloft 50 mg  BID, Wellbutrin 100 mg SR AM. Continue current regimen ,per psychiatry.              Age-related osteoporosis with current pathological fracture with routine healing  9/2023 DEXA: 10 yr cuauhtemoc of hip fracture is 12.6% with 10 year cuauhtemoc of major osteoporotic fracture being 31.9%  H/o of vertebral compression fracture, femur fracture, s/p total L Knee replacement  Currently on Prolia Q6mo, next dose in 5/2025  Continue calcium 600 mg PO BID, Vit D 2000 Units PO daily             Essential hypertension  Chronic, well controlled..  Continue metoprolol  BP today 133/82  Follow with cardiology as needed             Stage 3a chronic kidney disease (HCC)  Lab Results   Component Value Date    EGFR 43 02/03/2025    EGFR 69 01/22/2025    EGFR 42 01/16/2025    CREATININE 1.33 02/03/2025    CREATININE 0.85 01/22/2025    CREATININE 1.27 01/16/2025     Continue fluid restriction  Avoid nephrotoxic agents       Mixed hyperlipidemia  Chronic stable, continue crestor 20 mg daily, fish oil 1000 mg BID             Mild cognitive impairment  Continue to monitor for signs of worsening cognitive decline  Alert and oriented to person and place today               History of Present Illness     Yris Bowles is a 70 y.o. female   Patient is a 69 yo female seen and examined at bedside today.  She is a patient of [facility].  She denies any  health complaints today.        Review of Systems   Constitutional: Negative.    HENT: Negative.     Eyes: Negative.    Respiratory: Negative.     Cardiovascular: Negative.    Gastrointestinal: Negative.    Endocrine: Negative.    Genitourinary: Negative.    Musculoskeletal: Negative.    Skin: Negative.    Allergic/Immunologic: Negative.    Neurological: Negative.    Hematological: Negative.    Psychiatric/Behavioral: Negative.       Objective   There were no vitals taken for this visit.     Physical Exam  Constitutional:       General: She is not in acute distress.     Appearance: She is not ill-appearing, toxic-appearing or diaphoretic.   HENT:      Head: Normocephalic and atraumatic.      Right Ear: External ear normal.      Left Ear: External ear normal.      Nose: Nose normal. No congestion.      Mouth/Throat:      Mouth: Mucous membranes are moist.      Pharynx: Oropharynx is clear.   Eyes:      General:         Right eye: No discharge.         Left eye: No discharge.      Conjunctiva/sclera: Conjunctivae normal.   Cardiovascular:      Rate and Rhythm: Normal rate and regular rhythm.      Pulses: Normal pulses. no weak pulses.           Dorsalis pedis pulses are 2+ on the right side and 2+ on the left side.        Posterior tibial pulses are 2+ on the right side and 2+ on the left side.      Heart sounds: Normal heart sounds.   Pulmonary:      Effort: Pulmonary effort is normal. No respiratory distress.      Breath sounds: Normal breath sounds. No wheezing, rhonchi or rales.   Abdominal:      General: Bowel sounds are normal.      Palpations: Abdomen is soft.      Tenderness: There is no abdominal tenderness. There is no guarding.   Musculoskeletal:         General: Normal range of motion.      Cervical back: Normal range of motion. No rigidity.      Right lower leg: No edema.      Left lower leg: No edema.   Feet:      Right foot:      Skin integrity: No ulcer, skin breakdown, erythema, warmth, callus or dry  skin.      Left foot:      Skin integrity: No ulcer, skin breakdown, erythema, warmth, callus or dry skin.   Lymphadenopathy:      Cervical: No cervical adenopathy.   Skin:     General: Skin is warm and dry.      Findings: No lesion or rash.   Neurological:      Mental Status: She is alert. Mental status is at baseline.      Gait: Gait abnormal.   Psychiatric:         Mood and Affect: Mood normal.         Behavior: Behavior normal.         Diabetic Foot Exam    Patient's shoes and socks removed.    Right Foot/Ankle   Right Foot Inspection  Skin Exam: skin normal and skin intact. No dry skin, no warmth, no callus, no erythema, no maceration, no abnormal color, no pre-ulcer, no ulcer and no callus.     Toe Exam: ROM and strength within normal limits.     Sensory   Monofilament testing: intact    Vascular  The right DP pulse is 2+. The right PT pulse is 2+.     Left Foot/Ankle  Left Foot Inspection  Skin Exam: skin normal and skin intact. No dry skin, no warmth, no erythema, no maceration, normal color, no pre-ulcer, no ulcer and no callus.     Toe Exam: ROM and strength within normal limits.     Sensory   Monofilament testing: intact    Vascular  The left DP pulse is 2+. The left PT pulse is 2+.     Assign Risk Category  No deformity present  No loss of protective sensation  No weak pulses  Risk: 0

## 2025-04-25 NOTE — ASSESSMENT & PLAN NOTE
Continue to monitor for signs of worsening cognitive decline  Alert and oriented to person and place today

## 2025-04-25 NOTE — ASSESSMENT & PLAN NOTE
Lab Results   Component Value Date    EGFR 43 02/03/2025    EGFR 69 01/22/2025    EGFR 42 01/16/2025    CREATININE 1.33 02/03/2025    CREATININE 0.85 01/22/2025    CREATININE 1.27 01/16/2025     Continue fluid restriction  Avoid nephrotoxic agents

## 2025-04-25 NOTE — ASSESSMENT & PLAN NOTE
Lab Results   Component Value Date    HGBA1C 7.7 (H) 11/08/2024   Chronic, currently on Metformin 500 mg PO BID.  Continue current regimen  BG range up to 280  F/u POCT Hba1c to be completed  Diabetic Diet  Continue gabapentin 100 mg PO daily for neuropathy

## 2025-04-25 NOTE — ASSESSMENT & PLAN NOTE
Chronic managed by  Psychiatry  Not exhibiting depression or barney  Currently on Latuda 40 mg AM, 120 mg PM ,Zoloft 50 mg  BID, Wellbutrin 100 mg SR AM. Continue current regimen ,per psychiatry.

## 2025-05-09 ENCOUNTER — TELEPHONE (OUTPATIENT)
Age: 70
End: 2025-05-09

## 2025-05-09 NOTE — TELEPHONE ENCOUNTER
Dept of Corrects- request of leave paperwork  Scanned into encounter  Placed in pcps folder  call: [phone]

## 2025-05-12 ENCOUNTER — PATIENT OUTREACH (OUTPATIENT)
Age: 70
End: 2025-05-12

## 2025-05-14 ENCOUNTER — HOSPITAL ENCOUNTER (OUTPATIENT)
Dept: INFUSION CENTER | Facility: HOSPITAL | Age: 70
Discharge: HOME | End: 2025-05-14
Attending: FAMILY MEDICINE
Payer: MEDICARE

## 2025-05-14 VITALS
OXYGEN SATURATION: 97 % | BODY MASS INDEX: 28.67 KG/M2 | TEMPERATURE: 96.8 F | HEART RATE: 73 BPM | WEIGHT: 167 LBS | DIASTOLIC BLOOD PRESSURE: 67 MMHG | RESPIRATION RATE: 18 BRPM | SYSTOLIC BLOOD PRESSURE: 160 MMHG

## 2025-05-14 DIAGNOSIS — M80.00XD AGE-RELATED OSTEOPOROSIS WITH CURRENT PATHOLOGICAL FRACTURE WITH ROUTINE HEALING: Primary | ICD-10-CM

## 2025-05-14 RX ADMIN — DENOSUMAB 60 MG: 60 INJECTION SUBCUTANEOUS at 14:12

## 2025-05-14 NOTE — PROGRESS NOTES
Yris Bowles  tolerated treatment well with no complications.      Yris Bowles is aware of future appt on 6/10 at 1:30pm.     AVS printed and given to Yris Bowles:  Yes

## 2025-05-14 NOTE — PLAN OF CARE
Problem: Potential for Falls  Goal: Patient will remain free of falls  Description: INTERVENTIONS:  - Educate patient/family on patient safety including physical limitations  - Instruct patient to call for assistance with activity   - Keep Call bell within reach  - Keep bed low and locked with side rails adjusted as appropriate  - Keep care items and personal belongings within reach  - Initiate and maintain comfort rounds  Outcome: Progressing

## 2025-05-19 ENCOUNTER — NURSING HOME VISIT (OUTPATIENT)
Age: 70
End: 2025-05-19

## 2025-05-19 ENCOUNTER — PATIENT OUTREACH (OUTPATIENT)
Age: 70
End: 2025-05-19

## 2025-05-19 VITALS
HEART RATE: 69 BPM | OXYGEN SATURATION: 95 % | DIASTOLIC BLOOD PRESSURE: 77 MMHG | WEIGHT: 165 LBS | TEMPERATURE: 97.4 F | BODY MASS INDEX: 28.32 KG/M2 | SYSTOLIC BLOOD PRESSURE: 136 MMHG | RESPIRATION RATE: 18 BRPM

## 2025-05-19 DIAGNOSIS — F31.5 BIPOLAR AFFECTIVE DISORDER, DEPRESSED, SEVERE, WITH PSYCHOTIC BEHAVIOR (HCC): Primary | Chronic | ICD-10-CM

## 2025-05-19 DIAGNOSIS — N18.31 STAGE 3A CHRONIC KIDNEY DISEASE (HCC): ICD-10-CM

## 2025-05-19 DIAGNOSIS — I10 ESSENTIAL HYPERTENSION: ICD-10-CM

## 2025-05-19 DIAGNOSIS — M80.00XD AGE-RELATED OSTEOPOROSIS WITH CURRENT PATHOLOGICAL FRACTURE WITH ROUTINE HEALING: ICD-10-CM

## 2025-05-19 DIAGNOSIS — E11.9 TYPE 2 DIABETES MELLITUS WITHOUT COMPLICATION, WITHOUT LONG-TERM CURRENT USE OF INSULIN (HCC): ICD-10-CM

## 2025-05-19 DIAGNOSIS — E78.2 MIXED HYPERLIPIDEMIA: ICD-10-CM

## 2025-05-19 PROCEDURE — 99308 SBSQ NF CARE LOW MDM 20: CPT | Performed by: FAMILY MEDICINE

## 2025-05-19 NOTE — ASSESSMENT & PLAN NOTE
Lab Results   Component Value Date    EGFR 43 02/03/2025    EGFR 69 01/22/2025    EGFR 42 01/16/2025    CREATININE 1.33 02/03/2025    CREATININE 0.85 01/22/2025    CREATININE 1.27 01/16/2025   Chronic, stable  Follows outpatient with     Continue fluid restriction and avoid nephrotoxic medications  Repeat BMP and A1c pending

## 2025-05-19 NOTE — ASSESSMENT & PLAN NOTE
Chronic, stable  Continue Latuda 40 mg in the morning and 80 mg at bedtime.  Continue Zoloft 50 mg twice daily  Follows up outpatient with Dr. JAMES sanders, psychiatry.

## 2025-05-19 NOTE — PROGRESS NOTES
Daughter called requesting assistance with [place] paperwork. Assistance provided. Documents signed by Dr. Green. Completed paperwork placed in clerical area for  later this week.

## 2025-05-19 NOTE — ASSESSMENT & PLAN NOTE
Chronic, stable  History of compression fracture of the vertebra, femur.  History of left knee replacement    Currently on Prolia every 6 months, next dose 5/2025  Continue vitamin D 1000 units daily, calcium 600 mg twice daily

## 2025-05-19 NOTE — ASSESSMENT & PLAN NOTE
Lab Results   Component Value Date    HGBA1C 7.7 (H) 11/08/2024   Chronic, stable  Blood sugar today is 315    Repeat HbA1c ordered for 5/21/2025  Currently taking metformin 500 mg twice daily  Consider increasing metformin to 1000 mg twice daily  Consider adding Jardiance 10 mg daily  Consider daily blood sugar measurement

## 2025-05-19 NOTE — PROGRESS NOTES
Name: Yris Bowles      : 1955      MRN: [id number]  Encounter Provider: Quin Greenwood MD  Encounter Date: 2025   Encounter department: [facility]  :  Assessment & Plan  Bipolar affective disorder, depressed, severe, with psychotic behavior (HCC)  Chronic, stable  Continue Latuda 40 mg in the morning and 80 mg at bedtime.  Continue Zoloft 50 mg twice daily  Follows up outpatient with Dr. JAMES sanders, psychiatry.    Essential hypertension  Chronic, stable  Blood pressure 136/77  Continue metoprolol 25 mg daily       Type 2 diabetes mellitus without complication, without long-term current use of insulin (HCC)    Lab Results   Component Value Date    HGBA1C 7.7 (H) 2024   Chronic, stable  Blood sugar today is 315    Repeat HbA1c ordered for 2025  Currently taking metformin 500 mg twice daily  Consider increasing metformin to 1000 mg twice daily  Consider adding Jardiance 10 mg daily  Consider daily blood sugar measurement         Age-related osteoporosis with current pathological fracture with routine healing  Chronic, stable  History of compression fracture of the vertebra, femur.  History of left knee replacement    Currently on Prolia every 6 months, next dose 2025  Continue vitamin D 1000 units daily, calcium 600 mg twice daily       Stage 3a chronic kidney disease (HCC)  Lab Results   Component Value Date    EGFR 43 2025    EGFR 69 2025    EGFR 42 2025    CREATININE 1.33 2025    CREATININE 0.85 2025    CREATININE 1.27 2025   Chronic, stable  Follows outpatient with     Continue fluid restriction and avoid nephrotoxic medications  Repeat BMP and A1c pending         Mixed hyperlipidemia  Chronic, stable  Continue rosuvastatin 20 mg daily              History of Present Illness   HPI  Patient is a 70-year-old female who was seen and examined at bedside at briefly.  Patient states that she currently has no pain.   Last bowel movement was yesterday, denies any chest pain, shortness of breath, nausea, vomiting, diarrhea.  Last bowel movement was today    Review of Systems   Constitutional:  Negative for chills.   Respiratory:  Negative for cough and shortness of breath.    Cardiovascular:  Negative for chest pain and palpitations.   Gastrointestinal:  Negative for abdominal pain and vomiting.   Genitourinary:  Negative for dysuria and hematuria.   Musculoskeletal:  Negative for arthralgias.   Skin:  Negative for color change and rash.   All other systems reviewed and are negative.      Objective   /77   Pulse 69   Temp (!) 97.4 °F (36.3 °C)   Resp 18   Wt 74.8 kg (165 lb)   SpO2 95%   BMI 28.32 kg/m²      Physical Exam  Vitals and nursing note reviewed.   Constitutional:       General: She is not in acute distress.     Appearance: She is well-developed.     Cardiovascular:      Rate and Rhythm: Normal rate and regular rhythm.      Heart sounds: No murmur heard.  Pulmonary:      Effort: Pulmonary effort is normal. No respiratory distress.      Breath sounds: Normal breath sounds.   Abdominal:      Palpations: Abdomen is soft.      Tenderness: There is no abdominal tenderness.     Musculoskeletal:         General: No swelling.      Cervical back: Neck supple.     Skin:     General: Skin is warm.     Neurological:      Mental Status: She is alert.     Psychiatric:      Comments: Flat affect

## 2025-05-30 ENCOUNTER — OFFICE VISIT (OUTPATIENT)
Age: 70
End: 2025-05-30
Payer: MEDICARE

## 2025-05-30 VITALS — HEIGHT: 64 IN | RESPIRATION RATE: 17 BRPM | BODY MASS INDEX: 28.51 KG/M2 | WEIGHT: 167 LBS

## 2025-05-30 DIAGNOSIS — M79.672 PAIN IN BOTH FEET: Primary | ICD-10-CM

## 2025-05-30 DIAGNOSIS — L60.0 INGROWN TOENAIL: ICD-10-CM

## 2025-05-30 DIAGNOSIS — L03.032 PARONYCHIA OF TOENAIL OF LEFT FOOT: ICD-10-CM

## 2025-05-30 DIAGNOSIS — E11.42 DIABETIC POLYNEUROPATHY ASSOCIATED WITH TYPE 2 DIABETES MELLITUS (HCC): ICD-10-CM

## 2025-05-30 DIAGNOSIS — M79.671 PAIN IN BOTH FEET: Primary | ICD-10-CM

## 2025-05-30 PROCEDURE — 99203 OFFICE O/P NEW LOW 30 MIN: CPT | Performed by: PODIATRIST

## 2025-05-30 RX ORDER — EMPAGLIFLOZIN 10 MG/1
TABLET, FILM COATED ORAL
COMMUNITY
Start: 2025-05-22

## 2025-05-30 NOTE — PROGRESS NOTES
Assessment/Plan:  On ambulation secondary to paronychia left hallux.  Patient with diabetic neuropathy, mild.    Plan.  Chart reviewed.  PCP notes reviewed.  Diabetic foot exam performed.  Patient educated on care of the diabetic foot.  Today we will start wound care of left hallux.  Betadine wet-to-dry applied.  Patient given aftercare instruction.  Patient return to skilled nursing facility with written instruction.  Return as needed.       Diagnoses and all orders for this visit:    Pain in both feet    Ingrown toenail    Paronychia of toenail of left foot    Diabetic polyneuropathy associated with type 2 diabetes mellitus (HCC)    Other orders  -     Jardiance 10 MG TABS tablet          Subjective: Denies pain.  Patient has pain in her left big toe with ambulation and shoewear.  No history of trauma.  Patient is diabetic.            No Known Allergies    Current Medications[1]    Problem List[2]       Patient ID: Yris Bowles is a 70 y.o. female.    HPI    The following portions of the patient's history were reviewed and updated as appropriate: She  has a past medical history of Anesthesia complication, Anxiety, Arthritis, Bipolar 1 disorder ([facility]), Bone spur, Cancer (HCC), Chronic kidney disease, Chronic pain disorder, Colon cancer (HCC), Colon polyp, Depression, Diabetes mellitus (HCC), Endometrial cancer (HCC), bleeding disorder, Hyperlipidemia, Hypertension, Hypomagnesemia (03/20/2023), Memory loss, PONV (postoperative nausea and vomiting), Psychiatric disorder, Psychiatric illness, Psychosis ([facility]), Shortness of breath, Sleep difficulties, Urinary incontinence, and Vitamin D deficiency.  She   Patient Active Problem List    Diagnosis Date Noted    Self-care deficit 01/27/2025    Pain of upper abdomen 01/22/2025    Age-related osteoporosis with current pathological fracture with routine healing 10/30/2023    Closed fracture of second lumbar vertebra with routine healing 06/16/2023    Closed fracture of T11  vertebra with routine healing 06/16/2023    SIADH (syndrome of inappropriate ADH production) ([facility]) 06/08/2023    Pain 05/30/2023    Anemia 05/22/2023    History of femur fracture 04/28/2023    Medical clearance for psychiatric admission 04/28/2023    Thrush 04/03/2023    Dysphagia 03/25/2023    Acute blood loss anemia 03/24/2023    Mild cognitive impairment 03/22/2023    Anxiety 03/21/2023    PONV (postoperative nausea and vomiting) 03/20/2023    Delayed emergence from anesthesia 03/20/2023    Fracture of left femur ([facility]) 03/20/2023    History of DVT of lower extremity 03/20/2023    Thyroid nodule 03/20/2023    Platelets decreased ([facility]) 12/27/2022    Status post total left knee replacement using cement 12/06/2022    Stage 3a chronic kidney disease ([facility]) 10/31/2022    Elevated brain natriuretic peptide (BNP) level 10/18/2022    Osteoarthritis of both knees 10/18/2022    Hyponatremia 10/15/2022    Bipolar depression ([facility]) 10/15/2022    Mass of axillary tail of right breast 04/07/2022    Mass of axillary tail of left breast 04/07/2022    Axillary mass, right 02/24/2022    Urinary incontinence 11/15/2019    History of colon cancer 09/27/2018    History of endometrial cancer 05/04/2017    Type 2 diabetes mellitus without complication, without long-term current use of insulin ([facility]) 05/04/2017    Essential hypertension 05/04/2017    Mixed hyperlipidemia 05/04/2017    Bipolar affective disorder, depressed, severe, with psychotic behavior ([facility]) 04/13/2017     She  has a past surgical history that includes Tubal ligation; Cholecystectomy; Tonsillectomy; Dilation and curettage of uterus (N/A, 04/05/2017); pr laps total hysterect 250 gm/< w/rmvl tube/ovary (N/A, 05/04/2017); Hysterectomy; Oophorectomy; Laparoscopy; Colonoscopy; Breast biopsy (Left); pr arthrp kne condyle&platu medial&lat compartments (Left, 12/06/2022); Replacement total knee; and pr optx fem shft fx w/insj imed implt w/wo screw (Left, 3/20/2023).  Her family  history includes Breast cancer (age of onset: 45) in her paternal aunt; Cancer in her mother; Dementia in her sister and sister; Heart disease in her father and sister.  She  reports that she has never smoked. She has never been exposed to tobacco smoke. She has never used smokeless tobacco. She reports that she does not drink alcohol and does not use drugs.  Current Outpatient Medications   Medication Sig Dispense Refill    Jardiance 10 MG TABS tablet       aspirin (ECOTRIN LOW STRENGTH) 81 mg EC tablet Take 1 tablet (81 mg total) by mouth daily 30 tablet 5    buPROPion (WELLBUTRIN SR) 100 mg 12 hr tablet Take 100 mg by mouth in the morning      calcium carbonate (OS-MARVEL) 600 MG tablet Take 600 mg by mouth 2 (two) times a day with meals      cholecalciferol (VITAMIN D3) 1,000 units tablet Take 1,000 Units by mouth daily      docusate sodium (COLACE) 100 mg capsule Take 1 capsule (100 mg total) by mouth daily      famotidine (PEPCID) 20 mg tablet Take 1 tablet (20 mg total) by mouth 2 (two) times a day 180 tablet 1    gabapentin (NEURONTIN) 100 mg capsule Take 1 capsule (100 mg total) by mouth daily with dinner      lidocaine (LIDODERM) 5 % Apply 1 patch topically over 12 hours daily Remove & Discard patch within 12 hours or as directed by MD Do not start before May 31, 2023. (Patient not taking: Reported on 1/31/2025) 30 patch 0    lurasidone (LATUDA) 40 mg tablet Take 1 tablet (40 mg total) by mouth daily with breakfast AND 3 tablets (120 mg total) daily with dinner.      metFORMIN (GLUCOPHAGE-XR) 500 mg 24 hr tablet Take 2 tablets (1,000 mg total) by mouth daily with breakfast (Patient taking differently: Take 500 mg by mouth 2 (two) times a day with meals)      metoprolol succinate (TOPROL-XL) 25 mg 24 hr tablet Take 1 tablet (25 mg total) by mouth daily 90 tablet 0    Multiple Vitamin (One-Daily Multi-Vitamin) TABS Take 1 tablet by mouth daily 90 tablet 1    nitroglycerin (NITROSTAT) 0.4 mg SL tablet Place 1  tablet (0.4 mg total) under the tongue every 5 (five) minutes as needed for chest pain 30 tablet 1    Omega-3 Fatty Acids (fish oil) 1,000 mg Take 1,000 mg by mouth 2 (two) times a day      rosuvastatin (CRESTOR) 20 MG tablet Take 1 tablet (20 mg total) by mouth daily 90 tablet 1    sertraline (ZOLOFT) 100 mg tablet Take 50 mg by mouth 2 (two) times a day      sodium chloride 1 g tablet Take 1 tablet (1 g total) by mouth 3 (three) times a day 270 tablet 0    torsemide (DEMADEX) 5 MG tablet Take 1 tablet by mouth once daily 90 tablet 1     No current facility-administered medications for this visit.     Current Outpatient Medications on File Prior to Visit   Medication Sig    Jardiance 10 MG TABS tablet     aspirin (ECOTRIN LOW STRENGTH) 81 mg EC tablet Take 1 tablet (81 mg total) by mouth daily    buPROPion (WELLBUTRIN SR) 100 mg 12 hr tablet Take 100 mg by mouth in the morning    calcium carbonate (OS-MARVEL) 600 MG tablet Take 600 mg by mouth 2 (two) times a day with meals    cholecalciferol (VITAMIN D3) 1,000 units tablet Take 1,000 Units by mouth daily    docusate sodium (COLACE) 100 mg capsule Take 1 capsule (100 mg total) by mouth daily    famotidine (PEPCID) 20 mg tablet Take 1 tablet (20 mg total) by mouth 2 (two) times a day    gabapentin (NEURONTIN) 100 mg capsule Take 1 capsule (100 mg total) by mouth daily with dinner    lidocaine (LIDODERM) 5 % Apply 1 patch topically over 12 hours daily Remove & Discard patch within 12 hours or as directed by MD Do not start before May 31, 2023. (Patient not taking: Reported on 1/31/2025)    lurasidone (LATUDA) 40 mg tablet Take 1 tablet (40 mg total) by mouth daily with breakfast AND 3 tablets (120 mg total) daily with dinner.    metFORMIN (GLUCOPHAGE-XR) 500 mg 24 hr tablet Take 2 tablets (1,000 mg total) by mouth daily with breakfast (Patient taking differently: Take 500 mg by mouth 2 (two) times a day with meals)    metoprolol succinate (TOPROL-XL) 25 mg 24 hr tablet  Take 1 tablet (25 mg total) by mouth daily    Multiple Vitamin (One-Daily Multi-Vitamin) TABS Take 1 tablet by mouth daily    nitroglycerin (NITROSTAT) 0.4 mg SL tablet Place 1 tablet (0.4 mg total) under the tongue every 5 (five) minutes as needed for chest pain    Omega-3 Fatty Acids (fish oil) 1,000 mg Take 1,000 mg by mouth 2 (two) times a day    rosuvastatin (CRESTOR) 20 MG tablet Take 1 tablet (20 mg total) by mouth daily    sertraline (ZOLOFT) 100 mg tablet Take 50 mg by mouth 2 (two) times a day    sodium chloride 1 g tablet Take 1 tablet (1 g total) by mouth 3 (three) times a day    torsemide (DEMADEX) 5 MG tablet Take 1 tablet by mouth once daily     No current facility-administered medications on file prior to visit.     She has no known allergies..    Vitals:    05/30/25 1323   Resp: 17       Review of Systems      Objective:  Patient's shoes and socks removed.   Foot Exam    General  General Appearance: appears stated age and healthy   Orientation: alert and oriented to person, place, and time   Affect: appropriate   Gait: antalgic   Assistance: walker use       Right Foot/Ankle     Inspection and Palpation  Tenderness: metatarsals   Swelling: dorsum   Arch: pes cavus  Skin Exam: dry skin;     Neurovascular  Dorsalis pedis: 2+  Posterior tibial: 2+  Saphenous nerve sensation: diminished  Tibial nerve sensation: diminished  Superficial peroneal nerve sensation: diminished  Deep peroneal nerve sensation: diminished  Sural nerve sensation: diminished      Left Foot/Ankle      Inspection and Palpation  Tenderness: metatarsals   Swelling: dorsum   Arch: pes cavus  Skin Exam: dry skin;     Neurovascular  Dorsalis pedis: 2+  Posterior tibial: 2+  Saphenous nerve sensation: diminished  Tibial nerve sensation: diminished  Superficial peroneal nerve sensation: diminished  Deep peroneal nerve sensation: diminished  Sural nerve sensation: diminished      Physical Exam  Vitals and nursing note reviewed.      Cardiovascular:      Rate and Rhythm: Normal rate and regular rhythm.      Pulses: no weak pulses.           Dorsalis pedis pulses are 2+ on the right side and 2+ on the left side.        Posterior tibial pulses are 2+ on the right side and 2+ on the left side.   Feet:      Right foot:      Skin integrity: Dry skin present.      Left foot:      Skin integrity: Dry skin present.     Skin:     Capillary Refill: Capillary refill takes less than 2 seconds.      Comments: Nails are dystrophic.  Left hallux demonstrates paronychia of the fibular nail groove.  Negative occult pus or cellulitis.     Patient's shoes and socks removed.    Right Foot/Ankle   Right Foot Inspection  Skin Exam: dry skin.     Toe Exam: swelling and right toe deformity.     Sensory   Vibration: diminished  Proprioception: intact  Monofilament testing: intact    Vascular  Capillary refills: < 3 seconds  The right DP pulse is 2+. The right PT pulse is 2+.     Right Toe  - Comprehensive Exam  Arch: pes cavus  Swelling: dorsum   Tenderness: metatarsals       Left Foot/Ankle  Left Foot Inspection  Skin Exam: dry skin.     Toe Exam: swelling and left toe deformity.     Sensory   Vibration: diminished  Proprioception: intact  Monofilament testing: intact    Vascular  Capillary refills: < 3 seconds  The left DP pulse is 2+. The left PT pulse is 2+.     Left Toe  - Comprehensive Exam  Arch: pes cavus  Swelling: dorsum   Tenderness: metatarsals       Assign Risk Category  Deformity present  No loss of protective sensation  No weak pulses  Risk: 0                 [1]   Current Outpatient Medications:     Jardiance 10 MG TABS tablet, , Disp: , Rfl:     aspirin (ECOTRIN LOW STRENGTH) 81 mg EC tablet, Take 1 tablet (81 mg total) by mouth daily, Disp: 30 tablet, Rfl: 5    buPROPion (WELLBUTRIN SR) 100 mg 12 hr tablet, Take 100 mg by mouth in the morning, Disp: , Rfl:     calcium carbonate (OS-MARVEL) 600 MG tablet, Take 600 mg by mouth 2 (two) times a day with  meals, Disp: , Rfl:     cholecalciferol (VITAMIN D3) 1,000 units tablet, Take 1,000 Units by mouth daily, Disp: , Rfl:     docusate sodium (COLACE) 100 mg capsule, Take 1 capsule (100 mg total) by mouth daily, Disp: , Rfl:     famotidine (PEPCID) 20 mg tablet, Take 1 tablet (20 mg total) by mouth 2 (two) times a day, Disp: 180 tablet, Rfl: 1    gabapentin (NEURONTIN) 100 mg capsule, Take 1 capsule (100 mg total) by mouth daily with dinner, Disp: , Rfl:     lidocaine (LIDODERM) 5 %, Apply 1 patch topically over 12 hours daily Remove & Discard patch within 12 hours or as directed by MD Do not start before May 31, 2023. (Patient not taking: Reported on 1/31/2025), Disp: 30 patch, Rfl: 0    lurasidone (LATUDA) 40 mg tablet, Take 1 tablet (40 mg total) by mouth daily with breakfast AND 3 tablets (120 mg total) daily with dinner., Disp: , Rfl:     metFORMIN (GLUCOPHAGE-XR) 500 mg 24 hr tablet, Take 2 tablets (1,000 mg total) by mouth daily with breakfast (Patient taking differently: Take 500 mg by mouth 2 (two) times a day with meals), Disp: , Rfl:     metoprolol succinate (TOPROL-XL) 25 mg 24 hr tablet, Take 1 tablet (25 mg total) by mouth daily, Disp: 90 tablet, Rfl: 0    Multiple Vitamin (One-Daily Multi-Vitamin) TABS, Take 1 tablet by mouth daily, Disp: 90 tablet, Rfl: 1    nitroglycerin (NITROSTAT) 0.4 mg SL tablet, Place 1 tablet (0.4 mg total) under the tongue every 5 (five) minutes as needed for chest pain, Disp: 30 tablet, Rfl: 1    Omega-3 Fatty Acids (fish oil) 1,000 mg, Take 1,000 mg by mouth 2 (two) times a day, Disp: , Rfl:     rosuvastatin (CRESTOR) 20 MG tablet, Take 1 tablet (20 mg total) by mouth daily, Disp: 90 tablet, Rfl: 1    sertraline (ZOLOFT) 100 mg tablet, Take 50 mg by mouth 2 (two) times a day, Disp: , Rfl:     sodium chloride 1 g tablet, Take 1 tablet (1 g total) by mouth 3 (three) times a day, Disp: 270 tablet, Rfl: 0    torsemide (DEMADEX) 5 MG tablet, Take 1 tablet by mouth once daily,  Disp: 90 tablet, Rfl: 1  [2]   Patient Active Problem List  Diagnosis    History of endometrial cancer    Type 2 diabetes mellitus without complication, without long-term current use of insulin ([facility])    Essential hypertension    Mixed hyperlipidemia    History of colon cancer    Urinary incontinence    Axillary mass, right    Mass of axillary tail of right breast    Mass of axillary tail of left breast    Hyponatremia    Bipolar affective disorder, depressed, severe, with psychotic behavior ([facility])    Elevated brain natriuretic peptide (BNP) level    Osteoarthritis of both knees    Stage 3a chronic kidney disease ([facility])    Status post total left knee replacement using cement    Platelets decreased ([facility])    PONV (postoperative nausea and vomiting)    Delayed emergence from anesthesia    Bipolar depression ([facility])    Fracture of left femur ([facility])    History of DVT of lower extremity    Thyroid nodule    Anxiety    Mild cognitive impairment    Acute blood loss anemia    Dysphagia    Thrush    History of femur fracture    Medical clearance for psychiatric admission    Anemia    Pain    SIADH (syndrome of inappropriate ADH production) ([facility])    Closed fracture of second lumbar vertebra with routine healing    Closed fracture of T11 vertebra with routine healing    Age-related osteoporosis with current pathological fracture with routine healing    Pain of upper abdomen    Self-care deficit

## 2025-06-27 ENCOUNTER — NURSING HOME VISIT (OUTPATIENT)
Age: 70
End: 2025-06-27

## 2025-06-27 VITALS
HEART RATE: 70 BPM | OXYGEN SATURATION: 94 % | WEIGHT: 162 LBS | BODY MASS INDEX: 27.81 KG/M2 | SYSTOLIC BLOOD PRESSURE: 129 MMHG | DIASTOLIC BLOOD PRESSURE: 70 MMHG | TEMPERATURE: 97.2 F | RESPIRATION RATE: 18 BRPM

## 2025-06-27 DIAGNOSIS — E11.9 TYPE 2 DIABETES MELLITUS WITHOUT COMPLICATION, WITHOUT LONG-TERM CURRENT USE OF INSULIN (HCC): ICD-10-CM

## 2025-06-27 DIAGNOSIS — E78.2 MIXED HYPERLIPIDEMIA: ICD-10-CM

## 2025-06-27 DIAGNOSIS — I10 ESSENTIAL HYPERTENSION: ICD-10-CM

## 2025-06-27 DIAGNOSIS — N18.31 STAGE 3A CHRONIC KIDNEY DISEASE (HCC): ICD-10-CM

## 2025-06-27 DIAGNOSIS — F31.9 BIPOLAR DEPRESSION (HCC): Primary | ICD-10-CM

## 2025-06-27 DIAGNOSIS — M80.00XD AGE-RELATED OSTEOPOROSIS WITH CURRENT PATHOLOGICAL FRACTURE WITH ROUTINE HEALING: ICD-10-CM

## 2025-06-27 PROCEDURE — 99308 SBSQ NF CARE LOW MDM 20: CPT | Performed by: NURSE PRACTITIONER

## 2025-06-27 NOTE — ASSESSMENT & PLAN NOTE
Lab Results   Component Value Date    EGFR 43 02/03/2025    EGFR 69 01/22/2025    EGFR 42 01/16/2025    CREATININE 1.33 02/03/2025    CREATININE 0.85 01/22/2025    CREATININE 1.27 01/16/2025   Chronic, stable  Follows outpatient with      Continue fluid restriction and avoid nephrotoxic medications  Repeat BMP and A1c reviewed with diabetic medication adjustment          no

## 2025-06-27 NOTE — PROGRESS NOTES
:  Assessment & Plan  Bipolar depression (HCC)  Continue Latuda 40 mg in the morning and 80 mg at bedtime.  Continue Zoloft 50 mg twice daily  Follows up outpatient with Dr. JAMES sanders, psychiatry.  Mood pleasant today         Essential hypertension  Chronic, stable  Blood pressure 129/70  Continue metoprolol 25 mg daily         Type 2 diabetes mellitus without complication, without long-term current use of insulin (HCC)    Lab Results   Component Value Date    HGBA1C 7.7 (H) 11/08/2024   Chronic, stable  Blood sugar today is 286     Repeat HbA1c ordered for 6-23 is 10.1  Metformin increased and jardiance added  Patient refusing insulin at this time  Continue CCD diet  Consider daily blood sugar measurement         Age-related osteoporosis with current pathological fracture with routine healing  Chronic, stable  History of compression fracture of the vertebra, femur.  History of left knee replacement     Currently on Prolia every 6 months, next dose 5/2025  Continue vitamin D 1000 units daily, calcium 600 mg twice daily         Stage 3a chronic kidney disease (HCC)  Lab Results   Component Value Date    EGFR 43 02/03/2025    EGFR 69 01/22/2025    EGFR 42 01/16/2025    CREATININE 1.33 02/03/2025    CREATININE 0.85 01/22/2025    CREATININE 1.27 01/16/2025   Chronic, stable  Follows outpatient with      Continue fluid restriction and avoid nephrotoxic medications  Repeat BMP and A1c reviewed with diabetic medication adjustment         Mixed hyperlipidemia  Chronic, stable  Continue rosuvastatin 20 mg daily             History of Present Illness     Yris Bowles is a 70 y.o. female   Patient is a 71yo female seen and examined today at bedside.  She denies any c/o today and her mood is pleasant.        Review of Systems   Constitutional: Negative.    HENT: Negative.     Eyes: Negative.    Respiratory: Negative.     Cardiovascular: Negative.    Gastrointestinal: Negative.    Endocrine: Negative.    Genitourinary:  Negative.    Musculoskeletal: Negative.    Skin: Negative.    Allergic/Immunologic: Negative.    Neurological: Negative.    Hematological: Negative.    Psychiatric/Behavioral: Negative.       Objective   /70   Pulse 70   Temp (!) 97.2 °F (36.2 °C)   Resp 18   Wt 73.5 kg (162 lb)   SpO2 94%   BMI 27.81 kg/m²      Physical Exam  Constitutional:       General: She is not in acute distress.     Appearance: Normal appearance. She is not ill-appearing, toxic-appearing or diaphoretic.   HENT:      Head: Normocephalic and atraumatic.      Right Ear: External ear normal.      Left Ear: External ear normal.      Nose: Nose normal. No congestion.      Mouth/Throat:      Mouth: Mucous membranes are moist.      Pharynx: Oropharynx is clear.     Eyes:      General:         Right eye: No discharge.         Left eye: No discharge.       Cardiovascular:      Rate and Rhythm: Normal rate and regular rhythm.      Pulses: Normal pulses.      Heart sounds: Normal heart sounds.   Pulmonary:      Effort: Pulmonary effort is normal. No respiratory distress.      Breath sounds: Normal breath sounds. No wheezing, rhonchi or rales.   Chest:      Chest wall: No tenderness.   Abdominal:      General: There is no distension.      Palpations: Abdomen is soft.      Tenderness: There is no abdominal tenderness. There is no right CVA tenderness, left CVA tenderness, guarding or rebound.     Musculoskeletal:      Cervical back: Normal range of motion and neck supple. No rigidity.      Right lower leg: No edema.      Left lower leg: No edema.      Comments: W/c baseline   Lymphadenopathy:      Cervical: No cervical adenopathy.     Skin:     General: Skin is warm and dry.      Findings: No bruising, erythema, lesion or rash.     Neurological:      Mental Status: Mental status is at baseline.      Gait: Gait abnormal.     Psychiatric:         Mood and Affect: Mood normal.         Behavior: Behavior normal.         Thought Content: Thought  content normal.         Judgment: Judgment normal.

## 2025-06-27 NOTE — ASSESSMENT & PLAN NOTE
Continue Latuda 40 mg in the morning and 80 mg at bedtime.  Continue Zoloft 50 mg twice daily  Follows up outpatient with Dr. JAMES sanders, psychiatry.  Mood pleasant today

## 2025-06-27 NOTE — ASSESSMENT & PLAN NOTE
Lab Results   Component Value Date    HGBA1C 7.7 (H) 11/08/2024   Chronic, stable  Blood sugar today is 286     Repeat HbA1c ordered for 6-23 is 10.1  Metformin increased and jardiance added  Patient refusing insulin at this time  Continue CCD diet  Consider daily blood sugar measurement

## 2025-07-14 DIAGNOSIS — M80.00XD AGE-RELATED OSTEOPOROSIS WITH CURRENT PATHOLOGICAL FRACTURE WITH ROUTINE HEALING: Primary | ICD-10-CM

## 2025-07-16 NOTE — PROGRESS NOTES
Assessment/Plan:    Problem List Items Addressed This Visit          Oncology    History of endometrial cancer (Chronic)     69-year-old with a history of on stage Ib grade 1 endometrial cancer with lymphovascular space invasion status post surgery and adjuvant whole pelvic radiation therapy that completed in August 2017.  I reviewed CBC, BMP.  She is clinically without evidence of disease recurrence.  Her performance status is 3.  1.  Return in 1 year for endometrial cancer surveillance.                CHIEF COMPLAINT: Endometrial cancer surveillance          Problem:  Cancer Staging   History of endometrial cancer  Staging form: Corpus Uteri - Carcinoma, AJCC 7th Edition  - Clinical: FIGO Stage IB (T1b, N0, M0) - Signed by Natalia Ramirez PA-C on 3/28/2018      Previous therapy:  Oncology History   History of endometrial cancer   4/5/2017 Initial Diagnosis    Malignant neoplasm of endometrium (HCC)     4/5/2017 Surgery    Dilation and curettage  A. Grade 1 endometrial cancer        5/4/2017 Surgery    Robotic-assisted total laparoscopic hysterectomy, bilateral salpingo-oophorectomy and cystoscopy  A. FIGO grade 1, 4.4 cm tumor   B. 1.0 of 1.5 cm myometrial invasion.   C. Positive LVSI.   D. Negative pelvic washings  E. IHC for peterson, all proteins expressed.      7/19/2017 - 8/24/2017 Radiation    :  Course: C1    Plan ID Energy Fractions Dose per Fraction (cGy) Total Dose Delivered (cGy) Elapsed Days   Whole Pelvis 10X 25 / 25 180 4,500 36      Treatment dates:  C1: 7/19/2017 - 8/24/2017       History of colon cancer   12/2017 Surgery    Colonoscopy x 2           Patient ID: Yris Bowles is a 69 y.o. female  Who returns after being lost to follow-up for almost 3 years for endometrial cancer surveillance.  She has no problems with bowel or bladder function.  No hematuria, dysuria, vaginal bleeding, hematochezia, melena.  No abdominal pain.  Labs from 7/12/2024 revealed mild leukopenia with a total white blood  The patient was called. He was informed of his lab results as well as Dr. Yu's recommendations. He verbalized understanding and was encouraged to call the clinic back with any questions or concerns.    cell count of 4.26, mild thrombocytopenia with a platelet count of 132 and hemoglobin of 12 g/dL.  BMP was normal with the exception of a serum creatinine of 1.13 mg/dL and a blood sugar of 190 mg/dL.  She ambulates with the assistance of a walker.  She follows closely with PT and OT.        Review of Systems   Constitutional:  Negative for activity change and unexpected weight change.   HENT: Negative.     Eyes: Negative.    Respiratory: Negative.     Cardiovascular: Negative.    Gastrointestinal:  Negative for abdominal distention and abdominal pain.   Endocrine: Negative.    Genitourinary:  Negative for pelvic pain and vaginal bleeding.   Musculoskeletal:  Positive for arthralgias and gait problem.   Skin: Negative.    Allergic/Immunologic: Negative.    Hematological: Negative.    Psychiatric/Behavioral: Negative.         Current Outpatient Medications   Medication Sig Dispense Refill    acetaminophen (TYLENOL) 325 mg tablet Take 3 tablets (975 mg total) by mouth 2 (two) times a day (Patient taking differently: Take 975 mg by mouth every 6 (six) hours as needed)  0    aspirin (ECOTRIN LOW STRENGTH) 81 mg EC tablet Take 1 tablet (81 mg total) by mouth daily 30 tablet 5    calcium carbonate (OS-MARVEL) 600 MG tablet Take 600 mg by mouth 2 (two) times a day with meals      cholecalciferol (VITAMIN D3) 1,000 units tablet Take 1,000 Units by mouth daily      Diclofenac Sodium (VOLTAREN) 1 % Apply 4 g topically 4 (four) times a day 100 g 1    divalproex sodium (DEPAKOTE ER) 250 mg 24 hr tablet       docusate sodium (COLACE) 100 mg capsule Take 100 mg by mouth 2 (two) times a day      famotidine (PEPCID) 20 mg tablet Take 1 tablet (20 mg total) by mouth 2 (two) times a day 180 tablet 1    gabapentin (NEURONTIN) 100 mg capsule TAKE 1 CAPSULE BY MOUTH TWICE DAILY ( 8AM AND 6PM) AND 2 CAPSULE AT BEDTIME (Patient taking differently: 100 mg 2 (two) times a day) 120 capsule 5    lidocaine (LIDODERM) 5 % Apply 1 patch topically  over 12 hours daily Remove & Discard patch within 12 hours or as directed by MD Do not start before May 31, 2023. (Patient taking differently: Apply 1 patch topically as needed Remove & Discard patch within 12 hours or as directed by MD) 30 patch 0    lurasidone (LATUDA) 40 mg tablet 40 mg 40 mg at 8 am, 40 mg 12 pm , 80 mg at 6 pm      metFORMIN (GLUCOPHAGE-XR) 500 mg 24 hr tablet Take 1 tablet (500 mg total) by mouth 2 (two) times a day with meals 180 tablet 1    metoprolol succinate (TOPROL-XL) 25 mg 24 hr tablet Take 1 tablet (25 mg total) by mouth daily 90 tablet 1    Multiple Vitamin (One-Daily Multi-Vitamin) TABS Take 1 tablet by mouth once daily 90 tablet 1    nitroglycerin (NITROSTAT) 0.4 mg SL tablet Place 1 tablet (0.4 mg total) under the tongue every 5 (five) minutes as needed for chest pain 30 tablet 1    Omega-3 Fatty Acids (FISH OIL PO) Take 2,000 mg by mouth 2 (two) times a day      rosuvastatin (CRESTOR) 20 MG tablet Take 1 tablet by mouth once daily 90 tablet 1    sertraline (ZOLOFT) 100 mg tablet Take 50 mg by mouth 2 (two) times a day      sodium chloride 1 g tablet Take 1 tablet (1 g total) by mouth 3 (three) times a day 270 tablet 1    torsemide (DEMADEX) 5 MG tablet Take 1 tablet by mouth once daily 90 tablet 1    BD Pen Needle Chelsey 2nd Gen 32G X 4 MM MISC USE 1  TWICE DAILY (Patient not taking: Reported on 8/27/2024) 60 each 0    clonazePAM (KlonoPIN) 0.5 mg tablet TAKE 1/2 (ONE-HALF) TO ONE TABLET BY MOUTH ONCE DAILY AS NEEDED. WATCH FOR SEDATION (Patient not taking: Reported on 8/1/2024)      ketoconazole (NIZORAL) 2 % cream Apply topically daily (Patient not taking: Reported on 8/12/2024) 30 g 0    lurasidone (LATUDA) 80 mg tablet Take 1 tablet (80 mg total) by mouth daily with dinner (Patient not taking: Reported on 8/27/2024) 30 tablet 0    Multiple Vitamin (multivitamin) tablet Take 1 tablet by mouth daily (Patient not taking: Reported on 8/12/2024) 90 tablet 1    sertraline (ZOLOFT) 50  mg tablet Take 50 mg by mouth 2 (two) times a day (Patient not taking: Reported on 6/7/2024)       No current facility-administered medications for this visit.       Allergies   Allergen Reactions    Wellbutrin [Bupropion] Anxiety     Patient has tried Wellbutrin which resulted in increased paranoia. Patient wishes to not try this medication again.       Past Medical History:   Diagnosis Date    Anesthesia complication     difficulty waking up    Anxiety     Arthritis     left knee    Bipolar 1 disorder (HCC)     Bone spur     left knee behing the knee cap    Cancer (HCC)     Chronic kidney disease     Chronic pain disorder     Colon cancer (HCC)     patient states about 2017    Colon polyp     Tubulovillous Adenoma High Grade Dysplasia - 2017    Depression     Diabetes mellitus (HCC)     Endometrial cancer (HCC)     grade I    Hx of bleeding disorder     vaginal bleeding started on 3/13/17     Hyperlipidemia     Hypertension     Hypomagnesemia 03/20/2023    Memory loss     PONV (postoperative nausea and vomiting)     Psychiatric disorder     Psychiatric illness     Psychosis (HCC)     Shortness of breath     with exertion    Sleep difficulties     Urinary incontinence     Vitamin D deficiency        Past Surgical History:   Procedure Laterality Date    BREAST BIOPSY Left     benign-maybe at ar age 63    CHOLECYSTECTOMY      open    COLONOSCOPY      DILATION AND CURETTAGE OF UTERUS N/A 04/05/2017    Procedure: DILATATION AND CURETTAGE (D&C);  Surgeon: Primitivo Weber MD;  Location: [facility] MAIN OR;  Service:     HYSTERECTOMY      OOPHORECTOMY      PELVIC LAPAROSCOPY      VT ARTHRP KNE CONDYLE&PLATU MEDIAL&LAT COMPARTMENTS Left 12/06/2022    Procedure: ARTHROPLASTY KNEE TOTAL W ROBOT - CEMENTED - LEFT;  Surgeon: Juan Jose Guerra DO;  Location: [facility] MAIN OR;  Service: Orthopedics    VT LAPS TOTAL HYSTERECT 250 GM/< W/RMVL TUBE/OVARY N/A 05/04/2017    Procedure: ROBOTIC ASSISTED TOTAL LAPAROSCOPIC HYSTERECTOMY, BILATERAL  SALPINGOOPHERECTOMY; CYSTOSCOPY;  Surgeon: Damir Reyes MD;  Location:  MAIN OR;  Service: Gynecology Oncology    SD OPTX FEM SHFT FX W/INSJ IMED IMPLT W/WO SCREW Left 3/20/2023    Procedure: INSERTION NAIL IM FEMUR RETROGRADE;  Surgeon: Terry White MD;  Location: [facility] MAIN OR;  Service: Orthopedics    REPLACEMENT TOTAL KNEE      TONSILLECTOMY      TUBAL LIGATION         OB History          2    Para   2    Term   2            AB        Living             SAB        IAB        Ectopic        Multiple        Live Births                     Family History   Problem Relation Age of Onset    Cancer Mother         Renal    Heart disease Father         CHF    Dementia Sister     Heart disease Sister         atrial septal defect    Dementia Sister     Breast cancer Paternal Aunt 45       The following portions of the patient's history were reviewed and updated as appropriate: allergies, current medications, past family history, past medical history, past social history, past surgical history, and problem list.      Objective:    Blood pressure 120/72, pulse 78, temperature 97.7 °F (36.5 °C), temperature source Temporal, weight 78 kg (172 lb), SpO2 97%, not currently breastfeeding.  Body mass index is 29.52 kg/m².    Physical Exam  Vitals reviewed. Exam conducted with a chaperone present.   Constitutional:       General: She is not in acute distress.     Appearance: Normal appearance. She is well-developed. She is obese. She is not ill-appearing, toxic-appearing or diaphoretic.   HENT:      Head: Normocephalic and atraumatic.   Eyes:      General: No scleral icterus.     Extraocular Movements: Extraocular movements intact.      Conjunctiva/sclera: Conjunctivae normal.   Neck:      Thyroid: No thyromegaly.   Pulmonary:      Effort: Pulmonary effort is normal.   Abdominal:      General: There is no distension.      Palpations: Abdomen is soft. There is no mass.      Tenderness: There is no abdominal  "tenderness. There is no guarding or rebound.      Hernia: A hernia is present.      Comments: Palpable periumbilical hernia   Genitourinary:     Comments: The external female genitalia is normal. The bartholin's, uretheral and skenes glands are normal. The urethral meatus is normal (midline with no lesions). Anus without fissure or lesion. Speculum exam reveals a grossly normal vagina with a small anterior polypoid structure consistent with rugation.  No masses, lesions,discharge or bleeding. No significant cystocele or rectocele noted. Bimanual exam notes a surgical absent cervix, uterus and adnexal structures. No masses or fullness. Bladder is without fullness, mass or tenderness.    Musculoskeletal:         General: No swelling or tenderness.      Cervical back: Normal range of motion and neck supple.      Right lower leg: Edema present.      Left lower leg: Edema present.      Comments: 2+ bilateral   Lymphadenopathy:      Cervical: No cervical adenopathy.   Skin:     General: Skin is warm and dry.      Coloration: Skin is not jaundiced or pale.      Findings: No lesion or rash.   Neurological:      General: No focal deficit present.      Mental Status: She is alert and oriented to person, place, and time.      Cranial Nerves: No cranial nerve deficit.      Motor: No weakness.      Gait: Gait abnormal.      Comments: Flat affect   Psychiatric:         Mood and Affect: Mood normal.         Behavior: Behavior normal.         Thought Content: Thought content normal.         Judgment: Judgment normal.           No results found for: \"\"  Lab Results   Component Value Date    WBC 4.26 (L) 05/09/2024    HGB 12.1 05/09/2024    HCT 36.7 05/09/2024    MCV 97 05/09/2024     (L) 05/09/2024     Lab Results   Component Value Date     (L) 04/21/2017    K 4.4 07/12/2024     07/12/2024    CO2 29 07/12/2024    BUN 20 07/12/2024    CREATININE 1.13 07/12/2024    GLUF 212 (H) 05/09/2024    CALCIUM 9.5 " 07/12/2024    CORRECTEDCA 9.6 04/29/2023    AST 25 05/09/2024    ALT 20 05/09/2024    ALKPHOS 58 05/09/2024    PROT 6.9 04/21/2017    BILITOT 0.5 04/21/2017    EGFR 49 07/12/2024

## 2025-07-21 ENCOUNTER — NURSING HOME VISIT (OUTPATIENT)
Age: 70
End: 2025-07-21

## 2025-07-21 VITALS
TEMPERATURE: 97.6 F | OXYGEN SATURATION: 96 % | BODY MASS INDEX: 27.72 KG/M2 | WEIGHT: 162.4 LBS | DIASTOLIC BLOOD PRESSURE: 56 MMHG | HEART RATE: 58 BPM | HEIGHT: 64 IN | SYSTOLIC BLOOD PRESSURE: 105 MMHG | RESPIRATION RATE: 18 BRPM

## 2025-07-21 DIAGNOSIS — N18.31 STAGE 3A CHRONIC KIDNEY DISEASE (HCC): ICD-10-CM

## 2025-07-21 DIAGNOSIS — E78.2 MIXED HYPERLIPIDEMIA: ICD-10-CM

## 2025-07-21 DIAGNOSIS — E04.1 THYROID NODULE: ICD-10-CM

## 2025-07-21 DIAGNOSIS — M80.00XD AGE-RELATED OSTEOPOROSIS WITH CURRENT PATHOLOGICAL FRACTURE WITH ROUTINE HEALING: ICD-10-CM

## 2025-07-21 DIAGNOSIS — F31.9 BIPOLAR DEPRESSION (HCC): ICD-10-CM

## 2025-07-21 DIAGNOSIS — E11.9 TYPE 2 DIABETES MELLITUS WITHOUT COMPLICATION, WITHOUT LONG-TERM CURRENT USE OF INSULIN (HCC): ICD-10-CM

## 2025-07-21 DIAGNOSIS — I10 ESSENTIAL HYPERTENSION: Primary | ICD-10-CM

## 2025-07-21 PROCEDURE — 99308 SBSQ NF CARE LOW MDM 20: CPT | Performed by: FAMILY MEDICINE

## 2025-07-21 NOTE — ASSESSMENT & PLAN NOTE
Chronic, stable with history of compression fracture of the vertebra, femur and left knee replacement.  -Vit D 39 ng/ml on low end of normal   Currently on Prolia q6 months   Continue vitamin D 1000 units daily, calcium 600 mg twice daily  Denies offer for PT

## 2025-07-21 NOTE — ASSESSMENT & PLAN NOTE
Chronic, stable on home Latuda 40 mg in the morning and 80 mg at bedtime.  In addition to Zoloft 50 mg BID.  Follows up outpatient with psychiatry.  Mood stable  Continue home regimen

## 2025-07-21 NOTE — ASSESSMENT & PLAN NOTE
Chronic, stable on metoprolol 25 mg daily. Last blood pressure 105/56.   Continue home regimen   Low salt diet   Continue to monitor pressures, if continue to trend on the lower range can consider lowering BP medication dose

## 2025-07-21 NOTE — ASSESSMENT & PLAN NOTE
Lab Results   Component Value Date    EGFR 43 02/03/2025    EGFR 69 01/22/2025    EGFR 42 01/16/2025    CREATININE 1.33 02/03/2025    CREATININE 0.85 01/22/2025    CREATININE 1.27 01/16/2025   Chronic, stable. Follows nephrology outpatient with . No urinary complaints noted.   Continue fluid restriction and avoid nephrotoxic medications  Repeat BMP and A1c reviewed with diabetic medication adjustment

## 2025-07-21 NOTE — ASSESSMENT & PLAN NOTE
Lab Results   Component Value Date    HGBA1C 7.7 (H) 11/08/2024   Chronic, stable. Blood sugars chronically on the higher end. Patient refuses insulin at this time.    -Hb A1c 06/23/25: 10.1  Continue Metformin and Jardiance for added coverage   Continue diabetic diet  Consider daily blood sugar checks

## 2025-08-01 ENCOUNTER — OFFICE VISIT (OUTPATIENT)
Dept: NEPHROLOGY | Facility: CLINIC | Age: 70
End: 2025-08-01
Payer: MEDICARE

## 2025-08-01 VITALS
HEIGHT: 64 IN | WEIGHT: 167 LBS | DIASTOLIC BLOOD PRESSURE: 64 MMHG | OXYGEN SATURATION: 97 % | BODY MASS INDEX: 28.51 KG/M2 | HEART RATE: 72 BPM | SYSTOLIC BLOOD PRESSURE: 114 MMHG

## 2025-08-01 DIAGNOSIS — E22.2 SIADH (SYNDROME OF INAPPROPRIATE ADH PRODUCTION) (HCC): ICD-10-CM

## 2025-08-01 DIAGNOSIS — I10 ESSENTIAL HYPERTENSION: ICD-10-CM

## 2025-08-01 DIAGNOSIS — E87.1 HYPONATREMIA: ICD-10-CM

## 2025-08-01 DIAGNOSIS — R79.89 ELEVATED SERUM CREATININE: ICD-10-CM

## 2025-08-01 DIAGNOSIS — E11.9 TYPE 2 DIABETES MELLITUS WITHOUT COMPLICATION, WITHOUT LONG-TERM CURRENT USE OF INSULIN (HCC): Primary | ICD-10-CM

## 2025-08-01 PROCEDURE — 99214 OFFICE O/P EST MOD 30 MIN: CPT | Performed by: INTERNAL MEDICINE

## 2025-08-01 RX ORDER — LANOLIN ALCOHOL/MO/W.PET/CERES
1000 CREAM (GRAM) TOPICAL DAILY
COMMUNITY

## 2025-08-05 ENCOUNTER — TELEPHONE (OUTPATIENT)
Dept: NEPHROLOGY | Facility: CLINIC | Age: 70
End: 2025-08-05

## (undated) DEVICE — ALL PURPOSE SPONGES,NONWOVEN, 4 PLY: Brand: CURITY

## (undated) DEVICE — GLOVE SRG BIOGEL 7

## (undated) DEVICE — PROGRASP FORCEPS: Brand: ENDOWRIST;DAVINCI SI

## (undated) DEVICE — ANTIBACTERIAL UNDYED BRAIDED (POLYGLACTIN 910), SYNTHETIC ABSORBABLE SUTURE: Brand: COATED VICRYL

## (undated) DEVICE — DUAL CUT SAGITTAL BLADE

## (undated) DEVICE — MAYO STAND COVER: Brand: CONVERTORS

## (undated) DEVICE — DRESSING MEPILEX AG BORDER POST-OP 4 X 12 IN

## (undated) DEVICE — GLOVE INDICATOR PI UNDERGLOVE SZ 9 BLUE

## (undated) DEVICE — PACK ROBOTIC PROSTATE PBDS DA VINCI SI/XI

## (undated) DEVICE — CHLORAPREP HI-LITE 26ML ORANGE

## (undated) DEVICE — CAPIT KNEE ATTUNE FB W/DOM

## (undated) DEVICE — COBAN 4 IN STERILE

## (undated) DEVICE — HANDPIECE SET WITH HIGH FLOW TIP AND SUCTION TUBE: Brand: INTERPULSE

## (undated) DEVICE — DRAPE C-ARM X-RAY

## (undated) DEVICE — VELYS ROBOT DRAPE

## (undated) DEVICE — ROBOT ACCESSORY KIT 4 ARM

## (undated) DEVICE — VELYS ROBOT-ASSISTED SOLUTION ARRAY DRILL PIN 100 MM X 4 MM: Brand: VELYS

## (undated) DEVICE — BANDAGE, ESMARK LF STR 6"X9' (20/CS): Brand: CYPRESS

## (undated) DEVICE — VELYS SATEL DRAPE

## (undated) DEVICE — GLOVE SRG BIOGEL 9

## (undated) DEVICE — CHLORHEXIDINE 4PCT 4 OZ

## (undated) DEVICE — LUBRICANT SURGILUBE TUBE 4 OZ  FLIP TOP

## (undated) DEVICE — INTENDED FOR TISSUE SEPARATION, AND OTHER PROCEDURES THAT REQUIRE A SHARP SURGICAL BLADE TO PUNCTURE OR CUT.: Brand: BARD-PARKER ® CARBON RIB-BACK BLADES

## (undated) DEVICE — LUBRICANT INST ELECTROLUBE ANTISTK WO PAD

## (undated) DEVICE — PAD GROUNDING ADULT

## (undated) DEVICE — GLOVE SRG BIOGEL 6.5

## (undated) DEVICE — MAXI PAD5.51 X 13.78 IN. (14.0 X 35.0 CM)HEAVYCONTOUREDUNSCENTED: Brand: CURITY

## (undated) DEVICE — 4.2MM THREE-FLUTED DRILL BIT QC/NEEDLE POINT/145MM

## (undated) DEVICE — BASIC DOUBLE BASIN 2-LF: Brand: MEDLINE INDUSTRIES, INC.

## (undated) DEVICE — VEST SURGEON DISPOSABLE

## (undated) DEVICE — GLOVE SRG BIOGEL ECLIPSE 8

## (undated) DEVICE — STOCKINETTE REGULAR

## (undated) DEVICE — GLOVE INDICATOR PI UNDERGLOVE SZ 7.5 BLUE

## (undated) DEVICE — SKIN MARKER DUAL TIP WITH RULER CAP, FLEXIBLE RULER AND LABELS: Brand: DEVON

## (undated) DEVICE — OCCLUDER COLPO-PNEUMO

## (undated) DEVICE — ADHESIVE SKN CLSR HISTOACRYL FLEX 0.5ML LF

## (undated) DEVICE — INTENDED FOR TISSUE SEPARATION, AND OTHER PROCEDURES THAT REQUIRE A SHARP SURGICAL BLADE TO PUNCTURE OR CUT.: Brand: BARD-PARKER SAFETY BLADES SIZE 15, STERILE

## (undated) DEVICE — SUT VICRYL 2-0 SH 27 IN UNDYED J417H

## (undated) DEVICE — TIP COVER ACCESSORY

## (undated) DEVICE — 2.5MM REAMING ROD WITH BALL TIP/950MM-STERILE

## (undated) DEVICE — DISPOSABLE BRIEF/UNDERWEAR

## (undated) DEVICE — ANTIBACTERIAL VIOLET BRAIDED (POLYGLACTIN 910), SYNTHETIC ABSORBABLE SUTURE: Brand: COATED VICRYL

## (undated) DEVICE — VELYS ROBOT-ASSISTED SOLUTION ARRAY DRILL PIN 125 MM X 4 MM: Brand: VELYS

## (undated) DEVICE — ADHESIVE SKIN HIGH VISCOSITY EXOFIN 1ML

## (undated) DEVICE — GLOVE INDICATOR PI UNDERGLOVE SZ 8.5 BLUE

## (undated) DEVICE — TIBURON EXTREMITY SHEET: Brand: CONVERTORS

## (undated) DEVICE — SYRINGE 10ML SLIP TIP LF

## (undated) DEVICE — SUT VICRYL 0 CP-1 27 IN J267H

## (undated) DEVICE — SUT STRATAFIX SPIRAL 3-0 PGA/PCL 30 X 30 CM SXMD2B410

## (undated) DEVICE — SPECIMEN TRAP: Brand: ARGYLE

## (undated) DEVICE — SILVER-COATED ANTIMICROBIAL BARRIER DRESSING: Brand: ACTICOAT   4" X 8"

## (undated) DEVICE — PACK MAJOR ORTHO W/SPLITS PBDS

## (undated) DEVICE — SPONGE RAYTEX

## (undated) DEVICE — DRAPE EQUIPMENT RF WAND

## (undated) DEVICE — INTENDED FOR TISSUE SEPARATION, AND OTHER PROCEDURES THAT REQUIRE A SHARP SURGICAL BLADE TO PUNCTURE OR CUT.: Brand: BARD-PARKER SAFETY BLADES SIZE 11, STERILE

## (undated) DEVICE — SYRINGE 50ML LL

## (undated) DEVICE — DRAPE SHEET X-LG

## (undated) DEVICE — MONOPOLAR CURVED SCISSORS: Brand: ENDOWRIST;DAVINCI SI

## (undated) DEVICE — DRAPE FLUID WARMER (BIRD BATH)

## (undated) DEVICE — BIPOLAR CORD DISP

## (undated) DEVICE — LINER FOOT PAD STERILE DISPOSABLE

## (undated) DEVICE — UTERINE MANIPULATOR RUMI 6.7 X 8 CM

## (undated) DEVICE — TRAY FOLEY 16FR URIMETER SURESTEP

## (undated) DEVICE — SUT STRATAFIX SPIRAL 1-0  CT-1 30 X 30CM SXPD2B403

## (undated) DEVICE — 3M™ STERI-DRAPE™ U-DRAPE 1015: Brand: STERI-DRAPE™

## (undated) DEVICE — DRILL BIT/ CALIBRATED/ Ø4.2MM EXTRA-LONG

## (undated) DEVICE — GLOVE SRG BIOGEL 7.5

## (undated) DEVICE — 450 ML BOTTLE OF 0.05% CHLORHEXIDINE GLUCONATE IN 99.95% STERILE WATER FOR IRRIGATION, USP AND APPLICATOR.: Brand: IRRISEPT ANTIMICROBIAL WOUND LAVAGE

## (undated) DEVICE — SUT VICRYL 2-0 CT-1 27 IN J259H

## (undated) DEVICE — ACE WRAP 6 IN UNSTERILE

## (undated) DEVICE — PADDING CAST 4 IN  COTTON STRL

## (undated) DEVICE — VELYS BLADE SAW OSC 85 X 19 X 2MM

## (undated) DEVICE — ENDOPATH XCEL BLADELESS TROCARS WITH STABILITY SLEEVES: Brand: ENDOPATH XCEL

## (undated) DEVICE — 3.2MM GUIDE WIRE 400MM

## (undated) DEVICE — HOOD: Brand: FLYTE, SURGICOOL

## (undated) DEVICE — GLOVE SRG BIOGEL ORTHOPEDIC 8.5

## (undated) DEVICE — LARGE NEEDLE DRIVER: Brand: ENDOWRIST;DAVINCI SI

## (undated) DEVICE — GLOVE SRG BIOGEL ECLIPSE 7.5

## (undated) DEVICE — PACK GENERAL LF

## (undated) DEVICE — STANDARD SURGICAL GOWN, L: Brand: CONVERTORS

## (undated) DEVICE — FENESTRATED BIPOLAR FORCEPS: Brand: ENDOWRIST;DAVINCI SI

## (undated) DEVICE — TELFA NON-ADHERENT ABSORBENT DRESSING: Brand: TELFA

## (undated) DEVICE — INSTRUMENT POUCH: Brand: CONVERTORS

## (undated) DEVICE — 3M™ DURAPORE™ SURGICAL TAPE 1538-3, 3 INCH X 10 YARD (7,5CM X 9,1M), 4 ROLLS/BOX: Brand: 3M™ DURAPORE™

## (undated) DEVICE — SUT MONOCRYL 4-0 PS-2 27 IN Y426H

## (undated) DEVICE — DRESSING MEPILEX AG BORDER 4 X 12 IN

## (undated) DEVICE — DRESSING MEPILEX AG BORDER 4 X 8 IN

## (undated) DEVICE — 3M™ IOBAN™ 2 ANTIMICROBIAL INCISE DRAPE 6650EZ: Brand: IOBAN™ 2

## (undated) DEVICE — LIGHT GLOVE GREEN

## (undated) DEVICE — ASTOUND SURGICAL GOWN, XXX LARGE, X-LONG: Brand: CONVERTORS

## (undated) DEVICE — VELYS ROBOTIC-ASSISTED SOLUTION ARRAY SET - KNEE: Brand: VELYS

## (undated) DEVICE — PLUMEPEN PRO 10FT

## (undated) DEVICE — PERI/GYN PACK: Brand: CONVERTORS

## (undated) DEVICE — ORTHOPEDIC PACK: Brand: CARDINAL HEALTH

## (undated) DEVICE — GLOVE INDICATOR PI UNDERGLOVE SZ 7 BLUE

## (undated) DEVICE — GLOVE INDICATOR PI UNDERGLOVE SZ 8 BLUE

## (undated) DEVICE — PREP PAD BNS: Brand: CONVERTORS

## (undated) DEVICE — CUFF TOURNIQUET 34 X 4 IN QUICK CONNECT DISP 1BLA

## (undated) DEVICE — ASTOUND STANDARD SURGICAL GOWN, XL: Brand: CONVERTORS

## (undated) DEVICE — PROXIMATE SKIN STAPLERS (35 WIDE) CONTAINS 35 STAINLESS STEEL STAPLES (FIXED HEAD): Brand: PROXIMATE

## (undated) DEVICE — 1820 FOAM BLOCK NEEDLE COUNTER: Brand: DEVON

## (undated) DEVICE — DRAPE C-ARMOUR

## (undated) DEVICE — SPECIMEN CONTAINER STERILE PEEL PACK

## (undated) DEVICE — 3000CC GUARDIAN II: Brand: GUARDIAN

## (undated) DEVICE — GLOVE SRG BIOGEL 8

## (undated) DEVICE — INTENDED FOR TISSUE SEPARATION, AND OTHER PROCEDURES THAT REQUIRE A SHARP SURGICAL BLADE TO PUNCTURE OR CUT.: Brand: BARD-PARKER SAFETY BLADES SIZE 10, STERILE